# Patient Record
Sex: MALE | Race: WHITE
[De-identification: names, ages, dates, MRNs, and addresses within clinical notes are randomized per-mention and may not be internally consistent; named-entity substitution may affect disease eponyms.]

---

## 2019-11-13 ENCOUNTER — HOSPITAL ENCOUNTER (EMERGENCY)
Dept: HOSPITAL 46 - ED | Age: 54
LOS: 1 days | Discharge: HOME | End: 2019-11-14
Payer: MEDICAID

## 2019-11-13 VITALS — WEIGHT: 200 LBS | BODY MASS INDEX: 28.63 KG/M2 | HEIGHT: 70 IN

## 2019-11-13 DIAGNOSIS — Z88.0: ICD-10-CM

## 2019-11-13 DIAGNOSIS — N18.9: ICD-10-CM

## 2019-11-13 DIAGNOSIS — Z79.01: ICD-10-CM

## 2019-11-13 DIAGNOSIS — Z79.899: ICD-10-CM

## 2019-11-13 DIAGNOSIS — Z86.73: ICD-10-CM

## 2019-11-13 DIAGNOSIS — Z87.891: ICD-10-CM

## 2019-11-13 DIAGNOSIS — Z79.82: ICD-10-CM

## 2019-11-13 DIAGNOSIS — I12.9: Primary | ICD-10-CM

## 2019-11-13 DIAGNOSIS — E11.22: ICD-10-CM

## 2019-11-16 NOTE — EKG
[facility]
                                    [address]
                                  [place]  [address]
_________________________________________________________________________________________
                                                                 Signed   
 
 
Normal sinus rhythm
Possible Left atrial enlargement
Left axis deviation
Left ventricular hypertrophy with QRS widening
Cannot rule out Septal infarct , age undetermined
Abnormal ECG
No previous ECGs available
Confirmed by DANAY GOMES MD ([id number]) on 11/16/2019 7:55:11 PM
 
 
 
 
 
 
 
 
 
 
 
 
 
 
 
 
 
 
 
 
 
 
 
 
 
 
 
 
 
 
 
 
 
 
 
 
 
    Electronically Signed By: DANAY GOMES MD  11/16/19 1955
_________________________________________________________________________________________
PATIENT NAME:     ALEX PATEL             
MEDICAL RECORD #: [id number]                     Electrocardiogram             
          ACCT #: [id number]  
DATE OF BIRTH:   [birth year]                                       
PHYSICIAN:   DANAY GOMES MD           REPORT #: [id number]
REPORT IS CONFIDENTIAL AND NOT TO BE RELEASED WITHOUT AUTHORIZATION

## 2020-11-12 PROBLEM — R80.1 PERSISTENT PROTEINURIA: Chronic | Status: ACTIVE | Noted: 2020-11-12

## 2021-10-01 PROBLEM — N40.0 BENIGN PROSTATE HYPERPLASIA: Status: ACTIVE | Noted: 2021-10-01

## 2021-10-01 PROBLEM — F32.9 MAJOR DEPRESSIVE DISORDER: Status: ACTIVE | Noted: 2021-10-01

## 2021-10-01 PROBLEM — F31.9 BIPOLAR DISORDER: Status: ACTIVE | Noted: 2021-10-01

## 2021-10-01 PROBLEM — I50.32 CHRONIC DIASTOLIC HEART FAILURE: Status: ACTIVE | Noted: 2021-10-01

## 2021-10-01 PROBLEM — G81.90 HEMIPLEGIA: Status: ACTIVE | Noted: 2021-10-01

## 2021-10-01 PROBLEM — I48.91 ATRIAL FIBRILLATION: Status: ACTIVE | Noted: 2021-10-01

## 2021-10-01 PROBLEM — E78.5 HYPERLIPIDEMIA: Status: ACTIVE | Noted: 2021-10-01

## 2023-07-17 ENCOUNTER — OFFICE VISIT (OUTPATIENT)
Dept: OPHTHALMOLOGY | Facility: CLINIC | Age: 58
End: 2023-07-17
Payer: MEDICARE

## 2023-07-17 VITALS — HEIGHT: 70 IN | BODY MASS INDEX: 28.06 KG/M2 | WEIGHT: 196 LBS

## 2023-07-17 DIAGNOSIS — E11.3599 PROLIFERATIVE DIABETIC RETINOPATHY ASSOCIATED WITH TYPE 2 DIABETES MELLITUS, MACULAR EDEMA PRESENCE UNSPECIFIED, UNSPECIFIED LATERALITY: ICD-10-CM

## 2023-07-17 DIAGNOSIS — H33.23 BILATERAL RETINAL DETACHMENT: Primary | ICD-10-CM

## 2023-07-17 DIAGNOSIS — H44.003: ICD-10-CM

## 2023-07-17 PROCEDURE — 99212 OFFICE O/P EST SF 10 MIN: CPT | Mod: PBBFAC,PO | Performed by: STUDENT IN AN ORGANIZED HEALTH CARE EDUCATION/TRAINING PROGRAM

## 2023-07-17 PROCEDURE — 76512 OPH US DX B-SCAN: CPT | Mod: 50,PBBFAC,PO | Performed by: OPHTHALMOLOGY

## 2023-07-17 PROCEDURE — 76512 OPH US DX B-SCAN: CPT | Mod: PBBFAC,PO | Performed by: STUDENT IN AN ORGANIZED HEALTH CARE EDUCATION/TRAINING PROGRAM

## 2023-07-17 RX ORDER — DOCUSATE SODIUM 100 MG/1
CAPSULE, LIQUID FILLED ORAL
Status: ON HOLD | COMMUNITY
Start: 2023-03-17 | End: 2023-08-14

## 2023-07-17 RX ORDER — SEVELAMER CARBONATE 800 MG/1
TABLET, FILM COATED ORAL
Status: ON HOLD | COMMUNITY
Start: 2023-07-17 | End: 2023-09-01 | Stop reason: HOSPADM

## 2023-07-17 RX ORDER — IPRATROPIUM BROMIDE AND ALBUTEROL SULFATE 2.5; .5 MG/3ML; MG/3ML
3 SOLUTION RESPIRATORY (INHALATION) 3 TIMES DAILY
Status: ON HOLD | COMMUNITY
End: 2023-09-14 | Stop reason: SDUPTHER

## 2023-07-17 RX ORDER — HYDRALAZINE HYDROCHLORIDE 100 MG/1
100 TABLET, FILM COATED ORAL 3 TIMES DAILY
Status: ON HOLD | COMMUNITY
Start: 2023-07-03 | End: 2023-08-14

## 2023-07-17 RX ORDER — FERROUS GLUCONATE 324(38)MG
TABLET ORAL
Status: ON HOLD | COMMUNITY
Start: 2023-04-18 | End: 2023-08-14

## 2023-07-17 RX ORDER — CLONIDINE HYDROCHLORIDE 0.1 MG/1
0.1 TABLET ORAL 3 TIMES DAILY
Status: ON HOLD | COMMUNITY
Start: 2023-07-03 | End: 2023-08-14

## 2023-07-17 RX ORDER — POLYETHYLENE GLYCOL 3350 17 G/17G
POWDER, FOR SOLUTION ORAL
Status: ON HOLD | COMMUNITY
Start: 2023-03-17 | End: 2023-08-14 | Stop reason: CLARIF

## 2023-07-17 RX ORDER — VITAMIN B COMPLEX/FOLIC ACID 0.4 MG
TABLET ORAL
Status: ON HOLD | COMMUNITY
Start: 2023-04-18 | End: 2023-08-14

## 2023-07-17 RX ORDER — PENTOXIFYLLINE 400 MG/1
400 TABLET, EXTENDED RELEASE ORAL 2 TIMES DAILY
Status: ON HOLD | COMMUNITY
Start: 2023-07-17 | End: 2023-08-21 | Stop reason: HOSPADM

## 2023-07-17 RX ORDER — DOXAZOSIN 4 MG/1
4 TABLET ORAL 2 TIMES DAILY
Status: ON HOLD | COMMUNITY
Start: 2023-07-03 | End: 2023-08-14

## 2023-07-17 RX ORDER — OXYCODONE HYDROCHLORIDE 5 MG/1
10 CAPSULE ORAL EVERY 8 HOURS PRN
Status: ON HOLD | COMMUNITY
Start: 2023-03-17 | End: 2023-08-14

## 2023-07-17 RX ORDER — METOCLOPRAMIDE 5 MG/1
5 TABLET ORAL DAILY
Status: ON HOLD | COMMUNITY
Start: 2023-03-06 | End: 2023-08-21 | Stop reason: HOSPADM

## 2023-07-17 RX ORDER — LACTULOSE 10 G/15ML
45 SOLUTION ORAL; RECTAL DAILY PRN
Status: ON HOLD | COMMUNITY
Start: 2023-07-17 | End: 2023-08-21 | Stop reason: HOSPADM

## 2023-07-17 RX ORDER — FLECAINIDE ACETATE 50 MG/1
50 TABLET ORAL 2 TIMES DAILY
Status: ON HOLD | COMMUNITY
Start: 2023-07-17 | End: 2023-08-21 | Stop reason: HOSPADM

## 2023-07-17 RX ORDER — INSULIN LISPRO 100 [IU]/ML
INJECTION, SOLUTION INTRAVENOUS; SUBCUTANEOUS
Status: ON HOLD | COMMUNITY
Start: 2023-03-17 | End: 2023-08-14

## 2023-07-17 RX ORDER — FOLIC ACID 1 MG/1
1 TABLET ORAL EVERY MORNING
COMMUNITY

## 2023-07-17 RX ORDER — LOSARTAN POTASSIUM 25 MG/1
25 TABLET ORAL
Status: ON HOLD | COMMUNITY
Start: 2023-07-03 | End: 2023-08-14

## 2023-07-17 RX ORDER — IBUPROFEN 600 MG/1
600 TABLET ORAL EVERY 6 HOURS PRN
Status: ON HOLD | COMMUNITY
Start: 2023-07-17 | End: 2023-08-21 | Stop reason: HOSPADM

## 2023-07-17 RX ORDER — BUSPIRONE HYDROCHLORIDE 10 MG/1
1 TABLET ORAL
Status: ON HOLD | COMMUNITY
Start: 2023-03-17 | End: 2023-08-14

## 2023-07-17 RX ORDER — ROSUVASTATIN CALCIUM 5 MG/1
5 TABLET, COATED ORAL DAILY
Status: ON HOLD | COMMUNITY
Start: 2023-03-06 | End: 2023-08-21 | Stop reason: HOSPADM

## 2023-07-17 RX ORDER — MEGESTROL ACETATE 40 MG/ML
10 SUSPENSION ORAL 2 TIMES DAILY
COMMUNITY
Start: 2023-07-17

## 2023-07-17 RX ORDER — OXYCODONE AND ACETAMINOPHEN 5; 325 MG/1; MG/1
1 TABLET ORAL EVERY 8 HOURS PRN
Status: ON HOLD | COMMUNITY
Start: 2023-04-11 | End: 2023-08-14

## 2023-07-17 RX ORDER — AMLODIPINE BESYLATE 10 MG/1
10 TABLET ORAL DAILY
Status: ON HOLD | COMMUNITY
Start: 2023-03-06 | End: 2023-08-21 | Stop reason: HOSPADM

## 2023-07-17 RX ORDER — LOPERAMIDE HYDROCHLORIDE 2 MG/1
4 CAPSULE ORAL 3 TIMES DAILY
Status: ON HOLD | COMMUNITY
Start: 2023-07-03 | End: 2023-08-14

## 2023-07-17 RX ORDER — ALPRAZOLAM 0.5 MG/1
1 TABLET ORAL
Status: ON HOLD | COMMUNITY
Start: 2023-03-17 | End: 2023-08-14

## 2023-07-17 RX ORDER — CALCITRIOL 0.25 UG/1
0.25 CAPSULE ORAL
Status: ON HOLD | COMMUNITY
Start: 2023-07-03 | End: 2023-08-14

## 2023-07-17 NOTE — PROGRESS NOTES
HPI    Pt states lost vision OS approx 1 wk ago. Lost vision OD more recently.   States has been receiving injections for diabetic retinopathy since 2018.   Pt is on dialysis MWF. OS swollen earlier this week. Pt is resident at   nursing home in [place], [place]. Wheelchair bound.   Last edited by Massiel Russo RN on 7/17/2023  3:29 PM.          7/17/23  Assessment /Plan     For exam results, see Encounter Report.    Bilateral retinal detachment    Endophthalmitis of both eyes    Proliferative diabetic retinopathy associated with type 2 diabetes mellitus, macular edema presence unspecified, unspecified laterality      Bilateral retinal detachments  Bilateral endophthalmitis   - Patient diagnosed with a vitreous hemorrhage OS at outside ED and presents to clinic today for evaluation   - VA LP OU. IOP 4//12. Fibrin in AC OU with hypopyon OS. No view posteriorly   - B scan with RD OU and vitreous debris concerning for endophthalmitis OU  - Patient with ESRD on dialysis with right AV fistula recently placed 7/7/2023 ?potential source of infection   - Discussed with retina staff, patient will present to the ED at [facility] for evaluation. Sister will bring patient to [facility]. Provided with address   - Will likely need hospital admission for dialysis needs and workup to infectious source of endophthalmitis     3. History of PDR, both eyes   - Patient with history of necrotic bowel with sepsis requiring surgery in July 2022 per patient's sister.   - Per patient's sister, patient recently seen by retina specialist in April 2022 and no signs of RD/infection at that time  - Unsure chronicity of bilateral retinal detachments       Patient to be sent to [facility] ED for evaluation with retina service.   Patient seen and discussed with Dr. Estrada and Dr. Valencia (retina fellow)

## 2023-07-18 PROBLEM — H33.23 BILATERAL RETINAL DETACHMENT: Status: ACTIVE | Noted: 2023-07-18

## 2023-08-08 NOTE — PROVIDER TRANSFER
(Physician in Lead of Transfers)  Outside Transfer Acceptance Note / Regional Referral Center    Upon patient arrival to floor, please send SecureChat to [facility] or call extension [phone] (if no answer, do NOT leave a callback number after the beep, rather please send a SecureChat to [facility]), for Hospital Medicine admit team assignment and for additional admit orders for the patient.  Do not page the attending physician associated with the patient on arrival (this physician may not be on duty at the time of arrival).  Rather, always send a SecureChat to [facility] or call [phone] to reach the triage physician for orders and team assignment.    Referring facility: [facility]   Referring provider: SONYA BUTLER JR.  Accepting facility: [facility]  Accepting provider: ASHLEY CHAIREZ  Reason for transfer: Higher Level of Care   Transfer diagnosis: endopthalmitis  Transfer specialty requested: Ophthalmology  Transfer specialty notified: Yes  Transfer level: NUMBER 1-5: 2  Bed type requested: stepdown  Isolation status: No active isolations   Admission class or status: IP- Inpatient      Narrative     57-year-old male with a history of CHF, diabetes, hypertension, chronic disability, end-stage renal disease on hemodialysis, PEG placement, bowel obstruction, sleep apnea, TIA, left eye vitreous hemorrhage, stroke, and bowel obstruction with bowel resection admitted to [facility] in [place] July 18 from nursing home with left eye pain and blurred vision.  He was admitted with concern for bilateral endophthalmitis.  Other admit diagnoses included right upper extremity thrombus, end-stage renal disease on hemodialysis, hyperkalemia, sleep apnea, diabetes, and CHF.  He was treated with broad-spectrum antibiotics.  He was seen by Infectious Diseases,, and he was treated with vancomycin, ceftriaxone, and amphotericin.  Blood cultures were positive  for Pseudomonas, and amphotericin/vancomycin were stopped.  IV cefepime was continued.  He was seen by Ophthalmology, and he received intravitreal vancomycin and ceftazidime (July 18).  He also had intravitreal tap July 18 that had no yeast or fungal elements observed.  Left eye endophthalmitis did not respond to treatment, and he subsequently developed abscess formation, significant proptosis, and drainage of purulent material from the orbit.  Ophthalmology recommended enucleation.  He was continued on cefepime for pseudomonal and ophthalmitis.  Recommendation was for 6 weeks of treatment to be followed by indefinite fluoroquinolone.  He was seen by Pulmonology during his stay for a right upper lung mass with peripheral nodules.  It was felt that he would need further investigation of this once his current clinical issues stabilized.  Right upper extremity AV graft was excised during his stay with concern for infection.  A right IJ tunneled line was placed on July 27.  Left eye endophthalmitis continued to worsen, and ophthalmology spoke with patient and family about the need for enucleation.  Despite several conversations, family declined enucleation.  Plans were for transitioned to skilled nursing facility for continued treatment, but family did not want him to go to a skilled nursing facility.  He was subsequently discharged AMA from the hospital there on August 7.  Please see the August 7 Internal Medicine note for further details.    He subsequently presented to [facility] Emergency Department.  He will not go back to [facility] in [place]. He received Zosyn and vancomycin.  ED team at [facility] spoke with the patient and his power-of- (sister).  CT of the orbits noted scleral abscess along the lateral aspect of the left globe.  Patient and family are aware and in agreement that the patient needs enucleation of the eye at this time. Referring provider spoke with  Oculoplastics at [facility]. Requesting transfer to [facility] for Oculoplastics specialty evaluation of persistent endophthalmitis.  ED provider noted patient is hemodynamically stable.  He does not appear volume overloaded or in respiratory distress.    [facility]:  White blood cells 7.9, hemoglobin 8.3, hematocrit 28.1, platelets 259, sodium 137, potassium 4.2, chloride 103, CO2 28, BUN 46, creatinine 7.83, glucose 125, AST 7, ALT 10, CRP 18.9,  Venous pH 7.309, procalcitonin 0.71  -CT of the orbits noted scleral abscess suspected along the lateral aspect of the left globe in the region of the lateral rectus attachment with inflammatory change involving the preseptal and intraconal retrobulbar fat.  -chest x-ray noted right IJ tunneled hemodialysis catheter in good position.  No acute findings.      [facility]:  August 7:  White blood cells 6.4, hemoglobin 8.6, hematocrit 26, platelets 251, sodium 138, potassium 4.1, chloride 100, CO2 26, BUN 36, creatinine 6.1    August 3: MRI brain and orbits showed worsening ophthalmitis and inflammatory changes of the left orbital tissues with slight increase in proptosis.  No evidence of intracranial inflammatory process observed.  No acute intracranial process or other significant change in comparison with prior study.    July 28:  CT of the head had worsening inflammatory process in the left globe with interval rupture and volume loss.  Peripheral fluid collection which may represent vitreous leakage versus abscess formation.  No discrete abnormality in the right globe.  Subtle thickening and enhancement of the retina.  No enhancing lesions or acute intracranial process identified.    July 25: Transesophageal echocardiogram had no evidence of endocarditis.  Moderate to severely decreased left ventricular systolic function with EF 30-35%.  Moderately reduced RV systolic function.  Moderately enlarged right ventricle.  Moderate to severe tricuspid  regurgitation.    July 24: Blood cultures with no growth at 5 days    July 22:  CT chest showed right upper lobe mass with numerous bilateral nodularity predominantly in the right with associated mediastinal lymph nodes.  -CT abdomen and pelvis had no intra-abdominal/pelvic abscess.  Large amount of fecal material within the rectum.  Anasarca.    July 21: Blood cultures with Pseudomonas aeruginosa    July 19: MRI brain/orbits with and without contrast had findings suggestive of chronic microvascular ischemic disease of the white matter with remote ischemic event in the left insula/basal ganglia/subependymal white matter/right middle cerebellar peduncle and hemisphere.  Findings consistent with left endophthalmitis.  No abnormal findings observed in the right globe.    July 18:  Blood cultures with Pseudomonas aeruginosa and coag-negative staph    Objective     Vitals:  Temperature 99°, pulse 63, respirations 18, blood pressure 121/66, O2 sats 94%    Instructions    Admit to Hospital medicine  Consult Oculoplastics  Consult Nephrology for continued dialysis      LUIS Solis MD  Hospital Medicine Staff  Cell: [phone]

## 2023-08-09 ENCOUNTER — HOSPITAL ENCOUNTER (INPATIENT)
Facility: HOSPITAL | Age: 58
LOS: 40 days | Discharge: NURSING, REHAB OR LONG-TERM CARE | DRG: 113 | End: 2023-09-18
Attending: INTERNAL MEDICINE | Admitting: INTERNAL MEDICINE
Payer: MEDICARE

## 2023-08-09 DIAGNOSIS — Z99.2 ANEMIA IN CHRONIC KIDNEY DISEASE, ON CHRONIC DIALYSIS: ICD-10-CM

## 2023-08-09 DIAGNOSIS — R94.31 QT PROLONGATION: ICD-10-CM

## 2023-08-09 DIAGNOSIS — Z74.09 IMPAIRED MOBILITY: ICD-10-CM

## 2023-08-09 DIAGNOSIS — H44.002 LEFT ENDOPHTHALMIA: ICD-10-CM

## 2023-08-09 DIAGNOSIS — R23.8 REDNESS AND SWELLING OF UPPER ARM: ICD-10-CM

## 2023-08-09 DIAGNOSIS — R07.9 CHEST PAIN: ICD-10-CM

## 2023-08-09 DIAGNOSIS — R00.1 BRADYCARDIA: ICD-10-CM

## 2023-08-09 DIAGNOSIS — H44.009 ENDOPHTHALMITIS, UNSPECIFIED LATERALITY: ICD-10-CM

## 2023-08-09 DIAGNOSIS — I48.91 FIBRILLATION, ATRIAL: ICD-10-CM

## 2023-08-09 DIAGNOSIS — H44.009 ENDOPHTHALMITIS: ICD-10-CM

## 2023-08-09 DIAGNOSIS — G93.41 ACUTE METABOLIC ENCEPHALOPATHY: ICD-10-CM

## 2023-08-09 DIAGNOSIS — R41.0 DELIRIUM: ICD-10-CM

## 2023-08-09 DIAGNOSIS — N18.6 ESRD (END STAGE RENAL DISEASE): ICD-10-CM

## 2023-08-09 DIAGNOSIS — Z93.1 PEG (PERCUTANEOUS ENDOSCOPIC GASTROSTOMY) STATUS: Primary | Chronic | ICD-10-CM

## 2023-08-09 DIAGNOSIS — K92.1 MELENA: ICD-10-CM

## 2023-08-09 DIAGNOSIS — M79.89 REDNESS AND SWELLING OF UPPER ARM: ICD-10-CM

## 2023-08-09 DIAGNOSIS — R91.8 LUNG MASS: ICD-10-CM

## 2023-08-09 DIAGNOSIS — D63.1 ANEMIA IN CHRONIC KIDNEY DISEASE, ON CHRONIC DIALYSIS: ICD-10-CM

## 2023-08-09 DIAGNOSIS — N18.6 ANEMIA IN CHRONIC KIDNEY DISEASE, ON CHRONIC DIALYSIS: ICD-10-CM

## 2023-08-09 PROBLEM — I50.9 CONGESTIVE HEART FAILURE (CHF): Status: ACTIVE | Noted: 2023-08-09

## 2023-08-09 PROBLEM — E83.9 CHRONIC KIDNEY DISEASE-MINERAL AND BONE DISORDER: Status: ACTIVE | Noted: 2023-08-09

## 2023-08-09 PROBLEM — N18.9 ANEMIA IN CHRONIC KIDNEY DISEASE: Status: ACTIVE | Noted: 2023-04-28

## 2023-08-09 PROBLEM — N18.9 CHRONIC KIDNEY DISEASE-MINERAL AND BONE DISORDER: Status: ACTIVE | Noted: 2023-08-09

## 2023-08-09 PROBLEM — M89.9 CHRONIC KIDNEY DISEASE-MINERAL AND BONE DISORDER: Status: ACTIVE | Noted: 2023-08-09

## 2023-08-09 LAB
ALBUMIN SERPL BCP-MCNC: 2.4 G/DL (ref 3.5–5.2)
ALP SERPL-CCNC: 112 U/L (ref 55–135)
ALT SERPL W/O P-5'-P-CCNC: 9 U/L (ref 10–44)
ANION GAP SERPL CALC-SCNC: 18 MMOL/L (ref 8–16)
APTT PPP: 23.6 SEC (ref 21–32)
AST SERPL-CCNC: 12 U/L (ref 10–40)
BASOPHILS # BLD AUTO: 0.07 K/UL (ref 0–0.2)
BASOPHILS NFR BLD: 1 % (ref 0–1.9)
BILIRUB SERPL-MCNC: 0.4 MG/DL (ref 0.1–1)
BNP SERPL-MCNC: 4286 PG/ML (ref 0–99)
BUN SERPL-MCNC: 52 MG/DL (ref 6–20)
CALCIUM SERPL-MCNC: 9.8 MG/DL (ref 8.7–10.5)
CHLORIDE SERPL-SCNC: 103 MMOL/L (ref 95–110)
CO2 SERPL-SCNC: 17 MMOL/L (ref 23–29)
CREAT SERPL-MCNC: 9 MG/DL (ref 0.5–1.4)
DIFFERENTIAL METHOD: ABNORMAL
EOSINOPHIL # BLD AUTO: 0.4 K/UL (ref 0–0.5)
EOSINOPHIL NFR BLD: 5.5 % (ref 0–8)
ERYTHROCYTE [DISTWIDTH] IN BLOOD BY AUTOMATED COUNT: 19 % (ref 11.5–14.5)
EST. GFR  (NO RACE VARIABLE): 6.2 ML/MIN/1.73 M^2
GLUCOSE SERPL-MCNC: 62 MG/DL (ref 70–110)
HCT VFR BLD AUTO: 31.9 % (ref 40–54)
HGB BLD-MCNC: 9.3 G/DL (ref 14–18)
IMM GRANULOCYTES # BLD AUTO: 0.07 K/UL (ref 0–0.04)
IMM GRANULOCYTES NFR BLD AUTO: 1 % (ref 0–0.5)
INR PPP: 1.2 (ref 0.8–1.2)
LACTATE SERPL-SCNC: 0.6 MMOL/L (ref 0.5–2.2)
LYMPHOCYTES # BLD AUTO: 0.7 K/UL (ref 1–4.8)
LYMPHOCYTES NFR BLD: 9.9 % (ref 18–48)
MCH RBC QN AUTO: 30.3 PG (ref 27–31)
MCHC RBC AUTO-ENTMCNC: 29.2 G/DL (ref 32–36)
MCV RBC AUTO: 104 FL (ref 82–98)
MONOCYTES # BLD AUTO: 0.6 K/UL (ref 0.3–1)
MONOCYTES NFR BLD: 8.6 % (ref 4–15)
NEUTROPHILS # BLD AUTO: 5.2 K/UL (ref 1.8–7.7)
NEUTROPHILS NFR BLD: 74 % (ref 38–73)
NRBC BLD-RTO: 0 /100 WBC
PLATELET # BLD AUTO: 264 K/UL (ref 150–450)
PMV BLD AUTO: 10.4 FL (ref 9.2–12.9)
POCT GLUCOSE: 110 MG/DL (ref 70–110)
POCT GLUCOSE: 126 MG/DL (ref 70–110)
POTASSIUM SERPL-SCNC: 4.9 MMOL/L (ref 3.5–5.1)
PROCALCITONIN SERPL IA-MCNC: 0.5 NG/ML
PROT SERPL-MCNC: 7.3 G/DL (ref 6–8.4)
PROTHROMBIN TIME: 12.7 SEC (ref 9–12.5)
RBC # BLD AUTO: 3.07 M/UL (ref 4.6–6.2)
SODIUM SERPL-SCNC: 138 MMOL/L (ref 136–145)
VANCOMYCIN SERPL-MCNC: 17.2 UG/ML
WBC # BLD AUTO: 7.07 K/UL (ref 3.9–12.7)

## 2023-08-09 PROCEDURE — 80202 ASSAY OF VANCOMYCIN: CPT | Performed by: HOSPITALIST

## 2023-08-09 PROCEDURE — 63600175 PHARM REV CODE 636 W HCPCS: Performed by: STUDENT IN AN ORGANIZED HEALTH CARE EDUCATION/TRAINING PROGRAM

## 2023-08-09 PROCEDURE — 83605 ASSAY OF LACTIC ACID: CPT | Performed by: STUDENT IN AN ORGANIZED HEALTH CARE EDUCATION/TRAINING PROGRAM

## 2023-08-09 PROCEDURE — 99233 SBSQ HOSP IP/OBS HIGH 50: CPT | Mod: ,,, | Performed by: NURSE PRACTITIONER

## 2023-08-09 PROCEDURE — 85730 THROMBOPLASTIN TIME PARTIAL: CPT | Performed by: STUDENT IN AN ORGANIZED HEALTH CARE EDUCATION/TRAINING PROGRAM

## 2023-08-09 PROCEDURE — 99223 PR INITIAL HOSPITAL CARE,LEVL III: ICD-10-PCS | Mod: ,,, | Performed by: HOSPITALIST

## 2023-08-09 PROCEDURE — 99223 1ST HOSP IP/OBS HIGH 75: CPT | Mod: ,,, | Performed by: HOSPITALIST

## 2023-08-09 PROCEDURE — 80053 COMPREHEN METABOLIC PANEL: CPT | Performed by: STUDENT IN AN ORGANIZED HEALTH CARE EDUCATION/TRAINING PROGRAM

## 2023-08-09 PROCEDURE — 87040 BLOOD CULTURE FOR BACTERIA: CPT | Performed by: STUDENT IN AN ORGANIZED HEALTH CARE EDUCATION/TRAINING PROGRAM

## 2023-08-09 PROCEDURE — 93010 ELECTROCARDIOGRAM REPORT: CPT | Mod: ,,, | Performed by: INTERNAL MEDICINE

## 2023-08-09 PROCEDURE — 83880 ASSAY OF NATRIURETIC PEPTIDE: CPT | Performed by: STUDENT IN AN ORGANIZED HEALTH CARE EDUCATION/TRAINING PROGRAM

## 2023-08-09 PROCEDURE — 84145 PROCALCITONIN (PCT): CPT | Performed by: STUDENT IN AN ORGANIZED HEALTH CARE EDUCATION/TRAINING PROGRAM

## 2023-08-09 PROCEDURE — 93010 EKG 12-LEAD: ICD-10-PCS | Mod: ,,, | Performed by: INTERNAL MEDICINE

## 2023-08-09 PROCEDURE — 93005 ELECTROCARDIOGRAM TRACING: CPT

## 2023-08-09 PROCEDURE — 11000001 HC ACUTE MED/SURG PRIVATE ROOM

## 2023-08-09 PROCEDURE — 20600001 HC STEP DOWN PRIVATE ROOM

## 2023-08-09 PROCEDURE — 85025 COMPLETE CBC W/AUTO DIFF WBC: CPT | Performed by: STUDENT IN AN ORGANIZED HEALTH CARE EDUCATION/TRAINING PROGRAM

## 2023-08-09 PROCEDURE — 85610 PROTHROMBIN TIME: CPT | Performed by: STUDENT IN AN ORGANIZED HEALTH CARE EDUCATION/TRAINING PROGRAM

## 2023-08-09 PROCEDURE — 99233 PR SUBSEQUENT HOSPITAL CARE,LEVL III: ICD-10-PCS | Mod: ,,, | Performed by: NURSE PRACTITIONER

## 2023-08-09 PROCEDURE — 36415 COLL VENOUS BLD VENIPUNCTURE: CPT | Performed by: HOSPITALIST

## 2023-08-09 PROCEDURE — 25000003 PHARM REV CODE 250: Performed by: STUDENT IN AN ORGANIZED HEALTH CARE EDUCATION/TRAINING PROGRAM

## 2023-08-09 RX ORDER — HYDRALAZINE HYDROCHLORIDE 50 MG/1
100 TABLET, FILM COATED ORAL 3 TIMES DAILY
Status: DISCONTINUED | OUTPATIENT
Start: 2023-08-09 | End: 2023-08-12

## 2023-08-09 RX ORDER — NALOXONE HCL 0.4 MG/ML
0.02 VIAL (ML) INJECTION
Status: DISCONTINUED | OUTPATIENT
Start: 2023-08-09 | End: 2023-09-19 | Stop reason: HOSPADM

## 2023-08-09 RX ORDER — LOSARTAN POTASSIUM 25 MG/1
25 TABLET ORAL DAILY
Status: DISCONTINUED | OUTPATIENT
Start: 2023-08-09 | End: 2023-08-12

## 2023-08-09 RX ORDER — IBUPROFEN 200 MG
24 TABLET ORAL
Status: DISCONTINUED | OUTPATIENT
Start: 2023-08-09 | End: 2023-09-19 | Stop reason: HOSPADM

## 2023-08-09 RX ORDER — FUROSEMIDE 20 MG/1
20 TABLET ORAL 2 TIMES DAILY
Status: DISCONTINUED | OUTPATIENT
Start: 2023-08-09 | End: 2023-08-12

## 2023-08-09 RX ORDER — ENOXAPARIN SODIUM 100 MG/ML
40 INJECTION SUBCUTANEOUS EVERY 24 HOURS
Status: DISCONTINUED | OUTPATIENT
Start: 2023-08-09 | End: 2023-08-09

## 2023-08-09 RX ORDER — ISOSORBIDE MONONITRATE 60 MG/1
60 TABLET, EXTENDED RELEASE ORAL DAILY
Status: DISCONTINUED | OUTPATIENT
Start: 2023-08-09 | End: 2023-08-12

## 2023-08-09 RX ORDER — CITALOPRAM 10 MG/1
10 TABLET ORAL DAILY
Status: DISCONTINUED | OUTPATIENT
Start: 2023-08-09 | End: 2023-08-12

## 2023-08-09 RX ORDER — IPRATROPIUM BROMIDE AND ALBUTEROL SULFATE 2.5; .5 MG/3ML; MG/3ML
3 SOLUTION RESPIRATORY (INHALATION) EVERY 4 HOURS PRN
Status: DISCONTINUED | OUTPATIENT
Start: 2023-08-09 | End: 2023-08-23

## 2023-08-09 RX ORDER — AMLODIPINE BESYLATE 2.5 MG/1
2.5 TABLET ORAL DAILY
Status: DISCONTINUED | OUTPATIENT
Start: 2023-08-09 | End: 2023-08-09

## 2023-08-09 RX ORDER — GLUCAGON 1 MG
1 KIT INJECTION
Status: DISCONTINUED | OUTPATIENT
Start: 2023-08-09 | End: 2023-09-19 | Stop reason: HOSPADM

## 2023-08-09 RX ORDER — NAPROXEN SODIUM 220 MG/1
81 TABLET, FILM COATED ORAL DAILY
Status: DISCONTINUED | OUTPATIENT
Start: 2023-08-09 | End: 2023-08-09

## 2023-08-09 RX ORDER — TAMSULOSIN HYDROCHLORIDE 0.4 MG/1
1 CAPSULE ORAL DAILY
Status: DISCONTINUED | OUTPATIENT
Start: 2023-08-09 | End: 2023-08-12

## 2023-08-09 RX ORDER — CARVEDILOL 25 MG/1
25 TABLET ORAL 2 TIMES DAILY WITH MEALS
Status: DISCONTINUED | OUTPATIENT
Start: 2023-08-09 | End: 2023-08-12

## 2023-08-09 RX ORDER — ATORVASTATIN CALCIUM 40 MG/1
40 TABLET, FILM COATED ORAL DAILY
Status: DISCONTINUED | OUTPATIENT
Start: 2023-08-09 | End: 2023-08-12

## 2023-08-09 RX ORDER — NIFEDIPINE 30 MG/1
30 TABLET, EXTENDED RELEASE ORAL DAILY
Status: DISCONTINUED | OUTPATIENT
Start: 2023-08-09 | End: 2023-08-12

## 2023-08-09 RX ORDER — DOXAZOSIN 1 MG/1
4 TABLET ORAL DAILY
Status: DISCONTINUED | OUTPATIENT
Start: 2023-08-09 | End: 2023-08-09

## 2023-08-09 RX ORDER — SODIUM CHLORIDE 0.9 % (FLUSH) 0.9 %
10 SYRINGE (ML) INJECTION EVERY 12 HOURS PRN
Status: DISCONTINUED | OUTPATIENT
Start: 2023-08-09 | End: 2023-09-19 | Stop reason: HOSPADM

## 2023-08-09 RX ORDER — BUSPIRONE HYDROCHLORIDE 10 MG/1
10 TABLET ORAL 2 TIMES DAILY
Status: DISCONTINUED | OUTPATIENT
Start: 2023-08-09 | End: 2023-08-12

## 2023-08-09 RX ORDER — CLOPIDOGREL BISULFATE 75 MG/1
75 TABLET ORAL DAILY
Status: DISCONTINUED | OUTPATIENT
Start: 2023-08-09 | End: 2023-08-09

## 2023-08-09 RX ORDER — CALCITRIOL 0.25 UG/1
0.25 CAPSULE ORAL DAILY
Status: DISCONTINUED | OUTPATIENT
Start: 2023-08-09 | End: 2023-08-12

## 2023-08-09 RX ORDER — IBUPROFEN 200 MG
16 TABLET ORAL
Status: DISCONTINUED | OUTPATIENT
Start: 2023-08-09 | End: 2023-09-19 | Stop reason: HOSPADM

## 2023-08-09 RX ORDER — LACTULOSE 10 G/15ML
10 SOLUTION ORAL 2 TIMES DAILY
Status: DISCONTINUED | OUTPATIENT
Start: 2023-08-09 | End: 2023-08-12

## 2023-08-09 RX ORDER — SEVELAMER CARBONATE 800 MG/1
800 TABLET, FILM COATED ORAL
Status: DISCONTINUED | OUTPATIENT
Start: 2023-08-09 | End: 2023-08-14

## 2023-08-09 RX ADMIN — FUROSEMIDE 20 MG: 20 TABLET ORAL at 09:08

## 2023-08-09 RX ADMIN — ISOSORBIDE MONONITRATE 60 MG: 60 TABLET, EXTENDED RELEASE ORAL at 03:08

## 2023-08-09 RX ADMIN — ATORVASTATIN CALCIUM 40 MG: 40 TABLET, FILM COATED ORAL at 03:08

## 2023-08-09 RX ADMIN — LOSARTAN POTASSIUM 25 MG: 25 TABLET, FILM COATED ORAL at 03:08

## 2023-08-09 RX ADMIN — NIFEDIPINE 30 MG: 30 TABLET, FILM COATED, EXTENDED RELEASE ORAL at 03:08

## 2023-08-09 RX ADMIN — CARVEDILOL 25 MG: 25 TABLET, FILM COATED ORAL at 05:08

## 2023-08-09 RX ADMIN — LACTULOSE 10 G: 20 SOLUTION ORAL at 09:08

## 2023-08-09 RX ADMIN — TAMSULOSIN HYDROCHLORIDE 0.4 MG: 0.4 CAPSULE ORAL at 03:08

## 2023-08-09 RX ADMIN — BUSPIRONE HYDROCHLORIDE 10 MG: 10 TABLET ORAL at 09:08

## 2023-08-09 RX ADMIN — HYDRALAZINE HYDROCHLORIDE 100 MG: 50 TABLET ORAL at 09:08

## 2023-08-09 RX ADMIN — CITALOPRAM HYDROBROMIDE 10 MG: 10 TABLET ORAL at 03:08

## 2023-08-09 RX ADMIN — HYDRALAZINE HYDROCHLORIDE 100 MG: 50 TABLET ORAL at 04:08

## 2023-08-09 RX ADMIN — CALCITRIOL CAPSULES 0.25 MCG 0.25 MCG: 0.25 CAPSULE ORAL at 03:08

## 2023-08-09 RX ADMIN — PIPERACILLIN SODIUM AND TAZOBACTAM SODIUM 4.5 G: 4; .5 INJECTION, POWDER, FOR SOLUTION INTRAVENOUS at 03:08

## 2023-08-09 NOTE — LETTER
August 26, 2023    Immanuel Bernal  [address] [address]         AUDIOLOGY    Josefa Sherman M.S., CCC-A  EMILY Dowd, CCC-A  Samson Beard, CCC-A  Samson Nuñez, CCC-A  Handy Fry Jr., M.C.D., CCA-A  EMILY Trammell, CCC-A  Samson Baeza, CCC-A    Patient: Immanuel Bernal  YOB: 1965  MRN: [id number]      This is to certify that the patient's relative, Marley Bernal, who is his sister and his only power of , was his  during his hospital stay from the time of his admission.   [facility] [address] [place] [address]  phone [phone]  fax [phone]  [email or web address]

## 2023-08-09 NOTE — ASSESSMENT & PLAN NOTE
Last echo at outside facility    July 25: Transesophageal echocardiogram had no evidence of endocarditis. Moderate to severely decreased left ventricular systolic function with EF 30-35%. Moderately reduced RV systolic function. Moderately enlarged right ventricle. Moderate to severe tricuspid regurgitation.     -Repeat BNP  -Monitor for volume overload  -on 2L  -Pulse ox   -CXR  -Titrated GDMT

## 2023-08-09 NOTE — ASSESSMENT & PLAN NOTE
Ophthalmology team on board for tentative OR     -Repeat MRI w contrast- Nephrology team on board to dialyze post scan  -Keep NPO for possible operation  -Vancomycin and Zosyn  -Daily Labs  -Repeat Blood cultures

## 2023-08-09 NOTE — PLAN OF CARE
"Ophthalmology Plan of Care Note    I examined patient at bedside this afternoon upon arrival to [facility]. I was able to get in contact with patient's sister and MPOA, Marley Bernal. Discussed indications for enucleation (vs evisceration) for Mr. Bernal. Discussed that patient has "no light perception" vision as well as a worsening infection, evident on imaging obtained at outside hospital. As an ophthalmology team, we are all extremely concerned that the infection will continue to worsen and possibly spread to brain. She states that she was under the impression that if surgery was performed, it would be in several days after the IV antibiotics have had time to fully take effect. I explained to her that with the severity of the left eye infection, the infection will not be resolved with IV antibiotics alone and surgical intervention is required. Not only that but the patient has had several weeks of IV antibiotics and the infection has continued to progress (as evident on MRI performed 8/3/23).     Patient's MPOA asked many questions regarding the care given to Mr. Bernal at [facility]. I explained to her that I cannot discuss what occurred at [facility] because I was not at [facility] and I am not sure of the details that occurred during that hospitalization. I explained to her that I can review the exam findings as well as imaging through [facility] from [facility], but I do not have specific details regarding the care at [facility].     Of note, Mr. Bernal is not able to consent for himself. He does report that he understands that he has an infection of the left eye. When asked if he would be okay with surgical removal, he states "I am willing to take the risks of surgery". Patient's MPOA and Mr. Bernal had a phone call discussion regarding the need for evisceration vs enucleation. At the end of the discussion, patient's MPOA AGREED to consent to enucleation or evisceration. Rahul (SWAPNIL) was present throughout the entire " discussion.     Will plan for OR tomorrow (case request not in at this time). Will place case request tomorrow morning.     Patient's case discussed with Dr. Stewart (oculoplastics attending)

## 2023-08-09 NOTE — LETTER
August 28, 2023         [address]  [facility] [address]  Phone: [phone]  Fax: [phone]       Patient: Immanuel Bernal   YOB: 1965  Date of Visit: 08/28/2023    To Whom It May Concern:    Mr Immanuel Bernal has been admitted at [facility] since August 9, 2023 for multiple life-threatening health problems including a severe eye infection, gastrointestinal bleeding, and encephalopathy. His sister, healthcare power of , and sole primary caregiver, Ms Marley Bernal has been dedicated to his care since that time.     Therefore, Ms Bernal may be unable to attend to some of her regular duties, as she has been in [place] caring for her brother.      If you have any questions or concerns, or if I can be of further assistance, please do not hesitate to contact me.    Sincerely,          Pat Montoya MD

## 2023-08-09 NOTE — ASSESSMENT & PLAN NOTE
Outpatient HD Information:    -Outpatient HD unit: C   -HD tx days: MWF   -HD tx time: 210min  -HD access: R IJ TDC   -HD modality: iHD   -Residual urine: ?      Plan/Recommendations:    -If receiving gadolinium contrast, will need dialysis for 3 days in a row due to risk of nephrogenic systemic fibrosis   -Renal diet  -Strict I/O's and daily weights  -Daily renal function panels   -Renally dose meds

## 2023-08-09 NOTE — CONSULTS
[facility] ([place] [address])  Nephrology  Consult Note    Patient Name: Immanuel Bernal  MRN: [id number]  Admission Date: 8/9/2023  Hospital Length of Stay: 0 days  Attending Provider: Porsha Funez MD   Primary Care Physician: Dolly, Primary Doctor  Principal Problem:Endophthalmitis    Inpatient consult to Nephrology  Consult performed by: Pete Grimes NP  Consult ordered by: Leroy Park MD  Reason for consult: ESRD/HD         Subjective:     HPI: HPI obtained per medical record as patient unable to communicate     57-year-old male with a history of CHF, diabetes, hypertension, chronic disability, end-stage renal disease on hemodialysis, PEG placement, bowel obstruction, sleep apnea, TIA, left eye vitreous hemorrhage, stroke, and bowel obstruction with bowel resection admitted to [facility] in [place] July 18 from nursing home with left eye pain and blurred vision.  He was admitted with concern for bilateral endophthalmitis.  Other admit diagnoses included right upper extremity thrombus, end-stage renal disease on hemodialysis, hyperkalemia, sleep apnea, diabetes, and CHF.  He was treated with broad-spectrum antibiotics.  He was seen by Infectious Diseases,, and he was treated with vancomycin, ceftriaxone, and amphotericin.  Blood cultures were positive for Pseudomonas, and amphotericin/vancomycin were stopped.  IV cefepime was continued.  He was seen by Ophthalmology, and he received intravitreal vancomycin and ceftazidime (July 18).  He also had intravitreal tap July 18 that had no yeast or fungal elements observed.  Left eye endophthalmitis did not respond to treatment, and he subsequently developed abscess formation, significant proptosis, and drainage of purulent material from the orbit.  Ophthalmology recommended enucleation.  He was continued on cefepime for pseudomonal and ophthalmitis.  Recommendation was for 6 weeks of treatment to be followed by indefinite  fluoroquinolone.  He was seen by Pulmonology during his stay for a right upper lung mass with peripheral nodules.  It was felt that he would need further investigation of this once his current clinical issues stabilized.  Right upper extremity AV graft was excised during his stay with concern for infection.  A right IJ tunneled line was placed on July 27.  Left eye endophthalmitis continued to worsen, and ophthalmology spoke with patient and family about the need for enucleation.  Despite several conversations, family declined enucleation.  Plans were for transitioned to skilled nursing facility for continued treatment, but family did not want him to go to a skilled nursing facility.  He was subsequently discharged AMA from the hospital there on August 7.  Please see the August 7 Internal Medicine note for further details.     He subsequently presented to [facility] Emergency Department.  He will not go back to [facility] in [place]. He received Zosyn and vancomycin.  ED team at [facility] spoke with the patient and his power-of- (sister).  CT of the orbits noted scleral abscess along the lateral aspect of the left globe.  Patient and family are aware and in agreement that the patient needs enucleation of the eye at this time. Referring provider spoke with Oculoplastics at [facility]. Requesting transfer to Hospital Medicine for Oculoplastics specialty evaluation of persistent endophthalmitis.  ED provider noted patient is hemodynamically stable.  He does not appear volume overloaded or in respiratory distress.        The patient has a significant previous medical course as listed below  August 3: MRI brain and orbits showed worsening ophthalmitis and inflammatory changes of the left orbital tissues with slight increase in proptosis.  No evidence of intracranial inflammatory process observed.    July 25: Transesophageal echocardiogram had no evidence of endocarditis.   Moderate to severely decreased left ventricular systolic function with EF 30-35%.    July 22:  CT chest showed right upper lobe mass with numerous bilateral nodularity predominantly in the right with associated mediastinal lymph nodes.  July 21: Blood cultures with Pseudomonas aeruginosa  July 19: MRI brain/orbits with and without contrast had findings suggestive of chronic microvascular ischemic disease of the white matter with remote ischemic event in the left insula/basal ganglia/subependymal white matter/right middle cerebellar peduncle and hemisphere.  Findings consistent with left endophthalmitis.  No abnormal findings observed in the right globe.  July 18:  Blood cultures with Pseudomonas aeruginosa and coag-negative staph    HPI obtained from [facility]'s note. Patient with AMS at time of nephrology evaluation and unable to provide HPI or ROS.          Past Medical History:   Diagnosis Date    Bilateral retinal detachment 7/18/2023    BPH (benign prostatic hyperplasia)     Cataract     CHF (congestive heart failure)     CKD (chronic kidney disease) stage 3, GFR 30-59 ml/min     Diabetes mellitus, type 2     Hypertension     KENIA (obstructive sleep apnea)     Pancreatitis     Persistent proteinuria 11/12/2020    2 g proteinuria noted Resumed ACE Lower BP systolic to less than 130     PTSD (post-traumatic stress disorder)     Stroke        Past Surgical History:   Procedure Laterality Date    CATARACT EXTRACTION Bilateral        Review of patient's allergies indicates:   Allergen Reactions    Morphine Rash    Amiodarone analogues Itching     Other reaction(s): Unknown     Current Facility-Administered Medications   Medication Frequency    albuterol-ipratropium 2.5 mg-0.5 mg/3 mL nebulizer solution 3 mL Q4H PRN    atorvastatin tablet 40 mg Daily    busPIRone tablet 10 mg BID    calcitRIOL capsule 0.25 mcg Daily    carvediloL tablet 25 mg BID WM    citalopram tablet 10 mg Daily    dextrose  10% bolus 125 mL 125 mL PRN    dextrose 10% bolus 250 mL 250 mL PRN    furosemide tablet 20 mg BID    glucagon (human recombinant) injection 1 mg PRN    glucose chewable tablet 16 g PRN    glucose chewable tablet 24 g PRN    hydrALAZINE tablet 100 mg TID    isosorbide mononitrate 24 hr tablet 60 mg Daily    lactulose 20 gram/30 mL solution Soln 10 g BID    losartan tablet 25 mg Daily    naloxone 0.4 mg/mL injection 0.02 mg PRN    NIFEdipine 24 hr tablet 30 mg Daily    piperacillin-tazobactam (ZOSYN) 4.5 g in dextrose 5 % in water (D5W) 100 mL IVPB (MB+) Q12H    sevelamer carbonate tablet 800 mg TID WM    sodium chloride 0.9% flush 10 mL Q12H PRN    tamsulosin 24 hr capsule 0.4 mg Daily    vancomycin - pharmacy to dose pharmacy to manage frequency     Family History       Problem Relation (Age of Onset)    Diabetes Father, Sister    Heart disease Father    Hyperlipidemia Brother    Kidney disease Father    Stroke Mother          Tobacco Use    Smoking status: Former     Current packs/day: 0.00     Types: Cigarettes, Cigars    Smokeless tobacco: Never   Substance and Sexual Activity    Alcohol use: Not Currently    Drug use: Not Currently    Sexual activity: Not on file     Review of Systems   Unable to perform ROS: Mental status change     Objective:     Vital Signs (Most Recent):  Temp: 98 °F (36.7 °C) (08/09/23 1400)  Resp: 18 (08/09/23 1400)  BP: (!) 160/90 (08/09/23 1400)  SpO2: (!) 94 % (08/09/23 1400) Vital Signs (24h Range):  Temp:  [98 °F (36.7 °C)] 98 °F (36.7 °C)  Resp:  [18] 18  SpO2:  [94 %] 94 %  BP: (160)/(90) 160/90        There is no height or weight on file to calculate BMI.  There is no height or weight on file to calculate BSA.    No intake/output data recorded.     Physical Exam  Constitutional:       Appearance: He is ill-appearing.   HENT:      Head: Normocephalic and atraumatic.      Nose: Nose normal.      Mouth/Throat:      Mouth: Mucous membranes are moist.      Pharynx:  "Oropharynx is clear.   Eyes:      General:         Left eye: Discharge present.  Cardiovascular:      Rate and Rhythm: Normal rate and regular rhythm.      Comments: R IJ TDC   Pulmonary:      Effort: Pulmonary effort is normal.      Breath sounds: Normal breath sounds.   Abdominal:      General: Abdomen is flat.      Palpations: Abdomen is soft.   Musculoskeletal:      Cervical back: Normal range of motion and neck supple.      Right lower leg: No edema.      Left lower leg: No edema.   Skin:     General: Skin is warm and dry.   Neurological:      Mental Status: He is alert. He is disoriented.          Significant Labs:  CBC: No results for input(s): "WBC", "RBC", "HGB", "HCT", "PLT", "MCV", "MCH", "MCHC" in the last 168 hours.  CMP: No results for input(s): "GLU", "CALCIUM", "ALBUMIN", "PROT", "NA", "K", "CO2", "CL", "BUN", "CREATININE", "ALKPHOS", "ALT", "AST", "BILITOT" in the last 168 hours.  All labs within the past 24 hours have been reviewed.        Assessment/Plan:     Ophtho  * Endophthalmitis  -Plan per primary     Renal/  Chronic kidney disease-mineral and bone disorder  -Continue calcitriol and renvela     ESRD (end stage renal disease)  Outpatient HD Information:    -Outpatient HD unit: C   -HD tx days: MWF   -HD tx time: 210min  -HD access: R IJ TDC   -HD modality: iHD   -Residual urine: ?      Plan/Recommendations:    -If receiving gadolinium contrast, will need dialysis for 3 days in a row due to risk of nephrogenic systemic fibrosis   -Renal diet  -Strict I/O's and daily weights  -Daily renal function panels   -Renally dose meds      Oncology  Anemia in chronic kidney disease  -Transfuse for Hg <7.0  -Will obtain ferritin/iron studies         Thank you for your consult. I will follow-up with patient. Please contact us if you have any additional questions.    Pete Grimes, NP  Nephrology  [facility] ([facility]  "

## 2023-08-09 NOTE — H&P
[facility] - Intensive Care ([address] Medicine  History & Physical    Patient Name: Immanuel Bernal  MRN: [id number]  Patient Class: IP- Inpatient  Admission Date: 8/9/2023  Attending Physician: Porsha Funez MD   Primary Care Provider: [facility]         Patient information was obtained from past medical records.     Subjective:     Principal Problem:Endophthalmitis    Chief Complaint:   Chief Complaint   Patient presents with    Eye Problem        HPI: HPI obtained per medical record as patient unable to communicate    57-year-old male with a history of CHF, diabetes, hypertension, chronic disability, end-stage renal disease on hemodialysis, PEG placement, bowel obstruction, sleep apnea, TIA, left eye vitreous hemorrhage, stroke, and bowel obstruction with bowel resection admitted to [facility] in [place] July 18 from nursing home with left eye pain and blurred vision.  He was admitted with concern for bilateral endophthalmitis.  Other admit diagnoses included right upper extremity thrombus, end-stage renal disease on hemodialysis, hyperkalemia, sleep apnea, diabetes, and CHF.  He was treated with broad-spectrum antibiotics.  He was seen by Infectious Diseases,, and he was treated with vancomycin, ceftriaxone, and amphotericin.  Blood cultures were positive for Pseudomonas, and amphotericin/vancomycin were stopped.  IV cefepime was continued.  He was seen by Ophthalmology, and he received intravitreal vancomycin and ceftazidime (July 18).  He also had intravitreal tap July 18 that had no yeast or fungal elements observed.  Left eye endophthalmitis did not respond to treatment, and he subsequently developed abscess formation, significant proptosis, and drainage of purulent material from the orbit.  Ophthalmology recommended enucleation.  He was continued on cefepime for pseudomonal and ophthalmitis.  Recommendation was for 6 weeks of treatment to be followed by indefinite  fluoroquinolone.  He was seen by Pulmonology during his stay for a right upper lung mass with peripheral nodules.  It was felt that he would need further investigation of this once his current clinical issues stabilized.  Right upper extremity AV graft was excised during his stay with concern for infection.  A right IJ tunneled line was placed on July 27.  Left eye endophthalmitis continued to worsen, and ophthalmology spoke with patient and family about the need for enucleation.  Despite several conversations, family declined enucleation.  Plans were for transitioned to skilled nursing facility for continued treatment, but family did not want him to go to a skilled nursing facility.  He was subsequently discharged AMA from the hospital there on August 7.  Please see the August 7 Internal Medicine note for further details.     He subsequently presented to [facility] Emergency Department.  He will not go back to [facility] in [place]. He received Zosyn and vancomycin.  ED team at [place] spoke with the patient and his power-of- (sister).  CT of the orbits noted scleral abscess along the lateral aspect of the left globe.  Patient and family are aware and in agreement that the patient needs enucleation of the eye at this time. Referring provider spoke with Oculoplastics at [facility]. Requesting transfer to Hospital Medicine for Oculoplastics specialty evaluation of persistent endophthalmitis.  ED provider noted patient is hemodynamically stable.  He does not appear volume overloaded or in respiratory distress.      The patient has a significant previous medical course as listed below  August 3: MRI brain and orbits showed worsening ophthalmitis and inflammatory changes of the left orbital tissues with slight increase in proptosis.  No evidence of intracranial inflammatory process observed.    July 25: Transesophageal echocardiogram had no evidence of endocarditis.   Moderate to severely decreased left ventricular systolic function with EF 30-35%.    July 22:  CT chest showed right upper lobe mass with numerous bilateral nodularity predominantly in the right with associated mediastinal lymph nodes.  July 21: Blood cultures with Pseudomonas aeruginosa  July 19: MRI brain/orbits with and without contrast had findings suggestive of chronic microvascular ischemic disease of the white matter with remote ischemic event in the left insula/basal ganglia/subependymal white matter/right middle cerebellar peduncle and hemisphere.  Findings consistent with left endophthalmitis.  No abnormal findings observed in the right globe.  July 18:  Blood cultures with Pseudomonas aeruginosa and coag-negative staph      Past Medical History:   Diagnosis Date    Bilateral retinal detachment 7/18/2023    BPH (benign prostatic hyperplasia)     Cataract     CHF (congestive heart failure)     CKD (chronic kidney disease) stage 3, GFR 30-59 ml/min     Diabetes mellitus, type 2     Hypertension     KENIA (obstructive sleep apnea)     Pancreatitis     Persistent proteinuria 11/12/2020    2 g proteinuria noted Resumed ACE Lower BP systolic to less than 130     PTSD (post-traumatic stress disorder)     Stroke        Past Surgical History:   Procedure Laterality Date    CATARACT EXTRACTION Bilateral        Review of patient's allergies indicates:   Allergen Reactions    Morphine Rash    Amiodarone analogues Itching     Other reaction(s): Unknown       Current Facility-Administered Medications on File Prior to Encounter   Medication    [DISCONTINUED] acetaminophen tablet    [DISCONTINUED] aspirin EC tablet    [DISCONTINUED] atropine 1% ophthalmic solution    [DISCONTINUED] carvediloL tablet    [DISCONTINUED] citalopram tablet    [DISCONTINUED] dextrose 40 % gel    [DISCONTINUED] dextrose 5 % (D5W) infusion    [DISCONTINUED] dextrose 50 % in water (D50W) injection    [DISCONTINUED] epoetin  thee-epbx injection    [DISCONTINUED] ergocalciferol capsule    [DISCONTINUED] flecainide tablet    [DISCONTINUED] GENERIC EXTERNAL MEDICATION    [DISCONTINUED] GENERIC EXTERNAL MEDICATION    [DISCONTINUED] glucagon HCL SolR    [DISCONTINUED] glucagon SolR    [DISCONTINUED] heparin (porcine) injection    [DISCONTINUED] insulin lispro injection    [DISCONTINUED] melatonin tablet    [DISCONTINUED] mirtazapine tablet    [DISCONTINUED] moxifloxacin 0.5 % ophthalmic solution    [DISCONTINUED] multivitamin with folic acid 400 mcg Tab    [DISCONTINUED] neomycin-polymyxin-dexamethasone ophthalmic ointment    [DISCONTINUED] ondansetron injection    [DISCONTINUED] oxyCODONE immediate release tablet    [DISCONTINUED] pantoprazole EC tablet    [DISCONTINUED] pentoxifylline CR tablet    [DISCONTINUED] polyethylene glycol packet    [DISCONTINUED] senna tablet    [DISCONTINUED] sevelamer HCL tablet    [DISCONTINUED] tamsulosin 24 hr capsule    [DISCONTINUED] vitamin E (dl, acetate) capsule     Current Outpatient Medications on File Prior to Encounter   Medication Sig    acetaminophen-codeine 300-30mg (TYLENOL #3) 300-30 mg Tab Take 1 tablet by mouth every 6 (six) hours as needed.    albuterol-ipratropium (DUO-NEB) 2.5 mg-0.5 mg/3 mL nebulizer solution Inhale 3 mLs into the lungs.    ALPRAZolam (XANAX) 0.5 MG tablet Take 1 tablet by mouth.    amLODIPine (NORVASC) 10 MG tablet Take by mouth.    apixaban (ELIQUIS) 2.5 mg Tab Take 2.5 mg by mouth 2 (two) times daily.    ascorbic acid, vitamin C, (VITAMIN C) 500 MG tablet Take 500 mg by mouth once daily.    aspirin 81 MG Chew Take 81 mg by mouth once daily.    atorvastatin (LIPITOR) 80 MG tablet atorvastatin 80 mg tablet    B COMPLEX 1, WITH FOLIC ACID, 0.4 mg Tab     busPIRone (BUSPAR) 10 MG tablet Take 1 tablet by mouth.    calcitRIOL (ROCALTROL) 0.25 MCG Cap Take 0.25 mcg by mouth.    calcium carb/vit D3/minerals (CALCIUM CARBONATE-VIT D3-MIN ORAL)  Take by mouth.    carvediloL (COREG) 25 MG tablet Take 1 tablet (25 mg total) by mouth 2 (two) times daily with meals.    chlorthalidone (HYGROTEN) 25 MG Tab Take 1 tablet (25 mg total) by mouth once daily.    ciprofloxacin HCl (CILOXAN) 0.3 % ophthalmic solution ciprofloxacin 0.3 % eye drops   INSTILL 1 DROP IN AFFECTED EYE FOUR TIMES A DAY BEGINNING 3 DAYS PRIOR TO SURGERY AND CONTINUE TILL EMPTY    citalopram (CELEXA) 10 MG tablet citalopram 10 mg tablet    CLEARLAX 17 gram/dose powder SMARTSI scoopful By Mouth Twice Daily    cloNIDine (CATAPRES) 0.1 MG tablet Take 0.1 mg by mouth 3 (three) times daily.    clopidogreL (PLAVIX) 75 mg tablet Take 75 mg by mouth once daily.    docusate sodium (COLACE) 100 MG capsule Take by mouth.    doxazosin (CARDURA) 4 MG tablet Take 4 mg by mouth 2 (two) times daily.    ergocalciferol (ERGOCALCIFEROL) 50,000 unit Cap TAKE 1 CAPSULE BY MOUTH EVERY WEDNESDAY    ergocalciferol, vitamin D2, (VITAMIN D ORAL) Take 0.25 mcg by mouth.    ferrous gluconate (FERGON) 324 MG tablet     flecainide (TAMBOCOR) 50 MG Tab Take 50 mg by mouth 2 (two) times daily.    folic acid (FOLVITE) 1 MG tablet Take 1 tablet by mouth every morning.    folic acid/vit B complex and C (B COMPLEX-VITAMIN C-FOLIC ACID ORAL) Take by mouth.    FREESTYLE BRYAN 14 DAY SENSOR Kit 1 each by Misc.(Non-Drug; Combo Route) route every 14 (fourteen) days.    furosemide (LASIX) 20 MG tablet Take 20 mg by mouth 2 (two) times daily.    guaiFENesin (HUMIBID E) 400 mg Tab Take 400 mg by mouth.    HUMALOG U-100 INSULIN 100 unit/mL injection Inject into the skin.    hydrALAZINE (APRESOLINE) 100 MG tablet Take 100 mg by mouth 3 (three) times daily.    hydroCHLOROthiazide (HYDRODIURIL) 12.5 MG Tab Take 12.5 mg by mouth once daily.    ibuprofen (ADVIL,MOTRIN) 600 MG tablet Take 600 mg by mouth every 6 (six) hours as needed.    insulin aspart U-100 (NOVOLOG) 100 unit/mL injection Inject into the skin 3 (three)  times daily before meals.    insulin glargine (LANTUS U-100 INSULIN) 100 unit/mL injection Inject 15 Units into the skin once daily.    isosorbide mononitrate (IMDUR) 60 MG 24 hr tablet TAKE 1 & 1 2 (ONE & ONE HALF) TABLETS BY MOUTH ONCE DAILY IN THE MORNING    ketorolac 0.5% (ACULAR) 0.5 % Drop INSTILL 1 DROP 4 TIMES DAILY BEGIN THREE DAYS PRIOR TO SURGERY. USE UNTIL COMPLETED OR DIRECTED TO STOP BY DOCTOR    lactulose (CHRONULAC) 10 gram/15 mL solution SMARTSIG:Milliliter(s) By Mouth    lisinopriL 10 MG tablet Take 2 tablets (20 mg total) by mouth once daily.    loperamide (IMODIUM) 2 mg capsule Take 4 mg by mouth 3 (three) times daily.    losartan (COZAAR) 25 MG tablet Take 25 mg by mouth.    megestroL (MEGACE) 400 mg/10 mL (40 mg/mL) Susp Take by mouth.    methoxy peg-epoetin beta (MIRCERA INJ) 200 mcg.    metoclopramide HCl (REGLAN) 10 MG tablet Take by mouth 3 (three) times daily.    NIFEdipine (PROCARDIA) 20 MG Cap Take 60 mg by mouth every 8 (eight) hours.    ONETOUCH DELICA LANCETS 33 gauge Misc 1 lancet by Misc.(Non-Drug; Combo Route) route 3 (three) times daily.    ONETOUCH VERIO FLEX METER Misc 1 each by Misc.(Non-Drug; Combo Route) route as needed.    ONETOUCH VERIO MID CONTROL Soln 1 each by Misc.(Non-Drug; Combo Route) route as needed.    ONETOUCH VERIO TEST STRIPS Strp 1 strip by Misc.(Non-Drug; Combo Route) route 3 (three) times daily.    oxyCODONE (OXY-IR) 5 mg Cap Take 10 mg by mouth every 8 (eight) hours as needed.    oxyCODONE-acetaminophen (PERCOCET) 5-325 mg per tablet Take 1 tablet by mouth every 8 (eight) hours as needed.    pantoprazole (PROTONIX) 20 MG tablet Take 20 mg by mouth once daily.    pentoxifylline (TRENTAL) 400 mg TbSR Take 400 mg by mouth 2 (two) times daily.    pioglitazone (ACTOS) 30 MG tablet Take 1 tablet (30 mg total) by mouth once daily.    prednisoLONE acetate (PRED FORTE) 1 % DrpS prednisolone acetate 1 % eye drops,suspension   INSTILL 1 DROP 4  TIMES DAILY IN AFFECTED EYE BEGINNING AFTER SURGERY AND CONTINUE UNTIL COMPLETE OR DIRECTED TO STOP BY DOCTOR    rosuvastatin (CRESTOR) 5 MG tablet Take by mouth.    sevelamer carbonate (RENVELA) 800 mg Tab Take by mouth.    sodium chloride 0.9% SolP 1,000 mL with heparin (porcine) 10,000 unit/mL Soln 90,000 Units Heparin Sodium (Porcine) 1,000 Units/mL Systemic    tamsulosin (FLOMAX) 0.4 mg Cap Take 1 capsule (0.4 mg total) by mouth once daily.    torsemide (DEMADEX) 20 MG Tab Take 1 tablet (20 mg total) by mouth 2 (two) times a day.    trazodone (DESYREL) 25 MG tablet 1 tablet.    VITAMIN B COMPLEX ORAL Take 1 tablet by mouth every morning.    zinc sulfate (ORAZINC) 110 mg (25 mg zinc) Tab Orazinc 110 mg (25 mg zinc) tablet   Take 1 tablet every day by oral route for 30 days.     Family History       Problem Relation (Age of Onset)    Diabetes Father, Sister    Heart disease Father    Hyperlipidemia Brother    Kidney disease Father    Stroke Mother          Tobacco Use    Smoking status: Former     Current packs/day: 0.00     Types: Cigarettes, Cigars    Smokeless tobacco: Never   Substance and Sexual Activity    Alcohol use: Not Currently    Drug use: Not Currently    Sexual activity: Not on file     Review of Systems   Unable to perform ROS: Mental status change     Objective:     Vital Signs (Most Recent):    Vital Signs (24h Range):           There is no height or weight on file to calculate BMI.     Physical Exam  Vitals reviewed.   Constitutional:       Appearance: He is ill-appearing.   HENT:      Head: Normocephalic.      Mouth/Throat:      Pharynx: Oropharynx is clear.   Eyes:      Comments: Left eye   Draining purulence  Significant proptosis and chemosis  Appears fixed  Unable to determine vision   Neck:      Comments: Line in place in Right neck  Cardiovascular:      Rate and Rhythm: Normal rate.      Pulses: Normal pulses.   Pulmonary:      Effort: Pulmonary effort is normal. No  respiratory distress.   Abdominal:      General: Abdomen is flat. There is no distension.      Comments: Peg in place   Musculoskeletal:         General: Deformity and signs of injury present.      Comments: Yuridia noted   Skin:     General: Skin is warm.   Neurological:      Mental Status: He is disoriented.      Comments: Alert only to name   Psychiatric:      Comments: Unable to determine                Significant Labs: All pertinent labs within the past 24 hours have been reviewed.    Significant Imaging: I have reviewed all pertinent imaging results/findings within the past 24 hours.    Assessment/Plan:     * Endophthalmitis  Ophthalmology team on board for tentative OR     -Repeat MRI w contrast- Nephrology team on board to dialyze post scan  -Keep NPO for possible operation  -Vancomycin and Zosyn  -Daily Labs  -Repeat Blood cultures      Congestive heart failure (CHF)  Last echo at outside facility    July 25: Transesophageal echocardiogram had no evidence of endocarditis. Moderate to severely decreased left ventricular systolic function with EF 30-35%. Moderately reduced RV systolic function. Moderately enlarged right ventricle. Moderate to severe tricuspid regurgitation.     -Repeat BNP  -Monitor for volume overload  -on 2L  -Pulse ox   -CXR  -Titrated GDMT      ESRD (end stage renal disease)  Nephrology consulted for HD needs while inpatient      Bipolar disorder  Home psych meds reorderd      Atrial fibrillation  Continue apixaban  Continue home CCB    Hyperlipidemia  Home statin      Chronic diastolic heart failure  Continue to monitor for signs of volume overload      Persistent proteinuria  Repeat UA      Hypertension  Hypertensive on arrival    Home imdur  Carvedilol  Hydralazine  Nifedipine  And losartan reordered    Stroke  Continue home statin  Continue apixaban  Delerium precautions  PT OT    Diabetes mellitus  POCT glucose  LDSS  -180      VTE Risk Mitigation (From admission, onward)          Ordered     IP VTE HIGH RISK PATIENT  Once         08/09/23 1317     Place sequential compression device  Until discontinued         08/09/23 1317                           Leroy Park MD  Department of Hospital Medicine  [facility] - Intensive Care ([place]-)

## 2023-08-09 NOTE — SUBJECTIVE & OBJECTIVE
Past Medical History:   Diagnosis Date    Bilateral retinal detachment 7/18/2023    BPH (benign prostatic hyperplasia)     Cataract     CHF (congestive heart failure)     CKD (chronic kidney disease) stage 3, GFR 30-59 ml/min     Diabetes mellitus, type 2     Hypertension     KENIA (obstructive sleep apnea)     Pancreatitis     Persistent proteinuria 11/12/2020    2 g proteinuria noted Resumed ACE Lower BP systolic to less than 130     PTSD (post-traumatic stress disorder)     Stroke        Past Surgical History:   Procedure Laterality Date    CATARACT EXTRACTION Bilateral        Review of patient's allergies indicates:   Allergen Reactions    Morphine Rash    Amiodarone analogues Itching     Other reaction(s): Unknown     Current Facility-Administered Medications   Medication Frequency    albuterol-ipratropium 2.5 mg-0.5 mg/3 mL nebulizer solution 3 mL Q4H PRN    atorvastatin tablet 40 mg Daily    busPIRone tablet 10 mg BID    calcitRIOL capsule 0.25 mcg Daily    carvediloL tablet 25 mg BID WM    citalopram tablet 10 mg Daily    dextrose 10% bolus 125 mL 125 mL PRN    dextrose 10% bolus 250 mL 250 mL PRN    furosemide tablet 20 mg BID    glucagon (human recombinant) injection 1 mg PRN    glucose chewable tablet 16 g PRN    glucose chewable tablet 24 g PRN    hydrALAZINE tablet 100 mg TID    isosorbide mononitrate 24 hr tablet 60 mg Daily    lactulose 20 gram/30 mL solution Soln 10 g BID    losartan tablet 25 mg Daily    naloxone 0.4 mg/mL injection 0.02 mg PRN    NIFEdipine 24 hr tablet 30 mg Daily    piperacillin-tazobactam (ZOSYN) 4.5 g in dextrose 5 % in water (D5W) 100 mL IVPB (MB+) Q12H    sevelamer carbonate tablet 800 mg TID WM    sodium chloride 0.9% flush 10 mL Q12H PRN    tamsulosin 24 hr capsule 0.4 mg Daily    vancomycin - pharmacy to dose pharmacy to manage frequency     Family History       Problem Relation (Age of Onset)    Diabetes Father, Sister    Heart disease Father    Hyperlipidemia Brother     "Kidney disease Father    Stroke Mother          Tobacco Use    Smoking status: Former     Current packs/day: 0.00     Types: Cigarettes, Cigars    Smokeless tobacco: Never   Substance and Sexual Activity    Alcohol use: Not Currently    Drug use: Not Currently    Sexual activity: Not on file     Review of Systems   Unable to perform ROS: Mental status change     Objective:     Vital Signs (Most Recent):  Temp: 98 °F (36.7 °C) (08/09/23 1400)  Resp: 18 (08/09/23 1400)  BP: (!) 160/90 (08/09/23 1400)  SpO2: (!) 94 % (08/09/23 1400) Vital Signs (24h Range):  Temp:  [98 °F (36.7 °C)] 98 °F (36.7 °C)  Resp:  [18] 18  SpO2:  [94 %] 94 %  BP: (160)/(90) 160/90        There is no height or weight on file to calculate BMI.  There is no height or weight on file to calculate BSA.    No intake/output data recorded.     Physical Exam  Constitutional:       Appearance: He is ill-appearing.   HENT:      Head: Normocephalic and atraumatic.      Nose: Nose normal.      Mouth/Throat:      Mouth: Mucous membranes are moist.      Pharynx: Oropharynx is clear.   Eyes:      General:         Left eye: Discharge present.  Cardiovascular:      Rate and Rhythm: Normal rate and regular rhythm.      Comments: R IJ TDC   Pulmonary:      Effort: Pulmonary effort is normal.      Breath sounds: Normal breath sounds.   Abdominal:      General: Abdomen is flat.      Palpations: Abdomen is soft.   Musculoskeletal:      Cervical back: Normal range of motion and neck supple.      Right lower leg: No edema.      Left lower leg: No edema.   Skin:     General: Skin is warm and dry.   Neurological:      Mental Status: He is alert. He is disoriented.          Significant Labs:  CBC: No results for input(s): "WBC", "RBC", "HGB", "HCT", "PLT", "MCV", "MCH", "MCHC" in the last 168 hours.  CMP: No results for input(s): "GLU", "CALCIUM", "ALBUMIN", "PROT", "NA", "K", "CO2", "CL", "BUN", "CREATININE", "ALKPHOS", "ALT", "AST", "BILITOT" in the last 168 hours.  All " labs within the past 24 hours have been reviewed.

## 2023-08-09 NOTE — HPI
Immanuel Bernal is a 57 y.o. male with ESRD, HFrEF 30-35%, DM2, HTN, history of bowel obstruction s/p bowel resection, now with PEG in place, ?KENIA, history of CVA, ??AFib who was recently hospitalized at [facility] in 7/2023 for b/l endophthalmitis, Pseudomonas bacteremia, RUE thrombus, RUE AVG infection s/p excision, and RUL mass. Left eye did not improve & subsequently developed abscess, but POA declined enucleation and patient ultimately left AMA. Re-presented to [facility] ED with worsening eye symptoms with imaging showing left scleral abscess, so he was transferred to [facility] on 8/9/2023 for Oculoplastics evaluation & enucleation.

## 2023-08-09 NOTE — ASSESSMENT & PLAN NOTE
Hypertensive on arrival    Home imdur  Carvedilol  Hydralazine  Nifedipine  And losartan reordered

## 2023-08-09 NOTE — SUBJECTIVE & OBJECTIVE
Past Medical History:   Diagnosis Date    Bilateral retinal detachment 7/18/2023    BPH (benign prostatic hyperplasia)     Cataract     CHF (congestive heart failure)     CKD (chronic kidney disease) stage 3, GFR 30-59 ml/min     Diabetes mellitus, type 2     Hypertension     KENIA (obstructive sleep apnea)     Pancreatitis     Persistent proteinuria 11/12/2020    2 g proteinuria noted Resumed ACE Lower BP systolic to less than 130     PTSD (post-traumatic stress disorder)     Stroke        Past Surgical History:   Procedure Laterality Date    CATARACT EXTRACTION Bilateral        Review of patient's allergies indicates:   Allergen Reactions    Morphine Rash    Amiodarone analogues Itching     Other reaction(s): Unknown       Current Facility-Administered Medications on File Prior to Encounter   Medication    [DISCONTINUED] acetaminophen tablet    [DISCONTINUED] aspirin EC tablet    [DISCONTINUED] atropine 1% ophthalmic solution    [DISCONTINUED] carvediloL tablet    [DISCONTINUED] citalopram tablet    [DISCONTINUED] dextrose 40 % gel    [DISCONTINUED] dextrose 5 % (D5W) infusion    [DISCONTINUED] dextrose 50 % in water (D50W) injection    [DISCONTINUED] epoetin thee-epbx injection    [DISCONTINUED] ergocalciferol capsule    [DISCONTINUED] flecainide tablet    [DISCONTINUED] GENERIC EXTERNAL MEDICATION    [DISCONTINUED] GENERIC EXTERNAL MEDICATION    [DISCONTINUED] glucagon HCL SolR    [DISCONTINUED] glucagon SolR    [DISCONTINUED] heparin (porcine) injection    [DISCONTINUED] insulin lispro injection    [DISCONTINUED] melatonin tablet    [DISCONTINUED] mirtazapine tablet    [DISCONTINUED] moxifloxacin 0.5 % ophthalmic solution    [DISCONTINUED] multivitamin with folic acid 400 mcg Tab    [DISCONTINUED] neomycin-polymyxin-dexamethasone ophthalmic ointment    [DISCONTINUED] ondansetron injection    [DISCONTINUED] oxyCODONE immediate release tablet    [DISCONTINUED] pantoprazole EC tablet    [DISCONTINUED] pentoxifylline  CR tablet    [DISCONTINUED] polyethylene glycol packet    [DISCONTINUED] senna tablet    [DISCONTINUED] sevelamer HCL tablet    [DISCONTINUED] tamsulosin 24 hr capsule    [DISCONTINUED] vitamin E (dl, acetate) capsule     Current Outpatient Medications on File Prior to Encounter   Medication Sig    acetaminophen-codeine 300-30mg (TYLENOL #3) 300-30 mg Tab Take 1 tablet by mouth every 6 (six) hours as needed.    albuterol-ipratropium (DUO-NEB) 2.5 mg-0.5 mg/3 mL nebulizer solution Inhale 3 mLs into the lungs.    ALPRAZolam (XANAX) 0.5 MG tablet Take 1 tablet by mouth.    amLODIPine (NORVASC) 10 MG tablet Take by mouth.    apixaban (ELIQUIS) 2.5 mg Tab Take 2.5 mg by mouth 2 (two) times daily.    ascorbic acid, vitamin C, (VITAMIN C) 500 MG tablet Take 500 mg by mouth once daily.    aspirin 81 MG Chew Take 81 mg by mouth once daily.    atorvastatin (LIPITOR) 80 MG tablet atorvastatin 80 mg tablet    B COMPLEX 1, WITH FOLIC ACID, 0.4 mg Tab     busPIRone (BUSPAR) 10 MG tablet Take 1 tablet by mouth.    calcitRIOL (ROCALTROL) 0.25 MCG Cap Take 0.25 mcg by mouth.    calcium carb/vit D3/minerals (CALCIUM CARBONATE-VIT D3-MIN ORAL) Take by mouth.    carvediloL (COREG) 25 MG tablet Take 1 tablet (25 mg total) by mouth 2 (two) times daily with meals.    chlorthalidone (HYGROTEN) 25 MG Tab Take 1 tablet (25 mg total) by mouth once daily.    ciprofloxacin HCl (CILOXAN) 0.3 % ophthalmic solution ciprofloxacin 0.3 % eye drops   INSTILL 1 DROP IN AFFECTED EYE FOUR TIMES A DAY BEGINNING 3 DAYS PRIOR TO SURGERY AND CONTINUE TILL EMPTY    citalopram (CELEXA) 10 MG tablet citalopram 10 mg tablet    CLEARLAX 17 gram/dose powder SMARTSI scoopful By Mouth Twice Daily    cloNIDine (CATAPRES) 0.1 MG tablet Take 0.1 mg by mouth 3 (three) times daily.    clopidogreL (PLAVIX) 75 mg tablet Take 75 mg by mouth once daily.    docusate sodium (COLACE) 100 MG capsule Take by mouth.    doxazosin (CARDURA) 4 MG tablet Take 4 mg by mouth 2 (two)  times daily.    ergocalciferol (ERGOCALCIFEROL) 50,000 unit Cap TAKE 1 CAPSULE BY MOUTH EVERY WEDNESDAY    ergocalciferol, vitamin D2, (VITAMIN D ORAL) Take 0.25 mcg by mouth.    ferrous gluconate (FERGON) 324 MG tablet     flecainide (TAMBOCOR) 50 MG Tab Take 50 mg by mouth 2 (two) times daily.    folic acid (FOLVITE) 1 MG tablet Take 1 tablet by mouth every morning.    folic acid/vit B complex and C (B COMPLEX-VITAMIN C-FOLIC ACID ORAL) Take by mouth.    FREESTYLE BRYAN 14 DAY SENSOR Kit 1 each by Misc.(Non-Drug; Combo Route) route every 14 (fourteen) days.    furosemide (LASIX) 20 MG tablet Take 20 mg by mouth 2 (two) times daily.    guaiFENesin (HUMIBID E) 400 mg Tab Take 400 mg by mouth.    HUMALOG U-100 INSULIN 100 unit/mL injection Inject into the skin.    hydrALAZINE (APRESOLINE) 100 MG tablet Take 100 mg by mouth 3 (three) times daily.    hydroCHLOROthiazide (HYDRODIURIL) 12.5 MG Tab Take 12.5 mg by mouth once daily.    ibuprofen (ADVIL,MOTRIN) 600 MG tablet Take 600 mg by mouth every 6 (six) hours as needed.    insulin aspart U-100 (NOVOLOG) 100 unit/mL injection Inject into the skin 3 (three) times daily before meals.    insulin glargine (LANTUS U-100 INSULIN) 100 unit/mL injection Inject 15 Units into the skin once daily.    isosorbide mononitrate (IMDUR) 60 MG 24 hr tablet TAKE 1 & 1 2 (ONE & ONE HALF) TABLETS BY MOUTH ONCE DAILY IN THE MORNING    ketorolac 0.5% (ACULAR) 0.5 % Drop INSTILL 1 DROP 4 TIMES DAILY BEGIN THREE DAYS PRIOR TO SURGERY. USE UNTIL COMPLETED OR DIRECTED TO STOP BY DOCTOR    lactulose (CHRONULAC) 10 gram/15 mL solution SMARTSIG:Milliliter(s) By Mouth    lisinopriL 10 MG tablet Take 2 tablets (20 mg total) by mouth once daily.    loperamide (IMODIUM) 2 mg capsule Take 4 mg by mouth 3 (three) times daily.    losartan (COZAAR) 25 MG tablet Take 25 mg by mouth.    megestroL (MEGACE) 400 mg/10 mL (40 mg/mL) Susp Take by mouth.    methoxy peg-epoetin beta (MIRCERA INJ) 200 mcg.     metoclopramide HCl (REGLAN) 10 MG tablet Take by mouth 3 (three) times daily.    NIFEdipine (PROCARDIA) 20 MG Cap Take 60 mg by mouth every 8 (eight) hours.    ONETOUCH DELICA LANCETS 33 gauge Misc 1 lancet by Misc.(Non-Drug; Combo Route) route 3 (three) times daily.    ONETOUCH VERIO FLEX METER Misc 1 each by Misc.(Non-Drug; Combo Route) route as needed.    ONETOUCH VERIO MID CONTROL Soln 1 each by Misc.(Non-Drug; Combo Route) route as needed.    ONETOUCH VERIO TEST STRIPS Strp 1 strip by Misc.(Non-Drug; Combo Route) route 3 (three) times daily.    oxyCODONE (OXY-IR) 5 mg Cap Take 10 mg by mouth every 8 (eight) hours as needed.    oxyCODONE-acetaminophen (PERCOCET) 5-325 mg per tablet Take 1 tablet by mouth every 8 (eight) hours as needed.    pantoprazole (PROTONIX) 20 MG tablet Take 20 mg by mouth once daily.    pentoxifylline (TRENTAL) 400 mg TbSR Take 400 mg by mouth 2 (two) times daily.    pioglitazone (ACTOS) 30 MG tablet Take 1 tablet (30 mg total) by mouth once daily.    prednisoLONE acetate (PRED FORTE) 1 % DrpS prednisolone acetate 1 % eye drops,suspension   INSTILL 1 DROP 4 TIMES DAILY IN AFFECTED EYE BEGINNING AFTER SURGERY AND CONTINUE UNTIL COMPLETE OR DIRECTED TO STOP BY DOCTOR    rosuvastatin (CRESTOR) 5 MG tablet Take by mouth.    sevelamer carbonate (RENVELA) 800 mg Tab Take by mouth.    sodium chloride 0.9% SolP 1,000 mL with heparin (porcine) 10,000 unit/mL Soln 90,000 Units Heparin Sodium (Porcine) 1,000 Units/mL Systemic    tamsulosin (FLOMAX) 0.4 mg Cap Take 1 capsule (0.4 mg total) by mouth once daily.    torsemide (DEMADEX) 20 MG Tab Take 1 tablet (20 mg total) by mouth 2 (two) times a day.    trazodone (DESYREL) 25 MG tablet 1 tablet.    VITAMIN B COMPLEX ORAL Take 1 tablet by mouth every morning.    zinc sulfate (ORAZINC) 110 mg (25 mg zinc) Tab Orazinc 110 mg (25 mg zinc) tablet   Take 1 tablet every day by oral route for 30 days.     Family History       Problem Relation (Age of Onset)     Diabetes Father, Sister    Heart disease Father    Hyperlipidemia Brother    Kidney disease Father    Stroke Mother          Tobacco Use    Smoking status: Former     Current packs/day: 0.00     Types: Cigarettes, Cigars    Smokeless tobacco: Never   Substance and Sexual Activity    Alcohol use: Not Currently    Drug use: Not Currently    Sexual activity: Not on file     Review of Systems   Unable to perform ROS: Mental status change     Objective:     Vital Signs (Most Recent):    Vital Signs (24h Range):           There is no height or weight on file to calculate BMI.     Physical Exam  Vitals reviewed.   Constitutional:       Appearance: He is ill-appearing.   HENT:      Head: Normocephalic.      Mouth/Throat:      Pharynx: Oropharynx is clear.   Eyes:      Comments: Left eye   Draining purulence  Significant proptosis and chemosis  Appears fixed  Unable to determine vision   Neck:      Comments: Line in place in Right neck  Cardiovascular:      Rate and Rhythm: Normal rate.      Pulses: Normal pulses.   Pulmonary:      Effort: Pulmonary effort is normal. No respiratory distress.   Abdominal:      General: Abdomen is flat. There is no distension.      Comments: Peg in place   Musculoskeletal:         General: Deformity and signs of injury present.      Comments: Yuridia noted   Skin:     General: Skin is warm.   Neurological:      Mental Status: He is disoriented.      Comments: Alert only to name   Psychiatric:      Comments: Unable to determine                Significant Labs: All pertinent labs within the past 24 hours have been reviewed.    Significant Imaging: I have reviewed all pertinent imaging results/findings within the past 24 hours.

## 2023-08-09 NOTE — NURSING
Nurses Note -- 4 Eyes      8/9/2023   4:23 PM      Skin assessed during: Admit      [x] No Altered Skin Integrity Present    [x]Prevention Measures Documented    Dry skin noted to feet and legs     [] Yes- Altered Skin Integrity Present or Discovered   [] LDA Added if Not in Epic (Describe Wound)   [] New Altered Skin Integrity was Present on Admit and Documented in LDA   [] Wound Image Taken    Wound Care Consulted? No    Attending Nurse:  Rahul KRAFT    Second RN/Staff Member:   JEREMÍAS Rhoades

## 2023-08-09 NOTE — CONSULTS
"Consultation Report  Ophthalmology Service    Date: 08/09/2023    Reason for Consult: "Occuloplastics for enucleation."     History of Present Illness: Immanuel Bernal is a 57-year-old male with a history of CHF, diabetes, hypertension, chronic disability, end-stage renal disease on hemodialysis, PEG placement, bowel obstruction, sleep apnea, TIA, left eye vitreous hemorrhage, stroke, and bowel obstruction with bowel resection admitted to [facility] in [place] July 18 from nursing home with left eye pain and blurred vision.  He was admitted with concern for bilateral endophthalmitis.  Other admit diagnoses included right upper extremity thrombus, end-stage renal disease on hemodialysis, hyperkalemia, sleep apnea, diabetes, and CHF.  He was treated with broad-spectrum antibiotics.  He was seen by Infectious Diseases, and he was treated with vancomycin, ceftriaxone, and amphotericin.  Blood cultures were positive for Pseudomonas, and amphotericin/vancomycin were stopped.  IV cefepime was continued.  He was seen by Ophthalmology, and he received intravitreal vancomycin and ceftazidime (July 18).  He also had intravitreal tap July 18 that had no yeast or fungal elements observed.  Left eye endophthalmitis did not respond to treatment, and he subsequently developed abscess formation, significant proptosis, and drainage of purulent material from the orbit.  Ophthalmology recommended enucleation.  He was continued on cefepime for pseudomonal and ophthalmitis.  Recommendation was for 6 weeks of treatment to be followed by indefinite fluoroquinolone.  He was seen by Pulmonology during his stay for a right upper lung mass with peripheral nodules.  It was felt that he would need further investigation of this once his current clinical issues stabilized.  Right upper extremity AV graft was excised during his stay with concern for infection.  A right IJ tunneled line was placed on July 27.  Left eye " endophthalmitis continued to worsen, and ophthalmology spoke with patient and family about the need for enucleation.  Despite several conversations, family declined enucleation.  Plans were for transitioned to skilled nursing facility for continued treatment, but family did not want him to go to a skilled nursing facility.  He was subsequently discharged AMA from the hospital there on August 7.      He subsequently presented to [facility] Emergency Department.  He will not go back to [facility] in [place]. He received Zosyn and vancomycin.  ED team at [place] spoke with the patient and his power-of- (sister).  CT of the orbits noted scleral abscess along the lateral aspect of the left globe.      Patient and family are aware and in agreement that the patient needs enucleation of the eye at this time. Referring provider spoke with Oculoplastics at [facility]. Requesting transfer to [facility] Medicine for Oculoplastics specialty evaluation of persistent endophthalmitis.  ED provider noted patient is hemodynamically stable.  He does not appear volume overloaded or in respiratory distress. Ophthalmology is being consulted to evaluate for bilateral endophthalmitis requiring enucleation OS.       POcularHx: mild NPDR w/ macular edema OU per chart review    Current eye gtts: Atropine, vigamox     Family Hx:  family history includes Diabetes in his father and sister; Heart disease in his father; Hyperlipidemia in his brother; Kidney disease in his father; Stroke in his mother.     PMHx:  has a past medical history of Bilateral retinal detachment (7/18/2023), BPH (benign prostatic hyperplasia), Cataract, CHF (congestive heart failure), CKD (chronic kidney disease) stage 3, GFR 30-59 ml/min, Diabetes mellitus, type 2, Hypertension, KENIA (obstructive sleep apnea), Pancreatitis, Persistent proteinuria (11/12/2020), PTSD (post-traumatic stress disorder), and Stroke.     PSurgHx:   has a past surgical history that includes Cataract extraction (Bilateral).     Home Medications:   Prior to Admission medications    Medication Sig Start Date End Date Taking? Authorizing Provider   acetaminophen-codeine 300-30mg (TYLENOL #3) 300-30 mg Tab Take 1 tablet by mouth every 6 (six) hours as needed. 5/26/21   Isaias Williamson MD   albuterol-ipratropium (DUO-NEB) 2.5 mg-0.5 mg/3 mL nebulizer solution Inhale 3 mLs into the lungs.    Provider, Historical   ALPRAZolam (XANAX) 0.5 MG tablet Take 1 tablet by mouth. 3/17/23   Provider, Historical   amLODIPine (NORVASC) 10 MG tablet Take by mouth. 3/6/23   Provider, Historical   apixaban (ELIQUIS) 2.5 mg Tab Take 2.5 mg by mouth 2 (two) times daily.    Provider, Historical   ascorbic acid, vitamin C, (VITAMIN C) 500 MG tablet Take 500 mg by mouth once daily.    Provider, Historical   aspirin 81 MG Chew Take 81 mg by mouth once daily.    Provider, Historical   atorvastatin (LIPITOR) 80 MG tablet atorvastatin 80 mg tablet    Provider, Historical   B COMPLEX 1, WITH FOLIC ACID, 0.4 mg Tab  4/18/23   Provider, Historical   busPIRone (BUSPAR) 10 MG tablet Take 1 tablet by mouth. 3/17/23   Provider, Historical   calcitRIOL (ROCALTROL) 0.25 MCG Cap Take 0.25 mcg by mouth. 7/3/23   Provider, Historical   calcium carb/vit D3/minerals (CALCIUM CARBONATE-VIT D3-MIN ORAL) Take by mouth. 3/6/23   Provider, Historical   carvediloL (COREG) 25 MG tablet Take 1 tablet (25 mg total) by mouth 2 (two) times daily with meals. 11/12/20   Yuliana Mayberry MD   chlorthalidone (HYGROTEN) 25 MG Tab Take 1 tablet (25 mg total) by mouth once daily. 11/12/20 11/12/21  Yuliana Mayberry MD   ciprofloxacin HCl (CILOXAN) 0.3 % ophthalmic solution ciprofloxacin 0.3 % eye drops   INSTILL 1 DROP IN AFFECTED EYE FOUR TIMES A DAY BEGINNING 3 DAYS PRIOR TO SURGERY AND CONTINUE TILL EMPTY    Provider, Historical   citalopram (CELEXA) 10 MG tablet citalopram 10 mg tablet    Provider, Historical   CLEARLAX 17  gram/dose powder SMARTSI scoopful By Mouth Twice Daily 3/17/23   Provider, Historical   cloNIDine (CATAPRES) 0.1 MG tablet Take 0.1 mg by mouth 3 (three) times daily. 7/3/23   Provider, Historical   clopidogreL (PLAVIX) 75 mg tablet Take 75 mg by mouth once daily. 20   Provider, Historical   docusate sodium (COLACE) 100 MG capsule Take by mouth. 3/17/23   Provider, Historical   doxazosin (CARDURA) 4 MG tablet Take 4 mg by mouth 2 (two) times daily. 7/3/23   Provider, Historical   ergocalciferol (ERGOCALCIFEROL) 50,000 unit Cap TAKE 1 CAPSULE BY MOUTH EVERY 20   Provider, Historical   ergocalciferol, vitamin D2, (VITAMIN D ORAL) Take 0.25 mcg by mouth. 23  Provider, Historical   ferrous gluconate (FERGON) 324 MG tablet  23   Provider, Historical   flecainide (TAMBOCOR) 50 MG Tab Take 50 mg by mouth 2 (two) times daily. 23   Provider, Historical   folic acid (FOLVITE) 1 MG tablet Take 1 tablet by mouth every morning.    Provider, Historical   folic acid/vit B complex and C (B COMPLEX-VITAMIN C-FOLIC ACID ORAL) Take by mouth. 3/6/23   Provider, Historical   FREESTYLE BRYAN 14 DAY SENSOR Kit 1 each by Misc.(Non-Drug; Combo Route) route every 14 (fourteen) days. 10/15/20   Ej Rodgers MD   furosemide (LASIX) 20 MG tablet Take 20 mg by mouth 2 (two) times daily.    Provider, Historical   guaiFENesin (HUMIBID E) 400 mg Tab Take 400 mg by mouth.    Provider, Historical   HUMALOG U-100 INSULIN 100 unit/mL injection Inject into the skin. 3/17/23   Provider, Historical   hydrALAZINE (APRESOLINE) 100 MG tablet Take 100 mg by mouth 3 (three) times daily. 7/3/23   Provider, Historical   hydroCHLOROthiazide (HYDRODIURIL) 12.5 MG Tab Take 12.5 mg by mouth once daily.    Provider, Historical   ibuprofen (ADVIL,MOTRIN) 600 MG tablet Take 600 mg by mouth every 6 (six) hours as needed. 23   Provider, Historical   insulin aspart U-100 (NOVOLOG) 100 unit/mL injection Inject into the  skin 3 (three) times daily before meals.    Provider, Historical   insulin glargine (LANTUS U-100 INSULIN) 100 unit/mL injection Inject 15 Units into the skin once daily. 12/7/20 12/7/21  Ej Rodgers MD   isosorbide mononitrate (IMDUR) 60 MG 24 hr tablet TAKE 1 & 1 2 (ONE & ONE HALF) TABLETS BY MOUTH ONCE DAILY IN THE MORNING 9/16/20   Provider, Historical   ketorolac 0.5% (ACULAR) 0.5 % Drop INSTILL 1 DROP 4 TIMES DAILY BEGIN THREE DAYS PRIOR TO SURGERY. USE UNTIL COMPLETED OR DIRECTED TO STOP BY DOCTOR 10/23/20   Provider, Historical   lactulose (CHRONULAC) 10 gram/15 mL solution SMARTSIG:Milliliter(s) By Mouth 7/17/23   Provider, Historical   lisinopriL 10 MG tablet Take 2 tablets (20 mg total) by mouth once daily. 11/12/20 11/12/21  Yuliana Mayberry MD   loperamide (IMODIUM) 2 mg capsule Take 4 mg by mouth 3 (three) times daily. 7/3/23   Provider, Historical   losartan (COZAAR) 25 MG tablet Take 25 mg by mouth. 7/3/23   Provider, Historical   megestroL (MEGACE) 400 mg/10 mL (40 mg/mL) Susp Take by mouth. 7/17/23   Provider, Historical   methoxy peg-epoetin beta (MIRCERA INJ) 200 mcg. 7/14/23 7/12/24  Provider, Historical   metoclopramide HCl (REGLAN) 10 MG tablet Take by mouth 3 (three) times daily. 3/6/23   Provider, Historical   NIFEdipine (PROCARDIA) 20 MG Cap Take 60 mg by mouth every 8 (eight) hours.    Provider, Historical   ONETOUCH DELICA LANCETS 33 gauge Misc 1 lancet by Misc.(Non-Drug; Combo Route) route 3 (three) times daily. 11/13/20   Ej Rodgers MD ONETOUCH VERIO FLEX METER Misc 1 each by Misc.(Non-Drug; Combo Route) route as needed. 12/15/20 12/15/21  Ej Rodgers MD ONETOUCH VERIO MID CONTROL Soln 1 each by Misc.(Non-Drug; Combo Route) route as needed. 11/13/20 11/13/21  Ej Rodgers MD   ONETOUCH VERIO TEST STRIPS Strp 1 strip by Misc.(Non-Drug; Combo Route) route 3 (three) times daily. 11/13/20   Ej Rodgers MD   oxyCODONE (OXY-IR) 5 mg Cap Take 10 mg by mouth every 8 (eight) hours  as needed. 3/17/23   Provider, Historical   oxyCODONE-acetaminophen (PERCOCET) 5-325 mg per tablet Take 1 tablet by mouth every 8 (eight) hours as needed. 4/11/23   Provider, Historical   pantoprazole (PROTONIX) 20 MG tablet Take 20 mg by mouth once daily.    Provider, Historical   pentoxifylline (TRENTAL) 400 mg TbSR Take 400 mg by mouth 2 (two) times daily. 7/17/23   Provider, Historical   pioglitazone (ACTOS) 30 MG tablet Take 1 tablet (30 mg total) by mouth once daily. 10/23/20 10/23/21  Ej Rodgers MD   prednisoLONE acetate (PRED FORTE) 1 % DrpS prednisolone acetate 1 % eye drops,suspension   INSTILL 1 DROP 4 TIMES DAILY IN AFFECTED EYE BEGINNING AFTER SURGERY AND CONTINUE UNTIL COMPLETE OR DIRECTED TO STOP BY DOCTOR    Provider, Historical   rosuvastatin (CRESTOR) 5 MG tablet Take by mouth. 3/6/23   Provider, Historical   sevelamer carbonate (RENVELA) 800 mg Tab Take by mouth. 7/17/23   Provider, Historical   sodium chloride 0.9% SolP 1,000 mL with heparin (porcine) 10,000 unit/mL Soln 90,000 Units Heparin Sodium (Porcine) 1,000 Units/mL Systemic 5/24/23 5/22/24  Provider, Historical   tamsulosin (FLOMAX) 0.4 mg Cap Take 1 capsule (0.4 mg total) by mouth once daily. 11/12/20   Yuliana Mayberry MD   torsemide (DEMADEX) 20 MG Tab Take 1 tablet (20 mg total) by mouth 2 (two) times a day. 11/12/20 11/12/21  Yuliana Mayberry MD   trazodone (DESYREL) 25 MG tablet 1 tablet. 3/17/23   Provider, Historical   VITAMIN B COMPLEX ORAL Take 1 tablet by mouth every morning.    Provider, Historical   zinc sulfate (ORAZINC) 110 mg (25 mg zinc) Tab Orazinc 110 mg (25 mg zinc) tablet   Take 1 tablet every day by oral route for 30 days.    Provider, Historical        Medications this encounter:    atorvastatin  40 mg Oral Daily    busPIRone  10 mg Oral BID    calcitRIOL  0.25 mcg Oral Daily    carvediloL  25 mg Oral BID WM    citalopram  10 mg Oral Daily    furosemide  20 mg Oral BID    hydrALAZINE  100 mg Oral TID    isosorbide  mononitrate  60 mg Oral Daily    lactulose  10 g Oral BID    losartan  25 mg Oral Daily    NIFEdipine  30 mg Oral Daily    piperacillin-tazobactam (Zosyn) IV (PEDS and ADULTS) (extended infusion is not appropriate)  4.5 g Intravenous Q12H    sevelamer carbonate  800 mg Oral TID WM    tamsulosin  1 capsule Oral Daily       Allergies: is allergic to morphine and amiodarone analogues.     Social:  reports that he has quit smoking. His smoking use included cigarettes and cigars. He has never used smokeless tobacco. He reports that he does not currently use alcohol. He reports that he does not currently use drugs.     ROS: As per HPI    Ocular examination/Dilated fundus examination:  Base Eye Exam       Visual Acuity (Snellen - Linear)         Right Left    Dist sc 20/100 NLP              Tonometry (Applanation, 2:17 PM)         Right Left    Pressure 10 8              Pupils    Pharm dilated.                  Slit Lamp and Fundus Exam       External Exam         Right Left    External Normal Mass effect, Ptosis, 4+ proptosis, Prolapsed fat pads: Lower. Crusty drainage on cheek              Slit Lamp Exam         Right Left    Lids/Lashes Normal ptosis    Conjunctiva/Sclera White and quiet 4+ Chemosis, 4+ Injection    Cornea Clear hyphema, cloudy, edema    Anterior Chamber Deep and formed cloudy/hazy    Iris Round and pharm dialted poor view    Lens Clear poor view    Anterior Vitreous Normal poor view              Fundus Exam         Right Left    Disc Sharp and pink no view    C/D Ratio 0.4 no view    Macula flat no view    Vessels Normal no view    Periphery Flat, attached, dense area of vitritis I no view    Poor view in left eye                      Assessment/Plan:     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 OS Unable to assess given poor view.   - Transferred for enucleation OS.       Recommendations:   - Enucleation OS Per occuloplastics once consent is obtained from MPA (sister).   - Right eye significantly improved from HM at presentation at [facility], can continue to manage with systemic abx and watch with serial exams. No need for tap and inject at this time.   - Consider ID consult for IV antibiotic recommendations.       Patient's Best Contact Number: [phone]     Patient and plan discussed with Dr. Rodney Boyd MD   U Ophthalmology PGY2  08/09/2023  2:20 PM        Common Ophthalmologic Abbreviations  OD: right eye  OS: left eye  OU: both eyes  IOP: intraocular pressure  VA: visual acuity  PH: pinhole  HM: hand motion  LP: light perception  NLP: no light perception  DFE: dilated fundus examination  SLE: slit lamp examination  RD: retinal detachment   AT: artificial tears  PFAT: preservative free artificial tears

## 2023-08-09 NOTE — HPI
HPI obtained per medical record as patient unable to communicate     57-year-old male with a history of CHF, diabetes, hypertension, chronic disability, end-stage renal disease on hemodialysis, PEG placement, bowel obstruction, sleep apnea, TIA, left eye vitreous hemorrhage, stroke, and bowel obstruction with bowel resection admitted to [facility] in [place] July 18 from nursing home with left eye pain and blurred vision.  He was admitted with concern for bilateral endophthalmitis.  Other admit diagnoses included right upper extremity thrombus, end-stage renal disease on hemodialysis, hyperkalemia, sleep apnea, diabetes, and CHF.  He was treated with broad-spectrum antibiotics.  He was seen by Infectious Diseases,, and he was treated with vancomycin, ceftriaxone, and amphotericin.  Blood cultures were positive for Pseudomonas, and amphotericin/vancomycin were stopped.  IV cefepime was continued.  He was seen by Ophthalmology, and he received intravitreal vancomycin and ceftazidime (July 18).  He also had intravitreal tap July 18 that had no yeast or fungal elements observed.  Left eye endophthalmitis did not respond to treatment, and he subsequently developed abscess formation, significant proptosis, and drainage of purulent material from the orbit.  Ophthalmology recommended enucleation.  He was continued on cefepime for pseudomonal and ophthalmitis.  Recommendation was for 6 weeks of treatment to be followed by indefinite fluoroquinolone.  He was seen by Pulmonology during his stay for a right upper lung mass with peripheral nodules.  It was felt that he would need further investigation of this once his current clinical issues stabilized.  Right upper extremity AV graft was excised during his stay with concern for infection.  A right IJ tunneled line was placed on July 27.  Left eye endophthalmitis continued to worsen, and ophthalmology spoke with patient and family about the need for  enucleation.  Despite several conversations, family declined enucleation.  Plans were for transitioned to skilled nursing facility for continued treatment, but family did not want him to go to a skilled nursing facility.  He was subsequently discharged AMA from the hospital there on August 7.  Please see the August 7 Internal Medicine note for further details.     He subsequently presented to [facility] Emergency Department.  He will not go back to [facility] in [place]. He received Zosyn and vancomycin.  ED team at [place] spoke with the patient and his power-of- (sister).  CT of the orbits noted scleral abscess along the lateral aspect of the left globe.  Patient and family are aware and in agreement that the patient needs enucleation of the eye at this time. Referring provider spoke with Oculoplastics at [facility]. Requesting transfer to [facility] Medicine for Oculoplastics specialty evaluation of persistent endophthalmitis.  ED provider noted patient is hemodynamically stable.  He does not appear volume overloaded or in respiratory distress.        The patient has a significant previous medical course as listed below  August 3: MRI brain and orbits showed worsening ophthalmitis and inflammatory changes of the left orbital tissues with slight increase in proptosis.  No evidence of intracranial inflammatory process observed.    July 25: Transesophageal echocardiogram had no evidence of endocarditis.  Moderate to severely decreased left ventricular systolic function with EF 30-35%.    July 22:  CT chest showed right upper lobe mass with numerous bilateral nodularity predominantly in the right with associated mediastinal lymph nodes.  July 21: Blood cultures with Pseudomonas aeruginosa  July 19: MRI brain/orbits with and without contrast had findings suggestive of chronic microvascular ischemic disease of the white matter with remote ischemic event in the left  insula/basal ganglia/subependymal white matter/right middle cerebellar peduncle and hemisphere.  Findings consistent with left endophthalmitis.  No abnormal findings observed in the right globe.  July 18:  Blood cultures with Pseudomonas aeruginosa and coag-negative staph    HPI obtained from [facility]'s note. Patient with AMS at time of nephrology evaluation and unable to provide HPI or ROS.

## 2023-08-09 NOTE — PROGRESS NOTES
"Pharmacist Renal Dose Adjustment Note    Immanuel Bernal is a 57 y.o. male being treated with the medication zosyn    Patient Data:    Vital Signs (Most Recent):    Vital Signs (72h Range):  Temp:  [99 °F (37.2 °C)]   Pulse:  [63]   Resp:  [18]   BP: (121)/(66)   SpO2:  [94 %]      No results for input(s): "CREATININE" in the last 168 hours.  Creatinine clearance cannot be calculated (Patient's most recent lab result is older than the maximum 7 days allowed.)    Zosyn 4.5 g IV q8h will be changed to zosyn 4.5 g IV q12h based on the patient receiving iHD    Pharmacist's Name: Ajay Marshall, PharmD, [facility]   Pharmacist's Extension: [phone]    "

## 2023-08-10 PROBLEM — D63.1 ANEMIA IN CHRONIC KIDNEY DISEASE: Status: RESOLVED | Noted: 2023-04-28 | Resolved: 2023-08-10

## 2023-08-10 PROBLEM — Z43.4 CHOLECYSTOSTOMY CARE: Status: ACTIVE | Noted: 2023-08-10

## 2023-08-10 PROBLEM — N18.9 ANEMIA IN CHRONIC KIDNEY DISEASE: Status: RESOLVED | Noted: 2023-04-28 | Resolved: 2023-08-10

## 2023-08-10 PROBLEM — E43 SEVERE MALNUTRITION: Status: ACTIVE | Noted: 2023-08-10

## 2023-08-10 LAB
ALBUMIN SERPL BCP-MCNC: 2.3 G/DL (ref 3.5–5.2)
ALP SERPL-CCNC: 104 U/L (ref 55–135)
ALT SERPL W/O P-5'-P-CCNC: 7 U/L (ref 10–44)
ANION GAP SERPL CALC-SCNC: 19 MMOL/L (ref 8–16)
AST SERPL-CCNC: 15 U/L (ref 10–40)
BASOPHILS # BLD AUTO: 0.08 K/UL (ref 0–0.2)
BASOPHILS NFR BLD: 1.1 % (ref 0–1.9)
BILIRUB SERPL-MCNC: 0.4 MG/DL (ref 0.1–1)
BUN SERPL-MCNC: 62 MG/DL (ref 6–20)
CALCIUM SERPL-MCNC: 9.7 MG/DL (ref 8.7–10.5)
CHLORIDE SERPL-SCNC: 103 MMOL/L (ref 95–110)
CO2 SERPL-SCNC: 15 MMOL/L (ref 23–29)
CREAT SERPL-MCNC: 9.6 MG/DL (ref 0.5–1.4)
DIFFERENTIAL METHOD: ABNORMAL
EOSINOPHIL # BLD AUTO: 0.4 K/UL (ref 0–0.5)
EOSINOPHIL NFR BLD: 5.7 % (ref 0–8)
ERYTHROCYTE [DISTWIDTH] IN BLOOD BY AUTOMATED COUNT: 18.8 % (ref 11.5–14.5)
EST. GFR  (NO RACE VARIABLE): 5.7 ML/MIN/1.73 M^2
FERRITIN SERPL-MCNC: 3415 NG/ML (ref 20–300)
GLUCOSE SERPL-MCNC: 64 MG/DL (ref 70–110)
HBV SURFACE AG SERPL QL IA: NORMAL
HCT VFR BLD AUTO: 31 % (ref 40–54)
HGB BLD-MCNC: 9.2 G/DL (ref 14–18)
IMM GRANULOCYTES # BLD AUTO: 0.07 K/UL (ref 0–0.04)
IMM GRANULOCYTES NFR BLD AUTO: 1 % (ref 0–0.5)
IRON SERPL-MCNC: 72 UG/DL (ref 45–160)
LYMPHOCYTES # BLD AUTO: 0.7 K/UL (ref 1–4.8)
LYMPHOCYTES NFR BLD: 10.2 % (ref 18–48)
MAGNESIUM SERPL-MCNC: 3 MG/DL (ref 1.6–2.6)
MCH RBC QN AUTO: 29.8 PG (ref 27–31)
MCHC RBC AUTO-ENTMCNC: 29.7 G/DL (ref 32–36)
MCV RBC AUTO: 100 FL (ref 82–98)
MONOCYTES # BLD AUTO: 0.6 K/UL (ref 0.3–1)
MONOCYTES NFR BLD: 7.8 % (ref 4–15)
NEUTROPHILS # BLD AUTO: 5.3 K/UL (ref 1.8–7.7)
NEUTROPHILS NFR BLD: 74.2 % (ref 38–73)
NRBC BLD-RTO: 0 /100 WBC
PHOSPHATE SERPL-MCNC: 5.4 MG/DL (ref 2.7–4.5)
PLATELET # BLD AUTO: 244 K/UL (ref 150–450)
PMV BLD AUTO: 9.9 FL (ref 9.2–12.9)
POCT GLUCOSE: 134 MG/DL (ref 70–110)
POCT GLUCOSE: 53 MG/DL (ref 70–110)
POCT GLUCOSE: 86 MG/DL (ref 70–110)
POTASSIUM SERPL-SCNC: 4.9 MMOL/L (ref 3.5–5.1)
PROT SERPL-MCNC: 6.8 G/DL (ref 6–8.4)
RBC # BLD AUTO: 3.09 M/UL (ref 4.6–6.2)
SATURATED IRON: 45 % (ref 20–50)
SODIUM SERPL-SCNC: 137 MMOL/L (ref 136–145)
TOTAL IRON BINDING CAPACITY: 161 UG/DL (ref 250–450)
TRANSFERRIN SERPL-MCNC: 109 MG/DL (ref 200–375)
WBC # BLD AUTO: 7.17 K/UL (ref 3.9–12.7)

## 2023-08-10 PROCEDURE — 99233 SBSQ HOSP IP/OBS HIGH 50: CPT | Mod: ,,, | Performed by: STUDENT IN AN ORGANIZED HEALTH CARE EDUCATION/TRAINING PROGRAM

## 2023-08-10 PROCEDURE — 63600175 PHARM REV CODE 636 W HCPCS: Performed by: STUDENT IN AN ORGANIZED HEALTH CARE EDUCATION/TRAINING PROGRAM

## 2023-08-10 PROCEDURE — 80053 COMPREHEN METABOLIC PANEL: CPT | Performed by: STUDENT IN AN ORGANIZED HEALTH CARE EDUCATION/TRAINING PROGRAM

## 2023-08-10 PROCEDURE — 25000003 PHARM REV CODE 250: Performed by: NURSE PRACTITIONER

## 2023-08-10 PROCEDURE — 99223 PR INITIAL HOSPITAL CARE,LEVL III: ICD-10-PCS | Mod: ,,, | Performed by: INTERNAL MEDICINE

## 2023-08-10 PROCEDURE — 84100 ASSAY OF PHOSPHORUS: CPT | Performed by: STUDENT IN AN ORGANIZED HEALTH CARE EDUCATION/TRAINING PROGRAM

## 2023-08-10 PROCEDURE — 25000003 PHARM REV CODE 250: Performed by: STUDENT IN AN ORGANIZED HEALTH CARE EDUCATION/TRAINING PROGRAM

## 2023-08-10 PROCEDURE — 11000001 HC ACUTE MED/SURG PRIVATE ROOM

## 2023-08-10 PROCEDURE — 99233 PR SUBSEQUENT HOSPITAL CARE,LEVL III: ICD-10-PCS | Mod: ,,, | Performed by: STUDENT IN AN ORGANIZED HEALTH CARE EDUCATION/TRAINING PROGRAM

## 2023-08-10 PROCEDURE — 87340 HEPATITIS B SURFACE AG IA: CPT | Performed by: STUDENT IN AN ORGANIZED HEALTH CARE EDUCATION/TRAINING PROGRAM

## 2023-08-10 PROCEDURE — 84466 ASSAY OF TRANSFERRIN: CPT | Performed by: NURSE PRACTITIONER

## 2023-08-10 PROCEDURE — 90935 HEMODIALYSIS ONE EVALUATION: CPT | Mod: ,,, | Performed by: NURSE PRACTITIONER

## 2023-08-10 PROCEDURE — 20600001 HC STEP DOWN PRIVATE ROOM

## 2023-08-10 PROCEDURE — 36415 COLL VENOUS BLD VENIPUNCTURE: CPT | Performed by: STUDENT IN AN ORGANIZED HEALTH CARE EDUCATION/TRAINING PROGRAM

## 2023-08-10 PROCEDURE — 25000003 PHARM REV CODE 250: Performed by: HOSPITALIST

## 2023-08-10 PROCEDURE — 90935 PR HEMODIALYSIS, ONE EVALUATION: ICD-10-PCS | Mod: ,,, | Performed by: NURSE PRACTITIONER

## 2023-08-10 PROCEDURE — 83735 ASSAY OF MAGNESIUM: CPT | Performed by: STUDENT IN AN ORGANIZED HEALTH CARE EDUCATION/TRAINING PROGRAM

## 2023-08-10 PROCEDURE — 82728 ASSAY OF FERRITIN: CPT | Performed by: NURSE PRACTITIONER

## 2023-08-10 PROCEDURE — 63600175 PHARM REV CODE 636 W HCPCS: Mod: JZ | Performed by: NURSE PRACTITIONER

## 2023-08-10 PROCEDURE — 99223 1ST HOSP IP/OBS HIGH 75: CPT | Mod: ,,, | Performed by: INTERNAL MEDICINE

## 2023-08-10 PROCEDURE — 90935 HEMODIALYSIS ONE EVALUATION: CPT

## 2023-08-10 PROCEDURE — 85025 COMPLETE CBC W/AUTO DIFF WBC: CPT | Performed by: STUDENT IN AN ORGANIZED HEALTH CARE EDUCATION/TRAINING PROGRAM

## 2023-08-10 RX ORDER — FUROSEMIDE 10 MG/ML
INJECTION INTRAMUSCULAR; INTRAVENOUS
Status: CANCELLED | OUTPATIENT
Start: 2023-08-10

## 2023-08-10 RX ORDER — ACETAMINOPHEN 500 MG
1000 TABLET ORAL 3 TIMES DAILY
Status: DISCONTINUED | OUTPATIENT
Start: 2023-08-10 | End: 2023-08-12

## 2023-08-10 RX ORDER — SODIUM CHLORIDE 9 MG/ML
INJECTION, SOLUTION INTRAVENOUS ONCE
Status: COMPLETED | OUTPATIENT
Start: 2023-08-10 | End: 2023-08-10

## 2023-08-10 RX ORDER — HEPARIN SODIUM 1000 [USP'U]/ML
1000 INJECTION, SOLUTION INTRAVENOUS; SUBCUTANEOUS
Status: DISCONTINUED | OUTPATIENT
Start: 2023-08-10 | End: 2023-09-06

## 2023-08-10 RX ORDER — TALC
6 POWDER (GRAM) TOPICAL NIGHTLY PRN
Status: DISCONTINUED | OUTPATIENT
Start: 2023-08-11 | End: 2023-08-25

## 2023-08-10 RX ADMIN — CITALOPRAM HYDROBROMIDE 10 MG: 10 TABLET ORAL at 04:08

## 2023-08-10 RX ADMIN — LACTULOSE 10 G: 20 SOLUTION ORAL at 04:08

## 2023-08-10 RX ADMIN — MEROPENEM 2 G: 1 INJECTION INTRAVENOUS at 06:08

## 2023-08-10 RX ADMIN — LACTULOSE 10 G: 20 SOLUTION ORAL at 10:08

## 2023-08-10 RX ADMIN — SEVELAMER CARBONATE 800 MG: 800 TABLET, FILM COATED ORAL at 04:08

## 2023-08-10 RX ADMIN — CARVEDILOL 25 MG: 25 TABLET, FILM COATED ORAL at 06:08

## 2023-08-10 RX ADMIN — BUSPIRONE HYDROCHLORIDE 10 MG: 10 TABLET ORAL at 10:08

## 2023-08-10 RX ADMIN — BUSPIRONE HYDROCHLORIDE 10 MG: 10 TABLET ORAL at 04:08

## 2023-08-10 RX ADMIN — FUROSEMIDE 20 MG: 20 TABLET ORAL at 10:08

## 2023-08-10 RX ADMIN — PIPERACILLIN SODIUM AND TAZOBACTAM SODIUM 4.5 G: 4; .5 INJECTION, POWDER, FOR SOLUTION INTRAVENOUS at 02:08

## 2023-08-10 RX ADMIN — ATORVASTATIN CALCIUM 40 MG: 40 TABLET, FILM COATED ORAL at 04:08

## 2023-08-10 RX ADMIN — CALCITRIOL CAPSULES 0.25 MCG 0.25 MCG: 0.25 CAPSULE ORAL at 04:08

## 2023-08-10 RX ADMIN — ACETAMINOPHEN 1000 MG: 500 TABLET ORAL at 10:08

## 2023-08-10 RX ADMIN — HEPARIN SODIUM 1000 UNITS: 1000 INJECTION, SOLUTION INTRAVENOUS; SUBCUTANEOUS at 01:08

## 2023-08-10 RX ADMIN — SODIUM CHLORIDE: 9 INJECTION, SOLUTION INTRAVENOUS at 10:08

## 2023-08-10 RX ADMIN — HYDRALAZINE HYDROCHLORIDE 100 MG: 50 TABLET ORAL at 10:08

## 2023-08-10 RX ADMIN — Medication 6 MG: at 11:08

## 2023-08-10 RX ADMIN — ACETAMINOPHEN 1000 MG: 500 TABLET ORAL at 01:08

## 2023-08-10 RX ADMIN — DEXTROSE MONOHYDRATE 125 ML: 10 INJECTION, SOLUTION INTRAVENOUS at 09:08

## 2023-08-10 RX ADMIN — TAMSULOSIN HYDROCHLORIDE 0.4 MG: 0.4 CAPSULE ORAL at 04:08

## 2023-08-10 RX ADMIN — ERYTHROPOIETIN 3000 UNITS: 3000 INJECTION, SOLUTION INTRAVENOUS; SUBCUTANEOUS at 11:08

## 2023-08-10 RX ADMIN — APIXABAN 5 MG: 5 TABLET, FILM COATED ORAL at 10:08

## 2023-08-10 NOTE — PLAN OF CARE
Recommendations     1. When/if able, initiate TFs via PEG. Rec'd Novasource @ 40 mL/hr to provide 1920 kcals, 87 g of protein, 688 mL fluid.   2. RD to monitor & follow-up.     Goals: Meet % EEN, EPN by RD f/u date  Nutrition Goal Status: new  Communication of RD Recs: other (comment) (POC)

## 2023-08-10 NOTE — ASSESSMENT & PLAN NOTE
Nutrition Problem:  Severe Protein-Calorie Malnutrition  Malnutrition in the context of Chronic Illness/Injury    Related to (etiology):  Inability to consume sufficient energy     Signs and Symptoms (as evidenced by):  Body Fat Depletion: severe depletion of orbitals and triceps   Muscle Mass Depletion: severe depletion of temples, clavicle region, scapular region and lower extremities   Weight Loss: 31% x 6-7 months     Interventions(treatment strategy):  Collaboration of nutrition care w/ other providers  EN    Nutrition Diagnosis Status:  New

## 2023-08-10 NOTE — ASSESSMENT & PLAN NOTE
Continue to monitor for signs of volume overload  On full antifailure medications      Plan:  Monitor for signs of volume overload

## 2023-08-10 NOTE — PROGRESS NOTES
"Consultation Report  Ophthalmology Service    Date: 08/10/2023    Reason for Consult: "Occuloplastics for enucleation."     Interval history:   PT with worsening delirium on exam today. Per RN started in the afternoon today. PT repeating phrases, and interacts minimally with exam.       POcularHx: mild NPDR w/ macular edema OU per chart review    Current eye gtts: Atropine, vigamox     Family Hx:  family history includes Diabetes in his father and sister; Heart disease in his father; Hyperlipidemia in his brother; Kidney disease in his father; Stroke in his mother.     PMHx:  has a past medical history of Bilateral retinal detachment (7/18/2023), BPH (benign prostatic hyperplasia), Cataract, CHF (congestive heart failure), CKD (chronic kidney disease) stage 3, GFR 30-59 ml/min, Diabetes mellitus, type 2, Hypertension, KENIA (obstructive sleep apnea), Pancreatitis, Persistent proteinuria (11/12/2020), PTSD (post-traumatic stress disorder), and Stroke.     PSurgHx:  has a past surgical history that includes Cataract extraction (Bilateral).      Medications this encounter:    acetaminophen  1,000 mg Oral TID    apixaban  5 mg Oral BID    atorvastatin  40 mg Oral Daily    busPIRone  10 mg Oral BID    calcitRIOL  0.25 mcg Oral Daily    carvediloL  25 mg Oral BID WM    citalopram  10 mg Oral Daily    epoetin thee (PROCRIT) injection  3,000 Units Intravenous Every Tues, Thurs, Sat    furosemide  20 mg Oral BID    hydrALAZINE  100 mg Oral TID    isosorbide mononitrate  60 mg Oral Daily    lactulose  10 g Oral BID    losartan  25 mg Oral Daily    meropenem (MERREM) IVPB  2 g Intravenous Q8H    NIFEdipine  30 mg Oral Daily    sevelamer carbonate  800 mg Oral TID WM    tamsulosin  1 capsule Oral Daily    vancomycin (VANCOCIN) IV (PEDS and ADULTS)  500 mg Intravenous Once       Allergies: is allergic to morphine and amiodarone analogues.     Social:  reports that he has quit smoking. His smoking use included cigarettes and " cigars. He has never used smokeless tobacco. He reports that he does not currently use alcohol. He reports that he does not currently use drugs.     ROS: As per HPI    Ocular examination/Dilated fundus examination:  Base Eye Exam       Visual Acuity (Snellen - Linear)         Right Left    Dist sc 20/200 NLP              Tonometry (Tonopen, 9:27 AM)         Right Left    Pressure 8 6              Neuro/Psych    Fluctuating attention, poorly responsive to commands.                  Slit Lamp and Fundus Exam       External Exam         Right Left    External Normal Mass effect, Ptosis, 4+ proptosis, Prolapsed fat pads: Lower. Crusty drainage on cheek              Slit Lamp Exam         Right Left    Lids/Lashes Normal ptosis    Conjunctiva/Sclera White and quiet 4+ Chemosis, 4+ Injection    Cornea Clear hyphema, cloudy, edema    Anterior Chamber Deep and formed cloudy/hazy    Iris Round and pharm dialted poor view    Lens Clear poor view    Anterior Vitreous slight haze, retina visualized below poor view              Fundus Exam         Right Left    Disc Sharp and pink no view    C/D Ratio 0.2 no view    Macula flat no view    Vessels Normal no view    Periphery flat 360, 1+ Vitreous flair, snow banking inferiorly. Stable from prior. no view    Limited cooperation with eye movements                      Assessment/Plan:     Bilateral Endophthalmitis OS>OD  - Worsening mental status starting in the afternoon today per RN.   - IOP wnl, pupil pharm dilated OU, EOM full OD/ limited movement OS   - Anterior exam OD stable today   - DFE OD shows resolving area of clumped cells inferiorly, stable from prior.       Recommendations:   - Enucleation OS Per occuloplastics. Appreciate recs.   - Right eye exam stable from prior. No need for tap and inject at this time.   - Right eye significantly improved from initial presentation at OSH.   - Continue Abx per ID, appreciate recs.   - Will need regular fu with Retina specialist  upon discharge, as he is now monocular and needs frequent exams to ensure right eye does not worsen.    - Our team is happy to see him in our clinic, but this is likely too inconvenient for the patient and family as they live far away. Will likely benefit from Retina follow up closer to home.       Patient's Best Contact Number: [phone]     Patient seen and discussed with Dr. Rodney Boyd MD   [facility] Ophthalmology PGY2  08/10/2023  2:20 PM        Common Ophthalmologic Abbreviations  OD: right eye  OS: left eye  OU: both eyes  IOP: intraocular pressure  VA: visual acuity  PH: pinhole  HM: hand motion  LP: light perception  NLP: no light perception  DFE: dilated fundus examination  SLE: slit lamp examination  RD: retinal detachment   AT: artificial tears  PFAT: preservative free artificial tears

## 2023-08-10 NOTE — PLAN OF CARE
Problem: Adult Inpatient Plan of Care  Goal: Plan of Care Review  Outcome: Ongoing, Progressing  Goal: Patient-Specific Goal (Individualized)  Outcome: Ongoing, Progressing  Goal: Absence of Hospital-Acquired Illness or Injury  Outcome: Ongoing, Progressing  Goal: Optimal Comfort and Wellbeing  Outcome: Ongoing, Progressing  Goal: Readiness for Transition of Care  Outcome: Ongoing, Progressing     Problem: Diabetes Comorbidity  Goal: Blood Glucose Level Within Targeted Range  Outcome: Ongoing, Progressing     Problem: Skin Injury Risk Increased  Goal: Skin Health and Integrity  Outcome: Ongoing, Progressing     Problem: Device-Related Complication Risk (Hemodialysis)  Goal: Safe, Effective Therapy Delivery  Outcome: Ongoing, Progressing     Problem: Hemodynamic Instability (Hemodialysis)  Goal: Effective Tissue Perfusion  Outcome: Ongoing, Progressing     Problem: Infection (Hemodialysis)  Goal: Absence of Infection Signs and Symptoms  Outcome: Ongoing, Progressing     Problem: Infection  Goal: Absence of Infection Signs and Symptoms  Outcome: Ongoing, Progressing

## 2023-08-10 NOTE — PLAN OF CARE
[facility] ([place] [address])  Initial Discharge Assessment       Primary Care Provider: Dolly, Primary Doctor    Admission Diagnosis: Endophthalmitis [H44.009]    Admission Date: 8/9/2023  Expected Discharge Date: 8/11/2023    Transition of Care Barriers: None    Payor: MEDICARE / Plan: MEDICARE PART A & B / Product Type: Government /     Extended Emergency Contact Information  Primary Emergency Contact: Marley Bernal  Mobile Phone: [phone]  Relation: Sister   needed? No    Discharge Plan A: Long-term acute care facility (LTAC)  Discharge Plan B: Skilled Nursing Facility      [facility] [address] [place] [address] [address] [facility]  [address] [facility] [address]  Phone: [phone] Fax: [phone]    [place] [address] [facility]  [address] [facility] [address]  Phone: [phone] Fax: [phone]      Initial Assessment (most recent)       Adult Discharge Assessment - 08/10/23 1148          Discharge Assessment    Assessment Type Discharge Planning Assessment     Source of Information family   Marley Bernal (Sister)   [phone] (Mobile    When was your last doctors appointment? 05/09/23     Does patient/caregiver understand observation status No     Reason For Admission Endophthalmitis     Facility Arrived From: [facility]     Do you expect to return to your current living situation? No   [facility]    Do you have help at home or someone to help you manage your care at home? No     Prior to hospitilization cognitive status: Not Oriented to Place;Not Oriented to Time     Current cognitive status: Not Oriented to Place;Not Oriented to Time     Equipment Currently Used at Home walker, rolling;walker, standard;bedside commode;wheelchair     Readmission within 30 days? No     Patient currently being followed by outpatient case management? No     Do you currently have service(s) that help you manage your care at  home? No     Do you take prescription medications? Yes     Do you have prescription coverage? Yes     Do you have any problems affording any of your prescribed medications? No     Is the patient taking medications as prescribed? yes     Who is going to help you get home at discharge? patient was a resident at [facility]     How do you get to doctors appointments? family or friend will provide     Are you on dialysis? Yes   [facility] [place] #[phone]    Dialysis Name and Scheduled days [facility] MWF     Do you take coumadin? No     Discharge Plan A Long-term acute care facility (LTAC)     Discharge Plan B Skilled Nursing Facility     DME Needed Upon Discharge  oxygen     Discharge Plan discussed with: Sibling     Name(s) and Number(s) Marley Bernal (Sister)   [phone] (Mobile)     Transition of Care Barriers None        Physical Activity    On average, how many days per week do you engage in moderate to strenuous exercise (like a brisk walk)? 5 days     On average, how many minutes do you engage in exercise at this level? 20 min        Financial Resource Strain    How hard is it for you to pay for the very basics like food, housing, medical care, and heating? Not very hard        Housing Stability    In the last 12 months, was there a time when you were not able to pay the mortgage or rent on time? No     In the last 12 months, was there a time when you did not have a steady place to sleep or slept in a shelter (including now)? No        Transportation Needs    In the past 12 months, has lack of transportation kept you from medical appointments or from getting medications? No     In the past 12 months, has lack of transportation kept you from meetings, work, or from getting things needed for daily living? No        Food Insecurity    Within the past 12 months, you worried that your food would run out before you got the money to buy more. Never true     Within the past 12 months, the  food you bought just didn't last and you didn't have money to get more. Never true        Stress    Do you feel stress - tense, restless, nervous, or anxious, or unable to sleep at night because your mind is troubled all the time - these days? Rather much        Social Connections    In a typical week, how many times do you talk on the phone with family, friends, or neighbors? Twice a week     How often do you get together with friends or relatives? Once a week     How often do you attend Quaker or Episcopal services? Never     Do you belong to any clubs or organizations such as Quaker groups, unions, fraternal or athletic groups, or school groups? No     How often do you attend meetings of the clubs or organizations you belong to? Never     Are you , , , , never , or living with a partner? Never         Alcohol Use    Q1: How often do you have a drink containing alcohol? Never     Q2: How many drinks containing alcohol do you have on a typical day when you are drinking? Patient does not drink     Q3: How often do you have six or more drinks on one occasion? Never                      CM met with patient sister , Marley MARQUISE via phone  at to complete discharge planning assessment.  Patient alert and oriented xs 1-2  Patient verified all demographic information on facesheet is correct.  Patient verified PCP is Dr Hattie Thayer   Patient  sister verified primary health insurance is MEDICARE - MEDICARE PART A & B    Patient  is not active with  home health and has listed DME.  Patient with NO POA or Living Will.  Patient is on  dialysis or medication coumadin.  Patient with no 30 day admission.  Patient with no financial issues at this time.  Patient will  not provide transportation upon discharge from facility, patient will need transportation.  Patient  is dependent with ADLs, patient is current with [facility] damian Fitch and the patient sister prefer for patient not to  return.   All questions answered regarding Case Management Discharge Planning, patient  sister verbalized understanding.  Discharge booklet with CM's contact information given to patient.    Jerri Colon RN  Case Management  [facility]  [phone]

## 2023-08-10 NOTE — PROGRESS NOTES
3 hour dialysis complete.  Blood returned.  RIJ PC flushed, heparin locked, capped and taped.      Net UF 2L.  Epo given.    Tx ended 30 minutes per LORE Grimes NP d/t patient screaming.  Patient turned for comfort and given pain medication.    Transported from [facility] to room 16486 via stretcher by transporter and this RN.  Primary nurse at the bedside.

## 2023-08-10 NOTE — CONSULTS
[facility] ([place] [address])  Infectious Disease  Consult Note    Patient Name: Immanuel Bernal  MRN: [id number]  Admission Date: 8/9/2023  Hospital Length of Stay: 1 days  Attending Physician: Brett Gautam, *  Primary Care Provider: No, Primary Doctor     Isolation Status: No active isolations    Patient information was obtained from patient and ER records.      Inpatient consult to Infectious Diseases  Consult performed by: Nicole Andujar DO  Consult ordered by: Leroy Park MD        Assessment/Plan:     Ophtho  * Endophthalmitis  57M with PMH of DM2, HTN, ESRD, HFrEF, bowel obstruction s/p bowel resection, KENIA, afib, recent hospitalization for NELSON endophthalmitis, pseudomonas bacteremia, RUE AVG infection s/p excision, who presents as a transfer from [place] for worsening eye symptoms and imaging findings of L scleral abscess. Previously admitted to [facility] in July for NELSON endophthalmitis, received intravitreal vancomycin and ceftazidime and IV cefepime for pseudomonas bacteremia. Previously refused enucleation, but now agreeable. MRI orbits w/o on admission here shows fluid collection in the L posterior compartment. Patient is currently on vancomycin and zosyn. 8/9 blood cultures have no growth to date.     Recommendations:  · Continue vancomycin   · Switch zosyn to meropenem based on pseudomonas sensitivities from [facility]  · Pending enucleation by ophtho  · Follow up 8/9 blood cultures        Thank you for your consult. I will follow-up with patient. Please contact us if you have any additional questions.    Nicole Andujar DO  Infectious Disease  [facility] ([place] [address])    Subjective:     Principal Problem: Endophthalmitis    HPI: Mr. Bernal is a 57M with PMH of DM2, HTN, ESRD, HFrEF, bowel obstruction s/p bowel resection, KENIA, afib, recent hospitalization for NELSON endophthalmitis, pseudomonas bacteremia, RUE AVG infection s/p excision, who presents as a transfer from  [place] for worsening eye symptoms and imaging findings of L scleral abscess. Infectious disease consulted for "endophthalmitis".     Per chart review, patient had been admitted to [facility] in July for concern for NELSON endophthalmitis. He received intravitreal vancomycin and ceftazidime by ophthalmology, and IV cefepime for pseudomonas bacteremia. He also underwent intravitreal tap that was negative for any yeast or fungus. Unfortunately L eye did not respond to treatment and abscess developed. Ophthalmology recommended enucleation, however POA/family declined. He was continued on cefepime with plan for 6 weeks of treatment followed by indefinite fluoroquinolone.     MRI orbits w/o on admission here shows fluid collection in the L posterior compartment. Patient is currently on vancomycin and zosyn. 8/9 blood cultures have no growth to date.       Past Medical History:   Diagnosis Date    Bilateral retinal detachment 7/18/2023    BPH (benign prostatic hyperplasia)     Cataract     CHF (congestive heart failure)     CKD (chronic kidney disease) stage 3, GFR 30-59 ml/min     Diabetes mellitus, type 2     Hypertension     KENIA (obstructive sleep apnea)     Pancreatitis     Persistent proteinuria 11/12/2020    2 g proteinuria noted Resumed ACE Lower BP systolic to less than 130     PTSD (post-traumatic stress disorder)     Stroke        Past Surgical History:   Procedure Laterality Date    CATARACT EXTRACTION Bilateral        Review of patient's allergies indicates:   Allergen Reactions    Morphine Rash    Amiodarone analogues Itching     Other reaction(s): Unknown       Medications:  Medications Prior to Admission   Medication Sig    acetaminophen-codeine 300-30mg (TYLENOL #3) 300-30 mg Tab Take 1 tablet by mouth every 6 (six) hours as needed.    albuterol-ipratropium (DUO-NEB) 2.5 mg-0.5 mg/3 mL nebulizer solution Inhale 3 mLs into the lungs.    ALPRAZolam (XANAX) 0.5 MG tablet Take 1 tablet by mouth. "    amLODIPine (NORVASC) 10 MG tablet Take by mouth.    apixaban (ELIQUIS) 2.5 mg Tab Take 2.5 mg by mouth 2 (two) times daily.    ascorbic acid, vitamin C, (VITAMIN C) 500 MG tablet Take 500 mg by mouth once daily.    aspirin 81 MG Chew Take 81 mg by mouth once daily.    atorvastatin (LIPITOR) 80 MG tablet atorvastatin 80 mg tablet    B COMPLEX 1, WITH FOLIC ACID, 0.4 mg Tab     busPIRone (BUSPAR) 10 MG tablet Take 1 tablet by mouth.    calcitRIOL (ROCALTROL) 0.25 MCG Cap Take 0.25 mcg by mouth.    calcium carb/vit D3/minerals (CALCIUM CARBONATE-VIT D3-MIN ORAL) Take by mouth.    carvediloL (COREG) 25 MG tablet Take 1 tablet (25 mg total) by mouth 2 (two) times daily with meals.    chlorthalidone (HYGROTEN) 25 MG Tab Take 1 tablet (25 mg total) by mouth once daily.    ciprofloxacin HCl (CILOXAN) 0.3 % ophthalmic solution ciprofloxacin 0.3 % eye drops   INSTILL 1 DROP IN AFFECTED EYE FOUR TIMES A DAY BEGINNING 3 DAYS PRIOR TO SURGERY AND CONTINUE TILL EMPTY    citalopram (CELEXA) 10 MG tablet citalopram 10 mg tablet    CLEARLAX 17 gram/dose powder SMARTSI scoopful By Mouth Twice Daily    cloNIDine (CATAPRES) 0.1 MG tablet Take 0.1 mg by mouth 3 (three) times daily.    clopidogreL (PLAVIX) 75 mg tablet Take 75 mg by mouth once daily.    docusate sodium (COLACE) 100 MG capsule Take by mouth.    doxazosin (CARDURA) 4 MG tablet Take 4 mg by mouth 2 (two) times daily.    ergocalciferol (ERGOCALCIFEROL) 50,000 unit Cap TAKE 1 CAPSULE BY MOUTH EVERY WEDNESDAY    ergocalciferol, vitamin D2, (VITAMIN D ORAL) Take 0.25 mcg by mouth.    ferrous gluconate (FERGON) 324 MG tablet     flecainide (TAMBOCOR) 50 MG Tab Take 50 mg by mouth 2 (two) times daily.    folic acid (FOLVITE) 1 MG tablet Take 1 tablet by mouth every morning.    folic acid/vit B complex and C (B COMPLEX-VITAMIN C-FOLIC ACID ORAL) Take by mouth.    FREESTYLE BRYAN 14 DAY SENSOR Kit 1 each by Misc.(Non-Drug; Combo Route) route every 14  (fourteen) days.    furosemide (LASIX) 20 MG tablet Take 20 mg by mouth 2 (two) times daily.    guaiFENesin (HUMIBID E) 400 mg Tab Take 400 mg by mouth.    HUMALOG U-100 INSULIN 100 unit/mL injection Inject into the skin.    hydrALAZINE (APRESOLINE) 100 MG tablet Take 100 mg by mouth 3 (three) times daily.    hydroCHLOROthiazide (HYDRODIURIL) 12.5 MG Tab Take 12.5 mg by mouth once daily.    ibuprofen (ADVIL,MOTRIN) 600 MG tablet Take 600 mg by mouth every 6 (six) hours as needed.    insulin aspart U-100 (NOVOLOG) 100 unit/mL injection Inject into the skin 3 (three) times daily before meals.    insulin glargine (LANTUS U-100 INSULIN) 100 unit/mL injection Inject 15 Units into the skin once daily.    isosorbide mononitrate (IMDUR) 60 MG 24 hr tablet TAKE 1 & 1 2 (ONE & ONE HALF) TABLETS BY MOUTH ONCE DAILY IN THE MORNING    ketorolac 0.5% (ACULAR) 0.5 % Drop INSTILL 1 DROP 4 TIMES DAILY BEGIN THREE DAYS PRIOR TO SURGERY. USE UNTIL COMPLETED OR DIRECTED TO STOP BY DOCTOR    lactulose (CHRONULAC) 10 gram/15 mL solution SMARTSIG:Milliliter(s) By Mouth    lisinopriL 10 MG tablet Take 2 tablets (20 mg total) by mouth once daily.    loperamide (IMODIUM) 2 mg capsule Take 4 mg by mouth 3 (three) times daily.    losartan (COZAAR) 25 MG tablet Take 25 mg by mouth.    megestroL (MEGACE) 400 mg/10 mL (40 mg/mL) Susp Take by mouth.    methoxy peg-epoetin beta (MIRCERA INJ) 200 mcg.    metoclopramide HCl (REGLAN) 10 MG tablet Take by mouth 3 (three) times daily.    NIFEdipine (PROCARDIA) 20 MG Cap Take 60 mg by mouth every 8 (eight) hours.    ONETOUCH DELICA LANCETS 33 gauge Misc 1 lancet by Misc.(Non-Drug; Combo Route) route 3 (three) times daily.    ONETOUCH VERIO FLEX METER Misc 1 each by Misc.(Non-Drug; Combo Route) route as needed.    ONETOUCH VERIO MID CONTROL Soln 1 each by Misc.(Non-Drug; Combo Route) route as needed.    ONETOUCH VERIO TEST STRIPS Strp 1 strip by Misc.(Non-Drug; Combo Route) route 3  (three) times daily.    oxyCODONE (OXY-IR) 5 mg Cap Take 10 mg by mouth every 8 (eight) hours as needed.    oxyCODONE-acetaminophen (PERCOCET) 5-325 mg per tablet Take 1 tablet by mouth every 8 (eight) hours as needed.    pantoprazole (PROTONIX) 20 MG tablet Take 20 mg by mouth once daily.    pentoxifylline (TRENTAL) 400 mg TbSR Take 400 mg by mouth 2 (two) times daily.    pioglitazone (ACTOS) 30 MG tablet Take 1 tablet (30 mg total) by mouth once daily.    prednisoLONE acetate (PRED FORTE) 1 % DrpS prednisolone acetate 1 % eye drops,suspension   INSTILL 1 DROP 4 TIMES DAILY IN AFFECTED EYE BEGINNING AFTER SURGERY AND CONTINUE UNTIL COMPLETE OR DIRECTED TO STOP BY DOCTOR    rosuvastatin (CRESTOR) 5 MG tablet Take by mouth.    sevelamer carbonate (RENVELA) 800 mg Tab Take by mouth.    sodium chloride 0.9% SolP 1,000 mL with heparin (porcine) 10,000 unit/mL Soln 90,000 Units Heparin Sodium (Porcine) 1,000 Units/mL Systemic    tamsulosin (FLOMAX) 0.4 mg Cap Take 1 capsule (0.4 mg total) by mouth once daily.    torsemide (DEMADEX) 20 MG Tab Take 1 tablet (20 mg total) by mouth 2 (two) times a day.    trazodone (DESYREL) 25 MG tablet 1 tablet.    VITAMIN B COMPLEX ORAL Take 1 tablet by mouth every morning.    zinc sulfate (ORAZINC) 110 mg (25 mg zinc) Tab Orazinc 110 mg (25 mg zinc) tablet   Take 1 tablet every day by oral route for 30 days.     Antibiotics (From admission, onward)      Start     Stop Route Frequency Ordered    08/10/23 1800  meropenem (MERREM) 2 g in sodium chloride 0.9% 100 mL IVPB         -- IV Every 8 hours (non-standard times) 08/10/23 1646    08/10/23 1200  vancomycin (VANCOCIN) 500 mg in dextrose 5 % in water (D5W) 100 mL IVPB (MB+)         -- IV Once 08/10/23 0819    08/09/23 1418  vancomycin - pharmacy to dose  (vancomycin IVPB (PEDS and ADULTS))        See Hyperspace for full Linked Orders Report.    -- IV pharmacy to manage frequency 08/09/23 1319          Antifungals (From  admission, onward)      None          Antivirals (From admission, onward)      None             Immunization History   Administered Date(s) Administered    Influenza 10/30/1997, 11/14/2000    Pneumococcal Conjugate - 20 Valent 03/17/2023       Family History       Problem Relation (Age of Onset)    Diabetes Father, Sister    Heart disease Father    Hyperlipidemia Brother    Kidney disease Father    Stroke Mother          Social History     Socioeconomic History    Marital status: Single   Tobacco Use    Smoking status: Former     Current packs/day: 0.00     Types: Cigarettes, Cigars    Smokeless tobacco: Never   Substance and Sexual Activity    Alcohol use: Not Currently    Drug use: Not Currently     Social Determinants of Health     Financial Resource Strain: Low Risk  (8/10/2023)    Overall Financial Resource Strain (CARDIA)     Difficulty of Paying Living Expenses: Not very hard   Food Insecurity: No Food Insecurity (8/10/2023)    Hunger Vital Sign     Worried About Running Out of Food in the Last Year: Never true     Ran Out of Food in the Last Year: Never true   Transportation Needs: No Transportation Needs (8/10/2023)    PRAPARE - Transportation     Lack of Transportation (Medical): No     Lack of Transportation (Non-Medical): No   Physical Activity: Insufficiently Active (8/10/2023)    Exercise Vital Sign     Days of Exercise per Week: 5 days     Minutes of Exercise per Session: 20 min   Stress: Stress Concern Present (8/10/2023)    [employer] [place] [employer] - Occupational Stress Questionnaire     Feeling of Stress : Rather much   Social Connections: Socially Isolated (8/10/2023)    Social Connection and Isolation Panel [NHANES]     Frequency of Communication with Friends and Family: Twice a week     Frequency of Social Gatherings with Friends and Family: Once a week     Attends Spiritism Services: Never     Active Member of Clubs or Organizations: No     Attends Club or  Organization Meetings: Never     Marital Status: Never    Housing Stability: Unknown (8/10/2023)    Housing Stability Vital Sign     Unable to Pay for Housing in the Last Year: No     Unstable Housing in the Last Year: No       Limited ROS due to patient not participating in interview  Review of Systems   Respiratory:  Negative for shortness of breath.    Cardiovascular:  Negative for chest pain.   Gastrointestinal:  Negative for abdominal pain.     Objective:     Vital Signs (Most Recent):  Temp: 98.1 °F (36.7 °C) (08/10/23 1632)  Pulse: 67 (08/10/23 1632)  Resp: 19 (08/10/23 1632)  BP: 129/69 (08/10/23 1632)  SpO2: 100 % (08/10/23 1632) Vital Signs (24h Range):  Temp:  [96 °F (35.6 °C)-98.2 °F (36.8 °C)] 98.1 °F (36.7 °C)  Pulse:  [58-72] 67  Resp:  [18-20] 19  SpO2:  [98 %-100 %] 100 %  BP: (113-166)/(53-77) 129/69     Weight: 61.7 kg (136 lb 0.4 oz)  Body mass index is 19.52 kg/m².    Estimated Creatinine Clearance: 7.2 mL/min (A) (based on SCr of 9.6 mg/dL (H)).     Physical Exam  Vitals reviewed.   Constitutional:       General: He is not in acute distress.     Appearance: Normal appearance. He is not ill-appearing.   HENT:      Head: Normocephalic and atraumatic.   Eyes:      Comments: L eye edematous, patient refused to open eyes   Cardiovascular:      Rate and Rhythm: Normal rate and regular rhythm.      Heart sounds: No murmur heard.  Pulmonary:      Effort: Pulmonary effort is normal. No respiratory distress.      Breath sounds: Normal breath sounds.   Abdominal:      General: Bowel sounds are normal.      Palpations: Abdomen is soft.      Tenderness: There is no abdominal tenderness.   Skin:     General: Skin is warm and dry.          Significant Labs: All pertinent labs within the past 24 hours have been reviewed.  Recent Lab Results         08/10/23  1257   08/10/23  1128   08/10/23  0609   08/09/23  1924        Albumin     2.3         Alkaline Phosphatase     104         ALT     7          Anion Gap     19         AST     15         Baso #     0.08         Basophil %     1.1         BILIRUBIN TOTAL     0.4  Comment: For infants and newborns, interpretation of results should be based  on gestational age, weight and in agreement with clinical  observations.    Premature Infant recommended reference ranges:  Up to 24 hours.............<8.0 mg/dL  Up to 48 hours............<12.0 mg/dL  3-5 days..................<15.0 mg/dL  6-29 days.................<15.0 mg/dL           BUN     62         Calcium     9.7         Chloride     103         CO2     15         Creatinine     9.6         Differential Method     Automated         eGFR     5.7         Eos #     0.4         Eosinophil %     5.7         Ferritin     3,415         Glucose     64         Gran # (ANC)     5.3         Gran %     74.2         Hematocrit     31.0         Hemoglobin     9.2         Hepatitis B Surface Ag   Non-reactive           Immature Grans (Abs)     0.07  Comment: Mild elevation in immature granulocytes is non specific and   can be seen in a variety of conditions including stress response,   acute inflammation, trauma and pregnancy. Correlation with other   laboratory and clinical findings is essential.           Immature Granulocytes     1.0         Iron     72         Lymph #     0.7         Lymph %     10.2         Magnesium     3.0         MCH     29.8         MCHC     29.7         MCV     100         Mono #     0.6         Mono %     7.8         MPV     9.9         nRBC     0         Phosphorus     5.4         Platelets     244         POCT Glucose 86       110       Potassium     4.9         PROTEIN TOTAL     6.8         RBC     3.09         RDW     18.8         Saturated Iron     45         Sodium     137         TIBC     161         Transferrin     109         WBC     7.17                 Significant Imaging: I have reviewed all pertinent imaging results/findings within the past 24 hours.

## 2023-08-10 NOTE — PROGRESS NOTES
[facility] HEMODIALYSIS NOTE     Patient currently on hemodialysis for removal of uremic toxins and volume.     Patient seen and evaluated on hemodialysis, tolerating treatment, see HD flowsheet for vitals and assessments.    Labs have been reviewed and the dialysate bath has been adjusted.       Assessment/Plan:    -ESRD on HD   -Patient seen on HD, tolerating treatment well, w/o complaints   -UF goal of 3L   -Continue lasix 20mg po bid   -Renal diet, if not NPO   -Continue calcitriol and renvela   -Strict I/O's and daily weights  -Daily renal function panels  -Keep MAP >65 while on HD   -Maintain Hgb >7.0  -Will continue to follow while inpatient       Pete Grimes NP  Nephrology

## 2023-08-10 NOTE — ASSESSMENT & PLAN NOTE
57M with PMH of DM2, HTN, ESRD, HFrEF, bowel obstruction s/p bowel resection, KENIA, afib, recent hospitalization for NELSON endophthalmitis, pseudomonas bacteremia, RUE AVG infection s/p excision, who presents as a transfer from [place] for worsening eye symptoms and imaging findings of L scleral abscess. Previously admitted to [facility] in July for NELSON endophthalmitis, received intravitreal vancomycin and ceftazidime and IV cefepime for pseudomonas bacteremia. Previously refused enucleation, but now agreeable. MRI orbits w/o on admission here shows fluid collection in the L posterior compartment. Patient is currently on vancomycin and zosyn. 8/9 blood cultures have no growth to date.     Recommendations:  · Continue vancomycin   · Switch zosyn to meropenem based on pseudomonas sensitivities from [facility]  · Pending enucleation by ophtho  · Follow up 8/9 blood cultures

## 2023-08-10 NOTE — ASSESSMENT & PLAN NOTE
Ophthalmology team on board and were going to perform the procedure but the daughter refused without beign around to review the MRI which delayed the procedure till next wednesday    MRI repeat : Diffuse inflammatory change involving the left globe, with associated thickening of the left sclera, proptosis, and focal fluid collection within the left posterior compartment, which demonstrates diffusion restriction.  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     Diffuse left preseptal and postseptal inflammatory change, with superimposed orbital cellulitis also considered.  Evaluation of the left orbital apex is limited secondary to no contrast administration.     Decreased size of the left anterior compartment with bowing of the visualized left lens.  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      Plan:    -Keep NPO for possible operation, if GCS drops will utilize peg tube feeds  -Vancomycin and Zosyn  -Daily Labs  - trace blood cultures

## 2023-08-10 NOTE — PT/OT/SLP PROGRESS
Speech Language Pathology      Immanuel Bernal  MRN: [id number]    Patient not seen today secondary to being [facility] for dialysis. SLP will follow-up next service date.

## 2023-08-10 NOTE — PLAN OF CARE
Pt went to MRI overnight. AAO x1. VSS. NSR. 2L NC. Remains NPO. 1 BM. Safety measures in place.      Problem: Adult Inpatient Plan of Care  Goal: Plan of Care Review  Outcome: Ongoing, Progressing  Goal: Patient-Specific Goal (Individualized)  Outcome: Ongoing, Progressing  Goal: Absence of Hospital-Acquired Illness or Injury  Outcome: Ongoing, Progressing  Goal: Optimal Comfort and Wellbeing  Outcome: Ongoing, Progressing  Goal: Readiness for Transition of Care  Outcome: Ongoing, Progressing     Problem: Diabetes Comorbidity  Goal: Blood Glucose Level Within Targeted Range  Outcome: Ongoing, Progressing     Problem: Skin Injury Risk Increased  Goal: Skin Health and Integrity  Outcome: Ongoing, Progressing

## 2023-08-10 NOTE — SUBJECTIVE & OBJECTIVE
Past Medical History:   Diagnosis Date    Bilateral retinal detachment 7/18/2023    BPH (benign prostatic hyperplasia)     Cataract     CHF (congestive heart failure)     CKD (chronic kidney disease) stage 3, GFR 30-59 ml/min     Diabetes mellitus, type 2     Hypertension     KENIA (obstructive sleep apnea)     Pancreatitis     Persistent proteinuria 11/12/2020    2 g proteinuria noted Resumed ACE Lower BP systolic to less than 130     PTSD (post-traumatic stress disorder)     Stroke        Past Surgical History:   Procedure Laterality Date    CATARACT EXTRACTION Bilateral        Review of patient's allergies indicates:   Allergen Reactions    Morphine Rash    Amiodarone analogues Itching     Other reaction(s): Unknown       Medications:  Medications Prior to Admission   Medication Sig    acetaminophen-codeine 300-30mg (TYLENOL #3) 300-30 mg Tab Take 1 tablet by mouth every 6 (six) hours as needed.    albuterol-ipratropium (DUO-NEB) 2.5 mg-0.5 mg/3 mL nebulizer solution Inhale 3 mLs into the lungs.    ALPRAZolam (XANAX) 0.5 MG tablet Take 1 tablet by mouth.    amLODIPine (NORVASC) 10 MG tablet Take by mouth.    apixaban (ELIQUIS) 2.5 mg Tab Take 2.5 mg by mouth 2 (two) times daily.    ascorbic acid, vitamin C, (VITAMIN C) 500 MG tablet Take 500 mg by mouth once daily.    aspirin 81 MG Chew Take 81 mg by mouth once daily.    atorvastatin (LIPITOR) 80 MG tablet atorvastatin 80 mg tablet    B COMPLEX 1, WITH FOLIC ACID, 0.4 mg Tab     busPIRone (BUSPAR) 10 MG tablet Take 1 tablet by mouth.    calcitRIOL (ROCALTROL) 0.25 MCG Cap Take 0.25 mcg by mouth.    calcium carb/vit D3/minerals (CALCIUM CARBONATE-VIT D3-MIN ORAL) Take by mouth.    carvediloL (COREG) 25 MG tablet Take 1 tablet (25 mg total) by mouth 2 (two) times daily with meals.    chlorthalidone (HYGROTEN) 25 MG Tab Take 1 tablet (25 mg total) by mouth once daily.    ciprofloxacin HCl (CILOXAN) 0.3 % ophthalmic solution ciprofloxacin 0.3 % eye drops   INSTILL 1  DROP IN AFFECTED EYE FOUR TIMES A DAY BEGINNING 3 DAYS PRIOR TO SURGERY AND CONTINUE TILL EMPTY    citalopram (CELEXA) 10 MG tablet citalopram 10 mg tablet    CLEARLAX 17 gram/dose powder SMARTSI scoopful By Mouth Twice Daily    cloNIDine (CATAPRES) 0.1 MG tablet Take 0.1 mg by mouth 3 (three) times daily.    clopidogreL (PLAVIX) 75 mg tablet Take 75 mg by mouth once daily.    docusate sodium (COLACE) 100 MG capsule Take by mouth.    doxazosin (CARDURA) 4 MG tablet Take 4 mg by mouth 2 (two) times daily.    ergocalciferol (ERGOCALCIFEROL) 50,000 unit Cap TAKE 1 CAPSULE BY MOUTH EVERY WEDNESDAY    ergocalciferol, vitamin D2, (VITAMIN D ORAL) Take 0.25 mcg by mouth.    ferrous gluconate (FERGON) 324 MG tablet     flecainide (TAMBOCOR) 50 MG Tab Take 50 mg by mouth 2 (two) times daily.    folic acid (FOLVITE) 1 MG tablet Take 1 tablet by mouth every morning.    folic acid/vit B complex and C (B COMPLEX-VITAMIN C-FOLIC ACID ORAL) Take by mouth.    FREESTYLE BRYAN 14 DAY SENSOR Kit 1 each by Misc.(Non-Drug; Combo Route) route every 14 (fourteen) days.    furosemide (LASIX) 20 MG tablet Take 20 mg by mouth 2 (two) times daily.    guaiFENesin (HUMIBID E) 400 mg Tab Take 400 mg by mouth.    HUMALOG U-100 INSULIN 100 unit/mL injection Inject into the skin.    hydrALAZINE (APRESOLINE) 100 MG tablet Take 100 mg by mouth 3 (three) times daily.    hydroCHLOROthiazide (HYDRODIURIL) 12.5 MG Tab Take 12.5 mg by mouth once daily.    ibuprofen (ADVIL,MOTRIN) 600 MG tablet Take 600 mg by mouth every 6 (six) hours as needed.    insulin aspart U-100 (NOVOLOG) 100 unit/mL injection Inject into the skin 3 (three) times daily before meals.    insulin glargine (LANTUS U-100 INSULIN) 100 unit/mL injection Inject 15 Units into the skin once daily.    isosorbide mononitrate (IMDUR) 60 MG 24 hr tablet TAKE 1 & 1 2 (ONE & ONE HALF) TABLETS BY MOUTH ONCE DAILY IN THE MORNING    ketorolac 0.5% (ACULAR) 0.5 % Drop INSTILL 1 DROP 4 TIMES DAILY  BEGIN THREE DAYS PRIOR TO SURGERY. USE UNTIL COMPLETED OR DIRECTED TO STOP BY DOCTOR    lactulose (CHRONULAC) 10 gram/15 mL solution SMARTSIG:Milliliter(s) By Mouth    lisinopriL 10 MG tablet Take 2 tablets (20 mg total) by mouth once daily.    loperamide (IMODIUM) 2 mg capsule Take 4 mg by mouth 3 (three) times daily.    losartan (COZAAR) 25 MG tablet Take 25 mg by mouth.    megestroL (MEGACE) 400 mg/10 mL (40 mg/mL) Susp Take by mouth.    methoxy peg-epoetin beta (MIRCERA INJ) 200 mcg.    metoclopramide HCl (REGLAN) 10 MG tablet Take by mouth 3 (three) times daily.    NIFEdipine (PROCARDIA) 20 MG Cap Take 60 mg by mouth every 8 (eight) hours.    ONETOUCH DELICA LANCETS 33 gauge Misc 1 lancet by Misc.(Non-Drug; Combo Route) route 3 (three) times daily.    ONETOUCH VERIO FLEX METER Misc 1 each by Misc.(Non-Drug; Combo Route) route as needed.    ONETOUCH VERIO MID CONTROL Soln 1 each by Misc.(Non-Drug; Combo Route) route as needed.    ONETOUCH VERIO TEST STRIPS Strp 1 strip by Misc.(Non-Drug; Combo Route) route 3 (three) times daily.    oxyCODONE (OXY-IR) 5 mg Cap Take 10 mg by mouth every 8 (eight) hours as needed.    oxyCODONE-acetaminophen (PERCOCET) 5-325 mg per tablet Take 1 tablet by mouth every 8 (eight) hours as needed.    pantoprazole (PROTONIX) 20 MG tablet Take 20 mg by mouth once daily.    pentoxifylline (TRENTAL) 400 mg TbSR Take 400 mg by mouth 2 (two) times daily.    pioglitazone (ACTOS) 30 MG tablet Take 1 tablet (30 mg total) by mouth once daily.    prednisoLONE acetate (PRED FORTE) 1 % DrpS prednisolone acetate 1 % eye drops,suspension   INSTILL 1 DROP 4 TIMES DAILY IN AFFECTED EYE BEGINNING AFTER SURGERY AND CONTINUE UNTIL COMPLETE OR DIRECTED TO STOP BY DOCTOR    rosuvastatin (CRESTOR) 5 MG tablet Take by mouth.    sevelamer carbonate (RENVELA) 800 mg Tab Take by mouth.    sodium chloride 0.9% SolP 1,000 mL with heparin (porcine) 10,000 unit/mL Soln 90,000 Units Heparin Sodium (Porcine) 1,000  Units/mL Systemic    tamsulosin (FLOMAX) 0.4 mg Cap Take 1 capsule (0.4 mg total) by mouth once daily.    torsemide (DEMADEX) 20 MG Tab Take 1 tablet (20 mg total) by mouth 2 (two) times a day.    trazodone (DESYREL) 25 MG tablet 1 tablet.    VITAMIN B COMPLEX ORAL Take 1 tablet by mouth every morning.    zinc sulfate (ORAZINC) 110 mg (25 mg zinc) Tab Orazinc 110 mg (25 mg zinc) tablet   Take 1 tablet every day by oral route for 30 days.     Antibiotics (From admission, onward)      Start     Stop Route Frequency Ordered    08/10/23 1800  meropenem (MERREM) 2 g in sodium chloride 0.9% 100 mL IVPB         -- IV Every 8 hours (non-standard times) 08/10/23 1646    08/10/23 1200  vancomycin (VANCOCIN) 500 mg in dextrose 5 % in water (D5W) 100 mL IVPB (MB+)         -- IV Once 08/10/23 0819    08/09/23 1418  vancomycin - pharmacy to dose  (vancomycin IVPB (PEDS and ADULTS))        See Hyperspace for full Linked Orders Report.    -- IV pharmacy to manage frequency 08/09/23 1319          Antifungals (From admission, onward)      None          Antivirals (From admission, onward)      None             Immunization History   Administered Date(s) Administered    Influenza 10/30/1997, 11/14/2000    Pneumococcal Conjugate - 20 Valent 03/17/2023       Family History       Problem Relation (Age of Onset)    Diabetes Father, Sister    Heart disease Father    Hyperlipidemia Brother    Kidney disease Father    Stroke Mother          Social History     Socioeconomic History    Marital status: Single   Tobacco Use    Smoking status: Former     Current packs/day: 0.00     Types: Cigarettes, Cigars    Smokeless tobacco: Never   Substance and Sexual Activity    Alcohol use: Not Currently    Drug use: Not Currently     Social Determinants of Health     Financial Resource Strain: Low Risk  (8/10/2023)    Overall Financial Resource Strain (CARDIA)     Difficulty of Paying Living Expenses: Not very hard   Food Insecurity: No Food Insecurity  (8/10/2023)    Hunger Vital Sign     Worried About Running Out of Food in the Last Year: Never true     Ran Out of Food in the Last Year: Never true   Transportation Needs: No Transportation Needs (8/10/2023)    PRAPARE - Transportation     Lack of Transportation (Medical): No     Lack of Transportation (Non-Medical): No   Physical Activity: Insufficiently Active (8/10/2023)    Exercise Vital Sign     Days of Exercise per Week: 5 days     Minutes of Exercise per Session: 20 min   Stress: Stress Concern Present (8/10/2023)    [employer] - Occupational Stress Questionnaire     Feeling of Stress : Rather much   Social Connections: Socially Isolated (8/10/2023)    Social Connection and Isolation Panel [NHANES]     Frequency of Communication with Friends and Family: Twice a week     Frequency of Social Gatherings with Friends and Family: Once a week     Attends [employer] Services: Never     Active Member of Clubs or Organizations: No     Attends Club or Organization Meetings: Never     Marital Status: Never    Housing Stability: Unknown (8/10/2023)    Housing Stability Vital Sign     Unable to Pay for Housing in the Last Year: No     Unstable Housing in the Last Year: No       Limited ROS due to patient not participating in interview  Review of Systems   Respiratory:  Negative for shortness of breath.    Cardiovascular:  Negative for chest pain.   Gastrointestinal:  Negative for abdominal pain.     Objective:     Vital Signs (Most Recent):  Temp: 98.1 °F (36.7 °C) (08/10/23 1632)  Pulse: 67 (08/10/23 1632)  Resp: 19 (08/10/23 1632)  BP: 129/69 (08/10/23 1632)  SpO2: 100 % (08/10/23 1632) Vital Signs (24h Range):  Temp:  [96 °F (35.6 °C)-98.2 °F (36.8 °C)] 98.1 °F (36.7 °C)  Pulse:  [58-72] 67  Resp:  [18-20] 19  SpO2:  [98 %-100 %] 100 %  BP: (113-166)/(53-77) 129/69     Weight: 61.7 kg (136 lb 0.4 oz)  Body mass index is 19.52 kg/m².    Estimated Creatinine Clearance: 7.2 mL/min (A)  (based on SCr of 9.6 mg/dL (H)).     Physical Exam  Vitals reviewed.   Constitutional:       General: He is not in acute distress.     Appearance: Normal appearance. He is not ill-appearing.   HENT:      Head: Normocephalic and atraumatic.   Eyes:      Comments: L eye edematous, patient refused to open eyes   Cardiovascular:      Rate and Rhythm: Normal rate and regular rhythm.      Heart sounds: No murmur heard.  Pulmonary:      Effort: Pulmonary effort is normal. No respiratory distress.      Breath sounds: Normal breath sounds.   Abdominal:      General: Bowel sounds are normal.      Palpations: Abdomen is soft.      Tenderness: There is no abdominal tenderness.   Skin:     General: Skin is warm and dry.          Significant Labs: All pertinent labs within the past 24 hours have been reviewed.  Recent Lab Results         08/10/23  1257   08/10/23  1128   08/10/23  0609   08/09/23  1924        Albumin     2.3         Alkaline Phosphatase     104         ALT     7         Anion Gap     19         AST     15         Baso #     0.08         Basophil %     1.1         BILIRUBIN TOTAL     0.4  Comment: For infants and newborns, interpretation of results should be based  on gestational age, weight and in agreement with clinical  observations.    Premature Infant recommended reference ranges:  Up to 24 hours.............<8.0 mg/dL  Up to 48 hours............<12.0 mg/dL  3-5 days..................<15.0 mg/dL  6-29 days.................<15.0 mg/dL           BUN     62         Calcium     9.7         Chloride     103         CO2     15         Creatinine     9.6         Differential Method     Automated         eGFR     5.7         Eos #     0.4         Eosinophil %     5.7         Ferritin     3,415         Glucose     64         Gran # (ANC)     5.3         Gran %     74.2         Hematocrit     31.0         Hemoglobin     9.2         Hepatitis B Surface Ag   Non-reactive           Immature Grans (Abs)     0.07  Comment:  Mild elevation in immature granulocytes is non specific and   can be seen in a variety of conditions including stress response,   acute inflammation, trauma and pregnancy. Correlation with other   laboratory and clinical findings is essential.           Immature Granulocytes     1.0         Iron     72         Lymph #     0.7         Lymph %     10.2         Magnesium     3.0         MCH     29.8         MCHC     29.7         MCV     100         Mono #     0.6         Mono %     7.8         MPV     9.9         nRBC     0         Phosphorus     5.4         Platelets     244         POCT Glucose 86       110       Potassium     4.9         PROTEIN TOTAL     6.8         RBC     3.09         RDW     18.8         Saturated Iron     45         Sodium     137         TIBC     161         Transferrin     109         WBC     7.17                 Significant Imaging: I have reviewed all pertinent imaging results/findings within the past 24 hours.

## 2023-08-10 NOTE — SUBJECTIVE & OBJECTIVE
Interval History: no acute events overnight    Review of Systems   Respiratory:  Negative for choking, chest tightness, shortness of breath and wheezing.    Cardiovascular:  Negative for chest pain and leg swelling.   Gastrointestinal:  Positive for abdominal distention.   Neurological: Negative.    Psychiatric/Behavioral: Negative.       Objective:     Vital Signs (Most Recent):  Temp: 96 °F (35.6 °C) (08/10/23 0951)  Pulse: 72 (08/10/23 1309)  Resp: 18 (08/10/23 0804)  BP: (!) 150/68 (08/10/23 1309)  SpO2: 100 % (08/10/23 0804) Vital Signs (24h Range):  Temp:  [96 °F (35.6 °C)-98.2 °F (36.8 °C)] 96 °F (35.6 °C)  Pulse:  [58-72] 72  Resp:  [18-20] 18  SpO2:  [98 %-100 %] 100 %  BP: (113-166)/(53-77) 150/68     Weight: 61.7 kg (136 lb 0.4 oz)  Body mass index is 19.52 kg/m².    Intake/Output Summary (Last 24 hours) at 8/10/2023 1545  Last data filed at 8/10/2023 1310  Gross per 24 hour   Intake 601.8 ml   Output 2585 ml   Net -1983.2 ml         Physical Exam  Eyes:      Pupils: Pupils are equal, round, and reactive to light.   Cardiovascular:      Rate and Rhythm: Normal rate and regular rhythm.      Pulses: Normal pulses.      Heart sounds: Normal heart sounds.   Pulmonary:      Effort: Pulmonary effort is normal.      Breath sounds: Normal breath sounds.   Abdominal:      Palpations: Abdomen is soft.   Neurological:      Mental Status: He is alert. Mental status is at baseline.   Psychiatric:         Mood and Affect: Mood normal.             Significant Labs: All pertinent labs within the past 24 hours have been reviewed.    Significant Imaging: I have reviewed all pertinent imaging results/findings within the past 24 hours.

## 2023-08-10 NOTE — NURSING
Pt's sister (Marley) has MPOA. She expressed concerns over surgery. She would like to speak with MD regarding MRI results prior to surgery.

## 2023-08-10 NOTE — PLAN OF CARE
"   08/10/23 1223   Post-Acute Status   Post-Acute Authorization Placement   Post-Acute Placement Status Patient List Provided   Discharge Plan   Discharge Plan A Long-term acute care facility (LTAC)   Discharge Plan B Skilled Nursing Facility     CM spoke with sister and sister is requesting the patient have therapy evaluate and make any post acute recommendations.  The sister is interested in SNF or LTAC. CM secure chat the attending for therapy orders. Patients sisterMarley stated, " my brother was with  [facility]  #[phone] and I was pleased with them.       Patient has a HD chair @ [facility]  [place], contact number # [phone] dialysis days q MWF at 0450 pm       Patient was a previous resident at [facility] in [place] [phone] and spoke with to confirm the sisters concerns with care.    Orders received for therapy to evaluate for recommendations      Jerri Colon RN  Case Management  [facility]  [phone]    "

## 2023-08-10 NOTE — ASSESSMENT & PLAN NOTE
Underwent dialysis for three hours on 8/10 as well as MRI with contrast    Patient's creatinine before dialysis is 9.6  Baseline one month back is 5.83    Plan:  Follow nephro recs  And trace daily cmp

## 2023-08-10 NOTE — PROGRESS NOTES
Pharmacokinetic Assessment Follow Up: IV Vancomycin    Vancomycin serum concentration assessment(s):    The random level was drawn correctly and can be used to guide therapy at this time. The measurement is within the desired definitive target range of 10 to 20 mcg/mL.    Vancomycin Regimen Plan:    Will re-dose vancomycin 500 mg x 1 today   Repeat random level on Friday with AM labs   ESRD on HD- plan to dialyze today     Drug levels (last 3 results):  Recent Labs   Lab Result Units 08/09/23  1447   Vancomycin, Random ug/mL 17.2       Pharmacy will continue to follow and monitor vancomycin.    Please contact pharmacy at extension [phone] for questions regarding this assessment.    Thank you for the consult,   Elsa Solorzano       Patient brief summary:  Immanuel Bernal is a 57 y.o. male initiated on antimicrobial therapy with IV Vancomycin for treatment of  endophtalmitis     Drug Allergies:   Review of patient's allergies indicates:   Allergen Reactions    Morphine Rash    Amiodarone analogues Itching     Other reaction(s): Unknown       Renal Function:   Estimated Creatinine Clearance: 7.2 mL/min (A) (based on SCr of 9.6 mg/dL (H)).,     Dialysis Method (if applicable):  intermittent HD    CBC (last 72 hours):  Recent Labs   Lab Result Units 08/09/23  1447 08/10/23  0609   WBC K/uL 7.07 7.17   Hemoglobin g/dL 9.3* 9.2*   Hematocrit % 31.9* 31.0*   Platelets K/uL 264 244   Gran % % 74.0* 74.2*   Lymph % % 9.9* 10.2*   Mono % % 8.6 7.8   Eosinophil % % 5.5 5.7   Basophil % % 1.0 1.1   Differential Method  Automated Automated       Metabolic Panel (last 72 hours):  Recent Labs   Lab Result Units 08/09/23  1447 08/10/23  0609   Sodium mmol/L 138 137   Potassium mmol/L 4.9 4.9   Chloride mmol/L 103 103   CO2 mmol/L 17* 15*   Glucose mg/dL 62* 64*   BUN mg/dL 52* 62*   Creatinine mg/dL 9.0* 9.6*   Albumin g/dL 2.4* 2.3*   Total Bilirubin mg/dL 0.4 0.4   Alkaline Phosphatase U/L 112 104   AST U/L 12 15   ALT U/L 9*  7*   Magnesium mg/dL  --  3.0*   Phosphorus mg/dL  --  5.4*       Vancomycin Administrations:  vancomycin given in the last 96 hours        No antibiotic orders with administrations found.                    Microbiologic Results:  Microbiology Results (last 7 days)       Procedure Component Value Units Date/Time    Blood culture (site 2) [[id number]] Collected: 08/09/23 1434    Order Status: Completed Specimen: Blood Updated: 08/10/23 0115     Blood Culture, Routine No Growth to date    Narrative:      Site # 2, aerobic only  Collection has been rescheduled by [email or web address] at 08/09/2023 13:27 Reason:   Patient unavailable in with DR   Collection has been rescheduled by [email or web address] at 08/09/2023 13:27 Reason:   Patient unavailable in with DR     Blood culture (site 1) [[id number]] Collected: 08/09/23 1434    Order Status: Completed Specimen: Blood Updated: 08/10/23 0115     Blood Culture, Routine No Growth to date    Narrative:      Site # 1, aerobic and anaerobic  Collection has been rescheduled by [email or web address] at 08/09/2023 13:27 Reason:   Patient unavailable in with DR   Collection has been rescheduled by [email or web address] at 08/09/2023 13:27 Reason:   Patient unavailable in with DR

## 2023-08-10 NOTE — CONSULTS
[facility] ([place] [address])  Adult Nutrition  Consult Note    SUMMARY     Recommendations    1. When/if able, initiate TFs via PEG. Rec'd Novasource @ 40 mL/hr to provide 1920 kcals, 87 g of protein, 688 mL fluid.   2. RD to monitor & follow-up.    Goals: Meet % EEN, EPN by RD f/u date  Nutrition Goal Status: new  Communication of RD Recs: other (comment) (POC)    Assessment and Plan    Severe malnutrition    Nutrition Problem:  Severe Protein-Calorie Malnutrition  Malnutrition in the context of Chronic Illness/Injury    Related to (etiology):  Inability to consume sufficient energy     Signs and Symptoms (as evidenced by):  Body Fat Depletion: severe depletion of orbitals and triceps   Muscle Mass Depletion: severe depletion of temples, clavicle region, scapular region and lower extremities   Weight Loss: 31% x 6-7 months     Interventions(treatment strategy):  Collaboration of nutrition care w/ other providers  EN    Nutrition Diagnosis Status:  New    Malnutrition Assessment    Malnutrition Context: chronic illness  Malnutrition Level: severe    Weight Loss (Malnutrition): greater than 10% in 6 months  Energy Intake (Malnutrition): other (see comments) (LAMONT)  Subcutaneous Fat (Malnutrition): severe depletion  Muscle Mass (Malnutrition): severe depletion   Orbital Region (Subcutaneous Fat Loss): severe depletion  Upper Arm Region (Subcutaneous Fat Loss): severe depletion   [place] Region (Muscle Loss): severe depletion  Clavicle Bone Region (Muscle Loss): severe depletion  Dorsal Hand (Muscle Loss): severe depletion  Patellar Region (Muscle Loss): severe depletion  Anterior Thigh Region (Muscle Loss): severe depletion     Reason for Assessment    Reason For Assessment: consult  Diagnosis: other (see comments) (Endophtalmitis)  Relevant Medical History: CHF, DM, ESRD on HD, PEG  Interdisciplinary Rounds: did not attend    General Information Comments: NPO w/ PEG present. No family at bedside,  "unsure of energy intake/TF regimen PTA (pt aphasic). UBW: 196# per chart review. NFPE complete w/ severe wasting found. RD feels pt meets criteria for severe malnutrition. Please see PES statement for details. Planning for HD today.  Nutrition Discharge Planning: Pending clinical course    Nutrition/Diet History    Patient Reported Diet/Restrictions/Preferences: other (see comments) (LAMONT)  Factors Affecting Nutritional Intake: NPO  Nutrition Support Formula Prior to Admit: Other (see commments) (LAMONT)    Anthropometrics    Temp: 98.2 °F (36.8 °C)  Height: 5' 10" (177.8 cm)  Height (inches): 70 in  Weight Method: Bed Scale  Weight: 61.7 kg (136 lb 0.4 oz)  Weight (lb): 136.03 lb  Ideal Body Weight (IBW), Male: 166 lb  % Ideal Body Weight, Male (lb): 81.95 %  BMI (Calculated): 19.5  BMI Grade: 18.5-24.9 - normal  Usual Body Weight (UBW), k kg  % Usual Body Weight: 69.47  % Weight Change From Usual Weight: -30.67 %    Lab/Procedures/Meds    Pertinent Labs Reviewed: reviewed  Pertinent Labs Comments: Creat 9.6, GFR 5.7, P 5.4  Pertinent Medications Reviewed: reviewed  Pertinent Medications Comments: Lactulose    Estimated/Assessed Needs    Weight Used For Calorie Calculations: 61.7 kg (136 lb 0.4 oz)    Energy Calorie Requirements (kcal): 1851 kcal/d  Energy Need Method: Kcal/kg (30 kcal/kg)    Protein Requirements: 80 g/d (1.3 g/kg)  Weight Used For Protein Calculations: 61.7 kg (136 lb 0.4 oz)    Estimated Fluid Requirement Method: other (see comments) (Per MD or 1 mL/kcal)  RDA Method (mL): 1851    CHO Requirement: 231g    Nutrition Prescription Ordered    Current Diet Order: NPO    Evaluation of Received Nutrient/Fluid Intake    I/O: (n/a)    Comments: LBM:     Nutrition Risk    Level of Risk/Frequency of Follow-up:  (1x/week)     Monitor and Evaluation    Food and Nutrient Intake: enteral nutrition intake, food and beverage intake, energy intake  Food and Nutrient Adminstration: enteral and parenteral " nutrition administration, diet order  Physical Activity and Function: nutrition-related ADLs and IADLs  Anthropometric Measurements: weight, weight change  Biochemical Data, Medical Tests and Procedures: inflammatory profile, lipid profile, glucose/endocrine profile, gastrointestinal profile, electrolyte and renal panel  Nutrition-Focused Physical Findings: overall appearance     Nutrition Follow-Up    RD Follow-up?: Yes
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
## 2023-08-10 NOTE — HPI
Mr. Bernal is a 57M with PMH of DM2, HTN, ESRD, HFrEF, bowel obstruction s/p bowel resection, KENIA, afib, recent hospitalization for NELSON endophthalmitis, pseudomonas bacteremia, RUE AVG infection s/p excision, who presented as a transfer from [facility] for worsening eye symptoms and imaging findings of L scleral abscess. Patient had been admitted to [facility] July 18th for concern for NELSON endophthalmitis. He received intravitreal vancomycin and ceftazidime by ophthalmology, and IV cefepime for pseudomonas bacteremia. Superficial swab with diphtheroids and anaerobic cocci. He underwent intravitreal tap that was negative for any yeast or fungus. Unfortunately L eye did not respond to treatment and abscess developed. Ophthalmology recommended enucleation, however POA/family declined. He was recommend to continue on cefepime for 6 weeks followed by indefinite fluoroquinolone, however he left AMA without the antibiotics.     He then presented to [facility], where imaging was notable for L scleral abscess. He received a dose of zosyn and vanc, and was [place] [facility] [place]. ID was consulted on arrival, and he was transitioned to vanc/meropenem while awaiting culture results. Blood cultures from 8/9 negative. He underwent L enucleation 8/16. ID reconsulted 8/17 for long term antibiotic regimen.

## 2023-08-11 PROBLEM — D63.1 ANEMIA IN ESRD (END-STAGE RENAL DISEASE): Status: ACTIVE | Noted: 2023-08-11

## 2023-08-11 PROBLEM — G93.41 ACUTE METABOLIC ENCEPHALOPATHY: Status: ACTIVE | Noted: 2023-08-11

## 2023-08-11 PROBLEM — R41.0 DELIRIUM: Status: ACTIVE | Noted: 2023-08-11

## 2023-08-11 PROBLEM — Z51.5 ENCOUNTER FOR PALLIATIVE CARE: Status: ACTIVE | Noted: 2023-08-11

## 2023-08-11 PROBLEM — Z93.1 PEG (PERCUTANEOUS ENDOSCOPIC GASTROSTOMY) STATUS: Status: ACTIVE | Noted: 2023-08-11

## 2023-08-11 PROBLEM — N18.6 ANEMIA IN ESRD (END-STAGE RENAL DISEASE): Status: ACTIVE | Noted: 2023-08-11

## 2023-08-11 LAB
ALBUMIN SERPL BCP-MCNC: 2.3 G/DL (ref 3.5–5.2)
ALBUMIN SERPL BCP-MCNC: 2.4 G/DL (ref 3.5–5.2)
ALP SERPL-CCNC: 112 U/L (ref 55–135)
ALT SERPL W/O P-5'-P-CCNC: 7 U/L (ref 10–44)
ANION GAP SERPL CALC-SCNC: 16 MMOL/L (ref 8–16)
ANION GAP SERPL CALC-SCNC: 19 MMOL/L (ref 8–16)
AST SERPL-CCNC: 16 U/L (ref 10–40)
BASOPHILS # BLD AUTO: 0.11 K/UL (ref 0–0.2)
BASOPHILS NFR BLD: 1.1 % (ref 0–1.9)
BILIRUB SERPL-MCNC: 0.4 MG/DL (ref 0.1–1)
BUN SERPL-MCNC: 30 MG/DL (ref 6–20)
BUN SERPL-MCNC: 31 MG/DL (ref 6–20)
CALCIUM SERPL-MCNC: 8.9 MG/DL (ref 8.7–10.5)
CALCIUM SERPL-MCNC: 9 MG/DL (ref 8.7–10.5)
CHLORIDE SERPL-SCNC: 97 MMOL/L (ref 95–110)
CHLORIDE SERPL-SCNC: 97 MMOL/L (ref 95–110)
CO2 SERPL-SCNC: 22 MMOL/L (ref 23–29)
CO2 SERPL-SCNC: 25 MMOL/L (ref 23–29)
CREAT SERPL-MCNC: 5.9 MG/DL (ref 0.5–1.4)
CREAT SERPL-MCNC: 7 MG/DL (ref 0.5–1.4)
DIFFERENTIAL METHOD: ABNORMAL
EOSINOPHIL # BLD AUTO: 0.3 K/UL (ref 0–0.5)
EOSINOPHIL NFR BLD: 2.8 % (ref 0–8)
ERYTHROCYTE [DISTWIDTH] IN BLOOD BY AUTOMATED COUNT: 18.8 % (ref 11.5–14.5)
EST. GFR  (NO RACE VARIABLE): 10.3 ML/MIN/1.73 M^2
EST. GFR  (NO RACE VARIABLE): 8.4 ML/MIN/1.73 M^2
GLUCOSE SERPL-MCNC: 49 MG/DL (ref 70–110)
GLUCOSE SERPL-MCNC: 49 MG/DL (ref 70–110)
HCT VFR BLD AUTO: 28.4 % (ref 40–54)
HGB BLD-MCNC: 8.5 G/DL (ref 14–18)
IMM GRANULOCYTES # BLD AUTO: 0.06 K/UL (ref 0–0.04)
IMM GRANULOCYTES NFR BLD AUTO: 0.6 % (ref 0–0.5)
LYMPHOCYTES # BLD AUTO: 0.9 K/UL (ref 1–4.8)
LYMPHOCYTES NFR BLD: 8.5 % (ref 18–48)
MAGNESIUM SERPL-MCNC: 2.3 MG/DL (ref 1.6–2.6)
MAGNESIUM SERPL-MCNC: 2.5 MG/DL (ref 1.6–2.6)
MCH RBC QN AUTO: 29.7 PG (ref 27–31)
MCHC RBC AUTO-ENTMCNC: 29.9 G/DL (ref 32–36)
MCV RBC AUTO: 99 FL (ref 82–98)
MONOCYTES # BLD AUTO: 1.4 K/UL (ref 0.3–1)
MONOCYTES NFR BLD: 13.3 % (ref 4–15)
NEUTROPHILS # BLD AUTO: 7.5 K/UL (ref 1.8–7.7)
NEUTROPHILS NFR BLD: 73.7 % (ref 38–73)
NRBC BLD-RTO: 0 /100 WBC
PHOSPHATE SERPL-MCNC: 3.3 MG/DL (ref 2.7–4.5)
PHOSPHATE SERPL-MCNC: 4.1 MG/DL (ref 2.7–4.5)
PLATELET # BLD AUTO: 267 K/UL (ref 150–450)
PMV BLD AUTO: 10.5 FL (ref 9.2–12.9)
POCT GLUCOSE: 62 MG/DL (ref 70–110)
POCT GLUCOSE: 66 MG/DL (ref 70–110)
POCT GLUCOSE: 68 MG/DL (ref 70–110)
POCT GLUCOSE: 71 MG/DL (ref 70–110)
POTASSIUM SERPL-SCNC: 3.5 MMOL/L (ref 3.5–5.1)
POTASSIUM SERPL-SCNC: 3.6 MMOL/L (ref 3.5–5.1)
PROT SERPL-MCNC: 6.9 G/DL (ref 6–8.4)
RBC # BLD AUTO: 2.86 M/UL (ref 4.6–6.2)
SODIUM SERPL-SCNC: 138 MMOL/L (ref 136–145)
SODIUM SERPL-SCNC: 138 MMOL/L (ref 136–145)
VANCOMYCIN SERPL-MCNC: 22.4 UG/ML
WBC # BLD AUTO: 10.15 K/UL (ref 3.9–12.7)

## 2023-08-11 PROCEDURE — 99233 SBSQ HOSP IP/OBS HIGH 50: CPT | Mod: ,,, | Performed by: NURSE PRACTITIONER

## 2023-08-11 PROCEDURE — 99233 PR SUBSEQUENT HOSPITAL CARE,LEVL III: ICD-10-PCS | Mod: ,,, | Performed by: STUDENT IN AN ORGANIZED HEALTH CARE EDUCATION/TRAINING PROGRAM

## 2023-08-11 PROCEDURE — 63600175 PHARM REV CODE 636 W HCPCS: Performed by: STUDENT IN AN ORGANIZED HEALTH CARE EDUCATION/TRAINING PROGRAM

## 2023-08-11 PROCEDURE — 80053 COMPREHEN METABOLIC PANEL: CPT | Performed by: STUDENT IN AN ORGANIZED HEALTH CARE EDUCATION/TRAINING PROGRAM

## 2023-08-11 PROCEDURE — 36415 COLL VENOUS BLD VENIPUNCTURE: CPT | Performed by: STUDENT IN AN ORGANIZED HEALTH CARE EDUCATION/TRAINING PROGRAM

## 2023-08-11 PROCEDURE — 25000003 PHARM REV CODE 250: Performed by: STUDENT IN AN ORGANIZED HEALTH CARE EDUCATION/TRAINING PROGRAM

## 2023-08-11 PROCEDURE — 80202 ASSAY OF VANCOMYCIN: CPT | Performed by: STUDENT IN AN ORGANIZED HEALTH CARE EDUCATION/TRAINING PROGRAM

## 2023-08-11 PROCEDURE — 97165 OT EVAL LOW COMPLEX 30 MIN: CPT

## 2023-08-11 PROCEDURE — 92610 EVALUATE SWALLOWING FUNCTION: CPT

## 2023-08-11 PROCEDURE — 25000003 PHARM REV CODE 250: Performed by: HOSPITALIST

## 2023-08-11 PROCEDURE — 83735 ASSAY OF MAGNESIUM: CPT | Performed by: STUDENT IN AN ORGANIZED HEALTH CARE EDUCATION/TRAINING PROGRAM

## 2023-08-11 PROCEDURE — 20000000 HC ICU ROOM

## 2023-08-11 PROCEDURE — 99233 SBSQ HOSP IP/OBS HIGH 50: CPT | Mod: ,,, | Performed by: STUDENT IN AN ORGANIZED HEALTH CARE EDUCATION/TRAINING PROGRAM

## 2023-08-11 PROCEDURE — 99233 PR SUBSEQUENT HOSPITAL CARE,LEVL III: ICD-10-PCS | Mod: ,,, | Performed by: NURSE PRACTITIONER

## 2023-08-11 PROCEDURE — 99233 PR SUBSEQUENT HOSPITAL CARE,LEVL III: ICD-10-PCS | Mod: ,,, | Performed by: INTERNAL MEDICINE

## 2023-08-11 PROCEDURE — 99233 SBSQ HOSP IP/OBS HIGH 50: CPT | Mod: ,,, | Performed by: INTERNAL MEDICINE

## 2023-08-11 PROCEDURE — 80069 RENAL FUNCTION PANEL: CPT | Performed by: STUDENT IN AN ORGANIZED HEALTH CARE EDUCATION/TRAINING PROGRAM

## 2023-08-11 PROCEDURE — 85025 COMPLETE CBC W/AUTO DIFF WBC: CPT | Performed by: STUDENT IN AN ORGANIZED HEALTH CARE EDUCATION/TRAINING PROGRAM

## 2023-08-11 PROCEDURE — 99223 1ST HOSP IP/OBS HIGH 75: CPT | Mod: ,,, | Performed by: STUDENT IN AN ORGANIZED HEALTH CARE EDUCATION/TRAINING PROGRAM

## 2023-08-11 PROCEDURE — 83735 ASSAY OF MAGNESIUM: CPT | Mod: 91 | Performed by: STUDENT IN AN ORGANIZED HEALTH CARE EDUCATION/TRAINING PROGRAM

## 2023-08-11 PROCEDURE — 84100 ASSAY OF PHOSPHORUS: CPT | Performed by: STUDENT IN AN ORGANIZED HEALTH CARE EDUCATION/TRAINING PROGRAM

## 2023-08-11 PROCEDURE — 99223 PR INITIAL HOSPITAL CARE,LEVL III: ICD-10-PCS | Mod: ,,, | Performed by: STUDENT IN AN ORGANIZED HEALTH CARE EDUCATION/TRAINING PROGRAM

## 2023-08-11 RX ORDER — HYDROMORPHONE HYDROCHLORIDE 1 MG/ML
0.5 INJECTION, SOLUTION INTRAMUSCULAR; INTRAVENOUS; SUBCUTANEOUS
Status: DISCONTINUED | OUTPATIENT
Start: 2023-08-11 | End: 2023-08-17

## 2023-08-11 RX ORDER — HYDROMORPHONE HYDROCHLORIDE 1 MG/ML
0.5 INJECTION, SOLUTION INTRAMUSCULAR; INTRAVENOUS; SUBCUTANEOUS EVERY 6 HOURS PRN
Status: DISCONTINUED | OUTPATIENT
Start: 2023-08-11 | End: 2023-08-11

## 2023-08-11 RX ORDER — SODIUM CHLORIDE 9 MG/ML
INJECTION, SOLUTION INTRAVENOUS ONCE
Status: DISCONTINUED | OUTPATIENT
Start: 2023-08-12 | End: 2023-08-15

## 2023-08-11 RX ORDER — SODIUM CHLORIDE 9 MG/ML
INJECTION, SOLUTION INTRAVENOUS
Status: DISCONTINUED | OUTPATIENT
Start: 2023-08-11 | End: 2023-09-06

## 2023-08-11 RX ORDER — OLANZAPINE 10 MG/2ML
5 INJECTION, POWDER, FOR SOLUTION INTRAMUSCULAR EVERY 8 HOURS PRN
Status: DISCONTINUED | OUTPATIENT
Start: 2023-08-11 | End: 2023-08-19

## 2023-08-11 RX ORDER — MAGNESIUM SULFATE HEPTAHYDRATE 40 MG/ML
2 INJECTION, SOLUTION INTRAVENOUS
Status: ACTIVE | OUTPATIENT
Start: 2023-08-11 | End: 2023-08-12

## 2023-08-11 RX ORDER — QUETIAPINE FUMARATE 25 MG/1
100 TABLET, FILM COATED ORAL ONCE
Status: COMPLETED | OUTPATIENT
Start: 2023-08-11 | End: 2023-08-11

## 2023-08-11 RX ORDER — HYDROCODONE BITARTRATE AND ACETAMINOPHEN 500; 5 MG/1; MG/1
TABLET ORAL CONTINUOUS
Status: ACTIVE | OUTPATIENT
Start: 2023-08-11 | End: 2023-08-13

## 2023-08-11 RX ORDER — OLANZAPINE 10 MG/2ML
5 INJECTION, POWDER, FOR SOLUTION INTRAMUSCULAR ONCE AS NEEDED
Status: DISCONTINUED | OUTPATIENT
Start: 2023-08-11 | End: 2023-08-11

## 2023-08-11 RX ADMIN — VANCOMYCIN HYDROCHLORIDE 500 MG: 500 INJECTION, POWDER, LYOPHILIZED, FOR SOLUTION INTRAVENOUS at 02:08

## 2023-08-11 RX ADMIN — QUETIAPINE FUMARATE 100 MG: 25 TABLET ORAL at 12:08

## 2023-08-11 RX ADMIN — LACTULOSE 10 G: 20 SOLUTION ORAL at 10:08

## 2023-08-11 RX ADMIN — DEXTROSE MONOHYDRATE 125 ML: 10 INJECTION, SOLUTION INTRAVENOUS at 11:08

## 2023-08-11 RX ADMIN — MEROPENEM 2 G: 1 INJECTION INTRAVENOUS at 10:08

## 2023-08-11 RX ADMIN — Medication 6 MG: at 10:08

## 2023-08-11 RX ADMIN — Medication 16 G: at 10:08

## 2023-08-11 RX ADMIN — HYDROMORPHONE HYDROCHLORIDE 0.5 MG: 1 INJECTION, SOLUTION INTRAMUSCULAR; INTRAVENOUS; SUBCUTANEOUS at 11:08

## 2023-08-11 RX ADMIN — MEROPENEM 2 G: 1 INJECTION INTRAVENOUS at 03:08

## 2023-08-11 RX ADMIN — BUSPIRONE HYDROCHLORIDE 10 MG: 10 TABLET ORAL at 10:08

## 2023-08-11 RX ADMIN — FUROSEMIDE 20 MG: 20 TABLET ORAL at 10:08

## 2023-08-11 RX ADMIN — VANCOMYCIN HYDROCHLORIDE 500 MG: 500 INJECTION, POWDER, LYOPHILIZED, FOR SOLUTION INTRAVENOUS at 11:08

## 2023-08-11 RX ADMIN — ACETAMINOPHEN 1000 MG: 500 TABLET ORAL at 10:08

## 2023-08-11 RX ADMIN — HYDRALAZINE HYDROCHLORIDE 100 MG: 50 TABLET ORAL at 10:08

## 2023-08-11 RX ADMIN — Medication 16 G: at 11:08

## 2023-08-11 NOTE — PLAN OF CARE
Discussion today with Ophthalmology, Palliative care, and Medicine team with patient's sister and DYAN Marley. She is aware he has a severe, life threatening infection of his eye. Discussed with her obtaining a contrasted MRI despite his known ESRD, and that we would minimize the risk of gadolinium fibrosis with 3 consecutive days of HD after scan. Discussed that if MRI shows worsening posterior involvement/extension of infection, surgery would be performed emergently, else we would continue IV antibiotics in anticipation of him undergoing procedure as scheduled on Wednesday, 8/16. Pt's sister marley provided an opportunity to ask any questions of the multidisciplinary team, and she had no further questions at that time.

## 2023-08-11 NOTE — PROGRESS NOTES
Pharmacokinetic Assessment Follow Up: IV Vancomycin    Vancomycin serum concentration assessment(s):    The random level was drawn at 09;00 and resulted at 22.4 ug/ml  Patient received 500 mg vancomycin on 8/11 @ 02:00 instead as scheduled on 8/10    Vancomycin Regimen Plan:    Will re-dose vancomycin 500 mg x 1   Repeat random level on Saturday with AM labs   ESRD on HD- plan for HD on 8/11/23 (pt scheduled to have 3 days of HD over the weekend)    Drug levels (last 3 results):  Recent Labs   Lab Result Units 08/09/23  1447 08/11/23  0851   Vancomycin, Random ug/mL 17.2 22.4       Pharmacy will continue to follow and monitor vancomycin.    Please contact pharmacy at extension [phone] for questions regarding this assessment.    Thank you for the consult,   Elsa Solorzano       Patient brief summary:  Immanuel Bernal is a 57 y.o. male initiated on antimicrobial therapy with IV Vancomycin for treatment of  endophtalmitis     Drug Allergies:   Review of patient's allergies indicates:   Allergen Reactions    Morphine Rash    Amiodarone analogues Itching     Other reaction(s): Unknown       Renal Function:   Estimated Creatinine Clearance: 11.8 mL/min (A) (based on SCr of 5.9 mg/dL (H)).,     Dialysis Method (if applicable):  intermittent HD    CBC (last 72 hours):  Recent Labs   Lab Result Units 08/09/23  1447 08/10/23  0609 08/11/23  0851   WBC K/uL 7.07 7.17 10.15   Hemoglobin g/dL 9.3* 9.2* 8.5*   Hematocrit % 31.9* 31.0* 28.4*   Platelets K/uL 264 244 267   Gran % % 74.0* 74.2* 73.7*   Lymph % % 9.9* 10.2* 8.5*   Mono % % 8.6 7.8 13.3   Eosinophil % % 5.5 5.7 2.8   Basophil % % 1.0 1.1 1.1   Differential Method  Automated Automated Automated         Metabolic Panel (last 72 hours):  Recent Labs   Lab Result Units 08/09/23  1447 08/10/23  0609 08/11/23  0851   Sodium mmol/L 138 137 138   Potassium mmol/L 4.9 4.9 3.6   Chloride mmol/L 103 103 97   CO2 mmol/L 17* 15* 22*   Glucose mg/dL 62* 64* 49*   BUN mg/dL 52*  62* 30*   Creatinine mg/dL 9.0* 9.6* 5.9*   Albumin g/dL 2.4* 2.3* 2.4*   Total Bilirubin mg/dL 0.4 0.4 0.4   Alkaline Phosphatase U/L 112 104 112   AST U/L 12 15 16   ALT U/L 9* 7* 7*   Magnesium mg/dL  --  3.0* 2.3   Phosphorus mg/dL  --  5.4* 3.3         Vancomycin Administrations:  vancomycin given in the last 96 hours        No antibiotic orders with administrations found.                    Microbiologic Results:  Microbiology Results (last 7 days)       Procedure Component Value Units Date/Time    Blood culture (site 1) [[id number]] Collected: 08/09/23 1434    Order Status: Completed Specimen: Blood Updated: 08/10/23 1812     Blood Culture, Routine No Growth to date      No Growth to date    Narrative:      Site # 1, aerobic and anaerobic  Collection has been rescheduled by [email or web address] at 08/09/2023 13:27 Reason:   Patient unavailable in with DR   Collection has been rescheduled by [email or web address] at 08/09/2023 13:27 Reason:   Patient unavailable in with DR     Blood culture (site 2) [[id number]] Collected: 08/09/23 1434    Order Status: Completed Specimen: Blood Updated: 08/10/23 1812     Blood Culture, Routine No Growth to date      No Growth to date    Narrative:      Site # 2, aerobic only  Collection has been rescheduled by [email or web address] at 08/09/2023 13:27 Reason:   Patient unavailable in with DR   Collection has been rescheduled by [email or web address] at 08/09/2023 13:27 Reason:   Patient unavailable in with DR

## 2023-08-11 NOTE — PROGRESS NOTES
Pharmacokinetic Assessment Follow Up: IV Vancomycin    Vancomycin serum concentration assessment(s):    The random level was not drawn this morning   Patient received 500 mg vancomycin on 8/11 @ 02:00 instead as scheduled on 8/10    Vancomycin Regimen Plan:    Will hold re dosing vancomycin   Repeat random level on Saturday with AM labs   ESRD on HD- plan for next HD on 8/11/23    Drug levels (last 3 results):  Recent Labs   Lab Result Units 08/09/23  1447   Vancomycin, Random ug/mL 17.2       Pharmacy will continue to follow and monitor vancomycin.    Please contact pharmacy at extension [phone] for questions regarding this assessment.    Thank you for the consult,   Elsa Solorzano       Patient brief summary:  Immanuel Bernal is a 57 y.o. male initiated on antimicrobial therapy with IV Vancomycin for treatment of  endophtalmitis     Drug Allergies:   Review of patient's allergies indicates:   Allergen Reactions    Morphine Rash    Amiodarone analogues Itching     Other reaction(s): Unknown       Renal Function:   Estimated Creatinine Clearance: 7.2 mL/min (A) (based on SCr of 9.6 mg/dL (H)).,     Dialysis Method (if applicable):  intermittent HD    CBC (last 72 hours):  Recent Labs   Lab Result Units 08/09/23  1447 08/10/23  0609   WBC K/uL 7.07 7.17   Hemoglobin g/dL 9.3* 9.2*   Hematocrit % 31.9* 31.0*   Platelets K/uL 264 244   Gran % % 74.0* 74.2*   Lymph % % 9.9* 10.2*   Mono % % 8.6 7.8   Eosinophil % % 5.5 5.7   Basophil % % 1.0 1.1   Differential Method  Automated Automated         Metabolic Panel (last 72 hours):  Recent Labs   Lab Result Units 08/09/23  1447 08/10/23  0609   Sodium mmol/L 138 137   Potassium mmol/L 4.9 4.9   Chloride mmol/L 103 103   CO2 mmol/L 17* 15*   Glucose mg/dL 62* 64*   BUN mg/dL 52* 62*   Creatinine mg/dL 9.0* 9.6*   Albumin g/dL 2.4* 2.3*   Total Bilirubin mg/dL 0.4 0.4   Alkaline Phosphatase U/L 112 104   AST U/L 12 15   ALT U/L 9* 7*   Magnesium mg/dL  --  3.0*    Phosphorus mg/dL  --  5.4*         Vancomycin Administrations:  vancomycin given in the last 96 hours        No antibiotic orders with administrations found.                    Microbiologic Results:  Microbiology Results (last 7 days)       Procedure Component Value Units Date/Time    Blood culture (site 1) [[id number]] Collected: 08/09/23 1434    Order Status: Completed Specimen: Blood Updated: 08/10/23 1812     Blood Culture, Routine No Growth to date      No Growth to date    Narrative:      Site # 1, aerobic and anaerobic  Collection has been rescheduled by [email or web address] at 08/09/2023 13:27 Reason:   Patient unavailable in with DR   Collection has been rescheduled by [email or web address] at 08/09/2023 13:27 Reason:   Patient unavailable in with DR     Blood culture (site 2) [[id number]] Collected: 08/09/23 1434    Order Status: Completed Specimen: Blood Updated: 08/10/23 1812     Blood Culture, Routine No Growth to date      No Growth to date    Narrative:      Site # 2, aerobic only  Collection has been rescheduled by [email or web address] at 08/09/2023 13:27 Reason:   Patient unavailable in with DR   Collection has been rescheduled by [email or web address] at 08/09/2023 13:27 Reason:   Patient unavailable in with DR

## 2023-08-11 NOTE — PROGRESS NOTES
[facility] ([address] Medicine  Progress Note    Patient Name: Immanuel Bernal  MRN: [id number]  Patient Class: IP- Inpatient   Admission Date: 8/9/2023  Length of Stay: 2 days  Attending Physician: Brett Gautam, *  Primary Care Provider: Dolly, Primary Doctor        Subjective:     Principal Problem:Endophthalmitis        HPI:  HPI obtained per medical record as patient unable to communicate    57-year-old male with a history of CHF, diabetes, hypertension, chronic disability, end-stage renal disease on hemodialysis, PEG placement, bowel obstruction, sleep apnea, TIA, left eye vitreous hemorrhage, stroke, and bowel obstruction with bowel resection admitted to [facility] in [place] July 18 from nursing home with left eye pain and blurred vision.  He was admitted with concern for bilateral endophthalmitis.  Other admit diagnoses included right upper extremity thrombus, end-stage renal disease on hemodialysis, hyperkalemia, sleep apnea, diabetes, and CHF.  He was treated with broad-spectrum antibiotics.  He was seen by Infectious Diseases,, and he was treated with vancomycin, ceftriaxone, and amphotericin.  Blood cultures were positive for Pseudomonas, and amphotericin/vancomycin were stopped.  IV cefepime was continued.  He was seen by Ophthalmology, and he received intravitreal vancomycin and ceftazidime (July 18).  He also had intravitreal tap July 18 that had no yeast or fungal elements observed.  Left eye endophthalmitis did not respond to treatment, and he subsequently developed abscess formation, significant proptosis, and drainage of purulent material from the orbit.  Ophthalmology recommended enucleation.  He was continued on cefepime for pseudomonal and ophthalmitis.  Recommendation was for 6 weeks of treatment to be followed by indefinite fluoroquinolone.  He was seen by Pulmonology during his stay for a right upper lung mass with peripheral nodules.   It was felt that he would need further investigation of this once his current clinical issues stabilized.  Right upper extremity AV graft was excised during his stay with concern for infection.  A right IJ tunneled line was placed on July 27.  Left eye endophthalmitis continued to worsen, and ophthalmology spoke with patient and family about the need for enucleation.  Despite several conversations, family declined enucleation.  Plans were for transitioned to skilled nursing facility for continued treatment, but family did not want him to go to a skilled nursing facility.  He was subsequently discharged AMA from the hospital there on August 7.  Please see the August 7 Internal Medicine note for further details.     He subsequently presented to [facility] Emergency [facility].  He will not go back to [facility] in [place]. He received Zosyn and vancomycin.  ED team at [facility] spoke with the patient and his power-of- (sister).  CT of the orbits noted scleral abscess along the lateral aspect of the left globe.  Patient and family are aware and in agreement that the patient needs enucleation of the eye at this time. Referring provider spoke with Oculoplastics at [facility]. Requesting transfer to [facility] Medicine for Oculoplastics specialty evaluation of persistent endophthalmitis.  ED provider noted patient is hemodynamically stable.  He does not appear volume overloaded or in respiratory distress.      The patient has a significant previous medical course as listed below  August 3: MRI brain and orbits showed worsening ophthalmitis and inflammatory changes of the left orbital tissues with slight increase in proptosis.  No evidence of intracranial inflammatory process observed.    July 25: Transesophageal echocardiogram had no evidence of endocarditis.  Moderate to severely decreased left ventricular systolic function with EF 30-35%.    July 22:  CT chest showed right  upper lobe mass with numerous bilateral nodularity predominantly in the right with associated mediastinal lymph nodes.  July 21: Blood cultures with Pseudomonas aeruginosa  July 19: MRI brain/orbits with and without contrast had findings suggestive of chronic microvascular ischemic disease of the white matter with remote ischemic event in the left insula/basal ganglia/subependymal white matter/right middle cerebellar peduncle and hemisphere.  Findings consistent with left endophthalmitis.  No abnormal findings observed in the right globe.  July 18:  Blood cultures with Pseudomonas aeruginosa and coag-negative staph      Overview/Hospital Course:  Patient was rate controlled and therefor flecainide held. His GCS is 13/15. Oculopalstics were consulted and so were the nephrology teams and IR (regarding the cholecystotomy). He underwent dialysis on his first day. Was scheduled for surgery today but his daughter refused to have it done until she reviewed the MRI herself first. This means that the surgeon will only be available next Wednesday in order to allow her to view and discuss the MRI. He has a peg tube in place that has not been utilzied prior. His GCS is 13/15 and will be monitored during his stays, in any case of deterioration we may switch his feeding regimen to tube feeds. In the meantime his heartfailure medications have been reinstated and is being covered by vancomycin and meronem for the endopthalmitis. Multidisciplinary session was conducted with his POA in regards to his operation, she accepted the surgery if it was emergent, otherwise to be done next Monday.        Interval History: delirious all day. Pain medications were given. MRI w/wo orbit has been ordered. Palliative care has been added.    Review of Systems   Constitutional:  Negative for activity change.   Respiratory:  Negative for cough, choking and shortness of breath.    Cardiovascular:  Negative for chest pain, palpitations and leg  swelling.   Gastrointestinal:  Negative for abdominal distention and abdominal pain.   Neurological: Negative.    Psychiatric/Behavioral:  Positive for agitation and confusion.      Objective:     Vital Signs (Most Recent):  Temp: 97.5 °F (36.4 °C) (08/11/23 0427)  Pulse: 72 (08/11/23 0427)  Resp: 18 (08/11/23 1102)  BP: (!) 146/66 (08/11/23 0427)  SpO2: 97 % (08/11/23 0427) Vital Signs (24h Range):  Temp:  [97.5 °F (36.4 °C)-99 °F (37.2 °C)] 97.5 °F (36.4 °C)  Pulse:  [66-72] 72  Resp:  [16-18] 18  SpO2:  [95 %-97 %] 97 %  BP: (146-156)/(66-74) 146/66     Weight: 61.7 kg (136 lb 0.4 oz)  Body mass index is 19.52 kg/m².  No intake or output data in the 24 hours ending 08/11/23 1750      Physical Exam  Eyes:      Pupils: Pupils are equal, round, and reactive to light.   Cardiovascular:      Rate and Rhythm: Normal rate and regular rhythm.      Pulses: Normal pulses.      Heart sounds: Normal heart sounds.   Pulmonary:      Effort: Pulmonary effort is normal.      Breath sounds: Normal breath sounds.   Abdominal:      General: Bowel sounds are normal.      Palpations: Abdomen is soft.   Neurological:      General: No focal deficit present.      Mental Status: He is alert.             Significant Labs: All pertinent labs within the past 24 hours have been reviewed.    Significant Imaging: I have reviewed all pertinent imaging results/findings within the past 24 hours.      Assessment/Plan:      * Endophthalmitis  Ophthalmology team on board and were going to perform the procedure but the daughter refused without beign around to review the MRI which delayed the procedure till next wednesday    MRI repeat : Diffuse inflammatory change involving the left globe, with associated thickening of the left sclera, proptosis, and focal fluid collection within the left posterior compartment, which demonstrates diffusion restriction.  Overall, findings are concerning for evolving endophthalmitis with potential abscess within the  left posterior chamber.     Diffuse left preseptal and postseptal inflammatory change, with superimposed orbital cellulitis also considered.  Evaluation of the left orbital apex is limited secondary to no contrast administration.     Decreased size of the left anterior compartment with bowing of the visualized left lens.  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    Thorough counseling undertaken with POA and IM team, palliative, opthalmo and nursing team in regards to the surgery  She agreed for her brother to undergo the procedure as an emergent case if his health deteriorates  Otherwise to be scheduled on wednesday    Plan:    -restarted peg tube feeds, if GCS drops will utilize peg tube feeds  -Vancomycin and meronem  -Daily Labs  - trace blood cultures      Delirium    Mri w and without requested and nephro consulted regarding post contrast dialysis    Plan:  Follow mri  To undergo dialysis after  If extension to CNS, contact opthalmo    Encounter for palliative care        Cholecystostomy care  IR consult sent  As per IR to be kept for a few more weeks as looks functioning      Plan:  Follow IR recs      Chronic kidney disease-mineral and bone disorder        Congestive heart failure (CHF)  Last echo at outside facility    July 25: Transesophageal echocardiogram had no evidence of endocarditis. Moderate to severely decreased left ventricular systolic function with EF 30-35%. Moderately reduced RV systolic function. Moderately enlarged right ventricle. Moderate to severe tricuspid regurgitation.     -Repeat BNP  -Monitor for volume overload  -on 2L  -Pulse ox   -CXR  -Titrated GDMT  Strict I and o's      ESRD (end stage renal disease)  Nephrology consulted for HD needs while inpatient      Bipolar disorder  Home psych meds reorderd      Atrial fibrillation  Continue apixaban  Continue home CCB    Hyperlipidemia  Home statin      Chronic diastolic heart failure  Continue to monitor  for signs of volume overload  On full antifailure medications      Plan:  Monitor for signs of volume overload      Persistent proteinuria  Repeat UA      CKD (chronic kidney disease) stage 3, GFR 30-59 ml/min    Underwent dialysis for three hours on 8/10 as well as MRI with contrast    Patient's creatinine before dialysis is 9.6  Baseline one month back is 5.83    Plan:  Follow nephro recs  And trace daily cmp    Hypertension  Hypertensive on arrival    Home imdur  Carvedilol  Hydralazine  Nifedipine  And losartan reordered    Stroke  Continue home statin  Continue apixaban  Delerium precautions  PT OT    Diabetes mellitus  POCT glucose  LDSS  -180      VTE Risk Mitigation (From admission, onward)         Ordered     heparin (porcine) injection 1,000 Units  As needed (PRN)         08/10/23 1046     IP VTE HIGH RISK PATIENT  Once         08/09/23 1317     Place sequential compression device  Until discontinued         08/09/23 1317                Discharge Planning   TOBY: 8/15/2023     Code Status: Full Code   Is the patient medically ready for discharge?: No    Reason for patient still in hospital (select all that apply): Treatment  Discharge Plan A: Long-term acute care facility (LTAC)   Discharge Delays: (!) Procedure Scheduling (IR, OR, Labs, Echo, Cath, Echo, EEG) (eye surgery next week)              Sharon Law MD  Department of Hospital Medicine   [facility] - Intensive Care ([place]

## 2023-08-11 NOTE — SUBJECTIVE & OBJECTIVE
Interval History: Had HD yesterday and MRI today.    Past Medical History:   Diagnosis Date    Bilateral retinal detachment 7/18/2023    BPH (benign prostatic hyperplasia)     Cataract     CHF (congestive heart failure)     CKD (chronic kidney disease) stage 3, GFR 30-59 ml/min     Diabetes mellitus, type 2     Hypertension     KENIA (obstructive sleep apnea)     Pancreatitis     Persistent proteinuria 11/12/2020    2 g proteinuria noted Resumed ACE Lower BP systolic to less than 130     PTSD (post-traumatic stress disorder)     Stroke        Past Surgical History:   Procedure Laterality Date    CATARACT EXTRACTION Bilateral        Review of patient's allergies indicates:   Allergen Reactions    Morphine Rash    Amiodarone analogues Itching     Other reaction(s): Unknown       Medications:  Continuous Infusions:  Scheduled Meds:   [START ON 8/12/2023] sodium chloride 0.9%   Intravenous Once    acetaminophen  1,000 mg Oral TID    atorvastatin  40 mg Oral Daily    busPIRone  10 mg Oral BID    calcitRIOL  0.25 mcg Oral Daily    carvediloL  25 mg Oral BID WM    citalopram  10 mg Oral Daily    epoetin thee (PROCRIT) injection  3,000 Units Intravenous Every Tues, Thurs, Sat    furosemide  20 mg Oral BID    hydrALAZINE  100 mg Oral TID    isosorbide mononitrate  60 mg Oral Daily    lactulose  10 g Oral BID    losartan  25 mg Oral Daily    meropenem (MERREM) IVPB  2 g Intravenous Q8H    NIFEdipine  30 mg Oral Daily    sevelamer carbonate  800 mg Oral TID WM    tamsulosin  1 capsule Oral Daily    vancomycin (VANCOCIN) IV (PEDS and ADULTS)  500 mg Intravenous Once     PRN Meds:sodium chloride 0.9%, albuterol-ipratropium, dextrose 10%, dextrose 10%, glucagon (human recombinant), glucose, glucose, heparin (porcine), HYDROmorphone, melatonin, naloxone, OLANZapine, sodium chloride 0.9%, Pharmacy to dose Vancomycin consult **AND** vancomycin - pharmacy to dose    Family History       Problem Relation  (Age of Onset)    Diabetes Father, Sister    Heart disease Father    Hyperlipidemia Brother    Kidney disease Father    Stroke Mother          Tobacco Use    Smoking status: Former     Current packs/day: 0.00     Types: Cigarettes, Cigars    Smokeless tobacco: Never   Substance and Sexual Activity    Alcohol use: Not Currently    Drug use: Not Currently    Sexual activity: Not on file       Review of Systems   Unable to perform ROS: Mental status change     Objective:     Vital Signs (Most Recent):  Temp: 97.5 °F (36.4 °C) (08/11/23 0427)  Pulse: 72 (08/11/23 0427)  Resp: 18 (08/11/23 1102)  BP: (!) 146/66 (08/11/23 0427)  SpO2: 97 % (08/11/23 0427) Vital Signs (24h Range):  Temp:  [97.5 °F (36.4 °C)-99 °F (37.2 °C)] 97.5 °F (36.4 °C)  Pulse:  [66-72] 72  Resp:  [16-19] 18  SpO2:  [95 %-100 %] 97 %  BP: (129-156)/(66-74) 146/66     Weight: 61.7 kg (136 lb 0.4 oz)  Body mass index is 19.52 kg/m².       Physical Exam  Constitutional:       General: He is not in acute distress.     Appearance: He is ill-appearing.   HENT:      Head: Normocephalic and atraumatic.      Right Ear: External ear normal.      Left Ear: External ear normal.      Nose: Nose normal.      Mouth/Throat:      Mouth: Mucous membranes are dry.   Eyes:      General:         Left eye: Discharge present.  Cardiovascular:      Rate and Rhythm: Normal rate.   Pulmonary:      Effort: Pulmonary effort is normal.      Breath sounds: Normal breath sounds.   Abdominal:      General: Bowel sounds are normal.      Palpations: Abdomen is soft.      Comments: G tube   Musculoskeletal:         General: No swelling.   Skin:     General: Skin is dry.   Neurological:      Mental Status: He is disoriented.          Review of Symptoms      Symptom Assessment (ESAS 0-10 Scale)  Unable to complete assessment due to Mental status change         Pain Assessment in Advanced Demential Scale (PAINAD)   Breathing - Independent of vocalization:  0  Negative vocalization:   0  Facial expression:  0  Body language:  0  Consolability:  0  Total:  0    Psychosocial/Cultural:   See Palliative Psychosocial Note: No  No longer working since stroke. Has sister Marley. Has adult children  **Primary  to Follow**  Palliative Care  Consult: No      Advance Care Planning   Advance Directives:   Living Will: No    LaPOST: No    Do Not Resuscitate Status: No    Medical Power of : Yes    Agent's Name:  Marley Bernal    Decision Making:  Family answered questions and Patient unable to communicate due to disease severity/cognitive impairment  Goals of Care: The healthcare power of   endorses that what is most important right now is to focus on extending life as long as possible, even it it means sacrificing quality    Accordingly, we have decided that the best plan to meet the patient's goals includes continuing with treatment         Significant Labs: CBC:   Recent Labs   Lab 08/10/23  0609 08/11/23  0851   WBC 7.17 10.15   HGB 9.2* 8.5*   HCT 31.0* 28.4*    267     CMP:   Recent Labs   Lab 08/10/23  0609 08/11/23  0851    138   K 4.9 3.6    97   CO2 15* 22*   GLU 64* 49*   BUN 62* 30*   CREATININE 9.6* 5.9*   CALCIUM 9.7 8.9   PROT 6.8 6.9   ALBUMIN 2.3* 2.4*   BILITOT 0.4 0.4   ALKPHOS 104 112   AST 15 16   ALT 7* 7*   ANIONGAP 19* 19*     CBC:   Recent Labs   Lab 08/11/23  0851   WBC 10.15   HGB 8.5*   HCT 28.4*   MCV 99*        BMP:  Recent Labs   Lab 08/11/23  0851   GLU 49*      K 3.6   CL 97   CO2 22*   BUN 30*   CREATININE 5.9*   CALCIUM 8.9   MG 2.3     LFT:  Lab Results   Component Value Date    AST 16 08/11/2023    GGT 13 11/09/2020    ALKPHOS 112 08/11/2023    BILITOT 0.4 08/11/2023     Albumin:   Albumin   Date Value Ref Range Status   08/11/2023 2.4 (L) 3.5 - 5.2 g/dL Final     Protein:   Total Protein   Date Value Ref Range Status   08/11/2023 6.9 6.0 - 8.4 g/dL Final     Lactic acid:   Lab Results    Component Value Date    LACTATE 0.6 08/09/2023       Significant Imaging: I have reviewed all pertinent imaging results/findings within the past 24 hours.

## 2023-08-11 NOTE — PROGRESS NOTES
[facility] ([place] [address])  Nephrology  Progress Note    Patient Name: Immanuel Bernal  MRN: [id number]  Admission Date: 8/9/2023  Hospital Length of Stay: 2 days  Attending Provider: Brett Gautam, *   Primary Care Physician: No, Primary Doctor  Principal Problem:Endophthalmitis      Interval History: Underwent HD yesterday, with a net UF of 2L. Treatment stopped 30mins early due to patient having having severe agitation and confusion.     Objective:     Vital Signs (Most Recent):  Temp: 97.5 °F (36.4 °C) (08/11/23 0427)  Pulse: 72 (08/11/23 0427)  Resp: 17 (08/11/23 0427)  BP: (!) 146/66 (08/11/23 0427)  SpO2: 97 % (08/11/23 0427) Vital Signs (24h Range):  Temp:  [97.5 °F (36.4 °C)-99 °F (37.2 °C)] 97.5 °F (36.4 °C)  Pulse:  [58-72] 72  Resp:  [16-19] 17  SpO2:  [95 %-100 %] 97 %  BP: (113-166)/(53-74) 146/66     Weight: 61.7 kg (136 lb 0.4 oz) (08/10/23 0944)  Body mass index is 19.52 kg/m².  Body surface area is 1.75 meters squared.    I/O last 3 completed shifts:  In: 601.8 [I.V.:301.8; Other:300]  Out: 2585 [Other:2585]    Physical Exam  Constitutional:       Appearance: He is ill-appearing.   HENT:      Head: Normocephalic and atraumatic.      Nose: Nose normal.      Mouth/Throat:      Mouth: Mucous membranes are moist.      Pharynx: Oropharynx is clear.   Eyes:      General:         Left eye: Discharge present.  Cardiovascular:      Rate and Rhythm: Normal rate and regular rhythm.      Comments: R IJ TDC   Pulmonary:      Effort: Pulmonary effort is normal.      Breath sounds: Normal breath sounds.   Abdominal:      General: Abdomen is flat.      Palpations: Abdomen is soft.   Musculoskeletal:      Cervical back: Normal range of motion and neck supple.      Right lower leg: No edema.      Left lower leg: No edema.   Skin:     General: Skin is warm and dry.   Neurological:      Mental Status: He is alert. He is disoriented.          Significant Labs:  CBC:   Recent Labs   Lab  08/11/23  0851   WBC 10.15   RBC 2.86*   HGB 8.5*   HCT 28.4*      MCV 99*   MCH 29.7   MCHC 29.9*     CMP:   Recent Labs   Lab 08/10/23  0609   GLU 64*   CALCIUM 9.7   ALBUMIN 2.3*   PROT 6.8      K 4.9   CO2 15*      BUN 62*   CREATININE 9.6*   ALKPHOS 104   ALT 7*   AST 15   BILITOT 0.4     All labs within the past 24 hours have been reviewed.        Assessment/Plan:     Ophtho  * Endophthalmitis  -Plan per primary     Renal/  Chronic kidney disease-mineral and bone disorder  -Continue calcitriol and renvela     ESRD (end stage renal disease)  Outpatient HD Information:    -Outpatient HD unit: [facility]   -HD tx days: MWF   -HD tx time: 210min  -HD access: R IJ TDC   -HD modality: iHD   -Residual urine: ?      Plan/Recommendations:    -HD tomorrow for metabolic clearance and volume management   -Renal diet  -Strict I/O's and daily weights  -Daily renal function panels   -Renally dose meds      Oncology  Anemia in ESRD (end-stage renal disease)  -Target Hg of 10-12  -Transfuse for Hg <7.0  -Epo with HD         Thank you for your consult. I will follow-up with patient. Please contact us if you have any additional questions.    Pete Grimes NP  Nephrology  [facility] ([place]-)

## 2023-08-11 NOTE — ASSESSMENT & PLAN NOTE
Outpatient HD Information:    -Outpatient HD unit: [facility]   -HD tx days: MWF   -HD tx time: 210min  -HD access: R IJ TDC   -HD modality: iHD   -Residual urine: ?      Plan/Recommendations:    -HD tomorrow for metabolic clearance and volume management   -Renal diet  -Strict I/O's and daily weights  -Daily renal function panels   -Renally dose meds

## 2023-08-11 NOTE — PATIENT CARE CONFERENCE
[id number] MARQUISE   Immanuel Bernal  :1965     AGE:57 y.o.     SEX:male      STATUS:Full Code      ISOLATION:No active isolations   ALLERGIES:Morphine and Amiodarone analogues   ADMIT DATE:  2023   PHYSICIAN:  Brett Gautam, *   DIAGNOSIS: Endophthalmitis [H44.009] Endophthalmitis  CC: Eye Problem    CONSULTS: ID, Dietician, Nephrology, Ophthalmology    Past Medical History:   Diagnosis Date    Bilateral retinal detachment 2023    BPH (benign prostatic hyperplasia)     Cataract     CHF (congestive heart failure)     CKD (chronic kidney disease) stage 3, GFR 30-59 ml/min     Diabetes mellitus, type 2     Hypertension     KENIA (obstructive sleep apnea)     Pancreatitis     Persistent proteinuria 2020    2 g proteinuria noted Resumed ACE Lower BP systolic to less than 130     PTSD (post-traumatic stress disorder)     Stroke      Scheduled procedure(s): none   Labs to Monitor (no values):   Recent Vitals:  Temp: 97.5 °F (36.4 °C)  Pulse: 72  Resp: 17  SpO2: 97 %  BP: (!) 146/66    Respiratory:    Cardiac: Rhythm: sinus tachycardia      Wt Readings from Last 2 Encounters:   08/10/23 61.7 kg (136 lb 0.4 oz)   23 88.9 kg (196 lb)     GI/: No diet orders on file   Last Bowel Movement: 23    Neuro: ex:alert and oriented x 1   Mcknight catheter: No  Skin:WDL   Wu Score: 14      Lines/Drains/Airways       Central Venous Catheter Line  Duration                  Hemodialysis Catheter right internal jugular -- days              Peripheral Intravenous Line  Duration                  Peripheral IV - Single Lumen 23 0200 20 G Posterior;Right Forearm <1 day                  Antibiotics (From admission, onward)      Start     Stop Route Frequency Ordered    08/10/23 1800  meropenem (MERREM) 2 g in sodium chloride 0.9% 100 mL IVPB         -- IV Every 8 hours (non-standard times) 08/10/23 1646    23 1418  vancomycin - pharmacy to dose  (vancomycin IVPB (PEDS and ADULTS))         See Hyperspace for full Linked Orders Report.    -- IV pharmacy to manage frequency 08/09/23 1319          VTE Risk Mitigation (From admission, onward)           Ordered     apixaban tablet 5 mg  2 times daily         08/10/23 1550     heparin (porcine) injection 1,000 Units  As needed (PRN)         08/10/23 1046     IP VTE HIGH RISK PATIENT  Once         08/09/23 1317     Place sequential compression device  Until discontinued         08/09/23 1317                  Glycemic control:  Recent Labs   Lab 08/09/23  1924 08/10/23  1257 08/10/23  2114 08/10/23  2213   POCTGLUCOSE 110 86 53* 134*     Fall risk: bed alarm  Mobility: GEMS ([place] Early Mobility Scale): Level 1-Primary in bed activities    Nursing Update/Plan of Care: Patient very confused throughout the night screaming and yelling majority of the night, had to have MD come see patient and they ordered melatonin and Seroquel 100mg patient finally fell asleep at 0300 and rested on an off throughout the rest of the morning. Patient very aphasic not making any since when speaking.

## 2023-08-11 NOTE — PT/OT/SLP EVAL
"Speech Language Pathology Evaluation  Bedside Swallow    Patient Name:  Immanuel Bernal   MRN:  [id number]  Admitting Diagnosis: Endophthalmitis    Recommendations:                 General Recommendations:  Follow-up not indicated  Diet recommendations:  Minced & Moist Diet - IDDSI Level 5, Thin liquids - IDDSI Level 0   **upgrade as tolerated once confusion subsides  **HOLD all po intake w/ utilization of non-oral means via established PEG unless pt is awake/alert/accepting  Aspiration Precautions: Standard aspiration precautions   General Precautions: Standard, fall  Communication strategies:  yes/no questions only and provide increased time to answer    Assessment:     Immanuel Bernal is a 57 y.o. male with an SLP diagnosis of  baseline Aphasia . Oral and pharyngeal phases of the swallow appear WFL, though current confusion/agitation is impacting the pt's overall safety. While confusion persists, SLP recommend ongoing usage of established PEG for primary means of intake w/ mechanical soft solids and thin liquids for pleasure purposes only if the pt is awake/alert/accepting. Return to po intake as tolerated as confusion subsides.     History:     Past Medical History:   Diagnosis Date    Bilateral retinal detachment 7/18/2023    BPH (benign prostatic hyperplasia)     Cataract     CHF (congestive heart failure)     CKD (chronic kidney disease) stage 3, GFR 30-59 ml/min     Diabetes mellitus, type 2     Hypertension     KENIA (obstructive sleep apnea)     Pancreatitis     Persistent proteinuria 11/12/2020    2 g proteinuria noted Resumed ACE Lower BP systolic to less than 130     PTSD (post-traumatic stress disorder)     Stroke        Past Surgical History:   Procedure Laterality Date    CATARACT EXTRACTION Bilateral      Prior Intubation HX:  none this admit    Modified Barium Swallow: none found on file    Chest X-Rays: 8/9: "Impression:  Somewhat curvilinear course of the large bore right vascular catheter.  " "Confirmation of its position suggested.  Findings suggestive of volume overload.  This report was flagged in Epic as abnormal."    Prior diet: unknown    Subjective     Per RN, pt remains confused/agitated following dialysis yesterday. She reported that he repeatedly yells out nonsensically 2/2 baseline aphasia. RN stated that despite current confusion, he did not appear to have difficulty swallowing water provided this date.     Pt awake/alert, seated upright in bedside chair. He was yelling out a nonsensical phrase. Pt did not respond to simple directives or basic YNQs.     Pain/Comfort:  Pain Rating 1: 0/10  Pain Rating Post-Intervention 1: 0/10    Respiratory Status: Room air    Objective:     Oral Musculature Evaluation  Oral Musculature: unable to assess due to poor participation/comprehension    Bedside Swallow Eval:   Consistencies Assessed:  Thin liquids - x2 instances of cyclical straw sips (3-4 swallows per presentation)  Solids - x 2 bites of derrell cracker      Oral Phase:   Prolonged mastication    Pharyngeal Phase:   no overt clinical signs/symptoms of aspiration  no overt clinical signs/symptoms of pharyngeal dysphagia    Compensatory Strategies  None    Goals:   Multidisciplinary Problems       SLP Goals       Not on file              Multidisciplinary Problems (Resolved)          Problem: SLP    Goal Priority Disciplines Outcome   SLP Goal   (Resolved)     SLP Met                       Plan:     Plan of Care reviewed with:  patient   SLP Follow-Up:  No       Discharge recommendations:   (no further skilled SLP service warranted)     Time Tracking:     SLP Treatment Date:   08/11/23  Speech Start Time:  0920  Speech Stop Time:  0929     Speech Total Time (min):  9 min    Billable Minutes: Eval Swallow and Oral Function 9    08/11/2023         "

## 2023-08-11 NOTE — ASSESSMENT & PLAN NOTE
57M with PMH of DM2, HTN, ESRD, HFrEF, bowel obstruction s/p bowel resection, KENIA, afib, recent hospitalization for NELSON endophthalmitis, pseudomonas bacteremia, RUE AVG infection s/p excision, who presents as a transfer from [place] for worsening eye symptoms and imaging findings of L scleral abscess. Previously admitted to [facility] in July for NELSON endophthalmitis, received intravitreal vancomycin and ceftazidime and IV cefepime for pseudomonas bacteremia. Previously refused enucleation, but now agreeable. MRI orbits w/o on admission here shows fluid collection in the L posterior compartment. 8/9 blood cultures have no growth to date. Scheduled for enucleation on 8/16    Recommendations:  · Continue vancomycin and meropenem  · Pending enucleation by ophtho on 8/16  · Follow up 8/9 blood cultures

## 2023-08-11 NOTE — PLAN OF CARE
08/11/23 1448   Post-Acute Status   Post-Acute Authorization Placement   Post-Acute Placement Status Referrals Sent   Home Health Status Pending Clinical Review   Diaylsis Status Pending medical clearance/testing   Patient choice form signed by patient/caregiver List from [employer]   Discharge Delays (!) Procedure Scheduling (IR, OR, Labs, Echo, Cath, Echo, EEG)  (eye surgery next week)   Discharge Plan   Discharge Plan A Long-term acute care facility (LTAC)   Discharge Plan B Skilled Nursing Facility     CM sent LTAC referral via [facility], CM updated the patients sisterMarley.    2:56 pm  TAMIA spoke with Russell lamar [place] [facility] [place] # [phone] and confirmed that patient has an HD chair, days are q MWF. CM provided and update.    2:58 pm   TAMIA spoke with [facility]  # [phone] and spoke with Elliott and willing to accept patient once he discharges for  LTAC or post acute facility.     Jerri Colon RN  Case Management  [facility]  [phone]

## 2023-08-11 NOTE — ASSESSMENT & PLAN NOTE
Ophthalmology team on board and were going to perform the procedure but the daughter refused without beign around to review the MRI which delayed the procedure till next wednesday    MRI repeat : Diffuse inflammatory change involving the left globe, with associated thickening of the left sclera, proptosis, and focal fluid collection within the left posterior compartment, which demonstrates diffusion restriction.  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     Diffuse left preseptal and postseptal inflammatory change, with superimposed orbital cellulitis also considered.  Evaluation of the left orbital apex is limited secondary to no contrast administration.     Decreased size of the left anterior compartment with bowing of the visualized left lens.  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    Thorough counseling undertaken with POA and IM team, palliative, opthalmo and nursing team in regards to the surgery  She agreed for her brother to undergo the procedure as an emergent case if his health deteriorates  Otherwise to be scheduled on wednesday    Plan:    -restarted peg tube feeds, if GCS drops will utilize peg tube feeds  -Vancomycin and meronem  -Daily Labs  - trace blood cultures

## 2023-08-11 NOTE — NURSING
Patient lying in bed very agitated and yelling, patient was calm this morning and sleeping, patient went to HD per dialysis nurse, patient began to become agitated towards the end of treatment. Attempted multiple times to adminsiter patient's medications that were missed while in dialysis, patient kept medications in mouth and eventually spit them out, attending team notified, no medications ordered. Called patient's sister and utilized music to encourage patient relaxation, which was only temporarily successful. Patient has continue to yell and beginning to take off gown, bed alarm remains on

## 2023-08-11 NOTE — PT/OT/SLP EVAL
"Occupational Therapy   Evaluation    Name: Immanuel Bernal  MRN: [id number]  Admitting Diagnosis: Endophthalmitis  Recent Surgery: * No surgery found *      Recommendations:     Discharge Recommendations:  (TBD pending ongoing assessment; per chart, SNF vs LTACH.)  Discharge Equipment Recommendations:   (TBD)  Barriers to discharge:   (Pt requries increased level of assistance for ADLs; is at risk for falls, readmission and morbidity.)    Assessment:     Immanuel Bernal is a 57 y.o. male with a medical diagnosis of Endophthalmitis.  Performance deficits affecting function: weakness, impaired endurance, impaired self care skills, impaired functional mobility, gait instability, impaired balance, decreased upper extremity function, decreased lower extremity function, decreased coordination, impaired cognition, decreased safety awareness, impaired coordination, visual deficits.      Pt significantly confused and perseverating on phrases throughout eval despite efforts to redirect. Pt was able to stand for multiple trials w/ mod-max A and use of RW for performing step transfer to chair w/ max A and use of RW; total A x 2 persons required for standing rocael-care. Pt will continue to benefit from skilled acute OT services to maximize functional capacity for safe performance w/ ADLs and functional mobility. Functional mobility and ADL performance to be further assessed pending pt's mentation.     Rehab Prognosis: Good; patient would benefit from acute skilled OT services to address these deficits and reach maximum level of function.       Plan:     Patient to be seen 3 x/week to address the above listed problems via self-care/home management, therapeutic activities, therapeutic exercises  Plan of Care Expires: 08/25/23  Plan of Care Reviewed with: patient    Subjective     Chief Complaint: Pt perseverating on, "Can I roll? Can I get up? Can I be ?"   Patient/Family Comments/goals: Pt wanting to get up but when up " "and OOB, still perseverated on "Can I get up?"     Occupational Profile: Pt poor historian; no family present.   Living Environment: Per chart, pt was a resident of [facility]. However, family will not like for pt to return.   Previous level of function: Unknown, per CM note, pt required dep A for ADLs.   Roles and Routines: Unknown   Equipment Used at Home: walker, rolling, walker, standard, bedside commode, wheelchair (per chart)  Assistance upon Discharge: Sister    Pain/Comfort:  Pain Rating 1:  (Pt aphasic; unabel to appropriately answer.)    Patients cultural, spiritual, Faith conflicts given the current situation: no    Objective:     Communicated with: Nurse prior to session.  Nurse reports pt's outbursts and perseveration began after dialysis on yesterday. Patient found HOB elevated with telemetry, oxygen, peripheral IV (hemodialysis catheter) upon OT entry to room.    General Precautions: Standard, aspiration, fall (delirium precautions)  Orthopedic Precautions: N/A  Braces: N/A  Respiratory Status: Nasal cannula, flow 2 L/min    Occupational Performance:    Bed Mobility:  Pt repeatedly stating, "Can I get up?" OT removed covers and gestured pt to sit EOB; pt w/ some effort for moving BLEs to EOB w/ cueing from OT and was able to grab OT 's hand for assisting w/ trunk management.   Patient completed Scooting hips to EOB with minimum assistance  Patient completed Supine to Sit with moderate assistance; pt able to maintain sitting balance w/ close CGA-SBA for safety.     Functional Mobility/Transfers:  Patient completed Sit <> Stand Transfer x 2 trials from EOB and x 1 trial from chair with moderate assistance and maximal assistance  with  rolling walker; pt required most assist for initiating stands but once provided w/ assist, he was able to extend B knees to complete stands; max A required for hand placement on RW  Once up, pt required min-mod A for static standing balance and would " "impulsively sit after ~25 sec of standing  After standing from chair, pt required mod-max A for maintaining standing balance for ~1 min while nurse provided total rocael-care   Patient completed Bed <> Chair Transfer using Step Transfer technique with maximal assistance with rolling walker; pt was able to take steps though foot placement was disorganized    Activities of Daily Living:  Upper Body Dressing: moderate assistance for donning gown over back   Toileting: total assistance and of 2 persons for standing rocael-care    Cognitive/Visual Perceptual:  Cognitive/Psychosocial Skills:     -       Oriented to: Person   -       Follows Commands/attention:Inattentive  -       Communication: aphasic; perseverates on phrases   -       Memory: unable to formally assess   -       Safety awareness/insight to disability: impaired     Physical Exam:  Balance:    -       fair + sitting balance; fair - static standing balance; poor dynamic standing balance  Postural examination/scapula alignment:    -       Rounded shoulders  -       Forward head  -       BUE resting tremors   Skin integrity: L eye w/ "significant proptosis and chemosis"  Edema:  Severe L eye  Sensation:    -       Intact  Upper Extremity Range of Motion:     -       Right Upper Extremity: WFL as observed   -       Left Upper Extremity: WFLas observed   Upper Extremity Strength:    -       Right Upper Extremity: WFLas observed   -       Left Upper Extremity: WFLas observed    Strength:    -       Right Upper Extremity:  fair   -       Left Upper Extremity:  fair    AMPAC 6 Click ADL:  AMPAC Total Score: 10    Treatment & Education:  -Initial eval complete,   -No family present for education.     Patient left up in chair (w/ clearance from nursing) with all lines intact and call button in reach    GOALS:   Multidisciplinary Problems       Occupational Therapy Goals          Problem: Occupational Therapy    Goal Priority Disciplines Outcome Interventions "   Occupational Therapy Goal     OT, PT/OT Ongoing, Progressing    Description: Goals to be met by: 8/25/23     Patient will increase functional independence with ADLs by performing:    UE Dressing with Minimal Assistance.  Grooming while seated with Stand-by Assistance.  Toileting from bedside commode with Minimal Assistance for hygiene and clothing management.   Sit to stand transfer with Contact Guard Assistance and use of RW.   Sitting at edge of bed >25 minutes with Supervision.  Step transfer with Minimal Assistance and use of RW.   Toilet transfer to bedside commode with Minimal Assistance and use of RW.                          History:     Past Medical History:   Diagnosis Date    Bilateral retinal detachment 7/18/2023    BPH (benign prostatic hyperplasia)     Cataract     CHF (congestive heart failure)     CKD (chronic kidney disease) stage 3, GFR 30-59 ml/min     Diabetes mellitus, type 2     Hypertension     KENIA (obstructive sleep apnea)     Pancreatitis     Persistent proteinuria 11/12/2020    2 g proteinuria noted Resumed ACE Lower BP systolic to less than 130     PTSD (post-traumatic stress disorder)     Stroke          Past Surgical History:   Procedure Laterality Date    CATARACT EXTRACTION Bilateral        Time Tracking:     OT Date of Treatment: 08/11/23  OT Start Time: 0905  OT Stop Time: 0919  OT Total Time (min): 14 min    Billable Minutes:Evaluation 14  Total Time 14    8/11/2023

## 2023-08-11 NOTE — SUBJECTIVE & OBJECTIVE
Interval History: delirious all day. Pain medications were given. MRI w/wo orbit has been ordered. Palliative care has been added.    Review of Systems   Constitutional:  Negative for activity change.   Respiratory:  Negative for cough, choking and shortness of breath.    Cardiovascular:  Negative for chest pain, palpitations and leg swelling.   Gastrointestinal:  Negative for abdominal distention and abdominal pain.   Neurological: Negative.    Psychiatric/Behavioral:  Positive for agitation and confusion.      Objective:     Vital Signs (Most Recent):  Temp: 97.5 °F (36.4 °C) (08/11/23 0427)  Pulse: 72 (08/11/23 0427)  Resp: 18 (08/11/23 1102)  BP: (!) 146/66 (08/11/23 0427)  SpO2: 97 % (08/11/23 0427) Vital Signs (24h Range):  Temp:  [97.5 °F (36.4 °C)-99 °F (37.2 °C)] 97.5 °F (36.4 °C)  Pulse:  [66-72] 72  Resp:  [16-18] 18  SpO2:  [95 %-97 %] 97 %  BP: (146-156)/(66-74) 146/66     Weight: 61.7 kg (136 lb 0.4 oz)  Body mass index is 19.52 kg/m².  No intake or output data in the 24 hours ending 08/11/23 1750      Physical Exam  Eyes:      Pupils: Pupils are equal, round, and reactive to light.   Cardiovascular:      Rate and Rhythm: Normal rate and regular rhythm.      Pulses: Normal pulses.      Heart sounds: Normal heart sounds.   Pulmonary:      Effort: Pulmonary effort is normal.      Breath sounds: Normal breath sounds.   Abdominal:      General: Bowel sounds are normal.      Palpations: Abdomen is soft.   Neurological:      General: No focal deficit present.      Mental Status: He is alert.             Significant Labs: All pertinent labs within the past 24 hours have been reviewed.    Significant Imaging: I have reviewed all pertinent imaging results/findings within the past 24 hours.

## 2023-08-11 NOTE — PLAN OF CARE
Problem: Occupational Therapy  Goal: Occupational Therapy Goal  Description: Goals to be met by: 8/25/23     Patient will increase functional independence with ADLs by performing:    UE Dressing with Minimal Assistance.  Grooming while seated with Stand-by Assistance.  Toileting from bedside commode with Minimal Assistance for hygiene and clothing management.   Sit to stand transfer with Contact Guard Assistance and use of RW.   Sitting at edge of bed >25 minutes with Supervision.  Step transfer with Minimal Assistance and use of RW.   Toilet transfer to bedside commode with Minimal Assistance and use of RW.     Outcome: Ongoing, Progressing

## 2023-08-11 NOTE — ASSESSMENT & PLAN NOTE
Immanuel Bernal is a 57-year-old man with a history of ESRD on HD, HFrEF (EF 30-35%), DM2, HTN, bowel obstruction s/p bowel resection, KENIA, CVA, atrial fibrillation who was hospitalized at [facility] in 7/2023 for b/l endophthalmitis, Pseudomonas bacteremia, RUE thrombus, RUE AVG infection s/p excision, and RUL mass. Left eye did not improve & subsequently developed abscess, but POA declined enucleation and patient ultimately left AMA. Re-presented to [place] ED with worsening eye symptoms with imaging showing left scleral abscess. He was transferred to [facility] [place] [facility] for Oculoplastics evaluation & enucleation. Palliative and Supportive Care was consulted to explore goals of care.    Advance Care Planning   Goals of Care:  - Code status: Full code, did not discuss  - HCPOA: sister Marley Bernal  - Patient does not have decision making capacity at this time  - Prognosis: guarded  - Goals: life prolongation  - Plans: will proceed with plans for ophthalmologic intervention for source control; will follow along as understanding of prognosis is very limited and trust with health care workers is practically non-existent    Goals of Care Conversation:  - 8/11/23: Meeting with Marleyjanna Bernal (sister), Dr. Gautam, Dr. Celestin (ophthalmology) and writer (Mac, palliative). I asked Ms. Bernal to share with me about Mr. Bernal's condition and she started back 5 years ago when he was 52 years old when he suffered a stroke at work (related to his history of non-treated DM). His health deteriorated over the years and notes that he has been traumatized by his decline and prior hospitalizations. She shared how upsetting the hospitalization at [facility] was, noting how nothing was being done for him and alternatively was always being pressured to proceed with surgery. She recalls the day they signed papers to leave AMA, and being told that if she didn't agree with surgery, then he needed to sign out AMA and needed to  leave in 15 minutes. Allowed for Ms. Bernal to express her frustrations and disappointment about the poor care he received and the poor communication she felt she received during this time. She wishes to advocate for him and protect him, knowing that the surgery and enucleation will be something he has to live with. Her desire was to preserve his eyes, but if left with no other options, understands that enucleation would need to occur. She eventually expresses gratefulness for Dr. Celestin for going out of her way to communicate and help her brother. She was agreeable when I asked if I could follow along.

## 2023-08-11 NOTE — PLAN OF CARE
Patient to receive MRI with contrast. Plan for HD after scan. Plan for HD on Saturday and Sunday as well. Plan communicated with dialysis team, as well as nephrology on-call.     Pete Grimes NP  Nephrology

## 2023-08-11 NOTE — PLAN OF CARE
A multidisciplinary counseling session was conducted with his POA which involved the following teams:    Palliative  Opthalmo  Nursing    It revolved around the topic of his surgical procedure. It was explained in detail to his POA (his sister) that an emergent enucleation may be warranted necessary in the case that Mr Bernal deteriorates and there is a suspicion that the infection spread to his CNS. Otherwise, if he remains stable and free from CNS infection, he is to be scheduled as an elective basis most likely next Wednesday.    All her thoughts and queries were attended to.    The overall condition of the patient was relayed to her and a palliative standpoint was included.

## 2023-08-11 NOTE — ASSESSMENT & PLAN NOTE
Last echo at outside facility    July 25: Transesophageal echocardiogram had no evidence of endocarditis. Moderate to severely decreased left ventricular systolic function with EF 30-35%. Moderately reduced RV systolic function. Moderately enlarged right ventricle. Moderate to severe tricuspid regurgitation.     -Repeat BNP  -Monitor for volume overload  -on 2L  -Pulse ox   -CXR  -Titrated GDMT  Strict I and o's

## 2023-08-11 NOTE — ASSESSMENT & PLAN NOTE
IR consult sent  As per IR to be kept for a few more weeks as looks functioning      Plan:  Follow IR recs

## 2023-08-11 NOTE — PLAN OF CARE
Problem: Adult Inpatient Plan of Care  Goal: Plan of Care Review  Outcome: Ongoing, Progressing  Goal: Patient-Specific Goal (Individualized)  Outcome: Ongoing, Progressing  Goal: Absence of Hospital-Acquired Illness or Injury  Outcome: Ongoing, Progressing  Goal: Optimal Comfort and Wellbeing  Outcome: Ongoing, Progressing  Goal: Readiness for Transition of Care  Outcome: Ongoing, Progressing     Problem: Diabetes Comorbidity  Goal: Blood Glucose Level Within Targeted Range  Outcome: Ongoing, Progressing     Problem: Skin Injury Risk Increased  Goal: Skin Health and Integrity  Outcome: Ongoing, Progressing     Problem: Device-Related Complication Risk (Hemodialysis)  Goal: Safe, Effective Therapy Delivery  Outcome: Ongoing, Progressing     Problem: Hemodynamic Instability (Hemodialysis)  Goal: Effective Tissue Perfusion  Outcome: Ongoing, Progressing     Problem: Infection (Hemodialysis)  Goal: Absence of Infection Signs and Symptoms  Outcome: Ongoing, Progressing     Problem: Infection  Goal: Absence of Infection Signs and Symptoms  Outcome: Ongoing, Progressing     Problem: Coping Ineffective  Goal: Effective Coping  Outcome: Ongoing, Progressing

## 2023-08-11 NOTE — HPI
"Per primary HPI:     "Immanuel Bernal is a 57-year-old man with a history of ESRD on HD, HFrEF (EF 30-35%), DM2, HTN, bowel obstruction s/p bowel resection, KENIA, CVA, atrial fibrillation who was hospitalized at [facility] in 7/2023 for b/l endophthalmitis, Pseudomonas bacteremia, RUE thrombus, RUE AVG infection s/p excision, and RUL mass. Left eye did not improve & subsequently developed abscess, but POA declined enucleation and patient ultimately left AMA. Re-presented to [place] ED with worsening eye symptoms with imaging showing left scleral abscess. He was transferred to [facility] for Oculoplastics evaluation & enucleation."    Palliative and Supportive Care was consulted to explore goals of care.  "

## 2023-08-11 NOTE — PLAN OF CARE
Oculoplastics Plan of Care Note    Received a message from primary team this AM regarding concerns for deteriorating mental status of Mr. Bernal. I evaluated patient at bedside. He remains confused and is uttering nonsensical speech on my examination. This was similar to my examination of patient yesterday evening. At that time yesterday evening, I had mentioned to his sister (DYAN) that he seemed to be more confused. She mentioned that she would be coming to the hospital to see the patient and help orient him. I also offered the sister an earlier surgical date (8/11/23) rather than waiting for 8/16/23. She insisted on the 8/16/23 surgical date so we agreed to keep the surgery scheduled for 8/16/23.     This AM, I attempted to reach patient's sister regarding his mental status change and to revisit the possibility of an earlier surgery date. She did not answer. At this time, the infection remains stable. Patient remains afebrile, WBC WNL, and preliminary blood cultures are NGTD. We are obtaining a repeat MRI Brain/Orbits, but this time with contrast, to evaluate for any posterior spread of infection. If MRI reveals worsening of the infection and spread posteriorly, we would need to consider emergent surgery rather than urgent.     Deanna Celestin MD  [employer] [facility] PGY3

## 2023-08-11 NOTE — PROGRESS NOTES
"[facility] - Intensive Care ([place] [address])  Infectious Disease  Progress Note    Patient Name: Immanuel Bernal  MRN: [id number]  Admission Date: 8/9/2023  Length of Stay: 2 days  Attending Physician: Brett Gautam, *  Primary Care Provider: No, Primary Doctor    Isolation Status: No active isolations  Assessment/Plan:      Ophtho  * Endophthalmitis  57M with PMH of DM2, HTN, ESRD, HFrEF, bowel obstruction s/p bowel resection, KENIA, afib, recent hospitalization for NELSON endophthalmitis, pseudomonas bacteremia, RUE AVG infection s/p excision, who presents as a transfer from [place] for worsening eye symptoms and imaging findings of L scleral abscess. Previously admitted to [facility] in July for NELSON endophthalmitis, received intravitreal vancomycin and ceftazidime and IV cefepime for pseudomonas bacteremia. Previously refused enucleation, but now agreeable. MRI orbits w/o on admission here shows fluid collection in the L posterior compartment. 8/9 blood cultures have no growth to date. Scheduled for enucleation on 8/16    Recommendations:  Continue vancomycin and meropenem  Pending enucleation by ophtho on 8/16  Follow up 8/9 blood cultures        Anticipated Disposition: TBD    Thank you for your consult. I will sign off. Please contact us if you have any additional questions. Please reconsult after enucleation and there is any further culture data.     Nicole Andujar DO  Infectious Disease  [facility] - Intensive Care ([place] [address])    Subjective:     Principal Problem:Endophthalmitis    HPI: Mr. Bernal is a 57M with PMH of DM2, HTN, ESRD, HFrEF, bowel obstruction s/p bowel resection, KENIA, afib, recent hospitalization for NELSON endophthalmitis, pseudomonas bacteremia, RUE AVG infection s/p excision, who presents as a transfer from [place] for worsening eye symptoms and imaging findings of L scleral abscess. Infectious disease consulted for "endophthalmitis".     Per chart review, patient had been admitted to " [facility] in July for concern for NELSON endophthalmitis. He received intravitreal vancomycin and ceftazidime by ophthalmology, and IV cefepime for pseudomonas bacteremia. He also underwent intravitreal tap that was negative for any yeast or fungus. Unfortunately L eye did not respond to treatment and abscess developed. Ophthalmology recommended enucleation, however POA/family declined. He was continued on cefepime with plan for 6 weeks of treatment followed by indefinite fluoroquinolone.     MRI orbits w/o on admission here shows fluid collection in the L posterior compartment. Patient is currently on vancomycin and zosyn. 8/9 blood cultures have no growth to date.     Interval History:     Patient is agitated this morning, unwilling to participate in interview or exam. Enucleation scheduled for 8/16.     Review of Systems   Respiratory:  Negative for shortness of breath.    Cardiovascular:  Negative for chest pain.   Gastrointestinal:  Negative for abdominal pain.   Psychiatric/Behavioral:  Positive for agitation.      Objective:     Vital Signs (Most Recent):  Temp: 97.5 °F (36.4 °C) (08/11/23 0427)  Pulse: 72 (08/11/23 0427)  Resp: 18 (08/11/23 1102)  BP: (!) 146/66 (08/11/23 0427)  SpO2: 97 % (08/11/23 0427) Vital Signs (24h Range):  Temp:  [97.5 °F (36.4 °C)-99 °F (37.2 °C)] 97.5 °F (36.4 °C)  Pulse:  [60-72] 72  Resp:  [16-19] 18  SpO2:  [95 %-100 %] 97 %  BP: (121-156)/(59-74) 146/66     Weight: 61.7 kg (136 lb 0.4 oz)  Body mass index is 19.52 kg/m².    Estimated Creatinine Clearance: 11.8 mL/min (A) (based on SCr of 5.9 mg/dL (H)).     Physical Exam  Vitals reviewed.   Constitutional:       General: He is in acute distress.      Appearance: Normal appearance. He is not ill-appearing.   HENT:      Head: Normocephalic and atraumatic.   Eyes:      Comments: L eye edematous, both eyes closed and patient unwilling to open   Pulmonary:      Effort: Pulmonary effort is normal.   Skin:     General: Skin is warm and dry.           Significant Labs: All pertinent labs within the past 24 hours have been reviewed.  Recent Lab Results  (Last 5 results in the past 24 hours)        08/11/23  1101   08/11/23  0851   08/10/23  2213   08/10/23  2114   08/10/23  1257        Albumin   2.4             Alkaline Phosphatase   112             ALT   7             Anion Gap   19             AST   16             Baso #   0.11             Basophil %   1.1             BILIRUBIN TOTAL   0.4  Comment: For infants and newborns, interpretation of results should be based  on gestational age, weight and in agreement with clinical  observations.    Premature Infant recommended reference ranges:  Up to 24 hours.............<8.0 mg/dL  Up to 48 hours............<12.0 mg/dL  3-5 days..................<15.0 mg/dL  6-29 days.................<15.0 mg/dL               BUN   30             Calcium   8.9             Chloride   97             CO2   22             Creatinine   5.9             Differential Method   Automated             eGFR   10.3             Eos #   0.3             Eosinophil %   2.8             Glucose   49  Comment: *Critical value notification by [facility] with confirmation of receipt to   BRITT BRAGA RN at  Date 8/11/23 Time 10:31AM               Gran # (ANC)   7.5             Gran %   73.7             Hematocrit   28.4             Hemoglobin   8.5             Immature Grans (Abs)   0.06  Comment: Mild elevation in immature granulocytes is non specific and   can be seen in a variety of conditions including stress response,   acute inflammation, trauma and pregnancy. Correlation with other   laboratory and clinical findings is essential.               Immature Granulocytes   0.6             Lymph #   0.9             Lymph %   8.5             Magnesium   2.3             MCH   29.7             MCHC   29.9             MCV   99             Mono #   1.4             Mono %   13.3             MPV   10.5             nRBC   0             Phosphorus   3.3              Platelets   267             POCT Glucose 62     134   53   86       Potassium   3.6             PROTEIN TOTAL   6.9             RBC   2.86             RDW   18.8             Sodium   138             Vancomycin, Random   22.4             WBC   10.15                                    Significant Imaging: I have reviewed all pertinent imaging results/findings within the past 24 hours.

## 2023-08-11 NOTE — CODE/ RAPID DOCUMENTATION
RAPID RESPONSE NURSE PROACTIVE ROUNDING NOTE       Time of Visit: 1107    Admit Date: 2023  LOS: 2  Code Status: Full Code   Date of Visit: 2023  : 1965  Age: 57 y.o.  Sex: male  Race: Black or   Bed: 32362/57688 A:   MRN: [id number]  Was the patient discharged from an ICU this admission? No   Was the patient discharged from a PACU within last 24 hours? No   Did the patient receive conscious sedation/general anesthesia in last 24 hours? No  Was the patient in the ED within the past 24 hours? No  Was the patient on NIPPV within the past 24 hours? No   Attending Physician: Brett Gautam, *  Primary Service: [facility] HOSP MED 3   Time spent at the bedside: 15 -30 min    SITUATION    Notified by  Bedside RN during rounds .  Reason for alert: altered mental status  Called to evaluate the patient for Neuro    BACKGROUND     Why is the patient in the hospital?: Endophthalmitis    Patient has a past medical history of Bilateral retinal detachment, BPH (benign prostatic hyperplasia), Cataract, CHF (congestive heart failure), CKD (chronic kidney disease) stage 3, GFR 30-59 ml/min, Diabetes mellitus, type 2, Hypertension, KENIA (obstructive sleep apnea), Pancreatitis, Persistent proteinuria, PTSD (post-traumatic stress disorder), and Stroke.    Last Vitals:  Temp: 97.5 °F (36.4 °C) (427)  Pulse: 72 (427)  Resp: 18 ( 110)  BP: 146/66 (427)  SpO2: 97 % (427)    24 Hours Vitals Range:  Temp:  [97.5 °F (36.4 °C)-99 °F (37.2 °C)]   Pulse:  [66-72]   Resp:  [16-18]   BP: (146-156)/(66-74)   SpO2:  [95 %-97 %]     Labs:  Recent Labs     23  1447 08/10/23  0609 23  0851   WBC 7.07 7.17 10.15   HGB 9.3* 9.2* 8.5*   HCT 31.9* 31.0* 28.4*    244 267       Recent Labs     23  1447 08/10/23  0609 23  0851    137 138   K 4.9 4.9 3.6    103 97   CO2 17* 15* 22*   BUN 52* 62* 30*   CREATININE 9.0* 9.6* 5.9*   GLU 62* 64* 49*   PHOS   "--  5.4* 3.3   MG  --  3.0* 2.3            ASSESSMENT    Physical Exam  HENT:      Head: Left periorbital erythema present.   Eyes:      General: Visual field deficit present.         Left eye: Discharge present.     Extraocular Movements:      Left eye: Abnormal extraocular motion present.      Conjunctiva/sclera:      Left eye: Chemosis, exudate and hemorrhage present.   Neurological:      Mental Status: He is alert.   Psychiatric:         Mood and Affect: Affect is angry.         Behavior: Behavior is agitated and combative.      Comments: Patient repeatedly yelling, "I can't remember!" And "Can I feel good?" Not redirectable.         INTERVENTIONS    The patient was seen for Neurological problem. Staff concerns included unexplained agitation or delirium. The following interventions were performed: POCT glucose and 0.5 mg hydromorphone administration, 16 g glucose chewable tablets administered (BG 62), and primary care team called to bedside.    RECOMMENDATIONS    - Recheck BG  - Delirium precautions:during the day - shades open, lights on, frequent reorientation, etc.; at night - sleep promotion, calm, quiet environment, lights off, etc.  - Antipsychotics per MD recommendations with close monitoring of patient's respiratory status. Consider continuous pulse ox monitor  - Consider additional brain imaging. Last head CT 7/28 and last brain MRI 8/3    PROVIDER ESCALATION    Yes/No  Yes    Orders received and case discussed with Dr. Gautam .    Disposition: Remain in room 77119.    FOLLOW-UP    Bedside RNWerner and charge RNAlexey,  updated on plan of care. Instructed to call the Rapid Response Nurse, Xuan Eastman RN at [phone] for additional questions or concerns.            "

## 2023-08-11 NOTE — CONSULTS
F/A completed 8/10:     Recommendations     1. When/if able, initiate TFs via PEG. Rec'd Novasource @ 40 mL/hr to provide 1920 kcals, 87 g of protein, 688 mL fluid.   2. RD to monitor & follow-up.     Goals: Meet % EEN, EPN by RD f/u date  Nutrition Goal Status: new  Communication of RD Recs: other (comment) (POC)    Thanks!    Al Galvan Registration Eligible, Provisional LDN

## 2023-08-11 NOTE — PROGRESS NOTES
[facility] - Int[facility] ([address] Medicine  Progress Note    Patient Name: Immanuel Bernal  MRN: [id number]  Patient Class: IP- Inpatient   Admission Date: 8/9/2023  Length of Stay: 1 days  Attending Physician: Brett Gautam, *  Primary Care Provider: Dolly, Primary Doctor        Subjective:     Principal Problem:Endophthalmitis        HPI:  HPI obtained per medical record as patient unable to communicate    57-year-old male with a history of CHF, diabetes, hypertension, chronic disability, end-stage renal disease on hemodialysis, PEG placement, bowel obstruction, sleep apnea, TIA, left eye vitreous hemorrhage, stroke, and bowel obstruction with bowel resection admitted to [facility] in [place] July 18 from nursing home with left eye pain and blurred vision.  He was admitted with concern for bilateral endophthalmitis.  Other admit diagnoses included right upper extremity thrombus, end-stage renal disease on hemodialysis, hyperkalemia, sleep apnea, diabetes, and CHF.  He was treated with broad-spectrum antibiotics.  He was seen by Infectious Diseases,, and he was treated with vancomycin, ceftriaxone, and amphotericin.  Blood cultures were positive for Pseudomonas, and amphotericin/vancomycin were stopped.  IV cefepime was continued.  He was seen by Ophthalmology, and he received intravitreal vancomycin and ceftazidime (July 18).  He also had intravitreal tap July 18 that had no yeast or fungal elements observed.  Left eye endophthalmitis did not respond to treatment, and he subsequently developed abscess formation, significant proptosis, and drainage of purulent material from the orbit.  Ophthalmology recommended enucleation.  He was continued on cefepime for pseudomonal and ophthalmitis.  Recommendation was for 6 weeks of treatment to be followed by indefinite fluoroquinolone.  He was seen by Pulmonology during his stay for a right upper lung mass with peripheral nodules.   It was felt that he would need further investigation of this once his current clinical issues stabilized.  Right upper extremity AV graft was excised during his stay with concern for infection.  A right IJ tunneled line was placed on July 27.  Left eye endophthalmitis continued to worsen, and ophthalmology spoke with patient and family about the need for enucleation.  Despite several conversations, family declined enucleation.  Plans were for transitioned to skilled nursing facility for continued treatment, but family did not want him to go to a skilled nursing facility.  He was subsequently discharged AMA from the hospital there on August 7.  Please see the August 7 Internal Medicine note for further details.     He subsequently presented to [facility] Emergency Department.  He will not go back to [facility] in [place]. He received Zosyn and vancomycin.  ED team at [place] [facility] spoke with the patient and his power-of- (sister).  CT of the orbits noted scleral abscess along the lateral aspect of the left globe.  Patient and family are aware and in agreement that the patient needs enucleation of the eye at this time. Referring provider spoke with Oculoplastics at [facility]. Requesting transfer to [facility] for Oculoplastics specialty evaluation of persistent endophthalmitis.  ED provider noted patient is hemodynamically stable.  He does not appear volume overloaded or in respiratory distress.      The patient has a significant previous medical course as listed below  August 3: MRI brain and orbits showed worsening ophthalmitis and inflammatory changes of the left orbital tissues with slight increase in proptosis.  No evidence of intracranial inflammatory process observed.    July 25: Transesophageal echocardiogram had no evidence of endocarditis.  Moderate to severely decreased left ventricular systolic function with EF 30-35%.    July 22:  CT chest showed right  upper lobe mass with numerous bilateral nodularity predominantly in the right with associated mediastinal lymph nodes.  July 21: Blood cultures with Pseudomonas aeruginosa  July 19: MRI brain/orbits with and without contrast had findings suggestive of chronic microvascular ischemic disease of the white matter with remote ischemic event in the left insula/basal ganglia/subependymal white matter/right middle cerebellar peduncle and hemisphere.  Findings consistent with left endophthalmitis.  No abnormal findings observed in the right globe.  July 18:  Blood cultures with Pseudomonas aeruginosa and coag-negative staph      Overview/Hospital Course:  Patient was rate controlled and therefor flecainide held. His GCS is 13/15. Oculopalstics were consulted and so were the nephrology teams and IR (regarding the cholecystotomy). He underwent dialysis on his first day. Was scheduled for surgery today but his daughter refused to have it done until she reviewed the MRI herself first. This means that the surgeon will only be available next Wednesday in order to allow her to view and discuss the MRI. He has a peg tube in place that has not been utilzied prior. His GCS is 13/15 and will be monitored during his stays, in any case of deterioration we may switch his feeding regimen to tube feeds. In the meantime his heartfailure medications have been reinstated and is being covered by vancomycin and zosyn for the endopthalmitis.        Interval History: no acute events overnight    Review of Systems   Respiratory:  Negative for choking, chest tightness, shortness of breath and wheezing.    Cardiovascular:  Negative for chest pain and leg swelling.   Gastrointestinal:  Positive for abdominal distention.   Neurological: Negative.    Psychiatric/Behavioral: Negative.       Objective:     Vital Signs (Most Recent):  Temp: 96 °F (35.6 °C) (08/10/23 0951)  Pulse: 72 (08/10/23 1309)  Resp: 18 (08/10/23 0804)  BP: (!) 150/68 (08/10/23  1309)  SpO2: 100 % (08/10/23 0804) Vital Signs (24h Range):  Temp:  [96 °F (35.6 °C)-98.2 °F (36.8 °C)] 96 °F (35.6 °C)  Pulse:  [58-72] 72  Resp:  [18-20] 18  SpO2:  [98 %-100 %] 100 %  BP: (113-166)/(53-77) 150/68     Weight: 61.7 kg (136 lb 0.4 oz)  Body mass index is 19.52 kg/m².    Intake/Output Summary (Last 24 hours) at 8/10/2023 1545  Last data filed at 8/10/2023 1310  Gross per 24 hour   Intake 601.8 ml   Output 2585 ml   Net -1983.2 ml         Physical Exam  Eyes:      Pupils: Pupils are equal, round, and reactive to light.   Cardiovascular:      Rate and Rhythm: Normal rate and regular rhythm.      Pulses: Normal pulses.      Heart sounds: Normal heart sounds.   Pulmonary:      Effort: Pulmonary effort is normal.      Breath sounds: Normal breath sounds.   Abdominal:      Palpations: Abdomen is soft.   Neurological:      Mental Status: He is alert. Mental status is at baseline.   Psychiatric:         Mood and Affect: Mood normal.             Significant Labs: All pertinent labs within the past 24 hours have been reviewed.    Significant Imaging: I have reviewed all pertinent imaging results/findings within the past 24 hours.      Assessment/Plan:      * Endophthalmitis  Ophthalmology team on board and were going to perform the procedure but the daughter refused without beign around to review the MRI which delayed the procedure till next wednesday    MRI repeat : Diffuse inflammatory change involving the left globe, with associated thickening of the left sclera, proptosis, and focal fluid collection within the left posterior compartment, which demonstrates diffusion restriction.  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     Diffuse left preseptal and postseptal inflammatory change, with superimposed orbital cellulitis also considered.  Evaluation of the left orbital apex is limited secondary to no contrast  administration.     Decreased size of the left anterior compartment with bowing of the visualized left lens.  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      Plan:    -Keep NPO for possible operation, if GCS drops will utilize peg tube feeds  -Vancomycin and Zosyn  -Daily Labs  - trace blood cultures      Cholecystostomy care  IR consult sent    Plan:  Follow IR recs      Congestive heart failure (CHF)  Last echo at outside facility    July 25: Transesophageal echocardiogram had no evidence of endocarditis. Moderate to severely decreased left ventricular systolic function with EF 30-35%. Moderately reduced RV systolic function. Moderately enlarged right ventricle. Moderate to severe tricuspid regurgitation.     -Repeat BNP  -Monitor for volume overload  -on 2L  -Pulse ox   -CXR  -Titrated GDMT      ESRD (end stage renal disease)  Nephrology consulted for HD needs while inpatient      Bipolar disorder  Home psych meds reorderd      Atrial fibrillation  Continue apixaban  Continue home CCB    Hyperlipidemia  Home statin      Chronic diastolic heart failure  Continue to monitor for signs of volume overload  On full antifailure medications      Plan:  Monitor for signs of volume overload      Persistent proteinuria  Repeat UA      CKD (chronic kidney disease) stage 3, GFR 30-59 ml/min    Underwent dialysis for three hours on 8/10 as well as MRI with contrast    Patient's creatinine before dialysis is 9.6  Baseline one month back is 5.83    Plan:  Follow nephro recs  And trace daily cmp    Hypertension  Hypertensive on arrival    Home imdur  Carvedilol  Hydralazine  Nifedipine  And losartan reordered    Stroke  Continue home statin  Continue apixaban  Delerium precautions  PT OT    Diabetes mellitus  POCT glucose  LDSS  -180      VTE Risk Mitigation (From admission, onward)         Ordered     apixaban tablet 5 mg  2 times daily         08/10/23 1550     heparin (porcine) injection 1,000  Units  As needed (PRN)         08/10/23 1046     IP VTE HIGH RISK PATIENT  Once         08/09/23 1317     Place sequential compression device  Until discontinued         08/09/23 1317                Discharge Planning   TOBY: 8/15/2023     Code Status: Full Code   Is the patient medically ready for discharge?: No    Reason for patient still in hospital (select all that apply): Treatment  Discharge Plan A: (P) Long-term acute care facility (LTAC)                  Sharon Law MD  Department of Hospital Medicine   [facility] - Intensive Care ([place])

## 2023-08-11 NOTE — SUBJECTIVE & OBJECTIVE
Interval History:     Patient is agitated this morning, unwilling to participate in interview or exam. Enucleation scheduled for 8/16.     Review of Systems   Respiratory:  Negative for shortness of breath.    Cardiovascular:  Negative for chest pain.   Gastrointestinal:  Negative for abdominal pain.   Psychiatric/Behavioral:  Positive for agitation.      Objective:     Vital Signs (Most Recent):  Temp: 97.5 °F (36.4 °C) (08/11/23 0427)  Pulse: 72 (08/11/23 0427)  Resp: 18 (08/11/23 1102)  BP: (!) 146/66 (08/11/23 0427)  SpO2: 97 % (08/11/23 0427) Vital Signs (24h Range):  Temp:  [97.5 °F (36.4 °C)-99 °F (37.2 °C)] 97.5 °F (36.4 °C)  Pulse:  [60-72] 72  Resp:  [16-19] 18  SpO2:  [95 %-100 %] 97 %  BP: (121-156)/(59-74) 146/66     Weight: 61.7 kg (136 lb 0.4 oz)  Body mass index is 19.52 kg/m².    Estimated Creatinine Clearance: 11.8 mL/min (A) (based on SCr of 5.9 mg/dL (H)).     Physical Exam  Vitals reviewed.   Constitutional:       General: He is in acute distress.      Appearance: Normal appearance. He is not ill-appearing.   HENT:      Head: Normocephalic and atraumatic.   Eyes:      Comments: L eye edematous, both eyes closed and patient unwilling to open   Pulmonary:      Effort: Pulmonary effort is normal.   Skin:     General: Skin is warm and dry.          Significant Labs: All pertinent labs within the past 24 hours have been reviewed.  Recent Lab Results  (Last 5 results in the past 24 hours)        08/11/23  1101   08/11/23  0851   08/10/23  2213   08/10/23  2114   08/10/23  1257        Albumin   2.4             Alkaline Phosphatase   112             ALT   7             Anion Gap   19             AST   16             Baso #   0.11             Basophil %   1.1             BILIRUBIN TOTAL   0.4  Comment: For infants and newborns, interpretation of results should be based  on gestational age, weight and in agreement with clinical  observations.    Premature Infant recommended reference ranges:  Up to 24  hours.............<8.0 mg/dL  Up to 48 hours............<12.0 mg/dL  3-5 days..................<15.0 mg/dL  6-29 days.................<15.0 mg/dL               BUN   30             Calcium   8.9             Chloride   97             CO2   22             Creatinine   5.9             Differential Method   Automated             eGFR   10.3             Eos #   0.3             Eosinophil %   2.8             Glucose   49  Comment: *Critical value notification by [facility] with confirmation of receipt to   BRITT BRAGA RN at  Date 8/11/23 Time 10:31AM               Gran # (ANC)   7.5             Gran %   73.7             Hematocrit   28.4             Hemoglobin   8.5             Immature Grans (Abs)   0.06  Comment: Mild elevation in immature granulocytes is non specific and   can be seen in a variety of conditions including stress response,   acute inflammation, trauma and pregnancy. Correlation with other   laboratory and clinical findings is essential.               Immature Granulocytes   0.6             Lymph #   0.9             Lymph %   8.5             Magnesium   2.3             MCH   29.7             MCHC   29.9             MCV   99             Mono #   1.4             Mono %   13.3             MPV   10.5             nRBC   0             Phosphorus   3.3             Platelets   267             POCT Glucose 62     134   53   86       Potassium   3.6             PROTEIN TOTAL   6.9             RBC   2.86             RDW   18.8             Sodium   138             Vancomycin, Random   22.4             WBC   10.15                                    Significant Imaging: I have reviewed all pertinent imaging results/findings within the past 24 hours.

## 2023-08-11 NOTE — PT/OT/SLP PROGRESS
Physical Therapy      Patient Name:  Immanuel Bernal   MRN:  [id number]    PT attempted to see patient twice in PM. Upon first attempt, SWAPNIL Kendall asked to return later in PM. PT returned to see patient for initial evaluation later in PM and patient was rounding with multiple doctors. Will follow up with physical therapy initial evaluation as appropriate.    Yordan Aly, PT, DPT  8/11/2023

## 2023-08-11 NOTE — CONSULTS
[facility] ([place] [id number])  Palliative Medicine  Consult Note    Patient Name: Immanuel Bernal  MRN: [id number]  Admission Date: 8/9/2023  Hospital Length of Stay: 2 days  Code Status: Full Code   Attending Provider: Brett Gautam, *  Consulting Provider: Kim Watts MD  Primary Care Physician: Dolly, Primary Doctor  Principal Problem:Endophthalmitis    Patient information was obtained from relative(s) and primary team.      Inpatient consult to Palliative Care  Consult performed by: Kim Watts MD  Consult ordered by: Brett Gautam,   Reason for consult: goals of care        Assessment/Plan:     Palliative Care  Encounter for palliative care  Immanuel Bernal is a 57-year-old man with a history of ESRD on HD, HFrEF (EF 30-35%), DM2, HTN, bowel obstruction s/p bowel resection, KENIA, CVA, atrial fibrillation who was hospitalized at [facility] in 7/2023 for b/l endophthalmitis, Pseudomonas bacteremia, RUE thrombus, RUE AVG infection s/p excision, and RUL mass. Left eye did not improve & subsequently developed abscess, but POA declined enucleation and patient ultimately left AMA. Re-presented to [place] ED with worsening eye symptoms with imaging showing left scleral abscess. He was transferred to [facility] [place] O[place]oplastics evaluation & enucleation. Palliative and Supportive Care was consulted to explore goals of care.    Advance Care Planning   Goals of Care:  - Code status: Full code, did not discuss  - HCPOA: sister Marley Bernal  - Patient does not have decision making capacity at this time  - Prognosis: guarded  - Goals: life prolongation  - Plans: will proceed with plans for ophthalmologic intervention for source control; will follow along as understanding of prognosis is very limited and trust with health care workers is practically non-existent    Goals of Care Conversation:  - 8/11/23: Meeting with Marley Bernal (sister), Dr. Gautam, Dr. Celestin (ophthalmology) and writer  "(Mac, palliative). I asked Ms. Bernal to share with me about Mr. Bernal's condition and she started back 5 years ago when he was 52 years old when he suffered a stroke at work (related to his history of non-treated DM). His health deteriorated over the years and notes that he has been traumatized by his decline and prior hospitalizations. She shared how upsetting the hospitalization at [facility] was, noting how nothing was being done for him and alternatively was always being pressured to proceed with surgery. She recalls the day they signed papers to leave AMA, and being told that if she didn't agree with surgery, then he needed to sign out AMA and needed to leave in 15 minutes. Allowed for Ms. Bernal to express her frustrations and disappointment about the poor care he received and the poor communication she felt she received during this time. She wishes to advocate for him and protect him, knowing that the surgery and enucleation will be something he has to live with. Her desire was to preserve his eyes, but if left with no other options, understands that enucleation would need to occur. She eventually expresses gratefulness for Dr. Celestin for going out of her way to communicate and help her brother. She was agreeable when I asked if I could follow along.            Thank you for your consult. I will follow-up with patient. Please contact us if you have any additional questions.    Subjective:     HPI:   Per primary HPI:     "Immanuel Bernal is a 57-year-old man with a history of ESRD on HD, HFrEF (EF 30-35%), DM2, HTN, bowel obstruction s/p bowel resection, KENIA, CVA, atrial fibrillation who was hospitalized at [facility] in 7/2023 for b/l endophthalmitis, Pseudomonas bacteremia, RUE thrombus, RUE AVG infection s/p excision, and RUL mass. Left eye did not improve & subsequently developed abscess, but POA declined enucleation and patient ultimately left AMA. Re-presented to [place] ED with " "worsening eye symptoms with imaging showing left scleral abscess. He was transferred to [facility] [place]culoplastics evaluation & enucleation."    Palliative and Supportive Care was consulted to explore goals of care.      Hospital Course:  No notes on file    Interval History: Had HD yesterday and MRI today.    Past Medical History:   Diagnosis Date    Bilateral retinal detachment 7/18/2023    BPH (benign prostatic hyperplasia)     Cataract     CHF (congestive heart failure)     CKD (chronic kidney disease) stage 3, GFR 30-59 ml/min     Diabetes mellitus, type 2     Hypertension     KENIA (obstructive sleep apnea)     Pancreatitis     Persistent proteinuria 11/12/2020    2 g proteinuria noted Resumed ACE Lower BP systolic to less than 130     PTSD (post-traumatic stress disorder)     Stroke        Past Surgical History:   Procedure Laterality Date    CATARACT EXTRACTION Bilateral        Review of patient's allergies indicates:   Allergen Reactions    Morphine Rash    Amiodarone analogues Itching     Other reaction(s): Unknown       Medications:  Continuous Infusions:  Scheduled Meds:   [START ON 8/12/2023] sodium chloride 0.9%   Intravenous Once    acetaminophen  1,000 mg Oral TID    atorvastatin  40 mg Oral Daily    busPIRone  10 mg Oral BID    calcitRIOL  0.25 mcg Oral Daily    carvediloL  25 mg Oral BID WM    citalopram  10 mg Oral Daily    epoetin thee (PROCRIT) injection  3,000 Units Intravenous Every Tues, Thurs, Sat    furosemide  20 mg Oral BID    hydrALAZINE  100 mg Oral TID    isosorbide mononitrate  60 mg Oral Daily    lactulose  10 g Oral BID    losartan  25 mg Oral Daily    meropenem (MERREM) IVPB  2 g Intravenous Q8H    NIFEdipine  30 mg Oral Daily    sevelamer carbonate  800 mg Oral TID WM    tamsulosin  1 capsule Oral Daily    vancomycin (VANCOCIN) IV (PEDS and ADULTS)  500 mg Intravenous Once     PRN Meds:sodium chloride 0.9%, albuterol-ipratropium, dextrose 10%, dextrose " 10%, glucagon (human recombinant), glucose, glucose, heparin (porcine), HYDROmorphone, melatonin, naloxone, OLANZapine, sodium chloride 0.9%, Pharmacy to dose Vancomycin consult **AND** vancomycin - pharmacy to dose    Family History       Problem Relation (Age of Onset)    Diabetes Father, Sister    Heart disease Father    Hyperlipidemia Brother    Kidney disease Father    Stroke Mother          Tobacco Use    Smoking status: Former     Current packs/day: 0.00     Types: Cigarettes, Cigars    Smokeless tobacco: Never   Substance and Sexual Activity    Alcohol use: Not Currently    Drug use: Not Currently    Sexual activity: Not on file       Review of Systems   Unable to perform ROS: Mental status change     Objective:     Vital Signs (Most Recent):  Temp: 97.5 °F (36.4 °C) (08/11/23 0427)  Pulse: 72 (08/11/23 0427)  Resp: 18 (08/11/23 1102)  BP: (!) 146/66 (08/11/23 0427)  SpO2: 97 % (08/11/23 0427) Vital Signs (24h Range):  Temp:  [97.5 °F (36.4 °C)-99 °F (37.2 °C)] 97.5 °F (36.4 °C)  Pulse:  [66-72] 72  Resp:  [16-19] 18  SpO2:  [95 %-100 %] 97 %  BP: (129-156)/(66-74) 146/66     Weight: 61.7 kg (136 lb 0.4 oz)  Body mass index is 19.52 kg/m².       Physical Exam  Constitutional:       General: He is not in acute distress.     Appearance: He is ill-appearing.   HENT:      Head: Normocephalic and atraumatic.      Right Ear: External ear normal.      Left Ear: External ear normal.      Nose: Nose normal.      Mouth/Throat:      Mouth: Mucous membranes are dry.   Eyes:      General:         Left eye: Discharge present.  Cardiovascular:      Rate and Rhythm: Normal rate.   Pulmonary:      Effort: Pulmonary effort is normal.      Breath sounds: Normal breath sounds.   Abdominal:      General: Bowel sounds are normal.      Palpations: Abdomen is soft.      Comments: G tube   Musculoskeletal:         General: No swelling.   Skin:     General: Skin is dry.   Neurological:      Mental Status: He is disoriented.           Review of Symptoms      Symptom Assessment (ESAS 0-10 Scale)  Unable to complete assessment due to Mental status change         Pain Assessment in Advanced Demential Scale (PAINAD)   Breathing - Independent of vocalization:  0  Negative vocalization:  0  Facial expression:  0  Body language:  0  Consolability:  0  Total:  0    Psychosocial/Cultural:   See Palliative Psychosocial Note: No  No longer working since stroke. Has sister Marley. Has adult children  **Primary  to Follow**  Palliative Care  Consult: No      Advance Care Planning  Advance Directives:   Living Will: No    LaPOST: No    Do Not Resuscitate Status: No    Medical Power of : Yes    Agent's Name:  Marley Bernal    Decision Making:  Family answered questions and Patient unable to communicate due to disease severity/cognitive impairment  Goals of Care: The healthcare power of   endorses that what is most important right now is to focus on extending life as long as possible, even it it means sacrificing quality    Accordingly, we have decided that the best plan to meet the patient's goals includes continuing with treatment         Significant Labs: CBC:   Recent Labs   Lab 08/10/23  0609 08/11/23  0851   WBC 7.17 10.15   HGB 9.2* 8.5*   HCT 31.0* 28.4*    267     CMP:   Recent Labs   Lab 08/10/23  0609 08/11/23  0851    138   K 4.9 3.6    97   CO2 15* 22*   GLU 64* 49*   BUN 62* 30*   CREATININE 9.6* 5.9*   CALCIUM 9.7 8.9   PROT 6.8 6.9   ALBUMIN 2.3* 2.4*   BILITOT 0.4 0.4   ALKPHOS 104 112   AST 15 16   ALT 7* 7*   ANIONGAP 19* 19*     CBC:   Recent Labs   Lab 08/11/23  0851   WBC 10.15   HGB 8.5*   HCT 28.4*   MCV 99*        BMP:  Recent Labs   Lab 08/11/23  0851   GLU 49*      K 3.6   CL 97   CO2 22*   BUN 30*   CREATININE 5.9*   CALCIUM 8.9   MG 2.3     LFT:  Lab Results   Component Value Date    AST 16 08/11/2023    GGT 13 11/09/2020    ALKPHOS 112 08/11/2023     BILITOT 0.4 08/11/2023     Albumin:   Albumin   Date Value Ref Range Status   08/11/2023 2.4 (L) 3.5 - 5.2 g/dL Final     Protein:   Total Protein   Date Value Ref Range Status   08/11/2023 6.9 6.0 - 8.4 g/dL Final     Lactic acid:   Lab Results   Component Value Date    LACTATE 0.6 08/09/2023       Significant Imaging: I have reviewed all pertinent imaging results/findings within the past 24 hours.        I spent a total of 75 minutes on the day of the visit. This includes face to face time in discussion of goals of care, symptom assessment, coordination of care and emotional support.  This also includes non-face to face time preparing to see the patient (eg, review of tests/imaging), obtaining and/or reviewing separately obtained history, documenting clinical information in the electronic or other health record, independently interpreting results and communicating results to the patient/family/caregiver, or care coordinator.    Kim Watts MD  Palliative Medicine  [facility] - Intensive Care ([place] [id number])

## 2023-08-11 NOTE — PLAN OF CARE
08/10/23 1223   Discharge Reassessment   Assessment Type Discharge Planning Reassessment   Discharge Plan discussed with: Sibling   Name(s) and Number(s) Marley Bernal (Sister)   [phone] ([place])   Discharge Plan A Long-term acute care facility (LTAC)   Discharge Plan B Skilled Nursing Facility   DME Needed Upon Discharge  other (see comments)   Transition of Care Barriers None   Why the patient remains in the hospital Requires continued medical care  (eye surgery postpned for next week by the patients Marley landers)   Post-Acute Status   Post-Acute Authorization Placement;Home Health;Dialysis   Post-Acute Placement Status Patient List Provided  (LTAC or SNF : the sister to decide)   Home Health Status Referrals Sent  ([facility] #)   Diaylsis Status Set-up Complete/Auth obtained  (PAtient camilo an HD chair with [facility] [place] #)   Discharge Delays (!) Procedure Scheduling (IR, OR, Labs, Echo, Cath, Echo, EEG)  (eye surgery, sister refuses to sign consent for surgery)     CM spoke with Marley landers via phone with sister  to discuss any changes in discharge planning.  TOBY:  8/15/23. Patient is requiring eye surgery.    Jerri Colon RN  Case Management  [facility]  [phone]

## 2023-08-11 NOTE — SUBJECTIVE & OBJECTIVE
Interval History: Underwent HD yesterday, with a net UF of 2L. Treatment stopped 30mins early due to patient having having severe agitation and confusion.     Objective:     Vital Signs (Most Recent):  Temp: 97.5 °F (36.4 °C) (08/11/23 0427)  Pulse: 72 (08/11/23 0427)  Resp: 17 (08/11/23 0427)  BP: (!) 146/66 (08/11/23 0427)  SpO2: 97 % (08/11/23 0427) Vital Signs (24h Range):  Temp:  [97.5 °F (36.4 °C)-99 °F (37.2 °C)] 97.5 °F (36.4 °C)  Pulse:  [58-72] 72  Resp:  [16-19] 17  SpO2:  [95 %-100 %] 97 %  BP: (113-166)/(53-74) 146/66     Weight: 61.7 kg (136 lb 0.4 oz) (08/10/23 0944)  Body mass index is 19.52 kg/m².  Body surface area is 1.75 meters squared.    I/O last 3 completed shifts:  In: 601.8 [I.V.:301.8; Other:300]  Out: 2585 [Other:2585]     Physical Exam  Constitutional:       Appearance: He is ill-appearing.   HENT:      Head: Normocephalic and atraumatic.      Nose: Nose normal.      Mouth/Throat:      Mouth: Mucous membranes are moist.      Pharynx: Oropharynx is clear.   Eyes:      General:         Left eye: Discharge present.  Cardiovascular:      Rate and Rhythm: Normal rate and regular rhythm.      Comments: R IJ TDC   Pulmonary:      Effort: Pulmonary effort is normal.      Breath sounds: Normal breath sounds.   Abdominal:      General: Abdomen is flat.      Palpations: Abdomen is soft.   Musculoskeletal:      Cervical back: Normal range of motion and neck supple.      Right lower leg: No edema.      Left lower leg: No edema.   Skin:     General: Skin is warm and dry.   Neurological:      Mental Status: He is alert. He is disoriented.          Significant Labs:  CBC:   Recent Labs   Lab 08/11/23  0851   WBC 10.15   RBC 2.86*   HGB 8.5*   HCT 28.4*      MCV 99*   MCH 29.7   MCHC 29.9*     CMP:   Recent Labs   Lab 08/10/23  0609   GLU 64*   CALCIUM 9.7   ALBUMIN 2.3*   PROT 6.8      K 4.9   CO2 15*      BUN 62*   CREATININE 9.6*   ALKPHOS 104   ALT 7*   AST 15   BILITOT 0.4     All  labs within the past 24 hours have been reviewed.

## 2023-08-11 NOTE — ASSESSMENT & PLAN NOTE
Mri w and without requested and nephro consulted regarding post contrast dialysis    Plan:  Follow mri  To undergo dialysis after  If extension to CNS, contact opthalmo

## 2023-08-11 NOTE — PLAN OF CARE
Oculoplastics Plan of Care Note    Patient obtained MRI (without contrast) over night - redemonstrated scleral abscess/endophthalmitis. Discussed results with patient's sister and MPOA today around 9 AM. She would like to see the MRI herself to review it prior to patient going to surgery. She is currently out of town and would not be back until after 5 pm, so surgery was postponed again.     At this time, patient's sister has agreed to surgery (evisceration vs enucleation) for Wednesday 8/16/23, giving her time to ask questions and review results. Consent in the chart. Explained to her that we do have IV antibiotics which we are using as a temporizing measure to control the infection in the meantime. If patient were to decompensate from an infection standpoint (i.e. infection spreading to brain, persistent fevers, etc.), the surgery would be considered emergent and would need to be performed sooner. She understands.     Patient will need multiple follow up appointments, both with retina and oculoplastics. Patient's sister understands. Patient is stable for discharge from ophthalmology standpoint if they return for surgery on 8/16/23 and they are able to get IV antibiotics at an LTAC facility in the meantime. From my discussion with patient's MPOA, she was not agreeable to an LTAC facility.     Patient evaluated with Dr. Stewart (oculoplastics staff)    Deanna Celestin MD  [facility]hthal[facility] PGY3

## 2023-08-12 PROBLEM — E16.2 HYPOGLYCEMIA: Status: ACTIVE | Noted: 2023-08-12

## 2023-08-12 LAB
ALBUMIN SERPL BCP-MCNC: 2.1 G/DL (ref 3.5–5.2)
ALBUMIN SERPL BCP-MCNC: 2.1 G/DL (ref 3.5–5.2)
ALP SERPL-CCNC: 106 U/L (ref 55–135)
ALT SERPL W/O P-5'-P-CCNC: <5 U/L (ref 10–44)
AMMONIA PLAS-SCNC: 23 UMOL/L (ref 10–50)
ANION GAP SERPL CALC-SCNC: 13 MMOL/L (ref 8–16)
ANION GAP SERPL CALC-SCNC: 14 MMOL/L (ref 8–16)
AST SERPL-CCNC: 16 U/L (ref 10–40)
BASOPHILS # BLD AUTO: 0.05 K/UL (ref 0–0.2)
BASOPHILS NFR BLD: 0.8 % (ref 0–1.9)
BILIRUB SERPL-MCNC: 0.4 MG/DL (ref 0.1–1)
BUN SERPL-MCNC: 14 MG/DL (ref 6–20)
BUN SERPL-MCNC: 25 MG/DL (ref 6–20)
CALCIUM SERPL-MCNC: 8.4 MG/DL (ref 8.7–10.5)
CALCIUM SERPL-MCNC: 8.5 MG/DL (ref 8.7–10.5)
CHLORIDE SERPL-SCNC: 102 MMOL/L (ref 95–110)
CHLORIDE SERPL-SCNC: 99 MMOL/L (ref 95–110)
CO2 SERPL-SCNC: 23 MMOL/L (ref 23–29)
CO2 SERPL-SCNC: 23 MMOL/L (ref 23–29)
CREAT SERPL-MCNC: 3.9 MG/DL (ref 0.5–1.4)
CREAT SERPL-MCNC: 5.3 MG/DL (ref 0.5–1.4)
DIFFERENTIAL METHOD: ABNORMAL
EOSINOPHIL # BLD AUTO: 0.2 K/UL (ref 0–0.5)
EOSINOPHIL NFR BLD: 3.3 % (ref 0–8)
ERYTHROCYTE [DISTWIDTH] IN BLOOD BY AUTOMATED COUNT: 18.4 % (ref 11.5–14.5)
EST. GFR  (NO RACE VARIABLE): 11.7 ML/MIN/1.73 M^2
EST. GFR  (NO RACE VARIABLE): 16.9 ML/MIN/1.73 M^2
ESTIMATED AVG GLUCOSE: 88 MG/DL (ref 68–131)
GLUCOSE SERPL-MCNC: 68 MG/DL (ref 70–110)
GLUCOSE SERPL-MCNC: 68 MG/DL (ref 70–110)
HBA1C MFR BLD: 4.7 % (ref 4–5.6)
HCT VFR BLD AUTO: 26.9 % (ref 40–54)
HGB BLD-MCNC: 8 G/DL (ref 14–18)
IMM GRANULOCYTES # BLD AUTO: 0.06 K/UL (ref 0–0.04)
IMM GRANULOCYTES NFR BLD AUTO: 0.9 % (ref 0–0.5)
LYMPHOCYTES # BLD AUTO: 0.6 K/UL (ref 1–4.8)
LYMPHOCYTES NFR BLD: 9 % (ref 18–48)
MAGNESIUM SERPL-MCNC: 2.1 MG/DL (ref 1.6–2.6)
MAGNESIUM SERPL-MCNC: 2.2 MG/DL (ref 1.6–2.6)
MCH RBC QN AUTO: 29.7 PG (ref 27–31)
MCHC RBC AUTO-ENTMCNC: 29.7 G/DL (ref 32–36)
MCV RBC AUTO: 100 FL (ref 82–98)
MONOCYTES # BLD AUTO: 0.7 K/UL (ref 0.3–1)
MONOCYTES NFR BLD: 11.2 % (ref 4–15)
NEUTROPHILS # BLD AUTO: 5 K/UL (ref 1.8–7.7)
NEUTROPHILS NFR BLD: 74.8 % (ref 38–73)
NRBC BLD-RTO: 0 /100 WBC
PHOSPHATE SERPL-MCNC: 2.8 MG/DL (ref 2.7–4.5)
PHOSPHATE SERPL-MCNC: 3.2 MG/DL (ref 2.7–4.5)
PLATELET # BLD AUTO: 212 K/UL (ref 150–450)
PMV BLD AUTO: 9.9 FL (ref 9.2–12.9)
POCT GLUCOSE: 112 MG/DL (ref 70–110)
POCT GLUCOSE: 136 MG/DL (ref 70–110)
POCT GLUCOSE: 61 MG/DL (ref 70–110)
POCT GLUCOSE: 67 MG/DL (ref 70–110)
POCT GLUCOSE: 71 MG/DL (ref 70–110)
POCT GLUCOSE: 74 MG/DL (ref 70–110)
POCT GLUCOSE: 82 MG/DL (ref 70–110)
POCT GLUCOSE: 86 MG/DL (ref 70–110)
POCT GLUCOSE: 96 MG/DL (ref 70–110)
POTASSIUM SERPL-SCNC: 3.5 MMOL/L (ref 3.5–5.1)
POTASSIUM SERPL-SCNC: 3.9 MMOL/L (ref 3.5–5.1)
PROT SERPL-MCNC: 6.1 G/DL (ref 6–8.4)
RBC # BLD AUTO: 2.69 M/UL (ref 4.6–6.2)
SODIUM SERPL-SCNC: 136 MMOL/L (ref 136–145)
SODIUM SERPL-SCNC: 138 MMOL/L (ref 136–145)
VANCOMYCIN SERPL-MCNC: 20.5 UG/ML
WBC # BLD AUTO: 6.63 K/UL (ref 3.9–12.7)

## 2023-08-12 PROCEDURE — 25000003 PHARM REV CODE 250: Performed by: STUDENT IN AN ORGANIZED HEALTH CARE EDUCATION/TRAINING PROGRAM

## 2023-08-12 PROCEDURE — 25000003 PHARM REV CODE 250: Performed by: HOSPITALIST

## 2023-08-12 PROCEDURE — 90945 DIALYSIS ONE EVALUATION: CPT

## 2023-08-12 PROCEDURE — 82140 ASSAY OF AMMONIA: CPT | Performed by: PHYSICIAN ASSISTANT

## 2023-08-12 PROCEDURE — 83735 ASSAY OF MAGNESIUM: CPT | Performed by: STUDENT IN AN ORGANIZED HEALTH CARE EDUCATION/TRAINING PROGRAM

## 2023-08-12 PROCEDURE — 99223 PR INITIAL HOSPITAL CARE,LEVL III: ICD-10-PCS | Mod: ,,, | Performed by: INTERNAL MEDICINE

## 2023-08-12 PROCEDURE — 94761 N-INVAS EAR/PLS OXIMETRY MLT: CPT

## 2023-08-12 PROCEDURE — 99291 PR CRITICAL CARE, E/M 30-74 MINUTES: ICD-10-PCS | Mod: ,,, | Performed by: PHYSICIAN ASSISTANT

## 2023-08-12 PROCEDURE — 93010 EKG 12-LEAD: ICD-10-PCS | Mod: ,,, | Performed by: INTERNAL MEDICINE

## 2023-08-12 PROCEDURE — 20000000 HC ICU ROOM

## 2023-08-12 PROCEDURE — 83735 ASSAY OF MAGNESIUM: CPT | Mod: 91 | Performed by: STUDENT IN AN ORGANIZED HEALTH CARE EDUCATION/TRAINING PROGRAM

## 2023-08-12 PROCEDURE — 93005 ELECTROCARDIOGRAM TRACING: CPT

## 2023-08-12 PROCEDURE — 99223 1ST HOSP IP/OBS HIGH 75: CPT | Mod: ,,, | Performed by: INTERNAL MEDICINE

## 2023-08-12 PROCEDURE — 99900035 HC TECH TIME PER 15 MIN (STAT)

## 2023-08-12 PROCEDURE — 83036 HEMOGLOBIN GLYCOSYLATED A1C: CPT | Performed by: PHYSICIAN ASSISTANT

## 2023-08-12 PROCEDURE — 80069 RENAL FUNCTION PANEL: CPT | Performed by: STUDENT IN AN ORGANIZED HEALTH CARE EDUCATION/TRAINING PROGRAM

## 2023-08-12 PROCEDURE — 84100 ASSAY OF PHOSPHORUS: CPT | Performed by: STUDENT IN AN ORGANIZED HEALTH CARE EDUCATION/TRAINING PROGRAM

## 2023-08-12 PROCEDURE — 80202 ASSAY OF VANCOMYCIN: CPT | Performed by: STUDENT IN AN ORGANIZED HEALTH CARE EDUCATION/TRAINING PROGRAM

## 2023-08-12 PROCEDURE — S0166 INJ OLANZAPINE 2.5MG: HCPCS | Performed by: STUDENT IN AN ORGANIZED HEALTH CARE EDUCATION/TRAINING PROGRAM

## 2023-08-12 PROCEDURE — 99291 CRITICAL CARE FIRST HOUR: CPT | Mod: ,,, | Performed by: PHYSICIAN ASSISTANT

## 2023-08-12 PROCEDURE — 63600175 PHARM REV CODE 636 W HCPCS: Performed by: STUDENT IN AN ORGANIZED HEALTH CARE EDUCATION/TRAINING PROGRAM

## 2023-08-12 PROCEDURE — 27000221 HC OXYGEN, UP TO 24 HOURS

## 2023-08-12 PROCEDURE — 80053 COMPREHEN METABOLIC PANEL: CPT | Performed by: STUDENT IN AN ORGANIZED HEALTH CARE EDUCATION/TRAINING PROGRAM

## 2023-08-12 PROCEDURE — 93010 ELECTROCARDIOGRAM REPORT: CPT | Mod: ,,, | Performed by: INTERNAL MEDICINE

## 2023-08-12 PROCEDURE — 85025 COMPLETE CBC W/AUTO DIFF WBC: CPT | Performed by: STUDENT IN AN ORGANIZED HEALTH CARE EDUCATION/TRAINING PROGRAM

## 2023-08-12 PROCEDURE — 25000003 PHARM REV CODE 250: Performed by: PHYSICIAN ASSISTANT

## 2023-08-12 RX ORDER — ACETAMINOPHEN 500 MG
1000 TABLET ORAL 3 TIMES DAILY
Status: DISCONTINUED | OUTPATIENT
Start: 2023-08-12 | End: 2023-08-14

## 2023-08-12 RX ORDER — FUROSEMIDE 20 MG/1
20 TABLET ORAL 2 TIMES DAILY
Status: DISCONTINUED | OUTPATIENT
Start: 2023-08-12 | End: 2023-08-14

## 2023-08-12 RX ORDER — LACTULOSE 10 G/15ML
10 SOLUTION ORAL 2 TIMES DAILY
Status: DISCONTINUED | OUTPATIENT
Start: 2023-08-12 | End: 2023-08-13

## 2023-08-12 RX ORDER — DEXMEDETOMIDINE HYDROCHLORIDE 4 UG/ML
0-1.4 INJECTION, SOLUTION INTRAVENOUS CONTINUOUS
Status: DISCONTINUED | OUTPATIENT
Start: 2023-08-12 | End: 2023-08-12

## 2023-08-12 RX ORDER — ATORVASTATIN CALCIUM 40 MG/1
40 TABLET, FILM COATED ORAL DAILY
Status: DISCONTINUED | OUTPATIENT
Start: 2023-08-12 | End: 2023-08-15

## 2023-08-12 RX ORDER — QUETIAPINE FUMARATE 25 MG/1
100 TABLET, FILM COATED ORAL ONCE
Status: DISCONTINUED | OUTPATIENT
Start: 2023-08-12 | End: 2023-08-12

## 2023-08-12 RX ORDER — CALCITRIOL 0.25 UG/1
0.25 CAPSULE ORAL DAILY
Status: DISCONTINUED | OUTPATIENT
Start: 2023-08-12 | End: 2023-08-15

## 2023-08-12 RX ORDER — LORAZEPAM 2 MG/ML
1 INJECTION INTRAMUSCULAR ONCE
Status: COMPLETED | OUTPATIENT
Start: 2023-08-12 | End: 2023-08-12

## 2023-08-12 RX ORDER — CARVEDILOL 25 MG/1
25 TABLET ORAL 2 TIMES DAILY WITH MEALS
Status: DISCONTINUED | OUTPATIENT
Start: 2023-08-12 | End: 2023-08-12

## 2023-08-12 RX ORDER — HYDRALAZINE HYDROCHLORIDE 50 MG/1
100 TABLET, FILM COATED ORAL 3 TIMES DAILY
Status: DISCONTINUED | OUTPATIENT
Start: 2023-08-12 | End: 2023-08-15

## 2023-08-12 RX ADMIN — DEXMEDETOMIDINE HYDROCHLORIDE 0.6 MCG/KG/HR: 4 INJECTION INTRAVENOUS at 09:08

## 2023-08-12 RX ADMIN — SODIUM CHLORIDE: 9 INJECTION, SOLUTION INTRAVENOUS at 02:08

## 2023-08-12 RX ADMIN — MEROPENEM 2 G: 1 INJECTION INTRAVENOUS at 04:08

## 2023-08-12 RX ADMIN — DEXTROSE MONOHYDRATE 125 ML: 10 INJECTION, SOLUTION INTRAVENOUS at 09:08

## 2023-08-12 RX ADMIN — ACETAMINOPHEN 1000 MG: 500 TABLET ORAL at 08:08

## 2023-08-12 RX ADMIN — LACTULOSE 10 G: 20 SOLUTION ORAL at 08:08

## 2023-08-12 RX ADMIN — OLANZAPINE 5 MG: 10 INJECTION, POWDER, FOR SOLUTION INTRAMUSCULAR at 01:08

## 2023-08-12 RX ADMIN — CARVEDILOL 25 MG: 25 TABLET, FILM COATED ORAL at 07:08

## 2023-08-12 RX ADMIN — ACETAMINOPHEN 1000 MG: 500 TABLET ORAL at 02:08

## 2023-08-12 RX ADMIN — MEROPENEM 2 G: 1 INJECTION INTRAVENOUS at 12:08

## 2023-08-12 RX ADMIN — HYDRALAZINE HYDROCHLORIDE 100 MG: 50 TABLET ORAL at 02:08

## 2023-08-12 RX ADMIN — SEVELAMER CARBONATE 800 MG: 800 TABLET, FILM COATED ORAL at 07:08

## 2023-08-12 RX ADMIN — DEXMEDETOMIDINE HYDROCHLORIDE 0.4 MCG/KG/HR: 4 INJECTION INTRAVENOUS at 01:08

## 2023-08-12 RX ADMIN — SEVELAMER CARBONATE 800 MG: 800 TABLET, FILM COATED ORAL at 05:08

## 2023-08-12 RX ADMIN — HYDRALAZINE HYDROCHLORIDE 100 MG: 50 TABLET ORAL at 08:08

## 2023-08-12 RX ADMIN — FUROSEMIDE 20 MG: 20 TABLET ORAL at 08:08

## 2023-08-12 NOTE — NURSING
08/12/23 0249   Treatment   Treatment Type SLED   Treatment Status New start   Dialysis Machine Number [id number]   Dialyzer Time (hours) 0   BVP (Liters) 0 L   Solutions Labeled and Current  Yes   Access Temporary Cath;Right;IJ   Catheter Dressing Intact  Yes   Alarms Engaged Yes   CRRT Comments CRRT started per MD orders     Report received from primary nurse. CRRT started per MD orders.

## 2023-08-12 NOTE — NURSING
Upon receiving report for patient this morning, patient observed yelling and screaming consistently with nearby patients complaining. Team informed of patient's condition and inability to give medications as well as patient's blood sugar 49, team arrived to bedside and orders placed, per attending's recommendations, rapid response called who came to bedside , patient given glucose tablets as well as dilaudid ordered by provider. Patient began to calm down and eventually fall asleep, MRI ordered for patient. Unfortunately MRI completed w/o contrast and patient had to be taken again, however patient unable to have completed due to agitation and inability to remain still.     While patient was having MRI, meeting with palliative, ophthalmology, attending, myself as well as the unit manager done with patient's sister.     While patient was in MRI, patient's sister expressed that the patient told her that he felt he had a stroke..Dr. Gautam notified.     Patient to get zybrexa and will retry for MRI once patient is more calm, patient's sister remains at bedside

## 2023-08-12 NOTE — SUBJECTIVE & OBJECTIVE
Interval History:      I have seen Ms. Bernal today and examined him. I have reviewed all his lab and clinical data. I have obtained some of his clinical information from his family. We finished the 6 hours of SLED. He had an MRI for his eye with Gadolinium and the dialysis was ordered after the MRI for clearing the Gadolinium. He was hemodynamically stable during his dialysis. Will plan an intermittent HD session on Monday.   If he had to get another contrast gadolinium injection will do another post contrast HD session.   Thanks for asking the nephrology team to participate in his management.     Review of patient's allergies indicates:   Allergen Reactions    Morphine Rash    Amiodarone analogues Itching     Other reaction(s): Unknown     Current Facility-Administered Medications   Medication Frequency    0.9%  NaCl infusion (CRRT USE ONLY) Continuous    0.9%  NaCl infusion Once    0.9%  NaCl infusion PRN    acetaminophen tablet 1,000 mg TID    albuterol-ipratropium 2.5 mg-0.5 mg/3 mL nebulizer solution 3 mL Q4H PRN    atorvastatin tablet 40 mg Daily    calcitRIOL capsule 0.25 mcg Daily    dextrose 10% bolus 125 mL 125 mL PRN    dextrose 10% bolus 250 mL 250 mL PRN    epoetin thee injection 3,000 Units Every Tues, Thurs, Sat    furosemide tablet 20 mg BID    glucagon (human recombinant) injection 1 mg PRN    glucose chewable tablet 16 g PRN    glucose chewable tablet 24 g PRN    heparin (porcine) injection 1,000 Units PRN    hydrALAZINE tablet 100 mg TID    HYDROmorphone injection 0.5 mg Q3H PRN    lactulose 20 gram/30 mL solution Soln 10 g BID    losartan tablet 25 mg Daily    magnesium sulfate 2g in water 50mL IVPB (premix) PRN    melatonin tablet 6 mg Nightly PRN    meropenem (MERREM) 2 g in sodium chloride 0.9% 100 mL IVPB Q8H    naloxone 0.4 mg/mL injection 0.02 mg PRN    OLANZapine injection 5 mg Q8H PRN    sevelamer carbonate tablet 800 mg TID WM    sodium chloride 0.9% flush 10 mL Q12H PRN    sodium  phosphate 20.01 mmol in dextrose 5 % (D5W) 250 mL IVPB PRN    sodium phosphate 30 mmol in dextrose 5 % (D5W) 250 mL IVPB PRN    sodium phosphate 39.99 mmol in dextrose 5 % (D5W) 250 mL IVPB PRN    vancomycin - pharmacy to dose pharmacy to manage frequency       Objective:     Vital Signs (Most Recent):  Temp: 96.1 °F (35.6 °C) (08/12/23 1046)  Pulse: (!) 52 (08/12/23 1100)  Resp: 16 (08/12/23 1100)  BP: (!) 155/83 (08/12/23 1100)  SpO2: 100 % (08/12/23 1100) Vital Signs (24h Range):  Temp:  [96.1 °F (35.6 °C)-98 °F (36.7 °C)] 96.1 °F (35.6 °C)  Pulse:  [51-65] 52  Resp:  [6-19] 16  SpO2:  [98 %-100 %] 100 %  BP: (140-175)/(70-86) 155/83     Weight: 61.7 kg (136 lb 0.4 oz) (08/10/23 0944)  Body mass index is 19.52 kg/m².  Body surface area is 1.75 meters squared.    I/O last 3 completed shifts:  In: 1344.4 [I.V.:669.9; IV Piggyback:674.6]  Out: 820 [Other:820]       Vitals:    08/12/23 0900 08/12/23 1000 08/12/23 1046 08/12/23 1100   BP: (!) 162/80 (!) 175/86  (!) 155/83   BP Location: Left arm Left arm  Left arm   Patient Position: Lying Lying  Lying   Pulse: (!) 51 (!) 51  (!) 52   Resp: 14 16  16   Temp:   96.1 °F (35.6 °C)    TempSrc:   Axillary    SpO2: 100% 100%  100%   Weight:       Height:          Physical Exam  Constitutional:       Comments: Weak, sleepy and dry mucosa.    Eyes:      Comments: Periorbital Rt. Side swelling.    Neck:      Comments: Rt. IJ cuffed tunneled HD catheter.   Cardiovascular:      Rate and Rhythm: Normal rate.      Pulses: Normal pulses.   Pulmonary:      Effort: Pulmonary effort is normal.   Musculoskeletal:      Cervical back: Normal range of motion.      Comments: Swollen Rt. Arm with surgical clips.           Significant Labs:  CBC:   Recent Labs   Lab 08/12/23  0349   WBC 6.63   RBC 2.69*   HGB 8.0*   HCT 26.9*      *   MCH 29.7   MCHC 29.7*     CMP:   Recent Labs   Lab 08/12/23  0349   GLU 68*   CALCIUM 8.4*   ALBUMIN 2.1*   PROT 6.1      K 3.5   CO2 23    CL 99   BUN 25*   CREATININE 5.3*   ALKPHOS 106   ALT <5*   AST 16   BILITOT 0.4     LFTs:   Recent Labs   Lab 08/12/23  0349   ALT <5*   AST 16   ALKPHOS 106   BILITOT 0.4   PROT 6.1   ALBUMIN 2.1*     All labs within the past 24 hours have been reviewed.     Impression and plan:    Endophthalmitis. Had Gadolinium post MRI with Gadolinium imaging.    HTN  DM with hypoglycemia  Stroke  ESRD on MWF HD session for a year.   Proteinuria  A fib  Hyperlipoidemia  Anemia  PEG  BPH    ASHER BEAULIEU.Yulia. MD. FASN. FACP.  , [employer] / [employer] ([place]  Nephrology Consultant. [employer] [facility].   [facility]. 5th [place] [address].    email: [email or web address].  Tel: Office: [phone]

## 2023-08-12 NOTE — ASSESSMENT & PLAN NOTE
MRI on admission with evolving endophthalmitis with potential abscess within the left posterior chamber  Ophthalmology following with plans for OR 8/16    --ID following  --continue vancomycin and meropenem  --Repeat MRI with contrast 8/11

## 2023-08-12 NOTE — HPI
Immanuel Bernal is a 57 y.o. male with ESRD, HFrEF 30-35%, DM2, HTN, bowel obstruction s/p bowel resection, KENIA, CVA, AFib who was recently hospitalized at [facility] in 7/2023 for b/l endophthalmitis, Pseudomonas bacteremia, RUE thrombus, RUE AVG infection s/p excision, and RUL mass. Left eye did not improve & subsequently developed abscess, but POA declined enucleation and patient ultimately left AMA. Re-presented to [place] ED with worsening eye symptoms with imaging showing left scleral abscess, so he was transferred to [facility] on 8/9/2023 for Oculoplastics evaluation & enucleation. Planning for OR 8/16.     On 8/11, patient developed worsening agitation/delirium, and HM concerned for expansion of left scleral abscess with plans to obtain stat MRI with gadolinium contrast. Critical care medicine consulted to assist with agitation and emergent SLED after contrasted study.

## 2023-08-12 NOTE — ASSESSMENT & PLAN NOTE
Suspect hyperactive delirium but also concern for posterior spread of left eye abscess    --stat MRI  --delirium precautions  --antipsychotics / precedex as needed to obtain imaging

## 2023-08-12 NOTE — PROGRESS NOTES
08/12/23 0913   Treatment   Treatment Type SLED   Treatment Status Blood returned;Discontinued treatment   Dialysis Machine Number [id number]   Dialyzer Time (hours) 6.25   BVP (Liters) 58 L   Access Temporary Cath;Right;IJ   Catheter Dressing Intact  Yes   CRRT Comments CRRT tx completed, rinsed back per primary RN

## 2023-08-12 NOTE — CARE UPDATE
I have seen Mr. Bernal. He Had 6 hours SLED after his MRI with Gadolinium. He is stable hemodynamically. He will get his next session on Monday at intermittent HD.     If MRI is repeated with Gadolinium will consider a second session of Dialysis after the imaging.     Full note will follow later today.     ASHER BEAULIEU.Yulia. MD. DUSTIN. FACP.  , [employer] / [employer] ([place]  Nephrology Consultant. [facility].   [address] [place] [address]. 5th [place] [address].    email: [email or web address]  Tel: Office: [phone]

## 2023-08-12 NOTE — ASSESSMENT & PLAN NOTE
"Last TTE at OSH, July 25th:  " Transesophageal echocardiogram had no evidence of endocarditis. Moderate to severely decreased left ventricular systolic function with EF 30-35%. Moderately reduced RV systolic function. Moderately enlarged right ventricle. Moderate to severe tricuspid regurgitation."    --GDMT  --I/Os  "

## 2023-08-12 NOTE — ASSESSMENT & PLAN NOTE
"Last TTE at OSH, July 25th:  " Transesophageal echocardiogram had no evidence of endocarditis. Moderate to severely decreased left ventricular systolic function with EF 30-35%. Moderately reduced RV systolic function. Moderately enlarged right ventricle. Moderate to severe tricuspid regurgitation."    --GDMT: lasix, carvedilol. Holding isosorbide mononitrate due to inability to be crushed per PEG  --I/Os    "

## 2023-08-12 NOTE — CONSULTS
Consult received. Patient to be admitted to the MICU. Full H&P to follow.    Charlotte Tee PA-C  Critical Care Medicine  8/11/2023   9:03 PM

## 2023-08-12 NOTE — PROGRESS NOTES
[facility] - Cardiac Medical ICU  Nephrology  Progress Note    Patient Name: Immanuel Bernal  MRN: [id number]  Admission Date: 8/9/2023  Hospital Length of Stay: 3 days  Attending Provider: Jacquelin Colón DO   Primary Care Physician: Dolly, Primary Doctor  Principal Problem:Endophthalmitis    Subjective:     HPI: HPI obtained per medical record as patient unable to communicate     57-year-old male with a history of CHF, diabetes, hypertension, chronic disability, end-stage renal disease on hemodialysis, PEG placement, bowel obstruction, sleep apnea, TIA, left eye vitreous hemorrhage, stroke, and bowel obstruction with bowel resection admitted to [facility] in [place] July 18 from nursing home with left eye pain and blurred vision.  He was admitted with concern for bilateral endophthalmitis.  Other admit diagnoses included right upper extremity thrombus, end-stage renal disease on hemodialysis, hyperkalemia, sleep apnea, diabetes, and CHF.  He was treated with broad-spectrum antibiotics.  He was seen by Infectious Diseases,, and he was treated with vancomycin, ceftriaxone, and amphotericin.  Blood cultures were positive for Pseudomonas, and amphotericin/vancomycin were stopped.  IV cefepime was continued.  He was seen by Ophthalmology, and he received intravitreal vancomycin and ceftazidime (July 18).  He also had intravitreal tap July 18 that had no yeast or fungal elements observed.  Left eye endophthalmitis did not respond to treatment, and he subsequently developed abscess formation, significant proptosis, and drainage of purulent material from the orbit.  Ophthalmology recommended enucleation.  He was continued on cefepime for pseudomonal and ophthalmitis.  Recommendation was for 6 weeks of treatment to be followed by indefinite fluoroquinolone.  He was seen by Pulmonology during his stay for a right upper lung mass with peripheral nodules.  It was felt that he would need further  investigation of this once his current clinical issues stabilized.  Right upper extremity AV graft was excised during his stay with concern for infection.  A right IJ tunneled line was placed on July 27.  Left eye endophthalmitis continued to worsen, and ophthalmology spoke with patient and family about the need for enucleation.  Despite several conversations, family declined enucleation.  Plans were for transitioned to skilled nursing facility for continued treatment, but family did not want him to go to a skilled nursing facility.  He was subsequently discharged AMA from the hospital there on August 7.  Please see the August 7 Internal Medicine note for further details.     He subsequently presented to [facility] Emergency Department.  He will not go back to [facility] in [place]. He received Zosyn and vancomycin.  ED team at [facility] spoke with the patient and his power-of- (sister).  CT of the orbits noted scleral abscess along the lateral aspect of the left globe.  Patient and family are aware and in agreement that the patient needs enucleation of the eye at this time. Referring provider spoke with Oculoplastics at [facility]. Requesting transfer to [facility] Medicine for Oculoplastics specialty evaluation of persistent endophthalmitis.  ED provider noted patient is hemodynamically stable.  He does not appear volume overloaded or in respiratory distress.        The patient has a significant previous medical course as listed below  August 3: MRI brain and orbits showed worsening ophthalmitis and inflammatory changes of the left orbital tissues with slight increase in proptosis.  No evidence of intracranial inflammatory process observed.    July 25: Transesophageal echocardiogram had no evidence of endocarditis.  Moderate to severely decreased left ventricular systolic function with EF 30-35%.    July 22:  CT chest showed right upper lobe mass with numerous bilateral  nodularity predominantly in the right with associated mediastinal lymph nodes.  July 21: Blood cultures with Pseudomonas aeruginosa  July 19: MRI brain/orbits with and without contrast had findings suggestive of chronic microvascular ischemic disease of the white matter with remote ischemic event in the left insula/basal ganglia/subependymal white matter/right middle cerebellar peduncle and hemisphere.  Findings consistent with left endophthalmitis.  No abnormal findings observed in the right globe.  July 18:  Blood cultures with Pseudomonas aeruginosa and coag-negative staph    HPI obtained from hospital med's note. Patient with AMS at time of nephrology evaluation and unable to provide HPI or ROS.          Interval History:      I have seen Ms. Bernal today and examined him. I have reviewed all his lab and clinical data. I have obtained some of his clinical information from his family. We finished the 6 hours of SLED. He had an MRI for his eye with Gadolinium and the dialysis was ordered after the MRI for clearing the Gadolinium. He was hemodynamically stable during his dialysis. Will plan an intermittent HD session on Monday.   If he had to get another contrast gadolinium injection will do another post contrast HD session.   Thanks for asking the nephrology team to participate in his management.     Review of patient's allergies indicates:   Allergen Reactions    Morphine Rash    Amiodarone analogues Itching     Other reaction(s): Unknown     Current Facility-Administered Medications   Medication Frequency    0.9%  NaCl infusion (CRRT USE ONLY) Continuous    0.9%  NaCl infusion Once    0.9%  NaCl infusion PRN    acetaminophen tablet 1,000 mg TID    albuterol-ipratropium 2.5 mg-0.5 mg/3 mL nebulizer solution 3 mL Q4H PRN    atorvastatin tablet 40 mg Daily    calcitRIOL capsule 0.25 mcg Daily    dextrose 10% bolus 125 mL 125 mL PRN    dextrose 10% bolus 250 mL 250 mL PRN    epoetin thee injection  3,000 Units Every Tues, Thurs, Sat    furosemide tablet 20 mg BID    glucagon (human recombinant) injection 1 mg PRN    glucose chewable tablet 16 g PRN    glucose chewable tablet 24 g PRN    heparin (porcine) injection 1,000 Units PRN    hydrALAZINE tablet 100 mg TID    HYDROmorphone injection 0.5 mg Q3H PRN    lactulose 20 gram/30 mL solution Soln 10 g BID    losartan tablet 25 mg Daily    magnesium sulfate 2g in water 50mL IVPB (premix) PRN    melatonin tablet 6 mg Nightly PRN    meropenem (MERREM) 2 g in sodium chloride 0.9% 100 mL IVPB Q8H    naloxone 0.4 mg/mL injection 0.02 mg PRN    OLANZapine injection 5 mg Q8H PRN    sevelamer carbonate tablet 800 mg TID WM    sodium chloride 0.9% flush 10 mL Q12H PRN    sodium phosphate 20.01 mmol in dextrose 5 % (D5W) 250 mL IVPB PRN    sodium phosphate 30 mmol in dextrose 5 % (D5W) 250 mL IVPB PRN    sodium phosphate 39.99 mmol in dextrose 5 % (D5W) 250 mL IVPB PRN    vancomycin - pharmacy to dose pharmacy to manage frequency       Objective:     Vital Signs (Most Recent):  Temp: 96.1 °F (35.6 °C) (08/12/23 1046)  Pulse: (!) 52 (08/12/23 1100)  Resp: 16 (08/12/23 1100)  BP: (!) 155/83 (08/12/23 1100)  SpO2: 100 % (08/12/23 1100) Vital Signs (24h Range):  Temp:  [96.1 °F (35.6 °C)-98 °F (36.7 °C)] 96.1 °F (35.6 °C)  Pulse:  [51-65] 52  Resp:  [6-19] 16  SpO2:  [98 %-100 %] 100 %  BP: (140-175)/(70-86) 155/83     Weight: 61.7 kg (136 lb 0.4 oz) (08/10/23 0944)  Body mass index is 19.52 kg/m².  Body surface area is 1.75 meters squared.    I/O last 3 completed shifts:  In: 1344.4 [I.V.:669.9; IV Piggyback:674.6]  Out: 820 [Other:820]       Vitals:    08/12/23 0900 08/12/23 1000 08/12/23 1046 08/12/23 1100   BP: (!) 162/80 (!) 175/86  (!) 155/83   BP Location: Left arm Left arm  Left arm   Patient Position: Lying Lying  Lying   Pulse: (!) 51 (!) 51  (!) 52   Resp: 14 16  16   Temp:   96.1 °F (35.6 °C)    TempSrc:   Axillary    SpO2: 100% 100%  100%    Weight:       Height:          Physical Exam  Constitutional:       Comments: Weak, sleepy and dry mucosa.    Eyes:      Comments: Periorbital Rt. Side swelling.    Neck:      Comments: Rt. IJ cuffed tunneled HD catheter.   Cardiovascular:      Rate and Rhythm: Normal rate.      Pulses: Normal pulses.   Pulmonary:      Effort: Pulmonary effort is normal.   Musculoskeletal:      Cervical back: Normal range of motion.      Comments: Swollen Rt. Arm with surgical clips.           Significant Labs:  CBC:   Recent Labs   Lab 08/12/23  0349   WBC 6.63   RBC 2.69*   HGB 8.0*   HCT 26.9*      *   MCH 29.7   MCHC 29.7*     CMP:   Recent Labs   Lab 08/12/23  0349   GLU 68*   CALCIUM 8.4*   ALBUMIN 2.1*   PROT 6.1      K 3.5   CO2 23   CL 99   BUN 25*   CREATININE 5.3*   ALKPHOS 106   ALT <5*   AST 16   BILITOT 0.4     LFTs:   Recent Labs   Lab 08/12/23  0349   ALT <5*   AST 16   ALKPHOS 106   BILITOT 0.4   PROT 6.1   ALBUMIN 2.1*     All labs within the past 24 hours have been reviewed.     Impression and plan:    1. Endophthalmitis. Had Gadolinium post MRI with Gadolinium imaging.    2. HTN  3. DM with hypoglycemia  4. Stroke  5. ESRD on MWF HD session for a year.   6. Proteinuria  7. A fib  8. Hyperlipoidemia  9. Anemia  10. PEG  11. BPH    ASHER BEAULIEU.Yulia. MD. DUSTIN. FACP.  , [employer] / [employer] ([place]).  Nephrology Consultant. [facility].   [facility]. 5th [place] [address].    email: [email or web address].  Tel: Office: [phone]

## 2023-08-12 NOTE — NURSING
MICU DAILY GOALS     Family/Goals of care/Code Status   Code Status: Full Code    24H Vital Sign Range  Temp:  [96.1 °F (35.6 °C)-99 °F (37.2 °C)]   Pulse:  [51-65]   Resp:  [0-21]   BP: (140-175)/(70-86)   SpO2:  [98 %-100 %]      Shift Events   No acute events throughout shift, CRRT stopped at approx 9am per nephrology orders to stop CRRT after a 6 hr run. Rinsed back w/o any problems. 10am pt experienced brief episode of severe bradycardia (32bpm) with a quick recovery to 40. Pt was asked if he felt okay and that's when he vomited a moderate amount. HR returned to previous rate (50-60s bpm). Team made aware; RN held AM meds administered thru PEG tube and stopped Tfs (10ml/hr). EKG done. PEG tube clogged but functioning now. TF restarted around 3pm and evening hydralazine administered. Pt mentation improving; no agitation. Intermittent confusion but answering questions and participating in conversation more than at beginning of shift.     AWAKE RASS: Goal -    Actual - RASS (Rodrigues Agitation-Sedation Scale): alert and calm    Restraint necessity: Not necessary   BREATHE SBT: Not intubated    Coordinate A & B, analgesics/sedatives Pain: managed   SAT: Not intubated   Delirium CAM-ICU:     Early(intubated/ Progressive (non-intubated) Mobility MOVE Screen: Pass   Activity: Activity Management: Rolling - L1   Feeding/Nutrition Diet order: Diet/Nutrition Received: other (see comments) (TF stopped 2/2 vomiting episode),     Thrombus DVT prophylaxis: VTE Required Core Measure: Pharmacological prophylaxis initiated/maintained   HOB Elevation Head of Bed (HOB) Positioning: HOB at 30 degrees   Ulcer Prophylaxis GI: no   Glucose control managed     Skin Skin assessed during daily assessment. New altered skin Integrity was present on admit and documented in LDA.   Bowel Function no issues    Indwelling Catheter Necessity           Hemodialysis Catheter right internal jugular-Line Necessity Review: CRRT/HD     De-escalation  Antibiotics No

## 2023-08-12 NOTE — CARE UPDATE
Patient unable to get MRI with contrast and subsequently get HD in a timely manner due to numerous issues. Have discussed case with primary team and MICU, will step up to MICU for sedation and start SLED shortly after.    Discussed with staff.      Adi Robles MD  PGY-4 Nephrology

## 2023-08-12 NOTE — H&P
[facility] - Cardiac Medical ICU  Critical Care Medicine  History & Physical    Patient Name: Immanuel Bernal  MRN: [id number]  Admission Date: 8/9/2023  Hospital Length of Stay: 3 days  Code Status: Full Code  Attending Physician: Idris Engel MD   Primary Care Provider: Dolly, Primary Doctor   Principal Problem: Endophthalmitis    Subjective:     HPI:  Immanuel Bernal is a 57 y.o. male with ESRD, HFrEF 30-35%, DM2, HTN, bowel obstruction s/p bowel resection, KENIA, CVA, AFib who was recently hospitalized at [facility] in 7/2023 for b/l endophthalmitis, Pseudomonas bacteremia, RUE thrombus, RUE AVG infection s/p excision, and RUL mass. Left eye did not improve & subsequently developed abscess, but POA declined enucleation and patient ultimately left AMA. Re-presented to [facility] ED with worsening eye symptoms with imaging showing left scleral abscess, so he was transferred to [facility] [place] on 8/9/2023 for Oculoplastics evaluation & enucleation. Planning for OR 8/16.     On 8/11, patient developed worsening agitation/delirium, and HM concerned for expansion of left scleral abscess with plans to obtain stat MRI with gadolinium contrast. Critical care medicine consulted to assist with agitation and emergent SLED after contrasted study.       Hospital/ICU Course:  No notes on file     Past Medical History:   Diagnosis Date    Bilateral retinal detachment 7/18/2023    BPH (benign prostatic hyperplasia)     Cataract     CHF (congestive heart failure)     CKD (chronic kidney disease) stage 3, GFR 30-59 ml/min     Diabetes mellitus, type 2     Hypertension     KENIA (obstructive sleep apnea)     Pancreatitis     Persistent proteinuria 11/12/2020    2 g proteinuria noted Resumed ACE Lower BP systolic to less than 130     PTSD (post-traumatic stress disorder)     Stroke        Past Surgical History:   Procedure Laterality Date    CATARACT EXTRACTION Bilateral        Review of patient's allergies indicates:   Allergen  Reactions    Morphine Rash    Amiodarone analogues Itching     Other reaction(s): Unknown       Family History       Problem Relation (Age of Onset)    Diabetes Father, Sister    Heart disease Father    Hyperlipidemia Brother    Kidney disease Father    Stroke Mother          Tobacco Use    Smoking status: Former     Current packs/day: 0.00     Types: Cigarettes, Cigars    Smokeless tobacco: Never   Substance and Sexual Activity    Alcohol use: Not Currently    Drug use: Not Currently    Sexual activity: Not on file      Review of Systems   Unable to perform ROS: Mental status change     Objective:     Vital Signs (Most Recent):  Temp: 98 °F (36.7 °C) (08/11/23 2007)  Pulse: 65 (08/11/23 2007)  Resp: 19 (08/11/23 2007)  BP: (!) 164/79 (08/11/23 2007)  SpO2: 98 % (08/11/23 2007) Vital Signs (24h Range):  Temp:  [97.5 °F (36.4 °C)-98 °F (36.7 °C)] 98 °F (36.7 °C)  Pulse:  [65-72] 65  Resp:  [17-19] 19  SpO2:  [97 %-98 %] 98 %  BP: (146-164)/(66-79) 164/79   Weight: 61.7 kg (136 lb 0.4 oz)  Body mass index is 19.52 kg/m².    No intake or output data in the 24 hours ending 08/12/23 0037       Physical Exam  Vitals reviewed.   Constitutional:       Appearance: He is well-developed. He is ill-appearing.   HENT:      Head: Normocephalic and atraumatic.   Eyes:      Conjunctiva/sclera:      Left eye: Left conjunctiva is injected. Chemosis, exudate and hemorrhage present.      Pupils: Pupils are equal, round, and reactive to light.   Cardiovascular:      Rate and Rhythm: Normal rate and regular rhythm.      Heart sounds: Normal heart sounds.   Pulmonary:      Effort: Pulmonary effort is normal.      Breath sounds: Normal breath sounds.   Abdominal:      General: Bowel sounds are normal.      Palpations: Abdomen is soft.      Tenderness: There is no abdominal tenderness.      Comments: PEG tube in place   Musculoskeletal:         General: Normal range of motion.      Right lower leg: No edema.      Left lower leg: No  edema.   Skin:     General: Skin is warm and dry.   Neurological:      Mental Status: He is easily aroused. He is disoriented and confused.      Motor: Tremor (BUE) present.            Vents:     Lines/Drains/Airways       Central Venous Catheter Line  Duration                  Hemodialysis Catheter right internal jugular -- days              Peripheral Intravenous Line  Duration                  Peripheral IV - Single Lumen 08/11/23 0200 20 G Posterior;Right Forearm <1 day                  Significant Labs:    CBC/Anemia Profile:  Recent Labs   Lab 08/10/23  0609 08/11/23  0851   WBC 7.17 10.15   HGB 9.2* 8.5*   HCT 31.0* 28.4*    267   * 99*   RDW 18.8* 18.8*   IRON 72  --    FERRITIN 3,415*  --         Chemistries:  Recent Labs   Lab 08/10/23  0609 08/11/23  0851 08/11/23  2131    138 138   K 4.9 3.6 3.5    97 97   CO2 15* 22* 25   BUN 62* 30* 31*   CREATININE 9.6* 5.9* 7.0*   CALCIUM 9.7 8.9 9.0   ALBUMIN 2.3* 2.4* 2.3*   PROT 6.8 6.9  --    BILITOT 0.4 0.4  --    ALKPHOS 104 112  --    ALT 7* 7*  --    AST 15 16  --    MG 3.0* 2.3 2.5   PHOS 5.4* 3.3 4.1       All pertinent labs within the past 24 hours have been reviewed.    Significant Imaging: I have reviewed all pertinent imaging results/findings within the past 24 hours.    Assessment/Plan:     Neuro  Acute metabolic encephalopathy  Suspect hyperactive delirium but also concern for posterior spread of left eye abscess    --stat MRI  --delirium precautions  --antipsychotics / precedex as needed to obtain imaging    Psychiatric  Bipolar disorder  Holding medications due to encephalopathy    Ophtho  * Endophthalmitis  MRI on admission with evolving endophthalmitis with potential abscess within the left posterior chamber  Ophthalmology following with plans for OR 8/16    --ID following  --continue vancomycin and meropenem  --Repeat MRI with contrast 8/11    Cardiac/Vascular  Atrial fibrillation  No recorded EKGs in our  "records  Continue apixaban and CCB    Hyperlipidemia  statin    Chronic diastolic heart failure  Last TTE at OSH, July 25th:  " Transesophageal echocardiogram had no evidence of endocarditis. Moderate to severely decreased left ventricular systolic function with EF 30-35%. Moderately reduced RV systolic function. Moderately enlarged right ventricle. Moderate to severe tricuspid regurgitation."    --GDMT: lasix, carvedilol. Holding isosorbide mononitrate due to inability to be crushed per PEG  --I/Os      Hypertension  Continue coreg, losartan, hydralazine  Holding nifedipine as cant be crushed per PEG    Renal/  ESRD (end stage renal disease)  Permacath present  Nephrology following  Emergent SLED after MRI with gadolinium      Endocrine  Hypoglycemia  Likely due to decreased PO intake  Start tube feedings    Severe malnutrition  Nutrition consulted. Most recent weight and BMI monitored-     Measurements:  Wt Readings from Last 1 Encounters:   08/10/23 61.7 kg (136 lb 0.4 oz)   Body mass index is 19.52 kg/m².    Patient has been screened and assessed by RD.    Malnutrition Type:  Context: chronic illness  Level: severe    Malnutrition Characteristic Summary:  Weight Loss (Malnutrition): greater than 10% in 6 months  Energy Intake (Malnutrition): other (see comments) (LAMONT)  Subcutaneous Fat (Malnutrition): severe depletion  Muscle Mass (Malnutrition): severe depletion    Interventions/Recommendations (treatment strategy):  1. When/if able, initiate TFs via PEG. Rec'd Novasource @ 40 mL/hr to provide 1920 kcals, 87 g of protein, 688 mL fluid. 2. RD to monitor & follow-up.    Diabetes mellitus  Insulin dependent  Hemoglobin A1c pending    --POCT glucose / SSI  --goal 140-180    GI  PEG (percutaneous endoscopic gastrostomy) status  PEG tube present  Tube feedings     Further recommendations per Dr. Colón. Family updated at the bedside.     Critical Care Time: 50 minutes  Critical secondary to Patient has a " condition that poses threat to life and bodily function: encephalopathy and renal failure     Critical care was time spent personally by me on the following activities: development of treatment plan with patient or surrogate and bedside caregivers, discussions with consultants, evaluation of patient's response to treatment, examination of patient, ordering and performing treatments and interventions, ordering and review of laboratory studies, ordering and review of radiographic studies, pulse oximetry, re-evaluation of patient's condition. This critical care time did not overlap with that of any other provider or involve time for any procedures.     Charlotte Tee PA-C  Critical Care Medicine  [facility] - Cardiac Medical ICU

## 2023-08-12 NOTE — PROGRESS NOTES
Pharmacokinetic Assessment Follow Up: IV Vancomycin    Vancomycin serum concentration assessment(s):    The random level was drawn correctly and can be used to guide therapy at this time. The measurement is above the desired definitive target range of 15 to 20 mcg/mL.    Patient is ESRD, received emergent SLED x6 hours overnight    Vancomycin Regimen Plan:  Will hold on redosing vancomycin today    Re-dose when the random level is less than 20 mcg/mL, next level to be drawn at 0300 on 8/13 with AM labs    Drug levels (last 3 results):  Recent Labs   Lab Result Units 08/09/23  1447 08/11/23  0851 08/12/23  0349   Vancomycin, Random ug/mL 17.2 22.4 20.5       Pharmacy will continue to follow and monitor vancomycin.    Please contact pharmacy at extension [phone] for questions regarding this assessment.    Thank you for the consult,   Shannon Lima       Patient brief summary:  Immanuel Bernal is a 57 y.o. male initiated on antimicrobial therapy with IV Vancomycin for treatment of  endophthalmitis    The patient's current regimen is pulse dose based on level    Drug Allergies:   Review of patient's allergies indicates:   Allergen Reactions    Morphine Rash    Amiodarone analogues Itching     Other reaction(s): Unknown       Actual Body Weight:   61.7 kg    Renal Function:   Estimated Creatinine Clearance: 13.1 mL/min (A) (based on SCr of 5.3 mg/dL (H)).,     Dialysis Method (if applicable):  intermittent HD    CBC (last 72 hours):  Recent Labs   Lab Result Units 08/09/23  1447 08/10/23  0609 08/11/23  0851 08/12/23  0349   WBC K/uL 7.07 7.17 10.15 6.63   Hemoglobin g/dL 9.3* 9.2* 8.5* 8.0*   Hemoglobin A1C %  --   --   --  4.7   Hematocrit % 31.9* 31.0* 28.4* 26.9*   Platelets K/uL 264 244 267 212   Gran % % 74.0* 74.2* 73.7* 74.8*   Lymph % % 9.9* 10.2* 8.5* 9.0*   Mono % % 8.6 7.8 13.3 11.2   Eosinophil % % 5.5 5.7 2.8 3.3   Basophil % % 1.0 1.1 1.1 0.8   Differential Method  Automated Automated Automated Automated        Metabolic Panel (last 72 hours):  Recent Labs   Lab Result Units 08/09/23  1447 08/10/23  0609 08/11/23  0851 08/11/23  2131 08/12/23  0349   Sodium mmol/L 138 137 138 138 136   Potassium mmol/L 4.9 4.9 3.6 3.5 3.5   Chloride mmol/L 103 103 97 97 99   CO2 mmol/L 17* 15* 22* 25 23   Glucose mg/dL 62* 64* 49* 49* 68*   BUN mg/dL 52* 62* 30* 31* 25*   Creatinine mg/dL 9.0* 9.6* 5.9* 7.0* 5.3*   Albumin g/dL 2.4* 2.3* 2.4* 2.3* 2.1*   Total Bilirubin mg/dL 0.4 0.4 0.4  --  0.4   Alkaline Phosphatase U/L 112 104 112  --  106   AST U/L 12 15 16  --  16   ALT U/L 9* 7* 7*  --  <5*   Magnesium mg/dL  --  3.0* 2.3 2.5 2.2   Phosphorus mg/dL  --  5.4* 3.3 4.1 3.2       Vancomycin Administrations:  vancomycin given in the last 96 hours                     vancomycin (VANCOCIN) 500 mg in dextrose 5 % in water (D5W) 100 mL IVPB (MB+) (mg) 500 mg New Bag 08/11/23 2326    vancomycin (VANCOCIN) 500 mg in dextrose 5 % in water (D5W) 100 mL IVPB (MB+) (mg) 500 mg New Bag 08/11/23 0202                    Microbiologic Results:  Microbiology Results (last 7 days)       Procedure Component Value Units Date/Time    Blood culture (site 1) [[id number]] Collected: 08/09/23 1434    Order Status: Completed Specimen: Blood Updated: 08/11/23 1812     Blood Culture, Routine No Growth to date      No Growth to date      No Growth to date    Narrative:      Site # 1, aerobic and anaerobic  Collection has been rescheduled by [email or web address] at 08/09/2023 13:27 Reason:   Patient unavailable in with DR   Collection has been rescheduled by [email or web address] at 08/09/2023 13:27 Reason:   Patient unavailable in with DR     Blood culture (site 2) [[id number]] Collected: 08/09/23 1434    Order Status: Completed Specimen: Blood Updated: 08/11/23 1812     Blood Culture, Routine No Growth to date      No Growth to date      No Growth to date    Narrative:      Site # 2, aerobic only  Collection has been rescheduled by [email or web address] at 08/09/2023 13:27 Reason:   Patient unavailable in  with DR   Collection has been rescheduled by [email or web address] at 08/09/2023 13:27 Reason:   Patient unavailable in with

## 2023-08-12 NOTE — SUBJECTIVE & OBJECTIVE
Past Medical History:   Diagnosis Date    Bilateral retinal detachment 7/18/2023    BPH (benign prostatic hyperplasia)     Cataract     CHF (congestive heart failure)     CKD (chronic kidney disease) stage 3, GFR 30-59 ml/min     Diabetes mellitus, type 2     Hypertension     KENIA (obstructive sleep apnea)     Pancreatitis     Persistent proteinuria 11/12/2020    2 g proteinuria noted Resumed ACE Lower BP systolic to less than 130     PTSD (post-traumatic stress disorder)     Stroke        Past Surgical History:   Procedure Laterality Date    CATARACT EXTRACTION Bilateral        Review of patient's allergies indicates:   Allergen Reactions    Morphine Rash    Amiodarone analogues Itching     Other reaction(s): Unknown       Family History       Problem Relation (Age of Onset)    Diabetes Father, Sister    Heart disease Father    Hyperlipidemia Brother    Kidney disease Father    Stroke Mother          Tobacco Use    Smoking status: Former     Current packs/day: 0.00     Types: Cigarettes, Cigars    Smokeless tobacco: Never   Substance and Sexual Activity    Alcohol use: Not Currently    Drug use: Not Currently    Sexual activity: Not on file      Review of Systems   Unable to perform ROS: Mental status change     Objective:     Vital Signs (Most Recent):  Temp: 98 °F (36.7 °C) (08/11/23 2007)  Pulse: 65 (08/11/23 2007)  Resp: 19 (08/11/23 2007)  BP: (!) 164/79 (08/11/23 2007)  SpO2: 98 % (08/11/23 2007) Vital Signs (24h Range):  Temp:  [97.5 °F (36.4 °C)-98 °F (36.7 °C)] 98 °F (36.7 °C)  Pulse:  [65-72] 65  Resp:  [17-19] 19  SpO2:  [97 %-98 %] 98 %  BP: (146-164)/(66-79) 164/79   Weight: 61.7 kg (136 lb 0.4 oz)  Body mass index is 19.52 kg/m².    No intake or output data in the 24 hours ending 08/12/23 0037       Physical Exam  Vitals reviewed.   Constitutional:       Appearance: He is well-developed. He is ill-appearing.   HENT:      Head: Normocephalic and atraumatic.   Eyes:      Conjunctiva/sclera:      Left  eye: Left conjunctiva is injected. Chemosis, exudate and hemorrhage present.      Pupils: Pupils are equal, round, and reactive to light.   Cardiovascular:      Rate and Rhythm: Normal rate and regular rhythm.      Heart sounds: Normal heart sounds.   Pulmonary:      Effort: Pulmonary effort is normal.      Breath sounds: Normal breath sounds.   Abdominal:      General: Bowel sounds are normal.      Palpations: Abdomen is soft.      Tenderness: There is no abdominal tenderness.      Comments: PEG tube in place   Musculoskeletal:         General: Normal range of motion.      Right lower leg: No edema.      Left lower leg: No edema.   Skin:     General: Skin is warm and dry.   Neurological:      Mental Status: He is easily aroused. He is disoriented and confused.      Motor: Tremor (BUE) present.            Vents:     Lines/Drains/Airways       Central Venous Catheter Line  Duration                  Hemodialysis Catheter right internal jugular -- days              Peripheral Intravenous Line  Duration                  Peripheral IV - Single Lumen 08/11/23 0200 20 G Posterior;Right Forearm <1 day                  Significant Labs:    CBC/Anemia Profile:  Recent Labs   Lab 08/10/23  0609 08/11/23  0851   WBC 7.17 10.15   HGB 9.2* 8.5*   HCT 31.0* 28.4*    267   * 99*   RDW 18.8* 18.8*   IRON 72  --    FERRITIN 3,415*  --         Chemistries:  Recent Labs   Lab 08/10/23  0609 08/11/23  0851 08/11/23  2131    138 138   K 4.9 3.6 3.5    97 97   CO2 15* 22* 25   BUN 62* 30* 31*   CREATININE 9.6* 5.9* 7.0*   CALCIUM 9.7 8.9 9.0   ALBUMIN 2.3* 2.4* 2.3*   PROT 6.8 6.9  --    BILITOT 0.4 0.4  --    ALKPHOS 104 112  --    ALT 7* 7*  --    AST 15 16  --    MG 3.0* 2.3 2.5   PHOS 5.4* 3.3 4.1       All pertinent labs within the past 24 hours have been reviewed.    Significant Imaging: I have reviewed all pertinent imaging results/findings within the past 24 hours.

## 2023-08-12 NOTE — ASSESSMENT & PLAN NOTE
Nutrition consulted. Most recent weight and BMI monitored-     Measurements:  Wt Readings from Last 1 Encounters:   08/10/23 61.7 kg (136 lb 0.4 oz)   Body mass index is 19.52 kg/m².    Patient has been screened and assessed by RD.    Malnutrition Type:  Context: chronic illness  Level: severe    Malnutrition Characteristic Summary:  Weight Loss (Malnutrition): greater than 10% in 6 months  Energy Intake (Malnutrition): other (see comments) (LAMONT)  Subcutaneous Fat (Malnutrition): severe depletion  Muscle Mass (Malnutrition): severe depletion    Interventions/Recommendations (treatment strategy):  1. When/if able, initiate TFs via PEG. Rec'd Novasource @ 40 mL/hr to provide 1920 kcals, 87 g of protein, 688 mL fluid. 2. RD to monitor & follow-up.

## 2023-08-12 NOTE — PLAN OF CARE
MICU DAILY GOALS     Family/Goals of care/Code Status   Code Status: Full Code    24H Vital Sign Range  Temp:  [97.8 °F (36.6 °C)-98 °F (36.7 °C)]   Pulse:  [54-65]   Resp:  [6-19]   BP: (140-164)/(70-79)   SpO2:  [98 %-100 %]      Shift Events   Admitted from 14WT on 2 LPM NC. For stat MRI with contrast, started on Precedex for sedation and Xyprexa was given before going to MRI. At about 10 minutes into procedure, pt started to be agitated, can't lie still and started taking off his straps. Procedure has to be stopped. Informed the team and Ativan was ordered. Radiologist was consulted and decision was made not to continue with the MRI. MRI tech informed me that it was the 5th failed MRI with contrast for this patient.  CRRT was initiated when pt returned to his room.    AWAKE RASS: Goal -    Actual - RASS (Rodrigues Agitation-Sedation Scale): restless    Restraint necessity: Not necessary   BREATHE SBT: Not intubated    Coordinate A & B, analgesics/sedatives Pain: managed   SAT: Not intubated   Delirium CAM-ICU:     Early(intubated/ Progressive (non-intubated) Mobility MOVE Screen: Fail   Activity: Activity Management: Rolling - L1   Feeding/Nutrition Diet order: Diet/Nutrition Received: NPO,     Thrombus DVT prophylaxis: VTE Required Core Measure: Pharmacological prophylaxis initiated/maintained   HOB Elevation Head of Bed (HOB) Positioning: HOB at 20-30 degrees   Ulcer Prophylaxis GI: yes   Glucose control managed     Skin Skin assessed during daily assessment.    Bowel Function no issues    Indwelling Catheter Necessity           Hemodialysis Catheter right internal jugular-Line Necessity Review: CRRT/HD     De-escalation Antibiotics No       VS and assessment per flow sheet, patient progressing towards goals as tolerated, plan of care reviewed with  mother , all concerns addressed, will continue to monitor.

## 2023-08-13 LAB
ALBUMIN SERPL BCP-MCNC: 2.1 G/DL (ref 3.5–5.2)
ANION GAP SERPL CALC-SCNC: 12 MMOL/L (ref 8–16)
ANION GAP SERPL CALC-SCNC: 14 MMOL/L (ref 8–16)
ANION GAP SERPL CALC-SCNC: 14 MMOL/L (ref 8–16)
BASOPHILS # BLD AUTO: 0.07 K/UL (ref 0–0.2)
BASOPHILS NFR BLD: 0.8 % (ref 0–1.9)
BUN SERPL-MCNC: 17 MG/DL (ref 6–20)
BUN SERPL-MCNC: 17 MG/DL (ref 6–20)
BUN SERPL-MCNC: 21 MG/DL (ref 6–20)
CALCIUM SERPL-MCNC: 8.6 MG/DL (ref 8.7–10.5)
CALCIUM SERPL-MCNC: 8.6 MG/DL (ref 8.7–10.5)
CALCIUM SERPL-MCNC: 9 MG/DL (ref 8.7–10.5)
CHLORIDE SERPL-SCNC: 100 MMOL/L (ref 95–110)
CHLORIDE SERPL-SCNC: 101 MMOL/L (ref 95–110)
CHLORIDE SERPL-SCNC: 101 MMOL/L (ref 95–110)
CO2 SERPL-SCNC: 21 MMOL/L (ref 23–29)
CO2 SERPL-SCNC: 21 MMOL/L (ref 23–29)
CO2 SERPL-SCNC: 24 MMOL/L (ref 23–29)
CREAT SERPL-MCNC: 4.5 MG/DL (ref 0.5–1.4)
CREAT SERPL-MCNC: 4.5 MG/DL (ref 0.5–1.4)
CREAT SERPL-MCNC: 5.2 MG/DL (ref 0.5–1.4)
DIFFERENTIAL METHOD: ABNORMAL
EOSINOPHIL # BLD AUTO: 0.3 K/UL (ref 0–0.5)
EOSINOPHIL NFR BLD: 3.3 % (ref 0–8)
ERYTHROCYTE [DISTWIDTH] IN BLOOD BY AUTOMATED COUNT: 18.3 % (ref 11.5–14.5)
EST. GFR  (NO RACE VARIABLE): 12 ML/MIN/1.73 M^2
EST. GFR  (NO RACE VARIABLE): 14.3 ML/MIN/1.73 M^2
EST. GFR  (NO RACE VARIABLE): 14.3 ML/MIN/1.73 M^2
GLUCOSE SERPL-MCNC: 71 MG/DL (ref 70–110)
GLUCOSE SERPL-MCNC: 79 MG/DL (ref 70–110)
GLUCOSE SERPL-MCNC: 79 MG/DL (ref 70–110)
HCT VFR BLD AUTO: 29.5 % (ref 40–54)
HGB BLD-MCNC: 8.7 G/DL (ref 14–18)
IMM GRANULOCYTES # BLD AUTO: 0.07 K/UL (ref 0–0.04)
IMM GRANULOCYTES NFR BLD AUTO: 0.8 % (ref 0–0.5)
LYMPHOCYTES # BLD AUTO: 0.7 K/UL (ref 1–4.8)
LYMPHOCYTES NFR BLD: 8.4 % (ref 18–48)
MAGNESIUM SERPL-MCNC: 2.2 MG/DL (ref 1.6–2.6)
MAGNESIUM SERPL-MCNC: 2.2 MG/DL (ref 1.6–2.6)
MAGNESIUM SERPL-MCNC: 2.3 MG/DL (ref 1.6–2.6)
MCH RBC QN AUTO: 29.9 PG (ref 27–31)
MCHC RBC AUTO-ENTMCNC: 29.5 G/DL (ref 32–36)
MCV RBC AUTO: 101 FL (ref 82–98)
MONOCYTES # BLD AUTO: 1.3 K/UL (ref 0.3–1)
MONOCYTES NFR BLD: 15.4 % (ref 4–15)
NEUTROPHILS # BLD AUTO: 6.1 K/UL (ref 1.8–7.7)
NEUTROPHILS NFR BLD: 71.3 % (ref 38–73)
NRBC BLD-RTO: 0 /100 WBC
PHOSPHATE SERPL-MCNC: 3.4 MG/DL (ref 2.7–4.5)
PHOSPHATE SERPL-MCNC: 3.4 MG/DL (ref 2.7–4.5)
PHOSPHATE SERPL-MCNC: 4.2 MG/DL (ref 2.7–4.5)
PLATELET # BLD AUTO: 239 K/UL (ref 150–450)
PMV BLD AUTO: 10 FL (ref 9.2–12.9)
POCT GLUCOSE: 73 MG/DL (ref 70–110)
POCT GLUCOSE: 77 MG/DL (ref 70–110)
POCT GLUCOSE: 80 MG/DL (ref 70–110)
POTASSIUM SERPL-SCNC: 3.7 MMOL/L (ref 3.5–5.1)
POTASSIUM SERPL-SCNC: 3.7 MMOL/L (ref 3.5–5.1)
POTASSIUM SERPL-SCNC: 3.8 MMOL/L (ref 3.5–5.1)
RBC # BLD AUTO: 2.91 M/UL (ref 4.6–6.2)
SODIUM SERPL-SCNC: 136 MMOL/L (ref 136–145)
VANCOMYCIN SERPL-MCNC: 18.8 UG/ML
WBC # BLD AUTO: 8.5 K/UL (ref 3.9–12.7)

## 2023-08-13 PROCEDURE — 83735 ASSAY OF MAGNESIUM: CPT | Mod: 91 | Performed by: STUDENT IN AN ORGANIZED HEALTH CARE EDUCATION/TRAINING PROGRAM

## 2023-08-13 PROCEDURE — 94761 N-INVAS EAR/PLS OXIMETRY MLT: CPT

## 2023-08-13 PROCEDURE — 25000003 PHARM REV CODE 250: Performed by: INTERNAL MEDICINE

## 2023-08-13 PROCEDURE — 11000001 HC ACUTE MED/SURG PRIVATE ROOM

## 2023-08-13 PROCEDURE — 80202 ASSAY OF VANCOMYCIN: CPT | Performed by: INTERNAL MEDICINE

## 2023-08-13 PROCEDURE — 97168 OT RE-EVAL EST PLAN CARE: CPT

## 2023-08-13 PROCEDURE — 25000003 PHARM REV CODE 250

## 2023-08-13 PROCEDURE — 25000003 PHARM REV CODE 250: Performed by: PHYSICIAN ASSISTANT

## 2023-08-13 PROCEDURE — 85025 COMPLETE CBC W/AUTO DIFF WBC: CPT | Performed by: INTERNAL MEDICINE

## 2023-08-13 PROCEDURE — 99233 SBSQ HOSP IP/OBS HIGH 50: CPT | Mod: ,,, | Performed by: INTERNAL MEDICINE

## 2023-08-13 PROCEDURE — 83735 ASSAY OF MAGNESIUM: CPT | Performed by: STUDENT IN AN ORGANIZED HEALTH CARE EDUCATION/TRAINING PROGRAM

## 2023-08-13 PROCEDURE — 27000221 HC OXYGEN, UP TO 24 HOURS

## 2023-08-13 PROCEDURE — 80069 RENAL FUNCTION PANEL: CPT | Mod: 91 | Performed by: STUDENT IN AN ORGANIZED HEALTH CARE EDUCATION/TRAINING PROGRAM

## 2023-08-13 PROCEDURE — 97162 PT EVAL MOD COMPLEX 30 MIN: CPT

## 2023-08-13 PROCEDURE — 63600175 PHARM REV CODE 636 W HCPCS: Performed by: INTERNAL MEDICINE

## 2023-08-13 PROCEDURE — 25000003 PHARM REV CODE 250: Performed by: HOSPITALIST

## 2023-08-13 PROCEDURE — 25000003 PHARM REV CODE 250: Performed by: STUDENT IN AN ORGANIZED HEALTH CARE EDUCATION/TRAINING PROGRAM

## 2023-08-13 PROCEDURE — 97530 THERAPEUTIC ACTIVITIES: CPT

## 2023-08-13 PROCEDURE — 21400001 HC TELEMETRY ROOM

## 2023-08-13 PROCEDURE — 97116 GAIT TRAINING THERAPY: CPT

## 2023-08-13 PROCEDURE — 80069 RENAL FUNCTION PANEL: CPT | Performed by: STUDENT IN AN ORGANIZED HEALTH CARE EDUCATION/TRAINING PROGRAM

## 2023-08-13 PROCEDURE — 99900035 HC TECH TIME PER 15 MIN (STAT)

## 2023-08-13 PROCEDURE — 99233 PR SUBSEQUENT HOSPITAL CARE,LEVL III: ICD-10-PCS | Mod: ,,, | Performed by: INTERNAL MEDICINE

## 2023-08-13 RX ORDER — ONDANSETRON 8 MG/1
8 TABLET, ORALLY DISINTEGRATING ORAL EVERY 8 HOURS PRN
Status: DISCONTINUED | OUTPATIENT
Start: 2023-08-13 | End: 2023-08-19

## 2023-08-13 RX ORDER — PROMETHAZINE HYDROCHLORIDE 25 MG/ML
25 INJECTION, SOLUTION INTRAMUSCULAR; INTRAVENOUS EVERY 6 HOURS PRN
Status: DISCONTINUED | OUTPATIENT
Start: 2023-08-13 | End: 2023-08-13

## 2023-08-13 RX ORDER — SODIUM CHLORIDE 9 MG/ML
INJECTION, SOLUTION INTRAVENOUS ONCE
Status: CANCELLED | OUTPATIENT
Start: 2023-08-13 | End: 2023-08-13

## 2023-08-13 RX ORDER — PROMETHAZINE HYDROCHLORIDE 25 MG/ML
12.5 INJECTION, SOLUTION INTRAMUSCULAR; INTRAVENOUS EVERY 6 HOURS PRN
Status: DISCONTINUED | OUTPATIENT
Start: 2023-08-13 | End: 2023-08-13

## 2023-08-13 RX ORDER — MUPIROCIN 20 MG/G
OINTMENT TOPICAL 2 TIMES DAILY
Status: DISPENSED | OUTPATIENT
Start: 2023-08-13 | End: 2023-08-18

## 2023-08-13 RX ORDER — SODIUM CHLORIDE 9 MG/ML
INJECTION, SOLUTION INTRAVENOUS
Status: CANCELLED | OUTPATIENT
Start: 2023-08-13

## 2023-08-13 RX ADMIN — ACETAMINOPHEN 1000 MG: 500 TABLET ORAL at 08:08

## 2023-08-13 RX ADMIN — HYDRALAZINE HYDROCHLORIDE 100 MG: 50 TABLET ORAL at 08:08

## 2023-08-13 RX ADMIN — MEROPENEM 500 MG: 500 INJECTION INTRAVENOUS at 12:08

## 2023-08-13 RX ADMIN — ATORVASTATIN CALCIUM 40 MG: 40 TABLET, FILM COATED ORAL at 09:08

## 2023-08-13 RX ADMIN — MUPIROCIN: 20 OINTMENT TOPICAL at 09:08

## 2023-08-13 RX ADMIN — ACETAMINOPHEN 1000 MG: 500 TABLET ORAL at 03:08

## 2023-08-13 RX ADMIN — LOSARTAN POTASSIUM 25 MG: 25 TABLET, FILM COATED ORAL at 09:08

## 2023-08-13 RX ADMIN — DEXTROSE MONOHYDRATE 125 ML: 10 INJECTION, SOLUTION INTRAVENOUS at 10:08

## 2023-08-13 RX ADMIN — HYDRALAZINE HYDROCHLORIDE 100 MG: 50 TABLET ORAL at 03:08

## 2023-08-13 RX ADMIN — CALCITRIOL CAPSULES 0.25 MCG 0.25 MCG: 0.25 CAPSULE ORAL at 09:08

## 2023-08-13 RX ADMIN — ONDANSETRON 8 MG: 8 TABLET, ORALLY DISINTEGRATING ORAL at 11:08

## 2023-08-13 RX ADMIN — MEROPENEM 500 MG: 500 INJECTION INTRAVENOUS at 11:08

## 2023-08-13 RX ADMIN — PROMETHAZINE HYDROCHLORIDE 12.5 MG: 25 INJECTION, SOLUTION INTRAMUSCULAR; INTRAVENOUS at 03:08

## 2023-08-13 RX ADMIN — FUROSEMIDE 20 MG: 20 TABLET ORAL at 08:08

## 2023-08-13 RX ADMIN — PROMETHAZINE HYDROCHLORIDE 12.5 MG: 25 INJECTION, SOLUTION INTRAMUSCULAR; INTRAVENOUS at 07:08

## 2023-08-13 RX ADMIN — ONDANSETRON 8 MG: 8 TABLET, ORALLY DISINTEGRATING ORAL at 03:08

## 2023-08-13 RX ADMIN — FUROSEMIDE 20 MG: 20 TABLET ORAL at 09:08

## 2023-08-13 RX ADMIN — HYDRALAZINE HYDROCHLORIDE 100 MG: 50 TABLET ORAL at 09:08

## 2023-08-13 RX ADMIN — SEVELAMER CARBONATE 800 MG: 800 TABLET, FILM COATED ORAL at 04:08

## 2023-08-13 RX ADMIN — ACETAMINOPHEN 1000 MG: 500 TABLET ORAL at 09:08

## 2023-08-13 NOTE — PLAN OF CARE
MICU DAILY GOALS     Family/Goals of care/Code Status   Code Status: Full Code    24H Vital Sign Range  Temp:  [96.1 °F (35.6 °C)-99.3 °F (37.4 °C)]   Pulse:  [51-69]   Resp:  [0-36]   BP: (124-175)/(68-86)   SpO2:  [98 %-100 %]      Shift Events   No acute events throughout shift. Vomited in the morning. Zofran ordered, administered. Paused the tube feeding, team informed. He urinated about 10 ml last night, used the urine bottle.    AWAKE RASS: Goal -    Actual - RASS (Rodrigues Agitation-Sedation Scale): restless    Restraint necessity: Not necessary   BREATHE SBT: Not intubated    Coordinate A & B, analgesics/sedatives Pain: managed   SAT: Not intubated   Delirium CAM-ICU:     Early(intubated/ Progressive (non-intubated) Mobility MOVE Screen: Fail   Activity: Activity Management: Rolling - L1   Feeding/Nutrition Diet order: Diet/Nutrition Received: tube feeding,     Thrombus DVT prophylaxis: VTE Required Core Measure: Pharmacological prophylaxis initiated/maintained   HOB Elevation Head of Bed (HOB) Positioning: HOB at 20-30 degrees   Ulcer Prophylaxis GI: yes   Glucose control managed     Skin Skin assessed during daily assessment. .   Bowel Function no issues    Indwelling Catheter Necessity           Hemodialysis Catheter right internal jugular-Line Necessity Review: CRRT/HD     De-escalation Antibiotics No       VS and assessment per flow sheet, patient progressing towards goals as tolerated, plan of care reviewed with  sister , all concerns addressed, will continue to monitor.

## 2023-08-13 NOTE — PROGRESS NOTES
Pharmacokinetic Assessment Follow Up: IV Vancomycin    Vancomycin serum concentration assessment(s):    The random level was drawn correctly and can be used to guide therapy at this time. The measurement is above the desired definitive target range of 15 to 20 mcg/mL.    Patient is ESRD, will plan for HD today    Vancomycin Regimen Plan:  Give 500 mg IV once    Re-dose when the random level is less than 20 mcg/mL, next level to be drawn at 0300 on 8/14 with AM labs    Drug levels (last 3 results):  Recent Labs   Lab Result Units 08/11/23  0851 08/12/23  0349 08/13/23  0348   Vancomycin, Random ug/mL 22.4 20.5 18.8         Pharmacy will continue to follow and monitor vancomycin.    Please contact pharmacy at extension [phone] for questions regarding this assessment.    Thank you for the consult,   Shannon Lima       Patient brief summary:  Immanuel Bernal is a 57 y.o. male initiated on antimicrobial therapy with IV Vancomycin for treatment of  endophthalmitis    The patient's current regimen is pulse dose based on level    Drug Allergies:   Review of patient's allergies indicates:   Allergen Reactions    Morphine Rash    Amiodarone analogues Itching     Other reaction(s): Unknown       Actual Body Weight:   61.7 kg    Renal Function:   Estimated Creatinine Clearance: 15.4 mL/min (A) (based on SCr of 4.5 mg/dL (H)).,     Dialysis Method (if applicable):  intermittent HD    CBC (last 72 hours):  Recent Labs   Lab Result Units 08/11/23  0851 08/12/23  0349 08/13/23  0348   WBC K/uL 10.15 6.63 8.50   Hemoglobin g/dL 8.5* 8.0* 8.7*   Hemoglobin A1C %  --  4.7  --    Hematocrit % 28.4* 26.9* 29.5*   Platelets K/uL 267 212 239   Gran % % 73.7* 74.8* 71.3   Lymph % % 8.5* 9.0* 8.4*   Mono % % 13.3 11.2 15.4*   Eosinophil % % 2.8 3.3 3.3   Basophil % % 1.1 0.8 0.8   Differential Method  Automated Automated Automated         Metabolic Panel (last 72 hours):  Recent Labs   Lab Result Units 08/11/23  0851 08/11/23 2131  08/12/23  0349 08/12/23  1440 08/13/23  0348   Sodium mmol/L 138 138 136 138 136  136   Potassium mmol/L 3.6 3.5 3.5 3.9 3.7  3.7   Chloride mmol/L 97 97 99 102 101  101   CO2 mmol/L 22* 25 23 23 21*  21*   Glucose mg/dL 49* 49* 68* 68* 79  79   BUN mg/dL 30* 31* 25* 14 17  17   Creatinine mg/dL 5.9* 7.0* 5.3* 3.9* 4.5*  4.5*   Albumin g/dL 2.4* 2.3* 2.1* 2.1* 2.1*  2.1*   Total Bilirubin mg/dL 0.4  --  0.4  --   --    Alkaline Phosphatase U/L 112  --  106  --   --    AST U/L 16  --  16  --   --    ALT U/L 7*  --  <5*  --   --    Magnesium mg/dL 2.3 2.5 2.2 2.1 2.2  2.2   Phosphorus mg/dL 3.3 4.1 3.2 2.8 3.4  3.4         Vancomycin Administrations:  vancomycin given in the last 96 hours                     vancomycin (VANCOCIN) 500 mg in dextrose 5 % in water (D5W) 100 mL IVPB (MB+) (mg) 500 mg New Bag 08/11/23 2326    vancomycin (VANCOCIN) 500 mg in dextrose 5 % in water (D5W) 100 mL IVPB (MB+) (mg) 500 mg New Bag 08/11/23 0202                    Microbiologic Results:  Microbiology Results (last 7 days)       Procedure Component Value Units Date/Time    Blood culture (site 1) [[id number]] Collected: 08/09/23 1434    Order Status: Completed Specimen: Blood Updated: 08/12/23 1812     Blood Culture, Routine No Growth to date      No Growth to date      No Growth to date      No Growth to date    Narrative:      Site # 1, aerobic and anaerobic  Collection has been rescheduled by [email or web address] at 08/09/2023 13:27 Reason:   Patient unavailable in with DR   Collection has been rescheduled by [email or web address] at 08/09/2023 13:27 Reason:   Patient unavailable in with DR     Blood culture (site 2) [[id number]] Collected: 08/09/23 1434    Order Status: Completed Specimen: Blood Updated: 08/12/23 1812     Blood Culture, Routine No Growth to date      No Growth to date      No Growth to date      No Growth to date    Narrative:      Site # 2, aerobic only  Collection has been rescheduled by [email or web address] at 08/09/2023 13:27 Reason:   Patient  unavailable in with DR   Collection has been rescheduled by SCOTTY at 08/09/2023 13:27 Reason:   Patient unavailable in with DR

## 2023-08-13 NOTE — PT/OT/SLP EVAL
Physical Therapy Evaluation    Patient Name:  Immanuel Bernal   MRN:  [id number]  Admit Date: 8/9/2023  Admitting Diagnosis:  Endophthalmitis  Length of Stay: 4 days  Recent Surgery: * No surgery found *      Recommendations:     Discharge Recommendations:  nursing facility, skilled   Discharge Equipment Recommendations: none       Assessment:     Immanuel Bernal is a 57 y.o. male admitted with a medical diagnosis of Endophthalmitis. Pt found alert, cooperative, and mildly confused. Pt with a  history of a stroke. Pt was able to perform bed mobility, stand, and take a few side steps with 50-75% assistance. Pt's RUE and R LE have impaired strength and impaired coordination compared to the L side.     Pt with L eye abscess and no vision in L eye.     Problem List: weakness, impaired endurance, impaired self care skills, impaired functional mobility, gait instability, impaired balance, impaired cognition, decreased coordination, impaired cardiopulmonary response to activity, decreased upper extremity function, decreased lower extremity function  Rehab Prognosis: Fair; patient would benefit from acute skilled PT services to address these deficits and reach maximum level of function.      Plan:     During this hospitalization, patient to be seen 3 x/week to address the identified rehab impairments via therapeutic activities, gait training, therapeutic exercises, neuromuscular re-education and progress towards the established goals.    Subjective   Communicated with RN prior to session.  Patient found HOB elevated upon PT entry to room, agreeable to evaluation. Immanuel Bernal's sister present during session.    Chief Complaint: Eye Problem    Patient/Family Comments/goals: to get better  Pain/Comfort:  Pain Rating 1: 0/10  Pain Rating Post-Intervention 1: 0/10    Living Environment:  Pt lives alone with a caregiver present 36 hours a week in a 1st floor apt (part of a medicaid program). Pt has not been home in a year  "due to being in and out of the hospital and NH/SNF. Patient uses DME as follows: walker, rolling, walker, standard, bedside commode, wheelchair. DME owned (not currently used): none.      Patient reports they will have assistance from sister upon discharge.    Objective:   Patient found with: telemetry, pulse ox (continuous), blood pressure cuff, central line, peripheral IV, oxygen (g-tube)     General Precautions: Standard, Cardiac fall   Orthopedic Precautions:N/A   Braces: N/A   Oxygen Device: Nasal Cannula   Vitals: BP (!) 140/80 (BP Location: Left arm, Patient Position: Lying)   Pulse 74   Temp 99.6 °F (37.6 °C) (Oral)   Resp 20   Ht 5' 10" (1.778 m)   Wt 61.7 kg (136 lb 0.4 oz)   SpO2 96%   BMI 19.52 kg/m²     Exams:  Cognition:   Alert, cooperative, mildly confused, distractible  Ox3  Command following: Follows two step commands  Fluency: clear/fluent    RLE ROM: WFL  RLE Strength: grossly 3+/5  LLE ROM: WFL  LLE Strength: grossly 4/5  R LE with impaired coordination     Outcome Measures:  AM-PAC 6 CLICK MOBILITY  Turning over in bed (including adjusting bedclothes, sheets and blankets)?: 2  Sitting down on and standing up from a chair with arms (e.g., wheelchair, bedside commode, etc.): 2  Moving from lying on back to sitting on the side of the bed?: 2  Moving to and from a bed to a chair (including a wheelchair)?: 2  Need to walk in hospital room?: 2  Climbing 3-5 steps with a railing?: 1  Basic Mobility Total Score: 11     Functional Mobility:  Additional staff present: OT  Bed Mobility:  Supine to Sit: moderate assistance; HOB elevated  Scooting anteriorly to EOB to have both feet planted on floor: contact guard assistance  Sit to Supine: moderate assistance; HOB flat    Sitting Balance at Edge of Bed:  Assistance Level Required: Contact Guard Assistance  Time: 6 minutes  Comments:   Worked on activity tolerance sitting EOB and worked on tolerance to positional change     Transfers:   Sit <> Stand " Transfer: minimum assistance and of 2 persons with  HHA and raised EOB    Facilitation of hip and thoracic extension   Increased time to obtain stability       Gait:   Patient ambulated: 6 side steps to the right    Patient required: maximal assist and of 2 persons  Patient used:  B HHA  Gait Deviation(s): unsteady gait, decreased step length, narrow base of support, flexed posture, and decreased sindi  Impairments due to: impaired balance, decreased strength, and impaired coordination  Comments:    Pt generally unsteady. Pt demo'd difficulty with R UE and R LE coordination  Mild SOB  Facilitation of B weight shifting  Verbal cuing for upright posture, forward gaze, and B LE advancement      Therapeutic Activities, Exercises, & Education:   Educated pt on PT role/POC  Pt verbalized understanding       Patient left HOB elevated with all lines intact, call button in reach, RN notified, and sister present.    GOALS:   Multidisciplinary Problems       Physical Therapy Goals          Problem: Physical Therapy    Goal Priority Disciplines Outcome Goal Variances Interventions   Physical Therapy Goal     PT, PT/OT Ongoing, Progressing     Description: Goals to be met by: 2023    Patient will increase functional independence with mobility by performin. Supine to sit with MInimal Assistance  2. Sit to stand transfer with Minimal Assistance using LRAD  3. Gait  x 10 feet with Minimal Assistance using LRAD.                          History:     Past Medical History:   Diagnosis Date    Bilateral retinal detachment 2023    BPH (benign prostatic hyperplasia)     Cataract     CHF (congestive heart failure)     CKD (chronic kidney disease) stage 3, GFR 30-59 ml/min     Diabetes mellitus, type 2     Hypertension     KENIA (obstructive sleep apnea)     Pancreatitis     Persistent proteinuria 2020    2 g proteinuria noted Resumed ACE Lower BP systolic to less than 130     PTSD (post-traumatic stress disorder)      Stroke        Past Surgical History:   Procedure Laterality Date    CATARACT EXTRACTION Bilateral        Time Tracking:     PT Received On: 08/13/23  PT Start Time: 0945     PT Stop Time: 1007  PT Total Time (min): 22 min     Billable Minutes: Evaluation 8 and Gait Training 8

## 2023-08-13 NOTE — ASSESSMENT & PLAN NOTE
Suspect hyperactive delirium but also concern for posterior spread of left eye abscess    --Uncertain etiology.  Resolved

## 2023-08-13 NOTE — PT/OT/SLP RE-EVAL
Occupational Therapy   Re-evaluation and Cotx     Name: Immanuel Bernal  MRN: [id number]  Admitting Diagnosis:  Endophthalmitis  Pt was t/f to ICU 8/12/23 with new orders in place  Medical chart indicates enucleation planned for 8/16/23     Recommendations:     Discharge Recommendations: nursing facility, skilled  Return to NH    Assessment:     Immanuel Bernal is a 57 y.o. male with a medical diagnosis of Endophthalmitis.   Performance deficits affecting function are weakness, impaired endurance, impaired self care skills, impaired functional mobility, gait instability, impaired balance, decreased safety awareness, decreased upper extremity function, decreased lower extremity function, impaired cognition, visual deficits.    Pt tolerated session fairly well. Pt to benefit from cont OT and anticipate he will be appropriate for return to SNF/NH when medically appropriate.   Rehab Prognosis:  Fair ; patient would benefit from acute skilled OT services to address these deficits and reach maximum level of function.       Plan:     Patient to be seen 3 x/week to address the above listed problems via self-care/home management, therapeutic activities, therapeutic exercises  Plan of Care Expires: 08/25/23  Plan of Care Reviewed with: patient    Subjective     Pt agreeable to therapy   Communicated with: nay  prior to session.  Pain/Comfort:  Pain Rating 1: 0/10    Objective:     Communicated with: nay prior to session.  Patient found in bed with sister in room. Pt with tele, pulse ox, BP cuff,  PEG tube and IV.   Re-eval completed with cotx with PT to optimize functional performance and safety   General Precautions: Standard, fall (delirium precautions)    Occupational Performance:    Bed Mobility:    Supine<>sit with MOD A     Functional Mobility/Transfers:  Sit>Stand with MIN A  x2  Side steps with MAX A x 2. Poor coordination and placement of LE's with side steps. Impaired safety and balance noted.     Activities of  Daily Living:  LE dressing: TOTAL A   UE dressing: MAX A simulated   Poor B UE coordination limiting engagement with ADL skills.       Cognitive/Visual Perceptual  Pt awake, alert and following commands. Pt with occasional unintelligible verbalizations and several episodes of word substitution   making communication difficult. Impaired attention, problem solving and safety noted   Blind L eye. Pt demo visual tracking and intact basic vision right eye.     Physical Exam:  Pt demo Poor  and grossly 3/5 UE strength.     AMPAC 6 Click:  AMPAC Total Score: 8    Treatment & Education:  Pt tolerated sitting EOB with CGA. Education provided re: OT POC and safety with functional mobility/ADL skills.     Patient left in supine in bed with sister in room, bed alarm intact and needs in reach. Nsg aware.   GOALS:   Multidisciplinary Problems       Occupational Therapy Goals          Problem: Occupational Therapy    Goal Priority Disciplines Outcome Interventions   Occupational Therapy Goal     OT, PT/OT Ongoing, Progressing    Description: Goals to be met by: 8/25/23     Patient will increase functional independence with ADLs by performing:    UE Dressing with Minimal Assistance.  Grooming while seated with Stand-by Assistance.  Toileting from bedside commode with Minimal Assistance for hygiene and clothing management.   Sit to stand transfer with Contact Guard Assistance and use of RW.   Sitting at edge of bed >25 minutes with Supervision.  Step transfer with Minimal Assistance and use of RW.   Toilet transfer to bedside commode with Minimal Assistance and use of RW.                          History:     Past Medical History:   Diagnosis Date    Bilateral retinal detachment 7/18/2023    BPH (benign prostatic hyperplasia)     Cataract     CHF (congestive heart failure)     CKD (chronic kidney disease) stage 3, GFR 30-59 ml/min     Diabetes mellitus, type 2     Hypertension     KENIA (obstructive sleep apnea)     Pancreatitis      Persistent proteinuria 11/12/2020    2 g proteinuria noted Resumed ACE Lower BP systolic to less than 130     PTSD (post-traumatic stress disorder)     Stroke          Past Surgical History:   Procedure Laterality Date    CATARACT EXTRACTION Bilateral        Time Tracking:     OT Date of Treatment: 08/13/23  OT Start Time: 0940  OT Stop Time: 1009  OT Total Time (min): 29 min    Billable Minutes:Re-eval 15  Therapeutic Activity 14    8/13/2023

## 2023-08-13 NOTE — ASSESSMENT & PLAN NOTE
MRI on admission with evolving endophthalmitis with potential abscess within the left posterior chamber  Ophthalmology following with plans for OR 8/16    --ID following  --continue vancomycin and meropenem  --Repeat MRI with contrast 8/11; complicated by motion artifact

## 2023-08-13 NOTE — PLAN OF CARE
Problem: Adult Inpatient Plan of Care  Goal: Optimal Comfort and Wellbeing  Outcome: Ongoing, Progressing  Intervention: Monitor Pain and Promote Comfort  Flowsheets (Taken 8/13/2023 1742)  Pain Management Interventions: quiet environment facilitated  Intervention: Provide Person-Centered Care  Flowsheets (Taken 8/13/2023 1742)  Trust Relationship/Rapport:   care explained   choices provided   emotional support provided   empathic listening provided   questions answered   questions encouraged   reassurance provided   thoughts/feelings acknowledged

## 2023-08-13 NOTE — NURSING
MICU DAILY GOALS     Family/Goals of care/Code Status   Code Status: Full Code    24H Vital Sign Range  Temp:  [98.2 °F (36.8 °C)-99.6 °F (37.6 °C)]   Pulse:  [62-74]   Resp:  [8-20]   BP: (124-165)/(68-85)   SpO2:  [96 %-100 %]      Shift Events   No acute events throughout shift, Pt with very large bowel movement at beginning of shift. Calm and cooperative, pt slept most of day. Oriented to year and self.     AWAKE RASS: Goal -    Actual - RASS (Rodrigues Agitation-Sedation Scale): restless    Restraint necessity: Not necessary   BREATHE SBT: Not intubated    Coordinate A & B, analgesics/sedatives Pain: managed   SAT: Not intubated   Delirium CAM-ICU:     Early(intubated/ Progressive (non-intubated) Mobility MOVE Screen: Pass   Activity: Activity Management: Rolling - L1   Feeding/Nutrition Diet order: Diet/Nutrition Received: tube feeding,     Thrombus DVT prophylaxis: VTE Required Core Measure: Pharmacological prophylaxis initiated/maintained   HOB Elevation Head of Bed (HOB) Positioning: HOB at 30 degrees   Ulcer Prophylaxis GI: no   Glucose control managed     Skin Skin assessed during daily assessment. New altered skin Integrity was present on admit and documented in LDA.   Bowel Function no issues    Indwelling Catheter Necessity           Hemodialysis Catheter right internal jugular-Line Necessity Review: CRRT/HD     De-escalation Antibiotics No

## 2023-08-13 NOTE — ASSESSMENT & PLAN NOTE
Nutrition consulted. Most recent weight and BMI monitored-     Measurements:  Wt Readings from Last 1 Encounters:   08/10/23 61.7 kg (136 lb 0.4 oz)   Body mass index is 19.52 kg/m².    Patient has been screened and assessed by RD.    Malnutrition Type:  Context: chronic illness  Level: severe    Malnutrition Characteristic Summary:  Weight Loss (Malnutrition): greater than 10% in 6 months  Energy Intake (Malnutrition): other (see comments) (LAMNOT)  Subcutaneous Fat (Malnutrition): severe depletion  Muscle Mass (Malnutrition): severe depletion    Interventions/Recommendations (treatment strategy):  1. When/if able, initiate TFs via PEG. Rec'd Novasource @ 40 mL/hr to provide 1920 kcals, 87 g of protein, 688 mL fluid. 2. RD to monitor & follow-up.

## 2023-08-13 NOTE — PROGRESS NOTES
[facility]  Critical Care Medicine  Progress Note    Patient Name: Immanuel Bernal  MRN: [id number]  Admission Date: 8/9/2023  Hospital Length of Stay: 4 days  Code Status: Full Code  Attending Provider: Jacquelin Colón DO  Primary Care Provider: Dolly, Primary Doctor   Principal Problem: Endophthalmitis    Subjective:     HPI:  Immanuel Bernal is a 57 y.o. male with ESRD, HFrEF 30-35%, DM2, HTN, bowel obstruction s/p bowel resection, KENIA, CVA, AFib who was recently hospitalized at [facility] in 7/2023 for b/l endophthalmitis, Pseudomonas bacteremia, RUE thrombus, RUE AVG infection s/p excision, and RUL mass. Left eye did not improve & subsequently developed abscess, but POA declined enucleation and patient ultimately left AMA. Re-presented to [facility] ED with worsening eye symptoms with imaging showing left scleral abscess, so he was transferred to [facility] on 8/9/2023 for Oculoplastics evaluation & enucleation. Planning for OR 8/16.     On 8/11, patient developed worsening agitation/delirium, and HM concerned for expansion of left scleral abscess with plans to obtain stat MRI with gadolinium contrast. Critical care medicine consulted to assist with agitation and emergent SLED after contrasted study.       Hospital/ICU Course:  No notes on file    Interval History/Significant Events:   NAEON.  Off precedex.  Mental status stable.      Review of Systems  Objective:     Vital Signs (Most Recent):  Temp: 98.7 °F (37.1 °C) (08/13/23 1100)  Pulse: 67 (08/13/23 1100)  Resp: 17 (08/13/23 1100)  BP: (!) 146/76 (08/13/23 1100)  SpO2: 100 % (08/13/23 1100) Vital Signs (24h Range):  Temp:  [98.2 °F (36.8 °C)-99.6 °F (37.6 °C)] 98.7 °F (37.1 °C)  Pulse:  [58-74] 67  Resp:  [5-21] 17  SpO2:  [96 %-100 %] 100 %  BP: (124-165)/(68-85) 146/76   Weight: 61.7 kg (136 lb 0.4 oz)  Body mass index is 19.52 kg/m².      Intake/Output Summary (Last 24 hours) at 8/13/2023 1349  Last data filed at 8/13/2023 0600  Gross per  24 hour   Intake 719.07 ml   Output 10 ml   Net 709.07 ml          Physical Exam  Vitals reviewed.   Constitutional:       Appearance: He is well-developed. He is ill-appearing.   HENT:      Head: Normocephalic and atraumatic.   Eyes:      Conjunctiva/sclera:      Left eye: Left conjunctiva is injected. Chemosis, exudate and hemorrhage present.      Pupils: Pupils are equal, round, and reactive to light.   Cardiovascular:      Rate and Rhythm: Normal rate and regular rhythm.      Heart sounds: Normal heart sounds.   Pulmonary:      Effort: Pulmonary effort is normal.      Breath sounds: Normal breath sounds.   Abdominal:      General: Bowel sounds are normal.      Palpations: Abdomen is soft.      Tenderness: There is no abdominal tenderness.      Comments: PEG tube in place   Musculoskeletal:         General: Normal range of motion.      Right lower leg: No edema.      Left lower leg: No edema.   Skin:     General: Skin is warm and dry.   Neurological:      Mental Status: He is easily aroused. He is disoriented and confused.      Motor: Tremor (BUE) present.            Vents:     Lines/Drains/Airways       Central Venous Catheter Line  Duration                  Hemodialysis Catheter right internal jugular -- days              Drain  Duration                  Gastrostomy/Enterostomy LUQ -- days              Peripheral Intravenous Line  Duration                  Peripheral IV - Single Lumen 08/11/23 0200 20 G Posterior;Right Forearm 2 days                  Significant Labs:    CBC/Anemia Profile:  Recent Labs   Lab 08/12/23  0349 08/13/23  0348   WBC 6.63 8.50   HGB 8.0* 8.7*   HCT 26.9* 29.5*    239   * 101*   RDW 18.4* 18.3*        Chemistries:  Recent Labs   Lab 08/12/23  0349 08/12/23  1440 08/13/23  0348    138 136  136   K 3.5 3.9 3.7  3.7   CL 99 102 101  101   CO2 23 23 21*  21*   BUN 25* 14 17  17   CREATININE 5.3* 3.9* 4.5*  4.5*   CALCIUM 8.4* 8.5* 8.6*  8.6*   ALBUMIN 2.1* 2.1*  "2.1*  2.1*   PROT 6.1  --   --    BILITOT 0.4  --   --    ALKPHOS 106  --   --    ALT <5*  --   --    AST 16  --   --    MG 2.2 2.1 2.2  2.2   PHOS 3.2 2.8 3.4  3.4       All pertinent labs within the past 24 hours have been reviewed.    Significant Imaging:  I have reviewed all pertinent imaging results/findings within the past 24 hours.      ABG  No results for input(s): "PH", "PO2", "PCO2", "HCO3", "BE" in the last 168 hours.  Assessment/Plan:     Neuro  Acute metabolic encephalopathy  Suspect hyperactive delirium but also concern for posterior spread of left eye abscess    --Uncertain etiology.  Resolved    Psychiatric  Bipolar disorder  Holding medications due to encephalopathy    Ophtho  * Endophthalmitis  MRI on admission with evolving endophthalmitis with potential abscess within the left posterior chamber  Ophthalmology following with plans for OR 8/16    --ID following  --continue vancomycin and meropenem  --Repeat MRI with contrast 8/11; complicated by motion artifact    Cardiac/Vascular  Atrial fibrillation  No recorded EKGs in our records  Continue apixaban and CCB    Hyperlipidemia  statin    Chronic diastolic heart failure  Last TTE at OSH, July 25th:  " Transesophageal echocardiogram had no evidence of endocarditis. Moderate to severely decreased left ventricular systolic function with EF 30-35%. Moderately reduced RV systolic function. Moderately enlarged right ventricle. Moderate to severe tricuspid regurgitation."    --GDMT: lasix, carvedilol. Holding isosorbide mononitrate due to inability to be crushed per PEG  --I/Os      Hypertension  Continue coreg, losartan, hydralazine  Holding nifedipine as cant be crushed per PEG    Renal/  ESRD (end stage renal disease)  Permacath present  Nephrology following  Emergent SLED after MRI with gadolinium      Endocrine  Hypoglycemia  Likely due to decreased PO intake  Start tube feedings    Severe malnutrition  Nutrition consulted. Most recent weight " and BMI monitored-     Measurements:  Wt Readings from Last 1 Encounters:   08/10/23 61.7 kg (136 lb 0.4 oz)   Body mass index is 19.52 kg/m².    Patient has been screened and assessed by RD.    Malnutrition Type:  Context: chronic illness  Level: severe    Malnutrition Characteristic Summary:  Weight Loss (Malnutrition): greater than 10% in 6 months  Energy Intake (Malnutrition): other (see comments) (LAMONT)  Subcutaneous Fat (Malnutrition): severe depletion  Muscle Mass (Malnutrition): severe depletion    Interventions/Recommendations (treatment strategy):  1. When/if able, initiate TFs via PEG. Rec'd Novasource @ 40 mL/hr to provide 1920 kcals, 87 g of protein, 688 mL fluid. 2. RD to monitor & follow-up.    Diabetes mellitus  Insulin dependent  Hemoglobin A1c pending    --POCT glucose / SSI  --goal 140-180    GI  PEG (percutaneous endoscopic gastrostomy) status  PEG tube present  Tube feedings           Jacquelin Colón DO  Critical Care Medicine  [facility] - Card[facility] Medical [facility]

## 2023-08-13 NOTE — RESIDENT HANDOFF
ICU Transfer of Care Note  Critical Care Medicine    Admit Date: 8/9/2023  LOS: 4    CC: Endophthalmitis    Code Status: Full Code     Transfer to Hospital Medicine      HPI and Hospital Course:     HPI:  Immanuel Bernal is a 57 y.o. male with ESRD, HFrEF 30-35%, DM2, HTN, bowel obstruction s/p bowel resection, KENIA, CVA, AFib who was recently hospitalized at [facility] in 7/2023 for b/l endophthalmitis, Pseudomonas bacteremia, RUE thrombus, RUE AVG infection s/p excision, and RUL mass. Left eye did not improve & subsequently developed abscess, but POA declined enucleation and patient ultimately left AMA. Re-presented to [facility] ED with worsening eye symptoms with imaging showing left scleral abscess, so he was [place] [facility] [place] for Oculoplastics evaluation & enucleation. Planning for OR 8/16.     On 8/11, patient developed worsening agitation/delirium, and HM concerned for expansion of left scleral abscess with plans to obtain stat MRI with gadolinium contrast. Critical care medicine consulted to assist with agitation and emergent SLED after contrasted study.   Unable to complete the study. Patients mental status appears back to baseline.      To Follow Up:     Continue previous work up       Discharge Plan:     SNF    Call with questions.  [id number]

## 2023-08-13 NOTE — SUBJECTIVE & OBJECTIVE
Interval History/Significant Events:   NAEON.  Off precedex.  Mental status stable.      Review of Systems  Objective:     Vital Signs (Most Recent):  Temp: 98.7 °F (37.1 °C) (08/13/23 1100)  Pulse: 67 (08/13/23 1100)  Resp: 17 (08/13/23 1100)  BP: (!) 146/76 (08/13/23 1100)  SpO2: 100 % (08/13/23 1100) Vital Signs (24h Range):  Temp:  [98.2 °F (36.8 °C)-99.6 °F (37.6 °C)] 98.7 °F (37.1 °C)  Pulse:  [58-74] 67  Resp:  [5-21] 17  SpO2:  [96 %-100 %] 100 %  BP: (124-165)/(68-85) 146/76   Weight: 61.7 kg (136 lb 0.4 oz)  Body mass index is 19.52 kg/m².      Intake/Output Summary (Last 24 hours) at 8/13/2023 1349  Last data filed at 8/13/2023 0600  Gross per 24 hour   Intake 719.07 ml   Output 10 ml   Net 709.07 ml          Physical Exam  Vitals reviewed.   Constitutional:       Appearance: He is well-developed. He is ill-appearing.   HENT:      Head: Normocephalic and atraumatic.   Eyes:      Conjunctiva/sclera:      Left eye: Left conjunctiva is injected. Chemosis, exudate and hemorrhage present.      Pupils: Pupils are equal, round, and reactive to light.   Cardiovascular:      Rate and Rhythm: Normal rate and regular rhythm.      Heart sounds: Normal heart sounds.   Pulmonary:      Effort: Pulmonary effort is normal.      Breath sounds: Normal breath sounds.   Abdominal:      General: Bowel sounds are normal.      Palpations: Abdomen is soft.      Tenderness: There is no abdominal tenderness.      Comments: PEG tube in place   Musculoskeletal:         General: Normal range of motion.      Right lower leg: No edema.      Left lower leg: No edema.   Skin:     General: Skin is warm and dry.   Neurological:      Mental Status: He is easily aroused. He is disoriented and confused.      Motor: Tremor (BUE) present.            Vents:     Lines/Drains/Airways       Central Venous Catheter Line  Duration                  Hemodialysis Catheter right internal jugular -- days              Drain  Duration                   Gastrostomy/Enterostomy LUQ -- days              Peripheral Intravenous Line  Duration                  Peripheral IV - Single Lumen 08/11/23 0200 20 G Posterior;Right Forearm 2 days                  Significant Labs:    CBC/Anemia Profile:  Recent Labs   Lab 08/12/23 0349 08/13/23 0348   WBC 6.63 8.50   HGB 8.0* 8.7*   HCT 26.9* 29.5*    239   * 101*   RDW 18.4* 18.3*        Chemistries:  Recent Labs   Lab 08/12/23 0349 08/12/23  1440 08/13/23 0348    138 136  136   K 3.5 3.9 3.7  3.7   CL 99 102 101  101   CO2 23 23 21*  21*   BUN 25* 14 17  17   CREATININE 5.3* 3.9* 4.5*  4.5*   CALCIUM 8.4* 8.5* 8.6*  8.6*   ALBUMIN 2.1* 2.1* 2.1*  2.1*   PROT 6.1  --   --    BILITOT 0.4  --   --    ALKPHOS 106  --   --    ALT <5*  --   --    AST 16  --   --    MG 2.2 2.1 2.2  2.2   PHOS 3.2 2.8 3.4  3.4       All pertinent labs within the past 24 hours have been reviewed.    Significant Imaging:  I have reviewed all pertinent imaging results/findings within the past 24 hours.

## 2023-08-14 ENCOUNTER — TELEPHONE (OUTPATIENT)
Dept: OPHTHALMOLOGY | Facility: CLINIC | Age: 58
End: 2023-08-14
Payer: MEDICARE

## 2023-08-14 DIAGNOSIS — H57.12 BLIND PAINFUL LEFT EYE: Primary | ICD-10-CM

## 2023-08-14 DIAGNOSIS — H54.40 BLIND PAINFUL LEFT EYE: Primary | ICD-10-CM

## 2023-08-14 LAB
ALBUMIN SERPL BCP-MCNC: 1.9 G/DL (ref 3.5–5.2)
ALBUMIN SERPL BCP-MCNC: 2 G/DL (ref 3.5–5.2)
ALBUMIN SERPL BCP-MCNC: 2.1 G/DL (ref 3.5–5.2)
ANION GAP SERPL CALC-SCNC: 10 MMOL/L (ref 8–16)
ANION GAP SERPL CALC-SCNC: 10 MMOL/L (ref 8–16)
ANION GAP SERPL CALC-SCNC: 8 MMOL/L (ref 8–16)
BACTERIA BLD CULT: NORMAL
BACTERIA BLD CULT: NORMAL
BASOPHILS # BLD AUTO: 0.09 K/UL (ref 0–0.2)
BASOPHILS NFR BLD: 0.7 % (ref 0–1.9)
BUN SERPL-MCNC: 13 MG/DL (ref 6–20)
BUN SERPL-MCNC: 14 MG/DL (ref 6–20)
BUN SERPL-MCNC: 16 MG/DL (ref 6–20)
CALCIUM SERPL-MCNC: 8.5 MG/DL (ref 8.7–10.5)
CALCIUM SERPL-MCNC: 8.5 MG/DL (ref 8.7–10.5)
CALCIUM SERPL-MCNC: 8.6 MG/DL (ref 8.7–10.5)
CHLORIDE SERPL-SCNC: 101 MMOL/L (ref 95–110)
CHLORIDE SERPL-SCNC: 99 MMOL/L (ref 95–110)
CHLORIDE SERPL-SCNC: 99 MMOL/L (ref 95–110)
CO2 SERPL-SCNC: 23 MMOL/L (ref 23–29)
CO2 SERPL-SCNC: 24 MMOL/L (ref 23–29)
CO2 SERPL-SCNC: 27 MMOL/L (ref 23–29)
CREAT SERPL-MCNC: 3.7 MG/DL (ref 0.5–1.4)
CREAT SERPL-MCNC: 3.7 MG/DL (ref 0.5–1.4)
CREAT SERPL-MCNC: 4.2 MG/DL (ref 0.5–1.4)
DIFFERENTIAL METHOD: ABNORMAL
EOSINOPHIL # BLD AUTO: 0.4 K/UL (ref 0–0.5)
EOSINOPHIL NFR BLD: 3.2 % (ref 0–8)
ERYTHROCYTE [DISTWIDTH] IN BLOOD BY AUTOMATED COUNT: 18.1 % (ref 11.5–14.5)
EST. GFR  (NO RACE VARIABLE): 15.5 ML/MIN/1.73 M^2
EST. GFR  (NO RACE VARIABLE): 18 ML/MIN/1.73 M^2
EST. GFR  (NO RACE VARIABLE): 18 ML/MIN/1.73 M^2
GLUCOSE SERPL-MCNC: 76 MG/DL (ref 70–110)
GLUCOSE SERPL-MCNC: 84 MG/DL (ref 70–110)
GLUCOSE SERPL-MCNC: 98 MG/DL (ref 70–110)
HCT VFR BLD AUTO: 31.5 % (ref 40–54)
HGB BLD-MCNC: 9.4 G/DL (ref 14–18)
IMM GRANULOCYTES # BLD AUTO: 0.08 K/UL (ref 0–0.04)
IMM GRANULOCYTES NFR BLD AUTO: 0.6 % (ref 0–0.5)
LYMPHOCYTES # BLD AUTO: 0.8 K/UL (ref 1–4.8)
LYMPHOCYTES NFR BLD: 5.6 % (ref 18–48)
MAGNESIUM SERPL-MCNC: 2 MG/DL (ref 1.6–2.6)
MAGNESIUM SERPL-MCNC: 2 MG/DL (ref 1.6–2.6)
MAGNESIUM SERPL-MCNC: 2.1 MG/DL (ref 1.6–2.6)
MCH RBC QN AUTO: 30.3 PG (ref 27–31)
MCHC RBC AUTO-ENTMCNC: 29.8 G/DL (ref 32–36)
MCV RBC AUTO: 102 FL (ref 82–98)
MONOCYTES # BLD AUTO: 1.2 K/UL (ref 0.3–1)
MONOCYTES NFR BLD: 8.9 % (ref 4–15)
NEUTROPHILS # BLD AUTO: 10.9 K/UL (ref 1.8–7.7)
NEUTROPHILS NFR BLD: 81 % (ref 38–73)
NRBC BLD-RTO: 0 /100 WBC
PHOSPHATE SERPL-MCNC: 2.8 MG/DL (ref 2.7–4.5)
PHOSPHATE SERPL-MCNC: 3 MG/DL (ref 2.7–4.5)
PHOSPHATE SERPL-MCNC: 3.3 MG/DL (ref 2.7–4.5)
PLATELET # BLD AUTO: 231 K/UL (ref 150–450)
PMV BLD AUTO: 10.1 FL (ref 9.2–12.9)
POCT GLUCOSE: 70 MG/DL (ref 70–110)
POCT GLUCOSE: 80 MG/DL (ref 70–110)
POCT GLUCOSE: 85 MG/DL (ref 70–110)
POCT GLUCOSE: 91 MG/DL (ref 70–110)
POCT GLUCOSE: 92 MG/DL (ref 70–110)
POTASSIUM SERPL-SCNC: 3.4 MMOL/L (ref 3.5–5.1)
POTASSIUM SERPL-SCNC: 3.5 MMOL/L (ref 3.5–5.1)
POTASSIUM SERPL-SCNC: 4 MMOL/L (ref 3.5–5.1)
POTASSIUM SERPL-SCNC: 4.1 MMOL/L (ref 3.5–5.1)
RBC # BLD AUTO: 3.1 M/UL (ref 4.6–6.2)
SODIUM SERPL-SCNC: 133 MMOL/L (ref 136–145)
SODIUM SERPL-SCNC: 134 MMOL/L (ref 136–145)
SODIUM SERPL-SCNC: 134 MMOL/L (ref 136–145)
VANCOMYCIN SERPL-MCNC: 12.6 UG/ML
WBC # BLD AUTO: 13.49 K/UL (ref 3.9–12.7)

## 2023-08-14 PROCEDURE — 99233 PR SUBSEQUENT HOSPITAL CARE,LEVL III: ICD-10-PCS | Mod: ,,, | Performed by: STUDENT IN AN ORGANIZED HEALTH CARE EDUCATION/TRAINING PROGRAM

## 2023-08-14 PROCEDURE — G0316 PR PROLONG INPT EVAL EA ADDL 15 MIN: ICD-10-PCS | Mod: ,,, | Performed by: STUDENT IN AN ORGANIZED HEALTH CARE EDUCATION/TRAINING PROGRAM

## 2023-08-14 PROCEDURE — 25000003 PHARM REV CODE 250: Performed by: PHYSICIAN ASSISTANT

## 2023-08-14 PROCEDURE — 25000003 PHARM REV CODE 250: Performed by: STUDENT IN AN ORGANIZED HEALTH CARE EDUCATION/TRAINING PROGRAM

## 2023-08-14 PROCEDURE — 99900035 HC TECH TIME PER 15 MIN (STAT)

## 2023-08-14 PROCEDURE — 80069 RENAL FUNCTION PANEL: CPT | Mod: 91 | Performed by: STUDENT IN AN ORGANIZED HEALTH CARE EDUCATION/TRAINING PROGRAM

## 2023-08-14 PROCEDURE — 63600175 PHARM REV CODE 636 W HCPCS: Performed by: INTERNAL MEDICINE

## 2023-08-14 PROCEDURE — 63600175 PHARM REV CODE 636 W HCPCS: Performed by: STUDENT IN AN ORGANIZED HEALTH CARE EDUCATION/TRAINING PROGRAM

## 2023-08-14 PROCEDURE — 63600175 PHARM REV CODE 636 W HCPCS

## 2023-08-14 PROCEDURE — 83735 ASSAY OF MAGNESIUM: CPT | Mod: 91 | Performed by: STUDENT IN AN ORGANIZED HEALTH CARE EDUCATION/TRAINING PROGRAM

## 2023-08-14 PROCEDURE — 11000001 HC ACUTE MED/SURG PRIVATE ROOM

## 2023-08-14 PROCEDURE — 63600175 PHARM REV CODE 636 W HCPCS: Performed by: NURSE PRACTITIONER

## 2023-08-14 PROCEDURE — 99233 SBSQ HOSP IP/OBS HIGH 50: CPT | Mod: ,,, | Performed by: STUDENT IN AN ORGANIZED HEALTH CARE EDUCATION/TRAINING PROGRAM

## 2023-08-14 PROCEDURE — 25000003 PHARM REV CODE 250: Performed by: NURSE PRACTITIONER

## 2023-08-14 PROCEDURE — 25000003 PHARM REV CODE 250: Performed by: INTERNAL MEDICINE

## 2023-08-14 PROCEDURE — 94761 N-INVAS EAR/PLS OXIMETRY MLT: CPT

## 2023-08-14 PROCEDURE — 36415 COLL VENOUS BLD VENIPUNCTURE: CPT | Performed by: STUDENT IN AN ORGANIZED HEALTH CARE EDUCATION/TRAINING PROGRAM

## 2023-08-14 PROCEDURE — 27000221 HC OXYGEN, UP TO 24 HOURS

## 2023-08-14 PROCEDURE — 80100014 HC HEMODIALYSIS 1:1

## 2023-08-14 PROCEDURE — 85025 COMPLETE CBC W/AUTO DIFF WBC: CPT | Performed by: INTERNAL MEDICINE

## 2023-08-14 PROCEDURE — 25000003 PHARM REV CODE 250

## 2023-08-14 PROCEDURE — 80202 ASSAY OF VANCOMYCIN: CPT | Performed by: INTERNAL MEDICINE

## 2023-08-14 PROCEDURE — 84132 ASSAY OF SERUM POTASSIUM: CPT | Performed by: INTERNAL MEDICINE

## 2023-08-14 PROCEDURE — 20600001 HC STEP DOWN PRIVATE ROOM

## 2023-08-14 PROCEDURE — G0316 PR PROLONG INPT EVAL EA ADDL 15 MIN: HCPCS | Mod: ,,, | Performed by: STUDENT IN AN ORGANIZED HEALTH CARE EDUCATION/TRAINING PROGRAM

## 2023-08-14 RX ORDER — HEPARIN SODIUM 5000 [USP'U]/ML
5000 INJECTION, SOLUTION INTRAVENOUS; SUBCUTANEOUS EVERY 8 HOURS
Status: COMPLETED | OUTPATIENT
Start: 2023-08-14 | End: 2023-08-15

## 2023-08-14 RX ORDER — POLYETHYLENE GLYCOL 3350 17 G/17G
17 POWDER, FOR SOLUTION ORAL 2 TIMES DAILY
COMMUNITY

## 2023-08-14 RX ORDER — ACETAMINOPHEN 325 MG/1
650 TABLET ORAL EVERY 6 HOURS PRN
COMMUNITY

## 2023-08-14 RX ORDER — MULTIVITAMIN
1 TABLET ORAL DAILY
COMMUNITY

## 2023-08-14 RX ORDER — ACETAMINOPHEN 500 MG
1000 TABLET ORAL 3 TIMES DAILY
Status: DISCONTINUED | OUTPATIENT
Start: 2023-08-14 | End: 2023-08-15

## 2023-08-14 RX ORDER — SEVELAMER CARBONATE FOR ORAL SUSPENSION 800 MG/1
0.8 POWDER, FOR SUSPENSION ORAL
Status: DISCONTINUED | OUTPATIENT
Start: 2023-08-14 | End: 2023-08-25

## 2023-08-14 RX ORDER — ZINC GLUCONATE 50 MG
50 TABLET ORAL DAILY
COMMUNITY

## 2023-08-14 RX ADMIN — VANCOMYCIN HYDROCHLORIDE 750 MG: 750 INJECTION, POWDER, LYOPHILIZED, FOR SOLUTION INTRAVENOUS at 09:08

## 2023-08-14 RX ADMIN — SEVELAMER CARBONATE 0.8 G: 0.8 POWDER, FOR SUSPENSION ORAL at 08:08

## 2023-08-14 RX ADMIN — HEPARIN SODIUM 5000 UNITS: 5000 INJECTION INTRAVENOUS; SUBCUTANEOUS at 09:08

## 2023-08-14 RX ADMIN — HYDRALAZINE HYDROCHLORIDE 100 MG: 50 TABLET ORAL at 08:08

## 2023-08-14 RX ADMIN — HEPARIN SODIUM 1000 UNITS: 1000 INJECTION, SOLUTION INTRAVENOUS; SUBCUTANEOUS at 05:08

## 2023-08-14 RX ADMIN — ACETAMINOPHEN 1000 MG: 500 TABLET ORAL at 09:08

## 2023-08-14 RX ADMIN — ACETAMINOPHEN 1000 MG: 500 TABLET ORAL at 02:08

## 2023-08-14 RX ADMIN — HYDRALAZINE HYDROCHLORIDE 100 MG: 50 TABLET ORAL at 02:08

## 2023-08-14 RX ADMIN — MEROPENEM 500 MG: 500 INJECTION INTRAVENOUS at 11:08

## 2023-08-14 RX ADMIN — MUPIROCIN: 20 OINTMENT TOPICAL at 08:08

## 2023-08-14 RX ADMIN — ACETAMINOPHEN 1000 MG: 500 TABLET ORAL at 05:08

## 2023-08-14 RX ADMIN — LOSARTAN POTASSIUM 25 MG: 25 TABLET, FILM COATED ORAL at 08:08

## 2023-08-14 RX ADMIN — MUPIROCIN: 20 OINTMENT TOPICAL at 09:08

## 2023-08-14 RX ADMIN — ACETAMINOPHEN 1000 MG: 500 TABLET ORAL at 08:08

## 2023-08-14 RX ADMIN — HYDRALAZINE HYDROCHLORIDE 50 MG: 50 TABLET ORAL at 09:08

## 2023-08-14 RX ADMIN — POTASSIUM BICARBONATE 20 MEQ: 391 TABLET, EFFERVESCENT ORAL at 08:08

## 2023-08-14 RX ADMIN — SEVELAMER CARBONATE 0.8 G: 0.8 POWDER, FOR SUSPENSION ORAL at 05:08

## 2023-08-14 RX ADMIN — ATORVASTATIN CALCIUM 40 MG: 40 TABLET, FILM COATED ORAL at 08:08

## 2023-08-14 RX ADMIN — HEPARIN SODIUM 5000 UNITS: 5000 INJECTION INTRAVENOUS; SUBCUTANEOUS at 02:08

## 2023-08-14 RX ADMIN — SEVELAMER CARBONATE 0.8 G: 0.8 POWDER, FOR SUSPENSION ORAL at 12:08

## 2023-08-14 RX ADMIN — CALCITRIOL CAPSULES 0.25 MCG 0.25 MCG: 0.25 CAPSULE ORAL at 08:08

## 2023-08-14 NOTE — PROGRESS NOTES
"Consultation Report  Ophthalmology Service    Date: 08/14/2023    Reason for Consult: "Occuloplastics for enucleation."     Interval history:   PT with improved mood. Oriented to self and year. Not to month or place. Pleasant this AM.       POcularHx: mild NPDR w/ macular edema OU per chart review    Current eye gtts: Atropine, vigamox     Family Hx:  family history includes Diabetes in his father and sister; Heart disease in his father; Hyperlipidemia in his brother; Kidney disease in his father; Stroke in his mother.     PMHx:  has a past medical history of Bilateral retinal detachment (7/18/2023), BPH (benign prostatic hyperplasia), Cataract, CHF (congestive heart failure), CKD (chronic kidney disease) stage 3, GFR 30-59 ml/min, Diabetes mellitus, type 2, Hypertension, KENIA (obstructive sleep apnea), Pancreatitis, Persistent proteinuria (11/12/2020), PTSD (post-traumatic stress disorder), and Stroke.     PSurgHx:  has a past surgical history that includes Cataract extraction (Bilateral).      Medications this encounter:    sodium chloride 0.9%   Intravenous Once    acetaminophen  1,000 mg Per G Tube TID    atorvastatin  40 mg Per G Tube Daily    calcitRIOL  0.25 mcg Per G Tube Daily    epoetin thee (PROCRIT) injection  3,000 Units Intravenous Every Tues, Thurs, Sat    heparin (porcine)  5,000 Units Subcutaneous Q8H    hydrALAZINE  100 mg Per G Tube TID    losartan  25 mg Per G Tube Daily    meropenem (MERREM) IVPB  500 mg Intravenous Q12H    mupirocin   Nasal BID    sevelamer carbonate  0.8 g Oral TID WM       Allergies: is allergic to morphine and amiodarone analogues.     Social:  reports that he has quit smoking. His smoking use included cigarettes and cigars. He has never used smokeless tobacco. He reports that he does not currently use alcohol. He reports that he does not currently use drugs.     ROS: As per HPI    Ocular examination/Dilated fundus examination:  Base Eye Exam       Visual Acuity (Snellen - " Linear)         Right Left    Dist sc 20/40 +1 NLP                  Slit Lamp and Fundus Exam       External Exam         Right Left    External Normal Mass effect, Ptosis, 4+ proptosis, Prolapsed fat pads: Lower. Crusty drainage on cheek              Slit Lamp Exam         Right Left    Lids/Lashes Normal ptosis    Conjunctiva/Sclera White and quiet 4+ Chemosis, 4+ Injection    Cornea Clear hyphema, cloudy, edema    Anterior Chamber Deep and formed cloudy/hazy    Iris Round and pharm dialted poor view    Lens Clear poor view    Anterior Vitreous slight haze, retina visualized below poor view              Fundus Exam         Right Left    Disc Sharp and pink no view    C/D Ratio 0.3 no view    Macula flat no view    Vessels Normal no view    Periphery flat 360, 1+ Vitreous flair, snow banking inferiorly. Stable from prior. no view                      Assessment/Plan:     Bilateral Endophthalmitis OS>OD  - VA OD improved to 20/40  - IOP wnl, pupil pharm dilated OU, EOM full OD/ limited movement OS   - Anterior exam OD stable today   - DFE OD shows resolving area of clumped cells inferiorly, stable from prior.       Recommendations:   - Vision and mood markedly improved today from prior. Now 20/40 OD  - Enucleation OS Per occuloplastics. Appreciate recs.   - Right eye exam stable to improved from prior.  - Right eye significantly improved from initial presentation at OSH.   - Continue Abx per ID, appreciate recs.   - Will need regular fu with Retina specialist upon discharge, as he is now monocular and needs frequent exams to ensure right eye does not worsen.    - Our team is happy to see him in our clinic, but this is likely too inconvenient for the patient and family as they live far away. Will likely benefit from Retina follow up closer to home.       Patient's Best Contact Number: [phone]       Adalberto Boyd MD   [facility] Ophthal[facility] PGY2  08/14/2023  2:20 PM        Common Ophthalmologic Abbreviations  OD:  right eye  OS: left eye  OU: both eyes  IOP: intraocular pressure  VA: visual acuity  PH: pinhole  HM: hand motion  LP: light perception  NLP: no light perception  DFE: dilated fundus examination  SLE: slit lamp examination  RD: retinal detachment   AT: artificial tears  PFAT: preservative free artificial tears

## 2023-08-14 NOTE — PHARMACY MED REC
"Admission Medication History     The home medication history was taken by Romana Srivastava.    You may go to "Admission" then "Reconcile Home Medications" tabs to review and/or act upon these items.     The home medication list has been updated by the Pharmacy department.   Please read ALL comments highlighted in yellow.   Please address this information as you see fit.    Feel free to contact us if you have any questions or require assistance.      The medications listed below were removed from the home medication list. Please reorder if appropriate:  Patient reports no longer taking the following medication(s):  ACETAMINOPHEN-CODEINE 300-30 MG  ALPRAZOLAM 0.5 MG  APIXABAN 2.5 MG  ATORVASTATIN 80 MG  BUSPIRONE 10 MG  CALCITRIOL 0.25 MCG  CHLORTHALIDONE 25 MG  CIPROFLOXACIN HCL 0.3% OPTH SOL  CLONIDINE 0.1 MG  CLOPIDOGREL 75 MG  DOCUSATE 100 MG  DOXAZOSIN 4 MG  ERGOCALCIFEROL 50,000 UNIT  FERROUS GLUCONATE 324 MG  FUROSEMIDE 20 MG  GUAIFENESIN 400 MG  HUMALOG U-100  HYDRALAZINE 100 MG  HYDROCHLOROTHIAZIDE 12.5 MG  INSULIN ASPART U-100  INSULIN GLARGINE U-100  ISOSORBIDE MONONITRATE 60 MG  KETOROLAC 0.5% DROP  LISINOPRIL 10 MG  LOPERAMIDE 2 MG  LOSARTAN 25 MG  METHOXY PEG-EPOTEIN BETA  NIFEDIPINE 20 MG  OXYCODONE 5 MG  OXYCODONE-ACETAMINOPHEN 5-325 MG  PIOGLITAZONE 30 MG  PREDNISOLONE ACETATE 1%  SODIUM CHLORIDE 0.9% SOLP 1,000 ML WITH HEPARIN  TORSEMIDE 20 MG  TRAZODONE 25 MG    Medications listed below were obtained from: Nursing home  PTA Medications   Medication Sig    acetaminophen (TYLENOL) 325 MG tablet Take 650 mg by mouth every 6 (six) hours as needed for Pain.    albuterol-ipratropium (DUO-NEB) 2.5 mg-0.5 mg/3 mL nebulizer solution Inhale 3 mLs into the lungs 3 (three) times daily.    amLODIPine (NORVASC) 10 MG tablet Take 10 mg by mouth once daily.    ascorbic acid, vitamin C, (VITAMIN C) 500 MG tablet Take 500 mg by mouth once daily.    aspirin 81 MG Chew Take 81 mg by mouth once daily.    calcium " carbonate-vitamin D3 (OYSTER SHELL CALCIUM-VIT D3) 500 mg-5 mcg (200 unit) PwPk Take 1 tablet by mouth 2 (two) times a day.    carvediloL (COREG) 25 MG tablet Take 1 tablet (25 mg total) by mouth 2 (two) times daily with meals.    citalopram (CELEXA) 20 MG tablet Take 20 mg by mouth once daily.    coffee xt/phosphatidyl serine (NEURIVA ORIGINAL ORAL) Take 1 capsule by mouth once daily.    flecainide (TAMBOCOR) 50 MG Tab Take 50 mg by mouth 2 (two) times daily.    folic acid (FOLVITE) 1 MG tablet Take 1 tablet by mouth every morning.    ibuprofen (ADVIL,MOTRIN) 600 MG tablet Take 600 mg by mouth every 6 (six) hours as needed for Pain.    lactulose (CHRONULAC) 10 gram/15 mL solution Take 45 mLs by mouth daily as needed (constipation).    megestroL (MEGACE) 400 mg/10 mL (40 mg/mL) Susp Take 10 mLs by mouth 2 (two) times a day.    metoclopramide HCl (REGLAN) 5 MG tablet Take 5 mg by mouth once daily.    multivitamin (THERAGRAN) per tablet Take 1 tablet by mouth once daily.    pantoprazole (PROTONIX) 40 MG tablet Take 40 mg by mouth once daily.    pentoxifylline (TRENTAL) 400 mg TbSR Take 400 mg by mouth 2 (two) times daily.    polyethylene glycol (GLYCOLAX) 17 gram/dose powder Take 17 g by mouth 2 (two) times daily.    rosuvastatin (CRESTOR) 5 MG tablet Take 5 mg by mouth once daily.    sevelamer carbonate (RENVELA) 800 mg Tab Take 1 tablet by mouth 5 times per day    tamsulosin (FLOMAX) 0.4 mg Cap Take 1 capsule (0.4 mg total) by mouth once daily.    UNABLE TO FIND Take 1 tablet by mouth once daily. medication name: OPTIMAL ANTIOX    UNABLE TO FIND Take 1 tablet by mouth nightly. medication name: QUNOL SLEEP SUPPORT    VITAMIN B COMPLEX ORAL Take 1 tablet by mouth every morning.    zinc gluconate 50 mg tablet Take 50 mg by mouth once daily.       Potential issues to be addressed PRIOR TO DISCHARGE  The listed medications were obtained from another facility ([facility]). The patient  may not have been able to fill these prescriptions prior to this admission and may require new scripts upon discharge.     Romana Srivastava  [id number]                  .

## 2023-08-14 NOTE — PROGRESS NOTES
Oculoplastics Progress Note    Over the weekend, patient obtained MRI without contrast as well as a limited MRI with contrast. Both were stable. Because a contrast study was attempted (although unsuccessful), patient was stepped up to the MICU for CRRT over the weekend. He is now back to the floor. Patient is more alert and able to communicate today. WBC count slightly elevated at 13, but remains afebrile.     Patient and patient's sister (MPOA) believe that his eye is improving. Discussed with them that the eye may look slightly improved externally, but internally the abscess remains. They asked if it would be reasonable to monitor for improvement of the left eye and NOT proceed with surgery. I explained that the left eye will not get better without surgery. The only reason it is not worsening is due to the IV antibiotics which are a temporizing measure to make it to surgery on Wednesday. There is no visual potential in that eye (no light perception) and there continues to be an abscess posteriorly. The surgery needs to be done despite external appearances of the eye. Patient and patient's MPOA expressed understanding. Surgery is still scheduled for Wednesday 8/16/23.     Patient discussed with Dr. Stewart (Oculoplastics)    Deanna Celestin MD  [facility]

## 2023-08-14 NOTE — ASSESSMENT & PLAN NOTE
Immanuel Bernal is a 57-year-old man with a history of ESRD on HD, HFrEF (EF 30-35%), DM2, HTN, bowel obstruction s/p bowel resection, KENIA, CVA, atrial fibrillation who was hospitalized at [facility] in 7/2023 for b/l endophthalmitis, Pseudomonas bacteremia, RUE thrombus, RUE AVG infection s/p excision, and RUL mass. Left eye did not improve & subsequently developed abscess, but POA declined enucleation and patient ultimately left AMA. Re-presented to [place] ED with worsening eye symptoms with imaging showing left scleral abscess. He was transferred to [facility] for Oculoplastics evaluation & enucleation. Palliative and Supportive Care was consulted to explore goals of care.    Advance Care Planning   Goals of Care:  - Code status: Full code, did not discuss  - HCPOA: sister Marley Bernal   - HCPOA form signed at [facility]  - Patient does not have decision making capacity at this time  - Prognosis: guarded  - Goals: life prolongation  - Plans: will proceed with plans for ophthalmologic intervention for source control; will follow along as understanding of prognosis is very limited and trust with health care workers is practically non-existent. Low threshold to involve patient advocacy for patient and his HCPOA    Goals of Care Conversation:  - 8/14/23: Met Mr. Bernal, who wakes easily and answers appropriately and respectfully. Sister Marley at bedside. Had long supportive conversation with Marley, who is also trying to help their uncle who is in a nursing home, having his own serious medical issues related to severe weight loss and swallowing issues. His experience has been extremely negative in the nursing home and she says that unfortunately 80% of the nursing homes quality are like this, so she plans to get Mr. Bernal into an actual rehab and not a nursing home with rehab. Marley shares that she feels that having signed the HCPOA form with her brother, that she has a legal duty to  advocate for him. She shares that she and other caregivers are not compensated for doing the hard job of shouldering the burden and protecting their loved ones from the mistakes and shortcomings that are from the medical providers/system. She is hoping to take legal action on behalf of her brother and the neglectful and bad care that he received. She shared that it was likely helpful that he had this weekend to adjust to his new vision loss. That although he hasn't been able to see in the last 3-4 weeks, this past Thursday he cried out and she believes he did so in realization that he cannot see. She put a mirror in front of him to help assess how much he can see, and he realized he cannot. She believes he needed time to adjust to this. She also worries that if he is delirious before the surgery, then it won't be a good thing to proceed. I thanked her for sharing these hardships with me and will return on Wednesday to continue to provide support for Mr. Bernal and his family.  - 8/11/23: Meeting with Marley Bernal (sister), Dr. Gautam, Dr. Celestin (ophthalmology) and writer (Mca, palliative). I asked Ms. Bernal to share with me about Mr. Bernal's condition and she started back 5 years ago when he was 52 years old when he suffered a stroke at work (related to his history of non-treated DM). His health deteriorated over the years and notes that he has been traumatized by his decline and prior hospitalizations. She shared how upsetting the hospitalization at [facility] was, noting how nothing was being done for him and alternatively was always being pressured to proceed with surgery. She recalls the day they signed papers to leave AMA, and being told that if she didn't agree with surgery, then he needed to sign out AMA and needed to leave in 15 minutes. Allowed for Ms. Bernal to express her frustrations and disappointment about the poor care he received and the poor communication she felt  she received during this time. She wishes to advocate for him and protect him, knowing that the surgery and enucleation will be something he has to live with. Her desire was to preserve his eyes, but if left with no other options, understands that enucleation would need to occur. She eventually expresses gratefulness for Dr. Celestin for going out of her way to communicate and help her brother. She was agreeable when I asked if I could follow along.

## 2023-08-14 NOTE — PROGRESS NOTES
Hemodialysis treatment completed. Blood returned. Dialyzed patient for 3.5  hours with net fluid removal of 1 L. Patient tolerated treatment well with stable VS    R tunneled IJ CVC flushed with saline and locked with heparin. Lumens capped and wrapped in sterile gauze.     Report given to primary nurse

## 2023-08-14 NOTE — NURSING TRANSFER
Nursing Transfer Note      8/14/2023   7:20 AM    Reason patient is being transferred: Step down    Transfer To: 11000 from 6070    Transfer via bed    Transfer with  O2 cardiac monitoring    Transported by BILL Falcon RN and JEREMÍAS Goldman    4eyes on Skin: yes    Medicines sent: Yes    Any special needs or follow-up needed: None    Patient belongings transferred with patient: Yes    Chart send with patient: Yes    Notified: Sister with pt    Patient reassessed at: 06.30 AM    Upon arrival to floor: cardiac monitor applied, patient oriented to room, call bell in reach, and bed in lowest position.

## 2023-08-14 NOTE — NURSING TRANSFER
Nursing Transfer Note      8/14/2023   6:26 AM    Nurse giving handoff:Sonny KRAFT  Nurse receiving handoff:Katie KRAFT    Reason patient is being transferred: Down grade in care    Transfer From: ICU    Transfer via bed    Transfer with cardiac monitoring and O2    Transported by Sonny KRAFT    Transfer Vital Signs:  Blood Pressure: 121/69  Heart Rate:74  O2:100 on 2 L/NC  Temperature:98.2 oral  Respirations:16    Telemetry: Rhythm NSR on bedside  Order for Tele Monitor? Yes    Additional Lines: Oxygen    4eyes on Skin: yes Done with Eyal KRAFT    Medicines sent: Yes    Any special needs or follow-up needed: HOB 30+ TF @ 20 ml/hr with goal to be 45 ml/hr    Patient belongings transferred with patient: Yes    Chart send with patient: Yes    Notified: Sister Marley Bernal [phone]    Patient reassessed at: 4 eyes with Eyal KRAFT  1  Upon arrival to floor: cardiac monitor applied, patient oriented to room, call bell in reach, and bed in lowest position

## 2023-08-14 NOTE — ASSESSMENT & PLAN NOTE
IR consult sent  As per IR to be kept for a few more weeks as it looks functioning      Plan:  Follow IR recs

## 2023-08-14 NOTE — ASSESSMENT & PLAN NOTE
Hypertensive on arrival  bp today: 121/69  Home imdur  Carvedilol  Hydralazine  Nifedipine    Plan:  On hydralazine And losartan

## 2023-08-14 NOTE — NURSING
..Nurses Note -- 4 Eyes      8/14/2023   6:26 AM      Skin assessed during: Transfer      [] No Altered Skin Integrity Present    []Prevention Measures Documented      [x] Yes- Altered Skin Integrity Present or Discovered   [x] LDA Added if Not in Epic (Describe Wound)   [] New Altered Skin Integrity was Present on Admit and Documented in LDA   [] Wound Image Taken    Wound Care Consulted? No    Attending Nurse:  Katie KRAFT    Second RN/Staff Member:  Eyal KRAFT

## 2023-08-14 NOTE — PLAN OF CARE
Problem: Adult Inpatient Plan of Care  Goal: Plan of Care Review  Outcome: Ongoing, Progressing  Goal: Patient-Specific Goal (Individualized)  Outcome: Ongoing, Progressing  Goal: Absence of Hospital-Acquired Illness or Injury  Outcome: Ongoing, Progressing  Goal: Optimal Comfort and Wellbeing  Outcome: Ongoing, Progressing  Goal: Readiness for Transition of Care  Outcome: Ongoing, Progressing     Problem: Diabetes Comorbidity  Goal: Blood Glucose Level Within Targeted Range  Outcome: Ongoing, Progressing     Problem: Skin Injury Risk Increased  Goal: Skin Health and Integrity  Outcome: Ongoing, Progressing     Problem: Device-Related Complication Risk (Hemodialysis)  Goal: Safe, Effective Therapy Delivery  Outcome: Ongoing, Progressing     Problem: Hemodynamic Instability (Hemodialysis)  Goal: Effective Tissue Perfusion  Outcome: Ongoing, Progressing     Problem: Infection (Hemodialysis)  Goal: Absence of Infection Signs and Symptoms  Outcome: Ongoing, Progressing     Problem: Infection  Goal: Absence of Infection Signs and Symptoms  Outcome: Ongoing, Progressing     Problem: Coping Ineffective  Goal: Effective Coping  Outcome: Ongoing, Progressing     Problem: Impaired Wound Healing  Goal: Optimal Wound Healing  Outcome: Ongoing, Progressing    Patient alert and cooperative with care. Medicated as ordered. Continues on novosourse renal @ 30/hr. Patient is tolerating well. Flushed as ordered. Family remains at bedside. Swallow eval ordered per family request. No noted c/o pain this shift. Remains on tylenol RTC. Patient has minimal discharge from left eye. Eye remains swollen at this time. Family undecided at this time about surgery. Patient is resting comfortably at this time

## 2023-08-14 NOTE — PROGRESS NOTES
Bedside hemodialysis treatment 1:1 started per MD orders via  R tunneled IJ CVC accessed using aseptic technique. Sister at bedside. Patient calm and cooperative.

## 2023-08-14 NOTE — SUBJECTIVE & OBJECTIVE
Interval History: no acute events overnight    Review of Systems   Constitutional:  Negative for activity change.   Respiratory:  Negative for apnea, choking, chest tightness, shortness of breath and wheezing.    Cardiovascular:  Negative for chest pain, palpitations and leg swelling.   Gastrointestinal:  Negative for abdominal distention and abdominal pain.   Neurological:  Negative for dizziness, tremors, seizures, speech difficulty, weakness, light-headedness and headaches.   Psychiatric/Behavioral:  Negative for agitation, behavioral problems, confusion and hallucinations.      Objective:     Vital Signs (Most Recent):  Temp: 98.7 °F (37.1 °C) (08/14/23 0850)  Pulse: 73 (08/14/23 0850)  Resp: 10 (08/14/23 0850)  BP: 137/81 (08/14/23 0850)  SpO2: 100 % (08/14/23 0850) Vital Signs (24h Range):  Temp:  [98.2 °F (36.8 °C)-99 °F (37.2 °C)] 98.7 °F (37.1 °C)  Pulse:  [67-80] 73  Resp:  [8-18] 10  SpO2:  [100 %] 100 %  BP: (105-158)/(65-82) 137/81     Weight: 61.7 kg (136 lb 0.4 oz)  Body mass index is 19.52 kg/m².    Intake/Output Summary (Last 24 hours) at 8/14/2023 1120  Last data filed at 8/14/2023 0600  Gross per 24 hour   Intake 1465 ml   Output 1500 ml   Net -35 ml         Physical Exam  Constitutional:       Appearance: Normal appearance.   Eyes:      Pupils: Pupils are equal, round, and reactive to light.   Cardiovascular:      Rate and Rhythm: Normal rate and regular rhythm.      Pulses: Normal pulses.      Heart sounds: Normal heart sounds.   Pulmonary:      Effort: Pulmonary effort is normal. No respiratory distress.      Breath sounds: Normal breath sounds.   Abdominal:      General: Abdomen is flat.      Palpations: Abdomen is soft.   Neurological:      General: No focal deficit present.      Mental Status: Mental status is at baseline.   Psychiatric:         Mood and Affect: Mood normal.         Behavior: Behavior normal.             Significant Labs: All pertinent labs within the past 24 hours have been  reviewed.    Significant Imaging: I have reviewed all pertinent imaging results/findings within the past 24 hours.

## 2023-08-14 NOTE — ASSESSMENT & PLAN NOTE
Underwent dialysis for three hours on 8/10 as well as MRI with contrast    Patient's creatinine before dialysis is 9.6 >> 5.2 >>> 3.7  Baseline one month back is 5.83  Underwent emergent SLED post MRI    Plan:  Follow nephro recs  And trace daily cmp

## 2023-08-14 NOTE — PROGRESS NOTES
Pharmacokinetic Assessment Follow Up: IV Vancomycin    Vancomycin serum concentration assessment(s):    The random level was drawn correctly and can be used to guide therapy at this time. The measurement is below the desired definitive target range of 15 to 20 mcg/mL.    Vancomycin Regimen Plan:    Will give 750 mg IV once  Re-dose when the random level is less than 20 mcg/mL, next level to be drawn with AM labs on 8/15    Drug levels (last 3 results):  Recent Labs   Lab Result Units 08/12/23  0349 08/13/23 0348 08/14/23  0324   Vancomycin, Random ug/mL 20.5 18.8 12.6       Pharmacy will continue to follow and monitor vancomycin.    Please contact pharmacy at extension [phone] for questions regarding this assessment.    Thank you for the consult,   Elsa Solorzano       Patient brief summary:  Immanuel Bernal is a 57 y.o. male initiated on antimicrobial therapy with IV Vancomycin for treatment of  endophthalmitis    The patient's current regimen is pulse dose based on level    Drug Allergies:   Review of patient's allergies indicates:   Allergen Reactions    Morphine Rash    Amiodarone analogues Itching     Other reaction(s): Unknown       Actual Body Weight:   61.7 kg    Renal Function:   Estimated Creatinine Clearance: 18.8 mL/min (A) (based on SCr of 3.7 mg/dL (H)).,     Dialysis Method (if applicable):  intermittent HD    CBC (last 72 hours):  Recent Labs   Lab Result Units 08/12/23  0349 08/13/23 0348 08/14/23  0324   WBC K/uL 6.63 8.50 13.49*   Hemoglobin g/dL 8.0* 8.7* 9.4*   Hemoglobin A1C % 4.7  --   --    Hematocrit % 26.9* 29.5* 31.5*   Platelets K/uL 212 239 231   Gran % % 74.8* 71.3 81.0*   Lymph % % 9.0* 8.4* 5.6*   Mono % % 11.2 15.4* 8.9   Eosinophil % % 3.3 3.3 3.2   Basophil % % 0.8 0.8 0.7   Differential Method  Automated Automated Automated         Metabolic Panel (last 72 hours):  Recent Labs   Lab Result Units 08/11/23  2131 08/12/23  0349 08/12/23  1440 08/13/23 0348 08/13/23  1449  08/14/23  0324 08/14/23  0545 08/14/23  1354   Sodium mmol/L 138 136 138 136  136 136 134*  --  134*   Potassium mmol/L 3.5 3.5 3.9 3.7  3.7 3.8 3.4* 3.5 4.1   Chloride mmol/L 97 99 102 101  101 100 101  --  99   CO2 mmol/L 25 23 23 21*  21* 24 23  --  27   Glucose mg/dL 49* 68* 68* 79  79 71 76  --  84   BUN mg/dL 31* 25* 14 17  17 21* 14  --  13   Creatinine mg/dL 7.0* 5.3* 3.9* 4.5*  4.5* 5.2* 3.7*  --  3.7*   Albumin g/dL 2.3* 2.1* 2.1* 2.1*  2.1* 2.1* 2.1*  --  2.0*   Total Bilirubin mg/dL  --  0.4  --   --   --   --   --   --    Alkaline Phosphatase U/L  --  106  --   --   --   --   --   --    AST U/L  --  16  --   --   --   --   --   --    ALT U/L  --  <5*  --   --   --   --   --   --    Magnesium mg/dL 2.5 2.2 2.1 2.2  2.2 2.3 2.1  --  2.0   Phosphorus mg/dL 4.1 3.2 2.8 3.4  3.4 4.2 2.8  --  3.0         Vancomycin Administrations:  vancomycin given in the last 96 hours                     vancomycin (VANCOCIN) 500 mg in dextrose 5 % in water (D5W) 100 mL IVPB (MB+) (mg) 500 mg New Bag 08/11/23 2326    vancomycin (VANCOCIN) 500 mg in dextrose 5 % in water (D5W) 100 mL IVPB (MB+) (mg) 500 mg New Bag 08/11/23 0202                    Microbiologic Results:  Microbiology Results (last 7 days)       Procedure Component Value Units Date/Time    Blood culture (site 1) [[id number]] Collected: 08/09/23 1434    Order Status: Completed Specimen: Blood Updated: 08/13/23 1812     Blood Culture, Routine No Growth to date      No Growth to date      No Growth to date      No Growth to date      No Growth to date    Narrative:      Site # 1, aerobic and anaerobic  Collection has been rescheduled by [email or web address] at 08/09/2023 13:27 Reason:   Patient unavailable in with DR   Collection has been rescheduled by [email or web address] at 08/09/2023 13:27 Reason:   Patient unavailable in with DR     Blood culture (site 2) [[id number]] Collected: 08/09/23 1434    Order Status: Completed Specimen: Blood Updated: 08/13/23 1812     Blood Culture,  Routine No Growth to date      No Growth to date      No Growth to date      No Growth to date      No Growth to date    Narrative:      Site # 2, aerobic only  Collection has been rescheduled by [email or web address] at 08/09/2023 13:27 Reason:   Patient unavailable in with DR   Collection has been rescheduled by [email or web address] at 08/09/2023 13:27 Reason:   Patient unavailable in with DR

## 2023-08-14 NOTE — PLAN OF CARE
[facility] - Intensive Care ([facility])  Discharge Reassessment    Primary Care Provider: No, Primary Doctor    Expected Discharge Date: 8/15/2023    Reassessment (most recent)       Discharge Reassessment - 08/14/23 0856          Discharge Reassessment    Assessment Type Discharge Planning Reassessment (P)      Did the patient's condition or plan change since previous assessment? Yes (P)      Communicated TOBY with patient/caregiver Yes (P)      Discharge Plan A Long-term acute care facility (LTAC) (P)      Discharge Plan B Long-term acute care facility (LTAC) (P)                    Patient is not stable to dc today, patient is in need of LtAC post dc. Patient has an accepting LTAC [facility]. SW will continue to follow up.       Mirta Melendez LMSW  [facility]   [id number]

## 2023-08-14 NOTE — SUBJECTIVE & OBJECTIVE
Interval History: Over the weekend stepped up to the ICU for urgent dialysis in setting of receiving contrast for MRI-evaluation of eye/brain. Stepped down to the floor. Awake, responds appropriately. Sister at bedside.    Past Medical History:   Diagnosis Date    Bilateral retinal detachment 7/18/2023    BPH (benign prostatic hyperplasia)     Cataract     CHF (congestive heart failure)     CKD (chronic kidney disease) stage 3, GFR 30-59 ml/min     Diabetes mellitus, type 2     Hypertension     KENIA (obstructive sleep apnea)     Pancreatitis     Persistent proteinuria 11/12/2020    2 g proteinuria noted Resumed ACE Lower BP systolic to less than 130     PTSD (post-traumatic stress disorder)     Stroke        Past Surgical History:   Procedure Laterality Date    CATARACT EXTRACTION Bilateral        Review of patient's allergies indicates:   Allergen Reactions    Morphine Rash    Amiodarone analogues Itching     Other reaction(s): Unknown       Medications:  Continuous Infusions:  Scheduled Meds:   sodium chloride 0.9%   Intravenous Once    acetaminophen  1,000 mg Per G Tube TID    atorvastatin  40 mg Per G Tube Daily    calcitRIOL  0.25 mcg Per G Tube Daily    epoetin thee (PROCRIT) injection  3,000 Units Intravenous Every Tues, Thurs, Sat    heparin (porcine)  5,000 Units Subcutaneous Q8H    hydrALAZINE  100 mg Per G Tube TID    losartan  25 mg Per G Tube Daily    meropenem (MERREM) IVPB  500 mg Intravenous Q12H    mupirocin   Nasal BID    sevelamer carbonate  0.8 g Oral TID WM     PRN Meds:sodium chloride 0.9%, albuterol-ipratropium, dextrose 10%, dextrose 10%, glucagon (human recombinant), glucose, glucose, heparin (porcine), HYDROmorphone, melatonin, naloxone, OLANZapine, ondansetron, promethazine (PHENERGAN) 12.5 mg in dextrose 5 % (D5W) 50 mL IVPB, sodium chloride 0.9%, Pharmacy to dose Vancomycin consult **AND** vancomycin - pharmacy to dose    Family History       Problem Relation (Age of Onset)    Diabetes  Father, Sister    Heart disease Father    Hyperlipidemia Brother    Kidney disease Father    Stroke Mother          Tobacco Use    Smoking status: Former     Current packs/day: 0.00     Types: Cigarettes, Cigars    Smokeless tobacco: Never   Substance and Sexual Activity    Alcohol use: Not Currently    Drug use: Not Currently    Sexual activity: Not on file       Review of Systems   Unable to perform ROS: Other     Objective:     Vital Signs (Most Recent):  Temp: 98.7 °F (37.1 °C) (08/14/23 1144)  Pulse: 105 (08/14/23 1437)  Resp: 10 (08/14/23 1145)  BP: 130/89 (08/14/23 1145)  SpO2: 99 % (08/14/23 1145) Vital Signs (24h Range):  Temp:  [98.2 °F (36.8 °C)-99 °F (37.2 °C)] 98.7 °F (37.1 °C)  Pulse:  [] 105  Resp:  [8-18] 10  SpO2:  [99 %-100 %] 99 %  BP: (105-151)/(65-89) 130/89     Weight: 61.7 kg (136 lb 0.4 oz)  Body mass index is 19.52 kg/m².       Physical Exam  Constitutional:       General: He is not in acute distress.     Appearance: He is ill-appearing.   HENT:      Head: Normocephalic and atraumatic.      Right Ear: External ear normal.      Left Ear: External ear normal.      Nose: Nose normal.      Mouth/Throat:      Mouth: Mucous membranes are dry.   Eyes:      General:         Left eye: Discharge present.  Cardiovascular:      Rate and Rhythm: Normal rate.   Pulmonary:      Effort: Pulmonary effort is normal.      Breath sounds: Normal breath sounds.   Abdominal:      General: Bowel sounds are normal.      Palpations: Abdomen is soft.      Comments: G tube   Musculoskeletal:         General: No swelling.   Skin:     General: Skin is dry.   Neurological:      Mental Status: He is alert.      Comments: Oriented to self, place            Review of Symptoms      Symptom Assessment (ESAS 0-10 Scale)  Unable to complete assessment due to Other         Pain Assessment in Advanced Demential Scale (PAINAD)   Breathing - Independent of vocalization:  0  Negative vocalization:  0  Facial expression:   0  Body language:  0  Consolability:  0  Total:  0    Psychosocial/Cultural:   See Palliative Psychosocial Note: No  No longer working since stroke. Has sister Marley. Has adult children  **Primary  to Follow**  Palliative Care  Consult: No        Advance Care Planning   Advance Directives:   Living Will: No    LaPOST: No    Do Not Resuscitate Status: No    Medical Power of : Yes    Agent's Name:  Marley Bernal    Decision Making:  Family answered questions and Patient unable to communicate due to disease severity/cognitive impairment  Goals of Care: What is most important right now is to focus on extending life as long as possible, even it it means sacrificing quality. Accordingly, we have decided that the best plan to meet the patient's goals includes continuing with treatment.         Significant Labs: CBC:   Recent Labs   Lab 08/13/23  0348 08/14/23  0324   WBC 8.50 13.49*   HGB 8.7* 9.4*   HCT 29.5* 31.5*    231       CMP:   Recent Labs   Lab 08/13/23  1449 08/14/23  0324 08/14/23  0545 08/14/23  1354    134*  --  134*   K 3.8 3.4* 3.5 4.1    101  --  99   CO2 24 23  --  27   GLU 71 76  --  84   BUN 21* 14  --  13   CREATININE 5.2* 3.7*  --  3.7*   CALCIUM 9.0 8.6*  --  8.5*   ALBUMIN 2.1* 2.1*  --  2.0*   ANIONGAP 12 10  --  8       CBC:   Recent Labs   Lab 08/14/23  0324   WBC 13.49*   HGB 9.4*   HCT 31.5*   *          BMP:  Recent Labs   Lab 08/14/23  1354   GLU 84   *   K 4.1   CL 99   CO2 27   BUN 13   CREATININE 3.7*   CALCIUM 8.5*   MG 2.0       LFT:  Lab Results   Component Value Date    AST 16 08/12/2023    GGT 13 11/09/2020    ALKPHOS 106 08/12/2023    BILITOT 0.4 08/12/2023     Albumin:   Albumin   Date Value Ref Range Status   08/14/2023 2.0 (L) 3.5 - 5.2 g/dL Final     Protein:   Total Protein   Date Value Ref Range Status   08/12/2023 6.1 6.0 - 8.4 g/dL Final     Lactic acid:   Lab Results   Component Value Date     LACTATE 0.6 08/09/2023       Significant Imaging: I have reviewed all pertinent imaging results/findings within the past 24 hours.

## 2023-08-14 NOTE — PROGRESS NOTES
[facility] - Intensive Care ([address] Medicine  Progress Note    Patient Name: Immanuel Bernal  MRN: [id number]  Patient Class: IP- Inpatient   Admission Date: 8/9/2023  Length of Stay: 5 days  Attending Physician: Brett Gautam, *  Primary Care Provider: Dolly, Primary Doctor        Subjective:     Principal Problem:Endophthalmitis        HPI:  HPI obtained per medical record as patient unable to communicate    57-year-old male with a history of CHF, diabetes, hypertension, chronic disability, end-stage renal disease on hemodialysis, PEG placement, bowel obstruction, sleep apnea, TIA, left eye vitreous hemorrhage, stroke, and bowel obstruction with bowel resection admitted to [facility] in [place] July 18 from nursing home with left eye pain and blurred vision.  He was admitted with concern for bilateral endophthalmitis.  Other admit diagnoses included right upper extremity thrombus, end-stage renal disease on hemodialysis, hyperkalemia, sleep apnea, diabetes, and CHF.  He was treated with broad-spectrum antibiotics.  He was seen by Infectious Diseases,, and he was treated with vancomycin, ceftriaxone, and amphotericin.  Blood cultures were positive for Pseudomonas, and amphotericin/vancomycin were stopped.  IV cefepime was continued.  He was seen by Ophthalmology, and he received intravitreal vancomycin and ceftazidime (July 18).  He also had intravitreal tap July 18 that had no yeast or fungal elements observed.  Left eye endophthalmitis did not respond to treatment, and he subsequently developed abscess formation, significant proptosis, and drainage of purulent material from the orbit.  Ophthalmology recommended enucleation.  He was continued on cefepime for pseudomonal and ophthalmitis.  Recommendation was for 6 weeks of treatment to be followed by indefinite fluoroquinolone.  He was seen by Pulmonology during his stay for a right upper lung mass with peripheral nodules.   It was felt that he would need further investigation of this once his current clinical issues stabilized.  Right upper extremity AV graft was excised during his stay with concern for infection.  A right IJ tunneled line was placed on July 27.  Left eye endophthalmitis continued to worsen, and ophthalmology spoke with patient and family about the need for enucleation.  Despite several conversations, family declined enucleation.  Plans were for transitioned to skilled nursing facility for continued treatment, but family did not want him to go to a skilled nursing facility.  He was subsequently discharged AMA from the hospital there on August 7.  Please see the August 7 Internal Medicine note for further details.     He subsequently presented to [facility] Emergency Department.  He will not go back to [facility] in [place]. He received Zosyn and vancomycin.  ED team at [place] spoke with the patient and his power-of- (sister).  CT of the orbits noted scleral abscess along the lateral aspect of the left globe.  Patient and family are aware and in agreement that the patient needs enucleation of the eye at this time. Referring provider spoke with Oculoplastics at [facility]. Requesting transfer to [facility] for Oculoplastics specialty evaluation of persistent endophthalmitis.  ED provider noted patient is hemodynamically stable.  He does not appear volume overloaded or in respiratory distress.      The patient has a significant previous medical course as listed below  August 3: MRI brain and orbits showed worsening ophthalmitis and inflammatory changes of the left orbital tissues with slight increase in proptosis.  No evidence of intracranial inflammatory process observed.    July 25: Transesophageal echocardiogram had no evidence of endocarditis.  Moderate to severely decreased left ventricular systolic function with EF 30-35%.    July 22:  CT chest showed right  upper lobe mass with numerous bilateral nodularity predominantly in the right with associated mediastinal lymph nodes.  July 21: Blood cultures with Pseudomonas aeruginosa  July 19: MRI brain/orbits with and without contrast had findings suggestive of chronic microvascular ischemic disease of the white matter with remote ischemic event in the left insula/basal ganglia/subependymal white matter/right middle cerebellar peduncle and hemisphere.  Findings consistent with left endophthalmitis.  No abnormal findings observed in the right globe.  July 18:  Blood cultures with Pseudomonas aeruginosa and coag-negative staph      Overview/Hospital Course:  Patient was rate controlled and therefor flecainide held. His GCS is 13/15. Oculopalstics were consulted and so were the nephrology teams and IR (regarding the cholecystotomy). He underwent dialysis on his first day. Was scheduled for surgery today but his daughter refused to have it done until she reviewed the MRI herself first. This means that the surgeon will only be available next Wednesday in order to allow her to view and discuss the MRI. He has a peg tube in place that has not been utilzied prior. His GCS is 13/15 and will be monitored during his stays, in any case of deterioration we may switch his feeding regimen to tube feeds. In the meantime his heartfailure medications have been reinstated and is being covered by vancomycin and meronem for the endopthalmitis. Multidisciplinary session was conducted with his POA in regards to his operation, she accepted the surgery if it was emergent, otherwise to be done this upcoming Wednesday. Patient's delirium/agitation worsened so he was transferred to the ICU until his clinical situation settled. He then was stepped down to our care and ophthalmology will be conducting his eye procedure on him as planned.      Interval History: no acute events overnight    Review of Systems   Constitutional:  Negative for activity change.    Respiratory:  Negative for apnea, choking, chest tightness, shortness of breath and wheezing.    Cardiovascular:  Negative for chest pain, palpitations and leg swelling.   Gastrointestinal:  Negative for abdominal distention and abdominal pain.   Neurological:  Negative for dizziness, tremors, seizures, speech difficulty, weakness, light-headedness and headaches.   Psychiatric/Behavioral:  Negative for agitation, behavioral problems, confusion and hallucinations.      Objective:     Vital Signs (Most Recent):  Temp: 98.7 °F (37.1 °C) (08/14/23 0850)  Pulse: 73 (08/14/23 0850)  Resp: 10 (08/14/23 0850)  BP: 137/81 (08/14/23 0850)  SpO2: 100 % (08/14/23 0850) Vital Signs (24h Range):  Temp:  [98.2 °F (36.8 °C)-99 °F (37.2 °C)] 98.7 °F (37.1 °C)  Pulse:  [67-80] 73  Resp:  [8-18] 10  SpO2:  [100 %] 100 %  BP: (105-158)/(65-82) 137/81     Weight: 61.7 kg (136 lb 0.4 oz)  Body mass index is 19.52 kg/m².    Intake/Output Summary (Last 24 hours) at 8/14/2023 1120  Last data filed at 8/14/2023 0600  Gross per 24 hour   Intake 1465 ml   Output 1500 ml   Net -35 ml         Physical Exam  Constitutional:       Appearance: Normal appearance.   Eyes:      Pupils: Pupils are equal, round, and reactive to light.   Cardiovascular:      Rate and Rhythm: Normal rate and regular rhythm.      Pulses: Normal pulses.      Heart sounds: Normal heart sounds.   Pulmonary:      Effort: Pulmonary effort is normal. No respiratory distress.      Breath sounds: Normal breath sounds.   Abdominal:      General: Abdomen is flat.      Palpations: Abdomen is soft.   Neurological:      General: No focal deficit present.      Mental Status: Mental status is at baseline.   Psychiatric:         Mood and Affect: Mood normal.         Behavior: Behavior normal.             Significant Labs: All pertinent labs within the past 24 hours have been reviewed.    Significant Imaging: I have reviewed all pertinent imaging results/findings within the past 24  hours.      Assessment/Plan:      * Endophthalmitis  Ophthalmology team on board and were going to perform the procedure but the daughter refused without beign around to review the MRI which delayed the procedure till next wednesday    MRI repeat : Diffuse inflammatory change involving the left globe, with associated thickening of the left sclera, proptosis, and focal fluid collection within the left posterior compartment, which demonstrates diffusion restriction.  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     Diffuse left preseptal and postseptal inflammatory change, with superimposed orbital cellulitis also considered.  Evaluation of the left orbital apex is limited secondary to no contrast administration.     Decreased size of the left anterior compartment with bowing of the visualized left lens.  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    Thorough counseling undertaken with POA and IM team, palliative, opthalmo and nursing team in regards to the surgery  She agreed for her brother to undergo the procedure as an emergent case if his health deteriorates  Otherwise to be scheduled on wednesday      Plan:    -restarted peg tube feeds, if GCS drops will utilize peg tube feeds  -Vancomycin and meronem  -Daily Labs  - trace blood cultures      PEG (percutaneous endoscopic gastrostomy) status  On peg tube feeds now  Tolerating well    Acute metabolic encephalopathy    Mri w and without requested and nephro consulted regarding post contrast dialysis    Plan:  Follow mri  To undergo dialysis after  If extension to CNS, contact opthalmo    Encounter for palliative care        Cholecystostomy care  IR consult sent  As per IR to be kept for a few more weeks as it looks functioning      Plan:  Follow IR recs      Severe malnutrition  Nutrition consulted. Most recent weight and BMI monitored-     Measurements:  Wt  Readings from Last 1 Encounters:   08/10/23 61.7 kg (136 lb 0.4 oz)   Body mass index is 19.52 kg/m².    Patient has been screened and assessed by RD.    Malnutrition Type:  Context: chronic illness  Level: severe    Malnutrition Characteristic Summary:  Weight Loss (Malnutrition): greater than 10% in 6 months  Energy Intake (Malnutrition): other (see comments) (LAMONT)  Subcutaneous Fat (Malnutrition): severe depletion  Muscle Mass (Malnutrition): severe depletion    Interventions/Recommendations (treatment strategy):  1. When/if able, initiate TFs via PEG. Rec'd Novasource @ 40 mL/hr to provide 1920 kcals, 87 g of protein, 688 mL fluid. 2. RD to monitor & follow-up.    Chronic kidney disease-mineral and bone disorder        ESRD (end stage renal disease)  Nephrology consulted for HD needs while inpatient      Bipolar disorder  Home psych meds reorderd      Atrial fibrillation  Continue apixaban  Continue home CCB    Hyperlipidemia  Home statin      Chronic diastolic heart failure  Continue to monitor for signs of volume overload  On full antifailure medications      Plan:  Monitor for signs of volume overload      Persistent proteinuria  Repeat UA      CKD (chronic kidney disease) stage 3, GFR 30-59 ml/min    Underwent dialysis for three hours on 8/10 as well as MRI with contrast    Patient's creatinine before dialysis is 9.6 >> 5.2 >>> 3.7  Baseline one month back is 5.83  Underwent emergent SLED post MRI    Plan:  Follow nephro recs  And trace daily cmp    Hypertension  Hypertensive on arrival  bp today: 121/69  Home imdur  Carvedilol  Hydralazine  Nifedipine    Plan:  On hydralazine And losartan     Stroke  Continue home statin  Continue apixaban  Delerium precautions  PT OT    Diabetes mellitus  POCT glucose  LDSS  -180      VTE Risk Mitigation (From admission, onward)         Ordered     heparin (porcine) injection 5,000 Units  Every 8 hours         08/14/23 0655     heparin (porcine) injection 1,000 Units   As needed (PRN)         08/10/23 1046     IP VTE HIGH RISK PATIENT  Once         08/09/23 1317     Place sequential compression device  Until discontinued         08/09/23 1317                Discharge Planning   TOBY: 8/21/2023     Code Status: Full Code   Is the patient medically ready for discharge?: No    Reason for patient still in hospital (select all that apply): Treatment  Discharge Plan A: Long-term acute care facility (LTAC)   Discharge Delays: (!) Procedure Scheduling (IR, OR, Labs, Echo, Cath, Echo, EEG) (eye surgery next week)              Sharon Law MD  Department of Hospital Medicine   [facility] - Intensive Care ([place])

## 2023-08-15 ENCOUNTER — ANESTHESIA EVENT (OUTPATIENT)
Dept: SURGERY | Facility: HOSPITAL | Age: 58
DRG: 113 | End: 2023-08-15
Payer: MEDICARE

## 2023-08-15 PROBLEM — E16.2 HYPOGLYCEMIA: Status: RESOLVED | Noted: 2023-08-12 | Resolved: 2023-08-15

## 2023-08-15 PROBLEM — G93.41 ACUTE METABOLIC ENCEPHALOPATHY: Status: RESOLVED | Noted: 2023-08-11 | Resolved: 2023-08-15

## 2023-08-15 PROBLEM — R80.1 PERSISTENT PROTEINURIA: Chronic | Status: RESOLVED | Noted: 2020-11-12 | Resolved: 2023-08-15

## 2023-08-15 PROBLEM — F31.9 BIPOLAR DISORDER: Status: RESOLVED | Noted: 2021-10-01 | Resolved: 2023-08-15

## 2023-08-15 PROBLEM — Z43.4 CHOLECYSTOSTOMY CARE: Status: RESOLVED | Noted: 2023-08-10 | Resolved: 2023-08-15

## 2023-08-15 LAB
ALBUMIN SERPL BCP-MCNC: 2 G/DL (ref 3.5–5.2)
ALP SERPL-CCNC: 126 U/L (ref 55–135)
ALT SERPL W/O P-5'-P-CCNC: 6 U/L (ref 10–44)
ANION GAP SERPL CALC-SCNC: 10 MMOL/L (ref 8–16)
ANION GAP SERPL CALC-SCNC: 10 MMOL/L (ref 8–16)
ANION GAP SERPL CALC-SCNC: 9 MMOL/L (ref 8–16)
AST SERPL-CCNC: 13 U/L (ref 10–40)
BILIRUB SERPL-MCNC: 0.3 MG/DL (ref 0.1–1)
BUN SERPL-MCNC: 19 MG/DL (ref 6–20)
BUN SERPL-MCNC: 19 MG/DL (ref 6–20)
BUN SERPL-MCNC: 25 MG/DL (ref 6–20)
CALCIUM SERPL-MCNC: 8.8 MG/DL (ref 8.7–10.5)
CALCIUM SERPL-MCNC: 8.9 MG/DL (ref 8.7–10.5)
CALCIUM SERPL-MCNC: 9 MG/DL (ref 8.7–10.5)
CHLORIDE SERPL-SCNC: 98 MMOL/L (ref 95–110)
CHLORIDE SERPL-SCNC: 98 MMOL/L (ref 95–110)
CHLORIDE SERPL-SCNC: 99 MMOL/L (ref 95–110)
CO2 SERPL-SCNC: 25 MMOL/L (ref 23–29)
CO2 SERPL-SCNC: 25 MMOL/L (ref 23–29)
CO2 SERPL-SCNC: 26 MMOL/L (ref 23–29)
CREAT SERPL-MCNC: 4.3 MG/DL (ref 0.5–1.4)
CREAT SERPL-MCNC: 4.6 MG/DL (ref 0.5–1.4)
CREAT SERPL-MCNC: 4.8 MG/DL (ref 0.5–1.4)
ERYTHROCYTE [DISTWIDTH] IN BLOOD BY AUTOMATED COUNT: 17.8 % (ref 11.5–14.5)
EST. GFR  (NO RACE VARIABLE): 13.2 ML/MIN/1.73 M^2
EST. GFR  (NO RACE VARIABLE): 13.9 ML/MIN/1.73 M^2
EST. GFR  (NO RACE VARIABLE): 15.1 ML/MIN/1.73 M^2
GLUCOSE SERPL-MCNC: 105 MG/DL (ref 70–110)
GLUCOSE SERPL-MCNC: 99 MG/DL (ref 70–110)
GLUCOSE SERPL-MCNC: 99 MG/DL (ref 70–110)
HCT VFR BLD AUTO: 33.3 % (ref 40–54)
HGB BLD-MCNC: 9.7 G/DL (ref 14–18)
MAGNESIUM SERPL-MCNC: 2 MG/DL (ref 1.6–2.6)
MAGNESIUM SERPL-MCNC: 2.2 MG/DL (ref 1.6–2.6)
MCH RBC QN AUTO: 30.6 PG (ref 27–31)
MCHC RBC AUTO-ENTMCNC: 29.1 G/DL (ref 32–36)
MCV RBC AUTO: 105 FL (ref 82–98)
PHOSPHATE SERPL-MCNC: 3.3 MG/DL (ref 2.7–4.5)
PHOSPHATE SERPL-MCNC: 3.5 MG/DL (ref 2.7–4.5)
PLATELET # BLD AUTO: 253 K/UL (ref 150–450)
PMV BLD AUTO: 10.5 FL (ref 9.2–12.9)
POCT GLUCOSE: 109 MG/DL (ref 70–110)
POCT GLUCOSE: 114 MG/DL (ref 70–110)
POCT GLUCOSE: 117 MG/DL (ref 70–110)
POCT GLUCOSE: 98 MG/DL (ref 70–110)
POTASSIUM SERPL-SCNC: 3.8 MMOL/L (ref 3.5–5.1)
POTASSIUM SERPL-SCNC: 4 MMOL/L (ref 3.5–5.1)
POTASSIUM SERPL-SCNC: 4 MMOL/L (ref 3.5–5.1)
PROT SERPL-MCNC: 6.2 G/DL (ref 6–8.4)
RBC # BLD AUTO: 3.17 M/UL (ref 4.6–6.2)
SODIUM SERPL-SCNC: 133 MMOL/L (ref 136–145)
SODIUM SERPL-SCNC: 133 MMOL/L (ref 136–145)
SODIUM SERPL-SCNC: 134 MMOL/L (ref 136–145)
VANCOMYCIN SERPL-MCNC: 22.9 UG/ML
WBC # BLD AUTO: 12.6 K/UL (ref 3.9–12.7)

## 2023-08-15 PROCEDURE — 27000221 HC OXYGEN, UP TO 24 HOURS

## 2023-08-15 PROCEDURE — 25000003 PHARM REV CODE 250

## 2023-08-15 PROCEDURE — 99900035 HC TECH TIME PER 15 MIN (STAT)

## 2023-08-15 PROCEDURE — 36415 COLL VENOUS BLD VENIPUNCTURE: CPT | Performed by: STUDENT IN AN ORGANIZED HEALTH CARE EDUCATION/TRAINING PROGRAM

## 2023-08-15 PROCEDURE — 25000003 PHARM REV CODE 250: Performed by: INTERNAL MEDICINE

## 2023-08-15 PROCEDURE — 80069 RENAL FUNCTION PANEL: CPT | Mod: 91 | Performed by: STUDENT IN AN ORGANIZED HEALTH CARE EDUCATION/TRAINING PROGRAM

## 2023-08-15 PROCEDURE — 25000003 PHARM REV CODE 250: Performed by: NURSE PRACTITIONER

## 2023-08-15 PROCEDURE — 63600175 PHARM REV CODE 636 W HCPCS

## 2023-08-15 PROCEDURE — 97116 GAIT TRAINING THERAPY: CPT

## 2023-08-15 PROCEDURE — 99233 PR SUBSEQUENT HOSPITAL CARE,LEVL III: ICD-10-PCS | Mod: ,,, | Performed by: STUDENT IN AN ORGANIZED HEALTH CARE EDUCATION/TRAINING PROGRAM

## 2023-08-15 PROCEDURE — 97530 THERAPEUTIC ACTIVITIES: CPT

## 2023-08-15 PROCEDURE — 97535 SELF CARE MNGMENT TRAINING: CPT

## 2023-08-15 PROCEDURE — 63600175 PHARM REV CODE 636 W HCPCS: Mod: JZ | Performed by: NURSE PRACTITIONER

## 2023-08-15 PROCEDURE — 25000003 PHARM REV CODE 250: Performed by: PHYSICIAN ASSISTANT

## 2023-08-15 PROCEDURE — 99233 SBSQ HOSP IP/OBS HIGH 50: CPT | Mod: ,,, | Performed by: NURSE PRACTITIONER

## 2023-08-15 PROCEDURE — 25000003 PHARM REV CODE 250: Performed by: STUDENT IN AN ORGANIZED HEALTH CARE EDUCATION/TRAINING PROGRAM

## 2023-08-15 PROCEDURE — 94761 N-INVAS EAR/PLS OXIMETRY MLT: CPT

## 2023-08-15 PROCEDURE — 99233 SBSQ HOSP IP/OBS HIGH 50: CPT | Mod: ,,, | Performed by: STUDENT IN AN ORGANIZED HEALTH CARE EDUCATION/TRAINING PROGRAM

## 2023-08-15 PROCEDURE — 85027 COMPLETE CBC AUTOMATED: CPT

## 2023-08-15 PROCEDURE — 92610 EVALUATE SWALLOWING FUNCTION: CPT

## 2023-08-15 PROCEDURE — 63600175 PHARM REV CODE 636 W HCPCS: Performed by: INTERNAL MEDICINE

## 2023-08-15 PROCEDURE — 99233 PR SUBSEQUENT HOSPITAL CARE,LEVL III: ICD-10-PCS | Mod: ,,, | Performed by: NURSE PRACTITIONER

## 2023-08-15 PROCEDURE — 80202 ASSAY OF VANCOMYCIN: CPT | Performed by: STUDENT IN AN ORGANIZED HEALTH CARE EDUCATION/TRAINING PROGRAM

## 2023-08-15 PROCEDURE — 83735 ASSAY OF MAGNESIUM: CPT | Performed by: STUDENT IN AN ORGANIZED HEALTH CARE EDUCATION/TRAINING PROGRAM

## 2023-08-15 PROCEDURE — 11000001 HC ACUTE MED/SURG PRIVATE ROOM

## 2023-08-15 PROCEDURE — 80053 COMPREHEN METABOLIC PANEL: CPT

## 2023-08-15 PROCEDURE — 80100014 HC HEMODIALYSIS 1:1

## 2023-08-15 RX ORDER — ACETAMINOPHEN 500 MG
1000 TABLET ORAL 3 TIMES DAILY
Status: DISCONTINUED | OUTPATIENT
Start: 2023-08-15 | End: 2023-08-25

## 2023-08-15 RX ORDER — ATORVASTATIN CALCIUM 40 MG/1
40 TABLET, FILM COATED ORAL DAILY
Status: DISCONTINUED | OUTPATIENT
Start: 2023-08-16 | End: 2023-08-25

## 2023-08-15 RX ORDER — HYDRALAZINE HYDROCHLORIDE 50 MG/1
100 TABLET, FILM COATED ORAL 3 TIMES DAILY
Status: DISCONTINUED | OUTPATIENT
Start: 2023-08-15 | End: 2023-08-16

## 2023-08-15 RX ORDER — CALCITRIOL 0.25 UG/1
0.25 CAPSULE ORAL DAILY
Status: DISCONTINUED | OUTPATIENT
Start: 2023-08-16 | End: 2023-08-25

## 2023-08-15 RX ORDER — ALUMINUM HYDROXIDE, MAGNESIUM HYDROXIDE, AND SIMETHICONE 2400; 240; 2400 MG/30ML; MG/30ML; MG/30ML
30 SUSPENSION ORAL ONCE
Status: COMPLETED | OUTPATIENT
Start: 2023-08-15 | End: 2023-08-15

## 2023-08-15 RX ORDER — SODIUM CHLORIDE 9 MG/ML
INJECTION, SOLUTION INTRAVENOUS ONCE
Status: DISCONTINUED | OUTPATIENT
Start: 2023-08-16 | End: 2023-08-17

## 2023-08-15 RX ADMIN — HYDRALAZINE HYDROCHLORIDE 100 MG: 50 TABLET ORAL at 02:08

## 2023-08-15 RX ADMIN — HYDRALAZINE HYDROCHLORIDE 100 MG: 50 TABLET ORAL at 08:08

## 2023-08-15 RX ADMIN — CALCITRIOL CAPSULES 0.25 MCG 0.25 MCG: 0.25 CAPSULE ORAL at 08:08

## 2023-08-15 RX ADMIN — ALUMINUM HYDROXIDE, MAGNESIUM HYDROXIDE, AND DIMETHICONE 30 ML: 400; 400; 40 SUSPENSION ORAL at 08:08

## 2023-08-15 RX ADMIN — ACETAMINOPHEN 1000 MG: 500 TABLET ORAL at 03:08

## 2023-08-15 RX ADMIN — SEVELAMER CARBONATE 0.8 G: 0.8 POWDER, FOR SUSPENSION ORAL at 04:08

## 2023-08-15 RX ADMIN — MEROPENEM 500 MG: 500 INJECTION INTRAVENOUS at 12:08

## 2023-08-15 RX ADMIN — MUPIROCIN: 20 OINTMENT TOPICAL at 09:08

## 2023-08-15 RX ADMIN — ONDANSETRON 8 MG: 8 TABLET, ORALLY DISINTEGRATING ORAL at 06:08

## 2023-08-15 RX ADMIN — LOSARTAN POTASSIUM 25 MG: 25 TABLET, FILM COATED ORAL at 08:08

## 2023-08-15 RX ADMIN — HEPARIN SODIUM 5000 UNITS: 5000 INJECTION INTRAVENOUS; SUBCUTANEOUS at 05:08

## 2023-08-15 RX ADMIN — ONDANSETRON 8 MG: 8 TABLET, ORALLY DISINTEGRATING ORAL at 11:08

## 2023-08-15 RX ADMIN — SEVELAMER CARBONATE 0.8 G: 0.8 POWDER, FOR SUSPENSION ORAL at 12:08

## 2023-08-15 RX ADMIN — ERYTHROPOIETIN 3000 UNITS: 3000 INJECTION, SOLUTION INTRAVENOUS; SUBCUTANEOUS at 11:08

## 2023-08-15 RX ADMIN — ACETAMINOPHEN 1000 MG: 500 TABLET ORAL at 08:08

## 2023-08-15 RX ADMIN — ATORVASTATIN CALCIUM 40 MG: 40 TABLET, FILM COATED ORAL at 08:08

## 2023-08-15 RX ADMIN — SEVELAMER CARBONATE 0.8 G: 0.8 POWDER, FOR SUSPENSION ORAL at 08:08

## 2023-08-15 RX ADMIN — MUPIROCIN: 20 OINTMENT TOPICAL at 08:08

## 2023-08-15 RX ADMIN — HEPARIN SODIUM 5000 UNITS: 5000 INJECTION INTRAVENOUS; SUBCUTANEOUS at 02:08

## 2023-08-15 NOTE — PT/OT/SLP PROGRESS
Physical Therapy Treatment    Patient Name:  Immanuel Bernal   MRN:  [id number]  Co-evaluation/treatment performed due to patient's multiple deficits requiring two skilled therapists to appropriately and safely assess patient's strength and endurance while facilitating functional tasks in addition to accommodating for patient's activity tolerance.    Recommendations:     Discharge Recommendations: nursing facility, skilled  Discharge Equipment Recommendations: none  Barriers to discharge: Decreased caregiver support    Assessment:     Immanuel Bernal is a 57 y.o. male admitted with a medical diagnosis of Endophthalmitis.  He presents with the following impairments/functional limitations: weakness, impaired endurance, impaired self care skills, impaired functional mobility, gait instability, impaired balance, decreased upper extremity function, decreased lower extremity function, decreased safety awareness, impaired cardiopulmonary response to activity, decreased coordination. Pt with increased GT distance and needed Mod A x 2people compared to  MaxA x 2people during his last PT session. Pt. With L sided lean, decrease eyesight L >R,FFT , decrease step length, decrease sindi and R sided weakness d.t a previous stroke.pt very motivated to participate with PT and would benefit from continued SNF rehab.    Rehab Prognosis: Good; patient would benefit from acute skilled PT services to address these deficits and reach maximum level of function.    Recent Surgery: * No surgery found *      Plan:     During this hospitalization, patient to be seen 3 x/week to address the identified rehab impairments via gait training, therapeutic activities, therapeutic exercises, neuromuscular re-education and progress toward the following goals:    Plan of Care Expires:  09/14/23    Subjective     Chief Complaint: none noted by pt.  Patient/Family Comments/goals: none noted by pt.  Pain/Comfort:  Pain Rating 1: 0/10  Pain Rating  Post-Intervention 1: 0/10      Objective:     Communicated with pt's nurse prior to session.  Patient found HOB elevated with millan catheter, pulse ox (continuous), telemetry, PEG Tube, oxygen upon PT entry to room.     General Precautions: Standard, fall  Orthopedic Precautions: N/A  Braces: N/A  Respiratory Status: Nasal cannula, flow 1 L/min     Functional Mobility:  Bed Mobility:     Scooting: Supervision scooting hips to the EOB  Supine to Sit: Leah for trunk elevation  Transfers:     Sit to Stand from elevated bed:  moderate assistance with rolling walker  Bed to Chair: moderate assistance and of 2 persons with  rolling walker  using  Stand Pivot  Gait: 4 ft from bed to [place] with RW and Mod A x 2people d.t L sided weakness, FFT, decrease step length, decrease sindi and pt with R sided weakness d.t previous stroke.  Balance: (S) for static sitting at the EOB; Mod A x 2 people for static standing with RW      AM-PAC 6 CLICK MOBILITY  Turning over in bed (including adjusting bedclothes, sheets and blankets)?: 3  Sitting down on and standing up from a chair with arms (e.g., wheelchair, bedside commode, etc.): 2  Moving from lying on back to sitting on the side of the bed?: 3  Moving to and from a bed to a chair (including a wheelchair)?: 2  Need to walk in hospital room?: 2  Climbing 3-5 steps with a railing?: 1  Basic Mobility Total Score: 13       Treatment & Education:  Education:  Patient provided with daily orientation and goals of this PT session.  Patient educated to transfer to bedside chair/bedside commode/bathroom with RN/PCT present.   Patient educated on Fall risk and plan of care by explanation.   Patient Verbalized Understanding.  Time provided for therapeutic counseling and discussion of current health disposition. All questions answered to satisfaction, within scope of PT practice; voiced no other concerns. White board updated in patient's room, RN notified of session.     Patient left up in  chair with all lines intact and call button in reach..    GOALS:   Multidisciplinary Problems       Physical Therapy Goals          Problem: Physical Therapy    Goal Priority Disciplines Outcome Goal Variances Interventions   Physical Therapy Goal     PT, PT/OT Ongoing, Progressing     Description: Goals to be met by: 2023    Patient will increase functional independence with mobility by performin. Supine to sit with MInimal Assistance  2. Sit to stand transfer with Minimal Assistance using LRAD  3. Gait  x 10 feet with Minimal Assistance using LRAD.                          Time Tracking:     PT Received On: 08/15/23  PT Start Time: 903     PT Stop Time: 922  PT Total Time (min): 19 min     Billable Minutes: Gait Training 19    Treatment Type: Treatment  PT/PTA: PT     Number of PTA visits since last PT visit: 0     08/15/2023

## 2023-08-15 NOTE — ASSESSMENT & PLAN NOTE
Nutrition consulted. Most recent weight and BMI monitored-     Measurements:  Wt Readings from Last 1 Encounters:   08/10/23 61.7 kg (136 lb 0.4 oz)   Body mass index is 19.52 kg/m².    Patient has been screened and assessed by RD.    Malnutrition Type:  Context: chronic illness  Level: severe    Malnutrition Characteristic Summary:  Weight Loss (Malnutrition): greater than 10% in 6 months  Energy Intake (Malnutrition): other (see comments) (LAMONT)  Subcutaneous Fat (Malnutrition): severe depletion  Muscle Mass (Malnutrition): severe depletion    Interventions/Recommendations (treatment strategy):  1. When/if able, initiate TFs via PEG. Rec'd Novasource @ 40 mL/hr to provide 1920 kcals, 87 g of protein, 688 mL fluid. 2. RD to monitor & follow-up.     Able to take in PO per SLP assessment on 8/15, on regular diet with thin liquids  Will reduce tube feeds gradually as patient takes in more PO

## 2023-08-15 NOTE — PT/OT/SLP EVAL
"Speech Language Pathology Evaluation  Bedside Swallow    Patient Name:  Immanuel Bernal   MRN:  [id number]  Admitting Diagnosis: Endophthalmitis    Recommendations:                 General Recommendations:  Follow-up not indicated  Diet recommendations:  Regular Diet - IDDSI Level 7, Thin liquids - IDDSI Level 0   Aspiration Precautions: Standard aspiration precautions   General Precautions: Standard, fall  Communication strategies:  yes/no questions only and provide increased time to answer    Assessment:     Immanuel Bernal is a 57 y.o. male with an SLP diagnosis of swallow remaining WFL.     History:     Past Medical History:   Diagnosis Date    Bilateral retinal detachment 7/18/2023    BPH (benign prostatic hyperplasia)     Cataract     CHF (congestive heart failure)     CKD (chronic kidney disease) stage 3, GFR 30-59 ml/min     Diabetes mellitus, type 2     Hypertension     KENIA (obstructive sleep apnea)     Pancreatitis     Persistent proteinuria 11/12/2020    2 g proteinuria noted Resumed ACE Lower BP systolic to less than 130     PTSD (post-traumatic stress disorder)     Stroke        Past Surgical History:   Procedure Laterality Date    CATARACT EXTRACTION Bilateral      Prior Intubation HX:  none this admit    Modified Barium Swallow: none found on file    Chest X-Rays: 8/9: "Impression:  Somewhat curvilinear course of the large bore right vascular catheter.  Confirmation of its position suggested.  Findings suggestive of volume overload.  This report was flagged in Epic as abnormal."    Prior diet: reg/thin per sister    Subjective     Pt awake/alert, seated upright in bedside chair. Sister present.     Pain/Comfort:  Pain Rating 1: 0/10  Pain Rating Post-Intervention 1: 0/10    Respiratory Status: Room air    Objective:     Oral Musculature Evaluation  Oral Musculature: unable to assess due to poor participation/comprehension    Bedside Swallow Eval:   Consistencies Assessed:  Thin liquids - several " straw sips thin water  Solids - x1 whole derrell cracker      Oral Phase:   WFL    Pharyngeal Phase:   no overt clinical signs/symptoms of aspiration  no overt clinical signs/symptoms of pharyngeal dysphagia    Compensatory Strategies  None    ST educated pt and sister re: role of SLP, risk factors for aspiration, current po rec, safe swallow precautions, and decreased need for SLP POC moving forward to which pt and sister verbalized understanding and denied questions prior to SLP exit. RN at bedside upon SLP exit.     Goals:   Multidisciplinary Problems       SLP Goals       Not on file              Multidisciplinary Problems (Resolved)          Problem: SLP    Goal Priority Disciplines Outcome   SLP Goal   (Resolved)     SLP Met                       Plan:     Plan of Care reviewed with:  patient, sibling   SLP Follow-Up:  No       Discharge recommendations: none per SLP    Time Tracking:     SLP Treatment Date:   08/15/23  Speech Start Time:  0919  Speech Stop Time:  0935     Speech Total Time (min):  16 min    Billable Minutes: Eval Swallow and Oral Function 8 and Self Care/Home Management Training 8    08/15/2023

## 2023-08-15 NOTE — PLAN OF CARE
Problem: Adult Inpatient Plan of Care  Goal: Plan of Care Review  Outcome: Ongoing, Progressing  Goal: Patient-Specific Goal (Individualized)  Outcome: Ongoing, Progressing  Goal: Optimal Comfort and Wellbeing  Outcome: Ongoing, Progressing  Goal: Readiness for Transition of Care  Outcome: Ongoing, Progressing     Problem: Diabetes Comorbidity  Goal: Blood Glucose Level Within Targeted Range  Outcome: Ongoing, Progressing     Problem: Skin Injury Risk Increased  Goal: Skin Health and Integrity  Outcome: Ongoing, Progressing     Problem: Infection (Hemodialysis)  Goal: Absence of Infection Signs and Symptoms  Outcome: Ongoing, Progressing     Problem: Infection  Goal: Absence of Infection Signs and Symptoms  Outcome: Ongoing, Progressing     Problem: Coping Ineffective  Goal: Effective Coping  Outcome: Ongoing, Progressing     Problem: Fall Injury Risk  Goal: Absence of Fall and Fall-Related Injury  Outcome: Ongoing, Progressing

## 2023-08-15 NOTE — ASSESSMENT & PLAN NOTE
History of AF noted however no EKG here with AF  Not on AC or rate control agents at this time   Subjective:      Patient ID: Katty Aguero is a 35 y.o. female.    Chief Complaint: No chief complaint on file.    The patient is a 35-year-old  female with MDS/MPN most consistent with CMML who presents to the hematology oncology clinic today to proceed with cycle 2 of chemotherapy with Vidaza.  She reports that overall she tolerated cycle 1 well without any significant side effects.  She also continues on prednisone 20 mg by mouth once daily at this time for polyarthralgia.  She reports that Dr. Deshpande has recommended that she start sulfasalazine but she has not yet picked up this prescription.  She denies any fevers, chills or night sweats.  She denies any loss of appetite or unintentional weight loss.  She denies any chest pain or shortness of breath.  She denies any melena, hematochezia, hematemesis, hemoptysis or hematuria.  She denies any bowel or urinary complaints.  She denies any nausea, vomiting or abdominal pain.  I have reviewed all of the patient's relevant clinical history available in the medical record and have utilized this in my evaluation and management recommendations today.  The patient is followed in the outpatient hematology oncology clinic by Dr. Miki Worrell and Dr. Aaron Zhong.      Review of Systems   Constitutional: Negative for activity change, appetite change, chills, diaphoresis, fatigue, fever and unexpected weight change.   HENT: Negative for congestion, dental problem, ear discharge, ear pain, hearing loss, mouth sores, postnasal drip, sinus pressure, sore throat, tinnitus, trouble swallowing and voice change.    Eyes: Negative for photophobia, pain and visual disturbance.   Respiratory: Negative for cough, chest tightness, shortness of breath and wheezing.    Cardiovascular: Negative for chest pain, palpitations and leg swelling.   Gastrointestinal: Negative for abdominal distention, abdominal pain, blood in stool, constipation, diarrhea, nausea and  vomiting.   Endocrine: Negative for cold intolerance, heat intolerance, polydipsia, polyphagia and polyuria.   Genitourinary: Negative for difficulty urinating, dysuria, flank pain, frequency, hematuria, urgency, vaginal bleeding and vaginal discharge.   Musculoskeletal: Positive for arthralgias. Negative for back pain, gait problem, joint swelling and myalgias.   Skin: Negative for pallor and rash.   Allergic/Immunologic: Negative for immunocompromised state.   Neurological: Negative for dizziness, syncope, weakness, light-headedness, numbness and headaches.   Hematological: Negative for adenopathy. Does not bruise/bleed easily.   Psychiatric/Behavioral: Negative for agitation, confusion and dysphoric mood. The patient is not nervous/anxious.        Medication List with Changes/Refills   Current Medications    DICLOFENAC SODIUM (VOLTAREN) 1 % GEL    Apply 2 g topically 4 (four) times daily as needed.    DORZOLAMIDE-TIMOLOL 2-0.5% (COSOPT) 22.3-6.8 MG/ML OPHTHALMIC SOLUTION    Place 1 drop into the left eye 2 (two) times daily.    HYDROCODONE-ACETAMINOPHEN 5-325MG (NORCO) 5-325 MG PER TABLET    Take 1 tablet by mouth every 6 (six) hours as needed for Pain.    NORETHINDRONE (ORTHO MICRONOR) 0.35 MG TABLET    Take 1 tablet (0.35 mg total) by mouth once daily.    PREDNISOLONE ACETATE (PRED FORTE) 1 % DRPS    Place One drop in the right eye QID and one drop in the left eye 6 times a day    PREDNISONE (DELTASONE) 20 MG TABLET    Take 2 tablets (40 mg total) by mouth once daily.    SULFASALAZINE (AZULFIDINE) 500 MG TBEC    Take 1 tablet (500 mg total) by mouth 2 (two) times daily.     Review of patient's allergies indicates:  No Known Allergies    Lab: I have reviewed all of the patient's relevant lab work available in the medical record and have utilized this in my evaluation and management recommendations today    Imaging: I have reviewed all of the patient's diagnostic/imaging results available in the medical record  and have utilized this in my evaluation and management recommendations today.      Objective:     Vitals:    02/19/18 1043   BP: 113/76   Pulse: 110   Temp: 98.8 °F (37.1 °C)       Physical Exam   Constitutional: She is oriented to person, place, and time. She appears well-developed and well-nourished. She is active and cooperative.   HENT:   Head: Normocephalic and atraumatic.   Nose: Nose normal.   Mouth/Throat: Oropharynx is clear and moist. No oropharyngeal exudate.   Eyes: Pupils are equal, round, and reactive to light. No scleral icterus.   Neck: Neck supple. No thyromegaly present.   Cardiovascular: Normal rate, regular rhythm, normal heart sounds and intact distal pulses.    No murmur heard.  Pulmonary/Chest: Effort normal and breath sounds normal. No stridor. No respiratory distress. She has no wheezes. She has no rales.   Abdominal: Soft. Bowel sounds are normal. She exhibits no distension, no ascites and no mass. There is no hepatosplenomegaly. There is no tenderness. There is no rigidity, no rebound and no guarding.   Musculoskeletal: She exhibits no edema or tenderness.   Lymphadenopathy:     She has no cervical adenopathy.   Neurological: She is alert and oriented to person, place, and time. No cranial nerve deficit. She exhibits normal muscle tone. Coordination normal.   Skin: Skin is warm and dry. No rash noted. No pallor.   Psychiatric: She has a normal mood and affect. Her behavior is normal. Judgment and thought content normal.   Nursing note and vitals reviewed.      Assessment:     1. MDS/MPN (myelodysplastic/myeloproliferative neoplasms)    2. Thrombocytopenia        Plan:   1.  I had a detailed discussion with the patient today with regard to her current clinical situation.  Results of patient's lab work was reviewed.  She will proceed with cycle 2 of Vidaza today.  2.  I have recommended that she start by mouth sulfasalazine as recommend by Dr. Deshpande with rheumatology.  I have  recommended that she decrease her dose of prednisone to 15 mg by mouth once daily 2-3 days after she starts sulfasalazine.  3.  She knows to seek immediate medical attention for any signs/symptoms of infection or increased bleeding.    Follow-up in 10 days with Dr. Worrell with labs.  She knows to call sooner if any new problems or questions.  MDS/MPN (myelodysplastic/myeloproliferative neoplasms)  -     Cancel: azaCITIDine (VIDAZA) chemo injection 125 mg; Inject 125 mg into the skin one time.  -     CBC auto differential; Future; Expected date: 02/19/2018  -     CMP; Future; Expected date: 02/19/2018    Thrombocytopenia  -     Cancel: azaCITIDine (VIDAZA) chemo injection 125 mg; Inject 125 mg into the skin one time.  -     CBC auto differential; Future; Expected date: 02/19/2018  -     CMP; Future; Expected date: 02/19/2018    Other orders  -     azaCITIDine (VIDAZA) chemo injection 125 mg; Inject 125 mg into the skin one time.  -     azaCITIDine (VIDAZA) chemo injection 125 mg; Inject 125 mg into the skin one time.  -     azaCITIDine (VIDAZA) chemo injection 125 mg; Inject 125 mg into the skin one time.  -     azaCITIDine (VIDAZA) chemo injection 125 mg; Inject 125 mg into the skin one time.

## 2023-08-15 NOTE — ASSESSMENT & PLAN NOTE
Ophthalmology team on board and were going to perform the procedure but the patient's family refused without being around to review the MRI which delayed the procedure till next wednesday    MRI repeat : Diffuse inflammatory change involving the left globe, with associated thickening of the left sclera, proptosis, and focal fluid collection within the left posterior compartment, which demonstrates diffusion restriction.  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  Diffuse left preseptal and postseptal inflammatory change, with superimposed orbital cellulitis also considered.  Evaluation of the left orbital apex is limited secondary to no contrast administration.  Decreased size of the left anterior compartment with bowing of the visualized left lens.  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    Thorough counseling undertaken with POA and IM team, palliative, opthalmo and nursing team in regards to the surgery  She agreed for her brother to undergo the procedure as an emergent case if his health deteriorates  Otherwise to be scheduled on wednesday      Plan:  - Enucleation tentatively scheduled for tomorrow  - NPO midnight  - Patient's family, and Dr Stewart with ophthalmology will discuss the case further today  - If they decline surgery further, will consider life-long antibiotics instead, with outpatient follow up   oral

## 2023-08-15 NOTE — PT/OT/SLP PROGRESS
Occupational Therapy  Co- Treatment    Name: Immanuel Bernal  MRN: [id number]  Admitting Diagnosis:  Endophthalmitis     Co-tx performed to ensure pt. Safety and accommodate for pt. Activity tolerance  Recommendations:     Discharge Recommendations: nursing facility, skilled  Discharge Equipment Recommendations:  none  Barriers to discharge:  Other (Comment) (requires increased assist for ADL tasks)    Assessment:     Immanuel Bernal is a 57 y.o. male with a medical diagnosis of Endophthalmitis.  He presents with deficits in mobility and ADL tasks. Pt. Is a high fall risk. Pt. Has a lateral lean to the left for standing activities. Pt. Pleasant and cooperative. Patient would benefit from continued OT services to maximize level of safety and independence with self-care tasks.    Performance deficits affecting function are weakness, impaired endurance, impaired self care skills, impaired functional mobility, gait instability, impaired balance, impaired cardiopulmonary response to activity.     Rehab Prognosis:  Good; patient would benefit from acute skilled OT services to address these deficits and reach maximum level of function.       Plan:     Patient to be seen 3 x/week to address the above listed problems via self-care/home management, therapeutic activities, therapeutic exercises, neuromuscular re-education  Plan of Care Expires: 08/25/23  Plan of Care Reviewed with: patient, sibling    Subjective     Chief Complaint: Patient reported slight head ache on this date  Patient/Family Comments/goals: to get better   Pain/Comfort:  Pain Rating 1: 0/10  Pain Rating Post-Intervention 1: 0/10    Objective:     Communicated with: nurse prior to session.  Patient found supine with millan catheter, pulse ox (continuous), telemetry, PEG Tube, oxygen upon OT entry to room. Sister present    General Precautions: Standard, fall, vision impaired (Blind in left eye)    Orthopedic Precautions:N/A  Braces: N/A  Respiratory Status:  02 1 liter    Occupational Performance:     Bed Mobility:    Patient completed Supine to Sit with min assistance     Functional Mobility/Transfers:  Patient completed Sit <> Stand Transfer with moderate assistance  with  rolling walker   Functional Mobility: Pt. Ambulated ~ 4 feet with RW and Mod A with left lateral lean noted and second person needed for safety     Activities of Daily Living:  Upper Body Dressing: minimum assistance to don fresh gown forward seated EOB      AMPAC 6 Click ADL: 14    Treatment & Education:  Pt. Educated on importance of OOB/therapy with staff assist    Patient left up in chair with all lines intact, call button in reach, and sister present    GOALS:   Multidisciplinary Problems       Occupational Therapy Goals          Problem: Occupational Therapy    Goal Priority Disciplines Outcome Interventions   Occupational Therapy Goal     OT, PT/OT Ongoing, Progressing    Description: Goals to be met by: 8/25/23     Patient will increase functional independence with ADLs by performing:    UE Dressing with Minimal Assistance.  Grooming while seated with Stand-by Assistance.  Toileting from bedside commode with Minimal Assistance for hygiene and clothing management.   Sit to stand transfer with Contact Guard Assistance and use of RW.   Sitting at edge of bed >25 minutes with Supervision.  Step transfer with Minimal Assistance and use of RW.   Toilet transfer to bedside commode with Minimal Assistance and use of RW.                          Time Tracking:     OT Date of Treatment: 08/15/23  OT Start Time: 0905  OT Stop Time: 0923  OT Total Time (min): 18 min    Billable Minutes:Therapeutic Activity 18    OT/MORRO: OT          8/15/2023

## 2023-08-15 NOTE — NURSING
Peg tube is inoperable.  Attempts to flush tube were unsuccessful.  Peg tube has dark colored residual adhered to inside of tubing.  Medications were changed to oral.  TPN stopped.  Patient was able to swallow medications whole without incident. FLACA Gautam DO, and team notified via secure chat.

## 2023-08-15 NOTE — NURSING
Patient requested to be repositioned from recliner to bed.  Patient was not able to stand for a long period of time, legs buckled and had to be repositioned back into recliner for a 2nd try to reposition back into bed. Patient repositioned into bed with assist x 3. Patient repositioned to HOB.  Bed locked.  Call light within reach.

## 2023-08-15 NOTE — SUBJECTIVE & OBJECTIVE
Interval History: Patient was feeling well this morning, passed swallow study and cleared for a regular diet.     Labs, vitals, all remain stable.   Vision is stable in the R eye.     Tentatively, enucleation of the L eye is scheduled for tomorrow. However, patient's family would like to discuss the situation further with Dr Stewart and his team today.     Review of Systems   Constitutional:  Negative for activity change.   Eyes:  Positive for pain, redness and visual disturbance.        L eye affected   Respiratory:  Negative for apnea, choking, chest tightness, shortness of breath and wheezing.    Cardiovascular:  Negative for chest pain, palpitations and leg swelling.   Gastrointestinal:  Negative for abdominal distention and abdominal pain.   Neurological:  Negative for dizziness, tremors, seizures, speech difficulty, weakness, light-headedness and headaches.   Psychiatric/Behavioral:  Negative for agitation, behavioral problems, confusion and hallucinations.      Objective:     Vital Signs (Most Recent):  Temp: 98.7 °F (37.1 °C) (08/15/23 0807)  Pulse: 84 (08/15/23 1106)  Resp: 19 (08/15/23 0807)  BP: 120/68 (08/15/23 0807)  SpO2: 100 % (08/15/23 1106) Vital Signs (24h Range):  Temp:  [98 °F (36.7 °C)-99.1 °F (37.3 °C)] 98.7 °F (37.1 °C)  Pulse:  [] 84  Resp:  [10-19] 19  SpO2:  [99 %-100 %] 100 %  BP: (120-159)/(61-89) 120/68     Weight: 61.7 kg (136 lb 0.4 oz)  Body mass index is 19.52 kg/m².    Intake/Output Summary (Last 24 hours) at 8/15/2023 1141  Last data filed at 8/15/2023 0830  Gross per 24 hour   Intake 1464.47 ml   Output 0 ml   Net 1464.47 ml         Physical Exam  Constitutional:       Appearance: Normal appearance.   Eyes:      General:         Right eye: No discharge.      Comments: L eye remains swollen with mild tenderness in the periorbital region. Vision is not present.   R eye appears stable, without erythema pain or discharge.    Cardiovascular:      Rate and Rhythm: Normal rate and  regular rhythm.      Pulses: Normal pulses.      Heart sounds: Normal heart sounds.   Pulmonary:      Effort: Pulmonary effort is normal. No respiratory distress.      Breath sounds: Normal breath sounds.   Abdominal:      General: Abdomen is flat.      Palpations: Abdomen is soft.      Comments: PEG tube present   Musculoskeletal:      Right lower leg: No edema.      Left lower leg: No edema.   Skin:     General: Skin is warm and dry.   Neurological:      Mental Status: Mental status is at baseline.   Psychiatric:         Mood and Affect: Mood normal.             Significant Labs: All pertinent labs within the past 24 hours have been reviewed.    Significant Imaging: I have reviewed all pertinent imaging results/findings within the past 24 hours.

## 2023-08-15 NOTE — PROGRESS NOTES
Oculoplastics Progress Note    Interval hx - No improvement, stable examination.      Examination OS  External: proptosis, edema  L/L: significant periorbital edema  C/S: chemosis, 4+ injection  K: MCE  AC: hyphema  Iris: no view  Lens: no view  Vit: no view      A/P  Plan for OR tomorrow. Patient seen at bedside with Dr. Stewart. All questions answered. Will proceed with evisceration vs enucleation OS tomorrow 8/16/23.    Will update on recommendations for post-operative care after surgery.     Deanna Celestin MD  [facility]

## 2023-08-15 NOTE — NURSING
Patient is complaining of heartburn/indigestion.  FLACA Gautam DO, and team notified via secure chat.

## 2023-08-15 NOTE — NURSING
Patient consumed only a few bites of his breakfast meal, drank all of his milk 236 ml.  No coughing or clearing noted.  Patient reported feeling nauseas.  ODT given.  Patient provided crackers.

## 2023-08-15 NOTE — SUBJECTIVE & OBJECTIVE
Interval History: Underwent HD yesterday, with a net UF of 1L. NAEON.     Objective:     Vital Signs (Most Recent):  Temp: 99.5 °F (37.5 °C) (08/15/23 1218)  Pulse: 82 (08/15/23 1218)  Resp: 20 (08/15/23 1218)  BP: 119/64 (08/15/23 1218)  SpO2: 100 % (08/15/23 1218) Vital Signs (24h Range):  Temp:  [98 °F (36.7 °C)-99.5 °F (37.5 °C)] 99.5 °F (37.5 °C)  Pulse:  [] 82  Resp:  [12-20] 20  SpO2:  [99 %-100 %] 100 %  BP: (119-159)/(61-74) 119/64     Weight: 61.7 kg (136 lb 0.4 oz) (08/10/23 0944)  Body mass index is 19.52 kg/m².  Body surface area is 1.75 meters squared.    I/O last 3 completed shifts:  In: 2135.1 [Other:250; NG/GT:1330; IV Piggyback:555.1]  Out: 1500 [Other:1500]     Physical Exam  Constitutional:       Appearance: He is ill-appearing.   HENT:      Head: Normocephalic and atraumatic.      Nose: Nose normal.      Mouth/Throat:      Mouth: Mucous membranes are moist.      Pharynx: Oropharynx is clear.   Eyes:      General:         Left eye: Discharge present.  Cardiovascular:      Rate and Rhythm: Normal rate and regular rhythm.      Comments: R IJ TDC   Pulmonary:      Effort: Pulmonary effort is normal.      Breath sounds: Normal breath sounds.   Abdominal:      General: Abdomen is flat.      Palpations: Abdomen is soft.   Musculoskeletal:      Cervical back: Normal range of motion and neck supple.      Right lower leg: No edema.      Left lower leg: No edema.   Skin:     General: Skin is warm and dry.   Neurological:      Mental Status: He is alert. He is disoriented.          Significant Labs:  CBC:   Recent Labs   Lab 08/15/23  0415   WBC 12.60   RBC 3.17*   HGB 9.7*   HCT 33.3*      *   MCH 30.6   MCHC 29.1*       CMP:   Recent Labs   Lab 08/15/23  0415   GLU 99  99   CALCIUM 8.9  8.8   ALBUMIN 2.0*  2.0*   PROT 6.2   *  133*   K 3.8  4.0   CO2 25  25   CL 98  99   BUN 19  19   CREATININE 4.3*  4.6*   ALKPHOS 126   ALT 6*   AST 13   BILITOT 0.3       All labs  within the past 24 hours have been reviewed.

## 2023-08-15 NOTE — PROGRESS NOTES
[facility] - Intensive [facility] ([address] Medicine  Progress Note    Patient Name: Immanuel Bernal  MRN: [id number]  Patient Class: IP- Inpatient   Admission Date: 8/9/2023  Length of Stay: 6 days  Attending Physician: Brett Gautam, *  Primary Care Provider: Dolly, Primary Doctor        Subjective:     Principal Problem:Endophthalmitis        HPI:  HPI obtained per medical record as patient unable to communicate    57-year-old male with a history of CHF, diabetes, hypertension, chronic disability, end-stage renal disease on hemodialysis, PEG placement, bowel obstruction, sleep apnea, TIA, left eye vitreous hemorrhage, stroke, and bowel obstruction with bowel resection admitted to [facility] in [place] July 18 from nursing home with left eye pain and blurred vision.  He was admitted with concern for bilateral endophthalmitis.  Other admit diagnoses included right upper extremity thrombus, end-stage renal disease on hemodialysis, hyperkalemia, sleep apnea, diabetes, and CHF.  He was treated with broad-spectrum antibiotics.  He was seen by Infectious Diseases,, and he was treated with vancomycin, ceftriaxone, and amphotericin.  Blood cultures were positive for Pseudomonas, and amphotericin/vancomycin were stopped.  IV cefepime was continued.  He was seen by Ophthalmology, and he received intravitreal vancomycin and ceftazidime (July 18).  He also had intravitreal tap July 18 that had no yeast or fungal elements observed.  Left eye endophthalmitis did not respond to treatment, and he subsequently developed abscess formation, significant proptosis, and drainage of purulent material from the orbit.  Ophthalmology recommended enucleation.  He was continued on cefepime for pseudomonal and ophthalmitis.  Recommendation was for 6 weeks of treatment to be followed by indefinite fluoroquinolone.  He was seen by Pulmonology during his stay for a right upper lung mass with peripheral nodules.   It was felt that he would need further investigation of this once his current clinical issues stabilized.  Right upper extremity AV graft was excised during his stay with concern for infection.  A right IJ tunneled line was placed on July 27.  Left eye endophthalmitis continued to worsen, and ophthalmology spoke with patient and family about the need for enucleation.  Despite several conversations, family declined enucleation.  Plans were for transitioned to skilled nursing facility for continued treatment, but family did not want him to go to a skilled nursing facility.  He was subsequently discharged AMA from the hospital there on August 7.  Please see the August 7 Internal Medicine note for further details.     He subsequently presented to [facility] Department.  He will not go back to [facility] in [place]. He received Zosyn and vancomycin.  ED team at [facility] spoke with the patient and his power-of- (sister).  CT of the orbits noted scleral abscess along the lateral aspect of the left globe.  Patient and family are aware and in agreement that the patient needs enucleation of the eye at this time. Referring provider spoke with Oculoplastics at [facility]. Requesting transfer to [facility] Medicine for Oculoplastics specialty evaluation of persistent endophthalmitis.  ED provider noted patient is hemodynamically stable.  He does not appear volume overloaded or in respiratory distress.      The patient has a significant previous medical course as listed below  August 3: MRI brain and orbits showed worsening ophthalmitis and inflammatory changes of the left orbital tissues with slight increase in proptosis.  No evidence of intracranial inflammatory process observed.    July 25: Transesophageal echocardiogram had no evidence of endocarditis.  Moderate to severely decreased left ventricular systolic function with EF 30-35%.    July 22:  CT chest showed right  upper lobe mass with numerous bilateral nodularity predominantly in the right with associated mediastinal lymph nodes.  July 21: Blood cultures with Pseudomonas aeruginosa  July 19: MRI brain/orbits with and without contrast had findings suggestive of chronic microvascular ischemic disease of the white matter with remote ischemic event in the left insula/basal ganglia/subependymal white matter/right middle cerebellar peduncle and hemisphere.  Findings consistent with left endophthalmitis.  No abnormal findings observed in the right globe.  July 18:  Blood cultures with Pseudomonas aeruginosa and coag-negative staph      Overview/Hospital Course:  Patient was rate controlled and therefore flecainide held. His GCS is 13/15. Oculopalstics were consulted and so were the nephrology teams and IR. He underwent dialysis on his first day. Was scheduled for surgery today but his family refused to have it done until she reviewed the MRI herself first. This means that the surgeon will only be available next Wednesday in order to allow her to view and discuss the MRI. He has a peg tube in place that has not been utilzied prior. Heart failure medications have been reinstated and is being covered by vancomycin and meropenem for the endopthalmitis. Multidisciplinary session was conducted with his POA in regards to his operation, she accepted the surgery if it was emergent, otherwise to be done this upcoming Wednesday. Patient's delirium/agitation worsened so he was transferred to the ICU until his clinical situation settled. He then was stepped down to our care and ophthalmology will be conducting his eye procedure on him as planned. Tentatively, enucleation of the L eye is scheduled for tomorrow. However, patient's family would like to discuss the situation further with Dr Stewart and his team today.     Disposition: referrals to SNF have been sent. [facility] or rehab and do not want to return to their previous  nursing facility.       Interval History: Patient was feeling well this morning, passed swallow study and cleared for a regular diet.     Labs, vitals, all remain stable.   Vision is stable in the R eye.     Tentatively, enucleation of the L eye is scheduled for tomorrow. However, patient's family would like to discuss the situation further with Dr Stewart and his team today.     Review of Systems   Constitutional:  Negative for activity change.   Eyes:  Positive for pain, redness and visual disturbance.        L eye affected   Respiratory:  Negative for apnea, choking, chest tightness, shortness of breath and wheezing.    Cardiovascular:  Negative for chest pain, palpitations and leg swelling.   Gastrointestinal:  Negative for abdominal distention and abdominal pain.   Neurological:  Negative for dizziness, tremors, seizures, speech difficulty, weakness, light-headedness and headaches.   Psychiatric/Behavioral:  Negative for agitation, behavioral problems, confusion and hallucinations.      Objective:     Vital Signs (Most Recent):  Temp: 98.7 °F (37.1 °C) (08/15/23 0807)  Pulse: 84 (08/15/23 1106)  Resp: 19 (08/15/23 0807)  BP: 120/68 (08/15/23 0807)  SpO2: 100 % (08/15/23 1106) Vital Signs (24h Range):  Temp:  [98 °F (36.7 °C)-99.1 °F (37.3 °C)] 98.7 °F (37.1 °C)  Pulse:  [] 84  Resp:  [10-19] 19  SpO2:  [99 %-100 %] 100 %  BP: (120-159)/(61-89) 120/68     Weight: 61.7 kg (136 lb 0.4 oz)  Body mass index is 19.52 kg/m².    Intake/Output Summary (Last 24 hours) at 8/15/2023 1141  Last data filed at 8/15/2023 0830  Gross per 24 hour   Intake 1464.47 ml   Output 0 ml   Net 1464.47 ml         Physical Exam  Constitutional:       Appearance: Normal appearance.   Eyes:      General:         Right eye: No discharge.      Comments: L eye remains swollen with mild tenderness in the periorbital region. Vision is not present.   R eye appears stable, without erythema pain or discharge.    Cardiovascular:      Rate and  Rhythm: Normal rate and regular rhythm.      Pulses: Normal pulses.      Heart sounds: Normal heart sounds.   Pulmonary:      Effort: Pulmonary effort is normal. No respiratory distress.      Breath sounds: Normal breath sounds.   Abdominal:      General: Abdomen is flat.      Palpations: Abdomen is soft.      Comments: PEG tube present   Musculoskeletal:      Right lower leg: No edema.      Left lower leg: No edema.   Skin:     General: Skin is warm and dry.   Neurological:      Mental Status: Mental status is at baseline.   Psychiatric:         Mood and Affect: Mood normal.             Significant Labs: All pertinent labs within the past 24 hours have been reviewed.    Significant Imaging: I have reviewed all pertinent imaging results/findings within the past 24 hours.      Assessment/Plan:      * Endophthalmitis  Ophthalmology team on board and were going to perform the procedure but the patient's family refused without being around to review the MRI which delayed the procedure till next wednesday    MRI repeat : Diffuse inflammatory change involving the left globe, with associated thickening of the left sclera, proptosis, and focal fluid collection within the left posterior compartment, which demonstrates diffusion restriction.  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  Diffuse left preseptal and postseptal inflammatory change, with superimposed orbital cellulitis also considered.  Evaluation of the left orbital apex is limited secondary to no contrast administration.  Decreased size of the left anterior compartment with bowing of the visualized left lens.  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    Thorough counseling undertaken with POA and IM team, palliative, opthalmo and nursing team in regards to the surgery  She agreed for her brother to undergo the procedure as an emergent case if his health  deteriorates  Otherwise to be scheduled on wednesday      Plan:  - Enucleation tentatively scheduled for tomorrow  - NPO midnight  - Patient's family, and Dr Stewart with ophthalmology will discuss the case further today  - If they decline surgery further, will consider life-long antibiotics instead, with outpatient follow up    PEG (percutaneous endoscopic gastrostomy) status  On peg tube feeds now  Tolerating well  Able to take in PO per SLP assessment on 8/15, on regular diet with thin liquids  Will reduce tube feeds gradually as patient takes in more PO    Encounter for palliative care        Anemia in ESRD (end-stage renal disease)  EPO per nephrology      Severe malnutrition  Nutrition consulted. Most recent weight and BMI monitored-     Measurements:  Wt Readings from Last 1 Encounters:   08/10/23 61.7 kg (136 lb 0.4 oz)   Body mass index is 19.52 kg/m².    Patient has been screened and assessed by RD.    Malnutrition Type:  Context: chronic illness  Level: severe    Malnutrition Characteristic Summary:  Weight Loss (Malnutrition): greater than 10% in 6 months  Energy Intake (Malnutrition): other (see comments) (LAMONT)  Subcutaneous Fat (Malnutrition): severe depletion  Muscle Mass (Malnutrition): severe depletion    Interventions/Recommendations (treatment strategy):  1. When/if able, initiate TFs via PEG. Rec'd Novasource @ 40 mL/hr to provide 1920 kcals, 87 g of protein, 688 mL fluid. 2. RD to monitor & follow-up.     Able to take in PO per SLP assessment on 8/15, on regular diet with thin liquids  Will reduce tube feeds gradually as patient takes in more PO    Chronic kidney disease-mineral and bone disorder        ESRD (end stage renal disease)  Nephrology consulted for HD needs while inpatient    Atrial fibrillation  History of AF noted however no EKG here with AF  Not on AC or rate control agents at this time    Hyperlipidemia  Home statin    Chronic diastolic heart failure  Continue to monitor for signs of  volume overload; volume removal with dialysis    Hypertension  Hypertensive on arrival  bp today: 121/69  Home imdur  Carvedilol  Hydralazine  Nifedipine    Plan:  On hydralazine And losartan     Stroke  Continue home statin  Delerium precautions  PT OT    Diabetes mellitus  POCT glucose  LDSS  -180      VTE Risk Mitigation (From admission, onward)         Ordered     heparin (porcine) injection 5,000 Units  Every 8 hours         08/14/23 0655     heparin (porcine) injection 1,000 Units  As needed (PRN)         08/10/23 1046     IP VTE HIGH RISK PATIENT  Once         08/09/23 1317     Place sequential compression device  Until discontinued         08/09/23 1317                Discharge Planning   TOBY: 8/21/2023     Code Status: Full Code   Is the patient medically ready for discharge?: No    Reason for patient still in hospital (select all that apply): Patient trending condition, Treatment and Consult recommendations  Discharge Plan A: Long-term acute care facility (LTAC)   Discharge Delays: (!) Procedure Scheduling (IR, OR, Labs, Echo, Cath, Echo, EEG) (eye surgery next week)              Pat Montoya MD  Department of Hospital Medicine   [facility] - Intensive Care ([place]

## 2023-08-15 NOTE — NURSING
Marley, Sister, DYAN, is at bedside.  ANGELA Stewart will round on Patient shortly.  Patient and sister notified.

## 2023-08-15 NOTE — ANESTHESIA PREPROCEDURE EVALUATION
[facility]  Anesthesia Pre-Operative Evaluation         Patient Name: Immanuel Bernal  YOB: 1965  MRN: [id number]    SUBJECTIVE:     Pre-operative evaluation for Procedure(s) (LRB):  ENUCLEATION, EYE (Left)  CONJUNCTIVOPLASTY (Left)  BLEPHARORRHAPHY (Left)  INJECTION, MEDICATION, RETROBULBAR (Left)  EVISCERATION, OCULAR CONTENTS (Left)     08/15/2023    Immanuel Bernal is a 57 y.o. male w/ a significant PMHx of ESRD on HD, HFrEF 30-35%, DM2, HTN, HLD, bowel obstruction s/p bowel resection with PEG tube, KENIA, CVA, bipolar disorder, and AFib who is hospitalized for bilateral endophthalmitis with left scleral abscess. Currently on IV meropenem and vancomycin. Of note, patient was in MICU for SLED but has since been stepped down to HM.    Patient has severe PONV.    Patient now presents for the above procedure(s).    LDA:       Hemodialysis Catheter right internal jugular (Active)   Line Necessity Review CRRT/HD 08/14/23 0545   Verification by X-ray Yes 08/12/23 0701   Site Assessment No drainage;No redness;No swelling;No warmth 08/14/23 1930   Line Securement Device Secured with sutures 08/14/23 1930   Dressing Type CHG impregnated dressing/sponge 08/14/23 1930   Dressing Status Clean;Dry;Intact 08/14/23 1930   Dressing Intervention Integrity maintained 08/14/23 1930   Date on Dressing 08/10/23 08/14/23 0545   Dressing Due to be Changed 08/17/23 08/14/23 1930   Venous Patency/Care heparin locked 08/14/23 0545   Arterial Patency/Care heparin locked 08/14/23 0545   Waveform Not being transduced 08/14/23 0215   Number of days:             Peripheral IV - Single Lumen 08/11/23 0200 20 G Posterior;Right Forearm (Active)   Site Assessment Clean;Dry;Intact 08/15/23 0800   Extremity Assessment Distal to IV No abnormal discoloration 08/14/23 1930   Line Status Flushed;Saline locked  08/15/23 0800   Dressing Status Clean;Dry;Intact 08/15/23 0800   Dressing Intervention Integrity maintained 08/15/23 0800   Dressing Change Due 08/15/23 08/14/23 0500   Site Change Due 08/15/23 08/13/23 1905   Reason Not Rotated Not due 08/14/23 0500   Number of days: 4            Gastrostomy/Enterostomy LUQ (Active)   Securement secured to abdomen 08/14/23 1930   Interventions Prior to Feeding patency checked 08/14/23 1930   Feeding Type continuous;by pump 08/14/23 1930   Clamp Status/Tolerance unclamped;no abdominal discomfort;no abdominal distention 08/14/23 1930   Feeding Action feeding continued 08/14/23 0900   Dressing dry and intact 08/14/23 0900   Insertion Site no redness;no drainage 08/14/23 1930   Site Care site cleansed w/ sterile normal saline 08/14/23 0900   Flush/Irrigation flushed w/;water;no resistance met 08/14/23 1930   Current Rate (mL/hr) 45 mL/hr 08/15/23 0200   Goal Rate (mL/hr) 45 mL/hr 08/15/23 0200   Intake (mL) 30 mL 08/14/23 2118   Water Bolus (mL) 30 mL 08/14/23 2118   Tube Output(mL)(Include Discarded Residual) 0 mL 08/14/23 1651   Tube Feeding Intake (mL) 260 08/14/23 1651   Number of days:        Prev airway: None documented.    Drips: None documented.      Patient Active Problem List   Diagnosis    Diabetes mellitus    Stroke    Pancreatitis    Hypertension    CKD (chronic kidney disease) stage 3, GFR 30-59 ml/min    Persistent proteinuria    Caries    Chronic diastolic heart failure    Hemiplegia    Hyperlipidemia    Atrial fibrillation    Benign prostate hyperplasia    Major depressive disorder    Bipolar disorder    Bilateral retinal detachment    ESRD (end stage renal disease)    Endophthalmitis    Chronic kidney disease-mineral and bone disorder    Severe malnutrition    Cholecystostomy care    Anemia in ESRD (end-stage renal disease)    Encounter for palliative care    Acute metabolic encephalopathy    PEG (percutaneous endoscopic gastrostomy) status     Hypoglycemia       Review of patient's allergies indicates:   Allergen Reactions    Morphine Rash    Amiodarone analogues Itching     Other reaction(s): Unknown       Current Inpatient Medications:      Current Facility-Administered Medications on File Prior to Encounter   Medication Dose Route Frequency Provider Last Rate Last Admin    0.9%  NaCl infusion   Intravenous Once Pete Grimes NP        0.9%  NaCl infusion   Intravenous PRN Pete Grimes NP        acetaminophen tablet 1,000 mg  1,000 mg Per G Tube TID Rina Zhang NP   1,000 mg at 08/15/23 0850    albuterol-ipratropium 2.5 mg-0.5 mg/3 mL nebulizer solution 3 mL  3 mL Nebulization Q4H PRN Leroy Park MD        atorvastatin tablet 40 mg  40 mg Per G Tube Daily Charlotte Tee PA-C   40 mg at 08/15/23 0851    calcitRIOL capsule 0.25 mcg  0.25 mcg Per G Tube Daily Charlotte Tee PA-C   0.25 mcg at 08/15/23 0852    dextrose 10% bolus 125 mL 125 mL  12.5 g Intravenous PRN Porsha Funez MD   Stopped at 08/13/23 2211    dextrose 10% bolus 250 mL 250 mL  25 g Intravenous PRN Porsha Funez MD        epoetin thee injection 3,000 Units  3,000 Units Intravenous Every Tues, Thurs, Sat Pete Grimes NP   3,000 Units at 08/10/23 1116    glucagon (human recombinant) injection 1 mg  1 mg Intramuscular PRN Leroy Park MD        glucose chewable tablet 16 g  16 g Oral PRN Leroy Park MD   16 g at 08/11/23 2236    glucose chewable tablet 24 g  24 g Oral PRN Leroy Park MD        heparin (porcine) injection 1,000 Units  1,000 Units Intra-Catheter PRN Pete Grimes NP   1,000 Units at 08/14/23 0544    heparin (porcine) injection 5,000 Units  5,000 Units Subcutaneous Q8H Pat Montoya MD   5,000 Units at 08/15/23 0524    hydrALAZINE tablet 100 mg  100 mg Per G Tube TID Charlotte Tee PA-C   100 mg at 08/15/23 4219    HYDROmorphone injection 0.5 mg  0.5 mg Intravenous Q3H PRN  Brett Gautam DO        losartan split tablet 25 mg  25 mg Per G Tube Daily Charlotte Tee PA-C   25 mg at 08/15/23 0852    melatonin tablet 6 mg  6 mg Oral Nightly PRN Emilie Dunne MD   6 mg at 08/11/23 2204    meropenem (MERREM) 500 mg in sodium chloride 0.9 % 100 mL IVPB (MB+)  500 mg Intravenous Q12H Jacquelin Colón DO   Stopped at 08/15/23 0157    mupirocin 2 % ointment   Nasal BID Corine Delatorre MD   Given at 08/15/23 0850    naloxone 0.4 mg/mL injection 0.02 mg  0.02 mg Intravenous PRN Leroy Park MD        OLANZapine injection 5 mg  5 mg Intramuscular Q8H PRN Brett Gautam DO   5 mg at 08/12/23 0130    ondansetron disintegrating tablet 8 mg  8 mg Oral Q8H PRN Vera Trinidad MD   8 mg at 08/13/23 1133    promethazine (PHENERGAN) 12.5 mg in dextrose 5 % (D5W) 50 mL IVPB  12.5 mg Intravenous Q6H PRN Jacquelin Colón DO   Stopped at 08/13/23 1600    sevelamer carbonate pwpk 0.8 g  0.8 g Oral TID WM Brett Gautam DO   0.8 g at 08/15/23 0852    sodium chloride 0.9% flush 10 mL  10 mL Intravenous Q12H PRN Leroy Park MD        vancomycin - pharmacy to dose   Intravenous pharmacy to manage frequency Leroy Park MD         Current Outpatient Medications on File Prior to Encounter   Medication Sig Dispense Refill    acetaminophen (TYLENOL) 325 MG tablet Take 650 mg by mouth every 6 (six) hours as needed for Pain.      albuterol-ipratropium (DUO-NEB) 2.5 mg-0.5 mg/3 mL nebulizer solution Inhale 3 mLs into the lungs 3 (three) times daily.      amLODIPine (NORVASC) 10 MG tablet Take 10 mg by mouth once daily.      ascorbic acid, vitamin C, (VITAMIN C) 500 MG tablet Take 500 mg by mouth once daily.      aspirin 81 MG Chew Take 81 mg by mouth once daily.      calcium carbonate-vitamin D3 (OYSTER SHELL CALCIUM-VIT D3) 500 mg-5 mcg (200 unit) PwPk Take 1 tablet by mouth 2 (two) times a day.      carvediloL (COREG) 25 MG tablet Take 1  tablet (25 mg total) by mouth 2 (two) times daily with meals. 60 tablet 6    citalopram (CELEXA) 20 MG tablet Take 20 mg by mouth once daily.      coffee xt/phosphatidyl serine (NEURIVA ORIGINAL ORAL) Take 1 capsule by mouth once daily.      flecainide (TAMBOCOR) 50 MG Tab Take 50 mg by mouth 2 (two) times daily.      folic acid (FOLVITE) 1 MG tablet Take 1 tablet by mouth every morning.      ibuprofen (ADVIL,MOTRIN) 600 MG tablet Take 600 mg by mouth every 6 (six) hours as needed for Pain.      lactulose (CHRONULAC) 10 gram/15 mL solution Take 45 mLs by mouth daily as needed (constipation).      megestroL (MEGACE) 400 mg/10 mL (40 mg/mL) Susp Take 10 mLs by mouth 2 (two) times a day.      metoclopramide HCl (REGLAN) 5 MG tablet Take 5 mg by mouth once daily.      multivitamin (THERAGRAN) per tablet Take 1 tablet by mouth once daily.      pantoprazole (PROTONIX) 40 MG tablet Take 40 mg by mouth once daily.      pentoxifylline (TRENTAL) 400 mg TbSR Take 400 mg by mouth 2 (two) times daily.      polyethylene glycol (GLYCOLAX) 17 gram/dose powder Take 17 g by mouth 2 (two) times daily.      rosuvastatin (CRESTOR) 5 MG tablet Take 5 mg by mouth once daily.      sevelamer carbonate (RENVELA) 800 mg Tab Take 1 tablet by mouth 5 times per day      tamsulosin (FLOMAX) 0.4 mg Cap Take 1 capsule (0.4 mg total) by mouth once daily. 30 capsule 6    UNABLE TO FIND Take 1 tablet by mouth once daily. medication name: OPTIMAL ANTIOX      UNABLE TO FIND Take 1 tablet by mouth nightly. medication name: QUNOL SLEEP SUPPORT      VITAMIN B COMPLEX ORAL Take 1 tablet by mouth every morning.      zinc gluconate 50 mg tablet Take 50 mg by mouth once daily.         Past Surgical History:   Procedure Laterality Date    CATARACT EXTRACTION Bilateral        Social History     Socioeconomic History    Marital status: Single   Tobacco Use    Smoking status: Former     Current packs/day: 0.00     Types: Cigarettes, Cigars     Smokeless tobacco: Never   Substance and Sexual Activity    Alcohol use: Not Currently    Drug use: Not Currently     Social Determinants of Health     Financial Resource Strain: Low Risk  (8/10/2023)    Overall Financial Resource Strain (CARDIA)     Difficulty of Paying Living Expenses: Not very hard   Food Insecurity: No Food Insecurity (8/10/2023)    Hunger Vital Sign     Worried About Running Out of Food in the Last Year: Never true     Ran Out of Food in the Last Year: Never true   Transportation Needs: No Transportation Needs (8/10/2023)    PRAPARE - Transportation     Lack of Transportation (Medical): No     Lack of Transportation (Non-Medical): No   Physical Activity: Insufficiently Active (8/10/2023)    Exercise Vital Sign     Days of Exercise per Week: 5 days     Minutes of Exercise per Session: 20 min   Stress: Stress Concern Present (8/10/2023)    [employer] - Occupational Stress Questionnaire     Feeling of Stress : Rather much   Social Connections: Socially Isolated (8/10/2023)    Social Connection and Isolation Panel [NHANES]     Frequency of Communication with Friends and Family: Twice a week     Frequency of Social Gatherings with Friends and Family: Once a week     Attends Quaker Services: Never     Active Member of Clubs or Organizations: No     Attends Club or Organization Meetings: Never     Marital Status: Never    Housing Stability: Unknown (8/10/2023)    Housing Stability Vital Sign     Unable to Pay for Housing in the Last Year: No     Unstable Housing in the Last Year: No       OBJECTIVE:     Vital Signs Range (Last 24H):  Temp:  [36.7 °C (98 °F)-37.3 °C (99.1 °F)]   Pulse:  []   Resp:  [10-19]   BP: (120-159)/(61-89)   SpO2:  [99 %-100 %]       Significant Labs:  Lab Results   Component Value Date    WBC 12.60 08/15/2023    HGB 9.7 (L) 08/15/2023    HCT 33.3 (L) 08/15/2023     08/15/2023    CHOL 212 (H) 11/09/2020     TRIG 177 (H) 11/09/2020    HDL 46 11/09/2020    ALT 6 (L) 08/15/2023    AST 13 08/15/2023     (L) 08/15/2023     (L) 08/15/2023    K 3.8 08/15/2023    K 4.0 08/15/2023    CL 98 08/15/2023    CL 99 08/15/2023    CREATININE 4.3 (H) 08/15/2023    CREATININE 4.6 (H) 08/15/2023    BUN 19 08/15/2023    BUN 19 08/15/2023    CO2 25 08/15/2023    CO2 25 08/15/2023    TSH 2.270 11/09/2020    INR 1.2 08/09/2023    HGBA1C 4.7 08/12/2023       Diagnostic Studies: No relevant studies.    EKG:   Results for orders placed or performed during the hospital encounter of 08/09/23   EKG 12-lead    Collection Time: 08/12/23  9:38 AM    Narrative    Test Reason : R00.1,    Vent. Rate : 052 BPM     Atrial Rate : 052 BPM     P-R Int : 212 ms          QRS Dur : 128 ms      QT Int : 490 ms       P-R-T Axes : 002 252 269 degrees     QTc Int : 455 ms    Sinus bradycardia with 1st degree A-V block  Right bundle branch block  Anteroseptal infarct (cited on or before 09-AUG-2023)  T wave abnormality, consider inferolateral ischemia  Abnormal ECG  When compared with ECG of 09-AUG-2023 16:26,  Serial changes of Anteroseptal infarct Present  Confirmed by ELISSA DAVIS MD ([id number]) on 8/13/2023 9:18:03 AM    Referred By: SONYA BUTLER JR.           Confirmed By:ELISSA DAVIS MD       2D ECHO:  TTE:  No results found for this or any previous visit.    EMERALD:  No results found for this or any previous visit.    ASSESSMENT/PLAN:           Pre-op Assessment    I have reviewed the Patient Summary Reports.     I have reviewed the Nursing Notes. I have reviewed the NPO Status.   I have reviewed the Medications.     Review of Systems  Anesthesia Hx:  No problems with previous Anesthesia  History of prior surgery of interest to airway management or planning: Denies Family Hx of Anesthesia complications.  Personal Hx of Anesthesia complications, Post-Operative Nausea/Vomiting   Social:  No Alcohol Use, Former Smoker    Hematology/Oncology:         --  Denies Anemia:   Cardiovascular:   Hypertension Denies CAD.   Dysrhythmias atrial fibrillation CHF hyperlipidemia    Pulmonary:   Denies COPD.  Denies Asthma. Sleep Apnea    Renal/:   Chronic Renal Disease, ESRD, Dialysis    Hepatic/GI:   Denies GERD.    Musculoskeletal:   Denies Arthritis.     Neurological:   TIA, CVA Denies Seizures.    Endocrine:   Diabetes, type 2    Psych:   Psychiatric History          Physical Exam  General: Cooperative, Alert and Cachexia    Airway:  Mallampati: IV / III  Mouth Opening: Normal  TM Distance: Normal  Tongue: Normal  Neck ROM: Extension Decreased    Dental:  Periodontal disease  Multiple missing teeth.      Anesthesia Plan  Type of Anesthesia, risks & benefits discussed:    Anesthesia Type: Gen ETT  Intra-op Monitoring Plan: Standard ASA Monitors  Post Op Pain Control Plan: multimodal analgesia and IV/PO Opioids PRN  Induction:  IV  Airway Plan: Video, Post-Induction  Informed Consent: Informed consent signed with the Patient and all parties understand the risks and agree with anesthesia plan.  All questions answered.   ASA Score: 4  Day of Surgery Review of History & Physical: H&P Update referred to the surgeon/provider.    Ready For Surgery From Anesthesia Perspective.     .

## 2023-08-15 NOTE — ASSESSMENT & PLAN NOTE
On peg tube feeds now  Tolerating well  Able to take in PO per SLP assessment on 8/15, on regular diet with thin liquids  Will reduce tube feeds gradually as patient takes in more PO

## 2023-08-15 NOTE — PROGRESS NOTES
Pharmacokinetic Assessment Follow Up: IV Vancomycin    Vancomycin serum concentration assessment(s):    The random level was drawn correctly and can be used to guide therapy at this time. The measurement is above the desired definitive target range of 15 to 20 mcg/mL    Vancomycin Regimen Plan:    Will hold abx today   Re-dose when the random level is less than 20 mcg/mL, next level to be drawn with AM labs on 8/16  ESRD on HD- next HD set up for Wednesday     Drug levels (last 3 results):  Recent Labs   Lab Result Units 08/13/23  0348 08/14/23 0324 08/15/23  0415   Vancomycin, Random ug/mL 18.8 12.6 22.9       Pharmacy will continue to follow and monitor vancomycin.    Please contact pharmacy at extension [phone] for questions regarding this assessment.    Thank you for the consult,   Elsa Solorzano       Patient brief summary:  Immanuel Bernal is a 57 y.o. male initiated on antimicrobial therapy with IV Vancomycin for treatment of  endophthalmitis    The patient's current regimen is pulse dose based on level    Drug Allergies:   Review of patient's allergies indicates:   Allergen Reactions    Morphine Rash    Amiodarone analogues Itching     Other reaction(s): Unknown       Actual Body Weight:   61.7 kg    Renal Function:   Estimated Creatinine Clearance: 16.1 mL/min (A) (based on SCr of 4.3 mg/dL (H)).,     Dialysis Method (if applicable):  intermittent HD    CBC (last 72 hours):  Recent Labs   Lab Result Units 08/13/23  0348 08/14/23  0324 08/15/23  0415   WBC K/uL 8.50 13.49* 12.60   Hemoglobin g/dL 8.7* 9.4* 9.7*   Hematocrit % 29.5* 31.5* 33.3*   Platelets K/uL 239 231 253   Gran % % 71.3 81.0*  --    Lymph % % 8.4* 5.6*  --    Mono % % 15.4* 8.9  --    Eosinophil % % 3.3 3.2  --    Basophil % % 0.8 0.7  --    Differential Method  Automated Automated  --          Metabolic Panel (last 72 hours):  Recent Labs   Lab Result Units 08/12/23  1440 08/13/23  0348 08/13/23  1449 08/14/23  0324 08/14/23  0545  08/14/23  1354 08/14/23  2148 08/15/23  0415   Sodium mmol/L 138 136  136 136 134*  --  134* 133* 133*  133*   Potassium mmol/L 3.9 3.7  3.7 3.8 3.4* 3.5 4.1 4.0 3.8  4.0   Chloride mmol/L 102 101  101 100 101  --  99 99 98  99   CO2 mmol/L 23 21*  21* 24 23  --  27 24 25  25   Glucose mg/dL 68* 79  79 71 76  --  84 98 99  99   BUN mg/dL 14 17  17 21* 14  --  13 16 19  19   Creatinine mg/dL 3.9* 4.5*  4.5* 5.2* 3.7*  --  3.7* 4.2* 4.3*  4.6*   Albumin g/dL 2.1* 2.1*  2.1* 2.1* 2.1*  --  2.0* 1.9* 2.0*  2.0*   Total Bilirubin mg/dL  --   --   --   --   --   --   --  0.3   Alkaline Phosphatase U/L  --   --   --   --   --   --   --  126   AST U/L  --   --   --   --   --   --   --  13   ALT U/L  --   --   --   --   --   --   --  6*   Magnesium mg/dL 2.1 2.2  2.2 2.3 2.1  --  2.0 2.0 2.0   Phosphorus mg/dL 2.8 3.4  3.4 4.2 2.8  --  3.0 3.3 3.3         Vancomycin Administrations:  vancomycin given in the last 96 hours                     vancomycin (VANCOCIN) 500 mg in dextrose 5 % in water (D5W) 100 mL IVPB (MB+) (mg) 500 mg New Bag 08/11/23 2326    vancomycin (VANCOCIN) 500 mg in dextrose 5 % in water (D5W) 100 mL IVPB (MB+) (mg) 500 mg New Bag 08/11/23 0202                    Microbiologic Results:  Microbiology Results (last 7 days)       Procedure Component Value Units Date/Time    Blood culture (site 2) [[id number]] Collected: 08/09/23 1434    Order Status: Completed Specimen: Blood Updated: 08/14/23 1812     Blood Culture, Routine No growth after 5 days.    Narrative:      Site # 2, aerobic only  Collection has been rescheduled by [email or web address] at 08/09/2023 13:27 Reason:   Patient unavailable in with DR   Collection has been rescheduled by [email or web address] at 08/09/2023 13:27 Reason:   Patient unavailable in with DR     Blood culture (site 1) [[id number]] Collected: 08/09/23 1434    Order Status: Completed Specimen: Blood Updated: 08/14/23 1812     Blood Culture, Routine No growth after 5 days.    Narrative:      Site  # 1, aerobic and anaerobic  Collection has been rescheduled by [email or web address] at 08/09/2023 13:27 Reason:   Patient unavailable in with DR   Collection has been rescheduled by [email or web address] at 08/09/2023 13:27 Reason:   Patient unavailable in with DR

## 2023-08-15 NOTE — PLAN OF CARE
Problem: Adult Inpatient Plan of Care  Goal: Plan of Care Review  Outcome: Ongoing, Progressing  Goal: Optimal Comfort and Wellbeing  Outcome: Ongoing, Progressing  Goal: Readiness for Transition of Care  Outcome: Ongoing, Progressing     Problem: Diabetes Comorbidity  Goal: Blood Glucose Level Within Targeted Range  Outcome: Ongoing, Progressing     Problem: Skin Injury Risk Increased  Goal: Skin Health and Integrity  Outcome: Ongoing, Progressing     Problem: Infection (Hemodialysis)  Goal: Absence of Infection Signs and Symptoms  Outcome: Ongoing, Progressing

## 2023-08-15 NOTE — ASSESSMENT & PLAN NOTE
Underwent dialysis for three hours on 8/10 as well as MRI with contrast    Patient's creatinine before dialysis is 9.6 >> 5.2 >>> 3.7  Baseline one month back is 5.83  Underwent emergent SLED post MRI    Plan:  Follow nephro recs, currently receiving HD

## 2023-08-15 NOTE — NURSING
End of Shift Note    Diagnosis:  Endophthalmitis, Left Eye    Plan:  Left eye surgery tomorrow, Patient will remain NPO at midnight.    Mentation:  AAOx3, calm, cooperative, very pleasant    Respiratory:  2L nasal cannula.  No home oxygen use    Cardio:  Telemetry, NSR    GI: Last BM 8/15/23, c/o nausea, ODT given x 2.      : Anuric    Mobility:  Gait instability, and weakness.  Assist x 3.  Patient remained OOB for a few hours today.      Skin:  PEG LUQ (inoperable).      Lines, Drains, & Tubes:  PIV, Right 20 FA.  Right HD IJ, Peg tube    Antibiotic Therapy:  Meropenem 500 mg q12h.       Family:  Marley, sister, MPOMARQUISE, at bedside.  She is very attentive, caring, and participates with all Patient care.

## 2023-08-15 NOTE — PROGRESS NOTES
[facility] ([place] [id number])  Nephrology  Progress Note    Patient Name: Immanuel Bernal  MRN: [id number]  Admission Date: 8/9/2023  Hospital Length of Stay: 6 days  Attending Provider: Brett Gautam, *   Primary Care Physician: No, Primary Doctor  Principal Problem:Endophthalmitis      Interval History: Underwent HD yesterday, with a net UF of 1L. NAEON.     Objective:     Vital Signs (Most Recent):  Temp: 99.5 °F (37.5 °C) (08/15/23 1218)  Pulse: 82 (08/15/23 1218)  Resp: 20 (08/15/23 1218)  BP: 119/64 (08/15/23 1218)  SpO2: 100 % (08/15/23 1218) Vital Signs (24h Range):  Temp:  [98 °F (36.7 °C)-99.5 °F (37.5 °C)] 99.5 °F (37.5 °C)  Pulse:  [] 82  Resp:  [12-20] 20  SpO2:  [99 %-100 %] 100 %  BP: (119-159)/(61-74) 119/64     Weight: 61.7 kg (136 lb 0.4 oz) (08/10/23 0944)  Body mass index is 19.52 kg/m².  Body surface area is 1.75 meters squared.    I/O last 3 completed shifts:  In: 2135.1 [Other:250; NG/GT:1330; IV Piggyback:555.1]  Out: 1500 [Other:1500]    Physical Exam  Constitutional:       Appearance: He is ill-appearing.   HENT:      Head: Normocephalic and atraumatic.      Nose: Nose normal.      Mouth/Throat:      Mouth: Mucous membranes are moist.      Pharynx: Oropharynx is clear.   Eyes:      General:         Left eye: Discharge present.  Cardiovascular:      Rate and Rhythm: Normal rate and regular rhythm.      Comments: R IJ TDC   Pulmonary:      Effort: Pulmonary effort is normal.      Breath sounds: Normal breath sounds.   Abdominal:      General: Abdomen is flat.      Palpations: Abdomen is soft.   Musculoskeletal:      Cervical back: Normal range of motion and neck supple.      Right lower leg: No edema.      Left lower leg: No edema.   Skin:     General: Skin is warm and dry.   Neurological:      Mental Status: He is alert. He is disoriented.          Significant Labs:  CBC:   Recent Labs   Lab 08/15/23  0415   WBC 12.60   RBC 3.17*   HGB 9.7*   HCT 33.3*       *   MCH 30.6   MCHC 29.1*       CMP:   Recent Labs   Lab 08/15/23  0415   GLU 99  99   CALCIUM 8.9  8.8   ALBUMIN 2.0*  2.0*   PROT 6.2   *  133*   K 3.8  4.0   CO2 25  25   CL 98  99   BUN 19  19   CREATININE 4.3*  4.6*   ALKPHOS 126   ALT 6*   AST 13   BILITOT 0.3       All labs within the past 24 hours have been reviewed.        Assessment/Plan:     Ophtho  * Endophthalmitis  -Plan per primary     Renal/  Chronic kidney disease-mineral and bone disorder  -Continue calcitriol and renvela     ESRD (end stage renal disease)  Outpatient HD Information:    -Outpatient HD unit: [facility] [place]   -HD tx days: MWF   -HD tx time: 210min  -HD access: R IJ TDC   -HD modality: iHD   -Residual urine: ?      Plan/Recommendations:    -HD tomorrow for metabolic clearance and volume management   -Renal diet  -Strict I/O's and daily weights  -Daily renal function panels   -Renally dose meds      Oncology  Anemia in ESRD (end-stage renal disease)  -Target Hg of 10-12  -Transfuse for Hg <7.0  -Epo with HD         Thank you for your consult. I will follow-up with patient. Please contact us if you have any additional questions.    Pete Girmes NP  Nephrology  [facility] ([place])

## 2023-08-16 ENCOUNTER — ANESTHESIA (OUTPATIENT)
Dept: SURGERY | Facility: HOSPITAL | Age: 58
DRG: 113 | End: 2023-08-16
Payer: MEDICARE

## 2023-08-16 LAB
ABO + RH BLD: NORMAL
ALBUMIN SERPL BCP-MCNC: 2 G/DL (ref 3.5–5.2)
ALBUMIN SERPL BCP-MCNC: 2 G/DL (ref 3.5–5.2)
ALBUMIN SERPL BCP-MCNC: 2.1 G/DL (ref 3.5–5.2)
ALP SERPL-CCNC: 118 U/L (ref 55–135)
ALT SERPL W/O P-5'-P-CCNC: 5 U/L (ref 10–44)
ANION GAP SERPL CALC-SCNC: 10 MMOL/L (ref 8–16)
ANION GAP SERPL CALC-SCNC: 11 MMOL/L (ref 8–16)
ANION GAP SERPL CALC-SCNC: 9 MMOL/L (ref 8–16)
AST SERPL-CCNC: 14 U/L (ref 10–40)
BILIRUB SERPL-MCNC: 0.3 MG/DL (ref 0.1–1)
BLD GP AB SCN CELLS X3 SERPL QL: NORMAL
BUN SERPL-MCNC: 13 MG/DL (ref 6–20)
BUN SERPL-MCNC: 13 MG/DL (ref 6–20)
BUN SERPL-MCNC: 14 MG/DL (ref 6–20)
CALCIUM SERPL-MCNC: 8.6 MG/DL (ref 8.7–10.5)
CALCIUM SERPL-MCNC: 8.6 MG/DL (ref 8.7–10.5)
CALCIUM SERPL-MCNC: 8.7 MG/DL (ref 8.7–10.5)
CHLORIDE SERPL-SCNC: 103 MMOL/L (ref 95–110)
CHLORIDE SERPL-SCNC: 104 MMOL/L (ref 95–110)
CHLORIDE SERPL-SCNC: 104 MMOL/L (ref 95–110)
CO2 SERPL-SCNC: 23 MMOL/L (ref 23–29)
CO2 SERPL-SCNC: 24 MMOL/L (ref 23–29)
CO2 SERPL-SCNC: 25 MMOL/L (ref 23–29)
CREAT SERPL-MCNC: 3.2 MG/DL (ref 0.5–1.4)
CREAT SERPL-MCNC: 3.2 MG/DL (ref 0.5–1.4)
CREAT SERPL-MCNC: 3.4 MG/DL (ref 0.5–1.4)
ERYTHROCYTE [DISTWIDTH] IN BLOOD BY AUTOMATED COUNT: 17.7 % (ref 11.5–14.5)
EST. GFR  (NO RACE VARIABLE): 20 ML/MIN/1.73 M^2
EST. GFR  (NO RACE VARIABLE): 21.5 ML/MIN/1.73 M^2
EST. GFR  (NO RACE VARIABLE): 21.5 ML/MIN/1.73 M^2
GLUCOSE SERPL-MCNC: 66 MG/DL (ref 70–110)
GLUCOSE SERPL-MCNC: 67 MG/DL (ref 70–110)
GLUCOSE SERPL-MCNC: 68 MG/DL (ref 70–110)
GRAM STN SPEC: NORMAL
HCT VFR BLD AUTO: 30.4 % (ref 40–54)
HGB BLD-MCNC: 8.7 G/DL (ref 14–18)
MAGNESIUM SERPL-MCNC: 2.1 MG/DL (ref 1.6–2.6)
MAGNESIUM SERPL-MCNC: 2.1 MG/DL (ref 1.6–2.6)
MCH RBC QN AUTO: 29.6 PG (ref 27–31)
MCHC RBC AUTO-ENTMCNC: 28.6 G/DL (ref 32–36)
MCV RBC AUTO: 103 FL (ref 82–98)
PHOSPHATE SERPL-MCNC: 2.4 MG/DL (ref 2.7–4.5)
PHOSPHATE SERPL-MCNC: 2.4 MG/DL (ref 2.7–4.5)
PLATELET # BLD AUTO: 224 K/UL (ref 150–450)
PMV BLD AUTO: 9.8 FL (ref 9.2–12.9)
POCT GLUCOSE: 71 MG/DL (ref 70–110)
POCT GLUCOSE: 80 MG/DL (ref 70–110)
POTASSIUM SERPL-SCNC: 3.7 MMOL/L (ref 3.5–5.1)
PROT SERPL-MCNC: 6 G/DL (ref 6–8.4)
RBC # BLD AUTO: 2.94 M/UL (ref 4.6–6.2)
SODIUM SERPL-SCNC: 137 MMOL/L (ref 136–145)
SODIUM SERPL-SCNC: 138 MMOL/L (ref 136–145)
SODIUM SERPL-SCNC: 138 MMOL/L (ref 136–145)
SPECIMEN OUTDATE: NORMAL
VANCOMYCIN SERPL-MCNC: 17.8 UG/ML
WBC # BLD AUTO: 8.56 K/UL (ref 3.9–12.7)

## 2023-08-16 PROCEDURE — 85027 COMPLETE CBC AUTOMATED: CPT

## 2023-08-16 PROCEDURE — 87206 SMEAR FLUORESCENT/ACID STAI: CPT | Mod: 91 | Performed by: STUDENT IN AN ORGANIZED HEALTH CARE EDUCATION/TRAINING PROGRAM

## 2023-08-16 PROCEDURE — 88304 TISSUE EXAM BY PATHOLOGIST: CPT | Performed by: STUDENT IN AN ORGANIZED HEALTH CARE EDUCATION/TRAINING PROGRAM

## 2023-08-16 PROCEDURE — 63600175 PHARM REV CODE 636 W HCPCS: Performed by: ANESTHESIOLOGY

## 2023-08-16 PROCEDURE — 87116 MYCOBACTERIA CULTURE: CPT | Mod: 59 | Performed by: OPHTHALMOLOGY

## 2023-08-16 PROCEDURE — 67875 PR TEMP CLOSURE EYELID BY SUTURE: ICD-10-PCS | Mod: 51,LT,, | Performed by: OPHTHALMOLOGY

## 2023-08-16 PROCEDURE — 36415 COLL VENOUS BLD VENIPUNCTURE: CPT | Performed by: STUDENT IN AN ORGANIZED HEALTH CARE EDUCATION/TRAINING PROGRAM

## 2023-08-16 PROCEDURE — 87070 CULTURE OTHR SPECIMN AEROBIC: CPT | Mod: 59 | Performed by: OPHTHALMOLOGY

## 2023-08-16 PROCEDURE — 87205 SMEAR GRAM STAIN: CPT | Mod: 59 | Performed by: STUDENT IN AN ORGANIZED HEALTH CARE EDUCATION/TRAINING PROGRAM

## 2023-08-16 PROCEDURE — 25000003 PHARM REV CODE 250: Performed by: NURSE ANESTHETIST, CERTIFIED REGISTERED

## 2023-08-16 PROCEDURE — 87102 FUNGUS ISOLATION CULTURE: CPT | Mod: 59 | Performed by: STUDENT IN AN ORGANIZED HEALTH CARE EDUCATION/TRAINING PROGRAM

## 2023-08-16 PROCEDURE — 11000001 HC ACUTE MED/SURG PRIVATE ROOM

## 2023-08-16 PROCEDURE — 71000033 HC RECOVERY, INTIAL HOUR: Performed by: OPHTHALMOLOGY

## 2023-08-16 PROCEDURE — 88305 TISSUE EXAM BY PATHOLOGIST: CPT | Mod: 59 | Performed by: STUDENT IN AN ORGANIZED HEALTH CARE EDUCATION/TRAINING PROGRAM

## 2023-08-16 PROCEDURE — 88305 TISSUE EXAM BY PATHOLOGIST: CPT | Mod: 26,,, | Performed by: STUDENT IN AN ORGANIZED HEALTH CARE EDUCATION/TRAINING PROGRAM

## 2023-08-16 PROCEDURE — 88312 SPECIAL STAINS GROUP 1: CPT | Mod: 59 | Performed by: STUDENT IN AN ORGANIZED HEALTH CARE EDUCATION/TRAINING PROGRAM

## 2023-08-16 PROCEDURE — 36000707: Performed by: OPHTHALMOLOGY

## 2023-08-16 PROCEDURE — 99233 SBSQ HOSP IP/OBS HIGH 50: CPT | Mod: ,,, | Performed by: STUDENT IN AN ORGANIZED HEALTH CARE EDUCATION/TRAINING PROGRAM

## 2023-08-16 PROCEDURE — 37000009 HC ANESTHESIA EA ADD 15 MINS: Performed by: OPHTHALMOLOGY

## 2023-08-16 PROCEDURE — 88312 PR  SPECIAL STAINS,GROUP I: ICD-10-PCS | Mod: 26,,, | Performed by: STUDENT IN AN ORGANIZED HEALTH CARE EDUCATION/TRAINING PROGRAM

## 2023-08-16 PROCEDURE — 71000015 HC POSTOP RECOV 1ST HR: Performed by: OPHTHALMOLOGY

## 2023-08-16 PROCEDURE — 87075 CULTR BACTERIA EXCEPT BLOOD: CPT | Performed by: OPHTHALMOLOGY

## 2023-08-16 PROCEDURE — 25000003 PHARM REV CODE 250

## 2023-08-16 PROCEDURE — 82962 GLUCOSE BLOOD TEST: CPT | Performed by: OPHTHALMOLOGY

## 2023-08-16 PROCEDURE — 87070 CULTURE OTHR SPECIMN AEROBIC: CPT | Mod: 59 | Performed by: STUDENT IN AN ORGANIZED HEALTH CARE EDUCATION/TRAINING PROGRAM

## 2023-08-16 PROCEDURE — 80069 RENAL FUNCTION PANEL: CPT | Performed by: STUDENT IN AN ORGANIZED HEALTH CARE EDUCATION/TRAINING PROGRAM

## 2023-08-16 PROCEDURE — 88312 SPECIAL STAINS GROUP 1: CPT | Mod: 26,,, | Performed by: STUDENT IN AN ORGANIZED HEALTH CARE EDUCATION/TRAINING PROGRAM

## 2023-08-16 PROCEDURE — 88305 TISSUE EXAM BY PATHOLOGIST: ICD-10-PCS | Mod: 26,,, | Performed by: STUDENT IN AN ORGANIZED HEALTH CARE EDUCATION/TRAINING PROGRAM

## 2023-08-16 PROCEDURE — 36000706: Performed by: OPHTHALMOLOGY

## 2023-08-16 PROCEDURE — 87076 CULTURE ANAEROBE IDENT EACH: CPT | Performed by: STUDENT IN AN ORGANIZED HEALTH CARE EDUCATION/TRAINING PROGRAM

## 2023-08-16 PROCEDURE — 86900 BLOOD TYPING SEROLOGIC ABO: CPT

## 2023-08-16 PROCEDURE — 88304 PR  SURG PATH,LEVEL III: ICD-10-PCS | Mod: 26,,, | Performed by: STUDENT IN AN ORGANIZED HEALTH CARE EDUCATION/TRAINING PROGRAM

## 2023-08-16 PROCEDURE — D9220A PRA ANESTHESIA: Mod: CRNA,,, | Performed by: NURSE ANESTHETIST, CERTIFIED REGISTERED

## 2023-08-16 PROCEDURE — 63600175 PHARM REV CODE 636 W HCPCS: Performed by: NURSE ANESTHETIST, CERTIFIED REGISTERED

## 2023-08-16 PROCEDURE — 99233 PR SUBSEQUENT HOSPITAL CARE,LEVL III: ICD-10-PCS | Mod: ,,, | Performed by: NURSE PRACTITIONER

## 2023-08-16 PROCEDURE — D9220A PRA ANESTHESIA: ICD-10-PCS | Mod: CRNA,,, | Performed by: NURSE ANESTHETIST, CERTIFIED REGISTERED

## 2023-08-16 PROCEDURE — 88313 PR  SPECIAL STAINS,GROUP II: ICD-10-PCS | Mod: 26,,, | Performed by: STUDENT IN AN ORGANIZED HEALTH CARE EDUCATION/TRAINING PROGRAM

## 2023-08-16 PROCEDURE — 80202 ASSAY OF VANCOMYCIN: CPT | Performed by: STUDENT IN AN ORGANIZED HEALTH CARE EDUCATION/TRAINING PROGRAM

## 2023-08-16 PROCEDURE — 25000003 PHARM REV CODE 250: Performed by: OPHTHALMOLOGY

## 2023-08-16 PROCEDURE — 88304 TISSUE EXAM BY PATHOLOGIST: CPT | Mod: 26,,, | Performed by: STUDENT IN AN ORGANIZED HEALTH CARE EDUCATION/TRAINING PROGRAM

## 2023-08-16 PROCEDURE — 63600175 PHARM REV CODE 636 W HCPCS: Performed by: INTERNAL MEDICINE

## 2023-08-16 PROCEDURE — 99233 SBSQ HOSP IP/OBS HIGH 50: CPT | Mod: ,,, | Performed by: NURSE PRACTITIONER

## 2023-08-16 PROCEDURE — 87015 SPECIMEN INFECT AGNT CONCNTJ: CPT | Mod: 59 | Performed by: OPHTHALMOLOGY

## 2023-08-16 PROCEDURE — 36415 COLL VENOUS BLD VENIPUNCTURE: CPT

## 2023-08-16 PROCEDURE — 71000039 HC RECOVERY, EACH ADD'L HOUR: Performed by: OPHTHALMOLOGY

## 2023-08-16 PROCEDURE — 63600175 PHARM REV CODE 636 W HCPCS: Performed by: STUDENT IN AN ORGANIZED HEALTH CARE EDUCATION/TRAINING PROGRAM

## 2023-08-16 PROCEDURE — 83735 ASSAY OF MAGNESIUM: CPT | Performed by: STUDENT IN AN ORGANIZED HEALTH CARE EDUCATION/TRAINING PROGRAM

## 2023-08-16 PROCEDURE — 21400001 HC TELEMETRY ROOM

## 2023-08-16 PROCEDURE — 87116 MYCOBACTERIA CULTURE: CPT | Mod: 59 | Performed by: STUDENT IN AN ORGANIZED HEALTH CARE EDUCATION/TRAINING PROGRAM

## 2023-08-16 PROCEDURE — 80053 COMPREHEN METABOLIC PANEL: CPT

## 2023-08-16 PROCEDURE — 87205 SMEAR GRAM STAIN: CPT | Mod: 59 | Performed by: OPHTHALMOLOGY

## 2023-08-16 PROCEDURE — D9220A PRA ANESTHESIA: Mod: ANES,,, | Performed by: STUDENT IN AN ORGANIZED HEALTH CARE EDUCATION/TRAINING PROGRAM

## 2023-08-16 PROCEDURE — 88313 SPECIAL STAINS GROUP 2: CPT | Mod: 26,,, | Performed by: STUDENT IN AN ORGANIZED HEALTH CARE EDUCATION/TRAINING PROGRAM

## 2023-08-16 PROCEDURE — 88313 SPECIAL STAINS GROUP 2: CPT | Performed by: STUDENT IN AN ORGANIZED HEALTH CARE EDUCATION/TRAINING PROGRAM

## 2023-08-16 PROCEDURE — 80069 RENAL FUNCTION PANEL: CPT | Mod: 91 | Performed by: STUDENT IN AN ORGANIZED HEALTH CARE EDUCATION/TRAINING PROGRAM

## 2023-08-16 PROCEDURE — 94761 N-INVAS EAR/PLS OXIMETRY MLT: CPT

## 2023-08-16 PROCEDURE — 87075 CULTR BACTERIA EXCEPT BLOOD: CPT | Mod: 59 | Performed by: STUDENT IN AN ORGANIZED HEALTH CARE EDUCATION/TRAINING PROGRAM

## 2023-08-16 PROCEDURE — 27000221 HC OXYGEN, UP TO 24 HOURS

## 2023-08-16 PROCEDURE — 63600175 PHARM REV CODE 636 W HCPCS: Performed by: OPHTHALMOLOGY

## 2023-08-16 PROCEDURE — 65091 PR REMOVE OCULAR CONTENTS: ICD-10-PCS | Mod: LT,,, | Performed by: OPHTHALMOLOGY

## 2023-08-16 PROCEDURE — D9220A PRA ANESTHESIA: ICD-10-PCS | Mod: ANES,,, | Performed by: STUDENT IN AN ORGANIZED HEALTH CARE EDUCATION/TRAINING PROGRAM

## 2023-08-16 PROCEDURE — 37000008 HC ANESTHESIA 1ST 15 MINUTES: Performed by: OPHTHALMOLOGY

## 2023-08-16 PROCEDURE — 25000003 PHARM REV CODE 250: Performed by: INTERNAL MEDICINE

## 2023-08-16 PROCEDURE — 87102 FUNGUS ISOLATION CULTURE: CPT | Performed by: OPHTHALMOLOGY

## 2023-08-16 PROCEDURE — 65091 REVISE EYE: CPT | Mod: LT,,, | Performed by: OPHTHALMOLOGY

## 2023-08-16 PROCEDURE — 99900035 HC TECH TIME PER 15 MIN (STAT)

## 2023-08-16 PROCEDURE — 87206 SMEAR FLUORESCENT/ACID STAI: CPT | Mod: 91 | Performed by: OPHTHALMOLOGY

## 2023-08-16 PROCEDURE — 83735 ASSAY OF MAGNESIUM: CPT | Mod: 91 | Performed by: STUDENT IN AN ORGANIZED HEALTH CARE EDUCATION/TRAINING PROGRAM

## 2023-08-16 PROCEDURE — 71000016 HC POSTOP RECOV ADDL HR: Performed by: OPHTHALMOLOGY

## 2023-08-16 PROCEDURE — 80100014 HC HEMODIALYSIS 1:1

## 2023-08-16 PROCEDURE — 99233 PR SUBSEQUENT HOSPITAL CARE,LEVL III: ICD-10-PCS | Mod: ,,, | Performed by: STUDENT IN AN ORGANIZED HEALTH CARE EDUCATION/TRAINING PROGRAM

## 2023-08-16 PROCEDURE — 67875 CLOSURE OF EYELID BY SUTURE: CPT | Mod: 51,LT,, | Performed by: OPHTHALMOLOGY

## 2023-08-16 RX ORDER — SCOLOPAMINE TRANSDERMAL SYSTEM 1 MG/1
PATCH, EXTENDED RELEASE TRANSDERMAL
Status: DISCONTINUED | OUTPATIENT
Start: 2023-08-16 | End: 2023-08-16

## 2023-08-16 RX ORDER — PHENYLEPHRINE HYDROCHLORIDE 10 MG/ML
INJECTION INTRAVENOUS
Status: DISCONTINUED | OUTPATIENT
Start: 2023-08-16 | End: 2023-08-16

## 2023-08-16 RX ORDER — HYDROMORPHONE HYDROCHLORIDE 1 MG/ML
0.2 INJECTION, SOLUTION INTRAMUSCULAR; INTRAVENOUS; SUBCUTANEOUS EVERY 5 MIN PRN
Status: DISCONTINUED | OUTPATIENT
Start: 2023-08-16 | End: 2023-08-16 | Stop reason: HOSPADM

## 2023-08-16 RX ORDER — PROPOFOL 10 MG/ML
VIAL (ML) INTRAVENOUS
Status: DISCONTINUED | OUTPATIENT
Start: 2023-08-16 | End: 2023-08-16

## 2023-08-16 RX ORDER — SCOLOPAMINE TRANSDERMAL SYSTEM 1 MG/1
PATCH, EXTENDED RELEASE TRANSDERMAL
Status: COMPLETED
Start: 2023-08-16 | End: 2023-08-16

## 2023-08-16 RX ORDER — LOSARTAN POTASSIUM 50 MG/1
50 TABLET ORAL DAILY
Status: DISCONTINUED | OUTPATIENT
Start: 2023-08-17 | End: 2023-08-17

## 2023-08-16 RX ORDER — MEROPENEM 500 MG/1
INJECTION, POWDER, FOR SOLUTION INTRAVENOUS
Status: DISPENSED
Start: 2023-08-16 | End: 2023-08-17

## 2023-08-16 RX ORDER — SODIUM CHLORIDE 0.9 % (FLUSH) 0.9 %
3 SYRINGE (ML) INJECTION
Status: DISCONTINUED | OUTPATIENT
Start: 2023-08-16 | End: 2023-08-16 | Stop reason: HOSPADM

## 2023-08-16 RX ORDER — VASOPRESSIN 20 [USP'U]/ML
INJECTION, SOLUTION INTRAMUSCULAR; SUBCUTANEOUS
Status: DISCONTINUED | OUTPATIENT
Start: 2023-08-16 | End: 2023-08-16

## 2023-08-16 RX ORDER — TOBRAMYCIN AND DEXAMETHASONE 3; 1 MG/ML; MG/ML
SUSPENSION/ DROPS OPHTHALMIC
Status: DISCONTINUED | OUTPATIENT
Start: 2023-08-16 | End: 2023-08-16 | Stop reason: HOSPADM

## 2023-08-16 RX ORDER — ONDANSETRON 2 MG/ML
INJECTION INTRAMUSCULAR; INTRAVENOUS
Status: DISCONTINUED | OUTPATIENT
Start: 2023-08-16 | End: 2023-08-16

## 2023-08-16 RX ORDER — OXYCODONE HYDROCHLORIDE 5 MG/1
TABLET ORAL
Status: DISPENSED
Start: 2023-08-16 | End: 2023-08-17

## 2023-08-16 RX ORDER — HYDRALAZINE HYDROCHLORIDE 50 MG/1
TABLET, FILM COATED ORAL
Status: DISPENSED
Start: 2023-08-16 | End: 2023-08-17

## 2023-08-16 RX ORDER — ROCURONIUM BROMIDE 10 MG/ML
INJECTION, SOLUTION INTRAVENOUS
Status: DISCONTINUED | OUTPATIENT
Start: 2023-08-16 | End: 2023-08-16

## 2023-08-16 RX ORDER — HALOPERIDOL 5 MG/ML
0.5 INJECTION INTRAMUSCULAR EVERY 10 MIN PRN
Status: DISCONTINUED | OUTPATIENT
Start: 2023-08-16 | End: 2023-08-16 | Stop reason: HOSPADM

## 2023-08-16 RX ORDER — HYDROMORPHONE HYDROCHLORIDE 1 MG/ML
INJECTION, SOLUTION INTRAMUSCULAR; INTRAVENOUS; SUBCUTANEOUS
Status: DISPENSED
Start: 2023-08-16 | End: 2023-08-17

## 2023-08-16 RX ORDER — SUCCINYLCHOLINE CHLORIDE 20 MG/ML
INJECTION INTRAMUSCULAR; INTRAVENOUS
Status: DISCONTINUED | OUTPATIENT
Start: 2023-08-16 | End: 2023-08-16

## 2023-08-16 RX ORDER — FENTANYL CITRATE 50 UG/ML
INJECTION, SOLUTION INTRAMUSCULAR; INTRAVENOUS
Status: DISCONTINUED | OUTPATIENT
Start: 2023-08-16 | End: 2023-08-16

## 2023-08-16 RX ORDER — LIDOCAINE HYDROCHLORIDE 20 MG/ML
INJECTION, SOLUTION EPIDURAL; INFILTRATION; INTRACAUDAL; PERINEURAL
Status: DISCONTINUED | OUTPATIENT
Start: 2023-08-16 | End: 2023-08-16

## 2023-08-16 RX ORDER — TOBRAMYCIN AND DEXAMETHASONE 3; 1 MG/ML; MG/ML
1 SUSPENSION/ DROPS OPHTHALMIC 4 TIMES DAILY
Status: DISCONTINUED | OUTPATIENT
Start: 2023-08-16 | End: 2023-08-18

## 2023-08-16 RX ORDER — HYDRALAZINE HYDROCHLORIDE 50 MG/1
50 TABLET, FILM COATED ORAL 3 TIMES DAILY
Status: DISCONTINUED | OUTPATIENT
Start: 2023-08-16 | End: 2023-08-17

## 2023-08-16 RX ORDER — DEXAMETHASONE SODIUM PHOSPHATE 4 MG/ML
INJECTION, SOLUTION INTRA-ARTICULAR; INTRALESIONAL; INTRAMUSCULAR; INTRAVENOUS; SOFT TISSUE
Status: DISCONTINUED | OUTPATIENT
Start: 2023-08-16 | End: 2023-08-16

## 2023-08-16 RX ORDER — BUPIVACAINE HYDROCHLORIDE 5 MG/ML
INJECTION, SOLUTION EPIDURAL; INTRACAUDAL
Status: DISCONTINUED | OUTPATIENT
Start: 2023-08-16 | End: 2023-08-16 | Stop reason: HOSPADM

## 2023-08-16 RX ORDER — OXYCODONE HYDROCHLORIDE 5 MG/1
5 TABLET ORAL ONCE
Status: COMPLETED | OUTPATIENT
Start: 2023-08-16 | End: 2023-08-16

## 2023-08-16 RX ORDER — CARVEDILOL 12.5 MG/1
TABLET ORAL
Status: COMPLETED
Start: 2023-08-16 | End: 2023-08-20

## 2023-08-16 RX ORDER — CARVEDILOL 12.5 MG/1
12.5 TABLET ORAL 2 TIMES DAILY
Status: DISCONTINUED | OUTPATIENT
Start: 2023-08-16 | End: 2023-08-21

## 2023-08-16 RX ORDER — LIDOCAINE HCL/EPINEPHRINE/PF 2%-1:200K
VIAL (ML) INJECTION
Status: DISCONTINUED | OUTPATIENT
Start: 2023-08-16 | End: 2023-08-16 | Stop reason: HOSPADM

## 2023-08-16 RX ADMIN — HYDROMORPHONE HYDROCHLORIDE 0.2 MG: 1 INJECTION, SOLUTION INTRAMUSCULAR; INTRAVENOUS; SUBCUTANEOUS at 07:08

## 2023-08-16 RX ADMIN — ACETAMINOPHEN 1000 MG: 500 TABLET ORAL at 07:08

## 2023-08-16 RX ADMIN — OXYCODONE HYDROCHLORIDE 5 MG: 5 TABLET ORAL at 11:08

## 2023-08-16 RX ADMIN — HYDROMORPHONE HYDROCHLORIDE 0.5 MG: 1 INJECTION, SOLUTION INTRAMUSCULAR; INTRAVENOUS; SUBCUTANEOUS at 04:08

## 2023-08-16 RX ADMIN — HYDROMORPHONE HYDROCHLORIDE 0.5 MG: 1 INJECTION, SOLUTION INTRAMUSCULAR; INTRAVENOUS; SUBCUTANEOUS at 09:08

## 2023-08-16 RX ADMIN — VASOPRESSIN 1 UNITS: 20 INJECTION INTRAVENOUS at 03:08

## 2023-08-16 RX ADMIN — ONDANSETRON 8 MG: 2 INJECTION INTRAMUSCULAR; INTRAVENOUS at 03:08

## 2023-08-16 RX ADMIN — ROCURONIUM BROMIDE 10 MG: 10 INJECTION, SOLUTION INTRAVENOUS at 04:08

## 2023-08-16 RX ADMIN — SODIUM CHLORIDE: 9 INJECTION, SOLUTION INTRAVENOUS at 03:08

## 2023-08-16 RX ADMIN — DEXAMETHASONE SODIUM PHOSPHATE 4 MG: 4 INJECTION, SOLUTION INTRAMUSCULAR; INTRAVENOUS at 03:08

## 2023-08-16 RX ADMIN — VASOPRESSIN 1 UNITS: 20 INJECTION INTRAVENOUS at 04:08

## 2023-08-16 RX ADMIN — PROMETHAZINE HYDROCHLORIDE 12.5 MG: 25 INJECTION, SOLUTION INTRAMUSCULAR; INTRAVENOUS at 11:08

## 2023-08-16 RX ADMIN — CALCITRIOL CAPSULES 0.25 MCG 0.25 MCG: 0.25 CAPSULE ORAL at 09:08

## 2023-08-16 RX ADMIN — CARVEDILOL 12.5 MG: 12.5 TABLET, FILM COATED ORAL at 09:08

## 2023-08-16 RX ADMIN — MEROPENEM 500 MG: 500 INJECTION INTRAVENOUS at 12:08

## 2023-08-16 RX ADMIN — ROCURONIUM BROMIDE 30 MG: 10 INJECTION, SOLUTION INTRAVENOUS at 03:08

## 2023-08-16 RX ADMIN — ATORVASTATIN CALCIUM 40 MG: 40 TABLET, FILM COATED ORAL at 09:08

## 2023-08-16 RX ADMIN — MEROPENEM 500 MG: 500 INJECTION INTRAVENOUS at 02:08

## 2023-08-16 RX ADMIN — FENTANYL CITRATE 25 MCG: 50 INJECTION, SOLUTION INTRAMUSCULAR; INTRAVENOUS at 04:08

## 2023-08-16 RX ADMIN — PROPOFOL 50 MG: 10 INJECTION, EMULSION INTRAVENOUS at 03:08

## 2023-08-16 RX ADMIN — MEROPENEM 500 MG: 500 INJECTION INTRAVENOUS at 11:08

## 2023-08-16 RX ADMIN — SCOPALAMINE 1 PATCH: 1 PATCH, EXTENDED RELEASE TRANSDERMAL at 03:08

## 2023-08-16 RX ADMIN — ONDANSETRON 8 MG: 8 TABLET, ORALLY DISINTEGRATING ORAL at 07:08

## 2023-08-16 RX ADMIN — SUCCINYLCHOLINE CHLORIDE 140 MG: 20 INJECTION, SOLUTION INTRAMUSCULAR; INTRAVENOUS at 03:08

## 2023-08-16 RX ADMIN — SUGAMMADEX 200 MG: 100 INJECTION, SOLUTION INTRAVENOUS at 05:08

## 2023-08-16 RX ADMIN — ACETAMINOPHEN 1000 MG: 500 TABLET ORAL at 08:08

## 2023-08-16 RX ADMIN — PHENYLEPHRINE HYDROCHLORIDE 100 MCG: 10 INJECTION INTRAVENOUS at 03:08

## 2023-08-16 RX ADMIN — LIDOCAINE HYDROCHLORIDE 100 MG: 20 INJECTION, SOLUTION EPIDURAL; INFILTRATION; INTRACAUDAL; PERINEURAL at 03:08

## 2023-08-16 RX ADMIN — SODIUM PHOSPHATE, MONOBASIC, MONOHYDRATE AND SODIUM PHOSPHATE, DIBASIC, ANHYDROUS 15 MMOL: 142; 276 INJECTION, SOLUTION INTRAVENOUS at 08:08

## 2023-08-16 RX ADMIN — TOBRAMYCIN AND DEXAMETHASONE 1 APPLICATION: 3; 1 OINTMENT OPHTHALMIC at 09:08

## 2023-08-16 RX ADMIN — LOSARTAN POTASSIUM 25 MG: 25 TABLET, FILM COATED ORAL at 09:08

## 2023-08-16 RX ADMIN — TOBRAMYCIN AND DEXAMETHASONE 1 DROP: 3; 1 SUSPENSION/ DROPS OPHTHALMIC at 09:08

## 2023-08-16 RX ADMIN — HYDRALAZINE HYDROCHLORIDE 100 MG: 50 TABLET ORAL at 08:08

## 2023-08-16 RX ADMIN — HYDRALAZINE HYDROCHLORIDE 50 MG: 50 TABLET ORAL at 09:08

## 2023-08-16 RX ADMIN — MUPIROCIN: 20 OINTMENT TOPICAL at 08:08

## 2023-08-16 NOTE — PLAN OF CARE
"Chart reviewed. Pre-op nursing care per orders. Safe surgery checklist complete. Sister Marley at bedside. IV to left forearm infiltrated and removed. IV to left wrist flushed well. Mepilex, jacques hose and SCD applied to BLE. Patient is drowsy, arouses to voice and answers questions appropriately. Left periorbital swelling and drainage. 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 Permacath to right chest. Staples to RUE. Peg tube to abdomen clamped. Diaper on. Moving all extremities in bed. Positioned for comfort.  Warm blanket applied. Oxygen at 2L n/c. Vital signs stable. Glucose 71 at Noon. Tele monitor on, will send to PACU.   "

## 2023-08-16 NOTE — PROGRESS NOTES
[facility] ([place] [address])  Nephrology  Progress Note    Patient Name: Immanuel Bernal  MRN: [id number]  Admission Date: 8/9/2023  Hospital Length of Stay: 7 days  Attending Provider: Brett Gautam, *   Primary Care Physician: No, Primary Doctor  Principal Problem:Endophthalmitis      Interval History: Tolerated HD early this morning, with a net UF of 1L. NAEON.     Objective:     Vital Signs (Most Recent):  Temp: 98 °F (36.7 °C) (08/16/23 0714)  Pulse: 75 (08/16/23 0737)  Resp: 18 (08/16/23 0714)  BP: (!) 144/77 (08/16/23 0714)  SpO2: 100 % (08/16/23 0714) Vital Signs (24h Range):  Temp:  [97.6 °F (36.4 °C)-99.5 °F (37.5 °C)] 98 °F (36.7 °C)  Pulse:  [75-84] 75  Resp:  [13-20] 18  SpO2:  [95 %-100 %] 100 %  BP: (104-144)/(60-86) 144/77     Weight: 62.1 kg (136 lb 14.5 oz) (08/16/23 0215)  Body mass index is 19.64 kg/m².  Body surface area is 1.75 meters squared.    I/O last 3 completed shifts:  In: 296 [P.O.:236; NG/GT:60]  Out: 1500 [Other:1500]    Physical Exam  Constitutional:       Appearance: He is ill-appearing.   HENT:      Head: Normocephalic and atraumatic.      Nose: Nose normal.      Mouth/Throat:      Mouth: Mucous membranes are moist.      Pharynx: Oropharynx is clear.   Eyes:      General:         Left eye: Discharge present.  Cardiovascular:      Rate and Rhythm: Normal rate and regular rhythm.      Comments: R IJ TDC   Pulmonary:      Effort: Pulmonary effort is normal.      Breath sounds: Normal breath sounds.   Abdominal:      General: Abdomen is flat.      Palpations: Abdomen is soft.   Musculoskeletal:      Cervical back: Normal range of motion and neck supple.      Right lower leg: No edema.      Left lower leg: No edema.   Skin:     General: Skin is warm and dry.   Neurological:      Mental Status: He is alert. He is disoriented.          Significant Labs:  CBC:   Recent Labs   Lab 08/16/23  0437   WBC 8.56   RBC 2.94*   HGB 8.7*   HCT 30.4*      *   MCH  29.6   MCHC 28.6*       CMP:   Recent Labs   Lab 08/16/23  0437 08/16/23  0438   GLU 67* 66*  68*   CALCIUM 8.6* 8.6*  8.7   ALBUMIN 2.1* 2.0*  2.0*   PROT 6.0  --     138  138   K 3.7 3.7  3.7   CO2 23 24  25    103  104   BUN 13 14  13   CREATININE 3.2* 3.2*  3.4*   ALKPHOS 118  --    ALT 5*  --    AST 14  --    BILITOT 0.3  --        All labs within the past 24 hours have been reviewed.        Assessment/Plan:     Ophtho  * Endophthalmitis  -Plan per primary     Renal/  Chronic kidney disease-mineral and bone disorder  -Continue calcitriol and renvela     ESRD (end stage renal disease)  Outpatient HD Information:    -Outpatient HD unit: [facility]   -HD tx days: MWF   -HD tx time: 210min  -HD access: R IJ TDC   -HD modality: iHD   -Residual urine: ?      Plan/Recommendations:    -Continue HD MWF while inpatient   -Renal diet  -Strict I/O's and daily weights  -Daily renal function panels   -Renally dose meds      Oncology  Anemia in ESRD (end-stage renal disease)  -Target Hg of 10-12  -Transfuse for Hg <7.0  -Epo with HD         Thank you for your consult. I will follow-up with patient. Please contact us if you have any additional questions.    Pete Grimes NP  Nephrology  [facility] - Intensive [facility] ([place])

## 2023-08-16 NOTE — NURSING
Taryn, Phlebotomist, arrived to unit to draw renal panel.  Patient transferred to surgery, currently not available. JOI Montoya M.D. notified via secure chat.  Next draw will be at 2200 hours.

## 2023-08-16 NOTE — PLAN OF CARE
Problem: Adult Inpatient Plan of Care  Goal: Plan of Care Review  Outcome: Ongoing, Progressing  Goal: Absence of Hospital-Acquired Illness or Injury  Outcome: Ongoing, Progressing  Goal: Optimal Comfort and Wellbeing  Outcome: Ongoing, Progressing  Goal: Readiness for Transition of Care  Outcome: Ongoing, Progressing     Problem: Skin Injury Risk Increased  Goal: Skin Health and Integrity  Outcome: Ongoing, Progressing     Problem: Hemodynamic Instability (Hemodialysis)  Goal: Effective Tissue Perfusion  Outcome: Ongoing, Progressing     Problem: Infection (Hemodialysis)  Goal: Absence of Infection Signs and Symptoms  Outcome: Ongoing, Progressing

## 2023-08-16 NOTE — NURSING
End of Shift Note    Diagnosis: Endophthalmitis, left eye    Plan:  Enucleation of right eye /Blepharorrhapy, Blood glucose monitoring, new PIV, Antibiotic Therapy, Ultrasound Upper Extremity Right (STAT) for arm swelling.  Tube feedings discontinued. Diet changed to Diabetic 2000 calorie, low Na+ 2g.    Neuro:  AAO x 4, calm, cooperative, very pleasant.    Fluids:  Sodium Phosphate 15 mmol once    Antibiotic Therapy:  Meropenem 500 mg q12h    Respiratory: 2L nasal cannula, no home oxygen    Cardiac:  Telemetry, NSR, wall monitor    GI: Last BM 8/15/23    : UOP, HD anuric    Mobility:  assist x 2/3    Skin:  left eye swelling with purulent drainage.  Right arm swelling with staples (elevate with pillow)    Lines, Drains, & Tubes:  PEG tub (clogged) and not in use since 8/15/23, PIV Left 20 FA, Left 20 hand.  Right HD AV Fistula, deaccessed    Family:  Marley (MPOA) sister at bedside.

## 2023-08-16 NOTE — PLAN OF CARE
Problem: Adult Inpatient Plan of Care  Goal: Plan of Care Review  Outcome: Ongoing, Progressing  Goal: Patient-Specific Goal (Individualized)  Outcome: Ongoing, Progressing  Goal: Absence of Hospital-Acquired Illness or Injury  Outcome: Ongoing, Progressing  Goal: Optimal Comfort and Wellbeing  Outcome: Ongoing, Progressing     Problem: Diabetes Comorbidity  Goal: Blood Glucose Level Within Targeted Range  Outcome: Ongoing, Progressing     Problem: Skin Injury Risk Increased  Goal: Skin Health and Integrity  Outcome: Ongoing, Progressing     Problem: Coping Ineffective  Goal: Effective Coping  Outcome: Ongoing, Progressing     Problem: Fall Injury Risk  Goal: Absence of Fall and Fall-Related Injury  Outcome: Ongoing, Progressing

## 2023-08-16 NOTE — SUBJECTIVE & OBJECTIVE
Interval History: no acute events overnight    Review of Systems   Constitutional:  Negative for activity change.   Eyes:  Positive for pain, discharge and visual disturbance.   Cardiovascular:  Negative for chest pain, palpitations and leg swelling.   Gastrointestinal:  Negative for abdominal distention and abdominal pain.   Neurological: Negative.      Objective:     Vital Signs (Most Recent):  Temp: 98.1 °F (36.7 °C) (08/16/23 1400)  Pulse: 71 (08/16/23 1400)  Resp: 18 (08/16/23 1400)  BP: 138/77 (08/16/23 1400)  SpO2: 100 % (08/16/23 1400) Vital Signs (24h Range):  Temp:  [97.6 °F (36.4 °C)-98.1 °F (36.7 °C)] 98.1 °F (36.7 °C)  Pulse:  [71-84] 71  Resp:  [13-20] 18  SpO2:  [95 %-100 %] 100 %  BP: (104-161)/(60-86) 138/77     Weight: 68 kg (150 lb)  Body mass index is 21.52 kg/m².    Intake/Output Summary (Last 24 hours) at 8/16/2023 1641  Last data filed at 8/16/2023 0112  Gross per 24 hour   Intake --   Output 1500 ml   Net -1500 ml         Physical Exam  Eyes:      General:         Left eye: Discharge present.  Cardiovascular:      Rate and Rhythm: Normal rate and regular rhythm.      Pulses: Normal pulses.      Heart sounds: Normal heart sounds.   Pulmonary:      Effort: Pulmonary effort is normal.      Breath sounds: Normal breath sounds.   Abdominal:      General: Abdomen is flat. Bowel sounds are normal.      Palpations: Abdomen is soft.   Neurological:      General: No focal deficit present.      Mental Status: He is alert and oriented to person, place, and time. Mental status is at baseline.   Psychiatric:         Mood and Affect: Mood normal.             Significant Labs: All pertinent labs within the past 24 hours have been reviewed.    Significant Imaging: I have reviewed all pertinent imaging results/findings within the past 24 hours.

## 2023-08-16 NOTE — ANESTHESIA PROCEDURE NOTES
Intubation    Date/Time: 8/16/2023 3:29 PM    Performed by: Kim Simmons  Authorized by: Kandice Isaacs MD    Intubation:     Induction:  Intravenous    Intubated:  Postinduction    Mask Ventilation:  Not attempted    Attempts:  1    Attempted By:  Student    Method of Intubation:  Video laryngoscopy    Blade:  Putnam 3    Laryngeal View Grade: Grade I - full view of cords      Difficult Airway Encountered?: No      Complications:  None    Airway Device:  Oral endotracheal tube    Airway Device Size:  7.5    Style/Cuff Inflation:  Cuffed (inflated to minimal occlusive pressure)    Tube secured:  22    Secured at:  The lips    Placement Verified By:  Capnometry    Complicating Factors:  Small mouth    Findings Post-Intubation:  BS equal bilateral and atraumatic/condition of teeth unchanged

## 2023-08-16 NOTE — ASSESSMENT & PLAN NOTE
Nephrology consulted for HD needs while inpatient  Underwent SLED post MRI contrast (gadolinium)   Ad last dialysis was 08/15    Plan:  Follow nephro recs

## 2023-08-16 NOTE — OP NOTE
Operative Note  Ophthalmology Service     Procedure Date: 8/9/2023     Surgeon: Vicki Stewart MD      Assistant: Deanna Celestin MD PGY3    Pre-Operative Diagnosis:   Endophthalmitis [H44.009]      Post-Operative Diagnosis: Same as above     Treatments/Procedures:   Procedure(s) (LRB):  BLEPHARORRHAPHY (Left)  INJECTION, MEDICATION, RETROBULBAR (Left)  EVISCERATION, OCULAR CONTENTS (Left)     Intraoperative Findings: Endophthalmitis, OS. Scleral abscess, OS.      Anesthesia: General. Retrobulbar block with 2% lidocaine with epinephrine and 0.5% bupivacaine. Perilimbal injection with mixture of 2% lidocaine with epinephrine, 0.5% bupivacaine, and vitrase     Complications: None     Estimated Blood Loss: < 20 cc    Specimens:  intraocular contents left eye, cornea left eye, scleral abscess left eye, vitreous abscess left eye     Implant: none      Indications for Procedure:  The patient is a 57 y.o. year-old male with endophthalmitis of the left eye that has no light perception visual acuity. Given the poor visual prognosis and presence of infection, the risks, benefits, alternatives, and complications of evisceration of the left eye were discussed thoroughly with the patient. After discussion and the opportunity to ask questions, the patient expressed understanding and a desire to proceed with the procedure. Informed consent was obtained, signed, and witnessed, and placed in the chart.      Procedure Details:   The patient was brought to the operating room and the patient was put under general anesthesia. A time-out was performed to verify the correct patient using 2 identifiers, correct procedure, and correct laterality.     A transcutaneous retrobulbar block was performed using 3 cc of a mixture of 2% lidocaine with epinephrine and 0.5% bupivacaine. A mixture of 2% lido with epinephrine, 0.5% bupivacaine and vitrase was injected perilimbally for local anesthesia. The patient was then prepped and draped in usual sterile  fashion.      A 360 degree peritomy was then performed using Olivia scissors. An #11 scalpel was used to make an incision in the sclera adjacent to the limbus. This incision was extended around the cornea using Olivia scissors, leaving a hinge of cornea attached. The intraocular contents were then removed using an evisceration spoon and sent to pathology. As the intraocular contents were being removed, purulent drainage was being expressed from the scleral wall abscess. As the intraocular contents continued to be removed, copious amounts of purulent drainage was noted inside the left eye. Hemostasis of the vortex vessels was obtained with monopolar cautery. The optic nerve and vessels were cauterized with bipolar cautery. The scleral shell was then thoroughly irrigated multiple times using sterile saline. The sclera shell was packed with xeroform gauze X2. A temporary tarsorrhaphy was created with two 6-0 plain gut sutures placed through the gray lines via a horizontal mattress suture at both the medial and lateral aspects of the eye. 1.5 cc of additional 2% lidocaine with epinephrine and 0.5% bupivicaine was used for a retrobulbar block. The eye was then covered in tobradex ointment and tobradex drops. The patient was awoken from surgery. He tolerated procedure well.

## 2023-08-16 NOTE — PLAN OF CARE
Patient currently in surgery . YULI met with patient sister at the bedside and provided an update in regard to OSNF.Patient no longer has any days left for LTAC. Mray with OSNF is following patient for SNF. YULI will continue to follow up.    Mirta Melendez LMSW  [facility]   [id number]

## 2023-08-16 NOTE — BRIEF OP NOTE
Brief Operative Note  Ophthalmology Service    Date of Procedure: 8/9/2023     Attending Physician: MD Pat    Assistant: Deanna Celestin MD    Pre-Operative Diagnosis: Endophthalmitis [H44.009]     Post-Operative Diagnosis: Same as pre-operative diagnosis    Treatments/Procedures:   Procedure(s) (LRB):  BLEPHARORRHAPHY (Left)  INJECTION, MEDICATION, RETROBULBAR (Left)  EVISCERATION, OCULAR CONTENTS (Left)    Intraoperative Findings: scleral abscess, vitreous abscess, endophthalmitis    Anesthesia: General    Complications: None    Estimated Blood Loss: < 20 cc    Specimens: ocular contents, cornea, scleral wall abscess, vitreous abscess    -------------------------------------------------------------  Full dictated Operative Report to follow.  -------------------------------------------------------------     Asc Procedure Text (A): After obtaining clear surgical margins the patient was sent to an ASC for surgical repair.  The patient understands they will receive post-surgical care and follow-up from the ASC physician.

## 2023-08-16 NOTE — PLAN OF CARE
Recommendations     Resume diet when able. Rec'd Renal diet w/ texture per SLP.  If EN to resume, consider nocturnal TFs to promote PO intake throughout the day. Rec'd Novasource @ 40 mL/hr x 12 hours = 960 kcals, 44 g of protein, 344 mL fluid. This would meet 52% EEN, 54% EPN.   - Can increase/decrease depending on PO intake.   RD to monitor & follow-up.     Goals: Meet % EEN, EPN by RD f/u date  Nutrition Goal Status: progressing towards goal  Communication of RD Recs: other (comment) (POC)

## 2023-08-16 NOTE — PROGRESS NOTES
[facility] ([facility]  Adult Nutrition  Progress Note    SUMMARY       Recommendations    Resume diet when able. Rec'd Renal diet w/ texture per SLP.  If EN to resume, consider nocturnal TFs to promote PO intake throughout the day. Rec'd Novasource @ 40 mL/hr x 12 hours = 960 kcals, 44 g of protein, 344 mL fluid. This would meet 52% EEN, 54% EPN.   - Can increase/decrease depending on PO intake.   RD to monitor & follow-up.    Goals: Meet % EEN, EPN by RD f/u date  Nutrition Goal Status: progressing towards goal  Communication of RD Recs: other (comment) (POC)    Assessment and Plan    Severe malnutrition    Nutrition Problem:  Severe Protein-Calorie Malnutrition  Malnutrition in the context of Chronic Illness/Injury    Related to (etiology):  Inability to consume sufficient energy     Signs and Symptoms (as evidenced by):  Body Fat Depletion: severe depletion of orbitals and triceps   Muscle Mass Depletion: severe depletion of temples, clavicle region, scapular region and lower extremities   Weight Loss: 31% x 6-7 months     Interventions(treatment strategy):  Collaboration of nutrition care w/ other providers  EN    Nutrition Diagnosis Status:  Continues    Malnutrition Assessment    Malnutrition Context: chronic illness  Malnutrition Level: severe    Weight Loss (Malnutrition): greater than 10% in 6 months  Energy Intake (Malnutrition): other (see comments) (LAMONT)  Subcutaneous Fat (Malnutrition): severe depletion  Muscle Mass (Malnutrition): severe depletion   Orbital Region (Subcutaneous Fat Loss): severe depletion  Upper Arm Region (Subcutaneous Fat Loss): severe depletion   Yazidi Region (Muscle Loss): severe depletion  Clavicle Bone Region (Muscle Loss): severe depletion  Dorsal Hand (Muscle Loss): severe depletion  Patellar Region (Muscle Loss): severe depletion  Anterior Thigh Region (Muscle Loss): severe depletion     Reason for Assessment    Reason For Assessment: RD  "follow-up  Diagnosis: other (see comments) (Endophtalmitis)  Relevant Medical History: CHF, DM, ESRD on HD, PEG  Interdisciplinary Rounds: did not attend    General Information Comments: NPO for evisceration vs enucleation OS; was tolerating diet (advanced yesterday per SLP). PEG also present. +Nausea this AM. Pt received HD overnight. UBW: 196# per chart review. NFPE complete 8/10 w/ severe wasting found. RD feels pt meets criteria for severe malnutrition. Please see PES statement for details.   Nutrition Discharge Planning: Pending clinical course    Nutrition/Diet History    Patient Reported Diet/Restrictions/Preferences: other (see comments) (LAMONT)  Spiritual, Cultural Beliefs, Baptist Practices, Values that Affect Care: no  Factors Affecting Nutritional Intake: NPO  Nutrition Support Formula Prior to Admit: Other (see commments) (LAMONT)    Anthropometrics    Temp: 98 °F (36.7 °C)  Height Method: Stated  Height: 5' 10" (177.8 cm)  Height (inches): 70 in  Weight Method: Bed Scale  Weight: 62.1 kg (136 lb 14.5 oz)  Weight (lb): 136.91 lb  Ideal Body Weight (IBW), Male: 166 lb  % Ideal Body Weight, Male (lb): 82.48 %  BMI (Calculated): 19.6  BMI Grade: 18.5-24.9 - normal  Usual Body Weight (UBW), k kg  % Usual Body Weight: 69.47  % Weight Change From Usual Weight: -30.67 %    Lab/Procedures/Meds    Pertinent Labs Reviewed: reviewed  Pertinent Labs Comments: Creat 3.2, GFR 21.5  Pertinent Medications Reviewed: reviewed  Pertinent Medications Comments: -    Estimated/Assessed Needs    Weight Used For Calorie Calculations: 62.1 kg (136 lb 14.5 oz)    Energy Calorie Requirements (kcal): 1863 kcal/d  Energy Need Method: Kcal/kg (30 kcal/kg)    Protein Requirements: 81 g/d (1.3 g/kg)  Weight Used For Protein Calculations: 62.1 kg (136 lb 14.5 oz)    Estimated Fluid Requirement Method: other (see comments) (Per MD or 1 mL/kcal)  RDA Method (mL): 1863    CHO Requirement: 233g    Nutrition Prescription " Ordered    Current Diet Order: NPO    Evaluation of Received Nutrient/Fluid Intake    I/O: -1L since admit    Comments: LBM: 8/15    Tolerance: was tolerating    Nutrition Risk    Level of Risk/Frequency of Follow-up:  (1x/week)     Monitor and Evaluation    Food and Nutrient Intake: enteral nutrition intake, food and beverage intake, energy intake  Food and Nutrient Adminstration: enteral and parenteral nutrition administration, diet order  Physical Activity and Function: nutrition-related ADLs and IADLs  Anthropometric Measurements: weight, weight change  Biochemical Data, Medical Tests and Procedures: inflammatory profile, lipid profile, glucose/endocrine profile, gastrointestinal profile, electrolyte and renal panel  Nutrition-Focused Physical Findings: overall appearance     Nutrition Follow-Up    RD Follow-up?: Yes

## 2023-08-16 NOTE — PROGRESS NOTES
08/15/23 2208   During Hemodialysis Assessment   Blood Flow Rate (mL/min) 400 mL/min   Dialysate Flow Rate (mL/min) 800 ml/min   Ultrafiltration Rate (mL/Hr) 500 mL/Hr   Arteriovenous Lines Secure Yes   Arterial Pressure (mmHg) -180 mmHg   Venous Pressure (mmHg) 180   Blood Volume Processed (Liters) 0 L   UF Removed (mL) 0 mL   TMP 50   Venous Line in Air Detector Yes   Intake (mL) 250 mL   Intra-Hemodialysis Comments Tx started per MD orders     Report received from primary nurse. HD started per MD orders.

## 2023-08-16 NOTE — TRANSFER OF CARE
"Anesthesia Transfer of Care Note    Patient: Immanuel Bernal    Procedure(s) Performed: Procedure(s) (LRB):  BLEPHARORRHAPHY (Left)  INJECTION, MEDICATION, RETROBULBAR (Left)  EVISCERATION, OCULAR CONTENTS (Left)    Patient location: PACU    Anesthesia Type: general    Transport from OR: Transported from OR on 6-10 L/min O2 by face mask with adequate spontaneous ventilation    Post pain: adequate analgesia    Post assessment: no apparent anesthetic complications    Post vital signs: stable    Level of consciousness: awake    Nausea/Vomiting: no nausea/vomiting    Complications: none    Transfer of care protocol was followed      Last vitals:   Visit Vitals  BP (!) 147/70   Pulse 80   Temp 36.7 °C (98.1 °F) (Temporal)   Resp 18   Ht 5' 10" (1.778 m)   Wt 68 kg (150 lb)   SpO2 100%   BMI 21.52 kg/m²     "

## 2023-08-16 NOTE — ASSESSMENT & PLAN NOTE
On peg tube feeds now  Tolerating well  Able to take in PO per SLP assessment on 8/15, on regular diet with thin liquids  Will reduce tube feeds gradually as patient takes in more PO    Peg tube was found to be blocked yesterday  SLP evaluated patient : can restart oral feeds    Plan:  Start oral feeds post surgery

## 2023-08-16 NOTE — SUBJECTIVE & OBJECTIVE
Interval History: Tolerated HD early this morning, with a net UF of 1L. NAEON.     Objective:     Vital Signs (Most Recent):  Temp: 98 °F (36.7 °C) (08/16/23 0714)  Pulse: 75 (08/16/23 0737)  Resp: 18 (08/16/23 0714)  BP: (!) 144/77 (08/16/23 0714)  SpO2: 100 % (08/16/23 0714) Vital Signs (24h Range):  Temp:  [97.6 °F (36.4 °C)-99.5 °F (37.5 °C)] 98 °F (36.7 °C)  Pulse:  [75-84] 75  Resp:  [13-20] 18  SpO2:  [95 %-100 %] 100 %  BP: (104-144)/(60-86) 144/77     Weight: 62.1 kg (136 lb 14.5 oz) (08/16/23 0215)  Body mass index is 19.64 kg/m².  Body surface area is 1.75 meters squared.    I/O last 3 completed shifts:  In: 296 [P.O.:236; NG/GT:60]  Out: 1500 [Other:1500]     Physical Exam  Constitutional:       Appearance: He is ill-appearing.   HENT:      Head: Normocephalic and atraumatic.      Nose: Nose normal.      Mouth/Throat:      Mouth: Mucous membranes are moist.      Pharynx: Oropharynx is clear.   Eyes:      General:         Left eye: Discharge present.  Cardiovascular:      Rate and Rhythm: Normal rate and regular rhythm.      Comments: R IJ TDC   Pulmonary:      Effort: Pulmonary effort is normal.      Breath sounds: Normal breath sounds.   Abdominal:      General: Abdomen is flat.      Palpations: Abdomen is soft.   Musculoskeletal:      Cervical back: Normal range of motion and neck supple.      Right lower leg: No edema.      Left lower leg: No edema.   Skin:     General: Skin is warm and dry.   Neurological:      Mental Status: He is alert. He is disoriented.          Significant Labs:  CBC:   Recent Labs   Lab 08/16/23 0437   WBC 8.56   RBC 2.94*   HGB 8.7*   HCT 30.4*      *   MCH 29.6   MCHC 28.6*       CMP:   Recent Labs   Lab 08/16/23 0437 08/16/23 0438   GLU 67* 66*  68*   CALCIUM 8.6* 8.6*  8.7   ALBUMIN 2.1* 2.0*  2.0*   PROT 6.0  --     138  138   K 3.7 3.7  3.7   CO2 23 24  25    103  104   BUN 13 14  13   CREATININE 3.2* 3.2*  3.4*   ALKPHOS 118  --     ALT 5*  --    AST 14  --    BILITOT 0.3  --        All labs within the past 24 hours have been reviewed.

## 2023-08-16 NOTE — PLAN OF CARE
Oculoplastics Plan of Care Note    Patient s/p evisceration of the left eye this afternoon. Gross specimen and cultures were taken during the surgery. At the end of the case, two temporary sutures were placed medially and laterally to assist in healing. These will be removed later this week. Patient to go back to his inpatient room following PACU.     Recommendations:  - Start tobradex drops 4 times daily in the left eye  - Start tobradex ointment 3 times daily in the left eye, including at night  - Pain medication as needed. No need to patch eye.   - Continue IV antibiotics per infectious disease. Intraoperative cultures pending. Please assist in recommendations regarding length of IV antibiotic duration.   - Packing in place within the scleral shell. Will replace packing in 2-3 days and remove medial and lateral sutures.   - Upon discharge, patient will require q2-3 day follow up with oculoplastics (Dr. Stewart) to replace scleral shell packing x 14 days following surgery    Plan discussed with patient's sister/MPOA over the phone    Deanna Celestin MD  [facility]

## 2023-08-16 NOTE — NURSING
Ophthalmology at bedside marking left eye.  Sister, DYAN, at bedside.  Informed not to clean eye.  Plan is surgery on/about 1530 hours.

## 2023-08-16 NOTE — ASSESSMENT & PLAN NOTE
Ophthalmology team on board and were going to perform the procedure but the patient's family refused without being around to review the MRI which delayed the procedure till next wednesday    MRI repeat : Diffuse inflammatory change involving the left globe, with associated thickening of the left sclera, proptosis, and focal fluid collection within the left posterior compartment, which demonstrates diffusion restriction.  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  Diffuse left preseptal and postseptal inflammatory change, with superimposed orbital cellulitis also considered.  Evaluation of the left orbital apex is limited secondary to no contrast administration.  Decreased size of the left anterior compartment with bowing of the visualized left lens.  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    Thorough counseling undertaken with POA and IM team, palliative, opthalmo and nursing team in regards to the surgery  She agreed for her brother to undergo the procedure as an emergent case if his health deteriorates  Otherwise to be scheduled on wednesday    Will undergo enucleation vs evisceration today  Was kept NPO  - Patient's family, and Dr Stewart with ophthalmology will discuss the case further today    Plan:  - to go for surgery

## 2023-08-16 NOTE — ASSESSMENT & PLAN NOTE
Hypertensive on arrival  bp today: 144/77  Home imdur  Carvedilol  Hydralazine  Nifedipine    Plan:  On hydralazine And losartan and carvedilol

## 2023-08-16 NOTE — H&P
Pre-Operative History & Physical  Ophthalmology      SUBJECTIVE:     History of Present Illness:  Patient is a 57 y.o. male presents with Endophthalmitis [H44.009].    MEDICATIONS:   PTA Medications   Medication Sig    acetaminophen (TYLENOL) 325 MG tablet Take 650 mg by mouth every 6 (six) hours as needed for Pain.    albuterol-ipratropium (DUO-NEB) 2.5 mg-0.5 mg/3 mL nebulizer solution Inhale 3 mLs into the lungs 3 (three) times daily.    amLODIPine (NORVASC) 10 MG tablet Take 10 mg by mouth once daily.    ascorbic acid, vitamin C, (VITAMIN C) 500 MG tablet Take 500 mg by mouth once daily.    aspirin 81 MG Chew Take 81 mg by mouth once daily.    calcium carbonate-vitamin D3 (OYSTER SHELL CALCIUM-VIT D3) 500 mg-5 mcg (200 unit) PwPk Take 1 tablet by mouth 2 (two) times a day.    carvediloL (COREG) 25 MG tablet Take 1 tablet (25 mg total) by mouth 2 (two) times daily with meals.    citalopram (CELEXA) 20 MG tablet Take 20 mg by mouth once daily.    coffee xt/phosphatidyl serine (NEURIVA ORIGINAL ORAL) Take 1 capsule by mouth once daily.    flecainide (TAMBOCOR) 50 MG Tab Take 50 mg by mouth 2 (two) times daily.    folic acid (FOLVITE) 1 MG tablet Take 1 tablet by mouth every morning.    ibuprofen (ADVIL,MOTRIN) 600 MG tablet Take 600 mg by mouth every 6 (six) hours as needed for Pain.    lactulose (CHRONULAC) 10 gram/15 mL solution Take 45 mLs by mouth daily as needed (constipation).    megestroL (MEGACE) 400 mg/10 mL (40 mg/mL) Susp Take 10 mLs by mouth 2 (two) times a day.    metoclopramide HCl (REGLAN) 5 MG tablet Take 5 mg by mouth once daily.    multivitamin (THERAGRAN) per tablet Take 1 tablet by mouth once daily.    pantoprazole (PROTONIX) 40 MG tablet Take 40 mg by mouth once daily.    pentoxifylline (TRENTAL) 400 mg TbSR Take 400 mg by mouth 2 (two) times daily.    polyethylene glycol (GLYCOLAX) 17 gram/dose powder Take 17 g by mouth 2 (two) times daily.    rosuvastatin (CRESTOR) 5 MG tablet Take 5 mg by  mouth once daily.    sevelamer carbonate (RENVELA) 800 mg Tab Take 1 tablet by mouth 5 times per day    tamsulosin (FLOMAX) 0.4 mg Cap Take 1 capsule (0.4 mg total) by mouth once daily.    UNABLE TO FIND Take 1 tablet by mouth once daily. medication name: OPTIMAL ANTIOX    UNABLE TO FIND Take 1 tablet by mouth nightly. medication name: QUNOL SLEEP SUPPORT    VITAMIN B COMPLEX ORAL Take 1 tablet by mouth every morning.    zinc gluconate 50 mg tablet Take 50 mg by mouth once daily.       ALLERGIES:   Review of patient's allergies indicates:   Allergen Reactions    Morphine Rash    Amiodarone analogues Itching     Other reaction(s): Unknown       PAST MEDICAL HISTORY:   Past Medical History:   Diagnosis Date    Bilateral retinal detachment 7/18/2023    BPH (benign prostatic hyperplasia)     Cataract     CHF (congestive heart failure)     CKD (chronic kidney disease) stage 3, GFR 30-59 ml/min     Diabetes mellitus, type 2     Hypertension     KENIA (obstructive sleep apnea)     Pancreatitis     Persistent proteinuria 11/12/2020    2 g proteinuria noted Resumed ACE Lower BP systolic to less than 130     PTSD (post-traumatic stress disorder)     Stroke      PAST SURGICAL HISTORY:   Past Surgical History:   Procedure Laterality Date    CATARACT EXTRACTION Bilateral      PAST FAMILY HISTORY:   Family History   Problem Relation Age of Onset    Stroke Mother     Diabetes Father     Heart disease Father     Kidney disease Father     Diabetes Sister     Hyperlipidemia Brother     Amblyopia Neg Hx     Blindness Neg Hx     Cataracts Neg Hx     Glaucoma Neg Hx     Macular degeneration Neg Hx     Strabismus Neg Hx     Retinal detachment Neg Hx      SOCIAL HISTORY:   Social History     Tobacco Use    Smoking status: Former     Current packs/day: 0.00     Types: Cigarettes, Cigars    Smokeless tobacco: Never   Substance Use Topics    Alcohol use: Not Currently    Drug use: Not Currently        MENTAL STATUS: Alert    REVIEW OF SYSTEMS:  Negative    OBJECTIVE:     Vital Signs (Most Recent)  Temp: 98 °F (36.7 °C) (08/16/23 0714)  Pulse: 75 (08/16/23 0714)  Resp: 18 (08/16/23 0714)  BP: (!) 144/77 (08/16/23 0714)  SpO2: 100 % (08/16/23 0714)    Physical Exam:  General: NAD  HEENT: AT/NC  Lungs: Adequate respirations  Heart: + pulses  Abdomen: Soft    ASSESSMENT/PLAN:     Patient is a 57 y.o. male with endophthalmitis and no light perception vision of the left eye     - Risks/benefits/alternatives of the procedure including, but not limited, infection, bleeding, pain, ptosis, scarring, undercorrection, overcorrection, loss of vision, and loss of the eye were discussed with the patient and/or family. The patient/family voiced good understanding, the informed consent was signed, witnessed, and placed in chart. All patient and family questions were answered.   - Will proceed with enucleation vs evisceration of the left eye  - Plan for general anesthesia  - Allergies reviewed:   Review of patient's allergies indicates:   Allergen Reactions    Morphine Rash    Amiodarone analogues Itching     Other reaction(s): Unknown       Deanna Celestin MD  [facility]

## 2023-08-16 NOTE — NURSING
Patient has staples in right arm since x 4 weeks ago for clots/fistula per sister.  Right arm swelling noted.  Per sister arm has been swollen for months.  FLACA Gautam DO, notified via secure chat.

## 2023-08-16 NOTE — PROGRESS NOTES
Pharmacokinetic Assessment Follow Up: IV Vancomycin    Vancomycin serum concentration assessment(s):    The random level was drawn correctly and can be used to guide therapy at this time. The measurement is within the desired definitive target range of 15 to 20 mcg/mL    Vancomycin Regimen Plan:    Will hold abx today   Random level with AM labs on 8/17  ESRD on HD    Drug levels (last 3 results):  Recent Labs   Lab Result Units 08/14/23  0324 08/15/23  0415 08/16/23  0437   Vancomycin, Random ug/mL 12.6 22.9 17.8       Pharmacy will continue to follow and monitor vancomycin.    Please contact pharmacy at extension [phone] for questions regarding this assessment.    Thank you for the consult,   Elsa Solorzano       Patient brief summary:  Immanuel Bernal is a 57 y.o. male initiated on antimicrobial therapy with IV Vancomycin for treatment of  endophthalmitis    The patient's current regimen is pulse dose based on level    Drug Allergies:   Review of patient's allergies indicates:   Allergen Reactions    Morphine Rash    Amiodarone analogues Itching     Other reaction(s): Unknown       Actual Body Weight:   61.7 kg    Renal Function:   Estimated Creatinine Clearance: 21.8 mL/min (A) (based on SCr of 3.2 mg/dL (H)).,     Dialysis Method (if applicable):  intermittent HD    CBC (last 72 hours):  Recent Labs   Lab Result Units 08/14/23  0324 08/15/23  0415 08/16/23  0437   WBC K/uL 13.49* 12.60 8.56   Hemoglobin g/dL 9.4* 9.7* 8.7*   Hematocrit % 31.5* 33.3* 30.4*   Platelets K/uL 231 253 224   Gran % % 81.0*  --   --    Lymph % % 5.6*  --   --    Mono % % 8.9  --   --    Eosinophil % % 3.2  --   --    Basophil % % 0.7  --   --    Differential Method  Automated  --   --          Metabolic Panel (last 72 hours):  Recent Labs   Lab Result Units 08/13/23  1449 08/14/23  0324 08/14/23  0545 08/14/23  1354 08/14/23  2148 08/15/23  0415 08/15/23  1426 08/16/23  0437 08/16/23  0438   Sodium mmol/L 136 134*  --  134* 133*  133*  133* 134* 137 138  138   Potassium mmol/L 3.8 3.4* 3.5 4.1 4.0 3.8  4.0 4.0 3.7 3.7  3.7   Chloride mmol/L 100 101  --  99 99 98  99 98 104 103  104   CO2 mmol/L 24 23  --  27 24 25  25 26 23 24  25   Glucose mg/dL 71 76  --  84 98 99  99 105 67* 66*  68*   BUN mg/dL 21* 14  --  13 16 19  19 25* 13 14  13   Creatinine mg/dL 5.2* 3.7*  --  3.7* 4.2* 4.3*  4.6* 4.8* 3.2* 3.2*  3.4*   Albumin g/dL 2.1* 2.1*  --  2.0* 1.9* 2.0*  2.0* 2.0* 2.1* 2.0*  2.0*   Total Bilirubin mg/dL  --   --   --   --   --  0.3  --  0.3  --    Alkaline Phosphatase U/L  --   --   --   --   --  126  --  118  --    AST U/L  --   --   --   --   --  13  --  14  --    ALT U/L  --   --   --   --   --  6*  --  5*  --    Magnesium mg/dL 2.3 2.1  --  2.0 2.0 2.0 2.2  --  2.1  2.1   Phosphorus mg/dL 4.2 2.8  --  3.0 3.3 3.3 3.5  --  2.4*  2.4*         Vancomycin Administrations:  vancomycin given in the last 96 hours                     vancomycin (VANCOCIN) 500 mg in dextrose 5 % in water (D5W) 100 mL IVPB (MB+) (mg) 500 mg New Bag 08/11/23 2326    vancomycin (VANCOCIN) 500 mg in dextrose 5 % in water (D5W) 100 mL IVPB (MB+) (mg) 500 mg New Bag 08/11/23 0202                    Microbiologic Results:  Microbiology Results (last 7 days)       Procedure Component Value Units Date/Time    Blood culture (site 2) [[id number]] Collected: 08/09/23 1434    Order Status: Completed Specimen: Blood Updated: 08/14/23 1812     Blood Culture, Routine No growth after 5 days.    Narrative:      Site # 2, aerobic only  Collection has been rescheduled by [email or web address] at 08/09/2023 13:27 Reason:   Patient unavailable in with DR   Collection has been rescheduled by [email or web address] at 08/09/2023 13:27 Reason:   Patient unavailable in with DR     Blood culture (site 1) [[id number]] Collected: 08/09/23 1434    Order Status: Completed Specimen: Blood Updated: 08/14/23 1812     Blood Culture, Routine No growth after 5 days.    Narrative:      Site # 1, aerobic and  anaerobic  Collection has been rescheduled by [email or web address] at 08/09/2023 13:27 Reason:   Patient unavailable in with DR   Collection has been rescheduled by [email or web address] at 08/09/2023 13:27 Reason:   Patient unavailable in with DR

## 2023-08-16 NOTE — NURSING
08/16/23 0110 08/16/23 0112   During Hemodialysis Assessment   Blood Flow Rate (mL/min) 400 mL/min  --    Dialysate Flow Rate (mL/min) 800 ml/min  --    Ultrafiltration Rate (mL/Hr) 500 mL/Hr  --    Arteriovenous Lines Secure Yes  --    Arterial Pressure (mmHg) -180 mmHg  --    Venous Pressure (mmHg) 170  --    UF Removed (mL) 1500 mL  --    TMP 40  --    Venous Line in Air Detector Yes  --    Intake (mL) 250 mL  --    Intra-Hemodialysis Comments Tx complete  --    Post-Hemodialysis Assessment   Blood Volume Processed (Liters)  --  72 L   Dialyzer Clearance  --  Moderately streaked   Duration of Treatment  --  180 minutes   Total UF (mL)  --  1500 mL   Net Fluid Removal  --  1000   Patient Response to Treatment  --  Pt tolerated HD well   Post-Hemodialysis Comments  --  Tx complete     HD x 3 hours via [facility] TDC. UF: -1000ml net. Pt tolerated HD very well.

## 2023-08-16 NOTE — ASSESSMENT & PLAN NOTE
Outpatient HD Information:    -Outpatient HD unit: C   -HD tx days: MWF   -HD tx time: 210min  -HD access: R IJ TDC   -HD modality: iHD   -Residual urine: ?      Plan/Recommendations:    -Continue HD MWF while inpatient   -Renal diet  -Strict I/O's and daily weights  -Daily renal function panels   -Renally dose meds

## 2023-08-16 NOTE — PROGRESS NOTES
[facility] - Intensive Care ([address] Medicine  Progress Note    Patient Name: Immanuel Bernal  MRN: [id number]  Patient Class: IP- Inpatient   Admission Date: 8/9/2023  Length of Stay: 7 days  Attending Physician: Brett Gautam, *  Primary Care Provider: Dolly, Primary Doctor        Subjective:     Principal Problem:Endophthalmitis        HPI:  HPI obtained per medical record as patient unable to communicate    57-year-old male with a history of CHF, diabetes, hypertension, chronic disability, end-stage renal disease on hemodialysis, PEG placement, bowel obstruction, sleep apnea, TIA, left eye vitreous hemorrhage, stroke, and bowel obstruction with bowel resection admitted to [facility] in [place] July 18 from nursing home with left eye pain and blurred vision.  He was admitted with concern for bilateral endophthalmitis.  Other admit diagnoses included right upper extremity thrombus, end-stage renal disease on hemodialysis, hyperkalemia, sleep apnea, diabetes, and CHF.  He was treated with broad-spectrum antibiotics.  He was seen by Infectious Diseases,, and he was treated with vancomycin, ceftriaxone, and amphotericin.  Blood cultures were positive for Pseudomonas, and amphotericin/vancomycin were stopped.  IV cefepime was continued.  He was seen by Ophthalmology, and he received intravitreal vancomycin and ceftazidime (July 18).  He also had intravitreal tap July 18 that had no yeast or fungal elements observed.  Left eye endophthalmitis did not respond to treatment, and he subsequently developed abscess formation, significant proptosis, and drainage of purulent material from the orbit.  Ophthalmology recommended enucleation.  He was continued on cefepime for pseudomonal and ophthalmitis.  Recommendation was for 6 weeks of treatment to be followed by indefinite fluoroquinolone.  He was seen by Pulmonology during his stay for a right upper lung mass with peripheral nodules.   It was felt that he would need further investigation of this once his current clinical issues stabilized.  Right upper extremity AV graft was excised during his stay with concern for infection.  A right IJ tunneled line was placed on July 27.  Left eye endophthalmitis continued to worsen, and ophthalmology spoke with patient and family about the need for enucleation.  Despite several conversations, family declined enucleation.  Plans were for transitioned to skilled nursing facility for continued treatment, but family did not want him to go to a skilled nursing facility.  He was subsequently discharged AMA from the hospital there on August 7.  Please see the August 7 Internal Medicine note for further details.     He subsequently presented to [facility] Emergency Department.  He will not go back to [facility] in [place]. He received Zosyn and vancomycin.  ED team at [place] spoke with the patient and his power-of- (sister).  CT of the orbits noted scleral abscess along the lateral aspect of the left globe.  Patient and family are aware and in agreement that the patient needs enucleation of the eye at this time. Referring provider spoke with Oculoplastics at [facility]. Requesting transfer to [facility] Medicine for Oculoplastics specialty evaluation of persistent endophthalmitis.  ED provider noted patient is hemodynamically stable.  He does not appear volume overloaded or in respiratory distress.      The patient has a significant previous medical course as listed below  August 3: MRI brain and orbits showed worsening ophthalmitis and inflammatory changes of the left orbital tissues with slight increase in proptosis.  No evidence of intracranial inflammatory process observed.    July 25: Transesophageal echocardiogram had no evidence of endocarditis.  Moderate to severely decreased left ventricular systolic function with EF 30-35%.    July 22:  CT chest showed right  upper lobe mass with numerous bilateral nodularity predominantly in the right with associated mediastinal lymph nodes.  July 21: Blood cultures with Pseudomonas aeruginosa  July 19: MRI brain/orbits with and without contrast had findings suggestive of chronic microvascular ischemic disease of the white matter with remote ischemic event in the left insula/basal ganglia/subependymal white matter/right middle cerebellar peduncle and hemisphere.  Findings consistent with left endophthalmitis.  No abnormal findings observed in the right globe.  July 18:  Blood cultures with Pseudomonas aeruginosa and coag-negative staph      Overview/Hospital Course:  Patient was rate controlled and therefore flecainide held. His GCS is 13/15. Oculopalstics were consulted and so were the nephrology teams and IR. He underwent dialysis on his first day. Was scheduled for surgery today but his family refused to have it done until she reviewed the MRI herself first. This means that the surgeon will only be available next Wednesday in order to allow her to view and discuss the MRI. He has a peg tube in place that has not been utilzied prior. Heart failure medications have been reinstated and is being covered by vancomycin and meropenem for the endopthalmitis. Multidisciplinary session was conducted with his POA in regards to his operation, she accepted the surgery if it was emergent, otherwise to be done this upcoming Wednesday. Patient's delirium/agitation worsened so he was transferred to the ICU until his clinical situation settled. He then was stepped down to our care and ophthalmology will be conducting his eye procedure on him as planned. Tentatively, enucleation vs evisceration of the L eye is scheduled for today.    Disposition: referrals to SNF have been sent. Family prefers [facility] SNF or rehab and do not want to return to their previous nursing facility.       Interval History: no acute events overnight    Review of Systems    Constitutional:  Negative for activity change.   Eyes:  Positive for pain, discharge and visual disturbance.   Cardiovascular:  Negative for chest pain, palpitations and leg swelling.   Gastrointestinal:  Negative for abdominal distention and abdominal pain.   Neurological: Negative.      Objective:     Vital Signs (Most Recent):  Temp: 98.1 °F (36.7 °C) (08/16/23 1400)  Pulse: 71 (08/16/23 1400)  Resp: 18 (08/16/23 1400)  BP: 138/77 (08/16/23 1400)  SpO2: 100 % (08/16/23 1400) Vital Signs (24h Range):  Temp:  [97.6 °F (36.4 °C)-98.1 °F (36.7 °C)] 98.1 °F (36.7 °C)  Pulse:  [71-84] 71  Resp:  [13-20] 18  SpO2:  [95 %-100 %] 100 %  BP: (104-161)/(60-86) 138/77     Weight: 68 kg (150 lb)  Body mass index is 21.52 kg/m².    Intake/Output Summary (Last 24 hours) at 8/16/2023 1641  Last data filed at 8/16/2023 0112  Gross per 24 hour   Intake --   Output 1500 ml   Net -1500 ml         Physical Exam  Eyes:      General:         Left eye: Discharge present.  Cardiovascular:      Rate and Rhythm: Normal rate and regular rhythm.      Pulses: Normal pulses.      Heart sounds: Normal heart sounds.   Pulmonary:      Effort: Pulmonary effort is normal.      Breath sounds: Normal breath sounds.   Abdominal:      General: Abdomen is flat. Bowel sounds are normal.      Palpations: Abdomen is soft.   Neurological:      General: No focal deficit present.      Mental Status: He is alert and oriented to person, place, and time. Mental status is at baseline.   Psychiatric:         Mood and Affect: Mood normal.             Significant Labs: All pertinent labs within the past 24 hours have been reviewed.    Significant Imaging: I have reviewed all pertinent imaging results/findings within the past 24 hours.      Assessment/Plan:      * Endophthalmitis  Ophthalmology team on board and were going to perform the procedure but the patient's family refused without being around to review the MRI which delayed the procedure till next  wednesday    MRI repeat : Diffuse inflammatory change involving the left globe, with associated thickening of the left sclera, proptosis, and focal fluid collection within the left posterior compartment, which demonstrates diffusion restriction.  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  Diffuse left preseptal and postseptal inflammatory change, with superimposed orbital cellulitis also considered.  Evaluation of the left orbital apex is limited secondary to no contrast administration.  Decreased size of the left anterior compartment with bowing of the visualized left lens.  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    Thorough counseling undertaken with POA and IM team, palliative, opthalmo and nursing team in regards to the surgery  She agreed for her brother to undergo the procedure as an emergent case if his health deteriorates  Otherwise to be scheduled on wednesday    Will undergo enucleation vs evisceration today  Was kept NPO  - Patient's family, and Dr Stewart with ophthalmology will discuss the case further today    Plan:  - to go for surgery    PEG (percutaneous endoscopic gastrostomy) status  On peg tube feeds now  Tolerating well  Able to take in PO per SLP assessment on 8/15, on regular diet with thin liquids  Will reduce tube feeds gradually as patient takes in more PO    Peg tube was found to be blocked yesterday  SLP evaluated patient : can restart oral feeds    Plan:  Start oral feeds post surgery    Encounter for palliative care        Anemia in ESRD (end-stage renal disease)  EPO per nephrology      Severe malnutrition  Nutrition consulted. Most recent weight and BMI monitored-     Measurements:  Wt Readings from Last 1 Encounters:   08/10/23 61.7 kg (136 lb 0.4 oz)   Body mass index is 19.52 kg/m².    Patient has been screened and assessed by RD.    Malnutrition Type:  Context: chronic  illness  Level: severe    Malnutrition Characteristic Summary:  Weight Loss (Malnutrition): greater than 10% in 6 months  Energy Intake (Malnutrition): other (see comments) (LAMONT)  Subcutaneous Fat (Malnutrition): severe depletion  Muscle Mass (Malnutrition): severe depletion    Interventions/Recommendations (treatment strategy):  1. When/if able, initiate TFs via PEG. Rec'd Novasource @ 40 mL/hr to provide 1920 kcals, 87 g of protein, 688 mL fluid. 2. RD to monitor & follow-up.     Able to take in PO per SLP assessment on 8/15, on regular diet with thin liquids  Will reduce tube feeds gradually as patient takes in more PO    Chronic kidney disease-mineral and bone disorder        ESRD (end stage renal disease)  Nephrology consulted for HD needs while inpatient  Underwent SLED post MRI contrast (gadolinium)   Ad last dialysis was 08/15    Plan:  Follow nephro recs    Atrial fibrillation  History of AF noted however no EKG here with AF  Not on AC or rate control agents at this time    Hyperlipidemia  Home statin    Chronic diastolic heart failure  Continue to monitor for signs of volume overload; volume removal with dialysis    Hypertension  Hypertensive on arrival  bp today: 144/77  Home imdur  Carvedilol  Hydralazine  Nifedipine    Plan:  On hydralazine And losartan and carvedilol    Stroke  Continue home statin  Delerium precautions  PT OT    Diabetes mellitus  POCT glucose  LDSS  -180      VTE Risk Mitigation (From admission, onward)         Ordered     heparin (porcine) injection 1,000 Units  As needed (PRN)         08/10/23 1046     IP VTE HIGH RISK PATIENT  Once         08/09/23 1317     Place sequential compression device  Until discontinued         08/09/23 1317                Discharge Planning   TOBY: 8/21/2023     Code Status: Full Code   Is the patient medically ready for discharge?: No    Reason for patient still in hospital (select all that apply): Treatment  Discharge Plan A: Long-term acute care  facility (LTAC)   Discharge Delays: (!) Procedure Scheduling (IR, OR, Labs, Echo, Cath, Echo, EEG) (eye surgery next week)              Sharon Law MD  Department of Hospital Medicine   [facility] - Intensive Care ([place]-[facility])

## 2023-08-16 NOTE — NURSING
Received report from PACU.  Fistula declotted, site covered with gauze and tegaderm.  Patient will transfer to Dialysis after PACU.

## 2023-08-17 PROBLEM — M79.89 REDNESS AND SWELLING OF UPPER ARM: Status: ACTIVE | Noted: 2023-08-17

## 2023-08-17 PROBLEM — E83.9 CHRONIC KIDNEY DISEASE-MINERAL AND BONE DISORDER: Status: RESOLVED | Noted: 2023-08-09 | Resolved: 2023-08-17

## 2023-08-17 PROBLEM — M89.9 CHRONIC KIDNEY DISEASE-MINERAL AND BONE DISORDER: Status: RESOLVED | Noted: 2023-08-09 | Resolved: 2023-08-17

## 2023-08-17 PROBLEM — N18.9 CHRONIC KIDNEY DISEASE-MINERAL AND BONE DISORDER: Status: RESOLVED | Noted: 2023-08-09 | Resolved: 2023-08-17

## 2023-08-17 PROBLEM — R23.8 REDNESS AND SWELLING OF UPPER ARM: Status: ACTIVE | Noted: 2023-08-17

## 2023-08-17 LAB
ALBUMIN SERPL BCP-MCNC: 2.1 G/DL (ref 3.5–5.2)
ALBUMIN SERPL BCP-MCNC: 2.2 G/DL (ref 3.5–5.2)
ALP SERPL-CCNC: 104 U/L (ref 55–135)
ALT SERPL W/O P-5'-P-CCNC: <5 U/L (ref 10–44)
ANION GAP SERPL CALC-SCNC: 11 MMOL/L (ref 8–16)
ANION GAP SERPL CALC-SCNC: 11 MMOL/L (ref 8–16)
ANION GAP SERPL CALC-SCNC: 12 MMOL/L (ref 8–16)
ANION GAP SERPL CALC-SCNC: 12 MMOL/L (ref 8–16)
AST SERPL-CCNC: 12 U/L (ref 10–40)
BILIRUB SERPL-MCNC: 0.3 MG/DL (ref 0.1–1)
BUN SERPL-MCNC: 16 MG/DL (ref 6–20)
BUN SERPL-MCNC: 17 MG/DL (ref 6–20)
BUN SERPL-MCNC: 18 MG/DL (ref 6–20)
BUN SERPL-MCNC: 20 MG/DL (ref 6–20)
CALCIUM SERPL-MCNC: 8.7 MG/DL (ref 8.7–10.5)
CALCIUM SERPL-MCNC: 8.8 MG/DL (ref 8.7–10.5)
CALCIUM SERPL-MCNC: 9 MG/DL (ref 8.7–10.5)
CALCIUM SERPL-MCNC: 9 MG/DL (ref 8.7–10.5)
CHLORIDE SERPL-SCNC: 104 MMOL/L (ref 95–110)
CHLORIDE SERPL-SCNC: 104 MMOL/L (ref 95–110)
CHLORIDE SERPL-SCNC: 105 MMOL/L (ref 95–110)
CHLORIDE SERPL-SCNC: 105 MMOL/L (ref 95–110)
CO2 SERPL-SCNC: 22 MMOL/L (ref 23–29)
CO2 SERPL-SCNC: 22 MMOL/L (ref 23–29)
CO2 SERPL-SCNC: 24 MMOL/L (ref 23–29)
CO2 SERPL-SCNC: 24 MMOL/L (ref 23–29)
CREAT SERPL-MCNC: 4.6 MG/DL (ref 0.5–1.4)
CREAT SERPL-MCNC: 4.8 MG/DL (ref 0.5–1.4)
CREAT SERPL-MCNC: 5.2 MG/DL (ref 0.5–1.4)
CREAT SERPL-MCNC: 5.5 MG/DL (ref 0.5–1.4)
ERYTHROCYTE [DISTWIDTH] IN BLOOD BY AUTOMATED COUNT: 17.6 % (ref 11.5–14.5)
EST. GFR  (NO RACE VARIABLE): 11.2 ML/MIN/1.73 M^2
EST. GFR  (NO RACE VARIABLE): 12 ML/MIN/1.73 M^2
EST. GFR  (NO RACE VARIABLE): 13.2 ML/MIN/1.73 M^2
EST. GFR  (NO RACE VARIABLE): 13.9 ML/MIN/1.73 M^2
GLUCOSE SERPL-MCNC: 65 MG/DL (ref 70–110)
GLUCOSE SERPL-MCNC: 67 MG/DL (ref 70–110)
GLUCOSE SERPL-MCNC: 88 MG/DL (ref 70–110)
GLUCOSE SERPL-MCNC: 95 MG/DL (ref 70–110)
HCT VFR BLD AUTO: 30.5 % (ref 40–54)
HGB BLD-MCNC: 8.8 G/DL (ref 14–18)
MAGNESIUM SERPL-MCNC: 2.4 MG/DL (ref 1.6–2.6)
MAGNESIUM SERPL-MCNC: 2.5 MG/DL (ref 1.6–2.6)
MAGNESIUM SERPL-MCNC: 2.6 MG/DL (ref 1.6–2.6)
MCH RBC QN AUTO: 29.5 PG (ref 27–31)
MCHC RBC AUTO-ENTMCNC: 28.9 G/DL (ref 32–36)
MCV RBC AUTO: 102 FL (ref 82–98)
PHOSPHATE SERPL-MCNC: 4.4 MG/DL (ref 2.7–4.5)
PLATELET # BLD AUTO: 249 K/UL (ref 150–450)
PMV BLD AUTO: 10 FL (ref 9.2–12.9)
POCT GLUCOSE: 104 MG/DL (ref 70–110)
POCT GLUCOSE: 63 MG/DL (ref 70–110)
POCT GLUCOSE: 71 MG/DL (ref 70–110)
POCT GLUCOSE: 74 MG/DL (ref 70–110)
POCT GLUCOSE: 74 MG/DL (ref 70–110)
POCT GLUCOSE: 80 MG/DL (ref 70–110)
POCT GLUCOSE: 95 MG/DL (ref 70–110)
POTASSIUM SERPL-SCNC: 4.5 MMOL/L (ref 3.5–5.1)
POTASSIUM SERPL-SCNC: 4.6 MMOL/L (ref 3.5–5.1)
PROT SERPL-MCNC: 6.1 G/DL (ref 6–8.4)
RBC # BLD AUTO: 2.98 M/UL (ref 4.6–6.2)
SODIUM SERPL-SCNC: 138 MMOL/L (ref 136–145)
SODIUM SERPL-SCNC: 139 MMOL/L (ref 136–145)
SODIUM SERPL-SCNC: 139 MMOL/L (ref 136–145)
SODIUM SERPL-SCNC: 140 MMOL/L (ref 136–145)
VANCOMYCIN SERPL-MCNC: 18 UG/ML
WBC # BLD AUTO: 8.07 K/UL (ref 3.9–12.7)

## 2023-08-17 PROCEDURE — 97535 SELF CARE MNGMENT TRAINING: CPT

## 2023-08-17 PROCEDURE — 25000003 PHARM REV CODE 250: Performed by: STUDENT IN AN ORGANIZED HEALTH CARE EDUCATION/TRAINING PROGRAM

## 2023-08-17 PROCEDURE — 94660 CPAP INITIATION&MGMT: CPT

## 2023-08-17 PROCEDURE — 99233 SBSQ HOSP IP/OBS HIGH 50: CPT | Mod: ,,, | Performed by: NURSE PRACTITIONER

## 2023-08-17 PROCEDURE — 99233 PR SUBSEQUENT HOSPITAL CARE,LEVL III: ICD-10-PCS | Mod: ,,, | Performed by: HOSPITALIST

## 2023-08-17 PROCEDURE — 25000003 PHARM REV CODE 250: Performed by: INTERNAL MEDICINE

## 2023-08-17 PROCEDURE — 80069 RENAL FUNCTION PANEL: CPT | Mod: 91 | Performed by: STUDENT IN AN ORGANIZED HEALTH CARE EDUCATION/TRAINING PROGRAM

## 2023-08-17 PROCEDURE — 63600175 PHARM REV CODE 636 W HCPCS: Performed by: INTERNAL MEDICINE

## 2023-08-17 PROCEDURE — 97116 GAIT TRAINING THERAPY: CPT

## 2023-08-17 PROCEDURE — 80202 ASSAY OF VANCOMYCIN: CPT | Performed by: STUDENT IN AN ORGANIZED HEALTH CARE EDUCATION/TRAINING PROGRAM

## 2023-08-17 PROCEDURE — 27000190 HC CPAP FULL FACE MASK W/VALVE

## 2023-08-17 PROCEDURE — 80053 COMPREHEN METABOLIC PANEL: CPT

## 2023-08-17 PROCEDURE — 99233 SBSQ HOSP IP/OBS HIGH 50: CPT | Mod: ,,, | Performed by: HOSPITALIST

## 2023-08-17 PROCEDURE — 83735 ASSAY OF MAGNESIUM: CPT | Performed by: STUDENT IN AN ORGANIZED HEALTH CARE EDUCATION/TRAINING PROGRAM

## 2023-08-17 PROCEDURE — 63600175 PHARM REV CODE 636 W HCPCS

## 2023-08-17 PROCEDURE — 99900035 HC TECH TIME PER 15 MIN (STAT)

## 2023-08-17 PROCEDURE — 94761 N-INVAS EAR/PLS OXIMETRY MLT: CPT

## 2023-08-17 PROCEDURE — 11000001 HC ACUTE MED/SURG PRIVATE ROOM

## 2023-08-17 PROCEDURE — 97530 THERAPEUTIC ACTIVITIES: CPT

## 2023-08-17 PROCEDURE — 99233 SBSQ HOSP IP/OBS HIGH 50: CPT | Mod: ,,, | Performed by: INTERNAL MEDICINE

## 2023-08-17 PROCEDURE — 21400001 HC TELEMETRY ROOM

## 2023-08-17 PROCEDURE — 85027 COMPLETE CBC AUTOMATED: CPT

## 2023-08-17 PROCEDURE — 99233 PR SUBSEQUENT HOSPITAL CARE,LEVL III: ICD-10-PCS | Mod: ,,, | Performed by: INTERNAL MEDICINE

## 2023-08-17 PROCEDURE — 99233 PR SUBSEQUENT HOSPITAL CARE,LEVL III: ICD-10-PCS | Mod: ,,, | Performed by: NURSE PRACTITIONER

## 2023-08-17 PROCEDURE — 25000003 PHARM REV CODE 250

## 2023-08-17 PROCEDURE — 36415 COLL VENOUS BLD VENIPUNCTURE: CPT | Performed by: STUDENT IN AN ORGANIZED HEALTH CARE EDUCATION/TRAINING PROGRAM

## 2023-08-17 RX ORDER — HYDRALAZINE HYDROCHLORIDE 25 MG/1
25 TABLET, FILM COATED ORAL 3 TIMES DAILY
Status: DISCONTINUED | OUTPATIENT
Start: 2023-08-17 | End: 2023-08-18

## 2023-08-17 RX ORDER — FLUCONAZOLE 200 MG/1
800 TABLET ORAL ONCE
Status: COMPLETED | OUTPATIENT
Start: 2023-08-17 | End: 2023-08-17

## 2023-08-17 RX ORDER — SODIUM CHLORIDE 9 MG/ML
INJECTION, SOLUTION INTRAVENOUS ONCE
Status: DISCONTINUED | OUTPATIENT
Start: 2023-08-18 | End: 2023-08-19

## 2023-08-17 RX ORDER — HYDROMORPHONE HYDROCHLORIDE 1 MG/ML
0.5 INJECTION, SOLUTION INTRAMUSCULAR; INTRAVENOUS; SUBCUTANEOUS ONCE
Status: COMPLETED | OUTPATIENT
Start: 2023-08-17 | End: 2023-08-17

## 2023-08-17 RX ORDER — FLUCONAZOLE 200 MG/1
400 TABLET ORAL DAILY
Status: DISCONTINUED | OUTPATIENT
Start: 2023-08-18 | End: 2023-08-25

## 2023-08-17 RX ORDER — OXYCODONE AND ACETAMINOPHEN 5; 325 MG/1; MG/1
1 TABLET ORAL EVERY 4 HOURS PRN
Status: DISCONTINUED | OUTPATIENT
Start: 2023-08-17 | End: 2023-09-01

## 2023-08-17 RX ORDER — OXYCODONE HYDROCHLORIDE 10 MG/1
10 TABLET ORAL EVERY 4 HOURS PRN
Status: DISCONTINUED | OUTPATIENT
Start: 2023-08-17 | End: 2023-09-01

## 2023-08-17 RX ORDER — LOSARTAN POTASSIUM 50 MG/1
100 TABLET ORAL DAILY
Status: DISCONTINUED | OUTPATIENT
Start: 2023-08-18 | End: 2023-08-19

## 2023-08-17 RX ADMIN — SEVELAMER CARBONATE 0.8 G: 0.8 POWDER, FOR SUSPENSION ORAL at 06:08

## 2023-08-17 RX ADMIN — CALCITRIOL CAPSULES 0.25 MCG 0.25 MCG: 0.25 CAPSULE ORAL at 08:08

## 2023-08-17 RX ADMIN — TOBRAMYCIN AND DEXAMETHASONE 1 DROP: 3; 1 SUSPENSION/ DROPS OPHTHALMIC at 06:08

## 2023-08-17 RX ADMIN — ACETAMINOPHEN 1000 MG: 500 TABLET ORAL at 09:08

## 2023-08-17 RX ADMIN — HYDROMORPHONE HYDROCHLORIDE 0.5 MG: 1 INJECTION, SOLUTION INTRAMUSCULAR; INTRAVENOUS; SUBCUTANEOUS at 06:08

## 2023-08-17 RX ADMIN — APIXABAN 5 MG: 5 TABLET, FILM COATED ORAL at 09:08

## 2023-08-17 RX ADMIN — TOBRAMYCIN AND DEXAMETHASONE 1 DROP: 3; 1 SUSPENSION/ DROPS OPHTHALMIC at 08:08

## 2023-08-17 RX ADMIN — ATORVASTATIN CALCIUM 40 MG: 40 TABLET, FILM COATED ORAL at 08:08

## 2023-08-17 RX ADMIN — TOBRAMYCIN AND DEXAMETHASONE: 3; 1 OINTMENT OPHTHALMIC at 09:08

## 2023-08-17 RX ADMIN — FLUCONAZOLE 800 MG: 200 TABLET ORAL at 02:08

## 2023-08-17 RX ADMIN — MUPIROCIN: 20 OINTMENT TOPICAL at 08:08

## 2023-08-17 RX ADMIN — TOBRAMYCIN AND DEXAMETHASONE: 3; 1 OINTMENT OPHTHALMIC at 03:08

## 2023-08-17 RX ADMIN — Medication 16 G: at 08:08

## 2023-08-17 RX ADMIN — HYDRALAZINE HYDROCHLORIDE 50 MG: 50 TABLET ORAL at 08:08

## 2023-08-17 RX ADMIN — PROMETHAZINE HYDROCHLORIDE 12.5 MG: 25 INJECTION, SOLUTION INTRAMUSCULAR; INTRAVENOUS at 08:08

## 2023-08-17 RX ADMIN — HYDRALAZINE HYDROCHLORIDE 25 MG: 25 TABLET, FILM COATED ORAL at 02:08

## 2023-08-17 RX ADMIN — ONDANSETRON 8 MG: 8 TABLET, ORALLY DISINTEGRATING ORAL at 06:08

## 2023-08-17 RX ADMIN — TOBRAMYCIN AND DEXAMETHASONE 1 DROP: 3; 1 SUSPENSION/ DROPS OPHTHALMIC at 02:08

## 2023-08-17 RX ADMIN — HYDRALAZINE HYDROCHLORIDE 25 MG: 25 TABLET, FILM COATED ORAL at 09:08

## 2023-08-17 RX ADMIN — OXYCODONE HYDROCHLORIDE 10 MG: 10 TABLET ORAL at 02:08

## 2023-08-17 RX ADMIN — SEVELAMER CARBONATE 0.8 G: 0.8 POWDER, FOR SUSPENSION ORAL at 02:08

## 2023-08-17 RX ADMIN — LOSARTAN POTASSIUM 50 MG: 50 TABLET, FILM COATED ORAL at 08:08

## 2023-08-17 RX ADMIN — CARVEDILOL 12.5 MG: 12.5 TABLET, FILM COATED ORAL at 08:08

## 2023-08-17 RX ADMIN — TOBRAMYCIN AND DEXAMETHASONE 1 DROP: 3; 1 SUSPENSION/ DROPS OPHTHALMIC at 09:08

## 2023-08-17 RX ADMIN — CARVEDILOL 12.5 MG: 12.5 TABLET, FILM COATED ORAL at 09:08

## 2023-08-17 RX ADMIN — MUPIROCIN: 20 OINTMENT TOPICAL at 09:08

## 2023-08-17 RX ADMIN — ACETAMINOPHEN 1000 MG: 500 TABLET ORAL at 08:08

## 2023-08-17 RX ADMIN — ACETAMINOPHEN 1000 MG: 500 TABLET ORAL at 02:08

## 2023-08-17 RX ADMIN — MEROPENEM 500 MG: 500 INJECTION INTRAVENOUS at 02:08

## 2023-08-17 RX ADMIN — Medication 6 MG: at 09:08

## 2023-08-17 NOTE — PROGRESS NOTES
Pharmacokinetic Assessment Follow Up: IV Vancomycin    Vancomycin serum concentration assessment(s):    The random level was drawn correctly and can be used to guide therapy at this time. The measurement is within the desired definitive target range of 15 to 20 mcg/mL    Vancomycin Regimen Plan:    Will hold abx today   Will re-dose vancomycin 500 mg post HD on Friday (ordered)   Repeat random level with AM labs on Saturday     Drug levels (last 3 results):  Recent Labs   Lab Result Units 08/15/23  0415 08/16/23  0437 08/17/23  0540   Vancomycin, Random ug/mL 22.9 17.8 18.0       Pharmacy will continue to follow and monitor vancomycin.    Please contact pharmacy at extension [phone] for questions regarding this assessment.    Thank you for the consult,   Elsa Solorzano       Patient brief summary:  Immanuel Bernal is a 57 y.o. male initiated on antimicrobial therapy with IV Vancomycin for treatment of  endophthalmitis    The patient's current regimen is pulse dose based on level    Drug Allergies:   Review of patient's allergies indicates:   Allergen Reactions    Morphine Rash    Amiodarone analogues Itching     Other reaction(s): Unknown       Actual Body Weight:   61.7 kg    Renal Function:   Estimated Creatinine Clearance: 14.7 mL/min (A) (based on SCr of 5.2 mg/dL (H)).,     Dialysis Method (if applicable):  intermittent HD    CBC (last 72 hours):  Recent Labs   Lab Result Units 08/15/23  0415 08/16/23  0437 08/17/23  0540   WBC K/uL 12.60 8.56 8.07   Hemoglobin g/dL 9.7* 8.7* 8.8*   Hematocrit % 33.3* 30.4* 30.5*   Platelets K/uL 253 224 249         Metabolic Panel (last 72 hours):  Recent Labs   Lab Result Units 08/14/23  1354 08/14/23  2148 08/15/23  0415 08/15/23  1426 08/16/23  0437 08/16/23  0438 08/16/23  2348 08/17/23  0540   Sodium mmol/L 134* 133* 133*  133* 134* 137 138  138 139 138  140   Potassium mmol/L 4.1 4.0 3.8  4.0 4.0 3.7 3.7  3.7 4.5 4.5  4.5   Chloride mmol/L 99 99 98  99 98 104  103  104 104 104  105   CO2 mmol/L 27 24 25  25 26 23 24  25 24 22*  24   Glucose mg/dL 84 98 99  99 105 67* 66*  68* 95 65*  67*   BUN mg/dL 13 16 19  19 25* 13 14  13 16 17  18   Creatinine mg/dL 3.7* 4.2* 4.3*  4.6* 4.8* 3.2* 3.2*  3.4* 4.6* 4.8*  5.2*   Albumin g/dL 2.0* 1.9* 2.0*  2.0* 2.0* 2.1* 2.0*  2.0* 2.1* 2.1*  2.1*   Total Bilirubin mg/dL  --   --  0.3  --  0.3  --   --  0.3   Alkaline Phosphatase U/L  --   --  126  --  118  --   --  104   AST U/L  --   --  13  --  14  --   --  12   ALT U/L  --   --  6*  --  5*  --   --  <5*   Magnesium mg/dL 2.0 2.0 2.0 2.2  --  2.1  2.1 2.4 2.5   Phosphorus mg/dL 3.0 3.3 3.3 3.5  --  2.4*  2.4* 4.4 4.4         Vancomycin Administrations:  vancomycin given in the last 96 hours                     vancomycin (VANCOCIN) 500 mg in dextrose 5 % in water (D5W) 100 mL IVPB (MB+) (mg) 500 mg New Bag 08/11/23 2326    vancomycin (VANCOCIN) 500 mg in dextrose 5 % in water (D5W) 100 mL IVPB (MB+) (mg) 500 mg New Bag 08/11/23 0202                    Microbiologic Results:  Microbiology Results (last 7 days)       Procedure Component Value Units Date/Time    Aerobic culture [[id number]] Collected: 08/16/23 1629    Order Status: Completed Specimen: Wound from Eyelid, Left Updated: 08/17/23 0713     Aerobic Bacterial Culture No growth    Aerobic culture [[id number]] Collected: 08/16/23 1636    Order Status: Completed Specimen: Abscess from Eyelid, Left Updated: 08/17/23 0713     Aerobic Bacterial Culture No growth    Narrative:      Sclera abscess    Aerobic culture [[id number]] Collected: 08/16/23 1647    Order Status: Completed Specimen: Abscess from Eyelid, Left Updated: 08/17/23 0713     Aerobic Bacterial Culture No growth    Aerobic culture [[id number]] Collected: 08/16/23 1632    Order Status: Completed Specimen: Wound from Cornea, Left Updated: 08/17/23 0713     Aerobic Bacterial Culture No growth    Fungus culture [[id number]] Collected: 08/16/23 1636    Order  Status: Completed Specimen: Abscess from Eyelid, Left Updated: 08/17/23 0618     Fungus (Mycology) Culture Culture in progress    Narrative:      Sclera abscess    Fungus culture [[id number]] Collected: 08/16/23 1647    Order Status: Completed Specimen: Abscess from Eyelid, Left Updated: 08/17/23 0618     Fungus (Mycology) Culture Culture in progress    Fungus culture [[id number]] Collected: 08/16/23 1632    Order Status: Completed Specimen: Wound from Cornea, Left Updated: 08/17/23 0618     Fungus (Mycology) Culture Culture in progress    Fungus culture [[id number]] Collected: 08/16/23 1629    Order Status: Completed Specimen: Wound from Eyelid, Left Updated: 08/17/23 0618     Fungus (Mycology) Culture Culture in progress    Gram stain [[id number]] Collected: 08/16/23 1632    Order Status: Completed Specimen: Wound from Cornea, Left Updated: 08/16/23 1840     Gram Stain Result No WBC's      No organisms seen    Gram stain [[id number]] Collected: 08/16/23 1629    Order Status: Completed Specimen: Wound from Eyelid, Left Updated: 08/16/23 1840     Gram Stain Result Rare WBC's      No organisms seen    Gram stain [[id number]] Collected: 08/16/23 1647    Order Status: Completed Specimen: Abscess from Eyelid, Left Updated: 08/16/23 1839     Gram Stain Result No WBC's      Rare yeast    Narrative:      Vitreous abscess    Gram stain [[id number]] Collected: 08/16/23 1636    Order Status: Completed Specimen: Abscess from Eyelid, Left Updated: 08/16/23 1839     Gram Stain Result No WBC's      Rare yeast    Narrative:      Sclera abscess    AFB Culture & Smear [[id number]] Collected: 08/16/23 1647    Order Status: Sent Specimen: Abscess from Eyelid, Left Updated: 08/16/23 1702    Culture, Anaerobe [[id number]] Collected: 08/16/23 1647    Order Status: Sent Specimen: Abscess from Eyelid, Left Updated: 08/16/23 1701    AFB Culture & Smear [[id number]] Collected: 08/16/23 1636    Order Status: Sent Specimen: Abscess from Eyelid, Left  Updated: 08/16/23 1700    Culture, Anaerobe [[id number]] Collected: 08/16/23 1636    Order Status: Sent Specimen: Abscess from Eyelid, Left Updated: 08/16/23 1658    AFB Culture & Smear [[id number]] Collected: 08/16/23 1629    Order Status: Sent Specimen: Wound from Eyelid, Left Updated: 08/16/23 1657    Culture, Anaerobe [[id number]] Collected: 08/16/23 1628    Order Status: Sent Specimen: Wound from Eyelid, Left Updated: 08/16/23 1656    Culture, Anaerobe [[id number]] Collected: 08/16/23 1632    Order Status: Sent Specimen: Wound from Cornea, Left Updated: 08/16/23 1655    AFB Culture & Smear [[id number]] Collected: 08/16/23 1632    Order Status: Sent Specimen: Wound from Cornea, Left Updated: 08/16/23 1654    Blood culture (site 2) [[id number]] Collected: 08/09/23 1434    Order Status: Completed Specimen: Blood Updated: 08/14/23 1812     Blood Culture, Routine No growth after 5 days.    Narrative:      Site # 2, aerobic only  Collection has been rescheduled by [email or web address] at 08/09/2023 13:27 Reason:   Patient unavailable in with DR   Collection has been rescheduled by [email or web address] at 08/09/2023 13:27 Reason:   Patient unavailable in with DR     Blood culture (site 1) [[id number]] Collected: 08/09/23 1434    Order Status: Completed Specimen: Blood Updated: 08/14/23 1812     Blood Culture, Routine No growth after 5 days.    Narrative:      Site # 1, aerobic and anaerobic  Collection has been rescheduled by [email or web address] at 08/09/2023 13:27 Reason:   Patient unavailable in with DR   Collection has been rescheduled by [email or web address] at 08/09/2023 13:27 Reason:   Patient unavailable in with DR

## 2023-08-17 NOTE — ASSESSMENT & PLAN NOTE
Nephrology consulted for HD needs while inpatient  Underwent SLED post MRI contrast (gadolinium)   Ad last dialysis was 08/16: Tolerated HD, with a net UF of 1L. NAEON.     Plan:  Will be followed by nephro

## 2023-08-17 NOTE — ASSESSMENT & PLAN NOTE
Hypertensive on arrival  bp today: 137/66  SBP range: 128-137  Home medications: imdur Carvedilol Hydralazine Nifedipine    Plan:  On hydralazine And losartan and carvedilol in patient   Resume low salt diet

## 2023-08-17 NOTE — ASSESSMENT & PLAN NOTE
Ophthalmology team on board and were going to perform the procedure but the patient's family refused without being around to review the MRI which delayed the procedure till next wednesday    MRI repeat : Diffuse inflammatory change involving the left globe, with associated thickening of the left sclera, proptosis, and focal fluid collection within the left posterior compartment, which demonstrates diffusion restriction.  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  Diffuse left preseptal and postseptal inflammatory change, with superimposed orbital cellulitis also considered.  Evaluation of the left orbital apex is limited secondary to no contrast administration.  Decreased size of the left anterior compartment with bowing of the visualized left lens.  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    Thorough counseling undertaken with POA and IM team, palliative, opthalmo and nursing team in regards to the surgery  She agreed for her brother to undergo the procedure as an emergent case if his health deteriorates  Otherwise to be scheduled on wednesday    Underwent evisceration of his left eye yesterday, tobramycin ordered, ID recs are being followed  Still on meronema and vancomycin IV  Wbc: 8.07 (normal)  hgb post op 8.8 (at [facility])  No signs of distress or delirium    Plan:  Follow ID recs  Follow opthalomology recs

## 2023-08-17 NOTE — PROGRESS NOTES
[facility] ([place] [address])  Nephrology  Progress Note    Patient Name: Immanuel Bernal  MRN: [id number]  Admission Date: 8/9/2023  Hospital Length of Stay: 8 days  Attending Provider: Porsha Funez MD   Primary Care Physician: Dolly, Primary Doctor  Principal Problem:Endophthalmitis      Interval History: Tolerated HD yesterday, with a net UF of 1L. NAEON.     Objective:     Vital Signs (Most Recent):  Temp: 99.1 °F (37.3 °C) (08/17/23 0753)  Pulse: 75 (08/17/23 1123)  Resp: 18 (08/17/23 0753)  BP: 137/66 (08/17/23 0753)  SpO2: 98 % (08/17/23 0753) Vital Signs (24h Range):  Temp:  [98 °F (36.7 °C)-99.1 °F (37.3 °C)] 99.1 °F (37.3 °C)  Pulse:  [71-89] 75  Resp:  [10-19] 18  SpO2:  [96 %-100 %] 98 %  BP: (128-159)/(64-83) 137/66     Weight: 68 kg (150 lb) (08/16/23 1400)  Body mass index is 21.52 kg/m².  Body surface area is 1.83 meters squared.    I/O last 3 completed shifts:  In: 300 [IV Piggyback:300]  Out: 1500 [Other:1500]    Physical Exam  Constitutional:       Appearance: He is ill-appearing.   HENT:      Head: Normocephalic and atraumatic.      Nose: Nose normal.      Mouth/Throat:      Mouth: Mucous membranes are moist.      Pharynx: Oropharynx is clear.   Eyes:      General:         Left eye: Discharge present.  Cardiovascular:      Rate and Rhythm: Normal rate and regular rhythm.      Comments: R IJ TDC   Pulmonary:      Effort: Pulmonary effort is normal.      Breath sounds: Normal breath sounds.   Abdominal:      General: Abdomen is flat.      Palpations: Abdomen is soft.   Musculoskeletal:      Cervical back: Normal range of motion and neck supple.      Right lower leg: No edema.      Left lower leg: No edema.   Skin:     General: Skin is warm and dry.   Neurological:      Mental Status: He is alert. He is disoriented.          Significant Labs:  CBC:   Recent Labs   Lab 08/17/23  0540   WBC 8.07   RBC 2.98*   HGB 8.8*   HCT 30.5*      *   MCH 29.5   MCHC 28.9*       CMP:    Recent Labs   Lab 08/17/23  0540   GLU 65*  67*   CALCIUM 8.8  9.0   ALBUMIN 2.1*  2.1*   PROT 6.1     140   K 4.5  4.5   CO2 22*  24     105   BUN 17  18   CREATININE 4.8*  5.2*   ALKPHOS 104   ALT <5*   AST 12   BILITOT 0.3       All labs within the past 24 hours have been reviewed.        Assessment/Plan:     Ophtho  * Endophthalmitis  -Plan per primary/ophthalmology     Renal/  Chronic kidney disease-mineral and bone disorder  -Continue calcitriol and renvela     ESRD (end stage renal disease)  Outpatient HD Information:    -Outpatient HD unit: [facility]   -HD tx days: MWF   -HD tx time: 210min  -HD access: R IJ TDC   -HD modality: iHD   -Residual urine: ?      Plan/Recommendations:    -Continue HD MWF while inpatient   -Renal diet  -Strict I/O's and daily weights  -Daily renal function panels   -Renally dose meds      Oncology  Anemia in ESRD (end-stage renal disease)  -Target Hg of 10-12  -Transfuse for Hg <7.0  -Epo with HD         Thank you for your consult. I will follow-up with patient. Please contact us if you have any additional questions.    Pete Grimes NP  Nephrology  [facility] ([place]

## 2023-08-17 NOTE — CONSULTS
[facility] ([place] [address])  Infectious Disease  Consult Note    Patient Name: Immanuel Bernal  MRN: [id number]  Admission Date: 8/9/2023  Hospital Length of Stay: 8 days  Attending Physician: Porsah Funez MD  Primary Care Provider: No, Primary Doctor     Isolation Status: No active isolations    Patient information was obtained from patient, spouse/SO and past medical records.      Inpatient consult to Infectious Diseases  Consult performed by: Joann Haley DO  Consult ordered by: Brett Gautam DO        Assessment/Plan:     Ophtho  * Endophthalmitis  57M with PMH of DM2, HTN, ESRD, HFrEF, bowel obstruction s/p bowel resection, KENIA, afib, recent hospitalization for NELSON endophthalmitis, pseudomonas bacteremia, RUE AVG infection s/p excision, who presents as a transfer from [place] for worsening eye symptoms and imaging findings of L scleral abscess. Previously admitted to [facility] in July for NELSON endophthalmitis, received intravitreal vancomycin and ceftazidime and IV cefepime for pseudomonas bacteremia. Refused enucleation during that admission, and left AMA. He then presented to [place], and was transferred to [facility] for oculoplastic eval. MRI orbits with L scleral abscess. Underwent enucleation 8/16  - Blood cultures 8/9 NGTD. Gram stain of abscess with rare yeast. He has been on vanc/eunice since 8/10.     Recommendations:  - Given yeast on abscess culture, recommend addition of fluconazole  - Continue vanc and meropenem while awaiting operative culture data. Further tailoring of regimen to follow.      Thank you for your consult. I will follow-up with patient. Please contact us if you have any additional questions.    Joann Haley DO  Infectious Disease  [facility] ([place] [address])    Subjective:     Principal Problem: Endophthalmitis    HPI: Mr. Bernal is a 57M with PMH of DM2, HTN, ESRD, HFrEF, bowel obstruction s/p bowel resection, KENIA, afib, recent  hospitalization for NELSON endophthalmitis, pseudomonas bacteremia, RUE AVG infection s/p excision, who presented as a transfer from [place] for worsening eye symptoms and imaging findings of L scleral abscess. Patient had been admitted to [facility] July 18th for concern for NELSON endophthalmitis. He received intravitreal vancomycin and ceftazidime by ophthalmology, and IV cefepime for pseudomonas bacteremia. Superficial swab with diphtheroids and anaerobic cocci. He underwent intravitreal tap that was negative for any yeast or fungus. Unfortunately L eye did not respond to treatment and abscess developed. Ophthalmology recommended enucleation, however POA/family declined. He was recommend to continue on cefepime for 6 weeks followed by indefinite fluoroquinolone, however he left AMA without the antibiotics.     He then presented to [place], where imaging was notable for L scleral abscess. He received a dose of zosyn and vanc, and was transferred to [facility] for oculoplastic evaluation. ID was consulted on arrival, and he was transitioned to vanc/meropenem while awaiting culture results. Blood cultures from 8/9 negative. He underwent L enucleation 8/16. ID reconsulted 8/17 for long term antibiotic regimen.        Past Medical History:   Diagnosis Date    Bilateral retinal detachment 7/18/2023    BPH (benign prostatic hyperplasia)     Cataract     CHF (congestive heart failure)     CKD (chronic kidney disease) stage 3, GFR 30-59 ml/min     Diabetes mellitus, type 2     Hypertension     KENIA (obstructive sleep apnea)     Pancreatitis     Persistent proteinuria 11/12/2020    2 g proteinuria noted Resumed ACE Lower BP systolic to less than 130     PTSD (post-traumatic stress disorder)     Stroke        Past Surgical History:   Procedure Laterality Date    CATARACT EXTRACTION Bilateral     EVISCERATION OF OCULAR CONTENTS Left 8/16/2023    Procedure: EVISCERATION, OCULAR CONTENTS;  Surgeon: Vicki Stewart MD;   Location: [facility] OR [address] [place] [address];  Service: Ophthalmology;  Laterality: Left;    RETROBULBAR INJECTION OF MEDICATION Left 8/16/2023    Procedure: INJECTION, MEDICATION, RETROBULBAR;  Surgeon: Vicki Stewart MD;  Location: [facility] OR [address] [place] [address];  Service: Ophthalmology;  Laterality: Left;    TARSORRHAPHY Left 8/16/2023    Procedure: BLEPHARORRHAPHY;  Surgeon: Vicki Stewart MD;  Location: [facility] OR [address] [place] [address];  Service: Ophthalmology;  Laterality: Left;       Review of patient's allergies indicates:   Allergen Reactions    Morphine Rash    Amiodarone analogues Itching     Other reaction(s): Unknown       Medications:  Medications Prior to Admission   Medication Sig    acetaminophen (TYLENOL) 325 MG tablet Take 650 mg by mouth every 6 (six) hours as needed for Pain.    albuterol-ipratropium (DUO-NEB) 2.5 mg-0.5 mg/3 mL nebulizer solution Inhale 3 mLs into the lungs 3 (three) times daily.    amLODIPine (NORVASC) 10 MG tablet Take 10 mg by mouth once daily.    ascorbic acid, vitamin C, (VITAMIN C) 500 MG tablet Take 500 mg by mouth once daily.    aspirin 81 MG Chew Take 81 mg by mouth once daily.    calcium carbonate-vitamin D3 (OYSTER SHELL CALCIUM-VIT D3) 500 mg-5 mcg (200 unit) PwPk Take 1 tablet by mouth 2 (two) times a day.    carvediloL (COREG) 25 MG tablet Take 1 tablet (25 mg total) by mouth 2 (two) times daily with meals.    citalopram (CELEXA) 20 MG tablet Take 20 mg by mouth once daily.    coffee xt/phosphatidyl serine (NEURIVA ORIGINAL ORAL) Take 1 capsule by mouth once daily.    flecainide (TAMBOCOR) 50 MG Tab Take 50 mg by mouth 2 (two) times daily.    folic acid (FOLVITE) 1 MG tablet Take 1 tablet by mouth every morning.    ibuprofen (ADVIL,MOTRIN) 600 MG tablet Take 600 mg by mouth every 6 (six) hours as needed for Pain.    lactulose (CHRONULAC) 10 gram/15 mL solution Take 45 mLs by mouth daily as needed (constipation).    megestroL (MEGACE) 400 mg/10 mL (40 mg/mL) Susp Take 10 mLs by mouth 2 (two)  times a day.    metoclopramide HCl (REGLAN) 5 MG tablet Take 5 mg by mouth once daily.    multivitamin (THERAGRAN) per tablet Take 1 tablet by mouth once daily.    pantoprazole (PROTONIX) 40 MG tablet Take 40 mg by mouth once daily.    pentoxifylline (TRENTAL) 400 mg TbSR Take 400 mg by mouth 2 (two) times daily.    polyethylene glycol (GLYCOLAX) 17 gram/dose powder Take 17 g by mouth 2 (two) times daily.    rosuvastatin (CRESTOR) 5 MG tablet Take 5 mg by mouth once daily.    sevelamer carbonate (RENVELA) 800 mg Tab Take 1 tablet by mouth 5 times per day    tamsulosin (FLOMAX) 0.4 mg Cap Take 1 capsule (0.4 mg total) by mouth once daily.    UNABLE TO FIND Take 1 tablet by mouth once daily. medication name: OPTIMAL ANTIOX    UNABLE TO FIND Take 1 tablet by mouth nightly. medication name: QUNOL SLEEP SUPPORT    VITAMIN B COMPLEX ORAL Take 1 tablet by mouth every morning.    zinc gluconate 50 mg tablet Take 50 mg by mouth once daily.     Antibiotics (From admission, onward)      Start     Stop Route Frequency Ordered    08/18/23 1200  vancomycin (VANCOCIN) 500 mg in dextrose 5 % in water (D5W) 100 mL IVPB (MB+)         -- IV Once 08/17/23 0944    08/16/23 2100  tobramycin-dexAMETHasone 0.3-0.1% ophthalmic suspension 1 drop         09/15/23 2059 LEFT EYE 4 times daily 08/16/23 1732    08/16/23 2100  tobramycin-dexAMETHasone 0.3-0.1% ophthalmic ointment         09/15/23 2059 LEFT EYE 3 times daily 08/16/23 1732    08/13/23 0945  mupirocin 2 % ointment         08/18/23 0859 Nasl 2 times daily 08/13/23 0840    08/13/23 0000  meropenem (MERREM) 500 mg in sodium chloride 0.9 % 100 mL IVPB (MB+)         -- IV Every 12 hours (non-standard times) 08/12/23 1507    08/09/23 1418  vancomycin - pharmacy to dose  (vancomycin IVPB (PEDS and ADULTS))        See [facility] for full Linked Orders Report.    -- IV pharmacy to manage frequency 08/09/23 1319          Antifungals (From admission, onward)      None           Antivirals (From admission, onward)      None             Immunization History   Administered Date(s) Administered    Influenza 10/30/1997, 11/14/2000    Pneumococcal Conjugate - 20 Valent 03/17/2023       Family History       Problem Relation (Age of Onset)    Diabetes Father, Sister    Heart disease Father    Hyperlipidemia Brother    Kidney disease Father    Stroke Mother          Social History     Socioeconomic History    Marital status: Single   Tobacco Use    Smoking status: Former     Current packs/day: 0.00     Types: Cigarettes, Cigars    Smokeless tobacco: Never   Substance and Sexual Activity    Alcohol use: Not Currently    Drug use: Not Currently     Social Determinants of Health     Financial Resource Strain: Low Risk  (8/10/2023)    Overall Financial Resource Strain (CARDIA)     Difficulty of Paying Living Expenses: Not very hard   Food Insecurity: No Food Insecurity (8/10/2023)    Hunger Vital Sign     Worried About Running Out of Food in the Last Year: Never true     Ran Out of Food in the Last Year: Never true   Transportation Needs: No Transportation Needs (8/10/2023)    PRAPARE - Transportation     Lack of Transportation (Medical): No     Lack of Transportation (Non-Medical): No   Physical Activity: Insufficiently Active (8/10/2023)    Exercise Vital Sign     Days of Exercise per Week: 5 days     Minutes of Exercise per Session: 20 min   Stress: Stress Concern Present (8/10/2023)    Citizen of [place] of Occupational Health - Occupational Stress Questionnaire     Feeling of Stress : Rather much   Social Connections: Socially Isolated (8/10/2023)    Social Connection and Isolation Panel [NHANES]     Frequency of Communication with Friends and Family: Twice a week     Frequency of Social Gatherings with Friends and Family: Once a week     Attends Islam Services: Never     Active Member of Clubs or Organizations: No     Attends Club or Organization Meetings: Never      Marital Status: Never    Housing Stability: Unknown (8/10/2023)    Housing Stability Vital Sign     Unable to Pay for Housing in the Last Year: No     Unstable Housing in the Last Year: No     Review of Systems   Constitutional:  Positive for fatigue. Negative for fever.   Eyes:  Positive for visual disturbance. Negative for pain and redness.   Respiratory:  Negative for shortness of breath.    Cardiovascular:  Negative for chest pain.   Neurological:  Positive for headaches. Negative for dizziness and light-headedness.   Psychiatric/Behavioral:  Negative for confusion.      Objective:     Vital Signs (Most Recent):  Temp: 99.1 °F (37.3 °C) (08/17/23 0753)  Pulse: 80 (08/17/23 0753)  Resp: 18 (08/17/23 0753)  BP: 137/66 (08/17/23 0753)  SpO2: 98 % (08/17/23 0753) Vital Signs (24h Range):  Temp:  [98 °F (36.7 °C)-99.1 °F (37.3 °C)] 99.1 °F (37.3 °C)  Pulse:  [71-89] 80  Resp:  [10-19] 18  SpO2:  [96 %-100 %] 98 %  BP: (128-161)/(64-86) 137/66     Weight: 68 kg (150 lb)  Body mass index is 21.52 kg/m².    Estimated Creatinine Clearance: 14.7 mL/min (A) (based on SCr of 5.2 mg/dL (H)).     Physical Exam  Vitals and nursing note reviewed.   Constitutional:       General: He is not in acute distress.     Appearance: Normal appearance. He is not ill-appearing.   HENT:      Head: Normocephalic and atraumatic.   Eyes:      Comments: Left eye enucleated. Eye lid and surrounding tissue without significant edema/erythema  Right eye non erythematous. EOM intact   Cardiovascular:      Rate and Rhythm: Normal rate and regular rhythm.      Heart sounds: No murmur heard.  Pulmonary:      Effort: Pulmonary effort is normal. No respiratory distress.      Breath sounds: Normal breath sounds. No wheezing or rales.   Abdominal:      General: Abdomen is flat.      Palpations: Abdomen is soft.   Musculoskeletal:      Cervical back: Normal range of motion.      Right lower leg: No edema.      Left lower leg: No edema.   Skin:      General: Skin is warm and dry.      Comments: RUE with recent incision, staples in place  Clean, no erythematous, no oozing or drainage    Neurological:      General: No focal deficit present.      Mental Status: Mental status is at baseline.   Psychiatric:         Mood and Affect: Mood normal.         Behavior: Behavior normal.          Significant Labs: Blood Culture:   Recent Labs   Lab 08/09/23  1434   LABBLOO No growth after 5 days.  No growth after 5 days.     CBC:   Recent Labs   Lab 08/16/23  0437 08/17/23  0540   WBC 8.56 8.07   HGB 8.7* 8.8*   HCT 30.4* 30.5*    249     CMP:   Recent Labs   Lab 08/16/23  0437 08/16/23  0438 08/16/23  2348 08/17/23  0540    138  138 139 138  140   K 3.7 3.7  3.7 4.5 4.5  4.5    103  104 104 104  105   CO2 23 24  25 24 22*  24   GLU 67* 66*  68* 95 65*  67*   BUN 13 14  13 16 17  18   CREATININE 3.2* 3.2*  3.4* 4.6* 4.8*  5.2*   CALCIUM 8.6* 8.6*  8.7 8.7 8.8  9.0   PROT 6.0  --   --  6.1   ALBUMIN 2.1* 2.0*  2.0* 2.1* 2.1*  2.1*   BILITOT 0.3  --   --  0.3   ALKPHOS 118  --   --  104   AST 14  --   --  12   ALT 5*  --   --  <5*   ANIONGAP 10 11  9 11 12  11     Wound Culture:   Recent Labs   Lab 08/16/23  1629 08/16/23  1632 08/16/23  1636 08/16/23  1647   LABAERO No growth No growth No growth No growth       Significant Imaging: I have reviewed all pertinent imaging results/findings within the past 24 hours.

## 2023-08-17 NOTE — ASSESSMENT & PLAN NOTE
57M with PMH of DM2, HTN, ESRD, HFrEF, bowel obstruction s/p bowel resection, KENIA, afib, recent hospitalization for NELSON endophthalmitis, pseudomonas bacteremia, RUE AVG infection s/p excision, who presents as a transfer from [place] for worsening eye symptoms and imaging findings of L scleral abscess. Previously admitted to [facility] in July for NELSON endophthalmitis, received intravitreal vancomycin and ceftazidime and IV cefepime for pseudomonas bacteremia. Refused enucleation during that admission, and left AMA. He then presented to [place], and was transferred to [facility] for oculoplastic eval. MRI orbits with L scleral abscess. Underwent enucleation 8/16  - Blood cultures 8/9 NGTD. Gram stain of abscess with rare yeast. He has been on vanc/eunice since 8/10.     Recommendations:  - Given yeast on abscess culture, recommend addition of fluconazole  - Continue vanc and meropenem while awaiting operative culture data. Further tailoring of regimen to follow.

## 2023-08-17 NOTE — ANESTHESIA POSTPROCEDURE EVALUATION
Anesthesia Post Evaluation    Patient: Immanuel Bernal    Procedure(s) Performed: Procedure(s) (LRB):  BLEPHARORRHAPHY (Left)  INJECTION, MEDICATION, RETROBULBAR (Left)  EVISCERATION, OCULAR CONTENTS (Left)    Final Anesthesia Type: general      Patient location during evaluation: PACU  Patient participation: Yes- Able to Participate  Level of consciousness: awake and alert and oriented  Post-procedure vital signs: reviewed and stable  Pain management: adequate  Airway patency: patent  KENIA mitigation strategies: Intraoperative administration of CPAP, nasopharyngeal airway, or oral appliance during sedation, Multimodal analgesia, Extubation while patient is awake, Verification of full reversal of neuromuscular block and Extubation and recovery carried out in lateral, semiupright, or other nonsupine position  PONV status at discharge: No PONV  Anesthetic complications: no      Cardiovascular status: hemodynamically stable  Respiratory status: unassisted  Hydration status: euvolemic  Follow-up not needed.          Vitals Value Taken Time   /66 08/17/23 0753   Temp 37.3 °C (99.1 °F) 08/17/23 0753   Pulse 86 08/17/23 0824   Resp 18 08/17/23 0753   SpO2 98 % 08/17/23 0824   Vitals shown include unvalidated device data.      Event Time   Out of Recovery 08/16/2023 19:00:00         Pain/Marco A Score: Pain Rating Prior to Med Admin: 0 (8/17/2023  8:21 AM)  Pain Rating Post Med Admin: 5 (8/17/2023 12:56 AM)  Marco A Score: 9 (8/16/2023  7:00 PM)

## 2023-08-17 NOTE — ASSESSMENT & PLAN NOTE
Nutrition consulted. Most recent weight and BMI monitored-     Measurements:  Wt Readings from Last 1 Encounters:   08/16/23 68 kg (150 lb)   Body mass index is 21.52 kg/m².    Patient has been screened and assessed by RD.    Malnutrition Type:  Context: chronic illness  Level: severe    Malnutrition Characteristic Summary:  Weight Loss (Malnutrition): greater than 10% in 6 months  Energy Intake (Malnutrition): other (see comments) ([place])  Subcutaneous Fat (Malnutrition): severe depletion  Muscle Mass (Malnutrition): severe depletion    Interventions/Recommendations (treatment strategy):  1.     Able to take in PO per SLP assessment on 8/15, on regular diet with thin liquids  Will reduce tube feeds gradually as patient takes in more PO

## 2023-08-17 NOTE — SUBJECTIVE & OBJECTIVE
Interval History: Tolerated HD yesterday, with a net UF of 1L. NAEON.     Objective:     Vital Signs (Most Recent):  Temp: 99.1 °F (37.3 °C) (08/17/23 0753)  Pulse: 75 (08/17/23 1123)  Resp: 18 (08/17/23 0753)  BP: 137/66 (08/17/23 0753)  SpO2: 98 % (08/17/23 0753) Vital Signs (24h Range):  Temp:  [98 °F (36.7 °C)-99.1 °F (37.3 °C)] 99.1 °F (37.3 °C)  Pulse:  [71-89] 75  Resp:  [10-19] 18  SpO2:  [96 %-100 %] 98 %  BP: (128-159)/(64-83) 137/66     Weight: 68 kg (150 lb) (08/16/23 1400)  Body mass index is 21.52 kg/m².  Body surface area is 1.83 meters squared.    I/O last 3 completed shifts:  In: 300 [IV Piggyback:300]  Out: 1500 [Other:1500]     Physical Exam  Constitutional:       Appearance: He is ill-appearing.   HENT:      Head: Normocephalic and atraumatic.      Nose: Nose normal.      Mouth/Throat:      Mouth: Mucous membranes are moist.      Pharynx: Oropharynx is clear.   Eyes:      General:         Left eye: Discharge present.  Cardiovascular:      Rate and Rhythm: Normal rate and regular rhythm.      Comments: R IJ TDC   Pulmonary:      Effort: Pulmonary effort is normal.      Breath sounds: Normal breath sounds.   Abdominal:      General: Abdomen is flat.      Palpations: Abdomen is soft.   Musculoskeletal:      Cervical back: Normal range of motion and neck supple.      Right lower leg: No edema.      Left lower leg: No edema.   Skin:     General: Skin is warm and dry.   Neurological:      Mental Status: He is alert. He is disoriented.          Significant Labs:  CBC:   Recent Labs   Lab 08/17/23  0540   WBC 8.07   RBC 2.98*   HGB 8.8*   HCT 30.5*      *   MCH 29.5   MCHC 28.9*       CMP:   Recent Labs   Lab 08/17/23  0540   GLU 65*  67*   CALCIUM 8.8  9.0   ALBUMIN 2.1*  2.1*   PROT 6.1     140   K 4.5  4.5   CO2 22*  24     105   BUN 17  18   CREATININE 4.8*  5.2*   ALKPHOS 104   ALT <5*   AST 12   BILITOT 0.3       All labs within the past 24 hours have been  reviewed.

## 2023-08-17 NOTE — PLAN OF CARE
Consultation Report  Ophthalmology Service    Date: 08/17/2023       History of Present Illness:     PMHx:  has a past medical history of Bilateral retinal detachment (7/18/2023), BPH (benign prostatic hyperplasia), Cataract, CHF (congestive heart failure), CKD (chronic kidney disease) stage 3, GFR 30-59 ml/min, Diabetes mellitus, type 2, Hypertension, KENIA (obstructive sleep apnea), Pancreatitis, Persistent proteinuria (11/12/2020), PTSD (post-traumatic stress disorder), and Stroke.     PSurgHx:  has a past surgical history that includes Cataract extraction (Bilateral); Tarsorrhaphy (Left, 8/16/2023); Retrobulbar injection of medication (Left, 8/16/2023); and Evisceration of ocular contents (Left, 8/16/2023).     Home Medications:   Prior to Admission medications    Medication Sig Start Date End Date Taking? Authorizing Provider   acetaminophen (TYLENOL) 325 MG tablet Take 650 mg by mouth every 6 (six) hours as needed for Pain.   Yes Provider, Historical   albuterol-ipratropium (DUO-NEB) 2.5 mg-0.5 mg/3 mL nebulizer solution Inhale 3 mLs into the lungs 3 (three) times daily.   Yes Provider, Historical   amLODIPine (NORVASC) 10 MG tablet Take 10 mg by mouth once daily. 3/6/23  Yes Provider, Historical   ascorbic acid, vitamin C, (VITAMIN C) 500 MG tablet Take 500 mg by mouth once daily.   Yes Provider, Historical   aspirin 81 MG Chew Take 81 mg by mouth once daily.   Yes Provider, Historical   calcium carbonate-vitamin D3 (OYSTER SHELL CALCIUM-VIT D3) 500 mg-5 mcg (200 unit) PwPk Take 1 tablet by mouth 2 (two) times a day.   Yes Provider, Historical   carvediloL (COREG) 25 MG tablet Take 1 tablet (25 mg total) by mouth 2 (two) times daily with meals. 11/12/20  Yes Yuliana Mayberry MD   citalopram (CELEXA) 20 MG tablet Take 20 mg by mouth once daily.   Yes Provider, Historical   coffee xt/phosphatidyl serine (NEURIVA ORIGINAL ORAL) Take 1 capsule by mouth once daily.   Yes Provider, Historical   flecainide (TAMBOCOR) 50  MG Tab Take 50 mg by mouth 2 (two) times daily. 7/17/23  Yes Provider, Historical   folic acid (FOLVITE) 1 MG tablet Take 1 tablet by mouth every morning.   Yes Provider, Historical   ibuprofen (ADVIL,MOTRIN) 600 MG tablet Take 600 mg by mouth every 6 (six) hours as needed for Pain. 7/17/23  Yes Provider, Historical   lactulose (CHRONULAC) 10 gram/15 mL solution Take 45 mLs by mouth daily as needed (constipation). 7/17/23  Yes Provider, Historical   megestroL (MEGACE) 400 mg/10 mL (40 mg/mL) Susp Take 10 mLs by mouth 2 (two) times a day. 7/17/23  Yes Provider, Historical   metoclopramide HCl (REGLAN) 5 MG tablet Take 5 mg by mouth once daily. 3/6/23  Yes Provider, Historical   multivitamin (THERAGRAN) per tablet Take 1 tablet by mouth once daily.   Yes Provider, Historical   pantoprazole (PROTONIX) 40 MG tablet Take 40 mg by mouth once daily.   Yes Provider, Historical   pentoxifylline (TRENTAL) 400 mg TbSR Take 400 mg by mouth 2 (two) times daily. 7/17/23  Yes Provider, Historical   polyethylene glycol (GLYCOLAX) 17 gram/dose powder Take 17 g by mouth 2 (two) times daily.   Yes Provider, Historical   rosuvastatin (CRESTOR) 5 MG tablet Take 5 mg by mouth once daily. 3/6/23  Yes Provider, Historical   sevelamer carbonate (RENVELA) 800 mg Tab Take 1 tablet by mouth 5 times per day 7/17/23  Yes Provider, Historical   tamsulosin (FLOMAX) 0.4 mg Cap Take 1 capsule (0.4 mg total) by mouth once daily. 11/12/20  Yes Yuliana Mayberry MD   UNABLE TO FIND Take 1 tablet by mouth once daily. medication name: OPTIMAL ANTIOX   Yes Provider, Historical   UNABLE TO FIND Take 1 tablet by mouth nightly. medication name: QUNOL SLEEP SUPPORT   Yes Provider, Historical   VITAMIN B COMPLEX ORAL Take 1 tablet by mouth every morning.   Yes Provider, Historical   zinc gluconate 50 mg tablet Take 50 mg by mouth once daily.   Yes Provider, Historical        Medications this encounter:    [START ON 8/18/2023] sodium chloride 0.9%   Intravenous Once     acetaminophen  1,000 mg Oral TID    atorvastatin  40 mg Oral Daily    calcitRIOL  0.25 mcg Oral Daily    carvediloL  12.5 mg Oral BID    epoetin thee (PROCRIT) injection  3,000 Units Intravenous Every Tues, Thurs, Sat    hydrALAZINE  50 mg Oral TID    losartan  50 mg Oral Daily    meropenem (MERREM) IVPB  500 mg Intravenous Q12H    mupirocin   Nasal BID    sevelamer carbonate  0.8 g Oral TID WM    tobramycin-dexAMETHasone 0.3-0.1%   Left Eye TID    tobramycin-dexAMETHasone 0.3-0.1%  1 drop Left Eye QID       Allergies: is allergic to morphine and amiodarone analogues.     Social:  reports that he has quit smoking. His smoking use included cigarettes and cigars. He has never used smokeless tobacco. He reports that he does not currently use alcohol. He reports that he does not currently use drugs.     ROS: As per HPI    Ocular examination/Dilated fundus examination:  Base Eye Exam       Visual Acuity (Snellen - Linear)         Right Left    Dist sc 20/40 +1     Dist ph sc NI               Tonometry (Applanation, 9:31 AM)         Right Left    Pressure 10               Pupils         Dark Light Shape React APD    Right 4 3 Round Brisk None    Left                   Visual Fields         Right Left     Full               Extraocular Movement         Right Left     Full, Ortho Full, Ortho              Dilation       Both eyes: 1% Mydriacyl, 2.5% Phenylephrine @ 9:32 AM                  Slit Lamp and Fundus Exam       External Exam         Right Left    External Normal Normal              Slit Lamp Exam         Right Left    Lids/Lashes Normal Ptosis, edematous eyelid. Partial tarsorraphy noted with sutures medial and laterally.    Conjunctiva/Sclera White and quiet S/p evisceration. Open anteriorly. Globe tightly packed with gauze. No purulent drainage noted. Erythematous conj overlying scleral shell.    Cornea Clear S/p evisceration    Anterior Chamber Deep and formed S/p evisceration    Iris Round and pharm dialted  S/p evisceration    Lens Clear S/p evisceration    Anterior Vitreous  S/p evisceration                      Assessment/Plan:     1. Bilateral endophthalmitis (OS>OD)  2. POD1 evisceration of left eye   - evisceration on 08/16/2023  - patient reports mild throbbing pain  - stable post operative exam, with no purulent drainage noted. Image in chart.   - right Va stable at 20/40, IOP normal, stable anterior exam OD, stable funduscopic examination from prior with vitreous flare and clumped cells inferiorly.     Recommendations  - Continue tobradex drops 4 times daily in the left eye  - Continue tobradex ointment 3 times daily in the left eye, including at night  - Reviewed drop and ointment application instructions with nursing staff.   - Pain medication as needed. No need to patch left eye  - Continue IV antibiotics per infectious disease. Intraoperative cultures from OS evisceration pending.  Gram stain with rare yeast, patient is now on fluconazole.  - Packing in place within the scleral shell. Will replace packing tomorrow (08/18) and remove medial and lateral sutures.   - Upon discharge, patient will require q2-3 day follow up with oculoplastics (Dr. Stewart) to replace scleral shell packing x 14 days following surgery  - from Retina standpoint, patient will need regular follow with retina specialist after discharge. Likely can follow up with [facility] given need to follow with [facility].     Ajay Abad MD   [facility]  08/17/2023  9:37 AM

## 2023-08-17 NOTE — ASSESSMENT & PLAN NOTE
Had staples in right upper arm from transfer hospital (the fistula clotted and had pseudomonas aurgenosa, and bacteremia), patient signed AMA before intervention was yielded there    Underwent Ultrasound US    Plan:  Trace US report

## 2023-08-17 NOTE — PLAN OF CARE
Problem: Infection  Goal: Absence of Infection Signs and Symptoms  Outcome: Ongoing, Progressing     Problem: Coping Ineffective  Goal: Effective Coping  Outcome: Ongoing, Progressing     Problem: Impaired Wound Healing  Goal: Optimal Wound Healing  Outcome: Ongoing, Progressing

## 2023-08-17 NOTE — SUBJECTIVE & OBJECTIVE
Past Medical History:   Diagnosis Date    Bilateral retinal detachment 7/18/2023    BPH (benign prostatic hyperplasia)     Cataract     CHF (congestive heart failure)     CKD (chronic kidney disease) stage 3, GFR 30-59 ml/min     Diabetes mellitus, type 2     Hypertension     KENIA (obstructive sleep apnea)     Pancreatitis     Persistent proteinuria 11/12/2020    2 g proteinuria noted Resumed ACE Lower BP systolic to less than 130     PTSD (post-traumatic stress disorder)     Stroke        Past Surgical History:   Procedure Laterality Date    CATARACT EXTRACTION Bilateral     EVISCERATION OF OCULAR CONTENTS Left 8/16/2023    Procedure: EVISCERATION, OCULAR CONTENTS;  Surgeon: Vicki Stewart MD;  Location: [facility] OR [address] [place] [address];  Service: Ophthalmology;  Laterality: Left;    RETROBULBAR INJECTION OF MEDICATION Left 8/16/2023    Procedure: INJECTION, MEDICATION, RETROBULBAR;  Surgeon: Vicki Stewart MD;  Location: [facility] OR [address] [place] [address];  Service: Ophthalmology;  Laterality: Left;    TARSORRHAPHY Left 8/16/2023    Procedure: BLEPHARORRHAPHY;  Surgeon: Vicki Stewart MD;  Location: [address];  Service: Ophthalmology;  Laterality: Left;       Review of patient's allergies indicates:   Allergen Reactions    Morphine Rash    Amiodarone analogues Itching     Other reaction(s): Unknown       Medications:  Medications Prior to Admission   Medication Sig    acetaminophen (TYLENOL) 325 MG tablet Take 650 mg by mouth every 6 (six) hours as needed for Pain.    albuterol-ipratropium (DUO-NEB) 2.5 mg-0.5 mg/3 mL nebulizer solution Inhale 3 mLs into the lungs 3 (three) times daily.    amLODIPine (NORVASC) 10 MG tablet Take 10 mg by mouth once daily.    ascorbic acid, vitamin C, (VITAMIN C) 500 MG tablet Take 500 mg by mouth once daily.    aspirin 81 MG Chew Take 81 mg by mouth once daily.    calcium carbonate-vitamin D3 (OYSTER SHELL CALCIUM-VIT D3) 500 mg-5 mcg (200 unit) PwPk Take 1 tablet by mouth 2 (two) times a day.    carvediloL  (COREG) 25 MG tablet Take 1 tablet (25 mg total) by mouth 2 (two) times daily with meals.    citalopram (CELEXA) 20 MG tablet Take 20 mg by mouth once daily.    coffee xt/phosphatidyl serine (NEURIVA ORIGINAL ORAL) Take 1 capsule by mouth once daily.    flecainide (TAMBOCOR) 50 MG Tab Take 50 mg by mouth 2 (two) times daily.    folic acid (FOLVITE) 1 MG tablet Take 1 tablet by mouth every morning.    ibuprofen (ADVIL,MOTRIN) 600 MG tablet Take 600 mg by mouth every 6 (six) hours as needed for Pain.    lactulose (CHRONULAC) 10 gram/15 mL solution Take 45 mLs by mouth daily as needed (constipation).    megestroL (MEGACE) 400 mg/10 mL (40 mg/mL) Susp Take 10 mLs by mouth 2 (two) times a day.    metoclopramide HCl (REGLAN) 5 MG tablet Take 5 mg by mouth once daily.    multivitamin (THERAGRAN) per tablet Take 1 tablet by mouth once daily.    pantoprazole (PROTONIX) 40 MG tablet Take 40 mg by mouth once daily.    pentoxifylline (TRENTAL) 400 mg TbSR Take 400 mg by mouth 2 (two) times daily.    polyethylene glycol (GLYCOLAX) 17 gram/dose powder Take 17 g by mouth 2 (two) times daily.    rosuvastatin (CRESTOR) 5 MG tablet Take 5 mg by mouth once daily.    sevelamer carbonate (RENVELA) 800 mg Tab Take 1 tablet by mouth 5 times per day    tamsulosin (FLOMAX) 0.4 mg Cap Take 1 capsule (0.4 mg total) by mouth once daily.    UNABLE TO FIND Take 1 tablet by mouth once daily. medication name: OPTIMAL ANTIOX    UNABLE TO FIND Take 1 tablet by mouth nightly. medication name: QUNOL SLEEP SUPPORT    VITAMIN B COMPLEX ORAL Take 1 tablet by mouth every morning.    zinc gluconate 50 mg tablet Take 50 mg by mouth once daily.     Antibiotics (From admission, onward)      Start     Stop Route Frequency Ordered    08/18/23 1200  vancomycin (VANCOCIN) 500 mg in dextrose 5 % in water (D5W) 100 mL IVPB (MB+)         -- IV Once 08/17/23 0950    08/16/23 2100  tobramycin-dexAMETHasone 0.3-0.1% ophthalmic suspension 1 drop         09/15/23 4687  LEFT EYE 4 times daily 08/16/23 1732    08/16/23 2100  tobramycin-dexAMETHasone 0.3-0.1% ophthalmic ointment         09/15/23 2059 LEFT EYE 3 times daily 08/16/23 1732    08/13/23 0945  mupirocin 2 % ointment         08/18/23 0859 Nasl 2 times daily 08/13/23 0840    08/13/23 0000  meropenem (MERREM) 500 mg in sodium chloride 0.9 % 100 mL IVPB (MB+)         -- IV Every 12 hours (non-standard times) 08/12/23 1507    08/09/23 1418  vancomycin - pharmacy to dose  (vancomycin IVPB (PEDS and ADULTS))        See Hyperspace for full Linked Orders Report.    -- IV pharmacy to manage frequency 08/09/23 1319          Antifungals (From admission, onward)      None          Antivirals (From admission, onward)      None             Immunization History   Administered Date(s) Administered    Influenza 10/30/1997, 11/14/2000    Pneumococcal Conjugate - 20 Valent 03/17/2023       Family History       Problem Relation (Age of Onset)    Diabetes Father, Sister    Heart disease Father    Hyperlipidemia Brother    Kidney disease Father    Stroke Mother          Social History     Socioeconomic History    Marital status: Single   Tobacco Use    Smoking status: Former     Current packs/day: 0.00     Types: Cigarettes, Cigars    Smokeless tobacco: Never   Substance and Sexual Activity    Alcohol use: Not Currently    Drug use: Not Currently     Social Determinants of Health     Financial Resource Strain: Low Risk  (8/10/2023)    Overall Financial Resource Strain (CARDIA)     Difficulty of Paying Living Expenses: Not very hard   Food Insecurity: No Food Insecurity (8/10/2023)    Hunger Vital Sign     Worried About Running Out of Food in the Last Year: Never true     Ran Out of Food in the Last Year: Never true   Transportation Needs: No Transportation Needs (8/10/2023)    PRAPARE - Transportation     Lack of Transportation (Medical): No     Lack of Transportation (Non-Medical): No   Physical Activity: Insufficiently Active (8/10/2023)     Exercise Vital Sign     Days of Exercise per Week: 5 days     Minutes of Exercise per Session: 20 min   Stress: Stress Concern Present (8/10/2023)    [employer] - Occupational Stress Questionnaire     Feeling of Stress : Rather much   Social Connections: Socially Isolated (8/10/2023)    Social Connection and Isolation Panel [NHANES]     Frequency of Communication with Friends and Family: Twice a week     Frequency of Social Gatherings with Friends and Family: Once a week     Attends Mu-ism Services: Never     Active Member of Clubs or Organizations: No     Attends Club or Organization Meetings: Never     Marital Status: Never    Housing Stability: Unknown (8/10/2023)    Housing Stability Vital Sign     Unable to Pay for Housing in the Last Year: No     Unstable Housing in the Last Year: No     Review of Systems   Constitutional:  Positive for fatigue. Negative for fever.   Eyes:  Positive for visual disturbance. Negative for pain and redness.   Respiratory:  Negative for shortness of breath.    Cardiovascular:  Negative for chest pain.   Neurological:  Positive for headaches. Negative for dizziness and light-headedness.   Psychiatric/Behavioral:  Negative for confusion.      Objective:     Vital Signs (Most Recent):  Temp: 99.1 °F (37.3 °C) (08/17/23 0753)  Pulse: 80 (08/17/23 0753)  Resp: 18 (08/17/23 0753)  BP: 137/66 (08/17/23 0753)  SpO2: 98 % (08/17/23 0753) Vital Signs (24h Range):  Temp:  [98 °F (36.7 °C)-99.1 °F (37.3 °C)] 99.1 °F (37.3 °C)  Pulse:  [71-89] 80  Resp:  [10-19] 18  SpO2:  [96 %-100 %] 98 %  BP: (128-161)/(64-86) 137/66     Weight: 68 kg (150 lb)  Body mass index is 21.52 kg/m².    Estimated Creatinine Clearance: 14.7 mL/min (A) (based on SCr of 5.2 mg/dL (H)).     Physical Exam  Vitals and nursing note reviewed.   Constitutional:       General: He is not in acute distress.     Appearance: Normal appearance. He is not ill-appearing.   HENT:      Head:  Normocephalic and atraumatic.   Eyes:      Comments: Left eye enucleated. Eye lid and surrounding tissue without significant edema/erythema  Right eye non erythematous. EOM intact   Cardiovascular:      Rate and Rhythm: Normal rate and regular rhythm.      Heart sounds: No murmur heard.  Pulmonary:      Effort: Pulmonary effort is normal. No respiratory distress.      Breath sounds: Normal breath sounds. No wheezing or rales.   Abdominal:      General: Abdomen is flat.      Palpations: Abdomen is soft.   Musculoskeletal:      Cervical back: Normal range of motion.      Right lower leg: No edema.      Left lower leg: No edema.   Skin:     General: Skin is warm and dry.      Comments: RUE with recent incision, staples in place  Clean, no erythematous, no oozing or drainage    Neurological:      General: No focal deficit present.      Mental Status: Mental status is at baseline.   Psychiatric:         Mood and Affect: Mood normal.         Behavior: Behavior normal.          Significant Labs: Blood Culture:   Recent Labs   Lab 08/09/23  1434   LABBLOO No growth after 5 days.  No growth after 5 days.     CBC:   Recent Labs   Lab 08/16/23  0437 08/17/23  0540   WBC 8.56 8.07   HGB 8.7* 8.8*   HCT 30.4* 30.5*    249     CMP:   Recent Labs   Lab 08/16/23  0437 08/16/23  0438 08/16/23  2348 08/17/23  0540    138  138 139 138  140   K 3.7 3.7  3.7 4.5 4.5  4.5    103  104 104 104  105   CO2 23 24  25 24 22*  24   GLU 67* 66*  68* 95 65*  67*   BUN 13 14  13 16 17  18   CREATININE 3.2* 3.2*  3.4* 4.6* 4.8*  5.2*   CALCIUM 8.6* 8.6*  8.7 8.7 8.8  9.0   PROT 6.0  --   --  6.1   ALBUMIN 2.1* 2.0*  2.0* 2.1* 2.1*  2.1*   BILITOT 0.3  --   --  0.3   ALKPHOS 118  --   --  104   AST 14  --   --  12   ALT 5*  --   --  <5*   ANIONGAP 10 11  9 11 12  11     Wound Culture:   Recent Labs   Lab 08/16/23  1629 08/16/23  1632 08/16/23  1636 08/16/23  1647   LABAERO No growth No growth No growth No  growth       Significant Imaging: I have reviewed all pertinent imaging results/findings within the past 24 hours.

## 2023-08-17 NOTE — PT/OT/SLP PROGRESS
Occupational Therapy   Co-Treatment    Name: Immanuel Bernal  MRN: [id number]  Admitting Diagnosis:  Endophthalmitis  1 Day Post-Op  Co-tx on this date to ensure pt. Safety and accommodate for pt. Activity tolerance  Recommendations:     Discharge Recommendations: rehabilitation facility  Discharge Equipment Recommendations:  none  Barriers to discharge:  Other (Comment) (requires increased assist)    Assessment:     Immanuel Bernal is a 57 y.o. male with a medical diagnosis of Endophthalmitis.  He presents with deficits in self-care tasks, mobility and endurance. Pt. With pain noted in left eye on this date. Pt. Also with some dizziness noted when performing sitting tasks and standing on this date. Pt. Motivated to participate in therapy session. Patient would benefit from continued OT services to maximize level of safety and independence with self-care tasks.   . Performance deficits affecting function are weakness, impaired endurance, impaired self care skills, impaired functional mobility, gait instability, pain, decreased upper extremity function, decreased lower extremity function, visual deficits.     Rehab Prognosis:  Good; patient would benefit from acute skilled OT services to address these deficits and reach maximum level of function.       Plan:     Patient to be seen 4 x/week to address the above listed problems via self-care/home management, therapeutic activities, therapeutic exercises, neuromuscular re-education  Plan of Care Expires: 08/25/23  Plan of Care Reviewed with: patient, sibling    Subjective     Chief Complaint: pain in left eye  Patient/Family Comments/goals: to get stronger  Pain/Comfort:  Pain Rating 1: 7/10  Location - Side 1: Left  Location 1: eye  Pain Addressed 1: Nurse notified  Pain Rating Post-Intervention 1: 7/10    Objective:     Communicated with: nurse prior to session.  Patient found supine with telemetry (sister present) upon OT entry to room.    General Precautions:  Standard, fall, vision impaired  (per ophthalomologist no restrictions following procedure on 08-16-23)  Orthopedic Precautions:N/A  Braces: N/A  Respiratory Status: Room air     Occupational Performance:     Bed Mobility:    Patient completed Supine to Sit with minimum assistance   Sit to supine Min A wth assist at BLE    Functional Mobility/Transfers:  Patient completed Sit <> Stand Transfer with minimum assistance  with  rolling walker  x 2 trials from EOB with RW  Functional Mobility: pt. Took ~ 4 side steps to HOB with Min A and RW    Activities of Daily Living:  Grooming: stand by assistance seated EOB      [facility] 6 Click ADL: 14    Treatment & Education:  Pt. Educated on importance of therapy/OOB  Pt. Educated on safety with mobility and need for staff assist    Patient left supine with all lines intact, call button in reach, and sister present    GOALS:   Multidisciplinary Problems       Occupational Therapy Goals          Problem: Occupational Therapy    Goal Priority Disciplines Outcome Interventions   Occupational Therapy Goal     OT, PT/OT Ongoing, Progressing    Description: Goals to be met by: 8/25/23     Patient will increase functional independence with ADLs by performing:    UE Dressing with Minimal Assistance.  Grooming while seated with Stand-by Assistance.  Toileting from bedside commode with Minimal Assistance for hygiene and clothing management.   Sit to stand transfer with Contact Guard Assistance and use of RW.   Sitting at edge of bed >25 minutes with Supervision.  Step transfer with Minimal Assistance and use of RW.   Toilet transfer to bedside commode with Minimal Assistance and use of RW.                          Time Tracking:     OT Date of Treatment: 08/17/23  OT Start Time: 1356  OT Stop Time: 1419  OT Total Time (min): 23 min    Billable Minutes:Self Care/Home Management 15  Therapeutic Activity 8    OT/MORRO: OT          8/17/2023

## 2023-08-17 NOTE — ASSESSMENT & PLAN NOTE
On peg tube feeds now  Tolerating well  Able to take in PO per SLP assessment on 8/15, on regular diet with thin liquids  Will reduce tube feeds gradually as patient takes in more PO    Peg tube was found to be blocked yesterday  SLP evaluated patient : can restart oral feeds    Plan:  Start oral feeds post surgery (low salt diet)

## 2023-08-17 NOTE — PLAN OF CARE
Problem: Fall Injury Risk  Goal: Absence of Fall and Fall-Related Injury  Outcome: Ongoing, Progressing     Problem: Coping Ineffective  Goal: Effective Coping  Outcome: Ongoing, Progressing     Problem: Infection  Goal: Absence of Infection Signs and Symptoms  Outcome: Ongoing, Progressing

## 2023-08-17 NOTE — PROGRESS NOTES
[facility] - Intensive Care ([address] Medicine  Progress Note    Patient Name: Immanuel Bernal  MRN: [id number]  Patient Class: IP- Inpatient   Admission Date: 8/9/2023  Length of Stay: 8 days  Attending Physician: Porsha Funez MD  Primary Care Provider: Dolly Primary Doctor        Subjective:     Principal Problem:Endophthalmitis        HPI:  HPI obtained per medical record as patient unable to communicate    57-year-old male with a history of CHF, diabetes, hypertension, chronic disability, end-stage renal disease on hemodialysis, PEG placement, bowel obstruction, sleep apnea, TIA, left eye vitreous hemorrhage, stroke, and bowel obstruction with bowel resection admitted to [facility] in [place] July 18 from nursing home with left eye pain and blurred vision.  He was admitted with concern for bilateral endophthalmitis.  Other admit diagnoses included right upper extremity thrombus, end-stage renal disease on hemodialysis, hyperkalemia, sleep apnea, diabetes, and CHF.  He was treated with broad-spectrum antibiotics.  He was seen by Infectious Diseases,, and he was treated with vancomycin, ceftriaxone, and amphotericin.  Blood cultures were positive for Pseudomonas, and amphotericin/vancomycin were stopped.  IV cefepime was continued.  He was seen by Ophthalmology, and he received intravitreal vancomycin and ceftazidime (July 18).  He also had intravitreal tap July 18 that had no yeast or fungal elements observed.  Left eye endophthalmitis did not respond to treatment, and he subsequently developed abscess formation, significant proptosis, and drainage of purulent material from the orbit.  Ophthalmology recommended enucleation.  He was continued on cefepime for pseudomonal and ophthalmitis.  Recommendation was for 6 weeks of treatment to be followed by indefinite fluoroquinolone.  He was seen by Pulmonology during his stay for a right upper lung mass with peripheral nodules.  It was  felt that he would need further investigation of this once his current clinical issues stabilized.  Right upper extremity AV graft was excised during his stay with concern for infection.  A right IJ tunneled line was placed on July 27.  Left eye endophthalmitis continued to worsen, and ophthalmology spoke with patient and family about the need for enucleation.  Despite several conversations, family declined enucleation.  Plans were for transitioned to skilled nursing facility for continued treatment, but family did not want him to go to a skilled nursing facility.  He was subsequently discharged AMA from the hospital there on August 7.  Please see the August 7 Internal Medicine note for further details.     He subsequently presented to [facility] Emergency Department.  He will not go back to [facility] in [place]. He received Zosyn and vancomycin.  ED team at [facility] spoke with the patient and his power-of- (sister).  CT of the orbits noted scleral abscess along the lateral aspect of the left globe.  Patient and family are aware and in agreement that the patient needs enucleation of the eye at this time. Referring provider spoke with Oculoplastics at [facility]. Requesting transfer to [facility] for Oculoplastics specialty evaluation of persistent endophthalmitis.  ED provider noted patient is hemodynamically stable.  He does not appear volume overloaded or in respiratory distress.      The patient has a significant previous medical course as listed below  August 3: MRI brain and orbits showed worsening ophthalmitis and inflammatory changes of the left orbital tissues with slight increase in proptosis.  No evidence of intracranial inflammatory process observed.    July 25: Transesophageal echocardiogram had no evidence of endocarditis.  Moderate to severely decreased left ventricular systolic function with EF 30-35%.    July 22:  CT chest showed right upper  lobe mass with numerous bilateral nodularity predominantly in the right with associated mediastinal lymph nodes.  July 21: Blood cultures with Pseudomonas aeruginosa  July 19: MRI brain/orbits with and without contrast had findings suggestive of chronic microvascular ischemic disease of the white matter with remote ischemic event in the left insula/basal ganglia/subependymal white matter/right middle cerebellar peduncle and hemisphere.  Findings consistent with left endophthalmitis.  No abnormal findings observed in the right globe.  July 18:  Blood cultures with Pseudomonas aeruginosa and coag-negative staph      Overview/Hospital Course:  Patient was rate controlled and therefore flecainide held. His GCS is 13/15. Oculopalstics were consulted and so were the nephrology teams and IR. He underwent dialysis on his first day. Was scheduled for surgery today but his family refused to have it done until she reviewed the MRI herself first. This means that the surgeon will only be available next Wednesday in order to allow her to view and discuss the MRI. He has a peg tube in place that has not been utilzied prior. Heart failure medications have been reinstated and is being covered by vancomycin and meropenem for the endopthalmitis. Multidisciplinary session was conducted with his POA in regards to his operation, she accepted the surgery if it was emergent, otherwise to be done this upcoming Wednesday. Patient's delirium/agitation worsened so he was transferred to the ICU until his clinical situation settled. He then was stepped down to our care and ophthalmology will be conducting his eye procedure on him as planned. Underwent evisceration last night. Started the tobramycin ointment and drops. Following ID recs regarding antimicrobial regimen. Patient's sister noted us that he was diagnosed with central apnea and KENIA in the past of which he used a CPAP/BiPAP for. No concern of lack of oxygenation during his inpatient  stay. Labs are underway to detect any element of CO2 retention although he had no clinical signs of that.    Disposition: referrals to SNF have been sent. Family prefers [facility] SNF or rehab and do not want to return to their previous nursing facility.       No new subjective & objective note has been filed under this hospital service since the last note was generated.    Review of Systems   Respiratory:  Negative for cough, hemoptysis, shortness of breath and wheezing.    Cardiovascular:  Negative for chest pain, palpitations and leg swelling.   Neurological: Negative.    Psychiatric/Behavioral:  Negative for depression and hallucinations.       Physical Exam  Eyes:      Pupils: Pupils are equal, round, and reactive to light.   Cardiovascular:      Rate and Rhythm: Normal rate and regular rhythm.      Pulses: Normal pulses.      Heart sounds: Normal heart sounds.   Pulmonary:      Effort: Pulmonary effort is normal.      Breath sounds: Normal breath sounds.   Abdominal:      Palpations: Abdomen is soft.   Neurological:      Mental Status: He is oriented to person, place, and time. Mental status is at baseline.        Assessment/Plan:      * Endophthalmitis  Ophthalmology team on board and were going to perform the procedure but the patient's family refused without being around to review the MRI which delayed the procedure till next wednesday    MRI repeat : Diffuse inflammatory change involving the left globe, with associated thickening of the left sclera, proptosis, and focal fluid collection within the left posterior compartment, which demonstrates diffusion restriction.  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  Diffuse left preseptal and postseptal inflammatory change, with superimposed orbital cellulitis also considered.  Evaluation of the left orbital apex is limited secondary to no contrast administration.  Decreased size of the  left anterior compartment with bowing of the visualized left lens.  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    Thorough counseling undertaken with POA and IM team, palliative, opthalmo and nursing team in regards to the surgery  She agreed for her brother to undergo the procedure as an emergent case if his health deteriorates  Otherwise to be scheduled on wednesday    Underwent evisceration of his left eye yesterday, tobramycin ordered, ID recs are being followed  Still on meronema and vancomycin IV  Wbc: 8.07 (normal)  hgb post op 8.8 (at [facility])  No signs of distress or delirium    Plan:  Follow ID recs  Follow opthalomology recs          Redness and swelling of upper arm  Had staples in right upper arm from transfer hospital (the fistula clotted and had pseudomonas aurgenosa, and bacteremia), patient signed AMA before intervention was yielded there    Underwent Ultrasound US    Plan:  Trace US report    PEG (percutaneous endoscopic gastrostomy) status  On peg tube feeds now  Tolerating well  Able to take in PO per SLP assessment on 8/15, on regular diet with thin liquids  Will reduce tube feeds gradually as patient takes in more PO    Peg tube was found to be blocked yesterday  SLP evaluated patient : can restart oral feeds    Plan:  Start oral feeds post surgery (low salt diet)    Encounter for palliative care        Anemia in ESRD (end-stage renal disease)  EPO per nephrology      Severe malnutrition  Nutrition consulted. Most recent weight and BMI monitored-     Measurements:  Wt Readings from Last 1 Encounters:   08/16/23 68 kg (150 lb)   Body mass index is 21.52 kg/m².    Patient has been screened and assessed by RD.    Malnutrition Type:  Context: chronic illness  Level: severe    Malnutrition Characteristic Summary:  Weight Loss (Malnutrition): greater than 10% in 6 months  Energy Intake (Malnutrition): other (see comments) (LAMONT)  Subcutaneous Fat (Malnutrition): severe  depletion  Muscle Mass (Malnutrition): severe depletion    Interventions/Recommendations (treatment strategy):  1.     Able to take in PO per SLP assessment on 8/15, on regular diet with thin liquids  Will reduce tube feeds gradually as patient takes in more PO    ESRD (end stage renal disease)  Nephrology consulted for HD needs while inpatient  Underwent SLED post MRI contrast (gadolinium)   Ad last dialysis was 08/16: Tolerated HD, with a net UF of 1L. NAEON.     Plan:  Will be followed by nephro      Atrial fibrillation  History of AF noted however no EKG here with AF  Not on AC or rate control agents at this time    Hyperlipidemia  Home statin    Chronic diastolic heart failure  Continue to monitor for signs of volume overload; volume removal with dialysis    Hypertension  Hypertensive on arrival  bp today: 137/66  SBP range: 128-137  Home medications: imdur Carvedilol Hydralazine Nifedipine    Plan:  On hydralazine And losartan and carvedilol in patient   Resume low salt diet     Stroke  Continue home statin  Delerium precautions  PT OT    Diabetes mellitus  POCT glucose  LDSS  -180      VTE Risk Mitigation (From admission, onward)           Ordered     heparin (porcine) injection 1,000 Units  As needed (PRN)         08/10/23 1046     IP VTE HIGH RISK PATIENT  Once         08/09/23 1317     Place sequential compression device  Until discontinued         08/09/23 1317                    Discharge Planning   TOBY: 8/21/2023     Code Status: Full Code   Is the patient medically ready for discharge?: No    Reason for patient still in hospital (select all that apply): Treatment  Discharge Plan A: Long-term acute care facility (LTAC)   Discharge Delays: (!) Procedure Scheduling (IR, OR, Labs, Echo, Cath, Echo, EEG) (eye surgery next week)              Sharon Law MD  Department of Hospital Medicine   [facility] - Intensive Care ([place]-14)

## 2023-08-18 ENCOUNTER — OFFICE VISIT (OUTPATIENT)
Dept: DIALYSIS | Facility: HOSPITAL | Age: 58
DRG: 113 | End: 2023-08-18
Attending: INTERNAL MEDICINE
Payer: MEDICARE

## 2023-08-18 LAB
ALBUMIN SERPL BCP-MCNC: 2 G/DL (ref 3.5–5.2)
ALBUMIN SERPL BCP-MCNC: 2.1 G/DL (ref 3.5–5.2)
ALBUMIN SERPL BCP-MCNC: 2.2 G/DL (ref 3.5–5.2)
ALBUMIN SERPL BCP-MCNC: 2.2 G/DL (ref 3.5–5.2)
ALBUMIN SERPL BCP-MCNC: 2.3 G/DL (ref 3.5–5.2)
ALP SERPL-CCNC: 100 U/L (ref 55–135)
ALT SERPL W/O P-5'-P-CCNC: <5 U/L (ref 10–44)
ANION GAP SERPL CALC-SCNC: 10 MMOL/L (ref 8–16)
ANION GAP SERPL CALC-SCNC: 11 MMOL/L (ref 8–16)
ANION GAP SERPL CALC-SCNC: 9 MMOL/L (ref 8–16)
AST SERPL-CCNC: 19 U/L (ref 10–40)
BACTERIA SPEC AEROBE CULT: NORMAL
BILIRUB SERPL-MCNC: 0.3 MG/DL (ref 0.1–1)
BUN SERPL-MCNC: 17 MG/DL (ref 6–20)
BUN SERPL-MCNC: 17 MG/DL (ref 6–20)
BUN SERPL-MCNC: 24 MG/DL (ref 6–20)
CALCIUM SERPL-MCNC: 8.4 MG/DL (ref 8.7–10.5)
CALCIUM SERPL-MCNC: 8.7 MG/DL (ref 8.7–10.5)
CALCIUM SERPL-MCNC: 9.1 MG/DL (ref 8.7–10.5)
CALCIUM SERPL-MCNC: 9.2 MG/DL (ref 8.7–10.5)
CALCIUM SERPL-MCNC: 9.2 MG/DL (ref 8.7–10.5)
CHLORIDE SERPL-SCNC: 101 MMOL/L (ref 95–110)
CHLORIDE SERPL-SCNC: 103 MMOL/L (ref 95–110)
CHLORIDE SERPL-SCNC: 104 MMOL/L (ref 95–110)
CHLORIDE SERPL-SCNC: 104 MMOL/L (ref 95–110)
CHLORIDE SERPL-SCNC: 105 MMOL/L (ref 95–110)
CO2 SERPL-SCNC: 22 MMOL/L (ref 23–29)
CO2 SERPL-SCNC: 23 MMOL/L (ref 23–29)
CO2 SERPL-SCNC: 24 MMOL/L (ref 23–29)
CO2 SERPL-SCNC: 25 MMOL/L (ref 23–29)
CO2 SERPL-SCNC: 25 MMOL/L (ref 23–29)
CREAT SERPL-MCNC: 4.7 MG/DL (ref 0.5–1.4)
CREAT SERPL-MCNC: 5 MG/DL (ref 0.5–1.4)
CREAT SERPL-MCNC: 5.9 MG/DL (ref 0.5–1.4)
CREAT SERPL-MCNC: 6.3 MG/DL (ref 0.5–1.4)
CREAT SERPL-MCNC: 6.4 MG/DL (ref 0.5–1.4)
ERYTHROCYTE [DISTWIDTH] IN BLOOD BY AUTOMATED COUNT: 18 % (ref 11.5–14.5)
EST. GFR  (NO RACE VARIABLE): 10.3 ML/MIN/1.73 M^2
EST. GFR  (NO RACE VARIABLE): 12.6 ML/MIN/1.73 M^2
EST. GFR  (NO RACE VARIABLE): 13.5 ML/MIN/1.73 M^2
EST. GFR  (NO RACE VARIABLE): 9.3 ML/MIN/1.73 M^2
EST. GFR  (NO RACE VARIABLE): 9.5 ML/MIN/1.73 M^2
GLUCOSE SERPL-MCNC: 51 MG/DL (ref 70–110)
GLUCOSE SERPL-MCNC: 52 MG/DL (ref 70–110)
GLUCOSE SERPL-MCNC: 52 MG/DL (ref 70–110)
GLUCOSE SERPL-MCNC: 80 MG/DL (ref 70–110)
GLUCOSE SERPL-MCNC: 86 MG/DL (ref 70–110)
HCT VFR BLD AUTO: 30.1 % (ref 40–54)
HGB BLD-MCNC: 8.6 G/DL (ref 14–18)
MAGNESIUM SERPL-MCNC: 2.3 MG/DL (ref 1.6–2.6)
MAGNESIUM SERPL-MCNC: 2.4 MG/DL (ref 1.6–2.6)
MAGNESIUM SERPL-MCNC: 2.6 MG/DL (ref 1.6–2.6)
MAGNESIUM SERPL-MCNC: 2.7 MG/DL (ref 1.6–2.6)
MCH RBC QN AUTO: 29.7 PG (ref 27–31)
MCHC RBC AUTO-ENTMCNC: 28.6 G/DL (ref 32–36)
MCV RBC AUTO: 104 FL (ref 82–98)
PHOSPHATE SERPL-MCNC: 3.6 MG/DL (ref 2.7–4.5)
PHOSPHATE SERPL-MCNC: 3.9 MG/DL (ref 2.7–4.5)
PHOSPHATE SERPL-MCNC: 4.3 MG/DL (ref 2.7–4.5)
PHOSPHATE SERPL-MCNC: 4.3 MG/DL (ref 2.7–4.5)
PLATELET # BLD AUTO: 258 K/UL (ref 150–450)
PMV BLD AUTO: 10.4 FL (ref 9.2–12.9)
POCT GLUCOSE: 100 MG/DL (ref 70–110)
POCT GLUCOSE: 111 MG/DL (ref 70–110)
POCT GLUCOSE: 64 MG/DL (ref 70–110)
POCT GLUCOSE: 67 MG/DL (ref 70–110)
POCT GLUCOSE: 89 MG/DL (ref 70–110)
POCT GLUCOSE: 95 MG/DL (ref 70–110)
POCT GLUCOSE: 97 MG/DL (ref 70–110)
POCT GLUCOSE: 98 MG/DL (ref 70–110)
POTASSIUM SERPL-SCNC: 4.3 MMOL/L (ref 3.5–5.1)
POTASSIUM SERPL-SCNC: 4.4 MMOL/L (ref 3.5–5.1)
POTASSIUM SERPL-SCNC: 4.5 MMOL/L (ref 3.5–5.1)
POTASSIUM SERPL-SCNC: 4.6 MMOL/L (ref 3.5–5.1)
POTASSIUM SERPL-SCNC: 4.6 MMOL/L (ref 3.5–5.1)
PROT SERPL-MCNC: 6.3 G/DL (ref 6–8.4)
RBC # BLD AUTO: 2.9 M/UL (ref 4.6–6.2)
SODIUM SERPL-SCNC: 137 MMOL/L (ref 136–145)
SODIUM SERPL-SCNC: 138 MMOL/L (ref 136–145)
SODIUM SERPL-SCNC: 139 MMOL/L (ref 136–145)
WBC # BLD AUTO: 8.18 K/UL (ref 3.9–12.7)

## 2023-08-18 PROCEDURE — 11000001 HC ACUTE MED/SURG PRIVATE ROOM

## 2023-08-18 PROCEDURE — 63600175 PHARM REV CODE 636 W HCPCS: Performed by: NURSE PRACTITIONER

## 2023-08-18 PROCEDURE — 25000003 PHARM REV CODE 250: Performed by: STUDENT IN AN ORGANIZED HEALTH CARE EDUCATION/TRAINING PROGRAM

## 2023-08-18 PROCEDURE — 99233 PR SUBSEQUENT HOSPITAL CARE,LEVL III: ICD-10-PCS | Mod: ,,, | Performed by: STUDENT IN AN ORGANIZED HEALTH CARE EDUCATION/TRAINING PROGRAM

## 2023-08-18 PROCEDURE — 25000003 PHARM REV CODE 250: Performed by: INTERNAL MEDICINE

## 2023-08-18 PROCEDURE — 99232 PR SUBSEQUENT HOSPITAL CARE,LEVL II: ICD-10-PCS | Mod: ,,, | Performed by: HOSPITALIST

## 2023-08-18 PROCEDURE — 83735 ASSAY OF MAGNESIUM: CPT | Mod: 91 | Performed by: STUDENT IN AN ORGANIZED HEALTH CARE EDUCATION/TRAINING PROGRAM

## 2023-08-18 PROCEDURE — 85027 COMPLETE CBC AUTOMATED: CPT

## 2023-08-18 PROCEDURE — 80069 RENAL FUNCTION PANEL: CPT | Performed by: HOSPITALIST

## 2023-08-18 PROCEDURE — 25000003 PHARM REV CODE 250

## 2023-08-18 PROCEDURE — 36415 COLL VENOUS BLD VENIPUNCTURE: CPT | Performed by: STUDENT IN AN ORGANIZED HEALTH CARE EDUCATION/TRAINING PROGRAM

## 2023-08-18 PROCEDURE — 63600175 PHARM REV CODE 636 W HCPCS: Performed by: HOSPITALIST

## 2023-08-18 PROCEDURE — 80100016 HC MAINTENANCE HEMODIALYSIS

## 2023-08-18 PROCEDURE — 99232 SBSQ HOSP IP/OBS MODERATE 35: CPT | Mod: ,,, | Performed by: HOSPITALIST

## 2023-08-18 PROCEDURE — 90935 HEMODIALYSIS ONE EVALUATION: CPT | Mod: ,,, | Performed by: NURSE PRACTITIONER

## 2023-08-18 PROCEDURE — 25000003 PHARM REV CODE 250: Performed by: HOSPITALIST

## 2023-08-18 PROCEDURE — 36415 COLL VENOUS BLD VENIPUNCTURE: CPT | Performed by: HOSPITALIST

## 2023-08-18 PROCEDURE — 80069 RENAL FUNCTION PANEL: CPT | Mod: 91 | Performed by: STUDENT IN AN ORGANIZED HEALTH CARE EDUCATION/TRAINING PROGRAM

## 2023-08-18 PROCEDURE — 63600175 PHARM REV CODE 636 W HCPCS: Performed by: INTERNAL MEDICINE

## 2023-08-18 PROCEDURE — 99233 PR SUBSEQUENT HOSPITAL CARE,LEVL III: ICD-10-PCS | Mod: ,,, | Performed by: INTERNAL MEDICINE

## 2023-08-18 PROCEDURE — 99233 SBSQ HOSP IP/OBS HIGH 50: CPT | Mod: ,,, | Performed by: STUDENT IN AN ORGANIZED HEALTH CARE EDUCATION/TRAINING PROGRAM

## 2023-08-18 PROCEDURE — 90935 PR HEMODIALYSIS, ONE EVALUATION: ICD-10-PCS | Mod: ,,, | Performed by: NURSE PRACTITIONER

## 2023-08-18 PROCEDURE — 21400001 HC TELEMETRY ROOM

## 2023-08-18 PROCEDURE — 80053 COMPREHEN METABOLIC PANEL: CPT

## 2023-08-18 PROCEDURE — 99233 SBSQ HOSP IP/OBS HIGH 50: CPT | Mod: ,,, | Performed by: INTERNAL MEDICINE

## 2023-08-18 PROCEDURE — 83735 ASSAY OF MAGNESIUM: CPT | Mod: 91 | Performed by: HOSPITALIST

## 2023-08-18 RX ORDER — MECLIZINE HCL 12.5 MG 12.5 MG/1
12.5 TABLET ORAL 3 TIMES DAILY PRN
Status: DISCONTINUED | OUTPATIENT
Start: 2023-08-18 | End: 2023-09-19 | Stop reason: HOSPADM

## 2023-08-18 RX ORDER — HYDRALAZINE HYDROCHLORIDE 10 MG/1
10 TABLET, FILM COATED ORAL 3 TIMES DAILY
Status: DISCONTINUED | OUTPATIENT
Start: 2023-08-18 | End: 2023-08-19

## 2023-08-18 RX ORDER — TOBRAMYCIN AND DEXAMETHASONE 3; 1 MG/ML; MG/ML
1 SUSPENSION/ DROPS OPHTHALMIC 4 TIMES DAILY
Status: DISCONTINUED | OUTPATIENT
Start: 2023-08-18 | End: 2023-08-31

## 2023-08-18 RX ADMIN — MECLIZINE HYDROCHLORIDE 12.5 MG: 12.5 TABLET ORAL at 04:08

## 2023-08-18 RX ADMIN — VANCOMYCIN HYDROCHLORIDE 500 MG: 500 INJECTION, POWDER, LYOPHILIZED, FOR SOLUTION INTRAVENOUS at 03:08

## 2023-08-18 RX ADMIN — PROMETHAZINE HYDROCHLORIDE 12.5 MG: 25 INJECTION, SOLUTION INTRAMUSCULAR; INTRAVENOUS at 10:08

## 2023-08-18 RX ADMIN — HYDRALAZINE HYDROCHLORIDE 10 MG: 10 TABLET, FILM COATED ORAL at 08:08

## 2023-08-18 RX ADMIN — FLUCONAZOLE 400 MG: 200 TABLET ORAL at 08:08

## 2023-08-18 RX ADMIN — ACETAMINOPHEN 1000 MG: 500 TABLET ORAL at 03:08

## 2023-08-18 RX ADMIN — OXYCODONE HYDROCHLORIDE 10 MG: 10 TABLET ORAL at 09:08

## 2023-08-18 RX ADMIN — CALCITRIOL CAPSULES 0.25 MCG 0.25 MCG: 0.25 CAPSULE ORAL at 08:08

## 2023-08-18 RX ADMIN — ATORVASTATIN CALCIUM 40 MG: 40 TABLET, FILM COATED ORAL at 08:08

## 2023-08-18 RX ADMIN — TOBRAMYCIN AND DEXAMETHASONE: 3; 1 OINTMENT OPHTHALMIC at 03:08

## 2023-08-18 RX ADMIN — APIXABAN 10 MG: 5 TABLET, FILM COATED ORAL at 08:08

## 2023-08-18 RX ADMIN — HYDRALAZINE HYDROCHLORIDE 10 MG: 10 TABLET, FILM COATED ORAL at 02:08

## 2023-08-18 RX ADMIN — APIXABAN 5 MG: 5 TABLET, FILM COATED ORAL at 08:08

## 2023-08-18 RX ADMIN — TOBRAMYCIN AND DEXAMETHASONE 1 DROP: 3; 1 SUSPENSION/ DROPS OPHTHALMIC at 05:08

## 2023-08-18 RX ADMIN — MEROPENEM 500 MG: 500 INJECTION INTRAVENOUS at 02:08

## 2023-08-18 RX ADMIN — TOBRAMYCIN AND DEXAMETHASONE 1 DROP: 3; 1 SUSPENSION/ DROPS OPHTHALMIC at 08:08

## 2023-08-18 RX ADMIN — SEVELAMER CARBONATE 0.8 G: 0.8 POWDER, FOR SUSPENSION ORAL at 05:08

## 2023-08-18 RX ADMIN — Medication 16 G: at 08:08

## 2023-08-18 RX ADMIN — Medication 16 G: at 04:08

## 2023-08-18 RX ADMIN — OXYCODONE HYDROCHLORIDE AND ACETAMINOPHEN 1 TABLET: 5; 325 TABLET ORAL at 06:08

## 2023-08-18 RX ADMIN — CARVEDILOL 12.5 MG: 12.5 TABLET, FILM COATED ORAL at 08:08

## 2023-08-18 RX ADMIN — Medication 6 MG: at 09:08

## 2023-08-18 RX ADMIN — OXYCODONE HYDROCHLORIDE 10 MG: 10 TABLET ORAL at 03:08

## 2023-08-18 RX ADMIN — APIXABAN 10 MG: 5 TABLET, FILM COATED ORAL at 02:08

## 2023-08-18 RX ADMIN — ACETAMINOPHEN 1000 MG: 500 TABLET ORAL at 08:08

## 2023-08-18 RX ADMIN — TOBRAMYCIN AND DEXAMETHASONE: 3; 1 OINTMENT OPHTHALMIC at 09:08

## 2023-08-18 RX ADMIN — LOSARTAN POTASSIUM 100 MG: 50 TABLET, FILM COATED ORAL at 02:08

## 2023-08-18 RX ADMIN — TOBRAMYCIN AND DEXAMETHASONE: 3; 1 OINTMENT OPHTHALMIC at 08:08

## 2023-08-18 RX ADMIN — HEPARIN SODIUM 1000 UNITS: 1000 INJECTION, SOLUTION INTRAVENOUS; SUBCUTANEOUS at 01:08

## 2023-08-18 RX ADMIN — SEVELAMER CARBONATE 0.8 G: 0.8 POWDER, FOR SUSPENSION ORAL at 09:08

## 2023-08-18 RX ADMIN — CARVEDILOL 12.5 MG: 12.5 TABLET, FILM COATED ORAL at 02:08

## 2023-08-18 NOTE — PLAN OF CARE
Problem: Diabetes Comorbidity  Goal: Blood Glucose Level Within Targeted Range  Outcome: Ongoing, Progressing.  Patient has been hypoglycemic several times the last 2 days and treated with glucose tablet 16 g.  PER night shift nurse he was hypoglycemic overnight and was sweating a lot.  This morning 8/18/2023 his blood glucose  was 64 he was asymptomatic and he was given  glucose chewable tablets 16 grams, and 8 oz of apple juice (patient did not want orange juice).  His blood glucose level came up to 89 and after his blood glucose  recheck he ate his breakfast.  Patient alert and stable.     Problem: Infection  Goal: Absence of Infection Signs and Symptoms  Outcome: Ongoing, Progressing - Patient afebrile this morning.  WBC within defined limits.  Right arm surgical site open to air with staples has no drainage, no warmth, no redness.  Left eye still has swelling to it.  He is receiving antibiotic eye drops/ointment to left eye per orders.

## 2023-08-18 NOTE — SUBJECTIVE & OBJECTIVE
Interval History: Successful surgery on 8/16. Feeling nauseous this morning when trying to eat breakfast. Has had a history of room-spinning sensation with standing. Sister is wondering if he has vertigo.    Past Medical History:   Diagnosis Date    Bilateral retinal detachment 7/18/2023    BPH (benign prostatic hyperplasia)     Cataract     CHF (congestive heart failure)     CKD (chronic kidney disease) stage 3, GFR 30-59 ml/min     Diabetes mellitus, type 2     Hypertension     KENIA (obstructive sleep apnea)     Pancreatitis     Persistent proteinuria 11/12/2020    2 g proteinuria noted Resumed ACE Lower BP systolic to less than 130     PTSD (post-traumatic stress disorder)     Stroke        Past Surgical History:   Procedure Laterality Date    CATARACT EXTRACTION Bilateral     EVISCERATION OF OCULAR CONTENTS Left 8/16/2023    Procedure: EVISCERATION, OCULAR CONTENTS;  Surgeon: Vicki Stewart MD;  Location: [facility] OR [address] [place] [address];  Service: Ophthalmology;  Laterality: Left;    RETROBULBAR INJECTION OF MEDICATION Left 8/16/2023    Procedure: INJECTION, MEDICATION, RETROBULBAR;  Surgeon: Vicki Stewart MD;  Location: [facility] OR [address] [place] [address];  Service: Ophthalmology;  Laterality: Left;    TARSORRHAPHY Left 8/16/2023    Procedure: BLEPHARORRHAPHY;  Surgeon: Vicki Stewart MD;  Location: [facility] OR [address] [place] [address];  Service: Ophthalmology;  Laterality: Left;       Review of patient's allergies indicates:   Allergen Reactions    Morphine Rash    Amiodarone analogues Itching     Other reaction(s): Unknown       Medications:  Continuous Infusions:  Scheduled Meds:   sodium chloride 0.9%   Intravenous Once    acetaminophen  1,000 mg Oral TID    apixaban  10 mg Oral BID    Followed by    [START ON 8/21/2023] apixaban  5 mg Oral BID    atorvastatin  40 mg Oral Daily    calcitRIOL  0.25 mcg Oral Daily    carvediloL  12.5 mg Oral BID    epoetin thee (PROCRIT) injection  3,000 Units Intravenous Every Tues, Thurs, Sat    fluconazole  400 mg Oral Daily     hydrALAZINE  10 mg Oral TID    losartan  100 mg Oral Daily    meropenem (MERREM) IVPB  500 mg Intravenous Q12H    sevelamer carbonate  0.8 g Oral TID WM    tobramycin-dexAMETHasone 0.3-0.1%   Left Eye TID    tobramycin-dexAMETHasone 0.3-0.1%  1 drop Left Eye QID    vancomycin (VANCOCIN) IV (PEDS and ADULTS)  500 mg Intravenous Once     PRN Meds:sodium chloride 0.9%, albuterol-ipratropium, dextrose 10%, dextrose 10%, glucagon (human recombinant), glucose, glucose, heparin (porcine), meclizine, melatonin, naloxone, OLANZapine, ondansetron, oxyCODONE, oxyCODONE-acetaminophen, promethazine (PHENERGAN) 12.5 mg in dextrose 5 % (D5W) 50 mL IVPB, sodium chloride 0.9%, Pharmacy to dose Vancomycin consult **AND** vancomycin - pharmacy to dose    Family History       Problem Relation (Age of Onset)    Diabetes Father, Sister    Heart disease Father    Hyperlipidemia Brother    Kidney disease Father    Stroke Mother          Tobacco Use    Smoking status: Former     Current packs/day: 0.00     Types: Cigarettes, Cigars    Smokeless tobacco: Never   Substance and Sexual Activity    Alcohol use: Not Currently    Drug use: Not Currently    Sexual activity: Not on file       Review of Systems   Unable to perform ROS: Other     Objective:     Vital Signs (Most Recent):  Temp: 98.7 °F (37.1 °C) (08/18/23 0745)  Pulse: 70 (08/18/23 0745)  Resp: 18 (08/18/23 0929)  BP: (!) 164/88 (08/18/23 0745)  SpO2: 100 % (08/18/23 0745) Vital Signs (24h Range):  Temp:  [98.2 °F (36.8 °C)-98.9 °F (37.2 °C)] 98.7 °F (37.1 °C)  Pulse:  [66-75] 70  Resp:  [14-19] 18  SpO2:  [95 %-100 %] 100 %  BP: (125-164)/(64-88) 164/88     Weight: 68 kg (150 lb)  Body mass index is 21.52 kg/m².       Physical Exam  Constitutional:       General: He is not in acute distress.     Appearance: He is ill-appearing.   HENT:      Head: Normocephalic and atraumatic.      Right Ear: External ear normal.      Left Ear: External ear normal.      Nose: Nose normal.       Mouth/Throat:      Mouth: Mucous membranes are dry.   Eyes:      General:         Left eye: Discharge present.  Cardiovascular:      Rate and Rhythm: Normal rate.   Pulmonary:      Effort: Pulmonary effort is normal.      Breath sounds: Normal breath sounds.   Abdominal:      General: Bowel sounds are normal.      Palpations: Abdomen is soft.      Comments: G tube   Musculoskeletal:         General: No swelling.   Skin:     General: Skin is dry.   Neurological:      Mental Status: He is alert.      Comments: Oriented to self, place            Review of Symptoms      Symptom Assessment (ESAS 0-10 Scale)  Pain:  0  Dyspnea:  0  Anxiety:  0  Nausea:  0  Depression:  0  Anorexia:  0  Fatigue:  0  Insomnia:  0  Restlessness:  0  Agitation:  0 due to Other         Pain Assessment in Advanced Demential Scale (PAINAD)   Breathing - Independent of vocalization:  0  Negative vocalization:  0  Facial expression:  0  Body language:  0  Consolability:  0  Total:  0    Psychosocial/Cultural:   See Palliative Psychosocial Note: No  No longer working since stroke. Has sister Marley. Has adult children  **Primary  to Follow**  Palliative Care  Consult: No        Advance Care Planning   Advance Directives:   Living Will: No    LaPOST: No    Do Not Resuscitate Status: No    Medical Power of : Yes    Agent's Name:  Marley Bernal    Decision Making:  Family answered questions and Patient unable to communicate due to disease severity/cognitive impairment  Goals of Care: What is most important right now is to focus on extending life as long as possible, even it it means sacrificing quality. Accordingly, we have decided that the best plan to meet the patient's goals includes continuing with treatment.         Significant Labs: CBC:   Recent Labs   Lab 08/17/23  0540 08/18/23  0454   WBC 8.07 8.18   HGB 8.8* 8.6*   HCT 30.5* 30.1*    258       CMP:   Recent Labs   Lab 08/17/23  0540  08/17/23  1426 08/18/23  0454     140 139 137  138  139   K 4.5  4.5 4.6 4.4  4.6  4.5     105 105 104  105  104   CO2 22*  24 22* 22*  23  24   GLU 65*  67* 88 52*  51*  52*   BUN 17  18 20 24*  24*  24*   CREATININE 4.8*  5.2* 5.5* 5.9*  6.4*  6.3*   CALCIUM 8.8  9.0 9.0 9.2  9.1  9.2   PROT 6.1  --  6.3   ALBUMIN 2.1*  2.1* 2.2* 2.2*  2.2*  2.3*   BILITOT 0.3  --  0.3   ALKPHOS 104  --  100   AST 12  --  19   ALT <5*  --  <5*   ANIONGAP 12  11 12 11  10  11       CBC:   Recent Labs   Lab 08/18/23 0454   WBC 8.18   HGB 8.6*   HCT 30.1*   *          BMP:  Recent Labs   Lab 08/18/23 0454   GLU 52*  51*  52*     138  139   K 4.4  4.6  4.5     105  104   CO2 22*  23  24   BUN 24*  24*  24*   CREATININE 5.9*  6.4*  6.3*   CALCIUM 9.2  9.1  9.2   MG 2.6  2.7*       LFT:  Lab Results   Component Value Date    AST 19 08/18/2023    GGT 13 11/09/2020    ALKPHOS 100 08/18/2023    BILITOT 0.3 08/18/2023     Albumin:   Albumin   Date Value Ref Range Status   08/18/2023 2.2 (L) 3.5 - 5.2 g/dL Final   08/18/2023 2.2 (L) 3.5 - 5.2 g/dL Final   08/18/2023 2.3 (L) 3.5 - 5.2 g/dL Final     Protein:   Total Protein   Date Value Ref Range Status   08/18/2023 6.3 6.0 - 8.4 g/dL Final     Lactic acid:   Lab Results   Component Value Date    LACTATE 0.6 08/09/2023       Significant Imaging: I have reviewed all pertinent imaging results/findings within the past 24 hours.

## 2023-08-18 NOTE — PROGRESS NOTES
[facility] ([place] [address])  Infectious Disease  Progress Note    Patient Name: Immanuel Bernal  MRN: [id number]  Admission Date: 8/9/2023  Length of Stay: 9 days  Attending Physician: Porsha Funez MD  Primary Care Provider: No, Primary Doctor    Isolation Status: No active isolations  Assessment/Plan:      Ophtho  * Endophthalmitis  57M with PMH of DM2, HTN, ESRD, HFrEF, bowel obstruction s/p bowel resection, KENIA, afib, recent hospitalization for NELSON endophthalmitis, pseudomonas bacteremia, RUE AVG infection s/p excision, who presents as a transfer from [place] for worsening eye symptoms and imaging findings of L scleral abscess. Previously admitted to [facility] in July for NELSON endophthalmitis, received intravitreal vancomycin and ceftazidime and IV cefepime for pseudomonas bacteremia. Refused enucleation during that admission, and left AMA. He then presented to [place], and was transferred to [facility] for oculoplastic eval. MRI orbits with L scleral abscess. Underwent enucleation 8/16  - Blood cultures 8/9 NGTD. Gram stain of abscess with rare yeast, fluconazole started 8/17. vanc/eunice started 8/10.    Recommendations:  - Continue fluconazole, meropenem and vanc. Will need total of 6 weeks of therapy  - awaiting operative culture data. Further tailoring of regimen to follow.        Anticipated Disposition: Nursing home    Thank you for your consult. I will follow-up with patient. Please contact us if you have any additional questions.    Sarabjit Maguire MD  Infectious Disease  [facility] ([place] [address])    Subjective:     Principal Problem:Endophthalmitis    HPI: Mr. Bernal is a 57M with PMH of DM2, HTN, ESRD, HFrEF, bowel obstruction s/p bowel resection, KENIA, afib, recent hospitalization for NELSON endophthalmitis, pseudomonas bacteremia, RUE AVG infection s/p excision, who presented as a transfer from [place] for worsening eye symptoms and imaging findings of L scleral abscess.  Patient had been admitted to [facility] July 18th for concern for NELSON endophthalmitis. He received intravitreal vancomycin and ceftazidime by ophthalmology, and IV cefepime for pseudomonas bacteremia. Superficial swab with diphtheroids and anaerobic cocci. He underwent intravitreal tap that was negative for any yeast or fungus. Unfortunately L eye did not respond to treatment and abscess developed. Ophthalmology recommended enucleation, however POA/family declined. He was recommend to continue on cefepime for 6 weeks followed by indefinite fluoroquinolone, however he left AMA without the antibiotics.     He then presented to [place], where imaging was notable for L scleral abscess. He received a dose of zosyn and vanc, and was transferred to [facility] for oculoplastic evaluation. ID was consulted on arrival, and he was transitioned to vanc/meropenem while awaiting culture results. Blood cultures from 8/9 negative. He underwent L enucleation 8/16. ID reconsulted 8/17 for long term antibiotic regimen.      Interval History: NAEON, afebrile, WBC unelevated. Pt was seen in dialysis this morning. Only complaints of headache and eye pain. No other complaints at this time.    Past Medical History:   Diagnosis Date    Bilateral retinal detachment 7/18/2023    BPH (benign prostatic hyperplasia)     Cataract     CHF (congestive heart failure)     CKD (chronic kidney disease) stage 3, GFR 30-59 ml/min     Diabetes mellitus, type 2     Hypertension     KENIA (obstructive sleep apnea)     Pancreatitis     Persistent proteinuria 11/12/2020    2 g proteinuria noted Resumed ACE Lower BP systolic to less than 130     PTSD (post-traumatic stress disorder)     Stroke        Past Surgical History:   Procedure Laterality Date    CATARACT EXTRACTION Bilateral     EVISCERATION OF OCULAR CONTENTS Left 8/16/2023    Procedure: EVISCERATION, OCULAR CONTENTS;  Surgeon: Vicki Stewart MD;  Location: [facility] OR [address] [place] [address];  Service: Ophthalmology;  Laterality:  Left;    RETROBULBAR INJECTION OF MEDICATION Left 8/16/2023    Procedure: INJECTION, MEDICATION, RETROBULBAR;  Surgeon: Vicki Stewart MD;  Location: [facility] OR [address] [place] [address];  Service: Ophthalmology;  Laterality: Left;    TARSORRHAPHY Left 8/16/2023    Procedure: BLEPHARORRHAPHY;  Surgeon: Vicki Stewart MD;  Location: [facility] OR [address] [place] [address];  Service: Ophthalmology;  Laterality: Left;       Review of patient's allergies indicates:   Allergen Reactions    Morphine Rash    Amiodarone analogues Itching     Other reaction(s): Unknown       Medications:  Medications Prior to Admission   Medication Sig    acetaminophen (TYLENOL) 325 MG tablet Take 650 mg by mouth every 6 (six) hours as needed for Pain.    albuterol-ipratropium (DUO-NEB) 2.5 mg-0.5 mg/3 mL nebulizer solution Inhale 3 mLs into the lungs 3 (three) times daily.    amLODIPine (NORVASC) 10 MG tablet Take 10 mg by mouth once daily.    ascorbic acid, vitamin C, (VITAMIN C) 500 MG tablet Take 500 mg by mouth once daily.    aspirin 81 MG Chew Take 81 mg by mouth once daily.    calcium carbonate-vitamin D3 (OYSTER SHELL CALCIUM-VIT D3) 500 mg-5 mcg (200 unit) PwPk Take 1 tablet by mouth 2 (two) times a day.    carvediloL (COREG) 25 MG tablet Take 1 tablet (25 mg total) by mouth 2 (two) times daily with meals.    citalopram (CELEXA) 20 MG tablet Take 20 mg by mouth once daily.    coffee xt/phosphatidyl serine (NEURIVA ORIGINAL ORAL) Take 1 capsule by mouth once daily.    flecainide (TAMBOCOR) 50 MG Tab Take 50 mg by mouth 2 (two) times daily.    folic acid (FOLVITE) 1 MG tablet Take 1 tablet by mouth every morning.    ibuprofen (ADVIL,MOTRIN) 600 MG tablet Take 600 mg by mouth every 6 (six) hours as needed for Pain.    lactulose (CHRONULAC) 10 gram/15 mL solution Take 45 mLs by mouth daily as needed (constipation).    megestroL (MEGACE) 400 mg/10 mL (40 mg/mL) Susp Take 10 mLs by mouth 2 (two) times a day.    metoclopramide HCl (REGLAN) 5 MG tablet Take 5 mg by mouth once daily.     multivitamin (THERAGRAN) per tablet Take 1 tablet by mouth once daily.    pantoprazole (PROTONIX) 40 MG tablet Take 40 mg by mouth once daily.    pentoxifylline (TRENTAL) 400 mg TbSR Take 400 mg by mouth 2 (two) times daily.    polyethylene glycol (GLYCOLAX) 17 gram/dose powder Take 17 g by mouth 2 (two) times daily.    rosuvastatin (CRESTOR) 5 MG tablet Take 5 mg by mouth once daily.    sevelamer carbonate (RENVELA) 800 mg Tab Take 1 tablet by mouth 5 times per day    tamsulosin (FLOMAX) 0.4 mg Cap Take 1 capsule (0.4 mg total) by mouth once daily.    UNABLE TO FIND Take 1 tablet by mouth once daily. medication name: OPTIMAL ANTIOX    UNABLE TO FIND Take 1 tablet by mouth nightly. medication name: QUNOL SLEEP SUPPORT    VITAMIN B COMPLEX ORAL Take 1 tablet by mouth every morning.    zinc gluconate 50 mg tablet Take 50 mg by mouth once daily.     Antibiotics (From admission, onward)      Start     Stop Route Frequency Ordered    08/18/23 1200  vancomycin (VANCOCIN) 500 mg in dextrose 5 % in water (D5W) 100 mL IVPB (MB+)         -- IV Once 08/17/23 0944    08/16/23 2343  meropenem (MERREM) 500 mg injection        Note to Pharmacy: Created by cabinet override    08/17/23 1144   08/16/23 2343    08/16/23 2100  tobramycin-dexAMETHasone 0.3-0.1% ophthalmic suspension 1 drop         09/15/23 2059 LEFT EYE 4 times daily 08/16/23 1732    08/16/23 2100  tobramycin-dexAMETHasone 0.3-0.1% ophthalmic ointment         09/15/23 2059 LEFT EYE 3 times daily 08/16/23 1732    08/13/23 0945  mupirocin 2 % ointment         08/18/23 0859 Nasl 2 times daily 08/13/23 0840    08/13/23 0000  meropenem (MERREM) 500 mg in sodium chloride 0.9 % 100 mL IVPB (MB+)         -- IV Every 12 hours (non-standard times) 08/12/23 1507    08/09/23 1418  vancomycin - pharmacy to dose  (vancomycin IVPB (PEDS and ADULTS))        See Hyperspace for full Linked Orders Report.    -- IV pharmacy to manage frequency 08/09/23 1319          Antifungals (From  admission, onward)      Start     Stop Route Frequency Ordered    08/18/23 0900  fluconazole tablet 400 mg        See Hyperspace for full Linked Orders Report.    -- Oral Daily 08/17/23 1331          Antivirals (From admission, onward)      None             Immunization History   Administered Date(s) Administered    Influenza 10/30/1997, 11/14/2000    Pneumococcal Conjugate - 20 Valent 03/17/2023       Family History       Problem Relation (Age of Onset)    Diabetes Father, Sister    Heart disease Father    Hyperlipidemia Brother    Kidney disease Father    Stroke Mother          Social History     Socioeconomic History    Marital status: Single   Tobacco Use    Smoking status: Former     Current packs/day: 0.00     Types: Cigarettes, Cigars    Smokeless tobacco: Never   Substance and Sexual Activity    Alcohol use: Not Currently    Drug use: Not Currently     Social Determinants of Health     Financial Resource Strain: Low Risk  (8/10/2023)    Overall Financial Resource Strain (CARDIA)     Difficulty of Paying Living Expenses: Not very hard   Food Insecurity: No Food Insecurity (8/10/2023)    Hunger Vital Sign     Worried About Running Out of Food in the Last Year: Never true     Ran Out of Food in the Last Year: Never true   Transportation Needs: No Transportation Needs (8/10/2023)    PRAPARE - Transportation     Lack of Transportation (Medical): No     Lack of Transportation (Non-Medical): No   Physical Activity: Insufficiently Active (8/10/2023)    Exercise Vital Sign     Days of Exercise per Week: 5 days     Minutes of Exercise per Session: 20 min   Stress: Stress Concern Present (8/10/2023)    [employer] [place] [employer] - Occupational Stress Questionnaire     Feeling of Stress : Rather much   Social Connections: Socially Isolated (8/10/2023)    Social Connection and Isolation Panel [NHANES]     Frequency of Communication with Friends and Family: Twice a week     Frequency of Social Gatherings  with Friends and Family: Once a week     Attends [employer] Services: Never     Active Member of Clubs or Organizations: No     Attends Club or Organization Meetings: Never     Marital Status: Never    Housing Stability: Unknown (8/10/2023)    Housing Stability Vital Sign     Unable to Pay for Housing in the Last Year: No     Unstable Housing in the Last Year: No     Review of Systems   Constitutional:  Negative for chills, diaphoresis, fatigue and fever.   Eyes:  Positive for visual disturbance. Negative for pain and redness.   Respiratory:  Negative for shortness of breath.    Cardiovascular:  Negative for chest pain.   Neurological:  Positive for headaches. Negative for dizziness and light-headedness.   Psychiatric/Behavioral:  Negative for confusion.      Objective:     Vital Signs (Most Recent):  Temp: 98.7 °F (37.1 °C) (08/18/23 0745)  Pulse: 66 (08/18/23 1326)  Resp: 18 (08/18/23 0929)  BP: 135/76 (08/18/23 1326)  SpO2: 100 % (08/18/23 0745) Vital Signs (24h Range):  Temp:  [98.2 °F (36.8 °C)-98.7 °F (37.1 °C)] 98.7 °F (37.1 °C)  Pulse:  [65-74] 66  Resp:  [14-19] 18  SpO2:  [95 %-100 %] 100 %  BP: (123-164)/(65-88) 135/76     Weight: 68 kg (150 lb)  Body mass index is 21.52 kg/m².    Estimated Creatinine Clearance: 13 mL/min (A) (based on SCr of 5.9 mg/dL (H)).     Physical Exam  Vitals and nursing note reviewed.   Constitutional:       General: He is not in acute distress.     Appearance: Normal appearance. He is not ill-appearing.   HENT:      Head: Normocephalic and atraumatic.   Eyes:      Comments: Left eye enucleated. Eye lid and surrounding tissue without edematous and erythematous, serosanguinous, thin discharge from his L eye.   Right eye non erythematous. EOM intact   Cardiovascular:      Rate and Rhythm: Normal rate and regular rhythm.      Pulses: Normal pulses.      Heart sounds: Normal heart sounds. No murmur heard.  Pulmonary:      Effort: Pulmonary effort is normal. No respiratory  distress.      Breath sounds: Normal breath sounds. No wheezing or rales.   Abdominal:      General: Abdomen is flat.      Palpations: Abdomen is soft.   Musculoskeletal:      Cervical back: Normal range of motion.      Right lower leg: No edema.      Left lower leg: No edema.   Skin:     General: Skin is warm and dry.      Comments: RUE with recent incision, staples in place  Clean, no erythematous, no oozing or drainage    Neurological:      General: No focal deficit present.      Mental Status: Mental status is at baseline.   Psychiatric:         Mood and Affect: Mood normal.         Behavior: Behavior normal.          Significant Labs: Blood Culture:   Recent Labs   Lab 08/09/23  1434   LABBLOO No growth after 5 days.  No growth after 5 days.       CBC:   Recent Labs   Lab 08/17/23  0540 08/18/23  0454   WBC 8.07 8.18   HGB 8.8* 8.6*   HCT 30.5* 30.1*    258       CMP:   Recent Labs   Lab 08/17/23  0540 08/17/23  1426 08/18/23  0454     140 139 137  138  139   K 4.5  4.5 4.6 4.4  4.6  4.5     105 105 104  105  104   CO2 22*  24 22* 22*  23  24   GLU 65*  67* 88 52*  51*  52*   BUN 17  18 20 24*  24*  24*   CREATININE 4.8*  5.2* 5.5* 5.9*  6.4*  6.3*   CALCIUM 8.8  9.0 9.0 9.2  9.1  9.2   PROT 6.1  --  6.3   ALBUMIN 2.1*  2.1* 2.2* 2.2*  2.2*  2.3*   BILITOT 0.3  --  0.3   ALKPHOS 104  --  100   AST 12  --  19   ALT <5*  --  <5*   ANIONGAP 12  11 12 11  10  11       Wound Culture:   Recent Labs   Lab 08/16/23  1629 08/16/23  1632 08/16/23  1636 08/16/23  1647   LABAERO No growth No growth No growth No growth         Significant Imaging: I have reviewed all pertinent imaging results/findings within the past 24 hours.

## 2023-08-18 NOTE — ASSESSMENT & PLAN NOTE
57M with PMH of DM2, HTN, ESRD, HFrEF, bowel obstruction s/p bowel resection, KENIA, afib, recent hospitalization for NELSON endophthalmitis, pseudomonas bacteremia, RUE AVG infection s/p excision, who presents as a transfer from [place] for worsening eye symptoms and imaging findings of L scleral abscess. Previously admitted to [facility] in July for NELSON endophthalmitis, received intravitreal vancomycin and ceftazidime and IV cefepime for pseudomonas bacteremia. Refused enucleation during that admission, and left AMA. He then presented to [place], and was [place] [facility] [place]. MRI orbits with L scleral abscess. Underwent enucleation 8/16  - Blood cultures 8/9 NGTD. Gram stain of abscess with rare yeast, fluconazole started 8/17. He has been on vanc/eunice since 8/10. Vanc d/c on 8/14.   Recommendations:  - Continue fluconazole and meropenem. Will need total of 6 weeks of therapy  - awaiting operative culture data. Further tailoring of regimen to follow.

## 2023-08-18 NOTE — ASSESSMENT & PLAN NOTE
Ophthalmology team on board and were going to perform the procedure but the patient's family refused without being around to review the MRI which delayed the procedure till next wednesday    MRI repeat : Diffuse inflammatory change involving the left globe, with associated thickening of the left sclera, proptosis, and focal fluid collection within the left posterior compartment, which demonstrates diffusion restriction.  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  Diffuse left preseptal and postseptal inflammatory change, with superimposed orbital cellulitis also considered.  Evaluation of the left orbital apex is limited secondary to no contrast administration.  Decreased size of the left anterior compartment with bowing of the visualized left lens.  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    Thorough counseling undertaken with POA and IM team, palliative, opthalmo and nursing team in regards to the surgery  She agreed for her brother to undergo the procedure as an emergent case if his health deteriorates  Otherwise to be scheduled on wednesday    Underwent evisceration of his left eye yesterday, tobramycin ordered, ID recs are being followed  Still on meronema and vancomycin IV  Wbc: 8.07 (normal)  hgb post op 8.8 (at [facility])  No signs of distress or delirium  Culture of eye discharge grew yeast, started flucanazole to antibiotic regimen  Follow cultures      Plan:  Follow ID recs  Follow opthalomology recs

## 2023-08-18 NOTE — SUBJECTIVE & OBJECTIVE
Interval History: NAEON, afebrile, WBC unelevated. Pt was seen in dialysis this morning. Only complaints of headache and eye pain. No other complaints at this time.    Past Medical History:   Diagnosis Date    Bilateral retinal detachment 7/18/2023    BPH (benign prostatic hyperplasia)     Cataract     CHF (congestive heart failure)     CKD (chronic kidney disease) stage 3, GFR 30-59 ml/min     Diabetes mellitus, type 2     Hypertension     KENIA (obstructive sleep apnea)     Pancreatitis     Persistent proteinuria 11/12/2020    2 g proteinuria noted Resumed ACE Lower BP systolic to less than 130     PTSD (post-traumatic stress disorder)     Stroke        Past Surgical History:   Procedure Laterality Date    CATARACT EXTRACTION Bilateral     EVISCERATION OF OCULAR CONTENTS Left 8/16/2023    Procedure: EVISCERATION, OCULAR CONTENTS;  Surgeon: Vicki Stewart MD;  Location: [facility] OR [address] [place] [address];  Service: Ophthalmology;  Laterality: Left;    RETROBULBAR INJECTION OF MEDICATION Left 8/16/2023    Procedure: INJECTION, MEDICATION, RETROBULBAR;  Surgeon: Vicki tSewart MD;  Location: [facility] OR [address] [place] [address];  Service: Ophthalmology;  Laterality: Left;    TARSORRHAPHY Left 8/16/2023    Procedure: BLEPHARORRHAPHY;  Surgeon: Vicki Stewart MD;  Location: [facility] OR [address] [place] [address];  Service: Ophthalmology;  Laterality: Left;       Review of patient's allergies indicates:   Allergen Reactions    Morphine Rash    Amiodarone analogues Itching     Other reaction(s): Unknown       Medications:  Medications Prior to Admission   Medication Sig    acetaminophen (TYLENOL) 325 MG tablet Take 650 mg by mouth every 6 (six) hours as needed for Pain.    albuterol-ipratropium (DUO-NEB) 2.5 mg-0.5 mg/3 mL nebulizer solution Inhale 3 mLs into the lungs 3 (three) times daily.    amLODIPine (NORVASC) 10 MG tablet Take 10 mg by mouth once daily.    ascorbic acid, vitamin C, (VITAMIN C) 500 MG tablet Take 500 mg by mouth once daily.    aspirin 81 MG Chew Take 81 mg by  mouth once daily.    calcium carbonate-vitamin D3 (OYSTER SHELL CALCIUM-VIT D3) 500 mg-5 mcg (200 unit) PwPk Take 1 tablet by mouth 2 (two) times a day.    carvediloL (COREG) 25 MG tablet Take 1 tablet (25 mg total) by mouth 2 (two) times daily with meals.    citalopram (CELEXA) 20 MG tablet Take 20 mg by mouth once daily.    coffee xt/phosphatidyl serine (NEURIVA ORIGINAL ORAL) Take 1 capsule by mouth once daily.    flecainide (TAMBOCOR) 50 MG Tab Take 50 mg by mouth 2 (two) times daily.    folic acid (FOLVITE) 1 MG tablet Take 1 tablet by mouth every morning.    ibuprofen (ADVIL,MOTRIN) 600 MG tablet Take 600 mg by mouth every 6 (six) hours as needed for Pain.    lactulose (CHRONULAC) 10 gram/15 mL solution Take 45 mLs by mouth daily as needed (constipation).    megestroL (MEGACE) 400 mg/10 mL (40 mg/mL) Susp Take 10 mLs by mouth 2 (two) times a day.    metoclopramide HCl (REGLAN) 5 MG tablet Take 5 mg by mouth once daily.    multivitamin (THERAGRAN) per tablet Take 1 tablet by mouth once daily.    pantoprazole (PROTONIX) 40 MG tablet Take 40 mg by mouth once daily.    pentoxifylline (TRENTAL) 400 mg TbSR Take 400 mg by mouth 2 (two) times daily.    polyethylene glycol (GLYCOLAX) 17 gram/dose powder Take 17 g by mouth 2 (two) times daily.    rosuvastatin (CRESTOR) 5 MG tablet Take 5 mg by mouth once daily.    sevelamer carbonate (RENVELA) 800 mg Tab Take 1 tablet by mouth 5 times per day    tamsulosin (FLOMAX) 0.4 mg Cap Take 1 capsule (0.4 mg total) by mouth once daily.    UNABLE TO FIND Take 1 tablet by mouth once daily. medication name: OPTIMAL ANTIOX    UNABLE TO FIND Take 1 tablet by mouth nightly. medication name: QUNOL SLEEP SUPPORT    VITAMIN B COMPLEX ORAL Take 1 tablet by mouth every morning.    zinc gluconate 50 mg tablet Take 50 mg by mouth once daily.     Antibiotics (From admission, onward)      Start     Stop Route Frequency Ordered    08/18/23 1200  vancomycin (VANCOCIN) 500 mg in dextrose 5 % in  water (D5W) 100 mL IVPB (MB+)         -- IV Once 08/17/23 0944    08/16/23 2343  meropenem (MERREM) 500 mg injection        Note to Pharmacy: Created by cabinet override    08/17/23 1144   08/16/23 2343    08/16/23 2100  tobramycin-dexAMETHasone 0.3-0.1% ophthalmic suspension 1 drop         09/15/23 2059 LEFT EYE 4 times daily 08/16/23 1732    08/16/23 2100  tobramycin-dexAMETHasone 0.3-0.1% ophthalmic ointment         09/15/23 2059 LEFT EYE 3 times daily 08/16/23 1732    08/13/23 0945  mupirocin 2 % ointment         08/18/23 0859 Nasl 2 times daily 08/13/23 0840    08/13/23 0000  meropenem (MERREM) 500 mg in sodium chloride 0.9 % 100 mL IVPB (MB+)         -- IV Every 12 hours (non-standard times) 08/12/23 1507    08/09/23 1418  vancomycin - pharmacy to dose  (vancomycin IVPB (PEDS and ADULTS))        See Hyperspace for full Linked Orders Report.    -- IV pharmacy to manage frequency 08/09/23 1319          Antifungals (From admission, onward)      Start     Stop Route Frequency Ordered    08/18/23 0900  fluconazole tablet 400 mg        See Hyperspace for full Linked Orders Report.    -- Oral Daily 08/17/23 1331          Antivirals (From admission, onward)      None             Immunization History   Administered Date(s) Administered    Influenza 10/30/1997, 11/14/2000    Pneumococcal Conjugate - 20 Valent 03/17/2023       Family History       Problem Relation (Age of Onset)    Diabetes Father, Sister    Heart disease Father    Hyperlipidemia Brother    Kidney disease Father    Stroke Mother          Social History     Socioeconomic History    Marital status: Single   Tobacco Use    Smoking status: Former     Current packs/day: 0.00     Types: Cigarettes, Cigars    Smokeless tobacco: Never   Substance and Sexual Activity    Alcohol use: Not Currently    Drug use: Not Currently     Social Determinants of Health     Financial Resource Strain: Low Risk  (8/10/2023)    Overall Financial Resource Strain (CARDIA)      Difficulty of Paying Living Expenses: Not very hard   Food Insecurity: No Food Insecurity (8/10/2023)    Hunger Vital Sign     Worried About Running Out of Food in the Last Year: Never true     Ran Out of Food in the Last Year: Never true   Transportation Needs: No Transportation Needs (8/10/2023)    PRAPARE - Transportation     Lack of Transportation (Medical): No     Lack of Transportation (Non-Medical): No   Physical Activity: Insufficiently Active (8/10/2023)    Exercise Vital Sign     Days of Exercise per Week: 5 days     Minutes of Exercise per Session: 20 min   Stress: Stress Concern Present (8/10/2023)    [employer] - Occupational Stress Questionnaire     Feeling of Stress : Rather much   Social Connections: Socially Isolated (8/10/2023)    Social Connection and Isolation Panel [NHANES]     Frequency of Communication with Friends and Family: Twice a week     Frequency of Social Gatherings with Friends and Family: Once a week     Attends Jainism Services: Never     Active Member of Clubs or Organizations: No     Attends Club or Organization Meetings: Never     Marital Status: Never    Housing Stability: Unknown (8/10/2023)    Housing Stability Vital Sign     Unable to Pay for Housing in the Last Year: No     Unstable Housing in the Last Year: No     Review of Systems   Constitutional:  Negative for chills, diaphoresis, fatigue and fever.   Eyes:  Positive for visual disturbance. Negative for pain and redness.   Respiratory:  Negative for shortness of breath.    Cardiovascular:  Negative for chest pain.   Neurological:  Positive for headaches. Negative for dizziness and light-headedness.   Psychiatric/Behavioral:  Negative for confusion.      Objective:     Vital Signs (Most Recent):  Temp: 98.7 °F (37.1 °C) (08/18/23 0745)  Pulse: 66 (08/18/23 1326)  Resp: 18 (08/18/23 0929)  BP: 135/76 (08/18/23 1326)  SpO2: 100 % (08/18/23 0745) Vital Signs (24h Range):  Temp:  [98.2  °F (36.8 °C)-98.7 °F (37.1 °C)] 98.7 °F (37.1 °C)  Pulse:  [65-74] 66  Resp:  [14-19] 18  SpO2:  [95 %-100 %] 100 %  BP: (123-164)/(65-88) 135/76     Weight: 68 kg (150 lb)  Body mass index is 21.52 kg/m².    Estimated Creatinine Clearance: 13 mL/min (A) (based on SCr of 5.9 mg/dL (H)).     Physical Exam  Vitals and nursing note reviewed.   Constitutional:       General: He is not in acute distress.     Appearance: Normal appearance. He is not ill-appearing.   HENT:      Head: Normocephalic and atraumatic.   Eyes:      Comments: Left eye enucleated. Eye lid and surrounding tissue without edematous and erythematous, serosanguinous, thin discharge from his L eye.   Right eye non erythematous. EOM intact   Cardiovascular:      Rate and Rhythm: Normal rate and regular rhythm.      Pulses: Normal pulses.      Heart sounds: Normal heart sounds. No murmur heard.  Pulmonary:      Effort: Pulmonary effort is normal. No respiratory distress.      Breath sounds: Normal breath sounds. No wheezing or rales.   Abdominal:      General: Abdomen is flat.      Palpations: Abdomen is soft.   Musculoskeletal:      Cervical back: Normal range of motion.      Right lower leg: No edema.      Left lower leg: No edema.   Skin:     General: Skin is warm and dry.      Comments: RUE with recent incision, staples in place  Clean, no erythematous, no oozing or drainage    Neurological:      General: No focal deficit present.      Mental Status: Mental status is at baseline.   Psychiatric:         Mood and Affect: Mood normal.         Behavior: Behavior normal.          Significant Labs: Blood Culture:   Recent Labs   Lab 08/09/23  1434   LABBLOO No growth after 5 days.  No growth after 5 days.       CBC:   Recent Labs   Lab 08/17/23  0540 08/18/23  0454   WBC 8.07 8.18   HGB 8.8* 8.6*   HCT 30.5* 30.1*    258       CMP:   Recent Labs   Lab 08/17/23  0540 08/17/23  1426 08/18/23  0454     140 139 137  138  139   K 4.5  4.5 4.6  4.4  4.6  4.5     105 105 104  105  104   CO2 22*  24 22* 22*  23  24   GLU 65*  67* 88 52*  51*  52*   BUN 17  18 20 24*  24*  24*   CREATININE 4.8*  5.2* 5.5* 5.9*  6.4*  6.3*   CALCIUM 8.8  9.0 9.0 9.2  9.1  9.2   PROT 6.1  --  6.3   ALBUMIN 2.1*  2.1* 2.2* 2.2*  2.2*  2.3*   BILITOT 0.3  --  0.3   ALKPHOS 104  --  100   AST 12  --  19   ALT <5*  --  <5*   ANIONGAP 12  11 12 11  10  11       Wound Culture:   Recent Labs   Lab 08/16/23  1629 08/16/23  1632 08/16/23  1636 08/16/23  1647   LABAERO No growth No growth No growth No growth         Significant Imaging: I have reviewed all pertinent imaging results/findings within the past 24 hours.

## 2023-08-18 NOTE — ASSESSMENT & PLAN NOTE
57yoM with incisions recently closed with staples on RUE. Given findings on physical exam - lack of significant incisional tenderness, lack of erythema, no fluid of purulence noted at incisions, it is unlikely that patient has a SSI.     - Given mild dehiscence and granulation tissue, recommend keeping staples in place for now   - Recommend consulting wound care for granulation tissue at incision site   - Recommend treating RUE DVT, resolution of DVT may help with arm swelling   - Continue dialysis through RIJ catheter

## 2023-08-18 NOTE — ASSESSMENT & PLAN NOTE
Immanuel Bernal is a 57-year-old man with a history of ESRD on HD, HFrEF (EF 30-35%), DM2, HTN, bowel obstruction s/p bowel resection, KENIA, CVA, atrial fibrillation who was hospitalized at [facility] in 7/2023 for b/l endophthalmitis, Pseudomonas bacteremia, RUE thrombus, RUE AVG infection s/p excision, and RUL mass. Left eye did not improve & subsequently developed abscess, but POA declined enucleation and patient ultimately left AMA. Re-presented to [facility] ED with worsening eye symptoms with imaging showing left scleral abscess. He was transferred to [facility] for Oculoplastics evaluation & enucleation. S/p evisceration of left eye on 8/16/23. Palliative and Supportive Care was consulted to explore goals of care.    Advance Care Planning   Goals of Care:  - Code status: Full code  - HCPOA: sister Marley Bernal   - HCPOA form signed at [facility]  - Patient does not have decision making capacity at this time  - Prognosis: guarded  - Goals: life prolongation  - Plans: will proceed with plans for ophthalmologic intervention for source control; will follow along as understanding of prognosis is very limited and trust with health care workers is practically non-existent. Low threshold to involve patient advocacy for patient and his HCPOA    Goals of Care Conversation:  - 8/18/23: Met Mr. Bernal and his sister Marley at bedside. He tried to eat breakfast but felt nauseous and could not finish very much of it. Sister wonders if there is a component of vertigo contributing to his overall symptoms as he had shared in the past that with position changes, he gets a room-spinning sensation. Denies room spinning sensation at this time. Agreed that while at this time, his nausea will not likely respond to meclizine if it's not directly related to vertigo, but worth trying a low dose as needed. Will continue to follow along during hospitalization and return on Monday, 8/21/23.  - 8/14/23: Met Mr. Bernal, who  wakes easily and answers appropriately and respectfully. Sister Marley at bedside. Had long supportive conversation with Marley, who is also trying to help their uncle who is in a nursing home, having his own serious medical issues related to severe weight loss and swallowing issues. His experience has been extremely negative in the nursing home and she says that unfortunately 80% of the nursing homes quality are like this, so she plans to get Mr. Bernal into an actual rehab and not a nursing home with rehab. Marley shares that she feels that having signed the HCPOA form with her brother, that she has a legal duty to advocate for him. She shares that she and other caregivers are not compensated for doing the hard job of shouldering the burden and protecting their loved ones from the mistakes and shortcomings that are from the medical providers/system. She is hoping to take legal action on behalf of her brother and the neglectful and bad care that he received. She shared that it was likely helpful that he had this weekend to adjust to his new vision loss. That although he hasn't been able to see in the last 3-4 weeks, this past Thursday he cried out and she believes he did so in realization that he cannot see. She put a mirror in front of him to help assess how much he can see, and he realized he cannot. She believes he needed time to adjust to this. She also worries that if he is delirious before the surgery, then it won't be a good thing to proceed. I thanked her for sharing these hardships with me and will return on Wednesday to continue to provide support for Mr. Bernal and his family.  - 8/11/23: Meeting with Marley Bernal (sister), Dr. Gautam, Dr. Celestin (ophthalmology) and writer (Mac, palliative). I asked Ms. Bernal to share with me about Mr. Bernal's condition and she started back 5 years ago when he was 52 years old when he suffered a stroke at work (related to his history of non-treated  THOR). His health deteriorated over the years and notes that he has been traumatized by his decline and prior hospitalizations. She shared how upsetting the hospitalization at [facility] was, noting how nothing was being done for him and alternatively was always being pressured to proceed with surgery. She recalls the day they signed papers to leave AMA, and being told that if she didn't agree with surgery, then he needed to sign out AMA and needed to leave in 15 minutes. Allowed for Ms. Bernal to express her frustrations and disappointment about the poor care he received and the poor communication she felt she received during this time. She wishes to advocate for him and protect him, knowing that the surgery and enucleation will be something he has to live with. Her desire was to preserve his eyes, but if left with no other options, understands that enucleation would need to occur. She eventually expresses gratefulness for Dr. Celestin for going out of her way to communicate and help her brother. She was agreeable when I asked if I could follow along.       Nausea  - Had bowel movement on 8/17; continue to monitor for possible ileus in setting of severe illness  - Agree with antiemetics ordered: ondansetron and promethazine PRN  - Trial meclizine 12.5 mg TID PRN room-spinning sensation with nausea

## 2023-08-18 NOTE — PROGRESS NOTES
[facility] - [facility] ([place] [id number])  Palliative Medicine  Progress Note    Patient Name: Immanuel Bernal  MRN: [id number]  Admission Date: 8/9/2023  Hospital Length of Stay: 9 days  Code Status: Full Code   Attending Provider: Porsha Funez MD  Consulting Provider: Kim Watts MD  Primary Care Physician: Dolly, Primary Doctor  Principal Problem:Endophthalmitis    Patient information was obtained from patient, relative(s) and primary team.      Assessment/Plan:     Palliative Care  Encounter for palliative care  Immanuel Bernal is a 57-year-old man with a history of ESRD on HD, HFrEF (EF 30-35%), DM2, HTN, bowel obstruction s/p bowel resection, KENIA, CVA, atrial fibrillation who was hospitalized at [facility] in 7/2023 for b/l endophthalmitis, Pseudomonas bacteremia, RUE thrombus, RUE AVG infection s/p excision, and RUL mass. Left eye did not improve & subsequently developed abscess, but POA declined enucleation and patient ultimately left A. Re-presented to [place] ED with worsening eye symptoms with imaging showing left scleral abscess. He was transferred to [facility] for Oculoplastics evaluation & enucleation. S/p evisceration of left eye on 8/16/23. Palliative and Supportive Care was consulted to explore goals of care.    Advance Care Planning   Goals of Care:  - Code status: Full code  - HCPOA: sister Marley Bernal   - HCPOA form signed at [facility]  - Patient does not have decision making capacity at this time  - Prognosis: guarded  - Goals: life prolongation  - Plans: will proceed with plans for ophthalmologic intervention for source control; will follow along as understanding of prognosis is very limited and trust with health care workers is practically non-existent. Low threshold to involve patient advocacy for patient and his HCPOA    Goals of Care Conversation:  - 8/18/23: Met Mr. Bernal and his sister Marley at bedside. He tried to eat breakfast but felt nauseous and could not  finish very much of it. Sister wonders if there is a component of vertigo contributing to his overall symptoms as he had shared in the past that with position changes, he gets a room-spinning sensation. Denies room spinning sensation at this time. Agreed that while at this time, his nausea will not likely respond to meclizine if it's not directly related to vertigo, but worth trying a low dose as needed. Will continue to follow along during hospitalization and return on Monday, 8/21/23.  - 8/14/23: Met Mr. Bernal, who wakes easily and answers appropriately and respectfully. Sister Marley at bedside. Had long supportive conversation with Marley, who is also trying to help their uncle who is in a nursing home, having his own serious medical issues related to severe weight loss and swallowing issues. His experience has been extremely negative in the nursing home and she says that unfortunately 80% of the nursing homes quality are like this, so she plans to get Mr. Bernal into an actual rehab and not a nursing home with rehab. Marley shares that she feels that having signed the HCPOA form with her brother, that she has a legal duty to advocate for him. She shares that she and other caregivers are not compensated for doing the hard job of shouldering the burden and protecting their loved ones from the mistakes and shortcomings that are from the medical providers/system. She is hoping to take legal action on behalf of her brother and the neglectful and bad care that he received. She shared that it was likely helpful that he had this weekend to adjust to his new vision loss. That although he hasn't been able to see in the last 3-4 weeks, this past Thursday he cried out and she believes he did so in realization that he cannot see. She put a mirror in front of him to help assess how much he can see, and he realized he cannot. She believes he needed time to adjust to this. She also worries that if he is delirious  before the surgery, then it won't be a good thing to proceed. I thanked her for sharing these hardships with me and will return on Wednesday to continue to provide support for Mr. Bernal and his family.  - 8/11/23: Meeting with Marley Bernal (sister), Dr. Gautam, Dr. Celestin (ophthalmology) and writer (Mac, palliative). I asked Ms. Bernal to share with me about Mr. Bernal's condition and she started back 5 years ago when he was 52 years old when he suffered a stroke at work (related to his history of non-treated DM). His health deteriorated over the years and notes that he has been traumatized by his decline and prior hospitalizations. She shared how upsetting the hospitalization at [facility] was, noting how nothing was being done for him and alternatively was always being pressured to proceed with surgery. She recalls the day they signed papers to leave AMA, and being told that if she didn't agree with surgery, then he needed to sign out AMA and needed to leave in 15 minutes. Allowed for Ms. Bernal to express her frustrations and disappointment about the poor care he received and the poor communication she felt she received during this time. She wishes to advocate for him and protect him, knowing that the surgery and enucleation will be something he has to live with. Her desire was to preserve his eyes, but if left with no other options, understands that enucleation would need to occur. She eventually expresses gratefulness for Dr. Celestin for going out of her way to communicate and help her brother. She was agreeable when I asked if I could follow along.      Nausea  - Had bowel movement on 8/17; continue to monitor for possible ileus in setting of severe illness  - Agree with antiemetics ordered: ondansetron and promethazine PRN  - Trial meclizine 12.5 mg TID PRN room-spinning sensation with nausea        Will return on Monday, 8/21/23 to follow up Mr. Bernal and family for  "support.    Subjective:     Chief Complaint:   Chief Complaint   Patient presents with    Eye Problem       HPI:   Per primary HPI:     "Immanuel Bernal is a 57-year-old man with a history of ESRD on HD, HFrEF (EF 30-35%), DM2, HTN, bowel obstruction s/p bowel resection, KENIA, CVA, atrial fibrillation who was hospitalized at [facility] in 7/2023 for b/l endophthalmitis, Pseudomonas bacteremia, RUE thrombus, RUE AVG infection s/p excision, and RUL mass. Left eye did not improve & subsequently developed abscess, but POA declined enucleation and patient ultimately left AMA. Re-presented to [place] ED with worsening eye symptoms with imaging showing left scleral abscess. He was transferred to [facility] for Oculoplastics evaluation & enucleation."    Palliative and Supportive Care was consulted to explore goals of care.      Hospital Course:  No notes on file    Interval History: Successful surgery on 8/16. Feeling nauseous this morning when trying to eat breakfast. Has had a history of room-spinning sensation with standing. Sister is wondering if he has vertigo.    Past Medical History:   Diagnosis Date    Bilateral retinal detachment 7/18/2023    BPH (benign prostatic hyperplasia)     Cataract     CHF (congestive heart failure)     CKD (chronic kidney disease) stage 3, GFR 30-59 ml/min     Diabetes mellitus, type 2     Hypertension     KENIA (obstructive sleep apnea)     Pancreatitis     Persistent proteinuria 11/12/2020    2 g proteinuria noted Resumed ACE Lower BP systolic to less than 130     PTSD (post-traumatic stress disorder)     Stroke        Past Surgical History:   Procedure Laterality Date    CATARACT EXTRACTION Bilateral     EVISCERATION OF OCULAR CONTENTS Left 8/16/2023    Procedure: EVISCERATION, OCULAR CONTENTS;  Surgeon: Vicki Stewart MD;  Location: [facility] OR [address] [place] [address];  Service: Ophthalmology;  Laterality: Left;    RETROBULBAR INJECTION OF MEDICATION Left 8/16/2023    Procedure: INJECTION, " MEDICATION, RETROBULBAR;  Surgeon: Vicki Stewart MD;  Location: [facility] OR [address] [place] [address];  Service: Ophthalmology;  Laterality: Left;    TARSORRHAPHY Left 8/16/2023    Procedure: BLEPHARORRHAPHY;  Surgeon: Vicki Stewart MD;  Location: [facility] OR [address] [place] [address];  Service: Ophthalmology;  Laterality: Left;       Review of patient's allergies indicates:   Allergen Reactions    Morphine Rash    Amiodarone analogues Itching     Other reaction(s): Unknown       Medications:  Continuous Infusions:  Scheduled Meds:   sodium chloride 0.9%   Intravenous Once    acetaminophen  1,000 mg Oral TID    apixaban  10 mg Oral BID    Followed by    [START ON 8/21/2023] apixaban  5 mg Oral BID    atorvastatin  40 mg Oral Daily    calcitRIOL  0.25 mcg Oral Daily    carvediloL  12.5 mg Oral BID    epoetin thee (PROCRIT) injection  3,000 Units Intravenous Every Tues, Thurs, Sat    fluconazole  400 mg Oral Daily    hydrALAZINE  10 mg Oral TID    losartan  100 mg Oral Daily    meropenem (MERREM) IVPB  500 mg Intravenous Q12H    sevelamer carbonate  0.8 g Oral TID WM    tobramycin-dexAMETHasone 0.3-0.1%   Left Eye TID    tobramycin-dexAMETHasone 0.3-0.1%  1 drop Left Eye QID    vancomycin (VANCOCIN) IV (PEDS and ADULTS)  500 mg Intravenous Once     PRN Meds:sodium chloride 0.9%, albuterol-ipratropium, dextrose 10%, dextrose 10%, glucagon (human recombinant), glucose, glucose, heparin (porcine), meclizine, melatonin, naloxone, OLANZapine, ondansetron, oxyCODONE, oxyCODONE-acetaminophen, promethazine (PHENERGAN) 12.5 mg in dextrose 5 % (D5W) 50 mL IVPB, sodium chloride 0.9%, Pharmacy to dose Vancomycin consult **AND** vancomycin - pharmacy to dose    Family History       Problem Relation (Age of Onset)    Diabetes Father, Sister    Heart disease Father    Hyperlipidemia Brother    Kidney disease Father    Stroke Mother          Tobacco Use    Smoking status: Former     Current packs/day: 0.00     Types: Cigarettes, Cigars    Smokeless  tobacco: Never   Substance and Sexual Activity    Alcohol use: Not Currently    Drug use: Not Currently    Sexual activity: Not on file       Review of Systems   Unable to perform ROS: Other     Objective:     Vital Signs (Most Recent):  Temp: 98.7 °F (37.1 °C) (08/18/23 0745)  Pulse: 70 (08/18/23 0745)  Resp: 18 (08/18/23 0929)  BP: (!) 164/88 (08/18/23 0745)  SpO2: 100 % (08/18/23 0745) Vital Signs (24h Range):  Temp:  [98.2 °F (36.8 °C)-98.9 °F (37.2 °C)] 98.7 °F (37.1 °C)  Pulse:  [66-75] 70  Resp:  [14-19] 18  SpO2:  [95 %-100 %] 100 %  BP: (125-164)/(64-88) 164/88     Weight: 68 kg (150 lb)  Body mass index is 21.52 kg/m².       Physical Exam  Constitutional:       General: He is not in acute distress.     Appearance: He is ill-appearing.   HENT:      Head: Normocephalic and atraumatic.      Right Ear: External ear normal.      Left Ear: External ear normal.      Nose: Nose normal.      Mouth/Throat:      Mouth: Mucous membranes are dry.   Eyes:      General:         Left eye: Discharge present.  Cardiovascular:      Rate and Rhythm: Normal rate.   Pulmonary:      Effort: Pulmonary effort is normal.      Breath sounds: Normal breath sounds.   Abdominal:      General: Bowel sounds are normal.      Palpations: Abdomen is soft.      Comments: G tube   Musculoskeletal:         General: No swelling.   Skin:     General: Skin is dry.   Neurological:      Mental Status: He is alert.      Comments: Oriented to self, place            Review of Symptoms      Symptom Assessment (ESAS 0-10 Scale)  Pain:  0  Dyspnea:  0  Anxiety:  0  Nausea:  0  Depression:  0  Anorexia:  0  Fatigue:  0  Insomnia:  0  Restlessness:  0  Agitation:  0 due to Other         Pain Assessment in Advanced Demential Scale (PAINAD)   Breathing - Independent of vocalization:  0  Negative vocalization:  0  Facial expression:  0  Body language:  0  Consolability:  0  Total:  0    Psychosocial/Cultural:   See Palliative Psychosocial Note: No  No  longer working since stroke. Has sister Marley. Has adult children  **Primary  to Follow**  Palliative Care  Consult: No        Advance Care Planning  Advance Directives:   Living Will: No    LaPOST: No    Do Not Resuscitate Status: No    Medical Power of : Yes    Agent's Name:  Marley Bernal    Decision Making:  Family answered questions and Patient unable to communicate due to disease severity/cognitive impairment  Goals of Care: What is most important right now is to focus on extending life as long as possible, even it it means sacrificing quality. Accordingly, we have decided that the best plan to meet the patient's goals includes continuing with treatment.         Significant Labs: CBC:   Recent Labs   Lab 08/17/23  0540 08/18/23  0454   WBC 8.07 8.18   HGB 8.8* 8.6*   HCT 30.5* 30.1*    258       CMP:   Recent Labs   Lab 08/17/23  0540 08/17/23  1426 08/18/23  0454     140 139 137  138  139   K 4.5  4.5 4.6 4.4  4.6  4.5     105 105 104  105  104   CO2 22*  24 22* 22*  23  24   GLU 65*  67* 88 52*  51*  52*   BUN 17  18 20 24*  24*  24*   CREATININE 4.8*  5.2* 5.5* 5.9*  6.4*  6.3*   CALCIUM 8.8  9.0 9.0 9.2  9.1  9.2   PROT 6.1  --  6.3   ALBUMIN 2.1*  2.1* 2.2* 2.2*  2.2*  2.3*   BILITOT 0.3  --  0.3   ALKPHOS 104  --  100   AST 12  --  19   ALT <5*  --  <5*   ANIONGAP 12  11 12 11  10  11       CBC:   Recent Labs   Lab 08/18/23  0454   WBC 8.18   HGB 8.6*   HCT 30.1*   *          BMP:  Recent Labs   Lab 08/18/23  0454   GLU 52*  51*  52*     138  139   K 4.4  4.6  4.5     105  104   CO2 22*  23  24   BUN 24*  24*  24*   CREATININE 5.9*  6.4*  6.3*   CALCIUM 9.2  9.1  9.2   MG 2.6  2.7*       LFT:  Lab Results   Component Value Date    AST 19 08/18/2023    GGT 13 11/09/2020    ALKPHOS 100 08/18/2023    BILITOT 0.3 08/18/2023     Albumin:   Albumin   Date Value Ref Range Status    08/18/2023 2.2 (L) 3.5 - 5.2 g/dL Final   08/18/2023 2.2 (L) 3.5 - 5.2 g/dL Final   08/18/2023 2.3 (L) 3.5 - 5.2 g/dL Final     Protein:   Total Protein   Date Value Ref Range Status   08/18/2023 6.3 6.0 - 8.4 g/dL Final     Lactic acid:   Lab Results   Component Value Date    LACTATE 0.6 08/09/2023       Significant Imaging: I have reviewed all pertinent imaging results/findings within the past 24 hours.    I spent a total of 50 minutes on the day of the visit. This includes face to face time in discussion of goals of care, symptom assessment, coordination of care and emotional support. This also includes non-face to face time preparing to see the patient (eg, review of tests/imaging), obtaining and/or reviewing separately obtained history, documenting clinical information in the electronic or other health record, independently interpreting results and communicating results to the patient/family/caregiver, or care coordinator.         Kim Watts MD  Palliative Medicine  [facility] - Intensive Care ([facility]               Number Of Hemigard Strips Per Side: 1

## 2023-08-18 NOTE — ASSESSMENT & PLAN NOTE
Nephrology consulted for HD needs while inpatient  Underwent SLED post MRI contrast (gadolinium)   Ad last dialysis was 08/16: Tolerated HD, with a net UF of 1L. NAEON.   CRT today 6.4      Plan:  Will be followed by nephro, follow up with their recommendations

## 2023-08-18 NOTE — SUBJECTIVE & OBJECTIVE
Medications Prior to Admission   Medication Sig Dispense Refill Last Dose    acetaminophen (TYLENOL) 325 MG tablet Take 650 mg by mouth every 6 (six) hours as needed for Pain.       albuterol-ipratropium (DUO-NEB) 2.5 mg-0.5 mg/3 mL nebulizer solution Inhale 3 mLs into the lungs 3 (three) times daily.       amLODIPine (NORVASC) 10 MG tablet Take 10 mg by mouth once daily.       ascorbic acid, vitamin C, (VITAMIN C) 500 MG tablet Take 500 mg by mouth once daily.       aspirin 81 MG Chew Take 81 mg by mouth once daily.       calcium carbonate-vitamin D3 (OYSTER SHELL CALCIUM-VIT D3) 500 mg-5 mcg (200 unit) PwPk Take 1 tablet by mouth 2 (two) times a day.       carvediloL (COREG) 25 MG tablet Take 1 tablet (25 mg total) by mouth 2 (two) times daily with meals. 60 tablet 6 Past Week    citalopram (CELEXA) 20 MG tablet Take 20 mg by mouth once daily.       coffee xt/phosphatidyl serine (NEURIVA ORIGINAL ORAL) Take 1 capsule by mouth once daily.       flecainide (TAMBOCOR) 50 MG Tab Take 50 mg by mouth 2 (two) times daily.       folic acid (FOLVITE) 1 MG tablet Take 1 tablet by mouth every morning.       ibuprofen (ADVIL,MOTRIN) 600 MG tablet Take 600 mg by mouth every 6 (six) hours as needed for Pain.       lactulose (CHRONULAC) 10 gram/15 mL solution Take 45 mLs by mouth daily as needed (constipation).       megestroL (MEGACE) 400 mg/10 mL (40 mg/mL) Susp Take 10 mLs by mouth 2 (two) times a day.       metoclopramide HCl (REGLAN) 5 MG tablet Take 5 mg by mouth once daily.       multivitamin (THERAGRAN) per tablet Take 1 tablet by mouth once daily.       pantoprazole (PROTONIX) 40 MG tablet Take 40 mg by mouth once daily.       pentoxifylline (TRENTAL) 400 mg TbSR Take 400 mg by mouth 2 (two) times daily.       polyethylene glycol (GLYCOLAX) 17 gram/dose powder Take 17 g by mouth 2 (two) times daily.       rosuvastatin (CRESTOR) 5 MG tablet Take 5 mg by mouth once daily.       sevelamer carbonate (RENVELA) 800 mg Tab  Take 1 tablet by mouth 5 times per day       tamsulosin (FLOMAX) 0.4 mg Cap Take 1 capsule (0.4 mg total) by mouth once daily. 30 capsule 6     UNABLE TO FIND Take 1 tablet by mouth once daily. medication name: OPTIMAL ANTIOX       UNABLE TO FIND Take 1 tablet by mouth nightly. medication name: QUNOL SLEEP SUPPORT       VITAMIN B COMPLEX ORAL Take 1 tablet by mouth every morning.       zinc gluconate 50 mg tablet Take 50 mg by mouth once daily.          Review of patient's allergies indicates:   Allergen Reactions    Morphine Rash    Amiodarone analogues Itching     Other reaction(s): Unknown       Past Medical History:   Diagnosis Date    Bilateral retinal detachment 7/18/2023    BPH (benign prostatic hyperplasia)     Cataract     CHF (congestive heart failure)     CKD (chronic kidney disease) stage 3, GFR 30-59 ml/min     Diabetes mellitus, type 2     Hypertension     KENIA (obstructive sleep apnea)     Pancreatitis     Persistent proteinuria 11/12/2020    2 g proteinuria noted Resumed ACE Lower BP systolic to less than 130     PTSD (post-traumatic stress disorder)     Stroke      Past Surgical History:   Procedure Laterality Date    CATARACT EXTRACTION Bilateral     EVISCERATION OF OCULAR CONTENTS Left 8/16/2023    Procedure: EVISCERATION, OCULAR CONTENTS;  Surgeon: Vicki Stewart MD;  Location: [address];  Service: Ophthalmology;  Laterality: Left;    RETROBULBAR INJECTION OF MEDICATION Left 8/16/2023    Procedure: INJECTION, MEDICATION, RETROBULBAR;  Surgeon: Vicki Stewart MD;  Location: [address];  Service: Ophthalmology;  Laterality: Left;    TARSORRHAPHY Left 8/16/2023    Procedure: BLEPHARORRHAPHY;  Surgeon: Vicki Stewart MD;  Location: [address];  Service: Ophthalmology;  Laterality: Left;     Family History       Problem Relation (Age of Onset)    Diabetes Father, Sister    Heart disease Father    Hyperlipidemia Brother    Kidney disease Father    Stroke Mother          Tobacco Use     Smoking status: Former     Current packs/day: 0.00     Types: Cigarettes, Cigars    Smokeless tobacco: Never   Substance and Sexual Activity    Alcohol use: Not Currently    Drug use: Not Currently    Sexual activity: Not on file     Review of Systems   Constitutional:  Negative for chills and fever.   Eyes:  Positive for pain and discharge.   Respiratory:  Negative for chest tightness and shortness of breath.    Cardiovascular:  Negative for chest pain.   Gastrointestinal:  Negative for nausea and vomiting.     Objective:     Vital Signs (Most Recent):  Temp: 98.3 °F (36.8 °C) (08/18/23 0443)  Pulse: 70 (08/18/23 0443)  Resp: 17 (08/18/23 0443)  BP: (!) 147/81 (08/18/23 0443)  SpO2: 95 % (08/18/23 0443) Vital Signs (24h Range):  Temp:  [98.2 °F (36.8 °C)-98.9 °F (37.2 °C)] 98.3 °F (36.8 °C)  Pulse:  [66-75] 70  Resp:  [16-19] 17  SpO2:  [95 %-100 %] 95 %  BP: (125-151)/(64-81) 147/81     Weight: 68 kg (150 lb)  Body mass index is 21.52 kg/m².      Physical Exam  Constitutional:       Appearance: Normal appearance.   HENT:      Head: Normocephalic and atraumatic.   Eyes:      Comments: L eye s/p surgical intervention   Cardiovascular:      Rate and Rhythm: Normal rate and regular rhythm.      Pulses: Normal pulses.      Comments: Palpable 2+ R radial and ulnar pulse   Pulmonary:      Effort: No respiratory distress.   Musculoskeletal:         General: Swelling present.      Cervical back: Neck supple.      Comments: Significant swelling/pitting edema of RUE   Skin:     General: Skin is warm.      Comments: Incisions on RUE closed with staples. Incisions not tender, erythematous, or with any discharge or purulence. Proximal medial incision appears to have mild dehiscence with granulation tissue           Significant Labs:  All pertinent labs from the last 24 hours have been reviewed.    Significant Diagnostics:  I have reviewed all pertinent imaging results/findings within the past 24 hours.

## 2023-08-18 NOTE — HPI
"57yoM w/ multiple medical comorbidities including ESRD, AVF with potential infection and subsequent removal, who presented to [facility] after leaving ama from [facility] for management of endopthalmitis. Vascular surgery is consulted for the management of staples on his right arm from AVF removal at OSH. Per patient's sister, he had an uncomplicated AVF done at an OSH about 6 weeks ago. About two weeks after that he developed sxs of an "eye infection" later diagnosed at endophthalmitis. There was concern that the AVF placement may have been the source of infection, hence the fistula was removed the next week. After the removal the incision on the R UE was closed with staples. At the hospital the patient was also diagnosed with R UE "blood clots" given significant RUE swelling and tenderness. However, patient left AMA before receiving treatment d/t nursing concerns. He then came to [facility] for management of endophthalmitis which has since been treated with IV abx and opthalmologic intervention. He had a RUE US yesterday that showed multiple DVTs. Vascular surgery is consulted for potential SSI at staple site.             "

## 2023-08-18 NOTE — SUBJECTIVE & OBJECTIVE
Interval History: no acute events overnight    Review of Systems   Constitutional:  Negative for activity change.   HENT:  Positive for ear discharge (less than pre-op).    Respiratory:  Negative for choking, chest tightness, shortness of breath and wheezing.    Cardiovascular:  Negative for chest pain, palpitations and leg swelling.   Gastrointestinal:  Negative for abdominal distention and abdominal pain.   Neurological: Negative.      Objective:     Vital Signs (Most Recent):  Temp: 98.7 °F (37.1 °C) (08/18/23 0745)  Pulse: 66 (08/18/23 1326)  Resp: 18 (08/18/23 0929)  BP: 135/76 (08/18/23 1326)  SpO2: 100 % (08/18/23 0745) Vital Signs (24h Range):  Temp:  [98.2 °F (36.8 °C)-98.7 °F (37.1 °C)] 98.7 °F (37.1 °C)  Pulse:  [65-74] 66  Resp:  [14-19] 18  SpO2:  [95 %-100 %] 100 %  BP: (123-164)/(65-88) 135/76     Weight: 68 kg (150 lb)  Body mass index is 21.52 kg/m².    Intake/Output Summary (Last 24 hours) at 8/18/2023 1355  Last data filed at 8/18/2023 1326  Gross per 24 hour   Intake 360 ml   Output 2501 ml   Net -2141 ml         Physical Exam  Eyes:      General:         Left eye: Discharge present.  Cardiovascular:      Rate and Rhythm: Normal rate and regular rhythm.      Pulses: Normal pulses.      Heart sounds: Normal heart sounds.   Pulmonary:      Effort: Pulmonary effort is normal. No respiratory distress.      Breath sounds: Normal breath sounds.   Abdominal:      General: Bowel sounds are normal.      Palpations: Abdomen is soft.   Neurological:      Mental Status: He is alert and oriented to person, place, and time. Mental status is at baseline.   Psychiatric:         Mood and Affect: Mood normal.             Significant Labs: All pertinent labs within the past 24 hours have been reviewed.    Significant Imaging: I have reviewed all pertinent imaging results/findings within the past 24 hours.

## 2023-08-18 NOTE — PT/OT/SLP PROGRESS
Occupational Therapy      Patient Name:  Immanuel Bernal   MRN:  [id number]    Patient not seen today secondary to Patient fatigue (just gotten up with nursing, not feeling well). Will follow-up when appropriate.    8/18/2023

## 2023-08-18 NOTE — PROGRESS NOTES
[facility] - Int[facility] ([address] Medicine  Progress Note    Patient Name: Immanuel Bernal  MRN: [id number]  Patient Class: IP- Inpatient   Admission Date: 8/9/2023  Length of Stay: 9 days  Attending Physician: Porsha Funez MD  Primary Care Provider: Dolly, Primary Doctor        Subjective:     Principal Problem:Endophthalmitis        HPI:  HPI obtained per medical record as patient unable to communicate    57-year-old male with a history of CHF, diabetes, hypertension, chronic disability, end-stage renal disease on hemodialysis, PEG placement, bowel obstruction, sleep apnea, TIA, left eye vitreous hemorrhage, stroke, and bowel obstruction with bowel resection admitted to [facility] in [place] July 18 from nursing home with left eye pain and blurred vision.  He was admitted with concern for bilateral endophthalmitis.  Other admit diagnoses included right upper extremity thrombus, end-stage renal disease on hemodialysis, hyperkalemia, sleep apnea, diabetes, and CHF.  He was treated with broad-spectrum antibiotics.  He was seen by Infectious Diseases,, and he was treated with vancomycin, ceftriaxone, and amphotericin.  Blood cultures were positive for Pseudomonas, and amphotericin/vancomycin were stopped.  IV cefepime was continued.  He was seen by Ophthalmology, and he received intravitreal vancomycin and ceftazidime (July 18).  He also had intravitreal tap July 18 that had no yeast or fungal elements observed.  Left eye endophthalmitis did not respond to treatment, and he subsequently developed abscess formation, significant proptosis, and drainage of purulent material from the orbit.  Ophthalmology recommended enucleation.  He was continued on cefepime for pseudomonal and ophthalmitis.  Recommendation was for 6 weeks of treatment to be followed by indefinite fluoroquinolone.  He was seen by Pulmonology during his stay for a right upper lung mass with peripheral nodules.  It was  felt that he would need further investigation of this once his current clinical issues stabilized.  Right upper extremity AV graft was excised during his stay with concern for infection.  A right IJ tunneled line was placed on July 27.  Left eye endophthalmitis continued to worsen, and ophthalmology spoke with patient and family about the need for enucleation.  Despite several conversations, family declined enucleation.  Plans were for transitioned to skilled nursing facility for continued treatment, but family did not want him to go to a skilled nursing facility.  He was subsequently discharged AMA from the hospital there on August 7.  Please see the August 7 Internal Medicine note for further details.     He subsequently presented to [facility] Emergency Department.  He will not go back to [facility] in [place]. He received Zosyn and vancomycin.  ED team at [facility] spoke with the patient and his power-of- (sister).  CT of the orbits noted scleral abscess along the lateral aspect of the left globe.  Patient and family are aware and in agreement that the patient needs enucleation of the eye at this time. Referring provider spoke with Oculoplastics at [facility]. Requesting transfer to [facility] Medicine for Oculoplastics specialty evaluation of persistent endophthalmitis.  ED provider noted patient is hemodynamically stable.  He does not appear volume overloaded or in respiratory distress.      The patient has a significant previous medical course as listed below  August 3: MRI brain and orbits showed worsening ophthalmitis and inflammatory changes of the left orbital tissues with slight increase in proptosis.  No evidence of intracranial inflammatory process observed.    July 25: Transesophageal echocardiogram had no evidence of endocarditis.  Moderate to severely decreased left ventricular systolic function with EF 30-35%.    July 22:  CT chest showed right upper  lobe mass with numerous bilateral nodularity predominantly in the right with associated mediastinal lymph nodes.  July 21: Blood cultures with Pseudomonas aeruginosa  July 19: MRI brain/orbits with and without contrast had findings suggestive of chronic microvascular ischemic disease of the white matter with remote ischemic event in the left insula/basal ganglia/subependymal white matter/right middle cerebellar peduncle and hemisphere.  Findings consistent with left endophthalmitis.  No abnormal findings observed in the right globe.  July 18:  Blood cultures with Pseudomonas aeruginosa and coag-negative staph      Overview/Hospital Course:  Patient was rate controlled and therefore flecainide held. His GCS is 13/15. Oculopalstics were consulted and so were the nephrology teams and IR. He underwent dialysis on his first day. Was scheduled for surgery today but his family refused to have it done until she reviewed the MRI herself first. This means that the surgeon will only be available next Wednesday in order to allow her to view and discuss the MRI. He has a peg tube in place that has not been utilzied prior. Heart failure medications have been reinstated and is being covered by vancomycin and meropenem for the endopthalmitis. Multidisciplinary session was conducted with his POA in regards to his operation, she accepted the surgery if it was emergent, otherwise to be done this upcoming Wednesday. Patient's delirium/agitation worsened so he was transferred to the ICU until his clinical situation settled. He then was stepped down to our care and ophthalmology will be conducting his eye procedure on him as planned. Underwent evisceration last night. Started the tobramycin ointment and drops. Following ID recs regarding antimicrobial regimen. Patient's sister noted us that he was diagnosed with central apnea and KENIA in the past of which he used a CPAP/BiPAP for. No concern of lack of oxygenation during his inpatient  stay. Labs are underway to detect any element of CO2 retention although he had no clinical signs of that.    Disposition: referrals to SNF have been sent. [facility] or rehab and do not want to return to their previous nursing facility.       Interval History: no acute events overnight    Review of Systems   Constitutional:  Negative for activity change.   HENT:  Positive for ear discharge (less than pre-op).    Respiratory:  Negative for choking, chest tightness, shortness of breath and wheezing.    Cardiovascular:  Negative for chest pain, palpitations and leg swelling.   Gastrointestinal:  Negative for abdominal distention and abdominal pain.   Neurological: Negative.      Objective:     Vital Signs (Most Recent):  Temp: 98.7 °F (37.1 °C) (08/18/23 0745)  Pulse: 66 (08/18/23 1326)  Resp: 18 (08/18/23 0929)  BP: 135/76 (08/18/23 1326)  SpO2: 100 % (08/18/23 0745) Vital Signs (24h Range):  Temp:  [98.2 °F (36.8 °C)-98.7 °F (37.1 °C)] 98.7 °F (37.1 °C)  Pulse:  [65-74] 66  Resp:  [14-19] 18  SpO2:  [95 %-100 %] 100 %  BP: (123-164)/(65-88) 135/76     Weight: 68 kg (150 lb)  Body mass index is 21.52 kg/m².    Intake/Output Summary (Last 24 hours) at 8/18/2023 1355  Last data filed at 8/18/2023 1326  Gross per 24 hour   Intake 360 ml   Output 2501 ml   Net -2141 ml         Physical Exam  Eyes:      General:         Left eye: Discharge present.  Cardiovascular:      Rate and Rhythm: Normal rate and regular rhythm.      Pulses: Normal pulses.      Heart sounds: Normal heart sounds.   Pulmonary:      Effort: Pulmonary effort is normal. No respiratory distress.      Breath sounds: Normal breath sounds.   Abdominal:      General: Bowel sounds are normal.      Palpations: Abdomen is soft.   Neurological:      Mental Status: He is alert and oriented to person, place, and time. Mental status is at baseline.   Psychiatric:         Mood and Affect: Mood normal.             Significant Labs: All pertinent labs  within the past 24 hours have been reviewed.    Significant Imaging: I have reviewed all pertinent imaging results/findings within the past 24 hours.      Assessment/Plan:      * Endophthalmitis  Ophthalmology team on board and were going to perform the procedure but the patient's family refused without being around to review the MRI which delayed the procedure till next wednesday    MRI repeat : Diffuse inflammatory change involving the left globe, with associated thickening of the left sclera, proptosis, and focal fluid collection within the left posterior compartment, which demonstrates diffusion restriction.  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  Diffuse left preseptal and postseptal inflammatory change, with superimposed orbital cellulitis also considered.  Evaluation of the left orbital apex is limited secondary to no contrast administration.  Decreased size of the left anterior compartment with bowing of the visualized left lens.  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    Thorough counseling undertaken with POA and IM team, palliative, opthalmo and nursing team in regards to the surgery  She agreed for her brother to undergo the procedure as an emergent case if his health deteriorates  Otherwise to be scheduled on wednesday    Underwent evisceration of his left eye yesterday, tobramycin ordered, ID recs are being followed  Still on meronema and vancomycin IV  Wbc: 8.07 (normal)  hgb post op 8.8 (at [facility])  No signs of distress or delirium  Culture of eye discharge grew yeast, started flucanazole to antibiotic regimen  Follow cultures      Plan:  Follow ID recs  Follow opthalomology recs            Redness and swelling of upper arm  Had staples in right upper arm from transfer hospital (the fistula clotted and had pseudomonas aurgenosa, and bacteremia), patient signed AMA before intervention was  yielded there    Underwent Ultrasound US, findings:  1. Nonocclusive DVT in the right subclavian and axillary veins.  Catheter in the right subclavian vein.  2. 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    Seen by urology team:  Staples cannot be removed now as the skin is now adhering fully yet  Wound appears to be having dehiscence, for wound care team- consult sent already  DVT to be anticoagulated      Plan:  Wound care consult sent, pending recs  Anticoagulation started in the form of eliquis    Hypoglycemia  Latest POCT glucose is 111  Oral diet switched to low salt diet      Plan:  Follow glucose and notifiy if drops below <70      PEG (percutaneous endoscopic gastrostomy) status  On peg tube feeds now  Tolerating well  Able to take in PO per SLP assessment on 8/15, on regular diet with thin liquids  Will reduce tube feeds gradually as patient takes in more PO    Peg tube was found to be blocked yesterday  SLP evaluated patient : can restart oral feeds    Plan:  Start oral feeds post surgery (low salt diet)    Encounter for palliative care        Anemia in ESRD (end-stage renal disease)  EPO per nephrology  hgb at baseline     Severe malnutrition  Nutrition consulted. Most recent weight and BMI monitored-     Measurements:  Wt Readings from Last 1 Encounters:   08/16/23 68 kg (150 lb)   Body mass index is 21.52 kg/m².    Patient has been screened and assessed by RD.    Malnutrition Type:  Context: chronic illness  Level: severe    Malnutrition Characteristic Summary:  Weight Loss (Malnutrition): greater than 10% in 6 months  Energy Intake (Malnutrition): other (see comments) (LAMONT)  Subcutaneous Fat (Malnutrition): severe depletion  Muscle Mass (Malnutrition): severe depletion    Interventions/Recommendations (treatment strategy):  1.     Able to take in PO per SLP assessment on 8/15, on regular diet with thin liquids  Will reduce tube feeds gradually as patient takes  in more PO    ESRD (end stage renal disease)  Nephrology consulted for HD needs while inpatient  Underwent SLED post MRI contrast (gadolinium)   Ad last dialysis was 08/16: Tolerated HD, with a net UF of 1L. NAEON.   CRT today 6.4      Plan:  Will be followed by nephro, follow up with their recommendations      Atrial fibrillation  History of AF noted however no EKG here with AF  Not on AC or rate control agents at this time    Hyperlipidemia  Home statin    Chronic diastolic heart failure  Continue to monitor for signs of volume overload; volume removal with dialysis    Hypertension  Hypertensive on arrival  bp today: 137/66  SBP range: 128-137  Home medications: imdur Carvedilol Hydralazine Nifedipine    Plan:  On hydralazine And losartan and carvedilol in patient   Resume low salt diet     Stroke  Continue home statin  Delerium precautions  PT OT    Diabetes mellitus  POCT glucose  LDSS  -180      VTE Risk Mitigation (From admission, onward)         Ordered     apixaban tablet 5 mg  2 times daily        See Hyperspace for full Linked Orders Report.    08/18/23 0912     apixaban tablet 10 mg  2 times daily        See Hyperspace for full Linked Orders Report.    08/18/23 0912     heparin (porcine) injection 1,000 Units  As needed (PRN)         08/10/23 1046     IP VTE HIGH RISK PATIENT  Once         08/09/23 1317     Place sequential compression device  Until discontinued         08/09/23 1317                Discharge Planning   TOBY: 8/21/2023     Code Status: Full Code   Is the patient medically ready for discharge?: No    Reason for patient still in hospital (select all that apply): Treatment  Discharge Plan A: Long-term acute care facility (LTAC)   Discharge Delays: (!) Procedure Scheduling (IR, OR, Labs, Echo, Cath, Echo, EEG) (eye surgery next week)              Sharon Law MD  Department of Hospital Medicine   [facility] - Intensive Care ([place])

## 2023-08-18 NOTE — CONSULTS
"[facility] - [facility] ([facility]-)  Vascular Surgery  Consult Note    Inpatient consult to Vascular Surgery  Consult performed by: Trevon Dee MD  Consult ordered by: Pat Montoya MD        Subjective:     Chief Complaint/Reason for Admission: Concern for SSI    History of Present Illness: 57yoM w/ multiple medical comorbidities including ESRD, AVF with potential infection and subsequent removal, who presented to [facility] after leaving ama from [facility] for management of endopthalmitis. Vascular surgery is consulted for the management of staples on his right arm from AVF removal at OSH. Per patient's sister, he had an uncomplicated AVF done at an OSH about 6 weeks ago. About two weeks after that he developed sxs of an "eye infection" later diagnosed at endophthalmitis. There was concern that the AVF placement may have been the source of infection, hence the fistula was removed the next week. After the removal the incision on the R UE was closed with staples. At the hospital the patient was also diagnosed with R UE "blood clots" given significant RUE swelling and tenderness. However, patient left AMA before receiving treatment d/t nursing concerns. He then came to [facility] for management of endophthalmitis which has since been treated with IV abx and opthalmologic intervention. He had a RUE US yesterday that showed multiple DVTs. Vascular surgery is consulted for potential SSI at staple site.                 Medications Prior to Admission   Medication Sig Dispense Refill Last Dose    acetaminophen (TYLENOL) 325 MG tablet Take 650 mg by mouth every 6 (six) hours as needed for Pain.       albuterol-ipratropium (DUO-NEB) 2.5 mg-0.5 mg/3 mL nebulizer solution Inhale 3 mLs into the lungs 3 (three) times daily.       amLODIPine (NORVASC) 10 MG tablet Take 10 mg by mouth once daily.       ascorbic acid, vitamin C, (VITAMIN C) 500 MG tablet Take 500 mg by mouth once daily.       aspirin 81 MG Chew Take 81 mg by mouth once " daily.       calcium carbonate-vitamin D3 (OYSTER SHELL CALCIUM-VIT D3) 500 mg-5 mcg (200 unit) PwPk Take 1 tablet by mouth 2 (two) times a day.       carvediloL (COREG) 25 MG tablet Take 1 tablet (25 mg total) by mouth 2 (two) times daily with meals. 60 tablet 6 Past Week    citalopram (CELEXA) 20 MG tablet Take 20 mg by mouth once daily.       coffee xt/phosphatidyl serine (NEURIVA ORIGINAL ORAL) Take 1 capsule by mouth once daily.       flecainide (TAMBOCOR) 50 MG Tab Take 50 mg by mouth 2 (two) times daily.       folic acid (FOLVITE) 1 MG tablet Take 1 tablet by mouth every morning.       ibuprofen (ADVIL,MOTRIN) 600 MG tablet Take 600 mg by mouth every 6 (six) hours as needed for Pain.       lactulose (CHRONULAC) 10 gram/15 mL solution Take 45 mLs by mouth daily as needed (constipation).       megestroL (MEGACE) 400 mg/10 mL (40 mg/mL) Susp Take 10 mLs by mouth 2 (two) times a day.       metoclopramide HCl (REGLAN) 5 MG tablet Take 5 mg by mouth once daily.       multivitamin (THERAGRAN) per tablet Take 1 tablet by mouth once daily.       pantoprazole (PROTONIX) 40 MG tablet Take 40 mg by mouth once daily.       pentoxifylline (TRENTAL) 400 mg TbSR Take 400 mg by mouth 2 (two) times daily.       polyethylene glycol (GLYCOLAX) 17 gram/dose powder Take 17 g by mouth 2 (two) times daily.       rosuvastatin (CRESTOR) 5 MG tablet Take 5 mg by mouth once daily.       sevelamer carbonate (RENVELA) 800 mg Tab Take 1 tablet by mouth 5 times per day       tamsulosin (FLOMAX) 0.4 mg Cap Take 1 capsule (0.4 mg total) by mouth once daily. 30 capsule 6     UNABLE TO FIND Take 1 tablet by mouth once daily. medication name: OPTIMAL ANTIOX       UNABLE TO FIND Take 1 tablet by mouth nightly. medication name: QUNOL SLEEP SUPPORT       VITAMIN B COMPLEX ORAL Take 1 tablet by mouth every morning.       zinc gluconate 50 mg tablet Take 50 mg by mouth once daily.          Review of patient's allergies indicates:   Allergen Reactions     Morphine Rash    Amiodarone analogues Itching     Other reaction(s): Unknown       Past Medical History:   Diagnosis Date    Bilateral retinal detachment 7/18/2023    BPH (benign prostatic hyperplasia)     Cataract     CHF (congestive heart failure)     CKD (chronic kidney disease) stage 3, GFR 30-59 ml/min     Diabetes mellitus, type 2     Hypertension     KENIA (obstructive sleep apnea)     Pancreatitis     Persistent proteinuria 11/12/2020    2 g proteinuria noted Resumed ACE Lower BP systolic to less than 130     PTSD (post-traumatic stress disorder)     Stroke      Past Surgical History:   Procedure Laterality Date    CATARACT EXTRACTION Bilateral     EVISCERATION OF OCULAR CONTENTS Left 8/16/2023    Procedure: EVISCERATION, OCULAR CONTENTS;  Surgeon: Vicki Stewart MD;  Location: [facility] OR [address] [place] [address];  Service: Ophthalmology;  Laterality: Left;    RETROBULBAR INJECTION OF MEDICATION Left 8/16/2023    Procedure: INJECTION, MEDICATION, RETROBULBAR;  Surgeon: Vicki Stewart MD;  Location: [facility] OR [address] [place] [address];  Service: Ophthalmology;  Laterality: Left;    TARSORRHAPHY Left 8/16/2023    Procedure: BLEPHARORRHAPHY;  Surgeon: Vicki Stewart MD;  Location: [facility] OR [address] [place] [address];  Service: Ophthalmology;  Laterality: Left;     Family History       Problem Relation (Age of Onset)    Diabetes Father, Sister    Heart disease Father    Hyperlipidemia Brother    Kidney disease Father    Stroke Mother          Tobacco Use    Smoking status: Former     Current packs/day: 0.00     Types: Cigarettes, Cigars    Smokeless tobacco: Never   Substance and Sexual Activity    Alcohol use: Not Currently    Drug use: Not Currently    Sexual activity: Not on file     Review of Systems   Constitutional:  Negative for chills and fever.   Eyes:  Positive for pain and discharge.   Respiratory:  Negative for chest tightness and shortness of breath.    Cardiovascular:  Negative for chest pain.   Gastrointestinal:  Negative for nausea and vomiting.      Objective:     Vital Signs (Most Recent):  Temp: 98.3 °F (36.8 °C) (08/18/23 0443)  Pulse: 70 (08/18/23 0443)  Resp: 17 (08/18/23 0443)  BP: (!) 147/81 (08/18/23 0443)  SpO2: 95 % (08/18/23 0443) Vital Signs (24h Range):  Temp:  [98.2 °F (36.8 °C)-98.9 °F (37.2 °C)] 98.3 °F (36.8 °C)  Pulse:  [66-75] 70  Resp:  [16-19] 17  SpO2:  [95 %-100 %] 95 %  BP: (125-151)/(64-81) 147/81     Weight: 68 kg (150 lb)  Body mass index is 21.52 kg/m².      Physical Exam  Constitutional:       Appearance: Normal appearance.   HENT:      Head: Normocephalic and atraumatic.   Eyes:      Comments: L eye s/p surgical intervention   Cardiovascular:      Rate and Rhythm: Normal rate and regular rhythm.      Pulses: Normal pulses.      Comments: Palpable 2+ R radial and ulnar pulse   Pulmonary:      Effort: No respiratory distress.   Musculoskeletal:         General: Swelling present.      Cervical back: Neck supple.      Comments: Significant swelling/pitting edema of RUE   Skin:     General: Skin is warm.      Comments: Incisions on RUE closed with staples. Incisions not tender, erythematous, or with any discharge or purulence. Proximal medial incision appears to have mild dehiscence with granulation tissue           Significant Labs:  All pertinent labs from the last 24 hours have been reviewed.    Significant Diagnostics:  I have reviewed all pertinent imaging results/findings within the past 24 hours.    Assessment/Plan:     Redness and swelling of upper arm  57yoM with incisions recently closed with staples on RUE. Given findings on physical exam - lack of significant incisional tenderness, lack of erythema, no fluid of purulence noted at incisions, it is unlikely that patient has a SSI.     - Given mild dehiscence and granulation tissue, recommend keeping staples in place for now   - Recommend consulting wound care for granulation tissue at incision site   - Recommend treating RUE DVT, resolution of DVT may help with arm  swelling   - Continue dialysis through RIJ catheter         Thank you for your consult. Vascular will sign off for now, but please do not hesitate to reach out with any questions.     Trevon Dee MD  Vascular Surgery  [facility] ([place]-)

## 2023-08-18 NOTE — PROGRESS NOTES
Progress Note  Ophthalmology Service    Date: 08/18/2023    Presenting HPI:  Immanuel Bernal is a 57-year-old male with a history of CHF, diabetes, hypertension, chronic disability, end-stage renal disease on hemodialysis, PEG placement, bowel obstruction, sleep apnea, TIA, left eye vitreous hemorrhage, stroke, and bowel obstruction with bowel resection admitted to [facility] in [place] July 18 from nursing home with left eye pain and blurred vision.  He was admitted with concern for bilateral endophthalmitis.  Other admit diagnoses included right upper extremity thrombus, end-stage renal disease on hemodialysis, hyperkalemia, sleep apnea, diabetes, and CHF.  He was treated with broad-spectrum antibiotics.  He was seen by Infectious Diseases, and he was treated with vancomycin, ceftriaxone, and amphotericin.  Blood cultures were positive for Pseudomonas, and amphotericin/vancomycin were stopped.  IV cefepime was continued.  He was seen by Ophthalmology, and he received intravitreal vancomycin and ceftazidime (July 18).  He also had intravitreal tap July 18 that had no yeast or fungal elements observed.  Left eye endophthalmitis did not respond to treatment, and he subsequently developed abscess formation, significant proptosis, and drainage of purulent material from the orbit.  Ophthalmology recommended enucleation.  He was continued on cefepime for pseudomonal and ophthalmitis.  Recommendation was for 6 weeks of treatment to be followed by indefinite fluoroquinolone.  He was seen by Pulmonology during his stay for a right upper lung mass with peripheral nodules.  It was felt that he would need further investigation of this once his current clinical issues stabilized.  Right upper extremity AV graft was excised during his stay with concern for infection.  A right IJ tunneled line was placed on July 27.  Left eye endophthalmitis continued to worsen, and ophthalmology spoke with patient and family  about the need for enucleation.  Despite several conversations, family declined enucleation.  Plans were for transitioned to skilled nursing facility for continued treatment, but family did not want him to go to a skilled nursing facility.  He was subsequently discharged AMA from the hospital there on August 7.  He subsequently presented to [facility].  He will not go back to [facility] in [place]. He received Zosyn and vancomycin.  ED team at [facility] spoke with the patient and his power-of- (sister).  CT of the orbits noted scleral abscess along the lateral aspect of the left globe.  Patient and family are aware and in agreement that the patient needs enucleation of the eye at this time. Referring provider spoke with Oculoplastics at [facility]. Requesting transfer to Hospital Medicine for Oculoplastics specialty evaluation of persistent endophthalmitis.  ED provider noted patient is hemodynamically stable.  He does not appear volume overloaded or in respiratory distress. Ophthalmology is being consulted to evaluate for bilateral endophthalmitis requiring enucleation OS.      Interval History:   POD 2 s/p evisceration of the left eye. Doing well. Repacked scleral shell with gauze today.     Ocular examination/Dilated fundus examination:  Slit Lamp and Fundus Exam       External Exam         Right Left    External  Normal              Slit Lamp Exam         Right Left    Lids/Lashes  Ptosis, edematous eyelid.    Conjunctiva/Sclera  S/p evisceration. Open anteriorly. Globe tightly packed with gauze. No purulent drainage noted. Erythematous conj overlying scleral shell.    Cornea  S/p evisceration    Anterior Chamber  S/p evisceration    Iris  S/p evisceration    Lens  S/p evisceration    Anterior Vitreous  S/p evisceration                      Assessment/Plan:     # S/p Evisceration (DOS: 8/16/23)  # Endophthalmitis, OU  - patient with complex  history noted above in HPI. Diagnosed with endophthalmitis and scleral abscess at [facility] with NLP vision. Initially refused enucleation/evisceration and left [facility] AMA -- transferred to [facility] for enucleation/evisceration 3 days later. Patient now s/p evisceration without placement of implant  - remains afebrile and WBC WNL    Recommendations  - Continue tobradex drops 4 times daily in the left eye  - Continue tobradex ointment 3 times daily in the left eye, including at night  - Tape left eye closed at night following ointment administration  - Pain medication as needed  - Continue IV antibiotics per infectious disease. Intraoperative cultures from OS evisceration pending final results.  Gram stain with rare yeast, patient is now on fluconazole as well as meropenem and vancomycin  - Packing in place within the scleral shell. Replaced packing today 8/18/23 and removed temporary sutures medially and laterally. Packing needs to be replaced on 8/21/23.   - Upon discharge, patient will require q2-3 day follow up with oculoplastics (Dr. Stewart) to replace scleral shell packing x 14 days following surgery  - from Retina standpoint, patient will need regular follow with retina specialist after discharge. Likely can follow up with [facility] given need to follow with [facility].     If you have any further questions, please contact the ophthalmology resident on call.     Deanna Celestin MD   [facility] Ophthalmology PGY-3  08/18/2023  4:05 PM

## 2023-08-18 NOTE — PROGRESS NOTES
[facility] STAFF HEMODIALYSIS NOTE     Patient currently on hemodialysis for removal of uremic toxins and volume.     Patient seen and evaluated on hemodialysis, tolerating treatment, see HD flowsheet for vitals and assessments.    Labs have been reviewed and the dialysate bath has been adjusted.       Assessment/Plan:    -Elevated arterial pressures, lines reversed .   -Cathflo prior to next tx   -RFP daily   -Patient seen on HD, tolerating treatment well, w/o complaints   -UF goal of 2L  -Renal diet, if not NPO   -Strict I/O's and daily weights  -Daily renal function panels  -Keep MAP >65 while on HD   -Hgb goal 10-11  -continue epo with HD   -continue sevelamer   -check PTH with next lab  -Will continue to follow while inpatient     Kylie Okeefe DNP-FNP, C  Nephrology  Pager: [phone]

## 2023-08-18 NOTE — ASSESSMENT & PLAN NOTE
Had staples in right upper arm from transfer hospital (the fistula clotted and had pseudomonas aurgenosa, and bacteremia), patient signed AMA before intervention was yielded there    Underwent Ultrasound US, findings:  1. Nonocclusive DVT in the right subclavian and axillary veins.  Catheter in the right subclavian vein.  2. 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    Seen by urology team:  Staples cannot be removed now as the skin is now adhering fully yet  Wound appears to be having dehiscence, for wound care team- consult sent already  DVT to be anticoagulated      Plan:  Wound care consult sent, pending recs  Anticoagulation started in the form of eliquis

## 2023-08-18 NOTE — PT/OT/SLP PROGRESS
Physical Therapy      Patient Name:  Immanuel Bernal   MRN:  [id number]    Patient not seen today secondary to  (Pt politely declining stating he walked and did ther-ex x2 earlier today and requesting PT return tomorrow.). Will follow-up as appropriate.

## 2023-08-18 NOTE — PT/OT/SLP PROGRESS
Physical Therapy Treatment    Patient Name:  Immanuel Bernal   MRN:  [id number]    Recommendations:     Discharge Recommendations: rehabilitation facility  Discharge Equipment Recommendations: none  Barriers to discharge:  increased level of assistance    Assessment:     Immanuel Bernal is a 57 y.o. male admitted with a medical diagnosis of Endophthalmitis.  He presents with the following impairments/functional limitations: weakness, impaired endurance, impaired self care skills, impaired functional mobility, gait instability, impaired balance, visual deficits, decreased upper extremity function, decreased lower extremity function, pain. Pt is progressing well. Pt performed bed mobility with Ashlee this date and performed STS with Ashlee with RW x2 trials. Pt took 4 steps right with Ashlee with assistance with RW. Pt would benefit from intensive inpatient rehabilitation for: Dynamic/static standing/sitting balance through skilled balance training, strengthening with the use of skilled therapeutic exercises interventions, mobility and safety training to ensure safe discharge home through skilled patient and caregiver education home management training, and mobility through adaptive equipment training. Pt highly motivated to return to independent PLOF and can tolerate 3+hours of therapy. Pt continues to benefit from a collaborative multidisciplinary program to improve quality of life and focus on recovery of impairments.      Rehab Prognosis: Good; patient would benefit from acute skilled PT services to address these deficits and reach maximum level of function.    Recent Surgery: Procedure(s) (LRB):  BLEPHARORRHAPHY (Left)  INJECTION, MEDICATION, RETROBULBAR (Left)  EVISCERATION, OCULAR CONTENTS (Left) 2 Days Post-Op    Plan:     During this hospitalization, patient to be seen 3 x/week to address the identified rehab impairments via gait training, therapeutic activities, therapeutic exercises, neuromuscular re-education  and progress toward the following goals:    Plan of Care Expires:  09/14/23    Subjective     Chief Complaint: L eye pain  Patient/Family Comments/goals: improve functional mobility  Pain/Comfort:   Beginning of Session: 7/10, L eye  Intervention: RN notified  End of session: 7/10 L eye      Objective:     Communicated with RN prior to session.  Patient found HOB elevated with telemetry, peripheral IV, PEG Tube upon PT entry to room.     General Precautions: Standard, fall, vision impaired  Orthopedic Precautions: N/A  Braces: N/A  Respiratory Status: Room air     Functional Mobility:  Bed Mobility:     Scooting to EOB for feet flat on the floor: stand by assistance  Supine to Sit: minimum assistance  Sit to Supine: minimum assistance  Transfers:     Sit to Stand:  minimum assistance with rolling walker, x2 trials  Gait: 4 steps R along EOB, Ashlee, RW; decreased gait speed and step length, decreased BLE clearance, assistance with RW management  Balance:   Static Sitting: SBA  Dynamic Sitting: SBA  Static Standing: CGA-Ashlee with RW  Dynamic Standing: Ashlee with RW      AM-PAC 6 CLICK MOBILITY   14       Treatment & Education:  Patient educated on role of therapy, goals of session, and benefits of mobilizing.   Discussed PT plan of care during hospitalization.   Patient educated on calling for assistance.   Patient educated on how their diagnosis impacts their mobility within PT scope of practice.   All questions answered within PT scope of practice.  Co-treatment performed due to patient's multiple deficits and anticipating needing two skilled therapists to appropriately and safely address patient's strength and endurance while facilitating functional tasks.    Patient left HOB elevated with all lines intact, call button in reach, RN notified, and pt's sister present.    GOALS:   Multidisciplinary Problems       Physical Therapy Goals          Problem: Physical Therapy    Goal Priority Disciplines Outcome Goal  Variances Interventions   Physical Therapy Goal     PT, PT/OT Ongoing, Progressing     Description: Goals to be met by: 2023    Patient will increase functional independence with mobility by performin. Supine to sit with MInimal Assistance  2. Sit to stand transfer with Minimal Assistance using LRAD  3. Gait  x 10 feet with Minimal Assistance using LRAD.                          Time Tracking:     PT Received On: 23  PT Start Time: 1356     PT Stop Time: 1419  PT Total Time (min): 23 min     Billable Minutes: Gait Training 15 and Therapeutic Activity 8    Treatment Type: Treatment  PT/PTA: PT     Number of PTA visits since last PT visit: 0     2023

## 2023-08-18 NOTE — PROGRESS NOTES
08/18/23 1326   Post-Hemodialysis Assessment   Blood Volume Processed (Liters) 45.2 L   Dialyzer Clearance Lightly streaked   Duration of Treatment 180 minutes   Total UF (mL) 2500 mL   Net Fluid Removal 2000     HD tx completed, blood returned and PC heparin locked; PC dressing changed. Pt in NAD/VS waiting on transport back to bed. Report called to primary RN.    1350 Back to unit via bed with transport

## 2023-08-18 NOTE — PT/OT/SLP PROGRESS
Physical Therapy      Patient Name:  Immanuel Bernal   MRN:  [id number]    Patient not seen today secondary to  (VÍTCOR in HD x2 attempts). Will follow-up as appropriate.

## 2023-08-18 NOTE — PLAN OF CARE
Pt slept well this shift. A&Ox 4. VSS. Pain controlled with PRN's. Nausea at HS controlled with PRN's. HD cath to R chest. Drsg C/D/I. T&RQ2. Skin care completed. Safety precautions in place. Call light in reach. No further concerns noted at this time.    Problem: Adult Inpatient Plan of Care  Goal: Plan of Care Review  Outcome: Ongoing, Progressing  Goal: Patient-Specific Goal (Individualized)  Outcome: Ongoing, Progressing  Goal: Absence of Hospital-Acquired Illness or Injury  Outcome: Ongoing, Progressing  Goal: Optimal Comfort and Wellbeing  Outcome: Ongoing, Progressing  Goal: Readiness for Transition of Care  Outcome: Ongoing, Progressing     Problem: Diabetes Comorbidity  Goal: Blood Glucose Level Within Targeted Range  Outcome: Ongoing, Progressing     Problem: Skin Injury Risk Increased  Goal: Skin Health and Integrity  Outcome: Ongoing, Progressing     Problem: Device-Related Complication Risk (Hemodialysis)  Goal: Safe, Effective Therapy Delivery  Outcome: Ongoing, Progressing     Problem: Hemodynamic Instability (Hemodialysis)  Goal: Effective Tissue Perfusion  Outcome: Ongoing, Progressing     Problem: Infection (Hemodialysis)  Goal: Absence of Infection Signs and Symptoms  Outcome: Ongoing, Progressing     Problem: Infection  Goal: Absence of Infection Signs and Symptoms  Outcome: Ongoing, Progressing     Problem: Coping Ineffective  Goal: Effective Coping  Outcome: Ongoing, Progressing     Problem: Device-Related Complication Risk (CRRT (Continuous Renal Replacement Therapy))  Goal: Safe, Effective Therapy Delivery  Outcome: Ongoing, Progressing     Problem: Hypothermia (CRRT (Continuous Renal Replacement Therapy))  Goal: Body Temperature Maintained in Desired Range  Outcome: Ongoing, Progressing     Problem: Infection (CRRT (Continuous Renal Replacement Therapy))  Goal: Absence of Infection Signs and Symptoms  Outcome: Ongoing, Progressing     Problem: Impaired Wound Healing  Goal: Optimal Wound  Healing  Outcome: Ongoing, Progressing     Problem: Fall Injury Risk  Goal: Absence of Fall and Fall-Related Injury  Outcome: Ongoing, Progressing

## 2023-08-18 NOTE — ASSESSMENT & PLAN NOTE
Latest POCT glucose is 111  Oral diet switched to low salt diet      Plan:  Follow glucose and notifiy if drops below <70

## 2023-08-19 LAB
ALBUMIN SERPL BCP-MCNC: 2 G/DL (ref 3.5–5.2)
ALBUMIN SERPL BCP-MCNC: 2.1 G/DL (ref 3.5–5.2)
ALP SERPL-CCNC: 103 U/L (ref 55–135)
ALT SERPL W/O P-5'-P-CCNC: <5 U/L (ref 10–44)
ANION GAP SERPL CALC-SCNC: 11 MMOL/L (ref 8–16)
ANION GAP SERPL CALC-SCNC: 9 MMOL/L (ref 8–16)
AST SERPL-CCNC: 14 U/L (ref 10–40)
BACTERIA SPEC AEROBE CULT: NO GROWTH
BILIRUB SERPL-MCNC: 0.3 MG/DL (ref 0.1–1)
BUN SERPL-MCNC: 20 MG/DL (ref 6–20)
BUN SERPL-MCNC: 20 MG/DL (ref 6–20)
BUN SERPL-MCNC: 22 MG/DL (ref 6–20)
BUN SERPL-MCNC: 25 MG/DL (ref 6–20)
CALCIUM SERPL-MCNC: 8.7 MG/DL (ref 8.7–10.5)
CALCIUM SERPL-MCNC: 8.9 MG/DL (ref 8.7–10.5)
CALCIUM SERPL-MCNC: 9 MG/DL (ref 8.7–10.5)
CALCIUM SERPL-MCNC: 9.1 MG/DL (ref 8.7–10.5)
CHLORIDE SERPL-SCNC: 102 MMOL/L (ref 95–110)
CO2 SERPL-SCNC: 22 MMOL/L (ref 23–29)
CO2 SERPL-SCNC: 25 MMOL/L (ref 23–29)
CO2 SERPL-SCNC: 26 MMOL/L (ref 23–29)
CO2 SERPL-SCNC: 26 MMOL/L (ref 23–29)
CREAT SERPL-MCNC: 5.5 MG/DL (ref 0.5–1.4)
CREAT SERPL-MCNC: 5.6 MG/DL (ref 0.5–1.4)
CREAT SERPL-MCNC: 6 MG/DL (ref 0.5–1.4)
CREAT SERPL-MCNC: 6.3 MG/DL (ref 0.5–1.4)
ERYTHROCYTE [DISTWIDTH] IN BLOOD BY AUTOMATED COUNT: 17.5 % (ref 11.5–14.5)
EST. GFR  (NO RACE VARIABLE): 10.1 ML/MIN/1.73 M^2
EST. GFR  (NO RACE VARIABLE): 11 ML/MIN/1.73 M^2
EST. GFR  (NO RACE VARIABLE): 11.2 ML/MIN/1.73 M^2
EST. GFR  (NO RACE VARIABLE): 9.5 ML/MIN/1.73 M^2
FOLATE SERPL-MCNC: 9.1 NG/ML (ref 4–24)
GLUCOSE SERPL-MCNC: 71 MG/DL (ref 70–110)
GLUCOSE SERPL-MCNC: 72 MG/DL (ref 70–110)
GLUCOSE SERPL-MCNC: 80 MG/DL (ref 70–110)
GLUCOSE SERPL-MCNC: 86 MG/DL (ref 70–110)
HCT VFR BLD AUTO: 32.5 % (ref 40–54)
HGB BLD-MCNC: 8.8 G/DL (ref 14–18)
LIPASE SERPL-CCNC: 5 U/L (ref 4–60)
MAGNESIUM SERPL-MCNC: 2.5 MG/DL (ref 1.6–2.6)
MCH RBC QN AUTO: 30.6 PG (ref 27–31)
MCHC RBC AUTO-ENTMCNC: 27.1 G/DL (ref 32–36)
MCV RBC AUTO: 113 FL (ref 82–98)
PHOSPHATE SERPL-MCNC: 4.6 MG/DL (ref 2.7–4.5)
PHOSPHATE SERPL-MCNC: 5.2 MG/DL (ref 2.7–4.5)
PHOSPHATE SERPL-MCNC: 5.6 MG/DL (ref 2.7–4.5)
PLATELET # BLD AUTO: 193 K/UL (ref 150–450)
PMV BLD AUTO: 9.7 FL (ref 9.2–12.9)
POCT GLUCOSE: 68 MG/DL (ref 70–110)
POCT GLUCOSE: 72 MG/DL (ref 70–110)
POCT GLUCOSE: 79 MG/DL (ref 70–110)
POCT GLUCOSE: 88 MG/DL (ref 70–110)
POCT GLUCOSE: 94 MG/DL (ref 70–110)
POTASSIUM SERPL-SCNC: 4.3 MMOL/L (ref 3.5–5.1)
POTASSIUM SERPL-SCNC: 4.3 MMOL/L (ref 3.5–5.1)
POTASSIUM SERPL-SCNC: 4.7 MMOL/L (ref 3.5–5.1)
POTASSIUM SERPL-SCNC: 5.2 MMOL/L (ref 3.5–5.1)
PROT SERPL-MCNC: 6 G/DL (ref 6–8.4)
RBC # BLD AUTO: 2.88 M/UL (ref 4.6–6.2)
SODIUM SERPL-SCNC: 135 MMOL/L (ref 136–145)
SODIUM SERPL-SCNC: 136 MMOL/L (ref 136–145)
SODIUM SERPL-SCNC: 137 MMOL/L (ref 136–145)
SODIUM SERPL-SCNC: 137 MMOL/L (ref 136–145)
VANCOMYCIN SERPL-MCNC: 20.2 UG/ML
VIT B12 SERPL-MCNC: 827 PG/ML (ref 210–950)
WBC # BLD AUTO: 8.01 K/UL (ref 3.9–12.7)

## 2023-08-19 PROCEDURE — 80053 COMPREHEN METABOLIC PANEL: CPT

## 2023-08-19 PROCEDURE — 25000003 PHARM REV CODE 250

## 2023-08-19 PROCEDURE — 80069 RENAL FUNCTION PANEL: CPT | Mod: 91 | Performed by: STUDENT IN AN ORGANIZED HEALTH CARE EDUCATION/TRAINING PROGRAM

## 2023-08-19 PROCEDURE — 25000003 PHARM REV CODE 250: Performed by: STUDENT IN AN ORGANIZED HEALTH CARE EDUCATION/TRAINING PROGRAM

## 2023-08-19 PROCEDURE — 85027 COMPLETE CBC AUTOMATED: CPT

## 2023-08-19 PROCEDURE — 25000003 PHARM REV CODE 250: Performed by: INTERNAL MEDICINE

## 2023-08-19 PROCEDURE — 83690 ASSAY OF LIPASE: CPT | Performed by: STUDENT IN AN ORGANIZED HEALTH CARE EDUCATION/TRAINING PROGRAM

## 2023-08-19 PROCEDURE — 82746 ASSAY OF FOLIC ACID SERUM: CPT

## 2023-08-19 PROCEDURE — 99233 PR SUBSEQUENT HOSPITAL CARE,LEVL III: ICD-10-PCS | Mod: ,,, | Performed by: HOSPITALIST

## 2023-08-19 PROCEDURE — 82607 VITAMIN B-12: CPT

## 2023-08-19 PROCEDURE — 63600175 PHARM REV CODE 636 W HCPCS

## 2023-08-19 PROCEDURE — 63600175 PHARM REV CODE 636 W HCPCS: Performed by: INTERNAL MEDICINE

## 2023-08-19 PROCEDURE — 97530 THERAPEUTIC ACTIVITIES: CPT

## 2023-08-19 PROCEDURE — 11000001 HC ACUTE MED/SURG PRIVATE ROOM

## 2023-08-19 PROCEDURE — 97112 NEUROMUSCULAR REEDUCATION: CPT

## 2023-08-19 PROCEDURE — 21400001 HC TELEMETRY ROOM

## 2023-08-19 PROCEDURE — 99233 PR SUBSEQUENT HOSPITAL CARE,LEVL III: ICD-10-PCS | Mod: ,,, | Performed by: INTERNAL MEDICINE

## 2023-08-19 PROCEDURE — 80202 ASSAY OF VANCOMYCIN: CPT | Performed by: HOSPITALIST

## 2023-08-19 PROCEDURE — 99233 SBSQ HOSP IP/OBS HIGH 50: CPT | Mod: ,,, | Performed by: HOSPITALIST

## 2023-08-19 PROCEDURE — 36415 COLL VENOUS BLD VENIPUNCTURE: CPT | Performed by: STUDENT IN AN ORGANIZED HEALTH CARE EDUCATION/TRAINING PROGRAM

## 2023-08-19 PROCEDURE — 99233 SBSQ HOSP IP/OBS HIGH 50: CPT | Mod: ,,, | Performed by: INTERNAL MEDICINE

## 2023-08-19 PROCEDURE — 83735 ASSAY OF MAGNESIUM: CPT | Mod: 91 | Performed by: STUDENT IN AN ORGANIZED HEALTH CARE EDUCATION/TRAINING PROGRAM

## 2023-08-19 RX ORDER — SODIUM CHLORIDE 9 MG/ML
INJECTION, SOLUTION INTRAVENOUS ONCE
Status: COMPLETED | OUTPATIENT
Start: 2023-08-21 | End: 2023-08-21

## 2023-08-19 RX ORDER — AMOXICILLIN 250 MG
1 CAPSULE ORAL 2 TIMES DAILY
Status: DISCONTINUED | OUTPATIENT
Start: 2023-08-19 | End: 2023-08-25

## 2023-08-19 RX ORDER — ONDANSETRON 2 MG/ML
8 INJECTION INTRAMUSCULAR; INTRAVENOUS
Status: DISCONTINUED | OUTPATIENT
Start: 2023-08-19 | End: 2023-08-21

## 2023-08-19 RX ORDER — ONDANSETRON 8 MG/1
8 TABLET, ORALLY DISINTEGRATING ORAL
Status: DISCONTINUED | OUTPATIENT
Start: 2023-08-19 | End: 2023-08-19

## 2023-08-19 RX ORDER — LOSARTAN POTASSIUM 50 MG/1
100 TABLET ORAL DAILY
Status: DISCONTINUED | OUTPATIENT
Start: 2023-08-19 | End: 2023-08-25

## 2023-08-19 RX ORDER — POLYETHYLENE GLYCOL 3350 17 G/17G
17 POWDER, FOR SOLUTION ORAL 2 TIMES DAILY
Status: DISCONTINUED | OUTPATIENT
Start: 2023-08-19 | End: 2023-08-25

## 2023-08-19 RX ADMIN — CALCITRIOL CAPSULES 0.25 MCG 0.25 MCG: 0.25 CAPSULE ORAL at 08:08

## 2023-08-19 RX ADMIN — LOSARTAN POTASSIUM 100 MG: 50 TABLET, FILM COATED ORAL at 08:08

## 2023-08-19 RX ADMIN — SEVELAMER CARBONATE 0.8 G: 0.8 POWDER, FOR SUSPENSION ORAL at 06:08

## 2023-08-19 RX ADMIN — ACETAMINOPHEN 1000 MG: 500 TABLET ORAL at 08:08

## 2023-08-19 RX ADMIN — TOBRAMYCIN AND DEXAMETHASONE 1 DROP: 3; 1 SUSPENSION/ DROPS OPHTHALMIC at 04:08

## 2023-08-19 RX ADMIN — MEROPENEM 500 MG: 500 INJECTION INTRAVENOUS at 01:08

## 2023-08-19 RX ADMIN — TOBRAMYCIN AND DEXAMETHASONE 1 DROP: 3; 1 SUSPENSION/ DROPS OPHTHALMIC at 01:08

## 2023-08-19 RX ADMIN — HYDRALAZINE HYDROCHLORIDE 10 MG: 10 TABLET, FILM COATED ORAL at 08:08

## 2023-08-19 RX ADMIN — ATORVASTATIN CALCIUM 40 MG: 40 TABLET, FILM COATED ORAL at 08:08

## 2023-08-19 RX ADMIN — TOBRAMYCIN AND DEXAMETHASONE: 3; 1 OINTMENT OPHTHALMIC at 08:08

## 2023-08-19 RX ADMIN — FLUCONAZOLE 400 MG: 200 TABLET ORAL at 08:08

## 2023-08-19 RX ADMIN — ONDANSETRON 8 MG: 2 INJECTION INTRAMUSCULAR; INTRAVENOUS at 04:08

## 2023-08-19 RX ADMIN — PROMETHAZINE HYDROCHLORIDE 12.5 MG: 25 INJECTION, SOLUTION INTRAMUSCULAR; INTRAVENOUS at 06:08

## 2023-08-19 RX ADMIN — ONDANSETRON 8 MG: 2 INJECTION INTRAMUSCULAR; INTRAVENOUS at 11:08

## 2023-08-19 RX ADMIN — CARVEDILOL 12.5 MG: 12.5 TABLET, FILM COATED ORAL at 08:08

## 2023-08-19 RX ADMIN — TOBRAMYCIN AND DEXAMETHASONE: 3; 1 OINTMENT OPHTHALMIC at 03:08

## 2023-08-19 RX ADMIN — HYDRALAZINE HYDROCHLORIDE 10 MG: 10 TABLET, FILM COATED ORAL at 03:08

## 2023-08-19 RX ADMIN — TOBRAMYCIN AND DEXAMETHASONE 1 DROP: 3; 1 SUSPENSION/ DROPS OPHTHALMIC at 08:08

## 2023-08-19 RX ADMIN — PROMETHAZINE HYDROCHLORIDE 12.5 MG: 25 INJECTION, SOLUTION INTRAMUSCULAR; INTRAVENOUS at 11:08

## 2023-08-19 RX ADMIN — SEVELAMER CARBONATE 0.8 G: 0.8 POWDER, FOR SUSPENSION ORAL at 08:08

## 2023-08-19 RX ADMIN — APIXABAN 10 MG: 5 TABLET, FILM COATED ORAL at 08:08

## 2023-08-19 RX ADMIN — Medication 16 G: at 05:08

## 2023-08-19 NOTE — ASSESSMENT & PLAN NOTE
Ophthalmology team on board and were going to perform the procedure but the patient's family refused without being around to review the MRI which delayed the procedure till next wednesday    MRI repeat : Diffuse inflammatory change involving the left globe, with associated thickening of the left sclera, proptosis, and focal fluid collection within the left posterior compartment, which demonstrates diffusion restriction.  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  Diffuse left preseptal and postseptal inflammatory change, with superimposed orbital cellulitis also considered.  Evaluation of the left orbital apex is limited secondary to no contrast administration.  Decreased size of the left anterior compartment with bowing of the visualized left lens.  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    Thorough counseling undertaken with POA and IM team, palliative, opthalmo and nursing team in regards to the surgery  She agreed for her brother to undergo the procedure as an emergent case if his health deteriorates  Otherwise to be scheduled on wednesday    Underwent evisceration of his left eye yesterday, tobramycin ordered, ID recs are being followed  Still on meronema and vancomycin IV  Wbc: 8.07 (normal)  hgb post op 8.8 (at [facility])  No signs of distress or delirium  Culture of eye discharge grew yeast, started flucanazole to antibiotic regimen  Follow cultures      Plan:  Follow ID recs  - Continue fluconazole, vanc and meropenem  - Pending final ID recs for discharge  Follow opthalomology recs  - Continue tobramycin eye drops and ointments  - Will see Dr Stewart frequently in the clinic for dressing changes

## 2023-08-19 NOTE — SUBJECTIVE & OBJECTIVE
Interval History: NAEON. Patient had a low BG again this morning and overall has a very poor appetite.  Per family request, diet was changed to bland, and zofran will be given prior to meals.   Patient's PEG tube remains clogged currently. Will try to unclog, then consult GI.    Review of Systems   Constitutional: Negative.    Eyes:  Positive for redness.   Respiratory: Negative.     Cardiovascular: Negative.    Gastrointestinal:  Positive for nausea and vomiting.        Poor appetite   Genitourinary: Negative.    Musculoskeletal: Negative.    Neurological:  Positive for headaches.   All other systems reviewed and are negative.    Objective:     Vital Signs (Most Recent):  Temp: 99.2 °F (37.3 °C) (08/19/23 0825)  Pulse: 60 (08/19/23 1047)  Resp: 20 (08/19/23 0825)  BP: (!) 150/80 (08/19/23 0825)  SpO2: 100 % (08/19/23 0825) Vital Signs (24h Range):  Temp:  [98.1 °F (36.7 °C)-99.2 °F (37.3 °C)] 99.2 °F (37.3 °C)  Pulse:  [60-68] 60  Resp:  [17-20] 20  SpO2:  [99 %-100 %] 100 %  BP: (130-173)/(72-84) 150/80     Weight: 68 kg (150 lb)  Body mass index is 21.52 kg/m².    Intake/Output Summary (Last 24 hours) at 8/19/2023 1203  Last data filed at 8/18/2023 1854  Gross per 24 hour   Intake 330.03 ml   Output 2500 ml   Net -2169.97 ml         Physical Exam  Constitutional:       Appearance: He is ill-appearing.   Eyes:      Comments: L eye s/p enucleation  R eye stable   Cardiovascular:      Rate and Rhythm: Normal rate and regular rhythm.      Pulses: Normal pulses.      Heart sounds: Normal heart sounds.   Pulmonary:      Effort: Pulmonary effort is normal.      Breath sounds: Normal breath sounds.   Abdominal:      General: There is no distension.      Palpations: Abdomen is soft.      Tenderness: There is no abdominal tenderness.      Comments: PEG tube in place   Musculoskeletal:      Right lower leg: No edema.      Left lower leg: No edema.   Skin:     General: Skin is warm and dry.      Capillary Refill: Capillary  refill takes less than 2 seconds.   Neurological:      Mental Status: He is alert and oriented to person, place, and time.             Significant Labs: All pertinent labs within the past 24 hours have been reviewed.    Significant Imaging: I have reviewed all pertinent imaging results/findings within the past 24 hours.

## 2023-08-19 NOTE — PT/OT/SLP PROGRESS
"Occupational Therapy   Treatment    Name: Immanuel Bernal  MRN: [id number]  Admitting Diagnosis:  Endophthalmitis  3 Days Post-Op    Recommendations:     Discharge Recommendations: rehabilitation facility  Discharge Equipment Recommendations:  none  Barriers to discharge:  Other (Comment) (decreased functional abilities as a result of medical status)    Assessment:     Immanuel Bernal is a 57 y.o. male with a medical diagnosis of Endophthalmitis.  Pt tolerated OT session well with good participation and motivation. Pt is progressing well overall but does remain limited. Pt would continue to benefit from skilled OT services to maximize functional (I) & facilitate safe discharge. Performance deficits affecting function are weakness, impaired endurance, impaired sensation, impaired self care skills, impaired functional mobility, gait instability, impaired balance, visual deficits, decreased coordination, decreased upper extremity function, decreased lower extremity function, decreased safety awareness, decreased ROM, impaired coordination, impaired cardiopulmonary response to activity.     Rehab Prognosis:  Good; patient would benefit from acute skilled OT services to address these deficits and reach maximum level of function.       Plan:     Patient to be seen 4 x/week to address the above listed problems via self-care/home management, therapeutic activities, therapeutic exercises, neuromuscular re-education  Plan of Care Expires: 08/25/23  Plan of Care Reviewed with: patient, sibling    Subjective     Chief Complaint: "The nausea is better now, I'll get up"  Patient/Family Comments/goals: Transition to next level of care  Pain/Comfort:  Pain Rating 1: 0/10    Objective:     Communicated with: SWAPNIL Kay prior to session.  Patient found supine with PICC line, peripheral IV, pulse ox (continuous), telemetry upon OT entry to room.    General Precautions: Standard, fall    Orthopedic Precautions:N/A  Braces: " N/A  Respiratory Status: Room air     Occupational Performance:     Bed Mobility:    Patient completed Rolling/Turning to Left with  contact guard assistance  Patient completed Rolling/Turning to Right with contact guard assistance  Patient completed Scooting/Bridging with contact guard assistance  Patient completed Supine to Sit with contact guard assistance  Patient completed Sit to Supine with minimum assistance     Functional Mobility/Transfers:  Patient completed Sit <> Stand Transfer with contact guard assistance and minimum assistance  with  rolling walker   Functional Mobility: Patient completed trials x3 of sit to stand exercises at EOB. Initial plan was to have patient complete functional mobility within room, however, when EOB patient started to experience dizziness (nursing notified) and therapist opted against initial plan for safety. Patient required added time for rest breaks between trials of standing. Patient also completed 3-4 steps to R to reach HOB with min A using RW. Standing trials between 2-3 mins each with easy fatigue noted.     Activities of Daily Living:  Patient with no need/desire to complete ADLs at this time.       [facility] 6 Click ADL: 15    Treatment & Education:  Treatment as stated above. Discussed patient's PLOF and d/c plans at length with sister.   -Education on energy conservation and task modification to maximize safety and (I) during ADLs and mobility  -Education on importance of OOB activity to improve overall activity tolerance and promote recovery  -Pt educated to call for assistance and to transfer with hospital staff only  -Provided education regarding role of OT, POC, & discharge recommendations with pt and sister verbalizing understanding.  Pt had no further questions & when asked whether there were any concerns pt reported none.      Patient left supine with all lines intact, call button in reach, bed alarm on, nursing notified, and sister present    GOALS:    Multidisciplinary Problems       Occupational Therapy Goals          Problem: Occupational Therapy    Goal Priority Disciplines Outcome Interventions   Occupational Therapy Goal     OT, PT/OT Ongoing, Progressing    Description: Goals to be met by: 8/25/23     Patient will increase functional independence with ADLs by performing:    UE Dressing with Minimal Assistance.  Grooming while seated with Stand-by Assistance.  Toileting from bedside commode with Minimal Assistance for hygiene and clothing management.   Sit to stand transfer with Contact Guard Assistance and use of RW.   Sitting at edge of bed >25 minutes with Supervision.  Step transfer with Minimal Assistance and use of RW.   Toilet transfer to bedside commode with Minimal Assistance and use of RW.                          Time Tracking:     OT Date of Treatment: 08/19/23  OT Start Time: 1008  OT Stop Time: 1041  OT Total Time (min): 33 min    Billable Minutes:Therapeutic Activity 15  Neuromuscular Re-education 18    OT/MORRO: OT          8/19/2023

## 2023-08-19 NOTE — ASSESSMENT & PLAN NOTE
Hypertensive on arrival  bp today: 137/66  SBP range: 128-137  Home medications: imdur Carvedilol Hydralazine Nifedipine    Plan:  On hydralazine And losartan and carvedilol in patient, attempting to uptitrate GDMT  If BP remains uncontrolled, will switch hydralazine to BiDil

## 2023-08-19 NOTE — PROGRESS NOTES
Pharmacokinetic Assessment Follow Up: IV Vancomycin    Vancomycin serum concentration assessment(s):    The random level was drawn correctly and can be used to guide therapy at this time. The measurement is above the desired definitive target range of 15 to 20 mcg/mL.    Vancomycin Regimen Plan:    Re-dose when the random level is less than 20 mcg/mL, next level to be drawn at 0500 on 8/20/2023    Drug levels (last 3 results):  Recent Labs   Lab Result Units 08/17/23  0540 08/19/23  0441   Vancomycin, Random ug/mL 18.0 20.2       Pharmacy will continue to follow and monitor vancomycin.    Please contact pharmacy at extension [phone] for questions regarding this assessment.    Thank you for the consult,   Yasmine Wilson       Patient brief summary:  Immanuel Bernal is a 57 y.o. male initiated on antimicrobial therapy with IV Vancomycin for treatment of  endophthalmitis    The patient's current regimen is pulse dose based on level     Drug Allergies:   Review of patient's allergies indicates:   Allergen Reactions    Morphine Rash    Amiodarone analogues Itching     Other reaction(s): Unknown       Actual Body Weight:   68    Renal Function:   Estimated Creatinine Clearance: 13.7 mL/min (A) (based on SCr of 5.6 mg/dL (H)).,     Dialysis Method (if applicable):  intermittent HD    CBC (last 72 hours):  Recent Labs   Lab Result Units 08/17/23  0540 08/18/23  0454 08/19/23  0441   WBC K/uL 8.07 8.18 8.01   Hemoglobin g/dL 8.8* 8.6* 8.8*   Hematocrit % 30.5* 30.1* 32.5*   Platelets K/uL 249 258 193       Metabolic Panel (last 72 hours):  Recent Labs   Lab Result Units 08/16/23  2348 08/17/23  0540 08/17/23  1426 08/18/23  0454 08/18/23  1807 08/18/23  2143 08/19/23  0441   Sodium mmol/L 139 138  140 139 137  138  139 137 137 136  137   Potassium mmol/L 4.5 4.5  4.5 4.6 4.4  4.6  4.5 4.3 4.6 4.3  4.3   Chloride mmol/L 104 104  105 105 104  105  104 103 101 102  102   CO2 mmol/L 24 22*  24 22* 22*  23  24 25 25  25  26   Glucose mg/dL 95 65*  67* 88 52*  51*  52* 86 80 71  72   BUN mg/dL 16 17  18 20 24*  24*  24* 17 17 20  20   Creatinine mg/dL 4.6* 4.8*  5.2* 5.5* 5.9*  6.4*  6.3* 4.7* 5.0* 5.5*  5.6*   Albumin g/dL 2.1* 2.1*  2.1* 2.2* 2.2*  2.2*  2.3* 2.0* 2.1* 2.1*  2.1*   Total Bilirubin mg/dL  --  0.3  --  0.3  --   --  0.3   Alkaline Phosphatase U/L  --  104  --  100  --   --  103   AST U/L  --  12  --  19  --   --  14   ALT U/L  --  <5*  --  <5*  --   --  <5*   Magnesium mg/dL 2.4 2.5 2.6 2.6  2.7* 2.4 2.3 2.5   Phosphorus mg/dL 4.4 4.4 4.4 4.3  4.3 3.6 3.9 4.6*       Vancomycin Administrations:  vancomycin given in the last 96 hours                     vancomycin (VANCOCIN) 500 mg in dextrose 5 % in water (D5W) 100 mL IVPB (MB+) (mg) 500 mg New Bag 08/18/23 1540                    Microbiologic Results:  Microbiology Results (last 7 days)       Procedure Component Value Units Date/Time    Aerobic culture [[id number]] Collected: 08/16/23 1647    Order Status: Completed Specimen: Abscess from Eyelid, Left Updated: 08/18/23 1423     Aerobic Bacterial Culture No significant isolate    Culture, Anaerobe [[id number]] Collected: 08/16/23 1647    Order Status: Completed Specimen: Abscess from Eyelid, Left Updated: 08/18/23 0911     Anaerobic Culture Culture in progress    Culture, Anaerobe [[id number]] Collected: 08/16/23 1636    Order Status: Completed Specimen: Abscess from Eyelid, Left Updated: 08/18/23 0909     Anaerobic Culture Culture in progress    Narrative:      Sclera abscess    Culture, Anaerobe [[id number]] Collected: 08/16/23 1632    Order Status: Completed Specimen: Wound from Cornea, Left Updated: 08/18/23 0909     Anaerobic Culture Culture in progress    Culture, Anaerobe [[id number]] Collected: 08/16/23 1628    Order Status: Completed Specimen: Wound from Eyelid, Left Updated: 08/18/23 0908     Anaerobic Culture Culture in progress    AFB Culture & Smear [[id number]] Collected: 08/16/23 1643     Order Status: Completed Specimen: Abscess from Eyelid, Left Updated: 08/17/23 2127     AFB Culture & Smear Culture in progress     AFB CULTURE STAIN No acid fast bacilli seen.    AFB Culture & Smear [[id number]] Collected: 08/16/23 1629    Order Status: Completed Specimen: Wound from Eyelid, Left Updated: 08/17/23 2127     AFB Culture & Smear Culture in progress     AFB CULTURE STAIN No acid fast bacilli seen.    AFB Culture & Smear [[id number]] Collected: 08/16/23 1636    Order Status: Completed Specimen: Abscess from Eyelid, Left Updated: 08/17/23 2127     AFB Culture & Smear Culture in progress     AFB CULTURE STAIN No acid fast bacilli seen.    Narrative:      Sclera abscess    AFB Culture & Smear [[id number]] Collected: 08/16/23 1632    Order Status: Completed Specimen: Wound from Cornea, Left Updated: 08/17/23 2127     AFB Culture & Smear Culture in progress     AFB CULTURE STAIN No acid fast bacilli seen.    Aerobic culture [[id number]] Collected: 08/16/23 1629    Order Status: Completed Specimen: Wound from Eyelid, Left Updated: 08/17/23 0713     Aerobic Bacterial Culture No growth    Aerobic culture [[id number]] Collected: 08/16/23 1636    Order Status: Completed Specimen: Abscess from Eyelid, Left Updated: 08/17/23 0713     Aerobic Bacterial Culture No growth    Narrative:      Sclera abscess    Aerobic culture [[id number]] Collected: 08/16/23 1632    Order Status: Completed Specimen: Wound from Cornea, Left Updated: 08/17/23 0713     Aerobic Bacterial Culture No growth    Fungus culture [[id number]] Collected: 08/16/23 1636    Order Status: Completed Specimen: Abscess from Eyelid, Left Updated: 08/17/23 0618     Fungus (Mycology) Culture Culture in progress    Narrative:      Sclera abscess    Fungus culture [[id number]] Collected: 08/16/23 1647    Order Status: Completed Specimen: Abscess from Eyelid, Left Updated: 08/17/23 0618     Fungus (Mycology) Culture Culture in progress    Fungus culture [[id number]]  Collected: 08/16/23 1632    Order Status: Completed Specimen: Wound from Cornea, Left Updated: 08/17/23 0618     Fungus (Mycology) Culture Culture in progress    Fungus culture [[id number]] Collected: 08/16/23 1629    Order Status: Completed Specimen: Wound from Eyelid, Left Updated: 08/17/23 0618     Fungus (Mycology) Culture Culture in progress    Gram stain [[id number]] Collected: 08/16/23 1632    Order Status: Completed Specimen: Wound from Cornea, Left Updated: 08/16/23 1840     Gram Stain Result No WBC's      No organisms seen    Gram stain [[id number]] Collected: 08/16/23 1629    Order Status: Completed Specimen: Wound from Eyelid, Left Updated: 08/16/23 1840     Gram Stain Result Rare WBC's      No organisms seen    Gram stain [[id number]] Collected: 08/16/23 1647    Order Status: Completed Specimen: Abscess from Eyelid, Left Updated: 08/16/23 1839     Gram Stain Result No WBC's      Rare yeast    Narrative:      Vitreous abscess    Gram stain [[id number]] Collected: 08/16/23 1636    Order Status: Completed Specimen: Abscess from Eyelid, Left Updated: 08/16/23 1839     Gram Stain Result No WBC's      Rare yeast    Narrative:      Sclera abscess    Blood culture (site 2) [[id number]] Collected: 08/09/23 1434    Order Status: Completed Specimen: Blood Updated: 08/14/23 1812     Blood Culture, Routine No growth after 5 days.    Narrative:      Site # 2, aerobic only  Collection has been rescheduled by [email or web address] at 08/09/2023 13:27 Reason:   Patient unavailable in with DR   Collection has been rescheduled by [email or web address] at 08/09/2023 13:27 Reason:   Patient unavailable in with DR     Blood culture (site 1) [[id number]] Collected: 08/09/23 1434    Order Status: Completed Specimen: Blood Updated: 08/14/23 1812     Blood Culture, Routine No growth after 5 days.    Narrative:      Site # 1, aerobic and anaerobic  Collection has been rescheduled by [email or web address] at 08/09/2023 13:27 Reason:   Patient unavailable in with DR   Collection has been  rescheduled by [email or web address] at 08/09/2023 13:27 Reason:   Patient unavailable in with

## 2023-08-19 NOTE — PROGRESS NOTES
[facility] - Intensive Care ([address] Medicine  Progress Note    Patient Name: Immanuel Bernal  MRN: [id number]  Patient Class: IP- Inpatient   Admission Date: 8/9/2023  Length of Stay: 10 days  Attending Physician: Porsha Funez MD  Primary Care Provider: Dolly Primary Doctor        Subjective:     Principal Problem:Endophthalmitis        HPI:  HPI obtained per medical record as patient unable to communicate    57-year-old male with a history of CHF, diabetes, hypertension, chronic disability, end-stage renal disease on hemodialysis, PEG placement, bowel obstruction, sleep apnea, TIA, left eye vitreous hemorrhage, stroke, and bowel obstruction with bowel resection admitted to [facility] in [place] July 18 from nursing home with left eye pain and blurred vision.  He was admitted with concern for bilateral endophthalmitis.  Other admit diagnoses included right upper extremity thrombus, end-stage renal disease on hemodialysis, hyperkalemia, sleep apnea, diabetes, and CHF.  He was treated with broad-spectrum antibiotics.  He was seen by Infectious Diseases,, and he was treated with vancomycin, ceftriaxone, and amphotericin.  Blood cultures were positive for Pseudomonas, and amphotericin/vancomycin were stopped.  IV cefepime was continued.  He was seen by Ophthalmology, and he received intravitreal vancomycin and ceftazidime (July 18).  He also had intravitreal tap July 18 that had no yeast or fungal elements observed.  Left eye endophthalmitis did not respond to treatment, and he subsequently developed abscess formation, significant proptosis, and drainage of purulent material from the orbit.  Ophthalmology recommended enucleation.  He was continued on cefepime for pseudomonal and ophthalmitis.  Recommendation was for 6 weeks of treatment to be followed by indefinite fluoroquinolone.  He was seen by Pulmonology during his stay for a right upper lung mass with peripheral nodules.  It was  felt that he would need further investigation of this once his current clinical issues stabilized.  Right upper extremity AV graft was excised during his stay with concern for infection.  A right IJ tunneled line was placed on July 27.  Left eye endophthalmitis continued to worsen, and ophthalmology spoke with patient and family about the need for enucleation.  Despite several conversations, family declined enucleation.  Plans were for transitioned to skilled nursing facility for continued treatment, but family did not want him to go to a skilled nursing facility.  He was subsequently discharged AMA from the hospital there on August 7.  Please see the August 7 Internal Medicine note for further details.     He subsequently presented to [facility] Emergency Department.  He will not go back to [facility] in [place]. He received Zosyn and vancomycin.  ED team at [place] spoke with the patient and his power-of- (sister).  CT of the orbits noted scleral abscess along the lateral aspect of the left globe.  Patient and family are aware and in agreement that the patient needs enucleation of the eye at this time. Referring provider spoke with Oculoplastics at [facility]. Requesting transfer to [facility] Medicine for Oculoplastics specialty evaluation of persistent endophthalmitis.  ED provider noted patient is hemodynamically stable.  He does not appear volume overloaded or in respiratory distress.      The patient has a significant previous medical course as listed below  August 3: MRI brain and orbits showed worsening ophthalmitis and inflammatory changes of the left orbital tissues with slight increase in proptosis.  No evidence of intracranial inflammatory process observed.    July 25: Transesophageal echocardiogram had no evidence of endocarditis.  Moderate to severely decreased left ventricular systolic function with EF 30-35%.    July 22:  CT chest showed right upper  lobe mass with numerous bilateral nodularity predominantly in the right with associated mediastinal lymph nodes.  July 21: Blood cultures with Pseudomonas aeruginosa  July 19: MRI brain/orbits with and without contrast had findings suggestive of chronic microvascular ischemic disease of the white matter with remote ischemic event in the left insula/basal ganglia/subependymal white matter/right middle cerebellar peduncle and hemisphere.  Findings consistent with left endophthalmitis.  No abnormal findings observed in the right globe.  July 18:  Blood cultures with Pseudomonas aeruginosa and coag-negative staph      Overview/Hospital Course:  Patient was rate controlled and therefore flecainide held. His GCS is 13/15. Oculopalstics were consulted and so were the nephrology teams and IR. He underwent dialysis on his first day. Was scheduled for surgery today but his family refused to have it done until she reviewed the MRI herself first. This means that the surgeon will only be available next Wednesday in order to allow her to view and discuss the MRI. He has a peg tube in place that has not been utilzied prior. Heart failure medications have been reinstated and is being covered by vancomycin and meropenem for the endopthalmitis. Multidisciplinary session was conducted with his POA in regards to his operation, she accepted the surgery if it was emergent, otherwise to be done this upcoming Wednesday. Patient's delirium/agitation worsened so he was transferred to the ICU until his clinical situation settled. He then was stepped down to our care and ophthalmology will be conducting his eye procedure on him as planned. Underwent L eye enucleation on 8/16. Started the tobramycin ointment and drops. Following ID recs regarding antimicrobial regimen; operative cultures returned with yeast thus patient was also started on fluconazole. Patient's sister noted us that he was diagnosed with central apnea and KENIA in the past of  which he used a CPAP/BiPAP for. No concern of lack of oxygenation during his inpatient stay. Labs are underway to detect any element of CO2 retention although he had no clinical signs of that.    Disposition: referrals to SNF have been sent. Family prefers [facility] or rehab and do not want to return to their previous nursing facility. Pending final ID recommendations at this time.       Interval History: NAEON. Patient had a low BG again this morning and overall has a very poor appetite.  Per family request, diet was changed to bland, and zofran will be given prior to meals.   Patient's PEG tube remains clogged currently. Will try to unclog, then consult GI.    Review of Systems   Constitutional: Negative.    Eyes:  Positive for redness.   Respiratory: Negative.     Cardiovascular: Negative.    Gastrointestinal:  Positive for nausea and vomiting.        Poor appetite   Genitourinary: Negative.    Musculoskeletal: Negative.    Neurological:  Positive for headaches.   All other systems reviewed and are negative.    Objective:     Vital Signs (Most Recent):  Temp: 99.2 °F (37.3 °C) (08/19/23 0825)  Pulse: 60 (08/19/23 1047)  Resp: 20 (08/19/23 0825)  BP: (!) 150/80 (08/19/23 0825)  SpO2: 100 % (08/19/23 0825) Vital Signs (24h Range):  Temp:  [98.1 °F (36.7 °C)-99.2 °F (37.3 °C)] 99.2 °F (37.3 °C)  Pulse:  [60-68] 60  Resp:  [17-20] 20  SpO2:  [99 %-100 %] 100 %  BP: (130-173)/(72-84) 150/80     Weight: 68 kg (150 lb)  Body mass index is 21.52 kg/m².    Intake/Output Summary (Last 24 hours) at 8/19/2023 1203  Last data filed at 8/18/2023 1854  Gross per 24 hour   Intake 330.03 ml   Output 2500 ml   Net -2169.97 ml         Physical Exam  Constitutional:       Appearance: He is ill-appearing.   Eyes:      Comments: L eye s/p enucleation  R eye stable   Cardiovascular:      Rate and Rhythm: Normal rate and regular rhythm.      Pulses: Normal pulses.      Heart sounds: Normal heart sounds.   Pulmonary:      Effort:  Pulmonary effort is normal.      Breath sounds: Normal breath sounds.   Abdominal:      General: There is no distension.      Palpations: Abdomen is soft.      Tenderness: There is no abdominal tenderness.      Comments: PEG tube in place   Musculoskeletal:      Right lower leg: No edema.      Left lower leg: No edema.   Skin:     General: Skin is warm and dry.      Capillary Refill: Capillary refill takes less than 2 seconds.   Neurological:      Mental Status: He is alert and oriented to person, place, and time.             Significant Labs: All pertinent labs within the past 24 hours have been reviewed.    Significant Imaging: I have reviewed all pertinent imaging results/findings within the past 24 hours.      Assessment/Plan:      * Endophthalmitis  Ophthalmology team on board and were going to perform the procedure but the patient's family refused without being around to review the MRI which delayed the procedure till next wednesday    MRI repeat : Diffuse inflammatory change involving the left globe, with associated thickening of the left sclera, proptosis, and focal fluid collection within the left posterior compartment, which demonstrates diffusion restriction.  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  Diffuse left preseptal and postseptal inflammatory change, with superimposed orbital cellulitis also considered.  Evaluation of the left orbital apex is limited secondary to no contrast administration.  Decreased size of the left anterior compartment with bowing of the visualized left lens.  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    Thorough counseling undertaken with POA and IM team, palliative, opthalmo and nursing team in regards to the surgery  She agreed for her brother to undergo the procedure as an emergent case if his health deteriorates  Otherwise to be scheduled on wednesday    Underwent  evisceration of his left eye yesterday, tobramycin ordered, ID recs are being followed  Still on meronema and vancomycin IV  Wbc: 8.07 (normal)  hgb post op 8.8 (at [facility])  No signs of distress or delirium  Culture of eye discharge grew yeast, started flucanazole to antibiotic regimen  Follow cultures      Plan:  Follow ID recs  - Continue fluconazole, vanc and meropenem  - Pending final ID recs for discharge  Follow opthalomology recs  - Continue tobramycin eye drops and ointments  - Will see Dr Stewart frequently in the clinic for dressing changes    Redness and swelling of upper arm  Had staples in right upper arm from transfer hospital (the fistula clotted and had pseudomonas aurgenosa, and bacteremia), patient signed AMA before intervention was yielded there    Underwent Ultrasound US, findings:  1. Nonocclusive DVT in the right subclavian and axillary veins.  Catheter in the right subclavian vein.  2. 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    Seen by urology team:  Staples cannot be removed now as the skin is now adhering fully yet  Wound appears to be having dehiscence, for wound care team- consult sent already  DVT to be anticoagulated      Plan:  Wound care consult sent, pending recs  Anticoagulation started in the form of eliquis    Hypoglycemia  Latest POCT glucose is 111  Oral diet switched to low salt diet      Plan:  Follow glucose and notifiy if drops below <70      PEG (percutaneous endoscopic gastrostomy) status  On peg tube feeds now  Tolerating well  Able to take in PO per SLP assessment on 8/15, on regular diet with thin liquids  Will reduce tube feeds gradually as patient takes in more PO    Peg tube was found to be blocked yesterday  SLP evaluated patient : can restart oral feeds    Plan:  Start oral feeds post surgery (low salt diet)    Encounter for palliative care        Anemia in ESRD (end-stage renal disease)  EPO per nephrology  hgb at baseline      Severe malnutrition  Nutrition consulted. Most recent weight and BMI monitored-     Measurements:  Wt Readings from Last 1 Encounters:   08/16/23 68 kg (150 lb)   Body mass index is 21.52 kg/m².    Patient has been screened and assessed by RD.    Malnutrition Type:  Context: chronic illness  Level: severe    Malnutrition Characteristic Summary:  Weight Loss (Malnutrition): greater than 10% in 6 months  Energy Intake (Malnutrition): other (see comments) (LAMONT)  Subcutaneous Fat (Malnutrition): severe depletion  Muscle Mass (Malnutrition): severe depletion    Interventions/Recommendations (treatment strategy):  1.     Able to take in PO per SLP assessment on 8/15, on regular diet with thin liquids  Will reduce tube feeds gradually as patient takes in more PO    ESRD (end stage renal disease)  Nephrology consulted for HD needs while inpatient  Underwent SLED post MRI contrast (gadolinium)   Ad last dialysis was 08/16: Tolerated HD, with a net UF of 1L. NAEON.   CRT today 6.4      Plan:  Will be followed by nephro, follow up with their recommendations      Atrial fibrillation  History of AF noted however no EKG here with AF  Not on AC due to lack of definitive evidence of AF  Continue carvedilol    Hyperlipidemia  Home statin    Chronic diastolic heart failure  Continue to monitor for signs of volume overload; volume removal with dialysis    Hypertension  Hypertensive on arrival  bp today: 137/66  SBP range: 128-137  Home medications: imdur Carvedilol Hydralazine Nifedipine    Plan:  On hydralazine And losartan and carvedilol in patient, attempting to uptitrate GDMT  If BP remains uncontrolled, will switch hydralazine to BiDil    Stroke  Continue home statin  Delerium precautions  PT OT    Diabetes mellitus  POCT glucose  LDSS  -180      VTE Risk Mitigation (From admission, onward)         Ordered     apixaban tablet 5 mg  2 times daily        See Hyperspace for full Linked Orders Report.    08/18/23 8284      apixaban tablet 10 mg  2 times daily        See [employer] for full Linked Orders Report.    08/18/23 0912     heparin (porcine) injection 1,000 Units  As needed (PRN)         08/10/23 1046     IP VTE HIGH RISK PATIENT  Once         08/09/23 1317     Place sequential compression device  Until discontinued         08/09/23 1317                Discharge Planning   TOBY: 8/21/2023     Code Status: Full Code   Is the patient medically ready for discharge?: No    Reason for patient still in hospital (select all that apply): Consult recommendations  Discharge Plan A: Long-term acute care facility (LTAC)   Discharge Delays: (!) Procedure Scheduling (IR, OR, Labs, Echo, Cath, Echo, EEG) (eye surgery next week)              Pat Montoya MD  Department of Hospital Medicine   [facility] - Intensive Care ([place])

## 2023-08-19 NOTE — SUBJECTIVE & OBJECTIVE
"Interval History: NAEON, afebrile, WBC wnl. Only complaints of headache and eye pain. Daily weakness and "haziness" upon waking up that waxes and wanes throughout the day. Has had some throat pain and cough since his surgery.    Past Medical History:   Diagnosis Date    Bilateral retinal detachment 7/18/2023    BPH (benign prostatic hyperplasia)     Cataract     CHF (congestive heart failure)     CKD (chronic kidney disease) stage 3, GFR 30-59 ml/min     Diabetes mellitus, type 2     Hypertension     KENIA (obstructive sleep apnea)     Pancreatitis     Persistent proteinuria 11/12/2020    2 g proteinuria noted Resumed ACE Lower BP systolic to less than 130     PTSD (post-traumatic stress disorder)     Stroke        Past Surgical History:   Procedure Laterality Date    CATARACT EXTRACTION Bilateral     EVISCERATION OF OCULAR CONTENTS Left 8/16/2023    Procedure: EVISCERATION, OCULAR CONTENTS;  Surgeon: Vicki Stewart MD;  Location: [facility] OR [address] [place] [address];  Service: Ophthalmology;  Laterality: Left;    RETROBULBAR INJECTION OF MEDICATION Left 8/16/2023    Procedure: INJECTION, MEDICATION, RETROBULBAR;  Surgeon: Vicki Stewart MD;  Location: [facility] OR [address] [place] [address];  Service: Ophthalmology;  Laterality: Left;    TARSORRHAPHY Left 8/16/2023    Procedure: BLEPHARORRHAPHY;  Surgeon: Vicki Stewart MD;  Location: [facility] OR [address] [place] [address];  Service: Ophthalmology;  Laterality: Left;       Review of patient's allergies indicates:   Allergen Reactions    Morphine Rash    Amiodarone analogues Itching     Other reaction(s): Unknown       Medications:  Medications Prior to Admission   Medication Sig    acetaminophen (TYLENOL) 325 MG tablet Take 650 mg by mouth every 6 (six) hours as needed for Pain.    albuterol-ipratropium (DUO-NEB) 2.5 mg-0.5 mg/3 mL nebulizer solution Inhale 3 mLs into the lungs 3 (three) times daily.    amLODIPine (NORVASC) 10 MG tablet Take 10 mg by mouth once daily.    ascorbic acid, vitamin C, (VITAMIN C) 500 MG tablet Take 500 " mg by mouth once daily.    aspirin 81 MG Chew Take 81 mg by mouth once daily.    calcium carbonate-vitamin D3 (OYSTER SHELL CALCIUM-VIT D3) 500 mg-5 mcg (200 unit) PwPk Take 1 tablet by mouth 2 (two) times a day.    carvediloL (COREG) 25 MG tablet Take 1 tablet (25 mg total) by mouth 2 (two) times daily with meals.    citalopram (CELEXA) 20 MG tablet Take 20 mg by mouth once daily.    coffee xt/phosphatidyl serine (NEURIVA ORIGINAL ORAL) Take 1 capsule by mouth once daily.    flecainide (TAMBOCOR) 50 MG Tab Take 50 mg by mouth 2 (two) times daily.    folic acid (FOLVITE) 1 MG tablet Take 1 tablet by mouth every morning.    ibuprofen (ADVIL,MOTRIN) 600 MG tablet Take 600 mg by mouth every 6 (six) hours as needed for Pain.    lactulose (CHRONULAC) 10 gram/15 mL solution Take 45 mLs by mouth daily as needed (constipation).    megestroL (MEGACE) 400 mg/10 mL (40 mg/mL) Susp Take 10 mLs by mouth 2 (two) times a day.    metoclopramide HCl (REGLAN) 5 MG tablet Take 5 mg by mouth once daily.    multivitamin (THERAGRAN) per tablet Take 1 tablet by mouth once daily.    pantoprazole (PROTONIX) 40 MG tablet Take 40 mg by mouth once daily.    pentoxifylline (TRENTAL) 400 mg TbSR Take 400 mg by mouth 2 (two) times daily.    polyethylene glycol (GLYCOLAX) 17 gram/dose powder Take 17 g by mouth 2 (two) times daily.    rosuvastatin (CRESTOR) 5 MG tablet Take 5 mg by mouth once daily.    sevelamer carbonate (RENVELA) 800 mg Tab Take 1 tablet by mouth 5 times per day    tamsulosin (FLOMAX) 0.4 mg Cap Take 1 capsule (0.4 mg total) by mouth once daily.    UNABLE TO FIND Take 1 tablet by mouth once daily. medication name: OPTIMAL ANTIOX    UNABLE TO FIND Take 1 tablet by mouth nightly. medication name: QUNOL SLEEP SUPPORT    VITAMIN B COMPLEX ORAL Take 1 tablet by mouth every morning.    zinc gluconate 50 mg tablet Take 50 mg by mouth once daily.     Antibiotics (From admission, onward)      Start     Stop Route Frequency Ordered     08/18/23 1700  tobramycin-dexAMETHasone 0.3-0.1% ophthalmic suspension 1 drop         09/15/23 2059 Both Eyes 4 times daily 08/18/23 1411    08/18/23 1515  tobramycin-dexAMETHasone 0.3-0.1% ophthalmic ointment         -- RIGHT EYE 3 times daily 08/18/23 1411    08/16/23 2343  meropenem (MERREM) 500 mg injection        Note to Pharmacy: Created by cabinet override    08/17/23 1144   08/16/23 2343    08/16/23 2100  tobramycin-dexAMETHasone 0.3-0.1% ophthalmic ointment         09/15/23 2059 LEFT EYE 3 times daily 08/16/23 1732    08/13/23 0945  mupirocin 2 % ointment         08/18/23 0859 Nasl 2 times daily 08/13/23 0840    08/13/23 0000  meropenem (MERREM) 500 mg in sodium chloride 0.9 % 100 mL IVPB (MB+)         -- IV Every 12 hours (non-standard times) 08/12/23 1507    08/09/23 1418  vancomycin - pharmacy to dose  (vancomycin IVPB (PEDS and ADULTS))        See Hyperspace for full Linked Orders Report.    -- IV pharmacy to manage frequency 08/09/23 1319          Antifungals (From admission, onward)      Start     Stop Route Frequency Ordered    08/18/23 0900  fluconazole tablet 400 mg        See Hyperspace for full Linked Orders Report.    -- Oral Daily 08/17/23 1331          Antivirals (From admission, onward)      None             Immunization History   Administered Date(s) Administered    Influenza 10/30/1997, 11/14/2000    Pneumococcal Conjugate - 20 Valent 03/17/2023       Family History       Problem Relation (Age of Onset)    Diabetes Father, Sister    Heart disease Father    Hyperlipidemia Brother    Kidney disease Father    Stroke Mother          Social History     Socioeconomic History    Marital status: Single   Tobacco Use    Smoking status: Former     Current packs/day: 0.00     Types: Cigarettes, Cigars    Smokeless tobacco: Never   Substance and Sexual Activity    Alcohol use: Not Currently    Drug use: Not Currently     Social Determinants of Health     Financial Resource Strain: Low Risk  (8/10/2023)     Overall Financial Resource Strain (CARDIA)     Difficulty of Paying Living Expenses: Not very hard   Food Insecurity: No Food Insecurity (8/10/2023)    Hunger Vital Sign     Worried About Running Out of Food in the Last Year: Never true     Ran Out of Food in the Last Year: Never true   Transportation Needs: No Transportation Needs (8/10/2023)    PRAPARE - Transportation     Lack of Transportation (Medical): No     Lack of Transportation (Non-Medical): No   Physical Activity: Insufficiently Active (8/10/2023)    Exercise Vital Sign     Days of Exercise per Week: 5 days     Minutes of Exercise per Session: 20 min   Stress: Stress Concern Present (8/10/2023)    [employer] - Occupational Stress Questionnaire     Feeling of Stress : Rather much   Social Connections: Socially Isolated (8/10/2023)    Social Connection and Isolation Panel [NHANES]     Frequency of Communication with Friends and Family: Twice a week     Frequency of Social Gatherings with Friends and Family: Once a week     Attends Religion Services: Never     Active Member of Clubs or Organizations: No     Attends Club or Organization Meetings: Never     Marital Status: Never    Housing Stability: Unknown (8/10/2023)    Housing Stability Vital Sign     Unable to Pay for Housing in the Last Year: No     Unstable Housing in the Last Year: No     Review of Systems   Constitutional:  Negative for chills, diaphoresis, fatigue and fever.   Eyes:  Positive for visual disturbance. Negative for pain and redness.   Respiratory:  Negative for shortness of breath.    Cardiovascular:  Negative for chest pain.   Neurological:  Positive for headaches. Negative for dizziness and light-headedness.   Psychiatric/Behavioral:  Negative for confusion.      Objective:     Vital Signs (Most Recent):  Temp: 99.2 °F (37.3 °C) (08/19/23 0825)  Pulse: 60 (08/19/23 1047)  Resp: 20 (08/19/23 0825)  BP: (!) 150/80 (08/19/23 0825)  SpO2: 100 %  (08/19/23 0825) Vital Signs (24h Range):  Temp:  [98.1 °F (36.7 °C)-99.2 °F (37.3 °C)] 99.2 °F (37.3 °C)  Pulse:  [60-70] 60  Resp:  [17-20] 20  SpO2:  [99 %-100 %] 100 %  BP: (130-173)/(70-84) 150/80     Weight: 68 kg (150 lb)  Body mass index is 21.52 kg/m².    Estimated Creatinine Clearance: 13.7 mL/min (A) (based on SCr of 5.6 mg/dL (H)).     Physical Exam  Vitals and nursing note reviewed.   Constitutional:       General: He is not in acute distress.     Appearance: Normal appearance. He is not ill-appearing.   HENT:      Head: Normocephalic and atraumatic.   Eyes:      Comments: Left eye enucleated. Eye lid and surrounding tissue non edematous and erythematous,  thin discharge from his L eye now improved   Right eye non erythematous. EOM intact   Cardiovascular:      Rate and Rhythm: Normal rate and regular rhythm.      Pulses: Normal pulses.      Heart sounds: Normal heart sounds. No murmur heard.  Pulmonary:      Effort: Pulmonary effort is normal. No respiratory distress.      Breath sounds: Normal breath sounds. No wheezing or rales.   Abdominal:      General: Abdomen is flat.      Palpations: Abdomen is soft.   Musculoskeletal:      Cervical back: Normal range of motion.      Right lower leg: No edema.      Left lower leg: No edema.   Skin:     General: Skin is warm and dry.      Comments: RUE with recent incision, staples in place  Clean, no erythematous, no oozing or drainage    Neurological:      General: No focal deficit present.      Mental Status: Mental status is at baseline.   Psychiatric:         Mood and Affect: Mood normal.         Behavior: Behavior normal.          Significant Labs: Blood Culture:   Recent Labs   Lab 08/09/23  1434   LABBLOO No growth after 5 days.  No growth after 5 days.       CBC:   Recent Labs   Lab 08/18/23  0454 08/19/23  0441   WBC 8.18 8.01   HGB 8.6* 8.8*   HCT 30.1* 32.5*    193       CMP:   Recent Labs   Lab 08/18/23  0454 08/18/23  1807 08/18/23  2143  08/19/23  0441     138  139 137 137 136  137   K 4.4  4.6  4.5 4.3 4.6 4.3  4.3     105  104 103 101 102  102   CO2 22*  23  24 25 25 25  26   GLU 52*  51*  52* 86 80 71  72   BUN 24*  24*  24* 17 17 20  20   CREATININE 5.9*  6.4*  6.3* 4.7* 5.0* 5.5*  5.6*   CALCIUM 9.2  9.1  9.2 8.4* 8.7 8.7  9.0   PROT 6.3  --   --  6.0   ALBUMIN 2.2*  2.2*  2.3* 2.0* 2.1* 2.1*  2.1*   BILITOT 0.3  --   --  0.3   ALKPHOS 100  --   --  103   AST 19  --   --  14   ALT <5*  --   --  <5*   ANIONGAP 11  10  11 9 11 9  9       Wound Culture:   Recent Labs   Lab 08/16/23  1629 08/16/23  1632 08/16/23  1636 08/16/23  1647   LABAERO No growth No growth No growth No significant isolate         Significant Imaging: I have reviewed all pertinent imaging results/findings within the past 24 hours.

## 2023-08-19 NOTE — PLAN OF CARE
Pt slept well this shift. A&Ox 4. VSS. Pain controlled with PRN's. HD cath to R chest. Drsg C/D/I. O2 @ 2lpm NC. PEG drsg changed. Discussed herbal supplements with sister and MD. MD advised to not use any Bonny supplements d/t increased risk of bleeding. The headache supplement (Migrelief)  requires additional information and sister was encouraged to hold off until the the day team can assess. T&RQ2. Skin care completed. Pt very diaphoretic this early AM. Full bed change and bath completed. Spot check of BG was 68. Oral glucose tab given and pt started vomiting approx 15 mins later. PRN Phenergan requested. Recheck at 15 mins was 79. Md notified and recommended recheck in 20 mins. Recheck was 72. Safety precautions in place. Call light in reach. No further concerns noted at this time.    Problem: Adult Inpatient Plan of Care  Goal: Plan of Care Review  Outcome: Ongoing, Progressing  Goal: Patient-Specific Goal (Individualized)  Outcome: Ongoing, Progressing  Goal: Absence of Hospital-Acquired Illness or Injury  Outcome: Ongoing, Progressing  Goal: Optimal Comfort and Wellbeing  Outcome: Ongoing, Progressing  Goal: Readiness for Transition of Care  Outcome: Ongoing, Progressing     Problem: Diabetes Comorbidity  Goal: Blood Glucose Level Within Targeted Range  Outcome: Ongoing, Progressing     Problem: Skin Injury Risk Increased  Goal: Skin Health and Integrity  Outcome: Ongoing, Progressing     Problem: Device-Related Complication Risk (Hemodialysis)  Goal: Safe, Effective Therapy Delivery  Outcome: Ongoing, Progressing     Problem: Hemodynamic Instability (Hemodialysis)  Goal: Effective Tissue Perfusion  Outcome: Ongoing, Progressing     Problem: Infection (Hemodialysis)  Goal: Absence of Infection Signs and Symptoms  Outcome: Ongoing, Progressing     Problem: Infection  Goal: Absence of Infection Signs and Symptoms  Outcome: Ongoing, Progressing     Problem: Coping Ineffective  Goal: Effective Coping  Outcome:  Ongoing, Progressing     Problem: Device-Related Complication Risk (CRRT (Continuous Renal Replacement Therapy))  Goal: Safe, Effective Therapy Delivery  Outcome: Ongoing, Progressing     Problem: Hypothermia (CRRT (Continuous Renal Replacement Therapy))  Goal: Body Temperature Maintained in Desired Range  Outcome: Ongoing, Progressing     Problem: Infection (CRRT (Continuous Renal Replacement Therapy))  Goal: Absence of Infection Signs and Symptoms  Outcome: Ongoing, Progressing     Problem: Impaired Wound Healing  Goal: Optimal Wound Healing  Outcome: Ongoing, Progressing     Problem: Fall Injury Risk  Goal: Absence of Fall and Fall-Related Injury  Outcome: Ongoing, Progressing

## 2023-08-19 NOTE — ASSESSMENT & PLAN NOTE
57M with PMH of DM2, HTN, ESRD, HFrEF, bowel obstruction s/p bowel resection, KENIA, afib, recent hospitalization for NELSON endophthalmitis, pseudomonas bacteremia, RUE AVG infection s/p excision, who presents as a transfer from [place] for worsening eye symptoms and imaging findings of L scleral abscess. Previously admitted to [facility] in July for NELSON endophthalmitis, received intravitreal vancomycin and ceftazidime and IV cefepime for pseudomonas bacteremia. Refused enucleation during that admission, and left AMA. He then presented to [place], and was transferred to [facility] for oculoplastic eval. MRI orbits with L scleral abscess. Underwent enucleation 8/16    - Blood cultures 8/9 NGTD. Gram stain of abscess with rare yeast, fluconazole started 8/17. He has been on vanc/eunice since 8/10.   Recommendations:  - Continue fluconazole, vancomycin and meropenem.   - Plan to treat as CNS infection as MRI was non diagnostic. Anticipated 4-6 weeks of therapy from evisceration.  - awaiting operative culture data. Further tailoring of regimen to follow.  - Recommend LTAC placement for long term abx therapy.

## 2023-08-19 NOTE — PROGRESS NOTES
[facility] ([place] [address])  Infectious Disease  Progress Note    Patient Name: Immanuel Bernal  MRN: [id number]  Admission Date: 8/9/2023  Length of Stay: 10 days  Attending Physician: Porsha Funez MD  Primary Care Provider: No, Primary Doctor    Isolation Status: No active isolations  Assessment/Plan:      Ophtho  * Endophthalmitis  57M with PMH of DM2, HTN, ESRD, HFrEF, bowel obstruction s/p bowel resection, KENIA, afib, recent hospitalization for NELSON endophthalmitis, pseudomonas bacteremia, RUE AVG infection s/p excision, who presents as a transfer from [place] for worsening eye symptoms and imaging findings of L scleral abscess. Previously admitted to [facility] in July for NELSON endophthalmitis, received intravitreal vancomycin and ceftazidime and IV cefepime for pseudomonas bacteremia. Refused enucleation during that admission, and left AMA. He then presented to [place], and was transferred to [facility] [place] [facility] for oculoplastic eval. MRI orbits with L scleral abscess. Underwent enucleation 8/16    - Blood cultures 8/9 NGTD. Gram stain of abscess with rare yeast, fluconazole started 8/17. He has been on vanc/eunice since 8/10.   Recommendations:  - Continue fluconazole, vancomycin and meropenem.   - Plan to treat as CNS infection as MRI was non diagnostic. Anticipated 4-6 weeks of therapy from evisceration.  - awaiting operative culture data. Further tailoring of regimen to follow.  - Recommend LTAC placement for long term abx therapy.         Anticipated Disposition: LTAC  Thank you for your consult. I will follow-up with patient. Please contact us if you have any additional questions.    Sarabjit Maguire MD  Infectious Disease  [facility] ([place] [address])    Subjective:     Principal Problem:Endophthalmitis    HPI: Mr. Bernal is a 57M with PMH of DM2, HTN, ESRD, HFrEF, bowel obstruction s/p bowel resection, KENIA, afib, recent hospitalization for NELSON endophthalmitis, pseudomonas bacteremia, RUE AVG  "infection s/p excision, who presented as a transfer from [place] for worsening eye symptoms and imaging findings of L scleral abscess. Patient had been admitted to [facility] July 18th for concern for NELSON endophthalmitis. He received intravitreal vancomycin and ceftazidime by ophthalmology, and IV cefepime for pseudomonas bacteremia. Superficial swab with diphtheroids and anaerobic cocci. He underwent intravitreal tap that was negative for any yeast or fungus. Unfortunately L eye did not respond to treatment and abscess developed. Ophthalmology recommended enucleation, however POA/family declined. He was recommend to continue on cefepime for 6 weeks followed by indefinite fluoroquinolone, however he left AMA without the antibiotics.     He then presented to [place], where imaging was notable for L scleral abscess. He received a dose of zosyn and vanc, and was transferred to [facility] for oculoplastic evaluation. ID was consulted on arrival, and he was transitioned to vanc/meropenem while awaiting culture results. Blood cultures from 8/9 negative. He underwent L enucleation 8/16. ID reconsulted 8/17 for long term antibiotic regimen.      Interval History: NAEON, afebrile, WBC wnl. Only complaints of headache and eye pain. Daily weakness and "haziness" upon waking up that waxes and wanes throughout the day. Has had some throat pain and cough since his surgery.    Past Medical History:   Diagnosis Date    Bilateral retinal detachment 7/18/2023    BPH (benign prostatic hyperplasia)     Cataract     CHF (congestive heart failure)     CKD (chronic kidney disease) stage 3, GFR 30-59 ml/min     Diabetes mellitus, type 2     Hypertension     KENIA (obstructive sleep apnea)     Pancreatitis     Persistent proteinuria 11/12/2020    2 g proteinuria noted Resumed ACE Lower BP systolic to less than 130     PTSD (post-traumatic stress disorder)     Stroke        Past Surgical History:   Procedure Laterality Date    " CATARACT EXTRACTION Bilateral     EVISCERATION OF OCULAR CONTENTS Left 8/16/2023    Procedure: EVISCERATION, OCULAR CONTENTS;  Surgeon: Vicki Stewart MD;  Location: [facility] OR [address] [place] [address];  Service: Ophthalmology;  Laterality: Left;    RETROBULBAR INJECTION OF MEDICATION Left 8/16/2023    Procedure: INJECTION, MEDICATION, RETROBULBAR;  Surgeon: Vicki Stewart MD;  Location: [facility] OR [address] [place] [address];  Service: Ophthalmology;  Laterality: Left;    TARSORRHAPHY Left 8/16/2023    Procedure: BLEPHARORRHAPHY;  Surgeon: Vicki Stewart MD;  Location: [facility] OR [address] [place] [address];  Service: Ophthalmology;  Laterality: Left;       Review of patient's allergies indicates:   Allergen Reactions    Morphine Rash    Amiodarone analogues Itching     Other reaction(s): Unknown       Medications:  Medications Prior to Admission   Medication Sig    acetaminophen (TYLENOL) 325 MG tablet Take 650 mg by mouth every 6 (six) hours as needed for Pain.    albuterol-ipratropium (DUO-NEB) 2.5 mg-0.5 mg/3 mL nebulizer solution Inhale 3 mLs into the lungs 3 (three) times daily.    amLODIPine (NORVASC) 10 MG tablet Take 10 mg by mouth once daily.    ascorbic acid, vitamin C, (VITAMIN C) 500 MG tablet Take 500 mg by mouth once daily.    aspirin 81 MG Chew Take 81 mg by mouth once daily.    calcium carbonate-vitamin D3 (OYSTER SHELL CALCIUM-VIT D3) 500 mg-5 mcg (200 unit) PwPk Take 1 tablet by mouth 2 (two) times a day.    carvediloL (COREG) 25 MG tablet Take 1 tablet (25 mg total) by mouth 2 (two) times daily with meals.    citalopram (CELEXA) 20 MG tablet Take 20 mg by mouth once daily.    coffee xt/phosphatidyl serine (NEURIVA ORIGINAL ORAL) Take 1 capsule by mouth once daily.    flecainide (TAMBOCOR) 50 MG Tab Take 50 mg by mouth 2 (two) times daily.    folic acid (FOLVITE) 1 MG tablet Take 1 tablet by mouth every morning.    ibuprofen (ADVIL,MOTRIN) 600 MG tablet Take 600 mg by mouth every 6 (six) hours as needed for Pain.    lactulose (CHRONULAC) 10  gram/15 mL solution Take 45 mLs by mouth daily as needed (constipation).    megestroL (MEGACE) 400 mg/10 mL (40 mg/mL) Susp Take 10 mLs by mouth 2 (two) times a day.    metoclopramide HCl (REGLAN) 5 MG tablet Take 5 mg by mouth once daily.    multivitamin (THERAGRAN) per tablet Take 1 tablet by mouth once daily.    pantoprazole (PROTONIX) 40 MG tablet Take 40 mg by mouth once daily.    pentoxifylline (TRENTAL) 400 mg TbSR Take 400 mg by mouth 2 (two) times daily.    polyethylene glycol (GLYCOLAX) 17 gram/dose powder Take 17 g by mouth 2 (two) times daily.    rosuvastatin (CRESTOR) 5 MG tablet Take 5 mg by mouth once daily.    sevelamer carbonate (RENVELA) 800 mg Tab Take 1 tablet by mouth 5 times per day    tamsulosin (FLOMAX) 0.4 mg Cap Take 1 capsule (0.4 mg total) by mouth once daily.    UNABLE TO FIND Take 1 tablet by mouth once daily. medication name: OPTIMAL ANTIOX    UNABLE TO FIND Take 1 tablet by mouth nightly. medication name: QUNOL SLEEP SUPPORT    VITAMIN B COMPLEX ORAL Take 1 tablet by mouth every morning.    zinc gluconate 50 mg tablet Take 50 mg by mouth once daily.     Antibiotics (From admission, onward)      Start     Stop Route Frequency Ordered    08/18/23 1700  tobramycin-dexAMETHasone 0.3-0.1% ophthalmic suspension 1 drop         09/15/23 2059 Both Eyes 4 times daily 08/18/23 1411    08/18/23 1515  tobramycin-dexAMETHasone 0.3-0.1% ophthalmic ointment         -- RIGHT EYE 3 times daily 08/18/23 1411    08/16/23 2343  meropenem (MERREM) 500 mg injection        Note to Pharmacy: Created by cabinet override    08/17/23 1144   08/16/23 2343    08/16/23 2100  tobramycin-dexAMETHasone 0.3-0.1% ophthalmic ointment         09/15/23 2059 LEFT EYE 3 times daily 08/16/23 1732    08/13/23 0945  mupirocin 2 % ointment         08/18/23 0859 Nasl 2 times daily 08/13/23 0840    08/13/23 0000  meropenem (MERREM) 500 mg in sodium chloride 0.9 % 100 mL IVPB (MB+)         -- IV Every 12 hours  (non-standard times) 08/12/23 1507    08/09/23 1418  vancomycin - pharmacy to dose  (vancomycin IVPB (PEDS and ADULTS))        See Hyperspace for full Linked Orders Report.    -- IV pharmacy to manage frequency 08/09/23 1319          Antifungals (From admission, onward)      Start     Stop Route Frequency Ordered    08/18/23 0900  fluconazole tablet 400 mg        See Hyperspace for full Linked Orders Report.    -- Oral Daily 08/17/23 1331          Antivirals (From admission, onward)      None             Immunization History   Administered Date(s) Administered    Influenza 10/30/1997, 11/14/2000    Pneumococcal Conjugate - 20 Valent 03/17/2023       Family History       Problem Relation (Age of Onset)    Diabetes Father, Sister    Heart disease Father    Hyperlipidemia Brother    Kidney disease Father    Stroke Mother          Social History     Socioeconomic History    Marital status: Single   Tobacco Use    Smoking status: Former     Current packs/day: 0.00     Types: Cigarettes, Cigars    Smokeless tobacco: Never   Substance and Sexual Activity    Alcohol use: Not Currently    Drug use: Not Currently     Social Determinants of Health     Financial Resource Strain: Low Risk  (8/10/2023)    Overall Financial Resource Strain (CARDIA)     Difficulty of Paying Living Expenses: Not very hard   Food Insecurity: No Food Insecurity (8/10/2023)    Hunger Vital Sign     Worried About Running Out of Food in the Last Year: Never true     Ran Out of Food in the Last Year: Never true   Transportation Needs: No Transportation Needs (8/10/2023)    PRAPARE - Transportation     Lack of Transportation (Medical): No     Lack of Transportation (Non-Medical): No   Physical Activity: Insufficiently Active (8/10/2023)    Exercise Vital Sign     Days of Exercise per Week: 5 days     Minutes of Exercise per Session: 20 min   Stress: Stress Concern Present (8/10/2023)    [employer] [place] [employer] - Occupational  Stress Questionnaire     Feeling of Stress : Rather much   Social Connections: Socially Isolated (8/10/2023)    Social Connection and Isolation Panel [NHANES]     Frequency of Communication with Friends and Family: Twice a week     Frequency of Social Gatherings with Friends and Family: Once a week     Attends Christianity Services: Never     Active Member of Clubs or Organizations: No     Attends Club or Organization Meetings: Never     Marital Status: Never    Housing Stability: Unknown (8/10/2023)    Housing Stability Vital Sign     Unable to Pay for Housing in the Last Year: No     Unstable Housing in the Last Year: No     Review of Systems   Constitutional:  Negative for chills, diaphoresis, fatigue and fever.   Eyes:  Positive for visual disturbance. Negative for pain and redness.   Respiratory:  Negative for shortness of breath.    Cardiovascular:  Negative for chest pain.   Neurological:  Positive for headaches. Negative for dizziness and light-headedness.   Psychiatric/Behavioral:  Negative for confusion.      Objective:     Vital Signs (Most Recent):  Temp: 99.2 °F (37.3 °C) (08/19/23 0825)  Pulse: 60 (08/19/23 1047)  Resp: 20 (08/19/23 0825)  BP: (!) 150/80 (08/19/23 0825)  SpO2: 100 % (08/19/23 0825) Vital Signs (24h Range):  Temp:  [98.1 °F (36.7 °C)-99.2 °F (37.3 °C)] 99.2 °F (37.3 °C)  Pulse:  [60-70] 60  Resp:  [17-20] 20  SpO2:  [99 %-100 %] 100 %  BP: (130-173)/(70-84) 150/80     Weight: 68 kg (150 lb)  Body mass index is 21.52 kg/m².    Estimated Creatinine Clearance: 13.7 mL/min (A) (based on SCr of 5.6 mg/dL (H)).     Physical Exam  Vitals and nursing note reviewed.   Constitutional:       General: He is not in acute distress.     Appearance: Normal appearance. He is not ill-appearing.   HENT:      Head: Normocephalic and atraumatic.   Eyes:      Comments: Left eye enucleated. Eye lid and surrounding tissue non edematous and erythematous,  thin discharge from his L eye now improved    Right eye non erythematous. EOM intact   Cardiovascular:      Rate and Rhythm: Normal rate and regular rhythm.      Pulses: Normal pulses.      Heart sounds: Normal heart sounds. No murmur heard.  Pulmonary:      Effort: Pulmonary effort is normal. No respiratory distress.      Breath sounds: Normal breath sounds. No wheezing or rales.   Abdominal:      General: Abdomen is flat.      Palpations: Abdomen is soft.   Musculoskeletal:      Cervical back: Normal range of motion.      Right lower leg: No edema.      Left lower leg: No edema.   Skin:     General: Skin is warm and dry.      Comments: RUE with recent incision, staples in place  Clean, no erythematous, no oozing or drainage    Neurological:      General: No focal deficit present.      Mental Status: Mental status is at baseline.   Psychiatric:         Mood and Affect: Mood normal.         Behavior: Behavior normal.          Significant Labs: Blood Culture:   Recent Labs   Lab 08/09/23  1434   LABBLOO No growth after 5 days.  No growth after 5 days.       CBC:   Recent Labs   Lab 08/18/23  0454 08/19/23  0441   WBC 8.18 8.01   HGB 8.6* 8.8*   HCT 30.1* 32.5*    193       CMP:   Recent Labs   Lab 08/18/23  0454 08/18/23  1807 08/18/23  2143 08/19/23  0441     138  139 137 137 136  137   K 4.4  4.6  4.5 4.3 4.6 4.3  4.3     105  104 103 101 102  102   CO2 22*  23  24 25 25 25  26   GLU 52*  51*  52* 86 80 71  72   BUN 24*  24*  24* 17 17 20  20   CREATININE 5.9*  6.4*  6.3* 4.7* 5.0* 5.5*  5.6*   CALCIUM 9.2  9.1  9.2 8.4* 8.7 8.7  9.0   PROT 6.3  --   --  6.0   ALBUMIN 2.2*  2.2*  2.3* 2.0* 2.1* 2.1*  2.1*   BILITOT 0.3  --   --  0.3   ALKPHOS 100  --   --  103   AST 19  --   --  14   ALT <5*  --   --  <5*   ANIONGAP 11  10  11 9 11 9  9       Wound Culture:   Recent Labs   Lab 08/16/23  1629 08/16/23  1632 08/16/23  1636 08/16/23  1647   LABAERO No growth No growth No growth No significant isolate          Significant Imaging: I have reviewed all pertinent imaging results/findings within the past 24 hours.

## 2023-08-19 NOTE — ASSESSMENT & PLAN NOTE
History of AF noted however no EKG here with AF  Not on AC due to lack of definitive evidence of AF  Continue carvedilol

## 2023-08-19 NOTE — PLAN OF CARE
Patient making good progress toward stated goals. Continue POC.     Goals to be met by: 8/25/23     Patient will increase functional independence with ADLs by performing:    UE Dressing with Minimal Assistance.  Grooming while seated with Stand-by Assistance.  Toileting from bedside commode with Minimal Assistance for hygiene and clothing management.   Sit to stand transfer with Contact Guard Assistance and use of RW.   Sitting at edge of bed >25 minutes with Supervision.  Step transfer with Minimal Assistance and use of RW.   Toilet transfer to bedside commode with Minimal Assistance and use of RW.

## 2023-08-20 PROBLEM — R39.198 DIFFICULTY IN URINATION: Status: ACTIVE | Noted: 2023-08-20

## 2023-08-20 LAB
ALBUMIN SERPL BCP-MCNC: 1.9 G/DL (ref 3.5–5.2)
ALBUMIN SERPL BCP-MCNC: 2 G/DL (ref 3.5–5.2)
ALBUMIN SERPL BCP-MCNC: 2 G/DL (ref 3.5–5.2)
ALBUMIN SERPL BCP-MCNC: 2.1 G/DL (ref 3.5–5.2)
ALP SERPL-CCNC: 99 U/L (ref 55–135)
ALT SERPL W/O P-5'-P-CCNC: <5 U/L (ref 10–44)
ANION GAP SERPL CALC-SCNC: 11 MMOL/L (ref 8–16)
ANION GAP SERPL CALC-SCNC: 11 MMOL/L (ref 8–16)
ANION GAP SERPL CALC-SCNC: 12 MMOL/L (ref 8–16)
ANION GAP SERPL CALC-SCNC: 12 MMOL/L (ref 8–16)
AST SERPL-CCNC: 13 U/L (ref 10–40)
BILIRUB SERPL-MCNC: 0.3 MG/DL (ref 0.1–1)
BUN SERPL-MCNC: 29 MG/DL (ref 6–20)
BUN SERPL-MCNC: 29 MG/DL (ref 6–20)
BUN SERPL-MCNC: 35 MG/DL (ref 6–20)
BUN SERPL-MCNC: 39 MG/DL (ref 6–20)
CALCIUM SERPL-MCNC: 9.2 MG/DL (ref 8.7–10.5)
CALCIUM SERPL-MCNC: 9.2 MG/DL (ref 8.7–10.5)
CALCIUM SERPL-MCNC: 9.3 MG/DL (ref 8.7–10.5)
CALCIUM SERPL-MCNC: 9.3 MG/DL (ref 8.7–10.5)
CHLORIDE SERPL-SCNC: 100 MMOL/L (ref 95–110)
CHLORIDE SERPL-SCNC: 100 MMOL/L (ref 95–110)
CHLORIDE SERPL-SCNC: 101 MMOL/L (ref 95–110)
CHLORIDE SERPL-SCNC: 102 MMOL/L (ref 95–110)
CO2 SERPL-SCNC: 23 MMOL/L (ref 23–29)
CO2 SERPL-SCNC: 24 MMOL/L (ref 23–29)
CO2 SERPL-SCNC: 25 MMOL/L (ref 23–29)
CO2 SERPL-SCNC: 25 MMOL/L (ref 23–29)
CREAT SERPL-MCNC: 6.2 MG/DL (ref 0.5–1.4)
CREAT SERPL-MCNC: 6.6 MG/DL (ref 0.5–1.4)
CREAT SERPL-MCNC: 6.8 MG/DL (ref 0.5–1.4)
CREAT SERPL-MCNC: 7.1 MG/DL (ref 0.5–1.4)
ERYTHROCYTE [DISTWIDTH] IN BLOOD BY AUTOMATED COUNT: 17 % (ref 11.5–14.5)
EST. GFR  (NO RACE VARIABLE): 8.3 ML/MIN/1.73 M^2
EST. GFR  (NO RACE VARIABLE): 8.7 ML/MIN/1.73 M^2
EST. GFR  (NO RACE VARIABLE): 9 ML/MIN/1.73 M^2
EST. GFR  (NO RACE VARIABLE): 9.7 ML/MIN/1.73 M^2
GLUCOSE SERPL-MCNC: 74 MG/DL (ref 70–110)
GLUCOSE SERPL-MCNC: 74 MG/DL (ref 70–110)
GLUCOSE SERPL-MCNC: 80 MG/DL (ref 70–110)
GLUCOSE SERPL-MCNC: 81 MG/DL (ref 70–110)
HCT VFR BLD AUTO: 27.1 % (ref 40–54)
HGB BLD-MCNC: 8.1 G/DL (ref 14–18)
MAGNESIUM SERPL-MCNC: 2.6 MG/DL (ref 1.6–2.6)
MAGNESIUM SERPL-MCNC: 2.6 MG/DL (ref 1.6–2.6)
MAGNESIUM SERPL-MCNC: 2.7 MG/DL (ref 1.6–2.6)
MCH RBC QN AUTO: 30.7 PG (ref 27–31)
MCHC RBC AUTO-ENTMCNC: 29.9 G/DL (ref 32–36)
MCV RBC AUTO: 103 FL (ref 82–98)
PHOSPHATE SERPL-MCNC: 5.7 MG/DL (ref 2.7–4.5)
PHOSPHATE SERPL-MCNC: 5.8 MG/DL (ref 2.7–4.5)
PHOSPHATE SERPL-MCNC: 5.9 MG/DL (ref 2.7–4.5)
PLATELET # BLD AUTO: 242 K/UL (ref 150–450)
PMV BLD AUTO: 10.2 FL (ref 9.2–12.9)
POCT GLUCOSE: 102 MG/DL (ref 70–110)
POCT GLUCOSE: 457 MG/DL (ref 70–110)
POCT GLUCOSE: 68 MG/DL (ref 70–110)
POCT GLUCOSE: 69 MG/DL (ref 70–110)
POCT GLUCOSE: 72 MG/DL (ref 70–110)
POCT GLUCOSE: 75 MG/DL (ref 70–110)
POCT GLUCOSE: 82 MG/DL (ref 70–110)
POCT GLUCOSE: 98 MG/DL (ref 70–110)
POCT GLUCOSE: 99 MG/DL (ref 70–110)
POTASSIUM SERPL-SCNC: 4.7 MMOL/L (ref 3.5–5.1)
POTASSIUM SERPL-SCNC: 4.7 MMOL/L (ref 3.5–5.1)
POTASSIUM SERPL-SCNC: 4.9 MMOL/L (ref 3.5–5.1)
POTASSIUM SERPL-SCNC: 5 MMOL/L (ref 3.5–5.1)
PROT SERPL-MCNC: 5.9 G/DL (ref 6–8.4)
RBC # BLD AUTO: 2.64 M/UL (ref 4.6–6.2)
SODIUM SERPL-SCNC: 136 MMOL/L (ref 136–145)
SODIUM SERPL-SCNC: 136 MMOL/L (ref 136–145)
SODIUM SERPL-SCNC: 137 MMOL/L (ref 136–145)
SODIUM SERPL-SCNC: 137 MMOL/L (ref 136–145)
VANCOMYCIN SERPL-MCNC: 19.3 UG/ML
WBC # BLD AUTO: 7.29 K/UL (ref 3.9–12.7)

## 2023-08-20 PROCEDURE — 80053 COMPREHEN METABOLIC PANEL: CPT

## 2023-08-20 PROCEDURE — 80202 ASSAY OF VANCOMYCIN: CPT | Performed by: HOSPITALIST

## 2023-08-20 PROCEDURE — 25000003 PHARM REV CODE 250: Performed by: INTERNAL MEDICINE

## 2023-08-20 PROCEDURE — 25000003 PHARM REV CODE 250

## 2023-08-20 PROCEDURE — 83735 ASSAY OF MAGNESIUM: CPT | Performed by: STUDENT IN AN ORGANIZED HEALTH CARE EDUCATION/TRAINING PROGRAM

## 2023-08-20 PROCEDURE — 36415 COLL VENOUS BLD VENIPUNCTURE: CPT | Performed by: STUDENT IN AN ORGANIZED HEALTH CARE EDUCATION/TRAINING PROGRAM

## 2023-08-20 PROCEDURE — 63600175 PHARM REV CODE 636 W HCPCS

## 2023-08-20 PROCEDURE — 21400001 HC TELEMETRY ROOM

## 2023-08-20 PROCEDURE — 80069 RENAL FUNCTION PANEL: CPT | Mod: 91 | Performed by: STUDENT IN AN ORGANIZED HEALTH CARE EDUCATION/TRAINING PROGRAM

## 2023-08-20 PROCEDURE — 25000003 PHARM REV CODE 250: Performed by: STUDENT IN AN ORGANIZED HEALTH CARE EDUCATION/TRAINING PROGRAM

## 2023-08-20 PROCEDURE — 11000001 HC ACUTE MED/SURG PRIVATE ROOM

## 2023-08-20 PROCEDURE — 85027 COMPLETE CBC AUTOMATED: CPT

## 2023-08-20 PROCEDURE — 99233 PR SUBSEQUENT HOSPITAL CARE,LEVL III: ICD-10-PCS | Mod: ,,, | Performed by: HOSPITALIST

## 2023-08-20 PROCEDURE — 99233 SBSQ HOSP IP/OBS HIGH 50: CPT | Mod: ,,, | Performed by: HOSPITALIST

## 2023-08-20 PROCEDURE — 63600175 PHARM REV CODE 636 W HCPCS: Performed by: INTERNAL MEDICINE

## 2023-08-20 RX ORDER — METOCLOPRAMIDE HYDROCHLORIDE 5 MG/ML
10 INJECTION INTRAMUSCULAR; INTRAVENOUS
Status: DISCONTINUED | OUTPATIENT
Start: 2023-08-20 | End: 2023-08-21

## 2023-08-20 RX ORDER — HEPARIN SODIUM,PORCINE/D5W 25000/250
0-40 INTRAVENOUS SOLUTION INTRAVENOUS CONTINUOUS
Status: DISCONTINUED | OUTPATIENT
Start: 2023-08-20 | End: 2023-08-20

## 2023-08-20 RX ORDER — TAMSULOSIN HYDROCHLORIDE 0.4 MG/1
0.4 CAPSULE ORAL DAILY
Status: DISCONTINUED | OUTPATIENT
Start: 2023-08-20 | End: 2023-08-25

## 2023-08-20 RX ORDER — HYDRALAZINE HYDROCHLORIDE 20 MG/ML
10 INJECTION INTRAMUSCULAR; INTRAVENOUS ONCE
Status: COMPLETED | OUTPATIENT
Start: 2023-08-20 | End: 2023-08-20

## 2023-08-20 RX ADMIN — SEVELAMER CARBONATE 0.8 G: 0.8 POWDER, FOR SUSPENSION ORAL at 04:08

## 2023-08-20 RX ADMIN — METOCLOPRAMIDE 10 MG: 5 INJECTION, SOLUTION INTRAMUSCULAR; INTRAVENOUS at 10:08

## 2023-08-20 RX ADMIN — APIXABAN 10 MG: 5 TABLET, FILM COATED ORAL at 09:08

## 2023-08-20 RX ADMIN — TOBRAMYCIN AND DEXAMETHASONE 1 DROP: 3; 1 SUSPENSION/ DROPS OPHTHALMIC at 04:08

## 2023-08-20 RX ADMIN — SEVELAMER CARBONATE 0.8 G: 0.8 POWDER, FOR SUSPENSION ORAL at 09:08

## 2023-08-20 RX ADMIN — ONDANSETRON 8 MG: 2 INJECTION INTRAMUSCULAR; INTRAVENOUS at 04:08

## 2023-08-20 RX ADMIN — MEROPENEM 500 MG: 500 INJECTION INTRAVENOUS at 02:08

## 2023-08-20 RX ADMIN — CARVEDILOL 12.5 MG: 12.5 TABLET, FILM COATED ORAL at 09:08

## 2023-08-20 RX ADMIN — POLYETHYLENE GLYCOL 3350 17 G: 17 POWDER, FOR SOLUTION ORAL at 10:08

## 2023-08-20 RX ADMIN — ACETAMINOPHEN 1000 MG: 500 TABLET ORAL at 02:08

## 2023-08-20 RX ADMIN — ISOSORBIDE DINITRATE: 20 TABLET ORAL at 10:08

## 2023-08-20 RX ADMIN — APIXABAN 10 MG: 5 TABLET, FILM COATED ORAL at 01:08

## 2023-08-20 RX ADMIN — METOCLOPRAMIDE 10 MG: 5 INJECTION, SOLUTION INTRAMUSCULAR; INTRAVENOUS at 07:08

## 2023-08-20 RX ADMIN — APIXABAN 10 MG: 5 TABLET, FILM COATED ORAL at 10:08

## 2023-08-20 RX ADMIN — TOBRAMYCIN AND DEXAMETHASONE: 3; 1 OINTMENT OPHTHALMIC at 10:08

## 2023-08-20 RX ADMIN — POLYETHYLENE GLYCOL 3350 17 G: 17 POWDER, FOR SOLUTION ORAL at 09:08

## 2023-08-20 RX ADMIN — ONDANSETRON 8 MG: 2 INJECTION INTRAMUSCULAR; INTRAVENOUS at 07:08

## 2023-08-20 RX ADMIN — ACETAMINOPHEN 1000 MG: 500 TABLET ORAL at 10:08

## 2023-08-20 RX ADMIN — ONDANSETRON 8 MG: 2 INJECTION INTRAMUSCULAR; INTRAVENOUS at 10:08

## 2023-08-20 RX ADMIN — SENNOSIDES AND DOCUSATE SODIUM 1 TABLET: 50; 8.6 TABLET ORAL at 09:08

## 2023-08-20 RX ADMIN — ACETAMINOPHEN 1000 MG: 500 TABLET ORAL at 09:08

## 2023-08-20 RX ADMIN — TOBRAMYCIN AND DEXAMETHASONE 1 DROP: 3; 1 SUSPENSION/ DROPS OPHTHALMIC at 02:08

## 2023-08-20 RX ADMIN — TOBRAMYCIN AND DEXAMETHASONE: 3; 1 OINTMENT OPHTHALMIC at 09:08

## 2023-08-20 RX ADMIN — ISOSORBIDE DINITRATE: 20 TABLET ORAL at 09:08

## 2023-08-20 RX ADMIN — CARVEDILOL 12.5 MG: 12.5 TABLET, FILM COATED ORAL at 10:08

## 2023-08-20 RX ADMIN — TAMSULOSIN HYDROCHLORIDE 0.4 MG: 0.4 CAPSULE ORAL at 10:08

## 2023-08-20 RX ADMIN — HYDRALAZINE HYDROCHLORIDE 10 MG: 20 INJECTION, SOLUTION INTRAMUSCULAR; INTRAVENOUS at 01:08

## 2023-08-20 RX ADMIN — PROMETHAZINE HYDROCHLORIDE 12.5 MG: 25 INJECTION, SOLUTION INTRAMUSCULAR; INTRAVENOUS at 06:08

## 2023-08-20 RX ADMIN — TOBRAMYCIN AND DEXAMETHASONE: 3; 1 OINTMENT OPHTHALMIC at 02:08

## 2023-08-20 RX ADMIN — FLUCONAZOLE 400 MG: 200 TABLET ORAL at 09:08

## 2023-08-20 RX ADMIN — ISOSORBIDE DINITRATE: 20 TABLET ORAL at 02:08

## 2023-08-20 RX ADMIN — TOBRAMYCIN AND DEXAMETHASONE 1 DROP: 3; 1 SUSPENSION/ DROPS OPHTHALMIC at 09:08

## 2023-08-20 RX ADMIN — TOBRAMYCIN AND DEXAMETHASONE 1 DROP: 3; 1 SUSPENSION/ DROPS OPHTHALMIC at 12:08

## 2023-08-20 RX ADMIN — TOBRAMYCIN AND DEXAMETHASONE: 3; 1 OINTMENT OPHTHALMIC at 12:08

## 2023-08-20 RX ADMIN — LOSARTAN POTASSIUM 100 MG: 50 TABLET, FILM COATED ORAL at 09:08

## 2023-08-20 RX ADMIN — Medication 6 MG: at 10:08

## 2023-08-20 RX ADMIN — SENNOSIDES AND DOCUSATE SODIUM 1 TABLET: 50; 8.6 TABLET ORAL at 10:08

## 2023-08-20 RX ADMIN — CALCITRIOL CAPSULES 0.25 MCG 0.25 MCG: 0.25 CAPSULE ORAL at 09:08

## 2023-08-20 RX ADMIN — METOCLOPRAMIDE 10 MG: 5 INJECTION, SOLUTION INTRAMUSCULAR; INTRAVENOUS at 04:08

## 2023-08-20 RX ADMIN — ATORVASTATIN CALCIUM 40 MG: 40 TABLET, FILM COATED ORAL at 09:08

## 2023-08-20 RX ADMIN — TOBRAMYCIN AND DEXAMETHASONE 1 DROP: 3; 1 SUSPENSION/ DROPS OPHTHALMIC at 10:08

## 2023-08-20 NOTE — ASSESSMENT & PLAN NOTE
History of AF noted however no EKG here with AF  Not on AC due to lack of definitive evidence of AF  Continue carvedilol  eliquis indication is the non-occlusive DVT in right upper arm veins      Plan:  Received curshed eliquis through peg tube last night

## 2023-08-20 NOTE — ASSESSMENT & PLAN NOTE
Hypertensive on arrival  bp today: 156/88 SBP range: 142-172  Home medications: imdur Carvedilol Hydralazine Nifedipine  Had single attack of hypertension approx  during last night, patient was given another dose of hydralazine    Plan:  On hydralazine And losartan and carvedilol in patient, attempting to uptitrate GDMT  If BP remains uncontrolled, will switch hydralazine to BiDil

## 2023-08-20 NOTE — PROGRESS NOTES
Pharmacokinetic Assessment Follow Up: IV Vancomycin    Vancomycin serum concentration assessment(s):    The random level was drawn correctly and can be used to guide therapy at this time. The measurement is within the desired definitive target range of 15 to 20 mcg/mL.    Vancomycin Regimen Plan:    Will continue to hold Vancomycin due to HD schedule  Re-dose 8/21/2023 after dialysis and based on random level  Repeat random level with AM labs ordered for 8/21/2023    Drug levels (last 3 results):  Recent Labs   Lab Result Units 08/19/23  0441 08/20/23  0526   Vancomycin, Random ug/mL 20.2 19.3       Pharmacy will continue to follow and monitor vancomycin.    Please contact pharmacy at extension [phone] for questions regarding this assessment.    Thank you for the consult,   Yasmine Wilson       Patient brief summary:  Immanuel Bernal is a 57 y.o. male initiated on antimicrobial therapy with IV Vancomycin for treatment of  endphthalmitis    The patient's current regimen is pulse dose based on level    Drug Allergies:   Review of patient's allergies indicates:   Allergen Reactions    Morphine Rash    Amiodarone analogues Itching     Other reaction(s): Unknown       Actual Body Weight:   68 kg    Renal Function:   Estimated Creatinine Clearance: 11.6 mL/min (A) (based on SCr of 6.6 mg/dL (H)).,     Dialysis Method (if applicable):  intermittent HD    CBC (last 72 hours):  Recent Labs   Lab Result Units 08/18/23  0454 08/19/23  0441 08/20/23  0526   WBC K/uL 8.18 8.01 7.29   Hemoglobin g/dL 8.6* 8.8* 8.1*   Hematocrit % 30.1* 32.5* 27.1*   Platelets K/uL 258 193 242       Metabolic Panel (last 72 hours):  Recent Labs   Lab Result Units 08/17/23  1426 08/18/23  0454 08/18/23  1807 08/18/23  2143 08/19/23  0441 08/19/23  1326 08/19/23  2137 08/20/23  0526   Sodium mmol/L 139 137  138  139 137 137 136  137 137 135* 136  137   Potassium mmol/L 4.6 4.4  4.6  4.5 4.3 4.6 4.3  4.3 4.7 5.2* 4.7  4.7   Chloride mmol/L 105  104  105  104 103 101 102  102 102 102 100  101   CO2 mmol/L 22* 22*  23  24 25 25 25  26 26 22* 25  24   Glucose mg/dL 88 52*  51*  52* 86 80 71  72 80 86 74  74   BUN mg/dL 20 24*  24*  24* 17 17 20  20 22* 25* 29*  29*   Creatinine mg/dL 5.5* 5.9*  6.4*  6.3* 4.7* 5.0* 5.5*  5.6* 6.0* 6.3* 6.2*  6.6*   Albumin g/dL 2.2* 2.2*  2.2*  2.3* 2.0* 2.1* 2.1*  2.1* 2.1* 2.0* 2.1*  2.0*   Total Bilirubin mg/dL  --  0.3  --   --  0.3  --   --  0.3   Alkaline Phosphatase U/L  --  100  --   --  103  --   --  99   AST U/L  --  19  --   --  14  --   --  13   ALT U/L  --  <5*  --   --  <5*  --   --  <5*   Magnesium mg/dL 2.6 2.6  2.7* 2.4 2.3 2.5 2.5 2.5 2.6   Phosphorus mg/dL 4.4 4.3  4.3 3.6 3.9 4.6* 5.2* 5.6* 5.7*       Vancomycin Administrations:  vancomycin given in the last 96 hours                     vancomycin (VANCOCIN) 500 mg in dextrose 5 % in water (D5W) 100 mL IVPB (MB+) (mg) 500 mg New Bag 08/18/23 1540                    Microbiologic Results:  Microbiology Results (last 7 days)       Procedure Component Value Units Date/Time    Aerobic culture [[id number]] Collected: 08/16/23 1629    Order Status: Completed Specimen: Wound from Eyelid, Left Updated: 08/19/23 0911     Aerobic Bacterial Culture No growth    Aerobic culture [[id number]] Collected: 08/16/23 1636    Order Status: Completed Specimen: Abscess from Eyelid, Left Updated: 08/19/23 0911     Aerobic Bacterial Culture No growth    Narrative:      Sclera abscess    Aerobic culture [[id number]] Collected: 08/16/23 1632    Order Status: Completed Specimen: Wound from Cornea, Left Updated: 08/19/23 0911     Aerobic Bacterial Culture No growth    Aerobic culture [[id number]] Collected: 08/16/23 1647    Order Status: Completed Specimen: Abscess from Eyelid, Left Updated: 08/18/23 1423     Aerobic Bacterial Culture No significant isolate    Culture, Anaerobe [[id number]] Collected: 08/16/23 1647    Order Status: Completed Specimen: Abscess  from Eyelid, Left Updated: 08/18/23 0911     Anaerobic Culture Culture in progress    Culture, Anaerobe [[id number]] Collected: 08/16/23 1636    Order Status: Completed Specimen: Abscess from Eyelid, Left Updated: 08/18/23 0909     Anaerobic Culture Culture in progress    Narrative:      Sclera abscess    Culture, Anaerobe [[id number]] Collected: 08/16/23 1632    Order Status: Completed Specimen: Wound from Cornea, Left Updated: 08/18/23 0909     Anaerobic Culture Culture in progress    Culture, Anaerobe [[id number]] Collected: 08/16/23 1628    Order Status: Completed Specimen: Wound from Eyelid, Left Updated: 08/18/23 0908     Anaerobic Culture Culture in progress    AFB Culture & Smear [[id number]] Collected: 08/16/23 1647    Order Status: Completed Specimen: Abscess from Eyelid, Left Updated: 08/17/23 2127     AFB Culture & Smear Culture in progress     AFB CULTURE STAIN No acid fast bacilli seen.    AFB Culture & Smear [[id number]] Collected: 08/16/23 1629    Order Status: Completed Specimen: Wound from Eyelid, Left Updated: 08/17/23 2127     AFB Culture & Smear Culture in progress     AFB CULTURE STAIN No acid fast bacilli seen.    AFB Culture & Smear [[id number]] Collected: 08/16/23 1636    Order Status: Completed Specimen: Abscess from Eyelid, Left Updated: 08/17/23 2127     AFB Culture & Smear Culture in progress     AFB CULTURE STAIN No acid fast bacilli seen.    Narrative:      Sclera abscess    AFB Culture & Smear [[id number]] Collected: 08/16/23 1632    Order Status: Completed Specimen: Wound from Cornea, Left Updated: 08/17/23 2127     AFB Culture & Smear Culture in progress     AFB CULTURE STAIN No acid fast bacilli seen.    Fungus culture [[id number]] Collected: 08/16/23 1636    Order Status: Completed Specimen: Abscess from Eyelid, Left Updated: 08/17/23 0618     Fungus (Mycology) Culture Culture in progress    Narrative:      Sclera abscess    Fungus culture [[id number]] Collected: 08/16/23 1647    Order  Status: Completed Specimen: Abscess from Eyelid, Left Updated: 08/17/23 0618     Fungus (Mycology) Culture Culture in progress    Fungus culture [[id number]] Collected: 08/16/23 1632    Order Status: Completed Specimen: Wound from Cornea, Left Updated: 08/17/23 0618     Fungus (Mycology) Culture Culture in progress    Fungus culture [[id number]] Collected: 08/16/23 1629    Order Status: Completed Specimen: Wound from Eyelid, Left Updated: 08/17/23 0618     Fungus (Mycology) Culture Culture in progress    Gram stain [[id number]] Collected: 08/16/23 1632    Order Status: Completed Specimen: Wound from Cornea, Left Updated: 08/16/23 1840     Gram Stain Result No WBC's      No organisms seen    Gram stain [[id number]] Collected: 08/16/23 1629    Order Status: Completed Specimen: Wound from Eyelid, Left Updated: 08/16/23 1840     Gram Stain Result Rare WBC's      No organisms seen    Gram stain [[id number]] Collected: 08/16/23 1647    Order Status: Completed Specimen: Abscess from Eyelid, Left Updated: 08/16/23 1839     Gram Stain Result No WBC's      Rare yeast    Narrative:      Vitreous abscess    Gram stain [[id number]] Collected: 08/16/23 1636    Order Status: Completed Specimen: Abscess from Eyelid, Left Updated: 08/16/23 1839     Gram Stain Result No WBC's      Rare yeast    Narrative:      Sclera abscess    Blood culture (site 2) [[id number]] Collected: 08/09/23 1434    Order Status: Completed Specimen: Blood Updated: 08/14/23 1812     Blood Culture, Routine No growth after 5 days.    Narrative:      Site # 2, aerobic only  Collection has been rescheduled by [email or web address] at 08/09/2023 13:27 Reason:   Patient unavailable in with DR   Collection has been rescheduled by [email or web address] at 08/09/2023 13:27 Reason:   Patient unavailable in with DR     Blood culture (site 1) [[id number]] Collected: 08/09/23 1434    Order Status: Completed Specimen: Blood Updated: 08/14/23 1812     Blood Culture, Routine No growth after 5 days.    Narrative:       Site # 1, aerobic and anaerobic  Collection has been rescheduled by [email or web address] at 08/09/2023 13:27 Reason:   Patient unavailable in with DR   Collection has been rescheduled by [email or web address] at 08/09/2023 13:27 Reason:   Patient unavailable in with DR

## 2023-08-20 NOTE — PLAN OF CARE
Pt slept well this shift. A&Ox 4. VSS. Pain controlled with PRN's. HD cath to R chest. Drsg C/D/I. O2 @ 2lpm NC. PEG flushed and drsg changed. Unable to tolerate HS PO meds d/t nausea. MD notified. N.O. received to give eliquis per PEG and 1 time IVP Hydralazine ordered. Administered with effect. BG stable this shift. T&RQ2. Skin care completed. Safety precautions in place. Call light in reach. No further concerns noted at this time.    Problem: Adult Inpatient Plan of Care  Goal: Plan of Care Review  Outcome: Ongoing, Progressing  Goal: Patient-Specific Goal (Individualized)  Outcome: Ongoing, Progressing  Goal: Absence of Hospital-Acquired Illness or Injury  Outcome: Ongoing, Progressing  Goal: Optimal Comfort and Wellbeing  Outcome: Ongoing, Progressing  Goal: Readiness for Transition of Care  Outcome: Ongoing, Progressing     Problem: Diabetes Comorbidity  Goal: Blood Glucose Level Within Targeted Range  Outcome: Ongoing, Progressing     Problem: Skin Injury Risk Increased  Goal: Skin Health and Integrity  Outcome: Ongoing, Progressing     Problem: Device-Related Complication Risk (Hemodialysis)  Goal: Safe, Effective Therapy Delivery  Outcome: Ongoing, Progressing     Problem: Hemodynamic Instability (Hemodialysis)  Goal: Effective Tissue Perfusion  Outcome: Ongoing, Progressing     Problem: Infection (Hemodialysis)  Goal: Absence of Infection Signs and Symptoms  Outcome: Ongoing, Progressing     Problem: Infection  Goal: Absence of Infection Signs and Symptoms  Outcome: Ongoing, Progressing     Problem: Coping Ineffective  Goal: Effective Coping  Outcome: Ongoing, Progressing     Problem: Device-Related Complication Risk (CRRT (Continuous Renal Replacement Therapy))  Goal: Safe, Effective Therapy Delivery  Outcome: Ongoing, Progressing     Problem: Hypothermia (CRRT (Continuous Renal Replacement Therapy))  Goal: Body Temperature Maintained in Desired Range  Outcome: Ongoing, Progressing     Problem: Infection  (CRRT (Continuous Renal Replacement Therapy))  Goal: Absence of Infection Signs and Symptoms  Outcome: Ongoing, Progressing     Problem: Impaired Wound Healing  Goal: Optimal Wound Healing  Outcome: Ongoing, Progressing     Problem: Fall Injury Risk  Goal: Absence of Fall and Fall-Related Injury  Outcome: Ongoing, Progressing

## 2023-08-20 NOTE — ASSESSMENT & PLAN NOTE
Element of BPH displayed  Producing good urine, but facing slight difficulty emptying    Plan:  Start flomax 0.4 daily po

## 2023-08-20 NOTE — PLAN OF CARE
Problem: Adult Inpatient Plan of Care  Goal: Plan of Care Review  Outcome: Ongoing, Progressing  Goal: Patient-Specific Goal (Individualized)  Outcome: Ongoing, Progressing  Goal: Absence of Hospital-Acquired Illness or Injury  Outcome: Ongoing, Progressing  Goal: Optimal Comfort and Wellbeing  Outcome: Ongoing, Progressing  Goal: Readiness for Transition of Care  Outcome: Ongoing, Progressing     Problem: Diabetes Comorbidity  Goal: Blood Glucose Level Within Targeted Range  Outcome: Ongoing, Progressing     Problem: Skin Injury Risk Increased  Goal: Skin Health and Integrity  Outcome: Ongoing, Progressing     Problem: Device-Related Complication Risk (Hemodialysis)  Goal: Safe, Effective Therapy Delivery  Outcome: Ongoing, Progressing     Problem: Hemodynamic Instability (Hemodialysis)  Goal: Effective Tissue Perfusion  Outcome: Ongoing, Progressing

## 2023-08-20 NOTE — PROGRESS NOTES
[facility] - Int[facility] Care ([address] Medicine  Progress Note    Patient Name: Immanuel Bernal  MRN: [id number]  Patient Class: IP- Inpatient   Admission Date: 8/9/2023  Length of Stay: 11 days  Attending Physician: Porsha Funez MD  Primary Care Provider: Dolly Primary Doctor        Subjective:     Principal Problem:Endophthalmitis        HPI:  HPI obtained per medical record as patient unable to communicate    57-year-old male with a history of CHF, diabetes, hypertension, chronic disability, end-stage renal disease on hemodialysis, PEG placement, bowel obstruction, sleep apnea, TIA, left eye vitreous hemorrhage, stroke, and bowel obstruction with bowel resection admitted to [facility] in [place] July 18 from nursing home with left eye pain and blurred vision.  He was admitted with concern for bilateral endophthalmitis.  Other admit diagnoses included right upper extremity thrombus, end-stage renal disease on hemodialysis, hyperkalemia, sleep apnea, diabetes, and CHF.  He was treated with broad-spectrum antibiotics.  He was seen by Infectious Diseases,, and he was treated with vancomycin, ceftriaxone, and amphotericin.  Blood cultures were positive for Pseudomonas, and amphotericin/vancomycin were stopped.  IV cefepime was continued.  He was seen by Ophthalmology, and he received intravitreal vancomycin and ceftazidime (July 18).  He also had intravitreal tap July 18 that had no yeast or fungal elements observed.  Left eye endophthalmitis did not respond to treatment, and he subsequently developed abscess formation, significant proptosis, and drainage of purulent material from the orbit.  Ophthalmology recommended enucleation.  He was continued on cefepime for pseudomonal and ophthalmitis.  Recommendation was for 6 weeks of treatment to be followed by indefinite fluoroquinolone.  He was seen by Pulmonology during his stay for a right upper lung mass with peripheral nodules.  It was  felt that he would need further investigation of this once his current clinical issues stabilized.  Right upper extremity AV graft was excised during his stay with concern for infection.  A right IJ tunneled line was placed on July 27.  Left eye endophthalmitis continued to worsen, and ophthalmology spoke with patient and family about the need for enucleation.  Despite several conversations, family declined enucleation.  Plans were for transitioned to skilled nursing facility for continued treatment, but family did not want him to go to a skilled nursing facility.  He was subsequently discharged AMA from the hospital there on August 7.  Please see the August 7 Internal Medicine note for further details.     He subsequently presented to [facility] Emergency Department.  He will not go back to [facility] in [place]. He received Zosyn and vancomycin.  ED team at [facility] spoke with the patient and his power-of- (sister).  CT of the orbits noted scleral abscess along the lateral aspect of the left globe.  Patient and family are aware and in agreement that the patient needs enucleation of the eye at this time. Referring provider spoke with Oculoplastics at [facility]. Requesting transfer to [facility] Medicine for Oculoplastics specialty evaluation of persistent endophthalmitis.  ED provider noted patient is hemodynamically stable.  He does not appear volume overloaded or in respiratory distress.      The patient has a significant previous medical course as listed below  August 3: MRI brain and orbits showed worsening ophthalmitis and inflammatory changes of the left orbital tissues with slight increase in proptosis.  No evidence of intracranial inflammatory process observed.    July 25: Transesophageal echocardiogram had no evidence of endocarditis.  Moderate to severely decreased left ventricular systolic function with EF 30-35%.    July 22:  CT chest showed right upper  lobe mass with numerous bilateral nodularity predominantly in the right with associated mediastinal lymph nodes.  July 21: Blood cultures with Pseudomonas aeruginosa  July 19: MRI brain/orbits with and without contrast had findings suggestive of chronic microvascular ischemic disease of the white matter with remote ischemic event in the left insula/basal ganglia/subependymal white matter/right middle cerebellar peduncle and hemisphere.  Findings consistent with left endophthalmitis.  No abnormal findings observed in the right globe.  July 18:  Blood cultures with Pseudomonas aeruginosa and coag-negative staph      Overview/Hospital Course:  Patient was rate controlled and therefore flecainide held. His GCS is 13/15. Oculopalstics were consulted and so were the nephrology teams and IR. He underwent dialysis on his first day. Was scheduled for surgery today but his family refused to have it done until she reviewed the MRI herself first. This means that the surgeon will only be available next Wednesday in order to allow her to view and discuss the MRI. He has a peg tube in place that has not been utilzied prior. Heart failure medications have been reinstated and is being covered by vancomycin and meropenem for the endopthalmitis. Multidisciplinary session was conducted with his POA in regards to his operation, she accepted the surgery if it was emergent, otherwise to be done this upcoming Wednesday. Patient's delirium/agitation worsened so he was transferred to the ICU until his clinical situation settled. He then was stepped down to our care and ophthalmology will be conducting his eye procedure on him as planned. Underwent L eye enucleation on 8/16. Started the tobramycin ointment and drops. Following ID recs regarding antimicrobial regimen; operative cultures returned with yeast thus patient was also started on fluconazole. Patient's sister noted us that he was diagnosed with central apnea and KENIA in the past of  which he used a CPAP/BiPAP for. No concern of lack of oxygenation during his inpatient stay. Labs are underway to detect any element of CO2 retention although he had no clinical signs of that.    Disposition: referrals to SNF have been sent. Family prefers [facility] or rehab and do not want to return to their previous nursing facility. Pending final ID recommendations at this time.       Interval History: patient vomited his food every time. Feels nauseous. Bp was on higher end overnight, given extra hydralazine dose STAT.    Review of Systems   Constitutional:  Negative for activity change.   HENT:  Ear discharge: minimal.    Eyes:  Positive for pain (minimal) and discharge (minimal).   Respiratory:  Negative for chest tightness, shortness of breath and wheezing.    Cardiovascular:  Negative for chest pain, palpitations and leg swelling.   Gastrointestinal:  Positive for nausea. Negative for abdominal distention, abdominal pain and vomiting (stopped since last night).   Neurological: Negative.      Objective:     Vital Signs (Most Recent):  Temp: 97.7 °F (36.5 °C) (08/20/23 0731)  Pulse: 69 (08/20/23 0731)  Resp: 13 (08/20/23 0731)  BP: (!) 147/82 (08/20/23 0731)  SpO2: 100 % (08/20/23 0731) Vital Signs (24h Range):  Temp:  [97.7 °F (36.5 °C)-98.3 °F (36.8 °C)] 97.7 °F (36.5 °C)  Pulse:  [60-69] 69  Resp:  [13-21] 13  SpO2:  [99 %-100 %] 100 %  BP: (140-172)/(74-91) 147/82     Weight: 68 kg (150 lb)  Body mass index is 21.52 kg/m².  No intake or output data in the 24 hours ending 08/20/23 0951      Physical Exam  Eyes:      General:         Left eye: Discharge present.  Cardiovascular:      Rate and Rhythm: Normal rate and regular rhythm.      Pulses: Normal pulses.      Heart sounds: Normal heart sounds.   Pulmonary:      Effort: Pulmonary effort is normal. No respiratory distress.      Breath sounds: Normal breath sounds.   Abdominal:      General: Abdomen is flat. There is no distension.      Palpations:  Abdomen is soft.      Tenderness: There is no abdominal tenderness.   Neurological:      General: No focal deficit present.      Mental Status: He is alert and oriented to person, place, and time. Mental status is at baseline.   Psychiatric:         Mood and Affect: Mood normal.             Significant Labs: All pertinent labs within the past 24 hours have been reviewed.    Significant Imaging: I have reviewed all pertinent imaging results/findings within the past 24 hours.      Assessment/Plan:      * Endophthalmitis  Ophthalmology team on board and were going to perform the procedure but the patient's family refused without being around to review the MRI which delayed the procedure till next wednesday    MRI repeat : Diffuse inflammatory change involving the left globe, with associated thickening of the left sclera, proptosis, and focal fluid collection within the left posterior compartment, which demonstrates diffusion restriction.  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  Diffuse left preseptal and postseptal inflammatory change, with superimposed orbital cellulitis also considered.  Evaluation of the left orbital apex is limited secondary to no contrast administration.  Decreased size of the left anterior compartment with bowing of the visualized left lens.  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    Thorough counseling undertaken with POA and IM team, palliative, opthalmo and nursing team in regards to the surgery  She agreed for her brother to undergo the procedure as an emergent case if his health deteriorates  Otherwise to be scheduled on wednesday    Underwent evisceration of his left eye yesterday, tobramycin ordered, ID recs are being followed  Still on meronema and vancomycin IV  Wbc: 8.07 (normal)  hgb post op 8.8 (at [facility])  No signs of distress or delirium  Culture of eye discharge grew yeast,  started flucanazole to antibiotic regimen  Follow cultures      Plan:  Follow ID recs  - Continue fluconazole, vanc and meropenem  - Pending final ID recs for discharge  Follow opthalomology recs  - Continue tobramycin eye drops and ointments  - Will see Dr Stewart frequently in the clinic for dressing changes    Difficulty in urination  Element of BPH displayed  Producing good urine, but facing slight difficulty emptying    Plan:  Start flomax 0.4 daily po      Redness and swelling of upper arm  Had staples in right upper arm from transfer hospital (the fistula clotted and had pseudomonas aurgenosa, and bacteremia), patient signed AMA before intervention was yielded there    Underwent Ultrasound US, findings:  1. Nonocclusive DVT in the right subclavian and axillary veins.  Catheter in the right subclavian vein.  2. 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    Seen by urology team:  Staples cannot be removed now as the skin is now adhering fully yet  Wound appears to be having dehiscence, for wound care team- consult sent already  DVT to be anticoagulated      Plan:  Wound care consult sent, pending recs  Anticoagulation started in the form of eliquis    Hypoglycemia  Latest POCT glucose is 111  Oral diet switched to low salt diet      Plan:  Follow glucose and notifiy if drops below <70      PEG (percutaneous endoscopic gastrostomy) status  On peg tube feeds now  Tolerating well  Able to take in PO per SLP assessment on 8/15, on regular diet with thin liquids  Will reduce tube feeds gradually as patient takes in more PO    Peg tube was found to be blocked yesterday, nursing staff were able to unclog the PEG tube  SLP evaluated patient : can restart oral feeds    Patient was nauseous and had multiple attacks of vomitus yesterday. Peg tube was used as port for administration of medications  This monring he is only feeling slightly nauseous without any attacks of  vomitus    Plan:  Nutrition consult  Add novosource until nutrition recs are ready  Will continue to attempt oral feeds    Encounter for palliative care        Anemia in ESRD (end-stage renal disease)  EPO per nephrology  hgb at baseline     Severe malnutrition  Nutrition consulted. Most recent weight and BMI monitored-     Measurements:  Wt Readings from Last 1 Encounters:   08/16/23 68 kg (150 lb)   Body mass index is 21.52 kg/m².    Patient has been screened and assessed by RD.    Malnutrition Type:  Context: chronic illness  Level: severe    Malnutrition Characteristic Summary:  Weight Loss (Malnutrition): greater than 10% in 6 months  Energy Intake (Malnutrition): other (see comments) (LAMONT)  Subcutaneous Fat (Malnutrition): severe depletion  Muscle Mass (Malnutrition): severe depletion    Interventions/Recommendations (treatment strategy):  1.     Able to take in PO per SLP assessment on 8/15, on regular diet with thin liquids  Will reduce tube feeds gradually as patient takes in more PO    ESRD (end stage renal disease)  Nephrology consulted for HD needs while inpatient  Underwent SLED post MRI contrast (gadolinium)   Ad last dialysis was 08/18: Tolerated HD, UF 2500ml  CRT today 6.4 >> 6.2 >> 6.6  Electrolytes normal      Plan:  Will be followed by nephro, follow up with their recommendations regarding dialysis      Atrial fibrillation  History of AF noted however no EKG here with AF  Not on AC due to lack of definitive evidence of AF  Continue carvedilol  eliquis indication is the non-occlusive DVT in right upper arm veins      Plan:  Received curshed eliquis through peg tube last night    Hyperlipidemia  Home statin    Chronic diastolic heart failure  Continue to monitor for signs of volume overload; volume removal with dialysis    Hypertension  Hypertensive on arrival  bp today: 156/88 SBP range: 142-172  Home medications: imdur Carvedilol Hydralazine Nifedipine  Had single attack of hypertension approx   during last night, patient was given another dose of hydralazine    Plan:  On hydralazine And losartan and carvedilol in patient, attempting to uptitrate GDMT  If BP remains uncontrolled, will switch hydralazine to BiDil    Stroke  Continue home statin  Delerium precautions  PT OT    Diabetes mellitus  POCT glucose  LDSS  -180 target    Running slightly on the lower end: BG POCT is 75  No event of actual hypoglycemia inpatient    Plan:  Continue to monitor  In the event that his BG drops lower than 70 patient's hypoglycemic medications will be regulated again      VTE Risk Mitigation (From admission, onward)         Ordered     apixaban tablet 5 mg  2 times daily        See Hyperspace for full Linked Orders Report.    08/20/23 0308     apixaban tablet 10 mg  2 times daily        See Hyperspace for full Linked Orders Report.    08/20/23 0308     heparin (porcine) injection 1,000 Units  As needed (PRN)         08/10/23 1046     IP VTE HIGH RISK PATIENT  Once         08/09/23 1317     Place sequential compression device  Until discontinued         08/09/23 1317                Discharge Planning   TOBY: 8/21/2023     Code Status: Full Code   Is the patient medically ready for discharge?: No    Reason for patient still in hospital (select all that apply): Treatment  Discharge Plan A: Long-term acute care facility (LTAC)   Discharge Delays: (!) Procedure Scheduling (IR, OR, Labs, Echo, Cath, Echo, EEG) (eye surgery next week)              Sharon Law MD  Department of Hospital Medicine   [facility] - Intensive Care ([place]-14)

## 2023-08-20 NOTE — ASSESSMENT & PLAN NOTE
Nephrology consulted for HD needs while inpatient  Underwent SLED post MRI contrast (gadolinium)   Ad last dialysis was 08/18: Tolerated HD, UF 2500ml  CRT today 6.4 >> 6.2 >> 6.6  Electrolytes normal      Plan:  Will be followed by nephro, follow up with their recommendations regarding dialysis

## 2023-08-20 NOTE — PLAN OF CARE
[facility]    FACILITY TRANSFER ORDERS      Patient Name: Immanuel Bernal  YOB: 1965    PCP: No, Primary Doctor   PCP Address: None  PCP Phone Number: None  PCP Fax: None    Encounter Date: 08/21/2023    Admit to: LTAC    Vital Signs:  Routine    Diagnoses:   Active Hospital Problems    Diagnosis  POA    *Endophthalmitis [H44.009]  Yes    Difficulty in urination [R39.198]  Unknown    Redness and swelling of upper arm [M79.89, R23.8]  Unknown    Hypoglycemia [E16.2]  Unknown    Anemia in ESRD (end-stage renal disease) [N18.6, D63.1]  Yes    Encounter for palliative care [Z51.5]  Not Applicable    PEG (percutaneous endoscopic gastrostomy) status [Z93.1]  Not Applicable    Severe malnutrition [E43]  Unknown    ESRD (end stage renal disease) [N18.6]  Yes    Atrial fibrillation [I48.91]  Yes    Chronic diastolic heart failure [I50.32]  Yes    Hyperlipidemia [E78.5]  Yes    Diabetes mellitus [E11.9]  Yes     Goals of therapy reviewed:   1. Weight BMI <24  2. BP <130/80 mmHg  3. A1C <7 (Use when available SGLT2# lowers risk of progression)  4. Low salt diet  5. Avoid all OTC meds (NSAIDS)  6. Max Tolerated dose of ACE/ARB        Stroke [I63.9]  Yes    Hypertension [I10]  Yes     Amlodipine can cause significant swelling, stop  Hydralazine is 3 times a osborn, poor compliance    Add CTH 25 g a day (leg swelling)   Resume ACE for better proteinuria control  RTC 3-4 weeks    Large amounts of sodium can be hidden in canned, processed and convenience foods. High amounts of sodium can be found in many foods that are served at fast food restaurants.  Sodium controls fluid balance in our bodies and maintains blood volume and blood pressure. Eating too much sodium may raise blood pressure and cause fluid retention, which could lead to swelling of the legs and feet or other health issues. When limiting sodium in your diet, a common target is to eat less than 2,000 milligrams of sodium per day.           Resolved Hospital Problems    Diagnosis Date Resolved POA    Acute metabolic encephalopathy [G93.41] 08/15/2023 Yes    Cholecystostomy care [Z43.4] 08/15/2023 Not Applicable    Chronic kidney disease-mineral and bone disorder [N18.9, E83.9, M89.9] 08/17/2023 Yes    Anemia in chronic kidney disease [N18.9, D63.1] 08/10/2023 Yes    Bipolar disorder [F31.9] 08/15/2023 Yes    Persistent proteinuria [R80.1] 08/15/2023 Yes     Chronic     2 g proteinuria noted  Resumed ACE  Lower BP systolic to less than 130       CKD (chronic kidney disease) stage 3, GFR 30-59 ml/min [N18.30] 08/15/2023 Yes     Diabetic since 2009    CKD diagnosed in [facility] 2018  Not clear when the CKD was diagnosed    GFR 25-30 ml/min  Proteinuria >2g  Resume ACE and increase as tolerated         Allergies:  Review of patient's allergies indicates:   Allergen Reactions    Morphine Rash    Amiodarone analogues Itching     Other reaction(s): Unknown       Diet: cardiac diet    Activities: Activity as tolerated    Goals of Care Treatment Preferences:  Code Status: Full Code    Health care agent[facility] agent number: No value filed.          What is most important right now is to focus on extending life as long as possible, even it it means sacrificing quality.  Accordingly, we have decided that the best plan to meet the patient's goals includes continuing with treatment.      Nursing:     Labs: See antibiotic plan below    Antibiotic Therapy Plan:        1) Infection: Pseudomonas bacteremia and Endophthalmitis with concern for extension into CNS      2) Discharge Antibiotics:     Intravenous antibiotics:  Meropenem 500 mg IV q 12 hours   Vancomycin at dose recommended by PharmD service      Oral antibiotics:  Fluconazole 400 mg PO q 24 hours      3) Therapy Duration:  4-6 weeks     Estimated end date of IV antibiotics: 9/12/23-9/26/23     4) Outpatient Weekly Labs:     Order the following labs to be drawn on  Mondays:   CBC  CMP   CRP     Vancomycin trough. Target 15-20     If vancomycin trough is not at target (15-20) prior to discharge, schedule vancomycin trough to be drawn before their fourth outpatient dose.     5) Fax Lab Results to Infectious Diseases Provider: Dr. Gideon Meneses     [facility] Fax Number: [phone]     6) Outpatient Infectious Diseases Follow-up     Follow-up appointment will be arranged by the ID clinic and will be found in the patient's appointments tab.     Prior to discharge, please ensure the patient's follow-up has been scheduled.    If there is still no follow-up scheduled prior to discharge, please send an EPIC message to Gisele Castro in Infectious Diseases.      CONSULTS:    Physical Therapy to evaluate and treat.  and Occupational Therapy to evaluate and treat.    MISCELLANEOUS CARE:  PEG Care: Clean site every 24 hours.     WOUND CARE ORDERS  Yes: Surgical Wound:  Location: right upper arm    Consult ET nurse        Apply the following to wound:   Wound gel daily    Medications: Review discharge medications with patient and family and provide education.      Current Discharge Medication List        START taking these medications    Details   apixaban (ELIQUIS) 5 mg Tab Take 1 tablet (5 mg total) by mouth 2 (two) times daily.      fluconazole (DIFLUCAN) 200 MG Tab Take 2 tablets (400 mg total) by mouth once daily.      isosorbide-hydrALAZINE 20-37.5 mg (BIDIL) 20-37.5 mg Tab Take 1 tablet by mouth 3 (three) times daily.  Qty: 90 tablet, Refills: 11      losartan (COZAAR) 100 MG tablet Take 1 tablet (100 mg total) by mouth once daily.  Qty: 90 tablet, Refills: 3    Comments: .      oxyCODONE-acetaminophen (PERCOCET) 5-325 mg per tablet Take 1 tablet by mouth every 4 (four) hours as needed for Pain.  Refills: 0    Comments: Quantity prescribed more than 7 day supply? No      sodium chloride 0.9 % PgBk 100 mL with meropenem 500 mg SolR 500 mg Inject 500 mg into the vein every 12  (twelve) hours.      tobramycin-dexAMETHasone 0.3-0.1% (TOBRADEX) 0.3-0.1 % DrpS Place 1 drop into both eyes 4 (four) times daily.      tobramycin-dexAMETHasone 0.3-0.1% (TOBRADEX) 0.3-0.1 % Oint Place into the left eye 3 (three) times daily.           CONTINUE these medications which have CHANGED    Details   atorvastatin (LIPITOR) 40 MG tablet Take 1 tablet (40 mg total) by mouth once daily.  Qty: 90 tablet, Refills: 3      calcitRIOL (ROCALTROL) 0.25 MCG Cap Take 1 capsule (0.25 mcg total) by mouth once daily.           CONTINUE these medications which have NOT CHANGED    Details   acetaminophen (TYLENOL) 325 MG tablet Take 650 mg by mouth every 6 (six) hours as needed for Pain.      albuterol-ipratropium (DUO-NEB) 2.5 mg-0.5 mg/3 mL nebulizer solution Inhale 3 mLs into the lungs 3 (three) times daily.      ascorbic acid, vitamin C, (VITAMIN C) 500 MG tablet Take 500 mg by mouth once daily.      aspirin 81 MG Chew Take 81 mg by mouth once daily.      calcium carbonate-vitamin D3 (OYSTER SHELL CALCIUM-VIT D3) 500 mg-5 mcg (200 unit) PwPk Take 1 tablet by mouth 2 (two) times a day.      carvediloL (COREG) 25 MG tablet Take 1 tablet (25 mg total) by mouth 2 (two) times daily with meals.  Qty: 60 tablet, Refills: 6    Comments: .      folic acid (FOLVITE) 1 MG tablet Take 1 tablet by mouth every morning.      megestroL (MEGACE) 400 mg/10 mL (40 mg/mL) Susp Take 10 mLs by mouth 2 (two) times a day.      metoclopramide HCl (REGLAN) 5 MG tablet Take 5 mg by mouth once daily.      multivitamin (THERAGRAN) per tablet Take 1 tablet by mouth once daily.      pantoprazole (PROTONIX) 40 MG tablet Take 40 mg by mouth once daily.      polyethylene glycol (GLYCOLAX) 17 gram/dose powder Take 17 g by mouth 2 (two) times daily.      sevelamer carbonate (RENVELA) 800 mg Tab Take 1 tablet by mouth 5 times per day      tamsulosin (FLOMAX) 0.4 mg Cap Take 1 capsule (0.4 mg total) by mouth once daily.  Qty: 30 capsule, Refills: 6       VITAMIN B COMPLEX ORAL Take 1 tablet by mouth every morning.      zinc gluconate 50 mg tablet Take 50 mg by mouth once daily.           STOP taking these medications       amLODIPine (NORVASC) 10 MG tablet Comments:   Reason for Stopping:         citalopram (CELEXA) 20 MG tablet Comments:   Reason for Stopping:         coffee xt/phosphatidyl serine (NEURIVA ORIGINAL ORAL) Comments:   Reason for Stopping:         flecainide (TAMBOCOR) 50 MG Tab Comments:   Reason for Stopping:         ibuprofen (ADVIL,MOTRIN) 600 MG tablet Comments:   Reason for Stopping:         lactulose (CHRONULAC) 10 gram/15 mL solution Comments:   Reason for Stopping:         pentoxifylline (TRENTAL) 400 mg TbSR Comments:   Reason for Stopping:         rosuvastatin (CRESTOR) 5 MG tablet Comments:   Reason for Stopping:         UNABLE TO FIND Comments:   Reason for Stopping:         UNABLE TO FIND Comments:   Reason for Stopping:                  Immunizations Administered as of 8/21/2023       No immunizations on file.            Unknown covid vaccination status    Some patients may experience side effects after vaccination.  These may include fever, headache, muscle or joint aches.  Most symptoms resolve with 24-48 hours and do not require urgent medical evaluation unless they persist for more than 72 hours or symptoms are concerning for an unrelated medical condition.          _________________________________  Pat Montoya MD  08/21/2023

## 2023-08-20 NOTE — ASSESSMENT & PLAN NOTE
POCT glucose  LDSS  -180 target    Running slightly on the lower end: BG POCT is 75  No event of actual hypoglycemia inpatient    Plan:  Continue to monitor  In the event that his BG drops lower than 70 patient's hypoglycemic medications will be regulated again

## 2023-08-20 NOTE — SUBJECTIVE & OBJECTIVE
Interval History: patient vomited his food every time. Feels nauseous. Bp was on higher end overnight, given extra hydralazine dose STAT.    Review of Systems   Constitutional:  Negative for activity change.   HENT:  Ear discharge: minimal.    Eyes:  Positive for pain (minimal) and discharge (minimal).   Respiratory:  Negative for chest tightness, shortness of breath and wheezing.    Cardiovascular:  Negative for chest pain, palpitations and leg swelling.   Gastrointestinal:  Positive for nausea. Negative for abdominal distention, abdominal pain and vomiting (stopped since last night).   Neurological: Negative.      Objective:     Vital Signs (Most Recent):  Temp: 97.7 °F (36.5 °C) (08/20/23 0731)  Pulse: 69 (08/20/23 0731)  Resp: 13 (08/20/23 0731)  BP: (!) 147/82 (08/20/23 0731)  SpO2: 100 % (08/20/23 0731) Vital Signs (24h Range):  Temp:  [97.7 °F (36.5 °C)-98.3 °F (36.8 °C)] 97.7 °F (36.5 °C)  Pulse:  [60-69] 69  Resp:  [13-21] 13  SpO2:  [99 %-100 %] 100 %  BP: (140-172)/(74-91) 147/82     Weight: 68 kg (150 lb)  Body mass index is 21.52 kg/m².  No intake or output data in the 24 hours ending 08/20/23 0951      Physical Exam  Eyes:      General:         Left eye: Discharge present.  Cardiovascular:      Rate and Rhythm: Normal rate and regular rhythm.      Pulses: Normal pulses.      Heart sounds: Normal heart sounds.   Pulmonary:      Effort: Pulmonary effort is normal. No respiratory distress.      Breath sounds: Normal breath sounds.   Abdominal:      General: Abdomen is flat. There is no distension.      Palpations: Abdomen is soft.      Tenderness: There is no abdominal tenderness.   Neurological:      General: No focal deficit present.      Mental Status: He is alert and oriented to person, place, and time. Mental status is at baseline.   Psychiatric:         Mood and Affect: Mood normal.             Significant Labs: All pertinent labs within the past 24 hours have been reviewed.    Significant Imaging:  I have reviewed all pertinent imaging results/findings within the past 24 hours.

## 2023-08-20 NOTE — ASSESSMENT & PLAN NOTE
On peg tube feeds now  Tolerating well  Able to take in PO per SLP assessment on 8/15, on regular diet with thin liquids  Will reduce tube feeds gradually as patient takes in more PO    Peg tube was found to be blocked yesterday, nursing staff were able to unclog the PEG tube  SLP evaluated patient : can restart oral feeds    Patient was nauseous and had multiple attacks of vomitus yesterday. Peg tube was used as port for administration of medications  This monring he is only feeling slightly nauseous without any attacks of vomitus    Plan:  Nutrition consult  Add novosource until nutrition recs are ready  Will continue to attempt oral feeds

## 2023-08-21 PROBLEM — Z74.09 IMPAIRED MOBILITY: Status: ACTIVE | Noted: 2023-08-21

## 2023-08-21 LAB
ALBUMIN SERPL BCP-MCNC: 1.9 G/DL (ref 3.5–5.2)
ALBUMIN SERPL BCP-MCNC: 2 G/DL (ref 3.5–5.2)
ALBUMIN SERPL BCP-MCNC: 2.1 G/DL (ref 3.5–5.2)
ANION GAP SERPL CALC-SCNC: 8 MMOL/L (ref 8–16)
ANION GAP SERPL CALC-SCNC: 8 MMOL/L (ref 8–16)
ANION GAP SERPL CALC-SCNC: 9 MMOL/L (ref 8–16)
BASOPHILS # BLD AUTO: 0.04 K/UL (ref 0–0.2)
BASOPHILS NFR BLD: 0.6 % (ref 0–1.9)
BUN SERPL-MCNC: 17 MG/DL (ref 6–20)
BUN SERPL-MCNC: 22 MG/DL (ref 6–20)
BUN SERPL-MCNC: 37 MG/DL (ref 6–20)
CALCIUM SERPL-MCNC: 8.1 MG/DL (ref 8.7–10.5)
CALCIUM SERPL-MCNC: 8.4 MG/DL (ref 8.7–10.5)
CALCIUM SERPL-MCNC: 8.8 MG/DL (ref 8.7–10.5)
CHLORIDE SERPL-SCNC: 104 MMOL/L (ref 95–110)
CHLORIDE SERPL-SCNC: 104 MMOL/L (ref 95–110)
CHLORIDE SERPL-SCNC: 105 MMOL/L (ref 95–110)
CO2 SERPL-SCNC: 23 MMOL/L (ref 23–29)
CO2 SERPL-SCNC: 24 MMOL/L (ref 23–29)
CO2 SERPL-SCNC: 25 MMOL/L (ref 23–29)
CREAT SERPL-MCNC: 3.9 MG/DL (ref 0.5–1.4)
CREAT SERPL-MCNC: 4.4 MG/DL (ref 0.5–1.4)
CREAT SERPL-MCNC: 6.5 MG/DL (ref 0.5–1.4)
DIFFERENTIAL METHOD: ABNORMAL
EOSINOPHIL # BLD AUTO: 0.4 K/UL (ref 0–0.5)
EOSINOPHIL NFR BLD: 5.4 % (ref 0–8)
ERYTHROCYTE [DISTWIDTH] IN BLOOD BY AUTOMATED COUNT: 16.5 % (ref 11.5–14.5)
ERYTHROCYTE [DISTWIDTH] IN BLOOD BY AUTOMATED COUNT: 16.7 % (ref 11.5–14.5)
EST. GFR  (NO RACE VARIABLE): 14.7 ML/MIN/1.73 M^2
EST. GFR  (NO RACE VARIABLE): 16.9 ML/MIN/1.73 M^2
EST. GFR  (NO RACE VARIABLE): 9.2 ML/MIN/1.73 M^2
GLUCOSE SERPL-MCNC: 77 MG/DL (ref 70–110)
GLUCOSE SERPL-MCNC: 87 MG/DL (ref 70–110)
GLUCOSE SERPL-MCNC: 90 MG/DL (ref 70–110)
HCT VFR BLD AUTO: 23.5 % (ref 40–54)
HCT VFR BLD AUTO: 26.6 % (ref 40–54)
HGB BLD-MCNC: 6.7 G/DL (ref 14–18)
HGB BLD-MCNC: 7.7 G/DL (ref 14–18)
IMM GRANULOCYTES # BLD AUTO: 0.05 K/UL (ref 0–0.04)
IMM GRANULOCYTES NFR BLD AUTO: 0.8 % (ref 0–0.5)
LYMPHOCYTES # BLD AUTO: 0.8 K/UL (ref 1–4.8)
LYMPHOCYTES NFR BLD: 12.5 % (ref 18–48)
MAGNESIUM SERPL-MCNC: 2.2 MG/DL (ref 1.6–2.6)
MAGNESIUM SERPL-MCNC: 2.3 MG/DL (ref 1.6–2.6)
MCH RBC QN AUTO: 30.3 PG (ref 27–31)
MCH RBC QN AUTO: 30.7 PG (ref 27–31)
MCHC RBC AUTO-ENTMCNC: 28.5 G/DL (ref 32–36)
MCHC RBC AUTO-ENTMCNC: 28.9 G/DL (ref 32–36)
MCV RBC AUTO: 106 FL (ref 82–98)
MCV RBC AUTO: 106 FL (ref 82–98)
MONOCYTES # BLD AUTO: 1 K/UL (ref 0.3–1)
MONOCYTES NFR BLD: 15.6 % (ref 4–15)
NEUTROPHILS # BLD AUTO: 4.3 K/UL (ref 1.8–7.7)
NEUTROPHILS NFR BLD: 65.1 % (ref 38–73)
NRBC BLD-RTO: 0 /100 WBC
PHOSPHATE SERPL-MCNC: 3.1 MG/DL (ref 2.7–4.5)
PHOSPHATE SERPL-MCNC: 3.8 MG/DL (ref 2.7–4.5)
PHOSPHATE SERPL-MCNC: 4.8 MG/DL (ref 2.7–4.5)
PLATELET # BLD AUTO: 189 K/UL (ref 150–450)
PLATELET # BLD AUTO: 199 K/UL (ref 150–450)
PMV BLD AUTO: 10.3 FL (ref 9.2–12.9)
PMV BLD AUTO: 10.8 FL (ref 9.2–12.9)
POCT GLUCOSE: 112 MG/DL (ref 70–110)
POCT GLUCOSE: 76 MG/DL (ref 70–110)
POCT GLUCOSE: 87 MG/DL (ref 70–110)
POCT GLUCOSE: 95 MG/DL (ref 70–110)
POCT GLUCOSE: 96 MG/DL (ref 70–110)
POTASSIUM SERPL-SCNC: 3.9 MMOL/L (ref 3.5–5.1)
POTASSIUM SERPL-SCNC: 4.2 MMOL/L (ref 3.5–5.1)
POTASSIUM SERPL-SCNC: 4.9 MMOL/L (ref 3.5–5.1)
PTH-INTACT SERPL-MCNC: 78.5 PG/ML (ref 9–77)
RBC # BLD AUTO: 2.21 M/UL (ref 4.6–6.2)
RBC # BLD AUTO: 2.51 M/UL (ref 4.6–6.2)
SODIUM SERPL-SCNC: 136 MMOL/L (ref 136–145)
SODIUM SERPL-SCNC: 136 MMOL/L (ref 136–145)
SODIUM SERPL-SCNC: 138 MMOL/L (ref 136–145)
VANCOMYCIN SERPL-MCNC: 15.9 UG/ML
WBC # BLD AUTO: 3.42 K/UL (ref 3.9–12.7)
WBC # BLD AUTO: 6.65 K/UL (ref 3.9–12.7)

## 2023-08-21 PROCEDURE — 85027 COMPLETE CBC AUTOMATED: CPT

## 2023-08-21 PROCEDURE — 99222 1ST HOSP IP/OBS MODERATE 55: CPT | Mod: ,,, | Performed by: NURSE PRACTITIONER

## 2023-08-21 PROCEDURE — 63600175 PHARM REV CODE 636 W HCPCS: Performed by: HOSPITALIST

## 2023-08-21 PROCEDURE — 21400001 HC TELEMETRY ROOM

## 2023-08-21 PROCEDURE — 63600175 PHARM REV CODE 636 W HCPCS: Performed by: NURSE PRACTITIONER

## 2023-08-21 PROCEDURE — 90935 HEMODIALYSIS ONE EVALUATION: CPT | Mod: ,,, | Performed by: NURSE PRACTITIONER

## 2023-08-21 PROCEDURE — 25000003 PHARM REV CODE 250: Performed by: HOSPITALIST

## 2023-08-21 PROCEDURE — 85025 COMPLETE CBC W/AUTO DIFF WBC: CPT

## 2023-08-21 PROCEDURE — 63600175 PHARM REV CODE 636 W HCPCS

## 2023-08-21 PROCEDURE — 25000003 PHARM REV CODE 250

## 2023-08-21 PROCEDURE — 80069 RENAL FUNCTION PANEL: CPT | Mod: 91 | Performed by: STUDENT IN AN ORGANIZED HEALTH CARE EDUCATION/TRAINING PROGRAM

## 2023-08-21 PROCEDURE — 80202 ASSAY OF VANCOMYCIN: CPT | Performed by: HOSPITALIST

## 2023-08-21 PROCEDURE — 25000003 PHARM REV CODE 250: Performed by: INTERNAL MEDICINE

## 2023-08-21 PROCEDURE — 25000003 PHARM REV CODE 250: Performed by: STUDENT IN AN ORGANIZED HEALTH CARE EDUCATION/TRAINING PROGRAM

## 2023-08-21 PROCEDURE — 80069 RENAL FUNCTION PANEL: CPT | Performed by: HOSPITALIST

## 2023-08-21 PROCEDURE — 36415 COLL VENOUS BLD VENIPUNCTURE: CPT | Performed by: NURSE PRACTITIONER

## 2023-08-21 PROCEDURE — 90935 PR HEMODIALYSIS, ONE EVALUATION: ICD-10-PCS | Mod: ,,, | Performed by: NURSE PRACTITIONER

## 2023-08-21 PROCEDURE — 80100016 HC MAINTENANCE HEMODIALYSIS

## 2023-08-21 PROCEDURE — 63600175 PHARM REV CODE 636 W HCPCS: Performed by: INTERNAL MEDICINE

## 2023-08-21 PROCEDURE — 99232 SBSQ HOSP IP/OBS MODERATE 35: CPT | Mod: ,,, | Performed by: HOSPITALIST

## 2023-08-21 PROCEDURE — 99232 PR SUBSEQUENT HOSPITAL CARE,LEVL II: ICD-10-PCS | Mod: ,,, | Performed by: HOSPITALIST

## 2023-08-21 PROCEDURE — 83735 ASSAY OF MAGNESIUM: CPT | Performed by: STUDENT IN AN ORGANIZED HEALTH CARE EDUCATION/TRAINING PROGRAM

## 2023-08-21 PROCEDURE — 99222 PR INITIAL HOSPITAL CARE,LEVL II: ICD-10-PCS | Mod: ,,, | Performed by: NURSE PRACTITIONER

## 2023-08-21 PROCEDURE — 11000001 HC ACUTE MED/SURG PRIVATE ROOM

## 2023-08-21 PROCEDURE — 36415 COLL VENOUS BLD VENIPUNCTURE: CPT | Performed by: STUDENT IN AN ORGANIZED HEALTH CARE EDUCATION/TRAINING PROGRAM

## 2023-08-21 PROCEDURE — 83970 ASSAY OF PARATHORMONE: CPT | Performed by: NURSE PRACTITIONER

## 2023-08-21 PROCEDURE — 63600175 PHARM REV CODE 636 W HCPCS: Mod: JZ | Performed by: NURSE PRACTITIONER

## 2023-08-21 PROCEDURE — 25000003 PHARM REV CODE 250: Performed by: NURSE PRACTITIONER

## 2023-08-21 RX ORDER — CALCITRIOL 0.25 UG/1
0.25 CAPSULE ORAL DAILY
Start: 2023-08-21

## 2023-08-21 RX ORDER — TOBRAMYCIN AND DEXAMETHASONE 3; 1 MG/ML; MG/ML
1 SUSPENSION/ DROPS OPHTHALMIC 4 TIMES DAILY
Start: 2023-08-21 | End: 2023-08-31 | Stop reason: HOSPADM

## 2023-08-21 RX ORDER — ONDANSETRON 8 MG/1
8 TABLET, ORALLY DISINTEGRATING ORAL EVERY 6 HOURS PRN
Start: 2023-08-21

## 2023-08-21 RX ORDER — FLUCONAZOLE 200 MG/1
400 TABLET ORAL DAILY
Start: 2023-08-21 | End: 2023-09-01 | Stop reason: HOSPADM

## 2023-08-21 RX ORDER — ATORVASTATIN CALCIUM 40 MG/1
40 TABLET, FILM COATED ORAL DAILY
Qty: 90 TABLET | Refills: 3
Start: 2023-08-21 | End: 2023-09-01 | Stop reason: HOSPADM

## 2023-08-21 RX ORDER — ONDANSETRON 8 MG/1
8 TABLET, ORALLY DISINTEGRATING ORAL
Status: DISCONTINUED | OUTPATIENT
Start: 2023-08-21 | End: 2023-08-27

## 2023-08-21 RX ORDER — METOCLOPRAMIDE 5 MG/1
5 TABLET ORAL
Start: 2023-08-21 | End: 2023-09-01 | Stop reason: HOSPADM

## 2023-08-21 RX ORDER — PROMETHAZINE HYDROCHLORIDE 12.5 MG/1
25 TABLET ORAL EVERY 6 HOURS PRN
Status: DISCONTINUED | OUTPATIENT
Start: 2023-08-21 | End: 2023-08-24

## 2023-08-21 RX ORDER — METOCLOPRAMIDE 5 MG/1
5 TABLET ORAL
Status: DISCONTINUED | OUTPATIENT
Start: 2023-08-21 | End: 2023-08-25

## 2023-08-21 RX ORDER — ISOSORBIDE DINITRATE AND HYDRALAZINE HYDROCHLORIDE 37.5; 2 MG/1; MG/1
1 TABLET ORAL 3 TIMES DAILY
Qty: 90 TABLET | Refills: 11
Start: 2023-08-21 | End: 2023-09-01 | Stop reason: SDUPTHER

## 2023-08-21 RX ORDER — LOSARTAN POTASSIUM 100 MG/1
100 TABLET ORAL DAILY
Qty: 90 TABLET | Refills: 3
Start: 2023-08-21 | End: 2023-09-01 | Stop reason: HOSPADM

## 2023-08-21 RX ORDER — METOCLOPRAMIDE HYDROCHLORIDE 5 MG/ML
5 INJECTION INTRAMUSCULAR; INTRAVENOUS
Status: DISCONTINUED | OUTPATIENT
Start: 2023-08-21 | End: 2023-08-21

## 2023-08-21 RX ORDER — CARVEDILOL 25 MG/1
25 TABLET ORAL 2 TIMES DAILY
Status: DISCONTINUED | OUTPATIENT
Start: 2023-08-21 | End: 2023-08-22

## 2023-08-21 RX ORDER — OXYCODONE AND ACETAMINOPHEN 5; 325 MG/1; MG/1
1 TABLET ORAL EVERY 4 HOURS PRN
Refills: 0
Start: 2023-08-21 | End: 2023-09-01 | Stop reason: HOSPADM

## 2023-08-21 RX ADMIN — TOBRAMYCIN AND DEXAMETHASONE: 3; 1 OINTMENT OPHTHALMIC at 03:08

## 2023-08-21 RX ADMIN — SENNOSIDES AND DOCUSATE SODIUM 1 TABLET: 50; 8.6 TABLET ORAL at 12:08

## 2023-08-21 RX ADMIN — ERYTHROPOIETIN 3000 UNITS: 3000 INJECTION, SOLUTION INTRAVENOUS; SUBCUTANEOUS at 10:08

## 2023-08-21 RX ADMIN — MEROPENEM 500 MG: 500 INJECTION INTRAVENOUS at 03:08

## 2023-08-21 RX ADMIN — ONDANSETRON 8 MG: 2 INJECTION INTRAMUSCULAR; INTRAVENOUS at 06:08

## 2023-08-21 RX ADMIN — SENNOSIDES AND DOCUSATE SODIUM 1 TABLET: 50; 8.6 TABLET ORAL at 08:08

## 2023-08-21 RX ADMIN — SODIUM CHLORIDE: 9 INJECTION, SOLUTION INTRAVENOUS at 08:08

## 2023-08-21 RX ADMIN — VANCOMYCIN HYDROCHLORIDE 500 MG: 500 INJECTION, POWDER, LYOPHILIZED, FOR SOLUTION INTRAVENOUS at 01:08

## 2023-08-21 RX ADMIN — TOBRAMYCIN AND DEXAMETHASONE 1 DROP: 3; 1 SUSPENSION/ DROPS OPHTHALMIC at 12:08

## 2023-08-21 RX ADMIN — CARVEDILOL 25 MG: 25 TABLET, FILM COATED ORAL at 12:08

## 2023-08-21 RX ADMIN — POLYETHYLENE GLYCOL 3350 17 G: 17 POWDER, FOR SOLUTION ORAL at 12:08

## 2023-08-21 RX ADMIN — TOBRAMYCIN AND DEXAMETHASONE 1 DROP: 3; 1 SUSPENSION/ DROPS OPHTHALMIC at 05:08

## 2023-08-21 RX ADMIN — ONDANSETRON 8 MG: 8 TABLET, ORALLY DISINTEGRATING ORAL at 05:08

## 2023-08-21 RX ADMIN — ISOSORBIDE DINITRATE: 20 TABLET ORAL at 09:08

## 2023-08-21 RX ADMIN — TOBRAMYCIN AND DEXAMETHASONE 1 DROP: 3; 1 SUSPENSION/ DROPS OPHTHALMIC at 07:08

## 2023-08-21 RX ADMIN — TOBRAMYCIN AND DEXAMETHASONE: 3; 1 OINTMENT OPHTHALMIC at 07:08

## 2023-08-21 RX ADMIN — ATORVASTATIN CALCIUM 40 MG: 40 TABLET, FILM COATED ORAL at 12:08

## 2023-08-21 RX ADMIN — CALCITRIOL CAPSULES 0.25 MCG 0.25 MCG: 0.25 CAPSULE ORAL at 12:08

## 2023-08-21 RX ADMIN — TOBRAMYCIN AND DEXAMETHASONE: 3; 1 OINTMENT OPHTHALMIC at 09:08

## 2023-08-21 RX ADMIN — METOCLOPRAMIDE 5 MG: 5 TABLET ORAL at 12:08

## 2023-08-21 RX ADMIN — SEVELAMER CARBONATE 0.8 G: 0.8 POWDER, FOR SUSPENSION ORAL at 12:08

## 2023-08-21 RX ADMIN — APIXABAN 5 MG: 5 TABLET, FILM COATED ORAL at 08:08

## 2023-08-21 RX ADMIN — OXYCODONE HYDROCHLORIDE AND ACETAMINOPHEN 1 TABLET: 5; 325 TABLET ORAL at 05:08

## 2023-08-21 RX ADMIN — SEVELAMER CARBONATE 0.8 G: 0.8 POWDER, FOR SUSPENSION ORAL at 05:08

## 2023-08-21 RX ADMIN — METOCLOPRAMIDE 5 MG: 5 TABLET ORAL at 03:08

## 2023-08-21 RX ADMIN — CARVEDILOL 25 MG: 25 TABLET, FILM COATED ORAL at 08:08

## 2023-08-21 RX ADMIN — HEPARIN SODIUM 1000 UNITS: 1000 INJECTION, SOLUTION INTRAVENOUS; SUBCUTANEOUS at 10:08

## 2023-08-21 RX ADMIN — TOBRAMYCIN AND DEXAMETHASONE 1 DROP: 3; 1 SUSPENSION/ DROPS OPHTHALMIC at 09:08

## 2023-08-21 RX ADMIN — PROMETHAZINE HYDROCHLORIDE 12.5 MG: 25 INJECTION, SOLUTION INTRAMUSCULAR; INTRAVENOUS at 05:08

## 2023-08-21 RX ADMIN — APIXABAN 10 MG: 5 TABLET, FILM COATED ORAL at 12:08

## 2023-08-21 RX ADMIN — ACETAMINOPHEN 1000 MG: 500 TABLET ORAL at 03:08

## 2023-08-21 RX ADMIN — FLUCONAZOLE 400 MG: 200 TABLET ORAL at 12:08

## 2023-08-21 RX ADMIN — ISOSORBIDE DINITRATE: 20 TABLET ORAL at 03:08

## 2023-08-21 RX ADMIN — TAMSULOSIN HYDROCHLORIDE 0.4 MG: 0.4 CAPSULE ORAL at 12:08

## 2023-08-21 RX ADMIN — ONDANSETRON 8 MG: 8 TABLET, ORALLY DISINTEGRATING ORAL at 12:08

## 2023-08-21 RX ADMIN — METOCLOPRAMIDE 10 MG: 5 INJECTION, SOLUTION INTRAMUSCULAR; INTRAVENOUS at 06:08

## 2023-08-21 NOTE — HPI
Immanuel Bernal is a 57-year-old male with PMHx of CVA 2018 followed by another CVA per sister, CHF, diabetes, hypertension, chronic disability, end-stage renal disease on HD, A fib, PEG placement, bowel obstruction, sleep apnea, TIA, left eye vitreous hemorrhage. Patient has been in a nursing home for a year due to debility. About 6 weeks ago AVF done at an OSH later diagnosed with endophthalmitis with concern that the AVF placement may have been the source of infection, fistula was removed. Left OSH AMA and presented to [facility] on 8/14/23 for endophthalmitis with no light perception vision of the left eye. Opthalmology consulted and s/p evisceration on 8/16/23. Opthalmology last changed dressing 8/21/23 and recommending continue dressing changes (xeroform sheets) while inpt or outpt q2-3 days, Continue tobradex drops 4 times daily in the left eye, and Continue tobradex ointment 3 times daily in the left eye, including at night.  [employer] plans to replace scleral shell packing x 14 days after surgery. As well as retina specialist f/u, which will be 8/30/23. Hospital course complicated by RUE swelling at old fistual site (Vascular surgery consulted for the management of staples on his right arm from AVF removal at OSH. Given mild dehiscence and granulation tissue, recommend keeping staples in place for now. Continue HD through [facility], and wound care saw 8/21/23), RUE DVT (Eliquis), Pseudomonas bacteremia and gram stain with yeast (ID consulted and recommending IV fluconazole, Meropenem, and Vanc x 4-6 weeks of therapy from evisceration. Anticipated end date: 9/12-9/26). Now having drop in H&H 8/22,     Functional History: Patient lives in a nursing home the past year. Prior to admission, mainly bedbound. DME: w/c. Per sister, patient will be discharging back home with sister. Patient was I before 2018 CVA and since has had multiple admission to OSHs, rehabs, and nursing home.

## 2023-08-21 NOTE — ASSESSMENT & PLAN NOTE
POCT glucose  LDSS  -180 target    Running slightly on the lower end: BG POCT is 75 >>> 96  No event of actual hypoglycemia inpatient    Plan:  Continue to monitor  In the event that his BG drops lower than 70 patient's hypoglycemic medications will be regulated again

## 2023-08-21 NOTE — PROGRESS NOTES
Pharmacokinetic Assessment Follow Up: IV Vancomycin    Vancomycin serum concentration assessment/plan(s):    -Patient to receive HD today  -Level within goal of 15 - 20 for endophthalmitis  -Will re-dose with vancomycin 500 mg IVPB x1 to be given post HD  -HD schedule M/W/F, repeat random level on 08/23, plans for re-dosing with levels < 20    Drug levels (last 3 results):  Recent Labs   Lab Result Units 08/19/23  0441 08/20/23  0526   Vancomycin, Random ug/mL 20.2 19.3       Pharmacy will continue to follow and monitor vancomycin.    Please contact pharmacy at extension [phone] for questions regarding this assessment.    Thank you for the consult,   Ubaldo Whiteyamile       Patient brief summary:  Immanuel Bernal is a 57 y.o. male initiated on antimicrobial therapy with IV Vancomycin for treatment of  endophthalmitis    The patient's current regimen is vancomycin pulse dosing    Drug Allergies:   Review of patient's allergies indicates:   Allergen Reactions    Morphine Rash    Amiodarone analogues Itching     Other reaction(s): Unknown       Actual Body Weight:   63 kg    Renal Function:   Estimated Creatinine Clearance: 10.4 mL/min (A) (based on SCr of 6.8 mg/dL (H)).,     Dialysis Method (if applicable):  intermittent HD    CBC (last 72 hours):  Recent Labs   Lab Result Units 08/19/23  0441 08/20/23  0526   WBC K/uL 8.01 7.29   Hemoglobin g/dL 8.8* 8.1*   Hematocrit % 32.5* 27.1*   Platelets K/uL 193 242       Metabolic Panel (last 72 hours):  Recent Labs   Lab Result Units 08/18/23  1807 08/18/23  2143 08/19/23  0441 08/19/23  1326 08/19/23  2137 08/20/23  0526 08/20/23  1355 08/20/23  2222   Sodium mmol/L 137 137 136  137 137 135* 136  137 137 136   Potassium mmol/L 4.3 4.6 4.3  4.3 4.7 5.2* 4.7  4.7 4.9 5.0   Chloride mmol/L 103 101 102  102 102 102 100  101 100 102   CO2 mmol/L 25 25 25  26 26 22* 25  24 25 23   Glucose mg/dL 86 80 71  72 80 86 74  74 80 81   BUN mg/dL 17 17 20  20 22* 25* 29*  29* 35*  39*   Creatinine mg/dL 4.7* 5.0* 5.5*  5.6* 6.0* 6.3* 6.2*  6.6* 7.1* 6.8*   Albumin g/dL 2.0* 2.1* 2.1*  2.1* 2.1* 2.0* 2.1*  2.0* 2.0* 1.9*   Total Bilirubin mg/dL  --   --  0.3  --   --  0.3  --   --    Alkaline Phosphatase U/L  --   --  103  --   --  99  --   --    AST U/L  --   --  14  --   --  13  --   --    ALT U/L  --   --  <5*  --   --  <5*  --   --    Magnesium mg/dL 2.4 2.3 2.5 2.5 2.5 2.6 2.7* 2.6   Phosphorus mg/dL 3.6 3.9 4.6* 5.2* 5.6* 5.7* 5.9* 5.8*       Vancomycin Administrations:  vancomycin given in the last 96 hours                     vancomycin (VANCOCIN) 500 mg in dextrose 5 % in water (D5W) 100 mL IVPB (MB+) (mg) 500 mg New Bag 08/18/23 1540                    Microbiologic Results:  Microbiology Results (last 7 days)       Procedure Component Value Units Date/Time    Aerobic culture [[id number]] Collected: 08/16/23 1629    Order Status: Completed Specimen: Wound from Eyelid, Left Updated: 08/19/23 0911     Aerobic Bacterial Culture No growth    Aerobic culture [[id number]] Collected: 08/16/23 1636    Order Status: Completed Specimen: Abscess from Eyelid, Left Updated: 08/19/23 0911     Aerobic Bacterial Culture No growth    Narrative:      Sclera abscess    Aerobic culture [[id number]] Collected: 08/16/23 1632    Order Status: Completed Specimen: Wound from Cornea, Left Updated: 08/19/23 0911     Aerobic Bacterial Culture No growth    Aerobic culture [[id number]] Collected: 08/16/23 1647    Order Status: Completed Specimen: Abscess from Eyelid, Left Updated: 08/18/23 1423     Aerobic Bacterial Culture No significant isolate    Culture, Anaerobe [[id number]] Collected: 08/16/23 1647    Order Status: Completed Specimen: Abscess from Eyelid, Left Updated: 08/18/23 0911     Anaerobic Culture Culture in progress    Culture, Anaerobe [[id number]] Collected: 08/16/23 1636    Order Status: Completed Specimen: Abscess from Eyelid, Left Updated: 08/18/23 0909     Anaerobic Culture Culture in progress     Narrative:      Sclera abscess    Culture, Anaerobe [[id number]] Collected: 08/16/23 1632    Order Status: Completed Specimen: Wound from Cornea, Left Updated: 08/18/23 0909     Anaerobic Culture Culture in progress    Culture, Anaerobe [[id number]] Collected: 08/16/23 1628    Order Status: Completed Specimen: Wound from Eyelid, Left Updated: 08/18/23 0908     Anaerobic Culture Culture in progress    AFB Culture & Smear [[id number]] Collected: 08/16/23 1647    Order Status: Completed Specimen: Abscess from Eyelid, Left Updated: 08/17/23 2127     AFB Culture & Smear Culture in progress     AFB CULTURE STAIN No acid fast bacilli seen.    AFB Culture & Smear [[id number]] Collected: 08/16/23 1629    Order Status: Completed Specimen: Wound from Eyelid, Left Updated: 08/17/23 2127     AFB Culture & Smear Culture in progress     AFB CULTURE STAIN No acid fast bacilli seen.    AFB Culture & Smear [[id number]] Collected: 08/16/23 1636    Order Status: Completed Specimen: Abscess from Eyelid, Left Updated: 08/17/23 2127     AFB Culture & Smear Culture in progress     AFB CULTURE STAIN No acid fast bacilli seen.    Narrative:      Sclera abscess    AFB Culture & Smear [[id number]] Collected: 08/16/23 1632    Order Status: Completed Specimen: Wound from Cornea, Left Updated: 08/17/23 2127     AFB Culture & Smear Culture in progress     AFB CULTURE STAIN No acid fast bacilli seen.    Fungus culture [[id number]] Collected: 08/16/23 1636    Order Status: Completed Specimen: Abscess from Eyelid, Left Updated: 08/17/23 0618     Fungus (Mycology) Culture Culture in progress    Narrative:      Sclera abscess    Fungus culture [[id number]] Collected: 08/16/23 1647    Order Status: Completed Specimen: Abscess from Eyelid, Left Updated: 08/17/23 0618     Fungus (Mycology) Culture Culture in progress    Fungus culture [[id number]] Collected: 08/16/23 1632    Order Status: Completed Specimen: Wound from Cornea, Left Updated: 08/17/23 0618      Fungus (Mycology) Culture Culture in progress    Fungus culture [[id number]] Collected: 08/16/23 1629    Order Status: Completed Specimen: Wound from Eyelid, Left Updated: 08/17/23 0618     Fungus (Mycology) Culture Culture in progress    Gram stain [[id number]] Collected: 08/16/23 1632    Order Status: Completed Specimen: Wound from Cornea, Left Updated: 08/16/23 1840     Gram Stain Result No WBC's      No organisms seen    Gram stain [[id number]] Collected: 08/16/23 1629    Order Status: Completed Specimen: Wound from Eyelid, Left Updated: 08/16/23 1840     Gram Stain Result Rare WBC's      No organisms seen    Gram stain [[id number]] Collected: 08/16/23 1647    Order Status: Completed Specimen: Abscess from Eyelid, Left Updated: 08/16/23 1839     Gram Stain Result No WBC's      Rare yeast    Narrative:      Vitreous abscess    Gram stain [[id number]] Collected: 08/16/23 1636    Order Status: Completed Specimen: Abscess from Eyelid, Left Updated: 08/16/23 1839     Gram Stain Result No WBC's      Rare yeast    Narrative:      Sclera abscess    Blood culture (site 2) [[id number]] Collected: 08/09/23 1434    Order Status: Completed Specimen: Blood Updated: 08/14/23 1812     Blood Culture, Routine No growth after 5 days.    Narrative:      Site # 2, aerobic only  Collection has been rescheduled by [email or web address] at 08/09/2023 13:27 Reason:   Patient unavailable in with DR   Collection has been rescheduled by [email or web address] at 08/09/2023 13:27 Reason:   Patient unavailable in with DR     Blood culture (site 1) [[id number]] Collected: 08/09/23 1434    Order Status: Completed Specimen: Blood Updated: 08/14/23 1812     Blood Culture, Routine No growth after 5 days.    Narrative:      Site # 1, aerobic and anaerobic  Collection has been rescheduled by [email or web address] at 08/09/2023 13:27 Reason:   Patient unavailable in with DR   Collection has been rescheduled by [email or web address] at 08/09/2023 13:27 Reason:   Patient unavailable in with DR

## 2023-08-21 NOTE — CONSULTS
Patient seen for wound care consultation.   Reviewed chart for this encounter.   See Flow Sheet for findings.    Pt returned from dialysis and safely transferred to bed in room, family and bedside nurse present.   Pt agreed to assessment, pt able to turn with assistance for sacral assessment, triad removed with purple bath wipes, two small open areas on upper left buttock, appear to be scratches, camera malfunctioned.  Pt left heel, dry flaky skin, intact callous,   No wound on right heel, blanchable  Right bicept--insicion site, cleansed with Vashe, applied xeroform, cast padding, tubi-netting (change q2 days)    RECOMMENDATIONS:  Heel lift boots to prevent heels from making contact with hard surfaces.   Continue to monitor sacral area for breakdown. Apply Triad.   Xeroform to surgical incision on right bicep (upper arm) , cast padding, tubi-net    Discussed POC with patient and primary nurse.   See EMR for orders & patient education.    Discussed nutrition and the role of protein in wound healing with the patient. Instructed patient to optimize protein for wound healing.    Nursing to continue care.  Nursing to maintain pressure injury prevention interventions.       08/21/23 1130   WOCN Assessment   WOCN Total Time (mins) 30   Visit Date 08/21/23   Visit Time 1130   Consult Type New   WOCN Speciality Wound   Wound surgical   Intervention assessed;changed;applied;chart review;coordination of care;orders   Teaching on-going        Altered Skin Integrity 08/14/23 0630 Left Heel #1 Other (comment) Purple or maroon localized area of discolored intact skin or non-intact skin or a blood-filled blister.   Date First Assessed/Time First Assessed: 08/14/23 0630   Altered Skin Integrity Present on Admission - Did Patient arrive to the hospital with altered skin?: (c)   Side: Left  Location: Heel  Wound Number: #1  Primary Wound Type: (c) Other (comment)  ...   Wound Image         Altered Skin Integrity 08/14/23 0630 Right  posterior Heel #2 Other (comment) Purple or maroon localized area of discolored intact skin or non-intact skin or a blood-filled blister.   Date First Assessed/Time First Assessed: 08/14/23 0630   Altered Skin Integrity Present on Admission - Did Patient arrive to the hospital with altered skin?: (c)   Side: Right  Orientation: posterior  Location: Heel  Wound Number: #2  Is this injury d...   Wound Image         Altered Skin Integrity 08/18/23 1945 Left lower Buttocks Moisture associated dermatitis Intact skin with non-blanchable redness of localized area   Date First Assessed/Time First Assessed: 08/18/23 1945   Altered Skin Integrity Present on Admission - Did Patient arrive to the hospital with altered skin?: suspected hospital acquired  Side: Left  Orientation: lower  Location: Buttocks  Primary Woun...   Wound Image   (camera malfunction)   Dressing Appearance Open to air   Drainage Amount None   Drainage Characteristics/Odor No odor   Appearance Intact   Tissue loss description Not applicable   Dressing Removed;Applied;Other (comment)  (Triad)   Dressing Change Due 08/21/23        Incision/Site 08/14/23 0630 Right Arm anterior;upper vertical   Date First Assessed/Time First Assessed: 08/14/23 0630   Side: Right  Location: Arm  Orientation: anterior;upper  Incision Type: vertical  Closure Method: Staples  Additional Comments: Present on transfer from ICU   Wound Image    Dressing Appearance Open to air;Dry;Dried drainage   Drainage Amount None   Drainage Characteristics/Odor No odor   Appearance Staples intact;Sutures intact   Periwound Area Dry;Intact   Wound Edges Approximated   Wound Length (cm) 12.5 cm   Wound Width (cm) 0.2 cm   Wound Depth (cm) 0.1 cm   Wound Volume (cm^3) 0.25 cm^3   Wound Surface Area (cm^2) 2.5 cm^2   Care Cleansed with:;Antimicrobial agent   Dressing Applied;Other (comment)  (xeroform / cast padding / Tubi-netting)   Periwound Care Dry periwound area maintained   Dressing Change Due  08/23/23

## 2023-08-21 NOTE — CONSULTS
"Nutrition consult received stating "patient is unable to tolerate oral feeds. has peg tube. started novosource for the time being. SLP okay'd oral feeds after evaluation nonetheless pt is very nauseous and still vomits".  RD following, please see note from 8/16 for full assessment.    Recommendations/Interventions:  1) If TFs desired for 24 hours, rec'd Novasource @ 40 mL/hr to provide 1920 kcals, 87 g of protein, 688 mL fluid.   2) If TFs desired overnight (for 12 hours), rec'd Novasource @ 80 mL/hr to provide 1920 kcals, 87 g of protein, 688 mL fluid.   3) If bolus TFs desired, rec'd Novasource - 4 cartons/day to provide 1900 kcals, 86 g of protein, 680 mL fluid.   - All of these TF regimens will meet 100% EEN, 100% EPN.  4) RD to monitor & follow-up.    Thanks!  Cathleen, MS, RD, LDN     "

## 2023-08-21 NOTE — PROGRESS NOTES
[facility] NOTE    Immanuel Bernal is a 57 y.o. male currently on hemodialysis for removal of uremic toxins and volume.     Patient seen and evaluated on hemodialysis, tolerating treatment, see HD flowsheet for vitals and assessments.    No Hypotension, chest pain, shortness of breath, cramping, nausea or vomiting.      Labs have been reviewed and the dialysate bath has been adjusted.     Labs:     Recent Labs   Lab 08/20/23  0526 08/20/23  1355 08/20/23  2222     137 137 136   K 4.7  4.7 4.9 5.0     101 100 102   CO2 25  24 25 23   BUN 29*  29* 35* 39*   CREATININE 6.2*  6.6* 7.1* 6.8*   CALCIUM 9.2  9.2 9.3 9.3   PHOS 5.7* 5.9* 5.8*     Recent Labs   Lab 08/18/23  0454 08/19/23  0441 08/20/23  0526   WBC 8.18 8.01 7.29   HGB 8.6* 8.8* 8.1*   HCT 30.1* 32.5* 27.1*    193 242     Lab Results   Component Value Date    FESATURATED 45 08/10/2023    FERRITIN 3,415 (H) 08/10/2023        Assessment/Plan      Ultrafiltration goal: Liters: 1L. Duration: 3 hours    - Seen on dialysis today, tolerating session with current UFR, no complications. Patient resting during treatment, nodding head yes/no to direct questions.   - Pending SNF placement   - No lab stick/BP intake on access site  - Continue to monitor intake and output, daily weights   - Please avoid gadolinium, fleets, phos-based laxatives, NSAIDs  - Will follow closely and continue dialysis treatments while in-patient    Anemia  - Continue PATRICIA with dialysis treatments    BMM  - Renal diet with protein intake goal 1.5 g/kg/d if appropriate   - Novasource with meals   - F/U PO4, Mg, Calcium. And albumin levels.   - Phos 5.8. Please continue Sevelamer.   - PTH ordered   - Continue Calcitriol     HTN  - BP Normal  - Goal for BP <140mmHg SBP and <90 mmHg DBP. Maintain MAP > 65 mmHg.  - Continue home antihypertensive regimen; adjust as needed.       Rachael Barraza, ALEC, APRN, FNP-C  Nephrology Department  Pager:   [phone]

## 2023-08-21 NOTE — ASSESSMENT & PLAN NOTE
Ophthalmology team on board and were going to perform the procedure but the patient's family refused without being around to review the MRI which delayed the procedure till next wednesday    MRI repeat : Diffuse inflammatory change involving the left globe, with associated thickening of the left sclera, proptosis, and focal fluid collection within the left posterior compartment, which demonstrates diffusion restriction.  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  Diffuse left preseptal and postseptal inflammatory change, with superimposed orbital cellulitis also considered.  Evaluation of the left orbital apex is limited secondary to no contrast administration.  Decreased size of the left anterior compartment with bowing of the visualized left lens.  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    Thorough counseling undertaken with POA and IM team, palliative, opthalmo and nursing team in regards to the surgery  She agreed for her brother to undergo the procedure as an emergent case if his health deteriorates  Otherwise to be scheduled on wednesday    Underwent evisceration of his left eye yesterday, tobramycin ordered, ID recs are being followed  Still on meronema and vancomycin IV  Wbc: 8.07 (normal)  hgb post op 8.8 (at [facility])  No signs of distress or delirium  Culture of eye discharge grew yeast, started flucanazole to antibiotic regimen  Follow cultures    Pending Rehab placement as patient and his sister unanimously agreed that he does not desire LTAC  Negotiations with ID team and pharmacy team in regards to his antibiotics, whether they can be administered via the dialysis line or not, pending opinion    Plan:  Follow ID recs  - Continue fluconazole, vanc and meropenem  - Pending final ID recs for discharge  Follow opthalomology recs  - Continue tobramycin eye drops and ointments  - Will see Dr Stewart  frequently in the clinic for dressing changes

## 2023-08-21 NOTE — PLAN OF CARE
Problem: Adult Inpatient Plan of Care  Goal: Plan of Care Review  Outcome: Ongoing, Progressing  Goal: Patient-Specific Goal (Individualized)  Outcome: Ongoing, Progressing  Goal: Absence of Hospital-Acquired Illness or Injury  Outcome: Ongoing, Progressing  Goal: Optimal Comfort and Wellbeing  Outcome: Ongoing, Progressing  Goal: Readiness for Transition of Care  Outcome: Ongoing, Progressing     Problem: Diabetes Comorbidity  Goal: Blood Glucose Level Within Targeted Range  Outcome: Ongoing, Progressing     Problem: Skin Injury Risk Increased  Goal: Skin Health and Integrity  Outcome: Ongoing, Progressing     Problem: Device-Related Complication Risk (Hemodialysis)  Goal: Safe, Effective Therapy Delivery  Outcome: Ongoing, Progressing     Problem: Hemodynamic Instability (Hemodialysis)  Goal: Effective Tissue Perfusion  Outcome: Ongoing, Progressing     Problem: Infection (Hemodialysis)  Goal: Absence of Infection Signs and Symptoms  Outcome: Ongoing, Progressing

## 2023-08-21 NOTE — PROGRESS NOTES
Pharmacist Renal Dose Adjustment Note    Immanuel Bernal is a 57 y.o. male being treated with the medication metoclopramide    Patient Data:    Vital Signs (Most Recent):  Temp: 98.3 °F (36.8 °C) (08/21/23 0750)  Pulse: 70 (08/21/23 0404)  Resp: 14 (08/21/23 0404)  BP: (!) 141/84 (08/21/23 0404)  SpO2: 100 % (08/21/23 0404) Vital Signs (72h Range):  Temp:  [97.7 °F (36.5 °C)-99.2 °F (37.3 °C)]   Pulse:  [60-75]   Resp:  [13-21]   BP: (123-173)/(70-91)   SpO2:  [99 %-100 %]      Recent Labs   Lab 08/20/23  0526 08/20/23  1355 08/20/23  2222   CREATININE 6.2*  6.6* 7.1* 6.8*     Serum creatinine: 6.8 mg/dL (H) 08/20/23 2222  Estimated creatinine clearance: 10.4 mL/min (A)    Change metoclopramide to 5 mg 4x daily for ESRD on HD    Pharmacist's Name: Ubaldo Padilla  Pharmacist's Extension: [phone]

## 2023-08-21 NOTE — ASSESSMENT & PLAN NOTE
On peg tube feeds now  Tolerating well  Able to take in PO per SLP assessment on 8/15, on regular diet with thin liquids  Will reduce tube feeds gradually as patient takes in more PO    Peg tube was found to be blocked yesterday, nursing staff were able to unclog the PEG tube  SLP evaluated patient : can restart oral feeds    Patient was able to tolerate oral feeds last night without feeling nauseous or vomiting  Nutrition team was consulted regarding aid with tube feeds in case the patient needs to be switched to TF. Appreciate  Their recs. If patient vomits or cant tolerate oral feeds will switch to TF    Plan:  Will continue to attempt oral feeds

## 2023-08-21 NOTE — ASSESSMENT & PLAN NOTE
Latest POCT glucose is 96  Oral diet switched to low salt diet      Plan:  Follow glucose and notifiy if drops below <70

## 2023-08-21 NOTE — PLAN OF CARE
Pt slept well this shift. A&Ox 4 . VSS. T&RQ2. Skin care completed. Nause this am resolved by PRN's. Safety precautions in place. Call light in reach. No further concerns noted at this time.    Problem: Adult Inpatient Plan of Care  Goal: Plan of Care Review  Outcome: Ongoing, Progressing  Goal: Patient-Specific Goal (Individualized)  Outcome: Ongoing, Progressing  Goal: Absence of Hospital-Acquired Illness or Injury  Outcome: Ongoing, Progressing  Goal: Optimal Comfort and Wellbeing  Outcome: Ongoing, Progressing  Goal: Readiness for Transition of Care  Outcome: Ongoing, Progressing     Problem: Diabetes Comorbidity  Goal: Blood Glucose Level Within Targeted Range  Outcome: Ongoing, Progressing     Problem: Skin Injury Risk Increased  Goal: Skin Health and Integrity  Outcome: Ongoing, Progressing     Problem: Device-Related Complication Risk (Hemodialysis)  Goal: Safe, Effective Therapy Delivery  Outcome: Ongoing, Progressing     Problem: Hemodynamic Instability (Hemodialysis)  Goal: Effective Tissue Perfusion  Outcome: Ongoing, Progressing     Problem: Infection (Hemodialysis)  Goal: Absence of Infection Signs and Symptoms  Outcome: Ongoing, Progressing     Problem: Infection  Goal: Absence of Infection Signs and Symptoms  Outcome: Ongoing, Progressing     Problem: Coping Ineffective  Goal: Effective Coping  Outcome: Ongoing, Progressing     Problem: Device-Related Complication Risk (CRRT (Continuous Renal Replacement Therapy))  Goal: Safe, Effective Therapy Delivery  Outcome: Ongoing, Progressing     Problem: Hypothermia (CRRT (Continuous Renal Replacement Therapy))  Goal: Body Temperature Maintained in Desired Range  Outcome: Ongoing, Progressing     Problem: Infection (CRRT (Continuous Renal Replacement Therapy))  Goal: Absence of Infection Signs and Symptoms  Outcome: Ongoing, Progressing     Problem: Impaired Wound Healing  Goal: Optimal Wound Healing  Outcome: Ongoing, Progressing     Problem: Fall Injury  Risk  Goal: Absence of Fall and Fall-Related Injury  Outcome: Ongoing, Progressing

## 2023-08-21 NOTE — CONSULTS
Inpatient consult to Physical Medicine Rehab  Consult performed by: Kylah Crawford NP  Consult ordered by: Porsha Funez MD  Reason for consult: Assess rehab needs      Reviewed patient history and current admission.  Rehab team following.  Full consult to follow.    ISAURA Powell, FNP-C  Physical Medicine & Rehabilitation   08/21/2023

## 2023-08-21 NOTE — ASSESSMENT & PLAN NOTE
Hypertensive on arrival  bp today: 141/84SBP  Home medications: imdur Carvedilol Hydralazine Nifedipine  Had single attack of hypertension approx  during 08/19, patient was given another dose of hydralazine    Plan:  On hydralazine And losartan and carvedilol in patient, attempting to uptitrate GDMT  If BP remains uncontrolled, will switch hydralazine to BiDil

## 2023-08-21 NOTE — PROGRESS NOTES
[facility] - Intensive Care ([address] Medicine  Progress Note    Patient Name: Immanuel Bernal  MRN: [id number]  Patient Class: IP- Inpatient   Admission Date: 8/9/2023  Length of Stay: 12 days  Attending Physician: Porsha Funez MD  Primary Care Provider: [facility] Primary Doctor        Subjective:     Principal Problem:Endophthalmitis        HPI:  HPI obtained per medical record as patient unable to communicate    57-year-old male with a history of CHF, diabetes, hypertension, chronic disability, end-stage renal disease on hemodialysis, PEG placement, bowel obstruction, sleep apnea, TIA, left eye vitreous hemorrhage, stroke, and bowel obstruction with bowel resection admitted to [facility] in [place] July 18 from nursing home with left eye pain and blurred vision.  He was admitted with concern for bilateral endophthalmitis.  Other admit diagnoses included right upper extremity thrombus, end-stage renal disease on hemodialysis, hyperkalemia, sleep apnea, diabetes, and CHF.  He was treated with broad-spectrum antibiotics.  He was seen by Infectious Diseases,, and he was treated with vancomycin, ceftriaxone, and amphotericin.  Blood cultures were positive for Pseudomonas, and amphotericin/vancomycin were stopped.  IV cefepime was continued.  He was seen by Ophthalmology, and he received intravitreal vancomycin and ceftazidime (July 18).  He also had intravitreal tap July 18 that had no yeast or fungal elements observed.  Left eye endophthalmitis did not respond to treatment, and he subsequently developed abscess formation, significant proptosis, and drainage of purulent material from the orbit.  Ophthalmology recommended enucleation.  He was continued on cefepime for pseudomonal and ophthalmitis.  Recommendation was for 6 weeks of treatment to be followed by indefinite fluoroquinolone.  He was seen by Pulmonology during his stay for a right upper lung mass with peripheral nodules.  It was  felt that he would need further investigation of this once his current clinical issues stabilized.  Right upper extremity AV graft was excised during his stay with concern for infection.  A right IJ tunneled line was placed on July 27.  Left eye endophthalmitis continued to worsen, and ophthalmology spoke with patient and family about the need for enucleation.  Despite several conversations, family declined enucleation.  Plans were for transitioned to skilled nursing facility for continued treatment, but family did not want him to go to a skilled nursing facility.  He was subsequently discharged AMA from the hospital there on August 7.  Please see the August 7 Internal Medicine note for further details.     He subsequently presented to [facility] Emergency Department.  He will not go back to [facility] in [place]. He received Zosyn and vancomycin.  ED team at [facility] spoke with the patient and his power-of- (sister).  CT of the orbits noted scleral abscess along the lateral aspect of the left globe.  Patient and family are aware and in agreement that the patient needs enucleation of the eye at this time. Referring provider spoke with Oculoplastics at [facility]. Requesting transfer to [facility] Medicine for Oculoplastics specialty evaluation of persistent endophthalmitis.  ED provider noted patient is hemodynamically stable.  He does not appear volume overloaded or in respiratory distress.      The patient has a significant previous medical course as listed below  August 3: MRI brain and orbits showed worsening ophthalmitis and inflammatory changes of the left orbital tissues with slight increase in proptosis.  No evidence of intracranial inflammatory process observed.    July 25: Transesophageal echocardiogram had no evidence of endocarditis.  Moderate to severely decreased left ventricular systolic function with EF 30-35%.    July 22:  CT chest showed right upper  lobe mass with numerous bilateral nodularity predominantly in the right with associated mediastinal lymph nodes.  July 21: Blood cultures with Pseudomonas aeruginosa  July 19: MRI brain/orbits with and without contrast had findings suggestive of chronic microvascular ischemic disease of the white matter with remote ischemic event in the left insula/basal ganglia/subependymal white matter/right middle cerebellar peduncle and hemisphere.  Findings consistent with left endophthalmitis.  No abnormal findings observed in the right globe.  July 18:  Blood cultures with Pseudomonas aeruginosa and coag-negative staph      Overview/Hospital Course:  Patient was rate controlled and therefore flecainide held. His GCS is 13/15. Oculopalstics were consulted and so were the nephrology teams and IR. He underwent dialysis on his first day. Was scheduled for surgery today but his family refused to have it done until she reviewed the MRI herself first. This means that the surgeon will only be available next Wednesday in order to allow her to view and discuss the MRI. He has a peg tube in place that has not been utilzied prior. Heart failure medications have been reinstated and is being covered by vancomycin and meropenem for the endopthalmitis. Multidisciplinary session was conducted with his POA in regards to his operation, she accepted the surgery if it was emergent, otherwise to be done this upcoming Wednesday. Patient's delirium/agitation worsened so he was transferred to the ICU until his clinical situation settled. He then was stepped down to our care and ophthalmology will be conducting his eye procedure on him as planned. Underwent L eye enucleation on 8/16. Started the tobramycin ointment and drops. Following ID recs regarding antimicrobial regimen; operative cultures returned with yeast thus patient was also started on fluconazole. Patient's sister noted us that he was diagnosed with central apnea and KENIA in the past of  which he used a CPAP/BiPAP for. No concern of lack of oxygenation during his inpatient stay. Labs are underway to detect any element of CO2 retention although he had no clinical signs of that.    Disposition: referrals to SNF have been sent. [facility] or [facility]hab and do not want to return to their previous nursing facility. Pending final ID recommendations at this time.       Interval History: was able to tolerate the food he ate overnight without nausea nor vomitus.    Review of Systems   Constitutional:  Negative for activity change.   Eyes:  Positive for discharge (left eye minimal discharge).   Respiratory:  Negative for cough, chest tightness, shortness of breath and wheezing.    Cardiovascular:  Negative for chest pain and leg swelling.   Gastrointestinal:  Negative for abdominal distention and abdominal pain.   Neurological: Negative.      Objective:     Vital Signs (Most Recent):  Temp: 98.3 °F (36.8 °C) (08/21/23 0750)  Pulse: 62 (08/21/23 1105)  Resp: 14 (08/21/23 0404)  BP: 117/76 (08/21/23 1105)  SpO2: 100 % (08/21/23 0404) Vital Signs (24h Range):  Temp:  [98.3 °F (36.8 °C)-98.9 °F (37.2 °C)] 98.3 °F (36.8 °C)  Pulse:  [62-75] 62  Resp:  [14-18] 14  SpO2:  [100 %] 100 %  BP: (117-146)/(71-85) 117/76     Weight: 62.8 kg (138 lb 7.2 oz)  Body mass index is 19.87 kg/m².    Intake/Output Summary (Last 24 hours) at 8/21/2023 1128  Last data filed at 8/21/2023 1105  Gross per 24 hour   Intake --   Output 1601 ml   Net -1601 ml         Physical Exam  Eyes:      General:         Left eye: Discharge (very minimal) present.     Pupils: Pupils are equal, round, and reactive to light.   Cardiovascular:      Rate and Rhythm: Normal rate and regular rhythm.      Pulses: Normal pulses.      Heart sounds: Normal heart sounds.   Pulmonary:      Effort: Pulmonary effort is normal.      Breath sounds: Normal breath sounds.   Abdominal:      General: Bowel sounds are normal.      Palpations: Abdomen is soft.       Tenderness: There is no abdominal tenderness.   Neurological:      General: No focal deficit present.   Psychiatric:         Mood and Affect: Mood normal.             Significant Labs: All pertinent labs within the past 24 hours have been reviewed.    Significant Imaging: I have reviewed all pertinent imaging results/findings within the past 24 hours.      Assessment/Plan:      * Endophthalmitis  Ophthalmology team on board and were going to perform the procedure but the patient's family refused without being around to review the MRI which delayed the procedure till next wednesday    MRI repeat : Diffuse inflammatory change involving the left globe, with associated thickening of the left sclera, proptosis, and focal fluid collection within the left posterior compartment, which demonstrates diffusion restriction.  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  Diffuse left preseptal and postseptal inflammatory change, with superimposed orbital cellulitis also considered.  Evaluation of the left orbital apex is limited secondary to no contrast administration.  Decreased size of the left anterior compartment with bowing of the visualized left lens.  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    Thorough counseling undertaken with POA and IM team, palliative, opthalmo and nursing team in regards to the surgery  She agreed for her brother to undergo the procedure as an emergent case if his health deteriorates  Otherwise to be scheduled on wednesday    Underwent evisceration of his left eye yesterday, tobramycin ordered, ID recs are being followed  Still on meronema and vancomycin IV  Wbc: 8.07 (normal)  hgb post op 8.8 (at [facility])  No signs of distress or delirium  Culture of eye discharge grew yeast, started flucanazole to antibiotic regimen  Follow cultures    Pending Rehab placement as patient and his sister  unanimously agreed that he does not desire LTAC  Negotiations with ID team and pharmacy team in regards to his antibiotics, whether they can be administered via the dialysis line or not, pending opinion    Plan:  Follow ID recs  - Continue fluconazole, vanc and meropenem  - Pending final ID recs for discharge  Follow opthalomology recs  - Continue tobramycin eye drops and ointments  - Will see Dr Stewart frequently in the clinic for dressing changes    Difficulty in urination  Element of BPH displayed  Producing good urine, but facing slight difficulty emptying    Plan:  Start flomax 0.4 daily po      Redness and swelling of upper arm  Had staples in right upper arm from transfer hospital (the fistula clotted and had pseudomonas aurgenosa, and bacteremia), patient signed AMA before intervention was yielded there    Underwent Ultrasound US, findings:  1. Nonocclusive DVT in the right subclavian and axillary veins.  Catheter in the right subclavian vein.  2. 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    Seen by urology team:  Staples cannot be removed now as the skin is now adhering fully yet  Wound appears to be having dehiscence, for wound care team- consult sent already  DVT to be anticoagulated  Vascular consult sent for plan of removal of staples after discharge    Plan:  Wound care consult sent, pending recs  Anticoagulation started in the form of eliquis    Hypoglycemia  Latest POCT glucose is 96  Oral diet switched to low salt diet      Plan:  Follow glucose and notifiy if drops below <70      PEG (percutaneous endoscopic gastrostomy) status  On peg tube feeds now  Tolerating well  Able to take in PO per SLP assessment on 8/15, on regular diet with thin liquids  Will reduce tube feeds gradually as patient takes in more PO    Peg tube was found to be blocked yesterday, nursing staff were able to unclog the PEG tube  SLP evaluated patient : can restart oral feeds    Patient  was able to tolerate oral feeds last night without feeling nauseous or vomiting  Nutrition team was consulted regarding aid with tube feeds in case the patient needs to be switched to TF. Appreciate  Their recs. If patient vomits or cant tolerate oral feeds will switch to TF    Plan:  Will continue to attempt oral feeds    Encounter for palliative care        Anemia in ESRD (end-stage renal disease)  EPO per nephrology  hgb at baseline     Severe malnutrition  Nutrition consulted. Most recent weight and BMI monitored-     Measurements:  Wt Readings from Last 1 Encounters:   08/20/23 62.8 kg (138 lb 7.2 oz)   Body mass index is 19.87 kg/m².    Patient has been screened and assessed by RD.    Malnutrition Type:  Context: chronic illness  Level: severe    Malnutrition Characteristic Summary:  Weight Loss (Malnutrition): greater than 10% in 6 months  Energy Intake (Malnutrition): other (see comments) (LAMONT)  Subcutaneous Fat (Malnutrition): severe depletion  Muscle Mass (Malnutrition): severe depletion    Interventions/Recommendations (treatment strategy):  1.     Able to take in PO per SLP assessment on 8/15, on regular diet with thin liquids  Will reduce tube feeds gradually as patient takes in more PO    ESRD (end stage renal disease)  Nephrology consulted for HD needs while inpatient  Underwent SLED post MRI contrast (gadolinium)   Ad last dialysis was 08/18: Tolerated HD, UF 2500ml  08/21 in dialysis now  CRT today 6.4 >> 6.2 >> 6.6 >> 6.8    Electrolytes normal      Plan:  Will be followed by nephro, follow up with their recommendations regarding dialysis      Atrial fibrillation  History of AF noted however no EKG here with AF  Not on AC due to lack of definitive evidence of AF  Continue carvedilol  eliquis indication is the non-occlusive DVT in right upper arm veins      Plan:  Received curshed eliquis through peg tube last night    Hyperlipidemia  Home statin    Chronic diastolic heart failure  Continue to  monitor for signs of volume overload; volume removal with dialysis    Hypertension  Hypertensive on arrival  bp today: 141/84SBP  Home medications: imdur Carvedilol Hydralazine Nifedipine  Had single attack of hypertension approx  during 08/19, patient was given another dose of hydralazine    Plan:  On hydralazine And losartan and carvedilol in patient, attempting to uptitrate GDMT  If BP remains uncontrolled, will switch hydralazine to BiDil    Stroke  Continue home statin  Delerium precautions  PT OT    Diabetes mellitus  POCT glucose  LDSS  -180 target    Running slightly on the lower end: BG POCT is 75 >>> 96  No event of actual hypoglycemia inpatient    Plan:  Continue to monitor  In the event that his BG drops lower than 70 patient's hypoglycemic medications will be regulated again      VTE Risk Mitigation (From admission, onward)         Ordered     apixaban tablet 5 mg  2 times daily        See Hyperspace for full Linked Orders Report.    08/20/23 0308     apixaban tablet 10 mg  2 times daily        See Hyperspace for full Linked Orders Report.    08/20/23 0308     heparin (porcine) injection 1,000 Units  As needed (PRN)         08/10/23 1046     IP VTE HIGH RISK PATIENT  Once         08/09/23 1317     Place sequential compression device  Until discontinued         08/09/23 1317                Discharge Planning   TOBY: 8/22/2023     Code Status: Full Code   Is the patient medically ready for discharge?: No    Reason for patient still in hospital (select all that apply): Treatment and Pending disposition  Discharge Plan A: Skilled Nursing Facility   Discharge Delays: (!) Procedure Scheduling (IR, OR, Labs, Echo, Cath, Echo, EEG) (eye surgery next week)              Sharon Law MD  Department of Hospital Medicine   [facility] - Intensive Care ([place]-[facility])

## 2023-08-21 NOTE — PLAN OF CARE
NURSING HOME ORDERS    08/21/2023  [facility]  [facility] ([place] [place]  [address]  [facility] [address]  Dept: [phone]  Loc: [phone]     Admit to Nursing Home:  SNF    Diagnoses:  Active Hospital Problems    Diagnosis  POA    *Endophthalmitis [H44.009]  Yes    Difficulty in urination [R39.198]  Unknown    Redness and swelling of upper arm [M79.89, R23.8]  Unknown    Hypoglycemia [E16.2]  Unknown    Anemia in ESRD (end-stage renal disease) [N18.6, D63.1]  Yes    Encounter for palliative care [Z51.5]  Not Applicable    PEG (percutaneous endoscopic gastrostomy) status [Z93.1]  Not Applicable    Severe malnutrition [E43]  Unknown    ESRD (end stage renal disease) [N18.6]  Yes    Atrial fibrillation [I48.91]  Yes    Chronic diastolic heart failure [I50.32]  Yes    Hyperlipidemia [E78.5]  Yes    Diabetes mellitus [E11.9]  Yes     Goals of therapy reviewed:   1. Weight BMI <24  2. BP <130/80 mmHg  3. A1C <7 (Use when available SGLT2# lowers risk of progression)  4. Low salt diet  5. Avoid all OTC meds (NSAIDS)  6. Max Tolerated dose of ACE/ARB        Stroke [I63.9]  Yes    Hypertension [I10]  Yes     Amlodipine can cause significant swelling, stop  Hydralazine is 3 times a osborn, poor compliance    Add CTH 25 g a day (leg swelling)   Resume ACE for better proteinuria control  RTC 3-4 weeks    Large amounts of sodium can be hidden in canned, processed and convenience foods. High amounts of sodium can be found in many foods that are served at fast food restaurants.  Sodium controls fluid balance in our bodies and maintains blood volume and blood pressure. Eating too much sodium may raise blood pressure and cause fluid retention, which could lead to swelling of the legs and feet or other health issues. When limiting sodium in your diet, a common target is to eat less than 2,000 milligrams of sodium per day.          Resolved Hospital Problems    Diagnosis Date  Resolved POA    Acute metabolic encephalopathy [G93.41] 08/15/2023 Yes    Cholecystostomy care [Z43.4] 08/15/2023 Not Applicable    Chronic kidney disease-mineral and bone disorder [N18.9, E83.9, M89.9] 08/17/2023 Yes    Anemia in chronic kidney disease [N18.9, D63.1] 08/10/2023 Yes    Bipolar disorder [F31.9] 08/15/2023 Yes    Persistent proteinuria [R80.1] 08/15/2023 Yes     Chronic     2 g proteinuria noted  Resumed ACE  Lower BP systolic to less than 130       CKD (chronic kidney disease) stage 3, GFR 30-59 ml/min [N18.30] 08/15/2023 Yes     Diabetic since 2009    CKD diagnosed in [facility] 2018  Not clear when the CKD was diagnosed    GFR 25-30 ml/min  Proteinuria >2g  Resume ACE and increase as tolerated         Patient is homebound due to:  Endophthalmitis    Allergies:  Review of patient's allergies indicates:   Allergen Reactions    Morphine Rash    Amiodarone analogues Itching     Other reaction(s): Unknown       Vitals:  Routine    Diet: renal diet    Activities:   Activity as tolerated    Goals of Care Treatment Preferences:  Code Status: Full Code    Health care agent: Marley Bernal  [facility] care agent number: No value filed.          What is most important right now is to focus on extending life as long as possible, even it it means sacrificing quality.  Accordingly, we have decided that the best plan to meet the patient's goals includes continuing with treatment.      Labs:  See antibiotic plan below     Antibiotic Therapy Plan:        1) Infection: Pseudomonas bacteremia and Endophthalmitis with concern for extension into CNS      2) Discharge Antibiotics:     Intravenous antibiotics:  Meropenem 500 mg IV q 12 hours   Vancomycin at dose recommended by PharmD service      Oral antibiotics:  Fluconazole 400 mg PO q 24 hours      3) Therapy Duration:  4-6 weeks     Estimated end date of IV antibiotics: 9/12/23-9/26/23     4) Outpatient Weekly Labs:     Order the following labs to be drawn  on Mondays:   CBC  CMP   CRP     Vancomycin trough. Target 15-20     If vancomycin trough is not at target (15-20) prior to discharge, schedule vancomycin trough to be drawn before their fourth outpatient dose.     5) Fax Lab Results to Infectious Diseases Provider: Dr. Gideon Meneses     [facility] Fax Number: [phone]     6) Outpatient Infectious Diseases Follow-up     Follow-up appointment will be arranged by the ID clinic and will be found in the patient's appointments tab.     Prior to discharge, please ensure the patient's follow-up has been scheduled.    If there is still no follow-up scheduled prior to discharge, please send an EPIC message to Gisele Castro in Infectious Diseases.    Nursing Precautions:  Aspiration  and Fall    Consults:   PT to evaluate and treat and OT to evaluate and treat as seen appropriate, and Wound Care     Miscellaneous Care: PEG Care:  Clean site every 24 hours  Wound Care: yes:  Wound Vac:   Location:  right upper arm           Dressing changes every Monday, Wednesday and Friday.        ET Consult  Diabetes Care: Diabetes: Check blood sugar. Fingerstick blood sugar every 6 hours if unable to eat  Sliding Scale/Hypoglycemia Protocol: **MODERATE CORRECTION DOSE**  Blood Glucose  mg/dL    Pre-meal     Bedtime  151-200  2 units        1 unit  201-250   4 units       2 units   251-300  6 units        3 units   301-350   8 units       4 units   >350      10 units        5 units  **CALL MD for BG >350**  CHF Care: Daily Weight with notification of MD/NP of 2lb or > increase in 24 hours    v/s and O2 sat every shift    Oxygen as needed for sats <90%    Report abnormal breath sounds to MD/NP    Edema checks q shift- notify MD/NP of increased edema    Task segmentation by nursing for daily care to decrease exertion      CHF education to include diet ,medication, and CHF flags for MD notification                   Diabetes Care:  Report CBG < 60 or > 350 to  physician.      Medications: Discontinue all previous medication orders, if any. See new list below.     Medication List        START taking these medications      apixaban 5 mg Tab  Commonly known as: ELIQUIS  From 08/21 to 08/24 take 2 tablets (10 mg) by mouth twice daily; starting 08/25 take 1 tablet (5 mg) by mouth twice daily     dextrose 5 % in water (D5W) PgBk 100 mL with vancomycin 500 mg SolR 500 mg  Inject 500 mg into the vein every Mon, Wed, Fri. Administer vancomycin 500 mg post dialysis on HD days. Goal pre-HD vancomycin level 15 - 20     fluconazole 200 MG Tab  Commonly known as: DIFLUCAN  Take 2 tablets (400 mg total) by mouth once daily.     isosorbide-hydrALAZINE 20-37.5 mg 20-37.5 mg Tab  Commonly known as: BIDIL  Take 1 tablet by mouth 3 (three) times daily.     losartan 100 MG tablet  Commonly known as: COZAAR  Take 1 tablet (100 mg total) by mouth once daily.     oxyCODONE-acetaminophen 5-325 mg per tablet  Commonly known as: PERCOCET  Take 1 tablet by mouth every 4 (four) hours as needed for Pain.     sodium chloride 0.9 % PgBk 100 mL with meropenem 500 mg SolR 500 mg  Inject 500 mg into the vein every 12 (twelve) hours.     * tobramycin-dexAMETHasone 0.3-0.1% 0.3-0.1 % Oint  Commonly known as: TOBRADEX  Place into the left eye 3 (three) times daily.     * tobramycin-dexAMETHasone 0.3-0.1% 0.3-0.1 % Drps  Commonly known as: TOBRADEX  Place 1 drop into both eyes 4 (four) times daily.           * This list has 2 medication(s) that are the same as other medications prescribed for you. Read the directions carefully, and ask your doctor or other care provider to review them with you.                CHANGE how you take these medications      atorvastatin 40 MG tablet  Commonly known as: LIPITOR  Take 1 tablet (40 mg total) by mouth once daily.  What changed:   medication strength  See the new instructions.     calcitRIOL 0.25 MCG Cap  Commonly known as: ROCALTROL  Take 1 capsule (0.25 mcg total) by  mouth once daily.  What changed: when to take this            CONTINUE taking these medications      acetaminophen 325 MG tablet  Commonly known as: TYLENOL  Take 650 mg by mouth every 6 (six) hours as needed for Pain.     albuterol-ipratropium 2.5 mg-0.5 mg/3 mL nebulizer solution  Commonly known as: DUO-NEB  Inhale 3 mLs into the lungs 3 (three) times daily.     aspirin 81 MG Chew  Take 81 mg by mouth once daily.     carvediloL 25 MG tablet  Commonly known as: COREG  Take 1 tablet (25 mg total) by mouth 2 (two) times daily with meals.     folic acid 1 MG tablet  Commonly known as: FOLVITE  Take 1 tablet by mouth every morning.     megestroL 400 mg/10 mL (40 mg/mL) Susp  Commonly known as: MEGACE  Take 10 mLs by mouth 2 (two) times a day.     metoclopramide HCl 5 MG tablet  Commonly known as: REGLAN  Take 5 mg by mouth once daily.     multivitamin per tablet  Commonly known as: THERAGRAN  Take 1 tablet by mouth once daily.     OYSTER SHELL CALCIUM-VIT D3 500 mg-5 mcg (200 unit) Pwpk  Generic drug: calcium carbonate-vitamin D3  Take 1 tablet by mouth 2 (two) times a day.     pantoprazole 40 MG tablet  Commonly known as: PROTONIX  Take 40 mg by mouth once daily.     polyethylene glycol 17 gram/dose powder  Commonly known as: GLYCOLAX  Take 17 g by mouth 2 (two) times daily.     sevelamer carbonate 800 mg Tab  Commonly known as: RENVELA  Take 1 tablet by mouth 5 times per day     tamsulosin 0.4 mg Cap  Commonly known as: FLOMAX  Take 1 capsule (0.4 mg total) by mouth once daily.     VITAMIN B COMPLEX ORAL  Take 1 tablet by mouth every morning.     VITAMIN C 500 MG tablet  Generic drug: ascorbic acid (vitamin C)  Take 500 mg by mouth once daily.     zinc gluconate 50 mg tablet  Take 50 mg by mouth once daily.            STOP taking these medications      amLODIPine 10 MG tablet  Commonly known as: NORVASC     citalopram 20 MG tablet  Commonly known as: CeleXA     flecainide 50 MG Tab  Commonly known as: TAMBOCOR      ibuprofen 600 MG tablet  Commonly known as: ADVIL,MOTRIN     lactulose 10 gram/15 mL solution  Commonly known as: CHRONULAC     NEURIVA ORIGINAL ORAL     pentoxifylline 400 mg Tbsr  Commonly known as: TRENTAL     rosuvastatin 5 MG tablet  Commonly known as: CRESTOR     UNABLE TO FIND     UNABLE TO FIND                Immunizations Administered as of 8/21/2023       No immunizations on file.            Unknown covid vaccination status    Some patients may experience side effects after vaccination.  These may include fever, headache, muscle or joint aches.  Most symptoms resolve with 24-48 hours and do not require urgent medical evaluation unless they persist for more than 72 hours or symptoms are concerning for an unrelated medical condition.          _________________________________  Sharon Law MD  08/21/2023

## 2023-08-21 NOTE — ASSESSMENT & PLAN NOTE
Had staples in right upper arm from transfer hospital (the fistula clotted and had pseudomonas aurgenosa, and bacteremia), patient signed AMA before intervention was yielded there    Underwent Ultrasound US, findings:  1. Nonocclusive DVT in the right subclavian and axillary veins.  Catheter in the right subclavian vein.  2. 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    Seen by urology team:  Staples cannot be removed now as the skin is now adhering fully yet  Wound appears to be having dehiscence, for wound care team- consult sent already  DVT to be anticoagulated  Vascular consult sent for plan of removal of staples after discharge    Plan:  Wound care consult sent, pending recs  Anticoagulation started in the form of eliquis

## 2023-08-21 NOTE — NURSING
Report received from FLACA Martinez RN.   Patient arrived to [facility] via stretcher.     HD tx initiated via Right IJ CVC.

## 2023-08-21 NOTE — PLAN OF CARE
Patient is expected to dc to [facility] tomorrow.      Mirta Melendez LMSW  [facility]   [id number]

## 2023-08-21 NOTE — PLAN OF CARE
[facility] ([facility])  Discharge Reassessment    Primary Care Provider: No, Primary Doctor    Expected Discharge Date: 8/21/2023    Reassessment (most recent)       Discharge Reassessment - 08/21/23 1010          Discharge Reassessment    Assessment Type Discharge Planning Reassessment (P)      Did the patient's condition or plan change since previous assessment? Yes (P)      Communicated TOBY with patient/caregiver Yes (P)      Discharge Plan A Skilled Nursing Facility (P)      Discharge Plan B Skilled Nursing Facility (P)                    Patient has accepting SNF at [facility].Patient is expected to admit there on Wednesday.       Mirta Melendez LMSW  [facility]   [id number]

## 2023-08-21 NOTE — PT/OT/SLP PROGRESS
Physical Therapy  Not Seen  Patient Name:  Immanuel Bernal   MRN:  [id number]  Admitting Diagnosis:  Endophthalmitis   Recent Surgery: Procedure(s) (LRB):  BLEPHARORRHAPHY (Left)  INJECTION, MEDICATION, RETROBULBAR (Left)  EVISCERATION, OCULAR CONTENTS (Left) 5 Days Post-Op  Admit Date: 8/9/2023  Length of Stay: 12 days    Patient not seen on this date for treatment - patient gone to dialysis this morning; PT unable to return in the afternoon for second attempt. PT will continue to follow. Please continue progressive mobility as appropriate.    Discharge Disposition Recommendation: Rehab    Katy Boland, PT, DPT  8/21/2023

## 2023-08-21 NOTE — PLAN OF CARE
1. Bilateral endophthalmitis ([facility])  2. POD1 evisceration of left eye   - evisceration on 08/16/2023  - patient reports mild throbbing pain  - stable post operative exam, with no purulent drainage noted. Dressing changed today at bedside   - will continue dressing changes (xeroform sheets)  while inpt or outpt q2-3 days as below     Recommendations  - Continue tobradex drops 4 times daily in the left eye  - Continue tobradex ointment 3 times daily in the left eye, including at night  - Reviewed drop and ointment application instructions with nursing staff.   - Pain medication as needed. No need to patch left eye  - Continue IV antibiotics per infectious disease.   - upon d/c patient will require q2-3 day follow up with oculoplastics (Dr. Stewart) to replace scleral shell packing x 14 days following surgery (end date 8/30/23)   - from Retina standpoint, patient will need regular follow with retina specialist after discharge. Likely can follow up with [facility] given need to follow with [facility]. Will do dilated exam on pt tomorrow.

## 2023-08-21 NOTE — SUBJECTIVE & OBJECTIVE
Interval History: was able to tolerate the food he ate overnight without nausea nor vomitus.    Review of Systems   Constitutional:  Negative for activity change.   Eyes:  Positive for discharge (left eye minimal discharge).   Respiratory:  Negative for cough, chest tightness, shortness of breath and wheezing.    Cardiovascular:  Negative for chest pain and leg swelling.   Gastrointestinal:  Negative for abdominal distention and abdominal pain.   Neurological: Negative.      Objective:     Vital Signs (Most Recent):  Temp: 98.3 °F (36.8 °C) (08/21/23 0750)  Pulse: 62 (08/21/23 1105)  Resp: 14 (08/21/23 0404)  BP: 117/76 (08/21/23 1105)  SpO2: 100 % (08/21/23 0404) Vital Signs (24h Range):  Temp:  [98.3 °F (36.8 °C)-98.9 °F (37.2 °C)] 98.3 °F (36.8 °C)  Pulse:  [62-75] 62  Resp:  [14-18] 14  SpO2:  [100 %] 100 %  BP: (117-146)/(71-85) 117/76     Weight: 62.8 kg (138 lb 7.2 oz)  Body mass index is 19.87 kg/m².    Intake/Output Summary (Last 24 hours) at 8/21/2023 1128  Last data filed at 8/21/2023 1105  Gross per 24 hour   Intake --   Output 1601 ml   Net -1601 ml         Physical Exam  Eyes:      General:         Left eye: Discharge (very minimal) present.     Pupils: Pupils are equal, round, and reactive to light.   Cardiovascular:      Rate and Rhythm: Normal rate and regular rhythm.      Pulses: Normal pulses.      Heart sounds: Normal heart sounds.   Pulmonary:      Effort: Pulmonary effort is normal.      Breath sounds: Normal breath sounds.   Abdominal:      General: Bowel sounds are normal.      Palpations: Abdomen is soft.      Tenderness: There is no abdominal tenderness.   Neurological:      General: No focal deficit present.   Psychiatric:         Mood and Affect: Mood normal.             Significant Labs: All pertinent labs within the past 24 hours have been reviewed.    Significant Imaging: I have reviewed all pertinent imaging results/findings within the past 24 hours.

## 2023-08-21 NOTE — ASSESSMENT & PLAN NOTE
Nutrition consulted. Most recent weight and BMI monitored-     Measurements:  Wt Readings from Last 1 Encounters:   08/20/23 62.8 kg (138 lb 7.2 oz)   Body mass index is 19.87 kg/m².    Patient has been screened and assessed by RD.    Malnutrition Type:  Context: chronic illness  Level: severe    Malnutrition Characteristic Summary:  Weight Loss (Malnutrition): greater than 10% in 6 months  Energy Intake (Malnutrition): other (see comments) (LAMONT)  Subcutaneous Fat (Malnutrition): severe depletion  Muscle Mass (Malnutrition): severe depletion    Interventions/Recommendations (treatment strategy):  1.     Able to take in PO per SLP assessment on 8/15, on regular diet with thin liquids  Will reduce tube feeds gradually as patient takes in more PO

## 2023-08-21 NOTE — ASSESSMENT & PLAN NOTE
Nephrology consulted for HD needs while inpatient  Underwent SLED post MRI contrast (gadolinium)   Ad last dialysis was 08/18: Tolerated HD, UF 2500ml  08/21 in dialysis now  CRT today 6.4 >> 6.2 >> 6.6 >> 6.8    Electrolytes normal      Plan:  Will be followed by nephro, follow up with their recommendations regarding dialysis

## 2023-08-21 NOTE — NURSING
3 hours HD tx completed. 1 L of fluid removed.  Patient tolerated well.    Blood returned. Lines flushed with NS. Catheter locked with Heparin. Clamped and capped.    Report given to FLACA Martinez RN.

## 2023-08-22 ENCOUNTER — ANESTHESIA EVENT (OUTPATIENT)
Dept: ENDOSCOPY | Facility: HOSPITAL | Age: 58
DRG: 113 | End: 2023-08-22
Payer: MEDICARE

## 2023-08-22 PROBLEM — K92.1 MELENA: Status: ACTIVE | Noted: 2023-08-22

## 2023-08-22 LAB
ABO + RH BLD: NORMAL
ALBUMIN SERPL BCP-MCNC: 1.9 G/DL (ref 3.5–5.2)
ALBUMIN SERPL BCP-MCNC: 2 G/DL (ref 3.5–5.2)
ALP SERPL-CCNC: 83 U/L (ref 55–135)
ALT SERPL W/O P-5'-P-CCNC: <5 U/L (ref 10–44)
ANION GAP SERPL CALC-SCNC: 6 MMOL/L (ref 8–16)
ANION GAP SERPL CALC-SCNC: 7 MMOL/L (ref 8–16)
ANION GAP SERPL CALC-SCNC: 8 MMOL/L (ref 8–16)
ANION GAP SERPL CALC-SCNC: 8 MMOL/L (ref 8–16)
AST SERPL-CCNC: 11 U/L (ref 10–40)
BILIRUB SERPL-MCNC: 0.3 MG/DL (ref 0.1–1)
BLD GP AB SCN CELLS X3 SERPL QL: NORMAL
BLD PROD TYP BPU: NORMAL
BLOOD UNIT EXPIRATION DATE: NORMAL
BLOOD UNIT TYPE CODE: 5100
BLOOD UNIT TYPE: NORMAL
BUN SERPL-MCNC: 24 MG/DL (ref 6–20)
BUN SERPL-MCNC: 25 MG/DL (ref 6–20)
BUN SERPL-MCNC: 29 MG/DL (ref 6–20)
BUN SERPL-MCNC: 31 MG/DL (ref 6–20)
CALCIUM SERPL-MCNC: 8.5 MG/DL (ref 8.7–10.5)
CALCIUM SERPL-MCNC: 8.6 MG/DL (ref 8.7–10.5)
CALCIUM SERPL-MCNC: 8.9 MG/DL (ref 8.7–10.5)
CALCIUM SERPL-MCNC: 8.9 MG/DL (ref 8.7–10.5)
CHLORIDE SERPL-SCNC: 103 MMOL/L (ref 95–110)
CHLORIDE SERPL-SCNC: 104 MMOL/L (ref 95–110)
CHLORIDE SERPL-SCNC: 105 MMOL/L (ref 95–110)
CHLORIDE SERPL-SCNC: 106 MMOL/L (ref 95–110)
CO2 SERPL-SCNC: 24 MMOL/L (ref 23–29)
CO2 SERPL-SCNC: 24 MMOL/L (ref 23–29)
CO2 SERPL-SCNC: 26 MMOL/L (ref 23–29)
CO2 SERPL-SCNC: 26 MMOL/L (ref 23–29)
CODING SYSTEM: NORMAL
CREAT SERPL-MCNC: 5 MG/DL (ref 0.5–1.4)
CREAT SERPL-MCNC: 5.3 MG/DL (ref 0.5–1.4)
CREAT SERPL-MCNC: 5.5 MG/DL (ref 0.5–1.4)
CREAT SERPL-MCNC: 5.8 MG/DL (ref 0.5–1.4)
CROSSMATCH INTERPRETATION: NORMAL
DISPENSE STATUS: NORMAL
ERYTHROCYTE [DISTWIDTH] IN BLOOD BY AUTOMATED COUNT: 16.9 % (ref 11.5–14.5)
EST. GFR  (NO RACE VARIABLE): 10.5 ML/MIN/1.73 M^2
EST. GFR  (NO RACE VARIABLE): 11.2 ML/MIN/1.73 M^2
EST. GFR  (NO RACE VARIABLE): 11.7 ML/MIN/1.73 M^2
EST. GFR  (NO RACE VARIABLE): 12.6 ML/MIN/1.73 M^2
GLUCOSE SERPL-MCNC: 107 MG/DL (ref 70–110)
GLUCOSE SERPL-MCNC: 79 MG/DL (ref 70–110)
GLUCOSE SERPL-MCNC: 86 MG/DL (ref 70–110)
GLUCOSE SERPL-MCNC: 98 MG/DL (ref 70–110)
HCT VFR BLD AUTO: 22.7 % (ref 40–54)
HCT VFR BLD AUTO: 25.7 % (ref 40–54)
HGB BLD-MCNC: 6.7 G/DL (ref 14–18)
HGB BLD-MCNC: 7.8 G/DL (ref 14–18)
MAGNESIUM SERPL-MCNC: 2.2 MG/DL (ref 1.6–2.6)
MAGNESIUM SERPL-MCNC: 2.4 MG/DL (ref 1.6–2.6)
MAGNESIUM SERPL-MCNC: 2.4 MG/DL (ref 1.6–2.6)
MCH RBC QN AUTO: 31 PG (ref 27–31)
MCHC RBC AUTO-ENTMCNC: 29.5 G/DL (ref 32–36)
MCV RBC AUTO: 105 FL (ref 82–98)
NUM UNITS TRANS PACKED RBC: NORMAL
PHOSPHATE SERPL-MCNC: 4.1 MG/DL (ref 2.7–4.5)
PHOSPHATE SERPL-MCNC: 4.6 MG/DL (ref 2.7–4.5)
PHOSPHATE SERPL-MCNC: 4.6 MG/DL (ref 2.7–4.5)
PLATELET # BLD AUTO: 210 K/UL (ref 150–450)
PMV BLD AUTO: 10.2 FL (ref 9.2–12.9)
POCT GLUCOSE: 104 MG/DL (ref 70–110)
POCT GLUCOSE: 80 MG/DL (ref 70–110)
POCT GLUCOSE: 84 MG/DL (ref 70–110)
POCT GLUCOSE: 86 MG/DL (ref 70–110)
POCT GLUCOSE: 86 MG/DL (ref 70–110)
POTASSIUM SERPL-SCNC: 4.2 MMOL/L (ref 3.5–5.1)
POTASSIUM SERPL-SCNC: 4.2 MMOL/L (ref 3.5–5.1)
POTASSIUM SERPL-SCNC: 4.6 MMOL/L (ref 3.5–5.1)
POTASSIUM SERPL-SCNC: 4.7 MMOL/L (ref 3.5–5.1)
PROT SERPL-MCNC: 5.2 G/DL (ref 6–8.4)
RBC # BLD AUTO: 2.16 M/UL (ref 4.6–6.2)
SODIUM SERPL-SCNC: 136 MMOL/L (ref 136–145)
SODIUM SERPL-SCNC: 136 MMOL/L (ref 136–145)
SODIUM SERPL-SCNC: 137 MMOL/L (ref 136–145)
SODIUM SERPL-SCNC: 138 MMOL/L (ref 136–145)
SPECIMEN OUTDATE: NORMAL
WBC # BLD AUTO: 8.74 K/UL (ref 3.9–12.7)

## 2023-08-22 PROCEDURE — 80053 COMPREHEN METABOLIC PANEL: CPT

## 2023-08-22 PROCEDURE — 25000003 PHARM REV CODE 250: Performed by: HOSPITALIST

## 2023-08-22 PROCEDURE — 97530 THERAPEUTIC ACTIVITIES: CPT | Mod: CQ

## 2023-08-22 PROCEDURE — 86920 COMPATIBILITY TEST SPIN: CPT

## 2023-08-22 PROCEDURE — P9016 RBC LEUKOCYTES REDUCED: HCPCS

## 2023-08-22 PROCEDURE — 25000003 PHARM REV CODE 250

## 2023-08-22 PROCEDURE — 83735 ASSAY OF MAGNESIUM: CPT | Mod: 91 | Performed by: STUDENT IN AN ORGANIZED HEALTH CARE EDUCATION/TRAINING PROGRAM

## 2023-08-22 PROCEDURE — 99232 PR SUBSEQUENT HOSPITAL CARE,LEVL II: ICD-10-PCS | Mod: ,,, | Performed by: NURSE PRACTITIONER

## 2023-08-22 PROCEDURE — 99233 SBSQ HOSP IP/OBS HIGH 50: CPT | Mod: ,,, | Performed by: HOSPITALIST

## 2023-08-22 PROCEDURE — 11000001 HC ACUTE MED/SURG PRIVATE ROOM

## 2023-08-22 PROCEDURE — 36415 COLL VENOUS BLD VENIPUNCTURE: CPT

## 2023-08-22 PROCEDURE — 63600175 PHARM REV CODE 636 W HCPCS

## 2023-08-22 PROCEDURE — 85014 HEMATOCRIT: CPT | Performed by: HOSPITALIST

## 2023-08-22 PROCEDURE — 21400001 HC TELEMETRY ROOM

## 2023-08-22 PROCEDURE — 85018 HEMOGLOBIN: CPT | Performed by: HOSPITALIST

## 2023-08-22 PROCEDURE — 25000003 PHARM REV CODE 250: Performed by: INTERNAL MEDICINE

## 2023-08-22 PROCEDURE — 99223 1ST HOSP IP/OBS HIGH 75: CPT | Mod: ,,, | Performed by: INTERNAL MEDICINE

## 2023-08-22 PROCEDURE — 99223 PR INITIAL HOSPITAL CARE,LEVL III: ICD-10-PCS | Mod: ,,, | Performed by: INTERNAL MEDICINE

## 2023-08-22 PROCEDURE — 86901 BLOOD TYPING SEROLOGIC RH(D): CPT

## 2023-08-22 PROCEDURE — 99232 SBSQ HOSP IP/OBS MODERATE 35: CPT | Mod: ,,, | Performed by: NURSE PRACTITIONER

## 2023-08-22 PROCEDURE — C9113 INJ PANTOPRAZOLE SODIUM, VIA: HCPCS

## 2023-08-22 PROCEDURE — 80069 RENAL FUNCTION PANEL: CPT | Performed by: STUDENT IN AN ORGANIZED HEALTH CARE EDUCATION/TRAINING PROGRAM

## 2023-08-22 PROCEDURE — 36415 COLL VENOUS BLD VENIPUNCTURE: CPT | Performed by: STUDENT IN AN ORGANIZED HEALTH CARE EDUCATION/TRAINING PROGRAM

## 2023-08-22 PROCEDURE — 99233 PR SUBSEQUENT HOSPITAL CARE,LEVL III: ICD-10-PCS | Mod: ,,, | Performed by: HOSPITALIST

## 2023-08-22 PROCEDURE — 36430 TRANSFUSION BLD/BLD COMPNT: CPT

## 2023-08-22 PROCEDURE — 85027 COMPLETE CBC AUTOMATED: CPT

## 2023-08-22 RX ORDER — PANTOPRAZOLE SODIUM 40 MG/10ML
80 INJECTION, POWDER, LYOPHILIZED, FOR SOLUTION INTRAVENOUS ONCE
Status: COMPLETED | OUTPATIENT
Start: 2023-08-22 | End: 2023-08-22

## 2023-08-22 RX ORDER — HYDROCODONE BITARTRATE AND ACETAMINOPHEN 500; 5 MG/1; MG/1
TABLET ORAL
Status: DISCONTINUED | OUTPATIENT
Start: 2023-08-22 | End: 2023-09-19 | Stop reason: HOSPADM

## 2023-08-22 RX ORDER — CARVEDILOL 12.5 MG/1
12.5 TABLET ORAL 2 TIMES DAILY
Status: DISCONTINUED | OUTPATIENT
Start: 2023-08-22 | End: 2023-08-23

## 2023-08-22 RX ORDER — PANTOPRAZOLE SODIUM 40 MG/10ML
80 INJECTION, POWDER, LYOPHILIZED, FOR SOLUTION INTRAVENOUS ONCE
Status: DISCONTINUED | OUTPATIENT
Start: 2023-08-22 | End: 2023-08-22

## 2023-08-22 RX ORDER — SODIUM CHLORIDE 9 MG/ML
INJECTION, SOLUTION INTRAVENOUS ONCE
Status: DISCONTINUED | OUTPATIENT
Start: 2023-08-23 | End: 2023-08-26

## 2023-08-22 RX ORDER — PANTOPRAZOLE SODIUM 40 MG/10ML
40 INJECTION, POWDER, LYOPHILIZED, FOR SOLUTION INTRAVENOUS 2 TIMES DAILY
Status: DISCONTINUED | OUTPATIENT
Start: 2023-08-22 | End: 2023-08-29

## 2023-08-22 RX ADMIN — ACETAMINOPHEN 1000 MG: 500 TABLET ORAL at 09:08

## 2023-08-22 RX ADMIN — ONDANSETRON 8 MG: 8 TABLET, ORALLY DISINTEGRATING ORAL at 11:08

## 2023-08-22 RX ADMIN — PANTOPRAZOLE SODIUM 80 MG: 40 INJECTION, POWDER, FOR SOLUTION INTRAVENOUS at 09:08

## 2023-08-22 RX ADMIN — CEFEPIME 2 G: 2 INJECTION, POWDER, FOR SOLUTION INTRAVENOUS at 02:08

## 2023-08-22 RX ADMIN — ATORVASTATIN CALCIUM 40 MG: 40 TABLET, FILM COATED ORAL at 09:08

## 2023-08-22 RX ADMIN — CEFEPIME 2 G: 2 INJECTION, POWDER, FOR SOLUTION INTRAVENOUS at 09:08

## 2023-08-22 RX ADMIN — ONDANSETRON 8 MG: 8 TABLET, ORALLY DISINTEGRATING ORAL at 05:08

## 2023-08-22 RX ADMIN — TOBRAMYCIN AND DEXAMETHASONE 1 DROP: 3; 1 SUSPENSION/ DROPS OPHTHALMIC at 12:08

## 2023-08-22 RX ADMIN — TOBRAMYCIN AND DEXAMETHASONE: 3; 1 OINTMENT OPHTHALMIC at 03:08

## 2023-08-22 RX ADMIN — ISOSORBIDE DINITRATE: 20 TABLET ORAL at 03:08

## 2023-08-22 RX ADMIN — POLYETHYLENE GLYCOL 3350 17 G: 17 POWDER, FOR SOLUTION ORAL at 09:08

## 2023-08-22 RX ADMIN — TAMSULOSIN HYDROCHLORIDE 0.4 MG: 0.4 CAPSULE ORAL at 09:08

## 2023-08-22 RX ADMIN — CARVEDILOL 12.5 MG: 12.5 TABLET, FILM COATED ORAL at 09:08

## 2023-08-22 RX ADMIN — SENNOSIDES AND DOCUSATE SODIUM 1 TABLET: 50; 8.6 TABLET ORAL at 09:08

## 2023-08-22 RX ADMIN — METOCLOPRAMIDE 5 MG: 5 TABLET ORAL at 05:08

## 2023-08-22 RX ADMIN — TOBRAMYCIN AND DEXAMETHASONE: 3; 1 OINTMENT OPHTHALMIC at 09:08

## 2023-08-22 RX ADMIN — METOCLOPRAMIDE 5 MG: 5 TABLET ORAL at 11:08

## 2023-08-22 RX ADMIN — ACETAMINOPHEN 1000 MG: 500 TABLET ORAL at 03:08

## 2023-08-22 RX ADMIN — ISOSORBIDE DINITRATE: 20 TABLET ORAL at 09:08

## 2023-08-22 RX ADMIN — TOBRAMYCIN AND DEXAMETHASONE 1 DROP: 3; 1 SUSPENSION/ DROPS OPHTHALMIC at 09:08

## 2023-08-22 RX ADMIN — METOCLOPRAMIDE 5 MG: 5 TABLET ORAL at 03:08

## 2023-08-22 RX ADMIN — CALCITRIOL CAPSULES 0.25 MCG 0.25 MCG: 0.25 CAPSULE ORAL at 09:08

## 2023-08-22 RX ADMIN — ONDANSETRON 8 MG: 8 TABLET, ORALLY DISINTEGRATING ORAL at 03:08

## 2023-08-22 RX ADMIN — FLUCONAZOLE 400 MG: 200 TABLET ORAL at 09:08

## 2023-08-22 RX ADMIN — LOSARTAN POTASSIUM 100 MG: 50 TABLET, FILM COATED ORAL at 09:08

## 2023-08-22 RX ADMIN — PANTOPRAZOLE SODIUM 40 MG: 40 INJECTION, POWDER, FOR SOLUTION INTRAVENOUS at 09:08

## 2023-08-22 RX ADMIN — TOBRAMYCIN AND DEXAMETHASONE 1 DROP: 3; 1 SUSPENSION/ DROPS OPHTHALMIC at 05:08

## 2023-08-22 NOTE — ASSESSMENT & PLAN NOTE
- s/p evisceration on 8/16/23.  - Opthalmology last changed dressing 8/21/23 and recommending continue dressing changes (xeroform sheets) while inpt or outpt q2-3 days, Continue tobradex drops 4 times daily in the left eye, and Continue tobradex ointment 3 times daily in the left eye, including at night.   -[facility]cu[facility]astics plans to replace scleral shell packing x 14 days after surgery. As well as retina specialist f/u, which will be 8/30/23.

## 2023-08-22 NOTE — PROGRESS NOTES
[facility] ([place] [address])  Physical Medicine & Rehab  Progress Note    Patient Name: Immanuel Bernal  MRN: [id number]  Admission Date: 8/9/2023  Length of Stay: 13 days  Attending Physician: Porsha Funez MD    Subjective:     Principal Problem: melena    Hospital Course:   8/19/23: Participated w/ OT. Patient completed trials x3 of sit to stand exercises at EOB. Initial plan was to have patient complete functional mobility within room, however, when EOB patient started to experience dizziness (nursing notified) and therapist opted against initial plan for safety. Patient required added time for rest breaks between trials of standing. Patient also completed 3-4 steps to R to reach HOB with min A using RW. Standing trials between 2-3 mins each with easy fatigue noted. [facility] 15.      Interval History 8/22/2023:  Patient is seen for follow-up PM&R evaluation and recommendations: Participating with therapy. Scope pending for tomorrow due to drop in H&H and black tarry stool.     HPI, Past Medical, Family, and Social History remains the same as documented in the initial encounter.    Scheduled Medications:    [START ON 8/23/2023] sodium chloride 0.9%   Intravenous Once    acetaminophen  1,000 mg Oral TID    atorvastatin  40 mg Oral Daily    calcitRIOL  0.25 mcg Oral Daily    carvediloL  12.5 mg Oral BID    [START ON 8/23/2023] ceFEPime (MAXIPIME) IVPB  2 g Intravenous Q7 Days    And    [START ON 8/28/2023] ceFEPime (MAXIPIME) IVPB  2 g Intravenous Q7 Days    And    [START ON 8/25/2023] ceFEPime (MAXIPIME) IVPB  3 g Intravenous Q7 Days    epoetin thee (PROCRIT) injection  3,000 Units Intravenous Every Mon, Wed, Fri    fluconazole  400 mg Oral Daily    hydrALAZINE 10 mg 1 tablet, hydrALAZINE 25 mg 1 tablet, isorsorbide dinitrate 20 mg 1 tablet combination   Oral TID    losartan  100 mg Oral Daily    metoclopramide HCl  5 mg Oral TID AC    ondansetron  8 mg Oral TID AC    pantoprazole  40 mg  Intravenous BID    polyethylene glycol  17 g Oral BID    senna-docusate 8.6-50 mg  1 tablet Oral BID    sevelamer carbonate  0.8 g Oral TID WM    tamsulosin  0.4 mg Oral Daily    tobramycin-dexAMETHasone 0.3-0.1%   Left Eye TID    tobramycin-dexAMETHasone 0.3-0.1%  1 drop Both Eyes QID       Diagnostic Results: Labs: Reviewed  ECG: Reviewed  CT: Reviewed    PRN Medications: 0.9%  NaCl infusion (for blood administration), sodium chloride 0.9%, albuterol-ipratropium, dextrose 10%, dextrose 10%, glucagon (human recombinant), glucose, glucose, heparin (porcine), meclizine, melatonin, naloxone, oxyCODONE, oxyCODONE-acetaminophen, promethazine, sodium chloride 0.9%, Pharmacy to dose Vancomycin consult **AND** vancomycin - pharmacy to dose    Review of Systems   Constitutional:  Positive for activity change.   Musculoskeletal:  Positive for gait problem.   Neurological:  Positive for weakness. Negative for speech difficulty.   Psychiatric/Behavioral:  Negative for agitation and behavioral problems.      Objective:     Vital Signs (Most Recent):  Temp: 98.2 °F (36.8 °C) (08/22/23 1500)  Pulse: 61 (08/22/23 1509)  Resp: 18 (08/22/23 1500)  BP: (!) 141/78 (08/22/23 1530)  SpO2: 100 % (08/22/23 1617)    Vital Signs (24h Range):  Temp:  [97.6 °F (36.4 °C)-98.2 °F (36.8 °C)] 98.2 °F (36.8 °C)  Pulse:  [] 61  Resp:  [14-18] 18  SpO2:  [97 %-100 %] 100 %  BP: (118-145)/(69-80) 141/78         Physical Exam  Vitals and nursing note reviewed.   Constitutional:       Comments: Closing eyes, not speaking just nodding yes and no   Eyes:      Comments: L eye closed with minimal drainage   Pulmonary:      Effort: Pulmonary effort is normal. No respiratory distress.   Musculoskeletal:      Comments: Deconditioned and frail  RUE with dressing in place   Skin:     General: Skin is warm.   Neurological:      Motor: Weakness present.      Gait: Gait abnormal.   Psychiatric:         Mood and Affect: Mood normal.         Behavior:  Behavior normal.     Assessment/Plan:      Impaired mobility  - Related to prolonged/acute hospital course.     Recommendations  -  Encourage mobility, OOB in chair at least 3 hours per day, and early ambulation as appropriate  -  PT/OT evaluate and treat  -  Pain management  -  Monitor for and prevent skin breakdown and pressure ulcers  · Early mobility, repositioning/weight shifting every 20-30 minutes when sitting, turn patient every 2 hours, proper mattress/overlay and chair cushioning, pressure relief/heel protector boots  -  DVT prophylaxis    -  Reviewed discharge options (IP rehab, SNF, HH therapy, and OP therapy)    Redness and swelling of upper arm  - Vascular surgery consulted for the management of staples on his right arm from AVF removal at OSH. Given mild dehiscence and granulation tissue, recommend keeping staples in place for now. Continue HD through [facility], and wound care saw 8/21/23    Melena  - scope pending for tomorrow    Endophthalmitis  - s/p evisceration on 8/16/23.  - Opthalmology last changed dressing 8/21/23 and recommending continue dressing changes (xeroform sheets) while inpt or outpt q2-3 days, Continue tobradex drops 4 times daily in the left eye, and Continue tobradex ointment 3 times daily in the left eye, including at night.   -[employer] plans to replace scleral shell packing x 14 days after surgery. As well as retina specialist f/u, which will be 8/30/23.     ESRD (end stage renal disease)  - HD MWF  - Continue through [facility] per vas sx    PM&R Recommendation:     At this time, the PM&R team has reviewed this patient's ongoing medical case including inpatient diagnosis, medical history, clinical examination, labs, vitals, current social and functional history to provide the post-acute recommendation as follows:     RECOMMENDATIONS: inpatient rehabilitation due to good motivation/participation with therapies, has been determined to tolerate 3 hours of therapy and good potential for  recovery.     The patient will be admitted for comprehensive interdisciplinary inpatient rehabilitation to address the impairments due to medical diagnosis of Debility. The patient will benefit from an inpatient rehabilitation program to promote functional recovery, implement compensatory strategies and will undergo assessment for needs for durable medical equipment for safe discharge to the community. This patient will benefit from a coordinated interdisciplinary rehabilitation program services that require close monitoring and treatment with 24-hour rehabilitative nursing and physical/occupational therapies for 3 hours/day for 5 days/week.This interdisciplinary program will be performed under the direction of a physiatrist.    MEDICAL STABILITY:     At this time, barriers for post-acute rehab admission include insurance, improvement in H&H, and GI final plan.     We will continue to follow.    Kylah Crawford NP  Department of Physical Medicine & Rehab   [facility] - Intensive Care ([place]-)

## 2023-08-22 NOTE — SUBJECTIVE & OBJECTIVE
Past Medical History:   Diagnosis Date    Bilateral retinal detachment 7/18/2023    BPH (benign prostatic hyperplasia)     Cataract     CHF (congestive heart failure)     CKD (chronic kidney disease) stage 3, GFR 30-59 ml/min     Diabetes mellitus, type 2     Hypertension     KENIA (obstructive sleep apnea)     Pancreatitis     Persistent proteinuria 11/12/2020    2 g proteinuria noted Resumed ACE Lower BP systolic to less than 130     PTSD (post-traumatic stress disorder)     Stroke        Past Surgical History:   Procedure Laterality Date    CATARACT EXTRACTION Bilateral     EVISCERATION OF OCULAR CONTENTS Left 8/16/2023    Procedure: EVISCERATION, OCULAR CONTENTS;  Surgeon: Vicki Stewart MD;  Location: [address];  Service: Ophthalmology;  Laterality: Left;    RETROBULBAR INJECTION OF MEDICATION Left 8/16/2023    Procedure: INJECTION, MEDICATION, RETROBULBAR;  Surgeon: Vicki Stewart MD;  Location: [facility] OR [address] [place] [address];  Service: Ophthalmology;  Laterality: Left;    TARSORRHAPHY Left 8/16/2023    Procedure: BLEPHARORRHAPHY;  Surgeon: Vicki Stewart MD;  Location: [address];  Service: Ophthalmology;  Laterality: Left;       Review of patient's allergies indicates:   Allergen Reactions    Morphine Rash    Amiodarone analogues Itching     Other reaction(s): Unknown     Family History       Problem Relation (Age of Onset)    Diabetes Father, Sister    Heart disease Father    Hyperlipidemia Brother    Kidney disease Father    Stroke Mother          Tobacco Use    Smoking status: Former     Current packs/day: 0.00     Types: Cigarettes, Cigars    Smokeless tobacco: Never   Substance and Sexual Activity    Alcohol use: Not Currently    Drug use: Not Currently    Sexual activity: Not on file     Review of Systems   Constitutional:  Positive for diaphoresis. Negative for fever.   Gastrointestinal:  Positive for nausea and vomiting. Negative for abdominal pain.   Neurological:  Positive for dizziness and  light-headedness. Negative for syncope.     Objective:     Vital Signs (Most Recent):  Temp: 98 °F (36.7 °C) (08/22/23 0440)  Pulse: 66 (08/22/23 0440)  Resp: 15 (08/22/23 0440)  BP: 120/70 (08/22/23 0440)  SpO2: 100 % (08/22/23 0440) Vital Signs (24h Range):  Temp:  [97.7 °F (36.5 °C)-98 °F (36.7 °C)] 98 °F (36.7 °C)  Pulse:  [62-66] 66  Resp:  [14-18] 15  SpO2:  [100 %] 100 %  BP: (115-132)/(65-84) 120/70     Weight: 62.8 kg (138 lb 7.2 oz) (08/20/23 2255)  Body mass index is 19.87 kg/m².      Intake/Output Summary (Last 24 hours) at 8/22/2023 1004  Last data filed at 8/22/2023 0631  Gross per 24 hour   Intake 190 ml   Output 1600 ml   Net -1410 ml       Lines/Drains/Airways       Central Venous Catheter Line  Duration                  Hemodialysis Catheter right internal jugular -- days              Drain  Duration                  Gastrostomy/Enterostomy LUQ -- days              Peripheral Intravenous Line  Duration                  Peripheral IV - Single Lumen 08/14/23 0822 Anterior;Distal;Left Forearm 8 days                     Physical Exam  Constitutional:       General: He is not in acute distress.     Appearance: Normal appearance.   HENT:      Head: Normocephalic and atraumatic.   Eyes:      General: No scleral icterus.        Left eye: Discharge present.     Extraocular Movements: Extraocular movements intact.      Conjunctiva/sclera: Conjunctivae normal.   Cardiovascular:      Rate and Rhythm: Normal rate and regular rhythm.   Pulmonary:      Effort: Pulmonary effort is normal.      Breath sounds: Normal breath sounds.   Abdominal:      General: Abdomen is flat. Bowel sounds are normal. There is no distension.      Palpations: Abdomen is soft.      Tenderness: There is no abdominal tenderness.   Genitourinary:     Rectum: No anal fissure or external hemorrhoid.      Comments: Dark brown stool, no melena  Skin:     General: Skin is warm and dry.   Neurological:      Mental Status: He is alert.           Significant Labs:  All pertinent lab results from the last 24 hours have been reviewed.    Significant Imaging:  Imaging results within the past 24 hours have been reviewed.

## 2023-08-22 NOTE — ASSESSMENT & PLAN NOTE
- Vascular surgery consulted for the management of staples on his right arm from AVF removal at OSH. Given mild dehiscence and granulation tissue, recommend keeping staples in place for now. Continue HD through [facility], and wound care saw 8/21/23

## 2023-08-22 NOTE — ASSESSMENT & PLAN NOTE
Ophthalmology team on board and were going to perform the procedure but the patient's family refused without being around to review the MRI which delayed the procedure till next wednesday    MRI repeat : Diffuse inflammatory change involving the left globe, with associated thickening of the left sclera, proptosis, and focal fluid collection within the left posterior compartment, which demonstrates diffusion restriction.  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  Diffuse left preseptal and postseptal inflammatory change, with superimposed orbital cellulitis also considered.  Evaluation of the left orbital apex is limited secondary to no contrast administration.  Decreased size of the left anterior compartment with bowing of the visualized left lens.  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    Thorough counseling undertaken with POA and IM team, palliative, opthalmo and nursing team in regards to the surgery  She agreed for her brother to undergo the procedure as an emergent case if his health deteriorates  Otherwise to be scheduled on wednesday    Underwent evisceration of his left eye yesterday, tobramycin ordered, ID recs are being followed  Still on meronema and vancomycin IV  Wbc: 8.07 (normal)  hgb post op 8.8 (at [facility])  No signs of distress or delirium  Culture of eye discharge grew yeast, started flucanazole to antibiotic regimen  Follow cultures    Pending Rehab placement as patient and his sister unanimously agreed that he does not desire LTAC  Negotiations with ID team and pharmacy team in regards to his antibiotics, whether they can be administered via the dialysis line or not, pending opinion    Packing this Thursday and meeting on the following Monday.  Meronem is discontinued and replaced by cefepime in order to be able to administer it on dialysis    Plan:  Follow ID recs  - Continue fluconazole,  vanc and cefepime  - Pending final ID recs for discharge, waiting for their latest input  -Follow opthalomology recs  - Continue tobramycin eye drops and ointments

## 2023-08-22 NOTE — SUBJECTIVE & OBJECTIVE
Interval History 8/22/2023:  Patient is seen for follow-up PM&R evaluation and recommendations: Participating with therapy. Scope pending for tomorrow due to drop in H&H and black tarry stool.     HPI, Past Medical, Family, and Social History remains the same as documented in the initial encounter.    Scheduled Medications:    [START ON 8/23/2023] sodium chloride 0.9%   Intravenous Once    acetaminophen  1,000 mg Oral TID    atorvastatin  40 mg Oral Daily    calcitRIOL  0.25 mcg Oral Daily    carvediloL  12.5 mg Oral BID    [START ON 8/23/2023] ceFEPime (MAXIPIME) IVPB  2 g Intravenous Q7 Days    And    [START ON 8/28/2023] ceFEPime (MAXIPIME) IVPB  2 g Intravenous Q7 Days    And    [START ON 8/25/2023] ceFEPime (MAXIPIME) IVPB  3 g Intravenous Q7 Days    epoetin thee (PROCRIT) injection  3,000 Units Intravenous Every Mon, Wed, Fri    fluconazole  400 mg Oral Daily    hydrALAZINE 10 mg 1 tablet, hydrALAZINE 25 mg 1 tablet, isorsorbide dinitrate 20 mg 1 tablet combination   Oral TID    losartan  100 mg Oral Daily    metoclopramide HCl  5 mg Oral TID AC    ondansetron  8 mg Oral TID AC    pantoprazole  40 mg Intravenous BID    polyethylene glycol  17 g Oral BID    senna-docusate 8.6-50 mg  1 tablet Oral BID    sevelamer carbonate  0.8 g Oral TID WM    tamsulosin  0.4 mg Oral Daily    tobramycin-dexAMETHasone 0.3-0.1%   Left Eye TID    tobramycin-dexAMETHasone 0.3-0.1%  1 drop Both Eyes QID       Diagnostic Results: Labs: Reviewed  ECG: Reviewed  CT: Reviewed    PRN Medications: 0.9%  NaCl infusion (for blood administration), sodium chloride 0.9%, albuterol-ipratropium, dextrose 10%, dextrose 10%, glucagon (human recombinant), glucose, glucose, heparin (porcine), meclizine, melatonin, naloxone, oxyCODONE, oxyCODONE-acetaminophen, promethazine, sodium chloride 0.9%, Pharmacy to dose Vancomycin consult **AND** vancomycin - pharmacy to dose    Review of Systems   Constitutional:  Positive for activity change.    Musculoskeletal:  Positive for gait problem.   Neurological:  Positive for weakness. Negative for speech difficulty.   Psychiatric/Behavioral:  Negative for agitation and behavioral problems.      Objective:     Vital Signs (Most Recent):  Temp: 98.2 °F (36.8 °C) (08/22/23 1500)  Pulse: 61 (08/22/23 1509)  Resp: 18 (08/22/23 1500)  BP: (!) 141/78 (08/22/23 1530)  SpO2: 100 % (08/22/23 1617)    Vital Signs (24h Range):  Temp:  [97.6 °F (36.4 °C)-98.2 °F (36.8 °C)] 98.2 °F (36.8 °C)  Pulse:  [] 61  Resp:  [14-18] 18  SpO2:  [97 %-100 %] 100 %  BP: (118-145)/(69-80) 141/78         Physical Exam  Vitals and nursing note reviewed.   Constitutional:       Comments: Closing eyes, not speaking just nodding yes and no   Eyes:      Comments: L eye closed with minimal drainage   Pulmonary:      Effort: Pulmonary effort is normal. No respiratory distress.   Musculoskeletal:      Comments: Deconditioned and frail  RUE with dressing in place   Skin:     General: Skin is warm.   Neurological:      Motor: Weakness present.      Gait: Gait abnormal.   Psychiatric:         Mood and Affect: Mood normal.         Behavior: Behavior normal.

## 2023-08-22 NOTE — ASSESSMENT & PLAN NOTE
EPO per nephrology  hgb dropped due to melena this morning, transfused one pint PRBC after consent was taken

## 2023-08-22 NOTE — HPI
Pt is a 58 yo man w/ PMH of PEG tube, small bowel obstruction s/p bowel resection, ESRD on HD, stroke, endophthalmitis s/p left eye enucleation who had an episode of melena overnight. Per nurse, he had a bowel movement of dark and tarry stool and an episode of emesis overnight with no blood present. His sister endorses diaphoresis, nausea, vomiting, dizziness, and lightheadedness but denies fever, abdominal pain, syncope. He has received eliquis the last 5 days. His most recent colonoscopy was in 2008. His grandfather had colon cancer and his uncle had stomach cancer. His VSS. He Hgb dropped from 7.7 to 6.7 overnight

## 2023-08-22 NOTE — ASSESSMENT & PLAN NOTE
- Plan for EGD and colonoscopy tomorrow  - Trend H&H, transfuse for Hgb > 7, unless otherwise indicated  - Bolus IV pantoprazole 80mg followed by 40mg BID  - Maintain IV access with 2 large bore Ivs  - Intravascular resuscitation/support with IVFs   - Keep NPO  - Hold all NSAIDs and anticoagulants, unless contraindicated  - Please correct any coagulopathy with platelets and FFP for goal of platelets >50K and INR <2.0  - Please notify GI team if there is significant change in patient's clinical status

## 2023-08-22 NOTE — HOSPITAL COURSE
8/19/23: Participated w/ OT. Patient completed trials x3 of sit to stand exercises at EOB. Initial plan was to have patient complete functional mobility within room, however, when EOB patient started to experience dizziness (nursing notified) and therapist opted against initial plan for safety. Patient required added time for rest breaks between trials of standing. Patient also completed 3-4 steps to R to reach HOB with min A using RW. Standing trials between 2-3 mins each with easy fatigue noted. [facility] 15.  8/28/23: Participated w/ PT. Sat EOB SBA x 10 minutes. Sit to stand maxA. [facility] 11.

## 2023-08-22 NOTE — ASSESSMENT & PLAN NOTE
Hypertensive on arrival  bp today: 120/70    Home medications: imdur Carvedilol Hydralazine Nifedipine  Had single attack of hypertension approx  during 08/19, patient was given another dose of hydralazine    Plan:  On hydralazine And losartan and carvedilol in patient, attempting to uptitrate GDMT  Reduced carvedilol dose to half the dose (12.5)

## 2023-08-22 NOTE — ANESTHESIA PREPROCEDURE EVALUATION
[facility]  Anesthesia Pre-Operative Evaluation         Patient Name: Immanuel Bernal  YOB: 1965  MRN: [id number]    SUBJECTIVE:     Pre-operative evaluation for Procedure(s) (LRB):  EGD (ESOPHAGOGASTRODUODENOSCOPY) (N/A)     08/22/2023    Immanuel Bernal is a 57 y.o. male w/ a significant PMHx of DM, HTN, HFrEF EF 30-35%, A fib, ESRD on HD, KENIA, TIA, prior CVA, bilateral endophthalmitis, Pseudeomonas bacteremia. Patient has PEG tube that was recently clogged. Sister states he has chronic nausea that is worse with anesthesia.    Patient now presents for the above procedure(s).      LDA:        Hemodialysis Catheter right internal jugular (Active)   Line Necessity Review CRRT/HD 08/21/23 2100   Verification by X-ray Yes 08/21/23 0800   Site Assessment No drainage;No redness;No swelling;No warmth 08/21/23 2100   Line Securement Device Secured with sutures 08/21/23 2100   Dressing Type Central line dressing 08/21/23 2100   Dressing Status Clean;Dry;Intact 08/21/23 2100   Dressing Intervention Integrity maintained 08/21/23 2100   Date on Dressing 08/18/23 08/21/23 2100   Dressing Due to be Changed 08/25/23 08/21/23 2100   Venous Patency/Care heparin locked;intermittent infusion cap applied 08/21/23 1105   Arterial Patency/Care heparin locked;intermittent infusion cap applied 08/21/23 1105   Waveform Not being transduced 08/18/23 1014   Number of days:             Peripheral IV - Single Lumen 08/14/23 0822 Anterior;Distal;Left Forearm (Active)   Site Assessment Clean;Dry;Intact 08/22/23 0400   Extremity Assessment Distal to IV No abnormal discoloration;No redness;No swelling;No warmth 08/21/23 2100   Line Status Saline locked 08/22/23 0400   Dressing Status Clean;Dry;Intact 08/22/23 0400   Dressing Intervention Integrity maintained 08/22/23 0400   Dressing Change Due 08/18/23 08/21/23 2100   Site Change Due 08/18/23 08/21/23 2100   Reason Not Rotated Not due 08/21/23 2100   Number of  days: 8            Gastrostomy/Enterostomy LUQ (Active)   Securement secured to abdomen 08/21/23 2100   Interventions Prior to Feeding patency checked 08/21/23 2100   Feeding Type other (see comments) 08/16/23 0933   Clamp Status/Tolerance clamped 08/21/23 2100   Feeding Action feeding held 08/21/23 2100   Dressing no dressing 08/17/23 1945   Insertion Site no redness;no warmth 08/21/23 2100   Site Care sterile precut T-slit dressing applied 08/20/23 2000   Flush/Irrigation flushed w/;sterile normal saline;no resistance met 08/21/23 2100   Current Rate (mL/hr) 45 mL/hr 08/15/23 0200   Goal Rate (mL/hr) 45 mL/hr 08/15/23 0200   Intake (mL) 30 mL 08/14/23 2118   Water Bolus (mL) 30 mL 08/22/23 0631   Tube Output(mL)(Include Discarded Residual) 0 mL 08/14/23 1651   Tube Feeding Intake (mL) 260 08/14/23 1651   Number of days:        Prev airway: Intubation:     Induction:  Intravenous    Intubated:  Postinduction    Mask Ventilation:  Not attempted    Attempts:  1    Attempted By:  Student    Method of Intubation:  Video laryngoscopy    Blade:  Putnam 3    Laryngeal View Grade: Grade I - full view of cords      Difficult Airway Encountered?: No        Patient Active Problem List   Diagnosis    Diabetes mellitus    Stroke    Pancreatitis    Hypertension    Caries    Chronic diastolic heart failure    Hemiplegia    Hyperlipidemia    Atrial fibrillation    Benign prostate hyperplasia    Major depressive disorder    Bilateral retinal detachment    ESRD (end stage renal disease)    Endophthalmitis    Severe malnutrition    Anemia in ESRD (end-stage renal disease)    Encounter for palliative care    PEG (percutaneous endoscopic gastrostomy) status    Hypoglycemia    Redness and swelling of upper arm    Difficulty in urination    Impaired mobility    Melena       Review of patient's allergies indicates:   Allergen Reactions    Morphine Rash    Amiodarone analogues Itching     Other reaction(s):  Unknown       Current Inpatient Medications:   [START ON 8/23/2023] sodium chloride 0.9%   Intravenous Once    acetaminophen  1,000 mg Oral TID    atorvastatin  40 mg Oral Daily    calcitRIOL  0.25 mcg Oral Daily    carvediloL  12.5 mg Oral BID    [START ON 8/23/2023] ceFEPime (MAXIPIME) IVPB  2 g Intravenous Q7 Days    And    [START ON 8/28/2023] ceFEPime (MAXIPIME) IVPB  2 g Intravenous Q7 Days    And    [START ON 8/25/2023] ceFEPime (MAXIPIME) IVPB  3 g Intravenous Q7 Days    epoetin thee (PROCRIT) injection  3,000 Units Intravenous Every Mon, Wed, Fri    fluconazole  400 mg Oral Daily    hydrALAZINE 10 mg 1 tablet, hydrALAZINE 25 mg 1 tablet, isorsorbide dinitrate 20 mg 1 tablet combination   Oral TID    losartan  100 mg Oral Daily    metoclopramide HCl  5 mg Oral TID AC    ondansetron  8 mg Oral TID AC    pantoprazole  40 mg Intravenous BID    polyethylene glycol  17 g Oral BID    senna-docusate 8.6-50 mg  1 tablet Oral BID    sevelamer carbonate  0.8 g Oral TID WM    tamsulosin  0.4 mg Oral Daily    tobramycin-dexAMETHasone 0.3-0.1%   Left Eye TID    tobramycin-dexAMETHasone 0.3-0.1%  1 drop Both Eyes QID       No current facility-administered medications on file prior to encounter.     Current Outpatient Medications on File Prior to Encounter   Medication Sig Dispense Refill    acetaminophen (TYLENOL) 325 MG tablet Take 650 mg by mouth every 6 (six) hours as needed for Pain.      albuterol-ipratropium (DUO-NEB) 2.5 mg-0.5 mg/3 mL nebulizer solution Inhale 3 mLs into the lungs 3 (three) times daily.      ascorbic acid, vitamin C, (VITAMIN C) 500 MG tablet Take 500 mg by mouth once daily.      aspirin 81 MG Chew Take 81 mg by mouth once daily.      calcium carbonate-vitamin D3 (OYSTER SHELL CALCIUM-VIT D3) 500 mg-5 mcg (200 unit) PwPk Take 1 tablet by mouth 2 (two) times a day.      carvediloL (COREG) 25 MG tablet Take 1 tablet (25 mg total) by mouth 2 (two) times daily with meals. 60  tablet 6    folic acid (FOLVITE) 1 MG tablet Take 1 tablet by mouth every morning.      megestroL (MEGACE) 400 mg/10 mL (40 mg/mL) Susp Take 10 mLs by mouth 2 (two) times a day.      multivitamin (THERAGRAN) per tablet Take 1 tablet by mouth once daily.      pantoprazole (PROTONIX) 40 MG tablet Take 40 mg by mouth once daily.      polyethylene glycol (GLYCOLAX) 17 gram/dose powder Take 17 g by mouth 2 (two) times daily.      sevelamer carbonate (RENVELA) 800 mg Tab Take 1 tablet by mouth 5 times per day      tamsulosin (FLOMAX) 0.4 mg Cap Take 1 capsule (0.4 mg total) by mouth once daily. 30 capsule 6    VITAMIN B COMPLEX ORAL Take 1 tablet by mouth every morning.      zinc gluconate 50 mg tablet Take 50 mg by mouth once daily.         Past Surgical History:   Procedure Laterality Date    CATARACT EXTRACTION Bilateral     EVISCERATION OF OCULAR CONTENTS Left 8/16/2023    Procedure: EVISCERATION, OCULAR CONTENTS;  Surgeon: Vicki Stewart MD;  Location: [address];  Service: Ophthalmology;  Laterality: Left;    RETROBULBAR INJECTION OF MEDICATION Left 8/16/2023    Procedure: INJECTION, MEDICATION, RETROBULBAR;  Surgeon: Vicki Stewart MD;  Location: [address];  Service: Ophthalmology;  Laterality: Left;    TARSORRHAPHY Left 8/16/2023    Procedure: BLEPHARORRHAPHY;  Surgeon: Vikci Stewart MD;  Location: [address];  Service: Ophthalmology;  Laterality: Left;       Social History     Socioeconomic History    Marital status: Single   Tobacco Use    Smoking status: Former     Current packs/day: 0.00     Types: Cigarettes, Cigars    Smokeless tobacco: Never   Substance and Sexual Activity    Alcohol use: Not Currently    Drug use: Not Currently       OBJECTIVE:     Vital Signs Range (Last 24H):  Temp:  [36.5 °C (97.7 °F)-36.7 °C (98 °F)]   Pulse:  [62-66]   Resp:  [14-18]   BP: (115-132)/(65-84)   SpO2:  [100 %]       Significant Labs:  Lab Results   Component Value Date    WBC 8.74  08/22/2023    HGB 6.7 (L) 08/22/2023    HCT 22.7 (L) 08/22/2023     08/22/2023    CHOL 212 (H) 11/09/2020    TRIG 177 (H) 11/09/2020    HDL 46 11/09/2020    ALT <5 (L) 08/22/2023    AST 11 08/22/2023     08/22/2023    K 4.2 08/22/2023     08/22/2023    CREATININE 5.0 (H) 08/22/2023    BUN 24 (H) 08/22/2023    CO2 26 08/22/2023    TSH 2.270 11/09/2020    INR 1.2 08/09/2023    HGBA1C 4.7 08/12/2023       Diagnostic Studies: No relevant studies.    EKG:   Results for orders placed or performed during the hospital encounter of 08/09/23   EKG 12-lead    Collection Time: 08/12/23  9:38 AM    Narrative    Test Reason : R00.1,    Vent. Rate : 052 BPM     Atrial Rate : 052 BPM     P-R Int : 212 ms          QRS Dur : 128 ms      QT Int : 490 ms       P-R-T Axes : 002 252 269 degrees     QTc Int : 455 ms    Sinus bradycardia with 1st degree A-V block  Right bundle branch block  Anteroseptal infarct (cited on or before 09-AUG-2023)  T wave abnormality, consider inferolateral ischemia  Abnormal ECG  When compared with ECG of 09-AUG-2023 16:26,  Serial changes of Anteroseptal infarct Present  Confirmed by ELISSA DAVIS MD ([id number]) on 8/13/2023 9:18:03 AM    Referred By: SONYA BUTLER JR.           Confirmed By:ELISSA DAVIS MD       ASSESSMENT/PLAN:     Pre-op Assessment    I have reviewed the Patient Summary Reports.     I have reviewed the Nursing Notes. I have reviewed the NPO Status.   I have reviewed the Medications.     Review of Systems  Anesthesia Hx:  No problems with previous Anesthesia  History of prior surgery of interest to airway management or planning: Previous anesthesia: General Airway issues documented on chart review include videolaryngoscope used  Denies Family Hx of Anesthesia complications.   Denies Personal Hx of Anesthesia complications.   Social:  Former Smoker    Hematology/Oncology:     Oncology Normal    -- Anemia:   EENT/Dental:EENT/Dental Normal   Cardiovascular:   Hypertension CHF     Pulmonary:   Sleep Apnea    Renal/:   Chronic Renal Disease, ESRD, Dialysis    Hepatic/GI:  Hepatic/GI Normal    Musculoskeletal:  Musculoskeletal Normal    Neurological:   CVA    Endocrine:   Diabetes    Psych:   depression          Physical Exam  General: Cooperative, Alert, Oriented and Cachexia    Airway:  Mallampati: III   Mouth Opening: Normal  TM Distance: Normal  Tongue: Normal  Neck ROM: Normal ROM    Dental:  Intact        Anesthesia Plan  Type of Anesthesia, risks & benefits discussed:    Anesthesia Type: Gen Natural Airway, MAC, Gen ETT  Intra-op Monitoring Plan: Standard ASA Monitors  Post Op Pain Control Plan: multimodal analgesia and IV/PO Opioids PRN  Induction:  IV  Airway Plan: Direct, Post-Induction  Informed Consent: Informed consent signed with the Patient and all parties understand the risks and agree with anesthesia plan.  All questions answered.   ASA Score: 3  Day of Surgery Review of History & Physical: H&P Update referred to the surgeon/provider.    Ready For Surgery From Anesthesia Perspective.     .

## 2023-08-22 NOTE — SUBJECTIVE & OBJECTIVE
Past Medical History:   Diagnosis Date    Bilateral retinal detachment 7/18/2023    BPH (benign prostatic hyperplasia)     Cataract     CHF (congestive heart failure)     CKD (chronic kidney disease) stage 3, GFR 30-59 ml/min     Diabetes mellitus, type 2     Hypertension     KENIA (obstructive sleep apnea)     Pancreatitis     Persistent proteinuria 11/12/2020    2 g proteinuria noted Resumed ACE Lower BP systolic to less than 130     PTSD (post-traumatic stress disorder)     Stroke      Past Surgical History:   Procedure Laterality Date    CATARACT EXTRACTION Bilateral     EVISCERATION OF OCULAR CONTENTS Left 8/16/2023    Procedure: EVISCERATION, OCULAR CONTENTS;  Surgeon: Vicki Stewart MD;  Location: [facility] OR [address] [place] [address];  Service: Ophthalmology;  Laterality: Left;    RETROBULBAR INJECTION OF MEDICATION Left 8/16/2023    Procedure: INJECTION, MEDICATION, RETROBULBAR;  Surgeon: Vicki Stewart MD;  Location: [facility] OR [address] [place] [address];  Service: Ophthalmology;  Laterality: Left;    TARSORRHAPHY Left 8/16/2023    Procedure: BLEPHARORRHAPHY;  Surgeon: Vicki Stewart MD;  Location: [address];  Service: Ophthalmology;  Laterality: Left;     Review of patient's allergies indicates:   Allergen Reactions    Morphine Rash    Amiodarone analogues Itching     Other reaction(s): Unknown       Scheduled Medications:    acetaminophen  1,000 mg Oral TID    apixaban  5 mg Oral BID    atorvastatin  40 mg Oral Daily    calcitRIOL  0.25 mcg Oral Daily    carvediloL  25 mg Oral BID    [START ON 8/22/2023] ceFEPime (MAXIPIME) IVPB  2 g Intravenous Q24H    epoetin thee (PROCRIT) injection  3,000 Units Intravenous Every Mon, Wed, Fri    fluconazole  400 mg Oral Daily    hydrALAZINE 10 mg 1 tablet, hydrALAZINE 25 mg 1 tablet, isorsorbide dinitrate 20 mg 1 tablet combination   Oral TID    losartan  100 mg Oral Daily    metoclopramide HCl  5 mg Oral TID AC    ondansetron  8 mg Oral TID AC    polyethylene glycol  17 g Oral BID    senna-docusate  8.6-50 mg  1 tablet Oral BID    sevelamer carbonate  0.8 g Oral TID WM    tamsulosin  0.4 mg Oral Daily    tobramycin-dexAMETHasone 0.3-0.1%   Left Eye TID    tobramycin-dexAMETHasone 0.3-0.1%   Right Eye TID    tobramycin-dexAMETHasone 0.3-0.1%  1 drop Both Eyes QID       PRN Medications: sodium chloride 0.9%, albuterol-ipratropium, dextrose 10%, dextrose 10%, glucagon (human recombinant), glucose, glucose, heparin (porcine), meclizine, melatonin, naloxone, oxyCODONE, oxyCODONE-acetaminophen, promethazine, sodium chloride 0.9%, Pharmacy to dose Vancomycin consult **AND** vancomycin - pharmacy to dose    Family History       Problem Relation (Age of Onset)    Diabetes Father, Sister    Heart disease Father    Hyperlipidemia Brother    Kidney disease Father    Stroke Mother          Tobacco Use    Smoking status: Former     Current packs/day: 0.00     Types: Cigarettes, Cigars    Smokeless tobacco: Never   Substance and Sexual Activity    Alcohol use: Not Currently    Drug use: Not Currently    Sexual activity: Not on file     Review of Systems   Constitutional:  Positive for activity change.   Musculoskeletal:  Positive for gait problem.   Neurological:  Positive for weakness.   Psychiatric/Behavioral:  Negative for agitation and behavioral problems.      Objective:     Vital Signs (Most Recent):  Temp: 97.7 °F (36.5 °C) (08/21/23 1205)  Pulse: 64 (08/21/23 1522)  Resp: 18 (08/21/23 1735)  BP: 132/73 (08/21/23 1515)  SpO2: 100 % (08/21/23 1522)    Vital Signs (24h Range):  Temp:  [97.7 °F (36.5 °C)-98.9 °F (37.2 °C)] 97.7 °F (36.5 °C)  Pulse:  [62-75] 64  Resp:  [14-18] 18  SpO2:  [100 %] 100 %  BP: (115-146)/(65-85) 132/73     Body mass index is 19.87 kg/m².     Physical Exam  Vitals and nursing note reviewed.   Constitutional:       Comments: Closing eyes, not speaking just nodding yes and no   Eyes:      Comments: L eye closed with minimal drainage   Pulmonary:      Effort: Pulmonary effort is normal. No  respiratory distress.   Musculoskeletal:      Comments: Deconditioned and frail  RUE with dressing in place   Skin:     General: Skin is warm.   Neurological:      Motor: Weakness present.      Gait: Gait abnormal.   Psychiatric:         Mood and Affect: Mood normal.         Behavior: Behavior normal.               Diagnostic Results: Labs: Reviewed  ECG: Reviewed  CT: Reviewed

## 2023-08-22 NOTE — H&P (VIEW-ONLY)
[facility] - [facility] ([place] [address])  Gastroenterology  Consult Note    Patient Name: Immanuel Bernal  MRN: [id number]  Admission Date: 8/9/2023  Hospital Length of Stay: 13 days  Code Status: Full Code   Attending Provider: Porsha Funez MD   Consulting Provider: Emilie Dunne MD  Primary Care Physician: No, Primary Doctor  Principal Problem:<principal problem not specified>    Inpatient consult to Gastroenterology  Consult performed by: Emilie Dunne MD  Consult ordered by: Pat Montoya MD  Reason for consult: Melena        Subjective:     HPI:  Pt is a 58 yo man w/ PMH of PEG tube, small bowel obstruction s/p bowel resection, ESRD on HD, stroke, endophthalmitis s/p left eye enucleation who had an episode of melena overnight. Per nurse, he had a bowel movement of dark and tarry stool and an episode of emesis overnight with no blood present. His sister endorses diaphoresis, nausea, vomiting, dizziness, and lightheadedness but denies fever, abdominal pain, syncope. He has received eliquis the last 5 days. His most recent colonoscopy was in 2008. His grandfather had colon cancer and his uncle had stomach cancer. His VSS. He Hgb dropped from 7.7 to 6.7 overnight       Past Medical History:   Diagnosis Date    Bilateral retinal detachment 7/18/2023    BPH (benign prostatic hyperplasia)     Cataract     CHF (congestive heart failure)     CKD (chronic kidney disease) stage 3, GFR 30-59 ml/min     Diabetes mellitus, type 2     Hypertension     KENIA (obstructive sleep apnea)     Pancreatitis     Persistent proteinuria 11/12/2020    2 g proteinuria noted Resumed ACE Lower BP systolic to less than 130     PTSD (post-traumatic stress disorder)     Stroke        Past Surgical History:   Procedure Laterality Date    CATARACT EXTRACTION Bilateral     EVISCERATION OF OCULAR CONTENTS Left 8/16/2023    Procedure: EVISCERATION, OCULAR CONTENTS;  Surgeon: Vicki Stewart MD;  Location: [facility] OR [address] [place] [address];  Service:  Ophthalmology;  Laterality: Left;    RETROBULBAR INJECTION OF MEDICATION Left 8/16/2023    Procedure: INJECTION, MEDICATION, RETROBULBAR;  Surgeon: Vicki Stewart MD;  Location: [facility] OR [address] [place] [address];  Service: Ophthalmology;  Laterality: Left;    TARSORRHAPHY Left 8/16/2023    Procedure: BLEPHARORRHAPHY;  Surgeon: Vicki Stewart MD;  Location: [facility] OR [address] [place] [address];  Service: Ophthalmology;  Laterality: Left;       Review of patient's allergies indicates:   Allergen Reactions    Morphine Rash    Amiodarone analogues Itching     Other reaction(s): Unknown     Family History       Problem Relation (Age of Onset)    Diabetes Father, Sister    Heart disease Father    Hyperlipidemia Brother    Kidney disease Father    Stroke Mother          Tobacco Use    Smoking status: Former     Current packs/day: 0.00     Types: Cigarettes, Cigars    Smokeless tobacco: Never   Substance and Sexual Activity    Alcohol use: Not Currently    Drug use: Not Currently    Sexual activity: Not on file     Review of Systems   Constitutional:  Positive for diaphoresis. Negative for fever.   Gastrointestinal:  Positive for nausea and vomiting. Negative for abdominal pain.   Neurological:  Positive for dizziness and light-headedness. Negative for syncope.     Objective:     Vital Signs (Most Recent):  Temp: 98 °F (36.7 °C) (08/22/23 0440)  Pulse: 66 (08/22/23 0440)  Resp: 15 (08/22/23 0440)  BP: 120/70 (08/22/23 0440)  SpO2: 100 % (08/22/23 0440) Vital Signs (24h Range):  Temp:  [97.7 °F (36.5 °C)-98 °F (36.7 °C)] 98 °F (36.7 °C)  Pulse:  [62-66] 66  Resp:  [14-18] 15  SpO2:  [100 %] 100 %  BP: (115-132)/(65-84) 120/70     Weight: 62.8 kg (138 lb 7.2 oz) (08/20/23 2255)  Body mass index is 19.87 kg/m².      Intake/Output Summary (Last 24 hours) at 8/22/2023 1004  Last data filed at 8/22/2023 0631  Gross per 24 hour   Intake 190 ml   Output 1600 ml   Net -1410 ml       Lines/Drains/Airways       Central Venous Catheter Line  Duration                   Hemodialysis Catheter right internal jugular -- days              Drain  Duration                  Gastrostomy/Enterostomy LUQ -- days              Peripheral Intravenous Line  Duration                  Peripheral IV - Single Lumen 08/14/23 0822 Anterior;Distal;Left Forearm 8 days                     Physical Exam  Constitutional:       General: He is not in acute distress.     Appearance: Normal appearance.   HENT:      Head: Normocephalic and atraumatic.   Eyes:      General: No scleral icterus.        Left eye: Discharge present.     Extraocular Movements: Extraocular movements intact.      Conjunctiva/sclera: Conjunctivae normal.   Cardiovascular:      Rate and Rhythm: Normal rate and regular rhythm.   Pulmonary:      Effort: Pulmonary effort is normal.      Breath sounds: Normal breath sounds.   Abdominal:      General: Abdomen is flat. Bowel sounds are normal. There is no distension.      Palpations: Abdomen is soft.      Tenderness: There is no abdominal tenderness.   Genitourinary:     Rectum: No anal fissure or external hemorrhoid.      Comments: Dark brown stool, no melena  Skin:     General: Skin is warm and dry.   Neurological:      Mental Status: He is alert.          Significant Labs:  All pertinent lab results from the last 24 hours have been reviewed.    Significant Imaging:  Imaging results within the past 24 hours have been reviewed.    Assessment/Plan:     GI  Melena  Pt is a 58 yo man with a possible episode of melena overnight. His baseline Hgb is 9 and his Hgb dropped acutely from 7.7 to 6.7 over the last day suggesting a possible GI bleed. Etiology ulcer 2/2 PEG tube vs malignancy vs esophagitis.    Recommendations:  - Plan for EGD tomorrow  - Trend H&H, transfuse for Hgb > 7, unless otherwise indicated  - Bolus IV pantoprazole 80mg followed by 40mg BID  - Maintain IV access with 2 large bore Ivs  - Intravascular resuscitation/support with IVFs   - NPO at midnight  - Hold all NSAIDs and  anticoagulants, unless contraindicated  - Please correct any coagulopathy with platelets and FFP for goal of platelets >50K and INR <2.0  - Please notify GI team if there is significant change in patient's clinical status          Thank you for your consult. I will follow-up with patient. Please contact us if you have any additional questions.    Emilie Dunne MD  Gastroenterology  [facility] - Intensive Care ([place]

## 2023-08-22 NOTE — ASSESSMENT & PLAN NOTE
Pt is a 58 yo man with a possible episode of melena overnight. His baseline Hgb is 9 and his Hgb dropped acutely from 7.7 to 6.7 over the last day suggesting a possible GI bleed. Etiology ulcer 2/2 PEG tube vs malignancy vs esophagitis.    Recommendations:  - Plan for EGD tomorrow  - Trend H&H, transfuse for Hgb > 7, unless otherwise indicated  - Bolus IV pantoprazole 80mg followed by 40mg BID  - Maintain IV access with 2 large bore Ivs  - Intravascular resuscitation/support with IVFs   - NPO at midnight  - Hold all NSAIDs and anticoagulants, unless contraindicated  - Please correct any coagulopathy with platelets and FFP for goal of platelets >50K and INR <2.0  - Please notify GI team if there is significant change in patient's clinical status

## 2023-08-22 NOTE — PT/OT/SLP PROGRESS
Occupational Therapy      Patient Name:  Immanuel Bernal   MRN:  [id number]    Patient not seen today secondary to Blood transfusion, Patient fatigue (pt attempted 2x, in AM pt not feeling well, in PM pt recieving blood). Will follow-up when appropriate.    8/22/2023

## 2023-08-22 NOTE — PROGRESS NOTES
[facility] ([address] Medicine  Progress Note    Patient Name: Immanuel Bernal  MRN: [id number]  Patient Class: IP- Inpatient   Admission Date: 8/9/2023  Length of Stay: 13 days  Attending Physician: Porsha Funez MD  Primary Care Provider: Dolly, Primary Doctor        Subjective:     Principal Problem:<principal problem not specified>        HPI:  Pt is a 58 yo man w/ PMH of PEG tube, small bowel obstruction s/p bowel resection, ESRD on HD, stroke, endophthalmitis s/p left eye enucleation who was found to have an episode of melena overnight. Per nurse, he had a bowel movement that was dark and tarry. He also had an episode of emesis overnight that had no blood in it. Pt's sister endorses sweating, nausea, vomiting, dizziness, and lightheadedness. Denies fever, abdominal pain, syncope. He has taken eliquis the last 5 days. His most recent colonoscopy was 2008       Overview/Hospital Course:  Patient was rate controlled and therefore flecainide held. His GCS is 13/15. Oculopalstics were consulted and so were the nephrology teams and IR. He underwent dialysis on his first day. Was scheduled for surgery today but his family refused to have it done until she reviewed the MRI herself first. This means that the surgeon will only be available next Wednesday in order to allow her to view and discuss the MRI. He has a peg tube in place that has not been utilzied prior. Heart failure medications have been reinstated and is being covered by vancomycin and meropenem for the endopthalmitis. Multidisciplinary session was conducted with his POA in regards to his operation, she accepted the surgery if it was emergent, otherwise to be done this upcoming Wednesday. Patient's delirium/agitation worsened so he was transferred to the ICU until his clinical situation settled. He then was stepped down to our care and ophthalmology will be conducting his eye procedure on him as planned. Underwent L eye  enucleation on 8/16. Started the tobramycin ointment and drops. Following ID recs regarding antimicrobial regimen; operative cultures returned with yeast thus patient was also started on fluconazole. Patient's sister noted us that he was diagnosed with central apnea and KENIA in the past of which he used a CPAP/BiPAP for. No concern of lack of oxygenation during his inpatient stay. Labs are underway to detect any element of CO2 retention although he had no clinical signs of that. Patient was ready to be transferred to rehab when he suddenly developed one single bout of melena associated with hemoglobin drop. He received 1 pint PRBC and will undergo EGD the next morning.    Disposition: referrals to SNF have been sent. Family [facility] or rehab and do not want to return to their previous nursing facility. Pending final ID recommendations at this time.       Past Medical History:   Diagnosis Date    Bilateral retinal detachment 7/18/2023    BPH (benign prostatic hyperplasia)     Cataract     CHF (congestive heart failure)     CKD (chronic kidney disease) stage 3, GFR 30-59 ml/min     Diabetes mellitus, type 2     Hypertension     KENIA (obstructive sleep apnea)     Pancreatitis     Persistent proteinuria 11/12/2020    2 g proteinuria noted Resumed ACE Lower BP systolic to less than 130     PTSD (post-traumatic stress disorder)     Stroke        Past Surgical History:   Procedure Laterality Date    CATARACT EXTRACTION Bilateral     EVISCERATION OF OCULAR CONTENTS Left 8/16/2023    Procedure: EVISCERATION, OCULAR CONTENTS;  Surgeon: Vicki Stewart MD;  Location: [address];  Service: Ophthalmology;  Laterality: Left;    RETROBULBAR INJECTION OF MEDICATION Left 8/16/2023    Procedure: INJECTION, MEDICATION, RETROBULBAR;  Surgeon: Vicki Stewart MD;  Location: [facility] OR [address] [place] [address];  Service: Ophthalmology;  Laterality: Left;    TARSORRHAPHY Left 8/16/2023    Procedure: BLEPHARORRHAPHY;  Surgeon:  Vicki Stewart MD;  Location: [facility] OR [address] [place] [address];  Service: Ophthalmology;  Laterality: Left;       Review of patient's allergies indicates:   Allergen Reactions    Morphine Rash    Amiodarone analogues Itching     Other reaction(s): Unknown       No current facility-administered medications on file prior to encounter.     Current Outpatient Medications on File Prior to Encounter   Medication Sig    acetaminophen (TYLENOL) 325 MG tablet Take 650 mg by mouth every 6 (six) hours as needed for Pain.    albuterol-ipratropium (DUO-NEB) 2.5 mg-0.5 mg/3 mL nebulizer solution Inhale 3 mLs into the lungs 3 (three) times daily.    ascorbic acid, vitamin C, (VITAMIN C) 500 MG tablet Take 500 mg by mouth once daily.    aspirin 81 MG Chew Take 81 mg by mouth once daily.    calcium carbonate-vitamin D3 (OYSTER SHELL CALCIUM-VIT D3) 500 mg-5 mcg (200 unit) PwPk Take 1 tablet by mouth 2 (two) times a day.    carvediloL (COREG) 25 MG tablet Take 1 tablet (25 mg total) by mouth 2 (two) times daily with meals.    folic acid (FOLVITE) 1 MG tablet Take 1 tablet by mouth every morning.    megestroL (MEGACE) 400 mg/10 mL (40 mg/mL) Susp Take 10 mLs by mouth 2 (two) times a day.    multivitamin (THERAGRAN) per tablet Take 1 tablet by mouth once daily.    pantoprazole (PROTONIX) 40 MG tablet Take 40 mg by mouth once daily.    polyethylene glycol (GLYCOLAX) 17 gram/dose powder Take 17 g by mouth 2 (two) times daily.    sevelamer carbonate (RENVELA) 800 mg Tab Take 1 tablet by mouth 5 times per day    tamsulosin (FLOMAX) 0.4 mg Cap Take 1 capsule (0.4 mg total) by mouth once daily.    VITAMIN B COMPLEX ORAL Take 1 tablet by mouth every morning.    zinc gluconate 50 mg tablet Take 50 mg by mouth once daily.     Family History       Problem Relation (Age of Onset)    Diabetes Father, Sister    Heart disease Father    Hyperlipidemia Brother    Kidney disease Father    Stroke Mother          Tobacco Use    Smoking status: Former      Current packs/day: 0.00     Types: Cigarettes, Cigars    Smokeless tobacco: Never   Substance and Sexual Activity    Alcohol use: Not Currently    Drug use: Not Currently    Sexual activity: Not on file     Review of Systems   Constitutional:  Positive for diaphoresis. Negative for fever.   Gastrointestinal:  Positive for nausea and vomiting. Negative for abdominal pain.   Neurological:  Positive for dizziness and light-headedness. Negative for syncope.     Objective:     Vital Signs (Most Recent):  Temp: 98 °F (36.7 °C) (08/22/23 0440)  Pulse: 66 (08/22/23 0440)  Resp: 15 (08/22/23 0440)  BP: 120/70 (08/22/23 0440)  SpO2: 100 % (08/22/23 0440) Vital Signs (24h Range):  Temp:  [97.7 °F (36.5 °C)-98 °F (36.7 °C)] 98 °F (36.7 °C)  Pulse:  [62-66] 66  Resp:  [14-18] 15  SpO2:  [100 %] 100 %  BP: (115-132)/(65-84) 120/70     Weight: 62.8 kg (138 lb 7.2 oz)  Body mass index is 19.87 kg/m².     Physical Exam  Constitutional:       General: He is not in acute distress.     Appearance: Normal appearance.   HENT:      Head: Normocephalic and atraumatic.   Eyes:      General: No scleral icterus.        Left eye: Discharge present.     Extraocular Movements: Extraocular movements intact.      Conjunctiva/sclera: Conjunctivae normal.   Cardiovascular:      Rate and Rhythm: Normal rate and regular rhythm.   Pulmonary:      Effort: Pulmonary effort is normal.      Breath sounds: Normal breath sounds.   Abdominal:      General: Abdomen is flat. Bowel sounds are normal. There is no distension.      Palpations: Abdomen is soft.      Tenderness: There is no abdominal tenderness.   Genitourinary:     Rectum: No anal fissure or external hemorrhoid.      Comments: Dark brown stool, no melena  Skin:     General: Skin is warm and dry.   Neurological:      General: No focal deficit present.      Mental Status: He is alert and oriented to person, place, and time.   Psychiatric:         Mood and Affect: Mood normal.         Behavior:  Behavior normal.          Significant Labs: All pertinent labs within the past 24 hours have been reviewed.    Significant Imaging: I have reviewed all pertinent imaging results/findings within the past 24 hours.      Assessment/Plan:      Melena  - Plan for EGD and colonoscopy tomorrow  - Trend H&H, transfuse for Hgb > 7, unless otherwise indicated  - Bolus IV pantoprazole 80mg followed by 40mg BID  - Maintain IV access with 2 large bore Ivs  - Intravascular resuscitation/support with IVFs   - Keep NPO  - Hold all NSAIDs and anticoagulants, unless contraindicated  - Please correct any coagulopathy with platelets and FFP for goal of platelets >50K and INR <2.0  - Please notify GI team if there is significant change in patient's clinical status        Difficulty in urination  Element of BPH displayed  Producing good urine, but facing slight difficulty emptying    Plan:  Start flomax 0.4 daily po      Redness and swelling of upper arm  Had staples in right upper arm from transfer hospital (the fistula clotted and had pseudomonas aurgenosa, and bacteremia), patient signed AMA before intervention was yielded there    Underwent Ultrasound US, findings:  1. Nonocclusive DVT in the right subclavian and axillary veins.  Catheter in the right subclavian vein.  2. 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    Seen by urology team:  Staples cannot be removed now as the skin is now adhering fully yet  Wound appears to be having dehiscence, for wound care team, appreciate recs  Vascular consult sent for plan of removal of staples after discharge      Plan:  Wound care consult sent, appreciate recs  Anticoagulation held due to melena    Hypoglycemia  Latest POCT glucose is 96  Oral diet switched to ckear fluid pre-EGD    Plan:  Follow glucose and notifiy if drops below <70      PEG (percutaneous endoscopic gastrostomy) status  On peg tube feeds now  Tolerating well  Able to take in PO per  SLP assessment on 8/15, on regular diet with thin liquids  Will reduce tube feeds gradually as patient takes in more PO    Peg tube was found to be blocked yesterday, nursing staff were able to unclog the PEG tube  SLP evaluated patient : can restart oral feeds    Patient was able to tolerate oral feeds last night without feeling nauseous or vomiting  Nutrition team was consulted regarding aid with tube feeds in case the patient needs to be switched to TF. Appreciate  Their recs. If patient vomits or cant tolerate oral feeds will switch to TF    Plan:  Will continue to attempt oral feeds    Encounter for palliative care        Anemia in ESRD (end-stage renal disease)  EPO per nephrology  hgb dropped due to melena this morning, transfused one pint PRBC after consent was taken        Severe malnutrition  Nutrition consulted. Most recent weight and BMI monitored-     Measurements:  Wt Readings from Last 1 Encounters:   08/20/23 62.8 kg (138 lb 7.2 oz)   Body mass index is 19.87 kg/m².    Patient has been screened and assessed by RD.    Malnutrition Type:  Context: chronic illness  Level: severe    Malnutrition Characteristic Summary:  Weight Loss (Malnutrition): greater than 10% in 6 months  Energy Intake (Malnutrition): other (see comments) (LAMONT)  Subcutaneous Fat (Malnutrition): severe depletion  Muscle Mass (Malnutrition): severe depletion    Interventions/Recommendations (treatment strategy):  1.     Able to take in PO per SLP assessment on 8/15, on regular diet with thin liquids  Will reduce tube feeds gradually as patient takes in more PO    Endophthalmitis  Ophthalmology team on board and were going to perform the procedure but the patient's family refused without being around to review the MRI which delayed the procedure till next wednesday    MRI repeat : Diffuse inflammatory change involving the left globe, with associated thickening of the left sclera, proptosis, and focal fluid collection within the left  posterior compartment, which demonstrates diffusion restriction.  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  Diffuse left preseptal and postseptal inflammatory change, with superimposed orbital cellulitis also considered.  Evaluation of the left orbital apex is limited secondary to no contrast administration.  Decreased size of the left anterior compartment with bowing of the visualized left lens.  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    Thorough counseling undertaken with POA and IM team, palliative, opthalmo and nursing team in regards to the surgery  She agreed for her brother to undergo the procedure as an emergent case if his health deteriorates  Otherwise to be scheduled on wednesday    Underwent evisceration of his left eye yesterday, tobramycin ordered, ID recs are being followed  Still on meronema and vancomycin IV  Wbc: 8.07 (normal)  hgb post op 8.8 (at [facility])  No signs of distress or delirium  Culture of eye discharge grew yeast, started flucanazole to antibiotic regimen  Follow cultures    Pending Rehab placement as patient and his sister unanimously agreed that he does not desire LTAC  Negotiations with ID team and pharmacy team in regards to his antibiotics, whether they can be administered via the dialysis line or not, pending opinion    Packing this Thursday and meeting on the following Monday.  Meronem is discontinued and replaced by cefepime in order to be able to administer it on dialysis    Plan:  Follow ID recs  - Continue fluconazole, vanc and cefepime  - Pending final ID recs for discharge, waiting for their latest input  -Follow opthalomology recs  - Continue tobramycin eye drops and ointments    ESRD (end stage renal disease)  Nephrology consulted for HD needs while inpatient  Underwent SLED post MRI contrast (gadolinium)   Ad last dialysis was 08/18: Tolerated HD, UF 2500ml  Last  dialysis 08/21  CRT today 6.4 >> 6.2 >> 6.6 >> 6.8 >> 5.0     Electrolytes normal      Plan:  Will be followed by nephro, follow up with their recommendations regarding dialysis      Atrial fibrillation  History of AF noted however no EKG here with AF  Not on AC due to lack of definitive evidence of AF  Continue carvedilol  eliquis indication is the non-occlusive DVT in right upper arm veins      Plan:  Received curshed eliquis through peg tube last night    Hyperlipidemia  Home statin    Chronic diastolic heart failure  Continue to monitor for signs of volume overload; volume removal with dialysis    Hypertension  Hypertensive on arrival  bp today: 120/70    Home medications: imdur Carvedilol Hydralazine Nifedipine  Had single attack of hypertension approx  during 08/19, patient was given another dose of hydralazine    Plan:  On hydralazine And losartan and carvedilol in patient, attempting to uptitrate GDMT  Reduced carvedilol dose to half the dose (12.5)    Stroke  Continue home statin  Delerium precautions  PT OT    Diabetes mellitus  POCT glucose  LDSS  -180 target    Running slightly on the lower end: BG POCT is 75 >>> 96  No event of actual hypoglycemia inpatient    Plan:  Continue to monitor  In the event that his BG drops lower than 70 patient's hypoglycemic medications will be regulated again      VTE Risk Mitigation (From admission, onward)         Ordered     heparin (porcine) injection 1,000 Units  As needed (PRN)         08/10/23 1046     IP VTE HIGH RISK PATIENT  Once         08/09/23 1317     Place sequential compression device  Until discontinued         08/09/23 1317                Discharge Planning   TOBY: 8/23/2023     Code Status: Full Code   Is the patient medically ready for discharge?: No    Reason for patient still in hospital (select all that apply): Treatment  Discharge Plan A: Skilled Nursing Facility   Discharge Delays: (!) Procedure Scheduling (IR, OR, Labs, Echo, Cath,  Echo, EEG) (eye surgery next week)              Sharon Law MD  Department of Hospital Medicine   [facility] - Intensive Care ([place])

## 2023-08-22 NOTE — ASSESSMENT & PLAN NOTE
Nephrology consulted for HD needs while inpatient  Underwent SLED post MRI contrast (gadolinium)   Ad last dialysis was 08/18: Tolerated HD, UF 2500ml  Last dialysis 08/21  CRT today 6.4 >> 6.2 >> 6.6 >> 6.8 >> 5.0     Electrolytes normal      Plan:  Will be followed by nephro, follow up with their recommendations regarding dialysis

## 2023-08-22 NOTE — CONSULTS
[facility] ([place] [address])  Gastroenterology  Consult Note    Patient Name: Immanuel Bernal  MRN: [id number]  Admission Date: 8/9/2023  Hospital Length of Stay: 13 days  Code Status: Full Code   Attending Provider: Porsha Funez MD   Consulting Provider: Emilie Dunne MD  Primary Care Physician: No, Primary Doctor  Principal Problem:<principal problem not specified>    Inpatient consult to Gastroenterology  Consult performed by: Emilie Dunne MD  Consult ordered by: Pat Montoya MD  Reason for consult: Melena        Subjective:     HPI:  Pt is a 58 yo man w/ PMH of PEG tube, small bowel obstruction s/p bowel resection, ESRD on HD, stroke, endophthalmitis s/p left eye enucleation who had an episode of melena overnight. Per nurse, he had a bowel movement of dark and tarry stool and an episode of emesis overnight with no blood present. His sister endorses diaphoresis, nausea, vomiting, dizziness, and lightheadedness but denies fever, abdominal pain, syncope. He has received eliquis the last 5 days. His most recent colonoscopy was in 2008. His grandfather had colon cancer and his uncle had stomach cancer. His VSS. He Hgb dropped from 7.7 to 6.7 overnight       Past Medical History:   Diagnosis Date    Bilateral retinal detachment 7/18/2023    BPH (benign prostatic hyperplasia)     Cataract     CHF (congestive heart failure)     CKD (chronic kidney disease) stage 3, GFR 30-59 ml/min     Diabetes mellitus, type 2     Hypertension     KENIA (obstructive sleep apnea)     Pancreatitis     Persistent proteinuria 11/12/2020    2 g proteinuria noted Resumed ACE Lower BP systolic to less than 130     PTSD (post-traumatic stress disorder)     Stroke        Past Surgical History:   Procedure Laterality Date    CATARACT EXTRACTION Bilateral     EVISCERATION OF OCULAR CONTENTS Left 8/16/2023    Procedure: EVISCERATION, OCULAR CONTENTS;  Surgeon: Vicki Stewart MD;  Location: [facility] OR [address] [place] [address];  Service:  Ophthalmology;  Laterality: Left;    RETROBULBAR INJECTION OF MEDICATION Left 8/16/2023    Procedure: INJECTION, MEDICATION, RETROBULBAR;  Surgeon: Vicki Stewart MD;  Location: [facility] OR [address] [place] [address];  Service: Ophthalmology;  Laterality: Left;    TARSORRHAPHY Left 8/16/2023    Procedure: BLEPHARORRHAPHY;  Surgeon: Vicki Stewart MD;  Location: [facility] OR [address] [place] [address];  Service: Ophthalmology;  Laterality: Left;       Review of patient's allergies indicates:   Allergen Reactions    Morphine Rash    Amiodarone analogues Itching     Other reaction(s): Unknown     Family History       Problem Relation (Age of Onset)    Diabetes Father, Sister    Heart disease Father    Hyperlipidemia Brother    Kidney disease Father    Stroke Mother          Tobacco Use    Smoking status: Former     Current packs/day: 0.00     Types: Cigarettes, Cigars    Smokeless tobacco: Never   Substance and Sexual Activity    Alcohol use: Not Currently    Drug use: Not Currently    Sexual activity: Not on file     Review of Systems   Constitutional:  Positive for diaphoresis. Negative for fever.   Gastrointestinal:  Positive for nausea and vomiting. Negative for abdominal pain.   Neurological:  Positive for dizziness and light-headedness. Negative for syncope.     Objective:     Vital Signs (Most Recent):  Temp: 98 °F (36.7 °C) (08/22/23 0440)  Pulse: 66 (08/22/23 0440)  Resp: 15 (08/22/23 0440)  BP: 120/70 (08/22/23 0440)  SpO2: 100 % (08/22/23 0440) Vital Signs (24h Range):  Temp:  [97.7 °F (36.5 °C)-98 °F (36.7 °C)] 98 °F (36.7 °C)  Pulse:  [62-66] 66  Resp:  [14-18] 15  SpO2:  [100 %] 100 %  BP: (115-132)/(65-84) 120/70     Weight: 62.8 kg (138 lb 7.2 oz) (08/20/23 2255)  Body mass index is 19.87 kg/m².      Intake/Output Summary (Last 24 hours) at 8/22/2023 1004  Last data filed at 8/22/2023 0631  Gross per 24 hour   Intake 190 ml   Output 1600 ml   Net -1410 ml       Lines/Drains/Airways       Central Venous Catheter Line  Duration                   Hemodialysis Catheter right internal jugular -- days              Drain  Duration                  Gastrostomy/Enterostomy LUQ -- days              Peripheral Intravenous Line  Duration                  Peripheral IV - Single Lumen 08/14/23 0822 Anterior;Distal;Left Forearm 8 days                     Physical Exam  Constitutional:       General: He is not in acute distress.     Appearance: Normal appearance.   HENT:      Head: Normocephalic and atraumatic.   Eyes:      General: No scleral icterus.        Left eye: Discharge present.     Extraocular Movements: Extraocular movements intact.      Conjunctiva/sclera: Conjunctivae normal.   Cardiovascular:      Rate and Rhythm: Normal rate and regular rhythm.   Pulmonary:      Effort: Pulmonary effort is normal.      Breath sounds: Normal breath sounds.   Abdominal:      General: Abdomen is flat. Bowel sounds are normal. There is no distension.      Palpations: Abdomen is soft.      Tenderness: There is no abdominal tenderness.   Genitourinary:     Rectum: No anal fissure or external hemorrhoid.      Comments: Dark brown stool, no melena  Skin:     General: Skin is warm and dry.   Neurological:      Mental Status: He is alert.          Significant Labs:  All pertinent lab results from the last 24 hours have been reviewed.    Significant Imaging:  Imaging results within the past 24 hours have been reviewed.    Assessment/Plan:     GI  Melena  Pt is a 58 yo man with a possible episode of melena overnight. His baseline Hgb is 9 and his Hgb dropped acutely from 7.7 to 6.7 over the last day suggesting a possible GI bleed. Etiology ulcer 2/2 PEG tube vs malignancy vs esophagitis.    Recommendations:  - Plan for EGD tomorrow  - Trend H&H, transfuse for Hgb > 7, unless otherwise indicated  - Bolus IV pantoprazole 80mg followed by 40mg BID  - Maintain IV access with 2 large bore Ivs  - Intravascular resuscitation/support with IVFs   - NPO at midnight  - Hold all NSAIDs and  anticoagulants, unless contraindicated  - Please correct any coagulopathy with platelets and FFP for goal of platelets >50K and INR <2.0  - Please notify GI team if there is significant change in patient's clinical status          Thank you for your consult. I will follow-up with patient. Please contact us if you have any additional questions.    Emilie Dunne MD  Gastroenterology  [facility] - Intensive Care ([place]-)

## 2023-08-22 NOTE — PLAN OF CARE
YULI spoke with pt sister Marley [phone] at length about pt d/c plan and co-pays for post-acute services.  Sister Marley understood the issues and challenges with the pt insurance and d/c needs.      YULI confirmed Home Health company for pt as [facility] and Marley was agreeable to pt re-admitting to [facility].  YULI asked if pt dialysis was [facility] in [facility] and Marley affirmed that was where pt had dialysis in the past.      YULI agreed to send referrals to [facility] on behalf of family.  YULI stated she would ask the medical team to reach out with her to determine goals for medical readiness for pt.       is frustrated with the 'system' and the care pt received outside of [facility], and is thankful for the care at [facility] for pt.      YULI discussed sitter services with  and mentioned she would send her an email with the sitter lists to help her with pt care at home on d./c.  YULI mentioned that it would beneficial to contact pt secondary insurance.    YULI sent email to sister Marley at [email or web address]    YULI/TAMIA to follow-up on Home Health referrals and Dialysis chair on 08/23        Sherice Zepeda, JESÚS, MSW, LMSW, RSW   Case Management  [facility]  Email: [email or web address]

## 2023-08-22 NOTE — ASSESSMENT & PLAN NOTE
Had staples in right upper arm from transfer hospital (the fistula clotted and had pseudomonas aurgenosa, and bacteremia), patient signed AMA before intervention was yielded there    Underwent Ultrasound US, findings:  1. Nonocclusive DVT in the right subclavian and axillary veins.  Catheter in the right subclavian vein.  2. 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    Seen by urology team:  Staples cannot be removed now as the skin is now adhering fully yet  Wound appears to be having dehiscence, for wound care team, appreciate recs  Vascular consult sent for plan of removal of staples after discharge      Plan:  Wound care consult sent, appreciate recs  Anticoagulation held due to melena

## 2023-08-22 NOTE — CONSULTS
[facility] ([place] [address])  Physical Medicine & Rehab  Consult Note    Patient Name: Immanuel Bernal  MRN: [id number]  Admission Date: 8/9/2023  Hospital Length of Stay: 12 days  Attending Physician: Porsha Funez MD    Inpatient consult to Physical Medicine & Rehabilitation  Consult performed by: Kylah Crawford NP  Consult requested by:  Porsha Funez MD    Collaborating Physician: Dinora Rico MD  Reason for Consult:  Assess rehabilitation needs     Consults  Subjective:     Principal Problem: Endophthalmitis    HPI: Immanuel Bernal is a 57-year-old male with PMHx of CVA 2018 followed by another CVA per sister, CHF, diabetes, hypertension, chronic disability, end-stage renal disease on HD, A fib, PEG placement, bowel obstruction, sleep apnea, TIA, left eye vitreous hemorrhage, stroke, and bowel obstruction. Patient has been in a nursing home for a year due to debility. About 6 weeks ago AVF done at an OSH later diagnosed with endophthalmitis with concern that the AVF placement may have been the source of infection, fistula was removed. Left OSH AMA and presented to [facility] on 8/14/23 for endophthalmitis with no light perception vision of the left eye. Opthalmology consulted and s/p evisceration on 8/16/23. Opthalmology last changed dressing 8/21/23 and recommending continue dressing changes (xeroform sheets) while inpt or outpt q2-3 days, Continue tobradex drops 4 times daily in the left eye, and Continue tobradex ointment 3 times daily in the left eye, including at night.  [employer] plans to replace scleral shell packing x 14 days after surgery. As well as retina specialist f/u, which will be 8/30/23. Hospital course complicated by RUE swelling at old fistual site (Vascular surgery consulted for the management of staples on his right arm from AVF removal at OSH. Given mild dehiscence and granulation tissue, recommend keeping staples in place for now. Continue HD through [facility], and wound care  saw 8/21/23), RUE DVT (Eliquis), Pseudomonas bacteremia and gram stain with yeast (ID consulted and recommending IV fluconazole, Meropenem, and Vanc x 4-6 weeks of therapy from evisceration. Anticipated end date: 9/12-9/26).    Functional History: Patient lives in a nursing home the past year. Prior to admission, mainly bedbound. DME: w/c. Per sister, patient will be discharging back home with sister. Patient was I before 2018 CVA and since has had multiple admission to OSHs, rehabs, and nursing home.        Hospital Course: 8/19/23: Participated w/ OT. Patient completed trials x3 of sit to stand exercises at EOB. Initial plan was to have patient complete functional mobility within room, however, when EOB patient started to experience dizziness (nursing notified) and therapist opted against initial plan for safety. Patient required added time for rest breaks between trials of standing. Patient also completed 3-4 steps to R to reach HOB with min A using RW. Standing trials between 2-3 mins each with easy fatigue noted. [facility] 15.      Past Medical History:   Diagnosis Date    Bilateral retinal detachment 7/18/2023    BPH (benign prostatic hyperplasia)     Cataract     CHF (congestive heart failure)     CKD (chronic kidney disease) stage 3, GFR 30-59 ml/min     Diabetes mellitus, type 2     Hypertension     KENIA (obstructive sleep apnea)     Pancreatitis     Persistent proteinuria 11/12/2020    2 g proteinuria noted Resumed ACE Lower BP systolic to less than 130     PTSD (post-traumatic stress disorder)     Stroke      Past Surgical History:   Procedure Laterality Date    CATARACT EXTRACTION Bilateral     EVISCERATION OF OCULAR CONTENTS Left 8/16/2023    Procedure: EVISCERATION, OCULAR CONTENTS;  Surgeon: Vicki Stewart MD;  Location: [facility] OR [address] [place] [address];  Service: Ophthalmology;  Laterality: Left;    RETROBULBAR INJECTION OF MEDICATION Left 8/16/2023    Procedure: INJECTION, MEDICATION, RETROBULBAR;   2 Surgeon: Vicki Stewart MD;  Location: [facility] OR [address] [place] [address];  Service: Ophthalmology;  Laterality: Left;    TARSORRHAPHY Left 8/16/2023    Procedure: BLEPHARORRHAPHY;  Surgeon: Vicki Stewart MD;  Location: [facility] OR [address] [place] [address];  Service: Ophthalmology;  Laterality: Left;     Review of patient's allergies indicates:   Allergen Reactions    Morphine Rash    Amiodarone analogues Itching     Other reaction(s): Unknown       Scheduled Medications:    acetaminophen  1,000 mg Oral TID    apixaban  5 mg Oral BID    atorvastatin  40 mg Oral Daily    calcitRIOL  0.25 mcg Oral Daily    carvediloL  25 mg Oral BID    [START ON 8/22/2023] ceFEPime (MAXIPIME) IVPB  2 g Intravenous Q24H    epoetin thee (PROCRIT) injection  3,000 Units Intravenous Every Mon, Wed, Fri    fluconazole  400 mg Oral Daily    hydrALAZINE 10 mg 1 tablet, hydrALAZINE 25 mg 1 tablet, isorsorbide dinitrate 20 mg 1 tablet combination   Oral TID    losartan  100 mg Oral Daily    metoclopramide HCl  5 mg Oral TID AC    ondansetron  8 mg Oral TID AC    polyethylene glycol  17 g Oral BID    senna-docusate 8.6-50 mg  1 tablet Oral BID    sevelamer carbonate  0.8 g Oral TID WM    tamsulosin  0.4 mg Oral Daily    tobramycin-dexAMETHasone 0.3-0.1%   Left Eye TID    tobramycin-dexAMETHasone 0.3-0.1%   Right Eye TID    tobramycin-dexAMETHasone 0.3-0.1%  1 drop Both Eyes QID       PRN Medications: sodium chloride 0.9%, albuterol-ipratropium, dextrose 10%, dextrose 10%, glucagon (human recombinant), glucose, glucose, heparin (porcine), meclizine, melatonin, naloxone, oxyCODONE, oxyCODONE-acetaminophen, promethazine, sodium chloride 0.9%, Pharmacy to dose Vancomycin consult **AND** vancomycin - pharmacy to dose    Family History       Problem Relation (Age of Onset)    Diabetes Father, Sister    Heart disease Father    Hyperlipidemia Brother    Kidney disease Father    Stroke Mother          Tobacco Use    Smoking status: Former     Current packs/day: 0.00      Types: Cigarettes, Cigars    Smokeless tobacco: Never   Substance and Sexual Activity    Alcohol use: Not Currently    Drug use: Not Currently    Sexual activity: Not on file     Review of Systems Sister giving all information for patient  Constitutional:  Positive for activity change.   Musculoskeletal:  Positive for gait problem.   Neurological:  Positive for weakness.   Psychiatric/Behavioral:  Negative for agitation and behavioral problems.      Objective:     Vital Signs (Most Recent):  Temp: 97.7 °F (36.5 °C) (08/21/23 1205)  Pulse: 64 (08/21/23 1522)  Resp: 18 (08/21/23 1735)  BP: 132/73 (08/21/23 1515)  SpO2: 100 % (08/21/23 1522)    Vital Signs (24h Range):  Temp:  [97.7 °F (36.5 °C)-98.9 °F (37.2 °C)] 97.7 °F (36.5 °C)  Pulse:  [62-75] 64  Resp:  [14-18] 18  SpO2:  [100 %] 100 %  BP: (115-146)/(65-85) 132/73     Body mass index is 19.87 kg/m².     Physical Exam  Vitals and nursing note reviewed.   Constitutional:       Comments: Closing eyes, not speaking just nodding yes and no   Eyes:      Comments: L eye closed with minimal drainage   Pulmonary:      Effort: Pulmonary effort is normal. No respiratory distress.   Musculoskeletal:      Comments: Deconditioned and frail  RUE with dressing in place   Skin:     General: Skin is warm.   Neurological:      Motor: Weakness present.      Gait: Gait abnormal.   Psychiatric:         Mood and Affect: Mood normal.         Behavior: Behavior normal.     Diagnostic Results:   Labs: Reviewed  ECG: Reviewed  CT: Reviewed    Assessment/Plan:     * Endophthalmitis  - s/p evisceration on 8/16/23.  - Opthalmology last changed dressing 8/21/23 and recommending continue dressing changes (xeroform sheets) while inpt or outpt q2-3 days, Continue tobradex drops 4 times daily in the left eye, and Continue tobradex ointment 3 times daily in the left eye, including at night.   -[facility]cu[facility] plans to replace scleral shell packing x 14 days after surgery. As well as retina  specialist f/u, which will be 8/30/23.     Impaired mobility  - Related to prolonged/acute hospital course.     Recommendations  -  Encourage mobility, OOB in chair at least 3 hours per day, and early ambulation as appropriate  -  PT/OT evaluate and treat  -  Pain management  -  Monitor for and prevent skin breakdown and pressure ulcers  · Early mobility, repositioning/weight shifting every 20-30 minutes when sitting, turn patient every 2 hours, proper mattress/overlay and chair cushioning, pressure relief/heel protector boots  -  DVT prophylaxis    -  Reviewed discharge options (IP rehab, SNF, HH therapy, and OP therapy)    Redness and swelling of upper arm  - Vascular surgery consulted for the management of staples on his right arm from AVF removal at OSH. Given mild dehiscence and granulation tissue, recommend keeping staples in place for now. Continue HD through [facility], and wound care saw 8/21/23    ESRD (end stage renal disease)  - HD MWF  - Continue through [facility] per [facility] sx    PM&R Recommendation:     At this time, the PM&R team has reviewed this patient's ongoing medical case including inpatient diagnosis, medical history, clinical examination, labs, vitals, current social and functional history    At this time, we will continue to follow for ID final recommendation, Opthalmology replacing packing today, and continued participation as a rehab candidate.    Thank you for your consult.     Kylah Crawford NP  Department of Physical Medicine & Rehab  [facility] ([place]-[facility])

## 2023-08-22 NOTE — PT/OT/SLP PROGRESS
Physical Therapy Treatment    Patient Name:  Immanuel Bernal   MRN:  [id number]    Recommendations:     Discharge Recommendations: rehabilitation facility  Discharge Equipment Recommendations: none  Barriers to discharge: Decreased caregiver support    Assessment:     Immanuel Bernal is a 57 y.o. male admitted with a medical diagnosis of <principal problem not specified>.  He presents with the following impairments/functional limitations: weakness, impaired endurance, impaired functional mobility, gait instability, decreased coordination, decreased lower extremity function, decreased safety awareness, pain, impaired balance.  Pt required encouragement to participate. Pt completed bed mobility, seated therex, and short gait. Pt reported feeling dizzy upon initially sitting up, but no change in symptoms. Pt continues to benefit from therapy to improve functional endurance, strength, and mobility.     Rehab Prognosis: Good; patient would benefit from acute skilled PT services to address these deficits and reach maximum level of function.    Recent Surgery: Procedure(s) (LRB):  BLEPHARORRHAPHY (Left)  INJECTION, MEDICATION, RETROBULBAR (Left)  EVISCERATION, OCULAR CONTENTS (Left) 6 Days Post-Op    Plan:     During this hospitalization, patient to be seen 3 x/week to address the identified rehab impairments via gait training, therapeutic activities, therapeutic exercises, neuromuscular re-education and progress toward the following goals:    Plan of Care Expires:  09/14/23    Subjective     Chief Complaint: L eye pain  Patient/Family Comments/goals: pt reports wanting to take a nap  Pain/Comfort:  Pain Rating 1:  (did not rate)  Location - Side 1: Left  Location - Orientation 1: generalized  Location 1: eye  Pain Addressed 1: Reposition  Pain Rating Post-Intervention 1:  (did not rate)      Objective:     Communicated with RN prior to session.  Patient found HOB elevated with pressure relief boots, pulse ox  (continuous), telemetry upon PT entry to room.     General Precautions: Standard, fall, vision impaired  Orthopedic Precautions: N/A  Braces: N/A  Respiratory Status: Nasal cannula     Functional Mobility:  Additional staff present: N/A  Bed Mobility:   Scooting to EOB: minimum assistance  Supine to Sit: minimum assistance; HOB elevated  Sit to Supine: contact guard assistance  Transfers:   Sit <> Stand Transfer: moderate assistance with rolling walker   Increased left lateral lean noted   Cues for hand placement   Gait:  Pt took ~3 lateral steps to the right with minimum assistance and rolling walker.  Gait Deviation(s): occasional unsteady gait with flexed posture, decrease sindi, decrease step length, increase lateral sway  Verbal/tactile cues for RW management and sequencing  Balance:   Static/Dynamic sitting EOB balance: CGA-Ashlee   Completed seated therex   Reports initially feeling dizzy, but no change in symptoms      AM-PAC 6 CLICK MOBILITY  Turning over in bed (including adjusting bedclothes, sheets and blankets)?: 3  Sitting down on and standing up from a chair with arms (e.g., wheelchair, bedside commode, etc.): 3  Moving from lying on back to sitting on the side of the bed?: 3  Moving to and from a bed to a chair (including a wheelchair)?: 2  Need to walk in hospital room?: 2  Climbing 3-5 steps with a railing?: 1  Basic Mobility Total Score: 14       Treatment & Education:  Pt required encouragement to participate in therapy  Seated BLE therex x10 reps: ankle pumps, long arc quads, and marches  Cues for full ROM  Patient educated on role of therapy, goals of session, and benefits of out of bed mobility.   Instructed on use of call button and importance of calling nursing staff for assistance with mobility   Questions/concerns addressed within PTA scope of practice  Pt verbalized understanding.      Patient left HOB elevated with all lines intact, call button in reach, and nurse notified.    GOALS:    Multidisciplinary Problems       Physical Therapy Goals          Problem: Physical Therapy    Goal Priority Disciplines Outcome Goal Variances Interventions   Physical Therapy Goal     PT, PT/OT Ongoing, Progressing     Description: Goals to be met by: 2023    Patient will increase functional independence with mobility by performin. Supine to sit with MInimal Assistance  2. Sit to stand transfer with Minimal Assistance using LRAD  3. Gait  x 10 feet with Minimal Assistance using LRAD.                          Time Tracking:     PT Received On: 23  PT Start Time: 1629     PT Stop Time: 1643  PT Total Time (min): 14 min     Billable Minutes: Therapeutic Activity 14    Treatment Type: Treatment  PT/PTA: PTA     Number of PTA visits since last PT visit: 1     2023

## 2023-08-22 NOTE — PLAN OF CARE
Pt A&Ox4, VSS. L eye swollen w/ serosanguinous output, ointment and eye drops applied. Pt had one episode of emesis around 0600, scheduled zofran and Reglan given w/ relief. X1 episode of dark tarry stool at 0600. Provider notified of stool and Hgb and Hct of 6.7 and 22.7. Safety precautions maintained throughout shift.       Problem: Adult Inpatient Plan of Care  Goal: Plan of Care Review  Outcome: Ongoing, Progressing  Goal: Patient-Specific Goal (Individualized)  Outcome: Ongoing, Progressing  Goal: Absence of Hospital-Acquired Illness or Injury  Outcome: Ongoing, Progressing  Goal: Optimal Comfort and Wellbeing  Outcome: Ongoing, Progressing  Goal: Readiness for Transition of Care  Outcome: Ongoing, Progressing     Problem: Diabetes Comorbidity  Goal: Blood Glucose Level Within Targeted Range  Outcome: Ongoing, Progressing     Problem: Skin Injury Risk Increased  Goal: Skin Health and Integrity  Outcome: Ongoing, Progressing     Problem: Device-Related Complication Risk (Hemodialysis)  Goal: Safe, Effective Therapy Delivery  Outcome: Ongoing, Progressing     Problem: Hemodynamic Instability (Hemodialysis)  Goal: Effective Tissue Perfusion  Outcome: Ongoing, Progressing     Problem: Infection (Hemodialysis)  Goal: Absence of Infection Signs and Symptoms  Outcome: Ongoing, Progressing     Problem: Infection  Goal: Absence of Infection Signs and Symptoms  Outcome: Ongoing, Progressing     Problem: Coping Ineffective  Goal: Effective Coping  Outcome: Ongoing, Progressing     Problem: Device-Related Complication Risk (CRRT (Continuous Renal Replacement Therapy))  Goal: Safe, Effective Therapy Delivery  Outcome: Ongoing, Progressing     Problem: Hypothermia (CRRT (Continuous Renal Replacement Therapy))  Goal: Body Temperature Maintained in Desired Range  Outcome: Ongoing, Progressing     Problem: Infection (CRRT (Continuous Renal Replacement Therapy))  Goal: Absence of Infection Signs and Symptoms  Outcome: Ongoing,  Progressing     Problem: Impaired Wound Healing  Goal: Optimal Wound Healing  Outcome: Ongoing, Progressing     Problem: Fall Injury Risk  Goal: Absence of Fall and Fall-Related Injury  Outcome: Ongoing, Progressing

## 2023-08-22 NOTE — PROGRESS NOTES
Progress Note  Ophthalmology Service    Date: 08/22/2023    Presenting HPI:  Immanuel Bernal is a 57-year-old male with a history of CHF, diabetes, hypertension, chronic disability, end-stage renal disease on hemodialysis, PEG placement, bowel obstruction, sleep apnea, TIA, left eye vitreous hemorrhage, stroke, and bowel obstruction with bowel resection admitted to [facility] in [place] July 18 from nursing home with left eye pain and blurred vision.  He was admitted with concern for bilateral endophthalmitis.  Other admit diagnoses included right upper extremity thrombus, end-stage renal disease on hemodialysis, hyperkalemia, sleep apnea, diabetes, and CHF.  He was treated with broad-spectrum antibiotics.  He was seen by Infectious Diseases, and he was treated with vancomycin, ceftriaxone, and amphotericin.  Blood cultures were positive for Pseudomonas, and amphotericin/vancomycin were stopped.  IV cefepime was continued.  He was seen by Ophthalmology, and he received intravitreal vancomycin and ceftazidime (July 18).  He also had intravitreal tap July 18 that had no yeast or fungal elements observed.  Left eye endophthalmitis did not respond to treatment, and he subsequently developed abscess formation, significant proptosis, and drainage of purulent material from the orbit.  Ophthalmology recommended enucleation.  He was continued on cefepime for pseudomonal and ophthalmitis.  Recommendation was for 6 weeks of treatment to be followed by indefinite fluoroquinolone.  He was seen by Pulmonology during his stay for a right upper lung mass with peripheral nodules.  It was felt that he would need further investigation of this once his current clinical issues stabilized.  Right upper extremity AV graft was excised during his stay with concern for infection.  A right IJ tunneled line was placed on July 27.  Left eye endophthalmitis continued to worsen, and ophthalmology spoke with patient and family  about the need for enucleation.  Despite several conversations, family declined enucleation.  Plans were for transitioned to skilled nursing facility for continued treatment, but family did not want him to go to a skilled nursing facility.  He was subsequently discharged AMA from the hospital there on August 7.  He subsequently presented to [facility] Emergency Department.  He will not go back to [facility] in [place]. He received Zosyn and vancomycin.  ED team at [facility] spoke with the patient and his power-of- (sister).  CT of the orbits noted scleral abscess along the lateral aspect of the left globe.  Patient and family are aware and in agreement that the patient needs enucleation of the eye at this time. Referring provider spoke with Oculoplastics at [facility]. Requesting transfer to Hospital Medicine for Oculoplastics specialty evaluation of persistent endophthalmitis.  ED provider noted patient is hemodynamically stable.  He does not appear volume overloaded or in respiratory distress. Ophthalmology is being consulted to evaluate for bilateral endophthalmitis requiring enucleation OS.      Interval History:   POD 6 s/p evisceration of the left eye. Doing well. Repacked scleral shell with gauze day prior. DFE right eye prior to d/c, stable.      Ocular examination/Dilated fundus examination:  Slit Lamp and Fundus Exam       External Exam         Right Left    External  Normal              Slit Lamp Exam         Right Left    Lids/Lashes  Ptosis, edematous eyelid.    Conjunctiva/Sclera  S/p evisceration. Open anteriorly. Globe tightly packed with gauze. No purulent drainage noted. Erythematous conj overlying scleral shell.    Cornea  S/p evisceration    Anterior Chamber  S/p evisceration    Iris  S/p evisceration    Lens  S/p evisceration    Anterior Vitreous  S/p evisceration                      Assessment/Plan:     # S/p Evisceration (DOS: 8/16/23)  #  Endophthalmitis, OU  - patient with complex history noted above in HPI. Diagnosed with endophthalmitis and scleral abscess at [facility] with NLP vision. Initially refused enucleation/evisceration and left [facility] AMA -- transferred to [facility] for enucleation/evisceration 3 days later. Patient now s/p evisceration without placement of implant  - remains afebrile and WBC WNL    Recommendations  - Continue tobradex drops 4 times daily in the left eye  - Continue tobradex ointment 3 times daily in the left eye, including at night  - Tape left eye closed at night following ointment administration  - Pain medication as needed  - Continue IV antibiotics per infectious disease. Intraoperative cultures from OS evisceration pending final results.  Gram stain with rare yeast, patient is now on fluconazole as well as meropenem and vancomycin  - Packing in place within the scleral shell. Replaced packing yesterday 8/22/23 and removed temporary sutures medially and laterally on 8/18/23. Packing needs to be replaced on 8/23/23, will arrange for this prior to d/c tomorrow.   - Upon discharge, patient will require q2-3 day follow up with oculoplastics (Dr. Stewart) to replace scleral shell packing x 14 days following surgery. Will schedule appt Friday 8/25.  - from Retina standpoint, patient will need regular follow with retina specialist after discharge. Likely can follow up with [facility] given need to follow with [facility].     If you have any further questions, please contact the ophthalmology resident on call.     Tr Esquivel MD   [facility] Ophthalmology PGY-2  08/22/2023  4:05 PM

## 2023-08-22 NOTE — ASSESSMENT & PLAN NOTE
Latest POCT glucose is 96  Oral diet switched to ckear fluid pre-EGD    Plan:  Follow glucose and notifiy if drops below <70

## 2023-08-23 ENCOUNTER — ANESTHESIA (OUTPATIENT)
Dept: ENDOSCOPY | Facility: HOSPITAL | Age: 58
DRG: 113 | End: 2023-08-23
Payer: MEDICARE

## 2023-08-23 LAB
ALBUMIN SERPL BCP-MCNC: 2 G/DL (ref 3.5–5.2)
ALBUMIN SERPL BCP-MCNC: 2.1 G/DL (ref 3.5–5.2)
ALP SERPL-CCNC: 83 U/L (ref 55–135)
ALT SERPL W/O P-5'-P-CCNC: <5 U/L (ref 10–44)
ANION GAP SERPL CALC-SCNC: 8 MMOL/L (ref 8–16)
ANION GAP SERPL CALC-SCNC: 9 MMOL/L (ref 8–16)
AST SERPL-CCNC: 17 U/L (ref 10–40)
BACTERIA SPEC ANAEROBE CULT: ABNORMAL
BACTERIA SPEC ANAEROBE CULT: NORMAL
BILIRUB SERPL-MCNC: 0.3 MG/DL (ref 0.1–1)
BUN SERPL-MCNC: 34 MG/DL (ref 6–20)
BUN SERPL-MCNC: 34 MG/DL (ref 6–20)
BUN SERPL-MCNC: 37 MG/DL (ref 6–20)
BUN SERPL-MCNC: 39 MG/DL (ref 6–20)
CALCIUM SERPL-MCNC: 8.9 MG/DL (ref 8.7–10.5)
CALCIUM SERPL-MCNC: 9 MG/DL (ref 8.7–10.5)
CALCIUM SERPL-MCNC: 9.1 MG/DL (ref 8.7–10.5)
CALCIUM SERPL-MCNC: 9.1 MG/DL (ref 8.7–10.5)
CHLORIDE SERPL-SCNC: 103 MMOL/L (ref 95–110)
CHLORIDE SERPL-SCNC: 103 MMOL/L (ref 95–110)
CHLORIDE SERPL-SCNC: 108 MMOL/L (ref 95–110)
CHLORIDE SERPL-SCNC: 108 MMOL/L (ref 95–110)
CO2 SERPL-SCNC: 21 MMOL/L (ref 23–29)
CO2 SERPL-SCNC: 22 MMOL/L (ref 23–29)
CREAT SERPL-MCNC: 6.1 MG/DL (ref 0.5–1.4)
CREAT SERPL-MCNC: 6.1 MG/DL (ref 0.5–1.4)
CREAT SERPL-MCNC: 6.4 MG/DL (ref 0.5–1.4)
CREAT SERPL-MCNC: 6.6 MG/DL (ref 0.5–1.4)
ERYTHROCYTE [DISTWIDTH] IN BLOOD BY AUTOMATED COUNT: 18.1 % (ref 11.5–14.5)
EST. GFR  (NO RACE VARIABLE): 9 ML/MIN/1.73 M^2
EST. GFR  (NO RACE VARIABLE): 9.3 ML/MIN/1.73 M^2
EST. GFR  (NO RACE VARIABLE): 9.9 ML/MIN/1.73 M^2
EST. GFR  (NO RACE VARIABLE): 9.9 ML/MIN/1.73 M^2
GLUCOSE SERPL-MCNC: 106 MG/DL (ref 70–110)
GLUCOSE SERPL-MCNC: 60 MG/DL (ref 70–110)
GLUCOSE SERPL-MCNC: 60 MG/DL (ref 70–110)
GLUCOSE SERPL-MCNC: 70 MG/DL (ref 70–110)
HCT VFR BLD AUTO: 24.8 % (ref 40–54)
HGB BLD-MCNC: 7.3 G/DL (ref 14–18)
MAGNESIUM SERPL-MCNC: 2.4 MG/DL (ref 1.6–2.6)
MAGNESIUM SERPL-MCNC: 2.5 MG/DL (ref 1.6–2.6)
MAGNESIUM SERPL-MCNC: 2.5 MG/DL (ref 1.6–2.6)
MCH RBC QN AUTO: 30.7 PG (ref 27–31)
MCHC RBC AUTO-ENTMCNC: 29.4 G/DL (ref 32–36)
MCV RBC AUTO: 104 FL (ref 82–98)
PHOSPHATE SERPL-MCNC: 4.2 MG/DL (ref 2.7–4.5)
PHOSPHATE SERPL-MCNC: 4.7 MG/DL (ref 2.7–4.5)
PHOSPHATE SERPL-MCNC: 4.9 MG/DL (ref 2.7–4.5)
PLATELET # BLD AUTO: 185 K/UL (ref 150–450)
PMV BLD AUTO: 10.2 FL (ref 9.2–12.9)
POCT GLUCOSE: 159 MG/DL (ref 70–110)
POCT GLUCOSE: 62 MG/DL (ref 70–110)
POCT GLUCOSE: 66 MG/DL (ref 70–110)
POCT GLUCOSE: 67 MG/DL (ref 70–110)
POCT GLUCOSE: 74 MG/DL (ref 70–110)
POCT GLUCOSE: 81 MG/DL (ref 70–110)
POCT GLUCOSE: 97 MG/DL (ref 70–110)
POTASSIUM SERPL-SCNC: 4.4 MMOL/L (ref 3.5–5.1)
POTASSIUM SERPL-SCNC: 4.6 MMOL/L (ref 3.5–5.1)
POTASSIUM SERPL-SCNC: 4.8 MMOL/L (ref 3.5–5.1)
POTASSIUM SERPL-SCNC: 4.8 MMOL/L (ref 3.5–5.1)
PROT SERPL-MCNC: 5.5 G/DL (ref 6–8.4)
RBC # BLD AUTO: 2.38 M/UL (ref 4.6–6.2)
SODIUM SERPL-SCNC: 133 MMOL/L (ref 136–145)
SODIUM SERPL-SCNC: 133 MMOL/L (ref 136–145)
SODIUM SERPL-SCNC: 138 MMOL/L (ref 136–145)
SODIUM SERPL-SCNC: 138 MMOL/L (ref 136–145)
TROPONIN I SERPL DL<=0.01 NG/ML-MCNC: 0.1 NG/ML (ref 0–0.03)
TROPONIN I SERPL DL<=0.01 NG/ML-MCNC: 0.1 NG/ML (ref 0–0.03)
TROPONIN I SERPL DL<=0.01 NG/ML-MCNC: 0.12 NG/ML (ref 0–0.03)
VANCOMYCIN SERPL-MCNC: 19 UG/ML
WBC # BLD AUTO: 6.76 K/UL (ref 3.9–12.7)

## 2023-08-23 PROCEDURE — 83735 ASSAY OF MAGNESIUM: CPT | Mod: 91 | Performed by: STUDENT IN AN ORGANIZED HEALTH CARE EDUCATION/TRAINING PROGRAM

## 2023-08-23 PROCEDURE — 63600175 PHARM REV CODE 636 W HCPCS: Performed by: HOSPITALIST

## 2023-08-23 PROCEDURE — 99233 SBSQ HOSP IP/OBS HIGH 50: CPT | Mod: GC,,, | Performed by: HOSPITALIST

## 2023-08-23 PROCEDURE — 94761 N-INVAS EAR/PLS OXIMETRY MLT: CPT

## 2023-08-23 PROCEDURE — 37000009 HC ANESTHESIA EA ADD 15 MINS: Performed by: INTERNAL MEDICINE

## 2023-08-23 PROCEDURE — 36415 COLL VENOUS BLD VENIPUNCTURE: CPT

## 2023-08-23 PROCEDURE — 25000003 PHARM REV CODE 250: Performed by: NURSE ANESTHETIST, CERTIFIED REGISTERED

## 2023-08-23 PROCEDURE — 99900035 HC TECH TIME PER 15 MIN (STAT)

## 2023-08-23 PROCEDURE — 43255 PR EGD, FLEX, W/CTRL BLEED, ANY METHOD: ICD-10-PCS | Mod: 59,GC,, | Performed by: INTERNAL MEDICINE

## 2023-08-23 PROCEDURE — 27201089 HC SNARE, DISP (ANY): Performed by: INTERNAL MEDICINE

## 2023-08-23 PROCEDURE — 36415 COLL VENOUS BLD VENIPUNCTURE: CPT | Performed by: STUDENT IN AN ORGANIZED HEALTH CARE EDUCATION/TRAINING PROGRAM

## 2023-08-23 PROCEDURE — 25000003 PHARM REV CODE 250

## 2023-08-23 PROCEDURE — 63600175 PHARM REV CODE 636 W HCPCS

## 2023-08-23 PROCEDURE — 43246 EGD PLACE GASTROSTOMY TUBE: CPT | Mod: ,,, | Performed by: INTERNAL MEDICINE

## 2023-08-23 PROCEDURE — 43255 EGD CONTROL BLEEDING ANY: CPT | Mod: 59,GC,, | Performed by: INTERNAL MEDICINE

## 2023-08-23 PROCEDURE — C9113 INJ PANTOPRAZOLE SODIUM, VIA: HCPCS

## 2023-08-23 PROCEDURE — 25000003 PHARM REV CODE 250: Performed by: HOSPITALIST

## 2023-08-23 PROCEDURE — 83735 ASSAY OF MAGNESIUM: CPT | Mod: 91

## 2023-08-23 PROCEDURE — 63600175 PHARM REV CODE 636 W HCPCS: Performed by: NURSE ANESTHETIST, CERTIFIED REGISTERED

## 2023-08-23 PROCEDURE — D9220A PRA ANESTHESIA: Mod: ANES,,, | Performed by: ANESTHESIOLOGY

## 2023-08-23 PROCEDURE — D9220A PRA ANESTHESIA: Mod: CRNA,,, | Performed by: NURSE ANESTHETIST, CERTIFIED REGISTERED

## 2023-08-23 PROCEDURE — 11000001 HC ACUTE MED/SURG PRIVATE ROOM

## 2023-08-23 PROCEDURE — 27000221 HC OXYGEN, UP TO 24 HOURS

## 2023-08-23 PROCEDURE — 85027 COMPLETE CBC AUTOMATED: CPT

## 2023-08-23 PROCEDURE — 94640 AIRWAY INHALATION TREATMENT: CPT

## 2023-08-23 PROCEDURE — 80202 ASSAY OF VANCOMYCIN: CPT | Performed by: HOSPITALIST

## 2023-08-23 PROCEDURE — 27200997: Performed by: INTERNAL MEDICINE

## 2023-08-23 PROCEDURE — 99233 PR SUBSEQUENT HOSPITAL CARE,LEVL III: ICD-10-PCS | Mod: GC,,, | Performed by: HOSPITALIST

## 2023-08-23 PROCEDURE — D9220A PRA ANESTHESIA: ICD-10-PCS | Mod: ANES,,, | Performed by: ANESTHESIOLOGY

## 2023-08-23 PROCEDURE — 80069 RENAL FUNCTION PANEL: CPT | Mod: 91 | Performed by: STUDENT IN AN ORGANIZED HEALTH CARE EDUCATION/TRAINING PROGRAM

## 2023-08-23 PROCEDURE — 25000242 PHARM REV CODE 250 ALT 637 W/ HCPCS

## 2023-08-23 PROCEDURE — 84484 ASSAY OF TROPONIN QUANT: CPT | Mod: 91

## 2023-08-23 PROCEDURE — 21400001 HC TELEMETRY ROOM

## 2023-08-23 PROCEDURE — 37000008 HC ANESTHESIA 1ST 15 MINUTES: Performed by: INTERNAL MEDICINE

## 2023-08-23 PROCEDURE — 43246 EGD PLACE GASTROSTOMY TUBE: CPT | Performed by: INTERNAL MEDICINE

## 2023-08-23 PROCEDURE — 97535 SELF CARE MNGMENT TRAINING: CPT

## 2023-08-23 PROCEDURE — 93010 EKG 12-LEAD: ICD-10-PCS | Mod: ,,, | Performed by: INTERNAL MEDICINE

## 2023-08-23 PROCEDURE — 63600175 PHARM REV CODE 636 W HCPCS: Mod: JZ | Performed by: NURSE PRACTITIONER

## 2023-08-23 PROCEDURE — 27201018 HC KIT, PEG (ANY): Performed by: INTERNAL MEDICINE

## 2023-08-23 PROCEDURE — 43246 PR EGD, FLEX, W/PLCMT, GASTROSTOMY TUBE: ICD-10-PCS | Mod: ,,, | Performed by: INTERNAL MEDICINE

## 2023-08-23 PROCEDURE — 93010 ELECTROCARDIOGRAM REPORT: CPT | Mod: ,,, | Performed by: INTERNAL MEDICINE

## 2023-08-23 PROCEDURE — 80053 COMPREHEN METABOLIC PANEL: CPT

## 2023-08-23 PROCEDURE — 82962 GLUCOSE BLOOD TEST: CPT | Performed by: INTERNAL MEDICINE

## 2023-08-23 PROCEDURE — 93005 ELECTROCARDIOGRAM TRACING: CPT

## 2023-08-23 PROCEDURE — 97530 THERAPEUTIC ACTIVITIES: CPT

## 2023-08-23 PROCEDURE — 25000003 PHARM REV CODE 250: Performed by: INTERNAL MEDICINE

## 2023-08-23 PROCEDURE — 84100 ASSAY OF PHOSPHORUS: CPT

## 2023-08-23 PROCEDURE — 43255 EGD CONTROL BLEEDING ANY: CPT | Mod: 59 | Performed by: INTERNAL MEDICINE

## 2023-08-23 PROCEDURE — D9220A PRA ANESTHESIA: ICD-10-PCS | Mod: CRNA,,, | Performed by: NURSE ANESTHETIST, CERTIFIED REGISTERED

## 2023-08-23 RX ORDER — SODIUM CHLORIDE 0.9 % (FLUSH) 0.9 %
10 SYRINGE (ML) INJECTION
Status: DISCONTINUED | OUTPATIENT
Start: 2023-08-23 | End: 2023-08-23 | Stop reason: HOSPADM

## 2023-08-23 RX ORDER — LIDOCAINE HYDROCHLORIDE 20 MG/ML
INJECTION INTRAVENOUS
Status: DISCONTINUED | OUTPATIENT
Start: 2023-08-23 | End: 2023-08-23

## 2023-08-23 RX ORDER — EPINEPHRINE 0.1 MG/ML
INJECTION INTRAVENOUS
Status: DISCONTINUED | OUTPATIENT
Start: 2023-08-23 | End: 2023-08-23

## 2023-08-23 RX ORDER — PROPOFOL 10 MG/ML
VIAL (ML) INTRAVENOUS
Status: DISCONTINUED | OUTPATIENT
Start: 2023-08-23 | End: 2023-08-23

## 2023-08-23 RX ORDER — ONDANSETRON 2 MG/ML
INJECTION INTRAMUSCULAR; INTRAVENOUS
Status: DISCONTINUED | OUTPATIENT
Start: 2023-08-23 | End: 2023-08-23

## 2023-08-23 RX ORDER — CARVEDILOL 6.25 MG/1
6.25 TABLET ORAL 2 TIMES DAILY
Status: DISCONTINUED | OUTPATIENT
Start: 2023-08-23 | End: 2023-08-25

## 2023-08-23 RX ORDER — SODIUM CHLORIDE 9 MG/ML
INJECTION, SOLUTION INTRAVENOUS CONTINUOUS PRN
Status: DISCONTINUED | OUTPATIENT
Start: 2023-08-23 | End: 2023-08-23

## 2023-08-23 RX ORDER — SUCCINYLCHOLINE CHLORIDE 20 MG/ML
INJECTION INTRAMUSCULAR; INTRAVENOUS
Status: DISCONTINUED | OUTPATIENT
Start: 2023-08-23 | End: 2023-08-23

## 2023-08-23 RX ORDER — HALOPERIDOL 5 MG/ML
0.5 INJECTION INTRAMUSCULAR EVERY 10 MIN PRN
Status: DISCONTINUED | OUTPATIENT
Start: 2023-08-23 | End: 2023-08-23 | Stop reason: HOSPADM

## 2023-08-23 RX ORDER — IPRATROPIUM BROMIDE AND ALBUTEROL SULFATE 2.5; .5 MG/3ML; MG/3ML
3 SOLUTION RESPIRATORY (INHALATION)
Status: DISCONTINUED | OUTPATIENT
Start: 2023-08-23 | End: 2023-09-08

## 2023-08-23 RX ADMIN — EPINEPHRINE 5 MCG: 0.1 INJECTION INTRAVENOUS at 11:08

## 2023-08-23 RX ADMIN — ONDANSETRON 8 MG: 8 TABLET, ORALLY DISINTEGRATING ORAL at 05:08

## 2023-08-23 RX ADMIN — VANCOMYCIN HYDROCHLORIDE 500 MG: 500 INJECTION, POWDER, LYOPHILIZED, FOR SOLUTION INTRAVENOUS at 05:08

## 2023-08-23 RX ADMIN — ATORVASTATIN CALCIUM 40 MG: 40 TABLET, FILM COATED ORAL at 08:08

## 2023-08-23 RX ADMIN — EPINEPHRINE 5 MCG: 0.1 INJECTION INTRAVENOUS at 12:08

## 2023-08-23 RX ADMIN — LIDOCAINE HYDROCHLORIDE 100 MG: 20 INJECTION INTRAVENOUS at 11:08

## 2023-08-23 RX ADMIN — POLYETHYLENE GLYCOL 3350 17 G: 17 POWDER, FOR SOLUTION ORAL at 10:08

## 2023-08-23 RX ADMIN — ONDANSETRON 4 MG: 2 INJECTION INTRAMUSCULAR; INTRAVENOUS at 12:08

## 2023-08-23 RX ADMIN — DEXTROSE MONOHYDRATE 125 ML: 10 INJECTION, SOLUTION INTRAVENOUS at 05:08

## 2023-08-23 RX ADMIN — PANTOPRAZOLE SODIUM 40 MG: 40 INJECTION, POWDER, FOR SOLUTION INTRAVENOUS at 08:08

## 2023-08-23 RX ADMIN — FLUCONAZOLE 400 MG: 200 TABLET ORAL at 08:08

## 2023-08-23 RX ADMIN — DEXTROSE MONOHYDRATE 250 ML: 10 INJECTION, SOLUTION INTRAVENOUS at 01:08

## 2023-08-23 RX ADMIN — IPRATROPIUM BROMIDE AND ALBUTEROL SULFATE 3 ML: .5; 3 SOLUTION RESPIRATORY (INHALATION) at 08:08

## 2023-08-23 RX ADMIN — CALCITRIOL CAPSULES 0.25 MCG 0.25 MCG: 0.25 CAPSULE ORAL at 08:08

## 2023-08-23 RX ADMIN — METOCLOPRAMIDE 5 MG: 5 TABLET ORAL at 04:08

## 2023-08-23 RX ADMIN — SODIUM CHLORIDE: 9 INJECTION, SOLUTION INTRAVENOUS at 11:08

## 2023-08-23 RX ADMIN — DEXTROSE MONOHYDRATE 125 ML: 10 INJECTION, SOLUTION INTRAVENOUS at 08:08

## 2023-08-23 RX ADMIN — TOBRAMYCIN AND DEXAMETHASONE: 3; 1 OINTMENT OPHTHALMIC at 10:08

## 2023-08-23 RX ADMIN — ACETAMINOPHEN 1000 MG: 500 TABLET ORAL at 10:08

## 2023-08-23 RX ADMIN — TOBRAMYCIN AND DEXAMETHASONE: 3; 1 OINTMENT OPHTHALMIC at 04:08

## 2023-08-23 RX ADMIN — CARVEDILOL 12.5 MG: 12.5 TABLET, FILM COATED ORAL at 08:08

## 2023-08-23 RX ADMIN — ISOSORBIDE DINITRATE: 20 TABLET ORAL at 10:08

## 2023-08-23 RX ADMIN — ONDANSETRON 8 MG: 8 TABLET, ORALLY DISINTEGRATING ORAL at 04:08

## 2023-08-23 RX ADMIN — SUCCINYLCHOLINE CHLORIDE 160 MG: 20 INJECTION, SOLUTION INTRAMUSCULAR; INTRAVENOUS at 11:08

## 2023-08-23 RX ADMIN — ISOSORBIDE DINITRATE: 20 TABLET ORAL at 04:08

## 2023-08-23 RX ADMIN — ERYTHROPOIETIN 3000 UNITS: 3000 INJECTION, SOLUTION INTRAVENOUS; SUBCUTANEOUS at 05:08

## 2023-08-23 RX ADMIN — TAMSULOSIN HYDROCHLORIDE 0.4 MG: 0.4 CAPSULE ORAL at 08:08

## 2023-08-23 RX ADMIN — ISOSORBIDE DINITRATE: 20 TABLET ORAL at 08:08

## 2023-08-23 RX ADMIN — PROPOFOL 130 MG: 10 INJECTION, EMULSION INTRAVENOUS at 11:08

## 2023-08-23 RX ADMIN — CEFEPIME 2 G: 2 INJECTION, POWDER, FOR SOLUTION INTRAVENOUS at 04:08

## 2023-08-23 RX ADMIN — SENNOSIDES AND DOCUSATE SODIUM 1 TABLET: 50; 8.6 TABLET ORAL at 10:08

## 2023-08-23 RX ADMIN — IPRATROPIUM BROMIDE AND ALBUTEROL SULFATE 3 ML: .5; 3 SOLUTION RESPIRATORY (INHALATION) at 03:08

## 2023-08-23 RX ADMIN — TOBRAMYCIN AND DEXAMETHASONE: 3; 1 OINTMENT OPHTHALMIC at 08:08

## 2023-08-23 RX ADMIN — PANTOPRAZOLE SODIUM 40 MG: 40 INJECTION, POWDER, FOR SOLUTION INTRAVENOUS at 10:08

## 2023-08-23 RX ADMIN — LOSARTAN POTASSIUM 100 MG: 50 TABLET, FILM COATED ORAL at 08:08

## 2023-08-23 RX ADMIN — TOBRAMYCIN AND DEXAMETHASONE 1 DROP: 3; 1 SUSPENSION/ DROPS OPHTHALMIC at 04:08

## 2023-08-23 RX ADMIN — CARVEDILOL 6.25 MG: 6.25 TABLET, FILM COATED ORAL at 10:08

## 2023-08-23 RX ADMIN — TOBRAMYCIN AND DEXAMETHASONE 1 DROP: 3; 1 SUSPENSION/ DROPS OPHTHALMIC at 10:08

## 2023-08-23 RX ADMIN — METOCLOPRAMIDE 5 MG: 5 TABLET ORAL at 05:08

## 2023-08-23 RX ADMIN — ACETAMINOPHEN 1000 MG: 500 TABLET ORAL at 08:08

## 2023-08-23 RX ADMIN — TOBRAMYCIN AND DEXAMETHASONE 1 DROP: 3; 1 SUSPENSION/ DROPS OPHTHALMIC at 08:08

## 2023-08-23 RX ADMIN — ACETAMINOPHEN 1000 MG: 500 TABLET ORAL at 04:08

## 2023-08-23 NOTE — PLAN OF CARE
Pt A&Ox4, VSS. Pt woke up from sleeping and reported two episodes of feeling short of breath. Pt repositioned and sat up in bed. Satting 100% on 2L NC, titrated up to 4L NC for comfort. Hospital med team 3 made aware. NPO since midnight for EGD today. Safety precautions maintained throughout shift and frequent rounding completed.       Problem: Adult Inpatient Plan of Care  Goal: Plan of Care Review  Outcome: Ongoing, Progressing  Goal: Patient-Specific Goal (Individualized)  Outcome: Ongoing, Progressing  Goal: Absence of Hospital-Acquired Illness or Injury  Outcome: Ongoing, Progressing  Goal: Optimal Comfort and Wellbeing  Outcome: Ongoing, Progressing  Goal: Readiness for Transition of Care  Outcome: Ongoing, Progressing     Problem: Diabetes Comorbidity  Goal: Blood Glucose Level Within Targeted Range  Outcome: Ongoing, Progressing     Problem: Skin Injury Risk Increased  Goal: Skin Health and Integrity  Outcome: Ongoing, Progressing     Problem: Device-Related Complication Risk (Hemodialysis)  Goal: Safe, Effective Therapy Delivery  Outcome: Ongoing, Progressing     Problem: Hemodynamic Instability (Hemodialysis)  Goal: Effective Tissue Perfusion  Outcome: Ongoing, Progressing     Problem: Infection (Hemodialysis)  Goal: Absence of Infection Signs and Symptoms  Outcome: Ongoing, Progressing     Problem: Infection  Goal: Absence of Infection Signs and Symptoms  Outcome: Ongoing, Progressing     Problem: Coping Ineffective  Goal: Effective Coping  Outcome: Ongoing, Progressing     Problem: Device-Related Complication Risk (CRRT (Continuous Renal Replacement Therapy))  Goal: Safe, Effective Therapy Delivery  Outcome: Ongoing, Progressing     Problem: Hypothermia (CRRT (Continuous Renal Replacement Therapy))  Goal: Body Temperature Maintained in Desired Range  Outcome: Ongoing, Progressing     Problem: Infection (CRRT (Continuous Renal Replacement Therapy))  Goal: Absence of Infection Signs and Symptoms  Outcome:  Ongoing, Progressing     Problem: Impaired Wound Healing  Goal: Optimal Wound Healing  Outcome: Ongoing, Progressing     Problem: Fall Injury Risk  Goal: Absence of Fall and Fall-Related Injury  Outcome: Ongoing, Progressing

## 2023-08-23 NOTE — PROGRESS NOTES
[facility] - Intensive Care ([facility]-)  Adult Nutrition  Progress Note    SUMMARY       Recommendations    1. Resume Renal diet + Novasource ONS when able - encourage PO intake as tolerated.   2. If EN to resume, consider nocturnal TFs to promote PO intake throughout the day. Rec'd Novasource @ 40 mL/hr x 12 hours = 960 kcals, 44 g of protein, 344 mL fluid. This would meet 52% EEN, 54% EPN.   - Can increase/decrease depending on PO intake.   3. RD to monitor & follow-up.    Goals: Meet % EEN, EPN by RD f/u date  Nutrition Goal Status: progressing towards goal  Communication of RD Recs: reviewed with RN    Assessment and Plan    Severe malnutrition    Nutrition Problem:  Severe Protein-Calorie Malnutrition  Malnutrition in the context of Chronic Illness/Injury    Related to (etiology):  Inability to consume sufficient energy     Signs and Symptoms (as evidenced by):  Body Fat Depletion: severe depletion of orbitals and triceps   Muscle Mass Depletion: severe depletion of temples, clavicle region, scapular region and lower extremities   Weight Loss: 31% x 6-7 months     Interventions(treatment strategy):  Collaboration of nutrition care w/ other providers  EN vs ONS    Nutrition Diagnosis Status:  Continues     Malnutrition Assessment    Malnutrition Context: chronic illness  Malnutrition Level: severe    Weight Loss (Malnutrition): greater than 10% in 6 months  Energy Intake (Malnutrition): other (see comments) (LAMONT)  Subcutaneous Fat (Malnutrition): severe depletion  Muscle Mass (Malnutrition): severe depletion   Orbital Region (Subcutaneous Fat Loss): severe depletion  Upper Arm Region (Subcutaneous Fat Loss): severe depletion   [place] Region (Muscle Loss): severe depletion  Clavicle Bone Region (Muscle Loss): severe depletion  Dorsal Hand (Muscle Loss): severe depletion  Patellar Region (Muscle Loss): severe depletion  Anterior Thigh Region (Muscle Loss): severe depletion     Reason for  "Assessment    Reason For Assessment: RD follow-up  Diagnosis: other (see comments) (Endophtalmitis)  Relevant Medical History: CHF, DM, ESRD on HD, PEG  Interdisciplinary Rounds: did not attend    General Information Comments: NPO for EGD; prior to NPO status (per RN documentation), pt tolerating diet + ONS w/ 50% PO intake. Pt receiving no TFs. HD complete . UBW: 196# per chart review. NFPE complete 8/10 w/ severe wasting found. RD feels pt meets criteria for severe malnutrition. Please see PES statement for details.   Nutrition Discharge Planning: Pending clinical course    Nutrition/Diet History    Patient Reported Diet/Restrictions/Preferences: other (see comments) (LAMONT)  Spiritual, Cultural Beliefs, Hindu Practices, Values that Affect Care: no  Factors Affecting Nutritional Intake: NPO  Nutrition Support Formula Prior to Admit: Other (see commments) (LAMONT)    Anthropometrics    Temp: 98.1 °F (36.7 °C)  Height Method: Stated  Height: 5' 10" (177.8 cm)  Height (inches): 70 in  Weight Method: Bed Scale  Weight: 62.8 kg (138 lb 7.2 oz)  Weight (lb): 138.45 lb  Ideal Body Weight (IBW), Male: 166 lb  % Ideal Body Weight, Male (lb): 83.4 %  BMI (Calculated): 19.9  BMI Grade: 18.5-24.9 - normal  Usual Body Weight (UBW), k kg  % Usual Body Weight: 69.47  % Weight Change From Usual Weight: -30.67 %    Lab/Procedures/Meds    Pertinent Labs Reviewed: reviewed  Pertinent Labs Comments: Creat 6.1, GFR 9.9, P 4.9  Pertinent Medications Reviewed: reviewed  Pertinent Medications Comments: -    Estimated/Assessed Needs    Weight Used For Calorie Calculations: 62.8 kg (138 lb 7.2 oz)    Energy Calorie Requirements (kcal): 1884 kcal/d  Energy Need Method: Kcal/kg (30 kcal/kg)    Protein Requirements: 82 g/d (1.3 g/kg)  Weight Used For Protein Calculations: 62.8 kg (138 lb 7.2 oz)    Estimated Fluid Requirement Method: other (see comments) (Per MD or 1 mL/kcal)  RDA Method (mL): 1884    CHO Requirement: " 236g    Nutrition Prescription Ordered    Current Diet Order: NPO  Nutrition Order Comments: Was on Renal diet + ONS & tolerating.  Current Nutrition Support Formula Ordered: Other (Comment) (Discontinued)    Evaluation of Received Nutrient/Fluid Intake    I/O: -3.7L since admit    Comments: LBM: 8/23    Tolerance: tolerating    Nutrition Risk    Level of Risk/Frequency of Follow-up:  (1x/week)     Monitor and Evaluation    Food and Nutrient Intake: enteral nutrition intake, food and beverage intake, energy intake  Food and Nutrient Adminstration: enteral and parenteral nutrition administration, diet order  Physical Activity and Function: nutrition-related ADLs and IADLs  Anthropometric Measurements: weight, weight change  Biochemical Data, Medical Tests and Procedures: inflammatory profile, lipid profile, glucose/endocrine profile, gastrointestinal profile, electrolyte and renal panel  Nutrition-Focused Physical Findings: overall appearance     Nutrition Follow-Up    RD Follow-up?: Yes

## 2023-08-23 NOTE — PLAN OF CARE
Infectious Disease Note      Chart has been reviewed.  Notified by primary service that patient is not a candidate for CVC, due to ESRD on HD, for daily antibiotic dosing with meropenem. Operative cultures were reviewed and have not grown prior Pseudomonas. I have reviewed the cultures from [facility] and the Pseudomonas was susceptible to cefepime in the past.     Recommendations:  Stop meropenem. Start cefepime 2gm, 2gm, 3 gm with HD sessions.  Continue vancomycin.  Continue PO fluconazole.   Not a candidate for home IV antibiotics. Recommend placement for completion of therapy.   Plan to treat as CNS infection as MRI was non diagnostic. Anticipated 4-6 weeks of therapy from evisceration. Anticipated end date: 9/12-9/26.  Monitor CBC, CMP, and CRP while on antibiotics.   Discussed management plan with the staff and/or members from HM service.    Infectious Diseases will sign off. Please call if questions arise.  Beverley Meneses MD, [facility]  Infectious Diseases

## 2023-08-23 NOTE — ASSESSMENT & PLAN NOTE
Latest hemoglobin = 7.3 (post transfusion)    Underwent EGD which yielded a large DU with overlying clot, Gi were not able to penetrate beneath the clot to determine presence of any bleeding vessel, 2 clips placed.   The PEG was removed because it was not  functioning, and replaced with an externally removable PEG with internal balloon bumper.     Recommendation:      -  If hemoglobin downtrending or having ongoing  melena, consult IR for embolization. Clips are present on adherent clot.  - clear liquid diet  - Trend H&H, transfuse for Hgb > 7, unless otherwise indicated  -IV [id number] BID  - Maintain IV access with 2 large bore Ivs  - Hold all NSAIDs and anticoagulants, unless contraindicated  -correct any coagulopathy with platelets and FFP for goal of platelets >50K and INR <2.0

## 2023-08-23 NOTE — PROGRESS NOTES
Pharmacokinetic Assessment Follow Up: IV Vancomycin    Vancomycin serum concentration assessment/plan(s):    -Patient to receive HD today  -Level within goal of 15 - 20 for endophthalmitis, drawn appropriately.  - Patient has been receiving 500 mg after each dialysis session. Will schedule 500 mg dose to be given MWF post-HD.  -Repeat random level on 08/25.    Drug levels (last 3 results):  Recent Labs   Lab Result Units 08/21/23  0815 08/23/23  0525   Vancomycin, Random ug/mL 15.9 19.0         Pharmacy will continue to follow and monitor vancomycin.    Please contact pharmacy at extension [phone] for questions regarding this assessment.    Thank you for the consult,   Flavia Yin       Patient brief summary:  Immanuel Bernal is a 57 y.o. male initiated on antimicrobial therapy with IV Vancomycin for treatment of  endophthalmitis    The patient's current regimen is vancomycin pulse dosing    Drug Allergies:   Review of patient's allergies indicates:   Allergen Reactions    Morphine Rash    Amiodarone analogues Itching     Other reaction(s): Unknown       Actual Body Weight:   63 kg    Renal Function:   Estimated Creatinine Clearance: 11.6 mL/min (A) (based on SCr of 6.1 mg/dL (H)).,     Dialysis Method (if applicable):  intermittent HD- MWF    CBC (last 72 hours):  Recent Labs   Lab Result Units 08/21/23  0815 08/21/23  1200 08/22/23  0416 08/22/23  1648 08/23/23  0525   WBC K/uL 3.42* 6.65 8.74  --  6.76   Hemoglobin g/dL 6.7* 7.7* 6.7* 7.8* 7.3*   Hematocrit % 23.5* 26.6* 22.7* 25.7* 24.8*   Platelets K/uL 199 189 210  --  185   Gran % %  --  65.1  --   --   --    Lymph % %  --  12.5*  --   --   --    Mono % %  --  15.6*  --   --   --    Eosinophil % %  --  5.4  --   --   --    Basophil % %  --  0.6  --   --   --    Differential Method   --  Automated  --   --   --          Metabolic Panel (last 72 hours):  Recent Labs   Lab Result Units 08/20/23  1355 08/20/23  2222 08/21/23  0815 08/21/23  1338 08/21/23  7333  08/22/23  0415 08/22/23  0416 08/22/23  1648 08/22/23  2216 08/23/23  0525   Sodium mmol/L 137 136 138 136 136 137 136 136 138 138  138   Potassium mmol/L 4.9 5.0 4.2 3.9 4.9 4.2 4.2 4.6 4.7 4.8  4.8   Chloride mmol/L 100 102 104 104 105 103 104 105 106 108  108   CO2 mmol/L 25 23 25 24 23 26 26 24 24 21*  21*   Glucose mg/dL 80 81 87 77 90 98 107 79 86 60*  60*   BUN mg/dL 35* 39* 37* 17 22* 25* 24* 29* 31* 34*  34*   Creatinine mg/dL 7.1* 6.8* 6.5* 3.9* 4.4* 5.3* 5.0* 5.8* 5.5* 6.1*  6.1*   Albumin g/dL 2.0* 1.9* 1.9* 2.1* 2.0* 1.9* 1.9* 2.0* 1.9* 2.0*  2.0*   Total Bilirubin mg/dL  --   --   --   --   --   --  0.3  --   --  0.3   Alkaline Phosphatase U/L  --   --   --   --   --   --  83  --   --  83   AST U/L  --   --   --   --   --   --  11  --   --  17   ALT U/L  --   --   --   --   --   --  <5*  --   --  <5*   Magnesium mg/dL 2.7* 2.6  --  2.2 2.3 2.2  --  2.4 2.4 2.4   Phosphorus mg/dL 5.9* 5.8* 4.8* 3.1 3.8 4.1  --  4.6* 4.6* 4.9*         Vancomycin Administrations:  vancomycin given in the last 96 hours                     vancomycin (VANCOCIN) 500 mg in dextrose 5 % in water (D5W) 100 mL IVPB (MB+) (mg) 500 mg New Bag 08/18/23 9878                    Microbiologic Results:  Microbiology Results (last 7 days)       Procedure Component Value Units Date/Time    Culture, Anaerobe [[id number]] Collected: 08/16/23 1632    Order Status: Completed Specimen: Wound from Cornea, Left Updated: 08/23/23 0908     Anaerobic Culture Culture in progress    Culture, Anaerobe [[id number]] Collected: 08/16/23 1636    Order Status: Completed Specimen: Abscess from Eyelid, Left Updated: 08/23/23 0842     Anaerobic Culture No anaerobes isolated    Narrative:      Sclera abscess    Culture, Anaerobe [[id number]] Collected: 08/16/23 1647    Order Status: Completed Specimen: Abscess from Eyelid, Left Updated: 08/23/23 0842     Anaerobic Culture No anaerobes isolated    Culture, Anaerobe [[id number]] Collected: 08/16/23 1628     Order Status: Completed Specimen: Wound from Eyelid, Left Updated: 08/23/23 0842     Anaerobic Culture No anaerobes isolated    Aerobic culture [[id number]] Collected: 08/16/23 1629    Order Status: Completed Specimen: Wound from Eyelid, Left Updated: 08/19/23 0911     Aerobic Bacterial Culture No growth    Aerobic culture [[id number]] Collected: 08/16/23 1636    Order Status: Completed Specimen: Abscess from Eyelid, Left Updated: 08/19/23 0911     Aerobic Bacterial Culture No growth    Narrative:      Sclera abscess    Aerobic culture [[id number]] Collected: 08/16/23 1632    Order Status: Completed Specimen: Wound from Cornea, Left Updated: 08/19/23 0911     Aerobic Bacterial Culture No growth    Aerobic culture [[id number]] Collected: 08/16/23 1647    Order Status: Completed Specimen: Abscess from Eyelid, Left Updated: 08/18/23 1423     Aerobic Bacterial Culture No significant isolate    AFB Culture & Smear [[id number]] Collected: 08/16/23 1647    Order Status: Completed Specimen: Abscess from Eyelid, Left Updated: 08/17/23 2127     AFB Culture & Smear Culture in progress     AFB CULTURE STAIN No acid fast bacilli seen.    AFB Culture & Smear [[id number]] Collected: 08/16/23 1629    Order Status: Completed Specimen: Wound from Eyelid, Left Updated: 08/17/23 2127     AFB Culture & Smear Culture in progress     AFB CULTURE STAIN No acid fast bacilli seen.    AFB Culture & Smear [[id number]] Collected: 08/16/23 1636    Order Status: Completed Specimen: Abscess from Eyelid, Left Updated: 08/17/23 2127     AFB Culture & Smear Culture in progress     AFB CULTURE STAIN No acid fast bacilli seen.    Narrative:      Sclera abscess    AFB Culture & Smear [[id number]] Collected: 08/16/23 1632    Order Status: Completed Specimen: Wound from Cornea, Left Updated: 08/17/23 2127     AFB Culture & Smear Culture in progress     AFB CULTURE STAIN No acid fast bacilli seen.    Fungus culture [[id number]] Collected: 08/16/23 1636    Order  Status: Completed Specimen: Abscess from Eyelid, Left Updated: 08/17/23 0618     Fungus (Mycology) Culture Culture in progress    Narrative:      Sclera abscess    Fungus culture [[id number]] Collected: 08/16/23 1647    Order Status: Completed Specimen: Abscess from Eyelid, Left Updated: 08/17/23 0618     Fungus (Mycology) Culture Culture in progress    Fungus culture [[id number]] Collected: 08/16/23 1632    Order Status: Completed Specimen: Wound from Cornea, Left Updated: 08/17/23 0618     Fungus (Mycology) Culture Culture in progress    Fungus culture [[id number]] Collected: 08/16/23 1629    Order Status: Completed Specimen: Wound from Eyelid, Left Updated: 08/17/23 0618     Fungus (Mycology) Culture Culture in progress    Gram stain [[id number]] Collected: 08/16/23 1632    Order Status: Completed Specimen: Wound from Cornea, Left Updated: 08/16/23 1840     Gram Stain Result No WBC's      No organisms seen    Gram stain [[id number]] Collected: 08/16/23 1629    Order Status: Completed Specimen: Wound from Eyelid, Left Updated: 08/16/23 1840     Gram Stain Result Rare WBC's      No organisms seen    Gram stain [[id number]] Collected: 08/16/23 1647    Order Status: Completed Specimen: Abscess from Eyelid, Left Updated: 08/16/23 1839     Gram Stain Result No WBC's      Rare yeast    Narrative:      Vitreous abscess    Gram stain [[id number]] Collected: 08/16/23 1636    Order Status: Completed Specimen: Abscess from Eyelid, Left Updated: 08/16/23 1839     Gram Stain Result No WBC's      Rare yeast    Narrative:      Sclera abscess

## 2023-08-23 NOTE — NURSING TRANSFER
Nursing Transfer Note      8/23/2023   12:55 PM    Nurse giving handoff:SWAPNIL Wisdom  Nurse receiving handoff:Dayna    Reason patient is being transferred: Out of PACU    Transfer To: [id number]    Transfer via stretcher    Transfer with 2 L NC to O2, cardiac monitoring    Transported by Jasiel, PCT    Transfer Vital Signs:  Blood Pressure:177/79  Heart Rate:56  O2:100%  Temperature:97.9  Respirations:15    Order for Tele Monitor? Yes    Additional Lines: Oxygen    Medicines sent: N/A    Any special needs or follow-up needed: No    Patient belongings transferred with patient: No    Chart send with patient: Yes    Notified: Patient to notify family

## 2023-08-23 NOTE — PT/OT/SLP PROGRESS
"Occupational Therapy   Treatment    Name: Immanuel Bernal  MRN: [id number]  Admitting Diagnosis:  <principal problem not specified>  Day of Surgery    Recommendations:     Discharge Recommendations: rehabilitation facility  Discharge Equipment Recommendations:  none  Barriers to discharge:  Inaccessible home environment, Other (Comment) (decreased functional abilities for ADLs and mobility)    Assessment:     Immanuel Bernal is a 57 y.o. male with a medical diagnosis of <principal problem not specified>.  Pt tolerated OT session well with good participation and motivation. Pt is progressing well overall but does remain limited due to weakness leading to easy fatigue. Pt would continue to benefit from skilled OT services to maximize functional (I) & facilitate safe discharge. Performance deficits affecting function are weakness, impaired endurance, impaired sensation, impaired self care skills, impaired functional mobility, impaired balance, visual deficits, gait instability, impaired cognition, decreased coordination, decreased upper extremity function, decreased lower extremity function, decreased safety awareness, decreased ROM, impaired coordination, impaired fine motor, impaired cardiopulmonary response to activity, edema.     Rehab Prognosis:  Good; patient would benefit from acute skilled OT services to address these deficits and reach maximum level of function.       Plan:     Patient to be seen 4 x/week to address the above listed problems via self-care/home management, therapeutic activities, therapeutic exercises, neuromuscular re-education  Plan of Care Expires: 08/25/23  Plan of Care Reviewed with: patient, sibling    Subjective     Chief Complaint: "I've been feeling pretty good"  Patient/Family Comments/goals: Get better  Pain/Comfort:  Pain Rating 1: 0/10    Objective:     Communicated with: SWAPNIL Land prior to session.  Patient found supine with PICC line, Condom Catheter, peripheral IV, oxygen, " telemetry, pulse ox (continuous), pressure relief boots upon OT entry to room.    General Precautions: Standard, fall    Orthopedic Precautions:N/A  Braces: N/A  Respiratory Status: Nasal cannula, flow 3 L/min     Occupational Performance:     Bed Mobility:    Patient completed Rolling/Turning to Left with  contact guard assistance  Patient completed Rolling/Turning to Right with contact guard assistance  Patient completed Scooting/Bridging with minimum assistance  Patient completed Supine to Sit with minimum assistance  Patient completed Sit to Supine with minimum assistance     Functional Mobility/Transfers:  Patient completed Sit <> Stand Transfer with minimum assistance  with  rolling walker   Functional Mobility: Patient needed min A to correct L sided lean when completing sit to stand. Patient completed 3 to 4 steps forward and 3 to 4 back to bed with CGA using RW. 1 significant LOB when completing initial sit to stand. Patient with no significant SOB. Patient did complete EOB sitting with Sup-->CGA for ~15 minutes but needed to return to bed due to fatigue and needing to be cleaned.     Activities of Daily Living:  Upper Body Dressing: minimum assistance donning 2nd gown   Toileting: maximal assistance from bed level; patient aided with rolling. Patient defecated during ambulation and needed to be cleaned.       [facility] 6 Click ADL: 16    Treatment & Education:  Patient completed treatment as stated above.   -Education on energy conservation and task modification to maximize safety and (I) during ADLs and mobility  -Education on importance of OOB activity to improve overall activity tolerance and promote recovery  -Pt educated to call for assistance and to transfer with hospital staff only  -Provided education regarding role of OT, POC, & discharge recommendations with pt and sister Marley verbalizing understanding.  Pt had no further questions & when asked whether there were any concerns pt reported  none.      Patient left supine with all lines intact, call button in reach, nursing notified, and sister present    GOALS:   Multidisciplinary Problems       Occupational Therapy Goals          Problem: Occupational Therapy    Goal Priority Disciplines Outcome Interventions   Occupational Therapy Goal     OT, PT/OT Ongoing, Progressing    Description: Goals to be met by: 8/25/23     Patient will increase functional independence with ADLs by performing:    UE Dressing with Minimal Assistance.  Grooming while seated with Stand-by Assistance.  Toileting from bedside commode with Minimal Assistance for hygiene and clothing management.   Sit to stand transfer with Contact Guard Assistance and use of RW.   Sitting at edge of bed >25 minutes with Supervision.  Step transfer with Minimal Assistance and use of RW.   Toilet transfer to bedside commode with Minimal Assistance and use of RW.                          Time Tracking:     OT Date of Treatment: 08/23/23  OT Start Time: 0902  OT Stop Time: 0944  OT Total Time (min): 42 min    Billable Minutes:Self Care/Home Management 25  Therapeutic Activity 17    OT/MORRO: OT          8/23/2023

## 2023-08-23 NOTE — ANESTHESIA POSTPROCEDURE EVALUATION
Anesthesia Post Evaluation    Patient: Immanuel Brenal    Procedure(s) Performed: Procedure(s) (LRB):  EGD (ESOPHAGOGASTRODUODENOSCOPY) (N/A)    Final Anesthesia Type: general      Patient location during evaluation: PACU  Patient participation: Yes- Able to Participate  Level of consciousness: awake and alert and oriented  Pain management: adequate  Airway patency: patent    PONV status at discharge: No PONV  Anesthetic complications: no      Cardiovascular status: blood pressure returned to baseline and hemodynamically stable  Respiratory status: unassisted  Hydration status: euvolemic  Follow-up not needed.          Vitals Value Taken Time   /77 08/23/23 1312   Temp 36.4 °C (97.5 °F) 08/23/23 1235   Pulse 57 08/23/23 1545   Resp 17 08/23/23 1526   SpO2 100 % 08/23/23 1545   Vitals shown include unvalidated device data.      Event Time   Out of Recovery 12:50:00         Pain/Marco A Score: Pain Rating Prior to Med Admin: 1 (8/23/2023  8:57 AM)  Pain Rating Post Med Admin: 0 (8/23/2023  9:57 AM)  Marco A Score: 9 (8/23/2023 12:50 PM)

## 2023-08-23 NOTE — PLAN OF CARE
Recommendations     1. Resume Renal diet + Novasource ONS when able - encourage PO intake as tolerated.   2. If EN to resume, consider nocturnal TFs to promote PO intake throughout the day. Rec'd Novasource @ 40 mL/hr x 12 hours = 960 kcals, 44 g of protein, 344 mL fluid. This would meet 52% EEN, 54% EPN.   - Can increase/decrease depending on PO intake.   3. RD to monitor & follow-up.     Goals: Meet % EEN, EPN by RD f/u date  Nutrition Goal Status: progressing towards goal  Communication of RD Recs: reviewed with RN

## 2023-08-23 NOTE — NURSING
Patient resting in bed comfortably today, no longer complaining of chest pain.  Returned from EGD with VSS.  Blood sugars have remained in the 60s boluses given as ordered.  Notified Dr. Law and Med team 3 of blood sugars and troponin numbers.  Wound care for right upper extremity completed by previous nurse, next dressing change due on Friday 8/25.  Call light in reach with bed alarm on.

## 2023-08-23 NOTE — CARE UPDATE
"RAPID RESPONSE NURSE CHART REVIEW        Chart Reviewed: 08/23/2023, 9:58 AM    MRN: [id number]  Bed: 76587/25885 A    Dx: <principal problem not specified>    Immanuel Bernal has a past medical history of Bilateral retinal detachment, BPH (benign prostatic hyperplasia), Cataract, CHF (congestive heart failure), CKD (chronic kidney disease) stage 3, GFR 30-59 ml/min, Diabetes mellitus, type 2, Hypertension, KENIA (obstructive sleep apnea), Pancreatitis, Persistent proteinuria, PTSD (post-traumatic stress disorder), and Stroke.    Last VS: BP (!) 148/78   Pulse 61   Temp 98.1 °F (36.7 °C) (Oral)   Resp 20   Ht 5' 10" (1.778 m)   Wt 62.8 kg (138 lb 7.2 oz)   SpO2 100%   BMI 19.87 kg/m²     24H Vital Sign Range:  Temp:  [97.6 °F (36.4 °C)-98.6 °F (37 °C)]   Pulse:  []   Resp:  [18-22]   BP: (118-148)/(70-80)   SpO2:  [97 %-100 %]     Level of Consciousness (AVPU): alert    Recent Labs     08/21/23  1200 08/22/23  0416 08/22/23  1648 08/23/23  0525   WBC 6.65 8.74  --  6.76   HGB 7.7* 6.7* 7.8* 7.3*   HCT 26.6* 22.7* 25.7* 24.8*    210  --  185       Recent Labs     08/22/23  1648 08/22/23  2216 08/23/23  0525    138 138  138   K 4.6 4.7 4.8  4.8    106 108  108   CO2 24 24 21*  21*   BUN 29* 31* 34*  34*   CREATININE 5.8* 5.5* 6.1*  6.1*   GLU 79 86 60*  60*   PHOS 4.6* 4.6* 4.9*   MG 2.4 2.4 2.4        MEWS score: 1    Rounding completed with charge SWAPNIL William. Patient with chest pain this am, EKG and troponin ordered. VSS. No other concerns verbalized at this time. Instructed to call [phone] for further concerns or assistance.    Porsha Allred RN        "

## 2023-08-23 NOTE — ASSESSMENT & PLAN NOTE
Nephrology consulted for HD needs while inpatient  Underwent SLED post MRI contrast (gadolinium)   Ad last dialysis was 08/18: Tolerated HD, UF 2500ml  Last dialysis 08/21  CRT today 6.4 >> 6.2 >> 6.6 >> 6.8 >> 5.0 >> 6.1    Electrolytes normal      Plan:  Will be followed by nephro, follow up with their recommendations regarding dialysis

## 2023-08-23 NOTE — ASSESSMENT & PLAN NOTE
Hypertensive on arrival  bp today: 138/72  Home medications: imdur Carvedilol Hydralazine Nifedipine    Plan:  On hydralazine And losartan and carvedilol in patient, attempting to uptitrate GDMT  Reduced carvedilol dose to half the dose (12.5)

## 2023-08-23 NOTE — TRANSFER OF CARE
"Anesthesia Transfer of Care Note    Patient: Immanuel Bernal    Procedure(s) Performed: Procedure(s) (LRB):  EGD (ESOPHAGOGASTRODUODENOSCOPY) (N/A)    Patient location: PACU    Anesthesia Type: general    Transport from OR: Transported from OR on 2-3 L/min O2 by NC with adequate spontaneous ventilation    Post pain: adequate analgesia    Post assessment: tolerated procedure well and no apparent anesthetic complications    Post vital signs: stable    Level of consciousness: awake and alert    Nausea/Vomiting: no nausea/vomiting    Complications: none    Transfer of care protocol was followed      Last vitals:   Visit Vitals  BP (!) 170/77   Pulse (!) 57   Temp 36.7 °C (98 °F) (Temporal)   Resp 16   Ht 5' 10" (1.778 m)   Wt 62.8 kg (138 lb 7.2 oz)   SpO2 100%   BMI 19.87 kg/m²     "

## 2023-08-23 NOTE — PROGRESS NOTES
[facility] - Int[facility] ([address] Medicine  Progress Note    Patient Name: Immanuel Bernal  MRN: [id number]  Patient Class: IP- Inpatient   Admission Date: 8/9/2023  Length of Stay: 14 days  Attending Physician: Porsha Funez MD  Primary Care Provider: No, Primary Doctor        Subjective:     Principal Problem:<principal problem not specified>        HPI:  Pt is a 56 yo man w/ PMH of PEG tube, small bowel obstruction s/p bowel resection, ESRD on HD, stroke, endophthalmitis s/p left eye enucleation who was found to have an episode of melena overnight. Per nurse, he had a bowel movement that was dark and tarry. He also had an episode of emesis overnight that had no blood in it. Pt's sister endorses sweating, nausea, vomiting, dizziness, and lightheadedness. Denies fever, abdominal pain, syncope. He has taken eliquis the last 5 days. His most recent colonoscopy was 2008       Overview/Hospital Course:  Patient was rate controlled and therefore flecainide held. His GCS is 13/15. Oculopalstics were consulted and so were the nephrology teams and IR. He underwent dialysis on his first day. Was scheduled for surgery today but his family refused to have it done until she reviewed the MRI herself first. This means that the surgeon will only be available next Wednesday in order to allow her to view and discuss the MRI. He has a peg tube in place that has not been utilzied prior. Heart failure medications have been reinstated and is being covered by vancomycin and meropenem for the endopthalmitis. Multidisciplinary session was conducted with his POA in regards to his operation, she accepted the surgery if it was emergent, otherwise to be done this upcoming Wednesday. Patient's delirium/agitation worsened so he was transferred to the ICU until his clinical situation settled. He then was stepped down to our care and ophthalmology will be conducting his eye procedure on him as planned. Underwent L eye  enucleation on 8/16. Started the tobramycin ointment and drops. Following ID recs regarding antimicrobial regimen; operative cultures returned with yeast thus patient was also started on fluconazole. Patient's sister noted us that he was diagnosed with central apnea and KENIA in the past of which he used a CPAP/BiPAP for. No concern of lack of oxygenation during his inpatient stay. Labs are underway to detect any element of CO2 retention although he had no clinical signs of that. Patient was ready to be transferred to rehab when he suddenly developed one single bout of melena associated with hemoglobin drop. He received 1 pint PRBC and underwent EGD which showed large DU with overlying clots and 2 clips were placed.    Disposition: referrals to rehab have been sent. Patient's medicare days have been exhausted. Currently exploring medicaid options.      Interval History: slight chest tightness overnight but SpO2 was 100%    Review of Systems   Respiratory:  Positive for chest tightness (very mild.). Negative for cough, shortness of breath and wheezing.    Cardiovascular:  Negative for chest pain, palpitations and leg swelling.   Gastrointestinal:  Negative for blood in stool, diarrhea, nausea and vomiting.   Neurological: Negative.    Psychiatric/Behavioral: Negative.       Objective:     Vital Signs (Most Recent):  Temp: 97.5 °F (36.4 °C) (08/23/23 1235)  Pulse: (!) 57 (08/23/23 1526)  Resp: 17 (08/23/23 1526)  BP: (!) 167/77 (08/23/23 1312)  SpO2: 100 % (08/23/23 1526) Vital Signs (24h Range):  Temp:  [97.5 °F (36.4 °C)-98.6 °F (37 °C)] 97.5 °F (36.4 °C)  Pulse:  [54-65] 57  Resp:  [10-22] 17  SpO2:  [100 %] 100 %  BP: (136-177)/(72-80) 167/77     Weight: 62.8 kg (138 lb 7.2 oz)  Body mass index is 19.87 kg/m².    Intake/Output Summary (Last 24 hours) at 8/23/2023 1611  Last data filed at 8/23/2023 1231  Gross per 24 hour   Intake 331.36 ml   Output --   Net 331.36 ml         Physical Exam  Eyes:      General:          Left eye: Discharge present.  Cardiovascular:      Rate and Rhythm: Normal rate and regular rhythm.      Pulses: Normal pulses.      Heart sounds: Normal heart sounds.   Pulmonary:      Effort: Pulmonary effort is normal. No respiratory distress.      Breath sounds: Normal breath sounds. No wheezing, rhonchi or rales.   Abdominal:      General: Bowel sounds are normal.      Palpations: Abdomen is soft.   Neurological:      General: No focal deficit present.      Mental Status: He is alert and oriented to person, place, and time. Mental status is at baseline.   Psychiatric:         Mood and Affect: Mood normal.             Significant Labs: All pertinent labs within the past 24 hours have been reviewed.    Significant Imaging: I have reviewed all pertinent imaging results/findings within the past 24 hours.      Assessment/Plan:      Melena  Latest hemoglobin = 7.3 (post transfusion)    Underwent EGD which yielded a large DU with overlying clot, Gi were not able to penetrate beneath the clot to determine presence of any bleeding vessel, 2 clips placed.   The PEG was removed because it was not  functioning, and replaced with an externally removable PEG with internal balloon bumper.     Recommendation:      -  If hemoglobin downtrending or having ongoing  melena, consult IR for embolization. Clips are present on adherent clot.  - clear liquid diet  - Trend H&H, transfuse for Hgb > 7, unless otherwise indicated  -IV [id number] BID  - Maintain IV access with 2 large bore Ivs  - Hold all NSAIDs and anticoagulants, unless contraindicated  -correct any coagulopathy with platelets and FFP for goal of platelets >50K and INR <2.0          Difficulty in urination  Element of BPH displayed  Producing good urine, but facing slight difficulty emptying    Plan:  Start flomax 0.4 daily po      Redness and swelling of upper arm  Had staples in right upper arm from transfer hospital (the fistula clotted and had pseudomonas  aurgenosa, and bacteremia), patient signed AMA before intervention was yielded there    Underwent Ultrasound US, findings:  1. Nonocclusive DVT in the right subclavian and axillary veins.  Catheter in the right subclavian vein.  2. 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    Seen by urology team:  Staples cannot be removed now as the skin is now adhering fully yet  Wound appears to be having dehiscence, for wound care team, appreciate recs  Vascular consult sent for plan of removal of staples after discharge      Plan:  Wound care consult sent, appreciate recs  Anticoagulation held due to melena    Hypoglycemia  Latest POCT glucose is 96  Oral diet switched to ckear fluid pre-EGD    Plan:  Follow glucose and notifiy if drops below <70      PEG (percutaneous endoscopic gastrostomy) status  On peg tube feeds now  Tolerating well  Able to take in PO per SLP assessment on 8/15, on regular diet with thin liquids  Will reduce tube feeds gradually as patient takes in more PO    Peg tube was found to be blocked yesterday, nursing staff were able to unclog the PEG tube  SLP evaluated patient : can restart oral feeds    Patient was able to tolerate oral feeds last night without feeling nauseous or vomiting  Nutrition team was consulted regarding aid with tube feeds in case the patient needs to be switched to TF. Appreciate  Their recs. If patient vomits or cant tolerate oral feeds will switch to TF    Plan:  Will continue to attempt oral feeds    Encounter for palliative care        Anemia in ESRD (end-stage renal disease)  EPO per nephrology  hgb dropped due to melena this morning, transfused one pint PRBC after consent was taken        Severe malnutrition  Nutrition consulted. Most recent weight and BMI monitored-     Measurements:  Wt Readings from Last 1 Encounters:   08/20/23 62.8 kg (138 lb 7.2 oz)   Body mass index is 19.87 kg/m².    Patient has been screened and assessed by  RD.    Malnutrition Type:  Context: chronic illness  Level: severe    Malnutrition Characteristic Summary:  Weight Loss (Malnutrition): greater than 10% in 6 months  Energy Intake (Malnutrition): other (see comments) ([place]AMONT)  Subcutaneous Fat (Malnutrition): severe depletion  Muscle Mass (Malnutrition): severe depletion    Interventions/Recommendations (treatment strategy):  1.     Able to take in PO per SLP assessment on 8/15, on regular diet with thin liquids  Will reduce tube feeds gradually as patient takes in more PO    Endophthalmitis  Ophthalmology team on board and were going to perform the procedure but the patient's family refused without being around to review the MRI which delayed the procedure till next wednesday    MRI repeat : Diffuse inflammatory change involving the left globe, with associated thickening of the left sclera, proptosis, and focal fluid collection within the left posterior compartment, which demonstrates diffusion restriction.  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  Diffuse left preseptal and postseptal inflammatory change, with superimposed orbital cellulitis also considered.  Evaluation of the left orbital apex is limited secondary to no contrast administration.  Decreased size of the left anterior compartment with bowing of the visualized left lens.  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    Thorough counseling undertaken with POA and IM team, palliative, opthalmo and nursing team in regards to the surgery  She agreed for her brother to undergo the procedure as an emergent case if his health deteriorates  Otherwise to be scheduled on wednesday    Underwent evisceration of his left eye yesterday, tobramycin ordered, ID recs are being followed  Still on meronema and vancomycin IV  Wbc: 8.07 (normal)  hgb post op 8.8 (at [facility])  No signs of distress or delirium  Culture of eye  discharge grew yeast, started flucanazole to antibiotic regimen  Follow cultures    Pending Rehab placement as patient and his sister unanimously agreed that he does not desire LTAC  Negotiations with ID team and pharmacy team in regards to his antibiotics, whether they can be administered via the dialysis line or not, pending opinion    Packing this Thursday and meeting on the following Monday.  Meronem is discontinued and replaced by cefepime in order to be able to administer it on dialysis    Plan:  Follow ID recs  - Continue fluconazole, vanc and cefepime  - Pending final ID recs for discharge, waiting for their latest input  -Follow opthalomology recs  - Continue tobramycin eye drops and ointments    ESRD (end stage renal disease)  Nephrology consulted for HD needs while inpatient  Underwent SLED post MRI contrast (gadolinium)   Ad last dialysis was 08/18: Tolerated HD, UF 2500ml  Last dialysis 08/21  CRT today 6.4 >> 6.2 >> 6.6 >> 6.8 >> 5.0 >> 6.1    Electrolytes normal      Plan:  Will be followed by nephro, follow up with their recommendations regarding dialysis      Atrial fibrillation  History of AF noted however no EKG here with AF  Not on AC due to lack of definitive evidence of AF  Continue carvedilol  eliquis indication is the non-occlusive DVT in right upper arm veins      Plan:  Received curshed eliquis through peg tube last night    Hyperlipidemia  Home statin    Chronic diastolic heart failure  Continue to monitor for signs of volume overload; volume removal with dialysis    Hypertension  Hypertensive on arrival  bp today: 138/72  Home medications: imdur Carvedilol Hydralazine Nifedipine    Plan:  On hydralazine And losartan and carvedilol in patient, attempting to uptitrate GDMT  Reduced carvedilol dose to half the dose (12.5)    Stroke  Continue home statin  Delerium precautions  PT OT    Diabetes mellitus  POCT glucose  LDSS  -180 target    Running slightly on the lower end: BG POCT is 75  >>> 96  No event of actual hypoglycemia inpatient    Plan:  Continue to monitor  In the event that his BG drops lower than 70 patient's hypoglycemic medications will be regulated again      VTE Risk Mitigation (From admission, onward)         Ordered     heparin (porcine) injection 1,000 Units  As needed (PRN)         08/10/23 1046     IP VTE HIGH RISK PATIENT  Once         08/09/23 1317     Place sequential compression device  Until discontinued         08/09/23 1317                Discharge Planning   TOBY: 8/25/2023     Code Status: Full Code   Is the patient medically ready for discharge?: No    Reason for patient still in hospital (select all that apply): Treatment  Discharge Plan A: Skilled Nursing Facility   Discharge Delays: (!) Procedure Scheduling (IR, OR, Labs, Echo, Cath, Echo, EEG) (eye surgery next week)              Sharon Law MD  Department of Hospital Medicine   [facility] - Intensive Care ([address])

## 2023-08-23 NOTE — NURSING
Pt reports chest pain, still feeling SOB. [facility] med team 3 notified.EKG and trop ordered. Day shift RN updated on plan.

## 2023-08-23 NOTE — PLAN OF CARE
Continue to work toward goals set in POC. Patient is making slow but steady progress toward meeting goals.     Goals to be met by: 8/25/23     Patient will increase functional independence with ADLs by performing:    UE Dressing with Minimal Assistance.  Grooming while seated with Stand-by Assistance.  Toileting from bedside commode with Minimal Assistance for hygiene and clothing management.   Sit to stand transfer with Contact Guard Assistance and use of RW.   Sitting at edge of bed >25 minutes with Supervision.  Step transfer with Minimal Assistance and use of RW.   Toilet transfer to bedside commode with Minimal Assistance and use of RW.

## 2023-08-23 NOTE — PROVATION PATIENT INSTRUCTIONS
Discharge Summary/Instructions after an Endoscopic Procedure  Patient Name: Immanuel Bernal  Patient MRN: [id number]  Patient YOB: 1965 Wednesday, August 23, 2023  Bharath Ya MD  Dear patient,  As a result of recent federal legislation (The Federal Cures Act), you may   receive lab or pathology results from your procedure in your [facility]   account before your physician is able to contact you. Your physician or   their representative will relay the results to you with their   recommendations at their soonest availability.  Thank you,  RESTRICTIONS:  During your procedure today, you received medications for sedation.  These   medications may affect your judgment, balance and coordination.  Therefore,   for 24 hours, you have the following restrictions:   - DO NOT drive a car, operate machinery, make legal/financial decisions,   sign important papers or drink alcohol.    ACTIVITY:  Today: no heavy lifting, straining or running due to procedural   sedation/anesthesia.  The following day: return to full activity including work.  DIET:  Eat and drink normally unless instructed otherwise.     TREATMENT FOR COMMON SIDE EFFECTS:  - Mild abdominal pain, nausea, belching, bloating or excessive gas:  rest,   eat lightly and use a heating pad.  - Sore Throat: treat with throat lozenges and/or gargle with warm salt   water.  - Because air was used during the procedure, expelling large amounts of air   from your rectum or belching is normal.  - If a bowel prep was taken, you may not have a bowel movement for 1-3 days.    This is normal.  SYMPTOMS TO WATCH FOR AND REPORT TO YOUR PHYSICIAN:  1. Abdominal pain or bloating, other than gas cramps.  2. Chest pain.  3. Back pain.  4. Signs of infection such as: chills or fever occurring within 24 hours   after the procedure.  5. Rectal bleeding, which would show as bright red, maroon, or black stools.   (A tablespoon of blood from the rectum is not serious,  especially if   hemorrhoids are present.)  6. Vomiting.  7. Weakness or dizziness.  GO DIRECTLY TO THE NEAREST EMERGENCY ROOM IF YOU HAVE ANY OF THE FOLLOWING:      Difficulty breathing              Chills and/or fever over 101 F   Persistent vomiting and/or vomiting blood   Severe abdominal pain   Severe chest pain   Black, tarry stools   Bleeding- more than one tablespoon   Any other symptom or condition that you feel may need urgent attention  Your doctor recommends these additional instructions:  If any biopsies were taken, your doctors clinic will contact you in 1 to 2   weeks with any results.  - [facility] patient to [facility] for ongoing care.   - Clear liquid diet.   - Use a proton pump inhibitor IV BID.   - Observe patient's clinical course.   - If hemoglobin downtrending or having ongoing melena, consult IR for   embolization. Clips are present on adherent clot.  For questions, problems or results please call your physician - Bharath Ya MD at Work:  [phone].  [facility], EMERGENCY ROOM PHONE NUMBER: [phone]  IF A COMPLICATION OR EMERGENCY SITUATION ARISES AND YOU ARE UNABLE TO REACH   YOUR PHYSICIAN - GO DIRECTLY TO THE EMERGENCY ROOM.  Bharath Ya MD  8/23/2023 1:49:31 PM  This report has been verified and signed electronically.  Dear patient,  As a result of recent federal legislation (The Federal Cures Act), you may   receive lab or pathology results from your procedure in your [facility]   account before your physician is able to contact you. Your physician or   their representative will relay the results to you with their   recommendations at their soonest availability.  Thank you,  PROVATION

## 2023-08-23 NOTE — TREATMENT PLAN
GI Post Procedure Treatment Plan  Procedure Performed: EGD    Impression:                            - Normal esophagus.                         - Intact gastrostomy with an occluded G-tube                          present characterized by healthy appearing mucosa.                          - Oozing duodenal ulcer with adherent clot. Unable                          to dislodge despite lavage, mechanical traction                          with scope and suction. Mild oozing from clot                          surface after these attempts. Clips (MR                          conditional) were placed onto clot surface with no                          oozing at end of procedure. Clip :                          Almondy.                          - Non-bleeding duodenal ulcer with a clean ulcer                          base (Ronal Class III) in duodenal bulb.                          - Non-bleeding duodenal ulcers with a clean ulcer                          base (Ronal Class III) in second portion of                          duodenum.                          - The PEG was removed because it was not                          functioning, and replaced with an externally                          removable PEG with internal balloon bumper.                          - No specimens collected.     Recommendation:                             - Return patient to [facility] for ongoing care.                          - Clear liquid diet.                          - Use a proton pump inhibitor IV BID.                          - Observe patient's clinical course.                          - If hemoglobin downtrending or having ongoing                          melena, consult IR for embolization. Clips are                          present on adherent clot.    Petra Kent MD  Gastroenterology, PGY-4

## 2023-08-23 NOTE — ANESTHESIA PROCEDURE NOTES
Intubation    Date/Time: 8/23/2023 11:38 AM    Performed by: Lombardi, Nicole A., CRNA  Authorized by: Keith Gayle MD    Intubation:     Induction:  Rapid sequence induction    Intubated:  Postinduction    Mask Ventilation:  N/a    Attempts:  1    Attempted By:  CRNA    Method of Intubation:  Video laryngoscopy    Blade:  Putnam 3    Laryngeal View Grade: Grade I - full view of cords      Difficult Airway Encountered?: No      Complications:  None    Airway Device:  Oral endotracheal tube    Airway Device Size:  7.5    Style/Cuff Inflation:  Cuffed (inflated to minimal occlusive pressure)    Inflation Amount (mL):  10    Secured at:  The lips    Placement Verified By:  Capnometry    Complicating Factors:  None    Findings Post-Intubation:  BS equal bilateral and atraumatic/condition of teeth unchanged

## 2023-08-23 NOTE — PLAN OF CARE
[phone]      Caller Francine Spence MD  [place]      Patient declined ED disposition despite being informed these are urgent symptoms. Paged Dr. Viera who is on call to the patient.    Patient had consulted with Dr. Spence's clinical team for overbooking tomorrow appointment for a reported cough but did not discuss her elevated blood pressures. PCP office requested a negative covid-19 test than will overbook Dr. Sepnce.     Message sent to Dr. Spence's clinical pool about insisting on tomorrow appointment.    Onset:6/3/22  Location / description: Elevated blood pressure of 189/87 prior 190/79, mild lightheadedness, headache   Precipitating Factors: possibly due to steroid  Pain Scale (1-10), 10 highest: 7/10  Associated Symptoms: headache, lightheadedness  What improves / worsens symptoms: worsening symptoms of headache and lightheadedness.   Symptom specific medications: prednsione, tramadol  LMP : No LMP recorded (lmp unknown). Patient has had a hysterectomy.  Are you pregnant or breast feeding: n/a  Recent visits (last 3-4 weeks) for same reason or recent surgery: n/a      PLAN:   Directed to Emergency Department    Patient/Caller refuses to follow recommendations.        Reason for Disposition  • [1] Systolic BP  >= 160 OR Diastolic >= 100 AND [2] cardiac or neurologic symptoms (e.g., chest pain, difficulty breathing, unsteady gait, blurred vision)    Protocols used: BLOOD PRESSURE - HIGH-A-       YULI spoke with patient sister Marley [phone] to follow up on the dc plan in regard home with home health vs rehab. She is reporting that she has spoken to a rep with the Medicaid team and she was informed of a Medicaid Spend down program that will help to cover patient medical expenses. She is stating that she was informed if patient pays the first 4500.00 then the Medicaid program cover any additional cost. [phone] case number [id number]. YULI will follow up with the dept for additional information. She also expressed that she does not know how she is going to get patient transported back to [place], [place] and patient does not have a PCP at this time. She states there are no reputable physicians that can provide care for patients eye in [place]  and the only reputable physician she has spoken  is nurse for Dr. Santiago which is physician at [facility] . YULI will follow up on this information        Mirta Melendez LMSW  [facility]   [id number]

## 2023-08-23 NOTE — SUBJECTIVE & OBJECTIVE
Interval History: slight chest tightness overnight but SpO2 was 100%    Review of Systems   Respiratory:  Positive for chest tightness (very mild.). Negative for cough, shortness of breath and wheezing.    Cardiovascular:  Negative for chest pain, palpitations and leg swelling.   Gastrointestinal:  Negative for blood in stool, diarrhea, nausea and vomiting.   Neurological: Negative.    Psychiatric/Behavioral: Negative.       Objective:     Vital Signs (Most Recent):  Temp: 97.5 °F (36.4 °C) (08/23/23 1235)  Pulse: (!) 57 (08/23/23 1526)  Resp: 17 (08/23/23 1526)  BP: (!) 167/77 (08/23/23 1312)  SpO2: 100 % (08/23/23 1526) Vital Signs (24h Range):  Temp:  [97.5 °F (36.4 °C)-98.6 °F (37 °C)] 97.5 °F (36.4 °C)  Pulse:  [54-65] 57  Resp:  [10-22] 17  SpO2:  [100 %] 100 %  BP: (136-177)/(72-80) 167/77     Weight: 62.8 kg (138 lb 7.2 oz)  Body mass index is 19.87 kg/m².    Intake/Output Summary (Last 24 hours) at 8/23/2023 1611  Last data filed at 8/23/2023 1231  Gross per 24 hour   Intake 331.36 ml   Output --   Net 331.36 ml         Physical Exam  Eyes:      General:         Left eye: Discharge present.  Cardiovascular:      Rate and Rhythm: Normal rate and regular rhythm.      Pulses: Normal pulses.      Heart sounds: Normal heart sounds.   Pulmonary:      Effort: Pulmonary effort is normal. No respiratory distress.      Breath sounds: Normal breath sounds. No wheezing, rhonchi or rales.   Abdominal:      General: Bowel sounds are normal.      Palpations: Abdomen is soft.   Neurological:      General: No focal deficit present.      Mental Status: He is alert and oriented to person, place, and time. Mental status is at baseline.   Psychiatric:         Mood and Affect: Mood normal.             Significant Labs: All pertinent labs within the past 24 hours have been reviewed.    Significant Imaging: I have reviewed all pertinent imaging results/findings within the past 24 hours.

## 2023-08-23 NOTE — INTERVAL H&P NOTE
The patient has been examined and the H&P has been reviewed:    Pre-Procedure H and P Addendum    Patient seen and examined.  History and exam unchanged from prior history and physical.      Procedure: EGD  Indication: Melena  ASA Class: per anesthesiology  Airway: normal  Neck Mobility: full range of motion  Mallampatti score: per anesthesia  History of anesthesia problems: no  Family history of anesthesia problems: no  Anesthesia Plan: MAC      Active Hospital Problems    Diagnosis  POA    Melena [K92.1]  Unknown      Resolved Hospital Problems    Diagnosis Date Resolved POA    Acute metabolic encephalopathy [G93.41] 08/15/2023 Yes    Cholecystostomy care [Z43.4] 08/15/2023 Not Applicable    Chronic kidney disease-mineral and bone disorder [N18.9, E83.9, M89.9] 08/17/2023 Yes    Anemia in chronic kidney disease [N18.9, D63.1] 08/10/2023 Yes    Bipolar disorder [F31.9] 08/15/2023 Yes    Persistent proteinuria [R80.1] 08/15/2023 Yes     Chronic     2 g proteinuria noted  Resumed ACE  Lower BP systolic to less than 130       CKD (chronic kidney disease) stage 3, GFR 30-59 ml/min [N18.30] 08/15/2023 Yes     Diabetic since 2009    CKD diagnosed in [facility] 2018  Not clear when the CKD was diagnosed    GFR 25-30 ml/min  Proteinuria >2g  Resume ACE and increase as tolerated

## 2023-08-23 NOTE — NURSING
Blood sugar was 66 this morning, patient NPO for procedure d10% bolus given, last blood sugar check 97.  Troponin came back at 0.097 notified Dr. Montoya with Med team 3, patient no longer with chest pain and cleared for EGD.

## 2023-08-24 PROBLEM — R41.0 DELIRIUM: Status: ACTIVE | Noted: 2023-08-24

## 2023-08-24 PROBLEM — E83.9 CHRONIC KIDNEY DISEASE-MINERAL AND BONE DISORDER: Status: RESOLVED | Noted: 2023-08-09 | Resolved: 2023-08-24

## 2023-08-24 PROBLEM — N18.9 CHRONIC KIDNEY DISEASE-MINERAL AND BONE DISORDER: Status: RESOLVED | Noted: 2023-08-09 | Resolved: 2023-08-24

## 2023-08-24 PROBLEM — M89.9 CHRONIC KIDNEY DISEASE-MINERAL AND BONE DISORDER: Status: RESOLVED | Noted: 2023-08-09 | Resolved: 2023-08-24

## 2023-08-24 LAB
ABO + RH BLD: NORMAL
ALBUMIN SERPL BCP-MCNC: 2.1 G/DL (ref 3.5–5.2)
ALBUMIN SERPL BCP-MCNC: 2.1 G/DL (ref 3.5–5.2)
ALBUMIN SERPL BCP-MCNC: 2.2 G/DL (ref 3.5–5.2)
ALLENS TEST: ABNORMAL
ALLENS TEST: NORMAL
ALP SERPL-CCNC: 91 U/L (ref 55–135)
ALT SERPL W/O P-5'-P-CCNC: <5 U/L (ref 10–44)
ANION GAP SERPL CALC-SCNC: 10 MMOL/L (ref 8–16)
ANION GAP SERPL CALC-SCNC: 12 MMOL/L (ref 8–16)
ANION GAP SERPL CALC-SCNC: 9 MMOL/L (ref 8–16)
AST SERPL-CCNC: 15 U/L (ref 10–40)
BILIRUB SERPL-MCNC: 0.3 MG/DL (ref 0.1–1)
BLD GP AB SCN CELLS X3 SERPL QL: NORMAL
BLD PROD TYP BPU: NORMAL
BLOOD UNIT EXPIRATION DATE: NORMAL
BLOOD UNIT TYPE CODE: 5100
BLOOD UNIT TYPE: NORMAL
BUN SERPL-MCNC: 39 MG/DL (ref 6–20)
BUN SERPL-MCNC: 39 MG/DL (ref 6–20)
BUN SERPL-MCNC: 41 MG/DL (ref 6–20)
CALCIUM SERPL-MCNC: 9.2 MG/DL (ref 8.7–10.5)
CALCIUM SERPL-MCNC: 9.4 MG/DL (ref 8.7–10.5)
CALCIUM SERPL-MCNC: 9.4 MG/DL (ref 8.7–10.5)
CHLORIDE SERPL-SCNC: 101 MMOL/L (ref 95–110)
CHLORIDE SERPL-SCNC: 101 MMOL/L (ref 95–110)
CHLORIDE SERPL-SCNC: 102 MMOL/L (ref 95–110)
CO2 SERPL-SCNC: 19 MMOL/L (ref 23–29)
CO2 SERPL-SCNC: 23 MMOL/L (ref 23–29)
CO2 SERPL-SCNC: 23 MMOL/L (ref 23–29)
CODING SYSTEM: NORMAL
CREAT SERPL-MCNC: 6.9 MG/DL (ref 0.5–1.4)
CREAT SERPL-MCNC: 7.4 MG/DL (ref 0.5–1.4)
CREAT SERPL-MCNC: 7.6 MG/DL (ref 0.5–1.4)
CROSSMATCH INTERPRETATION: NORMAL
DELSYS: ABNORMAL
DELSYS: NORMAL
DISPENSE STATUS: NORMAL
ERYTHROCYTE [DISTWIDTH] IN BLOOD BY AUTOMATED COUNT: 17.2 % (ref 11.5–14.5)
EST. GFR  (NO RACE VARIABLE): 7.6 ML/MIN/1.73 M^2
EST. GFR  (NO RACE VARIABLE): 7.9 ML/MIN/1.73 M^2
EST. GFR  (NO RACE VARIABLE): 8.5 ML/MIN/1.73 M^2
FINAL PATHOLOGIC DIAGNOSIS: NORMAL
FLOW: 2
FLOW: 2
GLUCOSE SERPL-MCNC: 70 MG/DL (ref 70–110)
GLUCOSE SERPL-MCNC: 76 MG/DL (ref 70–110)
GLUCOSE SERPL-MCNC: 77 MG/DL (ref 70–110)
GROSS: NORMAL
HCO3 UR-SCNC: 21.6 MMOL/L (ref 24–28)
HCT VFR BLD AUTO: 22.4 % (ref 40–54)
HCT VFR BLD CALC: 19 %PCV (ref 36–54)
HGB BLD-MCNC: 6.8 G/DL (ref 14–18)
LDH SERPL L TO P-CCNC: 0.61 MMOL/L (ref 0.36–1.25)
Lab: NORMAL
MAGNESIUM SERPL-MCNC: 2.5 MG/DL (ref 1.6–2.6)
MAGNESIUM SERPL-MCNC: 2.5 MG/DL (ref 1.6–2.6)
MCH RBC QN AUTO: 30.5 PG (ref 27–31)
MCHC RBC AUTO-ENTMCNC: 30.4 G/DL (ref 32–36)
MCV RBC AUTO: 100 FL (ref 82–98)
MICROSCOPIC EXAM: NORMAL
MODE: ABNORMAL
MODE: NORMAL
NUM UNITS TRANS PACKED RBC: NORMAL
PCO2 BLDA: 31.1 MMHG (ref 35–45)
PH SMN: 7.45 [PH] (ref 7.35–7.45)
PHOSPHATE SERPL-MCNC: 4.3 MG/DL (ref 2.7–4.5)
PHOSPHATE SERPL-MCNC: 4.5 MG/DL (ref 2.7–4.5)
PLATELET # BLD AUTO: 188 K/UL (ref 150–450)
PMV BLD AUTO: 10.3 FL (ref 9.2–12.9)
PO2 BLDA: 63 MMHG (ref 80–100)
POC BE: -2 MMOL/L
POC IONIZED CALCIUM: 1.24 MMOL/L (ref 1.06–1.42)
POC SATURATED O2: 93 % (ref 95–100)
POC TCO2: 22 MMOL/L (ref 23–27)
POCT GLUCOSE: 81 MG/DL (ref 70–110)
POCT GLUCOSE: 81 MG/DL (ref 70–110)
POCT GLUCOSE: 89 MG/DL (ref 70–110)
POCT GLUCOSE: 92 MG/DL (ref 70–110)
POTASSIUM BLD-SCNC: 4.8 MMOL/L (ref 3.5–5.1)
POTASSIUM SERPL-SCNC: 5 MMOL/L (ref 3.5–5.1)
POTASSIUM SERPL-SCNC: 5 MMOL/L (ref 3.5–5.1)
POTASSIUM SERPL-SCNC: 5.1 MMOL/L (ref 3.5–5.1)
PROT SERPL-MCNC: 5.7 G/DL (ref 6–8.4)
RBC # BLD AUTO: 2.23 M/UL (ref 4.6–6.2)
SAMPLE: ABNORMAL
SAMPLE: NORMAL
SARS-COV-2 RNA RESP QL NAA+PROBE: NOT DETECTED
SITE: ABNORMAL
SITE: NORMAL
SODIUM BLD-SCNC: 130 MMOL/L (ref 136–145)
SODIUM SERPL-SCNC: 133 MMOL/L (ref 136–145)
SODIUM SERPL-SCNC: 133 MMOL/L (ref 136–145)
SODIUM SERPL-SCNC: 134 MMOL/L (ref 136–145)
SP02: 100
SP02: 100
SPECIMEN OUTDATE: NORMAL
WBC # BLD AUTO: 9.74 K/UL (ref 3.9–12.7)

## 2023-08-24 PROCEDURE — 85014 HEMATOCRIT: CPT

## 2023-08-24 PROCEDURE — 63600175 PHARM REV CODE 636 W HCPCS: Performed by: FAMILY MEDICINE

## 2023-08-24 PROCEDURE — 99233 SBSQ HOSP IP/OBS HIGH 50: CPT | Mod: GC,,, | Performed by: FAMILY MEDICINE

## 2023-08-24 PROCEDURE — P9016 RBC LEUKOCYTES REDUCED: HCPCS

## 2023-08-24 PROCEDURE — 85025 COMPLETE CBC W/AUTO DIFF WBC: CPT | Performed by: FAMILY MEDICINE

## 2023-08-24 PROCEDURE — 84132 ASSAY OF SERUM POTASSIUM: CPT

## 2023-08-24 PROCEDURE — 99223 PR INITIAL HOSPITAL CARE,LEVL III: ICD-10-PCS | Mod: ,,, | Performed by: INTERNAL MEDICINE

## 2023-08-24 PROCEDURE — 86920 COMPATIBILITY TEST SPIN: CPT

## 2023-08-24 PROCEDURE — 82330 ASSAY OF CALCIUM: CPT

## 2023-08-24 PROCEDURE — 27000221 HC OXYGEN, UP TO 24 HOURS

## 2023-08-24 PROCEDURE — 94761 N-INVAS EAR/PLS OXIMETRY MLT: CPT

## 2023-08-24 PROCEDURE — 86850 RBC ANTIBODY SCREEN: CPT

## 2023-08-24 PROCEDURE — 36415 COLL VENOUS BLD VENIPUNCTURE: CPT

## 2023-08-24 PROCEDURE — 25000003 PHARM REV CODE 250: Performed by: HOSPITALIST

## 2023-08-24 PROCEDURE — 36600 WITHDRAWAL OF ARTERIAL BLOOD: CPT

## 2023-08-24 PROCEDURE — 99223 1ST HOSP IP/OBS HIGH 75: CPT | Mod: ,,, | Performed by: INTERNAL MEDICINE

## 2023-08-24 PROCEDURE — 80069 RENAL FUNCTION PANEL: CPT | Mod: 91 | Performed by: STUDENT IN AN ORGANIZED HEALTH CARE EDUCATION/TRAINING PROGRAM

## 2023-08-24 PROCEDURE — 25000003 PHARM REV CODE 250

## 2023-08-24 PROCEDURE — 80053 COMPREHEN METABOLIC PANEL: CPT

## 2023-08-24 PROCEDURE — 99900035 HC TECH TIME PER 15 MIN (STAT)

## 2023-08-24 PROCEDURE — 94660 CPAP INITIATION&MGMT: CPT

## 2023-08-24 PROCEDURE — 11000001 HC ACUTE MED/SURG PRIVATE ROOM

## 2023-08-24 PROCEDURE — 63600175 PHARM REV CODE 636 W HCPCS: Performed by: NURSE PRACTITIONER

## 2023-08-24 PROCEDURE — 25500020 PHARM REV CODE 255: Performed by: FAMILY MEDICINE

## 2023-08-24 PROCEDURE — 63600175 PHARM REV CODE 636 W HCPCS

## 2023-08-24 PROCEDURE — 83605 ASSAY OF LACTIC ACID: CPT

## 2023-08-24 PROCEDURE — 83735 ASSAY OF MAGNESIUM: CPT | Performed by: STUDENT IN AN ORGANIZED HEALTH CARE EDUCATION/TRAINING PROGRAM

## 2023-08-24 PROCEDURE — 99232 SBSQ HOSP IP/OBS MODERATE 35: CPT | Mod: ,,, | Performed by: NURSE PRACTITIONER

## 2023-08-24 PROCEDURE — 94640 AIRWAY INHALATION TREATMENT: CPT

## 2023-08-24 PROCEDURE — 36430 TRANSFUSION BLD/BLD COMPNT: CPT

## 2023-08-24 PROCEDURE — 99232 PR SUBSEQUENT HOSPITAL CARE,LEVL II: ICD-10-PCS | Mod: ,,, | Performed by: NURSE PRACTITIONER

## 2023-08-24 PROCEDURE — 85027 COMPLETE CBC AUTOMATED: CPT

## 2023-08-24 PROCEDURE — C9113 INJ PANTOPRAZOLE SODIUM, VIA: HCPCS

## 2023-08-24 PROCEDURE — 84295 ASSAY OF SERUM SODIUM: CPT

## 2023-08-24 PROCEDURE — 21400001 HC TELEMETRY ROOM

## 2023-08-24 PROCEDURE — 36415 COLL VENOUS BLD VENIPUNCTURE: CPT | Performed by: STUDENT IN AN ORGANIZED HEALTH CARE EDUCATION/TRAINING PROGRAM

## 2023-08-24 PROCEDURE — 87635 SARS-COV-2 COVID-19 AMP PRB: CPT

## 2023-08-24 PROCEDURE — 25000242 PHARM REV CODE 250 ALT 637 W/ HCPCS

## 2023-08-24 PROCEDURE — 82803 BLOOD GASES ANY COMBINATION: CPT

## 2023-08-24 PROCEDURE — 99233 PR SUBSEQUENT HOSPITAL CARE,LEVL III: ICD-10-PCS | Mod: GC,,, | Performed by: FAMILY MEDICINE

## 2023-08-24 RX ORDER — HYDRALAZINE HYDROCHLORIDE 20 MG/ML
10 INJECTION INTRAMUSCULAR; INTRAVENOUS EVERY 6 HOURS PRN
Status: DISCONTINUED | OUTPATIENT
Start: 2023-08-24 | End: 2023-09-19 | Stop reason: HOSPADM

## 2023-08-24 RX ORDER — PROCHLORPERAZINE EDISYLATE 5 MG/ML
10 INJECTION INTRAMUSCULAR; INTRAVENOUS EVERY 6 HOURS PRN
Status: DISCONTINUED | OUTPATIENT
Start: 2023-08-24 | End: 2023-08-24

## 2023-08-24 RX ORDER — HYDROCODONE BITARTRATE AND ACETAMINOPHEN 500; 5 MG/1; MG/1
TABLET ORAL
Status: DISCONTINUED | OUTPATIENT
Start: 2023-08-24 | End: 2023-09-19 | Stop reason: HOSPADM

## 2023-08-24 RX ORDER — LORAZEPAM 2 MG/ML
0.5 INJECTION INTRAMUSCULAR
Status: DISCONTINUED | OUTPATIENT
Start: 2023-08-24 | End: 2023-08-25

## 2023-08-24 RX ORDER — PROCHLORPERAZINE EDISYLATE 5 MG/ML
10 INJECTION INTRAMUSCULAR; INTRAVENOUS EVERY 6 HOURS PRN
Status: DISCONTINUED | OUTPATIENT
Start: 2023-08-24 | End: 2023-09-19 | Stop reason: HOSPADM

## 2023-08-24 RX ORDER — ONDANSETRON 2 MG/ML
4 INJECTION INTRAMUSCULAR; INTRAVENOUS EVERY 6 HOURS PRN
Status: DISCONTINUED | OUTPATIENT
Start: 2023-08-24 | End: 2023-08-29

## 2023-08-24 RX ADMIN — DEXTROSE MONOHYDRATE 125 ML: 10 INJECTION, SOLUTION INTRAVENOUS at 04:08

## 2023-08-24 RX ADMIN — LORAZEPAM 0.5 MG: 2 INJECTION INTRAMUSCULAR; INTRAVENOUS at 01:08

## 2023-08-24 RX ADMIN — HYDRALAZINE HYDROCHLORIDE 10 MG: 20 INJECTION, SOLUTION INTRAMUSCULAR; INTRAVENOUS at 04:08

## 2023-08-24 RX ADMIN — HEPARIN SODIUM 1000 UNITS: 1000 INJECTION, SOLUTION INTRAVENOUS; SUBCUTANEOUS at 06:08

## 2023-08-24 RX ADMIN — ISOSORBIDE DINITRATE: 20 TABLET ORAL at 08:08

## 2023-08-24 RX ADMIN — MEROPENEM 500 MG: 500 INJECTION INTRAVENOUS at 04:08

## 2023-08-24 RX ADMIN — ACETAMINOPHEN 1000 MG: 500 TABLET ORAL at 08:08

## 2023-08-24 RX ADMIN — IOHEXOL 75 ML: 350 INJECTION, SOLUTION INTRAVENOUS at 02:08

## 2023-08-24 RX ADMIN — TOBRAMYCIN AND DEXAMETHASONE: 3; 1 OINTMENT OPHTHALMIC at 09:08

## 2023-08-24 RX ADMIN — PANTOPRAZOLE SODIUM 40 MG: 40 INJECTION, POWDER, FOR SOLUTION INTRAVENOUS at 04:08

## 2023-08-24 RX ADMIN — CARVEDILOL 6.25 MG: 6.25 TABLET, FILM COATED ORAL at 08:08

## 2023-08-24 RX ADMIN — PANTOPRAZOLE SODIUM 40 MG: 40 INJECTION, POWDER, FOR SOLUTION INTRAVENOUS at 08:08

## 2023-08-24 RX ADMIN — IPRATROPIUM BROMIDE AND ALBUTEROL SULFATE 3 ML: .5; 3 SOLUTION RESPIRATORY (INHALATION) at 08:08

## 2023-08-24 RX ADMIN — TOBRAMYCIN AND DEXAMETHASONE 1 DROP: 3; 1 SUSPENSION/ DROPS OPHTHALMIC at 09:08

## 2023-08-24 RX ADMIN — ONDANSETRON 4 MG: 2 INJECTION INTRAMUSCULAR; INTRAVENOUS at 05:08

## 2023-08-24 NOTE — NURSING
Pt sister at bedside very persistent about a physician coming to bedside immediately to check patient out. She feels he may be having a stroke due to him having some confusion that has been off and on but she says she has not had in about a week. Patient can answer half of the questions the primary nurse and charge nurse ask appropriately and half he can not. Primary nurse and charge nurse both have done neuro assessment on patient . BG normal and all vitals are stable. Wilfredo MANNING notified and will be up to see the patient.

## 2023-08-24 NOTE — NURSING
Patient given ativan and able to tolerate CT scan afterwards.  Patient returned to the room and has still been yelling out the same phrase repeatedly.  Patient lost both PIVs, replaced with an ultrasound.  Patient had 1 small black bm, and has started vomiting, black and watery consistency.  Dr. Mcbride team notified, iv zofran given.  Perm cath dressing soiled and changed.  Now infusing 1 unit of prbc.

## 2023-08-24 NOTE — PROGRESS NOTES
[facility] - Intensive Care ([place] [id number])  Infectious Disease  Progress Note    Patient Name: Immanuel Bernal  MRN: [id number]  Admission Date: 8/9/2023  Length of Stay: 15 days  Attending Physician: Michel Mcbride III*  Primary Care Provider: No, Primary Doctor    Isolation Status: No active isolations  Assessment/Plan:      Ophtho  Endophthalmitis  Hx of Pseudomonas bacteremia and Endophthalmitis with concern for extension into CNS as he had developed encephalopathy. He is now s/p enucleation with operative gram stain positive for yeast. Operative cultures grew c acnes. Blood cultures NGTD on 8/9/23.    ID reconsulted for acute encephalopathy. Etiology remains unclear. Was on cefepime which can cause neurotoxicity.  CTH stable.  · Agree with switching cefepime back to meropenem   Continue  vancomycin.   Continue fluconazole.   Plan to treat as CNS infection as MRI was non diagnostic. Anticipated 4-6 weeks of therapy from evisceration. Anticipated end date: 9/12-9/26.   Consider EEG        Anticipated Disposition: tbd    Thank you for your consult. I will follow-up with patient. Please contact us if you have any additional questions.    Bria Guzman MD  Infectious Disease  [facility] - Intensive Care ([place] [address])    Subjective:     Principal Problem:<principal problem not specified>    HPI: Mr. Bernal is a 57M with PMH of DM2, HTN, ESRD, HFrEF, bowel obstruction s/p bowel resection, KENIA, afib, recent hospitalization for NELSON endophthalmitis, pseudomonas bacteremia, RUE AVG infection s/p excision, who presented as a transfer from [place] for worsening eye symptoms and imaging findings of L scleral abscess. Patient had been admitted to [facility] July 18th for concern for NELSON endophthalmitis. He received intravitreal vancomycin and ceftazidime by ophthalmology, and IV cefepime for pseudomonas bacteremia. Superficial swab with diphtheroids and anaerobic cocci. He underwent intravitreal tap that  was negative for any yeast or fungus. Unfortunately L eye did not respond to treatment and abscess developed. Ophthalmology recommended enucleation, however POA/family declined. He was recommend to continue on cefepime for 6 weeks followed by indefinite fluoroquinolone, however he left AMA without the antibiotics.     He then presented to [place], where imaging was notable for L scleral abscess. He received a dose of zosyn and vanc, and was transferred to [facility] for oculoplastic evaluation. ID was consulted on arrival, and he was transitioned to vanc/meropenem while awaiting culture results. Blood cultures from 8/9 negative. He underwent L enucleation 8/16. ID reconsulted 8/17 for long term antibiotic regimen.      Interval History: ID reconsulted for acute onset mental status change.     Review of Systems   Unable to perform ROS: Mental status change     Objective:     Vital Signs (Most Recent):  Temp: 98 °F (36.7 °C) (08/24/23 1815)  Pulse: 83 (08/24/23 1746)  Resp: 20 (08/24/23 1815)  BP: (!) 165/89 (08/24/23 1815)  SpO2: 98 % (08/24/23 1746) Vital Signs (24h Range):  Temp:  [97.4 °F (36.3 °C)-98 °F (36.7 °C)] 98 °F (36.7 °C)  Pulse:  [61-83] 83  Resp:  [12-21] 20  SpO2:  [98 %-100 %] 98 %  BP: (146-206)/(74-97) 165/89     Weight: 62.8 kg (138 lb 7.2 oz)  Body mass index is 19.87 kg/m².    Estimated Creatinine Clearance: 9.3 mL/min (A) (based on SCr of 7.6 mg/dL (H)).     Physical Exam  HENT:      Head: Normocephalic and atraumatic.   Eyes:      Comments: Lt eyelid shut, yellowish discharge present (s/p enucleation)   Cardiovascular:      Rate and Rhythm: Normal rate and regular rhythm.   Pulmonary:      Effort: Pulmonary effort is normal.      Breath sounds: Normal breath sounds.   Abdominal:      General: Abdomen is flat. There is no distension.      Palpations: Abdomen is soft.      Tenderness: There is no abdominal tenderness.   Skin:     Findings: No lesion or rash.   Neurological:      Mental Status: He  is alert.      Comments: Not following my commands, incoherent          Significant Labs: All pertinent labs within the past 24 hours have been reviewed.    Significant Imaging: I have reviewed all pertinent imaging results/findings within the past 24 hours.

## 2023-08-24 NOTE — PT/OT/SLP PROGRESS
Physical Therapy      Patient Name:  Immanuel Bernal   MRN:  [id number]    Patient not seen today secondary to  (Pt low H&H, new onset of confusion, and requiring HD. Pt awaiting CT and likely needing blood products per RN. RN recommending f/u tomorrow.). Will follow-up tomorrow as appropriate.

## 2023-08-24 NOTE — ASSESSMENT & PLAN NOTE
Outpatient HD Information:    -Outpatient HD unit: [facility]   -HD tx days: MWF   -HD tx time: 210min  -HD access: R IJ TDC   -HD modality: iHD   -Residual urine: ?      Plan/Recommendations:  -No emergent need for HD at this particular time. Will tentatively plan for HD after CT if there's no acute changes noted. RN to call on-call nephrology provider before placing patient on machine. Orders placed. CN updated.   -Strict I/O's and daily weights  -Daily renal function panels   -Renally dose meds

## 2023-08-24 NOTE — PLAN OF CARE
Problem: Adult Inpatient Plan of Care  Goal: Plan of Care Review  Outcome: Ongoing, Progressing  Goal: Patient-Specific Goal (Individualized)  Outcome: Ongoing, Progressing  Goal: Absence of Hospital-Acquired Illness or Injury  Outcome: Ongoing, Progressing  Goal: Optimal Comfort and Wellbeing  Outcome: Ongoing, Progressing  Goal: Readiness for Transition of Care  Outcome: Ongoing, Progressing     Problem: Diabetes Comorbidity  Goal: Blood Glucose Level Within Targeted Range  Outcome: Ongoing, Progressing     Problem: Skin Injury Risk Increased  Goal: Skin Health and Integrity  Outcome: Ongoing, Progressing     Problem: Device-Related Complication Risk (Hemodialysis)  Goal: Safe, Effective Therapy Delivery  Outcome: Ongoing, Progressing     Problem: Hemodynamic Instability (Hemodialysis)  Goal: Effective Tissue Perfusion  Outcome: Ongoing, Progressing     Problem: Infection (Hemodialysis)  Goal: Absence of Infection Signs and Symptoms  Outcome: Ongoing, Progressing     Problem: Infection  Goal: Absence of Infection Signs and Symptoms  Outcome: Ongoing, Progressing     Problem: Coping Ineffective  Goal: Effective Coping  Outcome: Ongoing, Progressing     Problem: Device-Related Complication Risk (CRRT (Continuous Renal Replacement Therapy))  Goal: Safe, Effective Therapy Delivery  Outcome: Ongoing, Progressing     Problem: Hypothermia (CRRT (Continuous Renal Replacement Therapy))  Goal: Body Temperature Maintained in Desired Range  Outcome: Ongoing, Progressing     Problem: Infection (CRRT (Continuous Renal Replacement Therapy))  Goal: Absence of Infection Signs and Symptoms  Outcome: Ongoing, Progressing     Problem: Impaired Wound Healing  Goal: Optimal Wound Healing  Outcome: Ongoing, Progressing     Problem: Fall Injury Risk  Goal: Absence of Fall and Fall-Related Injury  Outcome: Ongoing, Progressing

## 2023-08-24 NOTE — RESPIRATORY THERAPY
RAPID RESPONSE RESPIRATORY THERAPY PROACTIVE NOTE           Time of visit: 835     Code Status: Full Code   : 1965  Bed: 39868/57457 A:   MRN: [id number]  Time spent at the bedside: 15 -30 min    SITUATION    Evaluated patient for: AMS    BACKGROUND    Why is the patient in the hospital?: <principal problem not specified>    Patient has a past medical history of Bilateral retinal detachment, BPH (benign prostatic hyperplasia), Cataract, CHF (congestive heart failure), CKD (chronic kidney disease) stage 3, GFR 30-59 ml/min, Diabetes mellitus, type 2, Hypertension, KENIA (obstructive sleep apnea), Pancreatitis, Persistent proteinuria, PTSD (post-traumatic stress disorder), and Stroke.    24 Hours Vitals Range:  Temp:  [97.4 °F (36.3 °C)-98 °F (36.7 °C)]   Pulse:  [54-77]   Resp:  [10-20]   BP: (146-178)/(74-88)   SpO2:  [99 %-100 %]     Labs:    Recent Labs     23  1421 23  2215 23  0828   * 133* 134*  133*   K 4.6 4.4 5.1  5.0    103 101  101   CO2 22* 21* 23  23   BUN 37* 39* 39*  39*   CREATININE 6.4* 6.6* 7.4*  6.9*    70 77  76   PHOS 4.7* 4.2 4.5   MG 2.5 2.5 2.5        Recent Labs     23  0836   PH 7.449   PCO2 31.1*   PO2 63*   HCO3 21.6*   POCSATURATED 93*   BE -2       ASSESSMENT/INTERVENTIONS  Called to the bedside due to AMS. Currently on 2L NC.   Bedside RT was running abg, per MD pt had a desaturation event last night. ABG results above. No other respiratory concerns at this time.       Last VS   Temp: 97.7 °F (36.5 °C) ( 0755)  Pulse: 77 ( 1027)  Resp: 18 ( 1027)  BP: 177/81 ( 0755)  SpO2: 100 % ( 1027)    Level of Consciousness: Level of Consciousness (AVPU): alert  Respiratory Effort: Respiratory Effort: Normal, Unlabored Expansion/Accessory Muscle Usage: Expansion/Accessory Muscles/Retractions: expansion symmetric, no use of accessory muscles, no retractions  All Lung Field Breath Sounds: All Lung Fields Breath Sounds:  diminished  O2 Device/Concentration: 2l  NIPPV: No Surgical airway: No  ETCO2 monitored:    Ambu at bedside:      Active Orders   Respiratory Care    Inhalation Treatment Q6H WAKE     Frequency: Q6H WAKE     Number of Occurrences: Until Specified    Oxygen Continuous     Frequency: Continuous     Number of Occurrences: Until Specified     Order Questions:      Device type: Low flow      Device: Nasal Cannula (1- 5 Liters)      LPM: 3      Titrate O2 per Oxygen Titration Protocol: Yes      To maintain SpO2 goal of: >= 90%      Notify MD of: Inability to achieve desired SpO2; Sudden change in patient status and requires 20% increase in FiO2; Patient requires >60% FiO2    Pulse Oximetry Continuous     Frequency: Continuous     Number of Occurrences: Until Specified       RECOMMENDATIONS    We recommend: RRT Recs: Continue POC per primary team.    FOLLOW-UP    Please call back the Rapid Response RT, Latisha Hastings RRT at x [phone] for any questions or concerns.

## 2023-08-24 NOTE — PROGRESS NOTES
[facility] ([place] [address])  Nephrology  Progress Note    Patient Name: Immanuel Bernal  MRN: [id number]  Admission Date: 8/9/2023  Hospital Length of Stay: 15 days  Attending Provider: Michel Mcbride III*   Primary Care Physician: Dolly, Primary Doctor  Principal Problem:<principal problem not specified>    Subjective:     Interval History:   Acute change in mental status overnight. STAT CT ordered this AM, but unable to complete due to agitation and patient not following commands.   Remain confused on exam and unable to be redirected. Family at bedside.   Electrolytes non emergent. Oxygenating well on RA.     Review of patient's allergies indicates:   Allergen Reactions    Morphine Rash    Amiodarone analogues Itching     Other reaction(s): Unknown     Current Facility-Administered Medications   Medication Frequency    0.9%  NaCl infusion (for blood administration) Q24H PRN    0.9%  NaCl infusion (for blood administration) Q24H PRN    0.9%  NaCl infusion PRN    0.9%  NaCl infusion Once    acetaminophen tablet 1,000 mg TID    albuterol-ipratropium 2.5 mg-0.5 mg/3 mL nebulizer solution 3 mL Q6H WAKE    atorvastatin tablet 40 mg Daily    calcitRIOL capsule 0.25 mcg Daily    carvediloL tablet 6.25 mg BID    dextrose 10% bolus 125 mL 125 mL PRN    dextrose 10% bolus 250 mL 250 mL PRN    epoetin thee injection 3,000 Units Every Mon, Wed, Fri    fluconazole tablet 400 mg Daily    glucagon (human recombinant) injection 1 mg PRN    glucose chewable tablet 16 g PRN    glucose chewable tablet 24 g PRN    heparin (porcine) injection 1,000 Units PRN    hydrALAZINE 10 mg 1 tablet, hydrALAZINE 25 mg 1 tablet, isorsorbide dinitrate 20 mg 1 tablet combination TID    hydrALAZINE injection 10 mg Q6H PRN    LORazepam injection 0.5 mg On Call Procedure    losartan tablet 100 mg Daily    meclizine tablet 12.5 mg TID PRN    melatonin tablet 6 mg Nightly PRN    meropenem (MERREM) 500 mg in  sodium chloride 0.9 % 100 mL IVPB (MB+) Q12H    metoclopramide HCl tablet 5 mg TID AC    naloxone 0.4 mg/mL injection 0.02 mg PRN    ondansetron disintegrating tablet 8 mg TID AC    oxyCODONE immediate release tablet Tab 10 mg Q4H PRN    oxyCODONE-acetaminophen 5-325 mg per tablet 1 tablet Q4H PRN    pantoprazole injection 40 mg BID    polyethylene glycol packet 17 g BID    promethazine tablet 25 mg Q6H PRN    senna-docusate 8.6-50 mg per tablet 1 tablet BID    sevelamer carbonate pwpk 0.8 g TID WM    sodium chloride 0.9% flush 10 mL Q12H PRN    tamsulosin 24 hr capsule 0.4 mg Daily    tobramycin-dexAMETHasone 0.3-0.1% ophthalmic ointment TID    tobramycin-dexAMETHasone 0.3-0.1% ophthalmic suspension 1 drop QID    vancomycin (VANCOCIN) 500 mg in dextrose 5 % in water (D5W) 100 mL IVPB (MB+) Every Mon, Wed, Fri    vancomycin - pharmacy to dose pharmacy to manage frequency       Objective:     Vital Signs (Most Recent):  Temp: 97.7 °F (36.5 °C) (08/24/23 0755)  Pulse: 76 (08/24/23 1315)  Resp: 18 (08/24/23 1027)  BP: (!) 196/96 (08/24/23 1315)  SpO2: 100 % (08/24/23 1315) Vital Signs (24h Range):  Temp:  [97.4 °F (36.3 °C)-98 °F (36.7 °C)] 97.7 °F (36.5 °C)  Pulse:  [54-77] 76  Resp:  [16-20] 18  SpO2:  [99 %-100 %] 100 %  BP: (146-196)/(74-96) 196/96     Weight: 62.8 kg (138 lb 7.2 oz) (08/23/23 0900)  Body mass index is 19.87 kg/m².  Body surface area is 1.76 meters squared.    I/O last 3 completed shifts:  In: 610 [P.O.:480; I.V.:100; NG/GT:30]  Out: -      Physical Exam  Vitals and nursing note reviewed.   Constitutional:       General: He is not in acute distress.     Appearance: He is ill-appearing.   Eyes:      General:         Left eye: Discharge present.  Cardiovascular:      Rate and Rhythm: Normal rate and regular rhythm.      Pulses: Normal pulses.      Heart sounds: Normal heart sounds.   Pulmonary:      Effort: Pulmonary effort is normal. No respiratory distress.      Breath sounds:  Normal breath sounds. No wheezing, rhonchi or rales.   Abdominal:      General: Bowel sounds are normal.      Palpations: Abdomen is soft.   Neurological:      Mental Status: He is alert. He is confused.   Psychiatric:         Speech: He is noncommunicative.         Behavior: Behavior is uncooperative.          Significant Labs:     CBC:   Recent Labs   Lab 08/24/23  0828 08/24/23  0836   WBC 9.74  --    RBC 2.23*  --    HGB 6.8*  --    HCT 22.4* 19*     --    *  --    MCH 30.5  --    MCHC 30.4*  --      CMP:   Recent Labs   Lab 08/24/23  0828   GLU 77  76   CALCIUM 9.4  9.4   ALBUMIN 2.2*  2.1*   PROT 5.7*   *  133*   K 5.1  5.0   CO2 23  23     101   BUN 39*  39*   CREATININE 7.4*  6.9*   ALKPHOS 91   ALT <5*   AST 15   BILITOT 0.3     All labs within the past 24 hours have been reviewed.    Assessment/Plan:     Ophtho  Endophthalmitis  -Plan per primary/ophthalmology     Renal/  Chronic kidney disease-mineral and bone disorder  -Continue calcitriol and renvela     ESRD (end stage renal disease)  Outpatient HD Information:    -Outpatient HD unit: [facility] [place]   -HD tx days: MWF   -HD tx time: 210min  -HD access: R IJ TDC   -HD modality: iHD   -Residual urine: ?      Plan/Recommendations:  -No emergent need for HD at this particular time. Will tentatively plan for HD after CT if there's no acute changes noted. RN to call on-call nephrology provider before placing patient on machine. Orders placed. CN updated.   -Strict I/O's and daily weights  -Daily renal function panels   -Renally dose meds      Oncology  Anemia in ESRD (end-stage renal disease)  -Target Hg of 10-12  -Epo with HD     GI  Melena  - defer to primary team         Thank you for your consult. I will follow-up with patient. Please contact us if you have any additional questions.    Rachael Barraza, ALEC, FNP-C  Nephrology  [facility] ([place]

## 2023-08-24 NOTE — PLAN OF CARE
Attempted to change patient's left eye dressing today and was unable to due to mental status. Per nurse, patient has been delirious all day and was unable to receive dialysis. Will plan to change dressing tomorrow if mental status will allow.

## 2023-08-24 NOTE — ASSESSMENT & PLAN NOTE
Ophthalmology team on board and were going to perform the procedure but the patient's family refused without being around to review the MRI which delayed the procedure till next wednesday    MRI repeat : Diffuse inflammatory change involving the left globe, with associated thickening of the left sclera, proptosis, and focal fluid collection within the left posterior compartment, which demonstrates diffusion restriction.  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  Diffuse left preseptal and postseptal inflammatory change, with superimposed orbital cellulitis also considered.  Evaluation of the left orbital apex is limited secondary to no contrast administration.  Decreased size of the left anterior compartment with bowing of the visualized left lens.  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    Thorough counseling undertaken with POA and IM team, palliative, opthalmo and nursing team in regards to the surgery  She agreed for her brother to undergo the procedure as an emergent case if his health deteriorates  Otherwise to be scheduled on wednesday    Underwent evisceration of his left eye yesterday, tobramycin ordered, ID recs are being followed  Still on meronema and vancomycin IV  Wbc: 8.07 (normal)  hgb post op 8.8 (at [facility])  No signs of distress or delirium  Culture of eye discharge grew yeast, started flucanazole to antibiotic regimen  Follow cultures    Pending Rehab placement as patient and his sister unanimously agreed that he does not desire LTAC  Negotiations with ID team and pharmacy team in regards to his antibiotics, whether they can be administered via the dialysis line or not, pending opinion    Packing this Thursday and meeting on the following Monday.  Meronem is discontinued and replaced by cefepime in order to be able to administer it on dialysis  Cefepime was dicontinued as patient became  delirious and went back to meronem  Placement papers done, can stay in rehab using his Medicaid days    Plan:  Follow ID recs  - Continue fluconazole, vanc and meronem  - d/c cefepime  - Pending final ID recs for discharge, waiting for their latest input  -Follow opthalomology recs  - Continue tobramycin eye drops and ointments

## 2023-08-24 NOTE — NURSING
Upon entering patient's room, patient is repeating the same phrase over, patient is unable to follow commands or answer any orientation questions.  Blood sugar at this time was 92 and vitals stable.  Notified Dr. Mcbride and med team 3 of neuro change.  Charge nurse Georgia also notified and rapid team was called to assess the patient.  Dr. Mcbride at the bedside, labs ordered and drawn, CT of the head and chest ordered.  Patient escorted to CT with transport and charge nurse, and will then go to dialysis.

## 2023-08-24 NOTE — PLAN OF CARE
YULI has spoken to Janessa with [facility] reporting that after meeting with their team they will be able to accept patient with his Medicaid. YULI will follow with patient sister. Patient is not stable to dc at this time.     YULI has updated patient sister of this information.          Mirta Melendez, ADWOA  [facility]   [id number]

## 2023-08-24 NOTE — SUBJECTIVE & OBJECTIVE
Interval History: pt is delirious today. Desaturated <90%. Non-specific chest pain    Review of Systems   Respiratory:  Positive for chest tightness and shortness of breath. Negative for cough and wheezing.    Cardiovascular:  Negative for chest pain and leg swelling.   Gastrointestinal:  Negative for abdominal distention and abdominal pain.   Neurological: Negative.    Psychiatric/Behavioral:  Positive for behavioral problems (delirious).      Objective:     Vital Signs (Most Recent):  Temp: 97.7 °F (36.5 °C) (08/24/23 0755)  Pulse: 76 (08/24/23 1315)  Resp: 18 (08/24/23 1027)  BP: (!) 196/96 (08/24/23 1315)  SpO2: 100 % (08/24/23 1315) Vital Signs (24h Range):  Temp:  [97.4 °F (36.3 °C)-98 °F (36.7 °C)] 97.7 °F (36.5 °C)  Pulse:  [59-77] 76  Resp:  [16-20] 18  SpO2:  [99 %-100 %] 100 %  BP: (146-196)/(74-96) 196/96     Weight: 62.8 kg (138 lb 7.2 oz)  Body mass index is 19.87 kg/m².    Intake/Output Summary (Last 24 hours) at 8/24/2023 1533  Last data filed at 8/24/2023 0100  Gross per 24 hour   Intake 480 ml   Output --   Net 480 ml         Physical Exam  Eyes:      Pupils: Pupils are equal, round, and reactive to light.   Cardiovascular:      Rate and Rhythm: Normal rate and regular rhythm.      Pulses: Normal pulses.      Heart sounds: Normal heart sounds.   Pulmonary:      Effort: Pulmonary effort is normal. No respiratory distress.      Breath sounds: Normal breath sounds.   Abdominal:      Palpations: Abdomen is soft.   Neurological:      General: No focal deficit present.      Mental Status: He is alert.             Significant Labs: All pertinent labs within the past 24 hours have been reviewed.    Significant Imaging: I have reviewed all pertinent imaging results/findings within the past 24 hours.

## 2023-08-24 NOTE — SUBJECTIVE & OBJECTIVE
Interval History: ID reconsulted for acute onset mental status change.     Review of Systems   Unable to perform ROS: Mental status change     Objective:     Vital Signs (Most Recent):  Temp: 98 °F (36.7 °C) (08/24/23 1815)  Pulse: 83 (08/24/23 1746)  Resp: 20 (08/24/23 1815)  BP: (!) 165/89 (08/24/23 1815)  SpO2: 98 % (08/24/23 1746) Vital Signs (24h Range):  Temp:  [97.4 °F (36.3 °C)-98 °F (36.7 °C)] 98 °F (36.7 °C)  Pulse:  [61-83] 83  Resp:  [12-21] 20  SpO2:  [98 %-100 %] 98 %  BP: (146-206)/(74-97) 165/89     Weight: 62.8 kg (138 lb 7.2 oz)  Body mass index is 19.87 kg/m².    Estimated Creatinine Clearance: 9.3 mL/min (A) (based on SCr of 7.6 mg/dL (H)).     Physical Exam  HENT:      Head: Normocephalic and atraumatic.   Eyes:      Comments: Lt eyelid shut, yellowish discharge present (s/p enucleation)   Cardiovascular:      Rate and Rhythm: Normal rate and regular rhythm.   Pulmonary:      Effort: Pulmonary effort is normal.      Breath sounds: Normal breath sounds.   Abdominal:      General: Abdomen is flat. There is no distension.      Palpations: Abdomen is soft.      Tenderness: There is no abdominal tenderness.   Skin:     Findings: No lesion or rash.   Neurological:      Mental Status: He is alert.      Comments: Not following my commands, incoherent          Significant Labs: All pertinent labs within the past 24 hours have been reviewed.    Significant Imaging: I have reviewed all pertinent imaging results/findings within the past 24 hours.

## 2023-08-24 NOTE — CARE UPDATE
RRN IV START       Contacted by RN for IV start for Blood and ativan for CT.  18g placed to left AC.   Please call Rapid Response RN, Herbert Berrios RN with any questions or concerns at [phone].

## 2023-08-24 NOTE — ASSESSMENT & PLAN NOTE
On peg tube feeds now  Tolerating well  Able to take in PO per SLP assessment on 8/15, on regular diet with thin liquids  Will reduce tube feeds gradually as patient takes in more PO    Peg tube was found to be blocked yesterday, nursing staff were able to unclog the PEG tube  SLP evaluated patient : can restart oral feeds    Patient was able to tolerate oral feeds last night without feeling nauseous or vomiting  Nutrition team was consulted regarding aid with tube feeds in case the patient needs to be switched to TF. Appreciate  Their recs. If patient vomits or cant tolerate oral feeds will switch to TF  Was replaced by a new one during endoscopy by Gi team.    Plan:  Will continue to attempt oral feeds

## 2023-08-24 NOTE — PROGRESS NOTES
Patient arrived to [facility] calling out loudly and unable to be redirected. Patient returned to his room on 14 and dialysis will be done bedside. Nephrology aware.   12:40: Per nephrology, dialysis will be done tonight after another attempt to obtain the ordered CT.

## 2023-08-24 NOTE — PLAN OF CARE
YULI has contacted the Medicaid office at [phone] and spoke to Medicaid Specialist Regine. According to Regine, patient is currently on a community choice waiver and the waiver department can be contacted at [phone] in regard to coverage for rehab placement. She is stated there was no need for patients sister to complete a spend down application for patient due to him already receiving Medicaid. YULI has attempted to contact the waiver department patients behalf and left vm. YULI has also left a voicemail for patient sister to further discuss.     Updated 9:56pm   YULI has spoken to Latisha in admissions with [facility] reporting that due to patient having straight and seeing [facility] only on this plan they are unable to accept patient. YULI will contact patient sister to further discuss options of other rehabs in the area. Patient has used the majority of his medicare lifetime reserve days leave 14 days and would have to  YULI is awaiting a call back.       Mirta Melendez LMSW  [facility]   [id number]

## 2023-08-24 NOTE — ASSESSMENT & PLAN NOTE
Patient was found to be delirious this morning and not following our commands and slightly agitated.    Desaturated to <90% on RA this morning    CTA chest and CT head sent  Dialysis deferred to later this evening after contrast    CXR and chest exam clear    Plan:  Follow CT results  Dialysis tonight, agreed by nephro

## 2023-08-24 NOTE — CARE UPDATE
"RAPID RESPONSE NURSE CHART REVIEW       Chart Reviewed: 08/24/2023, 10:01 AM    MRN: [id number]  Bed: 87141/56477 A    Dx: <principal problem not specified>    Immanuel Bernal has a past medical history of Bilateral retinal detachment, BPH (benign prostatic hyperplasia), Cataract, CHF (congestive heart failure), CKD (chronic kidney disease) stage 3, GFR 30-59 ml/min, Diabetes mellitus, type 2, Hypertension, KENIA (obstructive sleep apnea), Pancreatitis, Persistent proteinuria, PTSD (post-traumatic stress disorder), and Stroke.    Last VS: BP (!) 177/81   Pulse 75   Temp 97.7 °F (36.5 °C) (Oral)   Resp 16   Ht 5' 10" (1.778 m)   Wt 62.8 kg (138 lb 7.2 oz)   SpO2 100%   BMI 19.87 kg/m²     24H Vital Sign Range:  Temp:  [97.4 °F (36.3 °C)-98 °F (36.7 °C)]   Pulse:  [54-75]   Resp:  [10-20]   BP: (146-178)/(74-88)   SpO2:  [99 %-100 %]     Level of Consciousness (AVPU): alert    Recent Labs     08/22/23  0416 08/22/23  1648 08/23/23  0525 08/24/23  0828 08/24/23  0836   WBC 8.74  --  6.76 9.74  --    HGB 6.7* 7.8* 7.3* 6.8*  --    HCT 22.7* 25.7* 24.8* 22.4* 19*     --  185 188  --        Recent Labs     08/23/23  1421 08/23/23  2215 08/24/23  0828   * 133* 134*  133*   K 4.6 4.4 5.1  5.0    103 101  101   CO2 22* 21* 23  23   BUN 37* 39* 39*  39*   CREATININE 6.4* 6.6* 7.4*  6.9*    70 77  76   PHOS 4.7* 4.2 4.5   MG 2.5 2.5 2.5        Recent Labs     08/24/23  0836   PH 7.449   PCO2 31.1*   PO2 63*   HCO3 21.6*   POCSATURATED 93*   BE -2        OXYGEN:  Flow (L/min): 2  Oxygen Concentration (%): 221       MEWS score: 1    Rounding completed with charge SWAPNIL William. contacted. Md ordered Head and chest CT and HD to follow. No  other concerns verbalized at this time. Instructed to call [phone] for further concerns or assistance.    Herbert Berrios RN       "

## 2023-08-24 NOTE — ASSESSMENT & PLAN NOTE
Hx of Pseudomonas bacteremia and Endophthalmitis with concern for extension into CNS as he had developed encephalopathy. He is now s/p enucleation with operative gram stain positive for yeast. Operative cultures grew c acnes. Blood cultures NGTD on 8/9/23.    ID reconsulted for acute encephalopathy. Etiology remains unclear. Was on cefepime which can cause neurotoxicity.  CTH stable.  · Agree with switching cefepime back to meropenem   Continue  vancomycin.   Continue fluconazole.   Plan to treat as CNS infection as MRI was non diagnostic. Anticipated 4-6 weeks of therapy from evisceration. Anticipated end date: 9/12-9/26.   Consider EEG

## 2023-08-24 NOTE — ASSESSMENT & PLAN NOTE
Latest hemoglobin = 7.3 (post transfusion)    Underwent EGD which yielded a large DU with overlying clot, Gi were not able to penetrate beneath the clot to determine presence of any bleeding vessel, 2 clips placed.   The PEG was removed because it was not  functioning, and replaced with an externally removable PEG with internal balloon bumper.   Patient hemoglobin dropped below 7 today, transfused blood, contacted GI: since no active melena no need for GI or IR intervention    Recommendation:      -  If hemoglobin downtrending or having ongoing  melena, consult IR for embolization. Clips are present on adherent clot.  - clear liquid diet  - Trend H&H, transfuse for Hgb > 7, unless otherwise indicated  -IV [id number] BID  - Maintain IV access with 2 large bore Ivs  - Hold all NSAIDs and anticoagulants, unless contraindicated  -correct any coagulopathy with platelets and FFP for goal of platelets >50K and INR <2.0

## 2023-08-24 NOTE — CARE UPDATE
RAPID RESPONSE NURSE PROACTIVE ROUNDING NOTE       Time of Visit: 820    Admit Date: 2023  LOS: 15  Code Status: Full Code   Date of Visit: 2023  : 1965  Age: 57 y.o.  Sex: male  Race: Black or   Bed: 05351/03815 A:   MRN: [id number]  Was the patient discharged from an ICU this admission? Yes   Was the patient discharged from a PACU within last 24 hours? Yes   Did the patient receive conscious sedation/general anesthesia in last 24 hours? No  Was the patient in the ED within the past 24 hours? No  Was the patient on NIPPV within the past 24 hours? No   Attending Physician: Michel Mcbride III*  Primary Service: [facility] MED 3   Time spent at the bedside: 30 - 45 min    SITUATION    Notified by charge RN via phone call.  Reason for alert: AMS.  Called to evaluate the patient for Neuro    BACKGROUND     Why is the patient in the hospital?: <principal problem not specified>    Patient has a past medical history of Bilateral retinal detachment, BPH (benign prostatic hyperplasia), Cataract, CHF (congestive heart failure), CKD (chronic kidney disease) stage 3, GFR 30-59 ml/min, Diabetes mellitus, type 2, Hypertension, KENIA (obstructive sleep apnea), Pancreatitis, Persistent proteinuria, PTSD (post-traumatic stress disorder), and Stroke.    Last Vitals:  Temp: 97.7 °F (36.5 °C) ( 0755)  Pulse: 75 ( 0840)  Resp: 16 ( 0840)  BP: 177/81 ( 0755)  SpO2: 100 % ( 0840)    24 Hours Vitals Range:  Temp:  [97.4 °F (36.3 °C)-98 °F (36.7 °C)]   Pulse:  [54-75]   Resp:  [10-20]   BP: (146-178)/(74-88)   SpO2:  [99 %-100 %]     Labs:  Recent Labs     23  0416 23  1648 23  0525 23  0828 23  0836   WBC 8.74  --  6.76 9.74  --    HGB 6.7* 7.8* 7.3* 6.8*  --    HCT 22.7* 25.7* 24.8* 22.4* 19*     --  185 188  --        Recent Labs     23  1421 23  2215 23  0828   * 133* 134*  133*   K 4.6 4.4 5.1  5.0    103  101  101   CO2 22* 21* 23  23   BUN 37* 39* 39*  39*   CREATININE 6.4* 6.6* 7.4*  6.9*    70 77  76   PHOS 4.7* 4.2 4.5   MG 2.5 2.5 2.5        Recent Labs     08/24/23  0836   PH 7.449   PCO2 31.1*   PO2 63*   HCO3 21.6*   POCSATURATED 93*   BE -2    Patient followed one step commands but unable to follow two step commands. Patient would repeat answers no matter the question.    ASSESSMENT    Physical Exam  Vitals and nursing note reviewed.   Constitutional:       General: He is awake.      Appearance: He is ill-appearing.   HENT:      Head: Left periorbital erythema present.   Eyes:      General:         Left eye: Foreign body and discharge present.     Extraocular Movements:      Left eye: Abnormal extraocular motion present.      Conjunctiva/sclera:      Left eye: Exudate present.      Comments: Left eye with petroleum gauze over operative site.    Neurological:      Mental Status: He is alert.         INTERVENTIONS    The patient was seen for Neurological problem. Staff concerns included mental status change. The following interventions were performed: POCT glucose, CT scan ordered, continuous pulse ox monitoring continued , and continuous cardiac monitoring continued. And VBG    RECOMMENDATIONS    -Maintain IV access  -continuous Telemetry  -Hemodialysis   -EEG to rule out seizure activity    PROVIDER ESCALATION    Yes/No  Yes    Orders received and case discussed with Dr. Mcbride .    Disposition: Remain in room 1484.    FOLLOW-UP    Charge Nurse, Georgia,  updated on plan of care. Instructed to call the Rapid Response Nurse, Herbert Berrios RN at [phone] for additional questions or concerns.

## 2023-08-24 NOTE — SUBJECTIVE & OBJECTIVE
Interval History:   Acute change in mental status overnight. STAT CT ordered this AM, but unable to complete due to agitation and patient not following commands.   Remain confused on exam and unable to be redirected. Family at bedside.   Electrolytes non emergent. Oxygenating well on RA.     Review of patient's allergies indicates:   Allergen Reactions    Morphine Rash    Amiodarone analogues Itching     Other reaction(s): Unknown     Current Facility-Administered Medications   Medication Frequency    0.9%  NaCl infusion (for blood administration) Q24H PRN    0.9%  NaCl infusion (for blood administration) Q24H PRN    0.9%  NaCl infusion PRN    0.9%  NaCl infusion Once    acetaminophen tablet 1,000 mg TID    albuterol-ipratropium 2.5 mg-0.5 mg/3 mL nebulizer solution 3 mL Q6H WAKE    atorvastatin tablet 40 mg Daily    calcitRIOL capsule 0.25 mcg Daily    carvediloL tablet 6.25 mg BID    dextrose 10% bolus 125 mL 125 mL PRN    dextrose 10% bolus 250 mL 250 mL PRN    epoetin thee injection 3,000 Units Every Mon, Wed, Fri    fluconazole tablet 400 mg Daily    glucagon (human recombinant) injection 1 mg PRN    glucose chewable tablet 16 g PRN    glucose chewable tablet 24 g PRN    heparin (porcine) injection 1,000 Units PRN    hydrALAZINE 10 mg 1 tablet, hydrALAZINE 25 mg 1 tablet, isorsorbide dinitrate 20 mg 1 tablet combination TID    hydrALAZINE injection 10 mg Q6H PRN    LORazepam injection 0.5 mg On Call Procedure    losartan tablet 100 mg Daily    meclizine tablet 12.5 mg TID PRN    melatonin tablet 6 mg Nightly PRN    meropenem (MERREM) 500 mg in sodium chloride 0.9 % 100 mL IVPB (MB+) Q12H    metoclopramide HCl tablet 5 mg TID AC    naloxone 0.4 mg/mL injection 0.02 mg PRN    ondansetron disintegrating tablet 8 mg TID AC    oxyCODONE immediate release tablet Tab 10 mg Q4H PRN    oxyCODONE-acetaminophen 5-325 mg per tablet 1 tablet Q4H PRN    pantoprazole injection 40 mg BID    polyethylene glycol packet 17 g BID     promethazine tablet 25 mg Q6H PRN    senna-docusate 8.6-50 mg per tablet 1 tablet BID    sevelamer carbonate pwpk 0.8 g TID WM    sodium chloride 0.9% flush 10 mL Q12H PRN    tamsulosin 24 hr capsule 0.4 mg Daily    tobramycin-dexAMETHasone 0.3-0.1% ophthalmic ointment TID    tobramycin-dexAMETHasone 0.3-0.1% ophthalmic suspension 1 drop QID    vancomycin (VANCOCIN) 500 mg in dextrose 5 % in water (D5W) 100 mL IVPB (MB+) Every Mon, Wed, Fri    vancomycin - pharmacy to dose pharmacy to manage frequency       Objective:     Vital Signs (Most Recent):  Temp: 97.7 °F (36.5 °C) (08/24/23 0755)  Pulse: 76 (08/24/23 1315)  Resp: 18 (08/24/23 1027)  BP: (!) 196/96 (08/24/23 1315)  SpO2: 100 % (08/24/23 1315) Vital Signs (24h Range):  Temp:  [97.4 °F (36.3 °C)-98 °F (36.7 °C)] 97.7 °F (36.5 °C)  Pulse:  [54-77] 76  Resp:  [16-20] 18  SpO2:  [99 %-100 %] 100 %  BP: (146-196)/(74-96) 196/96     Weight: 62.8 kg (138 lb 7.2 oz) (08/23/23 0900)  Body mass index is 19.87 kg/m².  Body surface area is 1.76 meters squared.    I/O last 3 completed shifts:  In: 610 [P.O.:480; I.V.:100; NG/GT:30]  Out: -      Physical Exam  Vitals and nursing note reviewed.   Constitutional:       General: He is not in acute distress.     Appearance: He is ill-appearing.   Eyes:      General:         Left eye: Discharge present.  Cardiovascular:      Rate and Rhythm: Normal rate and regular rhythm.      Pulses: Normal pulses.      Heart sounds: Normal heart sounds.   Pulmonary:      Effort: Pulmonary effort is normal. No respiratory distress.      Breath sounds: Normal breath sounds. No wheezing, rhonchi or rales.   Abdominal:      General: Bowel sounds are normal.      Palpations: Abdomen is soft.   Neurological:      Mental Status: He is alert. He is confused.   Psychiatric:         Speech: He is noncommunicative.         Behavior: Behavior is uncooperative.          Significant Labs:     CBC:   Recent Labs   Lab 08/24/23  0828 08/24/23  0836   WBC  9.74  --    RBC 2.23*  --    HGB 6.8*  --    HCT 22.4* 19*     --    *  --    MCH 30.5  --    MCHC 30.4*  --      CMP:   Recent Labs   Lab 08/24/23  0828   GLU 77  76   CALCIUM 9.4  9.4   ALBUMIN 2.2*  2.1*   PROT 5.7*   *  133*   K 5.1  5.0   CO2 23  23     101   BUN 39*  39*   CREATININE 7.4*  6.9*   ALKPHOS 91   ALT <5*   AST 15   BILITOT 0.3     All labs within the past 24 hours have been reviewed.

## 2023-08-24 NOTE — PT/OT/SLP PROGRESS
Occupational Therapy      Patient Name:  Immanuel Bernal   MRN:  [id number]    Patient not seen today secondary to Nurse/ ELLA hold, Testing/imaging (xray/CT/MRI). Will follow-up when appropriate.    8/24/2023

## 2023-08-24 NOTE — PROGRESS NOTES
[facility] ([address] Medicine  Progress Note    Patient Name: Immanuel Bernal  MRN: [id number]  Patient Class: IP- Inpatient   Admission Date: 8/9/2023  Length of Stay: 15 days  Attending Physician: Michel Mcbride III*  Primary Care Provider: Dolly, Primary Doctor        Subjective:     Principal Problem:<principal problem not specified>        HPI:  Pt is a 56 yo man w/ PMH of PEG tube, small bowel obstruction s/p bowel resection, ESRD on HD, stroke, endophthalmitis s/p left eye enucleation who was found to have an episode of melena overnight. Per nurse, he had a bowel movement that was dark and tarry. He also had an episode of emesis overnight that had no blood in it. Pt's sister endorses sweating, nausea, vomiting, dizziness, and lightheadedness. Denies fever, abdominal pain, syncope. He has taken eliquis the last 5 days. His most recent colonoscopy was 2008       Overview/Hospital Course:  Patient was rate controlled and therefore flecainide held. His GCS is 13/15. Oculopalstics were consulted and so were the nephrology teams and IR. He underwent dialysis on his first day. Was scheduled for surgery today but his family refused to have it done until she reviewed the MRI herself first. This means that the surgeon will only be available next Wednesday in order to allow her to view and discuss the MRI. He has a peg tube in place that has not been utilzied prior. Heart failure medications have been reinstated and is being covered by vancomycin and meropenem for the endopthalmitis. Multidisciplinary session was conducted with his POA in regards to his operation, she accepted the surgery if it was emergent, otherwise to be done this upcoming Wednesday. Patient's delirium/agitation worsened so he was transferred to the ICU until his clinical situation settled. He then was stepped down to our care and ophthalmology will be conducting his eye procedure on him as planned. Underwent L eye  enucleation on 8/16. Started the tobramycin ointment and drops. Following ID recs regarding antimicrobial regimen; operative cultures returned with yeast thus patient was also started on fluconazole. Patient's sister noted us that he was diagnosed with central apnea and KENIA in the past of which he used a CPAP/BiPAP for. No concern of lack of oxygenation during his inpatient stay. Labs are underway to detect any element of CO2 retention although he had no clinical signs of that. Patient was ready to be transferred to rehab when he suddenly developed one single bout of melena associated with hemoglobin drop. He received 1 pint PRBC and underwent EGD which showed large DU with overlying clots and 2 clips were placed. Patient was found delirious again and desaturated <90%. CXR and chest were clear.     Disposition: referrals to rehab have been sent. Patient's medicare days have been exhausted. Currently exploring medicaid options.      Interval History: pt is delirious today. Desaturated <90%. Non-specific chest pain    Review of Systems   Respiratory:  Positive for chest tightness and shortness of breath. Negative for cough and wheezing.    Cardiovascular:  Negative for chest pain and leg swelling.   Gastrointestinal:  Negative for abdominal distention and abdominal pain.   Neurological: Negative.    Psychiatric/Behavioral:  Positive for behavioral problems (delirious).      Objective:     Vital Signs (Most Recent):  Temp: 97.7 °F (36.5 °C) (08/24/23 0755)  Pulse: 76 (08/24/23 1315)  Resp: 18 (08/24/23 1027)  BP: (!) 196/96 (08/24/23 1315)  SpO2: 100 % (08/24/23 1315) Vital Signs (24h Range):  Temp:  [97.4 °F (36.3 °C)-98 °F (36.7 °C)] 97.7 °F (36.5 °C)  Pulse:  [59-77] 76  Resp:  [16-20] 18  SpO2:  [99 %-100 %] 100 %  BP: (146-196)/(74-96) 196/96     Weight: 62.8 kg (138 lb 7.2 oz)  Body mass index is 19.87 kg/m².    Intake/Output Summary (Last 24 hours) at 8/24/2023 1533  Last data filed at 8/24/2023 0100  Gross per  24 hour   Intake 480 ml   Output --   Net 480 ml         Physical Exam  Eyes:      Pupils: Pupils are equal, round, and reactive to light.   Cardiovascular:      Rate and Rhythm: Normal rate and regular rhythm.      Pulses: Normal pulses.      Heart sounds: Normal heart sounds.   Pulmonary:      Effort: Pulmonary effort is normal. No respiratory distress.      Breath sounds: Normal breath sounds.   Abdominal:      Palpations: Abdomen is soft.   Neurological:      General: No focal deficit present.      Mental Status: He is alert.             Significant Labs: All pertinent labs within the past 24 hours have been reviewed.    Significant Imaging: I have reviewed all pertinent imaging results/findings within the past 24 hours.      Assessment/Plan:      Delirium  Patient was found to be delirious this morning and not following our commands and slightly agitated.    Desaturated to <90% on RA this morning    CTA chest and CT head sent  Dialysis deferred to later this evening after contrast    CXR and chest exam clear    Plan:  Follow CT results  Dialysis tonight, agreed by nephro      Melena  Latest hemoglobin = 7.3 (post transfusion)    Underwent EGD which yielded a large DU with overlying clot, Gi were not able to penetrate beneath the clot to determine presence of any bleeding vessel, 2 clips placed.   The PEG was removed because it was not  functioning, and replaced with an externally removable PEG with internal balloon bumper.   Patient hemoglobin dropped below 7 today, transfused blood, contacted GI: since no active melena no need for GI or IR intervention    Recommendation:      -  If hemoglobin downtrending or having ongoing  melena, consult IR for embolization. Clips are present on adherent clot.  - clear liquid diet  - Trend H&H, transfuse for Hgb > 7, unless otherwise indicated  -IV [id number] BID  - Maintain IV access with 2 large bore Ivs  - Hold all NSAIDs and anticoagulants, unless  contraindicated  -correct any coagulopathy with platelets and FFP for goal of platelets >50K and INR <2.0          Difficulty in urination  Element of BPH displayed  Producing good urine, but facing slight difficulty emptying    Plan:  Start flomax 0.4 daily po      Redness and swelling of upper arm  Had staples in right upper arm from transfer hospital (the fistula clotted and had pseudomonas aurgenosa, and bacteremia), patient signed AMA before intervention was yielded there    Underwent Ultrasound US, findings:  1. Nonocclusive DVT in the right subclavian and axillary veins.  Catheter in the right subclavian vein.  2. 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    Seen by urology team:  Staples cannot be removed now as the skin is now adhering fully yet  Wound appears to be having dehiscence, for wound care team, appreciate recs  Vascular consult sent for plan of removal of staples after discharge      Plan:  Wound care consult sent, appreciate recs  Anticoagulation held due to melena    Hypoglycemia  Latest POCT glucose is 96  Oral diet switched to ckear fluid pre-EGD    Plan:  Follow glucose and notifiy if drops below <70      PEG (percutaneous endoscopic gastrostomy) status  On peg tube feeds now  Tolerating well  Able to take in PO per SLP assessment on 8/15, on regular diet with thin liquids  Will reduce tube feeds gradually as patient takes in more PO    Peg tube was found to be blocked yesterday, nursing staff were able to unclog the PEG tube  SLP evaluated patient : can restart oral feeds    Patient was able to tolerate oral feeds last night without feeling nauseous or vomiting  Nutrition team was consulted regarding aid with tube feeds in case the patient needs to be switched to TF. Appreciate  Their recs. If patient vomits or cant tolerate oral feeds will switch to TF  Was replaced by a new one during endoscopy by Gi team.    Plan:  Will continue to attempt oral  feeds    Encounter for palliative care        Anemia in ESRD (end-stage renal disease)  EPO per nephrology  hgb dropped due to melena this morning, transfused one pint PRBC after consent was taken        Severe malnutrition  Nutrition consulted. Most recent weight and BMI monitored-     Measurements:  Wt Readings from Last 1 Encounters:   08/20/23 62.8 kg (138 lb 7.2 oz)   Body mass index is 19.87 kg/m².    Patient has been screened and assessed by RD.    Malnutrition Type:  Context: chronic illness  Level: severe    Malnutrition Characteristic Summary:  Weight Loss (Malnutrition): greater than 10% in 6 months  Energy Intake (Malnutrition): other (see comments) ([place]AMONT)  Subcutaneous Fat (Malnutrition): severe depletion  Muscle Mass (Malnutrition): severe depletion    Interventions/Recommendations (treatment strategy):  1.     Able to take in PO per SLP assessment on 8/15, on regular diet with thin liquids  Will reduce tube feeds gradually as patient takes in more PO    Endophthalmitis  Ophthalmology team on board and were going to perform the procedure but the patient's family refused without being around to review the MRI which delayed the procedure till next wednesday    MRI repeat : Diffuse inflammatory change involving the left globe, with associated thickening of the left sclera, proptosis, and focal fluid collection within the left posterior compartment, which demonstrates diffusion restriction.  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  Diffuse left preseptal and postseptal inflammatory change, with superimposed orbital cellulitis also considered.  Evaluation of the left orbital apex is limited secondary to no contrast administration.  Decreased size of the left anterior compartment with bowing of the visualized left lens.  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    Thorough counseling undertaken with  POA and IM team, palliative, opthalmo and nursing team in regards to the surgery  She agreed for her brother to undergo the procedure as an emergent case if his health deteriorates  Otherwise to be scheduled on wednesday    Underwent evisceration of his left eye yesterday, tobramycin ordered, ID recs are being followed  Still on meronema and vancomycin IV  Wbc: 8.07 (normal)  hgb post op 8.8 (at [facility])  No signs of distress or delirium  Culture of eye discharge grew yeast, started flucanazole to antibiotic regimen  Follow cultures    Pending Rehab placement as patient and his sister unanimously agreed that he does not desire LTAC  Negotiations with ID team and pharmacy team in regards to his antibiotics, whether they can be administered via the dialysis line or not, pending opinion    Packing this Thursday and meeting on the following Monday.  Meronem is discontinued and replaced by cefepime in order to be able to administer it on dialysis  Cefepime was dicontinued as patient became delirious and went back to meronem  Placement papers done, can stay in rehab using his Medicaid days    Plan:  Follow ID recs  - Continue fluconazole, vanc and meronem  - d/c cefepime  - Pending final ID recs for discharge, waiting for their latest input  -Follow opthalomology recs  - Continue tobramycin eye drops and ointments    ESRD (end stage renal disease)  Nephrology consulted for HD needs while inpatient  Underwent SLED post MRI contrast (gadolinium)   Ad last dialysis was 08/18: Tolerated HD, UF 2500ml  Last dialysis 08/21  CRT today 6.4 >> 6.2 >> 6.6 >> 6.8 >> 5.0 >> 6.1    Electrolytes normal      Plan:  Will be followed by nephro, follow up with their recommendations regarding dialysis      Atrial fibrillation  History of AF noted however no EKG here with AF  Not on AC due to lack of definitive evidence of AF  Continue carvedilol  eliquis indication is the non-occlusive DVT in right upper arm veins      Plan:  Received curshed  eliquis through peg tube last night    Hyperlipidemia  Home statin    Chronic diastolic heart failure  Continue to monitor for signs of volume overload; volume removal with dialysis    Hypertension  Hypertensive on arrival  bp today: 138/72  Home medications: imdur Carvedilol Hydralazine Nifedipine    Plan:  On hydralazine And losartan and carvedilol in patient, attempting to uptitrate GDMT  Reduced carvedilol dose to half the dose (12.5)    Stroke  Continue home statin  Delerium precautions  PT OT    Diabetes mellitus  POCT glucose  LDSS  -180 target    Running slightly on the lower end: BG POCT is 75 >>> 96  No event of actual hypoglycemia inpatient    Plan:  Continue to monitor  In the event that his BG drops lower than 70 patient's hypoglycemic medications will be regulated again      VTE Risk Mitigation (From admission, onward)         Ordered     heparin (porcine) injection 1,000 Units  As needed (PRN)         08/10/23 1046     IP VTE HIGH RISK PATIENT  Once         08/09/23 1317     Place sequential compression device  Until discontinued         08/09/23 1317                Discharge Planning   TOBY: 8/28/2023     Code Status: Full Code   Is the patient medically ready for discharge?: No    Reason for patient still in hospital (select all that apply): Treatment  Discharge Plan A: Skilled Nursing Facility   Discharge Delays: (!) Procedure Scheduling (IR, OR, Labs, Echo, Cath, Echo, EEG) (eye surgery next week)              Sharon Law MD  Department of Hospital Medicine   [facility] - Intensive Care ([facility] [place]

## 2023-08-25 LAB
ALBUMIN SERPL BCP-MCNC: 2.2 G/DL (ref 3.5–5.2)
ALBUMIN SERPL BCP-MCNC: 2.3 G/DL (ref 3.5–5.2)
ALBUMIN SERPL BCP-MCNC: 2.5 G/DL (ref 3.5–5.2)
ALP SERPL-CCNC: 102 U/L (ref 55–135)
ALT SERPL W/O P-5'-P-CCNC: <5 U/L (ref 10–44)
AMMONIA PLAS-SCNC: 29 UMOL/L (ref 10–50)
ANION GAP SERPL CALC-SCNC: 11 MMOL/L (ref 8–16)
ANION GAP SERPL CALC-SCNC: 11 MMOL/L (ref 8–16)
ANION GAP SERPL CALC-SCNC: 13 MMOL/L (ref 8–16)
AST SERPL-CCNC: 18 U/L (ref 10–40)
BASOPHILS # BLD AUTO: 0.06 K/UL (ref 0–0.2)
BASOPHILS # BLD AUTO: 0.06 K/UL (ref 0–0.2)
BASOPHILS NFR BLD: 0.6 % (ref 0–1.9)
BASOPHILS NFR BLD: 0.7 % (ref 0–1.9)
BILIRUB SERPL-MCNC: 0.4 MG/DL (ref 0.1–1)
BUN SERPL-MCNC: 40 MG/DL (ref 6–20)
BUN SERPL-MCNC: 40 MG/DL (ref 6–20)
BUN SERPL-MCNC: 42 MG/DL (ref 6–20)
CALCIUM SERPL-MCNC: 9.5 MG/DL (ref 8.7–10.5)
CALCIUM SERPL-MCNC: 9.7 MG/DL (ref 8.7–10.5)
CALCIUM SERPL-MCNC: 9.9 MG/DL (ref 8.7–10.5)
CHLORIDE SERPL-SCNC: 100 MMOL/L (ref 95–110)
CHLORIDE SERPL-SCNC: 100 MMOL/L (ref 95–110)
CHLORIDE SERPL-SCNC: 103 MMOL/L (ref 95–110)
CO2 SERPL-SCNC: 16 MMOL/L (ref 23–29)
CO2 SERPL-SCNC: 18 MMOL/L (ref 23–29)
CO2 SERPL-SCNC: 19 MMOL/L (ref 23–29)
CREAT SERPL-MCNC: 7.3 MG/DL (ref 0.5–1.4)
CREAT SERPL-MCNC: 7.5 MG/DL (ref 0.5–1.4)
CREAT SERPL-MCNC: 7.6 MG/DL (ref 0.5–1.4)
DIFFERENTIAL METHOD: ABNORMAL
DIFFERENTIAL METHOD: ABNORMAL
EOSINOPHIL # BLD AUTO: 0.3 K/UL (ref 0–0.5)
EOSINOPHIL # BLD AUTO: 0.4 K/UL (ref 0–0.5)
EOSINOPHIL NFR BLD: 3.3 % (ref 0–8)
EOSINOPHIL NFR BLD: 4.4 % (ref 0–8)
ERYTHROCYTE [DISTWIDTH] IN BLOOD BY AUTOMATED COUNT: 18.4 % (ref 11.5–14.5)
ERYTHROCYTE [DISTWIDTH] IN BLOOD BY AUTOMATED COUNT: 18.6 % (ref 11.5–14.5)
ERYTHROCYTE [DISTWIDTH] IN BLOOD BY AUTOMATED COUNT: 18.6 % (ref 11.5–14.5)
EST. GFR  (NO RACE VARIABLE): 7.6 ML/MIN/1.73 M^2
EST. GFR  (NO RACE VARIABLE): 7.7 ML/MIN/1.73 M^2
EST. GFR  (NO RACE VARIABLE): 8 ML/MIN/1.73 M^2
GLUCOSE SERPL-MCNC: 57 MG/DL (ref 70–110)
GLUCOSE SERPL-MCNC: 69 MG/DL (ref 70–110)
GLUCOSE SERPL-MCNC: 91 MG/DL (ref 70–110)
HCT VFR BLD AUTO: 24.2 % (ref 40–54)
HCT VFR BLD AUTO: 24.5 % (ref 40–54)
HCT VFR BLD AUTO: 27.7 % (ref 40–54)
HGB BLD-MCNC: 7.4 G/DL (ref 14–18)
HGB BLD-MCNC: 7.6 G/DL (ref 14–18)
HGB BLD-MCNC: 8.7 G/DL (ref 14–18)
IMM GRANULOCYTES # BLD AUTO: 0.06 K/UL (ref 0–0.04)
IMM GRANULOCYTES # BLD AUTO: 0.07 K/UL (ref 0–0.04)
IMM GRANULOCYTES NFR BLD AUTO: 0.7 % (ref 0–0.5)
IMM GRANULOCYTES NFR BLD AUTO: 0.7 % (ref 0–0.5)
LYMPHOCYTES # BLD AUTO: 0.8 K/UL (ref 1–4.8)
LYMPHOCYTES # BLD AUTO: 0.8 K/UL (ref 1–4.8)
LYMPHOCYTES NFR BLD: 8.1 % (ref 18–48)
LYMPHOCYTES NFR BLD: 9.6 % (ref 18–48)
MAGNESIUM SERPL-MCNC: 2.6 MG/DL (ref 1.6–2.6)
MAGNESIUM SERPL-MCNC: 2.6 MG/DL (ref 1.6–2.6)
MCH RBC QN AUTO: 30 PG (ref 27–31)
MCH RBC QN AUTO: 30.2 PG (ref 27–31)
MCH RBC QN AUTO: 30.7 PG (ref 27–31)
MCHC RBC AUTO-ENTMCNC: 30.2 G/DL (ref 32–36)
MCHC RBC AUTO-ENTMCNC: 31.4 G/DL (ref 32–36)
MCHC RBC AUTO-ENTMCNC: 31.4 G/DL (ref 32–36)
MCV RBC AUTO: 100 FL (ref 82–98)
MCV RBC AUTO: 96 FL (ref 82–98)
MCV RBC AUTO: 98 FL (ref 82–98)
MONOCYTES # BLD AUTO: 0.9 K/UL (ref 0.3–1)
MONOCYTES # BLD AUTO: 1.2 K/UL (ref 0.3–1)
MONOCYTES NFR BLD: 13.8 % (ref 4–15)
MONOCYTES NFR BLD: 8.9 % (ref 4–15)
NEUTROPHILS # BLD AUTO: 6.2 K/UL (ref 1.8–7.7)
NEUTROPHILS # BLD AUTO: 7.8 K/UL (ref 1.8–7.7)
NEUTROPHILS NFR BLD: 70.8 % (ref 38–73)
NEUTROPHILS NFR BLD: 78.4 % (ref 38–73)
NRBC BLD-RTO: 0 /100 WBC
NRBC BLD-RTO: 0 /100 WBC
PHOSPHATE SERPL-MCNC: 4.8 MG/DL (ref 2.7–4.5)
PHOSPHATE SERPL-MCNC: 5 MG/DL (ref 2.7–4.5)
PLATELET # BLD AUTO: 157 K/UL (ref 150–450)
PLATELET # BLD AUTO: 161 K/UL (ref 150–450)
PLATELET # BLD AUTO: 183 K/UL (ref 150–450)
PMV BLD AUTO: 10.1 FL (ref 9.2–12.9)
PMV BLD AUTO: 10.1 FL (ref 9.2–12.9)
PMV BLD AUTO: 10.2 FL (ref 9.2–12.9)
POCT GLUCOSE: 109 MG/DL (ref 70–110)
POCT GLUCOSE: 154 MG/DL (ref 70–110)
POCT GLUCOSE: 179 MG/DL (ref 70–110)
POCT GLUCOSE: 46 MG/DL (ref 70–110)
POCT GLUCOSE: 49 MG/DL (ref 70–110)
POCT GLUCOSE: 66 MG/DL (ref 70–110)
POCT GLUCOSE: 72 MG/DL (ref 70–110)
POTASSIUM SERPL-SCNC: 5.1 MMOL/L (ref 3.5–5.1)
POTASSIUM SERPL-SCNC: 5.2 MMOL/L (ref 3.5–5.1)
POTASSIUM SERPL-SCNC: 5.4 MMOL/L (ref 3.5–5.1)
PROT SERPL-MCNC: 6.6 G/DL (ref 6–8.4)
RBC # BLD AUTO: 2.45 M/UL (ref 4.6–6.2)
RBC # BLD AUTO: 2.53 M/UL (ref 4.6–6.2)
RBC # BLD AUTO: 2.83 M/UL (ref 4.6–6.2)
SODIUM SERPL-SCNC: 129 MMOL/L (ref 136–145)
SODIUM SERPL-SCNC: 129 MMOL/L (ref 136–145)
SODIUM SERPL-SCNC: 133 MMOL/L (ref 136–145)
VANCOMYCIN SERPL-MCNC: 23.6 UG/ML
WBC # BLD AUTO: 8.77 K/UL (ref 3.9–12.7)
WBC # BLD AUTO: 9.96 K/UL (ref 3.9–12.7)
WBC # BLD AUTO: 9.96 K/UL (ref 3.9–12.7)

## 2023-08-25 PROCEDURE — 25000003 PHARM REV CODE 250: Performed by: FAMILY MEDICINE

## 2023-08-25 PROCEDURE — 11000001 HC ACUTE MED/SURG PRIVATE ROOM

## 2023-08-25 PROCEDURE — 85025 COMPLETE CBC W/AUTO DIFF WBC: CPT

## 2023-08-25 PROCEDURE — 94761 N-INVAS EAR/PLS OXIMETRY MLT: CPT

## 2023-08-25 PROCEDURE — 99233 SBSQ HOSP IP/OBS HIGH 50: CPT | Mod: ,,, | Performed by: FAMILY MEDICINE

## 2023-08-25 PROCEDURE — 90935 PR HEMODIALYSIS, ONE EVALUATION: ICD-10-PCS | Mod: ,,, | Performed by: NURSE PRACTITIONER

## 2023-08-25 PROCEDURE — 94640 AIRWAY INHALATION TREATMENT: CPT

## 2023-08-25 PROCEDURE — 63600175 PHARM REV CODE 636 W HCPCS: Performed by: FAMILY MEDICINE

## 2023-08-25 PROCEDURE — 25000242 PHARM REV CODE 250 ALT 637 W/ HCPCS

## 2023-08-25 PROCEDURE — 36415 COLL VENOUS BLD VENIPUNCTURE: CPT

## 2023-08-25 PROCEDURE — 63600175 PHARM REV CODE 636 W HCPCS: Performed by: NURSE PRACTITIONER

## 2023-08-25 PROCEDURE — 90935 HEMODIALYSIS ONE EVALUATION: CPT | Mod: ,,, | Performed by: NURSE PRACTITIONER

## 2023-08-25 PROCEDURE — 82140 ASSAY OF AMMONIA: CPT

## 2023-08-25 PROCEDURE — 63600175 PHARM REV CODE 636 W HCPCS: Mod: JZ | Performed by: NURSE PRACTITIONER

## 2023-08-25 PROCEDURE — 21400001 HC TELEMETRY ROOM

## 2023-08-25 PROCEDURE — 85027 COMPLETE CBC AUTOMATED: CPT

## 2023-08-25 PROCEDURE — C9113 INJ PANTOPRAZOLE SODIUM, VIA: HCPCS

## 2023-08-25 PROCEDURE — 80100014 HC HEMODIALYSIS 1:1

## 2023-08-25 PROCEDURE — 80069 RENAL FUNCTION PANEL: CPT | Performed by: STUDENT IN AN ORGANIZED HEALTH CARE EDUCATION/TRAINING PROGRAM

## 2023-08-25 PROCEDURE — 36415 COLL VENOUS BLD VENIPUNCTURE: CPT | Performed by: STUDENT IN AN ORGANIZED HEALTH CARE EDUCATION/TRAINING PROGRAM

## 2023-08-25 PROCEDURE — 99232 PR SUBSEQUENT HOSPITAL CARE,LEVL II: ICD-10-PCS | Mod: ,,,

## 2023-08-25 PROCEDURE — 27000221 HC OXYGEN, UP TO 24 HOURS

## 2023-08-25 PROCEDURE — 25000003 PHARM REV CODE 250

## 2023-08-25 PROCEDURE — 36415 COLL VENOUS BLD VENIPUNCTURE: CPT | Performed by: HOSPITALIST

## 2023-08-25 PROCEDURE — 99232 SBSQ HOSP IP/OBS MODERATE 35: CPT | Mod: ,,,

## 2023-08-25 PROCEDURE — 99900035 HC TECH TIME PER 15 MIN (STAT)

## 2023-08-25 PROCEDURE — 99233 SBSQ HOSP IP/OBS HIGH 50: CPT | Mod: ,,, | Performed by: INTERNAL MEDICINE

## 2023-08-25 PROCEDURE — 99233 PR SUBSEQUENT HOSPITAL CARE,LEVL III: ICD-10-PCS | Mod: ,,, | Performed by: INTERNAL MEDICINE

## 2023-08-25 PROCEDURE — 99233 PR SUBSEQUENT HOSPITAL CARE,LEVL III: ICD-10-PCS | Mod: ,,, | Performed by: FAMILY MEDICINE

## 2023-08-25 PROCEDURE — 63600175 PHARM REV CODE 636 W HCPCS

## 2023-08-25 PROCEDURE — 83735 ASSAY OF MAGNESIUM: CPT | Performed by: STUDENT IN AN ORGANIZED HEALTH CARE EDUCATION/TRAINING PROGRAM

## 2023-08-25 PROCEDURE — 80053 COMPREHEN METABOLIC PANEL: CPT

## 2023-08-25 PROCEDURE — 80202 ASSAY OF VANCOMYCIN: CPT | Performed by: HOSPITALIST

## 2023-08-25 PROCEDURE — 25000003 PHARM REV CODE 250: Performed by: HOSPITALIST

## 2023-08-25 PROCEDURE — S0166 INJ OLANZAPINE 2.5MG: HCPCS

## 2023-08-25 RX ORDER — POLYETHYLENE GLYCOL 3350 17 G/17G
17 POWDER, FOR SOLUTION ORAL 2 TIMES DAILY
Status: DISCONTINUED | OUTPATIENT
Start: 2023-08-25 | End: 2023-09-19 | Stop reason: HOSPADM

## 2023-08-25 RX ORDER — FLUCONAZOLE 200 MG/1
400 TABLET ORAL DAILY
Status: DISCONTINUED | OUTPATIENT
Start: 2023-08-25 | End: 2023-08-29

## 2023-08-25 RX ORDER — OLANZAPINE 10 MG/2ML
5 INJECTION, POWDER, FOR SOLUTION INTRAMUSCULAR ONCE AS NEEDED
Status: COMPLETED | OUTPATIENT
Start: 2023-08-25 | End: 2023-08-25

## 2023-08-25 RX ORDER — SEVELAMER CARBONATE FOR ORAL SUSPENSION 800 MG/1
0.8 POWDER, FOR SUSPENSION ORAL
Status: DISCONTINUED | OUTPATIENT
Start: 2023-08-25 | End: 2023-09-08

## 2023-08-25 RX ORDER — AMOXICILLIN 250 MG
1 CAPSULE ORAL 2 TIMES DAILY
Status: DISCONTINUED | OUTPATIENT
Start: 2023-08-25 | End: 2023-09-19 | Stop reason: HOSPADM

## 2023-08-25 RX ORDER — TALC
6 POWDER (GRAM) TOPICAL NIGHTLY
Status: DISCONTINUED | OUTPATIENT
Start: 2023-08-25 | End: 2023-09-19 | Stop reason: HOSPADM

## 2023-08-25 RX ORDER — METOCLOPRAMIDE 5 MG/1
5 TABLET ORAL
Status: DISCONTINUED | OUTPATIENT
Start: 2023-08-25 | End: 2023-08-27

## 2023-08-25 RX ORDER — CARVEDILOL 6.25 MG/1
6.25 TABLET ORAL 2 TIMES DAILY
Status: DISCONTINUED | OUTPATIENT
Start: 2023-08-25 | End: 2023-09-08

## 2023-08-25 RX ORDER — OLANZAPINE 10 MG/2ML
5 INJECTION, POWDER, FOR SOLUTION INTRAMUSCULAR ONCE AS NEEDED
Status: COMPLETED | OUTPATIENT
Start: 2023-08-25 | End: 2023-08-26

## 2023-08-25 RX ORDER — QUETIAPINE FUMARATE 25 MG/1
25 TABLET, FILM COATED ORAL NIGHTLY
Status: DISCONTINUED | OUTPATIENT
Start: 2023-08-25 | End: 2023-08-26

## 2023-08-25 RX ORDER — LOSARTAN POTASSIUM 50 MG/1
100 TABLET ORAL DAILY
Status: DISCONTINUED | OUTPATIENT
Start: 2023-08-25 | End: 2023-08-28

## 2023-08-25 RX ORDER — HYDROMORPHONE HYDROCHLORIDE 1 MG/ML
0.2 INJECTION, SOLUTION INTRAMUSCULAR; INTRAVENOUS; SUBCUTANEOUS EVERY 4 HOURS PRN
Status: DISCONTINUED | OUTPATIENT
Start: 2023-08-25 | End: 2023-08-25

## 2023-08-25 RX ORDER — ATORVASTATIN CALCIUM 40 MG/1
40 TABLET, FILM COATED ORAL DAILY
Status: DISCONTINUED | OUTPATIENT
Start: 2023-08-25 | End: 2023-09-19 | Stop reason: HOSPADM

## 2023-08-25 RX ORDER — ACETAMINOPHEN 500 MG
1000 TABLET ORAL 2 TIMES DAILY
Status: DISCONTINUED | OUTPATIENT
Start: 2023-08-25 | End: 2023-09-01

## 2023-08-25 RX ORDER — HYDROMORPHONE HYDROCHLORIDE 1 MG/ML
0.2 INJECTION, SOLUTION INTRAMUSCULAR; INTRAVENOUS; SUBCUTANEOUS ONCE
Status: COMPLETED | OUTPATIENT
Start: 2023-08-25 | End: 2023-08-25

## 2023-08-25 RX ORDER — CALCITRIOL 0.25 UG/1
0.25 CAPSULE ORAL DAILY
Status: DISCONTINUED | OUTPATIENT
Start: 2023-08-25 | End: 2023-09-06

## 2023-08-25 RX ORDER — ERGOCALCIFEROL 1.25 MG/1
50000 CAPSULE ORAL
Status: DISCONTINUED | OUTPATIENT
Start: 2023-08-25 | End: 2023-08-25

## 2023-08-25 RX ORDER — OLANZAPINE 5 MG/1
20 TABLET, ORALLY DISINTEGRATING ORAL NIGHTLY
Status: DISCONTINUED | OUTPATIENT
Start: 2023-08-25 | End: 2023-08-25

## 2023-08-25 RX ADMIN — CARVEDILOL 6.25 MG: 6.25 TABLET, FILM COATED ORAL at 08:08

## 2023-08-25 RX ADMIN — TOBRAMYCIN AND DEXAMETHASONE: 3; 1 OINTMENT OPHTHALMIC at 09:08

## 2023-08-25 RX ADMIN — SEVELAMER CARBONATE 0.8 G: 0.8 POWDER, FOR SUSPENSION ORAL at 05:08

## 2023-08-25 RX ADMIN — OXYCODONE HYDROCHLORIDE 10 MG: 10 TABLET ORAL at 02:08

## 2023-08-25 RX ADMIN — CARVEDILOL 6.25 MG: 6.25 TABLET, FILM COATED ORAL at 09:08

## 2023-08-25 RX ADMIN — TOBRAMYCIN AND DEXAMETHASONE 1 DROP: 3; 1 SUSPENSION/ DROPS OPHTHALMIC at 09:08

## 2023-08-25 RX ADMIN — ACETAMINOPHEN 1000 MG: 500 TABLET ORAL at 09:08

## 2023-08-25 RX ADMIN — ISOSORBIDE DINITRATE: 20 TABLET ORAL at 09:08

## 2023-08-25 RX ADMIN — OXYCODONE HYDROCHLORIDE 10 MG: 10 TABLET ORAL at 08:08

## 2023-08-25 RX ADMIN — DEXTROSE MONOHYDRATE 250 ML: 10 INJECTION, SOLUTION INTRAVENOUS at 02:08

## 2023-08-25 RX ADMIN — ONDANSETRON 8 MG: 8 TABLET, ORALLY DISINTEGRATING ORAL at 05:08

## 2023-08-25 RX ADMIN — ISOSORBIDE DINITRATE: 20 TABLET ORAL at 08:08

## 2023-08-25 RX ADMIN — DEXTROSE MONOHYDRATE 125 ML: 10 INJECTION, SOLUTION INTRAVENOUS at 08:08

## 2023-08-25 RX ADMIN — ISOSORBIDE DINITRATE: 20 TABLET ORAL at 02:08

## 2023-08-25 RX ADMIN — PROCHLORPERAZINE EDISYLATE 10 MG: 5 INJECTION INTRAMUSCULAR; INTRAVENOUS at 02:08

## 2023-08-25 RX ADMIN — MEROPENEM 500 MG: 500 INJECTION INTRAVENOUS at 04:08

## 2023-08-25 RX ADMIN — SENNOSIDES AND DOCUSATE SODIUM 1 TABLET: 50; 8.6 TABLET ORAL at 09:08

## 2023-08-25 RX ADMIN — CALCITRIOL CAPSULES 0.25 MCG 0.25 MCG: 0.25 CAPSULE ORAL at 09:08

## 2023-08-25 RX ADMIN — TOBRAMYCIN AND DEXAMETHASONE: 3; 1 OINTMENT OPHTHALMIC at 02:08

## 2023-08-25 RX ADMIN — METOCLOPRAMIDE 5 MG: 5 TABLET ORAL at 04:08

## 2023-08-25 RX ADMIN — IPRATROPIUM BROMIDE AND ALBUTEROL SULFATE 3 ML: .5; 3 SOLUTION RESPIRATORY (INHALATION) at 01:08

## 2023-08-25 RX ADMIN — PANTOPRAZOLE SODIUM 40 MG: 40 INJECTION, POWDER, FOR SOLUTION INTRAVENOUS at 09:08

## 2023-08-25 RX ADMIN — TOBRAMYCIN AND DEXAMETHASONE 1 DROP: 3; 1 SUSPENSION/ DROPS OPHTHALMIC at 05:08

## 2023-08-25 RX ADMIN — OLANZAPINE 5 MG: 10 INJECTION, POWDER, FOR SOLUTION INTRAMUSCULAR at 08:08

## 2023-08-25 RX ADMIN — HYDROMORPHONE HYDROCHLORIDE 0.2 MG: 1 INJECTION, SOLUTION INTRAMUSCULAR; INTRAVENOUS; SUBCUTANEOUS at 09:08

## 2023-08-25 RX ADMIN — LOSARTAN POTASSIUM 100 MG: 50 TABLET, FILM COATED ORAL at 09:08

## 2023-08-25 RX ADMIN — ERYTHROPOIETIN 3000 UNITS: 3000 INJECTION, SOLUTION INTRAVENOUS; SUBCUTANEOUS at 12:08

## 2023-08-25 RX ADMIN — IPRATROPIUM BROMIDE AND ALBUTEROL SULFATE 3 ML: .5; 3 SOLUTION RESPIRATORY (INHALATION) at 08:08

## 2023-08-25 RX ADMIN — ONDANSETRON 8 MG: 8 TABLET, ORALLY DISINTEGRATING ORAL at 04:08

## 2023-08-25 RX ADMIN — ERGOCALCIFEROL 50000 UNITS: 1.25 CAPSULE ORAL at 09:08

## 2023-08-25 RX ADMIN — HEPARIN SODIUM 1000 UNITS: 1000 INJECTION, SOLUTION INTRAVENOUS; SUBCUTANEOUS at 12:08

## 2023-08-25 RX ADMIN — ATORVASTATIN CALCIUM 40 MG: 40 TABLET, FILM COATED ORAL at 09:08

## 2023-08-25 RX ADMIN — FLUCONAZOLE 400 MG: 200 TABLET ORAL at 09:08

## 2023-08-25 RX ADMIN — Medication 6 MG: at 08:08

## 2023-08-25 RX ADMIN — PANTOPRAZOLE SODIUM 40 MG: 40 INJECTION, POWDER, FOR SOLUTION INTRAVENOUS at 08:08

## 2023-08-25 RX ADMIN — MEROPENEM 500 MG: 500 INJECTION INTRAVENOUS at 05:08

## 2023-08-25 RX ADMIN — METOCLOPRAMIDE 5 MG: 5 TABLET ORAL at 05:08

## 2023-08-25 RX ADMIN — THERA TABS 1 TABLET: TAB at 09:08

## 2023-08-25 RX ADMIN — VANCOMYCIN HYDROCHLORIDE 500 MG: 500 INJECTION, POWDER, LYOPHILIZED, FOR SOLUTION INTRAVENOUS at 06:08

## 2023-08-25 RX ADMIN — QUETIAPINE FUMARATE 25 MG: 25 TABLET ORAL at 08:08

## 2023-08-25 RX ADMIN — DEXTROSE MONOHYDRATE 250 ML: 10 INJECTION, SOLUTION INTRAVENOUS at 03:08

## 2023-08-25 RX ADMIN — ACETAMINOPHEN 1000 MG: 500 TABLET ORAL at 08:08

## 2023-08-25 RX ADMIN — TOBRAMYCIN AND DEXAMETHASONE 1 DROP: 3; 1 SUSPENSION/ DROPS OPHTHALMIC at 02:08

## 2023-08-25 RX ADMIN — SEVELAMER CARBONATE 0.8 G: 0.8 POWDER, FOR SUSPENSION ORAL at 09:08

## 2023-08-25 RX ADMIN — HYDRALAZINE HYDROCHLORIDE 10 MG: 20 INJECTION, SOLUTION INTRAMUSCULAR; INTRAVENOUS at 04:08

## 2023-08-25 NOTE — PLAN OF CARE
Problem: Adult Inpatient Plan of Care  Goal: Plan of Care Review  Outcome: Ongoing, Progressing  Goal: Patient-Specific Goal (Individualized)  Outcome: Ongoing, Progressing  Goal: Absence of Hospital-Acquired Illness or Injury  Outcome: Ongoing, Progressing     Problem: Diabetes Comorbidity  Goal: Blood Glucose Level Within Targeted Range  Outcome: Ongoing, Progressing     Problem: Skin Injury Risk Increased  Goal: Skin Health and Integrity  Outcome: Ongoing, Progressing     Problem: Device-Related Complication Risk (Hemodialysis)  Goal: Safe, Effective Therapy Delivery  Outcome: Ongoing, Progressing     Problem: Hemodynamic Instability (Hemodialysis)  Goal: Effective Tissue Perfusion  Outcome: Ongoing, Progressing     Problem: Infection (Hemodialysis)  Goal: Absence of Infection Signs and Symptoms  Outcome: Ongoing, Progressing     Problem: Infection  Goal: Absence of Infection Signs and Symptoms  Outcome: Ongoing, Progressing       Problem: Impaired Wound Healing  Goal: Optimal Wound Healing  Outcome: Ongoing, Progressing     Problem: Fall Injury Risk  Goal: Absence of Fall and Fall-Related Injury  Outcome: Ongoing, Progressing.    Pt remains stable. Pt is agitated and screaming out. No other complaints noted. Needs met at this time.

## 2023-08-25 NOTE — PROGRESS NOTES
HD completed , Blood returned . Ports flushed with normal sale , heparin instilled into each port as indicated. Post B/P 173/79, pulse 73. O2 sat 93%.  Net uf net 800ml

## 2023-08-25 NOTE — PT/OT/SLP PROGRESS
Occupational Therapy      Patient Name:  Immanuel Bernal   MRN:  [id number]    Patient not seen today secondary to Dialysis, Other (Comment), Increased agitation (patient with increased agitation, yelling at staff.). Will follow-up when he is appropriate for therapy.    8/25/2023

## 2023-08-25 NOTE — ASSESSMENT & PLAN NOTE
Pt is a 56 yo man with a possible episode of melena overnight. His baseline Hgb is 9 and his Hgb dropped acutely from 7.7 to 6.7 over the last day suggesting a possible GI bleed. S/p EGD 8/23 with bleeding duodenal ulcer that was clipped. Trasnfused yesterday with appropriate response. No further signs of bleeding from nursing.     Recommendations:    - Diet per primary team  - Use a proton pump inhibitor IV BID fo 72 hours, the BID PO for 2 weeks, then once daily thereafter.   - Observe patient's clinical course.   - If hemoglobin downtrending or having ongoing melena, consult IR for embolization. Clips are present on adherent clot.  - Please notify GI team if there is significant change in patient's clinical status

## 2023-08-25 NOTE — SUBJECTIVE & OBJECTIVE
Subjective:     Interval History: Followed up with patient for concerns of ongoing bleeding. S/p EGD 8/23 with a oozing duodenal ulcer that was clipped. Nonbleeding ulcers noted in the duodenal bulb and second portion of the duodenum. Reports of possible hematemesis yesterday, however, RN reports emesis was more gray in color. Did have a BM overnight, however, uncertain of consistency and color. Blood counts much improved this AM. Last transfusion was yesterday with appropriate response. Patient confused and agitated. Vitals fluctuating due to his agitation. Otherwise, no further signs of bleeding per charge RN.     Review of Systems   Unable to perform ROS: Mental status change     Objective:     Vital Signs (Most Recent):  Temp: 98.9 °F (37.2 °C) (08/25/23 0807)  Pulse: 73 (08/25/23 0859)  Resp: 20 (08/25/23 0859)  BP: (!) 178/83 (08/25/23 0440)  SpO2: 98 % (08/25/23 0859) Vital Signs (24h Range):  Temp:  [97.6 °F (36.4 °C)-98.9 °F (37.2 °C)] 98.9 °F (37.2 °C)  Pulse:  [66-83] 73  Resp:  [12-29] 20  SpO2:  [95 %-100 %] 98 %  BP: (163-206)/() 178/83     Weight: 62.8 kg (138 lb 7.2 oz) (08/23/23 0900)  Body mass index is 19.87 kg/m².    No intake or output data in the 24 hours ending 08/25/23 0930    Lines/Drains/Airways       Central Venous Catheter Line  Duration                  Hemodialysis Catheter right internal jugular -- days              Drain  Duration                  Gastrostomy/Enterostomy 08/23/23 1213 Percutaneous endoscopic gastrostomy (PEG) LUQ 1 day              Peripheral Intravenous Line  Duration                  Peripheral IV - Single Lumen 08/24/23 1541 20 G;1 3/4 in Left Upper Arm <1 day                     Physical Exam  Vitals and nursing note reviewed.   Constitutional:       Appearance: He is ill-appearing.   HENT:      Mouth/Throat:      Mouth: Mucous membranes are dry.      Pharynx: Oropharynx is clear.   Eyes:      General: No scleral icterus.  Abdominal:      General: Abdomen is  flat. Bowel sounds are normal. There is no distension.      Palpations: Abdomen is soft. There is no mass.      Tenderness: There is no abdominal tenderness.      Hernia: No hernia is present.   Skin:     General: Skin is warm and dry.      Capillary Refill: Capillary refill takes less than 2 seconds.      Coloration: Skin is not jaundiced or pale.      Findings: No bruising or erythema.   Neurological:      Mental Status: He is disoriented.   Psychiatric:         Behavior: Behavior is uncooperative, agitated and hyperactive.          Significant Labs:  CBC:   Recent Labs   Lab 08/24/23  0828 08/24/23  0836 08/24/23  2344 08/25/23  0800   WBC 9.74  --  9.96 9.96   HGB 6.8*  --  7.4* 8.7*   HCT 22.4* 19* 24.5* 27.7*     --  161 183     CMP:   Recent Labs   Lab 08/25/23  0800   GLU 69*   CALCIUM 9.9   ALBUMIN 2.5*   PROT 6.6   *   K 5.1   CO2 16*      BUN 40*   CREATININE 7.5*   ALKPHOS 102   ALT <5*   AST 18   BILITOT 0.4         Significant Imaging:  Imaging results within the past 24 hours have been reviewed.    EGD 8/23/2023  Impression:            - Normal esophagus.                          - Intact gastrostomy with an occluded G-tube                          present characterized by healthy appearing mucosa.                          - Oozing duodenal ulcer with adherent clot. Unable                          to dislodge despite lavage, mechanical traction                          with scope and suction. Mild oozing from clot                          surface after these attempts. Clips (MR                          conditional) were placed onto clot surface with no                          oozing at end of procedure. Clip :                          Bswift.                          - Non-bleeding duodenal ulcer with a clean ulcer                          base (Ronal Class III) in duodenal bulb.                          - Non-bleeding duodenal ulcers with a clean ulcer                           base (Ronal Class III) in second portion of                          duodenum.                          - The PEG was removed because it was not                          functioning, and replaced with an externally                          removable PEG with internal balloon bumper.                          - No specimens collected.   Recommendation:        - [facility].                          - Clear liquid diet.                          - Use a proton pump inhibitor IV BID.                          - Observe patient's clinical course.                          - If hemoglobin downtrending or having ongoing                          melena, consult IR for embolization. Clips are                          present on adherent clot.   Attending Participation:        I was present and participated during the entire procedure,        including non-key portions.   Bharath Ya MD   8/23/2023 1:49:31 PM

## 2023-08-25 NOTE — PROGRESS NOTES
At this time, the PM&R team has reviewed this patient's ongoing medical case including inpatient diagnosis. We will be rescinded inpatient rehab recommendation at this time until agitation improves and able to participate with therapy. Thank you.     ISAURA Powell, FNP-C  Physical Medicine & Rehabilitation   08/25/2023

## 2023-08-25 NOTE — SUBJECTIVE & OBJECTIVE
Interval History: one bout of coffee ground emesis last evening. Gi defferred to the next day since he is vitally stable. Pt is still agitated/delirious. Pt not eating. No dialysis done yesterday.    Review of Systems   Constitutional:  Positive for activity change and appetite change.   Respiratory:  Negative for cough, chest tightness and wheezing.    Cardiovascular:  Negative for chest pain and leg swelling.   Neurological: Negative.    Psychiatric/Behavioral:  Positive for agitation, confusion, hallucinations and sleep disturbance. The patient is hyperactive.      Objective:     Vital Signs (Most Recent):  Temp: 98.6 °F (37 °C) (08/25/23 0430)  Pulse: 73 (08/25/23 0440)  Resp: 18 (08/25/23 0440)  BP: (!) 178/83 (08/25/23 0440)  SpO2: 98 % (08/25/23 0440) Vital Signs (24h Range):  Temp:  [97.6 °F (36.4 °C)-98.6 °F (37 °C)] 98.6 °F (37 °C)  Pulse:  [66-83] 73  Resp:  [12-29] 18  SpO2:  [95 %-100 %] 98 %  BP: (163-206)/() 178/83     Weight: 62.8 kg (138 lb 7.2 oz)  Body mass index is 19.87 kg/m².  No intake or output data in the 24 hours ending 08/25/23 0749      Physical Exam  Constitutional:       Appearance: He is ill-appearing.   Eyes:      General:         Right eye: Discharge present.   Cardiovascular:      Rate and Rhythm: Normal rate and regular rhythm.      Pulses: Normal pulses.      Heart sounds: Normal heart sounds.   Pulmonary:      Effort: Pulmonary effort is normal. No respiratory distress.      Breath sounds: Normal breath sounds.   Abdominal:      Palpations: Abdomen is soft.   Neurological:      General: No focal deficit present.      Mental Status: He is disoriented.             Significant Labs: All pertinent labs within the past 24 hours have been reviewed.    Significant Imaging: I have reviewed all pertinent imaging results/findings within the past 24 hours.

## 2023-08-25 NOTE — ASSESSMENT & PLAN NOTE
On peg tube feeds now  Tolerating well  Able to take in PO per SLP assessment on 8/15, on regular diet with thin liquids  Will reduce tube feeds gradually as patient takes in more PO    Peg tube was found to be blocked yesterday, nursing staff were able to unclog the PEG tube  SLP evaluated patient : can restart oral feeds    Patient was able to tolerate oral feeds last night without feeling nauseous or vomiting  Nutrition team was consulted regarding aid with tube feeds in case the patient needs to be switched to TF. Appreciate  Their recs. If patient vomits or cant tolerate oral feeds will switch to TF  Was replaced by a new one during endoscopy by Gi team.  >> currently receiving medications through the tube    Plan:  Will continue to attempt oral feeds   Patient should stop trulicity. I would defer any alternative medication choices to Dr. Ghosh, will flag her on this note.

## 2023-08-25 NOTE — ASSESSMENT & PLAN NOTE
Hx of Pseudomonas bacteremia and Endophthalmitis with concern for extension into CNS as he had developed encephalopathy. He is now s/p enucleation with operative gram stain positive for yeast. Operative cultures grew c acnes. Blood cultures NGTD on 8/9/23.     ID reconsulted for acute encephalopathy. Etiology remains unclear. Was on cefepime which can cause neurotoxicity.  CTH stable. Getting dialyzed today.   · Continue meropenem   Continue  vancomycin.   Continue fluconazole.   Plan to treat as CNS infection as MRI was non diagnostic. Anticipated 4-6 weeks of therapy from evisceration. Anticipated end date: 9/12-9/26.   Discussed with micro lab- no growth on fungal cx.

## 2023-08-25 NOTE — ASSESSMENT & PLAN NOTE
Ophthalmology team on board and were going to perform the procedure but the patient's family refused without being around to review the MRI which delayed the procedure till next wednesday    MRI repeat : Diffuse inflammatory change involving the left globe, with associated thickening of the left sclera, proptosis, and focal fluid collection within the left posterior compartment, which demonstrates diffusion restriction.  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  Diffuse left preseptal and postseptal inflammatory change, with superimposed orbital cellulitis also considered.  Evaluation of the left orbital apex is limited secondary to no contrast administration.  Decreased size of the left anterior compartment with bowing of the visualized left lens.  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    Thorough counseling undertaken with POA and IM team, palliative, opthalmo and nursing team in regards to the surgery  She agreed for her brother to undergo the procedure as an emergent case if his health deteriorates  Otherwise to be scheduled on wednesday    Underwent evisceration of his left eye yesterday, tobramycin ordered, ID recs are being followed  Still on meronema and vancomycin IV  Wbc: 8.07 (normal)  hgb post op 8.8 (at [facility])  No signs of distress or delirium  Culture of eye discharge grew yeast, started flucanazole to antibiotic regimen  Follow cultures    Pending Rehab placement as patient and his sister unanimously agreed that he does not desire LTAC  Negotiations with ID team and pharmacy team in regards to his antibiotics, whether they can be administered via the dialysis line or not, pending opinion    Packing this Thursday and meeting on the following Monday.  Meronem is discontinued and replaced by cefepime in order to be able to administer it on dialysis  Cefepime was dicontinued as patient became  delirious and went back to meronem  Placement papers done, can stay in rehab using his Medicaid days    As per ID:  ID reconsulted for acute encephalopathy. Etiology remains unclear. Was on cefepime which can cause neurotoxicity.  CTH stable.   Agree with switching cefepime back to meropenem   Continue  vancomycin.   Continue fluconazole.   Plan to treat as CNS infection as MRI was non diagnostic. Anticipated 4-6 weeks of therapy from evisceration. Anticipated end date: 9/12-9/26.   Consider EEG     Plan:  Follow ID recommendations  Seen by opthalmo today for packing but patient was too agitated, will visit again later today

## 2023-08-25 NOTE — ASSESSMENT & PLAN NOTE
Patient was found to be delirious this morning and not following our commands and slightly agitated.    Maintaining appropriate saturation  CT head: no acute insult  CTPA:  No definite evidence of pulmonary arterial thromboembolism to the level of the segmental arteries bilaterally.  Although the subsegmental arteries evaluation is limited the right upper lobe lateral subsegmental artery abruptly terminates its trajectory which could represent a distal pulmonary emboli.  Patient had a bout of coffee ground emesis yesterday, no intervention from Gi as he is vitally stable and hgb did not drop    CTA chest and CT head sent  Dialysis deferred yesterday. Will attempt today.    CXR and chest exam clear    Plan:    Trial of dialysis today after we sedate patient with PRN medications to calm him down

## 2023-08-25 NOTE — PT/OT/SLP PROGRESS
Physical Therapy  Not Seen  Patient Name:  Immanuel Bernal   MRN:  [id number]  Admitting Diagnosis:  <principal problem not specified>   Recent Surgery: Procedure(s) (LRB):  EGD (ESOPHAGOGASTRODUODENOSCOPY) (N/A) 2 Days Post-Op  Admit Date: 8/9/2023  Length of Stay: 16 days    Patient not seen on this date for treatment - patient with increased agitation, yelling at staff. PT will continue to follow as patient is able to safely participate. Please continue progressive mobility as appropriate.    Discharge Disposition Recommendation: Rehab    Katy Boland, PT, DPT  8/25/2023

## 2023-08-25 NOTE — PROGRESS NOTES
[facility] NOTE     Patient currently on hemodialysis for removal of uremic toxins .     Patient seen and evaluated on hemodialysis, tolerating treatment, see HD flowsheet for vitals and assessments.      No Hypotension, chest pain, shortness of breath, cramping, nausea or vomiting.     Patient acute change in mental statu son yesterday, sp CTH with no acute findings, remain agitated and delirious on exam, but tolerating HD.     Target UF: 0.5 L as tolerated, keep MAP >65.  Anemia: continue Epogen  MBD: Continue Calcitriol and Sevelamer.     FLACA Barraza DNP, APRN, FNP-C  Department of Nephrology  [facility]  Pager: [phone]

## 2023-08-25 NOTE — PROGRESS NOTES
[facility] - Intensive Care ([place] [id number])  Infectious Disease  Progress Note    Patient Name: Immanuel Bernal  MRN: [id number]  Admission Date: 8/9/2023  Length of Stay: 16 days  Attending Physician: Michel Mcbride III*  Primary Care Provider: Dolly, Primary Doctor    Isolation Status: No active isolations  Assessment/Plan:      Ophtho  Endophthalmitis  Hx of Pseudomonas bacteremia and Endophthalmitis with concern for extension into CNS as he had developed encephalopathy. He is now s/p enucleation with operative gram stain positive for yeast. Operative cultures grew c acnes. Blood cultures NGTD on 8/9/23.     ID reconsulted for acute encephalopathy. Etiology remains unclear. Was on cefepime which can cause neurotoxicity.  CTH stable. Getting dialyzed today.     · Continue meropenem   Continue  vancomycin.   Continue fluconazole.   Treating as CNS infection as MRI was non diagnostic. Anticipated 4-6 weeks of therapy from evisceration. Anticipated end date: 9/12-9/26.   Discussed with micro lab- no growth on fungal cx.   Monitor for improvement   F/u optha recs            Anticipated Disposition: tbd    Thank you for your consult. I will follow-up with patient. Please contact us if you have any additional questions.    Bria Guzman MD  Infectious Disease  [facility] - Intensive Care ([place] [address])    Subjective:     Principal Problem:<principal problem not specified>    HPI: Mr. Bernal is a 57M with PMH of DM2, HTN, ESRD, HFrEF, bowel obstruction s/p bowel resection, KENIA, afib, recent hospitalization for NELSON endophthalmitis, pseudomonas bacteremia, RUE AVG infection s/p excision, who presented as a transfer from [place] for worsening eye symptoms and imaging findings of L scleral abscess. Patient had been admitted to [facility] July 18th for concern for NELSON endophthalmitis. He received intravitreal vancomycin and ceftazidime by ophthalmology, and IV cefepime for pseudomonas bacteremia.  Superficial swab with diphtheroids and anaerobic cocci. He underwent intravitreal tap that was negative for any yeast or fungus. Unfortunately L eye did not respond to treatment and abscess developed. Ophthalmology recommended enucleation, however POA/family declined. He was recommend to continue on cefepime for 6 weeks followed by indefinite fluoroquinolone, however he left AMA without the antibiotics.     He then presented to [place], where imaging was notable for L scleral abscess. He received a dose of zosyn and vanc, and was transferred to [facility] for oculoplastic evaluation. ID was consulted on arrival, and he was transitioned to vanc/meropenem while awaiting culture results. Blood cultures from 8/9 negative. He underwent L enucleation 8/16. ID reconsulted 8/17 for long term antibiotic regimen.      Interval History: ID reconsulted for acute onset mental status change 4/24. On HD during eval. Sister at bedside. Still encephalopathic.    Review of Systems   Unable to perform ROS: Mental status change     Objective:     Vital Signs (Most Recent):  Temp: 98.3 °F (36.8 °C) (08/25/23 1115)  Pulse: 81 (08/25/23 1515)  Resp: 14 (08/25/23 1515)  BP: (!) 167/76 (08/25/23 1345)  SpO2: (!) 84 % (08/25/23 1515) Vital Signs (24h Range):  Temp:  [97.6 °F (36.4 °C)-98.9 °F (37.2 °C)] 98.3 °F (36.8 °C)  Pulse:  [66-83] 81  Resp:  [4-30] 14  SpO2:  [84 %-100 %] 84 %  BP: (163-206)/() 167/76     Weight: 62.8 kg (138 lb 7.2 oz)  Body mass index is 19.87 kg/m².    Estimated Creatinine Clearance: 9.4 mL/min (A) (based on SCr of 7.5 mg/dL (H)).     Physical Exam  HENT:      Head: Normocephalic and atraumatic.   Eyes:      Comments: Lt eyelid shut, yellowish discharge present (s/p enucleation)   Cardiovascular:      Rate and Rhythm: Normal rate and regular rhythm.   Pulmonary:      Effort: Pulmonary effort is normal.      Breath sounds: Normal breath sounds.   Abdominal:      General: Abdomen is flat. There is no distension.       Palpations: Abdomen is soft.      Tenderness: There is no abdominal tenderness.   Skin:     Findings: No lesion or rash.   Neurological:      Mental Status: He is alert.      Comments: Not following my commands, incoherent          Significant Labs: All pertinent labs within the past 24 hours have been reviewed.    Significant Imaging: I have reviewed all pertinent imaging results/findings within the past 24 hours.

## 2023-08-25 NOTE — PLAN OF CARE
Problem: Adult Inpatient Plan of Care  Goal: Plan of Care Review  Outcome: Ongoing, Progressing  Goal: Patient-Specific Goal (Individualized)  Outcome: Ongoing, Progressing  Goal: Absence of Hospital-Acquired Illness or Injury  Outcome: Ongoing, Progressing  Goal: Optimal Comfort and Wellbeing  Outcome: Ongoing, Progressing  Goal: Readiness for Transition of Care  Outcome: Ongoing, Progressing     Problem: Diabetes Comorbidity  Goal: Blood Glucose Level Within Targeted Range  Outcome: Ongoing, Progressing     Problem: Skin Injury Risk Increased  Goal: Skin Health and Integrity  Outcome: Ongoing, Progressing     Problem: Infection  Goal: Absence of Infection Signs and Symptoms  Outcome: Ongoing, Progressing     Problem: Impaired Wound Healing  Goal: Optimal Wound Healing  Outcome: Ongoing, Progressing     Problem: Fall Injury Risk  Goal: Absence of Fall and Fall-Related Injury  Outcome: Ongoing, Progressing

## 2023-08-25 NOTE — PLAN OF CARE
[facility] ([facility])  Discharge Reassessment    Primary Care Provider: No, Primary Doctor    Expected Discharge Date: 8/28/2023    Reassessment (most recent)       Discharge Reassessment - 08/25/23 0919          Discharge Reassessment    Assessment Type Discharge Planning Reassessment (P)      Discharge Plan discussed with: Sibling (P)      Communicated TOBY with patient/caregiver Yes (P)      Discharge Plan A Rehab (P)      Discharge Plan B Rehab (P)                    Patient is not stable to dc at this time. SW has provided updates to patient/patient family in regard to the dc plan of rehab. YULI will continue to follow up.    Mirta Melendez LMSW  [facility]   [id number]

## 2023-08-25 NOTE — NURSING
Patient laying bed, call light within reach, bed in low position, patient has not slept more than a couple hours all night very confused not making any sense throughout the whole night repeating the same word that he hears over and over again patient not alert and oriented, has his blood pressure elevated from being so agitated and yelling all night have given IV hydralazine and didn't work due to patient getting worked up. Patient left eye had moderate drainage throughout night, right arm has staples intact, on IV Merrem, there is no comprehension or understanding when talking with patient. Patient had x1 coffee ground bowel movement over night, 1 unit of blood was started on dayshift and ended on night shift hgb at 0000 came up to 7.4. blood sugar this AM was 46 gave patient 250ml bolus of D10 and blood sugar after D10 was 154. Patient might need tube feed or other source of feed due to patient sister states patient has not had anything to eat in 3 days now.

## 2023-08-25 NOTE — SUBJECTIVE & OBJECTIVE
Interval History: ID reconsulted for acute onset mental status change 4/24. On HD during eval. Sister at bedside. Still encephalopathic.    Review of Systems   Unable to perform ROS: Mental status change     Objective:     Vital Signs (Most Recent):  Temp: 98.3 °F (36.8 °C) (08/25/23 1115)  Pulse: 81 (08/25/23 1515)  Resp: 14 (08/25/23 1515)  BP: (!) 167/76 (08/25/23 1345)  SpO2: (!) 84 % (08/25/23 1515) Vital Signs (24h Range):  Temp:  [97.6 °F (36.4 °C)-98.9 °F (37.2 °C)] 98.3 °F (36.8 °C)  Pulse:  [66-83] 81  Resp:  [4-30] 14  SpO2:  [84 %-100 %] 84 %  BP: (163-206)/() 167/76     Weight: 62.8 kg (138 lb 7.2 oz)  Body mass index is 19.87 kg/m².    Estimated Creatinine Clearance: 9.4 mL/min (A) (based on SCr of 7.5 mg/dL (H)).     Physical Exam  HENT:      Head: Normocephalic and atraumatic.   Eyes:      Comments: Lt eyelid shut, yellowish discharge present (s/p enucleation)   Cardiovascular:      Rate and Rhythm: Normal rate and regular rhythm.   Pulmonary:      Effort: Pulmonary effort is normal.      Breath sounds: Normal breath sounds.   Abdominal:      General: Abdomen is flat. There is no distension.      Palpations: Abdomen is soft.      Tenderness: There is no abdominal tenderness.   Skin:     Findings: No lesion or rash.   Neurological:      Mental Status: He is alert.      Comments: Not following my commands, incoherent          Significant Labs: All pertinent labs within the past 24 hours have been reviewed.    Significant Imaging: I have reviewed all pertinent imaging results/findings within the past 24 hours.

## 2023-08-25 NOTE — ASSESSMENT & PLAN NOTE
Latest hemoglobin = 7.3 (post transfusion) >> 7.4 latest    Underwent EGD which yielded a large DU with overlying clot, Gi were not able to penetrate beneath the clot to determine presence of any bleeding vessel, 2 clips placed.   The PEG was removed because it was not  functioning, and replaced with an externally removable PEG with internal balloon bumper.   Patient hemoglobin dropped below 7 today, transfused blood, contacted GI: since no active melena no need for GI or IR intervention  Patient had another bout of coffee ground vomitus, consulted Gi since he is vitally stable no need for scope yesterday    Recommendation:      -  If hemoglobin downtrending or having ongoing  melena, consult Gastro or IR for embolization. Clips are present on adherent clot.  - clear liquid diet  - Trend H&H, transfuse for Hgb > 7, unless otherwise indicated  -IV [id number] BID  - Maintain IV access with 2 large bore Ivs  - Hold all NSAIDs and anticoagulants, unless contraindicated  -correct any coagulopathy with platelets and FFP for goal of platelets >50K and INR <2.0

## 2023-08-25 NOTE — ASSESSMENT & PLAN NOTE
Hypertensive on arrival  bp today: 177/82  Home medications: imdur Carvedilol Hydralazine Nifedipine  Patient is agitated today which can explain his elevated BP readings    Plan:  On hydralazine And losartan and carvedilol in patient, attempting to uptitrate GDMT  Reduced carvedilol dose to half the dose (12.5)  Will try to sedate patient with PRN medications

## 2023-08-25 NOTE — PROGRESS NOTES
[facility] - [facility] ([address] Medicine  Progress Note    Patient Name: Immanuel Bernal  MRN: [id number]  Patient Class: IP- Inpatient   Admission Date: 8/9/2023  Length of Stay: 16 days  Attending Physician: Michel Mcbride III*  Primary Care Provider: Dolly, Primary Doctor        Subjective:     Principal Problem:<principal problem not specified>        HPI:  Pt is a 56 yo man w/ PMH of PEG tube, small bowel obstruction s/p bowel resection, ESRD on HD, stroke, endophthalmitis s/p left eye enucleation who was found to have an episode of melena overnight. Per nurse, he had a bowel movement that was dark and tarry. He also had an episode of emesis overnight that had no blood in it. Pt's sister endorses sweating, nausea, vomiting, dizziness, and lightheadedness. Denies fever, abdominal pain, syncope. He has taken eliquis the last 5 days. His most recent colonoscopy was 2008       Overview/Hospital Course:  Patient was rate controlled and therefore flecainide held. His GCS is 13/15. Oculopalstics were consulted and so were the nephrology teams and IR. He underwent dialysis on his first day. Was scheduled for surgery today but his family refused to have it done until she reviewed the MRI herself first. This means that the surgeon will only be available next Wednesday in order to allow her to view and discuss the MRI. He has a peg tube in place that has not been utilzied prior. Heart failure medications have been reinstated and is being covered by vancomycin and meropenem for the endopthalmitis. Multidisciplinary session was conducted with his POA in regards to his operation, she accepted the surgery if it was emergent, otherwise to be done this upcoming Wednesday. Patient's delirium/agitation worsened so he was transferred to the ICU until his clinical situation settled. He then was stepped down to our care and ophthalmology will be conducting his eye procedure on him as planned. Underwent L eye  enucleation on 8/16. Started the tobramycin ointment and drops. Following ID recs regarding antimicrobial regimen; operative cultures returned with yeast thus patient was also started on fluconazole. Patient's sister noted us that he was diagnosed with central apnea and KENIA in the past of which he used a CPAP/BiPAP for. No concern of lack of oxygenation during his inpatient stay. Labs are underway to detect any element of CO2 retention although he had no clinical signs of that. Patient was ready to be transferred to rehab when he suddenly developed one single bout of melena associated with hemoglobin drop. He received 1 pint PRBC and underwent EGD which showed large DU with overlying clots and 2 clips were placed. Patient was found delirious again and desaturated <90%. CXR and chest were clear. Ct head showed no evidence of acute insult. CTPA not diagnostic of PE. Patient had another bout of coffee ground vomitus.    Disposition: referrals to rehab have been sent. Patient's medicare days have been exhausted. Medicaid days are available to be used for rehab.      Interval History: one bout of coffee ground emesis last evening. Gi defferred to the next day since he is vitally stable. Pt is still agitated/delirious. Pt not eating. No dialysis done yesterday.    Review of Systems   Constitutional:  Positive for activity change and appetite change.   Respiratory:  Negative for cough, chest tightness and wheezing.    Cardiovascular:  Negative for chest pain and leg swelling.   Neurological: Negative.    Psychiatric/Behavioral:  Positive for agitation, confusion, hallucinations and sleep disturbance. The patient is hyperactive.      Objective:     Vital Signs (Most Recent):  Temp: 98.6 °F (37 °C) (08/25/23 0430)  Pulse: 73 (08/25/23 0440)  Resp: 18 (08/25/23 0440)  BP: (!) 178/83 (08/25/23 0440)  SpO2: 98 % (08/25/23 0440) Vital Signs (24h Range):  Temp:  [97.6 °F (36.4 °C)-98.6 °F (37 °C)] 98.6 °F (37 °C)  Pulse:  [66-83]  73  Resp:  [12-29] 18  SpO2:  [95 %-100 %] 98 %  BP: (163-206)/() 178/83     Weight: 62.8 kg (138 lb 7.2 oz)  Body mass index is 19.87 kg/m².  No intake or output data in the 24 hours ending 08/25/23 0749      Physical Exam  Constitutional:       Appearance: He is ill-appearing.   Eyes:      General:         Right eye: Discharge present.   Cardiovascular:      Rate and Rhythm: Normal rate and regular rhythm.      Pulses: Normal pulses.      Heart sounds: Normal heart sounds.   Pulmonary:      Effort: Pulmonary effort is normal. No respiratory distress.      Breath sounds: Normal breath sounds.   Abdominal:      Palpations: Abdomen is soft.   Neurological:      General: No focal deficit present.      Mental Status: He is disoriented.             Significant Labs: All pertinent labs within the past 24 hours have been reviewed.    Significant Imaging: I have reviewed all pertinent imaging results/findings within the past 24 hours.      Assessment/Plan:      Delirium  Patient was found to be delirious this morning and not following our commands and slightly agitated.    Maintaining appropriate saturation  CT head: no acute insult  CTPA:  No definite evidence of pulmonary arterial thromboembolism to the level of the segmental arteries bilaterally.  Although the subsegmental arteries evaluation is limited the right upper lobe lateral subsegmental artery abruptly terminates its trajectory which could represent a distal pulmonary emboli.  Patient had a bout of coffee ground emesis yesterday, no intervention from Gi as he is vitally stable and hgb did not drop    CTA chest and CT head sent  Dialysis deferred yesterday. Will attempt today.    CXR and chest exam clear    Plan:    Trial of dialysis today after we sedate patient with PRN medications to calm him down      Melena  Latest hemoglobin = 7.3 (post transfusion) >> 7.4 latest    Underwent EGD which yielded a large DU with overlying clot, Gi were not able to  penetrate beneath the clot to determine presence of any bleeding vessel, 2 clips placed.   The PEG was removed because it was not  functioning, and replaced with an externally removable PEG with internal balloon bumper.   Patient hemoglobin dropped below 7 today, transfused blood, contacted GI: since no active melena no need for GI or IR intervention  Patient had another bout of coffee ground vomitus, consulted Gi since he is vitally stable no need for scope yesterday    Recommendation:      -  If hemoglobin downtrending or having ongoing  melena, consult Gastro or IR for embolization. Clips are present on adherent clot.  - clear liquid diet  - Trend H&H, transfuse for Hgb > 7, unless otherwise indicated  -IV [id number] BID  - Maintain IV access with 2 large bore Ivs  - Hold all NSAIDs and anticoagulants, unless contraindicated  -correct any coagulopathy with platelets and FFP for goal of platelets >50K and INR <2.0          Difficulty in urination  Element of BPH displayed  Producing good urine, but facing slight difficulty emptying    Plan:  Start flomax 0.4 daily po      Redness and swelling of upper arm  Had staples in right upper arm from transfer hospital (the fistula clotted and had pseudomonas aurgenosa, and bacteremia), patient signed AMA before intervention was yielded there    Underwent Ultrasound US, findings:  1. Nonocclusive DVT in the right subclavian and axillary veins.  Catheter in the right subclavian vein.  2. 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    Seen by urology team:  Staples cannot be removed now as the skin is now adhering fully yet  Wound appears to be having dehiscence, for wound care team, appreciate recs  Vascular consult sent for plan of removal of staples after discharge      Plan:  Wound care consult sent, appreciate recs  Anticoagulation held due to melena    Hypoglycemia  Latest POCT glucose is 96  Oral diet switched to ckear  fluid pre-EGD    Plan:  Follow glucose and notifiy if drops below <70      PEG (percutaneous endoscopic gastrostomy) status  On peg tube feeds now  Tolerating well  Able to take in PO per SLP assessment on 8/15, on regular diet with thin liquids  Will reduce tube feeds gradually as patient takes in more PO    Peg tube was found to be blocked yesterday, nursing staff were able to unclog the PEG tube  SLP evaluated patient : can restart oral feeds    Patient was able to tolerate oral feeds last night without feeling nauseous or vomiting  Nutrition team was consulted regarding aid with tube feeds in case the patient needs to be switched to TF. Appreciate  Their recs. If patient vomits or cant tolerate oral feeds will switch to TF  Was replaced by a new one during endoscopy by Gi team.  >> currently receiving medications through the tube    Plan:  Will continue to attempt oral feeds    Encounter for palliative care        Anemia in ESRD (end-stage renal disease)  EPO per nephrology  hgb dropped due to melena this morning, transfused one pint PRBC after consent was taken        Severe malnutrition  Nutrition consulted. Most recent weight and BMI monitored-     Measurements:  Wt Readings from Last 1 Encounters:   08/20/23 62.8 kg (138 lb 7.2 oz)   Body mass index is 19.87 kg/m².    Patient has been screened and assessed by RD.    Malnutrition Type:  Context: chronic illness  Level: severe    Malnutrition Characteristic Summary:  Weight Loss (Malnutrition): greater than 10% in 6 months  Energy Intake (Malnutrition): other (see comments) ([place]AMONT)  Subcutaneous Fat (Malnutrition): severe depletion  Muscle Mass (Malnutrition): severe depletion    Interventions/Recommendations (treatment strategy):  1.     Able to take in PO per SLP assessment on 8/15, on regular diet with thin liquids  Will reduce tube feeds gradually as patient takes in more PO    Endophthalmitis  Ophthalmology team on board and were going to perform the  procedure but the patient's family refused without being around to review the MRI which delayed the procedure till next wednesday    MRI repeat : Diffuse inflammatory change involving the left globe, with associated thickening of the left sclera, proptosis, and focal fluid collection within the left posterior compartment, which demonstrates diffusion restriction.  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  Diffuse left preseptal and postseptal inflammatory change, with superimposed orbital cellulitis also considered.  Evaluation of the left orbital apex is limited secondary to no contrast administration.  Decreased size of the left anterior compartment with bowing of the visualized left lens.  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    Thorough counseling undertaken with POA and IM team, palliative, opthalmo and nursing team in regards to the surgery  She agreed for her brother to undergo the procedure as an emergent case if his health deteriorates  Otherwise to be scheduled on wednesday    Underwent evisceration of his left eye yesterday, tobramycin ordered, ID recs are being followed  Still on meronema and vancomycin IV  Wbc: 8.07 (normal)  hgb post op 8.8 (at [facility])  No signs of distress or delirium  Culture of eye discharge grew yeast, started flucanazole to antibiotic regimen  Follow cultures    Pending Rehab placement as patient and his sister unanimously agreed that he does not desire LTAC  Negotiations with ID team and pharmacy team in regards to his antibiotics, whether they can be administered via the dialysis line or not, pending opinion    Packing this Thursday and meeting on the following Monday.  Meronem is discontinued and replaced by cefepime in order to be able to administer it on dialysis  Cefepime was dicontinued as patient became delirious and went back to meronem  Placement papers done, can  stay in rehab using his Medicaid days    As per ID:  ID reconsulted for acute encephalopathy. Etiology remains unclear. Was on cefepime which can cause neurotoxicity.  CTH stable.   Agree with switching cefepime back to meropenem   Continue  vancomycin.   Continue fluconazole.   Plan to treat as CNS infection as MRI was non diagnostic. Anticipated 4-6 weeks of therapy from evisceration. Anticipated end date: 9/12-9/26.   Consider EEG     Plan:  Follow ID recommendations  Seen by opthalmo today for packing but patient was too agitated, will visit again later today    ESRD (end stage renal disease)  Nephrology consulted for HD needs while inpatient  Underwent SLED post MRI contrast (gadolinium)   Ad last dialysis was 08/18: Tolerated HD, UF 2500ml  Last dialysis 08/21  CRT today 6.4 >> 6.2 >> 6.6 >> 6.8 >> 5.0 >> 6.1    Electrolytes normal      Plan:  Will be followed by nephro, follow up with their recommendations regarding dialysis      Atrial fibrillation  History of AF noted however no EKG here with AF  Not on AC due to lack of definitive evidence of AF  Continue carvedilol  eliquis indication is the non-occlusive DVT in right upper arm veins      Plan:  Received curshed eliquis through peg tube last night    Hyperlipidemia  Home statin    Chronic diastolic heart failure  Continue to monitor for signs of volume overload; volume removal with dialysis    Hypertension  Hypertensive on arrival  bp today: 177/82  Home medications: imdur Carvedilol Hydralazine Nifedipine  Patient is agitated today which can explain his elevated BP readings    Plan:  On hydralazine And losartan and carvedilol in patient, attempting to uptitrate GDMT  Reduced carvedilol dose to half the dose (12.5)  Will try to sedate patient with PRN medications    Stroke  Continue home statin  Delerium precautions  PT OT    Diabetes mellitus  POCT glucose  LDSS  -180 target    Running slightly on the lower end: BG POCT is 75 >>> 96  No event  of actual hypoglycemia inpatient    Plan:  Continue to monitor  In the event that his BG drops lower than 70 patient's hypoglycemic medications will be regulated again      VTE Risk Mitigation (From admission, onward)         Ordered     heparin (porcine) injection 1,000 Units  As needed (PRN)         08/10/23 1046     IP VTE HIGH RISK PATIENT  Once         08/09/23 1317     Place sequential compression device  Until discontinued         08/09/23 1317                Discharge Planning   TOBY: 8/28/2023     Code Status: Full Code   Is the patient medically ready for discharge?: No    Reason for patient still in hospital (select all that apply): Treatment  Discharge Plan A: Skilled Nursing Facility   Discharge Delays: (!) Procedure Scheduling (IR, OR, Labs, Echo, Cath, Echo, EEG) (eye surgery next week)              Sharon Law MD  Department of Hospital Medicine   [facility] - Intensive Care ([place]

## 2023-08-25 NOTE — PROGRESS NOTES
Pharmacokinetic Assessment Follow Up: IV Vancomycin    Vancomycin serum concentration assessment(s):    Vanc level is 23.6 mcg/ml, drawn appropriately.  Patient is over the goal of 15-20 mcg/ml.    Vancomycin Regimen Plan:    Spoke to HD nurse, plan for patient to receive HD today.  Give vancomycin 500 mg once after HD.  Redraw vanc random on Monday with AM labs.   Normal HD schedule is MWF; however, the HD sessions have been inconsistent this week. If the patient resumes that schedule, we will consider scheduling vancomycin 500 mg MWF after HD.     Drug levels (last 3 results):  Recent Labs   Lab Result Units 08/23/23  0525 08/25/23  0800   Vancomycin, Random ug/mL 19.0 23.6       Pharmacy will continue to follow and monitor vancomycin.    Please contact pharmacy at extension [phone] for questions regarding this assessment.    Thank you for the consult,   Flavia Rivas, PharmD, [facility]  [id number]     Patient brief summary:  Immanuel Bernal is a 57 y.o. male initiated on antimicrobial therapy with IV Vancomycin for treatment of endophthalmitis.    The patient's current regimen is vancomycin pulse dosing.     Actual Body Weight:   62.8 kg    Renal Function:   Estimated Creatinine Clearance: 9.4 mL/min (A) (based on SCr of 7.5 mg/dL (H)).,     Dialysis Method (if applicable):  intermittent HD    CBC (last 72 hours):  Recent Labs   Lab Result Units 08/22/23  1648 08/23/23  0525 08/24/23  0828 08/24/23  2344 08/25/23  0800   WBC K/uL  --  6.76 9.74 9.96 9.96   Hemoglobin g/dL 7.8* 7.3* 6.8* 7.4* 8.7*   Hematocrit % 25.7* 24.8* 22.4* 24.5* 27.7*   Platelets K/uL  --  185 188 161 183   Gran % %  --   --   --  78.4*  --    Lymph % %  --   --   --  8.1*  --    Mono % %  --   --   --  8.9  --    Eosinophil % %  --   --   --  3.3  --    Basophil % %  --   --   --  0.6  --    Differential Method   --   --   --  Automated  --        Metabolic Panel (last 72 hours):  Recent Labs   Lab Result Units 08/22/23  9815  08/22/23  2216 08/23/23  0525 08/23/23  1421 08/23/23  2215 08/24/23  0828 08/24/23  1458 08/24/23  2344 08/25/23  0529 08/25/23  0800   Sodium mmol/L 136 138 138  138 133* 133* 134*  133* 133* 133* 129* 129*   Potassium mmol/L 4.6 4.7 4.8  4.8 4.6 4.4 5.1  5.0 5.0 5.2* 5.4* 5.1   Chloride mmol/L 105 106 108  108 103 103 101  101 102 103 100 100   CO2 mmol/L 24 24 21*  21* 22* 21* 23  23 19* 19* 18* 16*   Glucose mg/dL 79 86 60*  60* 106 70 77  76 70 57* 91 69*   BUN mg/dL 29* 31* 34*  34* 37* 39* 39*  39* 41* 42* 40* 40*   Creatinine mg/dL 5.8* 5.5* 6.1*  6.1* 6.4* 6.6* 7.4*  6.9* 7.6* 7.3* 7.6* 7.5*   Albumin g/dL 2.0* 1.9* 2.0*  2.0* 2.0* 2.1* 2.2*  2.1* 2.1* 2.2* 2.3* 2.5*   Total Bilirubin mg/dL  --   --  0.3  --   --  0.3  --   --   --  0.4   Alkaline Phosphatase U/L  --   --  83  --   --  91  --   --   --  102   AST U/L  --   --  17  --   --  15  --   --   --  18   ALT U/L  --   --  <5*  --   --  <5*  --   --   --  <5*   Magnesium mg/dL 2.4 2.4 2.4 2.5 2.5 2.5 2.5 2.6 2.6  --    Phosphorus mg/dL 4.6* 4.6* 4.9* 4.7* 4.2 4.5 4.3 5.0* 4.8*  --        Vancomycin Administrations:  vancomycin given in the last 96 hours                     vancomycin (VANCOCIN) 500 mg in dextrose 5 % in water (D5W) 100 mL IVPB (MB+) (mg) 500 mg New Bag 08/23/23 1724    vancomycin (VANCOCIN) 500 mg in dextrose 5 % in water (D5W) 100 mL IVPB (MB+) (mg) 500 mg New Bag 08/21/23 1318                    Microbiologic Results:  Microbiology Results (last 7 days)       Procedure Component Value Units Date/Time    Culture, Anaerobe [[id number]]  (Abnormal) Collected: 08/16/23 1632    Order Status: Completed Specimen: Wound from Cornea, Left Updated: 08/23/23 1108     Anaerobic Culture CUTIBACTERIUM ACNES  From broth only      Culture, Anaerobe [[id number]] Collected: 08/16/23 1636    Order Status: Completed Specimen: Abscess from Eyelid, Left Updated: 08/23/23 0842     Anaerobic Culture No anaerobes isolated    Narrative:       Sclera abscess    Culture, Anaerobe [[id number]] Collected: 08/16/23 1647    Order Status: Completed Specimen: Abscess from Eyelid, Left Updated: 08/23/23 0842     Anaerobic Culture No anaerobes isolated    Culture, Anaerobe [[id number]] Collected: 08/16/23 1628    Order Status: Completed Specimen: Wound from Eyelid, Left Updated: 08/23/23 0842     Anaerobic Culture No anaerobes isolated    Aerobic culture [[id number]] Collected: 08/16/23 1629    Order Status: Completed Specimen: Wound from Eyelid, Left Updated: 08/19/23 0911     Aerobic Bacterial Culture No growth    Aerobic culture [[id number]] Collected: 08/16/23 1636    Order Status: Completed Specimen: Abscess from Eyelid, Left Updated: 08/19/23 0911     Aerobic Bacterial Culture No growth    Narrative:      Sclera abscess    Aerobic culture [[id number]] Collected: 08/16/23 1632    Order Status: Completed Specimen: Wound from Cornea, Left Updated: 08/19/23 0911     Aerobic Bacterial Culture No growth    Aerobic culture [[id number]] Collected: 08/16/23 1647    Order Status: Completed Specimen: Abscess from Eyelid, Left Updated: 08/18/23 1423     Aerobic Bacterial Culture No significant isolate

## 2023-08-25 NOTE — PROGRESS NOTES
[facility] ([place] [address])  Gastroenterology  Progress Note    Patient Name: Immanuel Bernal  MRN: [id number]  Admission Date: 8/9/2023  Hospital Length of Stay: 16 days  Code Status: Full Code   Attending Provider: Michel Mcbried III*  Consulting Provider: Mara Harvey NP  Primary Care Physician: No, Primary Doctor  Principal Problem: <principal problem not specified>    Subjective:     Interval History: Followed up with patient for concerns of ongoing bleeding. S/p EGD 8/23 with a oozing duodenal ulcer that was clipped. Nonbleeding ulcers noted in the duodenal bulb and second portion of the duodenum. Reports of possible hematemesis yesterday, however, RN reports emesis was more gray in color. Did have a BM overnight, however, uncertain of consistency and color. Blood counts much improved this AM. Last transfusion was yesterday with appropriate response. Patient confused and agitated. Vitals fluctuating due to his agitation. Otherwise, no further signs of bleeding per charge RN.     Review of Systems   Unable to perform ROS: Mental status change     Objective:     Vital Signs (Most Recent):  Temp: 98.9 °F (37.2 °C) (08/25/23 0807)  Pulse: 73 (08/25/23 0859)  Resp: 20 (08/25/23 0859)  BP: (!) 178/83 (08/25/23 0440)  SpO2: 98 % (08/25/23 0859) Vital Signs (24h Range):  Temp:  [97.6 °F (36.4 °C)-98.9 °F (37.2 °C)] 98.9 °F (37.2 °C)  Pulse:  [66-83] 73  Resp:  [12-29] 20  SpO2:  [95 %-100 %] 98 %  BP: (163-206)/() 178/83     Weight: 62.8 kg (138 lb 7.2 oz) (08/23/23 0900)  Body mass index is 19.87 kg/m².    No intake or output data in the 24 hours ending 08/25/23 0930    Lines/Drains/Airways       Central Venous Catheter Line  Duration                  Hemodialysis Catheter right internal jugular -- days              Drain  Duration                  Gastrostomy/Enterostomy 08/23/23 1213 Percutaneous endoscopic gastrostomy (PEG) LUQ 1 day              Peripheral Intravenous Line  Duration                   Peripheral IV - Single Lumen 08/24/23 1541 20 G;1 3/4 in Left Upper Arm <1 day                     Physical Exam  Vitals and nursing note reviewed.   Constitutional:       Appearance: He is ill-appearing.   HENT:      Mouth/Throat:      Mouth: Mucous membranes are dry.      Pharynx: Oropharynx is clear.   Eyes:      General: No scleral icterus.  Abdominal:      General: Abdomen is flat. Bowel sounds are normal. There is no distension.      Palpations: Abdomen is soft. There is no mass.      Tenderness: There is no abdominal tenderness.      Hernia: No hernia is present.   Skin:     General: Skin is warm and dry.      Capillary Refill: Capillary refill takes less than 2 seconds.      Coloration: Skin is not jaundiced or pale.      Findings: No bruising or erythema.   Neurological:      Mental Status: He is disoriented.   Psychiatric:         Behavior: Behavior is uncooperative, agitated and hyperactive.          Significant Labs:  CBC:   Recent Labs   Lab 08/24/23  0828 08/24/23  0836 08/24/23  2344 08/25/23  0800   WBC 9.74  --  9.96 9.96   HGB 6.8*  --  7.4* 8.7*   HCT 22.4* 19* 24.5* 27.7*     --  161 183     CMP:   Recent Labs   Lab 08/25/23  0800   GLU 69*   CALCIUM 9.9   ALBUMIN 2.5*   PROT 6.6   *   K 5.1   CO2 16*      BUN 40*   CREATININE 7.5*   ALKPHOS 102   ALT <5*   AST 18   BILITOT 0.4         Significant Imaging:  Imaging results within the past 24 hours have been reviewed.    EGD 8/23/2023  Impression:            - Normal esophagus.                          - Intact gastrostomy with an occluded G-tube                          present characterized by healthy appearing mucosa.                          - Oozing duodenal ulcer with adherent clot. Unable                          to dislodge despite lavage, mechanical traction                          with scope and suction. Mild oozing from clot                          surface after these attempts. Clips (MR                           conditional) were placed onto clot surface with no                          oozing at end of procedure. Clip :                          [employer].                          - Non-bleeding duodenal ulcer with a clean ulcer                          base (Ronal Class III) in duodenal bulb.                          - Non-bleeding duodenal ulcers with a clean ulcer                          base (Ronal Class III) in second portion of                          duodenum.                          - The PEG was removed because it was not                          functioning, and replaced with an externally                          removable PEG with internal balloon bumper.                          - No specimens collected.   Recommendation:        - Return patient to [facility] for ongoing care.                          - Clear liquid diet.                          - Use a proton pump inhibitor IV BID.                          - Observe patient's clinical course.                          - If hemoglobin downtrending or having ongoing                          melena, consult IR for embolization. Clips are                          present on adherent clot.   Attending Participation:        I was present and participated during the entire procedure,        including non-key portions.   Bharath Ya MD   8/23/2023 1:49:31 PM     Assessment/Plan:     GI  Melena  Pt is a 58 yo man with a possible episode of melena overnight. His baseline Hgb is 9 and his Hgb dropped acutely from 7.7 to 6.7 over the last day suggesting a possible GI bleed. S/p EGD 8/23 with bleeding duodenal ulcer that was clipped. Trasnfused yesterday with appropriate response. No further signs of bleeding from nursing.     Recommendations:    - Diet per primary team  - Use a proton pump inhibitor IV BID fo 72 hours, the BID PO for 2 weeks, then once daily thereafter.   - Observe patient's clinical course.   - If  hemoglobin downtrending or having ongoing melena, consult IR for embolization. Clips are present on adherent clot.  - Please notify GI team if there is significant change in patient's clinical status          Thank you for your consult. After careful review of labs and patient current status with the GI staff and fellow, it has been decided that I will sign off. Please contact us if you have any additional questions.    Mara Harvey NP  Gastroenterology  [facility] ([place])

## 2023-08-26 ENCOUNTER — ANESTHESIA (OUTPATIENT)
Dept: SURGERY | Facility: HOSPITAL | Age: 58
DRG: 113 | End: 2023-08-26
Payer: MEDICARE

## 2023-08-26 ENCOUNTER — ANESTHESIA (OUTPATIENT)
Dept: ENDOSCOPY | Facility: HOSPITAL | Age: 58
DRG: 113 | End: 2023-08-26
Payer: MEDICARE

## 2023-08-26 ENCOUNTER — ANESTHESIA EVENT (OUTPATIENT)
Dept: ENDOSCOPY | Facility: HOSPITAL | Age: 58
DRG: 113 | End: 2023-08-26
Payer: MEDICARE

## 2023-08-26 ENCOUNTER — ANESTHESIA EVENT (OUTPATIENT)
Dept: SURGERY | Facility: HOSPITAL | Age: 58
DRG: 113 | End: 2023-08-26
Payer: MEDICARE

## 2023-08-26 LAB
ALBUMIN SERPL BCP-MCNC: 2.2 G/DL (ref 3.5–5.2)
ALP SERPL-CCNC: 96 U/L (ref 55–135)
ALT SERPL W/O P-5'-P-CCNC: <5 U/L (ref 10–44)
ANION GAP SERPL CALC-SCNC: 9 MMOL/L (ref 8–16)
AST SERPL-CCNC: 19 U/L (ref 10–40)
BASOPHILS # BLD AUTO: 0.06 K/UL (ref 0–0.2)
BASOPHILS # BLD AUTO: 0.06 K/UL (ref 0–0.2)
BASOPHILS NFR BLD: 0.6 % (ref 0–1.9)
BASOPHILS NFR BLD: 0.6 % (ref 0–1.9)
BILIRUB SERPL-MCNC: 0.4 MG/DL (ref 0.1–1)
BLD PROD TYP BPU: NORMAL
BLOOD UNIT EXPIRATION DATE: NORMAL
BLOOD UNIT TYPE CODE: 5100
BLOOD UNIT TYPE: NORMAL
BUN SERPL-MCNC: 24 MG/DL (ref 6–20)
C GATTII+NEOFOR DNA CSF QL NAA+NON-PROBE: NOT DETECTED
CALCIUM SERPL-MCNC: 9.1 MG/DL (ref 8.7–10.5)
CHLORIDE SERPL-SCNC: 105 MMOL/L (ref 95–110)
CLARITY CSF: ABNORMAL
CLARITY CSF: CLEAR
CMV DNA CSF QL NAA+NON-PROBE: NOT DETECTED
CO2 SERPL-SCNC: 21 MMOL/L (ref 23–29)
CODING SYSTEM: NORMAL
COLOR CSF: COLORLESS
COLOR CSF: COLORLESS
CREAT SERPL-MCNC: 4.8 MG/DL (ref 0.5–1.4)
CROSSMATCH INTERPRETATION: NORMAL
CSF TUBE NUMBER: 2
CSF TUBE NUMBER: 2
CSF, COMMENT: ABNORMAL
CSF, COMMENT: ABNORMAL
DIFFERENTIAL METHOD: ABNORMAL
DIFFERENTIAL METHOD: ABNORMAL
DISPENSE STATUS: NORMAL
E COLI K1 DNA CSF QL NAA+NON-PROBE: NOT DETECTED
EOSINOPHIL # BLD AUTO: 0.4 K/UL (ref 0–0.5)
EOSINOPHIL # BLD AUTO: 0.6 K/UL (ref 0–0.5)
EOSINOPHIL NFR BLD: 4.3 % (ref 0–8)
EOSINOPHIL NFR BLD: 6.3 % (ref 0–8)
ERYTHROCYTE [DISTWIDTH] IN BLOOD BY AUTOMATED COUNT: 17.9 % (ref 11.5–14.5)
ERYTHROCYTE [DISTWIDTH] IN BLOOD BY AUTOMATED COUNT: 18.1 % (ref 11.5–14.5)
ERYTHROCYTE [DISTWIDTH] IN BLOOD BY AUTOMATED COUNT: 18.1 % (ref 11.5–14.5)
EST. GFR  (NO RACE VARIABLE): 13.2 ML/MIN/1.73 M^2
EV RNA CSF QL NAA+NON-PROBE: NOT DETECTED
GLUCOSE CSF-MCNC: 52 MG/DL (ref 40–70)
GLUCOSE SERPL-MCNC: 70 MG/DL (ref 70–110)
GP B STREP DNA CSF QL NAA+NON-PROBE: NOT DETECTED
HAEM INFLU DNA CSF QL NAA+NON-PROBE: NOT DETECTED
HAEM INFLU DNA CSF QL NAA+NON-PROBE: NOT DETECTED
HCT VFR BLD AUTO: 22.2 % (ref 40–54)
HCT VFR BLD AUTO: 23.8 % (ref 40–54)
HCT VFR BLD AUTO: 24.1 % (ref 40–54)
HGB BLD-MCNC: 6.9 G/DL (ref 14–18)
HGB BLD-MCNC: 7.5 G/DL (ref 14–18)
HGB BLD-MCNC: 7.5 G/DL (ref 14–18)
HHV6 DNA CSF QL NAA+NON-PROBE: NOT DETECTED
HSV1 DNA CSF QL NAA+NON-PROBE: NOT DETECTED
HSV2 DNA CSF QL NAA+NON-PROBE: NOT DETECTED
IMM GRANULOCYTES # BLD AUTO: 0.05 K/UL (ref 0–0.04)
IMM GRANULOCYTES # BLD AUTO: 0.06 K/UL (ref 0–0.04)
IMM GRANULOCYTES NFR BLD AUTO: 0.5 % (ref 0–0.5)
IMM GRANULOCYTES NFR BLD AUTO: 0.6 % (ref 0–0.5)
LYMPHOCYTES # BLD AUTO: 0.8 K/UL (ref 1–4.8)
LYMPHOCYTES # BLD AUTO: 0.9 K/UL (ref 1–4.8)
LYMPHOCYTES NFR BLD: 7.6 % (ref 18–48)
LYMPHOCYTES NFR BLD: 9.2 % (ref 18–48)
LYMPHOCYTES NFR CSF MANUAL: 25 % (ref 40–80)
MCH RBC QN AUTO: 30.5 PG (ref 27–31)
MCH RBC QN AUTO: 30.7 PG (ref 27–31)
MCH RBC QN AUTO: 30.9 PG (ref 27–31)
MCHC RBC AUTO-ENTMCNC: 31.1 G/DL (ref 32–36)
MCHC RBC AUTO-ENTMCNC: 31.1 G/DL (ref 32–36)
MCHC RBC AUTO-ENTMCNC: 31.5 G/DL (ref 32–36)
MCV RBC AUTO: 98 FL (ref 82–98)
MCV RBC AUTO: 98 FL (ref 82–98)
MCV RBC AUTO: 99 FL (ref 82–98)
MONOCYTES # BLD AUTO: 1.1 K/UL (ref 0.3–1)
MONOCYTES # BLD AUTO: 1.2 K/UL (ref 0.3–1)
MONOCYTES NFR BLD: 10.5 % (ref 4–15)
MONOCYTES NFR BLD: 11.8 % (ref 4–15)
MONOS+MACROS NFR CSF MANUAL: 25 % (ref 15–45)
N MEN DNA CSF QL NAA+NON-PROBE: NOT DETECTED
NEUTROPHILS # BLD AUTO: 7.3 K/UL (ref 1.8–7.7)
NEUTROPHILS # BLD AUTO: 7.5 K/UL (ref 1.8–7.7)
NEUTROPHILS NFR BLD: 72.8 % (ref 38–73)
NEUTROPHILS NFR BLD: 75.2 % (ref 38–73)
NEUTROPHILS NFR CSF MANUAL: 50 % (ref 0–6)
NRBC BLD-RTO: 0 /100 WBC
NRBC BLD-RTO: 0 /100 WBC
NUM UNITS TRANS PACKED RBC: NORMAL
PARECHOVIRUS A RNA CSF QL NAA+NON-PROBE: NOT DETECTED
PLATELET # BLD AUTO: 150 K/UL (ref 150–450)
PLATELET # BLD AUTO: 150 K/UL (ref 150–450)
PLATELET # BLD AUTO: 153 K/UL (ref 150–450)
PMV BLD AUTO: 10.5 FL (ref 9.2–12.9)
PMV BLD AUTO: 11.1 FL (ref 9.2–12.9)
PMV BLD AUTO: 11.6 FL (ref 9.2–12.9)
POCT GLUCOSE: 101 MG/DL (ref 70–110)
POCT GLUCOSE: 72 MG/DL (ref 70–110)
POCT GLUCOSE: 76 MG/DL (ref 70–110)
POCT GLUCOSE: 84 MG/DL (ref 70–110)
POCT GLUCOSE: 84 MG/DL (ref 70–110)
POTASSIUM SERPL-SCNC: 4.6 MMOL/L (ref 3.5–5.1)
PROT CSF-MCNC: 26 MG/DL (ref 15–40)
PROT SERPL-MCNC: 6.2 G/DL (ref 6–8.4)
RBC # BLD AUTO: 2.26 M/UL (ref 4.6–6.2)
RBC # BLD AUTO: 2.43 M/UL (ref 4.6–6.2)
RBC # BLD AUTO: 2.44 M/UL (ref 4.6–6.2)
RBC # CSF: 1 /CU MM
RBC # CSF: 64 /CU MM
S PNEUM DNA CSF QL NAA+NON-PROBE: NOT DETECTED
SODIUM SERPL-SCNC: 135 MMOL/L (ref 136–145)
SPECIMEN VOL CSF: 3.5 ML
SPECIMEN VOL CSF: 4.5 ML
VZV DNA CSF QL NAA+NON-PROBE: NOT DETECTED
WBC # BLD AUTO: 10 K/UL (ref 3.9–12.7)
WBC # BLD AUTO: 10.01 K/UL (ref 3.9–12.7)
WBC # BLD AUTO: 9.36 K/UL (ref 3.9–12.7)
WBC # CSF: 1 /CU MM (ref 0–5)
WBC # CSF: 1 /CU MM (ref 0–5)

## 2023-08-26 PROCEDURE — 99233 PR SUBSEQUENT HOSPITAL CARE,LEVL III: ICD-10-PCS | Mod: GC,,, | Performed by: FAMILY MEDICINE

## 2023-08-26 PROCEDURE — 94761 N-INVAS EAR/PLS OXIMETRY MLT: CPT

## 2023-08-26 PROCEDURE — 99233 SBSQ HOSP IP/OBS HIGH 50: CPT | Mod: GC,,, | Performed by: FAMILY MEDICINE

## 2023-08-26 PROCEDURE — D9220A PRA ANESTHESIA: ICD-10-PCS | Mod: CRNA,,, | Performed by: NURSE ANESTHETIST, CERTIFIED REGISTERED

## 2023-08-26 PROCEDURE — 25000242 PHARM REV CODE 250 ALT 637 W/ HCPCS

## 2023-08-26 PROCEDURE — 37000008 HC ANESTHESIA 1ST 15 MINUTES

## 2023-08-26 PROCEDURE — D9220A PRA ANESTHESIA: Mod: ANES,,, | Performed by: ANESTHESIOLOGY

## 2023-08-26 PROCEDURE — 27000221 HC OXYGEN, UP TO 24 HOURS

## 2023-08-26 PROCEDURE — 36415 COLL VENOUS BLD VENIPUNCTURE: CPT

## 2023-08-26 PROCEDURE — 87529 HSV DNA AMP PROBE: CPT | Mod: 59 | Performed by: INTERNAL MEDICINE

## 2023-08-26 PROCEDURE — 25000003 PHARM REV CODE 250

## 2023-08-26 PROCEDURE — 87205 SMEAR GRAM STAIN: CPT

## 2023-08-26 PROCEDURE — 25000003 PHARM REV CODE 250: Performed by: NURSE ANESTHETIST, CERTIFIED REGISTERED

## 2023-08-26 PROCEDURE — 63600175 PHARM REV CODE 636 W HCPCS

## 2023-08-26 PROCEDURE — 62270 DX LMBR SPI PNXR: CPT | Performed by: STUDENT IN AN ORGANIZED HEALTH CARE EDUCATION/TRAINING PROGRAM

## 2023-08-26 PROCEDURE — 99900035 HC TECH TIME PER 15 MIN (STAT)

## 2023-08-26 PROCEDURE — 85025 COMPLETE CBC W/AUTO DIFF WBC: CPT

## 2023-08-26 PROCEDURE — P9016 RBC LEUKOCYTES REDUCED: HCPCS

## 2023-08-26 PROCEDURE — 82962 GLUCOSE BLOOD TEST: CPT

## 2023-08-26 PROCEDURE — D9220A PRA ANESTHESIA: Mod: CRNA,,, | Performed by: NURSE ANESTHETIST, CERTIFIED REGISTERED

## 2023-08-26 PROCEDURE — C9113 INJ PANTOPRAZOLE SODIUM, VIA: HCPCS

## 2023-08-26 PROCEDURE — 87102 FUNGUS ISOLATION CULTURE: CPT

## 2023-08-26 PROCEDURE — 87483 CNS DNA AMP PROBE TYPE 12-25: CPT | Performed by: INTERNAL MEDICINE

## 2023-08-26 PROCEDURE — 94640 AIRWAY INHALATION TREATMENT: CPT

## 2023-08-26 PROCEDURE — 21400001 HC TELEMETRY ROOM

## 2023-08-26 PROCEDURE — 99000 SPECIMEN HANDLING OFFICE-LAB: CPT

## 2023-08-26 PROCEDURE — 80053 COMPREHEN METABOLIC PANEL: CPT

## 2023-08-26 PROCEDURE — 87070 CULTURE OTHR SPECIMN AEROBIC: CPT

## 2023-08-26 PROCEDURE — 87206 SMEAR FLUORESCENT/ACID STAI: CPT

## 2023-08-26 PROCEDURE — S0166 INJ OLANZAPINE 2.5MG: HCPCS

## 2023-08-26 PROCEDURE — 85027 COMPLETE CBC AUTOMATED: CPT

## 2023-08-26 PROCEDURE — 87116 MYCOBACTERIA CULTURE: CPT

## 2023-08-26 PROCEDURE — 86592 SYPHILIS TEST NON-TREP QUAL: CPT | Performed by: INTERNAL MEDICINE

## 2023-08-26 PROCEDURE — 82945 GLUCOSE OTHER FLUID: CPT

## 2023-08-26 PROCEDURE — 36415 COLL VENOUS BLD VENIPUNCTURE: CPT | Performed by: INTERNAL MEDICINE

## 2023-08-26 PROCEDURE — 36430 TRANSFUSION BLD/BLD COMPNT: CPT

## 2023-08-26 PROCEDURE — 37000009 HC ANESTHESIA EA ADD 15 MINS

## 2023-08-26 PROCEDURE — 87076 CULTURE ANAEROBE IDENT EACH: CPT

## 2023-08-26 PROCEDURE — 87798 DETECT AGENT NOS DNA AMP: CPT | Performed by: INTERNAL MEDICINE

## 2023-08-26 PROCEDURE — 84157 ASSAY OF PROTEIN OTHER: CPT

## 2023-08-26 PROCEDURE — 89051 BODY FLUID CELL COUNT: CPT

## 2023-08-26 PROCEDURE — 63600175 PHARM REV CODE 636 W HCPCS: Performed by: FAMILY MEDICINE

## 2023-08-26 PROCEDURE — 71000033 HC RECOVERY, INTIAL HOUR

## 2023-08-26 PROCEDURE — 86403 PARTICLE AGGLUT ANTBDY SCRN: CPT | Performed by: INTERNAL MEDICINE

## 2023-08-26 PROCEDURE — D9220A PRA ANESTHESIA: ICD-10-PCS | Mod: ANES,,, | Performed by: ANESTHESIOLOGY

## 2023-08-26 PROCEDURE — 11000001 HC ACUTE MED/SURG PRIVATE ROOM

## 2023-08-26 PROCEDURE — 63600175 PHARM REV CODE 636 W HCPCS: Performed by: NURSE ANESTHETIST, CERTIFIED REGISTERED

## 2023-08-26 RX ORDER — OLANZAPINE 10 MG/2ML
5 INJECTION, POWDER, FOR SOLUTION INTRAMUSCULAR NIGHTLY PRN
Status: DISCONTINUED | OUTPATIENT
Start: 2023-08-26 | End: 2023-09-19 | Stop reason: HOSPADM

## 2023-08-26 RX ORDER — LIDOCAINE HYDROCHLORIDE 20 MG/ML
INJECTION INTRAVENOUS
Status: DISCONTINUED | OUTPATIENT
Start: 2023-08-26 | End: 2023-08-26

## 2023-08-26 RX ORDER — DROPERIDOL 2.5 MG/ML
0.62 INJECTION, SOLUTION INTRAMUSCULAR; INTRAVENOUS ONCE AS NEEDED
Status: DISCONTINUED | OUTPATIENT
Start: 2023-08-26 | End: 2023-08-26 | Stop reason: HOSPADM

## 2023-08-26 RX ORDER — SODIUM CHLORIDE 9 MG/ML
INJECTION, SOLUTION INTRAVENOUS ONCE
Status: DISCONTINUED | OUTPATIENT
Start: 2023-08-28 | End: 2023-09-06

## 2023-08-26 RX ORDER — QUETIAPINE FUMARATE 25 MG/1
50 TABLET, FILM COATED ORAL 2 TIMES DAILY
Status: DISCONTINUED | OUTPATIENT
Start: 2023-08-26 | End: 2023-08-30

## 2023-08-26 RX ORDER — PROPOFOL 10 MG/ML
VIAL (ML) INTRAVENOUS CONTINUOUS PRN
Status: DISCONTINUED | OUTPATIENT
Start: 2023-08-26 | End: 2023-08-26

## 2023-08-26 RX ORDER — HYDROMORPHONE HYDROCHLORIDE 1 MG/ML
0.2 INJECTION, SOLUTION INTRAMUSCULAR; INTRAVENOUS; SUBCUTANEOUS EVERY 5 MIN PRN
Status: DISCONTINUED | OUTPATIENT
Start: 2023-08-26 | End: 2023-08-26 | Stop reason: HOSPADM

## 2023-08-26 RX ORDER — ONDANSETRON 2 MG/ML
4 INJECTION INTRAMUSCULAR; INTRAVENOUS ONCE AS NEEDED
Status: DISCONTINUED | OUTPATIENT
Start: 2023-08-26 | End: 2023-08-26 | Stop reason: HOSPADM

## 2023-08-26 RX ORDER — QUETIAPINE FUMARATE 25 MG/1
50 TABLET, FILM COATED ORAL NIGHTLY
Status: DISCONTINUED | OUTPATIENT
Start: 2023-08-26 | End: 2023-08-26

## 2023-08-26 RX ORDER — ONDANSETRON 2 MG/ML
INJECTION INTRAMUSCULAR; INTRAVENOUS
Status: DISCONTINUED | OUTPATIENT
Start: 2023-08-26 | End: 2023-08-26

## 2023-08-26 RX ORDER — PROPOFOL 10 MG/ML
VIAL (ML) INTRAVENOUS
Status: DISCONTINUED | OUTPATIENT
Start: 2023-08-26 | End: 2023-08-26

## 2023-08-26 RX ORDER — HEPARIN SODIUM 5000 [USP'U]/ML
5000 INJECTION, SOLUTION INTRAVENOUS; SUBCUTANEOUS EVERY 8 HOURS
Status: DISCONTINUED | OUTPATIENT
Start: 2023-08-26 | End: 2023-08-27

## 2023-08-26 RX ADMIN — CALCITRIOL CAPSULES 0.25 MCG 0.25 MCG: 0.25 CAPSULE ORAL at 08:08

## 2023-08-26 RX ADMIN — ATORVASTATIN CALCIUM 40 MG: 40 TABLET, FILM COATED ORAL at 08:08

## 2023-08-26 RX ADMIN — OXYCODONE HYDROCHLORIDE 10 MG: 10 TABLET ORAL at 08:08

## 2023-08-26 RX ADMIN — METOCLOPRAMIDE 5 MG: 5 TABLET ORAL at 05:08

## 2023-08-26 RX ADMIN — IPRATROPIUM BROMIDE AND ALBUTEROL SULFATE 3 ML: .5; 3 SOLUTION RESPIRATORY (INHALATION) at 09:08

## 2023-08-26 RX ADMIN — SENNOSIDES AND DOCUSATE SODIUM 1 TABLET: 50; 8.6 TABLET ORAL at 08:08

## 2023-08-26 RX ADMIN — ACETAMINOPHEN 1000 MG: 500 TABLET ORAL at 08:08

## 2023-08-26 RX ADMIN — LIDOCAINE HYDROCHLORIDE 100 MG: 20 INJECTION INTRAVENOUS at 11:08

## 2023-08-26 RX ADMIN — ONDANSETRON 4 MG: 2 INJECTION INTRAMUSCULAR; INTRAVENOUS at 12:08

## 2023-08-26 RX ADMIN — OLANZAPINE 5 MG: 10 INJECTION, POWDER, FOR SOLUTION INTRAMUSCULAR at 09:08

## 2023-08-26 RX ADMIN — HEPARIN SODIUM 5000 UNITS: 5000 INJECTION INTRAVENOUS; SUBCUTANEOUS at 04:08

## 2023-08-26 RX ADMIN — ISOSORBIDE DINITRATE: 20 TABLET ORAL at 08:08

## 2023-08-26 RX ADMIN — THERA TABS 1 TABLET: TAB at 08:08

## 2023-08-26 RX ADMIN — MEROPENEM 500 MG: 500 INJECTION INTRAVENOUS at 04:08

## 2023-08-26 RX ADMIN — PANTOPRAZOLE SODIUM 40 MG: 40 INJECTION, POWDER, FOR SOLUTION INTRAVENOUS at 08:08

## 2023-08-26 RX ADMIN — QUETIAPINE FUMARATE 50 MG: 25 TABLET ORAL at 08:08

## 2023-08-26 RX ADMIN — CARVEDILOL 6.25 MG: 6.25 TABLET, FILM COATED ORAL at 08:08

## 2023-08-26 RX ADMIN — TOBRAMYCIN AND DEXAMETHASONE 1 DROP: 3; 1 SUSPENSION/ DROPS OPHTHALMIC at 04:08

## 2023-08-26 RX ADMIN — ONDANSETRON 8 MG: 8 TABLET, ORALLY DISINTEGRATING ORAL at 04:08

## 2023-08-26 RX ADMIN — SODIUM CHLORIDE: 0.9 INJECTION, SOLUTION INTRAVENOUS at 11:08

## 2023-08-26 RX ADMIN — METOCLOPRAMIDE 5 MG: 5 TABLET ORAL at 04:08

## 2023-08-26 RX ADMIN — Medication 6 MG: at 08:08

## 2023-08-26 RX ADMIN — TOBRAMYCIN AND DEXAMETHASONE: 3; 1 OINTMENT OPHTHALMIC at 04:08

## 2023-08-26 RX ADMIN — ONDANSETRON 8 MG: 8 TABLET, ORALLY DISINTEGRATING ORAL at 05:08

## 2023-08-26 RX ADMIN — FLUCONAZOLE 400 MG: 200 TABLET ORAL at 08:08

## 2023-08-26 RX ADMIN — OXYCODONE HYDROCHLORIDE 10 MG: 10 TABLET ORAL at 04:08

## 2023-08-26 RX ADMIN — HEPARIN SODIUM 5000 UNITS: 5000 INJECTION INTRAVENOUS; SUBCUTANEOUS at 08:08

## 2023-08-26 RX ADMIN — TOBRAMYCIN AND DEXAMETHASONE: 3; 1 OINTMENT OPHTHALMIC at 08:08

## 2023-08-26 RX ADMIN — PROCHLORPERAZINE EDISYLATE 10 MG: 5 INJECTION INTRAMUSCULAR; INTRAVENOUS at 08:08

## 2023-08-26 RX ADMIN — OLANZAPINE 5 MG: 10 INJECTION, POWDER, FOR SOLUTION INTRAMUSCULAR at 02:08

## 2023-08-26 RX ADMIN — IPRATROPIUM BROMIDE AND ALBUTEROL SULFATE 3 ML: .5; 3 SOLUTION RESPIRATORY (INHALATION) at 08:08

## 2023-08-26 RX ADMIN — LOSARTAN POTASSIUM 100 MG: 50 TABLET, FILM COATED ORAL at 08:08

## 2023-08-26 RX ADMIN — PROPOFOL 50 MCG/KG/MIN: 10 INJECTION, EMULSION INTRAVENOUS at 11:08

## 2023-08-26 RX ADMIN — ISOSORBIDE DINITRATE: 20 TABLET ORAL at 04:08

## 2023-08-26 RX ADMIN — METOCLOPRAMIDE 5 MG: 5 TABLET ORAL at 10:08

## 2023-08-26 RX ADMIN — TOBRAMYCIN AND DEXAMETHASONE 1 DROP: 3; 1 SUSPENSION/ DROPS OPHTHALMIC at 08:08

## 2023-08-26 RX ADMIN — IPRATROPIUM BROMIDE AND ALBUTEROL SULFATE 3 ML: .5; 3 SOLUTION RESPIRATORY (INHALATION) at 05:08

## 2023-08-26 RX ADMIN — SEVELAMER CARBONATE 0.8 G: 0.8 POWDER, FOR SUSPENSION ORAL at 04:08

## 2023-08-26 RX ADMIN — QUETIAPINE FUMARATE 50 MG: 25 TABLET ORAL at 10:08

## 2023-08-26 RX ADMIN — ONDANSETRON 8 MG: 8 TABLET, ORALLY DISINTEGRATING ORAL at 10:08

## 2023-08-26 RX ADMIN — PROPOFOL 30 MG: 10 INJECTION, EMULSION INTRAVENOUS at 11:08

## 2023-08-26 NOTE — AI DETERIORATION ALERT
"RAPID RESPONSE NURSE AI ALERT       AI alert received.    Chart Reviewed: 08/26/2023, 12:38 AM    MRN: [id number]  Bed: 52817/76549 A    Dx: <principal problem not specified>    Immanuel Bernal has a past medical history of Bilateral retinal detachment, BPH (benign prostatic hyperplasia), Cataract, CHF (congestive heart failure), CKD (chronic kidney disease) stage 3, GFR 30-59 ml/min, Diabetes mellitus, type 2, Hypertension, KENIA (obstructive sleep apnea), Pancreatitis, Persistent proteinuria, PTSD (post-traumatic stress disorder), and Stroke.    Last VS: BP (!) 147/69   Pulse 83   Temp 98.6 °F (37 °C) (Axillary)   Resp 17   Ht 5' 10" (1.778 m)   Wt 62.8 kg (138 lb 7.2 oz)   SpO2 (!) 93%   BMI 19.87 kg/m²     24H Vital Sign Range:  Temp:  [98.3 °F (36.8 °C)-98.9 °F (37.2 °C)]   Pulse:  [66-83]   Resp:  [4-30]   BP: (147-188)/()   SpO2:  [84 %-98 %]     Level of Consciousness (AVPU): alert    Recent Labs     08/24/23  2344 08/25/23  0800 08/25/23  1841   WBC 9.96 9.96 8.77   HGB 7.4* 8.7* 7.6*   HCT 24.5* 27.7* 24.2*    183 157       Recent Labs     08/24/23  1458 08/24/23  2344 08/25/23  0529 08/25/23  0800   * 133* 129* 129*   K 5.0 5.2* 5.4* 5.1    103 100 100   CO2 19* 19* 18* 16*   BUN 41* 42* 40* 40*   CREATININE 7.6* 7.3* 7.6* 7.5*   GLU 70 57* 91 69*   PHOS 4.3 5.0* 4.8*  --    MG 2.5 2.6 2.6  --         Recent Labs     08/24/23  0836   PH 7.449   PCO2 31.1*   PO2 63*   HCO3 21.6*   POCSATURATED 93*   BE -2        OXYGEN:  Flow (L/min): 2  Oxygen Concentration (%): 4       MEWS score: 1    bedside RN, David  contacted. No concerns verbalized at this time. States patient is confused. Instructed to call [phone] for further concerns or assistance.    LUIS Hernandez RN        "

## 2023-08-26 NOTE — ASSESSMENT & PLAN NOTE
POCT glucose  LDSS  -180 target    Running slightly on the lower end: BG POCT is 75 >>> 96 >> 101  No event of actual hypoglycemia inpatient    Plan:  Continue to monitor  In the event that his BG drops lower than 70 patient's hypoglycemic medications will be regulated again

## 2023-08-26 NOTE — PLAN OF CARE
Problem: Adult Inpatient Plan of Care  Goal: Plan of Care Review  Outcome: Ongoing, Progressing  Goal: Patient-Specific Goal (Individualized)  Outcome: Ongoing, Progressing  Goal: Absence of Hospital-Acquired Illness or Injury  Outcome: Ongoing, Progressing     Problem: Fall Injury Risk  Goal: Absence of Fall and Fall-Related Injury  Outcome: Ongoing, Progressing     Problem: Adult Inpatient Plan of Care  Goal: Optimal Comfort and Wellbeing  Outcome: Ongoing, Not Progressing  Goal: Readiness for Transition of Care  Outcome: Ongoing, Not Progressing     Problem: Coping Ineffective  Goal: Effective Coping  Outcome: Ongoing, Not Progressing    Pt remains stable. Agitation has not improved. Pt messing with packing in left eye. Sister at bedside with own theories as to what is medically going on with pt. Seems to possibly be contributing to pts agitation.  MRI with sedation and EEG planned for today.

## 2023-08-26 NOTE — CODE/ RAPID DOCUMENTATION
"RAPID RESPONSE NURSE CHART REVIEW       Chart Reviewed: 08/26/2023, 9:33 AM    MRN: [id number]  Bed: 78755/62834 A    Dx: endophthalmitis    Immanuel Bernal has a past medical history of Bilateral retinal detachment, BPH (benign prostatic hyperplasia), Cataract, CHF (congestive heart failure), CKD (chronic kidney disease) stage 3, GFR 30-59 ml/min, Diabetes mellitus, type 2, Hypertension, KENIA (obstructive sleep apnea), Pancreatitis, Persistent proteinuria, PTSD (post-traumatic stress disorder), and Stroke.    Last VS: BP (!) 173/82 (BP Location: Right arm, Patient Position: Lying)   Pulse 78   Temp 97.8 °F (36.6 °C) (Axillary)   Resp 20   Ht 5' 10" (1.778 m)   Wt 62.8 kg (138 lb 7.2 oz)   SpO2 99%   BMI 19.87 kg/m²     24H Vital Sign Range:  Temp:  [97.8 °F (36.6 °C)-98.6 °F (37 °C)]   Pulse:  [69-83]   Resp:  [4-30]   BP: (147-186)/()   SpO2:  [84 %-100 %]     Level of Consciousness (AVPU): alert    Recent Labs     08/25/23  0800 08/25/23  1841 08/26/23  0601   WBC 9.96 8.77 9.36   HGB 8.7* 7.6* 7.5*   HCT 27.7* 24.2* 23.8*    157 150       Recent Labs     08/24/23  1458 08/24/23  2344 08/25/23  0529 08/25/23  0800 08/26/23  0601   * 133* 129* 129* 135*   K 5.0 5.2* 5.4* 5.1 4.6    103 100 100 105   CO2 19* 19* 18* 16* 21*   BUN 41* 42* 40* 40* 24*   CREATININE 7.6* 7.3* 7.6* 7.5* 4.8*   GLU 70 57* 91 69* 70   PHOS 4.3 5.0* 4.8*  --   --    MG 2.5 2.6 2.6  --   --         Recent Labs     08/24/23  0836   PH 7.449   PCO2 31.1*   PO2 63*   HCO3 21.6*   POCSATURATED 93*   BE -2        OXYGEN:  Flow (L/min): 2  Oxygen Concentration (%): 28       MEWS score: 1    Rounding completed with charge SWAPNIL Moreno. No concerns verbalized at this time. Instructed to call [phone] for further concerns or assistance.    Xuan Eastman RN       "

## 2023-08-26 NOTE — PROGRESS NOTES
[facility] ([address] Medicine  Progress Note    Patient Name: Immanuel Bernal  MRN: [id number]  Patient Class: IP- Inpatient   Admission Date: 8/9/2023  Length of Stay: 17 days  Attending Physician: Michel Mcbride III*  Primary Care Provider: Dolly, Primary Doctor        Subjective:     Principal Problem:<principal problem not specified>        HPI:  Pt is a 56 yo man w/ PMH of PEG tube, small bowel obstruction s/p bowel resection, ESRD on HD, stroke, endophthalmitis s/p left eye enucleation who was found to have an episode of melena overnight. Per nurse, he had a bowel movement that was dark and tarry. He also had an episode of emesis overnight that had no blood in it. Pt's sister endorses sweating, nausea, vomiting, dizziness, and lightheadedness. Denies fever, abdominal pain, syncope. He has taken eliquis the last 5 days. His most recent colonoscopy was 2008       Overview/Hospital Course:  Patient was rate controlled and therefore flecainide held. His GCS is 13/15. Oculopalstics were consulted and so were the nephrology teams and IR. He underwent dialysis on his first day. Was scheduled for surgery today but his family refused to have it done until she reviewed the MRI herself first. This means that the surgeon will only be available next Wednesday in order to allow her to view and discuss the MRI. He has a peg tube in place that has not been utilzied prior. Heart failure medications have been reinstated and is being covered by vancomycin and meropenem for the endopthalmitis. Multidisciplinary session was conducted with his POA in regards to his operation, she accepted the surgery if it was emergent, otherwise to be done this upcoming Wednesday. Patient's delirium/agitation worsened so he was transferred to the ICU until his clinical situation settled. He then was stepped down to our care and ophthalmology will be conducting his eye procedure on him as planned. Underwent L eye  enucleation on 8/16. Started the tobramycin ointment and drops. Following ID recs regarding antimicrobial regimen; operative cultures returned with yeast thus patient was also started on fluconazole. Patient's sister noted us that he was diagnosed with central apnea and KENIA in the past of which he used a CPAP/BiPAP for. No concern of lack of oxygenation during his inpatient stay. Labs are underway to detect any element of CO2 retention although he had no clinical signs of that. Patient was ready to be transferred to rehab when he suddenly developed one single bout of melena associated with hemoglobin drop. He received 1 pint PRBC and underwent EGD which showed large DU with overlying clots and 2 clips were placed. Patient was found delirious again and desaturated <90%. CXR and chest were clear. Ct head showed no evidence of acute insult. CTPA not diagnostic of PE. Patient had another bout of coffee ground vomitus but no drop of hemoglobin neither     Disposition: referrals to rehab have been sent. Patient's medicare days have been exhausted. Medicaid days are available to be used for rehab.      Interval History: kept pulling his eye packing out. Did not sleep well. Was fed through tube feeds.     Review of Systems   Constitutional:  Positive for activity change and appetite change.   Eyes:  Positive for discharge.   Respiratory:  Negative for cough, choking, chest tightness, shortness of breath and wheezing.    Cardiovascular:  Negative for chest pain, palpitations and leg swelling.   Gastrointestinal:  Negative for abdominal distention and abdominal pain.   Neurological: Negative.    Psychiatric/Behavioral:  Positive for agitation and behavioral problems.      Objective:     Vital Signs (Most Recent):  Temp: 98.8 °F (37.1 °C) (08/26/23 1415)  Pulse: 61 (08/26/23 1415)  Resp: 15 (08/26/23 1415)  BP: (!) 153/78 (08/26/23 1415)  SpO2: 99 % (08/26/23 1415) Vital Signs (24h Range):  Temp:  [97.8 °F (36.6 °C)-98.8 °F  (37.1 °C)] 98.8 °F (37.1 °C)  Pulse:  [61-83] 61  Resp:  [15-20] 15  SpO2:  [93 %-100 %] 99 %  BP: (147-178)/() 153/78     Weight: 62.8 kg (138 lb 7.2 oz)  Body mass index is 19.87 kg/m².    Intake/Output Summary (Last 24 hours) at 8/26/2023 1544  Last data filed at 8/25/2023 1915  Gross per 24 hour   Intake 60 ml   Output --   Net 60 ml         Physical Exam  Eyes:      Pupils: Pupils are equal, round, and reactive to light.   Cardiovascular:      Rate and Rhythm: Normal rate and regular rhythm.      Pulses: Normal pulses.      Heart sounds: Normal heart sounds.   Pulmonary:      Effort: Pulmonary effort is normal.      Breath sounds: Normal breath sounds.   Abdominal:      General: Bowel sounds are normal.      Palpations: Abdomen is soft.   Neurological:      General: No focal deficit present.      Mental Status: He is alert. He is disoriented.             Significant Labs: All pertinent labs within the past 24 hours have been reviewed.    Significant Imaging: I have reviewed all pertinent imaging results/findings within the past 24 hours.      Assessment/Plan:      Delirium  Patient was found to be delirious this morning and not following our commands and slightly agitated.    Maintaining appropriate saturation  CT head: no acute insult  CTPA:  No definite evidence of pulmonary arterial thromboembolism to the level of the segmental arteries bilaterally.  Although the subsegmental arteries evaluation is limited the right upper lobe lateral subsegmental artery abruptly terminates its trajectory which could represent a distal pulmonary emboli.  Patient had a bout of coffee ground emesis yesterday, no intervention from Gi as he is vitally stable and hgb did not drop    CTA chest done    CT head sent: no acute brain insult  Dialysis done 08/25     CXR and chest exam clear    MRI orbits: evolving orbital cellulitis  MRI head: no evidence of acute brain insult      Plan:  Contact ophthalmology regarding  opinion  Trace EEG  followup LP  May recontact ID team tomorrow for possible change in antibiotics given that patient did not display CNS infection in new MRI?        Melena  Latest hemoglobin = 7.3 (post transfusion) >> 7.4 >> 7.5 latest    Underwent EGD which yielded a large DU with overlying clot, Gi were not able to penetrate beneath the clot to determine presence of any bleeding vessel, 2 clips placed.   The PEG was removed because it was not  functioning, and replaced with an externally removable PEG with internal balloon bumper.   Patient hemoglobin dropped below 7 today, transfused blood, contacted GI: since no active melena no need for GI or IR intervention  Patient had another bout of coffee ground vomitus, consulted Gi since he is vitally stable no need for scope yesterday    Recommendation:      -  If hemoglobin downtrending or having ongoing  melena, consult Gastro or IR for embolization. Clips are present on adherent clot.  - clear liquid diet  - Trend H&H, transfuse for Hgb > 7, unless otherwise indicated  -IV [id number] BID  - Maintain IV access with 2 large bore Ivs  - Hold all NSAIDs and anticoagulants, unless contraindicated  -correct any coagulopathy with platelets and FFP for goal of platelets >50K and INR <2.0            Difficulty in urination  Element of BPH displayed  Producing good urine, but facing slight difficulty emptying    Plan:  Start flomax 0.4 daily po      Redness and swelling of upper arm  Had staples in right upper arm from transfer hospital (the fistula clotted and had pseudomonas aurgenosa, and bacteremia), patient signed AMA before intervention was yielded there    Underwent Ultrasound US, findings:  1. Nonocclusive DVT in the right subclavian and axillary veins.  Catheter in the right subclavian vein.  2. 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    Seen by urology team:  Staples cannot be removed now as the skin is now  adhering fully yet  Wound appears to be having dehiscence, for wound care team, appreciate recs  Vascular consult sent for plan of removal of staples after discharge      Plan:  Wound care consult sent, appreciate recs  Anticoagulation held due to melena    Hypoglycemia  Latest POCT glucose is 96  Oral diet switched to ckear fluid pre-EGD    Plan:  Follow glucose and notifiy if drops below <70      PEG (percutaneous endoscopic gastrostomy) status  On peg tube feeds now  Tolerating well  Able to take in PO per SLP assessment on 8/15, on regular diet with thin liquids  Will reduce tube feeds gradually as patient takes in more PO    Peg tube was found to be blocked yesterday, nursing staff were able to unclog the PEG tube  SLP evaluated patient : can restart oral feeds    Patient was able to tolerate oral feeds last night without feeling nauseous or vomiting  Nutrition team was consulted regarding aid with tube feeds in case the patient needs to be switched to TF. Appreciate  Their recs. If patient vomits or cant tolerate oral feeds will switch to TF  Was replaced by a new one during endoscopy by Gi team.  >> currently receiving medications through the tube    Plan:  Will continue to attempt oral feeds    Encounter for palliative care        Anemia in ESRD (end-stage renal disease)  EPO per nephrology  hgb dropped due to melena this morning, transfused one pint PRBC after consent was taken        Severe malnutrition  Nutrition consulted. Most recent weight and BMI monitored-     Measurements:  Wt Readings from Last 1 Encounters:   08/20/23 62.8 kg (138 lb 7.2 oz)   Body mass index is 19.87 kg/m².    Patient has been screened and assessed by RD.    Malnutrition Type:  Context: chronic illness  Level: severe    Malnutrition Characteristic Summary:  Weight Loss (Malnutrition): greater than 10% in 6 months  Energy Intake (Malnutrition): other (see comments) (LAMONT)  Subcutaneous Fat (Malnutrition): severe depletion  Muscle  Mass (Malnutrition): severe depletion    Interventions/Recommendations (treatment strategy):  1.     Able to take in PO per SLP assessment on 8/15, on regular diet with thin liquids  Will reduce tube feeds gradually as patient takes in more PO    Endophthalmitis  Ophthalmology team on board and were going to perform the procedure but the patient's family refused without being around to review the MRI which delayed the procedure till next wednesday    MRI repeat : Diffuse inflammatory change involving the left globe, with associated thickening of the left sclera, proptosis, and focal fluid collection within the left posterior compartment, which demonstrates diffusion restriction.  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  Diffuse left preseptal and postseptal inflammatory change, with superimposed orbital cellulitis also considered.  Evaluation of the left orbital apex is limited secondary to no contrast administration.  Decreased size of the left anterior compartment with bowing of the visualized left lens.  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    Thorough counseling undertaken with POA and IM team, palliative, opthalmo and nursing team in regards to the surgery  She agreed for her brother to undergo the procedure as an emergent case if his health deteriorates  Otherwise to be scheduled on wednesday    Underwent evisceration of his left eye yesterday, tobramycin ordered, ID recs are being followed  Still on meronema and vancomycin IV  Wbc: 8.07 (normal)  hgb post op 8.8 (at [facility])  No signs of distress or delirium  Culture of eye discharge grew yeast, started flucanazole to antibiotic regimen  Follow cultures    Pending Rehab placement as patient and his sister unanimously agreed that he does not desire LTAC  Negotiations with ID team and pharmacy team in regards to his antibiotics, whether they can be  administered via the dialysis line or not, pending opinion    Packing this Thursday and meeting on the following Monday.  Meronem is discontinued and replaced by cefepime in order to be able to administer it on dialysis  Cefepime was dicontinued as patient became delirious and went back to meronem  Placement papers done, can stay in rehab using his Medicaid days    As per ID:  ID reconsulted for acute encephalopathy. Etiology remains unclear. Was on cefepime which can cause neurotoxicity.  CTH stable.   Agree with switching cefepime back to meropenem   Continue  vancomycin.   Continue fluconazole.   Plan to treat as CNS infection as MRI was non diagnostic. Anticipated 4-6 weeks of therapy from evisceration. Anticipated end date: 9/12-9/26.   Consider EEG     Plan:  Follow ID recommendations  Seen by opthalmo today for packing but patient was too agitated, will visit again later today    ESRD (end stage renal disease)  Nephrology consulted for HD needs while inpatient  Underwent SLED post MRI contrast (gadolinium)   Ad last dialysis was 08/18: Tolerated HD, UF 2500ml  Last dialysis 08/21  CRT today 6.4 >> 6.2 >> 6.6 >> 6.8 >> 5.0 >> 6.1    Electrolytes normal      Plan:  Will be followed by nephro, follow up with their recommendations regarding dialysis      Atrial fibrillation  History of AF noted however no EKG here with AF  Not on AC due to lack of definitive evidence of AF  Continue carvedilol  eliquis indication is the non-occlusive DVT in right upper arm veins      Plan:  Received curshed eliquis through peg tube last night    Hyperlipidemia  Home statin    Chronic diastolic heart failure  Continue to monitor for signs of volume overload; volume removal with dialysis    Hypertension  Hypertensive on arrival  bp today: 177/82  Home medications: imdur Carvedilol Hydralazine Nifedipine  Patient is agitated today which can explain his elevated BP readings    Plan:  On hydralazine And losartan and carvedilol in  patient, attempting to uptitrate GDMT  Reduced carvedilol dose to half the dose (12.5)  Will try to sedate patient with PRN medications    Stroke  Continue home statin  Delerium precautions  PT OT      MRI head 08/26: no evidence of acute brain insult     Diabetes mellitus  POCT glucose  LDSS  -180 target    Running slightly on the lower end: BG POCT is 75 >>> 96 >> 101  No event of actual hypoglycemia inpatient    Plan:  Continue to monitor  In the event that his BG drops lower than 70 patient's hypoglycemic medications will be regulated again      VTE Risk Mitigation (From admission, onward)         Ordered     heparin (porcine) injection 5,000 Units  Every 8 hours         08/26/23 0941     heparin (porcine) injection 1,000 Units  As needed (PRN)         08/10/23 1046     IP VTE HIGH RISK PATIENT  Once         08/09/23 1317     Place sequential compression device  Until discontinued         08/09/23 1317                Discharge Planning   TOBY: 8/28/2023     Code Status: Full Code   Is the patient medically ready for discharge?: No    Reason for patient still in hospital (select all that apply): Treatment  Discharge Plan A: Rehab   Discharge Delays: (!) Procedure Scheduling (IR, OR, Labs, Echo, Cath, Echo, EEG) (eye surgery next week)              Sharon Law MD  Department of Hospital Medicine   [facility] - Intensive Care ([place])

## 2023-08-26 NOTE — ANESTHESIA PREPROCEDURE EVALUATION
[facility]  Anesthesia Pre-Operative Evaluation         Patient Name: Immanuel Bernal  YOB: 1965  MRN: [id number]    SUBJECTIVE:     Pre-operative evaluation for Procedure(s) (LRB):  MRI (Magnetic Resonance Imagine) (N/A)  Scheduled for 8/26/2023    HPI 08/26/2023:  Immanuel Bernal is a 57 y.o. male with hx of  ESRD, HFrEF 30-35%, DM2, HTN, bowel obstruction s/p bowel resection, KENIA, CVA, s/p PEG placement (usually tolerates PO diet), AFib (s/p cardioversion 2022 per sister, unclear hx of AC). Admitted with endophthalmitis s/p L eye evisceration. Hospital course c/b GIB (stopped eliquis, now stable) and encephalopathy. Presents for above procedure.    Prev airway:   [place] 3, Grade I view    Oxygen/Ventilation Requirements:  2LNC  Oxygen Concentration (%):  [2-28] 28        Patient Active Problem List   Diagnosis    Diabetes mellitus    Stroke    Pancreatitis    Hypertension    Caries    Chronic diastolic heart failure    Hemiplegia    Hyperlipidemia    Atrial fibrillation    Benign prostate hyperplasia    Major depressive disorder    Bilateral retinal detachment    ESRD (end stage renal disease)    Endophthalmitis    Severe malnutrition    Anemia in ESRD (end-stage renal disease)    Encounter for palliative care    PEG (percutaneous endoscopic gastrostomy) status    Hypoglycemia    Redness and swelling of upper arm    Difficulty in urination    Impaired mobility    Melena    Delirium       Review of patient's allergies indicates:   Allergen Reactions    Morphine Rash    Amiodarone analogues Itching     Other reaction(s): Unknown       Outpatient Medications:  No current facility-administered medications on file prior to encounter.     Current Outpatient Medications on File Prior to Encounter   Medication Sig Dispense Refill    acetaminophen (TYLENOL) 325 MG tablet Take 650 mg by mouth every 6 (six) hours as needed for Pain.       albuterol-ipratropium (DUO-NEB) 2.5 mg-0.5 mg/3 mL nebulizer solution Inhale 3 mLs into the lungs 3 (three) times daily.      ascorbic acid, vitamin C, (VITAMIN C) 500 MG tablet Take 500 mg by mouth once daily.      aspirin 81 MG Chew Take 81 mg by mouth once daily.      calcium carbonate-vitamin D3 (OYSTER SHELL CALCIUM-VIT D3) 500 mg-5 mcg (200 unit) PwPk Take 1 tablet by mouth 2 (two) times a day.      carvediloL (COREG) 25 MG tablet Take 1 tablet (25 mg total) by mouth 2 (two) times daily with meals. 60 tablet 6    folic acid (FOLVITE) 1 MG tablet Take 1 tablet by mouth every morning.      megestroL (MEGACE) 400 mg/10 mL (40 mg/mL) Susp Take 10 mLs by mouth 2 (two) times a day.      multivitamin (THERAGRAN) per tablet Take 1 tablet by mouth once daily.      pantoprazole (PROTONIX) 40 MG tablet Take 40 mg by mouth once daily.      polyethylene glycol (GLYCOLAX) 17 gram/dose powder Take 17 g by mouth 2 (two) times daily.      sevelamer carbonate (RENVELA) 800 mg Tab Take 1 tablet by mouth 5 times per day      tamsulosin (FLOMAX) 0.4 mg Cap Take 1 capsule (0.4 mg total) by mouth once daily. 30 capsule 6    VITAMIN B COMPLEX ORAL Take 1 tablet by mouth every morning.      zinc gluconate 50 mg tablet Take 50 mg by mouth once daily.          Current Inpatient Medications:   [START ON 8/28/2023] sodium chloride 0.9%   Intravenous Once    acetaminophen  1,000 mg Per G Tube BID    albuterol-ipratropium  3 mL Nebulization Q6H WAKE    atorvastatin  40 mg Per G Tube Daily    calcitRIOL  0.25 mcg Per G Tube Daily    carvediloL  6.25 mg Per G Tube BID    epoetin thee (PROCRIT) injection  3,000 Units Intravenous Every Mon, Wed, Fri    fluconazole  400 mg Per G Tube Daily    heparin (porcine)  5,000 Units Subcutaneous Q8H    hydrALAZINE 10 mg 1 tablet, hydrALAZINE 25 mg 1 tablet, isorsorbide dinitrate 20 mg 1 tablet combination   Per G Tube TID    losartan  100 mg Per G Tube Daily    melatonin  6 mg  Oral Nightly    meropenem (MERREM) IVPB  500 mg Intravenous Q12H    metoclopramide HCl  5 mg Per G Tube TID AC    multivitamin  1 tablet Per G Tube Daily    ondansetron  8 mg Oral TID AC    pantoprazole  40 mg Intravenous BID    polyethylene glycol  17 g Per G Tube BID    QUEtiapine  50 mg Per G Tube BID    senna-docusate 8.6-50 mg  1 tablet Per G Tube BID    sevelamer carbonate  0.8 g Per G Tube TID WM    tobramycin-dexAMETHasone 0.3-0.1%   Left Eye TID    tobramycin-dexAMETHasone 0.3-0.1%  1 drop Both Eyes QID       Past Surgical History:   Procedure Laterality Date    CATARACT EXTRACTION Bilateral     ESOPHAGOGASTRODUODENOSCOPY N/A 8/23/2023    Procedure: EGD (ESOPHAGOGASTRODUODENOSCOPY);  Surgeon: Bharath Ya MD;  Location: [address]);  Service: Endoscopy;  Laterality: N/A;    EVISCERATION OF OCULAR CONTENTS Left 8/16/2023    Procedure: EVISCERATION, OCULAR CONTENTS;  Surgeon: Vicki Stewart MD;  Location: [address];  Service: Ophthalmology;  Laterality: Left;    RETROBULBAR INJECTION OF MEDICATION Left 8/16/2023    Procedure: INJECTION, MEDICATION, RETROBULBAR;  Surgeon: Vicki Stewart MD;  Location: [address];  Service: Ophthalmology;  Laterality: Left;    TARSORRHAPHY Left 8/16/2023    Procedure: BLEPHARORRHAPHY;  Surgeon: Vicki Stewart MD;  Location: [address];  Service: Ophthalmology;  Laterality: Left;       Social History     Socioeconomic History    Marital status: Single   Tobacco Use    Smoking status: Former     Types: Cigarettes, Cigars    Smokeless tobacco: Never   Substance and Sexual Activity    Alcohol use: Not Currently    Drug use: Not Currently     Social Determinants of Health     Financial Resource Strain: Low Risk  (8/10/2023)    Overall Financial Resource Strain (CARDIA)     Difficulty of Paying Living Expenses: Not very hard   Food Insecurity: No Food Insecurity (8/10/2023)    Hunger Vital Sign     Worried About Running Out of  Food in the Last Year: Never true     Ran Out of Food in the Last Year: Never true   Transportation Needs: No Transportation Needs (8/10/2023)    PRAPARE - Transportation     Lack of Transportation (Medical): No     Lack of Transportation (Non-Medical): No   Physical Activity: Insufficiently Active (8/10/2023)    Exercise Vital Sign     Days of Exercise per Week: 5 days     Minutes of Exercise per Session: 20 min   Stress: Stress Concern Present (8/10/2023)    [employer] - Occupational Stress Questionnaire     Feeling of Stress : Rather much   Social Connections: Socially Isolated (8/10/2023)    Social Connection and Isolation Panel [NHANES]     Frequency of Communication with Friends and Family: Twice a week     Frequency of Social Gatherings with Friends and Family: Once a week     Attends Pentec[employer]ism Services: Never     Active Member of Clubs or Organizations: No     Attends Club or Organization Meetings: Never     Marital Status: Never    Housing Stability: Unknown (8/10/2023)    Housing Stability Vital Sign     Unable to Pay for Housing in the Last Year: No     Unstable Housing in the Last Year: No       OBJECTIVE:     Weight:  Wt Readings from Last 1 Encounters:   08/23/23 62.8 kg (138 lb 7.2 oz)     Body mass index is 19.87 kg/m².    Recent Blood Pressure Readings:  BP Readings from Last 3 Encounters:   08/26/23 (!) 173/82   08/08/23 121/66   10/01/21 138/64       Vital Signs Range (Last 24H):  Temp:  [36.6 °C (97.8 °F)-37 °C (98.6 °F)]   Pulse:  [69-83]   Resp:  [4-30]   BP: (147-186)/()   SpO2:  [84 %-100 %]       CBC:   Lab Results   Component Value Date    WBC 10.00 08/26/2023    HGB 7.5 (L) 08/26/2023    HCT 24.1 (L) 08/26/2023    MCV 99 (H) 08/26/2023     08/26/2023       CMP:     Chemistry        Component Value Date/Time     (L) 08/26/2023 0601    K 4.6 08/26/2023 0601     08/26/2023 0601    CO2 21 (L) 08/26/2023 0601    BUN 24  (H) 08/26/2023 0601    CREATININE 4.8 (H) 08/26/2023 0601    GLU 70 08/26/2023 0601        Component Value Date/Time    CALCIUM 9.1 08/26/2023 0601    ALKPHOS 96 08/26/2023 0601    AST 19 08/26/2023 0601    ALT <5 (L) 08/26/2023 0601    BILITOT 0.4 08/26/2023 0601    ESTGFRAFRICA 11 07/07/2023 0718    ESTGFRAFRICA 28.9 (A) 11/12/2020 1300    EGFRNONAA 25.0 (A) 11/12/2020 1300            INR:  Lab Results   Component Value Date    INR 1.2 08/09/2023       Diagnostic Studies:      EKG:    Results for orders placed or performed during the hospital encounter of 08/09/23   EKG 12-lead    Collection Time: 08/23/23  7:57 AM    Narrative    Test Reason : R07.9,    Vent. Rate : 061 BPM     Atrial Rate : 061 BPM     P-R Int : 216 ms          QRS Dur : 122 ms      QT Int : 424 ms       P-R-T Axes : 065 -51 -71 degrees     QTc Int : 426 ms    Sinus rhythm with 1st degree A-V block  Left axis deviation  Right bundle branch block  Possible  Septal infarct (cited on or before 09-AUG-2023)vs due to  conduction  Abnormal ECG  When compared with ECG of 12-AUG-2023 09:38,  Questionable change in The axis  Nonspecific T wave abnormality has replaced inverted T waves in Anterior  leads  Confirmed by Candelario REICH MD ([id number]) on 8/23/2023 11:22:54 AM    Referred By: LUNA LOW           Confirmed By:Candelario REICH MD       2D Echo:    No results found for this or any previous visit.    No results found for this or any previous visit.    No results found for this or any previous visit.      ASSESSMENT/PLAN:           Pre-op Assessment    I have reviewed the Patient Summary Reports.     I have reviewed the Nursing Notes. I have reviewed the NPO Status.      Review of Systems  Anesthesia Hx:  No problems with previous Anesthesia  History of prior surgery of interest to airway management or planning: Denies Family Hx of Anesthesia complications.   Denies Personal Hx of Anesthesia complications.   Cardiovascular:   Hypertension CHF    Pulmonary:    Sleep Apnea On 2LNC home oxygen   Renal/:   Chronic Renal Disease, ESRD, Dialysis    Hepatic/GI:   Denies PUD. Denies GERD.    Neurological:   CVA Neuromuscular Disease,    Endocrine:   Diabetes        Physical Exam  General: Confusion and Combative    Airway:  Mallampati: unable to assess   Mouth Opening: Normal  Neck ROM: Normal ROM    Chest/Lungs:  Normal Respiratory Rate    Heart:  Rate: Normal  Rhythm: Regular Rhythm        Anesthesia Plan  Type of Anesthesia, risks & benefits discussed:    Anesthesia Type: Gen ETT, Gen Supraglottic Airway, Gen Natural Airway  Intra-op Monitoring Plan: Standard ASA Monitors  Post Op Pain Control Plan: multimodal analgesia  Induction:  IV  Airway Plan: Direct and Video, Post-Induction  Informed Consent: Informed consent signed with the Patient representative and all parties understand the risks and agree with anesthesia plan.  All questions answered.   ASA Score: 3  Day of Surgery Review of History & Physical: H&P Update referred to the surgeon/provider.    Ready For Surgery From Anesthesia Perspective.     .

## 2023-08-26 NOTE — PATIENT CARE CONFERENCE
[id number] MARQUISE Bernal  :1965     AGE:57 y.o.     SEX:male      STATUS:Full Code      ISOLATION:No active isolations   ALLERGIES:Morphine and Amiodarone analogues   ADMIT DATE:  2023   PHYSICIAN:  Michel Mcbride III*   DIAGNOSIS: Endophthalmitis [H44.009] <principal problem not specified>  CC: Eye Problem    CONSULTS: Nephrology, Gastroenterology,ID.   Past Medical History:   Diagnosis Date    Bilateral retinal detachment 2023    BPH (benign prostatic hyperplasia)     Cataract     CHF (congestive heart failure)     CKD (chronic kidney disease) stage 3, GFR 30-59 ml/min     Diabetes mellitus, type 2     Hypertension     KENIA (obstructive sleep apnea)     Pancreatitis     Persistent proteinuria 2020    2 g proteinuria noted Resumed ACE Lower BP systolic to less than 130     PTSD (post-traumatic stress disorder)     Stroke      Scheduled procedure(s): MRI today   Labs to Monitor (no values):   Recent Vitals:  Temp: 98.6 °F (37 °C)  Pulse: 69  Resp: 18  SpO2: 100 %  BP: (!) 153/72    Respiratory:    Cardiac: Rhythm: normal sinus rhythm      Wt Readings from Last 2 Encounters:   23 62.8 kg (138 lb 7.2 oz)   23 88.9 kg (196 lb)     GI/: Diet NPO   Last Bowel Movement: 23    Neuro: ex:unable to assess   Mcknight catheter: No  Skin:ex:see LDA     Wu Score: 10      Lines/Drains/Airways       Central Venous Catheter Line  Duration                  Hemodialysis Catheter right internal jugular -- days              Drain  Duration                  Gastrostomy/Enterostomy 23 1213 Percutaneous endoscopic gastrostomy (PEG) LUQ 2 days              Peripheral Intravenous Line  Duration                  Peripheral IV - Single Lumen 23 1541 20 G;1 3/4 in Left Upper Arm 1 day                  Antibiotics (From admission, onward)      Start     Stop Route Frequency Ordered    23 1030  meropenem (MERREM) 500 mg in sodium chloride 0.9 % 100 mL IVPB (MB+)          -- IV Every 12 hours (non-standard times) 08/24/23 0918    08/18/23 1700  tobramycin-dexAMETHasone 0.3-0.1% ophthalmic suspension 1 drop         09/15/23 2059 Both Eyes 4 times daily 08/18/23 1411    08/16/23 2343  meropenem (MERREM) 500 mg injection        Note to Pharmacy: Created by cabinet override    08/17/23 1144   08/16/23 2343    08/16/23 2100  tobramycin-dexAMETHasone 0.3-0.1% ophthalmic ointment         09/15/23 2059 LEFT EYE 3 times daily 08/16/23 1732    08/13/23 0945  mupirocin 2 % ointment         08/18/23 0859 Nasl 2 times daily 08/13/23 0840    08/09/23 1418  vancomycin - pharmacy to dose  (vancomycin IVPB (PEDS and ADULTS))        See Hyperspace for full Linked Orders Report.    -- IV pharmacy to manage frequency 08/09/23 1319          VTE Risk Mitigation (From admission, onward)           Ordered     heparin (porcine) injection 1,000 Units  As needed (PRN)         08/10/23 1046     IP VTE HIGH RISK PATIENT  Once         08/09/23 1317     Place sequential compression device  Until discontinued         08/09/23 1317                  Glycemic control:  Recent Labs   Lab 08/25/23  1419 08/25/23  1437 08/25/23  1952 08/26/23  0539   POCTGLUCOSE 49* 179* 109 76     Fall risk: bed alarm  Mobility: GEMS ([place] Early Mobility Scale): Level 1-Primary in bed activities    Nursing Update/Plan of Care: Patient yelled throughout majority of the night, I got an order for zyprxa 5mg nightly patient slept for 3 hours and sister woke pt. Back up, sister is concerned with patient voice because it has gone hoarse from the yelling but she thinks is build up in his lungs I listen to his lungs and they are clear equally bilaterally throughout. Patient very confused you cannot understand anything he is saying at this point. Sister also has concerns about how eye was paked and stated she wants it repacked because she thinks it is irritating him and making him more agitated.

## 2023-08-26 NOTE — PROGRESS NOTES
Patient arrived to PACU with CRNA only via stretcher. CSF specimen tubes brought with patient; tube labels verified against armband, collected in computer, and walked to lab by PACU RN.

## 2023-08-26 NOTE — NURSING TRANSFER
Nursing Transfer Note      8/26/2023   2:47 PM    Nurse giving handoff:JOI Mccoy RN  Nurse receiving handoff: SWAPNIL Morneo (charge nurse)    Reason patient is being transferred: PACU to room    Transfer To: [id number]    Transfer via: Stretcher    Transfer with: O2 a 2L via NC    Transported by: RN and PCT    Transfer Vital Signs:  See-flowsheet    Telemetry: NSR 60's; On bedside Monitor in room  Order for Tele Monitor? Yes    Additional Lines: Oxygen    4eyes on Skin: Yes (SWAPNIL Terry updated in room)    Medicines sent: N/A    Any special needs or follow-up needed: No    Patient belongings transferred with patient: N/A    Chart send with patient: Yes    Notified: sister present in room om arrival to unit    Patient reassessed at: 1417

## 2023-08-26 NOTE — TRANSFER OF CARE
"Anesthesia Transfer of Care Note    Patient: Immanuel Bernal    Procedure(s) Performed: Procedure(s) (LRB):  MRI (Magnetic Resonance Imagine) (N/A)    Patient location: PACU    Anesthesia Type: general    Transport from OR: Transported from OR on 6-10 L/min O2 by face mask with adequate spontaneous ventilation    Post pain: adequate analgesia    Post assessment: no apparent anesthetic complications    Post vital signs: stable    Level of consciousness: responds to stimulation    Nausea/Vomiting: no nausea/vomiting    Complications: none    Transfer of care protocol was followed      Last vitals:   Visit Vitals  BP (!) 169/84 (BP Location: Right leg, Patient Position: Lying)   Pulse 64   Temp 37.1 °C (98.8 °F) (Temporal)   Resp 20   Ht 5' 10" (1.778 m)   Wt 62.8 kg (138 lb 7.2 oz)   SpO2 99%   BMI 19.87 kg/m²     "

## 2023-08-26 NOTE — SUBJECTIVE & OBJECTIVE
Interval History: kept pulling his eye packing out. Did not sleep well. Was fed through tube feeds.     Review of Systems   Constitutional:  Positive for activity change and appetite change.   Eyes:  Positive for discharge.   Respiratory:  Negative for cough, choking, chest tightness, shortness of breath and wheezing.    Cardiovascular:  Negative for chest pain, palpitations and leg swelling.   Gastrointestinal:  Negative for abdominal distention and abdominal pain.   Neurological: Negative.    Psychiatric/Behavioral:  Positive for agitation and behavioral problems.      Objective:     Vital Signs (Most Recent):  Temp: 98.8 °F (37.1 °C) (08/26/23 1415)  Pulse: 61 (08/26/23 1415)  Resp: 15 (08/26/23 1415)  BP: (!) 153/78 (08/26/23 1415)  SpO2: 99 % (08/26/23 1415) Vital Signs (24h Range):  Temp:  [97.8 °F (36.6 °C)-98.8 °F (37.1 °C)] 98.8 °F (37.1 °C)  Pulse:  [61-83] 61  Resp:  [15-20] 15  SpO2:  [93 %-100 %] 99 %  BP: (147-178)/() 153/78     Weight: 62.8 kg (138 lb 7.2 oz)  Body mass index is 19.87 kg/m².    Intake/Output Summary (Last 24 hours) at 8/26/2023 1544  Last data filed at 8/25/2023 1915  Gross per 24 hour   Intake 60 ml   Output --   Net 60 ml         Physical Exam  Eyes:      Pupils: Pupils are equal, round, and reactive to light.   Cardiovascular:      Rate and Rhythm: Normal rate and regular rhythm.      Pulses: Normal pulses.      Heart sounds: Normal heart sounds.   Pulmonary:      Effort: Pulmonary effort is normal.      Breath sounds: Normal breath sounds.   Abdominal:      General: Bowel sounds are normal.      Palpations: Abdomen is soft.   Neurological:      General: No focal deficit present.      Mental Status: He is alert. He is disoriented.             Significant Labs: All pertinent labs within the past 24 hours have been reviewed.    Significant Imaging: I have reviewed all pertinent imaging results/findings within the past 24 hours.

## 2023-08-26 NOTE — ASSESSMENT & PLAN NOTE
Patient was found to be delirious this morning and not following our commands and slightly agitated.    Maintaining appropriate saturation  CT head: no acute insult  CTPA:  No definite evidence of pulmonary arterial thromboembolism to the level of the segmental arteries bilaterally.  Although the subsegmental arteries evaluation is limited the right upper lobe lateral subsegmental artery abruptly terminates its trajectory which could represent a distal pulmonary emboli.  Patient had a bout of coffee ground emesis yesterday, no intervention from Gi as he is vitally stable and hgb did not drop    CTA chest done    CT head sent: no acute brain insult  Dialysis done 08/25     CXR and chest exam clear    MRI orbits: evolving orbital cellulitis  MRI head: no evidence of acute brain insult      Plan:  Contact ophthalmology regarding opinion  Trace EEG  followup LP  May recontact ID team tomorrow for possible change in antibiotics given that patient did not display CNS infection in new MRI?

## 2023-08-26 NOTE — ANESTHESIA PROCEDURE NOTES
Lumbar Puncture    Diagnosis: encephalopathy  Patient location during procedure: holding area  Start time: 8/26/2023 12:00 PM  Timeout: 8/26/2023 12:00 PM  End time: 8/26/2023 12:10 PM    Staffing  Authorizing Provider: Reji Rodgers MD  Performing Provider: Archana Lin DO    Staffing  Performed by: Archana Lin DO  Authorized by: Reji Rodgers MD    Preanesthetic Checklist  Completed: patient identified, IV checked, site marked, risks and benefits discussed, surgical consent, monitors and equipment checked, pre-op evaluation and timeout performed  Spinal Block  Patient position: right lateral decubitus  Prep: ChloraPrep  Patient monitoring: heart rate, continuous pulse ox, frequent blood pressure checks and cardiac monitor  Approach: midline  Location: L4-5  Injection technique: lumbar puncture  CSF Fluid: blood-tinged free-flowing CSF and clear free-flowing CSF  Needle  Needle type: Quincke   Needle localization: anatomical landmarks  Additional Notes  Opening pressure 23 cmH2O

## 2023-08-26 NOTE — ASSESSMENT & PLAN NOTE
Latest hemoglobin = 7.3 (post transfusion) >> 7.4 >> 7.5 latest    Underwent EGD which yielded a large DU with overlying clot, Gi were not able to penetrate beneath the clot to determine presence of any bleeding vessel, 2 clips placed.   The PEG was removed because it was not  functioning, and replaced with an externally removable PEG with internal balloon bumper.   Patient hemoglobin dropped below 7 today, transfused blood, contacted GI: since no active melena no need for GI or IR intervention  Patient had another bout of coffee ground vomitus, consulted Gi since he is vitally stable no need for scope yesterday    Recommendation:      -  If hemoglobin downtrending or having ongoing  melena, consult Gastro or IR for embolization. Clips are present on adherent clot.  - clear liquid diet  - Trend H&H, transfuse for Hgb > 7, unless otherwise indicated  -IV [id number] BID  - Maintain IV access with 2 large bore Ivs  - Hold all NSAIDs and anticoagulants, unless contraindicated  -correct any coagulopathy with platelets and FFP for goal of platelets >50K and INR <2.0

## 2023-08-26 NOTE — PLAN OF CARE
Patient resting quietly; arouses to voice, lifts hands to eyes, stops when told to and drifts off back to sleep. VSS. Placed on 2L per NC. Orally suctioned as need for thick secretions. Dressing to left eye remains CDI. RN remains at bedside for close monitoring. Patient to return to room. Safety maintained.

## 2023-08-26 NOTE — ANESTHESIA POSTPROCEDURE EVALUATION
Anesthesia Post Evaluation    Patient: Immanuel Bernal    Procedure(s) Performed: Procedure(s) (LRB):  MRI (Magnetic Resonance Imagine) (N/A)    Final Anesthesia Type: general      Patient location during evaluation: PACU  Patient participation: Yes- Able to Participate  Level of consciousness: awake  Post-procedure vital signs: reviewed and stable  Pain management: adequate  Airway patency: patent    PONV status at discharge: No PONV  Anesthetic complications: no      Cardiovascular status: blood pressure returned to baseline  Respiratory status: unassisted  Hydration status: euvolemic  Follow-up not needed.          Vitals Value Taken Time   /84 08/26/23 1801   Temp 36.5 °C (97.7 °F) 08/26/23 1610   Pulse 68 08/26/23 1849   Resp 22 08/26/23 1849   SpO2 100 % 08/26/23 1849   Vitals shown include unvalidated device data.      Event Time   Out of Recovery 08/26/2023 14:20:00         Pain/Marco A Score: Pain Rating Prior to Med Admin: 7 (8/26/2023  4:56 PM)  Pain Rating Post Med Admin: 3 (8/26/2023  5:48 PM)  Marco A Score: 9 (8/26/2023  2:00 PM)

## 2023-08-26 NOTE — ASSESSMENT & PLAN NOTE
Continue home statin  Delerium precautions  PT OT      MRI head 08/26: no evidence of acute brain insult

## 2023-08-27 PROBLEM — R39.198 DIFFICULTY IN URINATION: Status: RESOLVED | Noted: 2023-08-20 | Resolved: 2023-08-27

## 2023-08-27 PROBLEM — Z51.5 ENCOUNTER FOR PALLIATIVE CARE: Status: RESOLVED | Noted: 2023-08-11 | Resolved: 2023-08-27

## 2023-08-27 PROBLEM — E16.2 HYPOGLYCEMIA: Chronic | Status: ACTIVE | Noted: 2023-08-12

## 2023-08-27 PROBLEM — R23.8 REDNESS AND SWELLING OF UPPER ARM: Status: RESOLVED | Noted: 2023-08-17 | Resolved: 2023-08-27

## 2023-08-27 PROBLEM — E16.2 HYPOGLYCEMIA: Status: RESOLVED | Noted: 2023-08-12 | Resolved: 2023-08-27

## 2023-08-27 PROBLEM — I48.91 ATRIAL FIBRILLATION: Status: RESOLVED | Noted: 2021-10-01 | Resolved: 2023-08-27

## 2023-08-27 PROBLEM — K26.9 DUODENAL ULCERATION: Status: ACTIVE | Noted: 2023-08-22

## 2023-08-27 PROBLEM — T82.7XXA AV FISTULA INFECTION: Status: ACTIVE | Noted: 2023-08-27

## 2023-08-27 PROBLEM — Z93.1 PEG (PERCUTANEOUS ENDOSCOPIC GASTROSTOMY) STATUS: Chronic | Status: ACTIVE | Noted: 2023-08-11

## 2023-08-27 PROBLEM — N18.6 ANEMIA IN ESRD (END-STAGE RENAL DISEASE): Chronic | Status: ACTIVE | Noted: 2023-08-11

## 2023-08-27 PROBLEM — K92.1 MELENA: Chronic | Status: ACTIVE | Noted: 2023-08-22

## 2023-08-27 PROBLEM — N18.6 ESRD (END STAGE RENAL DISEASE): Chronic | Status: ACTIVE | Noted: 2023-08-09

## 2023-08-27 PROBLEM — M79.89 REDNESS AND SWELLING OF UPPER ARM: Status: RESOLVED | Noted: 2023-08-17 | Resolved: 2023-08-27

## 2023-08-27 PROBLEM — E43 SEVERE MALNUTRITION: Chronic | Status: ACTIVE | Noted: 2023-08-10

## 2023-08-27 PROBLEM — D63.1 ANEMIA IN ESRD (END-STAGE RENAL DISEASE): Chronic | Status: ACTIVE | Noted: 2023-08-11

## 2023-08-27 PROBLEM — Z74.09 IMPAIRED MOBILITY: Chronic | Status: ACTIVE | Noted: 2023-08-21

## 2023-08-27 LAB
ALBUMIN SERPL BCP-MCNC: 2.2 G/DL (ref 3.5–5.2)
ALP SERPL-CCNC: 104 U/L (ref 55–135)
ALT SERPL W/O P-5'-P-CCNC: 6 U/L (ref 10–44)
ANION GAP SERPL CALC-SCNC: 11 MMOL/L (ref 8–16)
ANISOCYTOSIS BLD QL SMEAR: SLIGHT
AST SERPL-CCNC: 25 U/L (ref 10–40)
BASOPHILS # BLD AUTO: ABNORMAL K/UL (ref 0–0.2)
BASOPHILS NFR BLD: 0 % (ref 0–1.9)
BILIRUB SERPL-MCNC: 0.5 MG/DL (ref 0.1–1)
BUN SERPL-MCNC: 30 MG/DL (ref 6–20)
CALCIUM SERPL-MCNC: 9.2 MG/DL (ref 8.7–10.5)
CHLORIDE SERPL-SCNC: 105 MMOL/L (ref 95–110)
CO2 SERPL-SCNC: 17 MMOL/L (ref 23–29)
CREAT SERPL-MCNC: 5.4 MG/DL (ref 0.5–1.4)
DACRYOCYTES BLD QL SMEAR: ABNORMAL
DIFFERENTIAL METHOD: ABNORMAL
EOSINOPHIL # BLD AUTO: ABNORMAL K/UL (ref 0–0.5)
EOSINOPHIL NFR BLD: 4 % (ref 0–8)
ERYTHROCYTE [DISTWIDTH] IN BLOOD BY AUTOMATED COUNT: 19 % (ref 11.5–14.5)
ERYTHROCYTE [DISTWIDTH] IN BLOOD BY AUTOMATED COUNT: 19.4 % (ref 11.5–14.5)
EST. GFR  (NO RACE VARIABLE): 11.5 ML/MIN/1.73 M^2
GLUCOSE SERPL-MCNC: 71 MG/DL (ref 70–110)
HCT VFR BLD AUTO: 26.6 % (ref 40–54)
HCT VFR BLD AUTO: 26.8 % (ref 40–54)
HCT VFR BLD AUTO: 27.8 % (ref 40–54)
HCT VFR BLD AUTO: 28.2 % (ref 40–54)
HGB BLD-MCNC: 8.3 G/DL (ref 14–18)
HGB BLD-MCNC: 8.4 G/DL (ref 14–18)
HGB BLD-MCNC: 8.6 G/DL (ref 14–18)
HGB BLD-MCNC: 9 G/DL (ref 14–18)
IMM GRANULOCYTES # BLD AUTO: ABNORMAL K/UL (ref 0–0.04)
IMM GRANULOCYTES NFR BLD AUTO: ABNORMAL % (ref 0–0.5)
LYMPHOCYTES # BLD AUTO: ABNORMAL K/UL (ref 1–4.8)
LYMPHOCYTES NFR BLD: 9 % (ref 18–48)
MCH RBC QN AUTO: 30.5 PG (ref 27–31)
MCH RBC QN AUTO: 30.6 PG (ref 27–31)
MCH RBC QN AUTO: 30.7 PG (ref 27–31)
MCH RBC QN AUTO: 31.5 PG (ref 27–31)
MCHC RBC AUTO-ENTMCNC: 30.9 G/DL (ref 32–36)
MCHC RBC AUTO-ENTMCNC: 31.2 G/DL (ref 32–36)
MCHC RBC AUTO-ENTMCNC: 31.3 G/DL (ref 32–36)
MCHC RBC AUTO-ENTMCNC: 31.9 G/DL (ref 32–36)
MCV RBC AUTO: 98 FL (ref 82–98)
MCV RBC AUTO: 99 FL (ref 82–98)
MONOCYTES # BLD AUTO: ABNORMAL K/UL (ref 0.3–1)
MONOCYTES NFR BLD: 5 % (ref 4–15)
NEUTROPHILS NFR BLD: 81 % (ref 38–73)
NEUTS BAND NFR BLD MANUAL: 1 %
NRBC BLD-RTO: 0 /100 WBC
OVALOCYTES BLD QL SMEAR: ABNORMAL
PLATELET # BLD AUTO: 141 K/UL (ref 150–450)
PLATELET # BLD AUTO: 151 K/UL (ref 150–450)
PLATELET # BLD AUTO: 154 K/UL (ref 150–450)
PLATELET # BLD AUTO: 154 K/UL (ref 150–450)
PLATELET BLD QL SMEAR: ABNORMAL
PMV BLD AUTO: 10.2 FL (ref 9.2–12.9)
PMV BLD AUTO: 11.1 FL (ref 9.2–12.9)
PMV BLD AUTO: 11.2 FL (ref 9.2–12.9)
PMV BLD AUTO: 11.3 FL (ref 9.2–12.9)
POCT GLUCOSE: 102 MG/DL (ref 70–110)
POCT GLUCOSE: 103 MG/DL (ref 70–110)
POCT GLUCOSE: 104 MG/DL (ref 70–110)
POCT GLUCOSE: 110 MG/DL (ref 70–110)
POIKILOCYTOSIS BLD QL SMEAR: SLIGHT
POTASSIUM SERPL-SCNC: 5.1 MMOL/L (ref 3.5–5.1)
PROT SERPL-MCNC: 6.4 G/DL (ref 6–8.4)
RBC # BLD AUTO: 2.7 M/UL (ref 4.6–6.2)
RBC # BLD AUTO: 2.75 M/UL (ref 4.6–6.2)
RBC # BLD AUTO: 2.81 M/UL (ref 4.6–6.2)
RBC # BLD AUTO: 2.86 M/UL (ref 4.6–6.2)
SODIUM SERPL-SCNC: 133 MMOL/L (ref 136–145)
SPHEROCYTES BLD QL SMEAR: ABNORMAL
WBC # BLD AUTO: 8.46 K/UL (ref 3.9–12.7)
WBC # BLD AUTO: 8.92 K/UL (ref 3.9–12.7)
WBC # BLD AUTO: 9 K/UL (ref 3.9–12.7)
WBC # BLD AUTO: 9.58 K/UL (ref 3.9–12.7)

## 2023-08-27 PROCEDURE — 99233 SBSQ HOSP IP/OBS HIGH 50: CPT | Mod: ,,, | Performed by: INTERNAL MEDICINE

## 2023-08-27 PROCEDURE — 27000221 HC OXYGEN, UP TO 24 HOURS

## 2023-08-27 PROCEDURE — 94640 AIRWAY INHALATION TREATMENT: CPT

## 2023-08-27 PROCEDURE — 25500020 PHARM REV CODE 255: Performed by: FAMILY MEDICINE

## 2023-08-27 PROCEDURE — 99233 SBSQ HOSP IP/OBS HIGH 50: CPT | Mod: GC,,, | Performed by: FAMILY MEDICINE

## 2023-08-27 PROCEDURE — 63600175 PHARM REV CODE 636 W HCPCS

## 2023-08-27 PROCEDURE — 63600175 PHARM REV CODE 636 W HCPCS: Performed by: FAMILY MEDICINE

## 2023-08-27 PROCEDURE — 11000001 HC ACUTE MED/SURG PRIVATE ROOM

## 2023-08-27 PROCEDURE — 95700 EEG CONT REC W/VID EEG TECH: CPT

## 2023-08-27 PROCEDURE — 25000242 PHARM REV CODE 250 ALT 637 W/ HCPCS

## 2023-08-27 PROCEDURE — 95711 VEEG 2-12 HR UNMONITORED: CPT

## 2023-08-27 PROCEDURE — 25000003 PHARM REV CODE 250

## 2023-08-27 PROCEDURE — 85027 COMPLETE CBC AUTOMATED: CPT

## 2023-08-27 PROCEDURE — 99233 PR SUBSEQUENT HOSPITAL CARE,LEVL III: ICD-10-PCS | Mod: GC,,, | Performed by: FAMILY MEDICINE

## 2023-08-27 PROCEDURE — 99900035 HC TECH TIME PER 15 MIN (STAT)

## 2023-08-27 PROCEDURE — 94761 N-INVAS EAR/PLS OXIMETRY MLT: CPT

## 2023-08-27 PROCEDURE — C9113 INJ PANTOPRAZOLE SODIUM, VIA: HCPCS

## 2023-08-27 PROCEDURE — 99233 PR SUBSEQUENT HOSPITAL CARE,LEVL III: ICD-10-PCS | Mod: ,,, | Performed by: INTERNAL MEDICINE

## 2023-08-27 PROCEDURE — 85025 COMPLETE CBC W/AUTO DIFF WBC: CPT | Performed by: FAMILY MEDICINE

## 2023-08-27 PROCEDURE — 21400001 HC TELEMETRY ROOM

## 2023-08-27 PROCEDURE — 80053 COMPREHEN METABOLIC PANEL: CPT

## 2023-08-27 PROCEDURE — 36415 COLL VENOUS BLD VENIPUNCTURE: CPT

## 2023-08-27 RX ORDER — ONDANSETRON 8 MG/1
8 TABLET, ORALLY DISINTEGRATING ORAL EVERY 8 HOURS PRN
Status: DISCONTINUED | OUTPATIENT
Start: 2023-08-27 | End: 2023-08-27

## 2023-08-27 RX ADMIN — PANTOPRAZOLE SODIUM 40 MG: 40 INJECTION, POWDER, FOR SOLUTION INTRAVENOUS at 08:08

## 2023-08-27 RX ADMIN — SENNOSIDES AND DOCUSATE SODIUM 1 TABLET: 50; 8.6 TABLET ORAL at 08:08

## 2023-08-27 RX ADMIN — HYDRALAZINE HYDROCHLORIDE 10 MG: 20 INJECTION, SOLUTION INTRAMUSCULAR; INTRAVENOUS at 12:08

## 2023-08-27 RX ADMIN — ONDANSETRON 8 MG: 8 TABLET, ORALLY DISINTEGRATING ORAL at 04:08

## 2023-08-27 RX ADMIN — ATORVASTATIN CALCIUM 40 MG: 40 TABLET, FILM COATED ORAL at 08:08

## 2023-08-27 RX ADMIN — PROCHLORPERAZINE EDISYLATE 10 MG: 5 INJECTION INTRAMUSCULAR; INTRAVENOUS at 04:08

## 2023-08-27 RX ADMIN — METOCLOPRAMIDE 5 MG: 5 TABLET ORAL at 04:08

## 2023-08-27 RX ADMIN — IPRATROPIUM BROMIDE AND ALBUTEROL SULFATE 3 ML: .5; 3 SOLUTION RESPIRATORY (INHALATION) at 08:08

## 2023-08-27 RX ADMIN — FLUCONAZOLE 400 MG: 200 TABLET ORAL at 08:08

## 2023-08-27 RX ADMIN — QUETIAPINE FUMARATE 50 MG: 25 TABLET ORAL at 08:08

## 2023-08-27 RX ADMIN — LOSARTAN POTASSIUM 100 MG: 50 TABLET, FILM COATED ORAL at 08:08

## 2023-08-27 RX ADMIN — TOBRAMYCIN AND DEXAMETHASONE: 3; 1 OINTMENT OPHTHALMIC at 08:08

## 2023-08-27 RX ADMIN — CALCITRIOL CAPSULES 0.25 MCG 0.25 MCG: 0.25 CAPSULE ORAL at 08:08

## 2023-08-27 RX ADMIN — TOBRAMYCIN AND DEXAMETHASONE 1 DROP: 3; 1 SUSPENSION/ DROPS OPHTHALMIC at 08:08

## 2023-08-27 RX ADMIN — SEVELAMER CARBONATE 0.8 G: 0.8 POWDER, FOR SUSPENSION ORAL at 08:08

## 2023-08-27 RX ADMIN — IOHEXOL 75 ML: 350 INJECTION, SOLUTION INTRAVENOUS at 07:08

## 2023-08-27 RX ADMIN — POLYETHYLENE GLYCOL 3350 17 G: 17 POWDER, FOR SOLUTION ORAL at 08:08

## 2023-08-27 RX ADMIN — HYDRALAZINE HYDROCHLORIDE: 10 TABLET, FILM COATED ORAL at 08:08

## 2023-08-27 RX ADMIN — TOBRAMYCIN AND DEXAMETHASONE 1 DROP: 3; 1 SUSPENSION/ DROPS OPHTHALMIC at 04:08

## 2023-08-27 RX ADMIN — IPRATROPIUM BROMIDE AND ALBUTEROL SULFATE 3 ML: .5; 3 SOLUTION RESPIRATORY (INHALATION) at 03:08

## 2023-08-27 RX ADMIN — TOBRAMYCIN AND DEXAMETHASONE 1 DROP: 3; 1 SUSPENSION/ DROPS OPHTHALMIC at 02:08

## 2023-08-27 RX ADMIN — ACETAMINOPHEN 1000 MG: 500 TABLET ORAL at 08:08

## 2023-08-27 RX ADMIN — TOBRAMYCIN AND DEXAMETHASONE: 3; 1 OINTMENT OPHTHALMIC at 02:08

## 2023-08-27 RX ADMIN — HYDRALAZINE HYDROCHLORIDE: 10 TABLET, FILM COATED ORAL at 02:08

## 2023-08-27 RX ADMIN — IPRATROPIUM BROMIDE AND ALBUTEROL SULFATE 3 ML: .5; 3 SOLUTION RESPIRATORY (INHALATION) at 09:08

## 2023-08-27 RX ADMIN — CARVEDILOL 6.25 MG: 6.25 TABLET, FILM COATED ORAL at 08:08

## 2023-08-27 RX ADMIN — HEPARIN SODIUM 5000 UNITS: 5000 INJECTION INTRAVENOUS; SUBCUTANEOUS at 04:08

## 2023-08-27 RX ADMIN — Medication 6 MG: at 08:08

## 2023-08-27 RX ADMIN — MEROPENEM 500 MG: 500 INJECTION INTRAVENOUS at 04:08

## 2023-08-27 RX ADMIN — ISOSORBIDE DINITRATE: 20 TABLET ORAL at 08:08

## 2023-08-27 RX ADMIN — THERA TABS 1 TABLET: TAB at 08:08

## 2023-08-27 NOTE — AI DETERIORATION ALERT
"RAPID RESPONSE NURSE AI ALERT       AI alert received.    Chart Reviewed: 08/27/2023, 11:18 AM    MRN: [id number]  Bed: 37531/60215 A    Dx: endophthalmitis    Immanuel Bernal has a past medical history of Bilateral retinal detachment, BPH (benign prostatic hyperplasia), Cataract, CHF (congestive heart failure), CKD (chronic kidney disease) stage 3, GFR 30-59 ml/min, Diabetes mellitus, type 2, Hypertension, KENIA (obstructive sleep apnea), Pancreatitis, Persistent proteinuria, PTSD (post-traumatic stress disorder), and Stroke.    Last VS: BP (!) 178/96   Pulse 68   Temp 98.2 °F (36.8 °C) (Oral)   Resp 16   Ht 5' 10" (1.778 m)   Wt 62.8 kg (138 lb 7.2 oz)   SpO2 100%   BMI 19.87 kg/m²     24H Vital Sign Range:  Temp:  [97.7 °F (36.5 °C)-98.8 °F (37.1 °C)]   Pulse:  [57-71]   Resp:  [15-23]   BP: (148-185)/(76-96)   SpO2:  [97 %-100 %]     Level of Consciousness (AVPU): alert    Recent Labs     08/26/23  1812 08/27/23  0116 08/27/23  0905   WBC 10.01 8.92  9.58 9.00   HGB 6.9* 8.6*  8.4* 9.0*   HCT 22.2* 27.8*  26.8* 28.2*    141*  151 154       Recent Labs     08/24/23  1458 08/24/23  2344 08/25/23  0529 08/25/23  0800 08/26/23  0601 08/27/23  0116   * 133* 129* 129* 135* 133*   K 5.0 5.2* 5.4* 5.1 4.6 5.1    103 100 100 105 105   CO2 19* 19* 18* 16* 21* 17*   BUN 41* 42* 40* 40* 24* 30*   CREATININE 7.6* 7.3* 7.6* 7.5* 4.8* 5.4*   GLU 70 57* 91 69* 70 71   PHOS 4.3 5.0* 4.8*  --   --   --    MG 2.5 2.6 2.6  --   --   --        OXYGEN:  Flow (L/min): 2  Oxygen Concentration (%): 28       MEWS score: 1    Bedside RN, Mara  contacted. Patient hypertensive, but otherwise VSS. Pt is currently being connected to an EEG monitor. RN states MD aware of HTN and PRNs have been ordered. No other concerns verbalized at this time. Instructed to call [phone] for further concerns or assistance.    Xuan Eastman RN       "

## 2023-08-27 NOTE — PATIENT CARE CONFERENCE
[id number] MARQUISE   Immanuel Bernal  :1965     AGE:57 y.o.     SEX:male      STATUS:Full Code      ISOLATION:No active isolations   ALLERGIES:Morphine and Amiodarone analogues   ADMIT DATE:  2023   PHYSICIAN:  Michel Mcbride III*   DIAGNOSIS: Endophthalmitis [H44.009] <principal problem not specified>  CC: Eye Problem    CONSULTS: Gastroenterology, ID, Neurology, Hospital medicine, Ophthalmology, Nephrology.   Past Medical History:   Diagnosis Date    Bilateral retinal detachment 2023    BPH (benign prostatic hyperplasia)     Cataract     CHF (congestive heart failure)     CKD (chronic kidney disease) stage 3, GFR 30-59 ml/min     Diabetes mellitus, type 2     Hypertension     KENIA (obstructive sleep apnea)     Pancreatitis     Persistent proteinuria 2020    2 g proteinuria noted Resumed ACE Lower BP systolic to less than 130     PTSD (post-traumatic stress disorder)     Stroke      Scheduled procedure(s): none   Labs to Monitor (no values):   Recent Vitals:  Temp: 97.7 °F (36.5 °C)  Pulse: 71  Resp: 17  SpO2: 100 %  BP: (!) 169/82    Respiratory:    Cardiac: Rhythm: normal sinus rhythm      Wt Readings from Last 2 Encounters:   23 62.8 kg (138 lb 7.2 oz)   23 88.9 kg (196 lb)     GI/: No diet orders on file   Last Bowel Movement: 23    Neuro: ex:unable to assess   Mcknight catheter: No  Skin:ex:see LDA     Wu Score: 11      Lines/Drains/Airways       Central Venous Catheter Line  Duration                  Hemodialysis Catheter right internal jugular -- days              Drain  Duration                  Gastrostomy/Enterostomy 23 1213 Percutaneous endoscopic gastrostomy (PEG) LUQ 3 days              Peripheral Intravenous Line  Duration                  Peripheral IV - Single Lumen 23 1541 20 G;1 3/4 in Left Upper Arm 2 days                  Antibiotics (From admission, onward)      Start     Stop Route Frequency Ordered    23 1030  meropenem (MERREM)  500 mg in sodium chloride 0.9 % 100 mL IVPB (MB+)         -- IV Every 12 hours (non-standard times) 08/24/23 0918    08/18/23 1700  tobramycin-dexAMETHasone 0.3-0.1% ophthalmic suspension 1 drop         09/15/23 2059 Both Eyes 4 times daily 08/18/23 1411    08/16/23 2343  meropenem (MERREM) 500 mg injection        Note to Pharmacy: Created by cabinet override    08/17/23 1144   08/16/23 2343    08/16/23 2100  tobramycin-dexAMETHasone 0.3-0.1% ophthalmic ointment         09/15/23 2059 LEFT EYE 3 times daily 08/16/23 1732    08/13/23 0945  mupirocin 2 % ointment         08/18/23 0859 Nasl 2 times daily 08/13/23 0840    08/09/23 1418  vancomycin - pharmacy to dose  (vancomycin IVPB (PEDS and ADULTS))        See Hyperspace for full Linked Orders Report.    -- IV pharmacy to manage frequency 08/09/23 1319          VTE Risk Mitigation (From admission, onward)           Ordered     heparin (porcine) injection 5,000 Units  Every 8 hours         08/26/23 0941     heparin (porcine) injection 1,000 Units  As needed (PRN)         08/10/23 1046     IP VTE HIGH RISK PATIENT  Once         08/09/23 1317     Place sequential compression device  Until discontinued         08/09/23 1317                  Glycemic control:  Recent Labs   Lab 08/26/23  0754 08/26/23  1331 08/26/23  1606 08/26/23  2013   POCTGLUCOSE 101 84 72 84     Fall risk: bed alarm  Mobility: GEMS (Patriot Early Mobility Scale): Level 1-Primary in bed activities    Nursing Update/Plan of Care: Patient in bed resting, call light within reach, bed in low position, tube feed going peptide 1.5 @ 50ml/hr, lungs clear on 2L NC, NSR on heart monitor, patient received 1 unit of blood over night HGB went from 6.8 to 8.6 after 1 unit of blood, patient still confused at this time but has rested throughout the night after procedures yesterday.

## 2023-08-27 NOTE — ASSESSMENT & PLAN NOTE
- Previously cleared by SLP for a regular diet without restrictions  - Currently ability to take PO limited by mentation  - Also frequently hypoglycemic due to malnutrition  - Tube feeds

## 2023-08-27 NOTE — ASSESSMENT & PLAN NOTE
Hx of Pseudomonas bacteremia and Endophthalmitis with concern for extension into CNS as he had developed encephalopathy. He is now s/p enucleation with operative gram stain positive for yeast. Operative cultures grew c acnes. Blood cultures NGTD on 8/9/23.     · ID reconsulted for acute encephalopathy. Etiology remains unclear. Was on cefepime which can cause neurotoxicity.  CTH stable. MRI orbits revealed evolving infection and probable continued orbital cellulitis; no abscess. LP results not concerning for CNS infection.     Encephalopathy improving today. Would continue to avoid cefepime as may have contributed to AMS.     Recommendations:  · Continue meropenem. Will need tunneled line on discharge.   Continue  vancomycin.   Continue fluconazole.   Plan to treat as CNS infection. Anticipated 4-6 weeks of therapy from evisceration. Anticipated end date: 9/12-9/26.   Discussed with micro lab- no growth on fungal cx.   Please notify ID of any new positive culture results.

## 2023-08-27 NOTE — PLAN OF CARE
Problem: Adult Inpatient Plan of Care  Goal: Plan of Care Review  Outcome: Ongoing, Progressing  Goal: Patient-Specific Goal (Individualized)  Outcome: Ongoing, Progressing  Goal: Absence of Hospital-Acquired Illness or Injury  Outcome: Ongoing, Progressing  Goal: Optimal Comfort and Wellbeing  Outcome: Ongoing, Progressing  Goal: Readiness for Transition of Care  Outcome: Ongoing, Progressing     Problem: Diabetes Comorbidity  Goal: Blood Glucose Level Within Targeted Range  Outcome: Ongoing, Progressing     Problem: Skin Injury Risk Increased  Goal: Skin Health and Integrity  Outcome: Ongoing, Progressing     Problem: Device-Related Complication Risk (Hemodialysis)  Goal: Safe, Effective Therapy Delivery  Outcome: Ongoing, Progressing     Problem: Hemodynamic Instability (Hemodialysis)  Goal: Effective Tissue Perfusion  Outcome: Ongoing, Progressing     Problem: Infection (Hemodialysis)  Goal: Absence of Infection Signs and Symptoms  Outcome: Ongoing, Progressing     Problem: Infection  Goal: Absence of Infection Signs and Symptoms  Outcome: Ongoing, Progressing     Problem: Coping Ineffective  Goal: Effective Coping  Outcome: Ongoing, Progressing     Problem: Device-Related Complication Risk (CRRT (Continuous Renal Replacement Therapy))  Goal: Safe, Effective Therapy Delivery  Outcome: Ongoing, Progressing     Problem: Hypothermia (CRRT (Continuous Renal Replacement Therapy))  Goal: Body Temperature Maintained in Desired Range  Outcome: Ongoing, Progressing     Problem: Infection (CRRT (Continuous Renal Replacement Therapy))  Goal: Absence of Infection Signs and Symptoms  Outcome: Ongoing, Progressing     Problem: Impaired Wound Healing  Goal: Optimal Wound Healing  Outcome: Ongoing, Progressing     Problem: Fall Injury Risk  Goal: Absence of Fall and Fall-Related Injury  Outcome: Ongoing, Progressing       Pt remains stable. Mentation appears better today. No new complaints. Needs met at this time.

## 2023-08-27 NOTE — ASSESSMENT & PLAN NOTE
Patient currently with severe delirium and altered mentation of uncertain etiology. He was briefly stepped up to the ICU on 8/11 for delirium that self-resolved. Current episode started on 8/24.  Workup so far:  Cefepime (received for 3 days) switched back to meropenem.   CTA PE study and US LE studies rule out thromboembolism  Electrolytes have been maintained wnl with dialysis, but mentation has not improved significantly with dialysis   CTH without acute changes.  Repeated MRI under sedation on 8/26, showed ongoing L scleral and orbital infection but no new processes nor new strokes  LP under sedation on 8/26 non-infectious    Plan  - Currently pending EEG, so far no seizure activity, but showing moderate encephalopathy  - Continue HD sessions  - Delirium likely due to prolonged hospitalization, altered sleep/wake cycle, vision loss  - Seroqul 50mg BID - seems to be helping  - Olanzapine 5mg IM PRN for severe agitation  - If not improving in coming days, may involve psychiatry

## 2023-08-27 NOTE — PROGRESS NOTES
[facility] - Intensive Care ([place] [address])  Infectious Disease  Progress Note    Patient Name: Immanuel Bernal  MRN: [id number]  Admission Date: 8/9/2023  Length of Stay: 18 days  Attending Physician: Michel Mcbride III*  Primary Care Provider: Dolly, Primary Doctor    Isolation Status: No active isolations  Assessment/Plan:      Ophtho  Endophthalmitis  Hx of Pseudomonas bacteremia and Endophthalmitis with concern for extension into CNS as he had developed encephalopathy. He is now s/p enucleation with operative gram stain positive for yeast. Operative cultures grew c acnes. Blood cultures NGTD on 8/9/23.     ID reconsulted for acute encephalopathy. Was on cefepime which can cause neurotoxicity.  CTH stable. MRI orbits revealed evolving infection and probable continued orbital cellulitis; no abscess. LP results not concerning for CNS infection.     Encephalopathy improving today. Would continue to avoid cefepime as may have contributed to AMS.     Recommendations:  · Continue meropenem. Will need tunneled line on discharge.   Continue  vancomycin.   Continue fluconazole.   Plan to treat as CNS infection. Anticipated 4-6 weeks of therapy from evisceration. Anticipated end date: 9/12-9/26.   Discussed with micro lab- no growth on fungal cx.   Please notify ID of any new positive culture results.     see OPAT note from 8/19/23.    Anticipated Disposition: tbd    Thank you for your consult. I will sign off. Please contact us if you have any additional questions.    Bria Guzman MD  Infectious Disease  [facility] - Intensive Care ([place] [address])    Subjective:     Principal Problem:Delirium    HPI: Mr. Bernal is a 57M with PMH of DM2, HTN, ESRD, HFrEF, bowel obstruction s/p bowel resection, KENIA, afib, recent hospitalization for NELSON endophthalmitis, pseudomonas bacteremia, RUE AVG infection s/p excision, who presented as a transfer from [place] for worsening eye symptoms and imaging findings of  L scleral abscess. Patient had been admitted to [facility] July 18th for concern for NELSON endophthalmitis. He received intravitreal vancomycin and ceftazidime by ophthalmology, and IV cefepime for pseudomonas bacteremia. Superficial swab with diphtheroids and anaerobic cocci. He underwent intravitreal tap that was negative for any yeast or fungus. Unfortunately L eye did not respond to treatment and abscess developed. Ophthalmology recommended enucleation, however POA/family declined. He was recommend to continue on cefepime for 6 weeks followed by indefinite fluoroquinolone, however he left AMA without the antibiotics.     He then presented to [place], where imaging was notable for L scleral abscess. He received a dose of zosyn and vanc, and was transferred to [facility] for oculoplastic evaluation. ID was consulted on arrival, and he was transitioned to vanc/meropenem while awaiting culture results. Blood cultures from 8/9 negative. He underwent L enucleation 8/16. ID reconsulted 8/17 for long term antibiotic regimen.      Interval History: Remained afebrile overnight. Pt more interactive today; encephalopathy appears to be improving.     Review of Systems   Unable to perform ROS: Mental status change   Gastrointestinal:  Negative for abdominal pain.     Objective:     Vital Signs (Most Recent):  Temp: 98.2 °F (36.8 °C) (08/27/23 1534)  Pulse: 64 (08/27/23 1537)  Resp: 20 (08/27/23 1534)  BP: (!) 172/91 (08/27/23 1537)  SpO2: 100 % (08/27/23 1534) Vital Signs (24h Range):  Temp:  [97.7 °F (36.5 °C)-98.2 °F (36.8 °C)] 98.2 °F (36.8 °C)  Pulse:  [58-72] 64  Resp:  [16-24] 20  SpO2:  [82 %-100 %] 100 %  BP: (168-196)/(80-96) 172/91     Weight: 62.8 kg (138 lb 7.2 oz)  Body mass index is 19.87 kg/m².    Estimated Creatinine Clearance: 13.1 mL/min (A) (based on SCr of 5.4 mg/dL (H)).     Physical Exam  Vitals reviewed.   Constitutional:       Appearance: He is ill-appearing.   HENT:      Head: Normocephalic and atraumatic.       Comments: EEG cap in place.  Eyes:      Comments: Lt eyelid edematous- swollen shut   Cardiovascular:      Rate and Rhythm: Normal rate and regular rhythm.   Pulmonary:      Effort: Pulmonary effort is normal.      Breath sounds: Normal breath sounds.   Abdominal:      General: Abdomen is flat.      Palpations: Abdomen is soft.   Musculoskeletal:      Right lower leg: No edema.      Left lower leg: No edema.   Skin:     General: Skin is warm.      Findings: No lesion or rash.   Neurological:      Mental Status: He is alert.      Comments: Oriented to self, also knew his age.           Significant Labs: Blood Culture:   Recent Labs   Lab 08/09/23  1434   LABBLOO No growth after 5 days.  No growth after 5 days.     All pertinent labs within the past 24 hours have been reviewed.    Significant Imaging: I have reviewed all pertinent imaging results/findings within the past 24 hours.

## 2023-08-27 NOTE — ASSESSMENT & PLAN NOTE
S/p L eye evisceration on 8/16/23.  Repeat MRI head/orbit with ongoing L scleral infection and cellulitis    - ID and ophthalmology following  - Continue vancomycin, meropenem, fluconazole, and tobramycin ointments/eye drops  - Tentatively planning total 4 weeks of abx from date of source control  - Follow up all culture data  - Ophthalmology to perform packing changes every 2-3 days  - L eye to be taped shut at night

## 2023-08-27 NOTE — PROCEDURES
EEG prelim  11:36 a.m.-12:35 p.m.    Background:  Continuous, slow, disorganized, frequent multifocal triphasic waves which do not organize.  There is cycling between wakefulness and sleep.    Impression:  Moderate encephalopathy with evidence of subcortical/deep midline dysfunction bilaterally.  There are no definite epileptiform discharges and no electrographic seizures on this portion of the recording session.    Please hit the EEG button with any clinical activity concerning for seizures and describe what you see.    Full report after the completion of the study.    Gisele Soriano MD PhD [facility]  Neurology-Epilepsy  [facility].

## 2023-08-27 NOTE — PROGRESS NOTES
[facility] - Intensive Care ([address] Medicine  Progress Note    Patient Name: Immanuel Bernal  MRN: [id number]  Patient Class: IP- Inpatient   Admission Date: 8/9/2023  Length of Stay: 18 days  Attending Physician: Michel Mcbride III*  Primary Care Provider: Dolly, Primary Doctor        Subjective:     Principal Problem:Delirium        HPI:  Immanuel Bernal is a 57 y.o. male with ESRD, HFrEF 30-35%, DM2, HTN, history of bowel obstruction s/p bowel resection, now with PEG in place, ?KENIA, history of CVA, ??AFib who was recently hospitalized at [facility] in 7/2023 for b/l endophthalmitis, Pseudomonas bacteremia, RUE thrombus, RUE AVG infection s/p excision, and RUL mass. Left eye did not improve & subsequently developed abscess, but POA declined enucleation and patient ultimately left AMA. Re-presented to [place] ED with worsening eye symptoms with imaging showing left scleral abscess, so he was transferred to [facility] on 8/9/2023 for Oculoplastics evaluation & enucleation.       Overview/Hospital Course:  Patient was admitted initially on 8/9/2023 to [facility] medicine for Oculoplastics evaluation for L eye endophthalmitis with abscess. Due to prior Pseudomonal bacteremia, patient was kept of vancomycin and meropenem per infectious disease recommendations. Initially, surgery was planned, however, patient's POA wanted to repeat imaging to confirm the severe infection before proceeding. Unfortunately, patient had worsening delirium of unknown etiology, thus MRI could not be completed properly. Patient was stepped up to ICU on 8/11 and briefly required a precedex drip, and got a full MRI on 8/12. Delirium self-resolved and patient was stepped down to medicine again on 8/14. After back and forth discussion with patient's POA and ophthalmology, patient underwent L eye evisceration on 8/16. Surgical cultures additionally grew yeast (not yet speciated) and cutibacterium acnes. Patient was started on fluconazole  and tobramycin drops/ointment. In preparation for discharge, meropenem was changed to cefepime on 8/21 for pseudomonal coverage, as meropenem needed to be dosed daily (and patient would need another tunneled Gil line) and cefepime could be dosed with dialysis.     On 8/24, patient once again had worsening delirium of unclear etiology. Cefepime was switched back to meropenem due to concern for neurotoxicity. Patient underwent HD however this did not improve his mentation. Patient underwent repeat MRI jim under sedation on 8/26, which showed ongoing L scleral infection and orbital cellulitis; per ophthalmology, no further surgical intervention is warranted. LP was obtained as well and negative for infection. CTA was ordered to r/o PE in setting of intermittent hypoxia, and was largely negative. LE US was also ordered, and did not show DVT, thus ruling out thromboembolism as a cause for agitation/confusion. Currently, pending EEG.    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 His R Permacath was still functioning and was left in place. Patient had an old AVF on the R arm that was operated on 1-2 months ago (see HPI; infected graft) and still had staples in place; vascular surgery recommend outpatient follow up for staple removal once site fully healed. For DVTs, Eliquis was started on 8/17, however, patient developed melena on 8/22. Eliquis was stopped, 1U PRBC was transfused, GI was consulted, and patient underwent EGD on 8/24. EGD found a large duodenal ulcer with adherent clot - 2 clips were placed adjacent to the ulcer but the clot was left intact. Patient remained hemodynamically stable, however has required 3U PRBC total since melena started. Per GI, no further endoscopic intervention would be useful for the large ulcer. CTA was ordered on 8/27; plan to consult IR for embolization if CTA is positive.      Disposition: once delirium resolves, antibiotic plan is finalized, and GIB stabilizes, will DC to [facility].       Interval History: Patient had a good nights sleep after being sedated for MRI and procedures yesterday. Received 1U PRBC overnight, and tube feeds were restarted.     This morning, his mentation was slightly better, however, he remained oriented only to self and would repeat phrases.    Furthermore, this morning nurses noted a large tarry bowel movement; patient had not had melena for several days until now.    Pending EEG today and CTA GIB protocol.      Review of Systems   Unable to perform ROS: Mental status change   Gastrointestinal:  Positive for blood in stool.   Psychiatric/Behavioral:  Positive for agitation and confusion.      Objective:     Vital Signs (Most Recent):  Temp: 98.2 °F (36.8 °C) (08/27/23 0822)  Pulse: 71 (08/27/23 1116)  Resp: (!) 24 (08/27/23 1100)  BP: (!) 196/93 (08/27/23 1100)  SpO2: (!) 82 % (08/27/23 1100) Vital Signs (24h Range):  Temp:  [97.7 °F (36.5 °C)-98.8 °F (37.1 °C)] 98.2 °F (36.8 °C)  Pulse:  [57-72] 71  Resp:  [15-24] 24  SpO2:  [82 %-100 %] 82 %  BP: (153-196)/(76-96) 196/93     Weight: 62.8 kg (138 lb 7.2 oz)  Body mass index is 19.87 kg/m².    Intake/Output Summary (Last 24 hours) at 8/27/2023 1346  Last data filed at 8/27/2023 0618  Gross per 24 hour   Intake 1493.42 ml   Output --   Net 1493.42 ml         Physical Exam  Vitals reviewed.   Constitutional:       Appearance: He is ill-appearing (chronic).   HENT:      Mouth/Throat:      Mouth: Mucous membranes are dry.   Eyes:      General:         Left eye: Discharge present.     Comments: R eye clear. Pupils reactive.  L eye with packing in place; taped shut.   Cardiovascular:      Rate and Rhythm: Normal rate and regular rhythm.      Pulses: Normal pulses.      Heart sounds: Normal heart sounds. No murmur heard.  Pulmonary:      Breath sounds: Normal breath sounds. No wheezing or rales.   Abdominal:       General: There is no distension.      Palpations: Abdomen is soft.      Tenderness: There is no abdominal tenderness.      Comments: PEG tube in place   Musculoskeletal:      Right lower leg: No edema.      Left lower leg: No edema.   Skin:     General: Skin is warm and dry.      Capillary Refill: Capillary refill takes less than 2 seconds.   Neurological:      Mental Status: He is alert.      Comments: Oriented to self only; repeats phrases           Significant Labs: All pertinent labs within the past 24 hours have been reviewed.  CBC:   Recent Labs   Lab 08/26/23  1812 08/27/23  0116 08/27/23  0905   WBC 10.01 8.92  9.58 9.00   HGB 6.9* 8.6*  8.4* 9.0*   HCT 22.2* 27.8*  26.8* 28.2*    141*  151 154     CMP:   Recent Labs   Lab 08/26/23  0601 08/27/23  0116   * 133*   K 4.6 5.1    105   CO2 21* 17*   GLU 70 71   BUN 24* 30*   CREATININE 4.8* 5.4*   CALCIUM 9.1 9.2   PROT 6.2 6.4   ALBUMIN 2.2* 2.2*   BILITOT 0.4 0.5   ALKPHOS 96 104   AST 19 25   ALT <5* 6*   ANIONGAP 9 11       Significant Imaging: I have reviewed all pertinent imaging results/findings within the past 24 hours.      Assessment/Plan:      * Delirium  Patient currently with severe delirium and altered mentation of uncertain etiology. He was briefly stepped up to the ICU on 8/11 for delirium that self-resolved. Current episode started on 8/24.  Workup so far:  Cefepime (received for 3 days) switched back to meropenem.   CTA PE study and US LE studies rule out thromboembolism  Electrolytes have been maintained wnl with dialysis, but mentation has not improved significantly with dialysis   CTH without acute changes.  Repeated MRI under sedation on 8/26, showed ongoing L scleral and orbital infection but no new processes nor new strokes  LP under sedation on 8/26 non-infectious    Plan  - Currently pending EEG, so far no seizure activity, but showing moderate encephalopathy  - Continue HD sessions  - Delirium likely due to  prolonged hospitalization, altered sleep/wake cycle, vision loss  - Seroqul 50mg BID - seems to be helping  - Olanzapine 5mg IM PRN for severe agitation  - If not improving in coming days, may involve psychiatry    Endophthalmitis  S/p L eye evisceration on 8/16/23.  Repeat MRI head/orbit with ongoing L scleral infection and cellulitis    - ID and ophthalmology following  - Continue vancomycin, meropenem, fluconazole, and tobramycin ointments/eye drops  - Tentatively planning total 4 weeks of abx from date of source control  - Follow up all culture data  - Ophthalmology to perform packing changes every 2-3 days  - L eye to be taped shut at night    Duodenal ulceration  Patient started to have melena on 8/22, and on 8/24, underwent EGD.   Found to have a large oozing duodenal ulcer with adherent clot. 2 clips placed however ulcer could not be addressed completely. Also had 2 more nonbleeding ulcers in the duodenal bulb and 2nd part of the duodenum.  3U PRBC transfused so far this admission    - Per GI, no further endoscopic intervention warranted  - Due to ongoing melena, ordered CTA GIB protocol today  - If CTA positive will consult IR  - Trend CBC Q12. Transfuse for Hb >7, unless otherwise indicated  - Maintain IV access with 2 large bore IVs  - Keep NPO  - Hold all NSAIDs and anticoagulants, unless contraindicated  - IV [id number] BID  - Correct any coagulopathy with platelets and FFP for goal of platelets >50K and INR <2.0    AV fistula infection  - See HPI and hospital course; previous AV graft was removed at OSH and found to be colonized with pseudomonas from prior pseudomonal bacteremia  - Staples still in place  - F/u with [facility] vascular surgery on discharge    Impaired mobility  - Plan to DC to [facility] when medically stable    PEG (percutaneous endoscopic gastrostomy) status  - Previously cleared by SLP for a regular diet without restrictions  - Currently ability to take PO limited by mentation  -  Also frequently hypoglycemic due to malnutrition  - Tube feeds    Anemia in ESRD (end-stage renal disease)  EPO per nephrology    Severe malnutrition  Nutrition consulted. Most recent weight and BMI monitored-     Measurements:  Wt Readings from Last 1 Encounters:   08/23/23 62.8 kg (138 lb 7.2 oz)   Body mass index is 19.87 kg/m².    Patient has been screened and assessed by RD.    Malnutrition Type:  Context: chronic illness  Level: severe    Malnutrition Characteristic Summary:  Weight Loss (Malnutrition): greater than 10% in 6 months  Energy Intake (Malnutrition): other (see comments) (LAMONT)  Subcutaneous Fat (Malnutrition): severe depletion  Muscle Mass (Malnutrition): severe depletion    Interventions/Recommendations (treatment strategy):  1. Resume Renal diet + Novasource ONS when able - encourage PO intake as tolerated. 2. If EN to resume, please reconsult for updated recommendations. 3. RD to monitor & follow-up.     Able to take in PO per SLP assessment on 8/15, on regular diet with thin liquids  Continue tube feeds for now due to altered mentation    ESRD (end stage renal disease)  Nephrology consulted for HD needs while inpatient    Hyperlipidemia  Home statin    Chronic diastolic heart failure  Continue to monitor for signs of volume overload; volume removal with dialysis    Hypertension  Current hypertension is worsened secondary to agitation  On CCB at home but will uptitrate GDMT while here    - Coreg 6.25mg BID, limited by HR  - BiDil 2 tablets TID  - Losartan 100mg daily    VTE Risk Mitigation (From admission, onward)         Ordered     heparin (porcine) injection 1,000 Units  As needed (PRN)         08/10/23 1046     IP VTE HIGH RISK PATIENT  Once         08/09/23 1317     Place sequential compression device  Until discontinued         08/09/23 1317                Discharge Planning   TOBY: 8/28/2023     Code Status: Full Code   Is the patient medically ready for discharge?: No    Reason for  patient still in hospital (select all that apply): Patient trending condition and Consult recommendations  Discharge Plan A: Rehab   Discharge Delays: (!) Procedure Scheduling (IR, OR, Labs, Echo, Cath, Echo, EEG) (eye surgery next week)              Pat Montoya MD  Department of Hospital Medicine   [facility] - Intensive Care ([place]-)

## 2023-08-27 NOTE — ASSESSMENT & PLAN NOTE
- See HPI and hospital course; previous AV graft was removed at OSH and found to be colonized with pseudomonas from prior pseudomonal bacteremia  - Staples still in place  - F/u with [facility] vascular surgery on discharge

## 2023-08-27 NOTE — NURSING
Telemetry Tech to call RN asking about pts posted HR in the 200s. RN looked at pts chart and no high heart rates have been verified. RN attempted to explain to Telemetry Tech that with continuous monitoring inaccurate readings sometimes occur. Tech continued to attempt argue with RN. RN to verify pts actual heart rate of 69-72 and verified to chart.

## 2023-08-27 NOTE — SUBJECTIVE & OBJECTIVE
Interval History: Patient had a good nights sleep after being sedated for MRI and procedures yesterday. Received 1U PRBC overnight, and tube feeds were restarted.     This morning, his mentation was slightly better, however, he remained oriented only to self and would repeat phrases.    Furthermore, this morning nurses noted a large tarry bowel movement; patient had not had melena for several days until now.    Pending EEG today and CTA GIB protocol.      Review of Systems   Unable to perform ROS: Mental status change   Gastrointestinal:  Positive for blood in stool.   Psychiatric/Behavioral:  Positive for agitation and confusion.      Objective:     Vital Signs (Most Recent):  Temp: 98.2 °F (36.8 °C) (08/27/23 0822)  Pulse: 71 (08/27/23 1116)  Resp: (!) 24 (08/27/23 1100)  BP: (!) 196/93 (08/27/23 1100)  SpO2: (!) 82 % (08/27/23 1100) Vital Signs (24h Range):  Temp:  [97.7 °F (36.5 °C)-98.8 °F (37.1 °C)] 98.2 °F (36.8 °C)  Pulse:  [57-72] 71  Resp:  [15-24] 24  SpO2:  [82 %-100 %] 82 %  BP: (153-196)/(76-96) 196/93     Weight: 62.8 kg (138 lb 7.2 oz)  Body mass index is 19.87 kg/m².    Intake/Output Summary (Last 24 hours) at 8/27/2023 1346  Last data filed at 8/27/2023 0618  Gross per 24 hour   Intake 1493.42 ml   Output --   Net 1493.42 ml         Physical Exam  Vitals reviewed.   Constitutional:       Appearance: He is ill-appearing (chronic).   HENT:      Mouth/Throat:      Mouth: Mucous membranes are dry.   Eyes:      General:         Left eye: Discharge present.     Comments: R eye clear. Pupils reactive.  L eye with packing in place; taped shut.   Cardiovascular:      Rate and Rhythm: Normal rate and regular rhythm.      Pulses: Normal pulses.      Heart sounds: Normal heart sounds. No murmur heard.  Pulmonary:      Breath sounds: Normal breath sounds. No wheezing or rales.   Abdominal:      General: There is no distension.      Palpations: Abdomen is soft.      Tenderness: There is no abdominal tenderness.       Comments: PEG tube in place   Musculoskeletal:      Right lower leg: No edema.      Left lower leg: No edema.   Skin:     General: Skin is warm and dry.      Capillary Refill: Capillary refill takes less than 2 seconds.   Neurological:      Mental Status: He is alert.      Comments: Oriented to self only; repeats phrases           Significant Labs: All pertinent labs within the past 24 hours have been reviewed.  CBC:   Recent Labs   Lab 08/26/23  1812 08/27/23  0116 08/27/23  0905   WBC 10.01 8.92  9.58 9.00   HGB 6.9* 8.6*  8.4* 9.0*   HCT 22.2* 27.8*  26.8* 28.2*    141*  151 154     CMP:   Recent Labs   Lab 08/26/23  0601 08/27/23  0116   * 133*   K 4.6 5.1    105   CO2 21* 17*   GLU 70 71   BUN 24* 30*   CREATININE 4.8* 5.4*   CALCIUM 9.1 9.2   PROT 6.2 6.4   ALBUMIN 2.2* 2.2*   BILITOT 0.4 0.5   ALKPHOS 96 104   AST 19 25   ALT <5* 6*   ANIONGAP 9 11       Significant Imaging: I have reviewed all pertinent imaging results/findings within the past 24 hours.

## 2023-08-27 NOTE — ASSESSMENT & PLAN NOTE
Nutrition consulted. Most recent weight and BMI monitored-     Measurements:  Wt Readings from Last 1 Encounters:   08/23/23 62.8 kg (138 lb 7.2 oz)   Body mass index is 19.87 kg/m².    Patient has been screened and assessed by RD.    Malnutrition Type:  Context: chronic illness  Level: severe    Malnutrition Characteristic Summary:  Weight Loss (Malnutrition): greater than 10% in 6 months  Energy Intake (Malnutrition): other (see comments) (LAMONT)  Subcutaneous Fat (Malnutrition): severe depletion  Muscle Mass (Malnutrition): severe depletion    Interventions/Recommendations (treatment strategy):  1. Resume Renal diet + Novasource ONS when able - encourage PO intake as tolerated. 2. If EN to resume, please reconsult for updated recommendations. 3. RD to monitor & follow-up.     Able to take in PO per SLP assessment on 8/15, on regular diet with thin liquids  Continue tube feeds for now due to altered mentation

## 2023-08-27 NOTE — ASSESSMENT & PLAN NOTE
Patient started to have melena on 8/22, and on 8/24, underwent EGD.   Found to have a large oozing duodenal ulcer with adherent clot. 2 clips placed however ulcer could not be addressed completely. Also had 2 more nonbleeding ulcers in the duodenal bulb and 2nd part of the duodenum.  3U PRBC transfused so far this admission    - Per GI, no further endoscopic intervention warranted  - Due to ongoing melena, ordered CTA GIB protocol today  - If CTA positive will consult IR  - Trend CBC Q12. Transfuse for Hb >7, unless otherwise indicated  - Maintain IV access with 2 large bore IVs  - Keep NPO  - Hold all NSAIDs and anticoagulants, unless contraindicated  - IV [id number] BID  - Correct any coagulopathy with platelets and FFP for goal of platelets >50K and INR <2.0

## 2023-08-27 NOTE — ASSESSMENT & PLAN NOTE
Current hypertension is worsened secondary to agitation  On CCB at home but will uptitrate GDMT while here    - Coreg 6.25mg BID, limited by HR  - BiDil 2 tablets TID  - Losartan 100mg daily

## 2023-08-27 NOTE — SUBJECTIVE & OBJECTIVE
Interval History: Remained afebrile overnight. Pt more interactive today; encephalopathy appears to be improving.     Review of Systems   Unable to perform ROS: Mental status change   Gastrointestinal:  Negative for abdominal pain.     Objective:     Vital Signs (Most Recent):  Temp: 98.2 °F (36.8 °C) (08/27/23 1534)  Pulse: 64 (08/27/23 1537)  Resp: 20 (08/27/23 1534)  BP: (!) 172/91 (08/27/23 1537)  SpO2: 100 % (08/27/23 1534) Vital Signs (24h Range):  Temp:  [97.7 °F (36.5 °C)-98.2 °F (36.8 °C)] 98.2 °F (36.8 °C)  Pulse:  [58-72] 64  Resp:  [16-24] 20  SpO2:  [82 %-100 %] 100 %  BP: (168-196)/(80-96) 172/91     Weight: 62.8 kg (138 lb 7.2 oz)  Body mass index is 19.87 kg/m².    Estimated Creatinine Clearance: 13.1 mL/min (A) (based on SCr of 5.4 mg/dL (H)).     Physical Exam  Vitals reviewed.   Constitutional:       Appearance: He is ill-appearing.   HENT:      Head: Normocephalic and atraumatic.      Comments: EEG cap in place.  Eyes:      Comments: Lt eyelid edematous- swollen shut   Cardiovascular:      Rate and Rhythm: Normal rate and regular rhythm.   Pulmonary:      Effort: Pulmonary effort is normal.      Breath sounds: Normal breath sounds.   Abdominal:      General: Abdomen is flat.      Palpations: Abdomen is soft.   Musculoskeletal:      Right lower leg: No edema.      Left lower leg: No edema.   Skin:     General: Skin is warm.      Findings: No lesion or rash.   Neurological:      Mental Status: He is alert.      Comments: Oriented to self, also knew his age.           Significant Labs: Blood Culture:   Recent Labs   Lab 08/09/23  1434   LABBLOO No growth after 5 days.  No growth after 5 days.     All pertinent labs within the past 24 hours have been reviewed.    Significant Imaging: I have reviewed all pertinent imaging results/findings within the past 24 hours.

## 2023-08-27 NOTE — PLAN OF CARE
Chart reviewed  Remains encephalopathic.  Repeat MRI orbits concerning for continued infection and orbital cellulitis, but no evidence of abscess formation.     LP today not concerning for bacterial meningitis with only 1WBC, normal P 26 and gluc 52. Meningitis / encephalitis panel neg.   --will f/u csf cultures and pending studies  --continue broad spectrum eunice, vanc, fluc  --f/u EEG results  --f/u ophtha recs    Bria Guzman MD  Infectious Disease

## 2023-08-27 NOTE — PLAN OF CARE
Problem: Adult Inpatient Plan of Care  Goal: Plan of Care Review  Outcome: Ongoing, Progressing  Goal: Patient-Specific Goal (Individualized)  Outcome: Ongoing, Progressing  Goal: Absence of Hospital-Acquired Illness or Injury  Outcome: Ongoing, Progressing  Goal: Optimal Comfort and Wellbeing  Outcome: Ongoing, Progressing  Goal: Readiness for Transition of Care  Outcome: Ongoing, Progressing     Problem: Diabetes Comorbidity  Goal: Blood Glucose Level Within Targeted Range  Outcome: Ongoing, Progressing     Problem: Skin Injury Risk Increased  Goal: Skin Health and Integrity  Outcome: Ongoing, Progressing     Problem: Impaired Wound Healing  Goal: Optimal Wound Healing  Outcome: Ongoing, Progressing     Problem: Fall Injury Risk  Goal: Absence of Fall and Fall-Related Injury  Outcome: Ongoing, Progressing

## 2023-08-27 NOTE — CARE UPDATE
RAPID RESPONSE NURSE ROUND       Rounding completed with charge RNMara and bedside RNDavid for confusion, low H/H reports patient sleeping comfortably at this time, 1 u PRBC ordered and currently infusing. No additional concerns verbalized at this time. Instructed to call [phone] for further concerns or assistance.

## 2023-08-28 VITALS — HEART RATE: 63 BPM

## 2023-08-28 LAB
ALBUMIN SERPL BCP-MCNC: 2.1 G/DL (ref 3.5–5.2)
ALP SERPL-CCNC: 119 U/L (ref 55–135)
ALT SERPL W/O P-5'-P-CCNC: <5 U/L (ref 10–44)
ANION GAP SERPL CALC-SCNC: 11 MMOL/L (ref 8–16)
ANISOCYTOSIS BLD QL SMEAR: SLIGHT
AST SERPL-CCNC: 14 U/L (ref 10–40)
BASOPHILS # BLD AUTO: 0.04 K/UL (ref 0–0.2)
BASOPHILS NFR BLD: 0.6 % (ref 0–1.9)
BILIRUB SERPL-MCNC: 0.3 MG/DL (ref 0.1–1)
BUN SERPL-MCNC: 18 MG/DL (ref 6–20)
CALCIUM SERPL-MCNC: 7.9 MG/DL (ref 8.7–10.5)
CHLORIDE SERPL-SCNC: 101 MMOL/L (ref 95–110)
CO2 SERPL-SCNC: 23 MMOL/L (ref 23–29)
CREAT SERPL-MCNC: 2.9 MG/DL (ref 0.5–1.4)
CRYPTOC AG CSF QL LA: NEGATIVE
DIFFERENTIAL METHOD: ABNORMAL
EOSINOPHIL # BLD AUTO: 0.5 K/UL (ref 0–0.5)
EOSINOPHIL NFR BLD: 7.3 % (ref 0–8)
ERYTHROCYTE [DISTWIDTH] IN BLOOD BY AUTOMATED COUNT: 18.4 % (ref 11.5–14.5)
ERYTHROCYTE [DISTWIDTH] IN BLOOD BY AUTOMATED COUNT: 18.9 % (ref 11.5–14.5)
EST. GFR  (NO RACE VARIABLE): 24.2 ML/MIN/1.73 M^2
GLUCOSE SERPL-MCNC: 96 MG/DL (ref 70–110)
HCT VFR BLD AUTO: 26 % (ref 40–54)
HCT VFR BLD AUTO: 26.9 % (ref 40–54)
HGB BLD-MCNC: 8.5 G/DL (ref 14–18)
HGB BLD-MCNC: 8.5 G/DL (ref 14–18)
HYPOCHROMIA BLD QL SMEAR: ABNORMAL
IMM GRANULOCYTES # BLD AUTO: 0.03 K/UL (ref 0–0.04)
IMM GRANULOCYTES NFR BLD AUTO: 0.4 % (ref 0–0.5)
LYMPHOCYTES # BLD AUTO: 0.7 K/UL (ref 1–4.8)
LYMPHOCYTES NFR BLD: 9 % (ref 18–48)
MCH RBC QN AUTO: 31.1 PG (ref 27–31)
MCH RBC QN AUTO: 31.3 PG (ref 27–31)
MCHC RBC AUTO-ENTMCNC: 31.6 G/DL (ref 32–36)
MCHC RBC AUTO-ENTMCNC: 32.7 G/DL (ref 32–36)
MCV RBC AUTO: 95 FL (ref 82–98)
MCV RBC AUTO: 99 FL (ref 82–98)
MONOCYTES # BLD AUTO: 0.8 K/UL (ref 0.3–1)
MONOCYTES NFR BLD: 11.3 % (ref 4–15)
NEUTROPHILS # BLD AUTO: 5.2 K/UL (ref 1.8–7.7)
NEUTROPHILS NFR BLD: 71.4 % (ref 38–73)
NRBC BLD-RTO: 0 /100 WBC
OVALOCYTES BLD QL SMEAR: ABNORMAL
PLATELET # BLD AUTO: 172 K/UL (ref 150–450)
PLATELET # BLD AUTO: 176 K/UL (ref 150–450)
PMV BLD AUTO: 11.1 FL (ref 9.2–12.9)
PMV BLD AUTO: 11.9 FL (ref 9.2–12.9)
POCT GLUCOSE: 114 MG/DL (ref 70–110)
POCT GLUCOSE: 117 MG/DL (ref 70–110)
POCT GLUCOSE: 81 MG/DL (ref 70–110)
POCT GLUCOSE: 86 MG/DL (ref 70–110)
POIKILOCYTOSIS BLD QL SMEAR: SLIGHT
POLYCHROMASIA BLD QL SMEAR: ABNORMAL
POTASSIUM SERPL-SCNC: 3.4 MMOL/L (ref 3.5–5.1)
PROT SERPL-MCNC: 6.1 G/DL (ref 6–8.4)
RBC # BLD AUTO: 2.72 M/UL (ref 4.6–6.2)
RBC # BLD AUTO: 2.73 M/UL (ref 4.6–6.2)
SODIUM SERPL-SCNC: 135 MMOL/L (ref 136–145)
VANCOMYCIN SERPL-MCNC: 15.9 UG/ML
WBC # BLD AUTO: 7.25 K/UL (ref 3.9–12.7)
WBC # BLD AUTO: 9.49 K/UL (ref 3.9–12.7)

## 2023-08-28 PROCEDURE — 63600175 PHARM REV CODE 636 W HCPCS

## 2023-08-28 PROCEDURE — 99223 1ST HOSP IP/OBS HIGH 75: CPT | Mod: ,,,

## 2023-08-28 PROCEDURE — 80202 ASSAY OF VANCOMYCIN: CPT | Performed by: FAMILY MEDICINE

## 2023-08-28 PROCEDURE — 99233 PR SUBSEQUENT HOSPITAL CARE,LEVL III: ICD-10-PCS | Mod: GC,,, | Performed by: FAMILY MEDICINE

## 2023-08-28 PROCEDURE — 11000001 HC ACUTE MED/SURG PRIVATE ROOM

## 2023-08-28 PROCEDURE — 94640 AIRWAY INHALATION TREATMENT: CPT

## 2023-08-28 PROCEDURE — 25000003 PHARM REV CODE 250

## 2023-08-28 PROCEDURE — 97530 THERAPEUTIC ACTIVITIES: CPT

## 2023-08-28 PROCEDURE — 99233 PR SUBSEQUENT HOSPITAL CARE,LEVL III: ICD-10-PCS | Mod: ,,, | Performed by: INTERNAL MEDICINE

## 2023-08-28 PROCEDURE — 63600175 PHARM REV CODE 636 W HCPCS: Mod: JZ | Performed by: NURSE PRACTITIONER

## 2023-08-28 PROCEDURE — 25000242 PHARM REV CODE 250 ALT 637 W/ HCPCS

## 2023-08-28 PROCEDURE — 99900035 HC TECH TIME PER 15 MIN (STAT)

## 2023-08-28 PROCEDURE — C9113 INJ PANTOPRAZOLE SODIUM, VIA: HCPCS

## 2023-08-28 PROCEDURE — 21400001 HC TELEMETRY ROOM

## 2023-08-28 PROCEDURE — 85027 COMPLETE CBC AUTOMATED: CPT

## 2023-08-28 PROCEDURE — 63600175 PHARM REV CODE 636 W HCPCS: Performed by: NURSE PRACTITIONER

## 2023-08-28 PROCEDURE — 63600175 PHARM REV CODE 636 W HCPCS: Performed by: FAMILY MEDICINE

## 2023-08-28 PROCEDURE — 80100014 HC HEMODIALYSIS 1:1

## 2023-08-28 PROCEDURE — 99223 PR INITIAL HOSPITAL CARE,LEVL III: ICD-10-PCS | Mod: ,,,

## 2023-08-28 PROCEDURE — 97535 SELF CARE MNGMENT TRAINING: CPT | Mod: CO

## 2023-08-28 PROCEDURE — 36415 COLL VENOUS BLD VENIPUNCTURE: CPT

## 2023-08-28 PROCEDURE — 95718 EEG PHYS/QHP 2-12 HR W/VEEG: CPT | Mod: ,,, | Performed by: STUDENT IN AN ORGANIZED HEALTH CARE EDUCATION/TRAINING PROGRAM

## 2023-08-28 PROCEDURE — 99233 SBSQ HOSP IP/OBS HIGH 50: CPT | Mod: ,,, | Performed by: INTERNAL MEDICINE

## 2023-08-28 PROCEDURE — 25000003 PHARM REV CODE 250: Performed by: FAMILY MEDICINE

## 2023-08-28 PROCEDURE — 27000221 HC OXYGEN, UP TO 24 HOURS

## 2023-08-28 PROCEDURE — 80053 COMPREHEN METABOLIC PANEL: CPT

## 2023-08-28 PROCEDURE — 95718 PR EEG, W/VIDEO, CONT RECORD, I&R, 2-12 HRS: ICD-10-PCS | Mod: ,,, | Performed by: STUDENT IN AN ORGANIZED HEALTH CARE EDUCATION/TRAINING PROGRAM

## 2023-08-28 PROCEDURE — 85025 COMPLETE CBC W/AUTO DIFF WBC: CPT

## 2023-08-28 PROCEDURE — 99233 SBSQ HOSP IP/OBS HIGH 50: CPT | Mod: GC,,, | Performed by: FAMILY MEDICINE

## 2023-08-28 PROCEDURE — 94761 N-INVAS EAR/PLS OXIMETRY MLT: CPT

## 2023-08-28 RX ORDER — VALSARTAN 160 MG/1
160 TABLET ORAL 2 TIMES DAILY
Status: DISCONTINUED | OUTPATIENT
Start: 2023-08-28 | End: 2023-09-19 | Stop reason: HOSPADM

## 2023-08-28 RX ORDER — SODIUM CHLORIDE 0.9 % (FLUSH) 0.9 %
10 SYRINGE (ML) INJECTION
Status: DISCONTINUED | OUTPATIENT
Start: 2023-08-28 | End: 2023-09-19 | Stop reason: HOSPADM

## 2023-08-28 RX ADMIN — VANCOMYCIN HYDROCHLORIDE 500 MG: 500 INJECTION, POWDER, LYOPHILIZED, FOR SOLUTION INTRAVENOUS at 11:08

## 2023-08-28 RX ADMIN — TOBRAMYCIN AND DEXAMETHASONE: 3; 1 OINTMENT OPHTHALMIC at 09:08

## 2023-08-28 RX ADMIN — THERA TABS 1 TABLET: TAB at 09:08

## 2023-08-28 RX ADMIN — ATORVASTATIN CALCIUM 40 MG: 40 TABLET, FILM COATED ORAL at 09:08

## 2023-08-28 RX ADMIN — MEROPENEM 500 MG: 500 INJECTION INTRAVENOUS at 06:08

## 2023-08-28 RX ADMIN — TOBRAMYCIN AND DEXAMETHASONE 1 DROP: 3; 1 SUSPENSION/ DROPS OPHTHALMIC at 12:08

## 2023-08-28 RX ADMIN — SENNOSIDES AND DOCUSATE SODIUM 1 TABLET: 50; 8.6 TABLET ORAL at 09:08

## 2023-08-28 RX ADMIN — IPRATROPIUM BROMIDE AND ALBUTEROL SULFATE 3 ML: .5; 3 SOLUTION RESPIRATORY (INHALATION) at 12:08

## 2023-08-28 RX ADMIN — SEVELAMER CARBONATE 0.8 G: 0.8 POWDER, FOR SUSPENSION ORAL at 05:08

## 2023-08-28 RX ADMIN — HYDRALAZINE HYDROCHLORIDE: 10 TABLET, FILM COATED ORAL at 09:08

## 2023-08-28 RX ADMIN — ACETAMINOPHEN 1000 MG: 500 TABLET ORAL at 09:08

## 2023-08-28 RX ADMIN — VALSARTAN 160 MG: 160 TABLET, FILM COATED ORAL at 09:08

## 2023-08-28 RX ADMIN — POLYETHYLENE GLYCOL 3350 17 G: 17 POWDER, FOR SOLUTION ORAL at 09:08

## 2023-08-28 RX ADMIN — SEVELAMER CARBONATE 0.8 G: 0.8 POWDER, FOR SUSPENSION ORAL at 09:08

## 2023-08-28 RX ADMIN — TOBRAMYCIN AND DEXAMETHASONE: 3; 1 OINTMENT OPHTHALMIC at 04:08

## 2023-08-28 RX ADMIN — CARVEDILOL 6.25 MG: 6.25 TABLET, FILM COATED ORAL at 09:08

## 2023-08-28 RX ADMIN — ERYTHROPOIETIN 3000 UNITS: 3000 INJECTION, SOLUTION INTRAVENOUS; SUBCUTANEOUS at 09:08

## 2023-08-28 RX ADMIN — IPRATROPIUM BROMIDE AND ALBUTEROL SULFATE 3 ML: .5; 3 SOLUTION RESPIRATORY (INHALATION) at 08:08

## 2023-08-28 RX ADMIN — TOBRAMYCIN AND DEXAMETHASONE 1 DROP: 3; 1 SUSPENSION/ DROPS OPHTHALMIC at 09:08

## 2023-08-28 RX ADMIN — PANTOPRAZOLE SODIUM 40 MG: 40 INJECTION, POWDER, FOR SOLUTION INTRAVENOUS at 09:08

## 2023-08-28 RX ADMIN — FLUCONAZOLE 400 MG: 200 TABLET ORAL at 09:08

## 2023-08-28 RX ADMIN — HEPARIN SODIUM 1000 UNITS: 1000 INJECTION, SOLUTION INTRAVENOUS; SUBCUTANEOUS at 06:08

## 2023-08-28 RX ADMIN — POTASSIUM BICARBONATE 10 MEQ: 391 TABLET, EFFERVESCENT ORAL at 09:08

## 2023-08-28 RX ADMIN — HYDRALAZINE HYDROCHLORIDE: 10 TABLET, FILM COATED ORAL at 04:08

## 2023-08-28 RX ADMIN — QUETIAPINE FUMARATE 50 MG: 25 TABLET ORAL at 09:08

## 2023-08-28 RX ADMIN — CALCITRIOL CAPSULES 0.25 MCG 0.25 MCG: 0.25 CAPSULE ORAL at 09:08

## 2023-08-28 RX ADMIN — SEVELAMER CARBONATE 0.8 G: 0.8 POWDER, FOR SUSPENSION ORAL at 12:08

## 2023-08-28 RX ADMIN — Medication 6 MG: at 09:08

## 2023-08-28 RX ADMIN — Medication 1 CAPSULE: at 09:08

## 2023-08-28 RX ADMIN — TOBRAMYCIN AND DEXAMETHASONE 1 DROP: 3; 1 SUSPENSION/ DROPS OPHTHALMIC at 04:08

## 2023-08-28 NOTE — ASSESSMENT & PLAN NOTE
- target Hg of 10-12  - hemoglobin this morning 8.3  - continue epogen 3,000 units every Monday, Wednesday and Friday

## 2023-08-28 NOTE — ASSESSMENT & PLAN NOTE
Patient started to have melena on 8/22, and on 8/24, underwent EGD.   Found to have a large oozing duodenal ulcer with adherent clot. 2 clips placed however ulcer could not be addressed completely. Also had 2 more nonbleeding ulcers in the duodenal bulb and 2nd part of the duodenum.  3U PRBC transfused so far this admission  Per GI, no further endoscopic intervention warranted  Due to ongoing melena, ordered CTA GIB protocol - negative    Problem now stable    - Trend CBC Q12. Transfuse for Hb >7, unless otherwise indicated  - Maintain IV access with 2 large bore IVs  - Hold all NSAIDs and anticoagulants, unless contraindicated  - IV pantoprazole 40mg BID - switch to PO pantroprazole on discharge  - Correct any coagulopathy with platelets and FFP for goal of platelets >50K and INR <2.0

## 2023-08-28 NOTE — SUBJECTIVE & OBJECTIVE
Interval History: Patient seen and examined. Underwent iHD earlier this morning for 3.5 hours with 2 liters removed. Afebrile with pulse ranging from 60s bpm. Systolic blood pressures ranging from 160-140s mmHg. He is saturating +91% on 2 liters via nasal cannula. He is net negative ~2.5 liters in the last 24 hours. Metabolic panel reviewed.    Review of patient's allergies indicates:   Allergen Reactions    Morphine Rash    Amiodarone analogues Itching     Other reaction(s): Unknown     Current Facility-Administered Medications   Medication Frequency    0.9%  NaCl infusion (for blood administration) Q24H PRN    0.9%  NaCl infusion (for blood administration) Q24H PRN    0.9%  NaCl infusion PRN    0.9%  NaCl infusion Once    acetaminophen tablet 1,000 mg BID    albuterol-ipratropium 2.5 mg-0.5 mg/3 mL nebulizer solution 3 mL Q6H WAKE    atorvastatin tablet 40 mg Daily    calcitRIOL capsule 0.25 mcg Daily    carvediloL tablet 6.25 mg BID    dextrose 10% bolus 125 mL 125 mL PRN    dextrose 10% bolus 250 mL 250 mL PRN    epoetin thee injection 3,000 Units Every Mon, Wed, Fri    fluconazole tablet 400 mg Daily    glucagon (human recombinant) injection 1 mg PRN    glucose chewable tablet 16 g PRN    glucose chewable tablet 24 g PRN    heparin (porcine) injection 1,000 Units PRN    hydrALAZINE 10 mg 2 tablets, hydrALAZINE 25 mg 2 tablets, isorsorbide dinitrate 20 mg  2 tablets combination TID    hydrALAZINE injection 10 mg Q6H PRN    meclizine tablet 12.5 mg TID PRN    melatonin tablet 6 mg Nightly    meropenem (MERREM) 500 mg in sodium chloride 0.9 % 100 mL IVPB (MB+) Q12H    multivitamin tablet Daily    naloxone 0.4 mg/mL injection 0.02 mg PRN    OLANZapine injection 5 mg Nightly PRN    ondansetron injection 4 mg Q6H PRN    oxyCODONE immediate release tablet Tab 10 mg Q4H PRN    oxyCODONE-acetaminophen 5-325 mg per tablet 1 tablet Q4H PRN    pantoprazole injection 40 mg BID    polyethylene glycol packet 17 g BID     prochlorperazine injection Soln 10 mg Q6H PRN    QUEtiapine tablet 50 mg BID    senna-docusate 8.6-50 mg per tablet 1 tablet BID    sevelamer carbonate pwpk 0.8 g TID WM    sodium chloride 0.9% flush 10 mL Q12H PRN    tobramycin-dexAMETHasone 0.3-0.1% ophthalmic ointment TID    tobramycin-dexAMETHasone 0.3-0.1% ophthalmic suspension 1 drop QID    valsartan tablet 160 mg BID    vancomycin - pharmacy to dose pharmacy to manage frequency       Objective:     Vital Signs (Most Recent):  Temp: 97.9 °F (36.6 °C) (08/28/23 0500)  Pulse: 66 (08/28/23 0805)  Resp: 18 (08/28/23 0805)  BP: (!) 168/83 (08/28/23 0805)  SpO2: 100 % (08/28/23 0805) Vital Signs (24h Range):  Temp:  [97.9 °F (36.6 °C)-98.2 °F (36.8 °C)] 97.9 °F (36.6 °C)  Pulse:  [61-72] 66  Resp:  [16-24] 18  SpO2:  [82 %-100 %] 100 %  BP: (141-196)/(72-96) 168/83     Weight: 62.8 kg (138 lb 7.2 oz) (08/23/23 0900)  Body mass index is 19.87 kg/m².  Body surface area is 1.76 meters squared.    I/O last 3 completed shifts:  In: 1973.4 [Blood:290.4; Other:250; NG/GT:1433]  Out: 3000 [Other:3000]     Physical Exam  Vitals and nursing note reviewed.   Constitutional:       General: He is awake. He is not in acute distress.     Appearance: He is cachectic. He is ill-appearing. He is not diaphoretic.      Comments: Muscle wasting/atrophy noted.   HENT:      Head: Normocephalic and atraumatic.      Right Ear: External ear normal.      Left Ear: External ear normal.      Nose: Nose normal.      Mouth/Throat:      Mouth: Mucous membranes are moist.      Pharynx: Oropharynx is clear. No oropharyngeal exudate or posterior oropharyngeal erythema.   Eyes:      General: No scleral icterus.        Right eye: No discharge.         Left eye: No discharge.      Extraocular Movements: Extraocular movements intact.      Conjunctiva/sclera: Conjunctivae normal.   Cardiovascular:      Rate and Rhythm: Normal rate.      Heart sounds: No murmur heard.     No friction rub. No gallop.    Pulmonary:      Effort: Pulmonary effort is normal. No respiratory distress.      Breath sounds: Rhonchi present. No wheezing or rales.   Chest:      Comments: Tunneled HD catheter to right chest wall.  Abdominal:      General: Bowel sounds are normal. There is no distension.      Palpations: Abdomen is soft.      Tenderness: There is no abdominal tenderness.   Musculoskeletal:      Cervical back: Neck supple.      Right lower leg: No edema.      Left lower leg: No edema.   Skin:     General: Skin is warm and dry.      Coloration: Skin is not jaundiced.      Comments: Stable noted to right upper extremity from recent surgery. Incision appears intact.   Neurological:      Mental Status: He is confused.      Cranial Nerves: No cranial nerve deficit.      Motor: Weakness present.   Psychiatric:         Speech: Speech is delayed.         Behavior: Behavior is slowed.          Significant Labs:  ABGs:   Recent Labs   Lab 08/24/23  0836   PH 7.449   PCO2 31.1*   HCO3 21.6*   POCSATURATED 93*   BE -2     BMP:   Recent Labs   Lab 08/25/23  0529 08/25/23  0800 08/27/23  0116   GLU 91   < > 71   *   < > 133*   K 5.4*   < > 5.1      < > 105   CO2 18*   < > 17*   BUN 40*   < > 30*   CREATININE 7.6*   < > 5.4*   CALCIUM 9.7   < > 9.2   MG 2.6  --   --     < > = values in this interval not displayed.     CBC:   Recent Labs   Lab 08/27/23  1704   WBC 8.46   RBC 2.70*   HGB 8.3*   HCT 26.6*      MCV 99*   MCH 30.7   MCHC 31.2*     CMP:   Recent Labs   Lab 08/27/23  0116   GLU 71   CALCIUM 9.2   ALBUMIN 2.2*   PROT 6.4   *   K 5.1   CO2 17*      BUN 30*   CREATININE 5.4*   ALKPHOS 104   ALT 6*   AST 25   BILITOT 0.5     LFTs:   Recent Labs   Lab 08/27/23  0116   ALT 6*   AST 25   ALKPHOS 104   BILITOT 0.5   PROT 6.4   ALBUMIN 2.2*     Microbiology Results (last 7 days)       Procedure Component Value Units Date/Time    CSF culture [[id number]] Collected: 08/26/23 1315    Order Status: Completed  Specimen: CSF (Spinal Fluid) from CSF Tap, Tube 3 Updated: 08/28/23 0713     CSF CULTURE No Growth to date     Gram Stain Result Cytospin indicates:      No WBC's      No organisms seen    AFB Culture & Smear [[id number]] Collected: 08/26/23 1315    Order Status: Completed Specimen: CSF (Spinal Fluid) from CSF Tap, Tube 3 Updated: 08/27/23 2127     AFB Culture & Smear Culture in progress    Cryptococcal antigen, CSF [[id number]] Collected: 08/26/23 1315    Order Status: Sent Specimen: CSF (Spinal Fluid) from CSF Tap, Tube 3 Updated: 08/26/23 1400    Fungus culture [[id number]] Collected: 08/26/23 1315    Order Status: Sent Specimen: CSF (Spinal Fluid) from CSF Tap, Tube 3 Updated: 08/26/23 1359    Gram stain [[id number]] Collected: 08/26/23 1315    Order Status: Canceled Specimen: CSF (Spinal Fluid) from CSF Tap, Tube 3     Culture, Anaerobe [[id number]]  (Abnormal) Collected: 08/16/23 1632    Order Status: Completed Specimen: Wound from Cornea, Left Updated: 08/23/23 1108     Anaerobic Culture CUTIBACTERIUM ACNES  From broth only      Culture, Anaerobe [[id number]] Collected: 08/16/23 1636    Order Status: Completed Specimen: Abscess from Eyelid, Left Updated: 08/23/23 0842     Anaerobic Culture No anaerobes isolated    Narrative:      Sclera abscess    Culture, Anaerobe [[id number]] Collected: 08/16/23 1647    Order Status: Completed Specimen: Abscess from Eyelid, Left Updated: 08/23/23 0842     Anaerobic Culture No anaerobes isolated    Culture, Anaerobe [[id number]] Collected: 08/16/23 1628    Order Status: Completed Specimen: Wound from Eyelid, Left Updated: 08/23/23 0842     Anaerobic Culture No anaerobes isolated          Specimen (24h ago, onward)      None          PTH:   Recent Labs   Lab 08/21/23  1200   PTH 78.5*     Significant Imaging:  I have reviewed all imagining in the last 24 hours.

## 2023-08-28 NOTE — PT/OT/SLP PROGRESS
Physical Therapy Treatment    Patient Name:  Immanuel Bernal   MRN:  [id number]  Admitting Diagnosis: Delirium  Recent Surgery: Procedure(s) (LRB):  MRI (Magnetic Resonance Imagine) (N/A) 2 Days Post-Op    Recommendations:     Discharge Recommendations:  rehabilitation facility   Discharge Equipment Recommendations: to be determined by next level of care   Barriers to discharge: None    Highest Level of Mobility: sit to stand  Assistance Needed: maximal assistance    Assessment:     Immanuel Bernal is a 57 y.o. male admitted with a medical diagnosis of Delirium. Patient tolerated PT treatment well today. He  is most limited today by weakness.  He was able to practice bed mobility and standing - majority of time spent assisting with clean up. Focus of treatment was improving functional mobility. Pt is progressing well. See detailed treatment note below:    Problem List: weakness, impaired endurance, impaired self care skills, impaired functional mobility, impaired balance, visual deficits, decreased coordination, decreased upper extremity function, decreased lower extremity function, impaired cardiopulmonary response to activity, decreased safety awareness, impaired cognition. weakness, impaired endurance, impaired self care skills, impaired functional mobility, impaired balance, visual deficits, decreased coordination, decreased upper extremity function, decreased lower extremity function, impaired cardiopulmonary response to activity, decreased safety awareness, impaired cognition  Rehab Prognosis: Good     GOALS:   Multidisciplinary Problems       Physical Therapy Goals          Problem: Physical Therapy    Goal Priority Disciplines Outcome Goal Variances Interventions   Physical Therapy Goal     PT, PT/OT Ongoing, Progressing     Description: Goals to be met by: 2023    Patient will increase functional independence with mobility by performin. Supine to sit with MInimal Assistance  2. Sit to stand  "transfer with Minimal Assistance using LRAD  3. Gait  x 10 feet with Minimal Assistance using LRAD.                        Plan:     During this hospitalization, patient to be seen 3 x/week to address the listed problems via gait training, therapeutic activities, therapeutic exercises, neuromuscular re-education  Plan of Care Expires:  09/14/23  Plan of Care Reviewed with: patient    Subjective     Communicated with RN prior to session.  Patient found resting in bed upon PT entry to room.   "I'm ok"    Pain/Comfort:  Pain Rating 1: 0/10  Pain Rating Post-Intervention 1: 0/10    Objective:     Patient found with: telemetry, pulse ox (continuous), PICC line, PEG Tube, blood pressure cuff, central line   Mental Status: Patient is Alert and Cooperative during session.    General Precautions: Standard, fall, vision impaired   Orthopedic Precautions:N/A   Braces: N/A   Respiratory Status: room air  Vital Signs (Most Recent):    Temp: 97.8 °F (36.6 °C) (08/28/23 0700)  Pulse: 65 (08/28/23 1222)  Resp: 18 (08/28/23 1222)  BP: (!) 148/76 (08/28/23 1142)  SpO2: 97 % (08/28/23 1222)    Functional Mobility:  Bed Mobility:   Rolling/Turning to Left: contact guard assistance  Rolling/Turning to Right: contact guard assistance  Supine to Sit: maximal assistance  Scooting anteriorly to EOB to have both feet planted on floor: moderate assistance  Sit to Supine: maximal assistance    Sitting Balance at Edge of Bed:  Assistance Level Required: standy by assistance  Time: ~10 minutes  Postural deviations noted: rounded shoulders, forward head  Patient reported dizziness in sitting: BP taken, 188/115    Transfers:   Sit <> Stand Transfer: maximal assistance with no assistive device     Education:  Patient was educated on the following:  Progress of PT goals and plan of care  In room safety and use of call button  Importance of continued upright mobility and exercise    Patient left HOB elevated with all lines intact, call button in " reach, and RN notified.    AM-PAC 6 CLICK MOBILITY  Turning over in bed (including adjusting bedclothes, sheets and blankets)?: 3  Sitting down on and standing up from a chair with arms (e.g., wheelchair, bedside commode, etc.): 2  Moving from lying on back to sitting on the side of the bed?: 2  Moving to and from a bed to a chair (including a wheelchair)?: 2  Need to walk in hospital room?: 1  Climbing 3-5 steps with a railing?: 1  Basic Mobility Total Score: 11       Time Tracking:     PT Received On: 08/28/23  PT Start Time: 0940     PT Stop Time: 1010  PT Total Time (min): 30 min     Additional staff present: CABRERA - Co-treatment with OT due to patient complexity and need for two skilled therapists to ensure safe mobilization.     Billable Minutes:   Therapeutic Activity 30    Treatment Type: Treatment  PT/PTA: KAI Boland PT, DPT  08/28/2023

## 2023-08-28 NOTE — ASSESSMENT & PLAN NOTE
Current hypertension is worsened secondary to agitation  On CCB at home but will uptitrate GDMT while here    - Coreg 6.25mg BID, limited by HR  - BiDil 2 tablets TID  - Valsartan 160mg BID

## 2023-08-28 NOTE — NURSING
Patient resting in bed, call light within reach, bed in low position, no distress at this time, A & O X 1 patient knows who he is but confused other wise with respond with simple answers just still confused at this time, lungs clear, NSR on heart monitor, staples in right arm intact, left eye less swelling overnight, no melena this shift, CT ABD done over night no results at this time, tube running peptide 1.5 @ 50 ml/hr, blood sugar 104 over night, getting dialysis this AM running 3 1/2 hrs per dialysis taking 2-2 1/2 L off.

## 2023-08-28 NOTE — ASSESSMENT & PLAN NOTE
S/p L eye evisceration on 8/16/23.  Repeat MRI head/orbit with ongoing L scleral infection and cellulitis    - ID and ophthalmology following  - Continue vancomycin, meropenem, fluconazole, and tobramycin ointments/eye drops  - Anticipated 4-6 weeks of therapy from evisceration. Anticipated end date: 9/12-9/26. See OPAT note from 8/19.  - IR consulted for tunneled line  - Follow up all culture data  - Ophthalmology to perform packing changes every 2-3 days  - L eye to be taped shut at night

## 2023-08-28 NOTE — PROGRESS NOTES
Hemodialysis treatment completed. Blood returned. Dialyzed patient for 3.5 hours with net fluid removal of 2 L. Patient tolerated treatment well.     R tunneled IJ CVC flushed with saline and locked with heparin. Lumens capped and wrapped in sterile gauze.     Report given to primary nurse.

## 2023-08-28 NOTE — PLAN OF CARE
Plan to finish evisceration with orbital implant left eye this coming Wednesday 8/30/23 as long as patient healthy enough to undergo general anesthesia for procedure and no other medically necessary procedures need to be done at that time.     NPO at midnight, hold all blood thinners night before (pt not on any currently)     Consent to be signed by sister prior to procedure (have not seen on 2x visits so far today, will check again later vs tomorrow am)     Discussed and reviewed with Dr Stewart, including recent MRI brain orbit which shows as expected inflammation orbitally s/p surgery.     Martín Izaguirre MD   PGY-3 [facility] ophthalmology

## 2023-08-28 NOTE — CARE UPDATE
"RAPID RESPONSE NURSE CHART REVIEW        Chart Reviewed: 08/28/2023, 9:39 AM    MRN: [id number]  Bed: 40546/43589 A    Dx: Pamela Bernal has a past medical history of Bilateral retinal detachment, BPH (benign prostatic hyperplasia), Cataract, CHF (congestive heart failure), CKD (chronic kidney disease) stage 3, GFR 30-59 ml/min, Diabetes mellitus, type 2, Hypertension, KENIA (obstructive sleep apnea), Pancreatitis, Persistent proteinuria, PTSD (post-traumatic stress disorder), and Stroke.    Last VS: BP (!) 165/78   Pulse 66   Temp 97.9 °F (36.6 °C) (Axillary)   Resp 18   Ht 5' 10" (1.778 m)   Wt 62.8 kg (138 lb 7.2 oz)   SpO2 100%   BMI 19.87 kg/m²     24H Vital Sign Range:  Temp:  [97.9 °F (36.6 °C)-98.2 °F (36.8 °C)]   Pulse:  [61-72]   Resp:  [17-24]   BP: (141-196)/(72-93)   SpO2:  [82 %-100 %]     Level of Consciousness (AVPU): alert    Recent Labs     08/27/23  0116 08/27/23  0905 08/27/23  1704   WBC 8.92  9.58 9.00 8.46   HGB 8.6*  8.4* 9.0* 8.3*   HCT 27.8*  26.8* 28.2* 26.6*   *  151 154 154       Recent Labs     08/26/23  0601 08/27/23  0116 08/28/23  0727   * 133* 135*   K 4.6 5.1 3.4*    105 101   CO2 21* 17* 23   BUN 24* 30* 18   CREATININE 4.8* 5.4* 2.9*   GLU 70 71 96      OXYGEN:  Flow (L/min): 2    MEWS score: 1    Charge RN, Kylie  contacted for hypertension, PRN medications noted in MAR. No additional concerns verbalized at this time. Instructed to call [phone] for further concerns or assistance.    Hermila Claros RN          "

## 2023-08-28 NOTE — CONSULTS
Tunneled Catheter Placement Consult Note  Interventional Radiology      Date: 8/28/2023   Primary team: [facility], Michel Mcbride III*   Room/bed: 05516/67952 A    Inpatient consult to Interventional Radiology  Consult performed by: Rochelle May PA-C  Consult ordered by: Pat Montoya MD           Reason for Consult:   tunneled line for abx, per nephro and ID     SUBJECTIVE:     Chief Complaint:  Endophthalmitis     History of Present Illness:  Immanuel Bernal is a 57 y.o. male with a past medical history of ESRD, HFrEF 30-35%, DM2, HTN, endophthalmitis s/p L eye enucleation who was admitted on 8/14/23 for encephalopathy.  Interventional Radiology has been consulted for tunneled catheter placement for outpatient IV abx. Non tunneled PICC unable to be placed due to patients kidney function (CrCl 24). ID has evaluated the pt with recs to continue IV abx with EOT ~9/26/23. WBC is 7, patient is afebrile, and is hemodynamically stable.     Review of Systems   Reason unable to perform ROS: mental status.        Scheduled Meds:   sodium chloride 0.9%   Intravenous Once    acetaminophen  1,000 mg Per G Tube BID    albuterol-ipratropium  3 mL Nebulization Q6H WAKE    atorvastatin  40 mg Per G Tube Daily    calcitRIOL  0.25 mcg Per G Tube Daily    carvediloL  6.25 mg Per G Tube BID    epoetin thee (PROCRIT) injection  3,000 Units Intravenous Every Mon, Wed, Fri    fluconazole  400 mg Per G Tube Daily    hydrALAZINE 10 mg 2 tablets, hydrALAZINE 25 mg 2 tablets, isorsorbide dinitrate 20 mg  2 tablets combination   Per G Tube TID    melatonin  6 mg Oral Nightly    meropenem (MERREM) IVPB  500 mg Intravenous Q12H    multivitamin  1 tablet Per G Tube Daily    pantoprazole  40 mg Intravenous BID    polyethylene glycol  17 g Per G Tube BID    QUEtiapine  50 mg Per G Tube BID    senna-docusate 8.6-50 mg  1 tablet Per G Tube BID    sevelamer carbonate  0.8 g Per G Tube TID WM    tobramycin-dexAMETHasone 0.3-0.1%   Left  Eye TID    tobramycin-dexAMETHasone 0.3-0.1%  1 drop Both Eyes QID    valsartan  160 mg Oral BID    vancomycin (VANCOCIN) IV (PEDS and ADULTS)  500 mg Intravenous Once     Continuous Infusions:  PRN Meds:0.9%  NaCl infusion (for blood administration), 0.9%  NaCl infusion (for blood administration), sodium chloride 0.9%, dextrose 10%, dextrose 10%, glucagon (human recombinant), glucose, glucose, heparin (porcine), hydrALAZINE, meclizine, naloxone, OLANZapine, ondansetron, oxyCODONE, oxyCODONE-acetaminophen, prochlorperazine, sodium chloride 0.9%, Pharmacy to dose Vancomycin consult **AND** vancomycin - pharmacy to dose    Review of patient's allergies indicates:   Allergen Reactions    Morphine Rash    Amiodarone analogues Itching     Other reaction(s): Unknown       Past Medical History:   Diagnosis Date    Bilateral retinal detachment 7/18/2023    BPH (benign prostatic hyperplasia)     Cataract     CHF (congestive heart failure)     CKD (chronic kidney disease) stage 3, GFR 30-59 ml/min     Diabetes mellitus, type 2     Hypertension     KENIA (obstructive sleep apnea)     Pancreatitis     Persistent proteinuria 11/12/2020    2 g proteinuria noted Resumed ACE Lower BP systolic to less than 130     PTSD (post-traumatic stress disorder)     Stroke      Past Surgical History:   Procedure Laterality Date    CATARACT EXTRACTION Bilateral     ESOPHAGOGASTRODUODENOSCOPY N/A 8/23/2023    Procedure: EGD (ESOPHAGOGASTRODUODENOSCOPY);  Surgeon: Bharath Ya MD;  Location: [facility] ([address] [place] [address]);  Service: Endoscopy;  Laterality: N/A;    EVISCERATION OF OCULAR CONTENTS Left 8/16/2023    Procedure: EVISCERATION, OCULAR CONTENTS;  Surgeon: Vicki Stewart MD;  [address];  Service: Ophthalmology;  Laterality: Left;    MAGNETIC RESONANCE IMAGING N/A 8/26/2023    Procedure: MRI (Magnetic Resonance Imagine);  Surgeon: Mckenzie De Jesus;  Location: [facility];  Service: Anesthesiology;  Laterality: N/A;    RETROBULBAR  INJECTION OF MEDICATION Left 8/16/2023    Procedure: INJECTION, MEDICATION, RETROBULBAR;  Surgeon: Vicki Stewart MD;  Location: [facility] OR [address] [place] [address];  Service: Ophthalmology;  Laterality: Left;    TARSORRHAPHY Left 8/16/2023    Procedure: BLEPHARORRHAPHY;  Surgeon: Vicki Stewart MD;  Location: [facility] OR [address] [place] [address];  Service: Ophthalmology;  Laterality: Left;     Family History   Problem Relation Age of Onset    Stroke Mother     Diabetes Father     Heart disease Father     Kidney disease Father     Diabetes Sister     Hyperlipidemia Brother     Amblyopia Neg Hx     Blindness Neg Hx     Cataracts Neg Hx     Glaucoma Neg Hx     Macular degeneration Neg Hx     Strabismus Neg Hx     Retinal detachment Neg Hx      Social History     Tobacco Use    Smoking status: Former     Types: Cigarettes, Cigars    Smokeless tobacco: Never   Substance Use Topics    Alcohol use: Not Currently    Drug use: Not Currently       OBJECTIVE:     Vital Signs (Most Recent)  Temp: 97.8 °F (36.6 °C) (08/28/23 0700)  Pulse: 66 (08/28/23 0805)  Resp: 18 (08/28/23 0805)  BP: (!) 165/78 (08/28/23 0916)  SpO2: 100 % (08/28/23 0805)    Physical Exam:   Physical Exam  Constitutional:       Comments: Awake, not oriented.    HENT:      Head: Normocephalic.   Eyes:      Comments: S/p L eye enucleation   Cardiovascular:      Rate and Rhythm: Normal rate.   Pulmonary:      Effort: Pulmonary effort is normal.   Abdominal:      General: Abdomen is flat.   Skin:     Comments: R chest: HD catheter in place   Neurological:      Mental Status: He is disoriented.         Laboratory  I have reviewed all pertinent lab results within the past 24 hours.  CBC:   Recent Labs   Lab 08/28/23  1152   WBC 7.25   RBC 2.72*   HGB 8.5*   HCT 26.9*      MCV 99*   MCH 31.3*   MCHC 31.6*     CMP:   Recent Labs   Lab 08/28/23  0727   GLU 96   CALCIUM 7.9*   ALBUMIN 2.1*   PROT 6.1   *   K 3.4*   CO2 23      BUN 18   CREATININE 2.9*   ALKPHOS 119   ALT <5*   AST 14  "  BILITOT 0.3     Coagulation: No results for input(s): "LABPROT", "INR", "APTT" in the last 168 hours.    ASA/Mallampati  ASA: 3  Mallampati: 2      ASSESSMENT/PLAN:     Assessment:  57 y.o. male with a past medical history of  ESRD, HFrEF 30-35%, DM2, HTN, endophthalmitis s/p L eye enucleation who was admitted on 8/14/23 for encephalopathy who has been referred to IR for tunneled catheter placement for long term antibiotics. Nephrology recommending a tunneled line due to kidney function (CrCl 24)    Plan:  Will proceed with tunneled line placement for IV abx on 8/29/23.   Sedation plan: up to moderate   Anticoagulation history reviewed.   Coagulation labs reviewed.  Thank you for the consult.Please contact with questions via [employer] secure chat.    Rochelle May PA-C  Interventional Radiology  8/28/2023         "

## 2023-08-28 NOTE — PROCEDURES
VIDEO ELECTROENCEPHALOGRAM  REPORT    DATE OF SERVICE:August 28, 2023  EEG NUMBER: FH   REQUESTED BY:  Dr Mcbride  LOCATION OF SERVICE:  [facility]    [facility]   Electroencephalographic (EEG) recording is with electrodes placed according to the International 10-20 placement system.  Thirty two (32) channels of digital signal (sampling rate of 512/sec) including T1 and T2 was simultaneously recorded from the scalp and may include  EKG, EMG, and/or eye monitors.  Recording band pass was 0.1 to 512 hz.  Digital video recording of the patient is simultaneously recorded with the EEG.  The patient is instructed report clinical symptoms which may occur during the recording session.  EEG and video recording is stored and archived in digital format.  Activation procedures which include photic stimulation, hyperventilation and instructing patients to perform simple task are done in selected patients.   The EEG is displayed on a monitor screen and can be reviewed using different montages.  Computer assisted analysis is employed to detect spike and electrographic seizure activity.   The entire record is submitted for computer analysis.  The entire recording is visually reviewed and the times identified by computer analysis as being spikes or seizures are reviewed again.  Compresses spectral analysis (CSA) is also performed on the activity recorded from each individual channel.  This is displayed as a power display of frequencies from 0 to 30 Hz over time.   The CSA is reviewed looking for asymmetries in power between homologous areas of the scalp and then compared with the original EEG recording.     Y[employer] software was also utilized in the review of this study.  This software suite analyzes the EEG recording in multiple domains.  Coherence and rhythmicity is computed to identify EEG sections which may contain organized seizures.  Each channel undergoes analysis to detect presence of spike and sharp waves which have  special and morphological characteristic of epileptic activity.  The routine EEG recording is converted from spacial into frequency domain.  This is then displayed comparing homologous areas to identify areas of significant asymmetry.  Algorithm to identify non-cortically generated artifact is used to separate eye movement, EMG and other artifact from the EEG.      ELECTROENCEPHALOGRAM:    RECORDING TIMES:  Start on 8/27/23 at 11:36  Stop on 8/27/23 at 15:22    A total of 3 hrs and 40 min of EEG recording was obtained.    Indication: 58 yo male hx of CHF, ESRD on HD prior stroke admitted with endophthalmitis, RUE thrombosis with encephalopathy.    State of Consciousness:   Awake and asleep    Background:   The background is moderately disorganized, symmetric and continuous.   Activity is predominantly in the delta range with some intermittent theta activity.  There is no clear posterior dominant rhythm appreciated.  Intermittent triphasic waves occur throughout the record.        Sleep:   Transition between wakefulness and sleep is noted, although sleep architecture is not well formed    Epileptiform Abnormalities  None    EKG:   Regular rate and rhythm on single lead EKG    Activating procedures:   Not performed    Events:   None    Other findings:  Patient removes electrodes at 13:05 making the rest of the record not interpretable    Impression:   Abnormal awake and sleep EEG showing slowing and disorganization of the background with intermittent triphasic waves.  There are no epileptiform discharges or lateralizing signs recorded.     Clinical interpretation: This awake and sleep EEG is consistent with a moderate diffuse encephalopathy.  There are numerous possible etiologies including metabolic derangements, drug intoxication, systemic infections, or a primary neuronal disorder.  There is no evidence of seizure activity or focal abnormalities in the record.     Antonia Quach MD  [facility]    Department of Neurology/Epilepsy

## 2023-08-28 NOTE — H&P
Please see IR consult note dated 8/28/2023    Rochelle May PA-C  Interventional Radiology  Spectra [phone]  8/28/2023

## 2023-08-28 NOTE — PROGRESS NOTES
Bedside hemodialysis treatment 1:1 started per MD orders via  R chest wall CVC. VS stable to start tx. Report received from primary nurse.

## 2023-08-28 NOTE — PLAN OF CARE
[facility] ([facility])  Discharge Reassessment    Primary Care Provider: No, Primary Doctor    Expected Discharge Date: 8/28/2023    Reassessment (most recent)       Discharge Reassessment - 08/28/23 0914          Discharge Reassessment    Assessment Type Discharge Planning Reassessment (P)      Did the patient's condition or plan change since previous assessment? Yes (P)      Discharge Plan discussed with: Patient (P)      Communicated TOBY with patient/caregiver Yes (P)      Discharge Plan A Rehab (P)      Discharge Plan B Rehab (P)                    Patient is not stable to dc at this time. [facility]         Mirta Melendez LMSW  [facility]   [id number]

## 2023-08-28 NOTE — PLAN OF CARE
Problem: Adult Inpatient Plan of Care  Goal: Plan of Care Review  Outcome: Ongoing, Progressing  Goal: Patient-Specific Goal (Individualized)  Outcome: Ongoing, Progressing  Goal: Absence of Hospital-Acquired Illness or Injury  Outcome: Ongoing, Progressing  Goal: Optimal Comfort and Wellbeing  Outcome: Ongoing, Progressing  Goal: Readiness for Transition of Care  Outcome: Ongoing, Progressing     Problem: Diabetes Comorbidity  Goal: Blood Glucose Level Within Targeted Range  Outcome: Ongoing, Progressing     Problem: Skin Injury Risk Increased  Goal: Skin Health and Integrity  Outcome: Ongoing, Progressing     Problem: Infection (Hemodialysis)  Goal: Absence of Infection Signs and Symptoms  Outcome: Ongoing, Progressing     Problem: Infection  Goal: Absence of Infection Signs and Symptoms  Outcome: Ongoing, Progressing     Problem: Impaired Wound Healing  Goal: Optimal Wound Healing  Outcome: Ongoing, Progressing     Problem: Fall Injury Risk  Goal: Absence of Fall and Fall-Related Injury  Outcome: Ongoing, Progressing

## 2023-08-28 NOTE — PT/OT/SLP PROGRESS
Occupational Therapy   Co-Treatment with PT    Name: Immanuel Bernal  MRN: [id number]  Admitting Diagnosis:  Delirium  2 Days Post-Op    Recommendations:     Discharge Recommendations: rehabilitation facility  Discharge Equipment Recommendations:  none  Barriers to discharge:       Assessment:     Immanuel Bernal is a 57 y.o. male with a medical diagnosis of Delirium.  He presents with the following performance deficits affecting function: weakness, impaired functional mobility, gait instability, impaired endurance, impaired balance, visual deficits, impaired self care skills, decreased upper extremity function, decreased lower extremity function, impaired coordination, impaired cognition, impaired cardiopulmonary response to activity.     Pt agreeable to therapy and tolerated session well. Pt following simple commands and respond appropriately to therapists' questions. Pt required significant assistance with bed mobility and transfers. Seated BP reading 188/115. Pt c/o fatigue and dizziness. Pt had large BM, majority time spent with clean up.    Rehab Prognosis:  Good; patient would benefit from acute skilled OT services to address these deficits and reach maximum level of function.       Plan:     Patient to be seen 4 x/week to address the above listed problems via self-care/home management, therapeutic activities, therapeutic exercises, neuromuscular re-education  Plan of Care Expires: 08/25/23  Plan of Care Reviewed with: patient    Subjective     Patient/Family Comments/goals: to improve function  Pain/Comfort:  Pain Rating 1: 0/10  Pain Rating Post-Intervention 1: 0/10    Objective:     Communicated with: RN prior to session.  Patient found HOB elevated with telemetry, pulse ox (continuous), PICC line, PEG Tube upon OT entry to room.  A client care conference was completed by the OTR and the CABRERA prior to treatment by the CABRERA to discuss the patient's POC and current status.    General Precautions: Standard,  fall, vision impaired    Orthopedic Precautions:N/A  Braces: N/A  Respiratory Status: Room air     Occupational Performance:     Bed Mobility:    Patient completed Rolling/Turning to Left with  contact guard assistance  Patient completed Rolling/Turning to Right with contact guard assistance  Patient completed Scooting/Bridging with moderate assistance and to EOB to plant feet on floor  Patient completed Supine to Sit with maximal assistance  Patient completed Sit to Supine with maximal assistance     Functional Mobility/Transfers:  Patient completed Sit <> Stand Transfer with maximal assistance  with  no assistive device     Activities of Daily Living:  Toileting: total assistance for all aspects      [facility] 6 Click ADL: 16    Treatment & Education:  Pt educated on OT POC and frequency during hospital stay.     Pt sat EOB with SBA for ~10min.     Pt educated on the importance of OOB activity to improve function    Addressed all patient questions/concerns within CABRERA scope of practice.     Patient left HOB elevated with all lines intact, call button in reach, and RN notified    GOALS:   Multidisciplinary Problems       Occupational Therapy Goals          Problem: Occupational Therapy    Goal Priority Disciplines Outcome Interventions   Occupational Therapy Goal     OT, PT/OT Ongoing, Progressing    Description: Goals to be met by: 8/25/23     Patient will increase functional independence with ADLs by performing:    UE Dressing with Minimal Assistance.  Grooming while seated with Stand-by Assistance.  Toileting from bedside commode with Minimal Assistance for hygiene and clothing management.   Sit to stand transfer with Contact Guard Assistance and use of RW.   Sitting at edge of bed >25 minutes with Supervision.  Step transfer with Minimal Assistance and use of RW.   Toilet transfer to bedside commode with Minimal Assistance and use of RW.                          Time Tracking:     OT Date of Treatment: 08/28/23  OT  Start Time: 0940  OT Stop Time: 1011  OT Total Time (min): 31 min    Billable Minutes:Self Care/Home Management 31    OT/MORRO: MORRO     Number of MORRO visits since last OT visit: 1 8/28/2023

## 2023-08-28 NOTE — PROGRESS NOTES
[facility] ([place] [address])  Nephrology  Progress Note    Patient Name: Immanuel Bernal  MRN: [id number]  Admission Date: 8/9/2023  Hospital Length of Stay: 19 days  Attending Provider: Michel Mcbride III*   Primary Care Physician: Dolly, Primary Doctor  Principal Problem:Delirium    Subjective:     HPI: HPI obtained per medical record as patient unable to communicate     57-year-old male with a history of CHF, diabetes, hypertension, chronic disability, end-stage renal disease on hemodialysis, PEG placement, bowel obstruction, sleep apnea, TIA, left eye vitreous hemorrhage, stroke, and bowel obstruction with bowel resection admitted to [facility] in [place] July 18 from nursing home with left eye pain and blurred vision.  He was admitted with concern for bilateral endophthalmitis.  Other admit diagnoses included right upper extremity thrombus, end-stage renal disease on hemodialysis, hyperkalemia, sleep apnea, diabetes, and CHF.  He was treated with broad-spectrum antibiotics.  He was seen by Infectious Diseases,, and he was treated with vancomycin, ceftriaxone, and amphotericin.  Blood cultures were positive for Pseudomonas, and amphotericin/vancomycin were stopped.  IV cefepime was continued.  He was seen by Ophthalmology, and he received intravitreal vancomycin and ceftazidime (July 18).  He also had intravitreal tap July 18 that had no yeast or fungal elements observed.  Left eye endophthalmitis did not respond to treatment, and he subsequently developed abscess formation, significant proptosis, and drainage of purulent material from the orbit.  Ophthalmology recommended enucleation.  He was continued on cefepime for pseudomonal and ophthalmitis.  Recommendation was for 6 weeks of treatment to be followed by indefinite fluoroquinolone.  He was seen by Pulmonology during his stay for a right upper lung mass with peripheral nodules.  It was felt that he would need further  investigation of this once his current clinical issues stabilized.  Right upper extremity AV graft was excised during his stay with concern for infection.  A right IJ tunneled line was placed on July 27.  Left eye endophthalmitis continued to worsen, and ophthalmology spoke with patient and family about the need for enucleation.  Despite several conversations, family declined enucleation.  Plans were for transitioned to skilled nursing facility for continued treatment, but family did not want him to go to a skilled nursing facility.  He was subsequently discharged AMA from the hospital there on August 7.  Please see the August 7 Internal Medicine note for further details.     He subsequently presented to [facility] Emergency Department.  He will not go back to [facility] in [place]. He received Zosyn and vancomycin.  ED team at [facility] spoke with the patient and his power-of- (sister).  CT of the orbits noted scleral abscess along the lateral aspect of the left globe.  Patient and family are aware and in agreement that the patient needs enucleation of the eye at this time. Referring provider spoke with Oculoplastics at [facility]. Requesting transfer to [facility] for Oculoplastics specialty evaluation of persistent endophthalmitis.  ED provider noted patient is hemodynamically stable.  He does not appear volume overloaded or in respiratory distress.        The patient has a significant previous medical course as listed below  August 3: MRI brain and orbits showed worsening ophthalmitis and inflammatory changes of the left orbital tissues with slight increase in proptosis.  No evidence of intracranial inflammatory process observed.    July 25: Transesophageal echocardiogram had no evidence of endocarditis.  Moderate to severely decreased left ventricular systolic function with EF 30-35%.    July 22:  CT chest showed right upper lobe mass with numerous bilateral  nodularity predominantly in the right with associated mediastinal lymph nodes.  July 21: Blood cultures with Pseudomonas aeruginosa  July 19: MRI brain/orbits with and without contrast had findings suggestive of chronic microvascular ischemic disease of the white matter with remote ischemic event in the left insula/basal ganglia/subependymal white matter/right middle cerebellar peduncle and hemisphere.  Findings consistent with left endophthalmitis.  No abnormal findings observed in the right globe.  July 18:  Blood cultures with Pseudomonas aeruginosa and coag-negative staph    HPI obtained from hospital med's note. Patient with AMS at time of nephrology evaluation and unable to provide HPI or ROS.          Interval History: Patient seen and examined. Underwent iHD earlier this morning for 3.5 hours with 2 liters removed. Afebrile with pulse ranging from 60s bpm. Systolic blood pressures ranging from 160-140s mmHg. He is saturating +91% on 2 liters via nasal cannula. He is net negative ~2.5 liters in the last 24 hours. Metabolic panel reviewed.    Review of patient's allergies indicates:   Allergen Reactions    Morphine Rash    Amiodarone analogues Itching     Other reaction(s): Unknown     Current Facility-Administered Medications   Medication Frequency    0.9%  NaCl infusion (for blood administration) Q24H PRN    0.9%  NaCl infusion (for blood administration) Q24H PRN    0.9%  NaCl infusion PRN    0.9%  NaCl infusion Once    acetaminophen tablet 1,000 mg BID    albuterol-ipratropium 2.5 mg-0.5 mg/3 mL nebulizer solution 3 mL Q6H WAKE    atorvastatin tablet 40 mg Daily    calcitRIOL capsule 0.25 mcg Daily    carvediloL tablet 6.25 mg BID    dextrose 10% bolus 125 mL 125 mL PRN    dextrose 10% bolus 250 mL 250 mL PRN    epoetin thee injection 3,000 Units Every Mon, Wed, Fri    fluconazole tablet 400 mg Daily    glucagon (human recombinant) injection 1 mg PRN    glucose chewable tablet 16 g PRN     glucose chewable tablet 24 g PRN    heparin (porcine) injection 1,000 Units PRN    hydrALAZINE 10 mg 2 tablets, hydrALAZINE 25 mg 2 tablets, isorsorbide dinitrate 20 mg  2 tablets combination TID    hydrALAZINE injection 10 mg Q6H PRN    meclizine tablet 12.5 mg TID PRN    melatonin tablet 6 mg Nightly    meropenem (MERREM) 500 mg in sodium chloride 0.9 % 100 mL IVPB (MB+) Q12H    multivitamin tablet Daily    naloxone 0.4 mg/mL injection 0.02 mg PRN    OLANZapine injection 5 mg Nightly PRN    ondansetron injection 4 mg Q6H PRN    oxyCODONE immediate release tablet Tab 10 mg Q4H PRN    oxyCODONE-acetaminophen 5-325 mg per tablet 1 tablet Q4H PRN    pantoprazole injection 40 mg BID    polyethylene glycol packet 17 g BID    prochlorperazine injection Soln 10 mg Q6H PRN    QUEtiapine tablet 50 mg BID    senna-docusate 8.6-50 mg per tablet 1 tablet BID    sevelamer carbonate pwpk 0.8 g TID WM    sodium chloride 0.9% flush 10 mL Q12H PRN    tobramycin-dexAMETHasone 0.3-0.1% ophthalmic ointment TID    tobramycin-dexAMETHasone 0.3-0.1% ophthalmic suspension 1 drop QID    valsartan tablet 160 mg BID    vancomycin - pharmacy to dose pharmacy to manage frequency       Objective:     Vital Signs (Most Recent):  Temp: 97.9 °F (36.6 °C) (08/28/23 0500)  Pulse: 66 (08/28/23 0805)  Resp: 18 (08/28/23 0805)  BP: (!) 168/83 (08/28/23 0805)  SpO2: 100 % (08/28/23 0805) Vital Signs (24h Range):  Temp:  [97.9 °F (36.6 °C)-98.2 °F (36.8 °C)] 97.9 °F (36.6 °C)  Pulse:  [61-72] 66  Resp:  [16-24] 18  SpO2:  [82 %-100 %] 100 %  BP: (141-196)/(72-96) 168/83     Weight: 62.8 kg (138 lb 7.2 oz) (08/23/23 0900)  Body mass index is 19.87 kg/m².  Body surface area is 1.76 meters squared.    I/O last 3 completed shifts:  In: 1973.4 [Blood:290.4; Other:250; NG/GT:1433]  Out: 3000 [Other:3000]     Physical Exam  Vitals and nursing note reviewed.   Constitutional:       General: He is awake. He is not in acute distress.      Appearance: He is cachectic. He is ill-appearing. He is not diaphoretic.      Comments: Muscle wasting/atrophy noted.   HENT:      Head: Normocephalic and atraumatic.      Right Ear: External ear normal.      Left Ear: External ear normal.      Nose: Nose normal.      Mouth/Throat:      Mouth: Mucous membranes are moist.      Pharynx: Oropharynx is clear. No oropharyngeal exudate or posterior oropharyngeal erythema.   Eyes:      General: No scleral icterus.        Right eye: No discharge.         Left eye: No discharge.      Extraocular Movements: Extraocular movements intact.      Conjunctiva/sclera: Conjunctivae normal.   Cardiovascular:      Rate and Rhythm: Normal rate.      Heart sounds: No murmur heard.     No friction rub. No gallop.   Pulmonary:      Effort: Pulmonary effort is normal. No respiratory distress.      Breath sounds: Rhonchi present. No wheezing or rales.   Chest:      Comments: Tunneled HD catheter to right chest wall.  Abdominal:      General: Bowel sounds are normal. There is no distension.      Palpations: Abdomen is soft.      Tenderness: There is no abdominal tenderness.   Musculoskeletal:      Cervical back: Neck supple.      Right lower leg: No edema.      Left lower leg: No edema.   Skin:     General: Skin is warm and dry.      Coloration: Skin is not jaundiced.      Comments: Stable noted to right upper extremity from recent surgery. Incision appears intact.   Neurological:      Mental Status: He is confused.      Cranial Nerves: No cranial nerve deficit.      Motor: Weakness present.   Psychiatric:         Speech: Speech is delayed.         Behavior: Behavior is slowed.          Significant Labs:  ABGs:   Recent Labs   Lab 08/24/23  0836   PH 7.449   PCO2 31.1*   HCO3 21.6*   POCSATURATED 93*   BE -2     BMP:   Recent Labs   Lab 08/25/23  0529 08/25/23  0800 08/27/23  0116   GLU 91   < > 71   *   < > 133*   K 5.4*   < > 5.1      < > 105   CO2 18*   < > 17*   BUN 40*   < >  30*   CREATININE 7.6*   < > 5.4*   CALCIUM 9.7   < > 9.2   MG 2.6  --   --     < > = values in this interval not displayed.     CBC:   Recent Labs   Lab 08/27/23  1704   WBC 8.46   RBC 2.70*   HGB 8.3*   HCT 26.6*      MCV 99*   MCH 30.7   MCHC 31.2*     CMP:   Recent Labs   Lab 08/27/23  0116   GLU 71   CALCIUM 9.2   ALBUMIN 2.2*   PROT 6.4   *   K 5.1   CO2 17*      BUN 30*   CREATININE 5.4*   ALKPHOS 104   ALT 6*   AST 25   BILITOT 0.5     LFTs:   Recent Labs   Lab 08/27/23  0116   ALT 6*   AST 25   ALKPHOS 104   BILITOT 0.5   PROT 6.4   ALBUMIN 2.2*     Microbiology Results (last 7 days)       Procedure Component Value Units Date/Time    CSF culture [[id number]] Collected: 08/26/23 1315    Order Status: Completed Specimen: CSF (Spinal Fluid) from CSF Tap, Tube 3 Updated: 08/28/23 0713     CSF CULTURE No Growth to date     Gram Stain Result Cytospin indicates:      No WBC's      No organisms seen    AFB Culture & Smear [[id number]] Collected: 08/26/23 1315    Order Status: Completed Specimen: CSF (Spinal Fluid) from CSF Tap, Tube 3 Updated: 08/27/23 2127     AFB Culture & Smear Culture in progress    Cryptococcal antigen, CSF [[id number]] Collected: 08/26/23 1315    Order Status: Sent Specimen: CSF (Spinal Fluid) from CSF Tap, Tube 3 Updated: 08/26/23 1400    Fungus culture [[id number]] Collected: 08/26/23 1315    Order Status: Sent Specimen: CSF (Spinal Fluid) from CSF Tap, Tube 3 Updated: 08/26/23 1359    Gram stain [[id number]] Collected: 08/26/23 1315    Order Status: Canceled Specimen: CSF (Spinal Fluid) from CSF Tap, Tube 3     Culture, Anaerobe [[id number]]  (Abnormal) Collected: 08/16/23 1632    Order Status: Completed Specimen: Wound from Cornea, Left Updated: 08/23/23 1108     Anaerobic Culture CUTIBACTERIUM ACNES  From broth only      Culture, Anaerobe [[id number]] Collected: 08/16/23 1636    Order Status: Completed Specimen: Abscess from Eyelid, Left Updated: 08/23/23 4867     Anaerobic  Culture No anaerobes isolated    Narrative:      Sclera abscess    Culture, Anaerobe [[id number]] Collected: 08/16/23 1647    Order Status: Completed Specimen: Abscess from Eyelid, Left Updated: 08/23/23 0842     Anaerobic Culture No anaerobes isolated    Culture, Anaerobe [[id number]] Collected: 08/16/23 1628    Order Status: Completed Specimen: Wound from Eyelid, Left Updated: 08/23/23 0842     Anaerobic Culture No anaerobes isolated          Specimen (24h ago, onward)      None          PTH:   Recent Labs   Lab 08/21/23  1200   PTH 78.5*     Significant Imaging:  I have reviewed all imagining in the last 24 hours.    Assessment/Plan:     Ophtho  Endophthalmitis  - Ophthalmology consulted and following    Renal/  ESRD (end stage renal disease)  Outpatient HD Information:  -Outpatient HD unit: [facility]   -HD tx days: MWF   -HD tx time: 210min  -HD access: R IJ TDC   -HD modality: iHD   -Residual urine: ?    Plan/Recommendations:  - s/p iHD this morning for metabolic clearance and volume management   - renal diet when not NPO  - strict I/O's and daily weights  - daily renal function panels and magnesium levels  - renally all dose medications to eGFR  - avoid gadolinium, fleets, phos-based laxatives, NSAIDs, etc.    ID  AV fistula infection  - secondary to Pseudomonas  - s/p right upper extremity AV graft excision in July  - Infectious Disease consulted and following     Oncology  Anemia in ESRD (end-stage renal disease)  - target Hg of 10-12  - hemoglobin this morning 8.3  - continue epogen 3,000 units every Monday, Wednesday and Friday     GI  Duodenal ulceration  - management per primary team      Thank you for your consult. I will follow-up with patient. Please contact us if you have any additional questions.    Herbert Cedeño MD  Nephrology  [facility] ([place]

## 2023-08-28 NOTE — NURSING
Pt's status stable and unchanged. VSS. Review POC. Aox3. Room air. BM x2, brown. Linen change x2. NAD. Safety precautions maintained. Call light within reach.

## 2023-08-28 NOTE — PROGRESS NOTES
[facility] - [facility] ([address] Medicine  Progress Note    Patient Name: Immanuel Bernal  MRN: [id number]  Patient Class: IP- Inpatient   Admission Date: 8/9/2023  Length of Stay: 19 days  Attending Physician: Michel Mcbride III*  Primary Care Provider: Dolly, Primary Doctor        Subjective:     Principal Problem:Delirium        HPI:  Immanuel Bernal is a 57 y.o. male with ESRD, HFrEF 30-35%, DM2, HTN, history of bowel obstruction s/p bowel resection, now with PEG in place, ?KENIA, history of CVA, ??AFib who was recently hospitalized at [facility] in 7/2023 for b/l endophthalmitis, Pseudomonas bacteremia, RUE thrombus, RUE AVG infection s/p excision, and RUL mass. Left eye did not improve & subsequently developed abscess, but POA declined enucleation and patient ultimately left AMA. Re-presented to [place] ED with worsening eye symptoms with imaging showing left scleral abscess, so he was transferred to [facility] on 8/9/2023 for Oculoplastics evaluation & enucleation.       Overview/Hospital Course:  Patient was admitted initially on 8/9/2023 to [facility] medicine for Oculoplastics evaluation for L eye endophthalmitis with abscess. Due to prior Pseudomonal bacteremia, patient was kept of vancomycin and meropenem per infectious disease recommendations. Initially, surgery was planned, however, patient's POA wanted to repeat imaging to confirm the severe infection before proceeding. Unfortunately, patient had worsening delirium of unknown etiology, thus MRI could not be completed properly. Patient was stepped up to ICU on 8/11 and briefly required a precedex drip, and got a full MRI on 8/12. Delirium self-resolved and patient was stepped down to medicine again on 8/14. After back and forth discussion with patient's POA and ophthalmology, patient underwent L eye evisceration on 8/16. Surgical cultures additionally grew yeast (not yet speciated) and cutibacterium acnes. Patient was started on fluconazole  and tobramycin drops/ointment. In preparation for discharge, meropenem was changed to cefepime on 8/21 for pseudomonal coverage, as meropenem needed to be dosed daily (and patient would need another tunneled Gil line) and cefepime could be dosed with dialysis.     On 8/24, patient once again had worsening delirium of unclear etiology. Cefepime was switched back to meropenem due to concern for neurotoxicity, and reglan was discontinued. Patient underwent HD however this did not improve his mentation. Patient underwent repeat MRI jim under sedation on 8/26, which showed ongoing L scleral infection and orbital cellulitis; per ophthalmology, no further surgical intervention is warranted. LP was obtained as well and negative for infection. CTA was ordered to r/o PE in setting of intermittent hypoxia, and was largely negative. LE US was also ordered, and did not show DVT, thus ruling out thromboembolism as a cause for agitation/confusion. EEG was completed and showed encephalopathy but no seizures. Patient's delirium ultimately improved with another session of HD on 8/29, thus, delirium was most likely related to medication adverse effects.     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 His R Permacath was still functioning and was left in place. Patient had an old AVF on the R arm that was operated on 1-2 months ago (see HPI; infected graft) and still had staples in place; vascular surgery recommend outpatient follow up for staple removal once site fully healed. For DVTs, Eliquis was started on 8/17, however, patient developed melena on 8/22. Eliquis was stopped, 1U PRBC was transfused, GI was consulted, and patient underwent EGD on 8/24. EGD found a large duodenal ulcer with adherent clot - 2 clips were placed adjacent to the ulcer but the clot was left intact. Patient remained hemodynamically stable, however has  required 3U PRBC total since melena started. Per GI, no further endoscopic intervention would be useful for the large ulcer. CTA was ordered on 8/27 and was negative for active bleeding. No further bleeding has been noted and hemoglobin has stabilized.    Disposition: IR consulted for tunneled line, will DC to [facility] once line place and patient stable.       Interval History: CTA done overnight, no active GIB.  No further melena overnight.  HD done overnight afterwards.  Mentation significantly improved today, pt able to hold a conversation now.    IR consulted for tunneled line for meropenem.      Review of Systems   Unable to perform ROS: Mental status change   Gastrointestinal:  Negative for blood in stool.   Psychiatric/Behavioral:  Positive for agitation and confusion.      Objective:     Vital Signs (Most Recent):  Temp: 97.8 °F (36.6 °C) (08/28/23 0700)  Pulse: 65 (08/28/23 1222)  Resp: 18 (08/28/23 1222)  BP: (!) 148/76 (08/28/23 1142)  SpO2: 97 % (08/28/23 1222) Vital Signs (24h Range):  Temp:  [97.8 °F (36.6 °C)-98.2 °F (36.8 °C)] 97.8 °F (36.6 °C)  Pulse:  [61-68] 65  Resp:  [12-20] 18  SpO2:  [91 %-100 %] 97 %  BP: (141-173)/(70-91) 148/76     Weight: 62.8 kg (138 lb 7.2 oz)  Body mass index is 19.87 kg/m².    Intake/Output Summary (Last 24 hours) at 8/28/2023 1406  Last data filed at 8/28/2023 0920  Gross per 24 hour   Intake 310 ml   Output 3000 ml   Net -2690 ml           Physical Exam  Vitals reviewed.   Constitutional:       Appearance: He is ill-appearing (chronic).   HENT:      Mouth/Throat:      Mouth: Mucous membranes are dry.   Eyes:      General:         Left eye: No discharge.      Comments: R eye clear. Pupils reactive.  L eye with packing in place; taped shut.   Cardiovascular:      Rate and Rhythm: Normal rate and regular rhythm.      Pulses: Normal pulses.      Heart sounds: Normal heart sounds. No murmur heard.  Pulmonary:      Breath sounds: Normal breath sounds. No wheezing or  rales.   Abdominal:      General: There is no distension.      Palpations: Abdomen is soft.      Tenderness: There is no abdominal tenderness.      Comments: PEG tube in place   Musculoskeletal:      Right lower leg: No edema.      Left lower leg: No edema.   Skin:     General: Skin is warm and dry.      Capillary Refill: Capillary refill takes less than 2 seconds.   Neurological:      Mental Status: He is alert.      Comments: Oriented to self and place today, able to follow commands and engage in conversation           Significant Labs: All pertinent labs within the past 24 hours have been reviewed.  CBC:   Recent Labs   Lab 08/27/23  1704 08/28/23  0731 08/28/23  1152   WBC 8.46 9.49 7.25   HGB 8.3* 8.5* 8.5*   HCT 26.6* 26.0* 26.9*    176 172       CMP:   Recent Labs   Lab 08/27/23  0116 08/28/23  0727   * 135*   K 5.1 3.4*    101   CO2 17* 23   GLU 71 96   BUN 30* 18   CREATININE 5.4* 2.9*   CALCIUM 9.2 7.9*   PROT 6.4 6.1   ALBUMIN 2.2* 2.1*   BILITOT 0.5 0.3   ALKPHOS 104 119   AST 25 14   ALT 6* <5*   ANIONGAP 11 11         Significant Imaging: I have reviewed all pertinent imaging results/findings within the past 24 hours.      Assessment/Plan:      * Delirium  Patient currently with severe delirium and altered mentation of uncertain etiology. He was briefly stepped up to the ICU on 8/11 for delirium that self-resolved. Current episode started on 8/24.  Workup so far:  Cefepime (received for 3 days) switched back to meropenem.   CTA PE study and US LE studies rule out thromboembolism  Electrolytes have been maintained wnl with dialysis, but mentation has not improved significantly with dialysis   CTH without acute changes.  Repeated MRI under sedation on 8/26, showed ongoing L scleral and orbital infection but no new processes nor new strokes  LP under sedation on 8/26 non-infectious  EEG without seizures, but showed encephalopathy    Delirium has significantly improved since stopping  cefepime and reglan and s/p 2 HD sessions. Thus most likely 2/2 mediation adverse effect    Plan  - Continue HD sessions  - Delirium likely due to medication adverse effect + prolonged hospitalization, altered sleep/wake cycle, vision loss  - Seroqul 50mg BID  - Olanzapine 5mg IM PRN for severe agitation  - Delirium precautions    Endophthalmitis  S/p L eye evisceration on 8/16/23.  Repeat MRI head/orbit with ongoing L scleral infection and cellulitis    - ID and ophthalmology following  - Continue vancomycin, meropenem, fluconazole, and tobramycin ointments/eye drops  - Anticipated 4-6 weeks of therapy from evisceration. Anticipated end date: 9/12-9/26. See OPAT note from 8/19.  - IR consulted for tunneled line  - Follow up all culture data  - Ophthalmology to perform packing changes every 2-3 days  - L eye to be taped shut at night    Duodenal ulceration  Patient started to have melena on 8/22, and on 8/24, underwent EGD.   Found to have a large oozing duodenal ulcer with adherent clot. 2 clips placed however ulcer could not be addressed completely. Also had 2 more nonbleeding ulcers in the duodenal bulb and 2nd part of the duodenum.  3U PRBC transfused so far this admission  Per GI, no further endoscopic intervention warranted  Due to ongoing melena, ordered CTA GIB protocol - negative    Problem now stable    - Trend CBC Q12. Transfuse for Hb >7, unless otherwise indicated  - Maintain IV access with 2 large bore IVs  - Hold all NSAIDs and anticoagulants, unless contraindicated  - IV pantoprazole 40mg BID - switch to PO pantroprazole on discharge  - Correct any coagulopathy with platelets and FFP for goal of platelets >50K and INR <2.0    AV fistula infection  - See HPI and hospital course; previous AV graft was removed at OSH and found to be colonized with pseudomonas from prior pseudomonal bacteremia  - Staples still in place  - F/u with [facility] vascular surgery on discharge    Impaired mobility  - Plan to DC to  [facility] rehab when medically stable    PEG (percutaneous endoscopic gastrostomy) status  - Previously cleared by SLP for a regular diet without restrictions  - Currently ability to take PO limited by mentation  - Also frequently hypoglycemic due to malnutrition  - Tube feeds    Anemia in ESRD (end-stage renal disease)  EPO per nephrology    Severe malnutrition  Nutrition consulted. Most recent weight and BMI monitored-     Measurements:  Wt Readings from Last 1 Encounters:   08/23/23 62.8 kg (138 lb 7.2 oz)   Body mass index is 19.87 kg/m².    Patient has been screened and assessed by RD.    Malnutrition Type:  Context: chronic illness  Level: severe    Malnutrition Characteristic Summary:  Weight Loss (Malnutrition): greater than 10% in 6 months  Energy Intake (Malnutrition): other (see comments) (LAMONT)  Subcutaneous Fat (Malnutrition): severe depletion  Muscle Mass (Malnutrition): severe depletion    Interventions/Recommendations (treatment strategy):  1. Resume Renal diet + Novasource ONS when able - encourage PO intake as tolerated. 2. If EN to resume, please reconsult for updated recommendations. 3. RD to monitor & follow-up.     Able to take in PO per SLP assessment on 8/15, on regular diet with thin liquids  Continue tube feeds for now due to altered mentation    ESRD (end stage renal disease)  Nephrology consulted for HD needs while inpatient    Hyperlipidemia  Home statin    Chronic diastolic heart failure  Continue to monitor for signs of volume overload; volume removal with dialysis    Hypertension  Current hypertension is worsened secondary to agitation  On CCB at home but will uptitrate GDMT while here    - Coreg 6.25mg BID, limited by HR  - BiDil 2 tablets TID  - Valsartan 160mg BID    VTE Risk Mitigation (From admission, onward)         Ordered     heparin (porcine) injection 1,000 Units  As needed (PRN)         08/10/23 1046     IP VTE HIGH RISK PATIENT  Once         08/09/23 1317     Place sequential  compression device  Until discontinued         08/09/23 1317                Discharge Planning   TOBY: 9/1/2023     Code Status: Full Code   Is the patient medically ready for discharge?: No    Reason for patient still in hospital (select all that apply): Patient trending condition  Discharge Plan A: Rehab   Discharge Delays: (!) Procedure Scheduling (IR, OR, Labs, Echo, Cath, Echo, EEG) (eye surgery next week)              Pat Montoya MD  Department of [facility] Medicine   [facility] - Intensive Care ([place]-14)

## 2023-08-28 NOTE — ASSESSMENT & PLAN NOTE
- secondary to Pseudomonas  - s/p right upper extremity AV graft excision in July  - Infectious Disease consulted and following

## 2023-08-28 NOTE — ASSESSMENT & PLAN NOTE
Patient currently with severe delirium and altered mentation of uncertain etiology. He was briefly stepped up to the ICU on 8/11 for delirium that self-resolved. Current episode started on 8/24.  Workup so far:  Cefepime (received for 3 days) switched back to meropenem.   CTA PE study and US LE studies rule out thromboembolism  Electrolytes have been maintained wnl with dialysis, but mentation has not improved significantly with dialysis   CTH without acute changes.  Repeated MRI under sedation on 8/26, showed ongoing L scleral and orbital infection but no new processes nor new strokes  LP under sedation on 8/26 non-infectious  EEG without seizures, but showed encephalopathy    Delirium has significantly improved since stopping cefepime and reglan and s/p 2 HD sessions. Thus most likely 2/2 mediation adverse effect    Plan  - Continue HD sessions  - Delirium likely due to medication adverse effect + prolonged hospitalization, altered sleep/wake cycle, vision loss  - Seroqul 50mg BID  - Olanzapine 5mg IM PRN for severe agitation  - Delirium precautions

## 2023-08-28 NOTE — PROGRESS NOTES
Pharmacokinetic Assessment Follow Up: IV Vancomycin    Vancomycin serum concentration assessment(s):    Vanc level is 15.9 mcg/ml, drawn appropriately  Patient is over the goal of 15-20 mcg/ml    Vancomycin Regimen Plan:    Will re-dose vancomycin 500 mg once  Redraw vanc random on Tuesday with AM labs    Drug levels (last 3 results):  Recent Labs   Lab Result Units 08/28/23  0727   Vancomycin, Random ug/mL 15.9       Pharmacy will continue to follow and monitor vancomycin.    Please contact pharmacy at extension [phone] for questions regarding this assessment.    Thank you for the consult,   Elsa Solorzano  [phone]     Patient brief summary:  Immanuel Bernal is a 57 y.o. male initiated on antimicrobial therapy with IV Vancomycin for treatment of endophthalmitis.    The patient's current regimen is vancomycin pulse dosing.     Actual Body Weight:   62.8 kg    Renal Function:   Estimated Creatinine Clearance: 24.4 mL/min (A) (based on SCr of 2.9 mg/dL (H)).,     Dialysis Method (if applicable):  intermittent HD    CBC (last 72 hours):  Recent Labs   Lab Result Units 08/25/23  1841 08/26/23  0601 08/26/23  0926 08/26/23  1812 08/27/23  0116 08/27/23  0905 08/27/23  1704   WBC K/uL 8.77 9.36 10.00 10.01 8.92  9.58 9.00 8.46   Hemoglobin g/dL 7.6* 7.5* 7.5* 6.9* 8.6*  8.4* 9.0* 8.3*   Hematocrit % 24.2* 23.8* 24.1* 22.2* 27.8*  26.8* 28.2* 26.6*   Platelets K/uL 157 150 153 150 141*  151 154 154   Gran % % 70.8  --  75.2* 72.8 81.0*  --   --    Lymph % % 9.6*  --  7.6* 9.2* 9.0*  --   --    Mono % % 13.8  --  11.8 10.5 5.0  --   --    Eosinophil % % 4.4  --  4.3 6.3 4.0  --   --    Basophil % % 0.7  --  0.6 0.6 0.0  --   --    Differential Method  Automated  --  Automated Automated Automated  --   --          Metabolic Panel (last 72 hours):  Recent Labs   Lab Result Units 08/26/23  0601 08/26/23  1315 08/27/23  0116 08/28/23  0727   Sodium mmol/L 135*  --  133* 135*   Potassium mmol/L 4.6  --  5.1 3.4*    Chloride mmol/L 105  --  105 101   CO2 mmol/L 21*  --  17* 23   Glucose mg/dL 70  --  71 96   Glucose, CSF mg/dL  --  52  --   --    BUN mg/dL 24*  --  30* 18   Creatinine mg/dL 4.8*  --  5.4* 2.9*   Albumin g/dL 2.2*  --  2.2* 2.1*   Total Bilirubin mg/dL 0.4  --  0.5 0.3   Alkaline Phosphatase U/L 96  --  104 119   AST U/L 19  --  25 14   ALT U/L <5*  --  6* <5*         Vancomycin Administrations:  vancomycin given in the last 96 hours                     vancomycin (VANCOCIN) 500 mg in dextrose 5 % in water (D5W) 100 mL IVPB (MB+) (mg) 500 mg New Bag 08/23/23 1724    vancomycin (VANCOCIN) 500 mg in dextrose 5 % in water (D5W) 100 mL IVPB (MB+) (mg) 500 mg New Bag 08/21/23 1318                    Microbiologic Results:  Microbiology Results (last 7 days)       Procedure Component Value Units Date/Time    Cryptococcal antigen, CSF [[id number]] Collected: 08/26/23 1315    Order Status: Completed Specimen: CSF (Spinal Fluid) from CSF Tap, Tube 3 Updated: 08/28/23 0853     Crypto Ag, CSF Negative    CSF culture [[id number]] Collected: 08/26/23 1315    Order Status: Completed Specimen: CSF (Spinal Fluid) from CSF Tap, Tube 3 Updated: 08/28/23 0713     CSF CULTURE No Growth to date     Gram Stain Result Cytospin indicates:      No WBC's      No organisms seen    AFB Culture & Smear [[id number]] Collected: 08/26/23 1315    Order Status: Completed Specimen: CSF (Spinal Fluid) from CSF Tap, Tube 3 Updated: 08/27/23 2127     AFB Culture & Smear Culture in progress    Fungus culture [[id number]] Collected: 08/26/23 1315    Order Status: Sent Specimen: CSF (Spinal Fluid) from CSF Tap, Tube 3 Updated: 08/26/23 1359    Gram stain [[id number]] Collected: 08/26/23 1315    Order Status: Canceled Specimen: CSF (Spinal Fluid) from CSF Tap, Tube 3     Culture, Anaerobe [[id number]]  (Abnormal) Collected: 08/16/23 4691    Order Status: Completed Specimen: Wound from Cornea, Left Updated: 08/23/23 1108     Anaerobic Culture CUTIBACTERIUM  ACNES  From broth only      Culture, Anaerobe [[id number]] Collected: 08/16/23 1636    Order Status: Completed Specimen: Abscess from Eyelid, Left Updated: 08/23/23 0842     Anaerobic Culture No anaerobes isolated    Narrative:      Sclera abscess    Culture, Anaerobe [[id number]] Collected: 08/16/23 1647    Order Status: Completed Specimen: Abscess from Eyelid, Left Updated: 08/23/23 0842     Anaerobic Culture No anaerobes isolated    Culture, Anaerobe [[id number]] Collected: 08/16/23 1628    Order Status: Completed Specimen: Wound from Eyelid, Left Updated: 08/23/23 0842     Anaerobic Culture No anaerobes isolated

## 2023-08-28 NOTE — SUBJECTIVE & OBJECTIVE
Interval History: CTA done overnight, no active GIB.  No further melena overnight.  HD done overnight afterwards.  Mentation significantly improved today, pt able to hold a conversation now.    IR consulted for tunneled line for meropenem.      Review of Systems   Unable to perform ROS: Mental status change   Gastrointestinal:  Negative for blood in stool.   Psychiatric/Behavioral:  Positive for agitation and confusion.      Objective:     Vital Signs (Most Recent):  Temp: 97.8 °F (36.6 °C) (08/28/23 0700)  Pulse: 65 (08/28/23 1222)  Resp: 18 (08/28/23 1222)  BP: (!) 148/76 (08/28/23 1142)  SpO2: 97 % (08/28/23 1222) Vital Signs (24h Range):  Temp:  [97.8 °F (36.6 °C)-98.2 °F (36.8 °C)] 97.8 °F (36.6 °C)  Pulse:  [61-68] 65  Resp:  [12-20] 18  SpO2:  [91 %-100 %] 97 %  BP: (141-173)/(70-91) 148/76     Weight: 62.8 kg (138 lb 7.2 oz)  Body mass index is 19.87 kg/m².    Intake/Output Summary (Last 24 hours) at 8/28/2023 1406  Last data filed at 8/28/2023 0920  Gross per 24 hour   Intake 310 ml   Output 3000 ml   Net -2690 ml           Physical Exam  Vitals reviewed.   Constitutional:       Appearance: He is ill-appearing (chronic).   HENT:      Mouth/Throat:      Mouth: Mucous membranes are dry.   Eyes:      General:         Left eye: No discharge.      Comments: R eye clear. Pupils reactive.  L eye with packing in place; taped shut.   Cardiovascular:      Rate and Rhythm: Normal rate and regular rhythm.      Pulses: Normal pulses.      Heart sounds: Normal heart sounds. No murmur heard.  Pulmonary:      Breath sounds: Normal breath sounds. No wheezing or rales.   Abdominal:      General: There is no distension.      Palpations: Abdomen is soft.      Tenderness: There is no abdominal tenderness.      Comments: PEG tube in place   Musculoskeletal:      Right lower leg: No edema.      Left lower leg: No edema.   Skin:     General: Skin is warm and dry.      Capillary Refill: Capillary refill takes less than 2 seconds.    Neurological:      Mental Status: He is alert.      Comments: Oriented to self and place today, able to follow commands and engage in conversation           Significant Labs: All pertinent labs within the past 24 hours have been reviewed.  CBC:   Recent Labs   Lab 08/27/23  1704 08/28/23  0731 08/28/23  1152   WBC 8.46 9.49 7.25   HGB 8.3* 8.5* 8.5*   HCT 26.6* 26.0* 26.9*    176 172       CMP:   Recent Labs   Lab 08/27/23  0116 08/28/23  0727   * 135*   K 5.1 3.4*    101   CO2 17* 23   GLU 71 96   BUN 30* 18   CREATININE 5.4* 2.9*   CALCIUM 9.2 7.9*   PROT 6.4 6.1   ALBUMIN 2.2* 2.1*   BILITOT 0.5 0.3   ALKPHOS 104 119   AST 25 14   ALT 6* <5*   ANIONGAP 11 11         Significant Imaging: I have reviewed all pertinent imaging results/findings within the past 24 hours.

## 2023-08-28 NOTE — ASSESSMENT & PLAN NOTE
Outpatient HD Information:  -Outpatient HD unit: C   -HD tx days: MWF   -HD tx time: 210min  -HD access: R IJ TDC   -HD modality: iHD   -Residual urine: ?    Plan/Recommendations:  - s/p iHD this morning for metabolic clearance and volume management   - renal diet when not NPO  - strict I/O's and daily weights  - daily renal function panels and magnesium levels  - renally all dose medications to eGFR  - avoid gadolinium, fleets, phos-based laxatives, NSAIDs, etc.

## 2023-08-29 ENCOUNTER — TELEPHONE (OUTPATIENT)
Dept: OPHTHALMOLOGY | Facility: CLINIC | Age: 58
End: 2023-08-29
Payer: MEDICARE

## 2023-08-29 LAB
ALBUMIN SERPL BCP-MCNC: 1.9 G/DL (ref 3.5–5.2)
ALP SERPL-CCNC: 112 U/L (ref 55–135)
ALT SERPL W/O P-5'-P-CCNC: <5 U/L (ref 10–44)
ANION GAP SERPL CALC-SCNC: 9 MMOL/L (ref 8–16)
AST SERPL-CCNC: 12 U/L (ref 10–40)
BILIRUB SERPL-MCNC: 0.3 MG/DL (ref 0.1–1)
BUN SERPL-MCNC: 37 MG/DL (ref 6–20)
CALCIUM SERPL-MCNC: 8.4 MG/DL (ref 8.7–10.5)
CHLORIDE SERPL-SCNC: 100 MMOL/L (ref 95–110)
CO2 SERPL-SCNC: 26 MMOL/L (ref 23–29)
CREAT SERPL-MCNC: 4.4 MG/DL (ref 0.5–1.4)
ERYTHROCYTE [DISTWIDTH] IN BLOOD BY AUTOMATED COUNT: 18.7 % (ref 11.5–14.5)
EST. GFR  (NO RACE VARIABLE): 14.7 ML/MIN/1.73 M^2
GLUCOSE SERPL-MCNC: 75 MG/DL (ref 70–110)
HCT VFR BLD AUTO: 26.9 % (ref 40–54)
HGB BLD-MCNC: 8.2 G/DL (ref 14–18)
MCH RBC QN AUTO: 29.7 PG (ref 27–31)
MCHC RBC AUTO-ENTMCNC: 30.5 G/DL (ref 32–36)
MCV RBC AUTO: 98 FL (ref 82–98)
PLATELET # BLD AUTO: 176 K/UL (ref 150–450)
PMV BLD AUTO: 11.1 FL (ref 9.2–12.9)
POCT GLUCOSE: 149 MG/DL (ref 70–110)
POCT GLUCOSE: 83 MG/DL (ref 70–110)
POCT GLUCOSE: 84 MG/DL (ref 70–110)
POCT GLUCOSE: 93 MG/DL (ref 70–110)
POTASSIUM SERPL-SCNC: 4.1 MMOL/L (ref 3.5–5.1)
PROT SERPL-MCNC: 5.7 G/DL (ref 6–8.4)
RBC # BLD AUTO: 2.76 M/UL (ref 4.6–6.2)
SODIUM SERPL-SCNC: 135 MMOL/L (ref 136–145)
VANCOMYCIN SERPL-MCNC: 23.1 UG/ML
VDRL CSF QL: NEGATIVE
WBC # BLD AUTO: 5.45 K/UL (ref 3.9–12.7)

## 2023-08-29 PROCEDURE — 94640 AIRWAY INHALATION TREATMENT: CPT

## 2023-08-29 PROCEDURE — 25000003 PHARM REV CODE 250

## 2023-08-29 PROCEDURE — 80202 ASSAY OF VANCOMYCIN: CPT | Performed by: FAMILY MEDICINE

## 2023-08-29 PROCEDURE — 94761 N-INVAS EAR/PLS OXIMETRY MLT: CPT

## 2023-08-29 PROCEDURE — 25000003 PHARM REV CODE 250: Performed by: STUDENT IN AN ORGANIZED HEALTH CARE EDUCATION/TRAINING PROGRAM

## 2023-08-29 PROCEDURE — 80053 COMPREHEN METABOLIC PANEL: CPT

## 2023-08-29 PROCEDURE — 63600175 PHARM REV CODE 636 W HCPCS

## 2023-08-29 PROCEDURE — 99233 SBSQ HOSP IP/OBS HIGH 50: CPT | Mod: GC,,, | Performed by: FAMILY MEDICINE

## 2023-08-29 PROCEDURE — 93010 EKG 12-LEAD: ICD-10-PCS | Mod: ,,, | Performed by: INTERNAL MEDICINE

## 2023-08-29 PROCEDURE — 21400001 HC TELEMETRY ROOM

## 2023-08-29 PROCEDURE — 93010 ELECTROCARDIOGRAM REPORT: CPT | Mod: ,,, | Performed by: INTERNAL MEDICINE

## 2023-08-29 PROCEDURE — 85027 COMPLETE CBC AUTOMATED: CPT

## 2023-08-29 PROCEDURE — 63600175 PHARM REV CODE 636 W HCPCS: Performed by: FAMILY MEDICINE

## 2023-08-29 PROCEDURE — 99233 PR SUBSEQUENT HOSPITAL CARE,LEVL III: ICD-10-PCS | Mod: GC,,, | Performed by: FAMILY MEDICINE

## 2023-08-29 PROCEDURE — 63600175 PHARM REV CODE 636 W HCPCS: Performed by: STUDENT IN AN ORGANIZED HEALTH CARE EDUCATION/TRAINING PROGRAM

## 2023-08-29 PROCEDURE — 11000001 HC ACUTE MED/SURG PRIVATE ROOM

## 2023-08-29 PROCEDURE — 93005 ELECTROCARDIOGRAM TRACING: CPT

## 2023-08-29 PROCEDURE — 25000242 PHARM REV CODE 250 ALT 637 W/ HCPCS

## 2023-08-29 RX ORDER — HYDRALAZINE HYDROCHLORIDE 50 MG/1
50 TABLET, FILM COATED ORAL ONCE
Status: COMPLETED | OUTPATIENT
Start: 2023-08-29 | End: 2023-08-29

## 2023-08-29 RX ORDER — FLUCONAZOLE 40 MG/ML
400 POWDER, FOR SUSPENSION ORAL DAILY
Status: DISCONTINUED | OUTPATIENT
Start: 2023-08-30 | End: 2023-09-19 | Stop reason: HOSPADM

## 2023-08-29 RX ORDER — PANTOPRAZOLE SODIUM 40 MG/1
40 FOR SUSPENSION ORAL 2 TIMES DAILY
Status: DISCONTINUED | OUTPATIENT
Start: 2023-08-29 | End: 2023-09-19 | Stop reason: HOSPADM

## 2023-08-29 RX ORDER — SODIUM CHLORIDE 9 MG/ML
INJECTION, SOLUTION INTRAVENOUS ONCE
Status: DISCONTINUED | OUTPATIENT
Start: 2023-08-29 | End: 2023-08-29

## 2023-08-29 RX ORDER — AMLODIPINE BESYLATE 5 MG/1
5 TABLET ORAL DAILY
Status: DISCONTINUED | OUTPATIENT
Start: 2023-08-29 | End: 2023-08-29

## 2023-08-29 RX ORDER — CEFAZOLIN SODIUM 1 G/50ML
SOLUTION INTRAVENOUS
Status: COMPLETED | OUTPATIENT
Start: 2023-08-29 | End: 2023-08-29

## 2023-08-29 RX ORDER — LIDOCAINE HYDROCHLORIDE 10 MG/ML
INJECTION INFILTRATION; PERINEURAL
Status: COMPLETED | OUTPATIENT
Start: 2023-08-29 | End: 2023-08-29

## 2023-08-29 RX ORDER — HEPARIN SODIUM 1000 [USP'U]/ML
1000 INJECTION, SOLUTION INTRAVENOUS; SUBCUTANEOUS
Status: ACTIVE | OUTPATIENT
Start: 2023-08-30 | End: 2023-08-30

## 2023-08-29 RX ORDER — ONDANSETRON 4 MG/1
4 TABLET, ORALLY DISINTEGRATING ORAL EVERY 8 HOURS
Status: DISCONTINUED | OUTPATIENT
Start: 2023-08-29 | End: 2023-09-14

## 2023-08-29 RX ORDER — MIDAZOLAM HYDROCHLORIDE 1 MG/ML
INJECTION INTRAMUSCULAR; INTRAVENOUS
Status: COMPLETED | OUTPATIENT
Start: 2023-08-29 | End: 2023-08-29

## 2023-08-29 RX ORDER — FENTANYL CITRATE 50 UG/ML
INJECTION, SOLUTION INTRAMUSCULAR; INTRAVENOUS
Status: COMPLETED | OUTPATIENT
Start: 2023-08-29 | End: 2023-08-29

## 2023-08-29 RX ORDER — AMLODIPINE BESYLATE 5 MG/1
5 TABLET ORAL DAILY
Status: DISCONTINUED | OUTPATIENT
Start: 2023-08-30 | End: 2023-08-30

## 2023-08-29 RX ORDER — SODIUM CHLORIDE 9 MG/ML
INJECTION, SOLUTION INTRAVENOUS ONCE
Status: DISCONTINUED | OUTPATIENT
Start: 2023-08-30 | End: 2023-09-06

## 2023-08-29 RX ADMIN — HYDRALAZINE HYDROCHLORIDE: 10 TABLET, FILM COATED ORAL at 08:08

## 2023-08-29 RX ADMIN — FENTANYL CITRATE 50 MCG: 0.05 INJECTION, SOLUTION INTRAMUSCULAR; INTRAVENOUS at 10:08

## 2023-08-29 RX ADMIN — IPRATROPIUM BROMIDE AND ALBUTEROL SULFATE 3 ML: .5; 3 SOLUTION RESPIRATORY (INHALATION) at 08:08

## 2023-08-29 RX ADMIN — TOBRAMYCIN AND DEXAMETHASONE 1 DROP: 3; 1 SUSPENSION/ DROPS OPHTHALMIC at 12:08

## 2023-08-29 RX ADMIN — PANTOPRAZOLE SODIUM 40 MG: 40 GRANULE, DELAYED RELEASE ORAL at 05:08

## 2023-08-29 RX ADMIN — SENNOSIDES AND DOCUSATE SODIUM 1 TABLET: 50; 8.6 TABLET ORAL at 12:08

## 2023-08-29 RX ADMIN — ONDANSETRON 4 MG: 4 TABLET, ORALLY DISINTEGRATING ORAL at 02:08

## 2023-08-29 RX ADMIN — POLYETHYLENE GLYCOL 3350 17 G: 17 POWDER, FOR SOLUTION ORAL at 08:08

## 2023-08-29 RX ADMIN — AMLODIPINE BESYLATE 5 MG: 5 TABLET ORAL at 07:08

## 2023-08-29 RX ADMIN — SEVELAMER CARBONATE 0.8 G: 0.8 POWDER, FOR SUSPENSION ORAL at 05:08

## 2023-08-29 RX ADMIN — HYDRALAZINE HYDROCHLORIDE 50 MG: 50 TABLET ORAL at 07:08

## 2023-08-29 RX ADMIN — PROCHLORPERAZINE EDISYLATE 10 MG: 5 INJECTION INTRAMUSCULAR; INTRAVENOUS at 03:08

## 2023-08-29 RX ADMIN — VALSARTAN 160 MG: 160 TABLET, FILM COATED ORAL at 12:08

## 2023-08-29 RX ADMIN — TOBRAMYCIN AND DEXAMETHASONE 1 DROP: 3; 1 SUSPENSION/ DROPS OPHTHALMIC at 08:08

## 2023-08-29 RX ADMIN — TOBRAMYCIN AND DEXAMETHASONE 1 DROP: 3; 1 SUSPENSION/ DROPS OPHTHALMIC at 05:08

## 2023-08-29 RX ADMIN — QUETIAPINE FUMARATE 50 MG: 25 TABLET ORAL at 08:08

## 2023-08-29 RX ADMIN — Medication 6 MG: at 08:08

## 2023-08-29 RX ADMIN — THERA TABS 1 TABLET: TAB at 12:08

## 2023-08-29 RX ADMIN — OXYCODONE HYDROCHLORIDE 10 MG: 10 TABLET ORAL at 05:08

## 2023-08-29 RX ADMIN — IPRATROPIUM BROMIDE AND ALBUTEROL SULFATE 3 ML: .5; 3 SOLUTION RESPIRATORY (INHALATION) at 07:08

## 2023-08-29 RX ADMIN — HYDRALAZINE HYDROCHLORIDE: 10 TABLET, FILM COATED ORAL at 02:08

## 2023-08-29 RX ADMIN — Medication 1 CAPSULE: at 08:08

## 2023-08-29 RX ADMIN — MEROPENEM 500 MG: 500 INJECTION INTRAVENOUS at 06:08

## 2023-08-29 RX ADMIN — IPRATROPIUM BROMIDE AND ALBUTEROL SULFATE 3 ML: .5; 3 SOLUTION RESPIRATORY (INHALATION) at 01:08

## 2023-08-29 RX ADMIN — TOBRAMYCIN AND DEXAMETHASONE: 3; 1 OINTMENT OPHTHALMIC at 08:08

## 2023-08-29 RX ADMIN — ACETAMINOPHEN 1000 MG: 500 TABLET ORAL at 12:08

## 2023-08-29 RX ADMIN — SENNOSIDES AND DOCUSATE SODIUM 1 TABLET: 50; 8.6 TABLET ORAL at 08:08

## 2023-08-29 RX ADMIN — ATORVASTATIN CALCIUM 40 MG: 40 TABLET, FILM COATED ORAL at 12:08

## 2023-08-29 RX ADMIN — POLYETHYLENE GLYCOL 3350 17 G: 17 POWDER, FOR SOLUTION ORAL at 12:08

## 2023-08-29 RX ADMIN — VALSARTAN 160 MG: 160 TABLET, FILM COATED ORAL at 08:08

## 2023-08-29 RX ADMIN — ACETAMINOPHEN 1000 MG: 500 TABLET ORAL at 08:08

## 2023-08-29 RX ADMIN — MIDAZOLAM HYDROCHLORIDE 1 MG: 2 INJECTION, SOLUTION INTRAMUSCULAR; INTRAVENOUS at 10:08

## 2023-08-29 RX ADMIN — TOBRAMYCIN AND DEXAMETHASONE: 3; 1 OINTMENT OPHTHALMIC at 02:08

## 2023-08-29 RX ADMIN — SEVELAMER CARBONATE 0.8 G: 0.8 POWDER, FOR SUSPENSION ORAL at 12:08

## 2023-08-29 RX ADMIN — CEFAZOLIN SODIUM 1 G: 1 SOLUTION INTRAVENOUS at 10:08

## 2023-08-29 RX ADMIN — TOBRAMYCIN AND DEXAMETHASONE: 3; 1 OINTMENT OPHTHALMIC at 12:08

## 2023-08-29 RX ADMIN — ONDANSETRON 4 MG: 4 TABLET, ORALLY DISINTEGRATING ORAL at 08:08

## 2023-08-29 RX ADMIN — HYDRALAZINE HYDROCHLORIDE 10 MG: 20 INJECTION, SOLUTION INTRAMUSCULAR; INTRAVENOUS at 02:08

## 2023-08-29 RX ADMIN — LIDOCAINE HYDROCHLORIDE 10 ML: 10 INJECTION, SOLUTION INFILTRATION; PERINEURAL at 10:08

## 2023-08-29 RX ADMIN — QUETIAPINE FUMARATE 50 MG: 25 TABLET ORAL at 12:08

## 2023-08-29 RX ADMIN — Medication 1 CAPSULE: at 12:08

## 2023-08-29 RX ADMIN — HYDRALAZINE HYDROCHLORIDE: 10 TABLET, FILM COATED ORAL at 12:08

## 2023-08-29 RX ADMIN — MEROPENEM 500 MG: 500 INJECTION INTRAVENOUS at 05:08

## 2023-08-29 NOTE — PROGRESS NOTES
[facility] - [facility] ([place] [address])  Nephrology  Progress Note    Patient Name: Immanuel Bernal  MRN: [id number]  Admission Date: 8/9/2023  Hospital Length of Stay: 20 days  Attending Provider: Michel Mcbride III*   Primary Care Physician: Dolly, Primary Doctor  Principal Problem:Delirium    Subjective:     HPI: HPI obtained per medical record as patient unable to communicate     57-year-old male with a history of CHF, diabetes, hypertension, chronic disability, end-stage renal disease on hemodialysis, PEG placement, bowel obstruction, sleep apnea, TIA, left eye vitreous hemorrhage, stroke, and bowel obstruction with bowel resection admitted to [facility] in [place] July 18 from nursing home with left eye pain and blurred vision.  He was admitted with concern for bilateral endophthalmitis.  Other admit diagnoses included right upper extremity thrombus, end-stage renal disease on hemodialysis, hyperkalemia, sleep apnea, diabetes, and CHF.  He was treated with broad-spectrum antibiotics.  He was seen by Infectious Diseases,, and he was treated with vancomycin, ceftriaxone, and amphotericin.  Blood cultures were positive for Pseudomonas, and amphotericin/vancomycin were stopped.  IV cefepime was continued.  He was seen by Ophthalmology, and he received intravitreal vancomycin and ceftazidime (July 18).  He also had intravitreal tap July 18 that had no yeast or fungal elements observed.  Left eye endophthalmitis did not respond to treatment, and he subsequently developed abscess formation, significant proptosis, and drainage of purulent material from the orbit.  Ophthalmology recommended enucleation.  He was continued on cefepime for pseudomonal and ophthalmitis.  Recommendation was for 6 weeks of treatment to be followed by indefinite fluoroquinolone.  He was seen by Pulmonology during his stay for a right upper lung mass with peripheral nodules.  It was felt that he would need further  investigation of this once his current clinical issues stabilized.  Right upper extremity AV graft was excised during his stay with concern for infection.  A right IJ tunneled line was placed on July 27.  Left eye endophthalmitis continued to worsen, and ophthalmology spoke with patient and family about the need for enucleation.  Despite several conversations, family declined enucleation.  Plans were for transitioned to skilled nursing facility for continued treatment, but family did not want him to go to a skilled nursing facility.  He was subsequently discharged AMA from the hospital there on August 7.  Please see the August 7 Internal Medicine note for further details.     He subsequently presented to [facility] Emergency Department.  He will not go back to [facility] in [place]. He received Zosyn and vancomycin.  ED team at [facility] spoke with the patient and his power-of- (sister).  CT of the orbits noted scleral abscess along the lateral aspect of the left globe.  Patient and family are aware and in agreement that the patient needs enucleation of the eye at this time. Referring provider spoke with Oculoplastics at [facility]. Requesting transfer to [facility] Medicine for Oculoplastics specialty evaluation of persistent endophthalmitis.  ED provider noted patient is hemodynamically stable.  He does not appear volume overloaded or in respiratory distress.        The patient has a significant previous medical course as listed below  August 3: MRI brain and orbits showed worsening ophthalmitis and inflammatory changes of the left orbital tissues with slight increase in proptosis.  No evidence of intracranial inflammatory process observed.    July 25: Transesophageal echocardiogram had no evidence of endocarditis.  Moderate to severely decreased left ventricular systolic function with EF 30-35%.    July 22:  CT chest showed right upper lobe mass with numerous bilateral  nodularity predominantly in the right with associated mediastinal lymph nodes.  July 21: Blood cultures with Pseudomonas aeruginosa  July 19: MRI brain/orbits with and without contrast had findings suggestive of chronic microvascular ischemic disease of the white matter with remote ischemic event in the left insula/basal ganglia/subependymal white matter/right middle cerebellar peduncle and hemisphere.  Findings consistent with left endophthalmitis.  No abnormal findings observed in the right globe.  July 18:  Blood cultures with Pseudomonas aeruginosa and coag-negative staph    HPI obtained from [facility] med's note. Patient with AMS at time of nephrology evaluation and unable to provide HPI or ROS.          Interval History: Patient seen and examined. NPO for tunneled cuffed catheter today for IV antibiotics moving forward. Tentative plans for left complete evisceration & eye implant. Afebrile with pulse ranging from 70-60s bpm. Systolic blood pressures ranging from 170-130s mmHg. He is saturating +95% on room air from 2 liters. One unmeasured void in the last 24 hours and he is net positive +1.2 liters in the last 24 hours. Metabolic panel reviewed. Plan for iHD at bedside overnight.    Review of patient's allergies indicates:   Allergen Reactions    Morphine Rash    Amiodarone analogues Itching     Other reaction(s): Unknown     Current Facility-Administered Medications   Medication Frequency    0.9%  NaCl infusion (for blood administration) Q24H PRN    0.9%  NaCl infusion (for blood administration) Q24H PRN    0.9%  NaCl infusion PRN    0.9%  NaCl infusion Once    acetaminophen tablet 1,000 mg BID    albuterol-ipratropium 2.5 mg-0.5 mg/3 mL nebulizer solution 3 mL Q6H WAKE    atorvastatin tablet 40 mg Daily    calcitRIOL capsule 0.25 mcg Daily    carvediloL tablet 6.25 mg BID    dextrose 10% bolus 125 mL 125 mL PRN    dextrose 10% bolus 250 mL 250 mL PRN    epoetin thee injection 3,000 Units Every  Mon, Wed, Fri    fluconazole tablet 400 mg Daily    glucagon (human recombinant) injection 1 mg PRN    glucose chewable tablet 16 g PRN    glucose chewable tablet 24 g PRN    heparin (porcine) injection 1,000 Units PRN    hydrALAZINE 10 mg 2 tablets, hydrALAZINE 25 mg 2 tablets, isorsorbide dinitrate 20 mg  2 tablets combination TID    hydrALAZINE injection 10 mg Q6H PRN    Lactobacillus rhamnosus GG capsule 1 capsule BID    meclizine tablet 12.5 mg TID PRN    melatonin tablet 6 mg Nightly    meropenem (MERREM) 500 mg in sodium chloride 0.9 % 100 mL IVPB (MB+) Q12H    multivitamin tablet Daily    naloxone 0.4 mg/mL injection 0.02 mg PRN    OLANZapine injection 5 mg Nightly PRN    ondansetron injection 4 mg Q6H PRN    oxyCODONE immediate release tablet Tab 10 mg Q4H PRN    oxyCODONE-acetaminophen 5-325 mg per tablet 1 tablet Q4H PRN    pantoprazole injection 40 mg BID    polyethylene glycol packet 17 g BID    prochlorperazine injection Soln 10 mg Q6H PRN    QUEtiapine tablet 50 mg BID    senna-docusate 8.6-50 mg per tablet 1 tablet BID    sevelamer carbonate pwpk 0.8 g TID WM    sodium chloride 0.9% flush 10 mL Q12H PRN    sodium chloride 0.9% flush 10 mL PRN    tobramycin-dexAMETHasone 0.3-0.1% ophthalmic ointment TID    tobramycin-dexAMETHasone 0.3-0.1% ophthalmic suspension 1 drop QID    valsartan tablet 160 mg BID    vancomycin - pharmacy to dose pharmacy to manage frequency       Objective:     Vital Signs (Most Recent):  Temp: 98.2 °F (36.8 °C) (08/29/23 0406)  Pulse: 65 (08/29/23 0406)  Resp: 18 (08/29/23 0406)  BP: (!) 171/79 (08/29/23 0406)  SpO2: 95 % (08/29/23 0406) Vital Signs (24h Range):  Temp:  [98.1 °F (36.7 °C)-98.8 °F (37.1 °C)] 98.2 °F (36.8 °C)  Pulse:  [64-75] 65  Resp:  [12-20] 18  SpO2:  [95 %-100 %] 95 %  BP: (135-171)/(64-83) 171/79     Weight: 62.8 kg (138 lb 7.2 oz) (08/23/23 0900)  Body mass index is 19.87 kg/m².  Body surface area is 1.76 meters  squared.    I/O last 3 completed shifts:  In: 1458 [Other:250; NG/GT:1208]  Out: 3000 [Other:3000]    Physical Exam  Vitals and nursing note reviewed.   Constitutional:       General: He is awake. He is not in acute distress.     Appearance: He is cachectic. He is ill-appearing. He is not diaphoretic.      Comments: Muscle wasting/atrophy noted.   HENT:      Head: Normocephalic and atraumatic.      Right Ear: External ear normal.      Left Ear: External ear normal.      Nose: Nose normal.      Mouth/Throat:      Mouth: Mucous membranes are moist.      Pharynx: Oropharynx is clear. No oropharyngeal exudate or posterior oropharyngeal erythema.   Eyes:      General: No scleral icterus.        Right eye: No discharge.         Left eye: No discharge.      Extraocular Movements: Extraocular movements intact.      Conjunctiva/sclera: Conjunctivae normal.   Cardiovascular:      Rate and Rhythm: Normal rate.      Heart sounds: No murmur heard.     No friction rub. No gallop.   Pulmonary:      Effort: Pulmonary effort is normal. No respiratory distress.      Breath sounds: Rhonchi present. No wheezing or rales.   Chest:      Comments: Tunneled HD catheter to right chest wall.  Abdominal:      General: Bowel sounds are normal. There is no distension.      Palpations: Abdomen is soft.      Tenderness: There is no abdominal tenderness.   Musculoskeletal:      Cervical back: Neck supple.      Right lower leg: No edema.      Left lower leg: No edema.   Skin:     General: Skin is warm and dry.      Coloration: Skin is not jaundiced.      Comments: Stable noted to right upper extremity from recent surgery. Incision appears intact.   Neurological:      Mental Status: He is confused.      Cranial Nerves: No cranial nerve deficit.      Motor: Weakness present.   Psychiatric:         Speech: Speech is delayed.         Behavior: Behavior is slowed.          Significant Labs:  ABGs:   Recent Labs   Lab 08/24/23  0836   PH 7.449   PCO2  31.1*   HCO3 21.6*   POCSATURATED 93*   BE -2       BMP:   Recent Labs   Lab 08/25/23  0529 08/25/23  0800 08/29/23  0454   GLU 91   < > 75   *   < > 135*   K 5.4*   < > 4.1      < > 100   CO2 18*   < > 26   BUN 40*   < > 37*   CREATININE 7.6*   < > 4.4*   CALCIUM 9.7   < > 8.4*   MG 2.6  --   --     < > = values in this interval not displayed.       CBC:   Recent Labs   Lab 08/29/23 0454   WBC 5.45   RBC 2.76*   HGB 8.2*   HCT 26.9*      MCV 98   MCH 29.7   MCHC 30.5*       CMP:   Recent Labs   Lab 08/29/23 0454   GLU 75   CALCIUM 8.4*   ALBUMIN 1.9*   PROT 5.7*   *   K 4.1   CO2 26      BUN 37*   CREATININE 4.4*   ALKPHOS 112   ALT <5*   AST 12   BILITOT 0.3       LFTs:   Recent Labs   Lab 08/29/23 0454   ALT <5*   AST 12   ALKPHOS 112   BILITOT 0.3   PROT 5.7*   ALBUMIN 1.9*       Microbiology Results (last 7 days)       Procedure Component Value Units Date/Time    CSF culture [[id number]] Collected: 08/26/23 1315    Order Status: Completed Specimen: CSF (Spinal Fluid) from CSF Tap, Tube 3 Updated: 08/29/23 0719     CSF CULTURE No Growth to date     Gram Stain Result Cytospin indicates:      No WBC's      No organisms seen    AFB Culture & Smear [[id number]] Collected: 08/26/23 1315    Order Status: Completed Specimen: CSF (Spinal Fluid) from CSF Tap, Tube 3 Updated: 08/28/23 1801     AFB Culture & Smear Culture in progress     AFB CULTURE STAIN No acid fast bacilli seen.    Fungus culture [[id number]] Collected: 08/26/23 1315    Order Status: Completed Specimen: CSF (Spinal Fluid) from CSF Tap, Tube 3 Updated: 08/28/23 1115     Fungus (Mycology) Culture Culture in progress    Cryptococcal antigen, CSF [[id number]] Collected: 08/26/23 1315    Order Status: Completed Specimen: CSF (Spinal Fluid) from CSF Tap, Tube 3 Updated: 08/28/23 0853     Crypto Ag, CSF Negative    Gram stain [[id number]] Collected: 08/26/23 1315    Order Status: Canceled Specimen: CSF (Spinal Fluid) from CSF Tap,  Tube 3     Culture, Anaerobe [[id number]]  (Abnormal) Collected: 08/16/23 1632    Order Status: Completed Specimen: Wound from Cornea, Left Updated: 08/23/23 1108     Anaerobic Culture CUTIBACTERIUM ACNES  From broth only      Culture, Anaerobe [[id number]] Collected: 08/16/23 1636    Order Status: Completed Specimen: Abscess from Eyelid, Left Updated: 08/23/23 0842     Anaerobic Culture No anaerobes isolated    Narrative:      Sclera abscess    Culture, Anaerobe [[id number]] Collected: 08/16/23 1647    Order Status: Completed Specimen: Abscess from Eyelid, Left Updated: 08/23/23 0842     Anaerobic Culture No anaerobes isolated    Culture, Anaerobe [[id number]] Collected: 08/16/23 1628    Order Status: Completed Specimen: Wound from Eyelid, Left Updated: 08/23/23 0842     Anaerobic Culture No anaerobes isolated          Specimen (24h ago, onward)      None          Significant Imaging:  I have reviewed all imagining in the last 24 hours.    Assessment/Plan:     Ophtho  Endophthalmitis  - Ophthalmology consulted and following  - tentative plans for complete left evisceration and eye implant tomorrow    Renal/  ESRD (end stage renal disease)  Outpatient HD Information:  -Outpatient HD unit: C   -HD tx days: MWF   -HD tx time: 210min  -HD access: R IJ TDC   -HD modality: iHD   -Residual urine: ?    Plan/Recommendations:  - plan for iHD overnight for metabolic clearance and volume management   - renal diet when not NPO  - strict I/O's and daily weights  - daily renal function panels and magnesium levels  - renally all dose medications to eGFR  - avoid gadolinium, fleets, phos-based laxatives, NSAIDs, etc.    ID  AV fistula infection  - secondary to Pseudomonas  - s/p right upper extremity AV graft excision in July  - Infectious Disease consulted and following   - plan for 4-6 weeks total of meropenem, vancomycin and fluconazole    Oncology  Anemia in ESRD (end-stage renal disease)  - target Hg of 10-12  - hemoglobin  this morning 8.2  - continue epogen 3,000 units every Monday, Wednesday and Friday     GI  Duodenal ulceration  - management per primary team    Thank you for your consult. I will follow-up with patient. Please contact us if you have any additional questions.    Herbert Cedeño MD  Nephrology  [facility] - Intensive Care ([place]-)

## 2023-08-29 NOTE — ASSESSMENT & PLAN NOTE
- secondary to Pseudomonas  - s/p right upper extremity AV graft excision in July  - Infectious Disease consulted and following   - plan for 4-6 weeks total of meropenem, vancomycin and fluconazole

## 2023-08-29 NOTE — SUBJECTIVE & OBJECTIVE
Interval History: Patient seen and examined. NPO for tunneled cuffed catheter today for IV antibiotics moving forward. Tentative plans for left complete evisceration & eye implant. Afebrile with pulse ranging from 70-60s bpm. Systolic blood pressures ranging from 170-130s mmHg. He is saturating +95% on room air from 2 liters. One unmeasured void in the last 24 hours and he is net positive +1.2 liters in the last 24 hours. Metabolic panel reviewed. Plan for iHD at bedside overnight.    Review of patient's allergies indicates:   Allergen Reactions    Morphine Rash    Amiodarone analogues Itching     Other reaction(s): Unknown     Current Facility-Administered Medications   Medication Frequency    0.9%  NaCl infusion (for blood administration) Q24H PRN    0.9%  NaCl infusion (for blood administration) Q24H PRN    0.9%  NaCl infusion PRN    0.9%  NaCl infusion Once    acetaminophen tablet 1,000 mg BID    albuterol-ipratropium 2.5 mg-0.5 mg/3 mL nebulizer solution 3 mL Q6H WAKE    atorvastatin tablet 40 mg Daily    calcitRIOL capsule 0.25 mcg Daily    carvediloL tablet 6.25 mg BID    dextrose 10% bolus 125 mL 125 mL PRN    dextrose 10% bolus 250 mL 250 mL PRN    epoetin thee injection 3,000 Units Every Mon, Wed, Fri    fluconazole tablet 400 mg Daily    glucagon (human recombinant) injection 1 mg PRN    glucose chewable tablet 16 g PRN    glucose chewable tablet 24 g PRN    heparin (porcine) injection 1,000 Units PRN    hydrALAZINE 10 mg 2 tablets, hydrALAZINE 25 mg 2 tablets, isorsorbide dinitrate 20 mg  2 tablets combination TID    hydrALAZINE injection 10 mg Q6H PRN    Lactobacillus rhamnosus GG capsule 1 capsule BID    meclizine tablet 12.5 mg TID PRN    melatonin tablet 6 mg Nightly    meropenem (MERREM) 500 mg in sodium chloride 0.9 % 100 mL IVPB (MB+) Q12H    multivitamin tablet Daily    naloxone 0.4 mg/mL injection 0.02 mg PRN    OLANZapine injection 5 mg Nightly PRN    ondansetron injection 4 mg Q6H PRN     oxyCODONE immediate release tablet Tab 10 mg Q4H PRN    oxyCODONE-acetaminophen 5-325 mg per tablet 1 tablet Q4H PRN    pantoprazole injection 40 mg BID    polyethylene glycol packet 17 g BID    prochlorperazine injection Soln 10 mg Q6H PRN    QUEtiapine tablet 50 mg BID    senna-docusate 8.6-50 mg per tablet 1 tablet BID    sevelamer carbonate pwpk 0.8 g TID WM    sodium chloride 0.9% flush 10 mL Q12H PRN    sodium chloride 0.9% flush 10 mL PRN    tobramycin-dexAMETHasone 0.3-0.1% ophthalmic ointment TID    tobramycin-dexAMETHasone 0.3-0.1% ophthalmic suspension 1 drop QID    valsartan tablet 160 mg BID    vancomycin - pharmacy to dose pharmacy to manage frequency       Objective:     Vital Signs (Most Recent):  Temp: 98.2 °F (36.8 °C) (08/29/23 0406)  Pulse: 65 (08/29/23 0406)  Resp: 18 (08/29/23 0406)  BP: (!) 171/79 (08/29/23 0406)  SpO2: 95 % (08/29/23 0406) Vital Signs (24h Range):  Temp:  [98.1 °F (36.7 °C)-98.8 °F (37.1 °C)] 98.2 °F (36.8 °C)  Pulse:  [64-75] 65  Resp:  [12-20] 18  SpO2:  [95 %-100 %] 95 %  BP: (135-171)/(64-83) 171/79     Weight: 62.8 kg (138 lb 7.2 oz) (08/23/23 0900)  Body mass index is 19.87 kg/m².  Body surface area is 1.76 meters squared.    I/O last 3 completed shifts:  In: 1458 [Other:250; NG/GT:1208]  Out: 3000 [Other:3000]     Physical Exam  Vitals and nursing note reviewed.   Constitutional:       General: He is awake. He is not in acute distress.     Appearance: He is cachectic. He is ill-appearing. He is not diaphoretic.      Comments: Muscle wasting/atrophy noted.   HENT:      Head: Normocephalic and atraumatic.      Right Ear: External ear normal.      Left Ear: External ear normal.      Nose: Nose normal.      Mouth/Throat:      Mouth: Mucous membranes are moist.      Pharynx: Oropharynx is clear. No oropharyngeal exudate or posterior oropharyngeal erythema.   Eyes:      General: No scleral icterus.        Right eye: No discharge.         Left eye: No discharge.       Extraocular Movements: Extraocular movements intact.      Conjunctiva/sclera: Conjunctivae normal.   Cardiovascular:      Rate and Rhythm: Normal rate.      Heart sounds: No murmur heard.     No friction rub. No gallop.   Pulmonary:      Effort: Pulmonary effort is normal. No respiratory distress.      Breath sounds: Rhonchi present. No wheezing or rales.   Chest:      Comments: Tunneled HD catheter to right chest wall.  Abdominal:      General: Bowel sounds are normal. There is no distension.      Palpations: Abdomen is soft.      Tenderness: There is no abdominal tenderness.   Musculoskeletal:      Cervical back: Neck supple.      Right lower leg: No edema.      Left lower leg: No edema.   Skin:     General: Skin is warm and dry.      Coloration: Skin is not jaundiced.      Comments: Stable noted to right upper extremity from recent surgery. Incision appears intact.   Neurological:      Mental Status: He is confused.      Cranial Nerves: No cranial nerve deficit.      Motor: Weakness present.   Psychiatric:         Speech: Speech is delayed.         Behavior: Behavior is slowed.          Significant Labs:  ABGs:   Recent Labs   Lab 08/24/23  0836   PH 7.449   PCO2 31.1*   HCO3 21.6*   POCSATURATED 93*   BE -2       BMP:   Recent Labs   Lab 08/25/23  0529 08/25/23  0800 08/29/23  0454   GLU 91   < > 75   *   < > 135*   K 5.4*   < > 4.1      < > 100   CO2 18*   < > 26   BUN 40*   < > 37*   CREATININE 7.6*   < > 4.4*   CALCIUM 9.7   < > 8.4*   MG 2.6  --   --     < > = values in this interval not displayed.       CBC:   Recent Labs   Lab 08/29/23  0454   WBC 5.45   RBC 2.76*   HGB 8.2*   HCT 26.9*      MCV 98   MCH 29.7   MCHC 30.5*       CMP:   Recent Labs   Lab 08/29/23  0454   GLU 75   CALCIUM 8.4*   ALBUMIN 1.9*   PROT 5.7*   *   K 4.1   CO2 26      BUN 37*   CREATININE 4.4*   ALKPHOS 112   ALT <5*   AST 12   BILITOT 0.3       LFTs:   Recent Labs   Lab 08/29/23  0454   ALT <5*   AST  12   ALKPHOS 112   BILITOT 0.3   PROT 5.7*   ALBUMIN 1.9*       Microbiology Results (last 7 days)       Procedure Component Value Units Date/Time    CSF culture [[id number]] Collected: 08/26/23 1315    Order Status: Completed Specimen: CSF (Spinal Fluid) from CSF Tap, Tube 3 Updated: 08/29/23 0719     CSF CULTURE No Growth to date     Gram Stain Result Cytospin indicates:      No WBC's      No organisms seen    AFB Culture & Smear [[id number]] Collected: 08/26/23 1315    Order Status: Completed Specimen: CSF (Spinal Fluid) from CSF Tap, Tube 3 Updated: 08/28/23 1801     AFB Culture & Smear Culture in progress     AFB CULTURE STAIN No acid fast bacilli seen.    Fungus culture [[id number]] Collected: 08/26/23 1315    Order Status: Completed Specimen: CSF (Spinal Fluid) from CSF Tap, Tube 3 Updated: 08/28/23 1115     Fungus (Mycology) Culture Culture in progress    Cryptococcal antigen, CSF [[id number]] Collected: 08/26/23 1315    Order Status: Completed Specimen: CSF (Spinal Fluid) from CSF Tap, Tube 3 Updated: 08/28/23 0853     Crypto Ag, CSF Negative    Gram stain [[id number]] Collected: 08/26/23 1315    Order Status: Canceled Specimen: CSF (Spinal Fluid) from CSF Tap, Tube 3     Culture, Anaerobe [[id number]]  (Abnormal) Collected: 08/16/23 1632    Order Status: Completed Specimen: Wound from Cornea, Left Updated: 08/23/23 1108     Anaerobic Culture CUTIBACTERIUM ACNES  From broth only      Culture, Anaerobe [[id number]] Collected: 08/16/23 1636    Order Status: Completed Specimen: Abscess from Eyelid, Left Updated: 08/23/23 0842     Anaerobic Culture No anaerobes isolated    Narrative:      Sclera abscess    Culture, Anaerobe [[id number]] Collected: 08/16/23 1647    Order Status: Completed Specimen: Abscess from Eyelid, Left Updated: 08/23/23 0842     Anaerobic Culture No anaerobes isolated    Culture, Anaerobe [[id number]] Collected: 08/16/23 1628    Order Status: Completed Specimen: Wound from Eyelid, Left Updated:  08/23/23 0842     Anaerobic Culture No anaerobes isolated          Specimen (24h ago, onward)      None          Significant Imaging:  I have reviewed all imagining in the last 24 hours.

## 2023-08-29 NOTE — H&P
Pre-operative History and Physical  Ophthalmology Service    CC: endophthalmitis left eye    HPI:   Patient is a 57 y.o. male presents with an eye problem endophthalmitis left eye requiring surgery as noted in the most recent ophthalmology progress note.        Past Medical History:   Diagnosis Date    Bilateral retinal detachment 7/18/2023    BPH (benign prostatic hyperplasia)     Cataract     CHF (congestive heart failure)     CKD (chronic kidney disease) stage 3, GFR 30-59 ml/min     Diabetes mellitus, type 2     Hypertension     KENIA (obstructive sleep apnea)     Pancreatitis     Persistent proteinuria 11/12/2020    2 g proteinuria noted Resumed ACE Lower BP systolic to less than 130     PTSD (post-traumatic stress disorder)     Stroke        Past Surgical History:   Procedure Laterality Date    CATARACT EXTRACTION Bilateral     ESOPHAGOGASTRODUODENOSCOPY N/A 8/23/2023    Procedure: EGD (ESOPHAGOGASTRODUODENOSCOPY);  Surgeon: Bharath Ya MD;  Location: [address]);  Service: Endoscopy;  Laterality: N/A;    EVISCERATION OF OCULAR CONTENTS Left 8/16/2023    Procedure: EVISCERATION, OCULAR CONTENTS;  Surgeon: Vicki Stewart MD;  Location: [address];  Service: Ophthalmology;  Laterality: Left;    MAGNETIC RESONANCE IMAGING N/A 8/26/2023    Procedure: MRI (Magnetic Resonance Imagine);  Surgeon: Mckenzie De Jesus;  Location: [facility];  Service: Anesthesiology;  Laterality: N/A;    RETROBULBAR INJECTION OF MEDICATION Left 8/16/2023    Procedure: INJECTION, MEDICATION, RETROBULBAR;  Surgeon: Vicki Stewart MD;  Location: [address];  Service: Ophthalmology;  Laterality: Left;    TARSORRHAPHY Left 8/16/2023    Procedure: BLEPHARORRHAPHY;  Surgeon: Vicki Stewart MD;  Location: [address];  Service: Ophthalmology;  Laterality: Left;       Family History   Problem Relation Age of Onset    Stroke Mother     Diabetes Father     Heart disease Father     Kidney disease Father     Diabetes Sister      Hyperlipidemia Brother     Amblyopia Neg Hx     Blindness Neg Hx     Cataracts Neg Hx     Glaucoma Neg Hx     Macular degeneration Neg Hx     Strabismus Neg Hx     Retinal detachment Neg Hx        Social History     Socioeconomic History    Marital status: Single   Tobacco Use    Smoking status: Former     Types: Cigarettes, Cigars    Smokeless tobacco: Never   Substance and Sexual Activity    Alcohol use: Not Currently    Drug use: Not Currently     Social Determinants of Health     Financial Resource Strain: Low Risk  (8/10/2023)    Overall Financial Resource Strain (CARDIA)     Difficulty of Paying Living Expenses: Not very hard   Food Insecurity: No Food Insecurity (8/10/2023)    Hunger Vital Sign     Worried About Running Out of Food in the Last Year: Never true     Ran Out of Food in the Last Year: Never true   Transportation Needs: No Transportation Needs (8/10/2023)    PRAPARE - Transportation     Lack of Transportation (Medical): No     Lack of Transportation (Non-Medical): No   Physical Activity: Insufficiently Active (8/10/2023)    Exercise Vital Sign     Days of Exercise per Week: 5 days     Minutes of Exercise per Session: 20 min   Stress: Stress Concern Present (8/10/2023)    [employer] - Occupational Stress Questionnaire     Feeling of Stress : Rather much   Social Connections: Socially Isolated (8/10/2023)    Social Connection and Isolation Panel [NHANES]     Frequency of Communication with Friends and Family: Twice a week     Frequency of Social Gatherings with Friends and Family: Once a week     Attends Church Services: Never     Active Member of Clubs or Organizations: No     Attends Club or Organization Meetings: Never     Marital Status: Never    Housing Stability: Unknown (8/10/2023)    Housing Stability Vital Sign     Unable to Pay for Housing in the Last Year: No     Unstable Housing in the Last Year: No       Current Facility-Administered  Medications   Medication Dose Route Frequency Provider Last Rate Last Admin    0.9%  NaCl infusion (for blood administration)   Intravenous Q24H PRN Pat Montoya MD        0.9%  NaCl infusion (for blood administration)   Intravenous Q24H PRN Sharon Law MD        0.9%  NaCl infusion   Intravenous PRN Pete Grimes NP        0.9%  NaCl infusion   Intravenous Once Rachael Barraza DNP, FNP-C        acetaminophen tablet 1,000 mg  1,000 mg Per G Tube BID Pat Montoya MD   1,000 mg at 08/28/23 2115    albuterol-ipratropium 2.5 mg-0.5 mg/3 mL nebulizer solution 3 mL  3 mL Nebulization Q6H WAKE Pat Montoya MD   3 mL at 08/29/23 0803    atorvastatin tablet 40 mg  40 mg Per G Tube Daily Pat Montoya MD   40 mg at 08/28/23 0916    calcitRIOL capsule 0.25 mcg  0.25 mcg Per G Tube Daily Pat Montoya MD   0.25 mcg at 08/28/23 0917    carvediloL tablet 6.25 mg  6.25 mg Per G Tube BID Pat Montoya MD   6.25 mg at 08/28/23 2116    dextrose 10% bolus 125 mL 125 mL  12.5 g Intravenous PRN Porsha Funez MD   Stopped at 08/25/23 0834    dextrose 10% bolus 250 mL 250 mL  25 g Intravenous PRN Porsha Funez MD   Stopped at 08/25/23 1435    epoetin thee injection 3,000 Units  3,000 Units Intravenous Every Mon, Wed, Fri Rachael Barraza DNP, FNP-JANETH   3,000 Units at 08/28/23 0900    fluconazole tablet 400 mg  400 mg Per G Tube Daily Pat Montoya MD   400 mg at 08/28/23 0917    glucagon (human recombinant) injection 1 mg  1 mg Intramuscular PRN Leroy Park MD        glucose chewable tablet 16 g  16 g Oral PRN Leroy Park MD   16 g at 08/19/23 0534    glucose chewable tablet 24 g  24 g Oral PRN Leroy Park MD        heparin (porcine) injection 1,000 Units  1,000 Units Intra-Catheter PRN Pete Grimes NP   1,000 Units at 08/28/23 0622    hydrALAZINE 10 mg 2 tablets, hydrALAZINE 25 mg 2 tablets, isorsorbide dinitrate 20 mg  2 tablets combination   Per G Tube TID Pat Montoya MD   Given at  08/28/23 2115    hydrALAZINE injection 10 mg  10 mg Intravenous Q6H PRN Michel Mcbride III, MD   10 mg at 08/27/23 1223    Lactobacillus rhamnosus GG capsule 1 capsule  1 capsule Per G Tube BID Pat Montoya MD   1 capsule at 08/28/23 2116    meclizine tablet 12.5 mg  12.5 mg Oral TID PRN Kim Watts MD   12.5 mg at 08/18/23 1622    melatonin tablet 6 mg  6 mg Oral Nightly Pat Montoya MD   6 mg at 08/28/23 2116    meropenem (MERREM) 500 mg in sodium chloride 0.9 % 100 mL IVPB (MB+)  500 mg Intravenous Q12H Leroy Hong MD   Stopped at 08/29/23 0611    multivitamin tablet  1 tablet Per G Tube Daily Pat Montoya MD   1 tablet at 08/28/23 0917    naloxone 0.4 mg/mL injection 0.02 mg  0.02 mg Intravenous PRN Leroy Park MD        OLANZapine injection 5 mg  5 mg Intramuscular Nightly PRN Rosalie Goldman MD   5 mg at 08/26/23 0240    ondansetron injection 4 mg  4 mg Intravenous Q6H PRN Michel Mcbride III, MD   4 mg at 08/24/23 1729    oxyCODONE immediate release tablet Tab 10 mg  10 mg Oral Q4H PRN Pat Montoya MD   10 mg at 08/26/23 1656    oxyCODONE-acetaminophen 5-325 mg per tablet 1 tablet  1 tablet Oral Q4H PRN Pat Montoya MD   1 tablet at 08/21/23 1735    pantoprazole injection 40 mg  40 mg Intravenous BID Pat Montoya MD   40 mg at 08/28/23 2116    polyethylene glycol packet 17 g  17 g Per G Tube BID Pat Montoya MD   17 g at 08/28/23 2100    prochlorperazine injection Soln 10 mg  10 mg Intravenous Q6H PRN Michel Mcbride III, MD   10 mg at 08/27/23 1656    QUEtiapine tablet 50 mg  50 mg Per G Tube BID Pat Montoya MD   50 mg at 08/28/23 2116    senna-docusate 8.6-50 mg per tablet 1 tablet  1 tablet Per G Tube BID Pat Montoya MD   1 tablet at 08/28/23 2116    sevelamer carbonate pwpk 0.8 g  0.8 g Per G Tube TID WM Pat Montoya MD   0.8 g at 08/28/23 1735    sodium chloride 0.9% flush 10 mL  10 mL Intravenous Q12H PRN Leroy Park MD        sodium chloride  0.9% flush 10 mL  10 mL Intravenous PRN Martín Izaguirre MD        tobramycin-dexAMETHasone 0.3-0.1% ophthalmic ointment   Left Eye TID Deanna Celestin MD   Given at 08/28/23 2100    tobramycin-dexAMETHasone 0.3-0.1% ophthalmic suspension 1 drop  1 drop Both Eyes QID Sharon Law MD   1 drop at 08/28/23 2100    valsartan tablet 160 mg  160 mg Oral BID Pat Montoya MD   160 mg at 08/28/23 2116    vancomycin - pharmacy to dose   Intravenous pharmacy to manage frequency Leroy Park MD           Review of patient's allergies indicates:   Allergen Reactions    Morphine Rash    Amiodarone analogues Itching     Other reaction(s): Unknown         Review of systems:  Gen: denies fevers, chills, nausea, vomitting, weight changes  HEENT: Denies discharge or coughing/sneezing.   Resp: Denies SOB  CV: Denies CP or palpitations  Abd: Denies tenderness, diarrhea, constipation, nausea  Ext:  Denies pain or edema    Physical Exam  BP: Vital signs stable  General: No apparent distress  HEENT: Normocephalic, atraumatic, see past ophtho note for eye exam OS  Lungs: Adequate respirations, no respiratory distress  Heart: Pulses intact  Abdomen: Soft, no distention  Rectal/pelvic: Deferred    Assessment  Patient is a 57 y.o. male with eye problem endophthalmitis left eye   - Risks/benefits/alternatives of the procedure including, but not limited to scarring, bleeding, infection, loss or decreased vision, and/or need for possible repeat surgery discussed with the patient and/or family, and Informed consent obtained prior to surgery and the patient/family voiced good understanding, witnessed, and placed in chart.  - Will proceed with completion of evisceration of left eye with placement of implant/closure of sclera and conjunctiva/temporary tarsorrhaphy   - Plan for General anesthesia with retrobulbar block  - no blood thinners night before

## 2023-08-29 NOTE — ASSESSMENT & PLAN NOTE
- Ophthalmology consulted and following  - tentative plans for complete left evisceration and eye implant tomorrow

## 2023-08-29 NOTE — PROGRESS NOTES
Pharmacokinetic Assessment Follow Up: IV Vancomycin    Vancomycin serum concentration assessment(s):    Vanc level is 23.1 mcg/ml, drawn appropriately  Patient is over the goal of 15-20 mcg/ml    Vancomycin Regimen Plan:    Will hold vancomycin today, no HD plan   Redraw vanc random on Wednesday with AM labs    Drug levels (last 3 results):  Recent Labs   Lab Result Units 08/28/23  0727 08/29/23  0454   Vancomycin, Random ug/mL 15.9 23.1       Pharmacy will continue to follow and monitor vancomycin.    Please contact pharmacy at extension [phone] for questions regarding this assessment.    Thank you for the consult,   Elsa Solorzano    Patient brief summary:  Immanuel Bernal is a 57 y.o. male initiated on antimicrobial therapy with IV Vancomycin for treatment of endophthalmitis.    The patient's current regimen is vancomycin pulse dosing.     Actual Body Weight:   62.8 kg    Renal Function:   Estimated Creatinine Clearance: 16.1 mL/min (A) (based on SCr of 4.4 mg/dL (H)).,     Dialysis Method (if applicable):  intermittent HD    CBC (last 72 hours):  Recent Labs   Lab Result Units 08/26/23  1812 08/27/23  0116 08/27/23  0905 08/27/23  1704 08/28/23  0731 08/28/23  1152 08/29/23  0454   WBC K/uL 10.01 8.92  9.58 9.00 8.46 9.49 7.25 5.45   Hemoglobin g/dL 6.9* 8.6*  8.4* 9.0* 8.3* 8.5* 8.5* 8.2*   Hematocrit % 22.2* 27.8*  26.8* 28.2* 26.6* 26.0* 26.9* 26.9*   Platelets K/uL 150 141*  151 154 154 176 172 176   Gran % % 72.8 81.0*  --   --   --  71.4  --    Lymph % % 9.2* 9.0*  --   --   --  9.0*  --    Mono % % 10.5 5.0  --   --   --  11.3  --    Eosinophil % % 6.3 4.0  --   --   --  7.3  --    Basophil % % 0.6 0.0  --   --   --  0.6  --    Differential Method  Automated Automated  --   --   --  Automated  --          Metabolic Panel (last 72 hours):  Recent Labs   Lab Result Units 08/26/23  1315 08/27/23  0116 08/28/23  0727 08/29/23  0454   Sodium mmol/L  --  133* 135* 135*   Potassium mmol/L  --  5.1 3.4*  4.1   Chloride mmol/L  --  105 101 100   CO2 mmol/L  --  17* 23 26   Glucose mg/dL  --  71 96 75   Glucose, CSF mg/dL 52  --   --   --    BUN mg/dL  --  30* 18 37*   Creatinine mg/dL  --  5.4* 2.9* 4.4*   Albumin g/dL  --  2.2* 2.1* 1.9*   Total Bilirubin mg/dL  --  0.5 0.3 0.3   Alkaline Phosphatase U/L  --  104 119 112   AST U/L  --  25 14 12   ALT U/L  --  6* <5* <5*         Vancomycin Administrations:  vancomycin given in the last 96 hours                     vancomycin (VANCOCIN) 500 mg in dextrose 5 % in water (D5W) 100 mL IVPB (MB+) (mg) 500 mg New Bag 08/23/23 1724    vancomycin (VANCOCIN) 500 mg in dextrose 5 % in water (D5W) 100 mL IVPB (MB+) (mg) 500 mg New Bag 08/21/23 1318                    Microbiologic Results:  Microbiology Results (last 7 days)       Procedure Component Value Units Date/Time    CSF culture [[id number]] Collected: 08/26/23 1315    Order Status: Completed Specimen: CSF (Spinal Fluid) from CSF Tap, Tube 3 Updated: 08/29/23 0719     CSF CULTURE No Growth to date     Gram Stain Result Cytospin indicates:      No WBC's      No organisms seen    AFB Culture & Smear [[id number]] Collected: 08/26/23 1315    Order Status: Completed Specimen: CSF (Spinal Fluid) from CSF Tap, Tube 3 Updated: 08/28/23 1801     AFB Culture & Smear Culture in progress     AFB CULTURE STAIN No acid fast bacilli seen.    Fungus culture [[id number]] Collected: 08/26/23 1315    Order Status: Completed Specimen: CSF (Spinal Fluid) from CSF Tap, Tube 3 Updated: 08/28/23 1115     Fungus (Mycology) Culture Culture in progress    Cryptococcal antigen, CSF [[id number]] Collected: 08/26/23 1315    Order Status: Completed Specimen: CSF (Spinal Fluid) from CSF Tap, Tube 3 Updated: 08/28/23 0853     Crypto Ag, CSF Negative    Gram stain [[id number]] Collected: 08/26/23 1315    Order Status: Canceled Specimen: CSF (Spinal Fluid) from CSF Tap, Tube 3     Culture, Anaerobe [[id number]]  (Abnormal) Collected: 08/16/23 1632    Order  Status: Completed Specimen: Wound from Cornea, Left Updated: 08/23/23 1108     Anaerobic Culture CUTIBACTERIUM ACNES  From broth only      Culture, Anaerobe [[id number]] Collected: 08/16/23 1636    Order Status: Completed Specimen: Abscess from Eyelid, Left Updated: 08/23/23 0842     Anaerobic Culture No anaerobes isolated    Narrative:      Sclera abscess    Culture, Anaerobe [[id number]] Collected: 08/16/23 1647    Order Status: Completed Specimen: Abscess from Eyelid, Left Updated: 08/23/23 0842     Anaerobic Culture No anaerobes isolated    Culture, Anaerobe [[id number]] Collected: 08/16/23 1628    Order Status: Completed Specimen: Wound from Eyelid, Left Updated: 08/23/23 0842     Anaerobic Culture No anaerobes isolated

## 2023-08-29 NOTE — PLAN OF CARE
Problem: Adult Inpatient Plan of Care  Goal: Plan of Care Review  Outcome: Ongoing, Progressing  Goal: Patient-Specific Goal (Individualized)  Outcome: Ongoing, Progressing  Goal: Absence of Hospital-Acquired Illness or Injury  Outcome: Ongoing, Progressing  Goal: Optimal Comfort and Wellbeing  Outcome: Ongoing, Progressing  Goal: Readiness for Transition of Care  Outcome: Ongoing, Progressing     Problem: Diabetes Comorbidity  Goal: Blood Glucose Level Within Targeted Range  Outcome: Ongoing, Progressing     Problem: Skin Injury Risk Increased  Goal: Skin Health and Integrity  Outcome: Ongoing, Progressing     Problem: Infection  Goal: Absence of Infection Signs and Symptoms  Outcome: Ongoing, Progressing     Problem: Coping Ineffective  Goal: Effective Coping  Outcome: Ongoing, Progressing     Problem: Impaired Wound Healing  Goal: Optimal Wound Healing  Outcome: Ongoing, Progressing     Problem: Fall Injury Risk  Goal: Absence of Fall and Fall-Related Injury  Outcome: Ongoing, Progressing

## 2023-08-29 NOTE — PROGRESS NOTES
[facility] ([address] Medicine  Progress Note    Patient Name: Immanuel Bernal  MRN: [id number]  Patient Class: IP- Inpatient   Admission Date: 8/9/2023  Length of Stay: 20 days  Attending Physician: Michel Mcbride III*  Primary Care Provider: Dolly, Primary Doctor        Subjective:     Principal Problem:Delirium        HPI:  Immanuel Bernal is a 57 y.o. male with ESRD, HFrEF 30-35%, DM2, HTN, history of bowel obstruction s/p bowel resection, now with PEG in place, ?KENIA, history of CVA, ??AFib who was recently hospitalized at [facility] in 7/2023 for b/l endophthalmitis, Pseudomonas bacteremia, RUE thrombus, RUE AVG infection s/p excision, and RUL mass. Left eye did not improve & subsequently developed abscess, but POA declined enucleation and patient ultimately left AMA. Re-presented to [place] ED with worsening eye symptoms with imaging showing left scleral abscess, so [place] [facility] Oculoplastics evaluation & enucleation.       Overview/Hospital Course:  Patient was admitted initially on 8/9/2023 to [facility] medicine for Oculoplastics evaluation for L eye endophthalmitis with abscess. Due to prior Pseudomonal bacteremia, patient was kept of vancomycin and meropenem per infectious disease recommendations. Initially, surgery was planned, however, patient's POA wanted to repeat imaging to confirm the severe infection before proceeding. Unfortunately, patient had worsening delirium of unknown etiology, thus MRI could not be completed properly. Patient was stepped up to ICU on 8/11 and briefly required a precedex drip, and got a full MRI on 8/12. Delirium self-resolved and patient was stepped down to medicine again on 8/14. After back and forth discussion with patient's POA and ophthalmology, patient underwent L eye evisceration on 8/16. Surgical cultures additionally grew yeast (not yet speciated) and cutibacterium acnes. Patient was started on fluconazole  and tobramycin drops/ointment. In preparation for discharge, meropenem was changed to cefepime on 8/21 for pseudomonal coverage, as meropenem needed to be dosed daily (and patient would need another tunneled Gil line) and cefepime could be dosed with dialysis.     On 8/24, patient once again had worsening delirium of unclear etiology. Cefepime was switched back to meropenem due to concern for neurotoxicity, and reglan was discontinued. Patient underwent HD however this did not improve his mentation. Patient underwent repeat MRI jim under sedation on 8/26, which showed ongoing L scleral infection and orbital cellulitis; per ophthalmology, no further surgical intervention is warranted. LP was obtained as well and negative for infection. CTA was ordered to r/o PE in setting of intermittent hypoxia, and was largely negative. LE US was also ordered, and did not show DVT, thus ruling out thromboembolism as a cause for agitation/confusion. EEG was completed and showed encephalopathy but no seizures. Patient's delirium ultimately improved with another session of HD on 8/28, thus, delirium was most likely related to medication adverse effects.     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 His R Permacath was still functioning and was left in place. Patient had an old AVF on the R arm that was operated on 1-2 months ago (see HPI; infected graft) and still had staples in place; vascular surgery recommend outpatient follow up for staple removal once site fully healed. For DVTs, Eliquis was started on 8/17, however, patient developed melena on 8/22. Eliquis was stopped, 1U PRBC was transfused, GI was consulted, and patient underwent EGD on 8/24. EGD found a large duodenal ulcer with adherent clot - 2 clips were placed adjacent to the ulcer but the clot was left intact. Patient remained hemodynamically stable, however has  required 3U PRBC total since melena started. Per GI, no further endoscopic intervention would be useful for the large ulcer. CTA was ordered on 8/27 and was negative for active bleeding. No further bleeding has been noted and hemoglobin has stabilized.    Disposition: Tunneled PICC line was placed today (08/29). Left eye evisceration completion and implant scheduled for (0830). Will DC to [facility] following line placement, eye implant, and patient stable.       Interval History: Patient was seen today and states he feels well aside from some nausea and vomiting. Reports 5 estimates of vomiting since yesterday but denies any hemoptysis/hematemesis. He will have a tunneled PICC line placed this morning. Also, he is scheduled for left eye evisceration completion with implant tomorrow (08/30). Will be discharged to rehab once these are complete and patient is stable.    Review of Systems   Constitutional:  Negative for activity change, chills, diaphoresis and fever.   HENT:  Negative for ear discharge.    Eyes:  Negative for pain.   Respiratory:  Negative for cough, chest tightness, shortness of breath and wheezing.    Cardiovascular:  Negative for chest pain, palpitations and leg swelling.   Gastrointestinal:  Positive for nausea and vomiting. Negative for abdominal distention, abdominal pain, constipation and diarrhea.   Genitourinary:  Negative for dysuria.   Neurological:  Negative for dizziness, syncope, weakness, light-headedness and headaches.   Psychiatric/Behavioral:  Negative for agitation and confusion.      Objective:     Vital Signs (Most Recent):  Temp: 98 °F (36.7 °C) (08/29/23 1035)  Pulse: (!) 55 (08/29/23 1115)  Resp: 16 (08/29/23 1115)  BP: (!) 175/96 (08/29/23 1115)  SpO2: 100 % (08/29/23 1115) Vital Signs (24h Range):  Temp:  [98 °F (36.7 °C)-98.8 °F (37.1 °C)] 98 °F (36.7 °C)  Pulse:  [55-75] 55  Resp:  [14-20] 16  SpO2:  [95 %-100 %] 100 %  BP: (135-180)/() 175/96     Weight: 62.8 kg  (138 lb 7.2 oz)  Body mass index is 19.87 kg/m².    Intake/Output Summary (Last 24 hours) at 8/29/2023 1155  Last data filed at 8/29/2023 0745  Gross per 24 hour   Intake 1148 ml   Output --   Net 1148 ml         Physical Exam  Constitutional:       Appearance: He is ill-appearing.   HENT:      Head: Normocephalic and atraumatic.   Eyes:      Comments: Right eye clear. Pupil reactive. Left orbit swollen with eye enucleation.   Cardiovascular:      Rate and Rhythm: Normal rate and regular rhythm.      Pulses: Normal pulses.      Heart sounds: Normal heart sounds.   Pulmonary:      Effort: Pulmonary effort is normal. No respiratory distress.      Breath sounds: Normal breath sounds. No wheezing or rhonchi.   Abdominal:      General: Abdomen is flat. There is no distension.      Palpations: Abdomen is soft.      Tenderness: There is no abdominal tenderness.   Musculoskeletal:         General: No swelling or tenderness.   Skin:     General: Skin is warm and dry.   Neurological:      General: No focal deficit present.      Cranial Nerves: No cranial nerve deficit.      Comments: Alert and oriented to name and place; able to converse well.   Psychiatric:         Mood and Affect: Mood normal.         Thought Content: Thought content normal.             Significant Labs: All pertinent labs within the past 24 hours have been reviewed.  CBC:   Recent Labs   Lab 08/28/23  0731 08/28/23  1152 08/29/23  0454   WBC 9.49 7.25 5.45   HGB 8.5* 8.5* 8.2*   HCT 26.0* 26.9* 26.9*    172 176     CMP:   Recent Labs   Lab 08/28/23  0727 08/29/23  0454   * 135*   K 3.4* 4.1    100   CO2 23 26   GLU 96 75   BUN 18 37*   CREATININE 2.9* 4.4*   CALCIUM 7.9* 8.4*   PROT 6.1 5.7*   ALBUMIN 2.1* 1.9*   BILITOT 0.3 0.3   ALKPHOS 119 112   AST 14 12   ALT <5* <5*   ANIONGAP 11 9       Significant Imaging: I have reviewed all pertinent imaging results/findings within the past 24 hours.      Assessment/Plan:      *  Delirium  Patient's mentation has currently improved substantially. Oriented to name and place and able to converse well. He was briefly stepped up to the ICU on 8/11 for delirium that self-resolved. Current episode started on 8/24.  Workup so far:  Cefepime (received for 3 days) switched back to meropenem.   CTA PE study and US LE studies rule out thromboembolism  Electrolytes have been maintained wnl with dialysis; mentation eventually began to improve following 2 rounds of HD. Likely due to medication effects.  CTH without acute changes.  Repeated MRI under sedation on 8/26, showed ongoing L scleral and orbital infection but no new processes nor new strokes  LP under sedation on 8/26 non-infectious  EEG without seizures, but showed encephalopathy    Delirium has significantly improved since stopping cefepime and reglan and s/p 2 HD sessions. Thus most likely 2/2 mediation adverse effect    Plan  - Continue HD sessions  - Delirium likely due to medication adverse effect + prolonged hospitalization, altered sleep/wake cycle, vision loss  - Seroqul 50mg BID  - Olanzapine 5mg IM PRN for severe agitation  - Delirium precautions    AV fistula infection  - See HPI and hospital course; previous AV graft was removed at OSH and found to be colonized with pseudomonas from prior pseudomonal bacteremia  - Staples still in place  - F/u with [facility] vascular surgery on discharge    Duodenal ulceration  Patient started to have melena on 8/22, and on 8/24, underwent EGD.   Found to have a large oozing duodenal ulcer with adherent clot. 2 clips placed however ulcer could not be addressed completely. Also had 2 more nonbleeding ulcers in the duodenal bulb and 2nd part of the duodenum.  3U PRBC transfused so far this admission  Per GI, no further endoscopic intervention warranted  Due to ongoing melena, ordered CTA GIB protocol - negative    Problem now stable    - Trend CBC Q12. Transfuse for Hb >7, unless otherwise indicated  -  Maintain IV access with 2 large bore IVs  - Hold all NSAIDs and anticoagulants, unless contraindicated  - IV pantoprazole 40mg BID - switch to PO pantroprazole on discharge  - Correct any coagulopathy with platelets and FFP for goal of platelets >50K and INR <2.0    Impaired mobility  - Plan to DC to [facility] when medically stable    PEG (percutaneous endoscopic gastrostomy) status  - Previously cleared by SLP for a regular diet without restrictions  - Currently ability to take PO limited by mentation  - Also frequently hypoglycemic due to malnutrition  - Tube feeds    Anemia in ESRD (end-stage renal disease)  EPO per nephrology    Severe malnutrition  Nutrition consulted. Most recent weight and BMI monitored-     Measurements:  Wt Readings from Last 1 Encounters:   08/23/23 62.8 kg (138 lb 7.2 oz)   Body mass index is 19.87 kg/m².    Patient has been screened and assessed by RD.    Malnutrition Type:  Context: chronic illness  Level: severe    Malnutrition Characteristic Summary:  Weight Loss (Malnutrition): greater than 10% in 6 months  Energy Intake (Malnutrition): other (see comments) (LAMONT)  Subcutaneous Fat (Malnutrition): severe depletion  Muscle Mass (Malnutrition): severe depletion    Interventions/Recommendations (treatment strategy):  1. Resume Renal diet + Novasource ONS when able - encourage PO intake as tolerated. 2. If EN to resume, please reconsult for updated recommendations. 3. RD to monitor & follow-up.     Able to take in PO per SLP assessment on 8/15, on regular diet with thin liquids  Continue tube feeds for now due to altered mentation    Endophthalmitis  S/p L eye evisceration on 8/16/23.  Repeat MRI head/orbit with ongoing L scleral infection and cellulitis    - ID and ophthalmology following  - Continue vancomycin, meropenem, fluconazole, and tobramycin ointments/eye drops  - Anticipated 4-6 weeks of therapy from evisceration. Anticipated end date: 9/12-9/26. See OPAT note from 8/19.  -  Tunneled PICC line was placed today (08/29)  - Follow up all culture data  - Ophthalmology to perform packing changes every 2-3 days  - L eye to be taped shut at night  - Completion of left eye evisceration plus eye implant scheduled for 08/30.    ESRD (end stage renal disease)  Nephrology consulted for HD needs while inpatient    Hyperlipidemia  Home statin    Chronic diastolic heart failure  Continue to monitor for signs of volume overload; volume removal with dialysis    Hypertension  Current hypertension is worsened secondary to agitation  On CCB at home but will uptitrate GDMT while here    - Coreg 6.25mg BID, limited by HR  - BiDil 2 tablets TID  - Valsartan 160mg BID    VTE Risk Mitigation (From admission, onward)         Ordered     heparin (porcine) injection 1,000 Units  As needed (PRN)         08/29/23 1322     heparin (porcine) injection 1,000 Units  As needed (PRN)         08/10/23 1046     IP VTE HIGH RISK PATIENT  Once         08/09/23 1317     Place sequential compression device  Until discontinued         08/09/23 1317                Discharge Planning   TOBY: 9/5/2023     Code Status: Full Code   Is the patient medically ready for discharge?: No    Reason for patient still in hospital (select all that apply): Treatment and Consult recommendations  Discharge Plan A: Rehab   Discharge Delays: (!) Procedure Scheduling (IR, OR, Labs, Echo, Cath, Echo, EEG) (eye surgery next week)              Eric Koo DO  Department of Hospital Medicine   [facility] - Int[facility] ([address])

## 2023-08-29 NOTE — ASSESSMENT & PLAN NOTE
- See HPI and hospital course; previous AV graft was removed at OSH and found to be colonized with pseudomonas from prior pseudomonal bacteremia  - Staples still in place  - F/u with [facility] vascular surgery on discharge

## 2023-08-29 NOTE — PATIENT CARE CONFERENCE
[id number] MARQUISE   Immanuel Bernal  :1965     AGE:57 y.o.     SEX:male      STATUS:Full Code      ISOLATION:No active isolations   ALLERGIES:Morphine and Amiodarone analogues   ADMIT DATE:  2023   PHYSICIAN:  Michel Mcbride III*   DIAGNOSIS: Endophthalmitis [H44.009] Delirium  CC: Eye Problem    CONSULTS: Hospital Medicine, Neurology, Ophthalmology, interventional Radiology  Past Medical History:   Diagnosis Date    Bilateral retinal detachment 2023    BPH (benign prostatic hyperplasia)     Cataract     CHF (congestive heart failure)     CKD (chronic kidney disease) stage 3, GFR 30-59 ml/min     Diabetes mellitus, type 2     Hypertension     KENIA (obstructive sleep apnea)     Pancreatitis     Persistent proteinuria 2020    2 g proteinuria noted Resumed ACE Lower BP systolic to less than 130     PTSD (post-traumatic stress disorder)     Stroke      Scheduled procedure(s): tunnel catheter placement today.   Labs to Monitor (no values):   Recent Vitals:  Temp: 98.2 °F (36.8 °C)  Pulse: 65  Resp: 18  SpO2: 95 %  BP: (!) 171/79    Respiratory:    Cardiac: Rhythm: normal sinus rhythm      Wt Readings from Last 2 Encounters:   23 62.8 kg (138 lb 7.2 oz)   23 88.9 kg (196 lb)     GI/: Diet NPO   Last Bowel Movement: 23    Neuro: AxOx3 disoriented to time   Mcknight catheter: No  Skin:ex:SEE LDA     Wu Score: 12      Lines/Drains/Airways       Central Venous Catheter Line  Duration                  Hemodialysis Catheter right internal jugular -- days              Drain  Duration                  Gastrostomy/Enterostomy 23 1213 Percutaneous endoscopic gastrostomy (PEG) LUQ 5 days              Peripheral Intravenous Line  Duration                  Peripheral IV - Single Lumen 23 1541 20 G;1 3/4 in Left Upper Arm 4 days                  Antibiotics (From admission, onward)      Start     Stop Route Frequency Ordered    23 1030  meropenem (MERREM) 500 mg in  sodium chloride 0.9 % 100 mL IVPB (MB+)         -- IV Every 12 hours (non-standard times) 08/24/23 0918    08/18/23 1700  tobramycin-dexAMETHasone 0.3-0.1% ophthalmic suspension 1 drop         09/15/23 2059 Both Eyes 4 times daily 08/18/23 1411    08/16/23 2343  meropenem (MERREM) 500 mg injection        Note to Pharmacy: Created by cabinet override    08/17/23 1144   08/16/23 2343    08/16/23 2100  tobramycin-dexAMETHasone 0.3-0.1% ophthalmic ointment         09/15/23 2059 LEFT EYE 3 times daily 08/16/23 1732    08/13/23 0945  mupirocin 2 % ointment         08/18/23 0859 Nasl 2 times daily 08/13/23 0840    08/09/23 1418  vancomycin - pharmacy to dose  (vancomycin IVPB (PEDS and ADULTS))        See Hyperspace for full Linked Orders Report.    -- IV pharmacy to manage frequency 08/09/23 1319          VTE Risk Mitigation (From admission, onward)           Ordered     heparin (porcine) injection 1,000 Units  As needed (PRN)         08/10/23 1046     IP VTE HIGH RISK PATIENT  Once         08/09/23 1317     Place sequential compression device  Until discontinued         08/09/23 1317                  Glycemic control:  Recent Labs   Lab 08/28/23  0758 08/28/23  1146 08/28/23  1533 08/28/23 2004   POCTGLUCOSE 81 114* 86 117*     Fall risk: bed alarm  Mobility: GEMS ([place] Early Mobility Scale): Level 1-Primary in bed activities    Nursing Update/Plan of Care: Patient in bed resting, call light within reach, bed in low position, no distress at this time, patients lungs clear, 2L NC, NSR on monitor, b/p slightly elevated @ 171/79 this AM, other VS WNL, patient NPO since midnight for tunnel catheter placement today, ophthalmology stated evisceration on left eye on 8/30/23.

## 2023-08-29 NOTE — NURSING
Pt's status stable. Elevated BP. Bradycardic. Provider team notified. Meds ordered. C/o nausea. Antiemetic given. Right chest tunneled catheter placed in IR. NPO at midnight d/t evisceration of left eye tomorrow. Review POC. Aox2. Room air. NAD. Safety precautions maintained. Call light within reach.

## 2023-08-29 NOTE — ASSESSMENT & PLAN NOTE
S/p L eye evisceration on 8/16/23.  Repeat MRI head/orbit with ongoing L scleral infection and cellulitis    - ID and ophthalmology following  - Continue vancomycin, meropenem, fluconazole, and tobramycin ointments/eye drops  - Anticipated 4-6 weeks of therapy from evisceration. Anticipated end date: 9/12-9/26. See OPAT note from 8/19.  - Tunneled PICC line was placed today (08/29)  - Follow up all culture data  - Ophthalmology to perform packing changes every 2-3 days  - L eye to be taped shut at night  - Completion of left eye evisceration plus eye implant scheduled for 08/30.

## 2023-08-29 NOTE — ANESTHESIA PREPROCEDURE EVALUATION
[facility]  Anesthesia Pre-Operative Evaluation         Patient Name: Immanuel Bernal  YOB: 1965  MRN: [id number]    SUBJECTIVE:     Pre-operative evaluation for Procedure(s) (LRB):  EVISCERATION, OCULAR CONTENTS (Left)     08/29/2023    Immanuel Bernal is a 57 y.o. male w/ a significant PMHx of ESRD, HFrEF 30-35%, DM2, HTN, bowel obstruction s/p bowel resection, KENIA, CVA, s/p PEG placement (usually tolerates PO diet), AFib (s/p cardioversion 2022 per sister, unclear hx of AC). Admitted with endophthalmitis s/p L eye evisceration. Hospital course c/b GIB (stopped eliquis, now stable) and encephalopathy. Presents for completion of evisceration with orbital implant of L eye.    Patient is confused and not consentable. E-consent completed with patient's DYAN Roach over telephone.     Prev airway:   [place] 3, Grade I view      LDA:        Hemodialysis Catheter right internal jugular (Active)   Line Necessity Review CRRT/HD 08/29/23 0714   Verification by X-ray Yes 08/28/23 0920   Site Assessment No drainage;No redness;No swelling;No warmth 08/29/23 0714   Line Securement Device Secured with sutures 08/29/23 0714   Dressing Type CHG impregnated dressing/sponge;Central line dressing;Transparent (Tegaderm) 08/29/23 0714   Dressing Status Clean;Dry;Intact 08/29/23 0714   Dressing Intervention Integrity maintained 08/29/23 0714   Date on Dressing 08/24/23 08/29/23 0714   Dressing Due to be Changed 08/31/23 08/29/23 0714   Venous Patency/Care heparin locked 08/28/23 0630   Arterial Patency/Care heparin locked 08/28/23 0630   Waveform Not being transduced 08/28/23 0630   Number of days:             Peripheral IV - Single Lumen 08/24/23 1541 20 G;1 3/4 in Left Upper Arm (Active)   Site Assessment Clean;Dry;Intact;No redness;No swelling 08/29/23 0714   Extremity Assessment Distal to IV No abnormal discoloration;No redness;No swelling;No warmth 08/29/23 0714   Line Status Saline locked  08/29/23 0714   Dressing Status Clean;Dry;Intact 08/29/23 0714   Dressing Intervention Integrity maintained 08/29/23 0714   Dressing Change Due 08/29/23 08/28/23 1920   Site Change Due 08/29/23 08/28/23 1920   Reason Not Rotated Not due 08/28/23 1920   Number of days: 4            Gastrostomy/Enterostomy 08/23/23 1213 Percutaneous endoscopic gastrostomy (PEG) LUQ (Active)   Securement secured to abdomen w/ adhesive device 08/29/23 0714   Interventions Prior to Feeding patency checked;residual checked 08/29/23 0714   Feeding Type continuous;by pump 08/29/23 0714   Clamp Status/Tolerance clamped 08/29/23 0714   Feeding Action feeding held 08/29/23 0714   Dressing dry and intact 08/29/23 0714   Flush/Irrigation flushed w/;water 08/28/23 1920   Current Rate (mL/hr) 50 mL/hr 08/28/23 0920   Goal Rate (mL/hr) 50 mL/hr 08/28/23 0920   Water Bolus (mL) 60 mL 08/28/23 1844   Formula Name Peptide 1.5 08/28/23 1920   Tube Feeding Intake (mL) 1028 08/28/23 1920   Residual Amount (ml) 0 ml 08/27/23 1920   Number of days: 5       Drips: None documented.      Patient Active Problem List   Diagnosis    Hypertension    Caries    Chronic diastolic heart failure    Hemiplegia    Hyperlipidemia    Benign prostate hyperplasia    Major depressive disorder    Bilateral retinal detachment    ESRD (end stage renal disease)    Endophthalmitis    Severe malnutrition    Anemia in ESRD (end-stage renal disease)    PEG (percutaneous endoscopic gastrostomy) status    Impaired mobility    Duodenal ulceration    Delirium    AV fistula infection       Review of patient's allergies indicates:   Allergen Reactions    Morphine Rash    Amiodarone analogues Itching     Other reaction(s): Unknown       Current Inpatient Medications:   sodium chloride 0.9%   Intravenous Once    acetaminophen  1,000 mg Per G Tube BID    albuterol-ipratropium  3 mL Nebulization Q6H WAKE    atorvastatin  40 mg Per G Tube Daily    calcitRIOL  0.25 mcg  Per G Tube Daily    carvediloL  6.25 mg Per G Tube BID    epoetin thee (PROCRIT) injection  3,000 Units Intravenous Every Mon, Wed, Fri    fluconazole  400 mg Per G Tube Daily    hydrALAZINE 10 mg 2 tablets, hydrALAZINE 25 mg 2 tablets, isorsorbide dinitrate 20 mg  2 tablets combination   Per G Tube TID    Lactobacillus rhamnosus GG  1 capsule Per G Tube BID    melatonin  6 mg Oral Nightly    meropenem (MERREM) IVPB  500 mg Intravenous Q12H    multivitamin  1 tablet Per G Tube Daily    pantoprazole  40 mg Intravenous BID    polyethylene glycol  17 g Per G Tube BID    QUEtiapine  50 mg Per G Tube BID    senna-docusate 8.6-50 mg  1 tablet Per G Tube BID    sevelamer carbonate  0.8 g Per G Tube TID WM    tobramycin-dexAMETHasone 0.3-0.1%   Left Eye TID    tobramycin-dexAMETHasone 0.3-0.1%  1 drop Both Eyes QID    valsartan  160 mg Oral BID       No current facility-administered medications on file prior to encounter.     Current Outpatient Medications on File Prior to Encounter   Medication Sig Dispense Refill    acetaminophen (TYLENOL) 325 MG tablet Take 650 mg by mouth every 6 (six) hours as needed for Pain.      albuterol-ipratropium (DUO-NEB) 2.5 mg-0.5 mg/3 mL nebulizer solution Inhale 3 mLs into the lungs 3 (three) times daily.      ascorbic acid, vitamin C, (VITAMIN C) 500 MG tablet Take 500 mg by mouth once daily.      aspirin 81 MG Chew Take 81 mg by mouth once daily.      calcium carbonate-vitamin D3 (OYSTER SHELL CALCIUM-VIT D3) 500 mg-5 mcg (200 unit) PwPk Take 1 tablet by mouth 2 (two) times a day.      carvediloL (COREG) 25 MG tablet Take 1 tablet (25 mg total) by mouth 2 (two) times daily with meals. 60 tablet 6    folic acid (FOLVITE) 1 MG tablet Take 1 tablet by mouth every morning.      megestroL (MEGACE) 400 mg/10 mL (40 mg/mL) Susp Take 10 mLs by mouth 2 (two) times a day.      multivitamin (THERAGRAN) per tablet Take 1 tablet by mouth once daily.      pantoprazole  (PROTONIX) 40 MG tablet Take 40 mg by mouth once daily.      polyethylene glycol (GLYCOLAX) 17 gram/dose powder Take 17 g by mouth 2 (two) times daily.      sevelamer carbonate (RENVELA) 800 mg Tab Take 1 tablet by mouth 5 times per day      tamsulosin (FLOMAX) 0.4 mg Cap Take 1 capsule (0.4 mg total) by mouth once daily. 30 capsule 6    VITAMIN B COMPLEX ORAL Take 1 tablet by mouth every morning.      zinc gluconate 50 mg tablet Take 50 mg by mouth once daily.         Past Surgical History:   Procedure Laterality Date    CATARACT EXTRACTION Bilateral     ESOPHAGOGASTRODUODENOSCOPY N/A 8/23/2023    Procedure: EGD (ESOPHAGOGASTRODUODENOSCOPY);  Surgeon: Bharath Ya MD;  Location: [address]);  Service: Endoscopy;  Laterality: N/A;    EVISCERATION OF OCULAR CONTENTS Left 8/16/2023    Procedure: EVISCERATION, OCULAR CONTENTS;  Surgeon: Vicki Stewart MD;  Location: [address];  Service: Ophthalmology;  Laterality: Left;    MAGNETIC RESONANCE IMAGING N/A 8/26/2023    Procedure: MRI (Magnetic Resonance Imagine);  Surgeon: Ar De Jesus;  Location: [facility];  Service: Anesthesiology;  Laterality: N/A;    RETROBULBAR INJECTION OF MEDICATION Left 8/16/2023    Procedure: INJECTION, MEDICATION, RETROBULBAR;  Surgeon: Vicki Stewart MD;  Location: [address];  Service: Ophthalmology;  Laterality: Left;    TARSORRHAPHY Left 8/16/2023    Procedure: BLEPHARORRHAPHY;  Surgeon: Vicki Stewart MD;  Location: [address];  Service: Ophthalmology;  Laterality: Left;       Social History     Socioeconomic History    Marital status: Single   Tobacco Use    Smoking status: Former     Types: Cigarettes, Cigars    Smokeless tobacco: Never   Substance and Sexual Activity    Alcohol use: Not Currently    Drug use: Not Currently         OBJECTIVE:     Vital Signs Range (Last 24H):  Temp:  [36.7 °C (98.1 °F)-37.1 °C (98.8 °F)]   Pulse:  [60-75]   Resp:  [12-20]   BP: (135-171)/(64-79)   SpO2:   [95 %-100 %]       Significant Labs:  Lab Results   Component Value Date    WBC 5.45 08/29/2023    HGB 8.2 (L) 08/29/2023    HCT 26.9 (L) 08/29/2023     08/29/2023    CHOL 212 (H) 11/09/2020    TRIG 177 (H) 11/09/2020    HDL 46 11/09/2020    ALT <5 (L) 08/29/2023    AST 12 08/29/2023     (L) 08/29/2023    K 4.1 08/29/2023     08/29/2023    CREATININE 4.4 (H) 08/29/2023    BUN 37 (H) 08/29/2023    CO2 26 08/29/2023    TSH 2.270 11/09/2020    INR 1.2 08/09/2023    HGBA1C 4.7 08/12/2023       Diagnostic Studies: No relevant studies.    EKG:   Results for orders placed or performed during the hospital encounter of 08/09/23   EKG 12-lead    Collection Time: 08/23/23  7:57 AM    Narrative    Test Reason : R07.9,    Vent. Rate : 061 BPM     Atrial Rate : 061 BPM     P-R Int : 216 ms          QRS Dur : 122 ms      QT Int : 424 ms       P-R-T Axes : 065 -51 -71 degrees     QTc Int : 426 ms    Sinus rhythm with 1st degree A-V block  Left axis deviation  Right bundle branch block  Possible  Septal infarct (cited on or before 09-AUG-2023)vs due to  conduction  Abnormal ECG  When compared with ECG of 12-AUG-2023 09:38,  Questionable change in The axis  Nonspecific T wave abnormality has replaced inverted T waves in Anterior  leads  Confirmed by Candelairo REICH MD ([id number]) on 8/23/2023 11:22:54 AM    Referred By: LUNA LOW           Confirmed By:Candelario REICH MD       2D ECHO:  TTE:  No results found for this or any previous visit.      ASSESSMENT/PLAN:         Pre-op Assessment    I have reviewed the Patient Summary Reports.     I have reviewed the Nursing Notes. I have reviewed the NPO Status.   I have reviewed the Medications.     Review of Systems  Anesthesia Hx:  No problems with previous Anesthesia  History of prior surgery of interest to airway management or planning: Previous anesthesia: General Airway issues documented on chart review include videolaryngoscope used, mask, easy  Denies Family Hx of Anesthesia  complications.   Denies Personal Hx of Anesthesia complications.   Social:  Former Smoker    Hematology/Oncology:     Oncology Normal    -- Anemia:   EENT/Dental:EENT/Dental Normal   Cardiovascular:   Hypertension CHF    Pulmonary:   Sleep Apnea    Renal/:   Chronic Renal Disease, ESRD, Dialysis    Hepatic/GI:   PUD,    Musculoskeletal:  Musculoskeletal Normal    Neurological:   CVA    Endocrine:   Diabetes    Psych:   depression +delirium/agitation         Physical Exam  General: Alert and Confusion    Airway:  Mallampati: IV   Mouth Opening: < 3 cm  TM Distance: Normal  Tongue: Normal  Neck ROM: Normal ROM    Dental:        Anesthesia Plan  Type of Anesthesia, risks & benefits discussed:    Anesthesia Type: Gen ETT  Intra-op Monitoring Plan: Standard ASA Monitors  Post Op Pain Control Plan: multimodal analgesia and IV/PO Opioids PRN  Induction:  IV  Airway Plan: Direct and Video, Post-Induction  Informed Consent: Informed consent signed with the Patient representative and all parties understand the risks and agree with anesthesia plan.  All questions answered.   ASA Score: 3  Day of Surgery Review of History & Physical: H&P Update referred to the surgeon/provider.    Ready For Surgery From Anesthesia Perspective.     .

## 2023-08-29 NOTE — PLAN OF CARE
Pt arrived to 190 for tunneled line. Pt oriented to unit and staff. Plan of care reviewed with patient, patient verbalizes understanding. Comfort measures utilized. Pt safely transferred from stretcher to procedural table. Fall risk reviewed with patient, fall risk interventions maintained. Safety strap applied, positioner pillows utilized to minimize pressure points. Blankets applied. Pt prepped and draped utilizing standard sterile technique. Patient placed on continuous monitoring, as required by sedation policy. Timeouts completed utilizing standard universal time-out, per department and facility policy. RN to remain at bedside, continuous monitoring maintained. Pt resting comfortably. Denies pain/discomfort. Will continue to monitor. See flow sheets for monitoring, medication administration, and updates.

## 2023-08-29 NOTE — SUBJECTIVE & OBJECTIVE
Interval History: Patient was seen today and states he feels well aside from some nausea and vomiting. Reports 5 estimates of vomiting since yesterday but denies any hemoptysis/hematemesis. He will have a tunneled PICC line placed this morning. Also, he is scheduled for left eye evisceration completion with implant tomorrow (08/30). Will be discharged to rehab once these are complete and patient is stable.    Review of Systems   Constitutional:  Negative for activity change, chills, diaphoresis and fever.   HENT:  Negative for ear discharge.    Eyes:  Negative for pain.   Respiratory:  Negative for cough, chest tightness, shortness of breath and wheezing.    Cardiovascular:  Negative for chest pain, palpitations and leg swelling.   Gastrointestinal:  Positive for nausea and vomiting. Negative for abdominal distention, abdominal pain, constipation and diarrhea.   Genitourinary:  Negative for dysuria.   Neurological:  Negative for dizziness, syncope, weakness, light-headedness and headaches.   Psychiatric/Behavioral:  Negative for agitation and confusion.      Objective:     Vital Signs (Most Recent):  Temp: 98 °F (36.7 °C) (08/29/23 1035)  Pulse: (!) 55 (08/29/23 1115)  Resp: 16 (08/29/23 1115)  BP: (!) 175/96 (08/29/23 1115)  SpO2: 100 % (08/29/23 1115) Vital Signs (24h Range):  Temp:  [98 °F (36.7 °C)-98.8 °F (37.1 °C)] 98 °F (36.7 °C)  Pulse:  [55-75] 55  Resp:  [14-20] 16  SpO2:  [95 %-100 %] 100 %  BP: (135-180)/() 175/96     Weight: 62.8 kg (138 lb 7.2 oz)  Body mass index is 19.87 kg/m².    Intake/Output Summary (Last 24 hours) at 8/29/2023 1155  Last data filed at 8/29/2023 0745  Gross per 24 hour   Intake 1148 ml   Output --   Net 1148 ml         Physical Exam  Constitutional:       Appearance: He is ill-appearing.   HENT:      Head: Normocephalic and atraumatic.   Eyes:      Comments: Right eye clear. Pupil reactive. Left orbit swollen with eye enucleation.   Cardiovascular:      Rate and Rhythm:  Normal rate and regular rhythm.      Pulses: Normal pulses.      Heart sounds: Normal heart sounds.   Pulmonary:      Effort: Pulmonary effort is normal. No respiratory distress.      Breath sounds: Normal breath sounds. No wheezing or rhonchi.   Abdominal:      General: Abdomen is flat. There is no distension.      Palpations: Abdomen is soft.      Tenderness: There is no abdominal tenderness.   Musculoskeletal:         General: No swelling or tenderness.   Skin:     General: Skin is warm and dry.   Neurological:      General: No focal deficit present.      Cranial Nerves: No cranial nerve deficit.      Comments: Alert and oriented to name and place; able to converse well.   Psychiatric:         Mood and Affect: Mood normal.         Thought Content: Thought content normal.             Significant Labs: All pertinent labs within the past 24 hours have been reviewed.  CBC:   Recent Labs   Lab 08/28/23  0731 08/28/23  1152 08/29/23  0454   WBC 9.49 7.25 5.45   HGB 8.5* 8.5* 8.2*   HCT 26.0* 26.9* 26.9*    172 176     CMP:   Recent Labs   Lab 08/28/23  0727 08/29/23  0454   * 135*   K 3.4* 4.1    100   CO2 23 26   GLU 96 75   BUN 18 37*   CREATININE 2.9* 4.4*   CALCIUM 7.9* 8.4*   PROT 6.1 5.7*   ALBUMIN 2.1* 1.9*   BILITOT 0.3 0.3   ALKPHOS 119 112   AST 14 12   ALT <5* <5*   ANIONGAP 11 9       Significant Imaging: I have reviewed all pertinent imaging results/findings within the past 24 hours.

## 2023-08-29 NOTE — ASSESSMENT & PLAN NOTE
Outpatient HD Information:  -Outpatient HD unit: C   -HD tx days: MWF   -HD tx time: 210min  -HD access: R IJ TDC   -HD modality: iHD   -Residual urine: ?    Plan/Recommendations:  - plan for iHD overnight for metabolic clearance and volume management   - renal diet when not NPO  - strict I/O's and daily weights  - daily renal function panels and magnesium levels  - renally all dose medications to eGFR  - avoid gadolinium, fleets, phos-based laxatives, NSAIDs, etc.

## 2023-08-29 NOTE — ASSESSMENT & PLAN NOTE
Patient's mentation has currently improved substantially. Oriented to name and place and able to converse well. He was briefly stepped up to the ICU on 8/11 for delirium that self-resolved. Current episode started on 8/24.  Workup so far:  Cefepime (received for 3 days) switched back to meropenem.   CTA PE study and US LE studies rule out thromboembolism  Electrolytes have been maintained wnl with dialysis; mentation eventually began to improve following 2 rounds of HD. Likely due to medication effects.  CTH without acute changes.  Repeated MRI under sedation on 8/26, showed ongoing L scleral and orbital infection but no new processes nor new strokes  LP under sedation on 8/26 non-infectious  EEG without seizures, but showed encephalopathy    Delirium has significantly improved since stopping cefepime and reglan and s/p 2 HD sessions. Thus most likely 2/2 mediation adverse effect    Plan  - Continue HD sessions  - Delirium likely due to medication adverse effect + prolonged hospitalization, altered sleep/wake cycle, vision loss  - Seroqul 50mg BID  - Olanzapine 5mg IM PRN for severe agitation  - Delirium precautions

## 2023-08-29 NOTE — ASSESSMENT & PLAN NOTE
- target Hg of 10-12  - hemoglobin this morning 8.2  - continue epogen 3,000 units every Monday, Wednesday and Friday

## 2023-08-29 NOTE — PROCEDURES
"  Pre Op Diagnosis: INfection  Post Op Diagnosis: Same    Procedure: TUnneled PICC    Procedure performed by: Saúl    Written Informed Consent Obtained: Yes  Specimen Removed: NO  Estimated Blood Loss: Minimal    Findings:   Successful palcementof 6 Fr 20cm TUnneled PICC> ready for immediate use.    Patient tolerated procedure well.    Satnam Hays MD ([place])  Interventional Radiology  [phone]      "

## 2023-08-30 LAB
ALBUMIN SERPL BCP-MCNC: 2.1 G/DL (ref 3.5–5.2)
ALP SERPL-CCNC: 121 U/L (ref 55–135)
ALT SERPL W/O P-5'-P-CCNC: <5 U/L (ref 10–44)
ANION GAP SERPL CALC-SCNC: 14 MMOL/L (ref 8–16)
AST SERPL-CCNC: 16 U/L (ref 10–40)
BILIRUB SERPL-MCNC: 0.3 MG/DL (ref 0.1–1)
BUN SERPL-MCNC: 43 MG/DL (ref 6–20)
CALCIUM SERPL-MCNC: 9.4 MG/DL (ref 8.7–10.5)
CHLORIDE SERPL-SCNC: 101 MMOL/L (ref 95–110)
CO2 SERPL-SCNC: 19 MMOL/L (ref 23–29)
CREAT SERPL-MCNC: 5.1 MG/DL (ref 0.5–1.4)
ERYTHROCYTE [DISTWIDTH] IN BLOOD BY AUTOMATED COUNT: 18.1 % (ref 11.5–14.5)
EST. GFR  (NO RACE VARIABLE): 12.3 ML/MIN/1.73 M^2
GLUCOSE SERPL-MCNC: 77 MG/DL (ref 70–110)
HCT VFR BLD AUTO: 33.5 % (ref 40–54)
HGB BLD-MCNC: 9.9 G/DL (ref 14–18)
MAGNESIUM SERPL-MCNC: 2.3 MG/DL (ref 1.6–2.6)
MCH RBC QN AUTO: 30.6 PG (ref 27–31)
MCHC RBC AUTO-ENTMCNC: 29.6 G/DL (ref 32–36)
MCV RBC AUTO: 103 FL (ref 82–98)
PLATELET # BLD AUTO: 205 K/UL (ref 150–450)
PMV BLD AUTO: 10.9 FL (ref 9.2–12.9)
POCT GLUCOSE: 104 MG/DL (ref 70–110)
POCT GLUCOSE: 67 MG/DL (ref 70–110)
POCT GLUCOSE: 71 MG/DL (ref 70–110)
POCT GLUCOSE: 80 MG/DL (ref 70–110)
POCT GLUCOSE: 88 MG/DL (ref 70–110)
POTASSIUM SERPL-SCNC: 4.6 MMOL/L (ref 3.5–5.1)
PROT SERPL-MCNC: 6.3 G/DL (ref 6–8.4)
RBC # BLD AUTO: 3.24 M/UL (ref 4.6–6.2)
SODIUM SERPL-SCNC: 134 MMOL/L (ref 136–145)
SPECIMEN SOURCE: NORMAL
VANCOMYCIN SERPL-MCNC: 21.7 UG/ML
VARICELLA ZOSTER BY PCR RESULT: NEGATIVE
WBC # BLD AUTO: 5.45 K/UL (ref 3.9–12.7)

## 2023-08-30 PROCEDURE — 25000003 PHARM REV CODE 250: Performed by: STUDENT IN AN ORGANIZED HEALTH CARE EDUCATION/TRAINING PROGRAM

## 2023-08-30 PROCEDURE — 63600175 PHARM REV CODE 636 W HCPCS: Performed by: STUDENT IN AN ORGANIZED HEALTH CARE EDUCATION/TRAINING PROGRAM

## 2023-08-30 PROCEDURE — 80053 COMPREHEN METABOLIC PANEL: CPT

## 2023-08-30 PROCEDURE — 25000003 PHARM REV CODE 250

## 2023-08-30 PROCEDURE — 99900035 HC TECH TIME PER 15 MIN (STAT)

## 2023-08-30 PROCEDURE — C1781 MESH (IMPLANTABLE): HCPCS | Performed by: OPHTHALMOLOGY

## 2023-08-30 PROCEDURE — 88307 PR  SURG PATH,LEVEL V: ICD-10-PCS | Mod: 26,,, | Performed by: STUDENT IN AN ORGANIZED HEALTH CARE EDUCATION/TRAINING PROGRAM

## 2023-08-30 PROCEDURE — 37000009 HC ANESTHESIA EA ADD 15 MINS: Performed by: OPHTHALMOLOGY

## 2023-08-30 PROCEDURE — 88307 TISSUE EXAM BY PATHOLOGIST: CPT | Mod: 26,,, | Performed by: STUDENT IN AN ORGANIZED HEALTH CARE EDUCATION/TRAINING PROGRAM

## 2023-08-30 PROCEDURE — D9220A PRA ANESTHESIA: ICD-10-PCS | Mod: CRNA,,, | Performed by: NURSE ANESTHETIST, CERTIFIED REGISTERED

## 2023-08-30 PROCEDURE — D9220A PRA ANESTHESIA: Mod: CRNA,,, | Performed by: NURSE ANESTHETIST, CERTIFIED REGISTERED

## 2023-08-30 PROCEDURE — 25000242 PHARM REV CODE 250 ALT 637 W/ HCPCS

## 2023-08-30 PROCEDURE — 80202 ASSAY OF VANCOMYCIN: CPT | Performed by: FAMILY MEDICINE

## 2023-08-30 PROCEDURE — 37000008 HC ANESTHESIA 1ST 15 MINUTES: Performed by: OPHTHALMOLOGY

## 2023-08-30 PROCEDURE — 82962 GLUCOSE BLOOD TEST: CPT | Performed by: OPHTHALMOLOGY

## 2023-08-30 PROCEDURE — 25000003 PHARM REV CODE 250: Performed by: OPHTHALMOLOGY

## 2023-08-30 PROCEDURE — 65105 REMOVE EYE/ATTACH IMPLANT: CPT | Mod: 78,LT,, | Performed by: OPHTHALMOLOGY

## 2023-08-30 PROCEDURE — 63600175 PHARM REV CODE 636 W HCPCS: Performed by: NURSE PRACTITIONER

## 2023-08-30 PROCEDURE — 80100014 HC HEMODIALYSIS 1:1

## 2023-08-30 PROCEDURE — 88312 SPECIAL STAINS GROUP 1: CPT | Mod: 59 | Performed by: STUDENT IN AN ORGANIZED HEALTH CARE EDUCATION/TRAINING PROGRAM

## 2023-08-30 PROCEDURE — 63600175 PHARM REV CODE 636 W HCPCS: Mod: JZ | Performed by: NURSE PRACTITIONER

## 2023-08-30 PROCEDURE — 83735 ASSAY OF MAGNESIUM: CPT | Performed by: FAMILY MEDICINE

## 2023-08-30 PROCEDURE — 90935 PR HEMODIALYSIS, ONE EVALUATION: ICD-10-PCS | Mod: ,,, | Performed by: NURSE PRACTITIONER

## 2023-08-30 PROCEDURE — 36000707: Performed by: OPHTHALMOLOGY

## 2023-08-30 PROCEDURE — 94761 N-INVAS EAR/PLS OXIMETRY MLT: CPT

## 2023-08-30 PROCEDURE — 90935 HEMODIALYSIS ONE EVALUATION: CPT | Mod: ,,, | Performed by: NURSE PRACTITIONER

## 2023-08-30 PROCEDURE — 71000033 HC RECOVERY, INTIAL HOUR: Performed by: OPHTHALMOLOGY

## 2023-08-30 PROCEDURE — L8610 OCULAR IMPLANT: HCPCS | Performed by: OPHTHALMOLOGY

## 2023-08-30 PROCEDURE — D9220A PRA ANESTHESIA: Mod: ANES,,, | Performed by: ANESTHESIOLOGY

## 2023-08-30 PROCEDURE — 25000003 PHARM REV CODE 250: Performed by: NURSE ANESTHETIST, CERTIFIED REGISTERED

## 2023-08-30 PROCEDURE — 63600175 PHARM REV CODE 636 W HCPCS: Performed by: FAMILY MEDICINE

## 2023-08-30 PROCEDURE — 27000221 HC OXYGEN, UP TO 24 HOURS

## 2023-08-30 PROCEDURE — 67875 PR TEMP CLOSURE EYELID BY SUTURE: ICD-10-PCS | Mod: 78,51,LT, | Performed by: OPHTHALMOLOGY

## 2023-08-30 PROCEDURE — 99233 SBSQ HOSP IP/OBS HIGH 50: CPT | Mod: GC,,, | Performed by: FAMILY MEDICINE

## 2023-08-30 PROCEDURE — 25000003 PHARM REV CODE 250: Performed by: FAMILY MEDICINE

## 2023-08-30 PROCEDURE — 36415 COLL VENOUS BLD VENIPUNCTURE: CPT | Performed by: FAMILY MEDICINE

## 2023-08-30 PROCEDURE — 63600175 PHARM REV CODE 636 W HCPCS: Performed by: OPHTHALMOLOGY

## 2023-08-30 PROCEDURE — 99233 PR SUBSEQUENT HOSPITAL CARE,LEVL III: ICD-10-PCS | Mod: GC,,, | Performed by: FAMILY MEDICINE

## 2023-08-30 PROCEDURE — 25000003 PHARM REV CODE 250: Performed by: ANESTHESIOLOGY

## 2023-08-30 PROCEDURE — 87070 CULTURE OTHR SPECIMN AEROBIC: CPT | Performed by: OPHTHALMOLOGY

## 2023-08-30 PROCEDURE — 88312 PR  SPECIAL STAINS,GROUP I: ICD-10-PCS | Mod: 26,,, | Performed by: STUDENT IN AN ORGANIZED HEALTH CARE EDUCATION/TRAINING PROGRAM

## 2023-08-30 PROCEDURE — 20000000 HC ICU ROOM

## 2023-08-30 PROCEDURE — D9220A PRA ANESTHESIA: ICD-10-PCS | Mod: ANES,,, | Performed by: ANESTHESIOLOGY

## 2023-08-30 PROCEDURE — 67875 CLOSURE OF EYELID BY SUTURE: CPT | Mod: 78,51,LT, | Performed by: OPHTHALMOLOGY

## 2023-08-30 PROCEDURE — 88307 TISSUE EXAM BY PATHOLOGIST: CPT | Performed by: STUDENT IN AN ORGANIZED HEALTH CARE EDUCATION/TRAINING PROGRAM

## 2023-08-30 PROCEDURE — 85027 COMPLETE CBC AUTOMATED: CPT

## 2023-08-30 PROCEDURE — 63600175 PHARM REV CODE 636 W HCPCS

## 2023-08-30 PROCEDURE — 94640 AIRWAY INHALATION TREATMENT: CPT

## 2023-08-30 PROCEDURE — 65105 PR REMOVE EYE W MUSC TO IMPLANT: ICD-10-PCS | Mod: 78,LT,, | Performed by: OPHTHALMOLOGY

## 2023-08-30 PROCEDURE — 88312 SPECIAL STAINS GROUP 1: CPT | Mod: 26,,, | Performed by: STUDENT IN AN ORGANIZED HEALTH CARE EDUCATION/TRAINING PROGRAM

## 2023-08-30 PROCEDURE — 87075 CULTR BACTERIA EXCEPT BLOOD: CPT | Performed by: OPHTHALMOLOGY

## 2023-08-30 PROCEDURE — 36000706: Performed by: OPHTHALMOLOGY

## 2023-08-30 PROCEDURE — 71000015 HC POSTOP RECOV 1ST HR: Performed by: OPHTHALMOLOGY

## 2023-08-30 DEVICE — MESH VICRYL 6 X 6: Type: IMPLANTABLE DEVICE | Site: EYE | Status: FUNCTIONAL

## 2023-08-30 DEVICE — IMPLANTABLE DEVICE: Type: IMPLANTABLE DEVICE | Site: EYE | Status: FUNCTIONAL

## 2023-08-30 RX ORDER — DEXTROSE MONOHYDRATE 50 MG/ML
INJECTION, SOLUTION INTRAVENOUS CONTINUOUS
Status: DISCONTINUED | OUTPATIENT
Start: 2023-08-30 | End: 2023-09-02

## 2023-08-30 RX ORDER — LIDOCAINE HYDROCHLORIDE 20 MG/ML
INJECTION INTRAVENOUS
Status: DISCONTINUED | OUTPATIENT
Start: 2023-08-30 | End: 2023-08-30

## 2023-08-30 RX ORDER — MIDAZOLAM HYDROCHLORIDE 1 MG/ML
INJECTION, SOLUTION INTRAMUSCULAR; INTRAVENOUS
Status: DISCONTINUED | OUTPATIENT
Start: 2023-08-30 | End: 2023-08-30

## 2023-08-30 RX ORDER — LIDOCAINE HYDROCHLORIDE AND EPINEPHRINE 10; 10 MG/ML; UG/ML
10 INJECTION, SOLUTION INFILTRATION; PERINEURAL ONCE
Status: COMPLETED | OUTPATIENT
Start: 2023-08-30 | End: 2023-08-30

## 2023-08-30 RX ORDER — EPHEDRINE SULFATE 50 MG/ML
INJECTION, SOLUTION INTRAVENOUS
Status: DISCONTINUED | OUTPATIENT
Start: 2023-08-30 | End: 2023-08-30

## 2023-08-30 RX ORDER — TETRACAINE HYDROCHLORIDE 5 MG/ML
1 SOLUTION OPHTHALMIC
Status: DISCONTINUED | OUTPATIENT
Start: 2023-08-30 | End: 2023-08-30

## 2023-08-30 RX ORDER — FENTANYL CITRATE 50 UG/ML
INJECTION, SOLUTION INTRAMUSCULAR; INTRAVENOUS
Status: DISCONTINUED | OUTPATIENT
Start: 2023-08-30 | End: 2023-08-30

## 2023-08-30 RX ORDER — PHENYLEPHRINE HYDROCHLORIDE 10 MG/ML
INJECTION INTRAVENOUS
Status: DISCONTINUED | OUTPATIENT
Start: 2023-08-30 | End: 2023-08-30

## 2023-08-30 RX ORDER — DEXAMETHASONE SODIUM PHOSPHATE 4 MG/ML
INJECTION, SOLUTION INTRA-ARTICULAR; INTRALESIONAL; INTRAMUSCULAR; INTRAVENOUS; SOFT TISSUE
Status: DISCONTINUED | OUTPATIENT
Start: 2023-08-30 | End: 2023-08-30

## 2023-08-30 RX ORDER — OXYCODONE HYDROCHLORIDE 5 MG/1
5 TABLET ORAL
Status: DISCONTINUED | OUTPATIENT
Start: 2023-08-30 | End: 2023-08-30 | Stop reason: HOSPADM

## 2023-08-30 RX ORDER — QUETIAPINE FUMARATE 25 MG/1
50 TABLET, FILM COATED ORAL NIGHTLY
Status: DISCONTINUED | OUTPATIENT
Start: 2023-08-30 | End: 2023-09-03

## 2023-08-30 RX ORDER — PROPOFOL 10 MG/ML
VIAL (ML) INTRAVENOUS
Status: DISCONTINUED | OUTPATIENT
Start: 2023-08-30 | End: 2023-08-30

## 2023-08-30 RX ORDER — BUPIVACAINE HYDROCHLORIDE 5 MG/ML
INJECTION, SOLUTION EPIDURAL; INTRACAUDAL
Status: DISCONTINUED | OUTPATIENT
Start: 2023-08-30 | End: 2023-08-30 | Stop reason: HOSPADM

## 2023-08-30 RX ORDER — TOBRAMYCIN AND DEXAMETHASONE 3; 1 MG/ML; MG/ML
SUSPENSION/ DROPS OPHTHALMIC
Status: DISCONTINUED | OUTPATIENT
Start: 2023-08-30 | End: 2023-08-30 | Stop reason: HOSPADM

## 2023-08-30 RX ORDER — AMLODIPINE BESYLATE 5 MG/1
5 TABLET ORAL DAILY
Status: DISCONTINUED | OUTPATIENT
Start: 2023-08-30 | End: 2023-09-04

## 2023-08-30 RX ORDER — FENTANYL CITRATE 50 UG/ML
25 INJECTION, SOLUTION INTRAMUSCULAR; INTRAVENOUS EVERY 5 MIN PRN
Status: DISCONTINUED | OUTPATIENT
Start: 2023-08-30 | End: 2023-08-30 | Stop reason: HOSPADM

## 2023-08-30 RX ORDER — ONDANSETRON 2 MG/ML
INJECTION INTRAMUSCULAR; INTRAVENOUS
Status: DISCONTINUED | OUTPATIENT
Start: 2023-08-30 | End: 2023-08-30

## 2023-08-30 RX ORDER — AMLODIPINE BESYLATE 10 MG/1
10 TABLET ORAL DAILY
Status: DISCONTINUED | OUTPATIENT
Start: 2023-08-30 | End: 2023-08-30

## 2023-08-30 RX ORDER — BUPIVACAINE HYDROCHLORIDE 5 MG/ML
INJECTION, SOLUTION EPIDURAL; INTRACAUDAL
Status: DISPENSED
Start: 2023-08-30 | End: 2023-08-31

## 2023-08-30 RX ORDER — HALOPERIDOL 5 MG/ML
0.5 INJECTION INTRAMUSCULAR EVERY 10 MIN PRN
Status: DISCONTINUED | OUTPATIENT
Start: 2023-08-30 | End: 2023-08-30 | Stop reason: HOSPADM

## 2023-08-30 RX ORDER — TOBRAMYCIN AND DEXAMETHASONE 3; 1 MG/ML; MG/ML
SUSPENSION/ DROPS OPHTHALMIC
Status: DISPENSED
Start: 2023-08-30 | End: 2023-08-31

## 2023-08-30 RX ORDER — ROCURONIUM BROMIDE 10 MG/ML
INJECTION, SOLUTION INTRAVENOUS
Status: DISCONTINUED | OUTPATIENT
Start: 2023-08-30 | End: 2023-08-30

## 2023-08-30 RX ADMIN — MIDAZOLAM HYDROCHLORIDE 1 MG: 1 INJECTION, SOLUTION INTRAMUSCULAR; INTRAVENOUS at 03:08

## 2023-08-30 RX ADMIN — SEVELAMER CARBONATE 0.8 G: 0.8 POWDER, FOR SUSPENSION ORAL at 12:08

## 2023-08-30 RX ADMIN — VALSARTAN 160 MG: 160 TABLET, FILM COATED ORAL at 11:08

## 2023-08-30 RX ADMIN — SEVELAMER CARBONATE 0.8 G: 0.8 POWDER, FOR SUSPENSION ORAL at 08:08

## 2023-08-30 RX ADMIN — AMLODIPINE BESYLATE 5 MG: 5 TABLET ORAL at 11:08

## 2023-08-30 RX ADMIN — POLYETHYLENE GLYCOL 3350 17 G: 17 POWDER, FOR SOLUTION ORAL at 10:08

## 2023-08-30 RX ADMIN — ONDANSETRON 4 MG: 2 INJECTION INTRAMUSCULAR; INTRAVENOUS at 04:08

## 2023-08-30 RX ADMIN — TOBRAMYCIN AND DEXAMETHASONE 1 DROP: 3; 1 SUSPENSION/ DROPS OPHTHALMIC at 11:08

## 2023-08-30 RX ADMIN — DEXAMETHASONE SODIUM PHOSPHATE 8 MG: 4 INJECTION, SOLUTION INTRAMUSCULAR; INTRAVENOUS at 03:08

## 2023-08-30 RX ADMIN — SODIUM CHLORIDE: 0.9 INJECTION, SOLUTION INTRAVENOUS at 03:08

## 2023-08-30 RX ADMIN — FLUCONAZOLE 400 MG: 40 POWDER, FOR SUSPENSION ORAL at 08:08

## 2023-08-30 RX ADMIN — CALCITRIOL CAPSULES 0.25 MCG 0.25 MCG: 0.25 CAPSULE ORAL at 08:08

## 2023-08-30 RX ADMIN — HYDRALAZINE HYDROCHLORIDE: 10 TABLET, FILM COATED ORAL at 10:08

## 2023-08-30 RX ADMIN — EPHEDRINE SULFATE 10 MG: 50 INJECTION INTRAVENOUS at 04:08

## 2023-08-30 RX ADMIN — PHENYLEPHRINE HYDROCHLORIDE 100 MCG: 10 INJECTION INTRAVENOUS at 03:08

## 2023-08-30 RX ADMIN — TOBRAMYCIN AND DEXAMETHASONE 1 DROP: 3; 1 SUSPENSION/ DROPS OPHTHALMIC at 12:08

## 2023-08-30 RX ADMIN — SUGAMMADEX 200 MG: 100 INJECTION, SOLUTION INTRAVENOUS at 05:08

## 2023-08-30 RX ADMIN — ERYTHROPOIETIN 3000 UNITS: 3000 INJECTION, SOLUTION INTRAVENOUS; SUBCUTANEOUS at 09:08

## 2023-08-30 RX ADMIN — VALSARTAN 160 MG: 160 TABLET, FILM COATED ORAL at 10:08

## 2023-08-30 RX ADMIN — Medication 6 MG: at 10:08

## 2023-08-30 RX ADMIN — Medication 1 CAPSULE: at 08:08

## 2023-08-30 RX ADMIN — FENTANYL CITRATE 25 MCG: 50 INJECTION, SOLUTION INTRAMUSCULAR; INTRAVENOUS at 03:08

## 2023-08-30 RX ADMIN — ATORVASTATIN CALCIUM 40 MG: 40 TABLET, FILM COATED ORAL at 08:08

## 2023-08-30 RX ADMIN — ONDANSETRON 4 MG: 4 TABLET, ORALLY DISINTEGRATING ORAL at 01:08

## 2023-08-30 RX ADMIN — LIDOCAINE HYDROCHLORIDE 9.5 ML: 10 INJECTION, SOLUTION INFILTRATION; PERINEURAL at 04:08

## 2023-08-30 RX ADMIN — CARVEDILOL 6.25 MG: 6.25 TABLET, FILM COATED ORAL at 10:08

## 2023-08-30 RX ADMIN — HEPARIN SODIUM 1000 UNITS: 1000 INJECTION, SOLUTION INTRAVENOUS; SUBCUTANEOUS at 07:08

## 2023-08-30 RX ADMIN — QUETIAPINE FUMARATE 50 MG: 25 TABLET ORAL at 10:08

## 2023-08-30 RX ADMIN — MEROPENEM 500 MG: 500 INJECTION INTRAVENOUS at 10:08

## 2023-08-30 RX ADMIN — POLYETHYLENE GLYCOL 3350 17 G: 17 POWDER, FOR SOLUTION ORAL at 08:08

## 2023-08-30 RX ADMIN — ROCURONIUM BROMIDE 20 MG: 10 INJECTION INTRAVENOUS at 04:08

## 2023-08-30 RX ADMIN — CARVEDILOL 6.25 MG: 6.25 TABLET, FILM COATED ORAL at 11:08

## 2023-08-30 RX ADMIN — TOBRAMYCIN AND DEXAMETHASONE 1 DROP: 3; 1 SUSPENSION/ DROPS OPHTHALMIC at 08:08

## 2023-08-30 RX ADMIN — Medication 1 CAPSULE: at 10:08

## 2023-08-30 RX ADMIN — QUETIAPINE FUMARATE 50 MG: 25 TABLET ORAL at 08:08

## 2023-08-30 RX ADMIN — FENTANYL CITRATE 50 MCG: 50 INJECTION, SOLUTION INTRAMUSCULAR; INTRAVENOUS at 03:08

## 2023-08-30 RX ADMIN — IPRATROPIUM BROMIDE AND ALBUTEROL SULFATE 3 ML: .5; 3 SOLUTION RESPIRATORY (INHALATION) at 01:08

## 2023-08-30 RX ADMIN — GLYCOPYRROLATE 0.2 MG: 0.2 INJECTION, SOLUTION INTRAMUSCULAR; INTRAVENOUS at 03:08

## 2023-08-30 RX ADMIN — HYDRALAZINE HYDROCHLORIDE 10 MG: 20 INJECTION, SOLUTION INTRAMUSCULAR; INTRAVENOUS at 04:08

## 2023-08-30 RX ADMIN — ACETAMINOPHEN 1000 MG: 500 TABLET ORAL at 08:08

## 2023-08-30 RX ADMIN — PANTOPRAZOLE SODIUM 40 MG: 40 GRANULE, DELAYED RELEASE ORAL at 08:08

## 2023-08-30 RX ADMIN — SENNOSIDES AND DOCUSATE SODIUM 1 TABLET: 50; 8.6 TABLET ORAL at 08:08

## 2023-08-30 RX ADMIN — THERA TABS 1 TABLET: TAB at 08:08

## 2023-08-30 RX ADMIN — IPRATROPIUM BROMIDE AND ALBUTEROL SULFATE 3 ML: .5; 3 SOLUTION RESPIRATORY (INHALATION) at 07:08

## 2023-08-30 RX ADMIN — SENNOSIDES AND DOCUSATE SODIUM 1 TABLET: 50; 8.6 TABLET ORAL at 10:08

## 2023-08-30 RX ADMIN — HYDRALAZINE HYDROCHLORIDE: 10 TABLET, FILM COATED ORAL at 11:08

## 2023-08-30 RX ADMIN — PROPOFOL 100 MG: 10 INJECTION, EMULSION INTRAVENOUS at 03:08

## 2023-08-30 RX ADMIN — ROCURONIUM BROMIDE 10 MG: 10 INJECTION INTRAVENOUS at 05:08

## 2023-08-30 RX ADMIN — DEXTROSE MONOHYDRATE: 50 INJECTION, SOLUTION INTRAVENOUS at 02:08

## 2023-08-30 RX ADMIN — PHENYLEPHRINE HYDROCHLORIDE 100 MCG: 10 INJECTION INTRAVENOUS at 04:08

## 2023-08-30 RX ADMIN — ONDANSETRON 4 MG: 4 TABLET, ORALLY DISINTEGRATING ORAL at 10:08

## 2023-08-30 RX ADMIN — ROCURONIUM BROMIDE 50 MG: 10 INJECTION INTRAVENOUS at 03:08

## 2023-08-30 RX ADMIN — MEROPENEM 500 MG: 500 INJECTION INTRAVENOUS at 05:08

## 2023-08-30 RX ADMIN — ACETAMINOPHEN 1000 MG: 500 TABLET ORAL at 10:08

## 2023-08-30 RX ADMIN — VANCOMYCIN HYDROCHLORIDE 500 MG: 500 INJECTION, POWDER, LYOPHILIZED, FOR SOLUTION INTRAVENOUS at 11:08

## 2023-08-30 RX ADMIN — TOBRAMYCIN AND DEXAMETHASONE: 3; 1 OINTMENT OPHTHALMIC at 08:08

## 2023-08-30 RX ADMIN — PANTOPRAZOLE SODIUM 40 MG: 40 GRANULE, DELAYED RELEASE ORAL at 10:08

## 2023-08-30 RX ADMIN — LIDOCAINE HYDROCHLORIDE 75 MG: 20 INJECTION INTRAVENOUS at 03:08

## 2023-08-30 NOTE — TRANSFER OF CARE
"Anesthesia Transfer of Care Note    Patient: Immanuel Bernal    Procedure(s) Performed: Procedure(s) (LRB):  ENUCLEATION, EYE (Left)  BLEPHARORRHAPHY (Left)  INJECTION, MEDICATION, RETROBULBAR (Left)    Patient location: PACU    Anesthesia Type: general    Transport from OR: Transported from OR on 6-10 L/min O2 by face mask with adequate spontaneous ventilation    Post pain: adequate analgesia    Post assessment: no apparent anesthetic complications and tolerated procedure well    Post vital signs: stable    Level of consciousness: responds to stimulation and sedated    Nausea/Vomiting: no nausea/vomiting    Complications: none    Transfer of care protocol was followed      Last vitals:   Visit Vitals  BP (!) 146/80 (BP Location: Left arm, Patient Position: Lying)   Pulse 63   Temp 36.6 °C (97.8 °F) (Temporal)   Resp 18   Ht 5' 10" (1.778 m)   Wt 62.8 kg (138 lb 7.2 oz)   SpO2 100%   BMI 19.87 kg/m²     "

## 2023-08-30 NOTE — BRIEF OP NOTE
Brief Operative Note  Ophthalmology Service      Date of Procedure: 8/9/2023     Attending Physician: Vicki Stewart MD     Assistant: Martín Izaguirre MD    Pre-Operative Diagnosis: Endophthalmitis [H44.009]     Post-Operative Diagnosis: Same as pre-operative diagnosis    Treatments/Procedures: enucleation of left eye with placement of temporary tarsorrhaphy and conformer    Intraoperative Findings: scleral shell s/p evisceration of left eye    Anesthesia: General    Complications: None    Estimated Blood Loss: < 20 cc    Specimens: None    -------------------------------------------------------------  Full dictated Operative Report to follow.  -------------------------------------------------------------

## 2023-08-30 NOTE — PROGRESS NOTES
[facility]FF HEMODIALYSIS NOTE     Patient currently on hemodialysis for removal of uremic toxins and volume.     Patient seen and evaluated on hemodialysis, tolerating treatment, see HD flowsheet for vitals and assessments.    Labs have been reviewed and the dialysate bath has been adjusted.       Assessment/Plan:      -Patient seen on HD, tolerating treatment well, w/o complaints   -Tentative plans fopr left complete evisceration and eye implant   -TDC line placed 8/29  -UF goal of 2L  -Renal diet, if not NPO   -Strict I/O's and daily weights  -Daily renal function panels  -Keep MAP >65 while on HD   -Hgb goal 10-11  -continue epo on HD days   -Will continue to follow while inpatient     Kylie Okeefe DNP-FNP, C  Nephrology  Pager: [phone]

## 2023-08-30 NOTE — ASSESSMENT & PLAN NOTE
Patient's mentation has currently improved substantially. Alert and Oriented x3 and able to converse well. He was briefly stepped up to the ICU on 8/11 for delirium that self-resolved. Current episode started on 8/24.  Workup so far:  Cefepime (received for 3 days) switched back to meropenem.   CTA PE study and US LE studies rule out thromboembolism  Electrolytes have been maintained wnl with dialysis; mentation eventually began to improve following 2 rounds of HD. Likely due to medication effects.  CTH without acute changes.  Repeated MRI under sedation on 8/26, showed ongoing L scleral and orbital infection but no new processes nor new strokes  LP under sedation on 8/26; CSF cultures grew Gram positive rods, adequate coverage with current Meropenem.  EEG without seizures, but showed encephalopathy    Delirium has significantly improved since stopping cefepime and reglan and s/p 2 HD sessions. Thus most likely 2/2 mediation adverse effect    Plan  - Continue HD sessions  - Delirium likely due to medication adverse effect + prolonged hospitalization, altered sleep/wake cycle, vision loss  - Continue Seroquel 50 mg nightly.  - Olanzapine 5mg IM PRN for severe agitation  - Delirium precautions in place.  - Patient pending for discharge to [facility] tomorrow.

## 2023-08-30 NOTE — ASSESSMENT & PLAN NOTE
Patient's mentation has currently improved substantially. Alert and Oriented x3 and able to converse well. He was briefly stepped up to the ICU on 8/11 for delirium that self-resolved. Current episode started on 8/24.  Workup so far:  Cefepime (received for 3 days) switched back to meropenem.   CTA PE study and US LE studies rule out thromboembolism  Electrolytes have been maintained wnl with dialysis; mentation eventually began to improve following 2 rounds of HD. Likely due to medication effects.  CTH without acute changes.  Repeated MRI under sedation on 8/26, showed ongoing L scleral and orbital infection but no new processes nor new strokes  LP under sedation on 8/26; CSF cultures grew Gram positive rods, adequate coverage with current Meropenem.  EEG without seizures, but showed encephalopathy    Delirium has significantly improved since stopping cefepime and reglan and s/p 2 HD sessions. Thus most likely 2/2 mediation adverse effect    Plan  - Continue HD sessions  - Delirium likely due to medication adverse effect + prolonged hospitalization, altered sleep/wake cycle, vision loss  - Holding Seroquel for now as could potentially be contributing to HTN; delirium/agitation has improved.  - Olanzapine 5mg IM PRN for severe agitation  - Delirium precautions

## 2023-08-30 NOTE — PT/OT/SLP PROGRESS
Physical Therapy      Patient Name:  Immanuel Bernal   MRN:  [id number]    Patient not seen today secondary to Dialysis. Will follow-up.

## 2023-08-30 NOTE — PROGRESS NOTES
Pharmacokinetic Assessment Follow Up: IV Vancomycin    Vancomycin serum concentration assessment(s):    Vanc level is 21.7 mcg/ml, drawn appropriately  Patient is over the goal of 15-20 mcg/ml    Vancomycin Regimen Plan:    Plan for HD today  Will re-dose vancomycin 500 mg x 1   Redraw vanc random on Thursday with AM labs    Drug levels (last 3 results):  Recent Labs   Lab Result Units 08/28/23  0727 08/29/23  0454 08/30/23  0612   Vancomycin, Random ug/mL 15.9 23.1 21.7       Pharmacy will continue to follow and monitor vancomycin.    Please contact pharmacy at extension [phone] for questions regarding this assessment.    Thank you for the consult,   Elsa Solorzano    Patient brief summary:  Immanuel Bernal is a 57 y.o. male initiated on antimicrobial therapy with IV Vancomycin for treatment of endophthalmitis.    The patient's current regimen is vancomycin pulse dosing.     Actual Body Weight:   62.8 kg    Renal Function:   Estimated Creatinine Clearance: 13.9 mL/min (A) (based on SCr of 5.1 mg/dL (H)).,     Dialysis Method (if applicable):  intermittent HD    CBC (last 72 hours):  Recent Labs   Lab Result Units 08/27/23  1704 08/28/23  0731 08/28/23  1152 08/29/23  0454 08/30/23  0612   WBC K/uL 8.46 9.49 7.25 5.45 5.45   Hemoglobin g/dL 8.3* 8.5* 8.5* 8.2* 9.9*   Hematocrit % 26.6* 26.0* 26.9* 26.9* 33.5*   Platelets K/uL 154 176 172 176 205   Gran % %  --   --  71.4  --   --    Lymph % %  --   --  9.0*  --   --    Mono % %  --   --  11.3  --   --    Eosinophil % %  --   --  7.3  --   --    Basophil % %  --   --  0.6  --   --    Differential Method   --   --  Automated  --   --          Metabolic Panel (last 72 hours):  Recent Labs   Lab Result Units 08/28/23  0727 08/29/23  0454 08/30/23  0612   Sodium mmol/L 135* 135* 134*   Potassium mmol/L 3.4* 4.1 4.6   Chloride mmol/L 101 100 101   CO2 mmol/L 23 26 19*   Glucose mg/dL 96 75 77   BUN mg/dL 18 37* 43*   Creatinine mg/dL 2.9* 4.4* 5.1*   Albumin g/dL  2.1* 1.9* 2.1*   Total Bilirubin mg/dL 0.3 0.3 0.3   Alkaline Phosphatase U/L 119 112 121   AST U/L 14 12 16   ALT U/L <5* <5* <5*   Magnesium mg/dL  --   --  2.3         Vancomycin Administrations:  vancomycin given in the last 96 hours                     vancomycin (VANCOCIN) 500 mg in dextrose 5 % in water (D5W) 100 mL IVPB (MB+) (mg) 500 mg New Bag 08/23/23 1724    vancomycin (VANCOCIN) 500 mg in dextrose 5 % in water (D5W) 100 mL IVPB (MB+) (mg) 500 mg New Bag 08/21/23 1318                    Microbiologic Results:  Microbiology Results (last 7 days)       Procedure Component Value Units Date/Time    CSF culture [[id number]]  (Abnormal) Collected: 08/26/23 1315    Order Status: Completed Specimen: CSF (Spinal Fluid) from CSF Tap, Tube 3 Updated: 08/30/23 0745     CSF CULTURE Results called to and read back by:Kashif Zhang RN 08/30/2023  07:44      GRAM-POSITIVE VERONICA  From broth only  Identification pending       Gram Stain Result Cytospin indicates:      No WBC's      No organisms seen    AFB Culture & Smear [[id number]] Collected: 08/26/23 1315    Order Status: Completed Specimen: CSF (Spinal Fluid) from CSF Tap, Tube 3 Updated: 08/28/23 1801     AFB Culture & Smear Culture in progress     AFB CULTURE STAIN No acid fast bacilli seen.    Fungus culture [[id number]] Collected: 08/26/23 1315    Order Status: Completed Specimen: CSF (Spinal Fluid) from CSF Tap, Tube 3 Updated: 08/28/23 1115     Fungus (Mycology) Culture Culture in progress    Cryptococcal antigen, CSF [[id number]] Collected: 08/26/23 1315    Order Status: Completed Specimen: CSF (Spinal Fluid) from CSF Tap, Tube 3 Updated: 08/28/23 0853     Crypto Ag, CSF Negative    Gram stain [[id number]] Collected: 08/26/23 1315    Order Status: Canceled Specimen: CSF (Spinal Fluid) from CSF Tap, Tube 3     Culture, Anaerobe [[id number]]  (Abnormal) Collected: 08/16/23 1632    Order Status: Completed Specimen: Wound from Cornea, Left Updated: 08/23/23 0482      Anaerobic Culture CUTIBACTERIUM ACNES  From broth only

## 2023-08-30 NOTE — ANESTHESIA PROCEDURE NOTES
Intubation    Date/Time: 8/30/2023 3:24 PM    Performed by: Sophia Tiwari CRNA  Authorized by: Isi Mo MD    Intubation:     Induction:  Intravenous    Intubated:  Postinduction    Mask Ventilation:  Easy with oral airway    Attempts:  1    Attempted By:  CRNA    Method of Intubation:  Direct    Blade:  Torres 2    Laryngeal View Grade: Grade I - full view of cords      Difficult Airway Encountered?: No      Complications:  None    Airway Device:  Oral endotracheal tube    Airway Device Size:  7.5    Style/Cuff Inflation:  Cuffed (inflated to minimal occlusive pressure)    Tube secured:  23    Secured at:  The lips    Placement Verified By:  Capnometry    Complicating Factors:  None    Findings Post-Intubation:  BS equal bilateral and atraumatic/condition of teeth unchanged

## 2023-08-30 NOTE — PROGRESS NOTES
Bedside HD completed.tolerated . Net uf removed 2 liters. Post B/P 196/95. Pulse 67, o2 sat 100 ojn room air. Patient awake and alert

## 2023-08-30 NOTE — PROGRESS NOTES
Bedside HD initiated, / uf net goal set for 2 liters as tolerated. B/P 178/87 , pulse 65, o02 sat on room air  !00%. Patient alert.

## 2023-08-30 NOTE — ASSESSMENT & PLAN NOTE
S/p L eye evisceration on 8/16/23.  Repeat MRI head/orbit with ongoing L scleral infection and cellulitis    - ID and ophthalmology following  - Continue vancomycin, meropenem, fluconazole, and tobramycin ointments/eye drops  - Anticipated 4-6 weeks of therapy from evisceration. Anticipated end date: 9/12-9/26. See OPAT note from 8/19.  - Tunneled PICC line was placed yesterday for Antibiotics. (08/29)  - Follow up all culture data  - Ophthalmology to perform packing changes every 2-3 days  - L eye to be taped shut at night  - Completion of left eye evisceration plus eye implant scheduled for 08/30.

## 2023-08-30 NOTE — PATIENT CARE CONFERENCE
[id number] MARQUISE   Immanuel Bernal  :1965     AGE:57 y.o.     SEX:male      STATUS:Full Code      ISOLATION:No active isolations   ALLERGIES:Morphine and Amiodarone analogues   ADMIT DATE:  2023   PHYSICIAN:  Michel Mcbride III*   DIAGNOSIS: Endophthalmitis [H44.009] Delirium  CC: Eye Problem    CONSULTS: Hospital Medicine, Neurology, Interventional Radiology, Ophthalmology   Past Medical History:   Diagnosis Date    Bilateral retinal detachment 2023    BPH (benign prostatic hyperplasia)     Cataract     CHF (congestive heart failure)     CKD (chronic kidney disease) stage 3, GFR 30-59 ml/min     Diabetes mellitus, type 2     Hypertension     KENIA (obstructive sleep apnea)     Pancreatitis     Persistent proteinuria 2020    2 g proteinuria noted Resumed ACE Lower BP systolic to less than 130     PTSD (post-traumatic stress disorder)     Stroke      Scheduled procedure(s): evisceration Left eye  Labs to Monitor (no values):   Recent Vitals:  Temp: 98.2 °F (36.8 °C)  Pulse: 61  Resp: 16  SpO2: 100 %  BP: (!) 168/89    Respiratory:    Cardiac: Rhythm: normal sinus rhythm      Wt Readings from Last 2 Encounters:   23 62.8 kg (138 lb 7.2 oz)   23 88.9 kg (196 lb)     GI/: Diet NPO   Last Bowel Movement: 23    Neuro: ex:AXOX2 With confusion   Mcknight catheter: No  Skin:ex:SEE LDA     Wu Score: 12      Lines/Drains/Airways       Central Venous Catheter Line  Duration                  Hemodialysis Catheter right internal jugular -- days    Percutaneous Central Line Insertion/Assessment - Double Lumen  23 1016 Internal Jugular Right <1 day              Drain  Duration                  Gastrostomy/Enterostomy 23 1213 Percutaneous endoscopic gastrostomy (PEG) LUQ 6 days              Peripheral Intravenous Line  Duration                  Peripheral IV - Single Lumen 23 1541 20 G;1 3/4 in Left Upper Arm 5 days                  Antibiotics (From admission,  onward)      Start     Stop Route Frequency Ordered    08/24/23 1030  meropenem (MERREM) 500 mg in sodium chloride 0.9 % 100 mL IVPB (MB+)         -- IV Every 12 hours (non-standard times) 08/24/23 0918    08/18/23 1700  tobramycin-dexAMETHasone 0.3-0.1% ophthalmic suspension 1 drop         09/15/23 2059 Both Eyes 4 times daily 08/18/23 1411    08/16/23 2343  meropenem (MERREM) 500 mg injection        Note to Pharmacy: Created by cabinet override    08/17/23 1144   08/16/23 2343    08/16/23 2100  tobramycin-dexAMETHasone 0.3-0.1% ophthalmic ointment         09/15/23 2059 LEFT EYE 3 times daily 08/16/23 1732    08/13/23 0945  mupirocin 2 % ointment         08/18/23 0859 Nasl 2 times daily 08/13/23 0840    08/09/23 1418  vancomycin - pharmacy to dose  (vancomycin IVPB (PEDS and ADULTS))        See Hyperspace for full Linked Orders Report.    -- IV pharmacy to manage frequency 08/09/23 1319          VTE Risk Mitigation (From admission, onward)           Ordered     heparin (porcine) injection 1,000 Units  As needed (PRN)         08/29/23 1322     heparin (porcine) injection 1,000 Units  As needed (PRN)         08/10/23 1046     IP VTE HIGH RISK PATIENT  Once         08/09/23 1317     Place sequential compression device  Until discontinued         08/09/23 1317                  Glycemic control:  Recent Labs   Lab 08/29/23  0814 08/29/23  1240 08/29/23  1632 08/29/23  1949   POCTGLUCOSE 149* 83 84 93     Fall risk: bed alarm  Mobility: GEMS ([place] Early Mobility Scale): Level 1-Primary in bed activities    Nursing Update/Plan of Care: Patient in bed resting, call light within reach, bed in low position, no distress at this time, lungs clear, NSR on monitor, patient b/p been elevated most of the night had to give PRN dose hydralazine this AM b/p in 190's /90's, patient blood pressure has came down to 168/89, patient scheduled form left eye removal today been NPO since midnight.

## 2023-08-30 NOTE — ASSESSMENT & PLAN NOTE
Nephrology consulted for HD needs while inpatient   A-T Advancement Flap Text: The defect edges were debeveled with a #15 scalpel blade.  Given the location of the defect, shape of the defect and the proximity to free margins an A-T advancement flap was deemed most appropriate.  Using a sterile surgical marker, an appropriate advancement flap was drawn incorporating the defect and placing the expected incisions within the relaxed skin tension lines where possible.    The area thus outlined was incised deep to adipose tissue with a #15 scalpel blade.  The skin margins were undermined to an appropriate distance in all directions utilizing iris scissors.

## 2023-08-30 NOTE — DISCHARGE SUMMARY
[facility] (1st Fl)  Discharge Note  Short Stay    Procedure(s) (LRB):  EVISCERATION, OCULAR CONTENTS (Left)      OUTCOME: Patient tolerated treatment/procedure well without complication and is now ready for discharge.    DISPOSITION: Admitted as an Inpatient    FINAL DIAGNOSIS:  endophthalmitis of left eye   FOLLOWUP: In clinic    DISCHARGE INSTRUCTIONS:  Patients eye shield will remain on until seen in clinic in 1 week. Does not need any drops or ointments until that time     TIME SPENT ON DISCHARGE: 15 minutes

## 2023-08-30 NOTE — PROGRESS NOTES
[facility] - Surgery ([facility])  [facility] Medicine  Progress Note    Patient Name: Immanuel Bernal  MRN: [id number]  Patient Class: IP- Inpatient   Admission Date: 8/9/2023  Length of Stay: 21 days  Attending Physician: Michel Mcbride III*  Primary Care Provider: Dolly, Primary Doctor        Subjective:     Principal Problem:Delirium        HPI:  Immanuel Bernal is a 57 y.o. male with ESRD, HFrEF 30-35%, DM2, HTN, history of bowel obstruction s/p bowel resection, now with PEG in place, ?KENIA, history of CVA, ??AFib who was recently hospitalized at [facility] in 7/2023 for b/l endophthalmitis, Pseudomonas bacteremia, RUE thrombus, RUE AVG infection s/p excision, and RUL mass. Left eye did not improve & subsequently developed abscess, but POA declined enucleation and patient ultimately left AMA. Re-presented to [place] ED with worsening eye symptoms with imaging showing left scleral abscess, so he was transferred to [facility] on 8/9/2023 for Oculoplastics evaluation & enucleation.       Overview/Hospital Course:  Patient was admitted initially on 8/9/2023 to [facility] medicine for Oculoplastics evaluation for L eye endophthalmitis with abscess. Due to prior Pseudomonal bacteremia, patient was kept of vancomycin and meropenem per infectious disease recommendations. Initially, surgery was planned, however, patient's POA wanted to repeat imaging to confirm the severe infection before proceeding. Unfortunately, patient had worsening delirium of unknown etiology, thus MRI could not be completed properly. Patient was stepped up to ICU on 8/11 and briefly required a precedex drip, and got a full MRI on 8/12. Delirium self-resolved and patient was stepped down to medicine again on 8/14. After back and forth discussion with patient's POA and ophthalmology, patient underwent L eye evisceration on 8/16. Surgical cultures additionally grew yeast (not yet speciated) and cutibacterium acnes. Patient was started on fluconazole and tobramycin  drops/ointment. In preparation for discharge, meropenem was changed to cefepime on 8/21 for pseudomonal coverage, as meropenem needed to be dosed daily (and patient would need another tunneled Gil line) and cefepime could be dosed with dialysis.     On 8/24, patient once again had worsening delirium of unclear etiology. Cefepime was switched back to meropenem due to concern for neurotoxicity, and reglan was discontinued. Patient underwent HD however this did not improve his mentation. Patient underwent repeat MRI jim under sedation on 8/26, which showed ongoing L scleral infection and orbital cellulitis; per ophthalmology, no further surgical intervention is warranted. LP was obtained as well and negative for infection. CTA was ordered to r/o PE in setting of intermittent hypoxia, and was largely negative. LE US was also ordered, and did not show DVT, thus ruling out thromboembolism as a cause for agitation/confusion. EEG was completed and showed encephalopathy but no seizures. Patient's delirium ultimately improved with another session of HD on 8/28, thus, delirium was most likely related to medication adverse effects.     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 His R Permacath was still functioning and was left in place. Patient had an old AVF on the R arm that was operated on 1-2 months ago (see HPI; infected graft) and still had staples in place; vascular surgery recommend outpatient follow up for staple removal once site fully healed. For DVTs, Eliquis was started on 8/17, however, patient developed melena on 8/22. Eliquis was stopped, 1U PRBC was transfused, GI was consulted, and patient underwent EGD on 8/24. EGD found a large duodenal ulcer with adherent clot - 2 clips were placed adjacent to the ulcer but the clot was left intact. Patient remained hemodynamically stable, however has required 3U PRBC total  since melena started. Per GI, no further endoscopic intervention would be useful for the large ulcer. CTA was ordered on 8/27 and was negative for active bleeding. No further bleeding has been noted and hemoglobin has stabilized.    Disposition: Tunneled PICC line was placed yesterday (08/29). Left eye evisceration completion and implant scheduled for (0830). Will DC to [facility] following line placement, eye implant, and patient stable.       Interval History: Patient was seen this morning prior to his scheduled surgery in the afternoon. He had just finished dialysis and tolerated the session well. He stated he feels well today and his nausea/vomiting has improved since yesterday. He reports no additional episodes of emesis. Denies any chest or abdominal pain. Mentation is overall still much improved from previous days and is able to converse well. He is scheduled for left eye evisceration completion and eye implant later today.    Review of Systems   Constitutional:  Negative for activity change, chills and fever.   Eyes:  Negative for pain, discharge and visual disturbance.   Respiratory:  Negative for choking, chest tightness and shortness of breath.    Cardiovascular:  Negative for chest pain and leg swelling.   Gastrointestinal:  Negative for abdominal pain, nausea and vomiting.   Genitourinary:  Negative for dysuria.   Neurological:  Negative for dizziness, weakness, light-headedness and headaches.   Psychiatric/Behavioral:  Negative for agitation and confusion.      Objective:     Vital Signs (Most Recent):  Temp: 97.4 °F (36.3 °C) (08/30/23 1115)  Pulse: (!) 59 (08/30/23 1345)  Resp: 11 (08/30/23 1345)  BP: (!) 160/93 (08/30/23 1345)  SpO2: 100 % (08/30/23 1345) Vital Signs (24h Range):  Temp:  [97.4 °F (36.3 °C)-98.3 °F (36.8 °C)] 97.4 °F (36.3 °C)  Pulse:  [58-81] 59  Resp:  [7-20] 11  SpO2:  [97 %-100 %] 100 %  BP: (153-203)/() 160/93     Weight: 62.8 kg (138 lb 7.2 oz)  Body mass index is  19.87 kg/m².    Intake/Output Summary (Last 24 hours) at 8/30/2023 1432  Last data filed at 8/30/2023 1130  Gross per 24 hour   Intake 755 ml   Output 2500 ml   Net -1745 ml         Physical Exam  Constitutional:       Appearance: Normal appearance.   HENT:      Head: Normocephalic and atraumatic.      Nose: Nose normal.   Eyes:      Comments: Right eye clear. Pupil reactive. Left orbit swollen with eye enucleation.    Cardiovascular:      Rate and Rhythm: Normal rate and regular rhythm.      Pulses: Normal pulses.      Heart sounds: Normal heart sounds.   Pulmonary:      Effort: Pulmonary effort is normal. No respiratory distress.      Breath sounds: Normal breath sounds. No wheezing.   Abdominal:      General: Abdomen is flat. There is no distension.      Palpations: Abdomen is soft.      Tenderness: There is no abdominal tenderness.   Musculoskeletal:         General: No tenderness.      Right lower leg: No edema.      Left lower leg: No edema.   Skin:     General: Skin is warm and dry.   Neurological:      General: No focal deficit present.      Mental Status: He is alert and oriented to person, place, and time.      Cranial Nerves: No cranial nerve deficit.   Psychiatric:         Mood and Affect: Mood normal.         Behavior: Behavior normal.             Significant Labs: All pertinent labs within the past 24 hours have been reviewed.    Significant Imaging: I have reviewed all pertinent imaging results/findings within the past 24 hours.      Assessment/Plan:      * Delirium  Patient's mentation has currently improved substantially. Alert and Oriented x3 and able to converse well. He was briefly stepped up to the ICU on 8/11 for delirium that self-resolved. Current episode started on 8/24.  Workup so far:  Cefepime (received for 3 days) switched back to meropenem.   CTA PE study and US LE studies rule out thromboembolism  Electrolytes have been maintained wnl with dialysis; mentation eventually began to  improve following 2 rounds of HD. Likely due to medication effects.  CTH without acute changes.  Repeated MRI under sedation on 8/26, showed ongoing L scleral and orbital infection but no new processes nor new strokes  LP under sedation on 8/26; CSF cultures grew Gram positive rods, adequate coverage with current Meropenem.  EEG without seizures, but showed encephalopathy    Delirium has significantly improved since stopping cefepime and reglan and s/p 2 HD sessions. Thus most likely 2/2 mediation adverse effect    Plan  - Continue HD sessions  - Delirium likely due to medication adverse effect + prolonged hospitalization, altered sleep/wake cycle, vision loss  - Changed Seroquel to nightly for now as could potentially be contributing to HTN; delirium/agitation has improved.  - Olanzapine 5mg IM PRN for severe agitation  - Delirium precautions    AV fistula infection  - See HPI and hospital course; previous AV graft was removed at OSH and found to be colonized with pseudomonas from prior pseudomonal bacteremia  - Staples still in place  - F/u with [facility] vascular surgery on discharge    Duodenal ulceration  Patient started to have melena on 8/22, and on 8/24, underwent EGD.   Found to have a large oozing duodenal ulcer with adherent clot. 2 clips placed however ulcer could not be addressed completely. Also had 2 more nonbleeding ulcers in the duodenal bulb and 2nd part of the duodenum.  3U PRBC transfused so far this admission  Per GI, no further endoscopic intervention warranted  Due to ongoing melena, ordered CTA GIB protocol - negative    Problem now stable    - Trend CBC Q12. Transfuse for Hb >7, unless otherwise indicated  - Maintain IV access with 2 large bore IVs  - Hold all NSAIDs and anticoagulants, unless contraindicated  - IV pantoprazole 40mg BID - switch to PO pantroprazole on discharge  - Correct any coagulopathy with platelets and FFP for goal of platelets >50K and INR <2.0    Impaired mobility  - Plan  to DC to [facility] when medically stable    PEG (percutaneous endoscopic gastrostomy) status  - Previously cleared by SLP for a regular diet without restrictions  - Currently ability to take PO limited by mentation  - Also frequently hypoglycemic due to malnutrition  - Tube feeds    Anemia in ESRD (end-stage renal disease)  EPO per nephrology    Severe malnutrition  Nutrition consulted. Most recent weight and BMI monitored-     Measurements:  Wt Readings from Last 1 Encounters:   08/23/23 62.8 kg (138 lb 7.2 oz)   Body mass index is 19.87 kg/m².    Patient has been screened and assessed by RD.    Malnutrition Type:  Context: chronic illness  Level: severe    Malnutrition Characteristic Summary:  Weight Loss (Malnutrition): greater than 10% in 6 months  Energy Intake (Malnutrition): other (see comments) (LAMONT)  Subcutaneous Fat (Malnutrition): severe depletion  Muscle Mass (Malnutrition): severe depletion    Interventions/Recommendations (treatment strategy):  1. Resume Renal diet + Novasource ONS when able - encourage PO intake as tolerated. 2. If EN to resume, please reconsult for updated recommendations. 3. RD to monitor & follow-up.     Able to take in PO per SLP assessment on 8/15, on regular diet with thin liquids  Continue tube feeds for now due to altered mentation    Endophthalmitis  S/p L eye evisceration on 8/16/23.  Repeat MRI head/orbit with ongoing L scleral infection and cellulitis    - ID and ophthalmology following  - Continue vancomycin, meropenem, fluconazole, and tobramycin ointments/eye drops  - Anticipated 4-6 weeks of therapy from evisceration. Anticipated end date: 9/12-9/26. See OPAT note from 8/19.  - Tunneled PICC line was placed yesterday for Antibiotics. (08/29)  - Follow up all culture data  - Ophthalmology to perform packing changes every 2-3 days  - L eye to be taped shut at night  - Completion of left eye evisceration plus eye implant scheduled for 08/30.    ESRD (end stage renal  disease)  Nephrology consulted for HD needs while inpatient    Hyperlipidemia  Home statin    Chronic diastolic heart failure  Continue to monitor for signs of volume overload; volume removal with dialysis    Hypertension  Current hypertension is worsened secondary to agitation  On CCB at home but will uptitrate GDMT while here    - Coreg 6.25mg BID, limited by HR  - BiDil 2 tablets TID  - Valsartan 160mg BID      VTE Risk Mitigation (From admission, onward)           Ordered     heparin (porcine) injection 1,000 Units  As needed (PRN)         08/29/23 1322     heparin (porcine) injection 1,000 Units  As needed (PRN)         08/10/23 1046     IP VTE HIGH RISK PATIENT  Once         08/09/23 1317     Place sequential compression device  Until discontinued         08/09/23 1317                    Discharge Planning   TOBY: 9/5/2023     Code Status: Full Code   Is the patient medically ready for discharge?: No    Reason for patient still in hospital (select all that apply): Treatment  Discharge Plan A: Rehab   Discharge Delays: (!) Procedure Scheduling (IR, OR, Labs, Echo, Cath, Echo, EEG) (eye surgery next week)              Eric Koo DO  Department of Hospital Medicine   [facility] - Surgery (1st Fl)

## 2023-08-30 NOTE — MEDICARE RECERTIFICATION
MEDICARE INPATIENT  PHYSICIAN RECERTIFICATION  OF MEDICAL NECESSITY      1st Recertification (required no later than the 20th day of hospitalization)     I certify that this patient's continued medical needs require an Inpatient level of care due to medical necessity and trending condition .  I estimate that the patient will be in the hospital for another 7  days.  Post-acute planning is in process, and currently the patient will likely be discharged to Inpatient rehab.

## 2023-08-30 NOTE — SUBJECTIVE & OBJECTIVE
Interval History: Patient was seen this morning prior to his scheduled surgery in the afternoon. He had just finished dialysis and tolerated the session well. He stated he feels well today and his nausea/vomiting has improved since yesterday. He reports no additional episodes of emesis. Denies any chest or abdominal pain. Mentation is overall still much improved from previous days and is able to converse well. He is scheduled for left eye evisceration completion and eye implant later today.    Review of Systems   Constitutional:  Negative for activity change, chills and fever.   Eyes:  Negative for pain, discharge and visual disturbance.   Respiratory:  Negative for choking, chest tightness and shortness of breath.    Cardiovascular:  Negative for chest pain and leg swelling.   Gastrointestinal:  Negative for abdominal pain, nausea and vomiting.   Genitourinary:  Negative for dysuria.   Neurological:  Negative for dizziness, weakness, light-headedness and headaches.   Psychiatric/Behavioral:  Negative for agitation and confusion.      Objective:     Vital Signs (Most Recent):  Temp: 97.4 °F (36.3 °C) (08/30/23 1115)  Pulse: (!) 59 (08/30/23 1345)  Resp: 11 (08/30/23 1345)  BP: (!) 160/93 (08/30/23 1345)  SpO2: 100 % (08/30/23 1345) Vital Signs (24h Range):  Temp:  [97.4 °F (36.3 °C)-98.3 °F (36.8 °C)] 97.4 °F (36.3 °C)  Pulse:  [58-81] 59  Resp:  [7-20] 11  SpO2:  [97 %-100 %] 100 %  BP: (153-203)/() 160/93     Weight: 62.8 kg (138 lb 7.2 oz)  Body mass index is 19.87 kg/m².    Intake/Output Summary (Last 24 hours) at 8/30/2023 1432  Last data filed at 8/30/2023 1130  Gross per 24 hour   Intake 755 ml   Output 2500 ml   Net -1745 ml         Physical Exam  Constitutional:       Appearance: Normal appearance.   HENT:      Head: Normocephalic and atraumatic.      Nose: Nose normal.   Eyes:      Comments: Right eye clear. Pupil reactive. Left orbit swollen with eye enucleation.    Cardiovascular:      Rate and  Rhythm: Normal rate and regular rhythm.      Pulses: Normal pulses.      Heart sounds: Normal heart sounds.   Pulmonary:      Effort: Pulmonary effort is normal. No respiratory distress.      Breath sounds: Normal breath sounds. No wheezing.   Abdominal:      General: Abdomen is flat. There is no distension.      Palpations: Abdomen is soft.      Tenderness: There is no abdominal tenderness.   Musculoskeletal:         General: No tenderness.      Right lower leg: No edema.      Left lower leg: No edema.   Skin:     General: Skin is warm and dry.   Neurological:      General: No focal deficit present.      Mental Status: He is alert and oriented to person, place, and time.      Cranial Nerves: No cranial nerve deficit.   Psychiatric:         Mood and Affect: Mood normal.         Behavior: Behavior normal.             Significant Labs: All pertinent labs within the past 24 hours have been reviewed.    Significant Imaging: I have reviewed all pertinent imaging results/findings within the past 24 hours.

## 2023-08-31 PROBLEM — R83.5: Status: ACTIVE | Noted: 2023-08-31

## 2023-08-31 LAB
ALBUMIN SERPL BCP-MCNC: 2.2 G/DL (ref 3.5–5.2)
ALP SERPL-CCNC: 127 U/L (ref 55–135)
ALT SERPL W/O P-5'-P-CCNC: <5 U/L (ref 10–44)
ANION GAP SERPL CALC-SCNC: 12 MMOL/L (ref 8–16)
AST SERPL-CCNC: 12 U/L (ref 10–40)
BACTERIA #/AREA URNS AUTO: ABNORMAL /HPF
BILIRUB SERPL-MCNC: 0.2 MG/DL (ref 0.1–1)
BILIRUB UR QL STRIP: NEGATIVE
BUN SERPL-MCNC: 24 MG/DL (ref 6–20)
CALCIUM SERPL-MCNC: 9.1 MG/DL (ref 8.7–10.5)
CHLORIDE SERPL-SCNC: 101 MMOL/L (ref 95–110)
CLARITY UR REFRACT.AUTO: CLEAR
CO2 SERPL-SCNC: 22 MMOL/L (ref 23–29)
COLOR UR AUTO: YELLOW
CREAT SERPL-MCNC: 3.7 MG/DL (ref 0.5–1.4)
ERYTHROCYTE [DISTWIDTH] IN BLOOD BY AUTOMATED COUNT: 17.8 % (ref 11.5–14.5)
EST. GFR  (NO RACE VARIABLE): 18 ML/MIN/1.73 M^2
GLUCOSE SERPL-MCNC: 103 MG/DL (ref 70–110)
GLUCOSE UR QL STRIP: ABNORMAL
HCT VFR BLD AUTO: 35.9 % (ref 40–54)
HGB BLD-MCNC: 11.1 G/DL (ref 14–18)
HGB UR QL STRIP: NEGATIVE
HSV-1 DNA BY PCR: NEGATIVE
HSV-2 DNA BY PCR: NEGATIVE
HYALINE CASTS UR QL AUTO: 0 /LPF
KETONES UR QL STRIP: NEGATIVE
LEUKOCYTE ESTERASE UR QL STRIP: NEGATIVE
MCH RBC QN AUTO: 30.2 PG (ref 27–31)
MCHC RBC AUTO-ENTMCNC: 30.9 G/DL (ref 32–36)
MCV RBC AUTO: 98 FL (ref 82–98)
MICROSCOPIC COMMENT: ABNORMAL
NITRITE UR QL STRIP: NEGATIVE
PH UR STRIP: 8 [PH] (ref 5–8)
PLATELET # BLD AUTO: 209 K/UL (ref 150–450)
PMV BLD AUTO: 10.5 FL (ref 9.2–12.9)
POCT GLUCOSE: 101 MG/DL (ref 70–110)
POCT GLUCOSE: 106 MG/DL (ref 70–110)
POCT GLUCOSE: 85 MG/DL (ref 70–110)
POCT GLUCOSE: 90 MG/DL (ref 70–110)
POCT GLUCOSE: 91 MG/DL (ref 70–110)
POCT GLUCOSE: 95 MG/DL (ref 70–110)
POTASSIUM SERPL-SCNC: 4.7 MMOL/L (ref 3.5–5.1)
PROT SERPL-MCNC: 6.5 G/DL (ref 6–8.4)
PROT UR QL STRIP: ABNORMAL
RBC # BLD AUTO: 3.67 M/UL (ref 4.6–6.2)
RBC #/AREA URNS AUTO: 7 /HPF (ref 0–4)
SODIUM SERPL-SCNC: 135 MMOL/L (ref 136–145)
SP GR UR STRIP: 1.02 (ref 1–1.03)
SQUAMOUS #/AREA URNS AUTO: 1 /HPF
URN SPEC COLLECT METH UR: ABNORMAL
VANCOMYCIN SERPL-MCNC: 23.7 UG/ML
WBC # BLD AUTO: 4.01 K/UL (ref 3.9–12.7)
WBC #/AREA URNS AUTO: 6 /HPF (ref 0–5)

## 2023-08-31 PROCEDURE — 80053 COMPREHEN METABOLIC PANEL: CPT | Performed by: STUDENT IN AN ORGANIZED HEALTH CARE EDUCATION/TRAINING PROGRAM

## 2023-08-31 PROCEDURE — 25000003 PHARM REV CODE 250

## 2023-08-31 PROCEDURE — 94640 AIRWAY INHALATION TREATMENT: CPT

## 2023-08-31 PROCEDURE — 81001 URINALYSIS AUTO W/SCOPE: CPT

## 2023-08-31 PROCEDURE — 63600175 PHARM REV CODE 636 W HCPCS

## 2023-08-31 PROCEDURE — 97110 THERAPEUTIC EXERCISES: CPT | Mod: CQ

## 2023-08-31 PROCEDURE — 99232 SBSQ HOSP IP/OBS MODERATE 35: CPT | Mod: ,,, | Performed by: NURSE PRACTITIONER

## 2023-08-31 PROCEDURE — 25000003 PHARM REV CODE 250: Performed by: STUDENT IN AN ORGANIZED HEALTH CARE EDUCATION/TRAINING PROGRAM

## 2023-08-31 PROCEDURE — 99232 SBSQ HOSP IP/OBS MODERATE 35: CPT | Mod: GC,,, | Performed by: STUDENT IN AN ORGANIZED HEALTH CARE EDUCATION/TRAINING PROGRAM

## 2023-08-31 PROCEDURE — 20000000 HC ICU ROOM

## 2023-08-31 PROCEDURE — 80202 ASSAY OF VANCOMYCIN: CPT | Performed by: STUDENT IN AN ORGANIZED HEALTH CARE EDUCATION/TRAINING PROGRAM

## 2023-08-31 PROCEDURE — 99232 PR SUBSEQUENT HOSPITAL CARE,LEVL II: ICD-10-PCS | Mod: GC,,, | Performed by: STUDENT IN AN ORGANIZED HEALTH CARE EDUCATION/TRAINING PROGRAM

## 2023-08-31 PROCEDURE — 94761 N-INVAS EAR/PLS OXIMETRY MLT: CPT

## 2023-08-31 PROCEDURE — 85027 COMPLETE CBC AUTOMATED: CPT

## 2023-08-31 PROCEDURE — 99232 PR SUBSEQUENT HOSPITAL CARE,LEVL II: ICD-10-PCS | Mod: ,,, | Performed by: NURSE PRACTITIONER

## 2023-08-31 PROCEDURE — 99232 SBSQ HOSP IP/OBS MODERATE 35: CPT | Mod: GC,,, | Performed by: HOSPITALIST

## 2023-08-31 PROCEDURE — 25000242 PHARM REV CODE 250 ALT 637 W/ HCPCS: Performed by: STUDENT IN AN ORGANIZED HEALTH CARE EDUCATION/TRAINING PROGRAM

## 2023-08-31 PROCEDURE — 99232 PR SUBSEQUENT HOSPITAL CARE,LEVL II: ICD-10-PCS | Mod: GC,,, | Performed by: HOSPITALIST

## 2023-08-31 PROCEDURE — 97535 SELF CARE MNGMENT TRAINING: CPT | Mod: CO

## 2023-08-31 PROCEDURE — 97530 THERAPEUTIC ACTIVITIES: CPT | Mod: CO

## 2023-08-31 PROCEDURE — 97530 THERAPEUTIC ACTIVITIES: CPT | Mod: CQ

## 2023-08-31 RX ORDER — HEPARIN SODIUM 1000 [USP'U]/ML
1000 INJECTION, SOLUTION INTRAVENOUS; SUBCUTANEOUS
Status: ACTIVE | OUTPATIENT
Start: 2023-09-01 | End: 2023-09-01

## 2023-08-31 RX ORDER — SODIUM CHLORIDE 9 MG/ML
INJECTION, SOLUTION INTRAVENOUS ONCE
Status: DISCONTINUED | OUTPATIENT
Start: 2023-09-01 | End: 2023-09-06

## 2023-08-31 RX ADMIN — HYDRALAZINE HYDROCHLORIDE: 10 TABLET, FILM COATED ORAL at 02:08

## 2023-08-31 RX ADMIN — CARVEDILOL 6.25 MG: 6.25 TABLET, FILM COATED ORAL at 09:08

## 2023-08-31 RX ADMIN — SEVELAMER CARBONATE 0.8 G: 0.8 POWDER, FOR SUSPENSION ORAL at 07:08

## 2023-08-31 RX ADMIN — ACETAMINOPHEN 1000 MG: 500 TABLET ORAL at 09:08

## 2023-08-31 RX ADMIN — ONDANSETRON 4 MG: 4 TABLET, ORALLY DISINTEGRATING ORAL at 02:08

## 2023-08-31 RX ADMIN — ATORVASTATIN CALCIUM 40 MG: 40 TABLET, FILM COATED ORAL at 08:08

## 2023-08-31 RX ADMIN — SENNOSIDES AND DOCUSATE SODIUM 1 TABLET: 50; 8.6 TABLET ORAL at 08:08

## 2023-08-31 RX ADMIN — IPRATROPIUM BROMIDE AND ALBUTEROL SULFATE 3 ML: .5; 3 SOLUTION RESPIRATORY (INHALATION) at 08:08

## 2023-08-31 RX ADMIN — IPRATROPIUM BROMIDE AND ALBUTEROL SULFATE 3 ML: .5; 3 SOLUTION RESPIRATORY (INHALATION) at 01:08

## 2023-08-31 RX ADMIN — Medication 1 CAPSULE: at 08:08

## 2023-08-31 RX ADMIN — ACETAMINOPHEN 1000 MG: 500 TABLET ORAL at 08:08

## 2023-08-31 RX ADMIN — MEROPENEM 500 MG: 500 INJECTION INTRAVENOUS at 07:08

## 2023-08-31 RX ADMIN — QUETIAPINE FUMARATE 50 MG: 25 TABLET ORAL at 09:08

## 2023-08-31 RX ADMIN — THERA TABS 1 TABLET: TAB at 08:08

## 2023-08-31 RX ADMIN — FLUCONAZOLE 400 MG: 40 POWDER, FOR SUSPENSION ORAL at 08:08

## 2023-08-31 RX ADMIN — VALSARTAN 160 MG: 160 TABLET, FILM COATED ORAL at 08:08

## 2023-08-31 RX ADMIN — SEVELAMER CARBONATE 0.8 G: 0.8 POWDER, FOR SUSPENSION ORAL at 04:08

## 2023-08-31 RX ADMIN — POLYETHYLENE GLYCOL 3350 17 G: 17 POWDER, FOR SOLUTION ORAL at 08:08

## 2023-08-31 RX ADMIN — ONDANSETRON 4 MG: 4 TABLET, ORALLY DISINTEGRATING ORAL at 05:08

## 2023-08-31 RX ADMIN — MEROPENEM 500 MG: 500 INJECTION INTRAVENOUS at 09:08

## 2023-08-31 RX ADMIN — AMLODIPINE BESYLATE 5 MG: 5 TABLET ORAL at 08:08

## 2023-08-31 RX ADMIN — CARVEDILOL 6.25 MG: 6.25 TABLET, FILM COATED ORAL at 08:08

## 2023-08-31 RX ADMIN — PANTOPRAZOLE SODIUM 40 MG: 40 GRANULE, DELAYED RELEASE ORAL at 08:08

## 2023-08-31 RX ADMIN — HYDRALAZINE HYDROCHLORIDE: 10 TABLET, FILM COATED ORAL at 08:08

## 2023-08-31 RX ADMIN — CALCITRIOL CAPSULES 0.25 MCG 0.25 MCG: 0.25 CAPSULE ORAL at 08:08

## 2023-08-31 RX ADMIN — SENNOSIDES AND DOCUSATE SODIUM 1 TABLET: 50; 8.6 TABLET ORAL at 09:08

## 2023-08-31 RX ADMIN — VALSARTAN 160 MG: 160 TABLET, FILM COATED ORAL at 09:08

## 2023-08-31 RX ADMIN — PANTOPRAZOLE SODIUM 40 MG: 40 GRANULE, DELAYED RELEASE ORAL at 09:08

## 2023-08-31 RX ADMIN — ONDANSETRON 4 MG: 4 TABLET, ORALLY DISINTEGRATING ORAL at 09:08

## 2023-08-31 RX ADMIN — SEVELAMER CARBONATE 0.8 G: 0.8 POWDER, FOR SUSPENSION ORAL at 01:08

## 2023-08-31 RX ADMIN — Medication 1 CAPSULE: at 09:08

## 2023-08-31 RX ADMIN — HYDRALAZINE HYDROCHLORIDE: 10 TABLET, FILM COATED ORAL at 09:08

## 2023-08-31 RX ADMIN — Medication 6 MG: at 09:08

## 2023-08-31 NOTE — PROGRESS NOTES
[facility] - Intensive Care ([place] [id number])  Physical Medicine & Rehab  Progress Note    Patient Name: Immanuel Bernal  MRN: [id number]  Admission Date: 8/9/2023  Length of Stay: 22 days  Attending Physician: Porsha Funez MD    Subjective:     Principal Problem:Delirium    Hospital Course:   8/19/23: Participated w/ OT. Patient completed trials x3 of sit to stand exercises at EOB. Initial plan was to have patient complete functional mobility within room, however, when EOB patient started to experience dizziness (nursing notified) and therapist opted against initial plan for safety. Patient required added time for rest breaks between trials of standing. Patient also completed 3-4 steps to R to reach HOB with min A using RW. Standing trials between 2-3 mins each with easy fatigue noted. [facility] 15.  8/28/23: Participated w/ PT. Sat EOB SBA x 10 minutes. Sit to stand maxA. [facility] 11.     Interval History 8/31/2023:  Patient is seen for follow-up PM&R evaluation and recommendations: Participating with therapy. Opthalmology follow up scheduled 9/7/23.     HPI, Past Medical, Family, and Social History remains the same as documented in the initial encounter.    Scheduled Medications:    sodium chloride 0.9%   Intravenous Once    sodium chloride 0.9%   Intravenous Once    acetaminophen  1,000 mg Per G Tube BID    albuterol-ipratropium  3 mL Nebulization Q6H WAKE    amLODIPine  5 mg Per G Tube Daily    atorvastatin  40 mg Per G Tube Daily    calcitRIOL  0.25 mcg Per G Tube Daily    carvediloL  6.25 mg Per G Tube BID    epoetin thee (PROCRIT) injection  3,000 Units Intravenous Every Mon, Wed, Fri    fluconazole 40 mg/ml  400 mg Per G Tube Daily    hydrALAZINE 10 mg 2 tablets, hydrALAZINE 25 mg 2 tablets, isorsorbide dinitrate 20 mg  2 tablets combination   Per G Tube TID    Lactobacillus rhamnosus GG  1 capsule Per G Tube BID    melatonin  6 mg Oral Nightly    meropenem (MERREM) IVPB  500 mg Intravenous Q12H     multivitamin  1 tablet Per G Tube Daily    ondansetron  4 mg Oral Q8H    pantoprazole  40 mg Per G Tube BID    polyethylene glycol  17 g Per G Tube BID    QUEtiapine  50 mg Per G Tube QHS    senna-docusate 8.6-50 mg  1 tablet Per G Tube BID    sevelamer carbonate  0.8 g Per G Tube TID WM    valsartan  160 mg Oral BID     Diagnostic Results:   Labs: Reviewed  ECG: Reviewed  CT: Reviewed    PRN Medications: 0.9%  NaCl infusion (for blood administration), 0.9%  NaCl infusion (for blood administration), sodium chloride 0.9%, dextrose 10%, dextrose 10%, glucagon (human recombinant), glucose, glucose, heparin (porcine), hydrALAZINE, meclizine, naloxone, OLANZapine, oxyCODONE, oxyCODONE-acetaminophen, prochlorperazine, sodium chloride 0.9%, sodium chloride 0.9%, sodium chloride 0.9%, Pharmacy to dose Vancomycin consult **AND** vancomycin - pharmacy to dose    Review of Systems   Musculoskeletal:  Positive for gait problem.   Neurological:  Positive for weakness. Negative for speech difficulty.   Psychiatric/Behavioral:  Negative for agitation and behavioral problems.      Objective:     Vital Signs (Most Recent):  Temp: 98.5 °F (36.9 °C) (08/31/23 1321)  Pulse: 65 (08/31/23 1321)  Resp: 15 (08/31/23 1321)  BP: 135/77 (08/31/23 1321)  SpO2: 95 % (08/31/23 1321)    Vital Signs (24h Range):  Temp:  [97.8 °F (36.6 °C)-98.6 °F (37 °C)] 98.5 °F (36.9 °C)  Pulse:  [59-71] 65  Resp:  [7-19] 15  SpO2:  [95 %-100 %] 95 %  BP: (135-168)/(77-94) 135/77     Physical Exam  Vitals and nursing note reviewed.   HENT:      Nose: Nose normal.      Mouth/Throat:      Mouth: Mucous membranes are moist.   Eyes:      Comments: L eye with dressing in place   Pulmonary:      Effort: Pulmonary effort is normal. No respiratory distress.   Musculoskeletal:      Comments: Deconditioned and frail  RUE mild swelling with staples   Skin:     General: Skin is warm.   Neurological:      Motor: Weakness present.      Gait: Gait abnormal.    Psychiatric:         Mood and Affect: Mood normal.         Behavior: Behavior normal.     Assessment/Plan:      Impaired mobility  - Related to prolonged/acute hospital course.     Recommendations  -  Encourage mobility, OOB in chair at least 3 hours per day, and early ambulation as appropriate  -  PT/OT evaluate and treat  -  Pain management  -  Monitor for and prevent skin breakdown and pressure ulcers  · Early mobility, repositioning/weight shifting every 20-30 minutes when sitting, turn patient every 2 hours, proper mattress/overlay and chair cushioning, pressure relief/heel protector boots  -  DVT prophylaxis    -  Reviewed discharge options (IP rehab, SNF, HH therapy, and OP therapy)    Endophthalmitis  - s/p evisceration on 8/16/23.  - Opthalmology last changed dressing 8/21/23 and recommending continue dressing changes (xeroform sheets) while inpt or outpt q2-3 days, Continue tobradex drops 4 times daily in the left eye, and Continue tobradex ointment 3 times daily in the left eye, including at night.   -[employer] plans to replace scleral shell packing x 14 days after surgery. As well as retina specialist f/u, which will be 8/30/23.     Delirium  -resolved     ESRD (end stage renal disease)  - HD MWF  - Continue through [facility] per vasc sx    PM&R Recommendation:     At this time, the PM&R team has reviewed this patient's ongoing medical case including inpatient diagnosis, medical history, clinical examination, labs, vitals, current social and functional history to provide the post-acute recommendation as follows:     RECOMMENDATIONS: inpatient rehabilitation due to good motivation/participation with therapies, has been determined to tolerate 3 hours of therapy and good potential for recovery.     The patient will be admitted for comprehensive interdisciplinary inpatient rehabilitation to address the impairments due to medical diagnosis of Debility. The patient will benefit from an inpatient rehabilitation  program to promote functional recovery, implement compensatory strategies and will undergo assessment for needs for durable medical equipment for safe discharge to the community. This patient will benefit from a coordinated interdisciplinary rehabilitation program services that require close monitoring and treatment with 24-hour rehabilitative nursing and physical/occupational therapies for 3 hours/day for 5 days/week.This interdisciplinary program will be performed under the direction of a physiatrist.    MEDICAL STABILITY:     At this time, no barriers for post-acute rehab admission.     We will continue to follow.    Kylah Crawford NP  Department of Physical Medicine & Rehab   [facility] ([place])

## 2023-08-31 NOTE — SUBJECTIVE & OBJECTIVE
Interval History: Patient seen and examined. Underwent left eye enucleation yesterday for which he tolerated well. Afebrile with pulse ranging from 70-60s bpm. Systolic blood pressures ranging from 160-130s mmHg. He is saturating +99% on room air. Metabolic panel reviewed. Plans for iHD tomorrow per schedule.    Review of patient's allergies indicates:   Allergen Reactions    Morphine Rash    Amiodarone analogues Itching     Other reaction(s): Unknown     Current Facility-Administered Medications   Medication Frequency    0.9%  NaCl infusion (for blood administration) Q24H PRN    0.9%  NaCl infusion (for blood administration) Q24H PRN    0.9%  NaCl infusion PRN    0.9%  NaCl infusion Once    0.9%  NaCl infusion Once    acetaminophen tablet 1,000 mg BID    albuterol-ipratropium 2.5 mg-0.5 mg/3 mL nebulizer solution 3 mL Q6H WAKE    amLODIPine tablet 5 mg Daily    atorvastatin tablet 40 mg Daily    calcitRIOL capsule 0.25 mcg Daily    carvediloL tablet 6.25 mg BID    dextrose 10% bolus 125 mL 125 mL PRN    dextrose 10% bolus 250 mL 250 mL PRN    dextrose 5 % (D5W) infusion Continuous    epoetin thee injection 3,000 Units Every Mon, Wed, Fri    fluconazole 40 mg/ml suspension 400 mg Daily    glucagon (human recombinant) injection 1 mg PRN    glucose chewable tablet 16 g PRN    glucose chewable tablet 24 g PRN    heparin (porcine) injection 1,000 Units PRN    hydrALAZINE 10 mg 2 tablets, hydrALAZINE 25 mg 2 tablets, isorsorbide dinitrate 20 mg  2 tablets combination TID    hydrALAZINE injection 10 mg Q6H PRN    Lactobacillus rhamnosus GG capsule 1 capsule BID    meclizine tablet 12.5 mg TID PRN    melatonin tablet 6 mg Nightly    meropenem (MERREM) 500 mg in sodium chloride 0.9 % 100 mL IVPB (MB+) Q12H    multivitamin tablet Daily    naloxone 0.4 mg/mL injection 0.02 mg PRN    OLANZapine injection 5 mg Nightly PRN    ondansetron disintegrating tablet 4 mg Q8H    oxyCODONE immediate release tablet Tab 10 mg Q4H PRN     oxyCODONE-acetaminophen 5-325 mg per tablet 1 tablet Q4H PRN    pantoprazole suspension 40 mg BID    polyethylene glycol packet 17 g BID    prochlorperazine injection Soln 10 mg Q6H PRN    QUEtiapine tablet 50 mg QHS    senna-docusate 8.6-50 mg per tablet 1 tablet BID    sevelamer carbonate pwpk 0.8 g TID WM    sodium chloride 0.9% bolus 250 mL 250 mL PRN    sodium chloride 0.9% flush 10 mL Q12H PRN    sodium chloride 0.9% flush 10 mL PRN    tobramycin-dexAMETHasone 0.3-0.1% ophthalmic ointment TID    tobramycin-dexAMETHasone 0.3-0.1% ophthalmic suspension 1 drop QID    valsartan tablet 160 mg BID    vancomycin - pharmacy to dose pharmacy to manage frequency       Objective:     Vital Signs (Most Recent):  Temp: 97.8 °F (36.6 °C) (08/30/23 1915)  Pulse: 67 (08/31/23 0324)  Resp: (!) 7 (08/31/23 0205)  BP: 136/84 (08/30/23 2345)  SpO2: 99 % (08/31/23 0500) Vital Signs (24h Range):  Temp:  [97.4 °F (36.3 °C)-98.3 °F (36.8 °C)] 97.8 °F (36.6 °C)  Pulse:  [59-71] 67  Resp:  [7-18] 7  SpO2:  [97 %-100 %] 99 %  BP: (136-196)/() 136/84     Weight: 62.8 kg (138 lb 7.2 oz) (08/23/23 0900)  Body mass index is 19.87 kg/m².  Body surface area is 1.76 meters squared.    I/O last 3 completed shifts:  In: 1448 [I.V.:150; Other:250; NG/GT:448; IV Piggyback:600]  Out: 2500 [Other:2500]     Physical Exam  Vitals and nursing note reviewed.   Constitutional:       General: He is awake. He is not in acute distress.     Appearance: He is cachectic. He is ill-appearing. He is not diaphoretic.      Comments: Muscle wasting/atrophy noted.   HENT:      Head: Normocephalic and atraumatic.      Right Ear: External ear normal.      Left Ear: External ear normal.      Nose: Nose normal.      Mouth/Throat:      Mouth: Mucous membranes are moist.      Pharynx: Oropharynx is clear. No oropharyngeal exudate or posterior oropharyngeal erythema.   Eyes:      Comments: Left eye with bandage/dressing in place.   Cardiovascular:      Rate and  Rhythm: Normal rate.      Heart sounds: No murmur heard.     No friction rub. No gallop.   Pulmonary:      Effort: Pulmonary effort is normal. No respiratory distress.      Breath sounds: No wheezing, rhonchi or rales.   Chest:      Comments: Tunneled HD catheter to right chest wall.  Abdominal:      General: Bowel sounds are normal. There is no distension.      Palpations: Abdomen is soft.      Tenderness: There is no abdominal tenderness.      Comments: PEG in place.   Musculoskeletal:      Cervical back: Neck supple.      Right lower leg: No edema.      Left lower leg: No edema.   Skin:     General: Skin is warm and dry.      Coloration: Skin is not jaundiced.      Comments: Stable noted to right upper extremity from recent surgery. Incision appears intact.   Neurological:      Mental Status: He is alert.      Cranial Nerves: No cranial nerve deficit.   Psychiatric:         Mood and Affect: Mood normal.         Behavior: Behavior normal.          Significant Labs:  ABGs:   Recent Labs   Lab 08/24/23  0836   PH 7.449   PCO2 31.1*   HCO3 21.6*   POCSATURATED 93*   BE -2       BMP:   Recent Labs   Lab 08/30/23  0612 08/31/23  0459   GLU 77 103   * 135*   K 4.6 4.7    101   CO2 19* 22*   BUN 43* 24*   CREATININE 5.1* 3.7*   CALCIUM 9.4 9.1   MG 2.3  --        CBC:   Recent Labs   Lab 08/31/23  0459   WBC 4.01   RBC 3.67*   HGB 11.1*   HCT 35.9*      MCV 98   MCH 30.2   MCHC 30.9*       CMP:   Recent Labs   Lab 08/31/23  0459      CALCIUM 9.1   ALBUMIN 2.2*   PROT 6.5   *   K 4.7   CO2 22*      BUN 24*   CREATININE 3.7*   ALKPHOS 127   ALT <5*   AST 12   BILITOT 0.2       LFTs:   Recent Labs   Lab 08/31/23  0459   ALT <5*   AST 12   ALKPHOS 127   BILITOT 0.2   PROT 6.5   ALBUMIN 2.2*       Microbiology Results (last 7 days)       Procedure Component Value Units Date/Time    CSF culture [[id number]]  (Abnormal) Collected: 08/26/23 1315    Order Status: Completed Specimen: CSF  (Spinal Fluid) from CSF Tap, Tube 3 Updated: 08/31/23 0658     CSF CULTURE Results called to and read back by:Kashif Zhang RN 08/30/2023  07:44      GRAM-POSITIVE VERONICA  From broth only  Identification pending       Gram Stain Result Cytospin indicates:      No WBC's      No organisms seen    Aerobic culture [[id number]] Collected: 08/30/23 1724    Order Status: Sent Specimen: Wound from Cornea, Left Updated: 08/30/23 1745    Culture, Anaerobe [[id number]] Collected: 08/30/23 1724    Order Status: Sent Specimen: Wound from Cornea, Left Updated: 08/30/23 1745    AFB Culture & Smear [[id number]] Collected: 08/26/23 1315    Order Status: Completed Specimen: CSF (Spinal Fluid) from CSF Tap, Tube 3 Updated: 08/28/23 1801     AFB Culture & Smear Culture in progress     AFB CULTURE STAIN No acid fast bacilli seen.    Fungus culture [[id number]] Collected: 08/26/23 1315    Order Status: Completed Specimen: CSF (Spinal Fluid) from CSF Tap, Tube 3 Updated: 08/28/23 1115     Fungus (Mycology) Culture Culture in progress    Cryptococcal antigen, CSF [[id number]] Collected: 08/26/23 1315    Order Status: Completed Specimen: CSF (Spinal Fluid) from CSF Tap, Tube 3 Updated: 08/28/23 0853     Crypto Ag, CSF Negative    Gram stain [[id number]] Collected: 08/26/23 1315    Order Status: Canceled Specimen: CSF (Spinal Fluid) from CSF Tap, Tube 3           Specimen (24h ago, onward)       Start     Ordered    08/30/23 1800  Specimen to Pathology, Surgery Ophthalmology  Once        Comments: Pre-op Diagnosis: Endophthalmitis [H44.009]Procedure(s):EVISCERATION, OCULAR CONTENTSORBITOTOMY Number of specimens: 1Name of specimens: LEFT EYEBALL     References:    Click here for ordering Quick Tip   Question Answer Comment   Procedure Type: Ophthalmology    Specimen Class: Routine/Screening    Which provider would you like to cc? ALEX JORGENSEN    Release to patient Immediate        08/30/23 1840                  Significant Imaging:  I have  reviewed all imagining in the last 24 hours.

## 2023-08-31 NOTE — NURSING TRANSFER
Nursing Transfer Note      8/30/2023   7:20 PM    Nurse giving handoff:PETER Luis RN   Nurse receiving handoff:evan Pradhan RN     Reason patient is being transferred: post procedure     Transfer To: [facility]    Transfer via bed    Transported by PCT     Transfer Vital Signs:  Blood Pressure:149/84  Heart Rate:62  O2:100% on RA   Temperature:97.8  Respirations:13    Medicines sent: yes     Any special needs or follow-up needed: routine     Chart send with patient: Yes    Patient reassessed at: 1915 on 8/30

## 2023-08-31 NOTE — ASSESSMENT & PLAN NOTE
Lumbar puncture was performed on 08/26 which initially showed no growth; On 08/30, CSF cultures were positive for Gram positive rods. Patient was already on Meropenem at time of known culture growth, and ID communicated that this would be adequate coverage for this infection. Delirium had already improved prior to culture growth as well, and patient is still alert and oriented x3 with no signs of delirium.    - Continue Meropenem per ID recs.

## 2023-08-31 NOTE — NURSING
Provider made aware that pt's diet has not been resumed since returning from surgery. Dr. Juarez also made aware pt has PEG in place. Orders received to advance diet as tolerated.

## 2023-08-31 NOTE — PLAN OF CARE
Problem: Adult Inpatient Plan of Care  Goal: Plan of Care Review  Outcome: Ongoing, Progressing  Goal: Patient-Specific Goal (Individualized)  Outcome: Ongoing, Progressing  Goal: Absence of Hospital-Acquired Illness or Injury  Outcome: Ongoing, Progressing  Goal: Optimal Comfort and Wellbeing  Outcome: Ongoing, Progressing  Goal: Readiness for Transition of Care  Outcome: Ongoing, Progressing     Alert and oriented x4 but intermittent forgetfulness/confusion noted. Stood up OOB with physical therapy today. Right eye dilated this morning. Patient denies pain in left eye; surgical dressing remains in place. Continuing meropenem. Tube feeds restarted. No BM today. Oliguria; condom catheter applied. Pt reports dysuria, MD aware and urinalysis collected. Plan of care reviewed.

## 2023-08-31 NOTE — ASSESSMENT & PLAN NOTE
- management per primary   - currently on Norvasc 5 mg daily, Coreg 6.25 mg BID, hydralazine 35 mg BID, isosorbide dinitrate 20 mg BID and valsartan 160 mg BID

## 2023-08-31 NOTE — PROGRESS NOTES
[facility] ([facility]  Adult Nutrition  Progress Note    SUMMARY       Recommendations    Diet advancement per MD. Rec'd Renal diet w/ texture per SLP.  Modify TF formula to Novasource. As tolerated, increase to goal rate of 40 mL/hr = 1920 kcals, 87 g of protein, 688 mL fluid.   - Decrease rate depending on PO intake/tolerance.   RD to monitor & follow-up.    Goals: Meet % EEN, EPN by RD f/u date  Nutrition Goal Status: progressing towards goal  Communication of RD Recs: reviewed with RN    Assessment and Plan    Severe malnutrition    Nutrition Problem:  Severe Protein-Calorie Malnutrition  Malnutrition in the context of Chronic Illness/Injury    Related to (etiology):  Inability to consume sufficient energy     Signs and Symptoms (as evidenced by):  Body Fat Depletion: severe depletion of orbitals and triceps   Muscle Mass Depletion: severe depletion of temples, clavicle region, scapular region and lower extremities   Weight Loss: 31% x 6-7 months     Interventions(treatment strategy):  Collaboration of nutrition care w/ other providers  EN    Nutrition Diagnosis Status:  Continues    Malnutrition Assessment    Malnutrition Context: chronic illness  Malnutrition Level: severe    Weight Loss (Malnutrition): greater than 10% in 6 months  Energy Intake (Malnutrition): other (see comments) (LAMONT)  Subcutaneous Fat (Malnutrition): severe depletion  Muscle Mass (Malnutrition): severe depletion   Orbital Region (Subcutaneous Fat Loss): severe depletion  Upper Arm Region (Subcutaneous Fat Loss): severe depletion   Islam Region (Muscle Loss): severe depletion  Clavicle Bone Region (Muscle Loss): severe depletion  Dorsal Hand (Muscle Loss): severe depletion  Patellar Region (Muscle Loss): severe depletion  Anterior Thigh Region (Muscle Loss): severe depletion     Reason for Assessment    Reason For Assessment: RD follow-up  Diagnosis: other (see comments) (Endophtalmitis)  Relevant Medical  "History: CHF, DM, ESRD on HD, PEG  Interdisciplinary Rounds: did not attend    General Information Comments: TFs restarted this AM following left eye enucleation yesterday. Team also planning to ADAT. Pt scheduled to discharge tomorrow. Pt received HD . UBW: 196# per chart review. NFPE complete 8/10 w/ severe wasting found. RD feels pt meets criteria for severe malnutrition. Please see PES statement for details.   Nutrition Discharge Planning: Pending clinical course    Nutrition/Diet History    Patient Reported Diet/Restrictions/Preferences: other (see comments) (LAMONT)  Spiritual, Cultural Beliefs, Mandaen Practices, Values that Affect Care: no  Factors Affecting Nutritional Intake: NPO  Nutrition Support Formula Prior to Admit: Other (see commments) (LAMONT)    Anthropometrics    Temp: 98.6 °F (37 °C)  Height Method: Stated  Height: 5' 10" (177.8 cm)  Height (inches): 70 in  Weight Method: Bed Scale  Weight: 62.8 kg (138 lb 7.2 oz)  Weight (lb): 138.45 lb  Ideal Body Weight (IBW), Male: 166 lb  % Ideal Body Weight, Male (lb): 83.4 %  BMI (Calculated): 19.9  BMI Grade: 18.5-24.9 - normal  Usual Body Weight (UBW), k kg  % Usual Body Weight: 69.47  % Weight Change From Usual Weight: -30.67 %    Lab/Procedures/Meds    Pertinent Labs Reviewed: reviewed  Pertinent Labs Comments: Creat 3.7, GFR 18, P 4.8  Pertinent Medications Reviewed: reviewed  Pertinent Medications Comments: D5, Statin, MVI    Estimated/Assessed Needs    Weight Used For Calorie Calculations: 62.8 kg (138 lb 7.2 oz)    Energy Calorie Requirements (kcal): 1884 kcal/d  Energy Need Method: Kcal/kg (30 kcal/kg)    Protein Requirements: 82 g/d (1.3 g/kg)  Weight Used For Protein Calculations: 62.8 kg (138 lb 7.2 oz)    Estimated Fluid Requirement Method: other (see comments) (Per MD or 1 mL/kcal)  RDA Method (mL): 1884    CHO Requirement: 236g    Nutrition Prescription Ordered    Current Diet Order: NPO  Current Nutrition Support Formula Ordered: " Impact Peptide 1.5  Current Nutrition Support Rate Ordered: 20 mL/hr    Evaluation of Received Nutrient/Fluid Intake    Enteral Calories (kcal): 720  Enteral Protein (gm): 45  Enteral (Free Water) Fluid (mL): 370  Free Water Flush Fluid (mL): 1200    % Kcal Needs: 38%  % Protein Needs: 55%    I/O: -3.7L since 8/17    Energy Calories Required: not meeting needs  Protein Required: not meeting needs  Fluid Required: other (see comments) (Per MD)    Comments: LBM: 8/30    Tolerance: tolerating    Nutrition Risk    Level of Risk/Frequency of Follow-up:  (1x/week)     Monitor and Evaluation    Food and Nutrient Intake: enteral nutrition intake, food and beverage intake, energy intake  Food and Nutrient Adminstration: enteral and parenteral nutrition administration, diet order  Physical Activity and Function: nutrition-related ADLs and IADLs  Anthropometric Measurements: weight, weight change  Biochemical Data, Medical Tests and Procedures: inflammatory profile, lipid profile, glucose/endocrine profile, gastrointestinal profile, electrolyte and renal panel  Nutrition-Focused Physical Findings: overall appearance     Nutrition Follow-Up    RD Follow-up?: Yes

## 2023-08-31 NOTE — PT/OT/SLP PROGRESS
"Occupational Therapy   Treatment    Name: Immanuel Bernal  MRN: [id number]  Admitting Diagnosis:  Delirium  1 Day Post-Op    Recommendations:     Discharge Recommendations: rehabilitation facility  Discharge Equipment Recommendations:  none  Barriers to discharge:       Assessment:     Immanuel Bernal is a 57 y.o. male with a medical diagnosis of Delirium.  He presents with the following performance deficits affecting function: weakness, impaired functional mobility, gait instability, impaired endurance, impaired self care skills, impaired balance, visual deficits, decreased lower extremity function, decreased upper extremity function, decreased safety awareness, impaired coordination.     Pt agreeable to therapy and tolerated session well. Pt requires light assistance for Bed mobility and moderate assistance for transfers. Pt showing improved cognition. He is c/o visual deficits and requiring assistance for visual scanning. Pt was able to take side steps with RW today.    Rehab Prognosis:  Good; patient would benefit from acute skilled OT services to address these deficits and reach maximum level of function.       Plan:     Patient to be seen 4 x/week to address the above listed problems via self-care/home management, therapeutic activities, therapeutic exercises, neuromuscular re-education  Plan of Care Expires: 08/25/23  Plan of Care Reviewed with: patient    Subjective     Chief Complaint: visual deficits  Patient/Family Comments/goals: "my right eye is dilated"   Pain/Comfort:  Pain Rating 1: 0/10  Pain Rating Post-Intervention 1: 0/10    Objective:     Communicated with: RN prior to session.  Patient found HOB elevated with telemetry, PEG Tube, blood pressure cuff, bed alarm, central line, pulse ox (continuous), PICC line upon OT entry to room.  A client care conference was completed by the OTR and the CABRERA prior to treatment by the CABRERA to discuss the patient's POC and current status.    General " Precautions: Standard, fall, vision impaired    Orthopedic Precautions:N/A  Braces: N/A  Respiratory Status: Room air     Occupational Performance:     Bed Mobility:    Patient completed Scooting/Bridging with contact guard assistance  Patient completed Supine to Sit with contact guard assistance  Patient completed Sit to Supine with contact guard assistance     Functional Mobility/Transfers:  Patient completed Sit <> Stand Transfer with moderate assistance  with  no assistive device and rolling walker  x 2 trials  Functional Mobility: pt taking 3-4 lateral steps to R side with Min A using RW. Pt presents BLE weakness and posterior lean. No SOB noted    Activities of Daily Living:  Grooming: minimum assistance d/t visual deficits requiring verbal cues and hand-over-hand assistance to find toothbrush/toothpaste      [facility] 6 Click ADL: 15    Treatment & Education:  Pt educated on OT POC and frequency during hospital stay.     Pt educated on compensatory strategies to address visual deficits.    Pt educated on proper hand placement for RW mgmt to improve safety.    Addressed all patient questions/concerns within CABRERA scope of practice.     Patient left HOB elevated with all lines intact, call button in reach, and RN notified    GOALS:   Multidisciplinary Problems       Occupational Therapy Goals          Problem: Occupational Therapy    Goal Priority Disciplines Outcome Interventions   Occupational Therapy Goal     OT, PT/OT Ongoing, Progressing    Description: Goals to be met by: 8/25/23 . Goals reviewed and updated as needed to be met by 09-15-23    Patient will increase functional independence with ADLs by performing:    UE Dressing with Minimal Assistance.  Grooming while seated with Stand-by Assistance.  Toileting from bedside commode with Minimal Assistance for hygiene and clothing management. NOT MET  Revised: Toileting from bedside commode with Mod A  Sit to stand transfer with Contact Guard Assistance and use  of RW. NOT MET  Revised: sit to stand transfer with Min A  Sitting at edge of bed >25 minutes with Supervision.  Step transfer with Minimal Assistance and use of RW.   Toilet transfer to bedside commode with Minimal Assistance and use of RW.                          Time Tracking:     OT Date of Treatment: 08/31/23  OT Start Time: 0919  OT Stop Time: 0946  OT Total Time (min): 27 min    Billable Minutes:Self Care/Home Management 10  Therapeutic Activity 17    OT/MORRO: MORRO     Number of MORRO visits since last OT visit: 2    8/31/2023

## 2023-08-31 NOTE — ASSESSMENT & PLAN NOTE
S/p L eye evisceration on 8/16/23.  Repeat MRI head/orbit with ongoing L scleral infection and cellulitis    - ID and ophthalmology following  - Continue meropenem and fluconazole; Tobramycin drops discontinued following successful eye evisceration completion with eye implant.  - Anticipated 4-6 weeks of therapy from evisceration. Anticipated end date: 9/12-9/26. See OPAT note from 8/19.  - Tunneled PICC line was placed for Antibiotics. (08/29)  - Follow up all culture data  - Ophthalmology to perform packing changes every 2-3 days  - Completion of left eye evisceration plus eye implant done 08/30, patient tolerated well.  - Per Ophthalmology, continue to cover left eye for 1 week; does not need eye drops within one week.  - Follow up in Ophthalmology clinic on Thursday (09/07).

## 2023-08-31 NOTE — PLAN OF CARE
Pt Aox4 and follows commands. Room air. NS. Pt arrived back to floor from PACU. Gauze and patch over left eye. Pt denies pain. Provider notified that pt was hungry. Orders received to advance diet as tolerated. Pt given broth and ginger ale. Pt tolerated well. No reports of nausea. No acute events overnight. Sister updated. Bed in lowest position and call light within reach.

## 2023-08-31 NOTE — PLAN OF CARE
[facility] ([facility])  Discharge Reassessment    Primary Care Provider: No, Primary Doctor    Expected Discharge Date: 9/1/2023    Reassessment (most recent)       Discharge Reassessment - 08/31/23 1127          Discharge Reassessment    Assessment Type Discharge Planning Reassessment (P)      Did the patient's condition or plan change since previous assessment? Yes (P)      Communicated TOBY with patient/caregiver Yes (P)      Discharge Plan A Rehab (P)      Discharge Plan B Rehab (P)                      Patient is expected to dc to [facility] tomorrow. SW will continue to follow up.       Mirta Melendez LMSW  [facility]   [id number]

## 2023-08-31 NOTE — PROGRESS NOTES
[facility] ([place] [address])  Nephrology  Progress Note    Patient Name: Immanuel Bernal  MRN: [id number]  Admission Date: 8/9/2023  Hospital Length of Stay: 22 days  Attending Provider: Porsha Funez MD   Primary Care Physician: Dolly, Primary Doctor  Principal Problem:Delirium    Subjective:     HPI: HPI obtained per medical record as patient unable to communicate     57-year-old male with a history of CHF, diabetes, hypertension, chronic disability, end-stage renal disease on hemodialysis, PEG placement, bowel obstruction, sleep apnea, TIA, left eye vitreous hemorrhage, stroke, and bowel obstruction with bowel resection admitted to [facility] in [place] July 18 from nursing home with left eye pain and blurred vision.  He was admitted with concern for bilateral endophthalmitis.  Other admit diagnoses included right upper extremity thrombus, end-stage renal disease on hemodialysis, hyperkalemia, sleep apnea, diabetes, and CHF.  He was treated with broad-spectrum antibiotics.  He was seen by Infectious Diseases,, and he was treated with vancomycin, ceftriaxone, and amphotericin.  Blood cultures were positive for Pseudomonas, and amphotericin/vancomycin were stopped.  IV cefepime was continued.  He was seen by Ophthalmology, and he received intravitreal vancomycin and ceftazidime (July 18).  He also had intravitreal tap July 18 that had no yeast or fungal elements observed.  Left eye endophthalmitis did not respond to treatment, and he subsequently developed abscess formation, significant proptosis, and drainage of purulent material from the orbit.  Ophthalmology recommended enucleation.  He was continued on cefepime for pseudomonal and ophthalmitis.  Recommendation was for 6 weeks of treatment to be followed by indefinite fluoroquinolone.  He was seen by Pulmonology during his stay for a right upper lung mass with peripheral nodules.  It was felt that he would need further investigation  of this once his current clinical issues stabilized.  Right upper extremity AV graft was excised during his stay with concern for infection.  A right IJ tunneled line was placed on July 27.  Left eye endophthalmitis continued to worsen, and ophthalmology spoke with patient and family about the need for enucleation.  Despite several conversations, family declined enucleation.  Plans were for transitioned to skilled nursing facility for continued treatment, but family did not want him to go to a skilled nursing facility.  He was subsequently discharged AMA from the hospital there on August 7.  Please see the August 7 Internal Medicine note for further details.     He subsequently presented to [facility] Emergency Department.  He will not go back to [facility] in [place]. He received Zosyn and vancomycin.  ED team at [facility] spoke with the patient and his power-of- (sister).  CT of the orbits noted scleral abscess along the lateral aspect of the left globe.  Patient and family are aware and in agreement that the patient needs enucleation of the eye at this time. Referring provider spoke with Oculoplastics at [facility]. Requesting transfer to [facility] Medicine for Oculoplastics specialty evaluation of persistent endophthalmitis.  ED provider noted patient is hemodynamically stable.  He does not appear volume overloaded or in respiratory distress.        The patient has a significant previous medical course as listed below  August 3: MRI brain and orbits showed worsening ophthalmitis and inflammatory changes of the left orbital tissues with slight increase in proptosis.  No evidence of intracranial inflammatory process observed.    July 25: Transesophageal echocardiogram had no evidence of endocarditis.  Moderate to severely decreased left ventricular systolic function with EF 30-35%.    July 22:  CT chest showed right upper lobe mass with numerous bilateral nodularity  predominantly in the right with associated mediastinal lymph nodes.  July 21: Blood cultures with Pseudomonas aeruginosa  July 19: MRI brain/orbits with and without contrast had findings suggestive of chronic microvascular ischemic disease of the white matter with remote ischemic event in the left insula/basal ganglia/subependymal white matter/right middle cerebellar peduncle and hemisphere.  Findings consistent with left endophthalmitis.  No abnormal findings observed in the right globe.  July 18:  Blood cultures with Pseudomonas aeruginosa and coag-negative staph    HPI obtained from hospital med's note. Patient with AMS at time of nephrology evaluation and unable to provide HPI or ROS.          Interval History: Patient seen and examined. Underwent left eye enucleation yesterday for which he tolerated well. Afebrile with pulse ranging from 70-60s bpm. Systolic blood pressures ranging from 160-130s mmHg. He is saturating +99% on room air. Metabolic panel reviewed. Plans for iHD tomorrow per schedule.    Review of patient's allergies indicates:   Allergen Reactions    Morphine Rash    Amiodarone analogues Itching     Other reaction(s): Unknown     Current Facility-Administered Medications   Medication Frequency    0.9%  NaCl infusion (for blood administration) Q24H PRN    0.9%  NaCl infusion (for blood administration) Q24H PRN    0.9%  NaCl infusion PRN    0.9%  NaCl infusion Once    0.9%  NaCl infusion Once    acetaminophen tablet 1,000 mg BID    albuterol-ipratropium 2.5 mg-0.5 mg/3 mL nebulizer solution 3 mL Q6H WAKE    amLODIPine tablet 5 mg Daily    atorvastatin tablet 40 mg Daily    calcitRIOL capsule 0.25 mcg Daily    carvediloL tablet 6.25 mg BID    dextrose 10% bolus 125 mL 125 mL PRN    dextrose 10% bolus 250 mL 250 mL PRN    dextrose 5 % (D5W) infusion Continuous    epoetin thee injection 3,000 Units Every Mon, Wed, Fri    fluconazole 40 mg/ml suspension 400 mg Daily    glucagon (human  recombinant) injection 1 mg PRN    glucose chewable tablet 16 g PRN    glucose chewable tablet 24 g PRN    heparin (porcine) injection 1,000 Units PRN    hydrALAZINE 10 mg 2 tablets, hydrALAZINE 25 mg 2 tablets, isorsorbide dinitrate 20 mg  2 tablets combination TID    hydrALAZINE injection 10 mg Q6H PRN    Lactobacillus rhamnosus GG capsule 1 capsule BID    meclizine tablet 12.5 mg TID PRN    melatonin tablet 6 mg Nightly    meropenem (MERREM) 500 mg in sodium chloride 0.9 % 100 mL IVPB (MB+) Q12H    multivitamin tablet Daily    naloxone 0.4 mg/mL injection 0.02 mg PRN    OLANZapine injection 5 mg Nightly PRN    ondansetron disintegrating tablet 4 mg Q8H    oxyCODONE immediate release tablet Tab 10 mg Q4H PRN    oxyCODONE-acetaminophen 5-325 mg per tablet 1 tablet Q4H PRN    pantoprazole suspension 40 mg BID    polyethylene glycol packet 17 g BID    prochlorperazine injection Soln 10 mg Q6H PRN    QUEtiapine tablet 50 mg QHS    senna-docusate 8.6-50 mg per tablet 1 tablet BID    sevelamer carbonate pwpk 0.8 g TID WM    sodium chloride 0.9% bolus 250 mL 250 mL PRN    sodium chloride 0.9% flush 10 mL Q12H PRN    sodium chloride 0.9% flush 10 mL PRN    tobramycin-dexAMETHasone 0.3-0.1% ophthalmic ointment TID    tobramycin-dexAMETHasone 0.3-0.1% ophthalmic suspension 1 drop QID    valsartan tablet 160 mg BID    vancomycin - pharmacy to dose pharmacy to manage frequency       Objective:     Vital Signs (Most Recent):  Temp: 97.8 °F (36.6 °C) (08/30/23 1915)  Pulse: 67 (08/31/23 0324)  Resp: (!) 7 (08/31/23 0205)  BP: 136/84 (08/30/23 2345)  SpO2: 99 % (08/31/23 0500) Vital Signs (24h Range):  Temp:  [97.4 °F (36.3 °C)-98.3 °F (36.8 °C)] 97.8 °F (36.6 °C)  Pulse:  [59-71] 67  Resp:  [7-18] 7  SpO2:  [97 %-100 %] 99 %  BP: (136-196)/() 136/84     Weight: 62.8 kg (138 lb 7.2 oz) (08/23/23 0900)  Body mass index is 19.87 kg/m².  Body surface area is 1.76 meters squared.    I/O last 3  completed shifts:  In: 1448 [I.V.:150; Other:250; NG/GT:448; IV Piggyback:600]  Out: 2500 [Other:2500]    Physical Exam  Vitals and nursing note reviewed.   Constitutional:       General: He is awake. He is not in acute distress.     Appearance: He is cachectic. He is ill-appearing. He is not diaphoretic.      Comments: Muscle wasting/atrophy noted.   HENT:      Head: Normocephalic and atraumatic.      Right Ear: External ear normal.      Left Ear: External ear normal.      Nose: Nose normal.      Mouth/Throat:      Mouth: Mucous membranes are moist.      Pharynx: Oropharynx is clear. No oropharyngeal exudate or posterior oropharyngeal erythema.   Eyes:      Comments: Left eye with bandage/dressing in place.   Cardiovascular:      Rate and Rhythm: Normal rate.      Heart sounds: No murmur heard.     No friction rub. No gallop.   Pulmonary:      Effort: Pulmonary effort is normal. No respiratory distress.      Breath sounds: No wheezing, rhonchi or rales.   Chest:      Comments: Tunneled HD catheter to right chest wall.  Abdominal:      General: Bowel sounds are normal. There is no distension.      Palpations: Abdomen is soft.      Tenderness: There is no abdominal tenderness.      Comments: PEG in place.   Musculoskeletal:      Cervical back: Neck supple.      Right lower leg: No edema.      Left lower leg: No edema.   Skin:     General: Skin is warm and dry.      Coloration: Skin is not jaundiced.      Comments: Stable noted to right upper extremity from recent surgery. Incision appears intact.   Neurological:      Mental Status: He is alert.      Cranial Nerves: No cranial nerve deficit.   Psychiatric:         Mood and Affect: Mood normal.         Behavior: Behavior normal.          Significant Labs:  ABGs:   Recent Labs   Lab 08/24/23  0836   PH 7.449   PCO2 31.1*   HCO3 21.6*   POCSATURATED 93*   BE -2       BMP:   Recent Labs   Lab 08/30/23  0612 08/31/23  0459   GLU 77 103   * 135*   K 4.6 4.7     101   CO2 19* 22*   BUN 43* 24*   CREATININE 5.1* 3.7*   CALCIUM 9.4 9.1   MG 2.3  --        CBC:   Recent Labs   Lab 08/31/23 0459   WBC 4.01   RBC 3.67*   HGB 11.1*   HCT 35.9*      MCV 98   MCH 30.2   MCHC 30.9*       CMP:   Recent Labs   Lab 08/31/23 0459      CALCIUM 9.1   ALBUMIN 2.2*   PROT 6.5   *   K 4.7   CO2 22*      BUN 24*   CREATININE 3.7*   ALKPHOS 127   ALT <5*   AST 12   BILITOT 0.2       LFTs:   Recent Labs   Lab 08/31/23 0459   ALT <5*   AST 12   ALKPHOS 127   BILITOT 0.2   PROT 6.5   ALBUMIN 2.2*       Microbiology Results (last 7 days)       Procedure Component Value Units Date/Time    CSF culture [[id number]]  (Abnormal) Collected: 08/26/23 1315    Order Status: Completed Specimen: CSF (Spinal Fluid) from CSF Tap, Tube 3 Updated: 08/31/23 0658     CSF CULTURE Results called to and read back by:Kashif Zhang RN 08/30/2023  07:44      GRAM-POSITIVE VERONICA  From broth only  Identification pending       Gram Stain Result Cytospin indicates:      No WBC's      No organisms seen    Aerobic culture [[id number]] Collected: 08/30/23 1724    Order Status: Sent Specimen: Wound from Cornea, Left Updated: 08/30/23 1745    Culture, Anaerobe [[id number]] Collected: 08/30/23 1724    Order Status: Sent Specimen: Wound from Cornea, Left Updated: 08/30/23 1745    AFB Culture & Smear [[id number]] Collected: 08/26/23 1315    Order Status: Completed Specimen: CSF (Spinal Fluid) from CSF Tap, Tube 3 Updated: 08/28/23 1801     AFB Culture & Smear Culture in progress     AFB CULTURE STAIN No acid fast bacilli seen.    Fungus culture [[id number]] Collected: 08/26/23 1315    Order Status: Completed Specimen: CSF (Spinal Fluid) from CSF Tap, Tube 3 Updated: 08/28/23 1115     Fungus (Mycology) Culture Culture in progress    Cryptococcal antigen, CSF [[id number]] Collected: 08/26/23 1315    Order Status: Completed Specimen: CSF (Spinal Fluid) from CSF Tap, Tube 3 Updated: 08/28/23 0853     Madelin Ag,  CSF Negative    Gram stain [[id number]] Collected: 08/26/23 1315    Order Status: Canceled Specimen: CSF (Spinal Fluid) from CSF Tap, Tube 3           Specimen (24h ago, onward)       Start     Ordered    08/30/23 1800  Specimen to Pathology, Surgery Ophthalmology  Once        Comments: Pre-op Diagnosis: Endophthalmitis [H44.009]Procedure(s):EVISCERATION, OCULAR CONTENTSORBITOTOMY Number of specimens: 1Name of specimens: LEFT EYEBALL     References:    Click here for ordering Quick Tip   Question Answer Comment   Procedure Type: Ophthalmology    Specimen Class: Routine/Screening    Which provider would you like to cc? ALEX JORGENSEN    Release to patient Immediate        08/30/23 1840                  Significant Imaging:  I have reviewed all imagining in the last 24 hours.    Assessment/Plan:     Ophtho  Endophthalmitis  - Ophthalmology consulted and following  - s/p left eye enucleation on 8/30    Cardiac/Vascular  Hypertension  - management per primary   - currently on Norvasc 5 mg daily, Coreg 6.25 mg BID, hydralazine 35 mg BID, isosorbide dinitrate 20 mg BID and valsartan 160 mg BID    Renal/  ESRD (end stage renal disease)  Outpatient HD Information:  -Outpatient HD unit: C   -HD tx days: MWF   -HD tx time: 210min  -HD access: R IJ TDC   -HD modality: iHD   -Residual urine: ?    Plan/Recommendations:  - plan for iHD tomorrow for metabolic clearance and volume management   - can continue calcitriol 0.25 mcg daily for now  - renal diet when not NPO  - strict I/O's and daily weights  - daily renal function panels and magnesium levels  - renally all dose medications to eGFR  - avoid gadolinium, fleets, phos-based laxatives, NSAIDs, etc.    ID  AV fistula infection  - secondary to Pseudomonas  - s/p right upper extremity AV graft excision in July  - Infectious Disease consulted and following   - plan for 4-6 weeks total of meropenem, vancomycin and fluconazole    Oncology  Anemia in ESRD (end-stage renal  disease)  - target Hg of 10-12  - hemoglobin this morning 11.1  - continue epogen 3,000 units every Monday, Wednesday and Friday     GI  Duodenal ulceration  - management per primary team    Thank you for your consult. I will follow-up with patient. Please contact us if you have any additional questions.    Herbert Cedeño MD  Nephrology  [facility] - Intensive Care ([place]-[address])

## 2023-08-31 NOTE — PLAN OF CARE
Problem: Physical Therapy  Goal: Physical Therapy Goal  Description: Goals to be met by: 23     Patient will increase functional independence with mobility by performin. Supine to sit with MInimal Assistance  2. Sit to stand transfer with Minimal Assistance using LRAD  3. Gait  x 10 feet with Minimal Assistance using LRAD.     Outcome: Ongoing, Progressing

## 2023-08-31 NOTE — SUBJECTIVE & OBJECTIVE
Interval History: Patient was seen this morning, and he stated he felt well overall. Reported the eye surgery went well yesterday, and he isn't having any eye pain. He does endorse some report some burning with urination, increased urinary frequency, and incomplete voiding. Also he says he tolerated the liquid diet well following the surgery and would like his diet advanced to solid food. Discussed with the patient that he will likely be discharged to [facility] tomorrow following his early morning dialysis.     Review of Systems   Constitutional:  Negative for chills, diaphoresis and fever.   HENT:  Negative for facial swelling and sinus pain.    Eyes:  Positive for visual disturbance. Negative for pain and discharge.   Respiratory: Negative.     Cardiovascular: Negative.    Gastrointestinal: Negative.    Genitourinary:  Positive for decreased urine volume, dysuria and urgency.   Musculoskeletal: Negative.    Neurological: Negative.    Psychiatric/Behavioral:  Negative for agitation, behavioral problems and confusion.      Objective:     Vital Signs (Most Recent):  Temp: 98.5 °F (36.9 °C) (08/31/23 1321)  Pulse: 66 (08/31/23 1515)  Resp: 15 (08/31/23 1321)  BP: 135/77 (08/31/23 1321)  SpO2: 95 % (08/31/23 1321) Vital Signs (24h Range):  Temp:  [97.8 °F (36.6 °C)-98.6 °F (37 °C)] 98.5 °F (36.9 °C)  Pulse:  [62-71] 66  Resp:  [7-19] 15  SpO2:  [95 %-100 %] 95 %  BP: (135-168)/(77-94) 135/77     Weight: 62.8 kg (138 lb 7.2 oz)  Body mass index is 19.87 kg/m².    Intake/Output Summary (Last 24 hours) at 8/31/2023 1537  Last data filed at 8/31/2023 0750  Gross per 24 hour   Intake 750 ml   Output --   Net 750 ml         Physical Exam  Constitutional:       Appearance: Normal appearance.   HENT:      Head: Normocephalic.      Comments: Eye shield covering left eye     Mouth/Throat:      Mouth: Mucous membranes are dry.   Eyes:      Comments: Right eye clear and pupil round and reactive; left eye covered by eye shield.    Cardiovascular:      Rate and Rhythm: Normal rate and regular rhythm.      Pulses: Normal pulses.      Heart sounds: Normal heart sounds.   Pulmonary:      Effort: Pulmonary effort is normal. No respiratory distress.      Breath sounds: Normal breath sounds. No wheezing.   Abdominal:      General: Abdomen is flat. There is no distension.      Palpations: Abdomen is soft.      Tenderness: There is no abdominal tenderness.   Genitourinary:     Penis: Normal.    Musculoskeletal:      Right lower leg: No edema.      Left lower leg: No edema.   Skin:     General: Skin is warm and dry.   Neurological:      General: No focal deficit present.      Mental Status: He is alert and oriented to person, place, and time.      Cranial Nerves: No cranial nerve deficit.   Psychiatric:         Mood and Affect: Mood normal.         Behavior: Behavior normal.             Significant Labs: All pertinent labs within the past 24 hours have been reviewed.    Significant Imaging: I have reviewed all pertinent imaging results/findings within the past 24 hours.

## 2023-08-31 NOTE — ASSESSMENT & PLAN NOTE
- target Hg of 10-12  - hemoglobin this morning 11.1  - continue epogen 3,000 units every Monday, Wednesday and Friday

## 2023-08-31 NOTE — PLAN OF CARE
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]  Yes    Duodenal ulceration [K26.9]  Yes    Impaired mobility [Z74.09]  Yes     Chronic    Anemia in ESRD (end-stage renal disease) [N18.6, D63.1]  Yes     Chronic    PEG (percutaneous endoscopic gastrostomy) status [Z93.1]  Not Applicable     Chronic    Severe malnutrition [E43]  Yes     Chronic    ESRD (end stage renal disease) [N18.6]  Yes     Chronic    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 High amounts of sodium can be found in many foods that are served at fast food restaurants.  Sodium controls fluid balance in our bodies and maintains blood volume and blood pressure. Eating too much sodium may raise blood pressure and cause fluid retention, which could lead to swelling of the legs and feet or other health issues. When limiting sodium in your diet, a common target is to eat less than 2,000 milligrams of sodium per day.          Resolved Hospital Problems    Diagnosis Date Resolved POA    Hypoglycemia [E16.2] 08/27/2023 Yes    Acute metabolic encephalopathy [G93.41] 08/15/2023 Yes    Cholecystostomy care [Z43.4] 08/15/2023 Not Applicable    Chronic kidney disease-mineral and bone disorder [N18.9, E83.9, M89.9] 08/24/2023 Unknown     Anemia in chronic kidney disease [N18.9, D63.1] 08/10/2023 Yes    Bipolar disorder [F31.9] 08/15/2023 Yes    Atrial fibrillation [I48.91] 08/27/2023 Yes    Persistent proteinuria [R80.1] 08/15/2023 Yes     Chronic     2 g proteinuria noted  Resumed ACE  Lower BP systolic to less than 130       CKD (chronic kidney disease) stage 3, GFR 30-59 ml/min [N18.30] 08/15/2023 Yes     Diabetic since 2009    CKD diagnosed in [facility] 2018  Not clear when the CKD was diagnosed    GFR 25-30 ml/min  Proteinuria >2g  Resume ACE and increase as tolerated         Allergies:  Review of patient's allergies indicates:   Allergen Reactions    Morphine Rash    Amiodarone analogues Itching     Other reaction(s): Unknown       Diet: renal diet    Activities: Activity as tolerated    Goals of Care Treatment Preferences:  Code Status: Full Code    Health care agent: Marley Bernal  [facility] care agent number: No value filed.          What is most important right now is to focus on extending life as long as possible, even it it means sacrificing quality.  Accordingly, we have decided that the best plan to meet the patient's goals includes continuing with treatment.      Nursing:      Labs:       CONSULTS:    Physical Therapy to evaluate and treat.  and Occupational Therapy to evaluate and treat.    MISCELLANEOUS CARE:  PEG Care: Clean site every 24 hours.  and Diabetes Care:   SN to perform and educate Diabetic management with blood glucose monitoring:    WOUND CARE ORDERS  Right Arm--Cleanse with Vashe, apply xeroform to incision, cover with gauze 4x4, cast padding, tubinetting.    L lower butt - Always cleanse wound before applying Triad.  Triad can be directly applied from the tube or by using a gloved finger.  Gently spread Triad evenly over the area of application to the thickness of a dime.  To remove Triad:   Use pH-balanced wound cleanser to soften Triad  Gently wipe without scrubbing  For complete removal, repeat as  needed.    Medications: Review discharge medications with patient and family and provide education.      Antibiotic Therapy Plan:        1) Infection: Pseudomonas bacteremia and Endophthalmitis with concern for extension into CNS      2) Discharge Antibiotics:     Intravenous antibiotics:  Meropenem 500 mg IV q 12 hours   Vancomycin 500 mg 3x/weekly after HD     Oral antibiotics:  Fluconazole 400 mg PO q 24 hours      3) Therapy Duration:  4-6 weeks     Estimated end date of IV antibiotics: 9/12/23-9/26/23     4) Outpatient Weekly Labs:     Order the following labs to be drawn on Mondays:   CBC  CMP   CRP     Vancomycin trough. Target 15-20     If vancomycin trough is not at target (15-20) prior to discharge, schedule vancomycin trough to be drawn before their fourth outpatient dose.     5) Fax Lab Results to Infectious Diseases Provider: Dr. Gideon Meneses     [facility] Fax Number: [phone]     6) Outpatient Infectious Diseases Follow-up     Follow-up appointment will be arranged by the ID clinic and will be found in the patient's appointments tab.     Prior to discharge, please ensure the patient's follow-up has been scheduled.    If there is still no follow-up scheduled prior to discharge, please send an EPIC message to Gisele Castro in Infectious Diseases.       Current Discharge Medication List        START taking these medications    Details   dextrose 5 % in water (D5W) PgBk 100 mL with vancomycin 500 mg SolR 500 mg Inject 500 mg into the vein every Mon, Wed, Fri. Administer vancomycin 500 mg post dialysis on HD days. Goal pre-HD vancomycin level 15 - 20    Associated Diagnoses: Endophthalmitis, unspecified laterality      fluconazole 40 mg/ml (DIFLUCAN) 40 mg/mL suspension 10 mLs (400 mg total) by Per G Tube route once daily.  Refills: 0      isosorbide-hydrALAZINE 20-37.5 mg (BIDIL) 20-37.5 mg Tab Take 2 tablets by mouth 3 (three) times daily.  Qty: 90 tablet, Refills: 11      Lactobacillus  rhamnosus GG (CULTURELLE) 10 billion cell capsule 1 capsule by Per G Tube route 2 (two) times daily.      melatonin (MELATIN) 3 mg tablet Take 2 tablets (6 mg total) by mouth nightly.  Refills: 0      ondansetron (ZOFRAN-ODT) 8 MG TbDL Take 1 tablet (8 mg total) by mouth every 6 (six) hours as needed.      pantoprazole (PROTONIX) 40 mg suspension 1 packet (40 mg total) by Per G Tube route 2 (two) times daily.  Qty: 60 packet, Refills: 11      QUEtiapine (SEROQUEL) 50 MG tablet 1 tablet (50 mg total) by Per G Tube route every evening.  Qty: 30 tablet, Refills: 11      senna-docusate 8.6-50 mg (PERICOLACE) 8.6-50 mg per tablet 1 tablet by Per G Tube route 2 (two) times daily.      sevelamer carbonate (RENVELA) 0.8 gram PwPk 1 packet (0.8 g total) by Per G Tube route 3 (three) times daily with meals.  Qty: 90 packet, Refills: 11      valsartan (DIOVAN) 160 MG tablet Take 1 tablet (160 mg total) by mouth 2 (two) times daily.  Qty: 180 tablet, Refills: 3    Comments: .           CONTINUE these medications which have CHANGED    Details   atorvastatin (LIPITOR) 40 MG tablet 1 tablet (40 mg total) by Per G Tube route once daily.  Qty: 90 tablet, Refills: 3      calcitRIOL (ROCALTROL) 0.25 MCG Cap Take 1 capsule (0.25 mcg total) by mouth once daily.      carvediloL (COREG) 6.25 MG tablet 1 tablet (6.25 mg total) by Per G Tube route 2 (two) times daily.  Qty: 60 tablet, Refills: 11    Comments: .           CONTINUE these medications which have NOT CHANGED    Details   acetaminophen (TYLENOL) 325 MG tablet Take 650 mg by mouth every 6 (six) hours as needed for Pain.      albuterol-ipratropium (DUO-NEB) 2.5 mg-0.5 mg/3 mL nebulizer solution Inhale 3 mLs into the lungs 3 (three) times daily.      ascorbic acid, vitamin C, (VITAMIN C) 500 MG tablet Take 500 mg by mouth once daily.      aspirin 81 MG Chew Take 81 mg by mouth once daily.      calcium carbonate-vitamin D3 (OYSTER SHELL CALCIUM-VIT D3) 500 mg-5 mcg (200 unit) PwPk  Take 1 tablet by mouth 2 (two) times a day.      folic acid (FOLVITE) 1 MG tablet Take 1 tablet by mouth every morning.      megestroL (MEGACE) 400 mg/10 mL (40 mg/mL) Susp Take 10 mLs by mouth 2 (two) times a day.      multivitamin (THERAGRAN) per tablet Take 1 tablet by mouth once daily.      polyethylene glycol (GLYCOLAX) 17 gram/dose powder Take 17 g by mouth 2 (two) times daily.      tamsulosin (FLOMAX) 0.4 mg Cap Take 1 capsule (0.4 mg total) by mouth once daily.  Qty: 30 capsule, Refills: 6      VITAMIN B COMPLEX ORAL Take 1 tablet by mouth every morning.      zinc gluconate 50 mg tablet Take 50 mg by mouth once daily.           STOP taking these medications       amLODIPine (NORVASC) 10 MG tablet Comments:   Reason for Stopping:         citalopram (CELEXA) 20 MG tablet Comments:   Reason for Stopping:         coffee xt/phosphatidyl serine (NEURIVA ORIGINAL ORAL) Comments:   Reason for Stopping:         flecainide (TAMBOCOR) 50 MG Tab Comments:   Reason for Stopping:         ibuprofen (ADVIL,MOTRIN) 600 MG tablet Comments:   Reason for Stopping:         lactulose (CHRONULAC) 10 gram/15 mL solution Comments:   Reason for Stopping:         metoclopramide HCl (REGLAN) 5 MG tablet Comments:   Reason for Stopping:         pantoprazole (PROTONIX) 40 MG tablet Comments:   Reason for Stopping:         pentoxifylline (TRENTAL) 400 mg TbSR Comments:   Reason for Stopping:         rosuvastatin (CRESTOR) 5 MG tablet Comments:   Reason for Stopping:         sevelamer carbonate (RENVELA) 800 mg Tab Comments:   Reason for Stopping:         UNABLE TO FIND Comments:   Reason for Stopping:         UNABLE TO FIND Comments:   Reason for Stopping:                  Immunizations Administered as of 9/1/2023       No immunizations on file.            Covid Vaccination status is unknown.    Some patients may experience side effects after vaccination.  These may include fever, headache, muscle or joint aches.  Most symptoms resolve  with 24-48 hours and do not require urgent medical evaluation unless they persist for more than 72 hours or symptoms are concerning for an unrelated medical condition.          _________________________________  Porsha Funez MD  09/01/2023

## 2023-08-31 NOTE — ASSESSMENT & PLAN NOTE
Outpatient HD Information:  -Outpatient HD unit: C   -HD tx days: MWF   -HD tx time: 210min  -HD access: R IJ TDC   -HD modality: iHD   -Residual urine: ?    Plan/Recommendations:  - plan for iHD tomorrow for metabolic clearance and volume management   - can continue calcitriol 0.25 mcg daily for now  - renal diet when not NPO  - strict I/O's and daily weights  - daily renal function panels and magnesium levels  - renally all dose medications to eGFR  - avoid gadolinium, fleets, phos-based laxatives, NSAIDs, etc.

## 2023-08-31 NOTE — PLAN OF CARE
Recommendations     Diet advancement per MD. Rec'd Renal diet w/ texture per SLP.  Modify TF formula to Novasource. As tolerated, increase to goal rate of 40 mL/hr = 1920 kcals, 87 g of protein, 688 mL fluid.   - Decrease rate depending on PO intake/tolerance.   RD to monitor & follow-up.     Goals: Meet % EEN, EPN by RD f/u date  Nutrition Goal Status: progressing towards goal  Communication of RD Recs: reviewed with RN

## 2023-08-31 NOTE — ANESTHESIA POSTPROCEDURE EVALUATION
Anesthesia Post Evaluation    Patient: Immanuel Bernal    Procedure(s) Performed: Procedure(s) (LRB):  ENUCLEATION, EYE (Left)  BLEPHARORRHAPHY (Left)  INJECTION, MEDICATION, RETROBULBAR (Left)    Final Anesthesia Type: general      Patient location during evaluation: PACU  Patient participation: Yes- Able to Participate  Level of consciousness: awake and alert  Post-procedure vital signs: reviewed and stable  Pain management: adequate  Airway patency: patent    PONV status at discharge: No PONV  Anesthetic complications: no      Cardiovascular status: blood pressure returned to baseline  Respiratory status: unassisted  Hydration status: euvolemic  Follow-up not needed.          Vitals Value Taken Time   /92 08/31/23 0747   Temp 37 °C (98.6 °F) 08/31/23 0745   Pulse 77 08/31/23 0814   Resp 18 08/31/23 0814   SpO2 97 % 08/31/23 0814   Vitals shown include unvalidated device data.      Event Time   Out of Recovery 08/30/2023 18:45:00         Pain/Marco A Score: Pain Rating Prior to Med Admin: 3 (8/30/2023 10:03 PM)  Pain Rating Post Med Admin: 0 (8/30/2023 11:05 PM)  Marco A Score: 9 (8/30/2023  6:45 PM)

## 2023-08-31 NOTE — ASSESSMENT & PLAN NOTE
- Previously cleared by SLP for a regular diet without restrictions  - Also frequently hypoglycemic due to malnutrition.  - Resumed tube feeds following eye surgery.

## 2023-08-31 NOTE — PLAN OF CARE
Patient is expected to dc to [facility] today. SW will continue to follow up.       Mirta Melendez LMSW  [facility]   [id number]

## 2023-08-31 NOTE — PROGRESS NOTES
Progress Note  Ophthalmology Service    Date: 08/31/2023    Presenting HPI:  Immanuel Bernal is a 57-year-old male with a history of CHF, diabetes, hypertension, chronic disability, end-stage renal disease on hemodialysis, PEG placement, bowel obstruction, sleep apnea, TIA, left eye vitreous hemorrhage, stroke, and bowel obstruction with bowel resection admitted to [facility] in [place] July 18 from nursing home with left eye pain and blurred vision.  He was admitted with concern for bilateral endophthalmitis.  Other admit diagnoses included right upper extremity thrombus, end-stage renal disease on hemodialysis, hyperkalemia, sleep apnea, diabetes, and CHF.  He was treated with broad-spectrum antibiotics.  He was seen by Infectious Diseases, and he was treated with vancomycin, ceftriaxone, and amphotericin.  Blood cultures were positive for Pseudomonas, and amphotericin/vancomycin were stopped.  IV cefepime was continued.  He was seen by Ophthalmology, and he received intravitreal vancomycin and ceftazidime (July 18).  He also had intravitreal tap July 18 that had no yeast or fungal elements observed.  Left eye endophthalmitis did not respond to treatment, and he subsequently developed abscess formation, significant proptosis, and drainage of purulent material from the orbit.  Ophthalmology recommended enucleation.  He was continued on cefepime for pseudomonal and ophthalmitis.  Recommendation was for 6 weeks of treatment to be followed by indefinite fluoroquinolone.  He was seen by Pulmonology during his stay for a right upper lung mass with peripheral nodules.  It was felt that he would need further investigation of this once his current clinical issues stabilized.  Right upper extremity AV graft was excised during his stay with concern for infection.  A right IJ tunneled line was placed on July 27.  Left eye endophthalmitis continued to worsen, and ophthalmology spoke with patient and family  about the need for enucleation.  Despite several conversations, family declined enucleation.  Plans were for transitioned to skilled nursing facility for continued treatment, but family did not want him to go to a skilled nursing facility.  He was subsequently discharged AMA from the hospital there on August 7.  He subsequently presented to [facility] Emergency Department.  He will not go back to [facility] in [place]. He received Zosyn and vancomycin.  ED team at [facility] spoke with the patient and his power-of- (sister).  CT of the orbits noted scleral abscess along the lateral aspect of the left globe.  Patient and family are aware and in agreement that the patient needs enucleation of the eye at this time. Referring provider spoke with Oculoplastics at [facility]. Requesting transfer to Hospital Medicine for Oculoplastics specialty evaluation of persistent endophthalmitis.  ED provider noted patient is hemodynamically stable.  He does not appear volume overloaded or in respiratory distress. Ophthalmology is being consulted to evaluate for bilateral endophthalmitis requiring enucleation OS.      Interval History:   The patient is doing well this morning. Had enucleation performed OS yesterday, patient tolerated well.     Patient denies any visual changes, visual disturbances, such as flashes, floaters, or curtain-veil in visual field,  in his right eye. Has no pain in his left orbit.    Current eye gtts:   Tobradex QID OU     Medications this encounter:    sodium chloride 0.9%   Intravenous Once    sodium chloride 0.9%   Intravenous Once    acetaminophen  1,000 mg Per G Tube BID    albuterol-ipratropium  3 mL Nebulization Q6H WAKE    amLODIPine  5 mg Per G Tube Daily    atorvastatin  40 mg Per G Tube Daily    calcitRIOL  0.25 mcg Per G Tube Daily    carvediloL  6.25 mg Per G Tube BID    epoetin thee (PROCRIT) injection  3,000 Units Intravenous Every Mon, Wed, Fri     fluconazole 40 mg/ml  400 mg Per G Tube Daily    hydrALAZINE 10 mg 2 tablets, hydrALAZINE 25 mg 2 tablets, isorsorbide dinitrate 20 mg  2 tablets combination   Per G Tube TID    Lactobacillus rhamnosus GG  1 capsule Per G Tube BID    melatonin  6 mg Oral Nightly    meropenem (MERREM) IVPB  500 mg Intravenous Q12H    multivitamin  1 tablet Per G Tube Daily    ondansetron  4 mg Oral Q8H    pantoprazole  40 mg Per G Tube BID    polyethylene glycol  17 g Per G Tube BID    QUEtiapine  50 mg Per G Tube QHS    senna-docusate 8.6-50 mg  1 tablet Per G Tube BID    sevelamer carbonate  0.8 g Per G Tube TID WM    tobramycin-dexAMETHasone 0.3-0.1%   Left Eye TID    tobramycin-dexAMETHasone 0.3-0.1%  1 drop Both Eyes QID    valsartan  160 mg Oral BID       Allergies: is allergic to morphine and amiodarone analogues.     ROS: As per HPI    Ocular examination/Dilated fundus examination:  Base Eye Exam       Visual Acuity (Snellen - Linear)         Right Left    Dist sc 20/40 +2               Tonometry (Applanation, 9:57 AM)         Right Left    Pressure 8               Pupils         Dark Light Shape React    Right 3 3 Round Minimal    Left                  Visual Fields         Right Left     Full               Extraocular Movement         Right Left     Full Full                  Slit Lamp and Fundus Exam       External Exam         Right Left    External Normal Bandage covering eye              Slit Lamp Exam         Right Left    Lids/Lashes Normal     Conjunctiva/Sclera White and quiet     Cornea Clear     Anterior Chamber Deep and formed     Iris Round and pharm dialted     Lens 1-2+ NS     Anterior Vitreous trace haze, retina visualized below               Fundus Exam         Right Left    Disc Sharp and pink     C/D Ratio 0.3     Macula flat     Vessels Normal     Periphery flat 360, trace vitreous flare, few snow banking inferiorly and nasally                     Assessment/Plan:     Endophthalmitis OU  Status post  Evisceration followed by Enucleation Left eye  - patient with complex history noted above in HPI. Diagnosed with endophthalmitis and scleral abscess at [facility] with NLP vision. Initially refused enucleation/evisceration and left [facility] AMA -- transferred to [facility] for enucleation/evisceration 3 days later. Patient now s/p evisceration with subsequent enucleation, left eye.  - Right eye:   Patient states OD is stable, denies pain or visual changes. Vision stable at 20/40 (20/100 on presentation). DFE unremarkable, inferior & nasal area of resolving vitritis present. Continue Tobradex ointment QID OD.   - Left eye:  POD1 enucleation OS, patient is doing well with mild pain. Bandage covering wound. Keep left eye covered for one week per plastics instructions, does not need drops/ointment within 1 week.    - Ophthalmology will continue to follow while inpatient, and will need q2-q3 day follow up in oculoplastics clinic  - Ok for discharge from ophthalmology standpoint with close outpatient follow up      Handy Birch MD PGY-2  [employer] Ophthalmology Resident  08/31/2023  9:58 AM

## 2023-08-31 NOTE — PROGRESS NOTES
[facility] - Intensive Care ([address] Medicine  Progress Note    Patient Name: Immanuel Bernal  MRN: [id number]  Patient Class: IP- Inpatient   Admission Date: 8/9/2023  Length of Stay: 22 days  Attending Physician: Porsha Funez MD  Primary Care Provider: Dolly, Primary Doctor        Subjective:     Principal Problem:Delirium        HPI:  Immanuel Bernal is a 57 y.o. male with ESRD, HFrEF 30-35%, DM2, HTN, history of bowel obstruction s/p bowel resection, now with PEG in place, ?KENIA, history of CVA, ??AFib who was recently hospitalized at [facility] in 7/2023 for b/l endophthalmitis, Pseudomonas bacteremia, RUE thrombus, RUE AVG infection s/p excision, and RUL mass. Left eye did not improve & subsequently developed abscess, but POA declined enucleation and patient ultimately left AMA. Re-presented to [facility] ED with worsening eye symptoms with imaging showing left scleral abscess, so he was [place] [facility] [place] for Oculoplastics evaluation & enucleation.       Overview/Hospital Course:  Patient was admitted initially on 8/9/2023 to [facility] medicine for Oculoplastics evaluation for L eye endophthalmitis with abscess. Due to prior Pseudomonal bacteremia, patient was kept of vancomycin and meropenem per infectious disease recommendations. Initially, surgery was planned, however, patient's POA wanted to repeat imaging to confirm the severe infection before proceeding. Unfortunately, patient had worsening delirium of unknown etiology, thus MRI could not be completed properly. Patient was stepped up to ICU on 8/11 and briefly required a precedex drip, and got a full MRI on 8/12. Delirium self-resolved and patient was stepped down to medicine again on 8/14. After back and forth discussion with patient's POA and ophthalmology, patient underwent L eye evisceration on 8/16. Surgical cultures additionally grew yeast (not yet speciated) and cutibacterium acnes. Patient was started on fluconazole and  tobramycin drops/ointment. In preparation for discharge, meropenem was changed to cefepime on 8/21 for pseudomonal coverage, as meropenem needed to be dosed daily (and patient would need another tunneled Gil line) and cefepime could be dosed with dialysis.     On 8/24, patient once again had worsening delirium of unclear etiology. Cefepime was switched back to meropenem due to concern for neurotoxicity, and reglan was discontinued. Patient underwent HD however this did not improve his mentation. Patient underwent repeat MRI jim under sedation on 8/26, which showed ongoing L scleral infection and orbital cellulitis; per ophthalmology, no further surgical intervention is warranted. LP was obtained as well and negative for infection. CTA was ordered to r/o PE in setting of intermittent hypoxia, and was largely negative. LE US was also ordered, and did not show DVT, thus ruling out thromboembolism as a cause for agitation/confusion. EEG was completed and showed encephalopathy but no seizures. Patient's delirium ultimately improved with another session of HD on 8/28, thus, delirium was most likely related to medication adverse effects.     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 His R Permacath was still functioning and was left in place. Patient had an old AVF on the R arm that was operated on 1-2 months ago (see HPI; infected graft) and still had staples in place; vascular surgery recommend outpatient follow up for staple removal once site fully healed. For DVTs, Eliquis was started on 8/17, however, patient developed melena on 8/22. Eliquis was stopped, 1U PRBC was transfused, GI was consulted, and patient underwent EGD on 8/24. EGD found a large duodenal ulcer with adherent clot - 2 clips were placed adjacent to the ulcer but the clot was left intact. Patient remained hemodynamically stable, however has required 3U  PRBC total since melena started. Per GI, no further endoscopic intervention would be useful for the large ulcer. CTA was ordered on 8/27 and was negative for active bleeding. No further bleeding has been noted and hemoglobin has stabilized.    Disposition: Tunneled PICC line was placed on 08/29. Patient underwent left eye evisceration completion and eye implant on 08/30 and tolerated the procedure well. Will DC to [facility] tomorrow (09/01).      Interval History: Patient was seen this morning, and he stated he felt well overall. Reported the eye surgery went well yesterday, and he isn't having any eye pain. He does endorse some report some burning with urination, increased urinary frequency, and incomplete voiding. Also he says he tolerated the liquid diet well following the surgery and would like his diet advanced to solid food. Discussed with the patient that he will likely be discharged to [facility] tomorrow following his early morning dialysis.     Review of Systems   Constitutional:  Negative for chills, diaphoresis and fever.   HENT:  Negative for facial swelling and sinus pain.    Eyes:  Positive for visual disturbance. Negative for pain and discharge.   Respiratory: Negative.     Cardiovascular: Negative.    Gastrointestinal: Negative.    Genitourinary:  Positive for decreased urine volume, dysuria and urgency.   Musculoskeletal: Negative.    Neurological: Negative.    Psychiatric/Behavioral:  Negative for agitation, behavioral problems and confusion.      Objective:     Vital Signs (Most Recent):  Temp: 98.5 °F (36.9 °C) (08/31/23 1321)  Pulse: 66 (08/31/23 1515)  Resp: 15 (08/31/23 1321)  BP: 135/77 (08/31/23 1321)  SpO2: 95 % (08/31/23 1321) Vital Signs (24h Range):  Temp:  [97.8 °F (36.6 °C)-98.6 °F (37 °C)] 98.5 °F (36.9 °C)  Pulse:  [62-71] 66  Resp:  [7-19] 15  SpO2:  [95 %-100 %] 95 %  BP: (135-168)/(77-94) 135/77     Weight: 62.8 kg (138 lb 7.2 oz)  Body mass index is 19.87  kg/m².    Intake/Output Summary (Last 24 hours) at 8/31/2023 1537  Last data filed at 8/31/2023 0750  Gross per 24 hour   Intake 750 ml   Output --   Net 750 ml         Physical Exam  Constitutional:       Appearance: Normal appearance.   HENT:      Head: Normocephalic.      Comments: Eye shield covering left eye     Mouth/Throat:      Mouth: Mucous membranes are dry.   Eyes:      Comments: Right eye clear and pupil round and reactive; left eye covered by eye shield.   Cardiovascular:      Rate and Rhythm: Normal rate and regular rhythm.      Pulses: Normal pulses.      Heart sounds: Normal heart sounds.   Pulmonary:      Effort: Pulmonary effort is normal. No respiratory distress.      Breath sounds: Normal breath sounds. No wheezing.   Abdominal:      General: Abdomen is flat. There is no distension.      Palpations: Abdomen is soft.      Tenderness: There is no abdominal tenderness.   Genitourinary:     Penis: Normal.    Musculoskeletal:      Right lower leg: No edema.      Left lower leg: No edema.   Skin:     General: Skin is warm and dry.   Neurological:      General: No focal deficit present.      Mental Status: He is alert and oriented to person, place, and time.      Cranial Nerves: No cranial nerve deficit.   Psychiatric:         Mood and Affect: Mood normal.         Behavior: Behavior normal.             Significant Labs: All pertinent labs within the past 24 hours have been reviewed.    Significant Imaging: I have reviewed all pertinent imaging results/findings within the past 24 hours.      Assessment/Plan:      * Delirium  Patient's mentation has currently improved substantially. Alert and Oriented x3 and able to converse well. He was briefly stepped up to the ICU on 8/11 for delirium that self-resolved. Current episode started on 8/24.  Workup so far:  Cefepime (received for 3 days) switched back to meropenem.   CTA PE study and US LE studies rule out thromboembolism  Electrolytes have been maintained  wnl with dialysis; mentation eventually began to improve following 2 rounds of HD. Likely due to medication effects.  CTH without acute changes.  Repeated MRI under sedation on 8/26, showed ongoing L scleral and orbital infection but no new processes nor new strokes  LP under sedation on 8/26; CSF cultures grew Gram positive rods, adequate coverage with current Meropenem.  EEG without seizures, but showed encephalopathy    Delirium has significantly improved since stopping cefepime and reglan and s/p 2 HD sessions. Thus most likely 2/2 mediation adverse effect    Plan  - Continue HD sessions  - Delirium likely due to medication adverse effect + prolonged hospitalization, altered sleep/wake cycle, vision loss  - Continue Seroquel 50 mg nightly.  - Olanzapine 5mg IM PRN for severe agitation  - Delirium precautions in place.  - Patient pending for discharge to [facility] tomorrow.    Abnormal microbiological findings in CSF  Lumbar puncture was performed on 08/26 which initially showed no growth; On 08/30, CSF cultures were positive for Gram positive rods. Patient was already on Meropenem at time of known culture growth, and ID communicated that this would be adequate coverage for this infection. Delirium had already improved prior to culture growth as well, and patient is still alert and oriented x3 with no signs of delirium.    - Continue Meropenem per ID recs.      AV fistula infection  - See HPI and hospital course; previous AV graft was removed at OSH and found to be colonized with pseudomonas from prior pseudomonal bacteremia  - Staples still in place  - F/u with [facility] vascular surgery on discharge    Duodenal ulceration  Patient started to have melena on 8/22, and on 8/24, underwent EGD.   Found to have a large oozing duodenal ulcer with adherent clot. 2 clips placed however ulcer could not be addressed completely. Also had 2 more nonbleeding ulcers in the duodenal bulb and 2nd part of the duodenum.  3U PRBC  transfused so far this admission  Per GI, no further endoscopic intervention warranted  Due to ongoing melena, ordered CTA GIB protocol - negative    Problem now stable    - Trend CBC Q12. Transfuse for Hb >7, unless otherwise indicated  - Maintain IV access with 2 large bore IVs  - Hold all NSAIDs and anticoagulants, unless contraindicated  - IV pantoprazole 40mg BID - switch to PO pantroprazole on discharge  - Correct any coagulopathy with platelets and FFP for goal of platelets >50K and INR <2.0    Impaired mobility  - Plan to DC to [facility] when medically stable    PEG (percutaneous endoscopic gastrostomy) status  - Previously cleared by SLP for a regular diet without restrictions  - Also frequently hypoglycemic due to malnutrition.  - Resumed tube feeds following eye surgery.      Anemia in ESRD (end-stage renal disease)  EPO per nephrology    Severe malnutrition  Nutrition consulted. Most recent weight and BMI monitored-     Measurements:  Wt Readings from Last 1 Encounters:   08/23/23 62.8 kg (138 lb 7.2 oz)   Body mass index is 19.87 kg/m².    Patient has been screened and assessed by RD.    Malnutrition Type:  Context: chronic illness  Level: severe    Malnutrition Characteristic Summary:  Weight Loss (Malnutrition): greater than 10% in 6 months  Energy Intake (Malnutrition): other (see comments) (LAMONT)  Subcutaneous Fat (Malnutrition): severe depletion  Muscle Mass (Malnutrition): severe depletion    Interventions/Recommendations (treatment strategy):  1. Resume Renal diet + Novasource ONS when able - encourage PO intake as tolerated. 2. If EN to resume, please reconsult for updated recommendations. 3. RD to monitor & follow-up.     Able to take in PO per SLP assessment on 8/15, on regular diet with thin liquids  Continue tube feeds for now due to altered mentation    Endophthalmitis  S/p L eye evisceration on 8/16/23.  Repeat MRI head/orbit with ongoing L scleral infection and cellulitis    - ID and  ophthalmology following  - Continue meropenem and fluconazole; Tobramycin drops discontinued following successful eye evisceration completion with eye implant.  - Anticipated 4-6 weeks of therapy from evisceration. Anticipated end date: 9/12-9/26. See OPAT note from 8/19.  - Tunneled PICC line was placed for Antibiotics. (08/29)  - Follow up all culture data  - Ophthalmology to perform packing changes every 2-3 days  - Completion of left eye evisceration plus eye implant done 08/30, patient tolerated well.  - Per Ophthalmology, continue to cover left eye for 1 week; does not need eye drops within one week.  - Follow up in Ophthalmology clinic on Thursday (09/07).    ESRD (end stage renal disease)  Nephrology consulted for HD needs while inpatient    Hyperlipidemia  Home statin    Chronic diastolic heart failure  Continue to monitor for signs of volume overload; volume removal with dialysis    Hypertension  Current hypertension is worsened secondary to agitation  On CCB at home but will uptitrate GDMT while here    - Coreg 6.25mg BID, limited by HR  - BiDil 2 tablets TID  - Valsartan 160mg BID    VTE Risk Mitigation (From admission, onward)         Ordered     heparin (porcine) injection 1,000 Units  As needed (PRN)         08/29/23 1322     heparin (porcine) injection 1,000 Units  As needed (PRN)         08/10/23 1046     IP VTE HIGH RISK PATIENT  Once         08/09/23 1317     Place sequential compression device  Until discontinued         08/09/23 1317                Discharge Planning   TOBY: 9/1/2023     Code Status: Full Code   Is the patient medically ready for discharge?: Yes    Reason for patient still in hospital (select all that apply): Pending disposition  Discharge Plan A: Rehab   Discharge Delays: (!) Procedure Scheduling (IR, OR, Labs, Echo, Cath, Echo, EEG) (eye surgery next week)              Eric Koo DO  Department of Hospital Medicine   [facility] - Intensive Care ([place]

## 2023-08-31 NOTE — PROGRESS NOTES
Pharmacokinetic Assessment Follow Up: IV Vancomycin    Vancomycin serum concentration assessment(s):    Vanc level is 23.7 mcg/ml, drawn appropriately  Patient is over the goal of 15-20 mcg/ml    Vancomycin Regimen Plan:    Will hold vancomycin  Redraw vanc random on Friday with AM labs    Drug levels (last 3 results):  Recent Labs   Lab Result Units 08/29/23 0454 08/30/23 0612 08/31/23 0459   Vancomycin, Random ug/mL 23.1 21.7 23.7       Pharmacy will continue to follow and monitor vancomycin.    Please contact pharmacy at extension [phone] for questions regarding this assessment.    Thank you for the consult,   Elsa Solorzano    Patient brief summary:  Immanuel Bernal is a 57 y.o. male initiated on antimicrobial therapy with IV Vancomycin for treatment of endophthalmitis.    The patient's current regimen is vancomycin pulse dosing.     Actual Body Weight:   62.8 kg    Renal Function:   Estimated Creatinine Clearance: 19.1 mL/min (A) (based on SCr of 3.7 mg/dL (H)).,     Dialysis Method (if applicable):  intermittent HD    CBC (last 72 hours):  Recent Labs   Lab Result Units 08/28/23  1152 08/29/23 0454 08/30/23 0612 08/31/23 0459   WBC K/uL 7.25 5.45 5.45 4.01   Hemoglobin g/dL 8.5* 8.2* 9.9* 11.1*   Hematocrit % 26.9* 26.9* 33.5* 35.9*   Platelets K/uL 172 176 205 209   Gran % % 71.4  --   --   --    Lymph % % 9.0*  --   --   --    Mono % % 11.3  --   --   --    Eosinophil % % 7.3  --   --   --    Basophil % % 0.6  --   --   --    Differential Method  Automated  --   --   --          Metabolic Panel (last 72 hours):  Recent Labs   Lab Result Units 08/29/23 0454 08/30/23 0612 08/31/23 0459   Sodium mmol/L 135* 134* 135*   Potassium mmol/L 4.1 4.6 4.7   Chloride mmol/L 100 101 101   CO2 mmol/L 26 19* 22*   Glucose mg/dL 75 77 103   BUN mg/dL 37* 43* 24*   Creatinine mg/dL 4.4* 5.1* 3.7*   Albumin g/dL 1.9* 2.1* 2.2*   Total Bilirubin mg/dL 0.3 0.3 0.2   Alkaline Phosphatase U/L 112 121 127   AST U/L  12 16 12   ALT U/L <5* <5* <5*   Magnesium mg/dL  --  2.3  --          Vancomycin Administrations:  vancomycin given in the last 96 hours                     vancomycin (VANCOCIN) 500 mg in dextrose 5 % in water (D5W) 100 mL IVPB (MB+) (mg) 500 mg New Bag 08/23/23 1724    vancomycin (VANCOCIN) 500 mg in dextrose 5 % in water (D5W) 100 mL IVPB (MB+) (mg) 500 mg New Bag 08/21/23 1318                    Microbiologic Results:  Microbiology Results (last 7 days)       Procedure Component Value Units Date/Time    AFB Culture & Smear [[id number]] Collected: 08/26/23 1315    Order Status: Completed Specimen: CSF (Spinal Fluid) from CSF Tap, Tube 3 Updated: 08/31/23 0854     AFB Culture & Smear Culture in progress     AFB CULTURE STAIN No acid fast bacilli seen.    Aerobic culture [[id number]] Collected: 08/30/23 1724    Order Status: Completed Specimen: Wound from Cornea, Left Updated: 08/31/23 0830     Aerobic Bacterial Culture No growth    Narrative:      LEFT INTRAOCULAR SPACE    Culture, Anaerobe [[id number]] Collected: 08/30/23 1724    Order Status: Completed Specimen: Wound from Cornea, Left Updated: 08/31/23 0736     Anaerobic Culture Culture in progress    Narrative:      LEFT INTRAOCULAR SPACE    CSF culture [[id number]]  (Abnormal) Collected: 08/26/23 1315    Order Status: Completed Specimen: CSF (Spinal Fluid) from CSF Tap, Tube 3 Updated: 08/31/23 0658     CSF CULTURE Results called to and read back by:Kashif Zhang RN 08/30/2023  07:44      GRAM-POSITIVE VERONICA  From broth only  Identification pending       Gram Stain Result Cytospin indicates:      No WBC's      No organisms seen    Fungus culture [[id number]] Collected: 08/26/23 1315    Order Status: Completed Specimen: CSF (Spinal Fluid) from CSF Tap, Tube 3 Updated: 08/28/23 1115     Fungus (Mycology) Culture Culture in progress    Cryptococcal antigen, CSF [[id number]] Collected: 08/26/23 1315    Order Status: Completed Specimen: CSF (Spinal Fluid) from CSF  Tap, Tube 3 Updated: 08/28/23 0853     Crypto Ag, CSF Negative    Gram stain [[id number]] Collected: 08/26/23 1315    Order Status: Canceled Specimen: CSF (Spinal Fluid) from CSF Tap, Tube 3

## 2023-08-31 NOTE — PT/OT/SLP PROGRESS
"Physical Therapy Treatment    Patient Name:  Immanuel Bernal   MRN:  [id number]    Recommendations:     Discharge Recommendations: rehabilitation facility  Discharge Equipment Recommendations: to be determined by next level of care  Barriers to discharge: None    Assessment:     Immanuel Bernal is a 57 y.o. male admitted with a medical diagnosis of Delirium.  He presents with the following impairments/functional limitations: weakness, impaired endurance, impaired self care skills, impaired functional mobility, gait instability, impaired balance, decreased coordination, decreased upper extremity function, decreased lower extremity function, decreased safety awareness, pain, decreased ROM.    Pt was pleasant and willing to participate. Pt demo improvement on Bed Mobility and Standing activity during therapy session today. Pt will cont to benefit from skilled therapy with multidisciplinary approach at [facility] to reach goals.    Rehab Prognosis: Good; patient would benefit from acute skilled PT services to address these deficits and reach maximum level of function.    Recent Surgery: Procedure(s) (LRB):  ENUCLEATION, EYE (Left)  BLEPHARORRHAPHY (Left)  INJECTION, MEDICATION, RETROBULBAR (Left) 1 Day Post-Op    Plan:     During this hospitalization, patient to be seen 3 x/week to address the identified rehab impairments via gait training, therapeutic activities, therapeutic exercises, neuromuscular re-education and progress toward the following goals:    Plan of Care Expires:  09/14/23    Subjective     Chief Complaint: weakness and "I feel like I need to pee."  Patient/Family Comments/goals: to get stronger. Pt agreed to participate.  Pain/Comfort:  Pain Rating 1:  (yes, a little pt did not rate)  Location - Side 1: Left  Location - Orientation 1: generalized  Location 1: eye  Pain Addressed 1: Distraction  Pain Rating Post-Intervention 1:  (pt did not rate)      Objective:     Communicated with nurse Graham prior to " session.  Patient found HOB elevated with telemetry, pulse ox (continuous), PICC line, PEG Tube, blood pressure cuff, central line upon PT entry to room.     General Precautions: Standard, fall, vision impaired  Orthopedic Precautions: N/A  Braces: N/A  Respiratory Status: Room air     Functional Mobility:  Bed Mobility:     Scooting: anterior scoot to EOB with contact guard assistance  Bridging: maximal assistance and of 2 persons toward HOB via Drawsheet and pads  Supine to Sit: moderate assistance for Trunk and BLE management with HOB elevated  Sit to Supine: minimum assistance for Trunk management, pt was able to lift BLE up to bed  Transfers:   gait belt donned prior standing activity  Sit to Stand: 3 trials from EOB with moderate assistance and maximal assistance with rolling walker; R foot blocked from sliding fwd, v/c for proper hand placement  Gait: Attempted to have pt taking sidesteps toward R side HOB, however, pt with RLE weakness and could not pivot his R foot over and then sat back down to bed with c/o feeling fatigue.    Balance: Fair/Fair+ Sitting; Poor+ Standing      AM-PAC 6 CLICK MOBILITY  Turning over in bed (including adjusting bedclothes, sheets and blankets)?: 3  Sitting down on and standing up from a chair with arms (e.g., wheelchair, bedside commode, etc.): 2  Moving from lying on back to sitting on the side of the bed?: 3  Moving to and from a bed to a chair (including a wheelchair)?: 2  Need to walk in hospital room?: 1  Climbing 3-5 steps with a railing?: 1  Basic Mobility Total Score: 12       Treatment & Education:  Educated pt on importance of Bed Mobility and performing BLE ex throughout the day, pt verbalized understanding.    BLE ex in seated 10 reps Marching, LAQ; in supine 10 reps AP, GS, QS    Pt completed 3 trials standing with PTA for bed changed by nurse, briefly ~2-3 min each stand    Pt reported no issue post treatment.    Patient left HOB elevated with BLE  offloaded/elevated by pillows, tray table at bedside, all lines intact, call button in reach, bed alarm on, and nurse notified.    GOALS:   Multidisciplinary Problems       Physical Therapy Goals          Problem: Physical Therapy    Goal Priority Disciplines Outcome Goal Variances Interventions   Physical Therapy Goal     PT, PT/OT Ongoing, Progressing     Description: Goals to be met by: 23     Patient will increase functional independence with mobility by performin. Supine to sit with MInimal Assistance  2. Sit to stand transfer with Minimal Assistance using LRAD  3. Gait  x 10 feet with Minimal Assistance using LRAD.                          Time Tracking:     PT Received On: 23  PT Start Time: 1127     PT Stop Time: 1157  PT Total Time (min): 30 min     Billable Minutes: Therapeutic Activity 15 min and Therapeutic Exercise 15 min    Treatment Type: Treatment  PT/PTA: PTA     Number of PTA visits since last PT visit: 2023

## 2023-08-31 NOTE — SUBJECTIVE & OBJECTIVE
Interval History 8/31/2023:  Patient is seen for follow-up PM&R evaluation and recommendations: Participating with therapy. Opthalmology follow up scheduled 9/7/23.     HPI, Past Medical, Family, and Social History remains the same as documented in the initial encounter.    Scheduled Medications:    sodium chloride 0.9%   Intravenous Once    sodium chloride 0.9%   Intravenous Once    acetaminophen  1,000 mg Per G Tube BID    albuterol-ipratropium  3 mL Nebulization Q6H WAKE    amLODIPine  5 mg Per G Tube Daily    atorvastatin  40 mg Per G Tube Daily    calcitRIOL  0.25 mcg Per G Tube Daily    carvediloL  6.25 mg Per G Tube BID    epoetin thee (PROCRIT) injection  3,000 Units Intravenous Every Mon, Wed, Fri    fluconazole 40 mg/ml  400 mg Per G Tube Daily    hydrALAZINE 10 mg 2 tablets, hydrALAZINE 25 mg 2 tablets, isorsorbide dinitrate 20 mg  2 tablets combination   Per G Tube TID    Lactobacillus rhamnosus GG  1 capsule Per G Tube BID    melatonin  6 mg Oral Nightly    meropenem (MERREM) IVPB  500 mg Intravenous Q12H    multivitamin  1 tablet Per G Tube Daily    ondansetron  4 mg Oral Q8H    pantoprazole  40 mg Per G Tube BID    polyethylene glycol  17 g Per G Tube BID    QUEtiapine  50 mg Per G Tube QHS    senna-docusate 8.6-50 mg  1 tablet Per G Tube BID    sevelamer carbonate  0.8 g Per G Tube TID WM    valsartan  160 mg Oral BID       Diagnostic Results: Labs: Reviewed  ECG: Reviewed  CT: Reviewed    PRN Medications: 0.9%  NaCl infusion (for blood administration), 0.9%  NaCl infusion (for blood administration), sodium chloride 0.9%, dextrose 10%, dextrose 10%, glucagon (human recombinant), glucose, glucose, heparin (porcine), hydrALAZINE, meclizine, naloxone, OLANZapine, oxyCODONE, oxyCODONE-acetaminophen, prochlorperazine, sodium chloride 0.9%, sodium chloride 0.9%, sodium chloride 0.9%, Pharmacy to dose Vancomycin consult **AND** vancomycin - pharmacy to dose    Review of Systems   Musculoskeletal:  Positive  for gait problem.   Neurological:  Positive for weakness. Negative for speech difficulty.   Psychiatric/Behavioral:  Negative for agitation and behavioral problems.      Objective:     Vital Signs (Most Recent):  Temp: 98.5 °F (36.9 °C) (08/31/23 1321)  Pulse: 65 (08/31/23 1321)  Resp: 15 (08/31/23 1321)  BP: 135/77 (08/31/23 1321)  SpO2: 95 % (08/31/23 1321)    Vital Signs (24h Range):  Temp:  [97.8 °F (36.6 °C)-98.6 °F (37 °C)] 98.5 °F (36.9 °C)  Pulse:  [59-71] 65  Resp:  [7-19] 15  SpO2:  [95 %-100 %] 95 %  BP: (135-168)/(77-94) 135/77         Physical Exam  Vitals and nursing note reviewed.   Constitutional:       Comments: Closing eyes, not speaking just nodding yes and no   HENT:      Nose: Nose normal.      Mouth/Throat:      Mouth: Mucous membranes are moist.   Eyes:      Comments: L eye with dressing in place   Pulmonary:      Effort: Pulmonary effort is normal. No respiratory distress.   Musculoskeletal:      Comments: Deconditioned and frail  RUE mild swelling with staples   Skin:     General: Skin is warm.   Neurological:      Motor: Weakness present.      Gait: Gait abnormal.   Psychiatric:         Mood and Affect: Mood normal.         Behavior: Behavior normal.

## 2023-09-01 PROBLEM — H33.23 BILATERAL RETINAL DETACHMENT: Chronic | Status: ACTIVE | Noted: 2023-07-18

## 2023-09-01 LAB
ALBUMIN SERPL BCP-MCNC: 2.2 G/DL (ref 3.5–5.2)
ALP SERPL-CCNC: 116 U/L (ref 55–135)
ALT SERPL W/O P-5'-P-CCNC: <5 U/L (ref 10–44)
ANION GAP SERPL CALC-SCNC: 9 MMOL/L (ref 8–16)
AST SERPL-CCNC: 11 U/L (ref 10–40)
BACTERIA CSF CULT: ABNORMAL
BACTERIA CSF CULT: ABNORMAL
BILIRUB SERPL-MCNC: 0.2 MG/DL (ref 0.1–1)
BUN SERPL-MCNC: 17 MG/DL (ref 6–20)
CALCIUM SERPL-MCNC: 8.5 MG/DL (ref 8.7–10.5)
CHLORIDE SERPL-SCNC: 100 MMOL/L (ref 95–110)
CO2 SERPL-SCNC: 24 MMOL/L (ref 23–29)
CREAT SERPL-MCNC: 2.7 MG/DL (ref 0.5–1.4)
ERYTHROCYTE [DISTWIDTH] IN BLOOD BY AUTOMATED COUNT: 17.6 % (ref 11.5–14.5)
EST. GFR  (NO RACE VARIABLE): 26.3 ML/MIN/1.73 M^2
GLUCOSE SERPL-MCNC: 113 MG/DL (ref 70–110)
GRAM STN SPEC: ABNORMAL
HCT VFR BLD AUTO: 29.6 % (ref 40–54)
HGB BLD-MCNC: 9 G/DL (ref 14–18)
MCH RBC QN AUTO: 30.1 PG (ref 27–31)
MCHC RBC AUTO-ENTMCNC: 30.4 G/DL (ref 32–36)
MCV RBC AUTO: 99 FL (ref 82–98)
PLATELET # BLD AUTO: 172 K/UL (ref 150–450)
PMV BLD AUTO: 10.1 FL (ref 9.2–12.9)
POCT GLUCOSE: 107 MG/DL (ref 70–110)
POCT GLUCOSE: 114 MG/DL (ref 70–110)
POCT GLUCOSE: 115 MG/DL (ref 70–110)
POCT GLUCOSE: 96 MG/DL (ref 70–110)
POTASSIUM SERPL-SCNC: 3.5 MMOL/L (ref 3.5–5.1)
PROT SERPL-MCNC: 5.9 G/DL (ref 6–8.4)
RBC # BLD AUTO: 2.99 M/UL (ref 4.6–6.2)
SODIUM SERPL-SCNC: 133 MMOL/L (ref 136–145)
VANCOMYCIN SERPL-MCNC: 17.7 UG/ML
WBC # BLD AUTO: 5.51 K/UL (ref 3.9–12.7)

## 2023-09-01 PROCEDURE — 63600175 PHARM REV CODE 636 W HCPCS

## 2023-09-01 PROCEDURE — 80053 COMPREHEN METABOLIC PANEL: CPT | Performed by: STUDENT IN AN ORGANIZED HEALTH CARE EDUCATION/TRAINING PROGRAM

## 2023-09-01 PROCEDURE — 99232 SBSQ HOSP IP/OBS MODERATE 35: CPT | Mod: GC,,, | Performed by: HOSPITALIST

## 2023-09-01 PROCEDURE — 99232 PR SUBSEQUENT HOSPITAL CARE,LEVL II: ICD-10-PCS | Mod: GC,,, | Performed by: STUDENT IN AN ORGANIZED HEALTH CARE EDUCATION/TRAINING PROGRAM

## 2023-09-01 PROCEDURE — 99900035 HC TECH TIME PER 15 MIN (STAT)

## 2023-09-01 PROCEDURE — 25000003 PHARM REV CODE 250: Performed by: STUDENT IN AN ORGANIZED HEALTH CARE EDUCATION/TRAINING PROGRAM

## 2023-09-01 PROCEDURE — 25000003 PHARM REV CODE 250

## 2023-09-01 PROCEDURE — 63600175 PHARM REV CODE 636 W HCPCS: Performed by: HOSPITALIST

## 2023-09-01 PROCEDURE — 80100014 HC HEMODIALYSIS 1:1

## 2023-09-01 PROCEDURE — 36415 COLL VENOUS BLD VENIPUNCTURE: CPT | Performed by: STUDENT IN AN ORGANIZED HEALTH CARE EDUCATION/TRAINING PROGRAM

## 2023-09-01 PROCEDURE — 20000000 HC ICU ROOM

## 2023-09-01 PROCEDURE — 94761 N-INVAS EAR/PLS OXIMETRY MLT: CPT

## 2023-09-01 PROCEDURE — 63600175 PHARM REV CODE 636 W HCPCS: Mod: JZ | Performed by: STUDENT IN AN ORGANIZED HEALTH CARE EDUCATION/TRAINING PROGRAM

## 2023-09-01 PROCEDURE — 99232 SBSQ HOSP IP/OBS MODERATE 35: CPT | Mod: GC,,, | Performed by: STUDENT IN AN ORGANIZED HEALTH CARE EDUCATION/TRAINING PROGRAM

## 2023-09-01 PROCEDURE — 25000003 PHARM REV CODE 250: Performed by: HOSPITALIST

## 2023-09-01 PROCEDURE — 85027 COMPLETE CBC AUTOMATED: CPT

## 2023-09-01 PROCEDURE — 99232 PR SUBSEQUENT HOSPITAL CARE,LEVL II: ICD-10-PCS | Mod: GC,,, | Performed by: HOSPITALIST

## 2023-09-01 PROCEDURE — 80202 ASSAY OF VANCOMYCIN: CPT | Performed by: HOSPITALIST

## 2023-09-01 PROCEDURE — 25000242 PHARM REV CODE 250 ALT 637 W/ HCPCS: Performed by: STUDENT IN AN ORGANIZED HEALTH CARE EDUCATION/TRAINING PROGRAM

## 2023-09-01 PROCEDURE — 94640 AIRWAY INHALATION TREATMENT: CPT

## 2023-09-01 RX ORDER — AMOXICILLIN 250 MG
1 CAPSULE ORAL 2 TIMES DAILY
Start: 2023-09-01

## 2023-09-01 RX ORDER — SEVELAMER CARBONATE FOR ORAL SUSPENSION 800 MG/1
0.8 POWDER, FOR SUSPENSION ORAL
Qty: 90 PACKET | Refills: 11
Start: 2023-09-01 | End: 2024-08-31

## 2023-09-01 RX ORDER — TALC
6 POWDER (GRAM) TOPICAL NIGHTLY
Refills: 0
Start: 2023-09-01

## 2023-09-01 RX ORDER — TRAMADOL HYDROCHLORIDE 50 MG/1
50 TABLET ORAL 3 TIMES DAILY PRN
Status: DISCONTINUED | OUTPATIENT
Start: 2023-09-01 | End: 2023-09-19 | Stop reason: HOSPADM

## 2023-09-01 RX ORDER — ACETAMINOPHEN 500 MG
1000 TABLET ORAL 3 TIMES DAILY
Status: DISCONTINUED | OUTPATIENT
Start: 2023-09-01 | End: 2023-09-19 | Stop reason: HOSPADM

## 2023-09-01 RX ORDER — ATORVASTATIN CALCIUM 40 MG/1
40 TABLET, FILM COATED ORAL DAILY
Qty: 90 TABLET | Refills: 3
Start: 2023-09-01 | End: 2024-08-31

## 2023-09-01 RX ORDER — QUETIAPINE FUMARATE 50 MG/1
50 TABLET, FILM COATED ORAL NIGHTLY
Qty: 30 TABLET | Refills: 11
Start: 2023-09-01 | End: 2023-09-14 | Stop reason: HOSPADM

## 2023-09-01 RX ORDER — PANTOPRAZOLE SODIUM 40 MG/1
40 FOR SUSPENSION ORAL 2 TIMES DAILY
Qty: 60 PACKET | Refills: 11
Start: 2023-09-01 | End: 2024-08-31

## 2023-09-01 RX ORDER — FLUCONAZOLE 40 MG/ML
400 POWDER, FOR SUSPENSION ORAL DAILY
Refills: 0
Start: 2023-09-01 | End: 2023-10-01

## 2023-09-01 RX ORDER — CARVEDILOL 6.25 MG/1
6.25 TABLET ORAL 2 TIMES DAILY
Qty: 60 TABLET | Refills: 11
Start: 2023-09-01 | End: 2023-09-14 | Stop reason: HOSPADM

## 2023-09-01 RX ORDER — ISOSORBIDE DINITRATE AND HYDRALAZINE HYDROCHLORIDE 37.5; 2 MG/1; MG/1
2 TABLET ORAL 3 TIMES DAILY
Qty: 90 TABLET | Refills: 11
Start: 2023-09-01 | End: 2024-08-31

## 2023-09-01 RX ORDER — VALSARTAN 160 MG/1
160 TABLET ORAL 2 TIMES DAILY
Qty: 180 TABLET | Refills: 3 | Status: ON HOLD
Start: 2023-09-01 | End: 2023-10-30 | Stop reason: HOSPADM

## 2023-09-01 RX ADMIN — IPRATROPIUM BROMIDE AND ALBUTEROL SULFATE 3 ML: .5; 3 SOLUTION RESPIRATORY (INHALATION) at 08:09

## 2023-09-01 RX ADMIN — CARVEDILOL 6.25 MG: 6.25 TABLET, FILM COATED ORAL at 09:09

## 2023-09-01 RX ADMIN — ACETAMINOPHEN 1000 MG: 500 TABLET ORAL at 09:09

## 2023-09-01 RX ADMIN — Medication 1 CAPSULE: at 08:09

## 2023-09-01 RX ADMIN — ONDANSETRON 4 MG: 4 TABLET, ORALLY DISINTEGRATING ORAL at 02:09

## 2023-09-01 RX ADMIN — Medication 6 MG: at 08:09

## 2023-09-01 RX ADMIN — IPRATROPIUM BROMIDE AND ALBUTEROL SULFATE 3 ML: .5; 3 SOLUTION RESPIRATORY (INHALATION) at 02:09

## 2023-09-01 RX ADMIN — VALSARTAN 160 MG: 160 TABLET, FILM COATED ORAL at 08:09

## 2023-09-01 RX ADMIN — SEVELAMER CARBONATE 0.8 G: 0.8 POWDER, FOR SUSPENSION ORAL at 12:09

## 2023-09-01 RX ADMIN — FLUCONAZOLE 400 MG: 40 POWDER, FOR SUSPENSION ORAL at 02:09

## 2023-09-01 RX ADMIN — Medication 1 CAPSULE: at 09:09

## 2023-09-01 RX ADMIN — ATORVASTATIN CALCIUM 40 MG: 40 TABLET, FILM COATED ORAL at 09:09

## 2023-09-01 RX ADMIN — HYDRALAZINE HYDROCHLORIDE: 10 TABLET, FILM COATED ORAL at 08:09

## 2023-09-01 RX ADMIN — CARVEDILOL 6.25 MG: 6.25 TABLET, FILM COATED ORAL at 08:09

## 2023-09-01 RX ADMIN — SENNOSIDES AND DOCUSATE SODIUM 1 TABLET: 50; 8.6 TABLET ORAL at 09:09

## 2023-09-01 RX ADMIN — POLYETHYLENE GLYCOL 3350 17 G: 17 POWDER, FOR SOLUTION ORAL at 08:09

## 2023-09-01 RX ADMIN — VALSARTAN 160 MG: 160 TABLET, FILM COATED ORAL at 09:09

## 2023-09-01 RX ADMIN — HYDRALAZINE HYDROCHLORIDE: 10 TABLET, FILM COATED ORAL at 02:09

## 2023-09-01 RX ADMIN — VANCOMYCIN HYDROCHLORIDE 500 MG: 500 INJECTION, POWDER, LYOPHILIZED, FOR SOLUTION INTRAVENOUS at 11:09

## 2023-09-01 RX ADMIN — ONDANSETRON 4 MG: 4 TABLET, ORALLY DISINTEGRATING ORAL at 06:09

## 2023-09-01 RX ADMIN — SEVELAMER CARBONATE 0.8 G: 0.8 POWDER, FOR SUSPENSION ORAL at 09:09

## 2023-09-01 RX ADMIN — PANTOPRAZOLE SODIUM 40 MG: 40 GRANULE, DELAYED RELEASE ORAL at 08:09

## 2023-09-01 RX ADMIN — THERA TABS 1 TABLET: TAB at 09:09

## 2023-09-01 RX ADMIN — PANTOPRAZOLE SODIUM 40 MG: 40 GRANULE, DELAYED RELEASE ORAL at 09:09

## 2023-09-01 RX ADMIN — ACETAMINOPHEN 1000 MG: 500 TABLET ORAL at 03:09

## 2023-09-01 RX ADMIN — SENNOSIDES AND DOCUSATE SODIUM 1 TABLET: 50; 8.6 TABLET ORAL at 08:09

## 2023-09-01 RX ADMIN — SEVELAMER CARBONATE 0.8 G: 0.8 POWDER, FOR SUSPENSION ORAL at 06:09

## 2023-09-01 RX ADMIN — ERYTHROPOIETIN 3000 UNITS: 3000 INJECTION, SOLUTION INTRAVENOUS; SUBCUTANEOUS at 11:09

## 2023-09-01 RX ADMIN — TRAMADOL HYDROCHLORIDE 50 MG: 50 TABLET, COATED ORAL at 06:09

## 2023-09-01 RX ADMIN — AMLODIPINE BESYLATE 5 MG: 5 TABLET ORAL at 09:09

## 2023-09-01 RX ADMIN — MEROPENEM 500 MG: 500 INJECTION INTRAVENOUS at 08:09

## 2023-09-01 RX ADMIN — CALCITRIOL CAPSULES 0.25 MCG 0.25 MCG: 0.25 CAPSULE ORAL at 09:09

## 2023-09-01 RX ADMIN — OXYCODONE HYDROCHLORIDE 10 MG: 10 TABLET ORAL at 08:09

## 2023-09-01 RX ADMIN — QUETIAPINE FUMARATE 50 MG: 25 TABLET ORAL at 08:09

## 2023-09-01 NOTE — SUBJECTIVE & OBJECTIVE
Interval History: Patient seen and examined. Underwent iHD overnight with 2 liters UF. Afebrile with pulse ranging from 70-60s bpm. Systolic blood pressures ranging from 140-110s mmHg. He is saturating +94% on room air. Metabolic panel still in process this AM. Tentative plans for discharge today to inpatient rehabilitation facility today.    Review of patient's allergies indicates:   Allergen Reactions    Morphine Rash    Amiodarone analogues Itching     Other reaction(s): Unknown     Current Facility-Administered Medications   Medication Frequency    0.9%  NaCl infusion (for blood administration) Q24H PRN    0.9%  NaCl infusion (for blood administration) Q24H PRN    0.9%  NaCl infusion PRN    0.9%  NaCl infusion Once    0.9%  NaCl infusion Once    0.9%  NaCl infusion Once    acetaminophen tablet 1,000 mg BID    albuterol-ipratropium 2.5 mg-0.5 mg/3 mL nebulizer solution 3 mL Q6H WAKE    amLODIPine tablet 5 mg Daily    atorvastatin tablet 40 mg Daily    calcitRIOL capsule 0.25 mcg Daily    carvediloL tablet 6.25 mg BID    dextrose 10% bolus 125 mL 125 mL PRN    dextrose 10% bolus 250 mL 250 mL PRN    dextrose 5 % (D5W) infusion Continuous    epoetin thee injection 3,000 Units Every Mon, Wed, Fri    fluconazole 40 mg/ml suspension 400 mg Daily    glucagon (human recombinant) injection 1 mg PRN    glucose chewable tablet 16 g PRN    glucose chewable tablet 24 g PRN    heparin (porcine) injection 1,000 Units PRN    heparin (porcine) injection 1,000 Units PRN    hydrALAZINE 10 mg 2 tablets, hydrALAZINE 25 mg 2 tablets, isorsorbide dinitrate 20 mg  2 tablets combination TID    hydrALAZINE injection 10 mg Q6H PRN    Lactobacillus rhamnosus GG capsule 1 capsule BID    meclizine tablet 12.5 mg TID PRN    melatonin tablet 6 mg Nightly    meropenem (MERREM) 500 mg in sodium chloride 0.9 % 100 mL IVPB (MB+) Q12H    multivitamin tablet Daily    naloxone 0.4 mg/mL injection 0.02 mg PRN    OLANZapine injection 5 mg Nightly PRN     ondansetron disintegrating tablet 4 mg Q8H    oxyCODONE immediate release tablet Tab 10 mg Q4H PRN    oxyCODONE-acetaminophen 5-325 mg per tablet 1 tablet Q4H PRN    pantoprazole suspension 40 mg BID    polyethylene glycol packet 17 g BID    prochlorperazine injection Soln 10 mg Q6H PRN    QUEtiapine tablet 50 mg QHS    senna-docusate 8.6-50 mg per tablet 1 tablet BID    sevelamer carbonate pwpk 0.8 g TID WM    sodium chloride 0.9% bolus 250 mL 250 mL PRN    sodium chloride 0.9% bolus 250 mL 250 mL PRN    sodium chloride 0.9% flush 10 mL Q12H PRN    sodium chloride 0.9% flush 10 mL PRN    valsartan tablet 160 mg BID    vancomycin - pharmacy to dose pharmacy to manage frequency       Objective:     Vital Signs (Most Recent):  Temp: 98.8 °F (37.1 °C) (08/31/23 2345)  Pulse: 69 (09/01/23 0530)  Resp: 19 (09/01/23 0530)  BP: 118/68 (09/01/23 0530)  SpO2: 98 % (09/01/23 0530) Vital Signs (24h Range):  Temp:  [97.6 °F (36.4 °C)-99.4 °F (37.4 °C)] 98.8 °F (37.1 °C)  Pulse:  [63-75] 69  Resp:  [13-25] 19  SpO2:  [94 %-98 %] 98 %  BP: (118-167)/(68-92) 118/68     Weight: 62.8 kg (138 lb 7.2 oz) (08/31/23 1146)  Body mass index is 19.87 kg/m².  Body surface area is 1.76 meters squared.    I/O last 3 completed shifts:  In: 1620 [I.V.:150; Other:250; NG/GT:620; IV Piggyback:600]  Out: 2510 [Urine:10; Other:2500]     Physical Exam  Vitals and nursing note reviewed.   Constitutional:       General: He is awake. He is not in acute distress.     Appearance: He is cachectic. He is ill-appearing. He is not diaphoretic.      Comments: Muscle wasting/atrophy noted.   HENT:      Head: Normocephalic and atraumatic.      Right Ear: External ear normal.      Left Ear: External ear normal.      Nose: Nose normal.      Mouth/Throat:      Mouth: Mucous membranes are moist.      Pharynx: Oropharynx is clear. No oropharyngeal exudate or posterior oropharyngeal erythema.   Eyes:      Comments: Left eye with bandage/dressing in place.    Cardiovascular:      Rate and Rhythm: Normal rate.      Heart sounds: No murmur heard.     No friction rub. No gallop.   Pulmonary:      Effort: Pulmonary effort is normal. No respiratory distress.      Breath sounds: No wheezing, rhonchi or rales.   Chest:      Comments: Tunneled HD catheter to right chest wall.  Abdominal:      General: Bowel sounds are normal. There is no distension.      Palpations: Abdomen is soft.      Tenderness: There is no abdominal tenderness.      Comments: PEG in place.   Musculoskeletal:      Cervical back: Neck supple.      Right lower leg: No edema.      Left lower leg: No edema.   Skin:     General: Skin is warm and dry.      Coloration: Skin is not jaundiced.      Comments: Stable noted to right upper extremity from recent surgery. Incision appears intact.   Neurological:      Mental Status: He is alert.      Cranial Nerves: No cranial nerve deficit.   Psychiatric:         Mood and Affect: Mood normal.         Behavior: Behavior normal.          Significant Labs:  BMP:   Recent Labs   Lab 08/30/23  0612 08/31/23 0459   GLU 77 103   * 135*   K 4.6 4.7    101   CO2 19* 22*   BUN 43* 24*   CREATININE 5.1* 3.7*   CALCIUM 9.4 9.1   MG 2.3  --        CBC:   Recent Labs   Lab 08/31/23 0459   WBC 4.01   RBC 3.67*   HGB 11.1*   HCT 35.9*      MCV 98   MCH 30.2   MCHC 30.9*       CMP:   Recent Labs   Lab 08/31/23 0459      CALCIUM 9.1   ALBUMIN 2.2*   PROT 6.5   *   K 4.7   CO2 22*      BUN 24*   CREATININE 3.7*   ALKPHOS 127   ALT <5*   AST 12   BILITOT 0.2       LFTs:   Recent Labs   Lab 08/31/23 0459   ALT <5*   AST 12   ALKPHOS 127   BILITOT 0.2   PROT 6.5   ALBUMIN 2.2*       Microbiology Results (last 7 days)       Procedure Component Value Units Date/Time    AFB Culture & Smear [[id number]] Collected: 08/26/23 1315    Order Status: Completed Specimen: CSF (Spinal Fluid) from CSF Tap, Tube 3 Updated: 08/31/23 0859     AFB Culture & Smear  Culture in progress     AFB CULTURE STAIN No acid fast bacilli seen.    Aerobic culture [[id number]] Collected: 08/30/23 1724    Order Status: Completed Specimen: Wound from Cornea, Left Updated: 08/31/23 0830     Aerobic Bacterial Culture No growth    Narrative:      LEFT INTRAOCULAR SPACE    Culture, Anaerobe [[id number]] Collected: 08/30/23 1724    Order Status: Completed Specimen: Wound from Cornea, Left Updated: 08/31/23 0736     Anaerobic Culture Culture in progress    Narrative:      LEFT INTRAOCULAR SPACE    CSF culture [[id number]]  (Abnormal) Collected: 08/26/23 1315    Order Status: Completed Specimen: CSF (Spinal Fluid) from CSF Tap, Tube 3 Updated: 08/31/23 0658     CSF CULTURE Results called to and read back by:Kashif Zhang RN 08/30/2023  07:44      GRAM-POSITIVE VERONICA  From broth only  Identification pending       Gram Stain Result Cytospin indicates:      No WBC's      No organisms seen    Fungus culture [[id number]] Collected: 08/26/23 1315    Order Status: Completed Specimen: CSF (Spinal Fluid) from CSF Tap, Tube 3 Updated: 08/28/23 1115     Fungus (Mycology) Culture Culture in progress    Cryptococcal antigen, CSF [[id number]] Collected: 08/26/23 1315    Order Status: Completed Specimen: CSF (Spinal Fluid) from CSF Tap, Tube 3 Updated: 08/28/23 0853     Crypto Ag, CSF Negative    Gram stain [[id number]] Collected: 08/26/23 1315    Order Status: Canceled Specimen: CSF (Spinal Fluid) from CSF Tap, Tube 3           Specimen (24h ago, onward)      None          Significant Imaging:  I have reviewed all imagining in the last 24 hours.

## 2023-09-01 NOTE — PROGRESS NOTES
[facility] ([place] [address])  Nephrology  Progress Note    Patient Name: Immanuel Bernal  MRN: [id number]  Admission Date: 8/9/2023  Hospital Length of Stay: 23 days  Attending Provider: Porsha Funez MD   Primary Care Physician: Dolly, Primary Doctor  Principal Problem:Delirium    Subjective:     HPI: HPI obtained per medical record as patient unable to communicate     57-year-old male with a history of CHF, diabetes, hypertension, chronic disability, end-stage renal disease on hemodialysis, PEG placement, bowel obstruction, sleep apnea, TIA, left eye vitreous hemorrhage, stroke, and bowel obstruction with bowel resection admitted to [facility] in [place] July 18 from nursing home with left eye pain and blurred vision.  He was admitted with concern for bilateral endophthalmitis.  Other admit diagnoses included right upper extremity thrombus, end-stage renal disease on hemodialysis, hyperkalemia, sleep apnea, diabetes, and CHF.  He was treated with broad-spectrum antibiotics.  He was seen by Infectious Diseases,, and he was treated with vancomycin, ceftriaxone, and amphotericin.  Blood cultures were positive for Pseudomonas, and amphotericin/vancomycin were stopped.  IV cefepime was continued.  He was seen by Ophthalmology, and he received intravitreal vancomycin and ceftazidime (July 18).  He also had intravitreal tap July 18 that had no yeast or fungal elements observed.  Left eye endophthalmitis did not respond to treatment, and he subsequently developed abscess formation, significant proptosis, and drainage of purulent material from the orbit.  Ophthalmology recommended enucleation.  He was continued on cefepime for pseudomonal and ophthalmitis.  Recommendation was for 6 weeks of treatment to be followed by indefinite fluoroquinolone.  He was seen by Pulmonology during his stay for a right upper lung mass with peripheral nodules.  It was felt that he would need further investigation  of this once his current clinical issues stabilized.  Right upper extremity AV graft was excised during his stay with concern for infection.  A right IJ tunneled line was placed on July 27.  Left eye endophthalmitis continued to worsen, and ophthalmology spoke with patient and family about the need for enucleation.  Despite several conversations, family declined enucleation.  Plans were for transitioned to skilled nursing facility for continued treatment, but family did not want him to go to a skilled nursing facility.  He was subsequently discharged AMA from the hospital there on August 7.  Please see the August 7 Internal Medicine note for further details.     He subsequently presented to [facility] Emergency Department.  He will not go back to [facility] in [place]. He received Zosyn and vancomycin.  ED team at [facility] spoke with the patient and his power-of- (sister).  CT of the orbits noted scleral abscess along the lateral aspect of the left globe.  Patient and family are aware and in agreement that the patient needs enucleation of the eye at this time. Referring provider spoke with Oculoplastics at [facility]. Requesting transfer to [facility] Medicine for Oculoplastics specialty evaluation of persistent endophthalmitis.  ED provider noted patient is hemodynamically stable.  He does not appear volume overloaded or in respiratory distress.        The patient has a significant previous medical course as listed below  August 3: MRI brain and orbits showed worsening ophthalmitis and inflammatory changes of the left orbital tissues with slight increase in proptosis.  No evidence of intracranial inflammatory process observed.    July 25: Transesophageal echocardiogram had no evidence of endocarditis.  Moderate to severely decreased left ventricular systolic function with EF 30-35%.    July 22:  CT chest showed right upper lobe mass with numerous bilateral nodularity  predominantly in the right with associated mediastinal lymph nodes.  July 21: Blood cultures with Pseudomonas aeruginosa  July 19: MRI brain/orbits with and without contrast had findings suggestive of chronic microvascular ischemic disease of the white matter with remote ischemic event in the left insula/basal ganglia/subependymal white matter/right middle cerebellar peduncle and hemisphere.  Findings consistent with left endophthalmitis.  No abnormal findings observed in the right globe.  July 18:  Blood cultures with Pseudomonas aeruginosa and coag-negative staph    HPI obtained from hospital med's note. Patient with AMS at time of nephrology evaluation and unable to provide HPI or ROS.          Interval History: Patient seen and examined. Underwent iHD overnight with 2 liters UF. Afebrile with pulse ranging from 70-60s bpm. Systolic blood pressures ranging from 140-110s mmHg. He is saturating +94% on room air. Metabolic panel still in process this AM. Tentative plans for discharge today to inpatient rehabilitation facility today.    Review of patient's allergies indicates:   Allergen Reactions    Morphine Rash    Amiodarone analogues Itching     Other reaction(s): Unknown     Current Facility-Administered Medications   Medication Frequency    0.9%  NaCl infusion (for blood administration) Q24H PRN    0.9%  NaCl infusion (for blood administration) Q24H PRN    0.9%  NaCl infusion PRN    0.9%  NaCl infusion Once    0.9%  NaCl infusion Once    0.9%  NaCl infusion Once    acetaminophen tablet 1,000 mg BID    albuterol-ipratropium 2.5 mg-0.5 mg/3 mL nebulizer solution 3 mL Q6H WAKE    amLODIPine tablet 5 mg Daily    atorvastatin tablet 40 mg Daily    calcitRIOL capsule 0.25 mcg Daily    carvediloL tablet 6.25 mg BID    dextrose 10% bolus 125 mL 125 mL PRN    dextrose 10% bolus 250 mL 250 mL PRN    dextrose 5 % (D5W) infusion Continuous    epoetin thee injection 3,000 Units Every Mon, Wed, Fri     fluconazole 40 mg/ml suspension 400 mg Daily    glucagon (human recombinant) injection 1 mg PRN    glucose chewable tablet 16 g PRN    glucose chewable tablet 24 g PRN    heparin (porcine) injection 1,000 Units PRN    heparin (porcine) injection 1,000 Units PRN    hydrALAZINE 10 mg 2 tablets, hydrALAZINE 25 mg 2 tablets, isorsorbide dinitrate 20 mg  2 tablets combination TID    hydrALAZINE injection 10 mg Q6H PRN    Lactobacillus rhamnosus GG capsule 1 capsule BID    meclizine tablet 12.5 mg TID PRN    melatonin tablet 6 mg Nightly    meropenem (MERREM) 500 mg in sodium chloride 0.9 % 100 mL IVPB (MB+) Q12H    multivitamin tablet Daily    naloxone 0.4 mg/mL injection 0.02 mg PRN    OLANZapine injection 5 mg Nightly PRN    ondansetron disintegrating tablet 4 mg Q8H    oxyCODONE immediate release tablet Tab 10 mg Q4H PRN    oxyCODONE-acetaminophen 5-325 mg per tablet 1 tablet Q4H PRN    pantoprazole suspension 40 mg BID    polyethylene glycol packet 17 g BID    prochlorperazine injection Soln 10 mg Q6H PRN    QUEtiapine tablet 50 mg QHS    senna-docusate 8.6-50 mg per tablet 1 tablet BID    sevelamer carbonate pwpk 0.8 g TID WM    sodium chloride 0.9% bolus 250 mL 250 mL PRN    sodium chloride 0.9% bolus 250 mL 250 mL PRN    sodium chloride 0.9% flush 10 mL Q12H PRN    sodium chloride 0.9% flush 10 mL PRN    valsartan tablet 160 mg BID    vancomycin - pharmacy to dose pharmacy to manage frequency       Objective:     Vital Signs (Most Recent):  Temp: 98.8 °F (37.1 °C) (08/31/23 2345)  Pulse: 69 (09/01/23 0530)  Resp: 19 (09/01/23 0530)  BP: 118/68 (09/01/23 0530)  SpO2: 98 % (09/01/23 0530) Vital Signs (24h Range):  Temp:  [97.6 °F (36.4 °C)-99.4 °F (37.4 °C)] 98.8 °F (37.1 °C)  Pulse:  [63-75] 69  Resp:  [13-25] 19  SpO2:  [94 %-98 %] 98 %  BP: (118-167)/(68-92) 118/68     Weight: 62.8 kg (138 lb 7.2 oz) (08/31/23 1146)  Body mass index is 19.87 kg/m².  Body surface area is 1.76 meters  squared.    I/O last 3 completed shifts:  In: 1620 [I.V.:150; Other:250; NG/GT:620; IV Piggyback:600]  Out: 2510 [Urine:10; Other:2500]    Physical Exam  Vitals and nursing note reviewed.   Constitutional:       General: He is awake. He is not in acute distress.     Appearance: He is cachectic. He is ill-appearing. He is not diaphoretic.      Comments: Muscle wasting/atrophy noted.   HENT:      Head: Normocephalic and atraumatic.      Right Ear: External ear normal.      Left Ear: External ear normal.      Nose: Nose normal.      Mouth/Throat:      Mouth: Mucous membranes are moist.      Pharynx: Oropharynx is clear. No oropharyngeal exudate or posterior oropharyngeal erythema.   Eyes:      Comments: Left eye with bandage/dressing in place.   Cardiovascular:      Rate and Rhythm: Normal rate.      Heart sounds: No murmur heard.     No friction rub. No gallop.   Pulmonary:      Effort: Pulmonary effort is normal. No respiratory distress.      Breath sounds: No wheezing, rhonchi or rales.   Chest:      Comments: Tunneled HD catheter to right chest wall.  Abdominal:      General: Bowel sounds are normal. There is no distension.      Palpations: Abdomen is soft.      Tenderness: There is no abdominal tenderness.      Comments: PEG in place.   Musculoskeletal:      Cervical back: Neck supple.      Right lower leg: No edema.      Left lower leg: No edema.   Skin:     General: Skin is warm and dry.      Coloration: Skin is not jaundiced.      Comments: Stable noted to right upper extremity from recent surgery. Incision appears intact.   Neurological:      Mental Status: He is alert.      Cranial Nerves: No cranial nerve deficit.   Psychiatric:         Mood and Affect: Mood normal.         Behavior: Behavior normal.          Significant Labs:  BMP:   Recent Labs   Lab 08/30/23  0612 08/31/23  0459   GLU 77 103   * 135*   K 4.6 4.7    101   CO2 19* 22*   BUN 43* 24*   CREATININE 5.1* 3.7*   CALCIUM 9.4 9.1   MG  2.3  --        CBC:   Recent Labs   Lab 08/31/23 0459   WBC 4.01   RBC 3.67*   HGB 11.1*   HCT 35.9*      MCV 98   MCH 30.2   MCHC 30.9*       CMP:   Recent Labs   Lab 08/31/23 0459      CALCIUM 9.1   ALBUMIN 2.2*   PROT 6.5   *   K 4.7   CO2 22*      BUN 24*   CREATININE 3.7*   ALKPHOS 127   ALT <5*   AST 12   BILITOT 0.2       LFTs:   Recent Labs   Lab 08/31/23 0459   ALT <5*   AST 12   ALKPHOS 127   BILITOT 0.2   PROT 6.5   ALBUMIN 2.2*       Microbiology Results (last 7 days)       Procedure Component Value Units Date/Time    AFB Culture & Smear [[id number]] Collected: 08/26/23 1315    Order Status: Completed Specimen: CSF (Spinal Fluid) from CSF Tap, Tube 3 Updated: 08/31/23 0854     AFB Culture & Smear Culture in progress     AFB CULTURE STAIN No acid fast bacilli seen.    Aerobic culture [[id number]] Collected: 08/30/23 1724    Order Status: Completed Specimen: Wound from Cornea, Left Updated: 08/31/23 0830     Aerobic Bacterial Culture No growth    Narrative:      LEFT INTRAOCULAR SPACE    Culture, Anaerobe [[id number]] Collected: 08/30/23 1724    Order Status: Completed Specimen: Wound from Cornea, Left Updated: 08/31/23 0736     Anaerobic Culture Culture in progress    Narrative:      LEFT INTRAOCULAR SPACE    CSF culture [[id number]]  (Abnormal) Collected: 08/26/23 1315    Order Status: Completed Specimen: CSF (Spinal Fluid) from CSF Tap, Tube 3 Updated: 08/31/23 0658     CSF CULTURE Results called to and read back by:Kashif Zhang RN 08/30/2023  07:44      GRAM-POSITIVE VERONICA  From broth only  Identification pending       Gram Stain Result Cytospin indicates:      No WBC's      No organisms seen    Fungus culture [[id number]] Collected: 08/26/23 1315    Order Status: Completed Specimen: CSF (Spinal Fluid) from CSF Tap, Tube 3 Updated: 08/28/23 1115     Fungus (Mycology) Culture Culture in progress    Cryptococcal antigen, CSF [[id number]] Collected: 08/26/23 1315    Order Status:  Completed Specimen: CSF (Spinal Fluid) from CSF Tap, Tube 3 Updated: 08/28/23 0853     Crypto Ag, CSF Negative    Gram stain [[id number]] Collected: 08/26/23 1315    Order Status: Canceled Specimen: CSF (Spinal Fluid) from CSF Tap, Tube 3           Specimen (24h ago, onward)      None          Significant Imaging:  I have reviewed all imagining in the last 24 hours.    Assessment/Plan:     Ophtho  Endophthalmitis  - Ophthalmology consulted and following  - s/p left eye enucleation on 8/30    Cardiac/Vascular  Hypertension  - management per primary   - currently on Norvasc 5 mg daily, Coreg 6.25 mg BID, hydralazine 35 mg BID, isosorbide dinitrate 20 mg BID and valsartan 160 mg BID    Renal/  ESRD (end stage renal disease)  Outpatient HD Information:  -[place] [facility] [place]   -HD tx days: MWF   -HD tx time: 210min  -HD access: R IJ TDC   -HD modality: iHD   -Residual urine: ?    Plan/Recommendations:  - s/p iHD overnight for metabolic clearance and volume management   - can continue calcitriol 0.25 mcg daily and Renvela 800 mg TIDWM  - renal diet when not NPO  - strict I/O's and daily weights  - daily renal function panels and magnesium levels  - renally all dose medications to eGFR  - avoid gadolinium, fleets, phos-based laxatives, NSAIDs, etc.    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 I will follow-up with patient. Please contact us if you have any additional questions.    Herbert Cedeño MD  Nephrology  [facility] - Intensive Care ([place]-)

## 2023-09-01 NOTE — ASSESSMENT & PLAN NOTE
Patient's mentation has currently improved substantially. Alert and Oriented x3 and able to converse well. He was briefly stepped up to the ICU on 8/11 for delirium that self-resolved. Current episode started on 8/24.  Workup so far:  Cefepime (received for 3 days) switched back to meropenem.   CTA PE study and US LE studies rule out thromboembolism  Electrolytes have been maintained wnl with dialysis; mentation eventually began to improve following 2 rounds of HD. Likely due to medication effects.  CTH without acute changes.  Repeated MRI under sedation on 8/26, showed ongoing L scleral and orbital infection but no new processes nor new strokes  LP under sedation on 8/26; CSF cultures grew Gram positive rods, adequate coverage with current Meropenem.  EEG without seizures, but showed encephalopathy    Delirium has significantly improved since stopping cefepime and reglan and s/p 2 HD sessions. Thus most likely 2/2 mediation adverse effect    Plan  - Continue HD sessions  - Delirium likely due to medication adverse effect + prolonged hospitalization, altered sleep/wake cycle, vision loss  - Continue Seroquel 50 mg nightly.  - Olanzapine 5mg IM PRN for severe agitation  - Delirium precautions in place.  - Patient pending for discharge to [facility] or Home health

## 2023-09-01 NOTE — ASSESSMENT & PLAN NOTE
- target Hg of 10-12  - hemoglobin this morning 9  - continue epogen 3,000 units every Monday, Wednesday and Friday

## 2023-09-01 NOTE — SUBJECTIVE & OBJECTIVE
Interval History: Patient was seen this morning after dilaysis, and he stated he felt well overall. . He does endorse some report some burning pain around the tip of his penis and increased urinary frequency. No other complaints at this time. Discussed with the patient that he will likely be discharged to [facility] today.    Review of Systems   Constitutional: Negative.  Negative for chills, diaphoresis and fever.   HENT: Negative.  Negative for facial swelling and sinus pain.    Eyes:  Positive for visual disturbance. Negative for pain, discharge, redness and itching.   Respiratory: Negative.     Cardiovascular: Negative.    Gastrointestinal: Negative.    Genitourinary:  Positive for decreased urine volume, dysuria and urgency.   Musculoskeletal: Negative.    Skin: Negative.    Neurological: Negative.    Psychiatric/Behavioral: Negative.  Negative for agitation, behavioral problems and confusion.      Objective:     Vital Signs (Most Recent):  Temp: 98.2 °F (36.8 °C) (09/01/23 1522)  Pulse: 66 (09/01/23 1522)  Resp: 18 (09/01/23 1522)  BP: 125/69 (09/01/23 1522)  SpO2: (!) 92 % (09/01/23 1522) Vital Signs (24h Range):  Temp:  [97.5 °F (36.4 °C)-99.4 °F (37.4 °C)] 98.2 °F (36.8 °C)  Pulse:  [66-75] 66  Resp:  [13-30] 18  SpO2:  [92 %-98 %] 92 %  BP: (118-153)/(68-88) 125/69     Weight: 62.8 kg (138 lb 7.2 oz)  Body mass index is 19.87 kg/m².    Intake/Output Summary (Last 24 hours) at 9/1/2023 9328  Last data filed at 9/1/2023 0547  Gross per 24 hour   Intake 290 ml   Output 2510 ml   Net -2220 ml           Physical Exam  Constitutional:       Appearance: Normal appearance.   HENT:      Head: Normocephalic.      Comments: Eye shield covering left eye     Mouth/Throat:      Mouth: Mucous membranes are dry.   Eyes:      Comments: Right eye clear and pupil round and reactive; left eye covered by eye shield.   Cardiovascular:      Rate and Rhythm: Normal rate and regular rhythm.      Pulses: Normal pulses.       Heart sounds: Normal heart sounds.   Pulmonary:      Effort: Pulmonary effort is normal. No respiratory distress.      Breath sounds: Normal breath sounds. No wheezing.   Abdominal:      General: Abdomen is flat. There is no distension.      Palpations: Abdomen is soft.      Tenderness: There is no abdominal tenderness.   Genitourinary:     Penis: Normal.    Musculoskeletal:      Right lower leg: No edema.      Left lower leg: No edema.   Skin:     General: Skin is warm and dry.   Neurological:      General: No focal deficit present.      Mental Status: He is alert and oriented to person, place, and time.      Cranial Nerves: No cranial nerve deficit.   Psychiatric:         Mood and Affect: Mood normal.         Behavior: Behavior normal.             Significant Labs: All pertinent labs within the past 24 hours have been reviewed.    Significant Imaging: I have reviewed all pertinent imaging results/findings within the past 24 hours.

## 2023-09-01 NOTE — NURSING
09/01/23 0540 09/01/23 0547   During Hemodialysis Assessment   Blood Flow Rate (mL/min) 400 mL/min  --    Dialysate Flow Rate (mL/min) 800 ml/min  --    Ultrafiltration Rate (mL/Hr) 830 mL/Hr  --    Arteriovenous Lines Secure Yes  --    Arterial Pressure (mmHg) -190 mmHg  --    Venous Pressure (mmHg) 190  --    UF Removed (mL) 2500 mL  --    TMP 60  --    Venous Line in Air Detector Yes  --    Intake (mL) 250 mL  --    Transducer Dry Yes  --    Access Visible Yes  --    Intra-Hemodialysis Comments Tx complete  --    Post-Hemodialysis Assessment   Blood Volume Processed (Liters)  --  72 L   Dialyzer Clearance  --  Moderately streaked   Duration of Treatment  --  180 minutes   Total UF (mL)  --  2500 mL   Net Fluid Removal  --  2000   Patient Response to Treatment  --  Pt tolerated HD very well   Post-Hemodialysis Comments  --  Tx complete     HD x 3 hours via RCW TDC. UF: -2000ml net. Pt tolerated HD very well.

## 2023-09-01 NOTE — ASSESSMENT & PLAN NOTE
Outpatient HD Information:  [place] [facility] [place] tx days: MWF   -HD tx time: 210min  -HD access: R IJ TDC   -HD modality: iHD   -Residual urine: ?    Plan/Recommendations:  - s/p iHD overnight for metabolic clearance and volume management   - can continue calcitriol 0.25 mcg daily and Renvela 800 mg TIDWM  - renal diet when not NPO  - strict I/O's and daily weights  - daily renal function panels and magnesium levels  - renally all dose medications to eGFR  - avoid gadolinium, fleets, phos-based laxatives, NSAIDs, etc.

## 2023-09-01 NOTE — PROGRESS NOTES
Pharmacokinetic Assessment Follow Up: IV Vancomycin    Vancomycin serum concentration assessment(s):    Vanc level is 17.1 mcg/ml, drawn appropriately  Patient was dialyzed earlier today    Vancomycin Regimen Plan:    Will re-dose vancomycin 500 mg x 1   Redraw vanc random on Sunday with AM labs    Drug levels (last 3 results):  Recent Labs   Lab Result Units 08/30/23  0612 08/31/23  0459 09/01/23  0816   Vancomycin, Random ug/mL 21.7 23.7 17.7       Pharmacy will continue to follow and monitor vancomycin.    Please contact pharmacy at extension [phone] for questions regarding this assessment.    Thank you for the consult,   Elsa Solorzano    Patient brief summary:  Immanuel Bernal is a 57 y.o. male initiated on antimicrobial therapy with IV Vancomycin for treatment of endophthalmitis.    The patient's current regimen is vancomycin pulse dosing.     Actual Body Weight:   62.8 kg    Renal Function:   Estimated Creatinine Clearance: 26.2 mL/min (A) (based on SCr of 2.7 mg/dL (H)).,     Dialysis Method (if applicable):  intermittent HD    CBC (last 72 hours):  Recent Labs   Lab Result Units 08/30/23  0612 08/31/23  0459 09/01/23  0815   WBC K/uL 5.45 4.01 5.51   Hemoglobin g/dL 9.9* 11.1* 9.0*   Hematocrit % 33.5* 35.9* 29.6*   Platelets K/uL 205 209 172         Metabolic Panel (last 72 hours):  Recent Labs   Lab Result Units 08/30/23  0612 08/31/23  0459 08/31/23  1632 09/01/23  0816   Sodium mmol/L 134* 135*  --  133*   Potassium mmol/L 4.6 4.7  --  3.5   Chloride mmol/L 101 101  --  100   CO2 mmol/L 19* 22*  --  24   Glucose mg/dL 77 103  --  113*   Glucose, UA   --   --  Trace*  --    BUN mg/dL 43* 24*  --  17   Creatinine mg/dL 5.1* 3.7*  --  2.7*   Albumin g/dL 2.1* 2.2*  --  2.2*   Total Bilirubin mg/dL 0.3 0.2  --  0.2   Alkaline Phosphatase U/L 121 127  --  116   AST U/L 16 12  --  11   ALT U/L <5* <5*  --  <5*   Magnesium mg/dL 2.3  --   --   --          Vancomycin Administrations:  vancomycin given in  the last 96 hours                     vancomycin (VANCOCIN) 500 mg in dextrose 5 % in water (D5W) 100 mL IVPB (MB+) (mg) 500 mg New Bag 08/23/23 1724    vancomycin (VANCOCIN) 500 mg in dextrose 5 % in water (D5W) 100 mL IVPB (MB+) (mg) 500 mg New Bag 08/21/23 1318                    Microbiologic Results:  Microbiology Results (last 7 days)       Procedure Component Value Units Date/Time    Culture, Anaerobe [[id number]] Collected: 08/30/23 1724    Order Status: Completed Specimen: Wound from Cornea, Left Updated: 09/01/23 0859     Anaerobic Culture Culture in progress    Narrative:      LEFT INTRAOCULAR SPACE    CSF culture [[id number]]  (Abnormal) Collected: 08/26/23 1315    Order Status: Completed Specimen: CSF (Spinal Fluid) from CSF Tap, Tube 3 Updated: 09/01/23 0705     CSF CULTURE Results called to and read back by:Kashif Zhang RN 08/30/2023  07:44      GRAM-POSITIVE VERONICA  From broth only  Identification pending       Gram Stain Result Cytospin indicates:      No WBC's      No organisms seen    AFB Culture & Smear [[id number]] Collected: 08/26/23 1315    Order Status: Completed Specimen: CSF (Spinal Fluid) from CSF Tap, Tube 3 Updated: 08/31/23 0854     AFB Culture & Smear Culture in progress     AFB CULTURE STAIN No acid fast bacilli seen.    Aerobic culture [[id number]] Collected: 08/30/23 1724    Order Status: Completed Specimen: Wound from Cornea, Left Updated: 08/31/23 0830     Aerobic Bacterial Culture No growth    Narrative:      LEFT INTRAOCULAR SPACE    Fungus culture [[id number]] Collected: 08/26/23 1315    Order Status: Completed Specimen: CSF (Spinal Fluid) from CSF Tap, Tube 3 Updated: 08/28/23 1115     Fungus (Mycology) Culture Culture in progress    Cryptococcal antigen, CSF [[id number]] Collected: 08/26/23 1315    Order Status: Completed Specimen: CSF (Spinal Fluid) from CSF Tap, Tube 3 Updated: 08/28/23 0853     Crypto Ag, CSF Negative    Gram stain [[id number]] Collected: 08/26/23 1315    Order  Status: Canceled Specimen: CSF (Spinal Fluid) from CSF Tap, Tube 3

## 2023-09-01 NOTE — NURSING
09/01/23 0240   During Hemodialysis Assessment   Blood Flow Rate (mL/min) 400 mL/min   Dialysate Flow Rate (mL/min) 800 ml/min   Ultrafiltration Rate (mL/Hr) 830 mL/Hr   Arteriovenous Lines Secure Yes   Arterial Pressure (mmHg) -180 mmHg   Venous Pressure (mmHg) 170   Blood Volume Processed (Liters) 0 L   UF Removed (mL) 0 mL   TMP 60   Venous Line in Air Detector Yes   Intake (mL) 250 mL   Transducer Dry Yes   Access Visible Yes    notified of access issue? N/A   Heparin given? N/A   Intra-Hemodialysis Comments Tx started per MD orders     Report received from primary nurse. HD started per MD orders.

## 2023-09-01 NOTE — PLAN OF CARE
Pt Aox3 and follows commands. Room air. NSR. Peptide 1.5 infusing at 50 cc/hr. Tolerated some PO intake. Large BM. Dialysis in progress. No acute events overnight. Bed in lowest position and call light within reach.

## 2023-09-01 NOTE — PROGRESS NOTES
[facility] - [facility] ([address] Medicine  Progress Note    Patient Name: Immanuel Bernal  MRN: [id number]  Patient Class: IP- Inpatient   Admission Date: 8/9/2023  Length of Stay: 23 days  Attending Physician: Porsha Funez MD  Primary Care Provider: Dolly, Primary Doctor        Subjective:     Principal Problem:Delirium        HPI:  Immanuel Bernal is a 57 y.o. male with ESRD, HFrEF 30-35%, DM2, HTN, history of bowel obstruction s/p bowel resection, now with PEG in place, ?KENIA, history of CVA, ??AFib who was recently hospitalized at [facility] in 7/2023 for b/l endophthalmitis, Pseudomonas bacteremia, RUE thrombus, RUE AVG infection s/p excision, and RUL mass. Left eye did not improve & subsequently developed abscess, but POA declined enucleation and patient ultimately left AMA. Re-presented to [place] ED with worsening eye symptoms with imaging showing left scleral abscess, so [place] was [place] [facility] [place] for Oculoplastics evaluation & enucleation.       Overview/Hospital Course:  Patient was admitted initially on 8/9/2023 to [facility] medicine for Oculoplastics evaluation for L eye endophthalmitis with abscess. Due to prior Pseudomonal bacteremia, patient was kept of vancomycin and meropenem per infectious disease recommendations. Initially, surgery was planned, however, patient's POA wanted to repeat imaging to confirm the severe infection before proceeding. Unfortunately, patient had worsening delirium of unknown etiology, thus MRI could not be completed properly. Patient was stepped up to ICU on 8/11 and briefly required a precedex drip, and got a full MRI on 8/12. Delirium self-resolved and patient was stepped down to medicine again on 8/14. After back and forth discussion with patient's POA and ophthalmology, patient underwent L eye evisceration on 8/16. Surgical cultures additionally grew yeast (not yet speciated) and cutibacterium acnes. Patient was started on fluconazole and  tobramycin drops/ointment. In preparation for discharge, meropenem was changed to cefepime on 8/21 for pseudomonal coverage, as meropenem needed to be dosed daily (and patient would need another tunneled Gil line) and cefepime could be dosed with dialysis.     On 8/24, patient once again had worsening delirium of unclear etiology. Cefepime was switched back to meropenem due to concern for neurotoxicity, and reglan was discontinued. Patient underwent HD however this did not improve his mentation. Patient underwent repeat MRI jim under sedation on 8/26, which showed ongoing L scleral infection and orbital cellulitis; per ophthalmology, no further surgical intervention is warranted. LP was obtained as well and negative for infection. CTA was ordered to r/o PE in setting of intermittent hypoxia, and was largely negative. LE US was also ordered, and did not show DVT, thus ruling out thromboembolism as a cause for agitation/confusion. EEG was completed and showed encephalopathy but no seizures. Patient's delirium ultimately improved with another session of HD on 8/28, thus, delirium was most likely related to medication adverse effects.     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 His R Permacath was still functioning and was left in place. Patient had an old AVF on the R arm that was operated on 1-2 months ago (see HPI; infected graft) and still had staples in place; vascular surgery recommend outpatient follow up for staple removal once site fully healed. For DVTs, Eliquis was started on 8/17, however, patient developed melena on 8/22. Eliquis was stopped, 1U PRBC was transfused, GI was consulted, and patient underwent EGD on 8/24. EGD found a large duodenal ulcer with adherent clot - 2 clips were placed adjacent to the ulcer but the clot was left intact. Patient remained hemodynamically stable, however has required 3U  PRBC total since melena started. Per GI, no further endoscopic intervention would be useful for the large ulcer. CTA was ordered on 8/27 and was negative for active bleeding. No further bleeding has been noted and hemoglobin has stabilized.    Disposition: Tunneled PICC line was placed on 08/29. Patient underwent left eye evisceration completion and eye implant on 08/30 and tolerated the procedure well. Patient is currently pending discharge to [facility] or possibly home health.      Interval History: Patient was seen this morning after dilaysis, and he stated he felt well overall. . He does endorse some report some burning pain around the tip of his penis and increased urinary frequency. No other complaints at this time. Discussed with the patient that he will likely be discharged to [facility] today.    Review of Systems   Constitutional: Negative.  Negative for chills, diaphoresis and fever.   HENT: Negative.  Negative for facial swelling and sinus pain.    Eyes:  Positive for visual disturbance. Negative for pain, discharge, redness and itching.   Respiratory: Negative.     Cardiovascular: Negative.    Gastrointestinal: Negative.    Genitourinary:  Positive for decreased urine volume, dysuria and urgency.   Musculoskeletal: Negative.    Skin: Negative.    Neurological: Negative.    Psychiatric/Behavioral: Negative.  Negative for agitation, behavioral problems and confusion.      Objective:     Vital Signs (Most Recent):  Temp: 98.2 °F (36.8 °C) (09/01/23 1522)  Pulse: 66 (09/01/23 1522)  Resp: 18 (09/01/23 1522)  BP: 125/69 (09/01/23 1522)  SpO2: (!) 92 % (09/01/23 1522) Vital Signs (24h Range):  Temp:  [97.5 °F (36.4 °C)-99.4 °F (37.4 °C)] 98.2 °F (36.8 °C)  Pulse:  [66-75] 66  Resp:  [13-30] 18  SpO2:  [92 %-98 %] 92 %  BP: (118-153)/(68-88) 125/69     Weight: 62.8 kg (138 lb 7.2 oz)  Body mass index is 19.87 kg/m².    Intake/Output Summary (Last 24 hours) at 9/1/2023 5546  Last data filed at 9/1/2023  0547  Gross per 24 hour   Intake 290 ml   Output 2510 ml   Net -2220 ml           Physical Exam  Constitutional:       Appearance: Normal appearance.   HENT:      Head: Normocephalic.      Comments: Eye shield covering left eye     Mouth/Throat:      Mouth: Mucous membranes are dry.   Eyes:      Comments: Right eye clear and pupil round and reactive; left eye covered by eye shield.   Cardiovascular:      Rate and Rhythm: Normal rate and regular rhythm.      Pulses: Normal pulses.      Heart sounds: Normal heart sounds.   Pulmonary:      Effort: Pulmonary effort is normal. No respiratory distress.      Breath sounds: Normal breath sounds. No wheezing.   Abdominal:      General: Abdomen is flat. There is no distension.      Palpations: Abdomen is soft.      Tenderness: There is no abdominal tenderness.   Genitourinary:     Penis: Normal.    Musculoskeletal:      Right lower leg: No edema.      Left lower leg: No edema.   Skin:     General: Skin is warm and dry.   Neurological:      General: No focal deficit present.      Mental Status: He is alert and oriented to person, place, and time.      Cranial Nerves: No cranial nerve deficit.   Psychiatric:         Mood and Affect: Mood normal.         Behavior: Behavior normal.             Significant Labs: All pertinent labs within the past 24 hours have been reviewed.    Significant Imaging: I have reviewed all pertinent imaging results/findings within the past 24 hours.      Assessment/Plan:      * Delirium  Patient's mentation has currently improved substantially. Alert and Oriented x3 and able to converse well. He was briefly stepped up to the ICU on 8/11 for delirium that self-resolved. Current episode started on 8/24.  Workup so far:  Cefepime (received for 3 days) switched back to meropenem.   CTA PE study and US LE studies rule out thromboembolism  Electrolytes have been maintained wnl with dialysis; mentation eventually began to improve following 2 rounds of HD.  Likely due to medication effects.  CTH without acute changes.  Repeated MRI under sedation on 8/26, showed ongoing L scleral and orbital infection but no new processes nor new strokes  LP under sedation on 8/26; CSF cultures grew Gram positive rods, adequate coverage with current Meropenem.  EEG without seizures, but showed encephalopathy    Delirium has significantly improved since stopping cefepime and reglan and s/p 2 HD sessions. Thus most likely 2/2 mediation adverse effect    Plan  - Continue HD sessions  - Delirium likely due to medication adverse effect + prolonged hospitalization, altered sleep/wake cycle, vision loss  - Continue Seroquel 50 mg nightly.  - Olanzapine 5mg IM PRN for severe agitation  - Delirium precautions in place.  - Patient pending for discharge to [facility] or Home health    Abnormal microbiological findings in CSF  Lumbar puncture was performed on 08/26 which initially showed no growth; On 08/30, CSF cultures were positive for Gram positive rods. Patient was already on Meropenem at time of known culture growth, and ID communicated that this would be adequate coverage for this infection. Delirium had already improved prior to culture growth as well, and patient is still alert and oriented x3 with no signs of delirium.    - Continue Meropenem per ID recs.      AV fistula infection  - See HPI and hospital course; previous AV graft was removed at OSH and found to be colonized with pseudomonas from prior pseudomonal bacteremia  - Staples still in place  - F/u with [facility] vascular surgery on discharge    Duodenal ulceration  Patient started to have melena on 8/22, and on 8/24, underwent EGD.   Found to have a large oozing duodenal ulcer with adherent clot. 2 clips placed however ulcer could not be addressed completely. Also had 2 more nonbleeding ulcers in the duodenal bulb and 2nd part of the duodenum.  3U PRBC transfused so far this admission  Per GI, no further endoscopic intervention  warranted  Due to ongoing melena, ordered CTA GIB protocol - negative    Problem now stable    - Trend CBC Q12. Transfuse for Hb >7, unless otherwise indicated  - Maintain IV access with 2 large bore IVs  - Hold all NSAIDs and anticoagulants, unless contraindicated  - IV pantoprazole 40mg BID - switch to PO pantroprazole on discharge  - Correct any coagulopathy with platelets and FFP for goal of platelets >50K and INR <2.0    Impaired mobility  - Plan to DC to [facility] when medically stable    PEG (percutaneous endoscopic gastrostomy) status  - Previously cleared by SLP for a regular diet without restrictions  - Also frequently hypoglycemic due to malnutrition.  - Resumed tube feeds following eye surgery.      Anemia in ESRD (end-stage renal disease)  EPO per nephrology    Severe malnutrition  Nutrition consulted. Most recent weight and BMI monitored-     Measurements:  Wt Readings from Last 1 Encounters:   08/23/23 62.8 kg (138 lb 7.2 oz)   Body mass index is 19.87 kg/m².    Patient has been screened and assessed by RD.    Malnutrition Type:  Context: chronic illness  Level: severe    Malnutrition Characteristic Summary:  Weight Loss (Malnutrition): greater than 10% in 6 months  Energy Intake (Malnutrition): other (see comments) (LAMONT)  Subcutaneous Fat (Malnutrition): severe depletion  Muscle Mass (Malnutrition): severe depletion    Interventions/Recommendations (treatment strategy):  1. Resume Renal diet + Novasource ONS when able - encourage PO intake as tolerated. 2. If EN to resume, please reconsult for updated recommendations. 3. RD to monitor & follow-up.     Able to take in PO per SLP assessment on 8/15, on regular diet with thin liquids  Continue tube feeds for now due to altered mentation    Endophthalmitis  S/p L eye evisceration on 8/16/23.  Repeat MRI head/orbit with ongoing L scleral infection and cellulitis    - ID and ophthalmology following  - Continue meropenem and fluconazole; Tobramycin  drops discontinued following successful eye evisceration completion with eye implant.  - Anticipated 4-6 weeks of therapy from evisceration. Anticipated end date: 9/12-9/26. See OPAT note from 8/19.  - Tunneled PICC line was placed for Antibiotics. (08/29)  - Follow up all culture data  - Ophthalmology to perform packing changes every 2-3 days  - Completion of left eye evisceration plus eye implant done 08/30, patient tolerated well.  - Per Ophthalmology, continue to cover left eye for 1 week; does not need eye drops within one week.  - Follow up in Ophthalmology clinic on Thursday (09/07).    ESRD (end stage renal disease)  Nephrology consulted for HD needs while inpatient    Hyperlipidemia  Home statin    Chronic diastolic heart failure  Continue to monitor for signs of volume overload; volume removal with dialysis    Hypertension  Current hypertension is worsened secondary to agitation  On CCB at home but will uptitrate GDMT while here    - Coreg 6.25mg BID, limited by HR  - BiDil 2 tablets TID  - Valsartan 160mg BID    VTE Risk Mitigation (From admission, onward)         Ordered     heparin (porcine) injection 1,000 Units  As needed (PRN)         08/31/23 1702     heparin (porcine) injection 1,000 Units  As needed (PRN)         08/29/23 1322     heparin (porcine) injection 1,000 Units  As needed (PRN)         08/10/23 1046     IP VTE HIGH RISK PATIENT  Once         08/09/23 1317     Place sequential compression device  Until discontinued         08/09/23 1317                Discharge Planning   TOBY: 9/1/2023     Code Status: Full Code   Is the patient medically ready for discharge?: Yes    Reason for patient still in hospital (select all that apply): Pending disposition  Discharge Plan A: Rehab   Discharge Delays: (!) Procedure Scheduling (IR, OR, Labs, Echo, Cath, Echo, EEG) (eye surgery next week)              Eric Koo DO  Department of Hospital Medicine   [facility] - Intensive Care ([place]

## 2023-09-01 NOTE — PLAN OF CARE
Patient is stable to dc today, and had an accepting with [facility], but YULI has received information indicating that patient would not be accepted due to patient has only limited Medicare lifetime reserve days. YULI previously received information from Janessa Motley, clinical liaison of [facility] indicating that patient would be accepting using his Medicaid insurance. YULI has attempted to contact Yoni, with [facility] and left a vm as Janessa is currently out of the office. YULI will reach out to the  Leadership team for assistance.    YULI has spoken to Yoni, coordinator with [facility] indicating that due to patient having those 4 lifetime reserve days they would not be able to accept him and that those days do interfere with using the secondary insurance of Medicaid.YULI has escalated this matter to the  Leadership team for further assistance as patient is still needing placement.    Updated 4:30  SW has receive feedback from the  Leadership team reporting that if patient family if unable to care for him he will need dc to a nursing home. YULI will include evening/ weekend team of this information.     Updated 4:50pm  YULI has informed patient sister Marley of the updated information as patient recently was supposed to admit to [facility]. YULI has informed her that due to patient insurance [facility] will not accept patient. YULI proceeded to discuss nursing home placement and she reported not wanted her brother to be placed in a nursing home but is highly concerned of all the follow up appts that are needed as patient lives 5 hours away in [place].         Mirta Melendez, ADWOA  [facility]   [id number]

## 2023-09-02 LAB
ALBUMIN SERPL BCP-MCNC: 2.1 G/DL (ref 3.5–5.2)
ALP SERPL-CCNC: 112 U/L (ref 55–135)
ALT SERPL W/O P-5'-P-CCNC: <5 U/L (ref 10–44)
ANION GAP SERPL CALC-SCNC: 11 MMOL/L (ref 8–16)
AST SERPL-CCNC: 13 U/L (ref 10–40)
BACTERIA SPEC AEROBE CULT: NO GROWTH
BILIRUB SERPL-MCNC: 0.3 MG/DL (ref 0.1–1)
BUN SERPL-MCNC: 31 MG/DL (ref 6–20)
CALCIUM SERPL-MCNC: 9.1 MG/DL (ref 8.7–10.5)
CHLORIDE SERPL-SCNC: 100 MMOL/L (ref 95–110)
CO2 SERPL-SCNC: 21 MMOL/L (ref 23–29)
CREAT SERPL-MCNC: 3.5 MG/DL (ref 0.5–1.4)
ERYTHROCYTE [DISTWIDTH] IN BLOOD BY AUTOMATED COUNT: 17.3 % (ref 11.5–14.5)
EST. GFR  (NO RACE VARIABLE): 19.3 ML/MIN/1.73 M^2
GLUCOSE SERPL-MCNC: 90 MG/DL (ref 70–110)
HCT VFR BLD AUTO: 33.2 % (ref 40–54)
HGB BLD-MCNC: 10.1 G/DL (ref 14–18)
MCH RBC QN AUTO: 31.2 PG (ref 27–31)
MCHC RBC AUTO-ENTMCNC: 30.4 G/DL (ref 32–36)
MCV RBC AUTO: 103 FL (ref 82–98)
PLATELET # BLD AUTO: 155 K/UL (ref 150–450)
PMV BLD AUTO: 10 FL (ref 9.2–12.9)
POCT GLUCOSE: 107 MG/DL (ref 70–110)
POCT GLUCOSE: 131 MG/DL (ref 70–110)
POTASSIUM SERPL-SCNC: 4.1 MMOL/L (ref 3.5–5.1)
PROT SERPL-MCNC: 5.9 G/DL (ref 6–8.4)
RBC # BLD AUTO: 3.24 M/UL (ref 4.6–6.2)
SODIUM SERPL-SCNC: 132 MMOL/L (ref 136–145)
WBC # BLD AUTO: 5.34 K/UL (ref 3.9–12.7)

## 2023-09-02 PROCEDURE — 20000000 HC ICU ROOM

## 2023-09-02 PROCEDURE — 25000003 PHARM REV CODE 250

## 2023-09-02 PROCEDURE — 99900035 HC TECH TIME PER 15 MIN (STAT)

## 2023-09-02 PROCEDURE — 36415 COLL VENOUS BLD VENIPUNCTURE: CPT | Performed by: STUDENT IN AN ORGANIZED HEALTH CARE EDUCATION/TRAINING PROGRAM

## 2023-09-02 PROCEDURE — 85027 COMPLETE CBC AUTOMATED: CPT

## 2023-09-02 PROCEDURE — 25000003 PHARM REV CODE 250: Performed by: HOSPITALIST

## 2023-09-02 PROCEDURE — 97535 SELF CARE MNGMENT TRAINING: CPT

## 2023-09-02 PROCEDURE — 80053 COMPREHEN METABOLIC PANEL: CPT | Performed by: STUDENT IN AN ORGANIZED HEALTH CARE EDUCATION/TRAINING PROGRAM

## 2023-09-02 PROCEDURE — 25000242 PHARM REV CODE 250 ALT 637 W/ HCPCS: Performed by: STUDENT IN AN ORGANIZED HEALTH CARE EDUCATION/TRAINING PROGRAM

## 2023-09-02 PROCEDURE — 25000003 PHARM REV CODE 250: Performed by: STUDENT IN AN ORGANIZED HEALTH CARE EDUCATION/TRAINING PROGRAM

## 2023-09-02 PROCEDURE — 94640 AIRWAY INHALATION TREATMENT: CPT

## 2023-09-02 PROCEDURE — 97530 THERAPEUTIC ACTIVITIES: CPT

## 2023-09-02 PROCEDURE — 63600175 PHARM REV CODE 636 W HCPCS

## 2023-09-02 PROCEDURE — 99232 PR SUBSEQUENT HOSPITAL CARE,LEVL II: ICD-10-PCS | Mod: GC,,, | Performed by: HOSPITALIST

## 2023-09-02 PROCEDURE — 99232 SBSQ HOSP IP/OBS MODERATE 35: CPT | Mod: GC,,, | Performed by: HOSPITALIST

## 2023-09-02 PROCEDURE — 94761 N-INVAS EAR/PLS OXIMETRY MLT: CPT

## 2023-09-02 RX ORDER — LIDOCAINE HYDROCHLORIDE 20 MG/ML
JELLY TOPICAL
Status: DISCONTINUED | OUTPATIENT
Start: 2023-09-02 | End: 2023-09-02

## 2023-09-02 RX ORDER — HEPARIN SODIUM 5000 [USP'U]/ML
5000 INJECTION, SOLUTION INTRAVENOUS; SUBCUTANEOUS EVERY 8 HOURS
Status: DISCONTINUED | OUTPATIENT
Start: 2023-09-02 | End: 2023-09-19 | Stop reason: HOSPADM

## 2023-09-02 RX ORDER — LIDOCAINE HYDROCHLORIDE 20 MG/ML
JELLY TOPICAL
Status: DISCONTINUED | OUTPATIENT
Start: 2023-09-02 | End: 2023-09-19 | Stop reason: HOSPADM

## 2023-09-02 RX ADMIN — AMLODIPINE BESYLATE 5 MG: 5 TABLET ORAL at 09:09

## 2023-09-02 RX ADMIN — TRAMADOL HYDROCHLORIDE 50 MG: 50 TABLET, COATED ORAL at 05:09

## 2023-09-02 RX ADMIN — CARVEDILOL 6.25 MG: 6.25 TABLET, FILM COATED ORAL at 09:09

## 2023-09-02 RX ADMIN — PANTOPRAZOLE SODIUM 40 MG: 40 GRANULE, DELAYED RELEASE ORAL at 09:09

## 2023-09-02 RX ADMIN — ACETAMINOPHEN 1000 MG: 500 TABLET ORAL at 09:09

## 2023-09-02 RX ADMIN — MEROPENEM 500 MG: 500 INJECTION INTRAVENOUS at 09:09

## 2023-09-02 RX ADMIN — HEPARIN SODIUM 5000 UNITS: 5000 INJECTION INTRAVENOUS; SUBCUTANEOUS at 03:09

## 2023-09-02 RX ADMIN — SEVELAMER CARBONATE 0.8 G: 0.8 POWDER, FOR SUSPENSION ORAL at 12:09

## 2023-09-02 RX ADMIN — SEVELAMER CARBONATE 0.8 G: 0.8 POWDER, FOR SUSPENSION ORAL at 05:09

## 2023-09-02 RX ADMIN — SEVELAMER CARBONATE 0.8 G: 0.8 POWDER, FOR SUSPENSION ORAL at 09:09

## 2023-09-02 RX ADMIN — CALCITRIOL CAPSULES 0.25 MCG 0.25 MCG: 0.25 CAPSULE ORAL at 09:09

## 2023-09-02 RX ADMIN — HEPARIN SODIUM 5000 UNITS: 5000 INJECTION INTRAVENOUS; SUBCUTANEOUS at 09:09

## 2023-09-02 RX ADMIN — QUETIAPINE FUMARATE 50 MG: 25 TABLET ORAL at 09:09

## 2023-09-02 RX ADMIN — ONDANSETRON 4 MG: 4 TABLET, ORALLY DISINTEGRATING ORAL at 03:09

## 2023-09-02 RX ADMIN — Medication 6 MG: at 09:09

## 2023-09-02 RX ADMIN — THERA TABS 1 TABLET: TAB at 09:09

## 2023-09-02 RX ADMIN — SENNOSIDES AND DOCUSATE SODIUM 1 TABLET: 50; 8.6 TABLET ORAL at 09:09

## 2023-09-02 RX ADMIN — TRAMADOL HYDROCHLORIDE 50 MG: 50 TABLET, COATED ORAL at 10:09

## 2023-09-02 RX ADMIN — ONDANSETRON 4 MG: 4 TABLET, ORALLY DISINTEGRATING ORAL at 09:09

## 2023-09-02 RX ADMIN — IPRATROPIUM BROMIDE AND ALBUTEROL SULFATE 3 ML: .5; 3 SOLUTION RESPIRATORY (INHALATION) at 07:09

## 2023-09-02 RX ADMIN — Medication 1 CAPSULE: at 09:09

## 2023-09-02 RX ADMIN — TRAMADOL HYDROCHLORIDE 50 MG: 50 TABLET, COATED ORAL at 02:09

## 2023-09-02 RX ADMIN — ATORVASTATIN CALCIUM 40 MG: 40 TABLET, FILM COATED ORAL at 09:09

## 2023-09-02 RX ADMIN — ACETAMINOPHEN 1000 MG: 500 TABLET ORAL at 03:09

## 2023-09-02 RX ADMIN — FLUCONAZOLE 400 MG: 40 POWDER, FOR SUSPENSION ORAL at 09:09

## 2023-09-02 RX ADMIN — HYDRALAZINE HYDROCHLORIDE: 10 TABLET, FILM COATED ORAL at 03:09

## 2023-09-02 RX ADMIN — IPRATROPIUM BROMIDE AND ALBUTEROL SULFATE 3 ML: .5; 3 SOLUTION RESPIRATORY (INHALATION) at 08:09

## 2023-09-02 RX ADMIN — VALSARTAN 160 MG: 160 TABLET, FILM COATED ORAL at 09:09

## 2023-09-02 RX ADMIN — HYDRALAZINE HYDROCHLORIDE: 10 TABLET, FILM COATED ORAL at 09:09

## 2023-09-02 RX ADMIN — LIDOCAINE HYDROCHLORIDE 10 ML: 20 JELLY TOPICAL at 03:09

## 2023-09-02 RX ADMIN — IPRATROPIUM BROMIDE AND ALBUTEROL SULFATE 3 ML: .5; 3 SOLUTION RESPIRATORY (INHALATION) at 02:09

## 2023-09-02 RX ADMIN — POLYETHYLENE GLYCOL 3350 17 G: 17 POWDER, FOR SOLUTION ORAL at 09:09

## 2023-09-02 NOTE — PROGRESS NOTES
[facility] - [facility] ([address] Medicine  Progress Note    Patient Name: Immanuel Bernal  MRN: [id number]  Patient Class: IP- Inpatient   Admission Date: 8/9/2023  Length of Stay: 24 days  Attending Physician: Porsha Funez MD  Primary Care Provider: Dolly, Primary Doctor        Subjective:     Principal Problem:Delirium        HPI:  Immanuel Bernal is a 57 y.o. male with ESRD, HFrEF 30-35%, DM2, HTN, history of bowel obstruction s/p bowel resection, now with PEG in place, ?KENIA, history of CVA, ??AFib who was recently hospitalized at [facility] in 7/2023 for b/l endophthalmitis, Pseudomonas bacteremia, RUE thrombus, RUE AVG infection s/p excision, and RUL mass. Left eye did not improve & subsequently developed abscess, but POA declined enucleation and patient ultimately left AMA. Re-presented to [place] ED with worsening eye symptoms with imaging showing left scleral abscess, so he was [place] [facility] [place] for Oculoplastics evaluation & enucleation.       Overview/Hospital Course:  Patient was admitted initially on 8/9/2023 to [facility] medicine for Oculoplastics evaluation for L eye endophthalmitis with abscess. Due to prior Pseudomonal bacteremia, patient was kept of vancomycin and meropenem per infectious disease recommendations. Initially, surgery was planned, however, patient's POA wanted to repeat imaging to confirm the severe infection before proceeding. Unfortunately, patient had worsening delirium of unknown etiology, thus MRI could not be completed properly. Patient was stepped up to ICU on 8/11 and briefly required a precedex drip, and got a full MRI on 8/12. Delirium self-resolved and patient was stepped down to medicine again on 8/14. After back and forth discussion with patient's POA and ophthalmology, patient underwent L eye evisceration on 8/16. Surgical cultures additionally grew yeast (not yet speciated) and cutibacterium acnes. Patient was started on fluconazole and  tobramycin drops/ointment. In preparation for discharge, meropenem was changed to cefepime on 8/21 for pseudomonal coverage, as meropenem needed to be dosed daily (and patient would need another tunneled Gil line) and cefepime could be dosed with dialysis.     On 8/24, patient once again had worsening delirium of unclear etiology. Cefepime was switched back to meropenem due to concern for neurotoxicity, and reglan was discontinued. Patient underwent HD however this did not improve his mentation. Patient underwent repeat MRI jim under sedation on 8/26, which showed ongoing L scleral infection and orbital cellulitis; per ophthalmology, no further surgical intervention is warranted. LP was obtained as well and negative for infection. CTA was ordered to r/o PE in setting of intermittent hypoxia, and was largely negative. LE US was also ordered, and did not show DVT, thus ruling out thromboembolism as a cause for agitation/confusion. EEG was completed and showed encephalopathy but no seizures. Patient's delirium ultimately improved with another session of HD on 8/28, thus, delirium was most likely related to medication adverse effects.     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 His R Permacath was still functioning and was left in place. Patient had an old AVF on the R arm that was operated on 1-2 months ago (see HPI; infected graft) and still had staples in place; vascular surgery recommend outpatient follow up for staple removal once site fully healed. For DVTs, Eliquis was started on 8/17, however, patient developed melena on 8/22. Eliquis was stopped, 1U PRBC was transfused, GI was consulted, and patient underwent EGD on 8/24. EGD found a large duodenal ulcer with adherent clot - 2 clips were placed adjacent to the ulcer but the clot was left intact. Patient remained hemodynamically stable, however has required 3U  PRBC total since melena started. Per GI, no further endoscopic intervention would be useful for the large ulcer. CTA was ordered on 8/27 and was negative for active bleeding. No further bleeding has been noted and hemoglobin has stabilized.    Disposition: Tunneled PICC line was placed on 08/29. Patient underwent left eye evisceration completion and eye implant on 08/30 and tolerated the procedure well. Patient is currently pending discharge to [facility] rehab or possibly home health.      Interval History: Patient was seen this morning, and he stated he felt well overall. . He does endorse some report some 10/10 burning pain around the tip of his penis. No other complaints at this time. Still awaiting facility placement or discharge to home.  Review of Systems   Constitutional: Negative.  Negative for chills, diaphoresis and fever.   HENT: Negative.  Negative for facial swelling and sinus pain.    Eyes:  Negative for pain, discharge, redness, itching and visual disturbance.   Respiratory: Negative.     Cardiovascular: Negative.    Gastrointestinal: Negative.    Genitourinary:  Positive for decreased urine volume and urgency. Negative for dysuria.        Positive for burning pain to head of penis   Musculoskeletal: Negative.    Skin: Negative.    Neurological: Negative.    Psychiatric/Behavioral: Negative.  Negative for agitation, behavioral problems and confusion.      Objective:     Vital Signs (Most Recent):  Temp: 98.2 °F (36.8 °C) (09/02/23 0825)  Pulse: 72 (09/02/23 0825)  Resp: 17 (09/02/23 0825)  BP: (!) 145/86 (09/02/23 0825)  SpO2: (!) 92 % (09/02/23 0825) Vital Signs (24h Range):  Temp:  [97.2 °F (36.2 °C)-98.2 °F (36.8 °C)] 98.2 °F (36.8 °C)  Pulse:  [64-80] 72  Resp:  [16-30] 17  SpO2:  [92 %-98 %] 92 %  BP: (125-162)/(69-93) 145/86     Weight: 62.8 kg (138 lb 7.2 oz)  Body mass index is 19.87 kg/m².    Intake/Output Summary (Last 24 hours) at 9/2/2023 0827  Last data filed at 9/2/2023 0701  Gross per 24  hour   Intake 1200 ml   Output --   Net 1200 ml           Physical Exam  Constitutional:       Appearance: Normal appearance.   HENT:      Head: Normocephalic.      Comments: Eye shield covering left eye     Mouth/Throat:      Mouth: Mucous membranes are dry.   Eyes:      Comments: Right eye clear and pupil round and reactive; left eye covered by eye shield.   Cardiovascular:      Rate and Rhythm: Normal rate and regular rhythm.      Pulses: Normal pulses.      Heart sounds: Normal heart sounds.   Pulmonary:      Effort: Pulmonary effort is normal. No respiratory distress.      Breath sounds: Normal breath sounds. No wheezing.   Abdominal:      General: Abdomen is flat. There is no distension.      Palpations: Abdomen is soft.      Tenderness: There is no abdominal tenderness.   Genitourinary:     Penis: Normal.    Musculoskeletal:      Right lower leg: No edema.      Left lower leg: No edema.   Skin:     General: Skin is warm and dry.   Neurological:      General: No focal deficit present.      Mental Status: He is alert and oriented to person, place, and time.      Cranial Nerves: No cranial nerve deficit.   Psychiatric:         Mood and Affect: Mood normal.         Behavior: Behavior normal.             Significant Labs: All pertinent labs within the past 24 hours have been reviewed.    Significant Imaging: I have reviewed all pertinent imaging results/findings within the past 24 hours.      Assessment/Plan:      * Delirium  Patient's mentation has currently improved substantially. Alert and Oriented x3 and able to converse well. He was briefly stepped up to the ICU on 8/11 for delirium that self-resolved. Current episode started on 8/24.  Workup so far:  Cefepime (received for 3 days) switched back to meropenem.   CTA PE study and US LE studies rule out thromboembolism  Electrolytes have been maintained wnl with dialysis; mentation eventually began to improve following 2 rounds of HD. Likely due to medication  effects.  CTH without acute changes.  Repeated MRI under sedation on 8/26, showed ongoing L scleral and orbital infection but no new processes nor new strokes  LP under sedation on 8/26; CSF cultures grew Gram positive rods, adequate coverage with current Meropenem.  EEG without seizures, but showed encephalopathy    Delirium has significantly improved since stopping cefepime and reglan and s/p 2 HD sessions. Thus most likely 2/2 mediation adverse effect    Plan  - Continue HD sessions  - Delirium likely due to medication adverse effect + prolonged hospitalization, altered sleep/wake cycle, vision loss  - Continue Seroquel 50 mg nightly.  - Olanzapine 5mg IM PRN for severe agitation  - Delirium precautions in place.  - Patient pending for discharge to [facility] or Home health    Abnormal microbiological findings in CSF  Lumbar puncture was performed on 08/26 which initially showed no growth; On 08/30, CSF cultures were positive for Gram positive rods. Patient was already on Meropenem at time of known culture growth, and ID communicated that this would be adequate coverage for this infection. Delirium had already improved prior to culture growth as well, and patient is still alert and oriented x3 with no signs of delirium.    - Continue Meropenem per ID recs.      AV fistula infection  - See HPI and hospital course; previous AV graft was removed at OSH and found to be colonized with pseudomonas from prior pseudomonal bacteremia  - Staples still in place  - F/u with [facility] vascular surgery on discharge    Duodenal ulceration  Patient started to have melena on 8/22, and on 8/24, underwent EGD.   Found to have a large oozing duodenal ulcer with adherent clot. 2 clips placed however ulcer could not be addressed completely. Also had 2 more nonbleeding ulcers in the duodenal bulb and 2nd part of the duodenum.  3U PRBC transfused so far this admission  Per GI, no further endoscopic intervention warranted  Due to ongoing  melena, ordered CTA GIB protocol - negative    Problem now stable    - Trend CBC Q12. Transfuse for Hb >7, unless otherwise indicated  - Maintain IV access with 2 large bore IVs  - Hold all NSAIDs and anticoagulants, unless contraindicated  - IV pantoprazole 40mg BID - switch to PO pantroprazole on discharge  - Correct any coagulopathy with platelets and FFP for goal of platelets >50K and INR <2.0    Impaired mobility  - Plan to DC to [facility] when medically stable    PEG (percutaneous endoscopic gastrostomy) status  - Previously cleared by SLP for a regular diet without restrictions  - Also frequently hypoglycemic due to malnutrition.  - Resumed tube feeds following eye surgery.      Anemia in ESRD (end-stage renal disease)  EPO per nephrology    Severe malnutrition  Nutrition consulted. Most recent weight and BMI monitored-     Measurements:  Wt Readings from Last 1 Encounters:   08/23/23 62.8 kg (138 lb 7.2 oz)   Body mass index is 19.87 kg/m².    Patient has been screened and assessed by RD.    Malnutrition Type:  Context: chronic illness  Level: severe    Malnutrition Characteristic Summary:  Weight Loss (Malnutrition): greater than 10% in 6 months  Energy Intake (Malnutrition): other (see comments) ([place]AMONT)  Subcutaneous Fat (Malnutrition): severe depletion  Muscle Mass (Malnutrition): severe depletion    Interventions/Recommendations (treatment strategy):  1. Resume Renal diet + Novasource ONS when able - encourage PO intake as tolerated. 2. If EN to resume, please reconsult for updated recommendations. 3. RD to monitor & follow-up.     Able to take in PO per SLP assessment on 8/15, on regular diet with thin liquids  Continue tube feeds for now due to altered mentation    Endophthalmitis  S/p L eye evisceration on 8/16/23.  Repeat MRI head/orbit with ongoing L scleral infection and cellulitis    - ID and ophthalmology following  - Continue meropenem and fluconazole; Tobramycin drops discontinued following  successful eye evisceration completion with eye implant.  - Anticipated 4-6 weeks of therapy from evisceration. Anticipated end date: 9/12-9/26. See OPAT note from 8/19.  - Tunneled PICC line was placed for Antibiotics. (08/29)  - Follow up all culture data  - Ophthalmology to perform packing changes every 2-3 days  - Completion of left eye evisceration plus eye implant done 08/30, patient tolerated well.  - Per Ophthalmology, continue to cover left eye for 1 week; does not need eye drops within one week.  - Follow up in Ophthalmology clinic on Thursday (09/07).    ESRD (end stage renal disease)  Nephrology consulted for HD needs while inpatient    Hyperlipidemia  Home statin    Chronic diastolic heart failure  Continue to monitor for signs of volume overload; volume removal with dialysis    Hypertension  Current hypertension is worsened secondary to agitation  On CCB at home but will uptitrate GDMT while here    - Coreg 6.25mg BID, limited by HR  - BiDil 2 tablets TID  - Valsartan 160mg BID      VTE Risk Mitigation (From admission, onward)         Ordered     heparin (porcine) injection 5,000 Units  Every 8 hours         09/02/23 0809     heparin (porcine) injection 1,000 Units  As needed (PRN)         08/31/23 1702     heparin (porcine) injection 1,000 Units  As needed (PRN)         08/29/23 1322     heparin (porcine) injection 1,000 Units  As needed (PRN)         08/10/23 1046     IP VTE HIGH RISK PATIENT  Once         08/09/23 1317     Place sequential compression device  Until discontinued         08/09/23 1317                Discharge Planning   TOBY: 9/4/2023     Code Status: Full Code   Is the patient medically ready for discharge?: Yes    Reason for patient still in hospital (select all that apply): Pending disposition  Discharge Plan A: Rehab   Discharge Delays: (!) Procedure Scheduling (IR, OR, Labs, Echo, Cath, Echo, EEG) (eye surgery next week)              Eric Koo DO  Department of Hospital  Medicine   [facility] - [facility] ([place])

## 2023-09-02 NOTE — PT/OT/SLP PROGRESS
"Occupational Therapy   Treatment    Name: Immanuel Bernal  MRN: [id number]  Admitting Diagnosis:  Delirium  3 Days Post-Op    Recommendations:     Discharge Recommendations: rehabilitation facility  Discharge Equipment Recommendations:   (TBD)  Barriers to discharge:  Inaccessible home environment, Decreased caregiver support    Assessment:     Immanuel Bernal is a 57 y.o. male with a medical diagnosis of Delirium.  He presents with deficits in self-care tasks as well as mobility. Pt. Tolerated session well other than pain noted in penile region. Pt. Requires extensive assist for ADL tasks and mobility. Patient would benefit from continued OT services to maximize level of safety and independence with self-care tasks.   . Performance deficits affecting function are weakness, impaired endurance, impaired self care skills, impaired functional mobility, gait instability, decreased lower extremity function, decreased safety awareness, pain.     Rehab Prognosis:  Good; patient would benefit from acute skilled OT services to address these deficits and reach maximum level of function.       Plan:     Patient to be seen 4 x/week to address the above listed problems via self-care/home management, therapeutic activities, therapeutic exercises, neuromuscular re-education  Plan of Care Expires: 09/15/23  Plan of Care Reviewed with: patient    Subjective     Chief Complaint: pain in penile region "burning"  Patient/Family Comments/goals: to get better and have rehab  Pain/Comfort:  Pain Rating 1:  (did not rate)  Location 1:  (burning in penile region. Nursing and MD aware)  Pain Addressed 1: Reposition, Distraction, Nurse notified, Pre-medicate for activity  Pain Rating Post-Intervention 1:  (appeared more comfortable in chair)    Objective:     Communicated with: nurse prior to session.  Patient found supine with telemetry, PEG Tube, central line, pulse ox (continuous), blood pressure cuff, PICC line (cover on left eye) upon " OT entry to room.    General Precautions: Standard, fall, vision impaired    Orthopedic Precautions:N/A  Braces: N/A  Respiratory Status: Room air     Occupational Performance:     Bed Mobility:    Patient completed Supine to Sit with minimum assistance     Functional Mobility/Transfers:  Patient completed Sit <> Stand Transfer with moderate assistance  with  no assistive device   Patient completed Bed <> Chair Transfer using Stand Pivot technique with moderate assistance with no assistive device  Functional Mobility: not tested    Activities of Daily Living:  Toileting: total assistance for cleaning BM      [facility] 6 Click ADL: 16    Treatment & Education:  Pt. Educated on role of OT and POC  Pt. Educated on safety with transfers and need for staff assist  Pt. Instructed to perform ankle pumps    Patient left up in chair with all lines intact, call button in reach, nurse notified, and BLE elevated    GOALS:   Multidisciplinary Problems       Occupational Therapy Goals          Problem: Occupational Therapy    Goal Priority Disciplines Outcome Interventions   Occupational Therapy Goal     OT, PT/OT Ongoing, Progressing    Description: Goals to be met by: 8/25/23 . Goals reviewed and updated as needed to be met by 09-15-23    Patient will increase functional independence with ADLs by performing:    UE Dressing with Minimal Assistance. MET  Grooming while seated with Stand-by Assistance. MET  Toileting from bedside commode with Minimal Assistance for hygiene and clothing management. NOT MET  Revised: Toileting from bedside commode with Mod A  Sit to stand transfer with Contact Guard Assistance and use of RW. NOT MET  Revised: sit to stand transfer with Min A  Sitting at edge of bed >25 minutes with Supervision.MET  Step transfer with Minimal Assistance and use of RW.   Toilet transfer to bedside commode with Minimal Assistance and use of RW.                          Time Tracking:     OT Date of Treatment: 09/02/23  OT  Start Time: 1024  OT Stop Time: 1047  OT Total Time (min): 23 min    Billable Minutes:Self Care/Home Management 15  Therapeutic Activity 8    OT/MORRO: OT     Number of MORRO visits since last OT visit: 2    9/2/2023

## 2023-09-02 NOTE — NURSING
Pt has been complaining of a constant burning sensation to his penis. Drs have been made aware PRN Lidocaine jelly ordered and applied as well tramadol have been used to adequately control the pain. Pt has been started on a pureed diet and is still receiving Peptide 1.5 at 50mL/Hr. Pt is AAOx3 resting comfortably in chair chest rise and fall observed call light within reach, vitals stable, with family at bedside.

## 2023-09-02 NOTE — SUBJECTIVE & OBJECTIVE
Interval History: Patient was seen this morning, and he stated he felt well overall. . He does endorse some report some 10/10 burning pain around the tip of his penis. No other complaints at this time. Still awaiting facility placement or discharge to home.  Review of Systems   Constitutional: Negative.  Negative for chills, diaphoresis and fever.   HENT: Negative.  Negative for facial swelling and sinus pain.    Eyes:  Negative for pain, discharge, redness, itching and visual disturbance.   Respiratory: Negative.     Cardiovascular: Negative.    Gastrointestinal: Negative.    Genitourinary:  Positive for decreased urine volume and urgency. Negative for dysuria.        Positive for burning pain to head of penis   Musculoskeletal: Negative.    Skin: Negative.    Neurological: Negative.    Psychiatric/Behavioral: Negative.  Negative for agitation, behavioral problems and confusion.      Objective:     Vital Signs (Most Recent):  Temp: 98.2 °F (36.8 °C) (09/02/23 0825)  Pulse: 72 (09/02/23 0825)  Resp: 17 (09/02/23 0825)  BP: (!) 145/86 (09/02/23 0825)  SpO2: (!) 92 % (09/02/23 0825) Vital Signs (24h Range):  Temp:  [97.2 °F (36.2 °C)-98.2 °F (36.8 °C)] 98.2 °F (36.8 °C)  Pulse:  [64-80] 72  Resp:  [16-30] 17  SpO2:  [92 %-98 %] 92 %  BP: (125-162)/(69-93) 145/86     Weight: 62.8 kg (138 lb 7.2 oz)  Body mass index is 19.87 kg/m².    Intake/Output Summary (Last 24 hours) at 9/2/2023 0838  Last data filed at 9/2/2023 0701  Gross per 24 hour   Intake 1200 ml   Output --   Net 1200 ml           Physical Exam  Constitutional:       Appearance: Normal appearance.   HENT:      Head: Normocephalic.      Comments: Eye shield covering left eye     Mouth/Throat:      Mouth: Mucous membranes are dry.   Eyes:      Comments: Right eye clear and pupil round and reactive; left eye covered by eye shield.   Cardiovascular:      Rate and Rhythm: Normal rate and regular rhythm.      Pulses: Normal pulses.      Heart sounds: Normal heart  sounds.   Pulmonary:      Effort: Pulmonary effort is normal. No respiratory distress.      Breath sounds: Normal breath sounds. No wheezing.   Abdominal:      General: Abdomen is flat. There is no distension.      Palpations: Abdomen is soft.      Tenderness: There is no abdominal tenderness.   Genitourinary:     Penis: Normal.    Musculoskeletal:      Right lower leg: No edema.      Left lower leg: No edema.   Skin:     General: Skin is warm and dry.   Neurological:      General: No focal deficit present.      Mental Status: He is alert and oriented to person, place, and time.      Cranial Nerves: No cranial nerve deficit.   Psychiatric:         Mood and Affect: Mood normal.         Behavior: Behavior normal.             Significant Labs: All pertinent labs within the past 24 hours have been reviewed.    Significant Imaging: I have reviewed all pertinent imaging results/findings within the past 24 hours.

## 2023-09-03 LAB
ALBUMIN SERPL BCP-MCNC: 2 G/DL (ref 3.5–5.2)
ALP SERPL-CCNC: 120 U/L (ref 55–135)
ALT SERPL W/O P-5'-P-CCNC: <5 U/L (ref 10–44)
ANION GAP SERPL CALC-SCNC: 10 MMOL/L (ref 8–16)
AST SERPL-CCNC: 12 U/L (ref 10–40)
BILIRUB SERPL-MCNC: 0.2 MG/DL (ref 0.1–1)
BUN SERPL-MCNC: 54 MG/DL (ref 6–20)
CALCIUM SERPL-MCNC: 8.9 MG/DL (ref 8.7–10.5)
CHLORIDE SERPL-SCNC: 99 MMOL/L (ref 95–110)
CO2 SERPL-SCNC: 21 MMOL/L (ref 23–29)
CREAT SERPL-MCNC: 4.5 MG/DL (ref 0.5–1.4)
ERYTHROCYTE [DISTWIDTH] IN BLOOD BY AUTOMATED COUNT: 17 % (ref 11.5–14.5)
EST. GFR  (NO RACE VARIABLE): 14.3 ML/MIN/1.73 M^2
GLUCOSE SERPL-MCNC: 117 MG/DL (ref 70–110)
HCT VFR BLD AUTO: 33.7 % (ref 40–54)
HGB BLD-MCNC: 10.8 G/DL (ref 14–18)
MCH RBC QN AUTO: 31.5 PG (ref 27–31)
MCHC RBC AUTO-ENTMCNC: 32 G/DL (ref 32–36)
MCV RBC AUTO: 98 FL (ref 82–98)
PLATELET # BLD AUTO: 152 K/UL (ref 150–450)
PMV BLD AUTO: 11.5 FL (ref 9.2–12.9)
POCT GLUCOSE: 111 MG/DL (ref 70–110)
POCT GLUCOSE: 115 MG/DL (ref 70–110)
POCT GLUCOSE: 151 MG/DL (ref 70–110)
POCT GLUCOSE: 164 MG/DL (ref 70–110)
POTASSIUM SERPL-SCNC: 4.8 MMOL/L (ref 3.5–5.1)
PROT SERPL-MCNC: 5.8 G/DL (ref 6–8.4)
RBC # BLD AUTO: 3.43 M/UL (ref 4.6–6.2)
SODIUM SERPL-SCNC: 130 MMOL/L (ref 136–145)
VANCOMYCIN SERPL-MCNC: 21.7 UG/ML
WBC # BLD AUTO: 5.32 K/UL (ref 3.9–12.7)

## 2023-09-03 PROCEDURE — 80053 COMPREHEN METABOLIC PANEL: CPT | Performed by: STUDENT IN AN ORGANIZED HEALTH CARE EDUCATION/TRAINING PROGRAM

## 2023-09-03 PROCEDURE — 25000003 PHARM REV CODE 250

## 2023-09-03 PROCEDURE — 20000000 HC ICU ROOM

## 2023-09-03 PROCEDURE — 80202 ASSAY OF VANCOMYCIN: CPT | Performed by: HOSPITALIST

## 2023-09-03 PROCEDURE — 63600175 PHARM REV CODE 636 W HCPCS

## 2023-09-03 PROCEDURE — 99232 SBSQ HOSP IP/OBS MODERATE 35: CPT | Mod: GC,,, | Performed by: HOSPITALIST

## 2023-09-03 PROCEDURE — 25000003 PHARM REV CODE 250: Performed by: HOSPITALIST

## 2023-09-03 PROCEDURE — 99232 PR SUBSEQUENT HOSPITAL CARE,LEVL II: ICD-10-PCS | Mod: GC,,, | Performed by: HOSPITALIST

## 2023-09-03 PROCEDURE — 94761 N-INVAS EAR/PLS OXIMETRY MLT: CPT

## 2023-09-03 PROCEDURE — 85027 COMPLETE CBC AUTOMATED: CPT

## 2023-09-03 PROCEDURE — 25000003 PHARM REV CODE 250: Performed by: STUDENT IN AN ORGANIZED HEALTH CARE EDUCATION/TRAINING PROGRAM

## 2023-09-03 PROCEDURE — 94640 AIRWAY INHALATION TREATMENT: CPT

## 2023-09-03 PROCEDURE — 25000242 PHARM REV CODE 250 ALT 637 W/ HCPCS: Performed by: STUDENT IN AN ORGANIZED HEALTH CARE EDUCATION/TRAINING PROGRAM

## 2023-09-03 RX ADMIN — LIDOCAINE HYDROCHLORIDE: 20 JELLY TOPICAL at 10:09

## 2023-09-03 RX ADMIN — FLUCONAZOLE 400 MG: 40 POWDER, FOR SUSPENSION ORAL at 08:09

## 2023-09-03 RX ADMIN — TRAMADOL HYDROCHLORIDE 50 MG: 50 TABLET, COATED ORAL at 02:09

## 2023-09-03 RX ADMIN — IPRATROPIUM BROMIDE AND ALBUTEROL SULFATE 3 ML: .5; 3 SOLUTION RESPIRATORY (INHALATION) at 02:09

## 2023-09-03 RX ADMIN — SENNOSIDES AND DOCUSATE SODIUM 1 TABLET: 50; 8.6 TABLET ORAL at 08:09

## 2023-09-03 RX ADMIN — PANTOPRAZOLE SODIUM 40 MG: 40 GRANULE, DELAYED RELEASE ORAL at 08:09

## 2023-09-03 RX ADMIN — CARVEDILOL 6.25 MG: 6.25 TABLET, FILM COATED ORAL at 08:09

## 2023-09-03 RX ADMIN — THERA TABS 1 TABLET: TAB at 08:09

## 2023-09-03 RX ADMIN — LIDOCAINE HYDROCHLORIDE 1 ML: 20 JELLY TOPICAL at 12:09

## 2023-09-03 RX ADMIN — HEPARIN SODIUM 5000 UNITS: 5000 INJECTION INTRAVENOUS; SUBCUTANEOUS at 10:09

## 2023-09-03 RX ADMIN — SEVELAMER CARBONATE 0.8 G: 0.8 POWDER, FOR SUSPENSION ORAL at 05:09

## 2023-09-03 RX ADMIN — Medication 6 MG: at 08:09

## 2023-09-03 RX ADMIN — Medication 1 CAPSULE: at 08:09

## 2023-09-03 RX ADMIN — HYDRALAZINE HYDROCHLORIDE: 10 TABLET, FILM COATED ORAL at 02:09

## 2023-09-03 RX ADMIN — CALCITRIOL CAPSULES 0.25 MCG 0.25 MCG: 0.25 CAPSULE ORAL at 08:09

## 2023-09-03 RX ADMIN — IPRATROPIUM BROMIDE AND ALBUTEROL SULFATE 3 ML: .5; 3 SOLUTION RESPIRATORY (INHALATION) at 08:09

## 2023-09-03 RX ADMIN — HYDRALAZINE HYDROCHLORIDE: 10 TABLET, FILM COATED ORAL at 08:09

## 2023-09-03 RX ADMIN — ATORVASTATIN CALCIUM 40 MG: 40 TABLET, FILM COATED ORAL at 08:09

## 2023-09-03 RX ADMIN — TRAMADOL HYDROCHLORIDE 50 MG: 50 TABLET, COATED ORAL at 08:09

## 2023-09-03 RX ADMIN — MEROPENEM 500 MG: 500 INJECTION INTRAVENOUS at 08:09

## 2023-09-03 RX ADMIN — ACETAMINOPHEN 1000 MG: 500 TABLET ORAL at 02:09

## 2023-09-03 RX ADMIN — HEPARIN SODIUM 5000 UNITS: 5000 INJECTION INTRAVENOUS; SUBCUTANEOUS at 02:09

## 2023-09-03 RX ADMIN — MEROPENEM 500 MG: 500 INJECTION INTRAVENOUS at 09:09

## 2023-09-03 RX ADMIN — VALSARTAN 160 MG: 160 TABLET, FILM COATED ORAL at 08:09

## 2023-09-03 RX ADMIN — POLYETHYLENE GLYCOL 3350 17 G: 17 POWDER, FOR SOLUTION ORAL at 08:09

## 2023-09-03 RX ADMIN — IPRATROPIUM BROMIDE AND ALBUTEROL SULFATE 3 ML: .5; 3 SOLUTION RESPIRATORY (INHALATION) at 07:09

## 2023-09-03 RX ADMIN — ACETAMINOPHEN 1000 MG: 500 TABLET ORAL at 08:09

## 2023-09-03 RX ADMIN — ONDANSETRON 4 MG: 4 TABLET, ORALLY DISINTEGRATING ORAL at 10:09

## 2023-09-03 RX ADMIN — ONDANSETRON 4 MG: 4 TABLET, ORALLY DISINTEGRATING ORAL at 06:09

## 2023-09-03 RX ADMIN — SEVELAMER CARBONATE 0.8 G: 0.8 POWDER, FOR SUSPENSION ORAL at 12:09

## 2023-09-03 RX ADMIN — AMLODIPINE BESYLATE 5 MG: 5 TABLET ORAL at 08:09

## 2023-09-03 RX ADMIN — SEVELAMER CARBONATE 0.8 G: 0.8 POWDER, FOR SUSPENSION ORAL at 08:09

## 2023-09-03 RX ADMIN — ONDANSETRON 4 MG: 4 TABLET, ORALLY DISINTEGRATING ORAL at 02:09

## 2023-09-03 RX ADMIN — TRAMADOL HYDROCHLORIDE 50 MG: 50 TABLET, COATED ORAL at 12:09

## 2023-09-03 RX ADMIN — HEPARIN SODIUM 5000 UNITS: 5000 INJECTION INTRAVENOUS; SUBCUTANEOUS at 06:09

## 2023-09-03 NOTE — CARE UPDATE
Patient's sister insisting on applying aoe vera cream which the patient used to receive back home on his groin area to improve the irritation.    She states he is not allergic to it and that he used to apply it before.    No issues with commencing with that    He is still in some pain but his most recent pain medications were around midnight.    No need for extra pain medications aside from those that are prescribed to him at the time being.

## 2023-09-03 NOTE — NURSING
Patient AAOx4 today, up to the chair today for about 2 hours.  New bag of tube feed along with tubing replaced.  Patient had 2 bms.  Patient now back in bed resting comfortably, bed alarm on with call light in reach.

## 2023-09-03 NOTE — PROGRESS NOTES
Pharmacokinetic Assessment Follow Up: IV Vancomycin    Vancomycin serum concentration assessment(s):    The random level was drawn correctly and can be used to guide therapy at this time. The measurement is above the desired definitive target range of 15 to 20 mcg/mL.    Vancomycin Regimen Plan:  Patients HD schedule is MWF, and he makes no urine. Do not expect level to change until after HD.   - level scheduled post HD to determine if pulse dose is required     Re-dose when the random level is less than 20 mcg/mL, next level to be drawn at 1400 on 9/4/2023    Drug levels (last 3 results):  Recent Labs   Lab Result Units 09/01/23  0816 09/03/23  0719   Vancomycin, Random ug/mL 17.7 21.7       Pharmacy will continue to follow and monitor vancomycin.    Please contact pharmacy at extension [phone] for questions regarding this assessment.    Thank you for the consult,   Yasmine Wilson       Patient brief summary:  Immanuel Bernal is a 57 y.o. male initiated on antimicrobial therapy with IV Vancomycin for treatment of  endophthalmitis    The patient's current regimen is pulse dose for level < 20     Drug Allergies:   Review of patient's allergies indicates:   Allergen Reactions    Morphine Rash    Amiodarone analogues Itching     Other reaction(s): Unknown       Actual Body Weight:   62.8    Renal Function:   Estimated Creatinine Clearance: 20.2 mL/min (A) (based on SCr of 3.5 mg/dL (H)).,     Dialysis Method (if applicable):  intermittent HD    CBC (last 72 hours):  Recent Labs   Lab Result Units 09/01/23  0815 09/02/23  0518 09/03/23  0719   WBC K/uL 5.51 5.34 5.32   Hemoglobin g/dL 9.0* 10.1* 10.8*   Hematocrit % 29.6* 33.2* 33.7*   Platelets K/uL 172 155 152       Metabolic Panel (last 72 hours):  Recent Labs   Lab Result Units 08/31/23  1632 09/01/23  0816 09/02/23  0518   Sodium mmol/L  --  133* 132*   Potassium mmol/L  --  3.5 4.1   Chloride mmol/L  --  100 100   CO2 mmol/L  --  24 21*   Glucose mg/dL  --  113* 90    Glucose, UA  Trace*  --   --    BUN mg/dL  --  17 31*   Creatinine mg/dL  --  2.7* 3.5*   Albumin g/dL  --  2.2* 2.1*   Total Bilirubin mg/dL  --  0.2 0.3   Alkaline Phosphatase U/L  --  116 112   AST U/L  --  11 13   ALT U/L  --  <5* <5*       Vancomycin Administrations:  vancomycin given in the last 96 hours                     vancomycin (VANCOCIN) 500 mg in dextrose 5 % in water (D5W) 100 mL IVPB (MB+) (mg) 500 mg New Bag 09/01/23 1149    vancomycin (VANCOCIN) 500 mg in dextrose 5 % in water (D5W) 100 mL IVPB (MB+) (mg) 500 mg New Bag 08/30/23 1136                    Microbiologic Results:  Microbiology Results (last 7 days)       Procedure Component Value Units Date/Time    Aerobic culture [[id number]] Collected: 08/30/23 1724    Order Status: Completed Specimen: Wound from Cornea, Left Updated: 09/02/23 1304     Aerobic Bacterial Culture No growth    Narrative:      LEFT INTRAOCULAR SPACE    CSF culture [[id number]]  (Abnormal) Collected: 08/26/23 1315    Order Status: Completed Specimen: CSF (Spinal Fluid) from CSF Tap, Tube 3 Updated: 09/01/23 1434     CSF CULTURE Results called to and read back by:Kashif Zhang RN 08/30/2023  07:44      PROPIONIBACTERIUM SPECIES  From broth only       Gram Stain Result Cytospin indicates:      No WBC's      No organisms seen    Culture, Anaerobe [[id number]] Collected: 08/30/23 1724    Order Status: Completed Specimen: Wound from Cornea, Left Updated: 09/01/23 0859     Anaerobic Culture Culture in progress    Narrative:      LEFT INTRAOCULAR SPACE    AFB Culture & Smear [[id number]] Collected: 08/26/23 1315    Order Status: Completed Specimen: CSF (Spinal Fluid) from CSF Tap, Tube 3 Updated: 08/31/23 0854     AFB Culture & Smear Culture in progress     AFB CULTURE STAIN No acid fast bacilli seen.    Fungus culture [[id number]] Collected: 08/26/23 1315    Order Status: Completed Specimen: CSF (Spinal Fluid) from CSF Tap, Tube 3 Updated: 08/28/23 1115     Fungus (Mycology)  Culture Culture in progress    Cryptococcal antigen, CSF [[id number]] Collected: 08/26/23 1315    Order Status: Completed Specimen: CSF (Spinal Fluid) from CSF Tap, Tube 3 Updated: 08/28/23 0854     Crypto Ag, CSF Negative

## 2023-09-03 NOTE — SUBJECTIVE & OBJECTIVE
Interval History: Patient was seen this morning, and he stated he felt well overall. He still is having some burning pain around the tip of his penis. Reports mild improvement with lidocaine jelly and aloe vera. No other complaints at this time. Still awaiting facility placement or discharge to home.  Review of Systems   Constitutional: Negative.  Negative for chills, diaphoresis and fever.   HENT: Negative.  Negative for facial swelling and sinus pain.    Eyes:  Negative for pain, discharge, redness, itching and visual disturbance.   Respiratory: Negative.     Cardiovascular: Negative.    Gastrointestinal: Negative.    Genitourinary:  Positive for decreased urine volume and urgency. Negative for dysuria.        Positive for burning pain to head of penis   Musculoskeletal: Negative.    Skin: Negative.    Neurological: Negative.    Psychiatric/Behavioral: Negative.  Negative for agitation, behavioral problems and confusion.      Objective:     Vital Signs (Most Recent):  Temp: 97.9 °F (36.6 °C) (09/03/23 1147)  Pulse: 71 (09/03/23 1147)  Resp: 17 (09/03/23 1147)  BP: (!) 159/89 (09/03/23 1147)  SpO2: 95 % (09/03/23 1147) Vital Signs (24h Range):  Temp:  [97.8 °F (36.6 °C)-98.6 °F (37 °C)] 97.9 °F (36.6 °C)  Pulse:  [66-89] 71  Resp:  [16-20] 17  SpO2:  [94 %-99 %] 95 %  BP: (112-169)/(73-97) 159/89     Weight: 62.8 kg (138 lb 7.2 oz)  Body mass index is 19.87 kg/m².    Intake/Output Summary (Last 24 hours) at 9/3/2023 1351  Last data filed at 9/3/2023 0002  Gross per 24 hour   Intake 1320 ml   Output --   Net 1320 ml           Physical Exam  Constitutional:       Appearance: Normal appearance.   HENT:      Head: Normocephalic.      Comments: Eye shield covering left eye     Mouth/Throat:      Mouth: Mucous membranes are dry.   Eyes:      Comments: Right eye clear and pupil round and reactive; left eye covered by eye shield.   Cardiovascular:      Rate and Rhythm: Normal rate and regular rhythm.      Pulses: Normal  pulses.      Heart sounds: Normal heart sounds.   Pulmonary:      Effort: Pulmonary effort is normal. No respiratory distress.      Breath sounds: Normal breath sounds. No wheezing.   Abdominal:      General: Abdomen is flat. There is no distension.      Palpations: Abdomen is soft.      Tenderness: There is no abdominal tenderness.   Genitourinary:     Penis: Normal.    Musculoskeletal:      Right lower leg: No edema.      Left lower leg: No edema.   Skin:     General: Skin is warm and dry.   Neurological:      General: No focal deficit present.      Mental Status: He is alert and oriented to person, place, and time.      Cranial Nerves: No cranial nerve deficit.   Psychiatric:         Mood and Affect: Mood normal.         Behavior: Behavior normal.             Significant Labs: All pertinent labs within the past 24 hours have been reviewed.    Significant Imaging: I have reviewed all pertinent imaging results/findings within the past 24 hours.

## 2023-09-03 NOTE — ASSESSMENT & PLAN NOTE
Patient's mentation has currently improved substantially. Alert and Oriented x3 and able to converse well. He was briefly stepped up to the ICU on 8/11 for delirium that self-resolved. Current episode started on 8/24.  Workup so far:  Cefepime (received for 3 days) switched back to meropenem.   CTA PE study and US LE studies rule out thromboembolism  Electrolytes have been maintained wnl with dialysis; mentation eventually began to improve following 2 rounds of HD. Likely due to medication effects.  CTH without acute changes.  Repeated MRI under sedation on 8/26, showed ongoing L scleral and orbital infection but no new processes nor new strokes  LP under sedation on 8/26; CSF cultures grew Gram positive rods, adequate coverage with current Meropenem.  EEG without seizures, but showed encephalopathy    Delirium has significantly improved since stopping cefepime and reglan and s/p 2 HD sessions. Thus most likely 2/2 mediation adverse effect    Plan  - Continue HD sessions  - Delirium likely due to medication adverse effect + prolonged hospitalization, altered sleep/wake cycle, vision loss  - Continue Seroquel 50 mg nightly.  - Olanzapine 5mg IM PRN for severe agitation  - Delirium precautions in place.  - Patient pending for discharge to nursing home or with home health.

## 2023-09-03 NOTE — PROGRESS NOTES
[facility] ([address] Medicine  Progress Note    Patient Name: Immanuel Bernal  MRN: [id number]  Patient Class: IP- Inpatient   Admission Date: 8/9/2023  Length of Stay: 25 days  Attending Physician: Porsha Funez MD  Primary Care Provider: Dolly, Primary Doctor        Subjective:     Principal Problem:Delirium        HPI:  Immanuel Bernal is a 57 y.o. male with ESRD, HFrEF 30-35%, DM2, HTN, history of bowel obstruction s/p bowel resection, now with PEG in place, ?KENIA, history of CVA, ??AFib who was recently hospitalized at [facility] in 7/2023 for b/l endophthalmitis, Pseudomonas bacteremia, RUE thrombus, RUE AVG infection s/p excision, and RUL mass. Left eye did not improve & subsequently developed abscess, but POA declined enucleation and patient ultimately left AMA. Re-presented to [place] ED with worsening eye symptoms with imaging showing left scleral abscess, so he was transferred to [facility] on 8/9/2023 for Oculoplastics evaluation & enucleation.       Overview/Hospital Course:  Patient was admitted initially on 8/9/2023 to [facility] medicine for Oculoplastics evaluation for L eye endophthalmitis with abscess. Due to prior Pseudomonal bacteremia, patient was kept of vancomycin and meropenem per infectious disease recommendations. Initially, surgery was planned, however, patient's POA wanted to repeat imaging to confirm the severe infection before proceeding. Unfortunately, patient had worsening delirium of unknown etiology, thus MRI could not be completed properly. Patient was stepped up to ICU on 8/11 and briefly required a precedex drip, and got a full MRI on 8/12. Delirium self-resolved and patient was stepped down to medicine again on 8/14. After back and forth discussion with patient's POA and ophthalmology, patient underwent L eye evisceration on 8/16. Surgical cultures additionally grew yeast (not yet speciated) and cutibacterium acnes. Patient was started on fluconazole and  tobramycin drops/ointment. In preparation for discharge, meropenem was changed to cefepime on 8/21 for pseudomonal coverage, as meropenem needed to be dosed daily (and patient would need another tunneled Gil line) and cefepime could be dosed with dialysis.     On 8/24, patient once again had worsening delirium of unclear etiology. Cefepime was switched back to meropenem due to concern for neurotoxicity, and reglan was discontinued. Patient underwent HD however this did not improve his mentation. Patient underwent repeat MRI jim under sedation on 8/26, which showed ongoing L scleral infection and orbital cellulitis; per ophthalmology, no further surgical intervention is warranted. LP was obtained as well and negative for infection. CTA was ordered to r/o PE in setting of intermittent hypoxia, and was largely negative. LE US was also ordered, and did not show DVT, thus ruling out thromboembolism as a cause for agitation/confusion. EEG was completed and showed encephalopathy but no seizures. Patient's delirium ultimately improved with another session of HD on 8/28, thus, delirium was most likely related to medication adverse effects.     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 His R Permacath was still functioning and was left in place. Patient had an old AVF on the R arm that was operated on 1-2 months ago (see HPI; infected graft) and still had staples in place; vascular surgery recommend outpatient follow up for staple removal once site fully healed. For DVTs, Eliquis was started on 8/17, however, patient developed melena on 8/22. Eliquis was stopped, 1U PRBC was transfused, GI was consulted, and patient underwent EGD on 8/24. EGD found a large duodenal ulcer with adherent clot - 2 clips were placed adjacent to the ulcer but the clot was left intact. Patient remained hemodynamically stable, however has required 3U  PRBC total since melena started. Per GI, no further endoscopic intervention would be useful for the large ulcer. CTA was ordered on 8/27 and was negative for active bleeding. No further bleeding has been noted and hemoglobin has stabilized.    Disposition: Tunneled PICC line was placed on 08/29. Patient underwent left eye evisceration completion and eye implant on 08/30 and tolerated the procedure well. Patient is currently pending discharge to nursing home; didn't qualify for [facility] rehab facility.      Interval History: Patient was seen this morning, and he stated he felt well overall. He still is having some burning pain around the tip of his penis. Reports mild improvement with lidocaine jelly and aloe vera. No other complaints at this time. Still awaiting facility placement or discharge to home.  Review of Systems   Constitutional: Negative.  Negative for chills, diaphoresis and fever.   HENT: Negative.  Negative for facial swelling and sinus pain.    Eyes:  Negative for pain, discharge, redness, itching and visual disturbance.   Respiratory: Negative.     Cardiovascular: Negative.    Gastrointestinal: Negative.    Genitourinary:  Positive for decreased urine volume and urgency. Negative for dysuria.        Positive for burning pain to head of penis   Musculoskeletal: Negative.    Skin: Negative.    Neurological: Negative.    Psychiatric/Behavioral: Negative.  Negative for agitation, behavioral problems and confusion.      Objective:     Vital Signs (Most Recent):  Temp: 97.9 °F (36.6 °C) (09/03/23 1147)  Pulse: 71 (09/03/23 1147)  Resp: 17 (09/03/23 1147)  BP: (!) 159/89 (09/03/23 1147)  SpO2: 95 % (09/03/23 1147) Vital Signs (24h Range):  Temp:  [97.8 °F (36.6 °C)-98.6 °F (37 °C)] 97.9 °F (36.6 °C)  Pulse:  [66-89] 71  Resp:  [16-20] 17  SpO2:  [94 %-99 %] 95 %  BP: (112-169)/(73-97) 159/89     Weight: 62.8 kg (138 lb 7.2 oz)  Body mass index is 19.87 kg/m².    Intake/Output Summary (Last 24 hours) at  9/3/2023 1351  Last data filed at 9/3/2023 0002  Gross per 24 hour   Intake 1320 ml   Output --   Net 1320 ml           Physical Exam  Constitutional:       Appearance: Normal appearance.   HENT:      Head: Normocephalic.      Comments: Eye shield covering left eye     Mouth/Throat:      Mouth: Mucous membranes are dry.   Eyes:      Comments: Right eye clear and pupil round and reactive; left eye covered by eye shield.   Cardiovascular:      Rate and Rhythm: Normal rate and regular rhythm.      Pulses: Normal pulses.      Heart sounds: Normal heart sounds.   Pulmonary:      Effort: Pulmonary effort is normal. No respiratory distress.      Breath sounds: Normal breath sounds. No wheezing.   Abdominal:      General: Abdomen is flat. There is no distension.      Palpations: Abdomen is soft.      Tenderness: There is no abdominal tenderness.   Genitourinary:     Penis: Normal.    Musculoskeletal:      Right lower leg: No edema.      Left lower leg: No edema.   Skin:     General: Skin is warm and dry.   Neurological:      General: No focal deficit present.      Mental Status: He is alert and oriented to person, place, and time.      Cranial Nerves: No cranial nerve deficit.   Psychiatric:         Mood and Affect: Mood normal.         Behavior: Behavior normal.             Significant Labs: All pertinent labs within the past 24 hours have been reviewed.    Significant Imaging: I have reviewed all pertinent imaging results/findings within the past 24 hours.      Assessment/Plan:      * Delirium  Patient's mentation has currently improved substantially. Alert and Oriented x3 and able to converse well. He was briefly stepped up to the ICU on 8/11 for delirium that self-resolved. Current episode started on 8/24.  Workup so far:  Cefepime (received for 3 days) switched back to meropenem.   CTA PE study and US LE studies rule out thromboembolism  Electrolytes have been maintained wnl with dialysis; mentation eventually began to  improve following 2 rounds of HD. Likely due to medication effects.  CTH without acute changes.  Repeated MRI under sedation on 8/26, showed ongoing L scleral and orbital infection but no new processes nor new strokes  LP under sedation on 8/26; CSF cultures grew Gram positive rods, adequate coverage with current Meropenem.  EEG without seizures, but showed encephalopathy    Delirium has significantly improved since stopping cefepime and reglan and s/p 2 HD sessions. Thus most likely 2/2 mediation adverse effect    Plan  - Continue HD sessions  - Delirium likely due to medication adverse effect + prolonged hospitalization, altered sleep/wake cycle, vision loss  - Continue Seroquel 50 mg nightly.  - Olanzapine 5mg IM PRN for severe agitation  - Delirium precautions in place.  - Patient pending for discharge to nursing home or with home health.    Abnormal microbiological findings in CSF  Lumbar puncture was performed on 08/26 which initially showed no growth; On 08/30, CSF cultures were positive for Gram positive rods. Patient was already on Meropenem at time of known culture growth, and ID communicated that this would be adequate coverage for this infection. Delirium had already improved prior to culture growth as well, and patient is still alert and oriented x3 with no signs of delirium.    - Continue Meropenem per ID recs.      AV fistula infection  - See HPI and hospital course; previous AV graft was removed at OSH and found to be colonized with pseudomonas from prior pseudomonal bacteremia  - Staples still in place  - F/u with [facility] vascular surgery on discharge    Duodenal ulceration  Patient started to have melena on 8/22, and on 8/24, underwent EGD.   Found to have a large oozing duodenal ulcer with adherent clot. 2 clips placed however ulcer could not be addressed completely. Also had 2 more nonbleeding ulcers in the duodenal bulb and 2nd part of the duodenum.  3U PRBC transfused so far this admission  Per GI,  no further endoscopic intervention warranted  Due to ongoing melena, ordered CTA GIB protocol - negative    Problem now stable    - Trend CBC Q12. Transfuse for Hb >7, unless otherwise indicated  - Maintain IV access with 2 large bore IVs  - Hold all NSAIDs and anticoagulants, unless contraindicated  - IV pantoprazole 40mg BID - switch to PO pantroprazole on discharge  - Correct any coagulopathy with platelets and FFP for goal of platelets >50K and INR <2.0    Impaired mobility  - Plan to DC to [facility] rehab when medically stable    PEG (percutaneous endoscopic gastrostomy) status  - Previously cleared by SLP for a regular diet without restrictions  - Also frequently hypoglycemic due to malnutrition.  - Resumed tube feeds following eye surgery.      Anemia in ESRD (end-stage renal disease)  EPO per nephrology    Severe malnutrition  Nutrition consulted. Most recent weight and BMI monitored-     Measurements:  Wt Readings from Last 1 Encounters:   08/23/23 62.8 kg (138 lb 7.2 oz)   Body mass index is 19.87 kg/m².    Patient has been screened and assessed by RD.    Malnutrition Type:  Context: chronic illness  Level: severe    Malnutrition Characteristic Summary:  Weight Loss (Malnutrition): greater than 10% in 6 months  Energy Intake (Malnutrition): other (see comments) (LAMONT)  Subcutaneous Fat (Malnutrition): severe depletion  Muscle Mass (Malnutrition): severe depletion    Interventions/Recommendations (treatment strategy):  1. Resume Renal diet + Novasource ONS when able - encourage PO intake as tolerated. 2. If EN to resume, please reconsult for updated recommendations. 3. RD to monitor & follow-up.     Able to take in PO per SLP assessment on 8/15, on regular diet with thin liquids  Continue tube feeds for now due to altered mentation    Endophthalmitis  S/p L eye evisceration on 8/16/23.  Repeat MRI head/orbit with ongoing L scleral infection and cellulitis    - ID and ophthalmology following  - Continue  meropenem and fluconazole; Tobramycin drops discontinued following successful eye evisceration completion with eye implant.  - Anticipated 4-6 weeks of therapy from evisceration. Anticipated end date: 9/12-9/26. See OPAT note from 8/19.  - Tunneled PICC line was placed for Antibiotics. (08/29)  - Follow up all culture data  - Ophthalmology to perform packing changes every 2-3 days  - Completion of left eye evisceration plus eye implant done 08/30, patient tolerated well.  - Per Ophthalmology, continue to cover left eye for 1 week; does not need eye drops within one week.  - Follow up in Ophthalmology clinic on Thursday (09/07).    ESRD (end stage renal disease)  Nephrology consulted for HD needs while inpatient    Hyperlipidemia  Home statin    Chronic diastolic heart failure  Continue to monitor for signs of volume overload; volume removal with dialysis    Hypertension  Current hypertension is worsened secondary to agitation  On CCB at home but will uptitrate GDMT while here    - Coreg 6.25mg BID, limited by HR  - BiDil 2 tablets TID  - Valsartan 160mg BID    VTE Risk Mitigation (From admission, onward)         Ordered     heparin (porcine) injection 5,000 Units  Every 8 hours         09/02/23 0809     heparin (porcine) injection 1,000 Units  As needed (PRN)         08/31/23 1702     heparin (porcine) injection 1,000 Units  As needed (PRN)         08/29/23 1322     heparin (porcine) injection 1,000 Units  As needed (PRN)         08/10/23 1046     IP VTE HIGH RISK PATIENT  Once         08/09/23 1317     Place sequential compression device  Until discontinued         08/09/23 1317                Discharge Planning   TOBY: 9/5/2023     Code Status: Full Code   Is the patient medically ready for discharge?: Yes    Reason for patient still in hospital (select all that apply): Pending disposition  Discharge Plan A: Rehab   Discharge Delays: (!) Procedure Scheduling (IR, OR, Labs, Echo, Cath, Echo, EEG) (eye surgery next  week)              Eric Koo DO  Department of Hospital Medicine   [facility] - Intensive Care ([place]-[facility])

## 2023-09-04 LAB
ALBUMIN SERPL BCP-MCNC: 2.1 G/DL (ref 3.5–5.2)
ALP SERPL-CCNC: 131 U/L (ref 55–135)
ALT SERPL W/O P-5'-P-CCNC: <5 U/L (ref 10–44)
ANION GAP SERPL CALC-SCNC: 10 MMOL/L (ref 8–16)
AST SERPL-CCNC: 12 U/L (ref 10–40)
BILIRUB SERPL-MCNC: 0.2 MG/DL (ref 0.1–1)
BUN SERPL-MCNC: 49 MG/DL (ref 6–20)
CALCIUM SERPL-MCNC: 8 MG/DL (ref 8.7–10.5)
CHLORIDE SERPL-SCNC: 97 MMOL/L (ref 95–110)
CO2 SERPL-SCNC: 25 MMOL/L (ref 23–29)
CREAT SERPL-MCNC: 3.5 MG/DL (ref 0.5–1.4)
ERYTHROCYTE [DISTWIDTH] IN BLOOD BY AUTOMATED COUNT: 16.7 % (ref 11.5–14.5)
EST. GFR  (NO RACE VARIABLE): 19.3 ML/MIN/1.73 M^2
GLUCOSE SERPL-MCNC: 87 MG/DL (ref 70–110)
HCT VFR BLD AUTO: 32.5 % (ref 40–54)
HGB BLD-MCNC: 10.1 G/DL (ref 14–18)
MCH RBC QN AUTO: 31 PG (ref 27–31)
MCHC RBC AUTO-ENTMCNC: 31.1 G/DL (ref 32–36)
MCV RBC AUTO: 100 FL (ref 82–98)
PLATELET # BLD AUTO: 174 K/UL (ref 150–450)
PMV BLD AUTO: 11.2 FL (ref 9.2–12.9)
POCT GLUCOSE: 104 MG/DL (ref 70–110)
POCT GLUCOSE: 136 MG/DL (ref 70–110)
POCT GLUCOSE: 147 MG/DL (ref 70–110)
POTASSIUM SERPL-SCNC: 4.3 MMOL/L (ref 3.5–5.1)
PROT SERPL-MCNC: 6 G/DL (ref 6–8.4)
RBC # BLD AUTO: 3.26 M/UL (ref 4.6–6.2)
SODIUM SERPL-SCNC: 132 MMOL/L (ref 136–145)
VANCOMYCIN SERPL-MCNC: 16.7 UG/ML
WBC # BLD AUTO: 4.42 K/UL (ref 3.9–12.7)

## 2023-09-04 PROCEDURE — 63600175 PHARM REV CODE 636 W HCPCS: Performed by: HOSPITALIST

## 2023-09-04 PROCEDURE — 85027 COMPLETE CBC AUTOMATED: CPT

## 2023-09-04 PROCEDURE — 36415 COLL VENOUS BLD VENIPUNCTURE: CPT | Performed by: STUDENT IN AN ORGANIZED HEALTH CARE EDUCATION/TRAINING PROGRAM

## 2023-09-04 PROCEDURE — 36415 COLL VENOUS BLD VENIPUNCTURE: CPT | Performed by: HOSPITALIST

## 2023-09-04 PROCEDURE — 99232 SBSQ HOSP IP/OBS MODERATE 35: CPT | Mod: GC,,, | Performed by: HOSPITALIST

## 2023-09-04 PROCEDURE — 63600175 PHARM REV CODE 636 W HCPCS

## 2023-09-04 PROCEDURE — 99233 SBSQ HOSP IP/OBS HIGH 50: CPT | Mod: ,,, | Performed by: NURSE PRACTITIONER

## 2023-09-04 PROCEDURE — 25000242 PHARM REV CODE 250 ALT 637 W/ HCPCS: Performed by: STUDENT IN AN ORGANIZED HEALTH CARE EDUCATION/TRAINING PROGRAM

## 2023-09-04 PROCEDURE — 25000003 PHARM REV CODE 250

## 2023-09-04 PROCEDURE — 94761 N-INVAS EAR/PLS OXIMETRY MLT: CPT

## 2023-09-04 PROCEDURE — 63600175 PHARM REV CODE 636 W HCPCS: Mod: JZ | Performed by: STUDENT IN AN ORGANIZED HEALTH CARE EDUCATION/TRAINING PROGRAM

## 2023-09-04 PROCEDURE — 20000000 HC ICU ROOM

## 2023-09-04 PROCEDURE — 80053 COMPREHEN METABOLIC PANEL: CPT | Performed by: STUDENT IN AN ORGANIZED HEALTH CARE EDUCATION/TRAINING PROGRAM

## 2023-09-04 PROCEDURE — 25000003 PHARM REV CODE 250: Performed by: HOSPITALIST

## 2023-09-04 PROCEDURE — 63600175 PHARM REV CODE 636 W HCPCS: Performed by: STUDENT IN AN ORGANIZED HEALTH CARE EDUCATION/TRAINING PROGRAM

## 2023-09-04 PROCEDURE — 25000003 PHARM REV CODE 250: Performed by: STUDENT IN AN ORGANIZED HEALTH CARE EDUCATION/TRAINING PROGRAM

## 2023-09-04 PROCEDURE — 99233 PR SUBSEQUENT HOSPITAL CARE,LEVL III: ICD-10-PCS | Mod: ,,, | Performed by: NURSE PRACTITIONER

## 2023-09-04 PROCEDURE — 99232 PR SUBSEQUENT HOSPITAL CARE,LEVL II: ICD-10-PCS | Mod: GC,,, | Performed by: HOSPITALIST

## 2023-09-04 PROCEDURE — 94640 AIRWAY INHALATION TREATMENT: CPT

## 2023-09-04 PROCEDURE — 80100014 HC HEMODIALYSIS 1:1

## 2023-09-04 PROCEDURE — 80202 ASSAY OF VANCOMYCIN: CPT | Performed by: HOSPITALIST

## 2023-09-04 RX ORDER — AMLODIPINE BESYLATE 5 MG/1
5 TABLET ORAL DAILY
Status: DISCONTINUED | OUTPATIENT
Start: 2023-09-04 | End: 2023-09-08

## 2023-09-04 RX ADMIN — MEROPENEM 500 MG: 500 INJECTION INTRAVENOUS at 09:09

## 2023-09-04 RX ADMIN — AMLODIPINE BESYLATE 5 MG: 5 TABLET ORAL at 09:09

## 2023-09-04 RX ADMIN — HEPARIN SODIUM 5000 UNITS: 5000 INJECTION INTRAVENOUS; SUBCUTANEOUS at 09:09

## 2023-09-04 RX ADMIN — IPRATROPIUM BROMIDE AND ALBUTEROL SULFATE 3 ML: .5; 3 SOLUTION RESPIRATORY (INHALATION) at 09:09

## 2023-09-04 RX ADMIN — ONDANSETRON 4 MG: 4 TABLET, ORALLY DISINTEGRATING ORAL at 02:09

## 2023-09-04 RX ADMIN — CALCITRIOL CAPSULES 0.25 MCG 0.25 MCG: 0.25 CAPSULE ORAL at 09:09

## 2023-09-04 RX ADMIN — POLYETHYLENE GLYCOL 3350 17 G: 17 POWDER, FOR SOLUTION ORAL at 09:09

## 2023-09-04 RX ADMIN — Medication 1 CAPSULE: at 09:09

## 2023-09-04 RX ADMIN — HYDRALAZINE HYDROCHLORIDE: 10 TABLET, FILM COATED ORAL at 09:09

## 2023-09-04 RX ADMIN — VANCOMYCIN HYDROCHLORIDE 500 MG: 500 INJECTION, POWDER, LYOPHILIZED, FOR SOLUTION INTRAVENOUS at 02:09

## 2023-09-04 RX ADMIN — VALSARTAN 160 MG: 160 TABLET, FILM COATED ORAL at 09:09

## 2023-09-04 RX ADMIN — ONDANSETRON 4 MG: 4 TABLET, ORALLY DISINTEGRATING ORAL at 09:09

## 2023-09-04 RX ADMIN — ACETAMINOPHEN 1000 MG: 500 TABLET ORAL at 09:09

## 2023-09-04 RX ADMIN — LIDOCAINE HYDROCHLORIDE: 20 JELLY TOPICAL at 12:09

## 2023-09-04 RX ADMIN — THERA TABS 1 TABLET: TAB at 09:09

## 2023-09-04 RX ADMIN — CARVEDILOL 6.25 MG: 6.25 TABLET, FILM COATED ORAL at 09:09

## 2023-09-04 RX ADMIN — Medication 6 MG: at 09:09

## 2023-09-04 RX ADMIN — SENNOSIDES AND DOCUSATE SODIUM 1 TABLET: 50; 8.6 TABLET ORAL at 11:09

## 2023-09-04 RX ADMIN — ONDANSETRON 4 MG: 4 TABLET, ORALLY DISINTEGRATING ORAL at 06:09

## 2023-09-04 RX ADMIN — ERYTHROPOIETIN 3000 UNITS: 3000 INJECTION, SOLUTION INTRAVENOUS; SUBCUTANEOUS at 05:09

## 2023-09-04 RX ADMIN — IPRATROPIUM BROMIDE AND ALBUTEROL SULFATE 3 ML: .5; 3 SOLUTION RESPIRATORY (INHALATION) at 08:09

## 2023-09-04 RX ADMIN — SEVELAMER CARBONATE 0.8 G: 0.8 POWDER, FOR SUSPENSION ORAL at 05:09

## 2023-09-04 RX ADMIN — ACETAMINOPHEN 1000 MG: 500 TABLET ORAL at 02:09

## 2023-09-04 RX ADMIN — PANTOPRAZOLE SODIUM 40 MG: 40 GRANULE, DELAYED RELEASE ORAL at 09:09

## 2023-09-04 RX ADMIN — LIDOCAINE HYDROCHLORIDE: 20 JELLY TOPICAL at 11:09

## 2023-09-04 RX ADMIN — HYDRALAZINE HYDROCHLORIDE: 10 TABLET, FILM COATED ORAL at 02:09

## 2023-09-04 RX ADMIN — TRAMADOL HYDROCHLORIDE 50 MG: 50 TABLET, COATED ORAL at 09:09

## 2023-09-04 RX ADMIN — TRAMADOL HYDROCHLORIDE 50 MG: 50 TABLET, COATED ORAL at 02:09

## 2023-09-04 RX ADMIN — FLUCONAZOLE 400 MG: 40 POWDER, FOR SUSPENSION ORAL at 09:09

## 2023-09-04 RX ADMIN — LIDOCAINE HYDROCHLORIDE: 20 JELLY TOPICAL at 09:09

## 2023-09-04 RX ADMIN — LIDOCAINE HYDROCHLORIDE: 20 JELLY TOPICAL at 06:09

## 2023-09-04 RX ADMIN — IPRATROPIUM BROMIDE AND ALBUTEROL SULFATE 3 ML: .5; 3 SOLUTION RESPIRATORY (INHALATION) at 02:09

## 2023-09-04 RX ADMIN — HEPARIN SODIUM 5000 UNITS: 5000 INJECTION INTRAVENOUS; SUBCUTANEOUS at 02:09

## 2023-09-04 RX ADMIN — SEVELAMER CARBONATE 0.8 G: 0.8 POWDER, FOR SUSPENSION ORAL at 09:09

## 2023-09-04 RX ADMIN — HEPARIN SODIUM 5000 UNITS: 5000 INJECTION INTRAVENOUS; SUBCUTANEOUS at 06:09

## 2023-09-04 RX ADMIN — HEPARIN SODIUM 1000 UNITS: 1000 INJECTION, SOLUTION INTRAVENOUS; SUBCUTANEOUS at 06:09

## 2023-09-04 RX ADMIN — ATORVASTATIN CALCIUM 40 MG: 40 TABLET, FILM COATED ORAL at 09:09

## 2023-09-04 NOTE — PROGRESS NOTES
[facility] - Intensive Care ([address] Medicine  Progress Note    Patient Name: Immanuel Bernal  MRN: [id number]  Patient Class: IP- Inpatient   Admission Date: 8/9/2023  Length of Stay: 26 days  Attending Physician: Porsha Funez MD  Primary Care Provider: Dolly, Primary Doctor        Subjective:     Principal Problem:Endophthalmitis        HPI:  Immanuel Bernal is a 57 y.o. male with ESRD, HFrEF 30-35%, DM2, HTN, history of bowel obstruction s/p bowel resection, now with PEG in place, ?KENIA, history of CVA, ??AFib who was recently hospitalized at [facility] in 7/2023 for b/l endophthalmitis, Pseudomonas bacteremia, RUE thrombus, RUE AVG infection s/p excision, and RUL mass. Left eye did not improve & subsequently developed abscess, but POA declined enucleation and patient ultimately left AMA. Re-presented to [facility] ED with worsening eye symptoms with imaging showing left scleral abscess, so he was [place] [facility] [place] for Oculoplastics evaluation & enucleation.       Overview/Hospital Course:  Patient was admitted initially on 8/9/2023 to [facility] medicine for Oculoplastics evaluation for L eye endophthalmitis with abscess. Due to prior Pseudomonal bacteremia, patient was kept of vancomycin and meropenem per infectious disease recommendations. Initially, surgery was planned, however, patient's POA wanted to repeat imaging to confirm the severe infection before proceeding. Unfortunately, patient had worsening delirium of unknown etiology, thus MRI could not be completed properly. Patient was stepped up to ICU on 8/11 and briefly required a precedex drip, and got a full MRI on 8/12. Delirium self-resolved and patient was stepped down to medicine again on 8/14. After back and forth discussion with patient's POA and ophthalmology, patient underwent L eye evisceration on 8/16. Surgical cultures additionally grew yeast (not yet speciated) and cutibacterium acnes. Patient was started on fluconazole  and tobramycin drops/ointment. In preparation for discharge, meropenem was changed to cefepime on 8/21 for pseudomonal coverage, as meropenem needed to be dosed daily (and patient would need another tunneled Gil line) and cefepime could be dosed with dialysis.     On 8/24, patient once again had worsening delirium of unclear etiology. Cefepime was switched back to meropenem due to concern for neurotoxicity, and reglan was discontinued. Patient underwent HD however this did not improve his mentation. Patient underwent repeat MRI jim under sedation on 8/26, which showed ongoing L scleral infection and orbital cellulitis; per ophthalmology, no further surgical intervention is warranted. LP was obtained as well and ultimately CSF cultures grew Propionibacterium species. CTA was ordered to r/o PE in setting of intermittent hypoxia, and was largely negative. LE US was also ordered, and did not show DVT, thus ruling out thromboembolism as a cause for agitation/confusion. EEG was completed and showed encephalopathy but no seizures. Patient's delirium ultimately improved with another session of HD on 8/28, and meropenem, thus, delirium was most likely related to medication adverse effects + meningitis. Patient underwent left eye evisceration completion and eye implant on 08/30 and tolerated the procedure well.    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 His R Permacath was still functioning and was left in place. Patient had an old AVF on the R arm that was operated on 7/21/2023 at [facility] (see HPI; infected graft) and still had staples in place; vascular surgery recommend outpatient follow up for staple removal once site fully healed. For DVTs, Eliquis was started on 8/17, however, patient developed melena on 8/22. Eliquis was stopped, 1U PRBC was transfused, GI was consulted, and patient underwent EGD on 8/24. EGD found a  large duodenal ulcer with adherent clot - 2 clips were placed adjacent to the ulcer but the clot was left intact. Patient remained hemodynamically stable, however has required 3U PRBC total since melena started. Per GI, no further endoscopic intervention would be useful for the large ulcer. CTA was ordered on 8/27 and was negative for active bleeding. No further bleeding has been noted and hemoglobin has stabilized.    Disposition: Tunneled PICC line was placed on 08/29. Patient is currently pending discharge to nursing home. CM working on alf placement. Once again family is asking vascular surgery to remove the patient's arm staples - reaching out to surgeons at [facility].      Interval History: [place]  Patient feeling well this morning  BP has been stable  12 beats of VT with dialysis, that then stabilized  Penile pain still present but there continues to be no skin changes present nor any discharge    Pending alf NH placement    Review of Systems   Constitutional: Negative.  Negative for fever.   HENT: Negative.     Eyes:  Negative for pain and discharge.   Respiratory: Negative.     Cardiovascular: Negative.    Gastrointestinal: Negative.    Genitourinary:  Negative for dysuria and urgency.        Positive for burning pain to head of penis   Musculoskeletal: Negative.    Skin: Negative.    Neurological: Negative.    Psychiatric/Behavioral: Negative.  Negative for agitation, behavioral problems and confusion.      Objective:     Vital Signs (Most Recent):  Temp: 98.7 °F (37.1 °C) (09/04/23 0700)  Pulse: 76 (09/04/23 1110)  Resp: 20 (09/04/23 0913)  BP: 124/74 (09/04/23 0740)  SpO2: 96 % (09/04/23 1110) Vital Signs (24h Range):  Temp:  [97.8 °F (36.6 °C)-98.7 °F (37.1 °C)] 98.7 °F (37.1 °C)  Pulse:  [67-89] 76  Resp:  [10-20] 20  SpO2:  [92 %-97 %] 96 %  BP: (102-159)/(66-92) 124/74     Weight: 62.8 kg (138 lb 7.2 oz)  Body mass index is 19.87 kg/m².    Intake/Output Summary (Last 24 hours) at 9/4/2023  1133  Last data filed at 9/4/2023 0700  Gross per 24 hour   Intake 910 ml   Output 2540 ml   Net -1630 ml         Physical Exam  Constitutional:       Appearance: Normal appearance.   HENT:      Head: Normocephalic.      Comments: Eye shield covering left eye     Mouth/Throat:      Mouth: Mucous membranes are dry.   Eyes:      Comments: Right eye clear and pupil round and reactive; left eye covered by eye shield.   Cardiovascular:      Rate and Rhythm: Normal rate and regular rhythm.      Pulses: Normal pulses.      Heart sounds: Normal heart sounds.   Pulmonary:      Effort: Pulmonary effort is normal. No respiratory distress.      Breath sounds: Normal breath sounds.   Abdominal:      General: Abdomen is flat.      Palpations: Abdomen is soft.   Genitourinary:     Penis: Normal.       Comments: No penile abnormalities  L groin with hernia present, nontender and fully reducible  Musculoskeletal:      Right lower leg: No edema.      Left lower leg: No edema.   Skin:     General: Skin is warm and dry.   Neurological:      Mental Status: He is alert and oriented to person, place, and time.             Significant Labs: All pertinent labs within the past 24 hours have been reviewed.  CBC:   Recent Labs   Lab 09/03/23  0719 09/04/23  0404   WBC 5.32 4.42   HGB 10.8* 10.1*   HCT 33.7* 32.5*    174     CMP:   Recent Labs   Lab 09/03/23  0719 09/04/23  0404   * 132*   K 4.8 4.3   CL 99 97   CO2 21* 25   * 87   BUN 54* 49*   CREATININE 4.5* 3.5*   CALCIUM 8.9 8.0*   PROT 5.8* 6.0   ALBUMIN 2.0* 2.1*   BILITOT 0.2 0.2   ALKPHOS 120 131   AST 12 12   ALT <5* <5*   ANIONGAP 10 10       Significant Imaging: I have reviewed all pertinent imaging results/findings within the past 24 hours.      Assessment/Plan:      * Endophthalmitis  Meningitis    S/p L eye evisceration on 8/16/23 and completed enucleation with implant placed on 8/30/20.  Repeat MRI head/orbit with ongoing L scleral infection and  cellulitis  LP completed and CSF grew propinobacterium spp.    - Ophthalmology following  - Continue meropenem and fluconazole; Tobramycin drops discontinued following successful eye evisceration completion with eye implant.  - Anticipated 4-6 weeks of therapy from evisceration. Anticipated end date: 9/12-9/26. See OPAT note from 8/19.  - Tunneled PICC line was placed for Antibiotics. (08/29)  - Ophthalmology to perform packing changes every 2-3 days  - Per Ophthalmology, continue to cover left eye for 1 week; does not need eye drops within one week.  - Follow up in Ophthalmology clinic on Thursday (09/07) - if not discharged, will alert the inpatient ophthalmology service    Delirium  Patient's mentation has currently improved substantially. Alert and Oriented x3 and able to converse well. He was briefly stepped up to the ICU on 8/11 for delirium that self-resolved. Delirium then recurred on 8/24.  Workup so far:  Cefepime (received for 3 days) switched back to meropenem.   CTA PE study and US LE studies rule out thromboembolism  Electrolytes have been maintained wnl with dialysis; mentation eventually began to improve following 2 rounds of HD. Likely due to medication effects.  CTH without acute changes.  Repeated MRI under sedation on 8/26, showed ongoing L scleral and orbital infection but no new processes nor new strokes  LP under sedation on 8/26; CSF cultures grew Gram positive rods, adequate coverage with current Meropenem.  EEG without seizures, but showed encephalopathy    Delirium has significantly improved since stopping cefepime and reglan and s/p 2 HD sessions. Thus most likely 2/2 mediation adverse effect plus possibly meningitis    Plan  - Continue HD sessions  - Delirium likely due to medication adverse effect + prolonged hospitalization, altered sleep/wake cycle, vision loss  - Discontinued Seroquel  - Olanzapine 5mg IM PRN for severe agitation  - Delirium precautions in place.    Duodenal  ulceration  Patient started to have melena on 8/22, and on 8/24, underwent EGD.   Found to have a large oozing duodenal ulcer with adherent clot. 2 clips placed however ulcer could not be addressed completely. Also had 2 more nonbleeding ulcers in the duodenal bulb and 2nd part of the duodenum.  3U PRBC transfused so far this admission  Per GI, no further endoscopic intervention warranted  Due to ongoing melena, ordered CTA GIB protocol - negative    Problem now stable    - Trend CBC. Transfuse for Hb >7, unless otherwise indicated  - Hold all NSAIDs and anticoagulants, unless contraindicated - patient had bleeding even with DVT prophylaxis   - PO pantoprazole 40mg BID   - Correct any coagulopathy with platelets and FFP for goal of platelets >50K and INR <2.0    AV fistula infection  - See HPI and hospital course; previous AV graft was removed at OSH on 7/21/2023 and found to be colonized with pseudomonas from prior pseudomonal bacteremia  - Staples still in place  - Will contact [facility] surgeons about timing of staple removal    Abnormal microbiological findings in CSF  Lumbar puncture was performed on 08/26 which initially showed no growth; On 08/30, CSF cultures were positive for Gram positive rods. Patient was already on Meropenem at time of known culture growth, and ID communicated that this would be adequate coverage for this infection. Delirium had already improved prior to culture growth as well, and patient is still alert and oriented x3 with no signs of delirium.    - Continue Meropenem per ID recs.    Impaired mobility  - Plan to DC to [facility] rehab when medically stable - unsuitable for rehab  - Pending DC now to care home NH    PEG (percutaneous endoscopic gastrostomy) status  - Previously cleared by SLP for a regular diet without restrictions  - Also frequently hypoglycemic due to malnutrition.  - Resumed tube feeds following eye surgery.    Anemia in ESRD (end-stage renal disease)  EPO per  nephrology    Severe malnutrition  Nutrition consulted. Most recent weight and BMI monitored-     Measurements:  Wt Readings from Last 1 Encounters:   08/31/23 62.8 kg (138 lb 7.2 oz)   Body mass index is 19.87 kg/m².    Patient has been screened and assessed by RD.    Malnutrition Type:  Context: chronic illness  Level: severe    Malnutrition Characteristic Summary:  Weight Loss (Malnutrition): greater than 10% in 6 months  Energy Intake (Malnutrition): other (see comments) ([place])  Subcutaneous Fat (Malnutrition): severe depletion  Muscle Mass (Malnutrition): severe depletion    Interventions/Recommendations (treatment strategy):  1,     Able to take in PO per SLP assessment on 8/15, on regular diet with thin liquids  Continue tube feeds for now in addition to oral diet    ESRD (end stage renal disease)  Nephrology consulted for HD needs while inpatient    Hyperlipidemia  Home statin    Chronic diastolic heart failure  Continue to monitor for signs of volume overload; volume removal with dialysis    Hypertension  Current hypertension is worsened secondary to agitation  On CCB at home but will uptitrate GDMT while here    - Coreg 6.25mg BID, limited by HR  - BiDil 2 tablets TID  - Valsartan 160mg BID  - Amlodipine 5mg daily      VTE Risk Mitigation (From admission, onward)         Ordered     heparin (porcine) injection 5,000 Units  Every 8 hours         09/02/23 0809     heparin (porcine) injection 1,000 Units  As needed (PRN)         08/31/23 1702     heparin (porcine) injection 1,000 Units  As needed (PRN)         08/29/23 1322     heparin (porcine) injection 1,000 Units  As needed (PRN)         08/10/23 1046     IP VTE HIGH RISK PATIENT  Once         08/09/23 1317     Place sequential compression device  Until discontinued         08/09/23 1317                Discharge Planning   TOBY: 9/5/2023     Code Status: Full Code   Is the patient medically ready for discharge?: Yes    Reason for patient still in hospital  (select all that apply): Pending disposition  Discharge Plan A: Rehab   Discharge Delays: (!) Procedure Scheduling (IR, OR, Labs, Echo, Cath, Echo, EEG) (eye surgery next week)              Pat Montoya MD  Department of Hospital Medicine   [facility] - Intensive Care ([place]-14)

## 2023-09-04 NOTE — ASSESSMENT & PLAN NOTE
Current hypertension is worsened secondary to agitation  On CCB at home but will uptitrate GDMT while here    - Coreg 6.25mg BID, limited by HR  - BiDil 2 tablets TID  - Valsartan 160mg BID  - Amlodipine 5mg daily

## 2023-09-04 NOTE — CONSULTS
[facility] - Int[facility] ([place] [address])  Wound Care    Patient Name:  Immanuel Bernal   MRN:  [id number]  Date: 9/4/2023  Diagnosis: Endophthalmitis    History:     Past Medical History:   Diagnosis Date    Bilateral retinal detachment 7/18/2023    BPH (benign prostatic hyperplasia)     Cataract     CHF (congestive heart failure)     CKD (chronic kidney disease) stage 3, GFR 30-59 ml/min     Diabetes mellitus, type 2     Hypertension     KENIA (obstructive sleep apnea)     Pancreatitis     Persistent proteinuria 11/12/2020    2 g proteinuria noted Resumed ACE Lower BP systolic to less than 130     PTSD (post-traumatic stress disorder)     Stroke        Social History     Socioeconomic History    Marital status: Single   Tobacco Use    Smoking status: Former     Types: Cigarettes, Cigars    Smokeless tobacco: Never   Substance and Sexual Activity    Alcohol use: Not Currently    Drug use: Not Currently     Social Determinants of Health     Financial Resource Strain: Low Risk  (8/10/2023)    Overall Financial Resource Strain (CARDIA)     Difficulty of Paying Living Expenses: Not very hard   Food Insecurity: No Food Insecurity (8/10/2023)    Hunger Vital Sign     Worried About Running Out of Food in the Last Year: Never true     Ran Out of Food in the Last Year: Never true   Transportation Needs: No Transportation Needs (8/10/2023)    PRAPARE - Transportation     Lack of Transportation (Medical): No     Lack of Transportation (Non-Medical): No   Physical Activity: Insufficiently Active (8/10/2023)    Exercise Vital Sign     Days of Exercise per Week: 5 days     Minutes of Exercise per Session: 20 min   Stress: Stress Concern Present (8/10/2023)    [employer] [place] [employer] - Occupational Stress Questionnaire     Feeling of Stress : Rather much   Social Connections: Socially Isolated (8/10/2023)    Social Connection and Isolation Panel [NHANES]     Frequency of Communication with Friends and Family: Twice  a week     Frequency of Social Gatherings with Friends and Family: Once a week     Attends Muslim Services: Never     Active Member of Clubs or Organizations: No     Attends Club or Organization Meetings: Never     Marital Status: Never    Housing Stability: Unknown (8/10/2023)    Housing Stability Vital Sign     Unable to Pay for Housing in the Last Year: No     Unstable Housing in the Last Year: No       Precautions:     Allergies as of 08/08/2023 - Reviewed 10/01/2021   Allergen Reaction Noted    Morphine Rash 05/04/2023    Amiodarone analogues Itching 07/07/2023       WOC Assessment Details/Treatment     Wound care consult received.  Patient seen and penis assessed after discussing with patient. No redness or other signs of irritation or broken skin. Patient reports that having the condom cath removed has not helped him tremendously. Unsure of reason as no visible signs are noted. Discussed with patient and nurse. No need for wound care at this time. Will sign off. Please reconsult if needed.  Nursing to continue care.

## 2023-09-04 NOTE — PROGRESS NOTES
Bedside hemodialysis treatment 1:1 started per MD orders via  R perm cath accessed using aseptic technique. Sister at bedside. VS stable to start HD.

## 2023-09-04 NOTE — ASSESSMENT & PLAN NOTE
- See HPI and hospital course; previous AV graft was removed at OSH on 7/21/2023 and found to be colonized with pseudomonas from prior pseudomonal bacteremia  - Staples still in place  - Will contact [facility] surgeons about timing of staple removal

## 2023-09-04 NOTE — SUBJECTIVE & OBJECTIVE
Interval History: TIM  Patient feeling well this morning  BP has been stable  12 beats of VT with dialysis, that then stabilized  Penile pain still present but there continues to be no skin changes present nor any discharge    Pending [facility] NH placement    Review of Systems   Constitutional: Negative.  Negative for fever.   HENT: Negative.     Eyes:  Negative for pain and discharge.   Respiratory: Negative.     Cardiovascular: Negative.    Gastrointestinal: Negative.    Genitourinary:  Negative for dysuria and urgency.        Positive for burning pain to head of penis   Musculoskeletal: Negative.    Skin: Negative.    Neurological: Negative.    Psychiatric/Behavioral: Negative.  Negative for agitation, behavioral problems and confusion.      Objective:     Vital Signs (Most Recent):  Temp: 98.7 °F (37.1 °C) (09/04/23 0700)  Pulse: 76 (09/04/23 1110)  Resp: 20 (09/04/23 0913)  BP: 124/74 (09/04/23 0740)  SpO2: 96 % (09/04/23 1110) Vital Signs (24h Range):  Temp:  [97.8 °F (36.6 °C)-98.7 °F (37.1 °C)] 98.7 °F (37.1 °C)  Pulse:  [67-89] 76  Resp:  [10-20] 20  SpO2:  [92 %-97 %] 96 %  BP: (102-159)/(66-92) 124/74     Weight: 62.8 kg (138 lb 7.2 oz)  Body mass index is 19.87 kg/m².    Intake/Output Summary (Last 24 hours) at 9/4/2023 1133  Last data filed at 9/4/2023 0700  Gross per 24 hour   Intake 910 ml   Output 2540 ml   Net -1630 ml         Physical Exam  Constitutional:       Appearance: Normal appearance.   HENT:      Head: Normocephalic.      Comments: Eye shield covering left eye     Mouth/Throat:      Mouth: Mucous membranes are dry.   Eyes:      Comments: Right eye clear and pupil round and reactive; left eye covered by eye shield.   Cardiovascular:      Rate and Rhythm: Normal rate and regular rhythm.      Pulses: Normal pulses.      Heart sounds: Normal heart sounds.   Pulmonary:      Effort: Pulmonary effort is normal. No respiratory distress.      Breath sounds: Normal breath sounds.   Abdominal:       General: Abdomen is flat.      Palpations: Abdomen is soft.   Genitourinary:     Penis: Normal.       Comments: No penile abnormalities  L groin with hernia present, nontender and fully reducible  Musculoskeletal:      Right lower leg: No edema.      Left lower leg: No edema.   Skin:     General: Skin is warm and dry.   Neurological:      Mental Status: He is alert and oriented to person, place, and time.             Significant Labs: All pertinent labs within the past 24 hours have been reviewed.  CBC:   Recent Labs   Lab 09/03/23  0719 09/04/23  0404   WBC 5.32 4.42   HGB 10.8* 10.1*   HCT 33.7* 32.5*    174     CMP:   Recent Labs   Lab 09/03/23  0719 09/04/23  0404   * 132*   K 4.8 4.3   CL 99 97   CO2 21* 25   * 87   BUN 54* 49*   CREATININE 4.5* 3.5*   CALCIUM 8.9 8.0*   PROT 5.8* 6.0   ALBUMIN 2.0* 2.1*   BILITOT 0.2 0.2   ALKPHOS 120 131   AST 12 12   ALT <5* <5*   ANIONGAP 10 10       Significant Imaging: I have reviewed all pertinent imaging results/findings within the past 24 hours.

## 2023-09-04 NOTE — PLAN OF CARE
Pt slept well this shift. A&Ox4. VSS. Pain controlled with PRN's. R chest permcath and central line in place. Drsg C/D/I. Pt sister asking for ok to use aloe vera gel topically to penis irritation. MD notified. Nursing communication order received. 2 episodes this AM of V-tach (8 beats & 12 beats). Pt currently receiving HD. MD and HD nurse aware. T&RQ2. Skin care completed. Safety precautions in place. Call light in reach. No further concerns noted at this time.    Problem: Adult Inpatient Plan of Care  Goal: Plan of Care Review  Outcome: Ongoing, Progressing  Goal: Patient-Specific Goal (Individualized)  Outcome: Ongoing, Progressing  Goal: Absence of Hospital-Acquired Illness or Injury  Outcome: Ongoing, Progressing  Goal: Optimal Comfort and Wellbeing  Outcome: Ongoing, Progressing  Goal: Readiness for Transition of Care  Outcome: Ongoing, Progressing     Problem: Diabetes Comorbidity  Goal: Blood Glucose Level Within Targeted Range  Outcome: Ongoing, Progressing     Problem: Skin Injury Risk Increased  Goal: Skin Health and Integrity  Outcome: Ongoing, Progressing     Problem: Device-Related Complication Risk (Hemodialysis)  Goal: Safe, Effective Therapy Delivery  Outcome: Ongoing, Progressing     Problem: Hemodynamic Instability (Hemodialysis)  Goal: Effective Tissue Perfusion  Outcome: Ongoing, Progressing     Problem: Infection (Hemodialysis)  Goal: Absence of Infection Signs and Symptoms  Outcome: Ongoing, Progressing     Problem: Infection  Goal: Absence of Infection Signs and Symptoms  Outcome: Ongoing, Progressing     Problem: Coping Ineffective  Goal: Effective Coping  Outcome: Ongoing, Progressing     Problem: Device-Related Complication Risk (CRRT (Continuous Renal Replacement Therapy))  Goal: Safe, Effective Therapy Delivery  Outcome: Ongoing, Progressing     Problem: Hypothermia (CRRT (Continuous Renal Replacement Therapy))  Goal: Body Temperature Maintained in Desired Range  Outcome: Ongoing,  Progressing     Problem: Infection (CRRT (Continuous Renal Replacement Therapy))  Goal: Absence of Infection Signs and Symptoms  Outcome: Ongoing, Progressing     Problem: Impaired Wound Healing  Goal: Optimal Wound Healing  Outcome: Ongoing, Progressing     Problem: Fall Injury Risk  Goal: Absence of Fall and Fall-Related Injury  Outcome: Ongoing, Progressing

## 2023-09-04 NOTE — PROGRESS NOTES
Hemodialysis treatment completed. Blood returned. Dialyzed patient for 3.5 hours with net fluid removal of 2 L. Patient had brief run of V tach shown on bedside monitor once on HD but immediately converted to NSR without change in VS, pt has no complaints voiced out during event. HD tolerated treatment well.    R perma cath flushed with saline and locked with heparin. Lumens capped and wrapped in sterile gauze.     Report given to primary nurse

## 2023-09-04 NOTE — NURSING
Patient resting in bed with no new complaints.  Tube feed bag and tubing changed.  Bed alarm on with call light in reach.

## 2023-09-04 NOTE — ASSESSMENT & PLAN NOTE
Outpatient HD Information:  -Outpatient HD unit: C   -HD tx days: MWF   -HD tx time: 210min  -HD access: R IJ TDC   -HD modality: iHD   -Residual urine: ?    Plan/Recommendations:  - Continue MWF HD schedule while IP   - can continue calcitriol 0.25 mcg daily and Renvela 800 mg TIDWM  - renal diet when not NPO  - strict I/O's and daily weights  - daily renal function panels and magnesium levels  - renally all dose medications to eGFR  - avoid gadolinium, fleets, phos-based laxatives, NSAIDs, etc.

## 2023-09-04 NOTE — PROGRESS NOTES
[facility] ([place] [id number])  Nephrology  Progress Note    Patient Name: Immanuel Bernal  MRN: [id number]  Admission Date: 8/9/2023  Hospital Length of Stay: 26 days  Attending Provider: Porsha Funez MD   Primary Care Physician: Dolly, Primary Doctor  Principal Problem:Endophthalmitis    Subjective:       Interval History: HD completed this am, tolerated well. UF 2L. Pending NH placement.     Review of patient's allergies indicates:   Allergen Reactions    Morphine Rash    Amiodarone analogues Itching     Other reaction(s): Unknown     Current Facility-Administered Medications   Medication Frequency    0.9%  NaCl infusion (for blood administration) Q24H PRN    0.9%  NaCl infusion (for blood administration) Q24H PRN    0.9%  NaCl infusion PRN    0.9%  NaCl infusion Once    0.9%  NaCl infusion Once    0.9%  NaCl infusion Once    acetaminophen tablet 1,000 mg TID    albuterol-ipratropium 2.5 mg-0.5 mg/3 mL nebulizer solution 3 mL Q6H WAKE    amLODIPine tablet 5 mg Daily    atorvastatin tablet 40 mg Daily    calcitRIOL capsule 0.25 mcg Daily    carvediloL tablet 6.25 mg BID    dextrose 10% bolus 125 mL 125 mL PRN    dextrose 10% bolus 250 mL 250 mL PRN    epoetin thee injection 3,000 Units Every Mon, Wed, Fri    fluconazole 40 mg/ml suspension 400 mg Daily    glucagon (human recombinant) injection 1 mg PRN    glucose chewable tablet 16 g PRN    glucose chewable tablet 24 g PRN    heparin (porcine) injection 1,000 Units PRN    heparin (porcine) injection 5,000 Units Q8H    hydrALAZINE 10 mg 2 tablets, hydrALAZINE 25 mg 2 tablets, isorsorbide dinitrate 20 mg  2 tablets combination TID    hydrALAZINE injection 10 mg Q6H PRN    Lactobacillus rhamnosus GG capsule 1 capsule BID    LIDOcaine HCl 2% urojet PRN    meclizine tablet 12.5 mg TID PRN    melatonin tablet 6 mg Nightly    meropenem (MERREM) 500 mg in sodium chloride 0.9 % 100 mL IVPB (MB+) Q12H    multivitamin tablet Daily     naloxone 0.4 mg/mL injection 0.02 mg PRN    OLANZapine injection 5 mg Nightly PRN    ondansetron disintegrating tablet 4 mg Q8H    pantoprazole suspension 40 mg BID    polyethylene glycol packet 17 g BID    prochlorperazine injection Soln 10 mg Q6H PRN    senna-docusate 8.6-50 mg per tablet 1 tablet BID    sevelamer carbonate pwpk 0.8 g TID WM    sodium chloride 0.9% bolus 250 mL 250 mL PRN    sodium chloride 0.9% bolus 250 mL 250 mL PRN    sodium chloride 0.9% flush 10 mL Q12H PRN    sodium chloride 0.9% flush 10 mL PRN    traMADoL tablet 50 mg TID PRN    valsartan tablet 160 mg BID    vancomycin - pharmacy to dose pharmacy to manage frequency    white petrolatum 41 % ointment PRN       Objective:     Vital Signs (Most Recent):  Temp: 98.8 °F (37.1 °C) (09/04/23 1140)  Pulse: 73 (09/04/23 1600)  Resp: (!) 7 (09/04/23 1600)  BP: 120/67 (09/04/23 1600)  SpO2: 96 % (09/04/23 1430) Vital Signs (24h Range):  Temp:  [97.8 °F (36.6 °C)-98.8 °F (37.1 °C)] 98.8 °F (37.1 °C)  Pulse:  [67-89] 73  Resp:  [7-20] 7  SpO2:  [92 %-97 %] 96 %  BP: (102-155)/(66-92) 120/67     Weight: 62.8 kg (138 lb 7.2 oz) (08/31/23 1146)  Body mass index is 19.87 kg/m².  Body surface area is 1.76 meters squared.    I/O last 3 completed shifts:  In: 2230 [P.O.:60; Other:250; NG/GT:1920]  Out: 2540 [Urine:40; Other:2500]     Physical Exam  Vitals and nursing note reviewed.   Constitutional:       General: He is awake. He is not in acute distress.     Appearance: He is cachectic. He is ill-appearing. He is not diaphoretic.      Comments: Muscle wasting/atrophy noted.   HENT:      Head: Normocephalic and atraumatic.      Right Ear: External ear normal.      Left Ear: External ear normal.      Nose: Nose normal.      Mouth/Throat:      Mouth: Mucous membranes are moist.      Pharynx: Oropharynx is clear. No oropharyngeal exudate or posterior oropharyngeal erythema.   Eyes:      Comments: Left eye with bandage/dressing in place.    Cardiovascular:      Rate and Rhythm: Normal rate.      Heart sounds: No murmur heard.     No friction rub. No gallop.   Pulmonary:      Effort: Pulmonary effort is normal. No respiratory distress.      Breath sounds: No wheezing, rhonchi or rales.   Chest:      Comments: Tunneled HD catheter to right chest wall.  Abdominal:      General: Bowel sounds are normal. There is no distension.      Palpations: Abdomen is soft.      Tenderness: There is no abdominal tenderness.      Comments: PEG in place.   Musculoskeletal:      Cervical back: Neck supple.      Right lower leg: No edema.      Left lower leg: No edema.   Skin:     General: Skin is warm and dry.      Coloration: Skin is not jaundiced.      Comments: Stable noted to right upper extremity from recent surgery. Incision appears intact.   Neurological:      Mental Status: He is alert.      Cranial Nerves: No cranial nerve deficit.   Psychiatric:         Mood and Affect: Mood normal.         Behavior: Behavior normal.          Significant Labs:  CBC:   Recent Labs   Lab 09/04/23  0404   WBC 4.42   RBC 3.26*   HGB 10.1*   HCT 32.5*      *   MCH 31.0   MCHC 31.1*     CMP:   Recent Labs   Lab 09/04/23  0404   GLU 87   CALCIUM 8.0*   ALBUMIN 2.1*   PROT 6.0   *   K 4.3   CO2 25   CL 97   BUN 49*   CREATININE 3.5*   ALKPHOS 131   ALT <5*   AST 12   BILITOT 0.2     All labs within the past 24 hours have been reviewed.         Assessment/Plan:     Ophtho  * Endophthalmitis  - Ophthalmology consulted and following  - s/p left eye enucleation on 8/30    Cardiac/Vascular  Hypertension  - management per primary   - currently on Norvasc 5 mg daily, Coreg 6.25 mg BID, hydralazine 35 mg BID, isosorbide dinitrate 20 mg BID and valsartan 160 mg BID    Renal/  ESRD (end stage renal disease)  Outpatient HD Information:  -Outpatient HD unit: [facility]   -HD tx days: MWF   -HD tx time: 210min  -HD access: R IJ TDC   -HD modality: iHD   -Residual urine:  ?    Plan/Recommendations:  - Continue MWF HD schedule while IP   - can continue calcitriol 0.25 mcg daily and Renvela 800 mg TIDWM  - renal diet when not NPO  - strict I/O's and daily weights  - daily renal function panels and magnesium levels  - renally all dose medications to eGFR  - avoid gadolinium, fleets, phos-based laxatives, NSAIDs, etc.    ID  AV fistula infection  - secondary to Pseudomonas  - s/p right upper extremity AV graft excision in July  - Infectious Disease consulted and following   - plan for 4-6 weeks total of meropenem, vancomycin and fluconazole    Oncology  Anemia in ESRD (end-stage renal disease)  - target Hg of 10-12  - continue epogen 3,000 units every Monday, Wednesday and Friday     GI  Duodenal ulceration  - management per primary team        Thank you for your consult. I will follow-up with patient. Please contact us if you have any additional questions.    Kylie Okeefe DNP  Nephrology  [facility] ([place]

## 2023-09-04 NOTE — SUBJECTIVE & OBJECTIVE
Interval History: HD completed this am, tolerated well. UF 2L. Pending NH placement.     Review of patient's allergies indicates:   Allergen Reactions    Morphine Rash    Amiodarone analogues Itching     Other reaction(s): Unknown     Current Facility-Administered Medications   Medication Frequency    0.9%  NaCl infusion (for blood administration) Q24H PRN    0.9%  NaCl infusion (for blood administration) Q24H PRN    0.9%  NaCl infusion PRN    0.9%  NaCl infusion Once    0.9%  NaCl infusion Once    0.9%  NaCl infusion Once    acetaminophen tablet 1,000 mg TID    albuterol-ipratropium 2.5 mg-0.5 mg/3 mL nebulizer solution 3 mL Q6H WAKE    amLODIPine tablet 5 mg Daily    atorvastatin tablet 40 mg Daily    calcitRIOL capsule 0.25 mcg Daily    carvediloL tablet 6.25 mg BID    dextrose 10% bolus 125 mL 125 mL PRN    dextrose 10% bolus 250 mL 250 mL PRN    epoetin thee injection 3,000 Units Every Mon, Wed, Fri    fluconazole 40 mg/ml suspension 400 mg Daily    glucagon (human recombinant) injection 1 mg PRN    glucose chewable tablet 16 g PRN    glucose chewable tablet 24 g PRN    heparin (porcine) injection 1,000 Units PRN    heparin (porcine) injection 5,000 Units Q8H    hydrALAZINE 10 mg 2 tablets, hydrALAZINE 25 mg 2 tablets, isorsorbide dinitrate 20 mg  2 tablets combination TID    hydrALAZINE injection 10 mg Q6H PRN    Lactobacillus rhamnosus GG capsule 1 capsule BID    LIDOcaine HCl 2% urojet PRN    meclizine tablet 12.5 mg TID PRN    melatonin tablet 6 mg Nightly    meropenem (MERREM) 500 mg in sodium chloride 0.9 % 100 mL IVPB (MB+) Q12H    multivitamin tablet Daily    naloxone 0.4 mg/mL injection 0.02 mg PRN    OLANZapine injection 5 mg Nightly PRN    ondansetron disintegrating tablet 4 mg Q8H    pantoprazole suspension 40 mg BID    polyethylene glycol packet 17 g BID    prochlorperazine injection Soln 10 mg Q6H PRN    senna-docusate 8.6-50 mg per tablet 1 tablet BID    sevelamer carbonate pwpk 0.8 g TID WM     sodium chloride 0.9% bolus 250 mL 250 mL PRN    sodium chloride 0.9% bolus 250 mL 250 mL PRN    sodium chloride 0.9% flush 10 mL Q12H PRN    sodium chloride 0.9% flush 10 mL PRN    traMADoL tablet 50 mg TID PRN    valsartan tablet 160 mg BID    vancomycin - pharmacy to dose pharmacy to manage frequency    white petrolatum 41 % ointment PRN       Objective:     Vital Signs (Most Recent):  Temp: 98.8 °F (37.1 °C) (09/04/23 1140)  Pulse: 73 (09/04/23 1600)  Resp: (!) 7 (09/04/23 1600)  BP: 120/67 (09/04/23 1600)  SpO2: 96 % (09/04/23 1430) Vital Signs (24h Range):  Temp:  [97.8 °F (36.6 °C)-98.8 °F (37.1 °C)] 98.8 °F (37.1 °C)  Pulse:  [67-89] 73  Resp:  [7-20] 7  SpO2:  [92 %-97 %] 96 %  BP: (102-155)/(66-92) 120/67     Weight: 62.8 kg (138 lb 7.2 oz) (08/31/23 1146)  Body mass index is 19.87 kg/m².  Body surface area is 1.76 meters squared.    I/O last 3 completed shifts:  In: 2230 [P.O.:60; Other:250; NG/GT:1920]  Out: 2540 [Urine:40; Other:2500]     Physical Exam  Vitals and nursing note reviewed.   Constitutional:       General: He is awake. He is not in acute distress.     Appearance: He is cachectic. He is ill-appearing. He is not diaphoretic.      Comments: Muscle wasting/atrophy noted.   HENT:      Head: Normocephalic and atraumatic.      Right Ear: External ear normal.      Left Ear: External ear normal.      Nose: Nose normal.      Mouth/Throat:      Mouth: Mucous membranes are moist.      Pharynx: Oropharynx is clear. No oropharyngeal exudate or posterior oropharyngeal erythema.   Eyes:      Comments: Left eye with bandage/dressing in place.   Cardiovascular:      Rate and Rhythm: Normal rate.      Heart sounds: No murmur heard.     No friction rub. No gallop.   Pulmonary:      Effort: Pulmonary effort is normal. No respiratory distress.      Breath sounds: No wheezing, rhonchi or rales.   Chest:      Comments: Tunneled HD catheter to right chest wall.  Abdominal:      General: Bowel sounds are normal.  There is no distension.      Palpations: Abdomen is soft.      Tenderness: There is no abdominal tenderness.      Comments: PEG in place.   Musculoskeletal:      Cervical back: Neck supple.      Right lower leg: No edema.      Left lower leg: No edema.   Skin:     General: Skin is warm and dry.      Coloration: Skin is not jaundiced.      Comments: Stable noted to right upper extremity from recent surgery. Incision appears intact.   Neurological:      Mental Status: He is alert.      Cranial Nerves: No cranial nerve deficit.   Psychiatric:         Mood and Affect: Mood normal.         Behavior: Behavior normal.          Significant Labs:  CBC:   Recent Labs   Lab 09/04/23 0404   WBC 4.42   RBC 3.26*   HGB 10.1*   HCT 32.5*      *   MCH 31.0   MCHC 31.1*     CMP:   Recent Labs   Lab 09/04/23 0404   GLU 87   CALCIUM 8.0*   ALBUMIN 2.1*   PROT 6.0   *   K 4.3   CO2 25   CL 97   BUN 49*   CREATININE 3.5*   ALKPHOS 131   ALT <5*   AST 12   BILITOT 0.2     All labs within the past 24 hours have been reviewed.

## 2023-09-04 NOTE — PROGRESS NOTES
Pharmacokinetic Assessment Follow Up: IV Vancomycin    Vancomycin serum concentration assessment(s):    The random level was drawn correctly and can be used to guide therapy at this time. The measurement is within the desired definitive target range of 15 to 20 mcg/mL.    Vancomycin Regimen Plan:    Patient is on HD MWF and had HD this AM 9/4  - Do no expect to need another vancomycin dose until next HD on 9/6    Continue pulse dosing with Vancomycin 500 mg IV x 1 dose  - Re-dose when random level is less than 20 mcg/mL  - Draw random serum concentration measured at 0500 on 9/5    Drug levels (last 3 results):  Recent Labs   Lab Result Units 09/03/23 0719 09/04/23  1139   Vancomycin, Random ug/mL 21.7 16.7       Pharmacy will continue to follow and monitor vancomycin.    Please contact pharmacy at extension [phone] for questions regarding this assessment.    Thank you for the consult,   Cary Crespo, PatienceD       Patient brief summary:  Immanuel Bernal is a 57 y.o. male initiated on antimicrobial therapy with IV Vancomycin for treatment of  Endophthalmitis      Drug Allergies:   Review of patient's allergies indicates:   Allergen Reactions    Morphine Rash    Amiodarone analogues Itching     Other reaction(s): Unknown       Actual Body Weight:   62.8 kg    Renal Function:   Estimated Creatinine Clearance: 20.2 mL/min (A) (based on SCr of 3.5 mg/dL (H)).,     Dialysis Method (if applicable):  intermittent HD MWF schedule    CBC (last 72 hours):  Recent Labs   Lab Result Units 09/02/23 0518 09/03/23 0719 09/04/23  0404   WBC K/uL 5.34 5.32 4.42   Hemoglobin g/dL 10.1* 10.8* 10.1*   Hematocrit % 33.2* 33.7* 32.5*   Platelets K/uL 155 152 174       Metabolic Panel (last 72 hours):  Recent Labs   Lab Result Units 09/02/23  0518 09/03/23 0719 09/04/23  0404   Sodium mmol/L 132* 130* 132*   Potassium mmol/L 4.1 4.8 4.3   Chloride mmol/L 100 99 97   CO2 mmol/L 21* 21* 25   Glucose mg/dL 90 117* 87   BUN mg/dL 31*  54* 49*   Creatinine mg/dL 3.5* 4.5* 3.5*   Albumin g/dL 2.1* 2.0* 2.1*   Total Bilirubin mg/dL 0.3 0.2 0.2   Alkaline Phosphatase U/L 112 120 131   AST U/L 13 12 12   ALT U/L <5* <5* <5*       Vancomycin Administrations:  vancomycin given in the last 96 hours                     vancomycin (VANCOCIN) 500 mg in dextrose 5 % in water (D5W) 100 mL IVPB (MB+) (mg) 500 mg New Bag 09/01/23 1149                    Microbiologic Results:  Microbiology Results (last 7 days)       Procedure Component Value Units Date/Time    Fungus culture [[id number]] Collected: 08/16/23 1647    Order Status: Completed Specimen: Abscess from Eyelid, Left Updated: 09/04/23 1250     Fungus (Mycology) Culture Culture in progress      No fungus isolated after 2 weeks    Fungus culture [[id number]] Collected: 08/16/23 1632    Order Status: Completed Specimen: Wound from Cornea, Left Updated: 09/04/23 1250     Fungus (Mycology) Culture Culture in progress      No fungus isolated after 2 weeks    Fungus culture [[id number]] Collected: 08/16/23 1629    Order Status: Completed Specimen: Wound from Eyelid, Left Updated: 09/04/23 1250     Fungus (Mycology) Culture Culture in progress      No fungus isolated after 2 weeks    Fungus culture [[id number]] Collected: 08/16/23 1636    Order Status: Completed Specimen: Abscess from Eyelid, Left Updated: 09/04/23 1250     Fungus (Mycology) Culture Culture in progress      No fungus isolated after 2 weeks    Narrative:      Sclera abscess    Culture, Anaerobe [[id number]] Collected: 08/30/23 1724    Order Status: Completed Specimen: Wound from Cornea, Left Updated: 09/04/23 0954     Anaerobic Culture Culture in progress    Narrative:      LEFT INTRAOCULAR SPACE    Aerobic culture [[id number]] Collected: 08/30/23 1724    Order Status: Completed Specimen: Wound from Cornea, Left Updated: 09/02/23 1304     Aerobic Bacterial Culture No growth    Narrative:      LEFT INTRAOCULAR SPACE    CSF culture [[id number]]   (Abnormal) Collected: 08/26/23 1315    Order Status: Completed Specimen: CSF (Spinal Fluid) from CSF Tap, Tube 3 Updated: 09/01/23 1434     CSF CULTURE Results called to and read back by:Kashif Zhang RN 08/30/2023  07:44      PROPIONIBACTERIUM SPECIES  From broth only       Gram Stain Result Cytospin indicates:      No WBC's      No organisms seen    AFB Culture & Smear [[id number]] Collected: 08/26/23 1315    Order Status: Completed Specimen: CSF (Spinal Fluid) from CSF Tap, Tube 3 Updated: 08/31/23 0854     AFB Culture & Smear Culture in progress     AFB CULTURE STAIN No acid fast bacilli seen.

## 2023-09-04 NOTE — ASSESSMENT & PLAN NOTE
Meningitis    S/p L eye evisceration on 8/16/23 and completed enucleation with implant placed on 8/30/20.  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    - Ophthalmology following  - Continue meropenem and fluconazole; Tobramycin drops discontinued following successful eye evisceration completion with eye implant.  - Anticipated 4-6 weeks of therapy from evisceration. Anticipated end date: 9/12-9/26. See OPAT note from 8/19.  - Tunneled PICC line was placed for Antibiotics. (08/29)  - Ophthalmology to perform packing changes every 2-3 days  - Per Ophthalmology, continue to cover left eye for 1 week; does not need eye drops within one week.  - Follow up in Ophthalmology clinic on Thursday (09/07) - if not discharged, will alert the inpatient ophthalmology service

## 2023-09-04 NOTE — ASSESSMENT & PLAN NOTE
Nutrition consulted. Most recent weight and BMI monitored-     Measurements:  Wt Readings from Last 1 Encounters:   08/31/23 62.8 kg (138 lb 7.2 oz)   Body mass index is 19.87 kg/m².    Patient has been screened and assessed by RD.    Malnutrition Type:  Context: chronic illness  Level: severe    Malnutrition Characteristic Summary:  Weight Loss (Malnutrition): greater than 10% in 6 months  Energy Intake (Malnutrition): other (see comments) ([place])  Subcutaneous Fat (Malnutrition): severe depletion  Muscle Mass (Malnutrition): severe depletion    Interventions/Recommendations (treatment strategy):  1,     Able to take in PO per SLP assessment on 8/15, on regular diet with thin liquids  Continue tube feeds for now in addition to oral diet

## 2023-09-04 NOTE — ASSESSMENT & PLAN NOTE
Patient's mentation has currently improved substantially. Alert and Oriented x3 and able to converse well. He was briefly stepped up to the ICU on 8/11 for delirium that self-resolved. Delirium then recurred on 8/24.  Workup so far:  Cefepime (received for 3 days) switched back to meropenem.   CTA PE study and US LE studies rule out thromboembolism  Electrolytes have been maintained wnl with dialysis; mentation eventually began to improve following 2 rounds of HD. Likely due to medication effects.  CTH without acute changes.  Repeated MRI under sedation on 8/26, showed ongoing L scleral and orbital infection but no new processes nor new strokes  LP under sedation on 8/26; CSF cultures grew Gram positive rods, adequate coverage with current Meropenem.  EEG without seizures, but showed encephalopathy    Delirium has significantly improved since stopping cefepime and reglan and s/p 2 HD sessions. Thus most likely 2/2 mediation adverse effect plus possibly meningitis    Plan  - Continue HD sessions  - Delirium likely due to medication adverse effect + prolonged hospitalization, altered sleep/wake cycle, vision loss  - Discontinued Seroquel  - Olanzapine 5mg IM PRN for severe agitation  - Delirium precautions in place.

## 2023-09-04 NOTE — ASSESSMENT & PLAN NOTE
- Plan to DC to [facility] rehab when medically stable - unsuitable for rehab  - Pending DC now to assisted NH

## 2023-09-05 LAB
ALBUMIN SERPL BCP-MCNC: 2.2 G/DL (ref 3.5–5.2)
ALP SERPL-CCNC: 132 U/L (ref 55–135)
ALT SERPL W/O P-5'-P-CCNC: <5 U/L (ref 10–44)
ANION GAP SERPL CALC-SCNC: 10 MMOL/L (ref 8–16)
AST SERPL-CCNC: 14 U/L (ref 10–40)
BACTERIA #/AREA URNS AUTO: ABNORMAL /HPF
BILIRUB SERPL-MCNC: 0.2 MG/DL (ref 0.1–1)
BILIRUB UR QL STRIP: NEGATIVE
BUN SERPL-MCNC: 65 MG/DL (ref 6–20)
CALCIUM SERPL-MCNC: 8.8 MG/DL (ref 8.7–10.5)
CHLORIDE SERPL-SCNC: 98 MMOL/L (ref 95–110)
CLARITY UR REFRACT.AUTO: CLEAR
CO2 SERPL-SCNC: 21 MMOL/L (ref 23–29)
COLOR UR AUTO: YELLOW
CREAT SERPL-MCNC: 4.3 MG/DL (ref 0.5–1.4)
ERYTHROCYTE [DISTWIDTH] IN BLOOD BY AUTOMATED COUNT: 17.1 % (ref 11.5–14.5)
EST. GFR  (NO RACE VARIABLE): 15.1 ML/MIN/1.73 M^2
GLUCOSE SERPL-MCNC: 91 MG/DL (ref 70–110)
GLUCOSE UR QL STRIP: NEGATIVE
HCT VFR BLD AUTO: 33.5 % (ref 40–54)
HGB BLD-MCNC: 10.2 G/DL (ref 14–18)
HGB UR QL STRIP: NEGATIVE
HYALINE CASTS UR QL AUTO: 0 /LPF
KETONES UR QL STRIP: NEGATIVE
LEUKOCYTE ESTERASE UR QL STRIP: NEGATIVE
MAGNESIUM SERPL-MCNC: 2.1 MG/DL (ref 1.6–2.6)
MCH RBC QN AUTO: 30.4 PG (ref 27–31)
MCHC RBC AUTO-ENTMCNC: 30.4 G/DL (ref 32–36)
MCV RBC AUTO: 100 FL (ref 82–98)
MICROSCOPIC COMMENT: ABNORMAL
NITRITE UR QL STRIP: NEGATIVE
PH UR STRIP: 8 [PH] (ref 5–8)
PHOSPHATE SERPL-MCNC: 2.6 MG/DL (ref 2.7–4.5)
PLATELET # BLD AUTO: 170 K/UL (ref 150–450)
PMV BLD AUTO: 10.8 FL (ref 9.2–12.9)
POCT GLUCOSE: 103 MG/DL (ref 70–110)
POCT GLUCOSE: 95 MG/DL (ref 70–110)
POCT GLUCOSE: 97 MG/DL (ref 70–110)
POTASSIUM SERPL-SCNC: 5.1 MMOL/L (ref 3.5–5.1)
PROT SERPL-MCNC: 6.1 G/DL (ref 6–8.4)
PROT UR QL STRIP: ABNORMAL
RBC # BLD AUTO: 3.36 M/UL (ref 4.6–6.2)
RBC #/AREA URNS AUTO: 5 /HPF (ref 0–4)
SODIUM SERPL-SCNC: 129 MMOL/L (ref 136–145)
SP GR UR STRIP: 1.01 (ref 1–1.03)
URN SPEC COLLECT METH UR: ABNORMAL
VANCOMYCIN SERPL-MCNC: 23.6 UG/ML
WBC # BLD AUTO: 6.62 K/UL (ref 3.9–12.7)
WBC #/AREA URNS AUTO: 6 /HPF (ref 0–5)

## 2023-09-05 PROCEDURE — 84100 ASSAY OF PHOSPHORUS: CPT

## 2023-09-05 PROCEDURE — 81001 URINALYSIS AUTO W/SCOPE: CPT | Performed by: HOSPITALIST

## 2023-09-05 PROCEDURE — 99232 PR SUBSEQUENT HOSPITAL CARE,LEVL II: ICD-10-PCS | Mod: GC,,, | Performed by: HOSPITALIST

## 2023-09-05 PROCEDURE — 83735 ASSAY OF MAGNESIUM: CPT

## 2023-09-05 PROCEDURE — 97112 NEUROMUSCULAR REEDUCATION: CPT

## 2023-09-05 PROCEDURE — 80053 COMPREHEN METABOLIC PANEL: CPT | Performed by: STUDENT IN AN ORGANIZED HEALTH CARE EDUCATION/TRAINING PROGRAM

## 2023-09-05 PROCEDURE — 97530 THERAPEUTIC ACTIVITIES: CPT

## 2023-09-05 PROCEDURE — 25000003 PHARM REV CODE 250: Performed by: STUDENT IN AN ORGANIZED HEALTH CARE EDUCATION/TRAINING PROGRAM

## 2023-09-05 PROCEDURE — 94761 N-INVAS EAR/PLS OXIMETRY MLT: CPT

## 2023-09-05 PROCEDURE — 63600175 PHARM REV CODE 636 W HCPCS

## 2023-09-05 PROCEDURE — 25000003 PHARM REV CODE 250

## 2023-09-05 PROCEDURE — 94640 AIRWAY INHALATION TREATMENT: CPT

## 2023-09-05 PROCEDURE — 20000000 HC ICU ROOM

## 2023-09-05 PROCEDURE — 25000003 PHARM REV CODE 250: Performed by: HOSPITALIST

## 2023-09-05 PROCEDURE — 99232 SBSQ HOSP IP/OBS MODERATE 35: CPT | Mod: GC,,, | Performed by: HOSPITALIST

## 2023-09-05 PROCEDURE — 25000242 PHARM REV CODE 250 ALT 637 W/ HCPCS: Performed by: STUDENT IN AN ORGANIZED HEALTH CARE EDUCATION/TRAINING PROGRAM

## 2023-09-05 PROCEDURE — 99233 SBSQ HOSP IP/OBS HIGH 50: CPT | Mod: ,,, | Performed by: STUDENT IN AN ORGANIZED HEALTH CARE EDUCATION/TRAINING PROGRAM

## 2023-09-05 PROCEDURE — 80202 ASSAY OF VANCOMYCIN: CPT | Performed by: HOSPITALIST

## 2023-09-05 PROCEDURE — 63600175 PHARM REV CODE 636 W HCPCS: Performed by: STUDENT IN AN ORGANIZED HEALTH CARE EDUCATION/TRAINING PROGRAM

## 2023-09-05 PROCEDURE — 85027 COMPLETE CBC AUTOMATED: CPT

## 2023-09-05 PROCEDURE — 99233 PR SUBSEQUENT HOSPITAL CARE,LEVL III: ICD-10-PCS | Mod: ,,, | Performed by: STUDENT IN AN ORGANIZED HEALTH CARE EDUCATION/TRAINING PROGRAM

## 2023-09-05 RX ORDER — OXYBUTYNIN CHLORIDE 5 MG/1
5 TABLET ORAL DAILY
Status: DISCONTINUED | OUTPATIENT
Start: 2023-09-05 | End: 2023-09-05

## 2023-09-05 RX ORDER — SODIUM CHLORIDE 9 MG/ML
INJECTION, SOLUTION INTRAVENOUS ONCE
Status: COMPLETED | OUTPATIENT
Start: 2023-09-06 | End: 2023-09-06

## 2023-09-05 RX ORDER — OXYBUTYNIN CHLORIDE 5 MG/1
5 TABLET, EXTENDED RELEASE ORAL DAILY
Status: CANCELLED | OUTPATIENT
Start: 2023-09-05

## 2023-09-05 RX ORDER — HEPARIN SODIUM 1000 [USP'U]/ML
1000 INJECTION, SOLUTION INTRAVENOUS; SUBCUTANEOUS
Status: DISPENSED | OUTPATIENT
Start: 2023-09-06 | End: 2023-09-06

## 2023-09-05 RX ORDER — OXYBUTYNIN CHLORIDE 5 MG/1
5 TABLET, EXTENDED RELEASE ORAL DAILY
Status: DISCONTINUED | OUTPATIENT
Start: 2023-09-05 | End: 2023-09-06

## 2023-09-05 RX ADMIN — HEPARIN SODIUM 5000 UNITS: 5000 INJECTION INTRAVENOUS; SUBCUTANEOUS at 09:09

## 2023-09-05 RX ADMIN — PANTOPRAZOLE SODIUM 40 MG: 40 GRANULE, DELAYED RELEASE ORAL at 08:09

## 2023-09-05 RX ADMIN — ACETAMINOPHEN 1000 MG: 500 TABLET ORAL at 09:09

## 2023-09-05 RX ADMIN — HEPARIN SODIUM 5000 UNITS: 5000 INJECTION INTRAVENOUS; SUBCUTANEOUS at 02:09

## 2023-09-05 RX ADMIN — VALSARTAN 160 MG: 160 TABLET, FILM COATED ORAL at 08:09

## 2023-09-05 RX ADMIN — Medication 6 MG: at 08:09

## 2023-09-05 RX ADMIN — ACETAMINOPHEN 1000 MG: 500 TABLET ORAL at 02:09

## 2023-09-05 RX ADMIN — ATORVASTATIN CALCIUM 40 MG: 40 TABLET, FILM COATED ORAL at 09:09

## 2023-09-05 RX ADMIN — ACETAMINOPHEN 1000 MG: 500 TABLET ORAL at 08:09

## 2023-09-05 RX ADMIN — TRAMADOL HYDROCHLORIDE 50 MG: 50 TABLET, COATED ORAL at 09:09

## 2023-09-05 RX ADMIN — POLYETHYLENE GLYCOL 3350 17 G: 17 POWDER, FOR SOLUTION ORAL at 09:09

## 2023-09-05 RX ADMIN — TRAMADOL HYDROCHLORIDE 50 MG: 50 TABLET, COATED ORAL at 02:09

## 2023-09-05 RX ADMIN — IPRATROPIUM BROMIDE AND ALBUTEROL SULFATE 3 ML: .5; 3 SOLUTION RESPIRATORY (INHALATION) at 08:09

## 2023-09-05 RX ADMIN — VALSARTAN 160 MG: 160 TABLET, FILM COATED ORAL at 09:09

## 2023-09-05 RX ADMIN — CALCITRIOL CAPSULES 0.25 MCG 0.25 MCG: 0.25 CAPSULE ORAL at 09:09

## 2023-09-05 RX ADMIN — MEROPENEM 500 MG: 500 INJECTION INTRAVENOUS at 09:09

## 2023-09-05 RX ADMIN — Medication 1 CAPSULE: at 09:09

## 2023-09-05 RX ADMIN — PANTOPRAZOLE SODIUM 40 MG: 40 GRANULE, DELAYED RELEASE ORAL at 09:09

## 2023-09-05 RX ADMIN — ONDANSETRON 4 MG: 4 TABLET, ORALLY DISINTEGRATING ORAL at 09:09

## 2023-09-05 RX ADMIN — ONDANSETRON 4 MG: 4 TABLET, ORALLY DISINTEGRATING ORAL at 05:09

## 2023-09-05 RX ADMIN — OXYBUTYNIN CHLORIDE 5 MG: 5 TABLET, EXTENDED RELEASE ORAL at 11:09

## 2023-09-05 RX ADMIN — PROCHLORPERAZINE EDISYLATE 10 MG: 5 INJECTION INTRAMUSCULAR; INTRAVENOUS at 10:09

## 2023-09-05 RX ADMIN — IPRATROPIUM BROMIDE AND ALBUTEROL SULFATE 3 ML: .5; 3 SOLUTION RESPIRATORY (INHALATION) at 01:09

## 2023-09-05 RX ADMIN — TRAMADOL HYDROCHLORIDE 50 MG: 50 TABLET, COATED ORAL at 08:09

## 2023-09-05 RX ADMIN — HYDRALAZINE HYDROCHLORIDE: 10 TABLET, FILM COATED ORAL at 08:09

## 2023-09-05 RX ADMIN — CARVEDILOL 6.25 MG: 6.25 TABLET, FILM COATED ORAL at 08:09

## 2023-09-05 RX ADMIN — CARVEDILOL 6.25 MG: 6.25 TABLET, FILM COATED ORAL at 09:09

## 2023-09-05 RX ADMIN — HYDRALAZINE HYDROCHLORIDE: 10 TABLET, FILM COATED ORAL at 02:09

## 2023-09-05 RX ADMIN — FLUCONAZOLE 400 MG: 40 POWDER, FOR SUSPENSION ORAL at 09:09

## 2023-09-05 RX ADMIN — ONDANSETRON 4 MG: 4 TABLET, ORALLY DISINTEGRATING ORAL at 01:09

## 2023-09-05 RX ADMIN — AMLODIPINE BESYLATE 5 MG: 5 TABLET ORAL at 09:09

## 2023-09-05 RX ADMIN — LIDOCAINE HYDROCHLORIDE: 20 JELLY TOPICAL at 05:09

## 2023-09-05 RX ADMIN — THERA TABS 1 TABLET: TAB at 09:09

## 2023-09-05 RX ADMIN — HEPARIN SODIUM 5000 UNITS: 5000 INJECTION INTRAVENOUS; SUBCUTANEOUS at 05:09

## 2023-09-05 RX ADMIN — MEROPENEM 500 MG: 500 INJECTION INTRAVENOUS at 08:09

## 2023-09-05 RX ADMIN — SENNOSIDES AND DOCUSATE SODIUM 1 TABLET: 50; 8.6 TABLET ORAL at 09:09

## 2023-09-05 NOTE — NURSING
Patient A&OX4, and is resting in bed. Patient initially complained of urinary pain and was subsequently given lidocaine urojet. Patient said pain subsided. Patient had bmx1. Personal items within reach. No acute events overnight.

## 2023-09-05 NOTE — ASSESSMENT & PLAN NOTE
Immanuel Bernal is a 57-year-old man with a history of ESRD on HD, HFrEF (EF 30-35%), DM2, HTN, bowel obstruction s/p bowel resection, KENIA, CVA, atrial fibrillation who was hospitalized at [facility] in 7/2023 for b/l endophthalmitis, Pseudomonas bacteremia, RUE thrombus, RUE AVG infection s/p excision, and RUL mass. Left eye did not improve & subsequently developed abscess, but POA declined enucleation and patient ultimately left AMA. Re-presented to [place] ED with worsening eye symptoms with imaging showing left scleral abscess. He was transferred to [facility] for Oculoplastics evaluation & enucleation. S/p evisceration of left eye on 8/16/23, and completion of evisceration with orbital implant on 8/30. Hospitalization has been complicated by pulmonary embolism, recurrent GI bleed for which anticoagulation has been withheld, encephalopathy work-up with lumbar puncture findings of GPR's, for which he remains on broad spectrum IV antibiotics by guidance of ID and debility. Palliative and Supportive Care was consulted to explore goals of care.    Advance Care Planning   Goals of Care:  - Code status: Full code  - HCPOA: sister Marley Bernal   - HCPOA form signed at [facility]  - Patient does not have decision making capacity at this time  - Prognosis: guarded  - Goals: life prolongation  - Plans: continuing full supportive care    Goals of Care Conversation:  - 9/5/23: Met Mr. Bernal and his sister Marley at bedside. He is feeling nauseous and has dysuria. Listened to Marley during encounter, learned more about her sick uncle at nursing home for which he is not receiving adequate care. She shares how she understands that the systems within nursing homes and hospitals are not set up for success, and therefore has to take it upon herself to help her family from suffering the complications that they are vulnerable to because of their living situation. She is hopeful that she can keep Mr. Bernal  "either at [facility] for another week so that she can go to [place] to get her RV and allow for both Immanuel and her uncle to live with her in the RV. She emphasizes how much can go wrong in her absence, noting the example of being gone for the last 4.5 days and her brother having likely another UTI for which he is not receiving adequate care - and having a condom catheter on when she was counseled that this condom cath poses a risk for UTI's. She expresses frustration at the lack of thoughtfulness of some medical staff members (example is directed at uncle's nursing home) and how she needs to learn how to do the same exercises that the therapists provide so that she can do them for her family instead. She shares she will likely write a book about how she is able to care for her uncle and brother simultaneously, as if being in two places at once. Validated her strength and loyalty, praised her diligence and love for her family. 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  - 8/18/23: Met Mr. Bernal and his sister Marley at bedside. He tried to eat breakfast but felt nauseous and could not finish very much of it. Sister wonders if there is a component of vertigo contributing to his overall symptoms as he had shared in the past that with position changes, he gets a room-spinning sensation. Denies room spinning sensation at this time. Agreed that while at this time, his nausea will not likely respond to meclizine if it's not directly related to vertigo, but worth trying a low dose as needed. Will continue to follow along during hospitalization and return on Monday, 8/21/23.  - 8/14/23: Met Mr. Bernal, who wakes easily and answers appropriately and respectfully. Sister Marley at bedside. Had long supportive conversation with Marley, who is also " trying to help their uncle who is in a nursing home, having his own serious medical issues related to severe weight loss and swallowing issues. His experience has been extremely negative in the nursing home and she says that unfortunately 80% of the nursing homes quality are like this, so she plans to get Mr. Bernal into an actual rehab and not a nursing home with rehab. Marley shares that she feels that having signed the HCPOA form with her brother, that she has a legal duty to advocate for him. She shares that she and other caregivers are not compensated for doing the hard job of shouldering the burden and protecting their loved ones from the mistakes and shortcomings that are from the medical providers/system. She is hoping to take legal action on behalf of her brother and the neglectful and bad care that he received. She shared that it was likely helpful that he had this weekend to adjust to his new vision loss. That although he hasn't been able to see in the last 3-4 weeks, this past Thursday he cried out and she believes he did so in realization that he cannot see. She put a mirror in front of him to help assess how much he can see, and he realized he cannot. She believes he needed time to adjust to this. She also worries that if he is delirious before the surgery, then it won't be a good thing to proceed. I thanked her for sharing these hardships with me and will return on Wednesday to continue to provide support for Mr. Bernal and his family.  - 8/11/23: Meeting with Marley Bernal (sister), Dr. Gautam, Dr. Celestin (ophthalmology) and writer (Mac, palliative). I asked Ms. Bernal to share with me about Mr. Bernal's condition and she started back 5 years ago when he was 52 years old when he suffered a stroke at work (related to his history of non-treated DM). His health deteriorated over the years and notes that he has been traumatized by his decline and prior hospitalizations. She shared how  upsetting the hospitalization at [facility] was, noting how nothing was being done for him and alternatively was always being pressured to proceed with surgery. She recalls the day they signed papers to leave AMA, and being told that if she didn't agree with surgery, then he needed to sign out AMA and needed to leave in 15 minutes. Allowed for Ms. Bernal to express her frustrations and disappointment about the poor care he received and the poor communication she felt she received during this time. She wishes to advocate for him and protect him, knowing that the surgery and enucleation will be something he has to live with. Her desire was to preserve his eyes, but if left with no other options, understands that enucleation would need to occur. She eventually expresses gratefulness for Dr. Celestin for going out of her way to communicate and help her brother. She was agreeable when I asked if I could follow along.

## 2023-09-05 NOTE — PLAN OF CARE
Update at 12:00 Noon  CM back to patient's room. Discussed plan with patient. He would rather go home than into NH. Explained process. Patient's sister not in room but left note with NH she requested referral go to ([facility] in [place]).     CM attempted to see sister Neelam, not in patient's room. Will try back later.    Massiel Shukla RN  Weekend  - [facility]  Spectralink: [phone]

## 2023-09-05 NOTE — PROGRESS NOTES
Pharmacokinetic Assessment Follow Up: IV Vancomycin    Vancomycin serum concentration assessment(s):    The random level was drawn correctly and can be used to guide therapy at this time. The measurement is above the desired definitive target range of 15 to 20 mcg/mL.    Vancomycin Regimen Plan:    Patient is on HD MWF and had HD on 9/4  Will hold vancomycin today  Repeat random level with AM labs on Wednesday    Drug levels (last 3 results):  Recent Labs   Lab Result Units 09/03/23  0719 09/04/23  1139 09/05/23  0742   Vancomycin, Random ug/mL 21.7 16.7 23.6       Pharmacy will continue to follow and monitor vancomycin.    Please contact pharmacy at extension [phone] for questions regarding this assessment.    Thank you for the consult,   Elsa Solorzano, PharmD    Patient brief summary:  Immanuel Bernal is a 57 y.o. male initiated on antimicrobial therapy with IV Vancomycin for treatment of  Endophthalmitis      Drug Allergies:   Review of patient's allergies indicates:   Allergen Reactions    Morphine Rash    Amiodarone analogues Itching     Other reaction(s): Unknown       Actual Body Weight:   62.8 kg    Renal Function:   Estimated Creatinine Clearance: 16.4 mL/min (A) (based on SCr of 4.3 mg/dL (H)).,     Dialysis Method (if applicable):  intermittent HD MWF schedule    CBC (last 72 hours):  Recent Labs   Lab Result Units 09/03/23  0719 09/04/23  0404 09/05/23  0742   WBC K/uL 5.32 4.42 6.62   Hemoglobin g/dL 10.8* 10.1* 10.2*   Hematocrit % 33.7* 32.5* 33.5*   Platelets K/uL 152 174 170         Metabolic Panel (last 72 hours):  Recent Labs   Lab Result Units 09/03/23  0719 09/04/23  0404 09/05/23  0742   Sodium mmol/L 130* 132* 129*   Potassium mmol/L 4.8 4.3 5.1   Chloride mmol/L 99 97 98   CO2 mmol/L 21* 25 21*   Glucose mg/dL 117* 87 91   BUN mg/dL 54* 49* 65*   Creatinine mg/dL 4.5* 3.5* 4.3*   Albumin g/dL 2.0* 2.1* 2.2*   Total Bilirubin mg/dL 0.2 0.2 0.2   Alkaline Phosphatase U/L 120 131 132   AST  U/L 12 12 14   ALT U/L <5* <5* <5*   Magnesium mg/dL  --   --  2.1   Phosphorus mg/dL  --   --  2.6*         Vancomycin Administrations:  vancomycin given in the last 96 hours                     vancomycin (VANCOCIN) 500 mg in dextrose 5 % in water (D5W) 100 mL IVPB (MB+) (mg) 500 mg New Bag 09/01/23 1149                    Microbiologic Results:  Microbiology Results (last 7 days)       Procedure Component Value Units Date/Time    Fungus culture [[id number]] Collected: 08/16/23 1647    Order Status: Completed Specimen: Abscess from Eyelid, Left Updated: 09/04/23 1250     Fungus (Mycology) Culture Culture in progress      No fungus isolated after 2 weeks    Fungus culture [[id number]] Collected: 08/16/23 1632    Order Status: Completed Specimen: Wound from Cornea, Left Updated: 09/04/23 1250     Fungus (Mycology) Culture Culture in progress      No fungus isolated after 2 weeks    Fungus culture [[id number]] Collected: 08/16/23 1629    Order Status: Completed Specimen: Wound from Eyelid, Left Updated: 09/04/23 1250     Fungus (Mycology) Culture Culture in progress      No fungus isolated after 2 weeks    Fungus culture [[id number]] Collected: 08/16/23 1636    Order Status: Completed Specimen: Abscess from Eyelid, Left Updated: 09/04/23 1250     Fungus (Mycology) Culture Culture in progress      No fungus isolated after 2 weeks    Narrative:      Sclera abscess    Culture, Anaerobe [[id number]] Collected: 08/30/23 1724    Order Status: Completed Specimen: Wound from Cornea, Left Updated: 09/04/23 0954     Anaerobic Culture Culture in progress    Narrative:      LEFT INTRAOCULAR SPACE    Aerobic culture [[id number]] Collected: 08/30/23 1724    Order Status: Completed Specimen: Wound from Cornea, Left Updated: 09/02/23 1304     Aerobic Bacterial Culture No growth    Narrative:      LEFT INTRAOCULAR SPACE    CSF culture [[id number]]  (Abnormal) Collected: 08/26/23 1315    Order Status: Completed Specimen: CSF (Spinal Fluid)  from CSF Tap, Tube 3 Updated: 09/01/23 1434     CSF CULTURE Results called to and read back by:Kashif Zhang RN 08/30/2023  07:44      PROPIONIBACTERIUM SPECIES  From broth only       Gram Stain Result Cytospin indicates:      No WBC's      No organisms seen    AFB Culture & Smear [[id number]] Collected: 08/26/23 1315    Order Status: Completed Specimen: CSF (Spinal Fluid) from CSF Tap, Tube 3 Updated: 08/31/23 0854     AFB Culture & Smear Culture in progress     AFB CULTURE STAIN No acid fast bacilli seen.

## 2023-09-05 NOTE — NURSING
Patient vomited several times today, tube feedings were held and no residual was found.  PRNs given, patient later stated the nausea has gone away,  will resume tube feed when delivered.  Sister at the bedside, bed alarm on with call light in reach.  No other complaints at this time.

## 2023-09-05 NOTE — ASSESSMENT & PLAN NOTE
- Previously cleared by SLP for a regular diet without restrictions  - Also frequently hypoglycemic due to malnutrition.  - Continuing tube feeds for now in addition to oral diet.

## 2023-09-05 NOTE — SUBJECTIVE & OBJECTIVE
Interval History: Patient seen and examined. Family member at bedside. Underwent iHD yesterday with 2 liters UF. Afebrile with pulse ranging from 80-60s bpm. Systolic blood pressures ranging from 130-110s mmHg. He is saturating +95% on room air. Roughly 100 mL documented UOP with two unmeasured voids in the last 24 hours.    Review of patient's allergies indicates:   Allergen Reactions    Morphine Rash    Amiodarone analogues Itching     Other reaction(s): Unknown     Current Facility-Administered Medications   Medication Frequency    0.9%  NaCl infusion (for blood administration) Q24H PRN    0.9%  NaCl infusion (for blood administration) Q24H PRN    0.9%  NaCl infusion PRN    0.9%  NaCl infusion Once    0.9%  NaCl infusion Once    0.9%  NaCl infusion Once    acetaminophen tablet 1,000 mg TID    albuterol-ipratropium 2.5 mg-0.5 mg/3 mL nebulizer solution 3 mL Q6H WAKE    amLODIPine tablet 5 mg Daily    atorvastatin tablet 40 mg Daily    calcitRIOL capsule 0.25 mcg Daily    carvediloL tablet 6.25 mg BID    dextrose 10% bolus 125 mL 125 mL PRN    dextrose 10% bolus 250 mL 250 mL PRN    epoetin thee injection 3,000 Units Every Mon, Wed, Fri    fluconazole 40 mg/ml suspension 400 mg Daily    glucagon (human recombinant) injection 1 mg PRN    glucose chewable tablet 16 g PRN    glucose chewable tablet 24 g PRN    heparin (porcine) injection 1,000 Units PRN    heparin (porcine) injection 5,000 Units Q8H    hydrALAZINE 10 mg 2 tablets, hydrALAZINE 25 mg 2 tablets, isorsorbide dinitrate 20 mg  2 tablets combination TID    hydrALAZINE injection 10 mg Q6H PRN    Lactobacillus rhamnosus GG capsule 1 capsule BID    LIDOcaine HCl 2% urojet PRN    meclizine tablet 12.5 mg TID PRN    melatonin tablet 6 mg Nightly    meropenem (MERREM) 500 mg in sodium chloride 0.9 % 100 mL IVPB (MB+) Q12H    multivitamin tablet Daily    naloxone 0.4 mg/mL injection 0.02 mg PRN    OLANZapine injection 5 mg Nightly PRN    ondansetron disintegrating  tablet 4 mg Q8H    pantoprazole suspension 40 mg BID    polyethylene glycol packet 17 g BID    prochlorperazine injection Soln 10 mg Q6H PRN    senna-docusate 8.6-50 mg per tablet 1 tablet BID    sevelamer carbonate pwpk 0.8 g TID WM    sodium chloride 0.9% bolus 250 mL 250 mL PRN    sodium chloride 0.9% bolus 250 mL 250 mL PRN    sodium chloride 0.9% flush 10 mL Q12H PRN    sodium chloride 0.9% flush 10 mL PRN    traMADoL tablet 50 mg TID PRN    valsartan tablet 160 mg BID    vancomycin - pharmacy to dose pharmacy to manage frequency    white petrolatum 41 % ointment PRN       Objective:     Vital Signs (Most Recent):  Temp: 98.8 °F (37.1 °C) (09/05/23 0430)  Pulse: 72 (09/05/23 0430)  Resp: 14 (09/05/23 0430)  BP: 133/78 (09/05/23 0430)  SpO2: 95 % (09/05/23 0430) Vital Signs (24h Range):  Temp:  [97.4 °F (36.3 °C)-98.9 °F (37.2 °C)] 98.8 °F (37.1 °C)  Pulse:  [68-80] 72  Resp:  [10-20] 14  SpO2:  [95 %-98 %] 95 %  BP: (119-145)/(67-80) 133/78     Weight: 62.8 kg (138 lb 7.2 oz) (08/31/23 1146)  Body mass index is 19.87 kg/m².  Body surface area is 1.76 meters squared.    I/O last 3 completed shifts:  In: 910 [P.O.:60; Other:250; NG/GT:600]  Out: 2640 [Urine:140; Other:2500]     Physical Exam  Vitals and nursing note reviewed.   Constitutional:       General: He is awake. He is not in acute distress.     Appearance: He is cachectic. He is ill-appearing. He is not diaphoretic.      Comments: Muscle wasting/atrophy noted.   HENT:      Head: Normocephalic and atraumatic.      Right Ear: External ear normal.      Left Ear: External ear normal.      Nose: Nose normal.      Mouth/Throat:      Mouth: Mucous membranes are moist.      Pharynx: Oropharynx is clear. No oropharyngeal exudate or posterior oropharyngeal erythema.   Eyes:      Comments: Left eye currently sutured closed.   Cardiovascular:      Rate and Rhythm: Normal rate.      Heart sounds: No murmur heard.     No friction rub. No gallop.   Pulmonary:       Effort: Pulmonary effort is normal. No respiratory distress.      Breath sounds: No wheezing, rhonchi or rales.   Chest:      Comments: Tunneled HD catheter to right chest wall.  Abdominal:      General: Bowel sounds are normal. There is no distension.      Palpations: Abdomen is soft.      Tenderness: There is no abdominal tenderness.      Comments: PEG in place.   Musculoskeletal:      Cervical back: Neck supple.      Right lower leg: No edema.      Left lower leg: No edema.   Skin:     General: Skin is warm and dry.      Coloration: Skin is not jaundiced.      Comments: Stable noted to right upper extremity from recent surgery. Incision appears intact.   Neurological:      Mental Status: He is alert.      Cranial Nerves: No cranial nerve deficit.   Psychiatric:         Mood and Affect: Mood normal.         Behavior: Behavior normal.          Significant Labs:  BMP:   Recent Labs   Lab 08/30/23  0612 08/31/23  0459 09/04/23  0404   GLU 77   < > 87   *   < > 132*   K 4.6   < > 4.3      < > 97   CO2 19*   < > 25   BUN 43*   < > 49*   CREATININE 5.1*   < > 3.5*   CALCIUM 9.4   < > 8.0*   MG 2.3  --   --     < > = values in this interval not displayed.       CBC:   Recent Labs   Lab 09/04/23  0404   WBC 4.42   RBC 3.26*   HGB 10.1*   HCT 32.5*      *   MCH 31.0   MCHC 31.1*       CMP:   Recent Labs   Lab 09/04/23  0404   GLU 87   CALCIUM 8.0*   ALBUMIN 2.1*   PROT 6.0   *   K 4.3   CO2 25   CL 97   BUN 49*   CREATININE 3.5*   ALKPHOS 131   ALT <5*   AST 12   BILITOT 0.2       LFTs:   Recent Labs   Lab 09/04/23  0404   ALT <5*   AST 12   ALKPHOS 131   BILITOT 0.2   PROT 6.0   ALBUMIN 2.1*       Microbiology Results (last 7 days)       Procedure Component Value Units Date/Time    Fungus culture [[id number]] Collected: 08/16/23 1647    Order Status: Completed Specimen: Abscess from Eyelid, Left Updated: 09/04/23 1250     Fungus (Mycology) Culture Culture in progress      No fungus isolated  after 2 weeks    Fungus culture [[id number]] Collected: 08/16/23 1632    Order Status: Completed Specimen: Wound from Cornea, Left Updated: 09/04/23 1250     Fungus (Mycology) Culture Culture in progress      No fungus isolated after 2 weeks    Fungus culture [[id number]] Collected: 08/16/23 1629    Order Status: Completed Specimen: Wound from Eyelid, Left Updated: 09/04/23 1250     Fungus (Mycology) Culture Culture in progress      No fungus isolated after 2 weeks    Fungus culture [[id number]] Collected: 08/16/23 1636    Order Status: Completed Specimen: Abscess from Eyelid, Left Updated: 09/04/23 1250     Fungus (Mycology) Culture Culture in progress      No fungus isolated after 2 weeks    Narrative:      Sclera abscess    Culture, Anaerobe [[id number]] Collected: 08/30/23 1724    Order Status: Completed Specimen: Wound from Cornea, Left Updated: 09/04/23 0954     Anaerobic Culture Culture in progress    Narrative:      LEFT INTRAOCULAR SPACE    Aerobic culture [[id number]] Collected: 08/30/23 1724    Order Status: Completed Specimen: Wound from Cornea, Left Updated: 09/02/23 1304     Aerobic Bacterial Culture No growth    Narrative:      LEFT INTRAOCULAR SPACE    CSF culture [[id number]]  (Abnormal) Collected: 08/26/23 1315    Order Status: Completed Specimen: CSF (Spinal Fluid) from CSF Tap, Tube 3 Updated: 09/01/23 1434     CSF CULTURE Results called to and read back by:Kashif Zhang RN 08/30/2023  07:44      PROPIONIBACTERIUM SPECIES  From broth only       Gram Stain Result Cytospin indicates:      No WBC's      No organisms seen    AFB Culture & Smear [[id number]] Collected: 08/26/23 1315    Order Status: Completed Specimen: CSF (Spinal Fluid) from CSF Tap, Tube 3 Updated: 08/31/23 0854     AFB Culture & Smear Culture in progress     AFB CULTURE STAIN No acid fast bacilli seen.          Specimen (24h ago, onward)      None          Significant Imaging:  I have reviewed all imagining in the last 24 hours.
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
## 2023-09-05 NOTE — SUBJECTIVE & OBJECTIVE
Interval History:  Patient feeling well this morning. BP has been stable. Penile pain still present but there continues to be no skin changes present nor any discharge. Adding on oxybutynin for possible bladder spasm and relief of discomfort. Patient reporting some nausea/vomiting, and tube feeds were adjusted to slow the rate of administration.     Pending senior care NH placement    Review of Systems   Constitutional: Negative.  Negative for fever.   HENT: Negative.     Eyes:  Negative for pain and discharge.   Respiratory: Negative.     Cardiovascular: Negative.    Gastrointestinal:  Positive for nausea and vomiting.   Genitourinary:  Positive for urgency. Negative for dysuria.        Positive for burning pain to head of penis   Musculoskeletal: Negative.    Skin: Negative.    Neurological: Negative.    Psychiatric/Behavioral: Negative.  Negative for agitation, behavioral problems and confusion.      Objective:     Vital Signs (Most Recent):  Temp: 98.8 °F (37.1 °C) (09/05/23 0430)  Pulse: 69 (09/05/23 1356)  Resp: 20 (09/05/23 1433)  BP: 127/74 (09/05/23 1200)  SpO2: 97 % (09/05/23 1356) Vital Signs (24h Range):  Temp:  [97.4 °F (36.3 °C)-98.9 °F (37.2 °C)] 98.8 °F (37.1 °C)  Pulse:  [68-80] 69  Resp:  [10-37] 20  SpO2:  [95 %-98 %] 97 %  BP: (119-145)/(67-88) 127/74     Weight: 62.8 kg (138 lb 7.2 oz)  Body mass index is 19.87 kg/m².    Intake/Output Summary (Last 24 hours) at 9/5/2023 1446  Last data filed at 9/5/2023 0519  Gross per 24 hour   Intake --   Output 100 ml   Net -100 ml         Physical Exam  Constitutional:       Appearance: Normal appearance.   HENT:      Head: Normocephalic.      Mouth/Throat:      Mouth: Mucous membranes are dry.   Eyes:      Comments: Right eye clear and pupil round and reactive;    Cardiovascular:      Rate and Rhythm: Normal rate and regular rhythm.      Pulses: Normal pulses.      Heart sounds: Normal heart sounds.   Pulmonary:      Effort: Pulmonary effort is normal. No  respiratory distress.      Breath sounds: Normal breath sounds.   Abdominal:      General: Abdomen is flat.      Palpations: Abdomen is soft.   Genitourinary:     Penis: Normal.       Comments: No penile abnormalities  L groin with hernia present, nontender and fully reducible  Musculoskeletal:      Right lower leg: No edema.      Left lower leg: No edema.   Skin:     General: Skin is warm and dry.   Neurological:      Mental Status: He is alert and oriented to person, place, and time.             Significant Labs: All pertinent labs within the past 24 hours have been reviewed.  CBC:   Recent Labs   Lab 09/04/23  0404 09/05/23  0742   WBC 4.42 6.62   HGB 10.1* 10.2*   HCT 32.5* 33.5*    170     CMP:   Recent Labs   Lab 09/04/23  0404 09/05/23  0742   * 129*   K 4.3 5.1   CL 97 98   CO2 25 21*   GLU 87 91   BUN 49* 65*   CREATININE 3.5* 4.3*   CALCIUM 8.0* 8.8   PROT 6.0 6.1   ALBUMIN 2.1* 2.2*   BILITOT 0.2 0.2   ALKPHOS 131 132   AST 12 14   ALT <5* <5*   ANIONGAP 10 10       Significant Imaging: I have reviewed all pertinent imaging results/findings within the past 24 hours.

## 2023-09-05 NOTE — ASSESSMENT & PLAN NOTE
- management per primary   - currently on Norvasc 5 mg daily, Coreg 6.25 mg BID, hydralazine 70 mg BID, isosorbide dinitrate 40 mg BID and valsartan 160 mg BID

## 2023-09-05 NOTE — ASSESSMENT & PLAN NOTE
Outpatient HD Information:  -Outpatient HD unit: C   -HD tx days: MWF   -HD tx time: 210min  -HD access: R IJ TDC   -HD modality: iHD   -Residual urine: ?    Plan/Recommendations:  - plan for iHD tomorrow per schedule, every Monday, Wednesday & Friday  - can continue calcitriol 0.25 mcg daily and Renvela 800 mg TIDWM  - renal diet/tube feeds when not NPO  - strict I/O's and daily weights  - daily renal function panels and magnesium levels  - renally all dose medications to eGFR  - avoid gadolinium, fleets, phos-based laxatives, NSAIDs, etc.

## 2023-09-05 NOTE — ASSESSMENT & PLAN NOTE
Patient started to have melena on 8/22, and on 8/24, underwent EGD.   Found to have a large oozing duodenal ulcer with adherent clot. 2 clips placed however ulcer could not be addressed completely. Also had 2 more nonbleeding ulcers in the duodenal bulb and 2nd part of the duodenum.  3U PRBC transfused so far this admission  Per GI, no further endoscopic intervention warranted  Due to ongoing melena, ordered CTA GIB protocol - negative    Problem now stable    - Trend CBC. Transfuse for Hb >7, unless otherwise indicated  - Hold all NSAIDs and anticoagulants, unless contraindicated - patient had bleeding even with DVT prophylaxis   - PO pantoprazole 40mg BID   - Correct any coagulopathy with platelets and FFP for goal of platelets >50K and INR <2.0

## 2023-09-05 NOTE — PT/OT/SLP PROGRESS
Physical Therapy Treatment    Patient Name:  Immanuel Bernal   MRN:  [id number]  Admitting Diagnosis:  Endophthalmitis   Recent Surgery: Procedure(s) (LRB):  ENUCLEATION, EYE (Left)  BLEPHARORRHAPHY (Left)  INJECTION, MEDICATION, RETROBULBAR (Left) 6 Days Post-Op  Admit Date: 8/9/2023  Length of Stay: 27 days    Recommendations:     Discharge Recommendations:  rehabilitation facility   Discharge Equipment Recommendations: to be determined by next level of care   Barriers to discharge: Evolving Clinical Presentation    Assessment:     Immanuel Bernal is a 57 y.o. male admitted with a medical diagnosis of Endophthalmitis.  He presents with the following impairments/functional limitations:  weakness, impaired self care skills, impaired functional mobility, impaired endurance, gait instability, decreased lower extremity function, impaired balance, decreased safety awareness.     Pt agreeable to therapy after given some time to rest, reports dizziness throughout treatment but is cooperative. Pt sits EOB from supine with mod A, CGA for EOB balance, stands from EOB to RW with mod A, ambulates few steps with RW and mod A, back into supine with min A and scoots to HOB with max A. Continue to progress functional mobility and increase ambulation distance as able to facilitate return to PLOF.    Rehab Prognosis: Fair; patient would benefit from acute skilled PT services to address these deficits and reach maximum level of function.    Recent Surgery: Procedure(s) (LRB):  ENUCLEATION, EYE (Left)  BLEPHARORRHAPHY (Left)  INJECTION, MEDICATION, RETROBULBAR (Left) 6 Days Post-Op    Treatment Tolerated: Fairly Well    Highest level of mobility achieved this visit: few steps forward and backward with RW and mod A    Activity with RN/PCT: transfer with 1 person assist    Plan:     During this hospitalization, patient to be seen 3 x/week to address the identified rehab impairments via gait training, therapeutic activities,  "therapeutic exercises, neuromuscular re-education and progress toward the following goals:    Plan of Care Expires:  09/14/23    Subjective     RN Emery notified prior to session. Pt's sister present upon PT entrance into room.    Chief Complaint: fatigue  Patient/Family Comments/goals: "I feel better I'm just very sleepy"  Pain/Comfort:  Pain Rating 1: 0/10      Objective:     Additional staff present: none    Patient found HOB elevated with: telemetry, PEG Tube, central line, pulse ox (continuous), blood pressure cuff, PICC line   Cognition:   Alert and Cooperative  Command following: Follows two-step verbal commands  Fluency: clear/fluent  General Precautions: Standard, Cardiac fall, vision impaired   Orthopedic Precautions:N/A   Braces: N/A   Body mass index is 19.87 kg/m².  Oxygen Device: Room Air    Vitals: /74   Pulse 71   Temp 97.9 °F (36.6 °C) (Oral)   Resp (!) 33   Ht 5' 10" (1.778 m)   Wt 62.8 kg (138 lb 7.2 oz)   SpO2 97%   BMI 19.87 kg/m²     Outcome Measures:  AM-PAC 6 CLICK MOBILITY  Turning over in bed (including adjusting bedclothes, sheets and blankets)?: 2  Sitting down on and standing up from a chair with arms (e.g., wheelchair, bedside commode, etc.): 2  Moving from lying on back to sitting on the side of the bed?: 2  Moving to and from a bed to a chair (including a wheelchair)?: 2  Need to walk in hospital room?: 2  Climbing 3-5 steps with a railing?: 1  Basic Mobility Total Score: 11     Functional Mobility:    Bed Mobility:   Rolling/Turning to Left: minimum assistance  Rolling/Turning to Right: minimum assistance  Scooting to HOB via supine bridge: maximal assistance  Supine to Sit: moderate assistance; from R side of bed  Scooting anteriorly to EOB to have both feet planted on floor: minimum assistance  Sit to Supine: minimum assistance; to L side of bed    Sitting Balance at Edge of Bed:  Assistance Level Required: Stand-by Assistance  Time: 15 min  Postural deviations noted: " rounded shoulders and forward head  Encouraged: upright sitting with eyes open    Transfers:   Sit <> Stand Transfer: moderate assistance with rolling walker   Stand <> Sit Transfer: moderate assistance with rolling walker   [id number] from EOB    Standing Balance:  Assistance Level Required: Moderate Assistance  Patient used: rolling walker   Time: 3 min x 2  Postural deviations noted: forward head, hip flexion  Encouraged: upright stance      Gait:  Patient ambulated: 3 Lateral steps to R, 2 fwd back steps then 6 fwd and back steps   Patient required: moderate assist  Patient used:  rolling walker   Gait Pattern observed: swing to  Gait Deviation(s): unsteady gait, narrow base of support, and flexed posture  Impairments due to: impaired balance, decreased strength, decreased endurance, impaired postural control, and impaired motor control  all lines remained intact throughout ambulation trial  Gait belt utilized    Therapeutic Exercises:   Static and dynamic sitting balance at EOB  Postural correction  Sitting tolerance  Static and dynamic standing balance at EOB  Postural correction  Standing tolerance  Foot placement    Education:  Time provided for education, counseling and discussion of health disposition in regards to patient's current status  All questions answered within PT scope of practice and to patient's satisfaction  PT role in POC to address current functional deficits  Pt educated on proper body mechanics, safety techniques, and energy conservation with PT facilitation and cueing throughout session    Patient left supine with all lines intact, call button in reach, and sister present.    GOALS:   Multidisciplinary Problems       Physical Therapy Goals          Problem: Physical Therapy    Goal Priority Disciplines Outcome Goal Variances Interventions   Physical Therapy Goal     PT, PT/OT Ongoing, Progressing     Description: Goals to be met by: 9/21/23     Patient will increase functional independence  with mobility by performin. Supine to sit with MInimal Assistance  2. Sit to stand transfer with Minimal Assistance using LRAD  3. Gait  x 10 feet with Minimal Assistance using LRAD.                        Time Tracking:     PT Received On: 23  PT Start Time: 1600     PT Stop Time: 1623  PT Total Time (min): 23 min     Billable Minutes:   Therapeutic Activity 15 min and Neuromuscular Re-education 8 min    Treatment Type: Treatment  PT/PTA: PT       Billy George, PT, DPT  2023

## 2023-09-05 NOTE — SUBJECTIVE & OBJECTIVE
Interval History: S/p completion of evisceration of left eye. Has nausea and dysuria for the last few days. No tenderness in actual penile area, but condom catheter is uncomfortable. Sister at bedside    Past Medical History:   Diagnosis Date    Bilateral retinal detachment 7/18/2023    BPH (benign prostatic hyperplasia)     Cataract     CHF (congestive heart failure)     CKD (chronic kidney disease) stage 3, GFR 30-59 ml/min     Diabetes mellitus, type 2     Hypertension     KENIA (obstructive sleep apnea)     Pancreatitis     Persistent proteinuria 11/12/2020    2 g proteinuria noted Resumed ACE Lower BP systolic to less than 130     PTSD (post-traumatic stress disorder)     Stroke        Past Surgical History:   Procedure Laterality Date    CATARACT EXTRACTION Bilateral     ENUCLEATION Left 8/30/2023    Procedure: ENUCLEATION, EYE;  Surgeon: Vicki Stewart MD;  Location: [address];  Service: Ophthalmology;  Laterality: Left;    ESOPHAGOGASTRODUODENOSCOPY N/A 8/23/2023    Procedure: EGD (ESOPHAGOGASTRODUODENOSCOPY);  Surgeon: Bharath Ya MD;  [address]  Service: Endoscopy;  Laterality: N/A;    EVISCERATION OF OCULAR CONTENTS Left 8/16/2023    Procedure: EVISCERATION, OCULAR CONTENTS;  Surgeon: Vicki Stewart MD;  Location: [address];  Service: Ophthalmology;  Laterality: Left;    MAGNETIC RESONANCE IMAGING N/A 8/26/2023    Procedure: MRI (Magnetic Resonance Imagine);  Surgeon: Mckenzie De Jesus;  Location: [facility];  Service: Anesthesiology;  Laterality: N/A;    RETROBULBAR INJECTION OF MEDICATION Left 8/16/2023    Procedure: INJECTION, MEDICATION, RETROBULBAR;  Surgeon: Vicki Stewart MD;  Location: [address];  Service: Ophthalmology;  Laterality: Left;    RETROBULBAR INJECTION OF MEDICATION Left 8/30/2023    Procedure: INJECTION, MEDICATION, RETROBULBAR;  Surgeon: Vicki Stewart MD;  Location: [address];  Service: Ophthalmology;  Laterality: Left;    TARSORRHAPHY  Left 8/16/2023    Procedure: BLEPHARORRHAPHY;  Surgeon: Vicki Stewart MD;  [facility];  Service: Ophthalmology;  Laterality: Left;    TARSORRHAPHY Left 8/30/2023    Procedure: BLEPHARORRHAPHY;  Surgeon: Vicki Stewart MD;  [facility];  Service: Ophthalmology;  Laterality: Left;       Review of patient's allergies indicates:   Allergen Reactions    Morphine Rash    Amiodarone analogues Itching     Other reaction(s): Unknown       Medications:  Continuous Infusions:  Scheduled Meds:   sodium chloride 0.9%   Intravenous Once    sodium chloride 0.9%   Intravenous Once    sodium chloride 0.9%   Intravenous Once    acetaminophen  1,000 mg Per G Tube TID    albuterol-ipratropium  3 mL Nebulization Q6H WAKE    amLODIPine  5 mg Per G Tube Daily    atorvastatin  40 mg Per G Tube Daily    calcitRIOL  0.25 mcg Per G Tube Daily    carvediloL  6.25 mg Per G Tube BID    epoetin thee (PROCRIT) injection  3,000 Units Intravenous Every Mon, Wed, Fri    fluconazole 40 mg/ml  400 mg Per G Tube Daily    heparin (porcine)  5,000 Units Subcutaneous Q8H    hydrALAZINE 10 mg 2 tablets, hydrALAZINE 25 mg 2 tablets, isorsorbide dinitrate 20 mg  2 tablets combination   Per G Tube TID    Lactobacillus rhamnosus GG  1 capsule Per G Tube BID    melatonin  6 mg Oral Nightly    meropenem (MERREM) IVPB  500 mg Intravenous Q12H    multivitamin  1 tablet Per G Tube Daily    ondansetron  4 mg Oral Q8H    oxybutynin  5 mg Oral Daily    pantoprazole  40 mg Per G Tube BID    polyethylene glycol  17 g Per G Tube BID    senna-docusate 8.6-50 mg  1 tablet Per G Tube BID    sevelamer carbonate  0.8 g Per G Tube TID WM    valsartan  160 mg Oral BID     PRN Meds:0.9%  NaCl infusion (for blood administration), 0.9%  NaCl infusion (for blood administration), sodium chloride 0.9%, dextrose 10%, dextrose 10%, glucagon (human recombinant), glucose, glucose, heparin (porcine), hydrALAZINE, LIDOcaine HCl 2%, meclizine, naloxone,  OLANZapine, prochlorperazine, sodium chloride 0.9%, sodium chloride 0.9%, sodium chloride 0.9%, sodium chloride 0.9%, traMADoL, Pharmacy to dose Vancomycin consult **AND** vancomycin - pharmacy to dose, white petrolatum    Family History       Problem Relation (Age of Onset)    Diabetes Father, Sister    Heart disease Father    Hyperlipidemia Brother    Kidney disease Father    Stroke Mother          Tobacco Use    Smoking status: Former     Types: Cigarettes, Cigars    Smokeless tobacco: Never   Substance and Sexual Activity    Alcohol use: Not Currently    Drug use: Not Currently    Sexual activity: Not on file       Review of Systems   Unable to perform ROS: Other   Gastrointestinal:  Positive for nausea.   Genitourinary:  Positive for dysuria.     Objective:     Vital Signs (Most Recent):  Temp: 98.8 °F (37.1 °C) (09/05/23 0430)  Pulse: 75 (09/05/23 0815)  Resp: 20 (09/05/23 0900)  BP: (!) 145/88 (09/05/23 0800)  SpO2: 98 % (09/05/23 0815) Vital Signs (24h Range):  Temp:  [97.4 °F (36.3 °C)-98.9 °F (37.2 °C)] 98.8 °F (37.1 °C)  Pulse:  [68-80] 75  Resp:  [10-20] 20  SpO2:  [95 %-98 %] 98 %  BP: (119-145)/(67-88) 145/88     Weight: 62.8 kg (138 lb 7.2 oz)  Body mass index is 19.87 kg/m².       Physical Exam  Constitutional:       General: He is not in acute distress.     Appearance: He is ill-appearing.   HENT:      Head: Normocephalic and atraumatic.      Right Ear: External ear normal.      Left Ear: External ear normal.      Nose: Nose normal.      Mouth/Throat:      Mouth: Mucous membranes are dry.   Eyes:      Comments: Left eye closed   Cardiovascular:      Rate and Rhythm: Normal rate.   Pulmonary:      Effort: Pulmonary effort is normal.      Breath sounds: Normal breath sounds.   Abdominal:      General: Bowel sounds are normal.      Palpations: Abdomen is soft.      Comments: G tube   Musculoskeletal:         General: No swelling.   Skin:     General: Skin is dry.   Neurological:      Mental Status: He  is alert.      Comments: Oriented to self, place            Review of Symptoms      Symptom Assessment (ESAS 0-10 Scale)  Pain:  0  Dyspnea:  0  Anxiety:  0  Nausea:  0  Depression:  0  Anorexia:  0  Fatigue:  0  Insomnia:  0  Restlessness:  0  Agitation:  0 due to Other         Pain Assessment in Advanced Demential Scale (PAINAD)   Breathing - Independent of vocalization:  0  Negative vocalization:  0  Facial expression:  0  Body language:  0  Consolability:  0  Total:  0    Psychosocial/Cultural:   See Palliative Psychosocial Note: No  No longer working since stroke. Has sister Marley. Has adult children  **Primary  to Follow**  Palliative Care  Consult: No        Advance Care Planning   Advance Directives:   Living Will: No    LaPOST: No    Do Not Resuscitate Status: No    Medical Power of : Yes    Agent's Name:  Marley Bernal    Decision Making:  Family answered questions and Patient unable to communicate due to disease severity/cognitive impairment  Goals of Care: What is most important right now is to focus on extending life as long as possible, even it it means sacrificing quality. Accordingly, we have decided that the best plan to meet the patient's goals includes continuing with treatment.         Significant Labs: CBC:   Recent Labs   Lab 09/04/23  0404 09/05/23  0742   WBC 4.42 6.62   HGB 10.1* 10.2*   HCT 32.5* 33.5*    170       CMP:   Recent Labs   Lab 09/04/23  0404 09/05/23  0742   * 129*   K 4.3 5.1   CL 97 98   CO2 25 21*   GLU 87 91   BUN 49* 65*   CREATININE 3.5* 4.3*   CALCIUM 8.0* 8.8   PROT 6.0 6.1   ALBUMIN 2.1* 2.2*   BILITOT 0.2 0.2   ALKPHOS 131 132   AST 12 14   ALT <5* <5*   ANIONGAP 10 10       CBC:   Recent Labs   Lab 09/05/23  0742   WBC 6.62   HGB 10.2*   HCT 33.5*   *          BMP:  Recent Labs   Lab 09/05/23  0742   GLU 91   *   K 5.1   CL 98   CO2 21*   BUN 65*   CREATININE 4.3*   CALCIUM 8.8   MG 2.1        LFT:  Lab Results   Component Value Date    AST 14 09/05/2023    GGT 13 11/09/2020    ALKPHOS 132 09/05/2023    BILITOT 0.2 09/05/2023     Albumin:   Albumin   Date Value Ref Range Status   09/05/2023 2.2 (L) 3.5 - 5.2 g/dL Final     Protein:   Total Protein   Date Value Ref Range Status   09/05/2023 6.1 6.0 - 8.4 g/dL Final     Lactic acid:   Lab Results   Component Value Date    LACTATE 0.6 08/09/2023       Significant Imaging: I have reviewed all pertinent imaging results/findings within the past 24 hours.
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
## 2023-09-05 NOTE — PROGRESS NOTES
[facility] - [facility] ([place] [id number])  Nephrology  Progress Note    Patient Name: Immanuel Bernal  MRN: [id number]  Admission Date: 8/9/2023  Hospital Length of Stay: 27 days  Attending Provider: Porsha Funez MD   Primary Care Physician: Dolly, Primary Doctor  Principal Problem:Endophthalmitis    Subjective:     HPI: HPI obtained per medical record as patient unable to communicate     57-year-old male with a history of CHF, diabetes, hypertension, chronic disability, end-stage renal disease on hemodialysis, PEG placement, bowel obstruction, sleep apnea, TIA, left eye vitreous hemorrhage, stroke, and bowel obstruction with bowel resection admitted to [facility] in [place] July 18 from nursing home with left eye pain and blurred vision.  He was admitted with concern for bilateral endophthalmitis.  Other admit diagnoses included right upper extremity thrombus, end-stage renal disease on hemodialysis, hyperkalemia, sleep apnea, diabetes, and CHF.  He was treated with broad-spectrum antibiotics.  He was seen by Infectious Diseases,, and he was treated with vancomycin, ceftriaxone, and amphotericin.  Blood cultures were positive for Pseudomonas, and amphotericin/vancomycin were stopped.  IV cefepime was continued.  He was seen by Ophthalmology, and he received intravitreal vancomycin and ceftazidime (July 18).  He also had intravitreal tap July 18 that had no yeast or fungal elements observed.  Left eye endophthalmitis did not respond to treatment, and he subsequently developed abscess formation, significant proptosis, and drainage of purulent material from the orbit.  Ophthalmology recommended enucleation.  He was continued on cefepime for pseudomonal and ophthalmitis.  Recommendation was for 6 weeks of treatment to be followed by indefinite fluoroquinolone.  He was seen by Pulmonology during his stay for a right upper lung mass with peripheral nodules.  It was felt that he would need further  investigation of this once his current clinical issues stabilized.  Right upper extremity AV graft was excised during his stay with concern for infection.  A right IJ tunneled line was placed on July 27.  Left eye endophthalmitis continued to worsen, and ophthalmology spoke with patient and family about the need for enucleation.  Despite several conversations, family declined enucleation.  Plans were for transitioned to skilled nursing facility for continued treatment, but family did not want him to go to a skilled nursing facility.  He was subsequently discharged AMA from the hospital there on August 7.  Please see the August 7 Internal Medicine note for further details.     He subsequently presented to [facility] Emergency Department.  He will not go back to [facility] in [place]. He received Zosyn and vancomycin.  ED team at [place] spoke with the patient and his power-of- (sister).  CT of the orbits noted scleral abscess along the lateral aspect of the left globe.  Patient and family are aware and in agreement that the patient needs enucleation of the eye at this time. Referring provider spoke with Oculoplastics at [facility]. Requesting transfer to [facility] for Oculoplastics specialty evaluation of persistent endophthalmitis.  ED provider noted patient is hemodynamically stable.  He does not appear volume overloaded or in respiratory distress.        The patient has a significant previous medical course as listed below  August 3: MRI brain and orbits showed worsening ophthalmitis and inflammatory changes of the left orbital tissues with slight increase in proptosis.  No evidence of intracranial inflammatory process observed.    July 25: Transesophageal echocardiogram had no evidence of endocarditis.  Moderate to severely decreased left ventricular systolic function with EF 30-35%.    July 22:  CT chest showed right upper lobe mass with numerous bilateral  nodularity predominantly in the right with associated mediastinal lymph nodes.  July 21: Blood cultures with Pseudomonas aeruginosa  July 19: MRI brain/orbits with and without contrast had findings suggestive of chronic microvascular ischemic disease of the white matter with remote ischemic event in the left insula/basal ganglia/subependymal white matter/right middle cerebellar peduncle and hemisphere.  Findings consistent with left endophthalmitis.  No abnormal findings observed in the right globe.  July 18:  Blood cultures with Pseudomonas aeruginosa and coag-negative staph    HPI obtained from hospital med's note. Patient with AMS at time of nephrology evaluation and unable to provide HPI or ROS.          Interval History: Patient seen and examined. Family member at bedside. Underwent iHD yesterday with 2 liters UF. Afebrile with pulse ranging from 80-60s bpm. Systolic blood pressures ranging from 130-110s mmHg. He is saturating +95% on room air. Roughly 100 mL documented UOP with two unmeasured voids in the last 24 hours.    Review of patient's allergies indicates:   Allergen Reactions    Morphine Rash    Amiodarone analogues Itching     Other reaction(s): Unknown     Current Facility-Administered Medications   Medication Frequency    0.9%  NaCl infusion (for blood administration) Q24H PRN    0.9%  NaCl infusion (for blood administration) Q24H PRN    0.9%  NaCl infusion PRN    0.9%  NaCl infusion Once    0.9%  NaCl infusion Once    0.9%  NaCl infusion Once    acetaminophen tablet 1,000 mg TID    albuterol-ipratropium 2.5 mg-0.5 mg/3 mL nebulizer solution 3 mL Q6H WAKE    amLODIPine tablet 5 mg Daily    atorvastatin tablet 40 mg Daily    calcitRIOL capsule 0.25 mcg Daily    carvediloL tablet 6.25 mg BID    dextrose 10% bolus 125 mL 125 mL PRN    dextrose 10% bolus 250 mL 250 mL PRN    epoetin thee injection 3,000 Units Every Mon, Wed, Fri    fluconazole 40 mg/ml suspension 400 mg Daily     glucagon (human recombinant) injection 1 mg PRN    glucose chewable tablet 16 g PRN    glucose chewable tablet 24 g PRN    heparin (porcine) injection 1,000 Units PRN    heparin (porcine) injection 5,000 Units Q8H    hydrALAZINE 10 mg 2 tablets, hydrALAZINE 25 mg 2 tablets, isorsorbide dinitrate 20 mg  2 tablets combination TID    hydrALAZINE injection 10 mg Q6H PRN    Lactobacillus rhamnosus GG capsule 1 capsule BID    LIDOcaine HCl 2% urojet PRN    meclizine tablet 12.5 mg TID PRN    melatonin tablet 6 mg Nightly    meropenem (MERREM) 500 mg in sodium chloride 0.9 % 100 mL IVPB (MB+) Q12H    multivitamin tablet Daily    naloxone 0.4 mg/mL injection 0.02 mg PRN    OLANZapine injection 5 mg Nightly PRN    ondansetron disintegrating tablet 4 mg Q8H    pantoprazole suspension 40 mg BID    polyethylene glycol packet 17 g BID    prochlorperazine injection Soln 10 mg Q6H PRN    senna-docusate 8.6-50 mg per tablet 1 tablet BID    sevelamer carbonate pwpk 0.8 g TID WM    sodium chloride 0.9% bolus 250 mL 250 mL PRN    sodium chloride 0.9% bolus 250 mL 250 mL PRN    sodium chloride 0.9% flush 10 mL Q12H PRN    sodium chloride 0.9% flush 10 mL PRN    traMADoL tablet 50 mg TID PRN    valsartan tablet 160 mg BID    vancomycin - pharmacy to dose pharmacy to manage frequency    white petrolatum 41 % ointment PRN       Objective:     Vital Signs (Most Recent):  Temp: 98.8 °F (37.1 °C) (09/05/23 0430)  Pulse: 72 (09/05/23 0430)  Resp: 14 (09/05/23 0430)  BP: 133/78 (09/05/23 0430)  SpO2: 95 % (09/05/23 0430) Vital Signs (24h Range):  Temp:  [97.4 °F (36.3 °C)-98.9 °F (37.2 °C)] 98.8 °F (37.1 °C)  Pulse:  [68-80] 72  Resp:  [10-20] 14  SpO2:  [95 %-98 %] 95 %  BP: (119-145)/(67-80) 133/78     Weight: 62.8 kg (138 lb 7.2 oz) (08/31/23 1146)  Body mass index is 19.87 kg/m².  Body surface area is 1.76 meters squared.    I/O last 3 completed shifts:  In: 910 [P.O.:60; Other:250; NG/GT:600]  Out: 2640  [Urine:140; Other:2500]    Physical Exam  Vitals and nursing note reviewed.   Constitutional:       General: He is awake. He is not in acute distress.     Appearance: He is cachectic. He is ill-appearing. He is not diaphoretic.      Comments: Muscle wasting/atrophy noted.   HENT:      Head: Normocephalic and atraumatic.      Right Ear: External ear normal.      Left Ear: External ear normal.      Nose: Nose normal.      Mouth/Throat:      Mouth: Mucous membranes are moist.      Pharynx: Oropharynx is clear. No oropharyngeal exudate or posterior oropharyngeal erythema.   Eyes:      Comments: Left eye currently sutured closed.   Cardiovascular:      Rate and Rhythm: Normal rate.      Heart sounds: No murmur heard.     No friction rub. No gallop.   Pulmonary:      Effort: Pulmonary effort is normal. No respiratory distress.      Breath sounds: No wheezing, rhonchi or rales.   Chest:      Comments: Tunneled HD catheter to right chest wall.  Abdominal:      General: Bowel sounds are normal. There is no distension.      Palpations: Abdomen is soft.      Tenderness: There is no abdominal tenderness.      Comments: PEG in place.   Musculoskeletal:      Cervical back: Neck supple.      Right lower leg: No edema.      Left lower leg: No edema.   Skin:     General: Skin is warm and dry.      Coloration: Skin is not jaundiced.      Comments: Stable noted to right upper extremity from recent surgery. Incision appears intact.   Neurological:      Mental Status: He is alert.      Cranial Nerves: No cranial nerve deficit.   Psychiatric:         Mood and Affect: Mood normal.         Behavior: Behavior normal.          Significant Labs:  BMP:   Recent Labs   Lab 08/30/23  0612 08/31/23  0459 09/04/23  0404   GLU 77   < > 87   *   < > 132*   K 4.6   < > 4.3      < > 97   CO2 19*   < > 25   BUN 43*   < > 49*   CREATININE 5.1*   < > 3.5*   CALCIUM 9.4   < > 8.0*   MG 2.3  --   --     < > = values in this interval not  displayed.       CBC:   Recent Labs   Lab 09/04/23  0404   WBC 4.42   RBC 3.26*   HGB 10.1*   HCT 32.5*      *   MCH 31.0   MCHC 31.1*       CMP:   Recent Labs   Lab 09/04/23  0404   GLU 87   CALCIUM 8.0*   ALBUMIN 2.1*   PROT 6.0   *   K 4.3   CO2 25   CL 97   BUN 49*   CREATININE 3.5*   ALKPHOS 131   ALT <5*   AST 12   BILITOT 0.2       LFTs:   Recent Labs   Lab 09/04/23  0404   ALT <5*   AST 12   ALKPHOS 131   BILITOT 0.2   PROT 6.0   ALBUMIN 2.1*       Microbiology Results (last 7 days)       Procedure Component Value Units Date/Time    Fungus culture [[id number]] Collected: 08/16/23 1647    Order Status: Completed Specimen: Abscess from Eyelid, Left Updated: 09/04/23 1250     Fungus (Mycology) Culture Culture in progress      No fungus isolated after 2 weeks    Fungus culture [[id number]] Collected: 08/16/23 1632    Order Status: Completed Specimen: Wound from Cornea, Left Updated: 09/04/23 1250     Fungus (Mycology) Culture Culture in progress      No fungus isolated after 2 weeks    Fungus culture [[id number]] Collected: 08/16/23 1629    Order Status: Completed Specimen: Wound from Eyelid, Left Updated: 09/04/23 1250     Fungus (Mycology) Culture Culture in progress      No fungus isolated after 2 weeks    Fungus culture [[id number]] Collected: 08/16/23 1636    Order Status: Completed Specimen: Abscess from Eyelid, Left Updated: 09/04/23 1250     Fungus (Mycology) Culture Culture in progress      No fungus isolated after 2 weeks    Narrative:      Sclera abscess    Culture, Anaerobe [[id number]] Collected: 08/30/23 1724    Order Status: Completed Specimen: Wound from Cornea, Left Updated: 09/04/23 0954     Anaerobic Culture Culture in progress    Narrative:      LEFT INTRAOCULAR SPACE    Aerobic culture [[id number]] Collected: 08/30/23 1724    Order Status: Completed Specimen: Wound from Cornea, Left Updated: 09/02/23 1304     Aerobic Bacterial Culture No growth    Narrative:      LEFT  INTRAOCULAR SPACE    CSF culture [[id number]]  (Abnormal) Collected: 08/26/23 1315    Order Status: Completed Specimen: CSF (Spinal Fluid) from CSF Tap, Tube 3 Updated: 09/01/23 1434     CSF CULTURE Results called to and read back by:Kashif Zhang RN 08/30/2023  07:44      PROPIONIBACTERIUM SPECIES  From broth only       Gram Stain Result Cytospin indicates:      No WBC's      No organisms seen    AFB Culture & Smear [[id number]] Collected: 08/26/23 1315    Order Status: Completed Specimen: CSF (Spinal Fluid) from CSF Tap, Tube 3 Updated: 08/31/23 0854     AFB Culture & Smear Culture in progress     AFB CULTURE STAIN No acid fast bacilli seen.          Specimen (24h ago, onward)      None          Significant Imaging:  I have reviewed all imagining in the last 24 hours.    Assessment/Plan:     Ophtho  * Endophthalmitis  - Ophthalmology consulted and following  - s/p left eye enucleation on 8/30    Cardiac/Vascular  Hypertension  - management per primary   - currently on Norvasc 5 mg daily, Coreg 6.25 mg BID, hydralazine 70 mg BID, isosorbide dinitrate 40 mg BID and valsartan 160 mg BID    Renal/  ESRD (end stage renal disease)  Outpatient HD Information:  -Outpatient HD unit: C   -HD tx days: MWF   -HD tx time: 210min  -HD access: R IJ TDC   -HD modality: iHD   -Residual urine: ?    Plan/Recommendations:  - plan for iHD tomorrow per schedule, every Monday, Wednesday & Friday  - can continue calcitriol 0.25 mcg daily and Renvela 800 mg TIDWM  - renal diet/tube feeds when not NPO  - strict I/O's and daily weights  - daily renal function panels and magnesium levels  - renally all dose medications to eGFR  - avoid gadolinium, fleets, phos-based laxatives, NSAIDs, etc.    ID  AV fistula infection  - secondary to Pseudomonas  - s/p right upper extremity AV graft excision in July  - Infectious Disease consulted and following   - plan for 4-6 weeks total of meropenem, vancomycin and fluconazole    Oncology  Anemia in  ESRD (end-stage renal disease)  - target Hg of 10-12  - continue epogen 3,000 units every Monday, Wednesday and Friday       Thank you for your consult. I will follow-up with patient. Please contact us if you have any additional questions.    Herbert Cedeño MD  Nephrology  [facility] - Intensive Care ([place]

## 2023-09-05 NOTE — ASSESSMENT & PLAN NOTE
- See HPI and hospital course; previous AV graft was removed at OSH on 7/21/2023 and found to be colonized with pseudomonas from prior pseudomonal bacteremia  - Staples still in place  - Will contact [facility] surgeons about timing of staple removal

## 2023-09-05 NOTE — PROGRESS NOTES
[facility] - Intensive Care ([place] [id number])  Palliative Medicine  Progress Note    Patient Name: Immanuel Bernal  MRN: [id number]  Admission Date: 8/9/2023  Hospital Length of Stay: 27 days  Code Status: Full Code   Attending Provider: Porsha Funez MD  Consulting Provider: Kim Watts MD  Primary Care Physician: Dolly, Primary Doctor  Principal Problem:Endophthalmitis    Patient information was obtained from patient, relative(s) and primary team.      Assessment/Plan:     Palliative Care  Encounter for palliative care  Immanuel Bernal is a 57-year-old man with a history of ESRD on HD, HFrEF (EF 30-35%), DM2, HTN, bowel obstruction s/p bowel resection, KENIA, CVA, atrial fibrillation who was hospitalized at [facility] in 7/2023 for b/l endophthalmitis, Pseudomonas bacteremia, RUE thrombus, RUE AVG infection s/p excision, and RUL mass. Left eye did not improve & subsequently developed abscess, but POA declined enucleation and patient ultimately left AMA. Re-presented to [place] ED with worsening eye symptoms with imaging showing left scleral abscess. He was transferred to [facility] for Oculoplastics evaluation & enucleation. S/p evisceration of left eye on 8/16/23, and completion of evisceration with orbital implant on 8/30. Hospitalization has been complicated by pulmonary embolism, recurrent GI bleed for which anticoagulation has been withheld, encephalopathy work-up with lumbar puncture findings of GPR's, for which he remains on broad spectrum IV antibiotics by guidance of ID and debility. Palliative and Supportive Care was consulted to explore goals of care.    Advance Care Planning   Goals of Care:  - Code status: Full code  - HCPOA: sister Marley Bernal   - HCPOA form signed at [facility]  - Patient does not have decision making capacity at this time  - Prognosis: guarded  - Goals: life prolongation  - Plans: continuing full supportive care    Goals of Care Conversation:  - 9/5/23: Met Mr. Bernal and  "his sister Marley at bedside. He is feeling nauseous and has dysuria. Listened to Marley during encounter, learned more about her sick uncle at nursing home for which he is not receiving adequate care. She shares how she understands that the systems within nursing homes and hospitals are not set up for success, and therefore has to take it upon herself to help her family from suffering the complications that they are vulnerable to because of their living situation. She is hopeful that she can keep [facility] so that she can go to [place] to get her RV and allow for both Immanuel and her uncle to live with her in the . She emphasizes how much can go wrong in her absence, noting the example of being gone for the last 4.5 days and her brother having likely another UTI for which he is not receiving adequate care - and having a condom catheter on when she was counseled that this condom cath poses a risk for UTI's. She expresses frustration at the lack of thoughtfulness of some medical staff members (example is directed at uncle's nursing home) and how she needs to learn how to do the same exercises that the therapists provide so that she can do them for her family instead. She shares she will likely write a book about how she is able to care for her uncle and brother simultaneously, as if being in two places at once. Validated her strength and loyalty, praised her diligence and love for her family. 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  - 8/18/23: Met Mr. Bernal and his sister Marley at bedside. He tried to eat breakfast but felt nauseous and could not finish very much of it. Sister wonders if there is a component of vertigo contributing to his overall symptoms as he had shared in the past that with " position changes, he gets a room-spinning sensation. Denies room spinning sensation at this time. Agreed that while at this time, his nausea will not likely respond to meclizine if it's not directly related to vertigo, but worth trying a low dose as needed. Will continue to follow along during hospitalization and return on Monday, 8/21/23.  - 8/14/23: Met Mr. Bernal, who wakes easily and answers appropriately and respectfully. Sister Marley at bedside. Had long supportive conversation with Marley, who is also trying to help their uncle who is in a nursing home, having his own serious medical issues related to severe weight loss and swallowing issues. His experience has been extremely negative in the nursing home and she says that unfortunately 80% of the nursing homes quality are like this, so she plans to get Mr. Bernal into an actual rehab and not a nursing home with rehab. Marley shares that she feels that having signed the HCPOA form with her brother, that she has a legal duty to advocate for him. She shares that she and other caregivers are not compensated for doing the hard job of shouldering the burden and protecting their loved ones from the mistakes and shortcomings that are from the medical providers/system. She is hoping to take legal action on behalf of her brother and the neglectful and bad care that he received. She shared that it was likely helpful that he had this weekend to adjust to his new vision loss. That although he hasn't been able to see in the last 3-4 weeks, this past Thursday he cried out and she believes he did so in realization that he cannot see. She put a mirror in front of him to help assess how much he can see, and he realized he cannot. She believes he needed time to adjust to this. She also worries that if he is delirious before the surgery, then it won't be a good thing to proceed. I thanked her for sharing these hardships with me and will return on Wednesday to continue  "to provide support for Mr. Bernal and his family.  - 8/11/23: Meeting with Marley Bernal (sister), Dr. Gautam, Dr. Celestin (ophthalmology) and writer (Mac, palliative). I asked Ms. Bernal to share with me about Mr. Bernal's condition and she started back 5 years ago when he was 52 years old when he suffered a stroke at work (related to his history of non-treated DM). His health deteriorated over the years and notes that he has been traumatized by his decline and prior hospitalizations. She shared how upsetting the hospitalization at [facility] was, noting how nothing was being done for him and alternatively was always being pressured to proceed with surgery. She recalls the day they signed papers to leave AMA, and being told that if she didn't agree with surgery, then he needed to sign out AMA and needed to leave in 15 minutes. Allowed for Ms. Bernal to express her frustrations and disappointment about the poor care he received and the poor communication she felt she received during this time. She wishes to advocate for him and protect him, knowing that the surgery and enucleation will be something he has to live with. Her desire was to preserve his eyes, but if left with no other options, understands that enucleation would need to occur. She eventually expresses gratefulness for Dr. Celestin for going out of her way to communicate and help her brother. She was agreeable when I asked if I could follow along.          Will continue to follow along peripherally. Goals remain unchanged, and hopefully will be able to engage with Mr. Bernal in clarifying his own perspective/priorities of his goals.    Subjective:     Chief Complaint:   Chief Complaint   Patient presents with    Eye Problem       HPI:   Per primary HPI:     "Immanuel Bernal is a 57-year-old man with a history of ESRD on HD, HFrEF (EF 30-35%), DM2, HTN, bowel obstruction s/p bowel resection, KENIA, CVA, atrial fibrillation " "who was hospitalized at [facility] in 7/2023 for b/l endophthalmitis, Pseudomonas bacteremia, RUE thrombus, RUE AVG infection s/p excision, and RUL mass. Left eye did not improve & subsequently developed abscess, but POA declined enucleation and patient ultimately left AMA. Re-presented to [facility] with worsening eye symptoms with imaging showing left scleral abscess. He was transferred to [facility] for Oculoplastics evaluation & enucleation."    Palliative and Supportive Care was consulted to explore goals of care.      Hospital Course:  No notes on file    Interval History: S/p completion of evisceration of left eye. Has nausea and dysuria for the last few days. No tenderness in actual penile area, but condom catheter is uncomfortable. Sister at bedside    Past Medical History:   Diagnosis Date    Bilateral retinal detachment 7/18/2023    BPH (benign prostatic hyperplasia)     Cataract     CHF (congestive heart failure)     CKD (chronic kidney disease) stage 3, GFR 30-59 ml/min     Diabetes mellitus, type 2     Hypertension     KENIA (obstructive sleep apnea)     Pancreatitis     Persistent proteinuria 11/12/2020    2 g proteinuria noted Resumed ACE Lower BP systolic to less than 130     PTSD (post-traumatic stress disorder)     Stroke        Past Surgical History:   Procedure Laterality Date    CATARACT EXTRACTION Bilateral     ENUCLEATION Left 8/30/2023    Procedure: ENUCLEATION, EYE;  Surgeon: Vicki Stewart MD;  Location: [facility] OR [address] [place] [address];  Service: Ophthalmology;  Laterality: Left;    ESOPHAGOGASTRODUODENOSCOPY N/A 8/23/2023    Procedure: EGD (ESOPHAGOGASTRODUODENOSCOPY);  Surgeon: Bharath Ya MD;  Location: [facility] (2ND FLR);  Service: Endoscopy;  Laterality: N/A;    EVISCERATION OF OCULAR CONTENTS Left 8/16/2023    Procedure: EVISCERATION, OCULAR CONTENTS;  Surgeon: Vicki Stewart MD;  Location: [facility] OR 1ST FLR;  Service: Ophthalmology;  Laterality: Left;    MAGNETIC RESONANCE IMAGING N/A " 8/26/2023    Procedure: MRI (Magnetic Resonance Imagine);  Surgeon: Mckenzie De Jesus;  Location: [facility];  Service: Anesthesiology;  Laterality: N/A;    RETROBULBAR INJECTION OF MEDICATION Left 8/16/2023    Procedure: INJECTION, MEDICATION, RETROBULBAR;  Surgeon: Vicki Stewart MD;  Location: [facility] OR [facility] FLR;  Service: Ophthalmology;  Laterality: Left;    RETROBULBAR INJECTION OF MEDICATION Left 8/30/2023    Procedure: INJECTION, MEDICATION, RETROBULBAR;  Surgeon: Vicki Stewart MD;  [facility];  Service: Ophthalmology;  Laterality: Left;    TARSORRHAPHY Left 8/16/2023    Procedure: BLEPHARORRHAPHY;  Surgeon: Vicki Stewart MD;  [facility];  Service: Ophthalmology;  Laterality: Left;    TARSORRHAPHY Left 8/30/2023    Procedure: BLEPHARORRHAPHY;  Surgeon: Vicki Stewart MD;  Location: [facility] OR [address] [place] [address];  Service: Ophthalmology;  Laterality: Left;       Review of patient's allergies indicates:   Allergen Reactions    Morphine Rash    Amiodarone analogues Itching     Other reaction(s): Unknown       Medications:  Continuous Infusions:  Scheduled Meds:   sodium chloride 0.9%   Intravenous Once    sodium chloride 0.9%   Intravenous Once    sodium chloride 0.9%   Intravenous Once    acetaminophen  1,000 mg Per G Tube TID    albuterol-ipratropium  3 mL Nebulization Q6H WAKE    amLODIPine  5 mg Per G Tube Daily    atorvastatin  40 mg Per G Tube Daily    calcitRIOL  0.25 mcg Per G Tube Daily    carvediloL  6.25 mg Per G Tube BID    epoetin thee (PROCRIT) injection  3,000 Units Intravenous Every Mon, Wed, Fri    fluconazole 40 mg/ml  400 mg Per G Tube Daily    heparin (porcine)  5,000 Units Subcutaneous Q8H    hydrALAZINE 10 mg 2 tablets, hydrALAZINE 25 mg 2 tablets, isorsorbide dinitrate 20 mg  2 tablets combination   Per G Tube TID    Lactobacillus rhamnosus GG  1 capsule Per G Tube BID    melatonin  6 mg Oral Nightly    meropenem (MERREM) IVPB  500 mg Intravenous Q12H     multivitamin  1 tablet Per G Tube Daily    ondansetron  4 mg Oral Q8H    oxybutynin  5 mg Oral Daily    pantoprazole  40 mg Per G Tube BID    polyethylene glycol  17 g Per G Tube BID    senna-docusate 8.6-50 mg  1 tablet Per G Tube BID    sevelamer carbonate  0.8 g Per G Tube TID WM    valsartan  160 mg Oral BID     PRN Meds:0.9%  NaCl infusion (for blood administration), 0.9%  NaCl infusion (for blood administration), sodium chloride 0.9%, dextrose 10%, dextrose 10%, glucagon (human recombinant), glucose, glucose, heparin (porcine), hydrALAZINE, LIDOcaine HCl 2%, meclizine, naloxone, OLANZapine, prochlorperazine, sodium chloride 0.9%, sodium chloride 0.9%, sodium chloride 0.9%, sodium chloride 0.9%, traMADoL, Pharmacy to dose Vancomycin consult **AND** vancomycin - pharmacy to dose, white petrolatum    Family History       Problem Relation (Age of Onset)    Diabetes Father, Sister    Heart disease Father    Hyperlipidemia Brother    Kidney disease Father    Stroke Mother          Tobacco Use    Smoking status: Former     Types: Cigarettes, Cigars    Smokeless tobacco: Never   Substance and Sexual Activity    Alcohol use: Not Currently    Drug use: Not Currently    Sexual activity: Not on file       Review of Systems   Unable to perform ROS: Other   Gastrointestinal:  Positive for nausea.   Genitourinary:  Positive for dysuria.     Objective:     Vital Signs (Most Recent):  Temp: 98.8 °F (37.1 °C) (09/05/23 0430)  Pulse: 75 (09/05/23 0815)  Resp: 20 (09/05/23 0900)  BP: (!) 145/88 (09/05/23 0800)  SpO2: 98 % (09/05/23 0815) Vital Signs (24h Range):  Temp:  [97.4 °F (36.3 °C)-98.9 °F (37.2 °C)] 98.8 °F (37.1 °C)  Pulse:  [68-80] 75  Resp:  [10-20] 20  SpO2:  [95 %-98 %] 98 %  BP: (119-145)/(67-88) 145/88     Weight: 62.8 kg (138 lb 7.2 oz)  Body mass index is 19.87 kg/m².       Physical Exam  Constitutional:       General: He is not in acute distress.     Appearance: He is ill-appearing.   HENT:       Head: Normocephalic and atraumatic.      Right Ear: External ear normal.      Left Ear: External ear normal.      Nose: Nose normal.      Mouth/Throat:      Mouth: Mucous membranes are dry.   Eyes:      Comments: Left eye closed   Cardiovascular:      Rate and Rhythm: Normal rate.   Pulmonary:      Effort: Pulmonary effort is normal.      Breath sounds: Normal breath sounds.   Abdominal:      General: Bowel sounds are normal.      Palpations: Abdomen is soft.      Comments: G tube   Musculoskeletal:         General: No swelling.   Skin:     General: Skin is dry.   Neurological:      Mental Status: He is alert.      Comments: Oriented to self, place            Review of Symptoms      Symptom Assessment (ESAS 0-10 Scale)  Pain:  0  Dyspnea:  0  Anxiety:  0  Nausea:  0  Depression:  0  Anorexia:  0  Fatigue:  0  Insomnia:  0  Restlessness:  0  Agitation:  0 due to Other         Pain Assessment in Advanced Demential Scale (PAINAD)   Breathing - Independent of vocalization:  0  Negative vocalization:  0  Facial expression:  0  Body language:  0  Consolability:  0  Total:  0    Psychosocial/Cultural:   See Palliative Psychosocial Note: No  No longer working since stroke. Has sister Marley. Has adult children  **Primary  to Follow**  Palliative Care  Consult: No        Advance Care Planning  Advance Directives:   Living Will: No    LaPOST: No    Do Not Resuscitate Status: No    Medical Power of : Yes    Agent's Name:  Marley Bernal    Decision Making:  Family answered questions and Patient unable to communicate due to disease severity/cognitive impairment  Goals of Care: What is most important right now is to focus on extending life as long as possible, even it it means sacrificing quality. Accordingly, we have decided that the best plan to meet the patient's goals includes continuing with treatment.         Significant Labs: CBC:   Recent Labs   Lab 09/04/23  0404 09/05/23  0742    WBC 4.42 6.62   HGB 10.1* 10.2*   HCT 32.5* 33.5*    170       CMP:   Recent Labs   Lab 09/04/23  0404 09/05/23  0742   * 129*   K 4.3 5.1   CL 97 98   CO2 25 21*   GLU 87 91   BUN 49* 65*   CREATININE 3.5* 4.3*   CALCIUM 8.0* 8.8   PROT 6.0 6.1   ALBUMIN 2.1* 2.2*   BILITOT 0.2 0.2   ALKPHOS 131 132   AST 12 14   ALT <5* <5*   ANIONGAP 10 10       CBC:   Recent Labs   Lab 09/05/23  0742   WBC 6.62   HGB 10.2*   HCT 33.5*   *          BMP:  Recent Labs   Lab 09/05/23  0742   GLU 91   *   K 5.1   CL 98   CO2 21*   BUN 65*   CREATININE 4.3*   CALCIUM 8.8   MG 2.1       LFT:  Lab Results   Component Value Date    AST 14 09/05/2023    GGT 13 11/09/2020    ALKPHOS 132 09/05/2023    BILITOT 0.2 09/05/2023     Albumin:   Albumin   Date Value Ref Range Status   09/05/2023 2.2 (L) 3.5 - 5.2 g/dL Final     Protein:   Total Protein   Date Value Ref Range Status   09/05/2023 6.1 6.0 - 8.4 g/dL Final     Lactic acid:   Lab Results   Component Value Date    LACTATE 0.6 08/09/2023       Significant Imaging: I have reviewed all pertinent imaging results/findings within the past 24 hours.    I spent a total of 50 minutes on the day of the visit. This includes face to face time in discussion of goals of care, symptom assessment, coordination of care and emotional support. This also includes non-face to face time preparing to see the patient (eg, review of tests/imaging), obtaining and/or reviewing separately obtained history, documenting clinical information in the electronic or other health record, independently interpreting results and communicating results to the patient/family/caregiver, or care coordinator.         Kim Watts MD  Palliative Medicine  [facility] - Intensive Care ([place] [id number])

## 2023-09-05 NOTE — PLAN OF CARE
CM received a call from Rosi at [facility], received referral. Facility is not able to accept patient since they are not able for patient to receive HD. Dr. Cassandra Ratliff is the only nephrologist in [place] and refuses to see patient again. Patient has no SNF days and almost out of lifetime reserve days. Patient has 32 days at time of admit to [facility] 8/9/23. Rosi said she contacted all HD centers within 50 miles of [place] and patient not current with ANY of them. Last year at local hospital EPS claim filed due to patient's sister giving non-prescribed medications to there patient. [facility] discharged patient 8/12/22.  sitter service discharged patient due to not receiving HH care as reported by patient's sister. Patient has been admit to multiple hospitals and post acute facilities. Patient's sister, Neelam Bernal is one of few named persons that share healthcare power of  (2018).    CM discussed details with attending physician and team members. CM feels the safest discharge plan for patient is new [facility] NH placement around [facility]. Updated charge nurse on discharge status. Dr. Funez agreed with plan. CM will prepare new list of [facility] nursing homes for patient's sister to select top 3 choices.     CM staff will continue to follow and assist team as needed. CM escalated to leadership.      Massiel Shukla RN  Weekend  - [facility] [address]  Spectralink: [phone]

## 2023-09-06 ENCOUNTER — OFFICE VISIT (OUTPATIENT)
Dept: DIALYSIS | Facility: HOSPITAL | Age: 58
DRG: 113 | End: 2023-09-06
Attending: INTERNAL MEDICINE
Payer: MEDICARE

## 2023-09-06 LAB
ALBUMIN SERPL BCP-MCNC: 2.1 G/DL (ref 3.5–5.2)
ALP SERPL-CCNC: 105 U/L (ref 55–135)
ALT SERPL W/O P-5'-P-CCNC: <5 U/L (ref 10–44)
ANION GAP SERPL CALC-SCNC: 12 MMOL/L (ref 8–16)
AST SERPL-CCNC: 15 U/L (ref 10–40)
BACTERIA SPEC ANAEROBE CULT: NORMAL
BILIRUB SERPL-MCNC: 0.3 MG/DL (ref 0.1–1)
BUN SERPL-MCNC: 73 MG/DL (ref 6–20)
CALCIUM SERPL-MCNC: 9.1 MG/DL (ref 8.7–10.5)
CHLORIDE SERPL-SCNC: 98 MMOL/L (ref 95–110)
CO2 SERPL-SCNC: 20 MMOL/L (ref 23–29)
CREAT SERPL-MCNC: 5 MG/DL (ref 0.5–1.4)
ERYTHROCYTE [DISTWIDTH] IN BLOOD BY AUTOMATED COUNT: 17 % (ref 11.5–14.5)
EST. GFR  (NO RACE VARIABLE): 12.6 ML/MIN/1.73 M^2
GLUCOSE SERPL-MCNC: 68 MG/DL (ref 70–110)
HBV CORE AB SERPL QL IA: NORMAL
HBV SURFACE AB SER-ACNC: <3 MIU/ML
HBV SURFACE AB SER-ACNC: NORMAL M[IU]/ML
HBV SURFACE AG SERPL QL IA: NORMAL
HCT VFR BLD AUTO: 31.3 % (ref 40–54)
HGB BLD-MCNC: 9.6 G/DL (ref 14–18)
MAGNESIUM SERPL-MCNC: 2.2 MG/DL (ref 1.6–2.6)
MCH RBC QN AUTO: 30.3 PG (ref 27–31)
MCHC RBC AUTO-ENTMCNC: 30.7 G/DL (ref 32–36)
MCV RBC AUTO: 99 FL (ref 82–98)
PHOSPHATE SERPL-MCNC: 3.6 MG/DL (ref 2.7–4.5)
PLATELET # BLD AUTO: 171 K/UL (ref 150–450)
PMV BLD AUTO: 10.1 FL (ref 9.2–12.9)
POCT GLUCOSE: 148 MG/DL (ref 70–110)
POCT GLUCOSE: 62 MG/DL (ref 70–110)
POCT GLUCOSE: 62 MG/DL (ref 70–110)
POCT GLUCOSE: 65 MG/DL (ref 70–110)
POCT GLUCOSE: 67 MG/DL (ref 70–110)
POCT GLUCOSE: 72 MG/DL (ref 70–110)
POCT GLUCOSE: 73 MG/DL (ref 70–110)
POTASSIUM SERPL-SCNC: 5.2 MMOL/L (ref 3.5–5.1)
PROT SERPL-MCNC: 5.9 G/DL (ref 6–8.4)
RBC # BLD AUTO: 3.17 M/UL (ref 4.6–6.2)
SODIUM SERPL-SCNC: 130 MMOL/L (ref 136–145)
VANCOMYCIN SERPL-MCNC: 22 UG/ML
WBC # BLD AUTO: 4.99 K/UL (ref 3.9–12.7)

## 2023-09-06 PROCEDURE — 20000000 HC ICU ROOM

## 2023-09-06 PROCEDURE — 86706 HEP B SURFACE ANTIBODY: CPT | Mod: 91

## 2023-09-06 PROCEDURE — 80100016 HC MAINTENANCE HEMODIALYSIS

## 2023-09-06 PROCEDURE — 25000003 PHARM REV CODE 250: Performed by: HOSPITALIST

## 2023-09-06 PROCEDURE — 36415 COLL VENOUS BLD VENIPUNCTURE: CPT

## 2023-09-06 PROCEDURE — 63600175 PHARM REV CODE 636 W HCPCS

## 2023-09-06 PROCEDURE — 87340 HEPATITIS B SURFACE AG IA: CPT

## 2023-09-06 PROCEDURE — 25000003 PHARM REV CODE 250

## 2023-09-06 PROCEDURE — 25000003 PHARM REV CODE 250: Performed by: STUDENT IN AN ORGANIZED HEALTH CARE EDUCATION/TRAINING PROGRAM

## 2023-09-06 PROCEDURE — 63600175 PHARM REV CODE 636 W HCPCS: Performed by: STUDENT IN AN ORGANIZED HEALTH CARE EDUCATION/TRAINING PROGRAM

## 2023-09-06 PROCEDURE — 99222 1ST HOSP IP/OBS MODERATE 55: CPT | Mod: ,,, | Performed by: SURGERY

## 2023-09-06 PROCEDURE — 83735 ASSAY OF MAGNESIUM: CPT

## 2023-09-06 PROCEDURE — 80053 COMPREHEN METABOLIC PANEL: CPT | Performed by: STUDENT IN AN ORGANIZED HEALTH CARE EDUCATION/TRAINING PROGRAM

## 2023-09-06 PROCEDURE — 94761 N-INVAS EAR/PLS OXIMETRY MLT: CPT

## 2023-09-06 PROCEDURE — 25000242 PHARM REV CODE 250 ALT 637 W/ HCPCS: Performed by: STUDENT IN AN ORGANIZED HEALTH CARE EDUCATION/TRAINING PROGRAM

## 2023-09-06 PROCEDURE — 80202 ASSAY OF VANCOMYCIN: CPT | Performed by: HOSPITALIST

## 2023-09-06 PROCEDURE — 99232 PR SUBSEQUENT HOSPITAL CARE,LEVL II: ICD-10-PCS | Mod: GC,,, | Performed by: HOSPITALIST

## 2023-09-06 PROCEDURE — 84100 ASSAY OF PHOSPHORUS: CPT

## 2023-09-06 PROCEDURE — 99232 SBSQ HOSP IP/OBS MODERATE 35: CPT | Mod: GC,,, | Performed by: HOSPITALIST

## 2023-09-06 PROCEDURE — 99222 PR INITIAL HOSPITAL CARE,LEVL II: ICD-10-PCS | Mod: ,,, | Performed by: SURGERY

## 2023-09-06 PROCEDURE — 63600175 PHARM REV CODE 636 W HCPCS: Mod: JZ | Performed by: STUDENT IN AN ORGANIZED HEALTH CARE EDUCATION/TRAINING PROGRAM

## 2023-09-06 PROCEDURE — 85027 COMPLETE CBC AUTOMATED: CPT

## 2023-09-06 PROCEDURE — 99900035 HC TECH TIME PER 15 MIN (STAT)

## 2023-09-06 PROCEDURE — 94640 AIRWAY INHALATION TREATMENT: CPT

## 2023-09-06 PROCEDURE — 86704 HEP B CORE ANTIBODY TOTAL: CPT

## 2023-09-06 RX ORDER — CALCITRIOL 1 UG/ML
0.25 SOLUTION ORAL DAILY
Status: DISCONTINUED | OUTPATIENT
Start: 2023-09-07 | End: 2023-09-19 | Stop reason: HOSPADM

## 2023-09-06 RX ORDER — OXYBUTYNIN CHLORIDE 5 MG/1
10 TABLET, EXTENDED RELEASE ORAL DAILY
Status: DISCONTINUED | OUTPATIENT
Start: 2023-09-07 | End: 2023-09-19 | Stop reason: HOSPADM

## 2023-09-06 RX ORDER — TOBRAMYCIN AND DEXAMETHASONE 3; 1 MG/ML; MG/ML
1 SUSPENSION/ DROPS OPHTHALMIC 4 TIMES DAILY
Status: DISCONTINUED | OUTPATIENT
Start: 2023-09-06 | End: 2023-09-06

## 2023-09-06 RX ADMIN — HEPARIN SODIUM 1000 UNITS: 1000 INJECTION, SOLUTION INTRAVENOUS; SUBCUTANEOUS at 03:09

## 2023-09-06 RX ADMIN — VALSARTAN 160 MG: 160 TABLET, FILM COATED ORAL at 09:09

## 2023-09-06 RX ADMIN — Medication 6 MG: at 08:09

## 2023-09-06 RX ADMIN — IPRATROPIUM BROMIDE AND ALBUTEROL SULFATE 3 ML: .5; 3 SOLUTION RESPIRATORY (INHALATION) at 07:09

## 2023-09-06 RX ADMIN — ACETAMINOPHEN 1000 MG: 500 TABLET ORAL at 09:09

## 2023-09-06 RX ADMIN — LIDOCAINE HYDROCHLORIDE: 20 JELLY TOPICAL at 01:09

## 2023-09-06 RX ADMIN — HEPARIN SODIUM 5000 UNITS: 5000 INJECTION INTRAVENOUS; SUBCUTANEOUS at 01:09

## 2023-09-06 RX ADMIN — ONDANSETRON 4 MG: 4 TABLET, ORALLY DISINTEGRATING ORAL at 09:09

## 2023-09-06 RX ADMIN — SODIUM CHLORIDE: 9 INJECTION, SOLUTION INTRAVENOUS at 02:09

## 2023-09-06 RX ADMIN — HEPARIN SODIUM 5000 UNITS: 5000 INJECTION INTRAVENOUS; SUBCUTANEOUS at 09:09

## 2023-09-06 RX ADMIN — ACETAMINOPHEN 1000 MG: 500 TABLET ORAL at 08:09

## 2023-09-06 RX ADMIN — ONDANSETRON 4 MG: 4 TABLET, ORALLY DISINTEGRATING ORAL at 01:09

## 2023-09-06 RX ADMIN — DEXTROSE MONOHYDRATE 125 ML: 10 INJECTION, SOLUTION INTRAVENOUS at 08:09

## 2023-09-06 RX ADMIN — THERA TABS 1 TABLET: TAB at 09:09

## 2023-09-06 RX ADMIN — VALSARTAN 160 MG: 160 TABLET, FILM COATED ORAL at 08:09

## 2023-09-06 RX ADMIN — SEVELAMER CARBONATE 0.8 G: 0.8 POWDER, FOR SUSPENSION ORAL at 09:09

## 2023-09-06 RX ADMIN — TRAMADOL HYDROCHLORIDE 50 MG: 50 TABLET, COATED ORAL at 04:09

## 2023-09-06 RX ADMIN — Medication 1 CAPSULE: at 09:09

## 2023-09-06 RX ADMIN — CARVEDILOL 6.25 MG: 6.25 TABLET, FILM COATED ORAL at 09:09

## 2023-09-06 RX ADMIN — ATORVASTATIN CALCIUM 40 MG: 40 TABLET, FILM COATED ORAL at 09:09

## 2023-09-06 RX ADMIN — CALCITRIOL CAPSULES 0.25 MCG 0.25 MCG: 0.25 CAPSULE ORAL at 09:09

## 2023-09-06 RX ADMIN — SODIUM ZIRCONIUM CYCLOSILICATE 10 G: 10 POWDER, FOR SUSPENSION ORAL at 09:09

## 2023-09-06 RX ADMIN — IPRATROPIUM BROMIDE AND ALBUTEROL SULFATE 3 ML: .5; 3 SOLUTION RESPIRATORY (INHALATION) at 08:09

## 2023-09-06 RX ADMIN — SENNOSIDES AND DOCUSATE SODIUM 1 TABLET: 50; 8.6 TABLET ORAL at 09:09

## 2023-09-06 RX ADMIN — PANTOPRAZOLE SODIUM 40 MG: 40 GRANULE, DELAYED RELEASE ORAL at 09:09

## 2023-09-06 RX ADMIN — POLYETHYLENE GLYCOL 3350 17 G: 17 POWDER, FOR SOLUTION ORAL at 09:09

## 2023-09-06 RX ADMIN — DEXTROSE MONOHYDRATE 125 ML: 10 INJECTION, SOLUTION INTRAVENOUS at 01:09

## 2023-09-06 RX ADMIN — HEPARIN SODIUM 5000 UNITS: 5000 INJECTION INTRAVENOUS; SUBCUTANEOUS at 05:09

## 2023-09-06 RX ADMIN — MEROPENEM 500 MG: 500 INJECTION INTRAVENOUS at 08:09

## 2023-09-06 RX ADMIN — CARVEDILOL 6.25 MG: 6.25 TABLET, FILM COATED ORAL at 08:09

## 2023-09-06 RX ADMIN — OXYBUTYNIN CHLORIDE 5 MG: 5 TABLET, EXTENDED RELEASE ORAL at 09:09

## 2023-09-06 RX ADMIN — PANTOPRAZOLE SODIUM 40 MG: 40 GRANULE, DELAYED RELEASE ORAL at 08:09

## 2023-09-06 RX ADMIN — Medication 1 CAPSULE: at 08:09

## 2023-09-06 RX ADMIN — TOBRAMYCIN AND DEXAMETHASONE: 3; 1 OINTMENT OPHTHALMIC at 10:09

## 2023-09-06 RX ADMIN — AMLODIPINE BESYLATE 5 MG: 5 TABLET ORAL at 09:09

## 2023-09-06 RX ADMIN — SEVELAMER CARBONATE 0.8 G: 0.8 POWDER, FOR SUSPENSION ORAL at 01:09

## 2023-09-06 RX ADMIN — Medication 16 G: at 12:09

## 2023-09-06 RX ADMIN — MEROPENEM 500 MG: 500 INJECTION INTRAVENOUS at 09:09

## 2023-09-06 RX ADMIN — ERYTHROPOIETIN 3000 UNITS: 3000 INJECTION, SOLUTION INTRAVENOUS; SUBCUTANEOUS at 03:09

## 2023-09-06 RX ADMIN — ONDANSETRON 4 MG: 4 TABLET, ORALLY DISINTEGRATING ORAL at 05:09

## 2023-09-06 RX ADMIN — FLUCONAZOLE 400 MG: 40 POWDER, FOR SUSPENSION ORAL at 09:09

## 2023-09-06 RX ADMIN — HYDRALAZINE HYDROCHLORIDE: 10 TABLET, FILM COATED ORAL at 08:09

## 2023-09-06 RX ADMIN — SENNOSIDES AND DOCUSATE SODIUM 1 TABLET: 50; 8.6 TABLET ORAL at 08:09

## 2023-09-06 RX ADMIN — HYDRALAZINE HYDROCHLORIDE: 10 TABLET, FILM COATED ORAL at 09:09

## 2023-09-06 NOTE — NURSING
Patient is  back tot he unit at around [address]  .  VSS .  Patient's  BG  was  at  60  at day  time before  dialysis , 16gm glucose  tablet  but still was at 60s . Patient complains of  nausea  , Zofran  given,   .  Patient's  BG  came to  148 after  10%Dextrose  ,  125 ml  bolus  given and send to  Dialysis .Call light within reach . Safety precaution  maintained . Will continue monitoring .

## 2023-09-06 NOTE — PROGRESS NOTES
"SW was referred to this pt for outpt HD coordination by Mirta Melendez CM.    Pt will need updated hepatitis labs for dialysis referral. SW requested labs from inpt treatment team via secure chat.    UPDATE 12:24PM: SW spoke with Thais at [facility] ([phone]). SW requested pt's 2728 form needed for dialysis clinic transfer. Thais informed SW that pt did not have a 2728 form on file. Per Thais, pt only received dialysis at [facility] from 05/24/2023 until "July 2023".     UPDATE 12:32PM: SW spoke with Marilee at [facility] ([phone]). Marilee informed pt does not have a 2728 form on file at [facility]. Per Marilee, pt's file states his first date of dialysis is listed as 07/19/2022. Marilee informed she has no other records of where pt has obtained outpt dialysis.    SW will send dialysis referral to [facility] central intake for placement at [facility] pending the result of pt's hepatitis labs.    SW to continue to follow with updates.    Silva Segovia LMSW  [facility]  [phone]    "

## 2023-09-06 NOTE — ASSESSMENT & PLAN NOTE
- Ophthalmology consulted and following  - s/p left eye enucleation on 8/30   Aklief Pregnancy And Lactation Text: It is unknown if this medication is safe to use during pregnancy.  It is unknown if this medication is excreted in breast milk.  Breastfeeding women should use the topical cream on the smallest area of the skin for the shortest time needed while breastfeeding.  Do not apply to nipple and areola.

## 2023-09-06 NOTE — SUBJECTIVE & OBJECTIVE
Interval History:  Patient feeling well this morning. BP has been stable. Reports improvement in penile pain after adding the Oxybutynin, but didn't last the entire day and returned late last night. Patient reporting no additional nausea/vomiting since yesterday and states he was able to eat half of his dinner without difficulty. Denies any other complaints at this time. Still awaiting placement.     Pending residential NH placement    Review of Systems   Constitutional: Negative.  Negative for fever.   HENT: Negative.     Eyes:  Negative for pain and discharge.   Respiratory: Negative.     Cardiovascular: Negative.    Gastrointestinal:  Positive for nausea and vomiting.   Genitourinary:  Positive for urgency. Negative for dysuria.        Positive for burning pain to head of penis   Musculoskeletal: Negative.    Skin: Negative.    Neurological: Negative.    Psychiatric/Behavioral: Negative.  Negative for agitation, behavioral problems and confusion.      Objective:     Vital Signs (Most Recent):  Temp: 98.2 °F (36.8 °C) (09/06/23 1200)  Pulse: 68 (09/06/23 1745)  Resp: (!) 21 (09/06/23 1200)  BP: (!) 145/85 (09/06/23 1745)  SpO2: 96 % (09/06/23 1200) Vital Signs (24h Range):  Temp:  [98 °F (36.7 °C)-99 °F (37.2 °C)] 98.2 °F (36.8 °C)  Pulse:  [67-78] 68  Resp:  [16-45] 21  SpO2:  [94 %-99 %] 96 %  BP: (126-154)/(75-85) 145/85     Weight: 62.8 kg (138 lb 7.2 oz)  Body mass index is 19.87 kg/m².    Intake/Output Summary (Last 24 hours) at 9/6/2023 6957  Last data filed at 9/6/2023 0557  Gross per 24 hour   Intake 280 ml   Output 350 ml   Net -70 ml           Physical Exam  Constitutional:       Appearance: Normal appearance.   HENT:      Head: Normocephalic.      Mouth/Throat:      Mouth: Mucous membranes are dry.   Eyes:      Comments: Right eye clear and pupil round and reactive;    Cardiovascular:      Rate and Rhythm: Normal rate and regular rhythm.      Pulses: Normal pulses.      Heart sounds: Normal heart sounds.    Pulmonary:      Effort: Pulmonary effort is normal. No respiratory distress.      Breath sounds: Normal breath sounds.   Abdominal:      General: Abdomen is flat.      Palpations: Abdomen is soft.   Genitourinary:     Penis: Normal.       Comments: No penile abnormalities  L groin with hernia present, nontender and fully reducible  Musculoskeletal:      Right lower leg: No edema.      Left lower leg: No edema.   Skin:     General: Skin is warm and dry.   Neurological:      Mental Status: He is alert and oriented to person, place, and time.             Significant Labs: All pertinent labs within the past 24 hours have been reviewed.  CBC:   Recent Labs   Lab 09/05/23  0742 09/06/23  0532   WBC 6.62 4.99   HGB 10.2* 9.6*   HCT 33.5* 31.3*    171       CMP:   Recent Labs   Lab 09/05/23  0742 09/06/23  0532   * 130*   K 5.1 5.2*   CL 98 98   CO2 21* 20*   GLU 91 68*   BUN 65* 73*   CREATININE 4.3* 5.0*   CALCIUM 8.8 9.1   PROT 6.1 5.9*   ALBUMIN 2.2* 2.1*   BILITOT 0.2 0.3   ALKPHOS 132 105   AST 14 15   ALT <5* <5*   ANIONGAP 10 12         Significant Imaging: I have reviewed all pertinent imaging results/findings within the past 24 hours.

## 2023-09-06 NOTE — PROGRESS NOTES
Pharmacokinetic Assessment Follow Up: IV Vancomycin    Vancomycin serum concentration assessment/plan(s):    -Vancomycin pre-HD level 22, above goal of 15 - 20 for endophthalmitis  -Patient to receive HD today (M/W/F), will not re-dose  -Check vancomycin random level this Friday prior to next HD, plans for re-dosing if < 20    Drug levels (last 3 results):  Recent Labs   Lab Result Units 09/04/23  1139 09/05/23  0742 09/06/23  0532   Vancomycin, Random ug/mL 16.7 23.6 22.0       Pharmacy will continue to follow and monitor vancomycin.    Please contact pharmacy at extension [phone] for questions regarding this assessment.    Thank you for the consult,   Ubaldo Whiteyamile       Patient brief summary:  Immanuel Bernal is a 57 y.o. male initiated on antimicrobial therapy with IV Vancomycin for treatment of  endophthalmitis    The patient's current regimen is vancomyhcin     Drug Allergies:   Review of patient's allergies indicates:   Allergen Reactions    Morphine Rash    Amiodarone analogues Itching     Other reaction(s): Unknown       Actual Body Weight:   63 kg    Renal Function:   Estimated Creatinine Clearance: 14.1 mL/min (A) (based on SCr of 5 mg/dL (H)).,     Dialysis Method (if applicable):  intermittent HD    CBC (last 72 hours):  Recent Labs   Lab Result Units 09/04/23  0404 09/05/23  0742 09/06/23  0532   WBC K/uL 4.42 6.62 4.99   Hemoglobin g/dL 10.1* 10.2* 9.6*   Hematocrit % 32.5* 33.5* 31.3*   Platelets K/uL 174 170 171       Metabolic Panel (last 72 hours):  Recent Labs   Lab Result Units 09/04/23  0404 09/05/23  0742 09/05/23  1119 09/06/23  0532   Sodium mmol/L 132* 129*  --  130*   Potassium mmol/L 4.3 5.1  --  5.2*   Chloride mmol/L 97 98  --  98   CO2 mmol/L 25 21*  --  20*   Glucose mg/dL 87 91  --  68*   Glucose, UA   --   --  Negative  --    BUN mg/dL 49* 65*  --  73*   Creatinine mg/dL 3.5* 4.3*  --  5.0*   Albumin g/dL 2.1* 2.2*  --  2.1*   Total Bilirubin mg/dL 0.2 0.2  --  0.3   Alkaline  Phosphatase U/L 131 132  --  105   AST U/L 12 14  --  15   ALT U/L <5* <5*  --  <5*   Magnesium mg/dL  --  2.1  --  2.2   Phosphorus mg/dL  --  2.6*  --  3.6       Vancomycin Administrations:  vancomycin given in the last 96 hours                     vancomycin (VANCOCIN) 500 mg in dextrose 5 % in water (D5W) 100 mL IVPB (MB+) (mg) 500 mg New Bag 09/04/23 1438                    Microbiologic Results:  Microbiology Results (last 7 days)       Procedure Component Value Units Date/Time    Culture, Anaerobe [[id number]] Collected: 08/30/23 1724    Order Status: Completed Specimen: Wound from Cornea, Left Updated: 09/06/23 0843     Anaerobic Culture No anaerobes isolated    Narrative:      LEFT INTRAOCULAR SPACE    Fungus culture [[id number]] Collected: 08/16/23 1647    Order Status: Completed Specimen: Abscess from Eyelid, Left Updated: 09/04/23 1250     Fungus (Mycology) Culture Culture in progress      No fungus isolated after 2 weeks    Fungus culture [[id number]] Collected: 08/16/23 1632    Order Status: Completed Specimen: Wound from Cornea, Left Updated: 09/04/23 1250     Fungus (Mycology) Culture Culture in progress      No fungus isolated after 2 weeks    Fungus culture [[id number]] Collected: 08/16/23 1629    Order Status: Completed Specimen: Wound from Eyelid, Left Updated: 09/04/23 1250     Fungus (Mycology) Culture Culture in progress      No fungus isolated after 2 weeks    Fungus culture [[id number]] Collected: 08/16/23 1636    Order Status: Completed Specimen: Abscess from Eyelid, Left Updated: 09/04/23 1250     Fungus (Mycology) Culture Culture in progress      No fungus isolated after 2 weeks    Narrative:      Sclera abscess    Aerobic culture [[id number]] Collected: 08/30/23 1724    Order Status: Completed Specimen: Wound from Cornea, Left Updated: 09/02/23 1304     Aerobic Bacterial Culture No growth    Narrative:      LEFT INTRAOCULAR SPACE    CSF culture [[id number]]  (Abnormal) Collected: 08/26/23 1315     Order Status: Completed Specimen: CSF (Spinal Fluid) from CSF Tap, Tube 3 Updated: 09/01/23 1434     CSF CULTURE Results called to and read back by:Kashif Zhang RN 08/30/2023  07:44      PROPIONIBACTERIUM SPECIES  From broth only       Gram Stain Result Cytospin indicates:      No WBC's      No organisms seen    AFB Culture & Smear [[id number]] Collected: 08/26/23 1315    Order Status: Completed Specimen: CSF (Spinal Fluid) from CSF Tap, Tube 3 Updated: 08/31/23 0854     AFB Culture & Smear Culture in progress     AFB CULTURE STAIN No acid fast bacilli seen.

## 2023-09-06 NOTE — PLAN OF CARE
Patient seen for pow1 s/p enucleation of left eye.   Doing well with no pain. Tarsorrhaphy released. Conformer in place. No sign of infection or extrusion of implant.     Patient will continue tobradex drops 4x daily in left eye and ointment 3x daily in left eye until seen back in plastics clinic in 5 weeks (around October 11th) . Conformer will remain in place for the duration.     All discussed with pts sister and ty galan.     Martín Izaguirre MD   PGY-3 [facility]

## 2023-09-06 NOTE — SUBJECTIVE & OBJECTIVE
Medications Prior to Admission   Medication Sig Dispense Refill Last Dose    acetaminophen (TYLENOL) 325 MG tablet Take 650 mg by mouth every 6 (six) hours as needed for Pain.       albuterol-ipratropium (DUO-NEB) 2.5 mg-0.5 mg/3 mL nebulizer solution Inhale 3 mLs into the lungs 3 (three) times daily.       ascorbic acid, vitamin C, (VITAMIN C) 500 MG tablet Take 500 mg by mouth once daily.       aspirin 81 MG Chew Take 81 mg by mouth once daily.       calcium carbonate-vitamin D3 (OYSTER SHELL CALCIUM-VIT D3) 500 mg-5 mcg (200 unit) PwPk Take 1 tablet by mouth 2 (two) times a day.       folic acid (FOLVITE) 1 MG tablet Take 1 tablet by mouth every morning.       megestroL (MEGACE) 400 mg/10 mL (40 mg/mL) Susp Take 10 mLs by mouth 2 (two) times a day.       multivitamin (THERAGRAN) per tablet Take 1 tablet by mouth once daily.       polyethylene glycol (GLYCOLAX) 17 gram/dose powder Take 17 g by mouth 2 (two) times daily.       tamsulosin (FLOMAX) 0.4 mg Cap Take 1 capsule (0.4 mg total) by mouth once daily. 30 capsule 6     VITAMIN B COMPLEX ORAL Take 1 tablet by mouth every morning.       zinc gluconate 50 mg tablet Take 50 mg by mouth once daily.       [DISCONTINUED] amLODIPine (NORVASC) 10 MG tablet Take 10 mg by mouth once daily.       [DISCONTINUED] carvediloL (COREG) 25 MG tablet Take 1 tablet (25 mg total) by mouth 2 (two) times daily with meals. 60 tablet 6 Past Week    [DISCONTINUED] citalopram (CELEXA) 20 MG tablet Take 20 mg by mouth once daily.       [DISCONTINUED] coffee xt/phosphatidyl serine (NEURIVA ORIGINAL ORAL) Take 1 capsule by mouth once daily.       [DISCONTINUED] flecainide (TAMBOCOR) 50 MG Tab Take 50 mg by mouth 2 (two) times daily.       [DISCONTINUED] ibuprofen (ADVIL,MOTRIN) 600 MG tablet Take 600 mg by mouth every 6 (six) hours as needed for Pain.       [DISCONTINUED] lactulose (CHRONULAC) 10 gram/15 mL solution Take 45 mLs by mouth daily as needed (constipation).       [DISCONTINUED]  metoclopramide HCl (REGLAN) 5 MG tablet Take 5 mg by mouth once daily.       [DISCONTINUED] pantoprazole (PROTONIX) 40 MG tablet Take 40 mg by mouth once daily.       [DISCONTINUED] pentoxifylline (TRENTAL) 400 mg TbSR Take 400 mg by mouth 2 (two) times daily.       [DISCONTINUED] rosuvastatin (CRESTOR) 5 MG tablet Take 5 mg by mouth once daily.       [DISCONTINUED] sevelamer carbonate (RENVELA) 800 mg Tab Take 1 tablet by mouth 5 times per day       [DISCONTINUED] UNABLE TO FIND Take 1 tablet by mouth once daily. medication name: OPTIMAL ANTIOX       [DISCONTINUED] UNABLE TO FIND Take 1 tablet by mouth nightly. medication name: QUNOL SLEEP SUPPORT          Review of patient's allergies indicates:   Allergen Reactions    Morphine Rash    Amiodarone analogues Itching     Other reaction(s): Unknown       Past Medical History:   Diagnosis Date    Bilateral retinal detachment 7/18/2023    BPH (benign prostatic hyperplasia)     Cataract     CHF (congestive heart failure)     CKD (chronic kidney disease) stage 3, GFR 30-59 ml/min     Diabetes mellitus, type 2     Hypertension     KENIA (obstructive sleep apnea)     Pancreatitis     Persistent proteinuria 11/12/2020    2 g proteinuria noted Resumed ACE Lower BP systolic to less than 130     PTSD (post-traumatic stress disorder)     Stroke      Past Surgical History:   Procedure Laterality Date    CATARACT EXTRACTION Bilateral     ENUCLEATION Left 8/30/2023    Procedure: ENUCLEATION, EYE;  Surgeon: Vicki Stewart MD;  Location: [address];  Service: Ophthalmology;  Laterality: Left;    ESOPHAGOGASTRODUODENOSCOPY N/A 8/23/2023    Procedure: EGD (ESOPHAGOGASTRODUODENOSCOPY);  Surgeon: Bharath Ya MD;  Location: [facility] F[facility]);  Service: Endoscopy;  Laterality: N/A;    EVISCERATION OF OCULAR CONTENTS Left 8/16/2023    Procedure: EVISCERATION, OCULAR CONTENTS;  Surgeon: Vicki Stewart MD;  Location: [address];  Service: Ophthalmology;  Laterality: Left;     MAGNETIC RESONANCE IMAGING N/A 8/26/2023    Procedure: MRI (Magnetic Resonance Imagine);  Surgeon: Mckenzie De Jesus;  Location: [facility];  Service: Anesthesiology;  Laterality: N/A;    RETROBULBAR INJECTION OF MEDICATION Left 8/16/2023    Procedure: INJECTION, MEDICATION, RETROBULBAR;  Surgeon: Vicki Stewart MD;  Location: [facility] OR [facility] FLR;  Service: Ophthalmology;  Laterality: Left;    RETROBULBAR INJECTION OF MEDICATION Left 8/30/2023    Procedure: INJECTION, MEDICATION, RETROBULBAR;  Surgeon: Vicki Stewart MD;  [facility];  Service: Ophthalmology;  Laterality: Left;    TARSORRHAPHY Left 8/16/2023    Procedure: BLEPHARORRHAPHY;  Surgeon: Vicki Stewart MD;  [facility];  Service: Ophthalmology;  Laterality: Left;    TARSORRHAPHY Left 8/30/2023    Procedure: BLEPHARORRHAPHY;  Surgeon: Vicki Stewart MD;  Location: [facility] OR [address] [place] [address];  Service: Ophthalmology;  Laterality: Left;     Family History       Problem Relation (Age of Onset)    Diabetes Father, Sister    Heart disease Father    Hyperlipidemia Brother    Kidney disease Father    Stroke Mother          Tobacco Use    Smoking status: Former     Types: Cigarettes, Cigars    Smokeless tobacco: Never   Substance and Sexual Activity    Alcohol use: Not Currently    Drug use: Not Currently    Sexual activity: Not on file     Review of Systems   All other systems reviewed and are negative.    Objective:     Vital Signs (Most Recent):  Temp: 98.2 °F (36.8 °C) (09/06/23 1200)  Pulse: 68 (09/06/23 1745)  Resp: (!) 21 (09/06/23 1200)  BP: (!) 145/85 (09/06/23 1745)  SpO2: 96 % (09/06/23 1200) Vital Signs (24h Range):  Temp:  [98 °F (36.7 °C)-99 °F (37.2 °C)] 98.2 °F (36.8 °C)  Pulse:  [67-78] 68  Resp:  [16-45] 21  SpO2:  [94 %-99 %] 96 %  BP: (126-154)/(75-85) 145/85     Weight: 62.8 kg (138 lb 7.2 oz)  Body mass index is 19.87 kg/m².      Physical Exam   Constitutional:       Appearance: Normal appearance.   HENT:      Head: Normocephalic  and atraumatic.   Eyes:      Comments: L eye s/p surgical intervention   Cardiovascular:      Rate and Rhythm: Normal rate and regular rhythm.      Pulses: Normal pulses.      Comments: Palpable 2+ R radial and ulnar pulse   Pulmonary:      Effort: No respiratory distress.   Musculoskeletal:         General: Swelling present.      Cervical back: Neck supple.      Comments: Significant swelling/pitting edema of RUE   Skin:     General: Skin is warm.      Comments: Incisions on RUE closed with staples. Incisions not tender, erythematous, or with any discharge or purulence. Incision well healed     Significant Labs:  All pertinent labs from the last 24 hours have been reviewed.    Significant Diagnostics:  I have reviewed all pertinent imaging results/findings within the past 24 hours.

## 2023-09-06 NOTE — SUBJECTIVE & OBJECTIVE
Interval History: Patient seen and examined. Family member at bedside. Complaints of insomnia this morning secondary to pain overnight. UA not indicative of infection. Afebrile with pulse ranging from 70-60s bpm. Systolic blood pressures ranging from 150-130s mmHg. He is saturating +95% on room air. Roughly 350 mL documented UOP in the last 24 hours. Laboratory studies reviewed. Plan for iHD today.    Review of patient's allergies indicates:   Allergen Reactions    Morphine Rash    Amiodarone analogues Itching     Other reaction(s): Unknown     Current Facility-Administered Medications   Medication Frequency    0.9%  NaCl infusion (for blood administration) Q24H PRN    0.9%  NaCl infusion (for blood administration) Q24H PRN    0.9%  NaCl infusion PRN    0.9%  NaCl infusion Once    0.9%  NaCl infusion Once    0.9%  NaCl infusion Once    0.9%  NaCl infusion Once    acetaminophen tablet 1,000 mg TID    albuterol-ipratropium 2.5 mg-0.5 mg/3 mL nebulizer solution 3 mL Q6H WAKE    amLODIPine tablet 5 mg Daily    atorvastatin tablet 40 mg Daily    calcitRIOL capsule 0.25 mcg Daily    carvediloL tablet 6.25 mg BID    dextrose 10% bolus 125 mL 125 mL PRN    dextrose 10% bolus 250 mL 250 mL PRN    epoetin thee injection 3,000 Units Every Mon, Wed, Fri    fluconazole 40 mg/ml suspension 400 mg Daily    glucagon (human recombinant) injection 1 mg PRN    glucose chewable tablet 16 g PRN    glucose chewable tablet 24 g PRN    heparin (porcine) injection 1,000 Units PRN    heparin (porcine) injection 1,000 Units PRN    heparin (porcine) injection 5,000 Units Q8H    hydrALAZINE 10 mg 2 tablets, hydrALAZINE 25 mg 2 tablets, isorsorbide dinitrate 20 mg  2 tablets combination TID    hydrALAZINE injection 10 mg Q6H PRN    Lactobacillus rhamnosus GG capsule 1 capsule BID    LIDOcaine HCl 2% urojet PRN    meclizine tablet 12.5 mg TID PRN    melatonin tablet 6 mg Nightly    meropenem (MERREM) 500 mg in sodium chloride 0.9 % 100 mL IVPB  (MB+) Q12H    multivitamin tablet Daily    naloxone 0.4 mg/mL injection 0.02 mg PRN    OLANZapine injection 5 mg Nightly PRN    ondansetron disintegrating tablet 4 mg Q8H    oxybutynin 24 hr tablet 5 mg Daily    pantoprazole suspension 40 mg BID    polyethylene glycol packet 17 g BID    prochlorperazine injection Soln 10 mg Q6H PRN    senna-docusate 8.6-50 mg per tablet 1 tablet BID    sevelamer carbonate pwpk 0.8 g TID WM    sodium chloride 0.9% bolus 250 mL 250 mL PRN    sodium chloride 0.9% bolus 250 mL 250 mL PRN    sodium chloride 0.9% bolus 250 mL 250 mL PRN    sodium chloride 0.9% flush 10 mL Q12H PRN    sodium chloride 0.9% flush 10 mL PRN    traMADoL tablet 50 mg TID PRN    valsartan tablet 160 mg BID    vancomycin - pharmacy to dose pharmacy to manage frequency    white petrolatum 41 % ointment PRN       Objective:     Vital Signs (Most Recent):  Temp: 98 °F (36.7 °C) (09/06/23 0426)  Pulse: 75 (09/06/23 0426)  Resp: 17 (09/06/23 0426)  BP: 133/78 (09/06/23 0426)  SpO2: 96 % (09/06/23 0426) Vital Signs (24h Range):  Temp:  [97.9 °F (36.6 °C)-98.2 °F (36.8 °C)] 98 °F (36.7 °C)  Pulse:  [67-78] 75  Resp:  [10-43] 17  SpO2:  [95 %-99 %] 96 %  BP: (121-154)/(72-88) 133/78     Weight: 62.8 kg (138 lb 7.2 oz) (08/31/23 1146)  Body mass index is 19.87 kg/m².  Body surface area is 1.76 meters squared.    I/O last 3 completed shifts:  In: -   Out: 100 [Urine:100]     Physical Exam  Vitals and nursing note reviewed.   Constitutional:       General: He is awake. He is not in acute distress.     Appearance: He is cachectic. He is ill-appearing. He is not diaphoretic.      Comments: Muscle wasting/atrophy noted.   HENT:      Head: Normocephalic and atraumatic.      Right Ear: External ear normal.      Left Ear: External ear normal.      Nose: Nose normal.      Mouth/Throat:      Mouth: Mucous membranes are moist.      Pharynx: Oropharynx is clear. No oropharyngeal exudate or posterior oropharyngeal erythema.   Eyes:       Comments: Left eye currently sutured closed.   Cardiovascular:      Rate and Rhythm: Normal rate.      Heart sounds: No murmur heard.     No friction rub. No gallop.   Pulmonary:      Effort: Pulmonary effort is normal. No respiratory distress.      Breath sounds: No wheezing, rhonchi or rales.   Chest:      Comments: Tunneled HD catheter to right chest wall.  Abdominal:      General: Bowel sounds are normal. There is no distension.      Palpations: Abdomen is soft.      Tenderness: There is no abdominal tenderness.      Comments: PEG in place.   Musculoskeletal:      Cervical back: Neck supple.      Right lower leg: No edema.      Left lower leg: No edema.   Skin:     General: Skin is warm and dry.      Coloration: Skin is not jaundiced.      Comments: Stable noted to right upper extremity from recent surgery. Incision appears intact.   Neurological:      General: No focal deficit present.      Mental Status: He is alert. Mental status is at baseline.      Cranial Nerves: No cranial nerve deficit.      Motor: No weakness.   Psychiatric:         Mood and Affect: Mood normal.         Behavior: Behavior normal.          Significant Labs:  BMP:   Recent Labs   Lab 09/05/23  0742   GLU 91   *   K 5.1   CL 98   CO2 21*   BUN 65*   CREATININE 4.3*   CALCIUM 8.8   MG 2.1       CBC:   Recent Labs   Lab 09/06/23  0532   WBC 4.99   RBC 3.17*   HGB 9.6*   HCT 31.3*      MCV 99*   MCH 30.3   MCHC 30.7*       CMP:   Recent Labs   Lab 09/05/23  0742   GLU 91   CALCIUM 8.8   ALBUMIN 2.2*   PROT 6.1   *   K 5.1   CO2 21*   CL 98   BUN 65*   CREATININE 4.3*   ALKPHOS 132   ALT <5*   AST 14   BILITOT 0.2       LFTs:   Recent Labs   Lab 09/05/23  0742   ALT <5*   AST 14   ALKPHOS 132   BILITOT 0.2   PROT 6.1   ALBUMIN 2.2*       Microbiology Results (last 7 days)       Procedure Component Value Units Date/Time    Fungus culture [[id number]] Collected: 08/16/23 4467    Order Status: Completed Specimen: Abscess from  Eyelid, Left Updated: 09/04/23 1250     Fungus (Mycology) Culture Culture in progress      No fungus isolated after 2 weeks    Fungus culture [[id number]] Collected: 08/16/23 1632    Order Status: Completed Specimen: Wound from Cornea, Left Updated: 09/04/23 1250     Fungus (Mycology) Culture Culture in progress      No fungus isolated after 2 weeks    Fungus culture [[id number]] Collected: 08/16/23 1629    Order Status: Completed Specimen: Wound from Eyelid, Left Updated: 09/04/23 1250     Fungus (Mycology) Culture Culture in progress      No fungus isolated after 2 weeks    Fungus culture [[id number]] Collected: 08/16/23 1636    Order Status: Completed Specimen: Abscess from Eyelid, Left Updated: 09/04/23 1250     Fungus (Mycology) Culture Culture in progress      No fungus isolated after 2 weeks    Narrative:      Sclera abscess    Culture, Anaerobe [[id number]] Collected: 08/30/23 1724    Order Status: Completed Specimen: Wound from Cornea, Left Updated: 09/04/23 0954     Anaerobic Culture Culture in progress    Narrative:      LEFT INTRAOCULAR SPACE    Aerobic culture [[id number]] Collected: 08/30/23 1724    Order Status: Completed Specimen: Wound from Cornea, Left Updated: 09/02/23 1304     Aerobic Bacterial Culture No growth    Narrative:      LEFT INTRAOCULAR SPACE    CSF culture [[id number]]  (Abnormal) Collected: 08/26/23 1315    Order Status: Completed Specimen: CSF (Spinal Fluid) from CSF Tap, Tube 3 Updated: 09/01/23 1434     CSF CULTURE Results called to and read back by:Kashif Zhang RN 08/30/2023  07:44      PROPIONIBACTERIUM SPECIES  From broth only       Gram Stain Result Cytospin indicates:      No WBC's      No organisms seen    AFB Culture & Smear [[id number]] Collected: 08/26/23 1315    Order Status: Completed Specimen: CSF (Spinal Fluid) from CSF Tap, Tube 3 Updated: 08/31/23 0854     AFB Culture & Smear Culture in progress     AFB CULTURE STAIN No acid fast bacilli seen.          Specimen (24h  ago, onward)      None          Significant Imaging:  I have reviewed all imagining in the last 24 hours.

## 2023-09-06 NOTE — PLAN OF CARE
Problem: Adult Inpatient Plan of Care  Goal: Plan of Care Review  Outcome: Ongoing, Progressing  Goal: Patient-Specific Goal (Individualized)  Outcome: Ongoing, Progressing  Goal: Absence of Hospital-Acquired Illness or Injury  Outcome: Ongoing, Progressing  Goal: Optimal Comfort and Wellbeing  Outcome: Ongoing, Progressing  Goal: Readiness for Transition of Care  Outcome: Ongoing, Progressing     Problem: Diabetes Comorbidity  Goal: Blood Glucose Level Within Targeted Range  Outcome: Ongoing, Progressing     Problem: Skin Injury Risk Increased  Goal: Skin Health and Integrity  Outcome: Ongoing, Progressing     Problem: Hemodynamic Instability (Hemodialysis)  Goal: Effective Tissue Perfusion  Outcome: Ongoing, Progressing     Problem: Infection (Hemodialysis)  Goal: Absence of Infection Signs and Symptoms  Outcome: Ongoing, Progressing     Problem: Infection  Goal: Absence of Infection Signs and Symptoms  Outcome: Ongoing, Progressing     Problem: Coping Ineffective  Goal: Effective Coping  Outcome: Ongoing, Progressing     Problem: Device-Related Complication Risk (CRRT (Continuous Renal Replacement Therapy))  Goal: Safe, Effective Therapy Delivery  Outcome: Ongoing, Progressing     Problem: Hypothermia (CRRT (Continuous Renal Replacement Therapy))  Goal: Body Temperature Maintained in Desired Range  Outcome: Ongoing, Progressing     Problem: Infection (CRRT (Continuous Renal Replacement Therapy))  Goal: Absence of Infection Signs and Symptoms  Outcome: Ongoing, Progressing

## 2023-09-06 NOTE — NURSING
Report received from Emery RN , care assumed, pt is AAO x4, resting in bed with siderails up x3, bed locked in lowest position with bed alarm set, call light in reach, safety maintained, NAD noted, POC reviewed with pt and sister at bedside, all questions answered, pt reminded to use call light for all requests, pt verbalized understanding, will continue to monitor.

## 2023-09-06 NOTE — NURSING
"Pt had  an ok night, pt was restarted on Tube feeds Novasource Renal at 10ml/h,no nausea or vomiting throughout the shift, pt did complain of pain throughout the shift and was given Tramadol x2, Tylenoll and Lidocaine, each given once throughout the shift, pt can be seen sleeping and his sister will wake him up and tell him to ask for pain medication, She pulled out a bottle of aloe vera from home  that she wanted injected into his urethra, this nurse refused and informed the pt's sister he had just gotten medicated with Tramadol 50mg orally and 2 hours prior he was given Lidocaine urojet. This nurse also informed "Marley", that I wasn't giving the patient any medications that were not prescribed by his physician, Pt and his sister verbalized understanding, pt resting in bed quietly, safety maintained, call light in reach, sister remains at bedside.  "

## 2023-09-06 NOTE — PT/OT/SLP PROGRESS
Physical Therapy      Patient Name:  Immanuel Bernal   MRN:  [id number]    Patient not seen today secondary to  (Pt leaving the floor for HD on attempt and remains VÍCTOR when PT later f/u.). Will follow-up as appropriate.

## 2023-09-06 NOTE — PT/OT/SLP PROGRESS
Occupational Therapy      Patient Name:  Immanuel Bernal   MRN:  [id number]    Patient not seen today secondary to Dialysis. Will follow-up at earliest convenience.    9/6/2023

## 2023-09-06 NOTE — ASSESSMENT & PLAN NOTE
Staples and sutures removed from RUE. Steri strips placed over incision. Vascular signing off at this time.

## 2023-09-06 NOTE — ASSESSMENT & PLAN NOTE
Meningitis    S/p L eye evisceration on 8/16/23 and completed enucleation with implant placed on 8/30/20.  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    - Ophthalmology following  - Continue meropenem and fluconazole; Tobramycin drops discontinued following successful eye evisceration completion with eye implant.  - Anticipated 4-6 weeks of therapy from evisceration. Anticipated end date: 9/12-9/26. See OPAT note from 8/19.  - Tunneled PICC line was placed for Antibiotics. (08/29)  - Ophthalmology removed eye binder and ordered tobramycin-dexamethasone eye drops and ointment.  - Follow up with outpatient Ophthalmology in 5 weeks.

## 2023-09-06 NOTE — NURSING
Patient left the unit for  dialysis at around 1410horus . Patient was transferred  on bed . Room air . Tele  on . Report  given to dialysis nurse .
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
## 2023-09-06 NOTE — CONSULTS
"[facility] - Intensive Care ([facility]-)  Vascular Surgery  Consult Note    Inpatient consult to Vascular Surgery  Consult performed by: Tl Knight MD  Consult ordered by: Porsha Funez MD        Subjective:     Chief Complaint/Reason for Admission:     History of Present Illness: 57yoM w/ multiple medical comorbidities including ESRD, AVF with potential infection and subsequent removal, who presented to [facility] [place] [facility] after leaving ama from [facility] for management of endopthalmitis. Vascular surgery is consulted for the management of staples on his right arm from AVF removal at OSH. Per patient's sister, he had an uncomplicated AVF done at an OSH about 6 weeks ago. About two weeks after that he developed sxs of an "eye infection" later diagnosed at endophthalmitis. There was concern that the AVF placement may have been the source of infection, hence the fistula was removed the next week. After the removal the incision on the R UE was closed with staples. At the hospital the patient was also diagnosed with R UE "blood clots" given significant RUE swelling and tenderness. However, patient left AMA before receiving treatment d/t nursing concerns. He then came to [facility] for management of endophthalmitis which has since been treated with IV abx and opthalmologic intervention. He had a RUE US yesterday that showed multiple DVTs. Vascular surgery is consulted for potential SSI at staple site.                 Medications Prior to Admission   Medication Sig Dispense Refill Last Dose    acetaminophen (TYLENOL) 325 MG tablet Take 650 mg by mouth every 6 (six) hours as needed for Pain.       albuterol-ipratropium (DUO-NEB) 2.5 mg-0.5 mg/3 mL nebulizer solution Inhale 3 mLs into the lungs 3 (three) times daily.       ascorbic acid, vitamin C, (VITAMIN C) 500 MG tablet Take 500 mg by mouth once daily.       aspirin 81 MG Chew Take 81 mg by mouth once daily.       calcium carbonate-vitamin D3 (OYSTER SHELL CALCIUM-VIT D3) 500 mg-5 " mcg (200 unit) PwPk Take 1 tablet by mouth 2 (two) times a day.       folic acid (FOLVITE) 1 MG tablet Take 1 tablet by mouth every morning.       megestroL (MEGACE) 400 mg/10 mL (40 mg/mL) Susp Take 10 mLs by mouth 2 (two) times a day.       multivitamin (THERAGRAN) per tablet Take 1 tablet by mouth once daily.       polyethylene glycol (GLYCOLAX) 17 gram/dose powder Take 17 g by mouth 2 (two) times daily.       tamsulosin (FLOMAX) 0.4 mg Cap Take 1 capsule (0.4 mg total) by mouth once daily. 30 capsule 6     VITAMIN B COMPLEX ORAL Take 1 tablet by mouth every morning.       zinc gluconate 50 mg tablet Take 50 mg by mouth once daily.       [DISCONTINUED] amLODIPine (NORVASC) 10 MG tablet Take 10 mg by mouth once daily.       [DISCONTINUED] carvediloL (COREG) 25 MG tablet Take 1 tablet (25 mg total) by mouth 2 (two) times daily with meals. 60 tablet 6 Past Week    [DISCONTINUED] citalopram (CELEXA) 20 MG tablet Take 20 mg by mouth once daily.       [DISCONTINUED] coffee xt/phosphatidyl serine (NEURIVA ORIGINAL ORAL) Take 1 capsule by mouth once daily.       [DISCONTINUED] flecainide (TAMBOCOR) 50 MG Tab Take 50 mg by mouth 2 (two) times daily.       [DISCONTINUED] ibuprofen (ADVIL,MOTRIN) 600 MG tablet Take 600 mg by mouth every 6 (six) hours as needed for Pain.       [DISCONTINUED] lactulose (CHRONULAC) 10 gram/15 mL solution Take 45 mLs by mouth daily as needed (constipation).       [DISCONTINUED] metoclopramide HCl (REGLAN) 5 MG tablet Take 5 mg by mouth once daily.       [DISCONTINUED] pantoprazole (PROTONIX) 40 MG tablet Take 40 mg by mouth once daily.       [DISCONTINUED] pentoxifylline (TRENTAL) 400 mg TbSR Take 400 mg by mouth 2 (two) times daily.       [DISCONTINUED] rosuvastatin (CRESTOR) 5 MG tablet Take 5 mg by mouth once daily.       [DISCONTINUED] sevelamer carbonate (RENVELA) 800 mg Tab Take 1 tablet by mouth 5 times per day       [DISCONTINUED] UNABLE TO FIND Take 1 tablet by  mouth once daily. medication name: OPTIMAL ANTIOX       [DISCONTINUED] UNABLE TO FIND Take 1 tablet by mouth nightly. medication name: QUNOL SLEEP SUPPORT          Review of patient's allergies indicates:   Allergen Reactions    Morphine Rash    Amiodarone analogues Itching     Other reaction(s): Unknown       Past Medical History:   Diagnosis Date    Bilateral retinal detachment 7/18/2023    BPH (benign prostatic hyperplasia)     Cataract     CHF (congestive heart failure)     CKD (chronic kidney disease) stage 3, GFR 30-59 ml/min     Diabetes mellitus, type 2     Hypertension     KENIA (obstructive sleep apnea)     Pancreatitis     Persistent proteinuria 11/12/2020    2 g proteinuria noted Resumed ACE Lower BP systolic to less than 130     PTSD (post-traumatic stress disorder)     Stroke      Past Surgical History:   Procedure Laterality Date    CATARACT EXTRACTION Bilateral     ENUCLEATION Left 8/30/2023    Procedure: ENUCLEATION, EYE;  Surgeon: Vicki Stewart MD;  Location: [facility] OR [address] [place] [address];  Service: Ophthalmology;  Laterality: Left;    ESOPHAGOGASTRODUODENOSCOPY N/A 8/23/2023    Procedure: EGD (ESOPHAGOGASTRODUODENOSCOPY);  Surgeon: Bharath Ya MD;  Location: [facility] (2ND FLR);  Service: Endoscopy;  Laterality: N/A;    EVISCERATION OF OCULAR CONTENTS Left 8/16/2023    Procedure: EVISCERATION, OCULAR CONTENTS;  Surgeon: Vicki Stewart MD;  Location: [address];  Service: Ophthalmology;  Laterality: Left;    MAGNETIC RESONANCE IMAGING N/A 8/26/2023    Procedure: MRI (Magnetic Resonance Imagine);  Surgeon: Mckenzie De Jesus;  Location: [facility];  Service: Anesthesiology;  Laterality: N/A;    RETROBULBAR INJECTION OF MEDICATION Left 8/16/2023    Procedure: INJECTION, MEDICATION, RETROBULBAR;  Surgeon: Vicki Stewart MD;  Location: [facility] OR [address] [place] [address];  Service: Ophthalmology;  Laterality: Left;    RETROBULBAR INJECTION OF MEDICATION Left 8/30/2023    Procedure: INJECTION, MEDICATION,  RETROBULBAR;  Surgeon: Vicki Stewart MD;  Location: [address];  Service: Ophthalmology;  Laterality: Left;    TARSORRHAPHY Left 8/16/2023    Procedure: BLEPHARORRHAPHY;  Surgeon: Vicki Stewart MD;  Location: [facility] OR [address] [place] [address];  Service: Ophthalmology;  Laterality: Left;    TARSORRHAPHY Left 8/30/2023    Procedure: BLEPHARORRHAPHY;  Surgeon: Vicki Stewart MD;  Location: [facility] OR [address] [place] [address];  Service: Ophthalmology;  Laterality: Left;     Family History       Problem Relation (Age of Onset)    Diabetes Father, Sister    Heart disease Father    Hyperlipidemia Brother    Kidney disease Father    Stroke Mother          Tobacco Use    Smoking status: Former     Types: Cigarettes, Cigars    Smokeless tobacco: Never   Substance and Sexual Activity    Alcohol use: Not Currently    Drug use: Not Currently    Sexual activity: Not on file     Review of Systems   All other systems reviewed and are negative.    Objective:     Vital Signs (Most Recent):  Temp: 98.2 °F (36.8 °C) (09/06/23 1200)  Pulse: 68 (09/06/23 1745)  Resp: (!) 21 (09/06/23 1200)  BP: (!) 145/85 (09/06/23 1745)  SpO2: 96 % (09/06/23 1200) Vital Signs (24h Range):  Temp:  [98 °F (36.7 °C)-99 °F (37.2 °C)] 98.2 °F (36.8 °C)  Pulse:  [67-78] 68  Resp:  [16-45] 21  SpO2:  [94 %-99 %] 96 %  BP: (126-154)/(75-85) 145/85     Weight: 62.8 kg (138 lb 7.2 oz)  Body mass index is 19.87 kg/m².      Physical Exam   Constitutional:       Appearance: Normal appearance.   HENT:      Head: Normocephalic and atraumatic.   Eyes:      Comments: L eye s/p surgical intervention   Cardiovascular:      Rate and Rhythm: Normal rate and regular rhythm.      Pulses: Normal pulses.      Comments: Palpable 2+ R radial and ulnar pulse   Pulmonary:      Effort: No respiratory distress.   Musculoskeletal:         General: Swelling present.      Cervical back: Neck supple.      Comments: Significant swelling/pitting edema of RUE   Skin:     General: Skin is warm.      Comments:  Incisions on RUE closed with staples. Incisions not tender, erythematous, or with any discharge or purulence. Incision well healed     Significant Labs:  All pertinent labs from the last 24 hours have been reviewed.    Significant Diagnostics:  I have reviewed all pertinent imaging results/findings within the past 24 hours.    Assessment/Plan:     ESRD (end stage renal disease)  Staples and sutures removed from RUE. Steri strips placed over incision. Vascular signing off at this time.        Thank you for your consult. I will sign off. Please contact us if you have any additional questions.    Tl Knight MD  Vascular Surgery  [facility] - Intensive Care ([place]

## 2023-09-06 NOTE — PLAN OF CARE
YULI has spoken with Janessa coordinator with [facility] and she requested YULI to reach out to [facility], director [phone] to discuss patient possibly being admitted there for rehab and she informed SW that patient has no more Medicare Lifetime Reserve days. She reports that they can possibly admit patient if his Medicaid plan gets changed to a Managed Medicaid Plan. YULI has sent an email to the [facility] team requesting patient Medicaid plan be changed and if changed the coordinator can submit for insurance auth.    YULI has left a message for patient sister Marley, [phone]  and awaiting a call back. Patient will be needing a new HD chair. YULI will follow up with [facility] YULI.  YULI spoke with Mara ,admission coordinator [phone] reporting patient that pt is accepted for nursing home placement will be needing his last 3 months of bank statements.     Updated 11:32am  YULI poke to the coordinator Mara of [facility] verifying he is accepted for nursing home placement, YULI will continue to try and reach patient sister so that she can provide his last 3 months of bank statements.     Updated 2:59  YULI has spoken to patient sister Marley in regard to the dc plan of nursing home placement at [facility] and she reports that she will be working on obtaining the patient last 3 months of bank statements.      Mirta Melendez, ADWOA  [facility]   [id number]

## 2023-09-06 NOTE — ASSESSMENT & PLAN NOTE
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

## 2023-09-06 NOTE — NURSING
3.5 hours HD tx completed. 2 L of fluid removed.  Patient tolerated well.    Epoetin given as ordered.    Blood returned. Lines flushed with NS. Catheter locked with Heparin. Clamped and capped.    BG 72 @ 2629.     Report given to ACACIA Montes RN.
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
## 2023-09-06 NOTE — ASSESSMENT & PLAN NOTE
Outpatient HD Information:  -Outpatient HD unit: C   -HD tx days: MWF   -HD tx time: 210min  -HD access: R IJ TDC   -HD modality: iHD   -Residual urine: ?    Plan/Recommendations:  - plan for iHD today per schedule, every Monday, Wednesday & Friday  - can continue calcitriol 0.25 mcg daily and Renvela 800 mg TIDWM  - renal diet/tube feeds when not NPO  - strict I/O's and daily weights  - daily renal function panels and magnesium levels  - renally all dose medications to eGFR  - avoid gadolinium, fleets, phos-based laxatives, NSAIDs, etc.

## 2023-09-06 NOTE — PROGRESS NOTES
[facility] - [facility] ([place] [address])  Nephrology  Progress Note    Patient Name: Immanuel Bernal  MRN: [id number]  Admission Date: 8/9/2023  Hospital Length of Stay: 28 days  Attending Provider: Porsha Funez MD   Primary Care Physician: Dolly, Primary Doctor  Principal Problem:Endophthalmitis    Subjective:     HPI: HPI obtained per medical record as patient unable to communicate     57-year-old male with a history of CHF, diabetes, hypertension, chronic disability, end-stage renal disease on hemodialysis, PEG placement, bowel obstruction, sleep apnea, TIA, left eye vitreous hemorrhage, stroke, and bowel obstruction with bowel resection admitted to [facility] in [place] July 18 from nursing home with left eye pain and blurred vision.  He was admitted with concern for bilateral endophthalmitis.  Other admit diagnoses included right upper extremity thrombus, end-stage renal disease on hemodialysis, hyperkalemia, sleep apnea, diabetes, and CHF.  He was treated with broad-spectrum antibiotics.  He was seen by Infectious Diseases,, and he was treated with vancomycin, ceftriaxone, and amphotericin.  Blood cultures were positive for Pseudomonas, and amphotericin/vancomycin were stopped.  IV cefepime was continued.  He was seen by Ophthalmology, and he received intravitreal vancomycin and ceftazidime (July 18).  He also had intravitreal tap July 18 that had no yeast or fungal elements observed.  Left eye endophthalmitis did not respond to treatment, and he subsequently developed abscess formation, significant proptosis, and drainage of purulent material from the orbit.  Ophthalmology recommended enucleation.  He was continued on cefepime for pseudomonal and ophthalmitis.  Recommendation was for 6 weeks of treatment to be followed by indefinite fluoroquinolone.  He was seen by Pulmonology during his stay for a right upper lung mass with peripheral nodules.  It was felt that he would need further  investigation of this once his current clinical issues stabilized.  Right upper extremity AV graft was excised during his stay with concern for infection.  A right IJ tunneled line was placed on July 27.  Left eye endophthalmitis continued to worsen, and ophthalmology spoke with patient and family about the need for enucleation.  Despite several conversations, family declined enucleation.  Plans were for transitioned to skilled nursing facility for continued treatment, but family did not want him to go to a skilled nursing facility.  He was subsequently discharged AMA from the hospital there on August 7.  Please see the August 7 Internal Medicine note for further details.     He subsequently presented to [facility] Emergency Department.  He will not go back to [facility] in [place]. He received Zosyn and vancomycin.  ED team at [place] spoke with the patient and his power-of- (sister).  CT of the orbits noted scleral abscess along the lateral aspect of the left globe.  Patient and family are aware and in agreement that the patient needs enucleation of the eye at this time. Referring provider spoke with Oculoplastics at [facility]. Requesting transfer to [facility] Medicine for Oculoplastics specialty evaluation of persistent endophthalmitis.  ED provider noted patient is hemodynamically stable.  He does not appear volume overloaded or in respiratory distress.        The patient has a significant previous medical course as listed below  August 3: MRI brain and orbits showed worsening ophthalmitis and inflammatory changes of the left orbital tissues with slight increase in proptosis.  No evidence of intracranial inflammatory process observed.    July 25: Transesophageal echocardiogram had no evidence of endocarditis.  Moderate to severely decreased left ventricular systolic function with EF 30-35%.    July 22:  CT chest showed right upper lobe mass with numerous bilateral  nodularity predominantly in the right with associated mediastinal lymph nodes.  July 21: Blood cultures with Pseudomonas aeruginosa  July 19: MRI brain/orbits with and without contrast had findings suggestive of chronic microvascular ischemic disease of the white matter with remote ischemic event in the left insula/basal ganglia/subependymal white matter/right middle cerebellar peduncle and hemisphere.  Findings consistent with left endophthalmitis.  No abnormal findings observed in the right globe.  July 18:  Blood cultures with Pseudomonas aeruginosa and coag-negative staph    HPI obtained from hospital med's note. Patient with AMS at time of nephrology evaluation and unable to provide HPI or ROS.          Interval History: Patient seen and examined. Family member at bedside. Complaints of insomnia this morning secondary to pain overnight. UA not indicative of infection. Afebrile with pulse ranging from 70-60s bpm. Systolic blood pressures ranging from 150-130s mmHg. He is saturating +95% on room air. Roughly 350 mL documented UOP in the last 24 hours. Laboratory studies reviewed. Plan for iHD today.    Review of patient's allergies indicates:   Allergen Reactions    Morphine Rash    Amiodarone analogues Itching     Other reaction(s): Unknown     Current Facility-Administered Medications   Medication Frequency    0.9%  NaCl infusion (for blood administration) Q24H PRN    0.9%  NaCl infusion (for blood administration) Q24H PRN    0.9%  NaCl infusion PRN    0.9%  NaCl infusion Once    0.9%  NaCl infusion Once    0.9%  NaCl infusion Once    0.9%  NaCl infusion Once    acetaminophen tablet 1,000 mg TID    albuterol-ipratropium 2.5 mg-0.5 mg/3 mL nebulizer solution 3 mL Q6H WAKE    amLODIPine tablet 5 mg Daily    atorvastatin tablet 40 mg Daily    calcitRIOL capsule 0.25 mcg Daily    carvediloL tablet 6.25 mg BID    dextrose 10% bolus 125 mL 125 mL PRN    dextrose 10% bolus 250 mL 250 mL PRN     epoetin thee injection 3,000 Units Every Mon, Wed, Fri    fluconazole 40 mg/ml suspension 400 mg Daily    glucagon (human recombinant) injection 1 mg PRN    glucose chewable tablet 16 g PRN    glucose chewable tablet 24 g PRN    heparin (porcine) injection 1,000 Units PRN    heparin (porcine) injection 1,000 Units PRN    heparin (porcine) injection 5,000 Units Q8H    hydrALAZINE 10 mg 2 tablets, hydrALAZINE 25 mg 2 tablets, isorsorbide dinitrate 20 mg  2 tablets combination TID    hydrALAZINE injection 10 mg Q6H PRN    Lactobacillus rhamnosus GG capsule 1 capsule BID    LIDOcaine HCl 2% urojet PRN    meclizine tablet 12.5 mg TID PRN    melatonin tablet 6 mg Nightly    meropenem (MERREM) 500 mg in sodium chloride 0.9 % 100 mL IVPB (MB+) Q12H    multivitamin tablet Daily    naloxone 0.4 mg/mL injection 0.02 mg PRN    OLANZapine injection 5 mg Nightly PRN    ondansetron disintegrating tablet 4 mg Q8H    oxybutynin 24 hr tablet 5 mg Daily    pantoprazole suspension 40 mg BID    polyethylene glycol packet 17 g BID    prochlorperazine injection Soln 10 mg Q6H PRN    senna-docusate 8.6-50 mg per tablet 1 tablet BID    sevelamer carbonate pwpk 0.8 g TID WM    sodium chloride 0.9% bolus 250 mL 250 mL PRN    sodium chloride 0.9% bolus 250 mL 250 mL PRN    sodium chloride 0.9% bolus 250 mL 250 mL PRN    sodium chloride 0.9% flush 10 mL Q12H PRN    sodium chloride 0.9% flush 10 mL PRN    traMADoL tablet 50 mg TID PRN    valsartan tablet 160 mg BID    vancomycin - pharmacy to dose pharmacy to manage frequency    white petrolatum 41 % ointment PRN       Objective:     Vital Signs (Most Recent):  Temp: 98 °F (36.7 °C) (09/06/23 0426)  Pulse: 75 (09/06/23 0426)  Resp: 17 (09/06/23 0426)  BP: 133/78 (09/06/23 0426)  SpO2: 96 % (09/06/23 0426) Vital Signs (24h Range):  Temp:  [97.9 °F (36.6 °C)-98.2 °F (36.8 °C)] 98 °F (36.7 °C)  Pulse:  [67-78] 75  Resp:  [10-43] 17  SpO2:  [95 %-99 %] 96 %  BP:  (121-154)/(72-88) 133/78     Weight: 62.8 kg (138 lb 7.2 oz) (08/31/23 1146)  Body mass index is 19.87 kg/m².  Body surface area is 1.76 meters squared.    I/O last 3 completed shifts:  In: -   Out: 100 [Urine:100]    Physical Exam  Vitals and nursing note reviewed.   Constitutional:       General: He is awake. He is not in acute distress.     Appearance: He is cachectic. He is ill-appearing. He is not diaphoretic.      Comments: Muscle wasting/atrophy noted.   HENT:      Head: Normocephalic and atraumatic.      Right Ear: External ear normal.      Left Ear: External ear normal.      Nose: Nose normal.      Mouth/Throat:      Mouth: Mucous membranes are moist.      Pharynx: Oropharynx is clear. No oropharyngeal exudate or posterior oropharyngeal erythema.   Eyes:      Comments: Left eye currently sutured closed.   Cardiovascular:      Rate and Rhythm: Normal rate.      Heart sounds: No murmur heard.     No friction rub. No gallop.   Pulmonary:      Effort: Pulmonary effort is normal. No respiratory distress.      Breath sounds: No wheezing, rhonchi or rales.   Chest:      Comments: Tunneled HD catheter to right chest wall.  Abdominal:      General: Bowel sounds are normal. There is no distension.      Palpations: Abdomen is soft.      Tenderness: There is no abdominal tenderness.      Comments: PEG in place.   Musculoskeletal:      Cervical back: Neck supple.      Right lower leg: No edema.      Left lower leg: No edema.   Skin:     General: Skin is warm and dry.      Coloration: Skin is not jaundiced.      Comments: Stable noted to right upper extremity from recent surgery. Incision appears intact.   Neurological:      General: No focal deficit present.      Mental Status: He is alert. Mental status is at baseline.      Cranial Nerves: No cranial nerve deficit.      Motor: No weakness.   Psychiatric:         Mood and Affect: Mood normal.         Behavior: Behavior normal.          Significant Labs:  BMP:   Recent  Labs   Lab 09/05/23  0742   GLU 91   *   K 5.1   CL 98   CO2 21*   BUN 65*   CREATININE 4.3*   CALCIUM 8.8   MG 2.1       CBC:   Recent Labs   Lab 09/06/23  0532   WBC 4.99   RBC 3.17*   HGB 9.6*   HCT 31.3*      MCV 99*   MCH 30.3   MCHC 30.7*       CMP:   Recent Labs   Lab 09/05/23  0742   GLU 91   CALCIUM 8.8   ALBUMIN 2.2*   PROT 6.1   *   K 5.1   CO2 21*   CL 98   BUN 65*   CREATININE 4.3*   ALKPHOS 132   ALT <5*   AST 14   BILITOT 0.2       LFTs:   Recent Labs   Lab 09/05/23  0742   ALT <5*   AST 14   ALKPHOS 132   BILITOT 0.2   PROT 6.1   ALBUMIN 2.2*       Microbiology Results (last 7 days)       Procedure Component Value Units Date/Time    Fungus culture [[id number]] Collected: 08/16/23 1647    Order Status: Completed Specimen: Abscess from Eyelid, Left Updated: 09/04/23 1250     Fungus (Mycology) Culture Culture in progress      No fungus isolated after 2 weeks    Fungus culture [[id number]] Collected: 08/16/23 1632    Order Status: Completed Specimen: Wound from Cornea, Left Updated: 09/04/23 1250     Fungus (Mycology) Culture Culture in progress      No fungus isolated after 2 weeks    Fungus culture [[id number]] Collected: 08/16/23 1629    Order Status: Completed Specimen: Wound from Eyelid, Left Updated: 09/04/23 1250     Fungus (Mycology) Culture Culture in progress      No fungus isolated after 2 weeks    Fungus culture [[id number]] Collected: 08/16/23 1636    Order Status: Completed Specimen: Abscess from Eyelid, Left Updated: 09/04/23 1250     Fungus (Mycology) Culture Culture in progress      No fungus isolated after 2 weeks    Narrative:      Sclera abscess    Culture, Anaerobe [[id number]] Collected: 08/30/23 1724    Order Status: Completed Specimen: Wound from Cornea, Left Updated: 09/04/23 0954     Anaerobic Culture Culture in progress    Narrative:      LEFT INTRAOCULAR SPACE    Aerobic culture [[id number]] Collected: 08/30/23 1724    Order Status: Completed Specimen: Wound from  Cornea, Left Updated: 09/02/23 1304     Aerobic Bacterial Culture No growth    Narrative:      LEFT INTRAOCULAR SPACE    CSF culture [[id number]]  (Abnormal) Collected: 08/26/23 1315    Order Status: Completed Specimen: CSF (Spinal Fluid) from CSF Tap, Tube 3 Updated: 09/01/23 1434     CSF CULTURE Results called to and read back by:Kashif Zhang RN 08/30/2023  07:44      PROPIONIBACTERIUM SPECIES  From broth only       Gram Stain Result Cytospin indicates:      No WBC's      No organisms seen    AFB Culture & Smear [[id number]] Collected: 08/26/23 1315    Order Status: Completed Specimen: CSF (Spinal Fluid) from CSF Tap, Tube 3 Updated: 08/31/23 0854     AFB Culture & Smear Culture in progress     AFB CULTURE STAIN No acid fast bacilli seen.          Specimen (24h ago, onward)      None          Significant Imaging:  I have reviewed all imagining in the last 24 hours.    Assessment/Plan:     Ophtho  * Endophthalmitis  - Ophthalmology consulted and following  - s/p left eye enucleation on 8/30    Cardiac/Vascular  Hypertension  - management per primary   - currently on Norvasc 5 mg daily, Coreg 6.25 mg BID, hydralazine 70 mg BID, isosorbide dinitrate 40 mg BID and valsartan 160 mg BID    Renal/  ESRD (end stage renal disease)  Outpatient HD Information:  -Outpatient HD unit: C   -HD tx days: MWF   -HD tx time: 210min  -HD access: R IJ TDC   -HD modality: iHD   -Residual urine: ?    Plan/Recommendations:  - plan for iHD today per schedule, every Monday, Wednesday & Friday  - can continue calcitriol 0.25 mcg daily and Renvela 800 mg TIDWM  - renal diet/tube feeds when not NPO  - strict I/O's and daily weights  - daily renal function panels and magnesium levels  - renally all dose medications to eGFR  - avoid gadolinium, fleets, phos-based laxatives, NSAIDs, etc.    ID  AV fistula infection  - secondary to Pseudomonas  - s/p right upper extremity AV graft excision in July  - Infectious Disease consulted and  following   - plan for 4-6 weeks total of meropenem, vancomycin and fluconazole    Oncology  Anemia in ESRD (end-stage renal disease)  - target Hg of 10-12  - continue epogen 3,000 units every Monday, Wednesday and Friday       Thank you for your consult. I will follow-up with patient. Please contact us if you have any additional questions.    Herbert Cedeño MD  Nephrology  [facility] - Intensive Care ([place]-)

## 2023-09-06 NOTE — NURSING
Report received from ACACIA Montes RN.   Patient arrived to [facility] via bed. Awake and alert.    HD tx initiated via Right IJ tunneled cath.

## 2023-09-07 LAB
ALBUMIN SERPL BCP-MCNC: 2.1 G/DL (ref 3.5–5.2)
ALP SERPL-CCNC: 111 U/L (ref 55–135)
ALT SERPL W/O P-5'-P-CCNC: <5 U/L (ref 10–44)
ANION GAP SERPL CALC-SCNC: 10 MMOL/L (ref 8–16)
AST SERPL-CCNC: 18 U/L (ref 10–40)
BILIRUB SERPL-MCNC: 0.3 MG/DL (ref 0.1–1)
BUN SERPL-MCNC: 30 MG/DL (ref 6–20)
CALCIUM SERPL-MCNC: 8.5 MG/DL (ref 8.7–10.5)
CHLORIDE SERPL-SCNC: 97 MMOL/L (ref 95–110)
CO2 SERPL-SCNC: 25 MMOL/L (ref 23–29)
CREAT SERPL-MCNC: 3 MG/DL (ref 0.5–1.4)
ERYTHROCYTE [DISTWIDTH] IN BLOOD BY AUTOMATED COUNT: 17 % (ref 11.5–14.5)
EST. GFR  (NO RACE VARIABLE): 23.2 ML/MIN/1.73 M^2
GLUCOSE SERPL-MCNC: 83 MG/DL (ref 70–110)
HCT VFR BLD AUTO: 31 % (ref 40–54)
HGB BLD-MCNC: 9.5 G/DL (ref 14–18)
MAGNESIUM SERPL-MCNC: 2 MG/DL (ref 1.6–2.6)
MCH RBC QN AUTO: 30.4 PG (ref 27–31)
MCHC RBC AUTO-ENTMCNC: 30.6 G/DL (ref 32–36)
MCV RBC AUTO: 99 FL (ref 82–98)
PHOSPHATE SERPL-MCNC: 2.7 MG/DL (ref 2.7–4.5)
PLATELET # BLD AUTO: 176 K/UL (ref 150–450)
PMV BLD AUTO: 10.4 FL (ref 9.2–12.9)
POCT GLUCOSE: 117 MG/DL (ref 70–110)
POCT GLUCOSE: 121 MG/DL (ref 70–110)
POCT GLUCOSE: 144 MG/DL (ref 70–110)
POCT GLUCOSE: 96 MG/DL (ref 70–110)
POTASSIUM SERPL-SCNC: 3.9 MMOL/L (ref 3.5–5.1)
PROT SERPL-MCNC: 5.9 G/DL (ref 6–8.4)
RBC # BLD AUTO: 3.12 M/UL (ref 4.6–6.2)
SODIUM SERPL-SCNC: 132 MMOL/L (ref 136–145)
WBC # BLD AUTO: 4.63 K/UL (ref 3.9–12.7)

## 2023-09-07 PROCEDURE — 25000242 PHARM REV CODE 250 ALT 637 W/ HCPCS: Performed by: STUDENT IN AN ORGANIZED HEALTH CARE EDUCATION/TRAINING PROGRAM

## 2023-09-07 PROCEDURE — 97116 GAIT TRAINING THERAPY: CPT

## 2023-09-07 PROCEDURE — 36415 COLL VENOUS BLD VENIPUNCTURE: CPT

## 2023-09-07 PROCEDURE — 25000003 PHARM REV CODE 250: Performed by: HOSPITALIST

## 2023-09-07 PROCEDURE — 25000003 PHARM REV CODE 250

## 2023-09-07 PROCEDURE — 99232 SBSQ HOSP IP/OBS MODERATE 35: CPT | Mod: GC,,, | Performed by: HOSPITALIST

## 2023-09-07 PROCEDURE — 84100 ASSAY OF PHOSPHORUS: CPT

## 2023-09-07 PROCEDURE — 97530 THERAPEUTIC ACTIVITIES: CPT | Mod: CO

## 2023-09-07 PROCEDURE — 80053 COMPREHEN METABOLIC PANEL: CPT | Performed by: STUDENT IN AN ORGANIZED HEALTH CARE EDUCATION/TRAINING PROGRAM

## 2023-09-07 PROCEDURE — 99232 PR SUBSEQUENT HOSPITAL CARE,LEVL II: ICD-10-PCS | Mod: GC,,, | Performed by: HOSPITALIST

## 2023-09-07 PROCEDURE — 97530 THERAPEUTIC ACTIVITIES: CPT

## 2023-09-07 PROCEDURE — 94761 N-INVAS EAR/PLS OXIMETRY MLT: CPT

## 2023-09-07 PROCEDURE — 25000003 PHARM REV CODE 250: Performed by: STUDENT IN AN ORGANIZED HEALTH CARE EDUCATION/TRAINING PROGRAM

## 2023-09-07 PROCEDURE — 85027 COMPLETE CBC AUTOMATED: CPT

## 2023-09-07 PROCEDURE — 63600175 PHARM REV CODE 636 W HCPCS

## 2023-09-07 PROCEDURE — 94640 AIRWAY INHALATION TREATMENT: CPT

## 2023-09-07 PROCEDURE — 97535 SELF CARE MNGMENT TRAINING: CPT | Mod: CO

## 2023-09-07 PROCEDURE — 83735 ASSAY OF MAGNESIUM: CPT

## 2023-09-07 PROCEDURE — 63700000 PHARM REV CODE 250 ALT 637 W/O HCPCS: Performed by: HOSPITALIST

## 2023-09-07 PROCEDURE — 20000000 HC ICU ROOM

## 2023-09-07 RX ORDER — HEPARIN SODIUM 1000 [USP'U]/ML
1000 INJECTION, SOLUTION INTRAVENOUS; SUBCUTANEOUS
Status: ACTIVE | OUTPATIENT
Start: 2023-09-08 | End: 2023-09-08

## 2023-09-07 RX ORDER — SODIUM CHLORIDE 9 MG/ML
INJECTION, SOLUTION INTRAVENOUS ONCE
Status: DISCONTINUED | OUTPATIENT
Start: 2023-09-08 | End: 2023-09-09

## 2023-09-07 RX ADMIN — TOBRAMYCIN AND DEXAMETHASONE: 3; 1 OINTMENT OPHTHALMIC at 03:09

## 2023-09-07 RX ADMIN — HYDRALAZINE HYDROCHLORIDE: 10 TABLET, FILM COATED ORAL at 03:09

## 2023-09-07 RX ADMIN — ACETAMINOPHEN 1000 MG: 500 TABLET ORAL at 09:09

## 2023-09-07 RX ADMIN — POLYETHYLENE GLYCOL 3350 17 G: 17 POWDER, FOR SOLUTION ORAL at 08:09

## 2023-09-07 RX ADMIN — CARVEDILOL 6.25 MG: 6.25 TABLET, FILM COATED ORAL at 08:09

## 2023-09-07 RX ADMIN — MEROPENEM 500 MG: 500 INJECTION INTRAVENOUS at 08:09

## 2023-09-07 RX ADMIN — TOBRAMYCIN AND DEXAMETHASONE: 3; 1 OINTMENT OPHTHALMIC at 08:09

## 2023-09-07 RX ADMIN — ONDANSETRON 4 MG: 4 TABLET, ORALLY DISINTEGRATING ORAL at 03:09

## 2023-09-07 RX ADMIN — SEVELAMER CARBONATE 0.8 G: 0.8 POWDER, FOR SUSPENSION ORAL at 08:09

## 2023-09-07 RX ADMIN — LIDOCAINE HYDROCHLORIDE: 20 JELLY TOPICAL at 06:09

## 2023-09-07 RX ADMIN — Medication 6 MG: at 09:09

## 2023-09-07 RX ADMIN — CARVEDILOL 6.25 MG: 6.25 TABLET, FILM COATED ORAL at 09:09

## 2023-09-07 RX ADMIN — Medication 1 CAPSULE: at 09:09

## 2023-09-07 RX ADMIN — POLYETHYLENE GLYCOL 3350 17 G: 17 POWDER, FOR SOLUTION ORAL at 09:09

## 2023-09-07 RX ADMIN — VALSARTAN 160 MG: 160 TABLET, FILM COATED ORAL at 08:09

## 2023-09-07 RX ADMIN — AMLODIPINE BESYLATE 5 MG: 5 TABLET ORAL at 08:09

## 2023-09-07 RX ADMIN — SENNOSIDES AND DOCUSATE SODIUM 1 TABLET: 50; 8.6 TABLET ORAL at 08:09

## 2023-09-07 RX ADMIN — Medication 1 CAPSULE: at 08:09

## 2023-09-07 RX ADMIN — CALCITRIOL 0.25 MCG: 1 SOLUTION ORAL at 08:09

## 2023-09-07 RX ADMIN — THERA TABS 1 TABLET: TAB at 08:09

## 2023-09-07 RX ADMIN — OXYBUTYNIN CHLORIDE 10 MG: 5 TABLET, EXTENDED RELEASE ORAL at 08:09

## 2023-09-07 RX ADMIN — SEVELAMER CARBONATE 0.8 G: 0.8 POWDER, FOR SUSPENSION ORAL at 05:09

## 2023-09-07 RX ADMIN — FLUCONAZOLE 400 MG: 40 POWDER, FOR SUSPENSION ORAL at 10:09

## 2023-09-07 RX ADMIN — IPRATROPIUM BROMIDE AND ALBUTEROL SULFATE 3 ML: .5; 3 SOLUTION RESPIRATORY (INHALATION) at 07:09

## 2023-09-07 RX ADMIN — PANTOPRAZOLE SODIUM 40 MG: 40 GRANULE, DELAYED RELEASE ORAL at 09:09

## 2023-09-07 RX ADMIN — HEPARIN SODIUM 5000 UNITS: 5000 INJECTION INTRAVENOUS; SUBCUTANEOUS at 05:09

## 2023-09-07 RX ADMIN — HEPARIN SODIUM 5000 UNITS: 5000 INJECTION INTRAVENOUS; SUBCUTANEOUS at 03:09

## 2023-09-07 RX ADMIN — ONDANSETRON 4 MG: 4 TABLET, ORALLY DISINTEGRATING ORAL at 05:09

## 2023-09-07 RX ADMIN — TOBRAMYCIN AND DEXAMETHASONE: 3; 1 OINTMENT OPHTHALMIC at 09:09

## 2023-09-07 RX ADMIN — MEROPENEM 500 MG: 500 INJECTION INTRAVENOUS at 09:09

## 2023-09-07 RX ADMIN — HYDRALAZINE HYDROCHLORIDE: 10 TABLET, FILM COATED ORAL at 09:09

## 2023-09-07 RX ADMIN — ATORVASTATIN CALCIUM 40 MG: 40 TABLET, FILM COATED ORAL at 08:09

## 2023-09-07 RX ADMIN — ACETAMINOPHEN 1000 MG: 500 TABLET ORAL at 08:09

## 2023-09-07 RX ADMIN — VALSARTAN 160 MG: 160 TABLET, FILM COATED ORAL at 09:09

## 2023-09-07 RX ADMIN — HYDRALAZINE HYDROCHLORIDE: 10 TABLET, FILM COATED ORAL at 08:09

## 2023-09-07 RX ADMIN — SEVELAMER CARBONATE 0.8 G: 0.8 POWDER, FOR SUSPENSION ORAL at 12:09

## 2023-09-07 RX ADMIN — TRAMADOL HYDROCHLORIDE 50 MG: 50 TABLET, COATED ORAL at 05:09

## 2023-09-07 RX ADMIN — PANTOPRAZOLE SODIUM 40 MG: 40 GRANULE, DELAYED RELEASE ORAL at 08:09

## 2023-09-07 RX ADMIN — ACETAMINOPHEN 1000 MG: 500 TABLET ORAL at 03:09

## 2023-09-07 RX ADMIN — IPRATROPIUM BROMIDE AND ALBUTEROL SULFATE 3 ML: .5; 3 SOLUTION RESPIRATORY (INHALATION) at 02:09

## 2023-09-07 NOTE — PLAN OF CARE
Accepted by [facility] [place] [facility] for admissions to further discuss - Nephrology SW updated

## 2023-09-07 NOTE — SUBJECTIVE & OBJECTIVE
Interval History: Patient seen and examined while undergoing iHD for which he is tolerating well. Afebrile with pulse ranging from 70-60s bpm. Systolic blood pressures ranging from 150-140s mmHg. He is saturating +94% on room air. Roughly 50 mL documented UOP in the last 24 hours with one unmeasured void.     Review of patient's allergies indicates:   Allergen Reactions    Morphine Rash    Amiodarone analogues Itching     Other reaction(s): Unknown     Current Facility-Administered Medications   Medication Frequency    0.9%  NaCl infusion (for blood administration) Q24H PRN    0.9%  NaCl infusion (for blood administration) Q24H PRN    acetaminophen tablet 1,000 mg TID    albuterol-ipratropium 2.5 mg-0.5 mg/3 mL nebulizer solution 3 mL Q6H WAKE    amLODIPine tablet 5 mg Daily    atorvastatin tablet 40 mg Daily    calcitrioL solution 0.25 mcg Daily    carvediloL tablet 6.25 mg BID    dextrose 10% bolus 125 mL 125 mL PRN    dextrose 10% bolus 250 mL 250 mL PRN    epoetin thee injection 3,000 Units Every Mon, Wed, Fri    fluconazole 40 mg/ml suspension 400 mg Daily    glucagon (human recombinant) injection 1 mg PRN    glucose chewable tablet 16 g PRN    glucose chewable tablet 24 g PRN    heparin (porcine) injection 5,000 Units Q8H    hydrALAZINE 10 mg 2 tablets, hydrALAZINE 25 mg 2 tablets, isorsorbide dinitrate 20 mg  2 tablets combination TID    hydrALAZINE injection 10 mg Q6H PRN    Lactobacillus rhamnosus GG capsule 1 capsule BID    LIDOcaine HCl 2% urojet PRN    meclizine tablet 12.5 mg TID PRN    melatonin tablet 6 mg Nightly    meropenem (MERREM) 500 mg in sodium chloride 0.9 % 100 mL IVPB (MB+) Q12H    multivitamin tablet Daily    naloxone 0.4 mg/mL injection 0.02 mg PRN    OLANZapine injection 5 mg Nightly PRN    ondansetron disintegrating tablet 4 mg Q8H    oxybutynin 24 hr tablet 10 mg Daily    pantoprazole suspension 40 mg BID    polyethylene glycol packet 17 g BID    prochlorperazine injection Soln 10 mg  Q6H PRN    senna-docusate 8.6-50 mg per tablet 1 tablet BID    sevelamer carbonate pwpk 0.8 g TID WM    sodium chloride 0.9% bolus 250 mL 250 mL PRN    sodium chloride 0.9% flush 10 mL Q12H PRN    sodium chloride 0.9% flush 10 mL PRN    tobramycin-dexAMETHasone 0.3-0.1% ophthalmic ointment TID    traMADoL tablet 50 mg TID PRN    valsartan tablet 160 mg BID    vancomycin - pharmacy to dose pharmacy to manage frequency    white petrolatum 41 % ointment PRN       Objective:     Vital Signs (Most Recent):  Temp: 98 °F (36.7 °C) (09/07/23 0418)  Pulse: 76 (09/07/23 0713)  Resp: 18 (09/07/23 0713)  BP: (!) 157/84 (09/07/23 0418)  SpO2: 98 % (09/07/23 0713) Vital Signs (24h Range):  Temp:  [98 °F (36.7 °C)-99 °F (37.2 °C)] 98 °F (36.7 °C)  Pulse:  [67-78] 76  Resp:  [16-45] 18  SpO2:  [94 %-99 %] 98 %  BP: (126-159)/(75-88) 157/84     Weight: 62.8 kg (138 lb 7.2 oz) (08/31/23 1146)  Body mass index is 19.87 kg/m².  Body surface area is 1.76 meters squared.    I/O last 3 completed shifts:  In: 780 [NG/GT:780]  Out: 3000 [Urine:350; Other:2650]     Physical Exam  Vitals and nursing note reviewed.   Constitutional:       General: He is awake. He is not in acute distress.     Appearance: He is cachectic. He is ill-appearing. He is not diaphoretic.      Comments: Muscle wasting/atrophy noted.   HENT:      Head: Normocephalic and atraumatic.      Right Ear: External ear normal.      Left Ear: External ear normal.      Nose: Nose normal.      Mouth/Throat:      Mouth: Mucous membranes are moist.      Pharynx: Oropharynx is clear. No oropharyngeal exudate or posterior oropharyngeal erythema.   Eyes:      Comments: Left eye currently sutured closed.   Cardiovascular:      Rate and Rhythm: Normal rate.      Heart sounds: No murmur heard.     No friction rub. No gallop.   Pulmonary:      Effort: Pulmonary effort is normal. No respiratory distress.      Breath sounds: No wheezing, rhonchi or rales.   Chest:      Comments: Tunneled HD  catheter to right chest wall.  Abdominal:      General: Bowel sounds are normal. There is no distension.      Palpations: Abdomen is soft.      Tenderness: There is no abdominal tenderness.      Comments: PEG in place.   Musculoskeletal:      Cervical back: Neck supple.      Right lower leg: No edema.      Left lower leg: No edema.   Skin:     General: Skin is warm and dry.      Coloration: Skin is not jaundiced.      Comments: Stable noted to right upper extremity from recent surgery. Incision appears intact.   Neurological:      General: No focal deficit present.      Mental Status: He is alert. Mental status is at baseline.      Cranial Nerves: No cranial nerve deficit.      Motor: No weakness.   Psychiatric:         Mood and Affect: Mood normal.         Behavior: Behavior normal.          Significant Labs:  BMP:   Recent Labs   Lab 09/07/23  0340   GLU 83   *   K 3.9   CL 97   CO2 25   BUN 30*   CREATININE 3.0*   CALCIUM 8.5*   MG 2.0       CBC:   Recent Labs   Lab 09/07/23  0341   WBC 4.63   RBC 3.12*   HGB 9.5*   HCT 31.0*      MCV 99*   MCH 30.4   MCHC 30.6*       CMP:   Recent Labs   Lab 09/07/23  0340   GLU 83   CALCIUM 8.5*   ALBUMIN 2.1*   PROT 5.9*   *   K 3.9   CO2 25   CL 97   BUN 30*   CREATININE 3.0*   ALKPHOS 111   ALT <5*   AST 18   BILITOT 0.3       LFTs:   Recent Labs   Lab 09/07/23  0340   ALT <5*   AST 18   ALKPHOS 111   BILITOT 0.3   PROT 5.9*   ALBUMIN 2.1*       Microbiology Results (last 7 days)       Procedure Component Value Units Date/Time    Culture, Anaerobe [[id number]] Collected: 08/30/23 1724    Order Status: Completed Specimen: Wound from Cornea, Left Updated: 09/06/23 0843     Anaerobic Culture No anaerobes isolated    Narrative:      LEFT INTRAOCULAR SPACE    Fungus culture [[id number]] Collected: 08/16/23 1647    Order Status: Completed Specimen: Abscess from Eyelid, Left Updated: 09/04/23 1250     Fungus (Mycology) Culture Culture in progress      No fungus  isolated after 2 weeks    Fungus culture [[id number]] Collected: 08/16/23 1632    Order Status: Completed Specimen: Wound from Cornea, Left Updated: 09/04/23 1250     Fungus (Mycology) Culture Culture in progress      No fungus isolated after 2 weeks    Fungus culture [[id number]] Collected: 08/16/23 1629    Order Status: Completed Specimen: Wound from Eyelid, Left Updated: 09/04/23 1250     Fungus (Mycology) Culture Culture in progress      No fungus isolated after 2 weeks    Fungus culture [[id number]] Collected: 08/16/23 1636    Order Status: Completed Specimen: Abscess from Eyelid, Left Updated: 09/04/23 1250     Fungus (Mycology) Culture Culture in progress      No fungus isolated after 2 weeks    Narrative:      Sclera abscess    Aerobic culture [[id number]] Collected: 08/30/23 1724    Order Status: Completed Specimen: Wound from Cornea, Left Updated: 09/02/23 1304     Aerobic Bacterial Culture No growth    Narrative:      LEFT INTRAOCULAR SPACE    CSF culture [[id number]]  (Abnormal) Collected: 08/26/23 1315    Order Status: Completed Specimen: CSF (Spinal Fluid) from CSF Tap, Tube 3 Updated: 09/01/23 1434     CSF CULTURE Results called to and read back by:Kashif Zhang RN 08/30/2023  07:44      PROPIONIBACTERIUM SPECIES  From broth only       Gram Stain Result Cytospin indicates:      No WBC's      No organisms seen    AFB Culture & Smear [[id number]] Collected: 08/26/23 1315    Order Status: Completed Specimen: CSF (Spinal Fluid) from CSF Tap, Tube 3 Updated: 08/31/23 0854     AFB Culture & Smear Culture in progress     AFB CULTURE STAIN No acid fast bacilli seen.          Specimen (24h ago, onward)      None          Significant Imaging:  I have reviewed all imagining in the last 24 hours.

## 2023-09-07 NOTE — SUBJECTIVE & OBJECTIVE
Interval History:  Patient feeling well this morning. BP has been stable. States he hasn't had been eating much due to a fear of becoming nauseous and vomiting, but denies any other issues/complaints at this time. Still awaiting placement.       Review of Systems   Constitutional: Negative.  Negative for fever.   HENT: Negative.     Eyes:  Negative for pain and discharge.   Respiratory: Negative.     Cardiovascular: Negative.    Gastrointestinal:  Negative for nausea and vomiting.   Genitourinary:  Negative for dysuria and urgency.        Positive for burning pain to head of penis   Musculoskeletal: Negative.    Skin: Negative.    Neurological: Negative.    Psychiatric/Behavioral: Negative.  Negative for agitation, behavioral problems and confusion.      Objective:     Vital Signs (Most Recent):  Temp: 97.9 °F (36.6 °C) (09/07/23 1139)  Pulse: 67 (09/07/23 1426)  Resp: 18 (09/07/23 1426)  BP: 135/73 (09/07/23 1139)  SpO2: 99 % (09/07/23 1426) Vital Signs (24h Range):  Temp:  [97.9 °F (36.6 °C)-98.5 °F (36.9 °C)] 97.9 °F (36.6 °C)  Pulse:  [67-76] 67  Resp:  [16-18] 18  SpO2:  [96 %-99 %] 99 %  BP: (126-159)/(73-88) 135/73     Weight: 62.8 kg (138 lb 7.2 oz)  Body mass index is 19.87 kg/m².    Intake/Output Summary (Last 24 hours) at 9/7/2023 1444  Last data filed at 9/7/2023 0000  Gross per 24 hour   Intake 500 ml   Output 2650 ml   Net -2150 ml           Physical Exam  Constitutional:       Appearance: Normal appearance.   HENT:      Head: Normocephalic.      Mouth/Throat:      Mouth: Mucous membranes are dry.   Eyes:      Comments: Right eye clear and pupil round and reactive;    Cardiovascular:      Rate and Rhythm: Normal rate and regular rhythm.      Pulses: Normal pulses.      Heart sounds: Normal heart sounds.   Pulmonary:      Effort: Pulmonary effort is normal. No respiratory distress.      Breath sounds: Normal breath sounds.   Abdominal:      General: Abdomen is flat.      Palpations: Abdomen is soft.    Genitourinary:     Penis: Normal.       Comments: No penile abnormalities  L groin with hernia present, nontender and fully reducible  Musculoskeletal:      Right lower leg: No edema.      Left lower leg: No edema.   Skin:     General: Skin is warm and dry.   Neurological:      Mental Status: He is alert and oriented to person, place, and time.             Significant Labs: All pertinent labs within the past 24 hours have been reviewed.  CBC:   Recent Labs   Lab 09/06/23  0532 09/07/23  0341   WBC 4.99 4.63   HGB 9.6* 9.5*   HCT 31.3* 31.0*    176       CMP:   Recent Labs   Lab 09/06/23  0532 09/07/23  0340   * 132*   K 5.2* 3.9   CL 98 97   CO2 20* 25   GLU 68* 83   BUN 73* 30*   CREATININE 5.0* 3.0*   CALCIUM 9.1 8.5*   PROT 5.9* 5.9*   ALBUMIN 2.1* 2.1*   BILITOT 0.3 0.3   ALKPHOS 105 111   AST 15 18   ALT <5* <5*   ANIONGAP 12 10         Significant Imaging: I have reviewed all pertinent imaging results/findings within the past 24 hours.

## 2023-09-07 NOTE — ASSESSMENT & PLAN NOTE
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

## 2023-09-07 NOTE — PLAN OF CARE
Problem: Adult Inpatient Plan of Care  Goal: Plan of Care Review  Outcome: Ongoing, Progressing  Goal: Patient-Specific Goal (Individualized)  Outcome: Ongoing, Progressing  Goal: Absence of Hospital-Acquired Illness or Injury  Outcome: Ongoing, Progressing  Goal: Optimal Comfort and Wellbeing  Outcome: Ongoing, Progressing  Goal: Readiness for Transition of Care  Outcome: Ongoing, Progressing     Problem: Diabetes Comorbidity  Goal: Blood Glucose Level Within Targeted Range  Outcome: Ongoing, Progressing     Problem: Skin Injury Risk Increased  Goal: Skin Health and Integrity  Outcome: Ongoing, Progressing     Problem: Device-Related Complication Risk (Hemodialysis)  Goal: Safe, Effective Therapy Delivery  Outcome: Ongoing, Progressing     Problem: Hemodynamic Instability (Hemodialysis)  Goal: Effective Tissue Perfusion  Outcome: Ongoing, Progressing     Problem: Infection (Hemodialysis)  Goal: Absence of Infection Signs and Symptoms  Outcome: Ongoing, Progressing     Problem: Infection  Goal: Absence of Infection Signs and Symptoms  Outcome: Ongoing, Progressing     Problem: Coping Ineffective  Goal: Effective Coping  Outcome: Ongoing, Progressing     Problem: Device-Related Complication Risk (CRRT (Continuous Renal Replacement Therapy))  Goal: Safe, Effective Therapy Delivery  Outcome: Ongoing, Progressing     Problem: Hypothermia (CRRT (Continuous Renal Replacement Therapy))  Goal: Body Temperature Maintained in Desired Range  Outcome: Ongoing, Progressing     Problem: Impaired Wound Healing  Goal: Optimal Wound Healing  Outcome: Ongoing, Progressing     Problem: Fall Injury Risk  Goal: Absence of Fall and Fall-Related Injury  Outcome: Ongoing, Progressing    Patient alert and oriented. Cooperative with care. Medicated for pain as ordered. Patient up to bedside chair with therapy. Patient currently in bed resting comfortably

## 2023-09-07 NOTE — PT/OT/SLP PROGRESS
Physical Therapy Co-Treatment    Patient Name:  Immanuel Bernal   MRN:  [id number]    Recommendations:     Discharge Recommendations: rehabilitation facility  Discharge Equipment Recommendations: to be determined by next level of care  Barriers to discharge: None    Co-treatment performed due to patient's multiple deficits requiring two skilled therapists to appropriately and safely assess patient's strength and endurance while facilitating functional tasks in addition to accommodating for patient's activity tolerance.     Assessment:     Immanuel Bernal is a 57 y.o. male admitted with a medical diagnosis of Endophthalmitis.  He presents with the following impairments/functional limitations: weakness, gait instability, impaired cardiopulmonary response to activity, impaired endurance, impaired balance, decreased lower extremity function, impaired self care skills, impaired functional mobility.    Pt agreeable to PT session with good tolerance. Pt performed bed mobility and t/f to chair with CGA-Min A today. Pt continues to be limited by fatigue, but is continuing to require decreased assistance for functional mobility. Pt would benefit from increasing ambulation distance in following session to maximize return to PLOF.    Rehab Prognosis: Good; patient would benefit from acute skilled PT services to address these deficits and reach maximum level of function.    Recent Surgery: Procedure(s) (LRB):  ENUCLEATION, EYE (Left)  BLEPHARORRHAPHY (Left)  INJECTION, MEDICATION, RETROBULBAR (Left) 8 Days Post-Op    Plan:     During this hospitalization, patient to be seen 4 x/week to address the identified rehab impairments via gait training, therapeutic activities, therapeutic exercises, neuromuscular re-education and progress toward the following goals:    Plan of Care Expires:  09/14/23    Subjective     Chief Complaint: None stated  Patient/Family Comments/goals: None stated  Pain/Comfort:  Pain Rating 1:  0/10      Objective:     Communicated with nurse prior to session.  Patient found supine with blood pressure cuff, central line, PEG Tube, pulse ox (continuous), telemetry upon PT entry to room.     General Precautions: Standard, fall, vision impaired  Orthopedic Precautions: N/A  Braces: N/A  Respiratory Status: Room air     Functional Mobility:  Bed Mobility:     Scooting to EOB in Sitting: minimum assistance  Supine to Sit: minimum assistance  Transfers:     Sit to Stand:  minimum assistance and of 2 persons with rolling walker; cueing for technique  Gait: x4 steps from bed > chair with RW & CGA  Static/Dynamic Sitting Balance: SBA      AM-PAC 6 CLICK MOBILITY  Turning over in bed (including adjusting bedclothes, sheets and blankets)?: 3  Sitting down on and standing up from a chair with arms (e.g., wheelchair, bedside commode, etc.): 3  Moving from lying on back to sitting on the side of the bed?: 3  Moving to and from a bed to a chair (including a wheelchair)?: 3  Need to walk in hospital room?: 3  Climbing 3-5 steps with a railing?: 1  Basic Mobility Total Score: 16       Treatment & Education:  Sitting EOB x10 min; SBA  Performed oral care with OT  Pt c/o dizziness, but vitals appropriate; symptoms improved with increased time   Patient educated on role of therapy, goals of session, and benefits of mobilizing.   Patient educated on importance/benefits of sitting up in chair.  All questions answered within PT scope of practice.     Patient left up in chair with all lines intact, call button in reach, nurse notified, and sister present..    GOALS:   Multidisciplinary Problems       Physical Therapy Goals          Problem: Physical Therapy    Goal Priority Disciplines Outcome Goal Variances Interventions   Physical Therapy Goal     PT, PT/OT Ongoing, Progressing     Description: Goals to be met by: 23     Patient will increase functional independence with mobility by performin. Supine to sit with  MInimal Assistance  2. Sit to stand transfer with Minimal Assistance using LRAD  3. Gait  x 10 feet with Minimal Assistance using LRAD.                          Time Tracking:     PT Received On: 09/07/23  PT Start Time: 0911     PT Stop Time: 0939  PT Total Time (min): 28 min     Billable Minutes: Gait Training 16 and Therapeutic Activity 12    Treatment Type: Treatment  PT/PTA: PT     Number of PTA visits since last PT visit: 0     09/07/2023
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
## 2023-09-07 NOTE — PLAN OF CARE
Mara ([facility]) reports they will not be able to accept patient Medicaid pending - patient & sister Marley updated at bedside - will continue to follow up with pending referrals/forward more referrals - Team updated

## 2023-09-07 NOTE — PLAN OF CARE
Referral forwarded to [facility] [phone] per sister's request as they currently have another family member there - spoke with Mona - under review

## 2023-09-07 NOTE — PLAN OF CARE
Recommendations     Continue Minced & Moist (level 5) diet - add Renal diet restrictions if necessary. Continue Novasource ONS.  Consider decreasing Novasource to 30 mL/hr if PO intake remains adequate = 1440 kcals, 65 g of protein - would meet 75% EEN, 78% EPN.  RD to monitor & follow-up.     Goals: Meet % EEN, EPN by RD f/u date  Nutrition Goal Status: goal met  Communication of RD Recs: reviewed with RN

## 2023-09-07 NOTE — PLAN OF CARE
Nephrology Chart Review    Intake/Output Summary (Last 24 hours) at 9/7/2023 1025  Last data filed at 9/7/2023 0000  Gross per 24 hour   Intake 500 ml   Output 2650 ml   Net -2150 ml       Vitals:    09/07/23 0418 09/07/23 0713 09/07/23 0744 09/07/23 0900   BP: (!) 157/84  132/74    BP Location: Left arm  Left arm    Patient Position: Lying  Lying    Pulse: 76 76 71    Resp: 18 18 18    Temp: 98 °F (36.7 °C)  97.9 °F (36.6 °C)    TempSrc: Oral  Oral    SpO2: 96% 98% 97% 97%   Weight:       Height:           Recent Labs   Lab 09/05/23  0742 09/06/23  0532 09/07/23  0340   * 130* 132*   K 5.1 5.2* 3.9   CL 98 98 97   CO2 21* 20* 25   BUN 65* 73* 30*   CREATININE 4.3* 5.0* 3.0*   CALCIUM 8.8 9.1 8.5*   PHOS 2.6* 3.6 2.7     A1c - 08/12/2023 4.7     Patients' chart and laboratory studies reviewed. Overnight with hypoglycemia and nausea. Vascular Surgery removed stitches/sutures from recent AVG excision. He underwent iHD yesterday with UF of 2 liters. Will plan for iHD again tomorrow for metabolic clearance and volume management per schedule. No other related issues identified. Please call nephrology as needed. We will continue to follow.    Herbert Cedeño MD  Nephrology

## 2023-09-07 NOTE — PROGRESS NOTES
[facility] - Int[facility] ([facility]-)  Adult Nutrition  Progress Note    SUMMARY       Recommendations    Continue Minced & Moist (level 5) diet - add Renal diet restrictions if necessary. Continue Novasource ONS.  Consider decreasing Novasource to 30 mL/hr if PO intake remains adequate = 1440 kcals, 65 g of protein - would meet 75% EEN, 78% EPN.  RD to monitor & follow-up.    Goals: Meet % EEN, EPN by RD f/u date  Nutrition Goal Status: goal met  Communication of RD Recs: reviewed with RN    Assessment and Plan    Severe malnutrition    Nutrition Problem:  Severe Protein-Calorie Malnutrition  Malnutrition in the context of Chronic Illness/Injury    Related to (etiology):  Inability to consume sufficient energy     Signs and Symptoms (as evidenced by):  Body Fat Depletion: severe depletion of orbitals and triceps   Muscle Mass Depletion: severe depletion of temples, clavicle region, scapular region and lower extremities   Weight Loss: 31% x 6-7 months     Interventions(treatment strategy):  Collaboration of nutrition care w/ other providers  EN    Nutrition Diagnosis Status:  Continues    Malnutrition Assessment    Malnutrition Context: chronic illness  Malnutrition Level: severe    Weight Loss (Malnutrition): greater than 10% in 6 months  Energy Intake (Malnutrition): other (see comments) (LAMONT)  Subcutaneous Fat (Malnutrition): severe depletion  Muscle Mass (Malnutrition): severe depletion   Orbital Region (Subcutaneous Fat Loss): severe depletion  Upper Arm Region (Subcutaneous Fat Loss): severe depletion   [place] Region (Muscle Loss): severe depletion  Clavicle Bone Region (Muscle Loss): severe depletion  Dorsal Hand (Muscle Loss): severe depletion  Patellar Region (Muscle Loss): severe depletion  Anterior Thigh Region (Muscle Loss): severe depletion     Reason for Assessment    Reason For Assessment: RD follow-up  Diagnosis: other (see comments) (Endophtalmitis)  Relevant Medical History: CHF, DM, ESRD  "on HD, PEG  Interdisciplinary Rounds: did not attend    General Information Comments: Pt tolerating diet + ONS, consuming breakfast during visit. Decreased appetite noted yesterday 2/2 nausea. Pt also receiving continuous TFs via PEG. Pt received HD yesterday. Severe malnutrition diagnosis continues - please see PES statement for details; NFPE 8/10.  Nutrition Discharge Planning: Pending clinical course    Nutrition/Diet History    Patient Reported Diet/Restrictions/Preferences: other (see comments) (LAMONT)  Spiritual, Cultural Beliefs, Buddhism Practices, Values that Affect Care: no  Factors Affecting Nutritional Intake: nausea/vomiting  Nutrition Support Formula Prior to Admit: Other (see commments) (LAMONT)    Anthropometrics    Temp: 97.9 °F (36.6 °C)  Height Method: Stated  Height: 5' 10" (177.8 cm)  Height (inches): 70 in  Weight Method: Bed Scale  Weight: 62.8 kg (138 lb 7.2 oz)  Weight (lb): 138.45 lb  Ideal Body Weight (IBW), Male: 166 lb  % Ideal Body Weight, Male (lb): 83.4 %  BMI (Calculated): 19.9  BMI Grade: 18.5-24.9 - normal  Usual Body Weight (UBW), k kg  % Usual Body Weight: 69.47  % Weight Change From Usual Weight: -30.67 %    Lab/Procedures/Meds    Pertinent Labs Reviewed: reviewed  Pertinent Labs Comments: Creat 3, GFR 23.2  Pertinent Medications Reviewed: reviewed  Pertinent Medications Comments: -    Estimated/Assessed Needs    Weight Used For Calorie Calculations: 62.8 kg (138 lb 7.2 oz)    Energy Calorie Requirements (kcal): 1884 kcal/d  Energy Need Method: Kcal/kg (30 kcal/kg)    Protein Requirements: 82 g/d (1.3 g/kg)  Weight Used For Protein Calculations: 62.8 kg (138 lb 7.2 oz)    Estimated Fluid Requirement Method: other (see comments) (Per MD or 1 mL/kcal)  RDA Method (mL): 1884    CHO Requirement: 236g    Nutrition Prescription Ordered    Current Diet Order: Minced & Moist (level 5)  Nutrition Order Comments: Novasource ONS    Current Nutrition Support Formula Ordered: Novasource " Renal  Current Nutrition Support Rate Ordered: 40 mL/hr    Evaluation of Received Nutrient/Fluid Intake    Enteral Calories (kcal): 1920  Enteral Protein (gm): 87  Enteral (Free Water) Fluid (mL): 688  Free Water Flush Fluid (mL): 1200    % Kcal Needs: 102%  % Protein Needs: 106%    I/O: -4.7L since 8/24    Energy Calories Required: meeting needs  Protein Required: meeting needs  Fluid Required: other (see comments) (Per MD)    Comments: LBM: 9/4    Tolerance: tolerating    Nutrition Risk    Level of Risk/Frequency of Follow-up:  (1x/week)     Monitor and Evaluation    Food and Nutrient Intake: enteral nutrition intake, food and beverage intake, energy intake  Food and Nutrient Adminstration: enteral and parenteral nutrition administration, diet order  Physical Activity and Function: nutrition-related ADLs and IADLs  Anthropometric Measurements: weight, weight change  Biochemical Data, Medical Tests and Procedures: inflammatory profile, lipid profile, glucose/endocrine profile, gastrointestinal profile, electrolyte and renal panel  Nutrition-Focused Physical Findings: overall appearance     Nutrition Follow-Up    RD Follow-up?: Yes

## 2023-09-07 NOTE — PT/OT/SLP PROGRESS
Occupational Therapy   Co-Treatment with PT  Co-treatment performed due to patient's multiple deficits requiring two skilled therapists to appropriately and safely assess patient's strength and endurance while facilitating functional tasks in addition to accommodating for patient's activity tolerance.     Name: Immanuel Bernal  MRN: [id number]  Admitting Diagnosis:  Endophthalmitis  8 Days Post-Op    Recommendations:     Discharge Recommendations: rehabilitation facility  Discharge Equipment Recommendations:   (TBD)  Barriers to discharge:       Assessment:     Immanuel Bernal is a 57 y.o. male with a medical diagnosis of Endophthalmitis.  He presents with the following performance deficits affecting function: impaired functional mobility, weakness, impaired endurance, gait instability, impaired balance, impaired sensation, visual deficits, impaired self care skills, decreased upper extremity function, decreased lower extremity function, decreased coordination.     Pt agreeable to therapy and tolerated session well. Pt requires light assistance with transfers. Pt is feeling better and improving with performance. Pt transfers from bed >chair with RW.     Rehab Prognosis:  Good; patient would benefit from acute skilled OT services to address these deficits and reach maximum level of function.       Plan:     Patient to be seen 4 x/week to address the above listed problems via self-care/home management, therapeutic activities, therapeutic exercises, neuromuscular re-education  Plan of Care Expires: 09/15/23  Plan of Care Reviewed with: patient, sibling    Subjective     Patient/Family Comments/goals: to improve performance  Pain/Comfort:  Pain Rating 1: 0/10  Pain Rating Post-Intervention 1: 0/10    Objective:     Communicated with: RN prior to session.  Patient found HOB elevated with blood pressure cuff, PEG Tube, pulse ox (continuous), telemetry, central line upon OT entry to room.    General Precautions:  Standard, vision impaired, fall    Orthopedic Precautions:N/A  Braces: N/A  Respiratory Status: Room air     Occupational Performance:     Bed Mobility:    Patient completed Scooting/Bridging with minimum assistance  Patient completed Supine to Sit with minimum assistance     Functional Mobility/Transfers:  Patient completed Sit <> Stand Transfer with minimum assistance and of 2 persons  with  rolling walker   Patient completed Bed <> Chair Transfer using Step Transfer technique with contact guard assistance with hand-held assist  Functional Mobility: Pt ambulating 4 steps with RW from bed>chair with CGA. Pt requiring min A to guide bottom into seat.    Activities of Daily Living:  Grooming: stand by assistance seated EOB for facial care and oral hygiene.      [facility] 6 Click ADL: 16    Treatment & Education:  Pt educated on OT POC and frequency during hospital stay.     Pt educated on importance of OOB activity to improve function and activity tolerance.    Pt educated on proper hand placement and techniques for RW mgmt to improve safety awareness.     Addressed all patient questions/concerns within [facility] scope of practice.     Patient left up in chair with all lines intact, call button in reach, and rn notified and sister present    GOALS:   Multidisciplinary Problems       Occupational Therapy Goals          Problem: Occupational Therapy    Goal Priority Disciplines Outcome Interventions   Occupational Therapy Goal     OT, PT/OT Ongoing, Progressing    Description: Goals to be met by: 8/25/23 . Goals reviewed and updated as needed to be met by 09-15-23    Patient will increase functional independence with ADLs by performing:    UE Dressing with Minimal Assistance. MET  Grooming while seated with Stand-by Assistance. MET  Toileting from bedside commode with Minimal Assistance for hygiene and clothing management. NOT MET  Revised: Toileting from bedside commode with Mod A  Sit to stand transfer with Contact Guard  Assistance and use of RW. NOT MET  Revised: sit to stand transfer with Min A  Sitting at edge of bed >25 minutes with Supervision.MET  Step transfer with Minimal Assistance and use of RW.   Toilet transfer to bedside commode with Minimal Assistance and use of RW.                          Time Tracking:     OT Date of Treatment: 09/07/23  OT Start Time: 0911  OT Stop Time: 0939  OT Total Time (min): 28 min    Billable Minutes:Self Care/Home Management 12  Therapeutic Activity 16    OT/MORRO: MORRO     Number of MORRO visits since last OT visit: 1 9/7/2023

## 2023-09-07 NOTE — PROGRESS NOTES
YULI spoke with [facility] pt coordinator No ([phone]). YULI informed that pt's discharge disposition has changed, and pt will now be discharging to [facility]. YULI informed that pt's dialysis referral will need to be rerouted to [facility] for placement. No confirmed pt's referral will be sent to [facility] for review.    YULI will continue to follow with updates.    Silva Segovia, ADWOA  [facility]  [phone]

## 2023-09-07 NOTE — PROGRESS NOTES
"Referral successfully faced to [facility] central intake for outpt HD coordination.    SW will continue to follow with updates.    Silva Segovia, LMSW [facility]  [phone]    Your fax has been successfully sent to [phone] at [phone].  ------------------------------------------------------------  From: [id number]  ------------------------------------------------------------  9/6/2023 3:28:16 PM Transmission Record          Sent to [phone] with remote ID "Ash"          Result: (0/339;0/0) Success          Page record: 1 - 61          Elapsed time: 33:23 on channel 50  "

## 2023-09-07 NOTE — PLAN OF CARE
Bank statements forwarded to [facility] via [facility]

## 2023-09-08 PROBLEM — I50.32 CHRONIC DIASTOLIC HEART FAILURE: Status: RESOLVED | Noted: 2021-10-01 | Resolved: 2023-09-08

## 2023-09-08 PROBLEM — G00.9 BACTERIAL MENINGITIS: Status: ACTIVE | Noted: 2023-08-31

## 2023-09-08 PROBLEM — N48.89 PENILE PAIN: Status: ACTIVE | Noted: 2023-09-08

## 2023-09-08 PROBLEM — I47.29 NSVT (NONSUSTAINED VENTRICULAR TACHYCARDIA): Status: ACTIVE | Noted: 2023-09-08

## 2023-09-08 LAB
ALBUMIN SERPL BCP-MCNC: 2.1 G/DL (ref 3.5–5.2)
ALP SERPL-CCNC: 144 U/L (ref 55–135)
ALT SERPL W/O P-5'-P-CCNC: <5 U/L (ref 10–44)
ANION GAP SERPL CALC-SCNC: 13 MMOL/L (ref 8–16)
AST SERPL-CCNC: 16 U/L (ref 10–40)
BILIRUB SERPL-MCNC: 0.3 MG/DL (ref 0.1–1)
BUN SERPL-MCNC: 45 MG/DL (ref 6–20)
CALCIUM SERPL-MCNC: 9.1 MG/DL (ref 8.7–10.5)
CHLORIDE SERPL-SCNC: 94 MMOL/L (ref 95–110)
CO2 SERPL-SCNC: 24 MMOL/L (ref 23–29)
CREAT SERPL-MCNC: 4.1 MG/DL (ref 0.5–1.4)
ERYTHROCYTE [DISTWIDTH] IN BLOOD BY AUTOMATED COUNT: 16.7 % (ref 11.5–14.5)
EST. GFR  (NO RACE VARIABLE): 15.9 ML/MIN/1.73 M^2
FINAL PATHOLOGIC DIAGNOSIS: NORMAL
GLUCOSE SERPL-MCNC: 106 MG/DL (ref 70–110)
GROSS: NORMAL
HCT VFR BLD AUTO: 29.1 % (ref 40–54)
HGB BLD-MCNC: 9.1 G/DL (ref 14–18)
Lab: NORMAL
MAGNESIUM SERPL-MCNC: 2.1 MG/DL (ref 1.6–2.6)
MCH RBC QN AUTO: 31.1 PG (ref 27–31)
MCHC RBC AUTO-ENTMCNC: 31.3 G/DL (ref 32–36)
MCV RBC AUTO: 99 FL (ref 82–98)
MICROSCOPIC EXAM: NORMAL
PHOSPHATE SERPL-MCNC: 3.3 MG/DL (ref 2.7–4.5)
PLATELET # BLD AUTO: 187 K/UL (ref 150–450)
PMV BLD AUTO: 10.5 FL (ref 9.2–12.9)
POCT GLUCOSE: 113 MG/DL (ref 70–110)
POCT GLUCOSE: 93 MG/DL (ref 70–110)
POTASSIUM SERPL-SCNC: 4.1 MMOL/L (ref 3.5–5.1)
PROT SERPL-MCNC: 5.8 G/DL (ref 6–8.4)
RBC # BLD AUTO: 2.93 M/UL (ref 4.6–6.2)
SODIUM SERPL-SCNC: 131 MMOL/L (ref 136–145)
VANCOMYCIN SERPL-MCNC: 15.3 UG/ML
WBC # BLD AUTO: 5.08 K/UL (ref 3.9–12.7)

## 2023-09-08 PROCEDURE — 83735 ASSAY OF MAGNESIUM: CPT

## 2023-09-08 PROCEDURE — 90935 PR HEMODIALYSIS, ONE EVALUATION: ICD-10-PCS | Mod: ,,, | Performed by: NURSE PRACTITIONER

## 2023-09-08 PROCEDURE — 63600175 PHARM REV CODE 636 W HCPCS

## 2023-09-08 PROCEDURE — 25000003 PHARM REV CODE 250: Performed by: HOSPITALIST

## 2023-09-08 PROCEDURE — 63700000 PHARM REV CODE 250 ALT 637 W/O HCPCS: Performed by: HOSPITALIST

## 2023-09-08 PROCEDURE — 25000003 PHARM REV CODE 250: Performed by: STUDENT IN AN ORGANIZED HEALTH CARE EDUCATION/TRAINING PROGRAM

## 2023-09-08 PROCEDURE — 25000003 PHARM REV CODE 250

## 2023-09-08 PROCEDURE — 99232 SBSQ HOSP IP/OBS MODERATE 35: CPT | Mod: GC,,, | Performed by: HOSPITALIST

## 2023-09-08 PROCEDURE — 80100016 HC MAINTENANCE HEMODIALYSIS

## 2023-09-08 PROCEDURE — 80202 ASSAY OF VANCOMYCIN: CPT | Performed by: HOSPITALIST

## 2023-09-08 PROCEDURE — 90935 HEMODIALYSIS ONE EVALUATION: CPT | Mod: ,,, | Performed by: NURSE PRACTITIONER

## 2023-09-08 PROCEDURE — 80053 COMPREHEN METABOLIC PANEL: CPT | Performed by: STUDENT IN AN ORGANIZED HEALTH CARE EDUCATION/TRAINING PROGRAM

## 2023-09-08 PROCEDURE — 85027 COMPLETE CBC AUTOMATED: CPT

## 2023-09-08 PROCEDURE — 99232 PR SUBSEQUENT HOSPITAL CARE,LEVL II: ICD-10-PCS | Mod: GC,,, | Performed by: HOSPITALIST

## 2023-09-08 PROCEDURE — 99222 PR INITIAL HOSPITAL CARE,LEVL II: ICD-10-PCS | Mod: GC,,, | Performed by: UROLOGY

## 2023-09-08 PROCEDURE — 63600175 PHARM REV CODE 636 W HCPCS: Performed by: HOSPITALIST

## 2023-09-08 PROCEDURE — 99222 1ST HOSP IP/OBS MODERATE 55: CPT | Mod: GC,,, | Performed by: UROLOGY

## 2023-09-08 PROCEDURE — 84100 ASSAY OF PHOSPHORUS: CPT

## 2023-09-08 PROCEDURE — 20000000 HC ICU ROOM

## 2023-09-08 RX ORDER — PHENAZOPYRIDINE HYDROCHLORIDE 100 MG/1
100 TABLET, FILM COATED ORAL 2 TIMES DAILY
Status: DISPENSED | OUTPATIENT
Start: 2023-09-08 | End: 2023-09-10

## 2023-09-08 RX ORDER — CARVEDILOL 12.5 MG/1
12.5 TABLET ORAL 2 TIMES DAILY
Status: DISCONTINUED | OUTPATIENT
Start: 2023-09-08 | End: 2023-09-11

## 2023-09-08 RX ORDER — IPRATROPIUM BROMIDE AND ALBUTEROL SULFATE 2.5; .5 MG/3ML; MG/3ML
3 SOLUTION RESPIRATORY (INHALATION) EVERY 6 HOURS PRN
Status: DISCONTINUED | OUTPATIENT
Start: 2023-09-08 | End: 2023-09-19 | Stop reason: HOSPADM

## 2023-09-08 RX ADMIN — VALSARTAN 160 MG: 160 TABLET, FILM COATED ORAL at 08:09

## 2023-09-08 RX ADMIN — CARVEDILOL 6.25 MG: 6.25 TABLET, FILM COATED ORAL at 08:09

## 2023-09-08 RX ADMIN — VALSARTAN 160 MG: 160 TABLET, FILM COATED ORAL at 09:09

## 2023-09-08 RX ADMIN — VANCOMYCIN HYDROCHLORIDE 500 MG: 500 INJECTION, POWDER, LYOPHILIZED, FOR SOLUTION INTRAVENOUS at 02:09

## 2023-09-08 RX ADMIN — HYDRALAZINE HYDROCHLORIDE: 10 TABLET, FILM COATED ORAL at 10:09

## 2023-09-08 RX ADMIN — PHENAZOPYRIDINE HYDROCHLORIDE 100 MG: 100 TABLET ORAL at 09:09

## 2023-09-08 RX ADMIN — CARVEDILOL 12.5 MG: 12.5 TABLET, FILM COATED ORAL at 09:09

## 2023-09-08 RX ADMIN — HEPARIN SODIUM 5000 UNITS: 5000 INJECTION INTRAVENOUS; SUBCUTANEOUS at 02:09

## 2023-09-08 RX ADMIN — THERA TABS 1 TABLET: TAB at 08:09

## 2023-09-08 RX ADMIN — Medication 1 CAPSULE: at 08:09

## 2023-09-08 RX ADMIN — SEVELAMER CARBONATE 0.8 G: 0.8 POWDER, FOR SUSPENSION ORAL at 08:09

## 2023-09-08 RX ADMIN — FLUCONAZOLE 400 MG: 40 POWDER, FOR SUSPENSION ORAL at 08:09

## 2023-09-08 RX ADMIN — TOBRAMYCIN AND DEXAMETHASONE: 3; 1 OINTMENT OPHTHALMIC at 09:09

## 2023-09-08 RX ADMIN — TRAMADOL HYDROCHLORIDE 50 MG: 50 TABLET, COATED ORAL at 08:09

## 2023-09-08 RX ADMIN — MEROPENEM 500 MG: 500 INJECTION INTRAVENOUS at 09:09

## 2023-09-08 RX ADMIN — ACETAMINOPHEN 1000 MG: 500 TABLET ORAL at 02:09

## 2023-09-08 RX ADMIN — OXYBUTYNIN CHLORIDE 10 MG: 5 TABLET, EXTENDED RELEASE ORAL at 08:09

## 2023-09-08 RX ADMIN — HYDRALAZINE HYDROCHLORIDE: 10 TABLET, FILM COATED ORAL at 02:09

## 2023-09-08 RX ADMIN — TOBRAMYCIN AND DEXAMETHASONE: 3; 1 OINTMENT OPHTHALMIC at 02:09

## 2023-09-08 RX ADMIN — CALCITRIOL 0.25 MCG: 1 SOLUTION ORAL at 08:09

## 2023-09-08 RX ADMIN — ACETAMINOPHEN 1000 MG: 500 TABLET ORAL at 08:09

## 2023-09-08 RX ADMIN — PANTOPRAZOLE SODIUM 40 MG: 40 GRANULE, DELAYED RELEASE ORAL at 08:09

## 2023-09-08 RX ADMIN — ONDANSETRON 4 MG: 4 TABLET, ORALLY DISINTEGRATING ORAL at 12:09

## 2023-09-08 RX ADMIN — Medication 6 MG: at 09:09

## 2023-09-08 RX ADMIN — HEPARIN SODIUM 5000 UNITS: 5000 INJECTION INTRAVENOUS; SUBCUTANEOUS at 05:09

## 2023-09-08 RX ADMIN — TOBRAMYCIN AND DEXAMETHASONE: 3; 1 OINTMENT OPHTHALMIC at 08:09

## 2023-09-08 RX ADMIN — AMLODIPINE BESYLATE 5 MG: 5 TABLET ORAL at 08:09

## 2023-09-08 RX ADMIN — Medication 1 CAPSULE: at 09:09

## 2023-09-08 RX ADMIN — PANTOPRAZOLE SODIUM 40 MG: 40 GRANULE, DELAYED RELEASE ORAL at 09:09

## 2023-09-08 RX ADMIN — ONDANSETRON 4 MG: 4 TABLET, ORALLY DISINTEGRATING ORAL at 09:09

## 2023-09-08 RX ADMIN — TRAMADOL HYDROCHLORIDE 50 MG: 50 TABLET, COATED ORAL at 09:09

## 2023-09-08 RX ADMIN — HEPARIN SODIUM 5000 UNITS: 5000 INJECTION INTRAVENOUS; SUBCUTANEOUS at 09:09

## 2023-09-08 RX ADMIN — SENNOSIDES AND DOCUSATE SODIUM 1 TABLET: 50; 8.6 TABLET ORAL at 08:09

## 2023-09-08 RX ADMIN — MEROPENEM 500 MG: 500 INJECTION INTRAVENOUS at 08:09

## 2023-09-08 RX ADMIN — ACETAMINOPHEN 1000 MG: 500 TABLET ORAL at 09:09

## 2023-09-08 RX ADMIN — ONDANSETRON 4 MG: 4 TABLET, ORALLY DISINTEGRATING ORAL at 08:09

## 2023-09-08 RX ADMIN — ATORVASTATIN CALCIUM 40 MG: 40 TABLET, FILM COATED ORAL at 08:09

## 2023-09-08 RX ADMIN — ONDANSETRON 4 MG: 4 TABLET, ORALLY DISINTEGRATING ORAL at 02:09

## 2023-09-08 RX ADMIN — POLYETHYLENE GLYCOL 3350 17 G: 17 POWDER, FOR SOLUTION ORAL at 08:09

## 2023-09-08 RX ADMIN — POLYETHYLENE GLYCOL 3350 17 G: 17 POWDER, FOR SOLUTION ORAL at 09:09

## 2023-09-08 RX ADMIN — TRAMADOL HYDROCHLORIDE 50 MG: 50 TABLET, COATED ORAL at 12:09

## 2023-09-08 NOTE — HPI
Immanuel Bernal is a 57 y.o. male with ESRD requiring HD, HFrEF 30-35%, DM2, HTN, history of bowel obstruction and Afib. Recently hospitalized at [facility] in 7/2023 for b/l endophthalmitis, Pseudomonas bacteremia, RUE thrombus, RUE AVG infection s/p excision, and RUL mass. Developed left eye abscess, denied enucleation and left AMA. Represented to [facility] with worsening of symptoms  and was transferred to [facility] 8/9/23 for Opthalmologic evaluation & enucleation. Urology was consulted for penile pain and presumptive bladder spasms.     Symptoms started on 8/31. Oxybutynin and lidocaine gel has previously been prescribed and provided some relief, but patient continued to have pain and bladder spasms. UA's on 9/5 and 8/31 were both negative any indication of infection. WBC 2.93. Hg 9.1. Patient got HD today. States that his pain is mainly located in the penile shaft, with mild pain in his lower abdomen. The pain seems to worsen when the patient feels like he needs to urinate. Does not make much urine, but has noticed no blood in what does make. Denies fevers, chills, flank pain, scrotal or perineal pain. Patient states he has never seen a Urologist. Denies any prior  history.

## 2023-09-08 NOTE — PLAN OF CARE
Problem: Adult Inpatient Plan of Care  Goal: Plan of Care Review  Outcome: Ongoing, Progressing  Goal: Patient-Specific Goal (Individualized)  Outcome: Ongoing, Progressing  Goal: Absence of Hospital-Acquired Illness or Injury  Outcome: Ongoing, Progressing  Goal: Optimal Comfort and Wellbeing  Outcome: Ongoing, Progressing  Goal: Readiness for Transition of Care  Outcome: Ongoing, Progressing     Problem: Diabetes Comorbidity  Goal: Blood Glucose Level Within Targeted Range  Outcome: Ongoing, Progressing     Problem: Skin Injury Risk Increased  Goal: Skin Health and Integrity  Outcome: Ongoing, Progressing     Problem: Device-Related Complication Risk (Hemodialysis)  Goal: Safe, Effective Therapy Delivery  Outcome: Ongoing, Progressing     Problem: Hemodynamic Instability (Hemodialysis)  Goal: Effective Tissue Perfusion  Outcome: Ongoing, Progressing     Problem: Infection (Hemodialysis)  Goal: Absence of Infection Signs and Symptoms  Outcome: Ongoing, Progressing     Problem: Infection  Goal: Absence of Infection Signs and Symptoms  Outcome: Ongoing, Progressing     Problem: Coping Ineffective  Goal: Effective Coping  Outcome: Ongoing, Progressing     Problem: Device-Related Complication Risk (CRRT (Continuous Renal Replacement Therapy))  Goal: Safe, Effective Therapy Delivery  Outcome: Ongoing, Progressing     Problem: Hypothermia (CRRT (Continuous Renal Replacement Therapy))  Goal: Body Temperature Maintained in Desired Range  Outcome: Ongoing, Progressing     Problem: Infection (CRRT (Continuous Renal Replacement Therapy))  Goal: Absence of Infection Signs and Symptoms  Outcome: Ongoing, Progressing     Problem: Impaired Wound Healing  Goal: Optimal Wound Healing  Outcome: Ongoing, Progressing     Problem: Fall Injury Risk  Goal: Absence of Fall and Fall-Related Injury  Outcome: Ongoing, Progressing       Patient alert and cooperative with care. Medicated as ordered. Continues on iv abx and tube feeding at  40ml/hr. Patient had dialysis this shift. Tolerated well. Patient resting comfortably at this time

## 2023-09-08 NOTE — ASSESSMENT & PLAN NOTE
Meningitis    S/p L eye evisceration on 8/16/23 and completed enucleation with implant placed on 8/30/20.  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  L eye enucleated, and implant placed on 8/30    - Continue vanc, meropenem and fluconazole, tobramycin  - Anticipated 4-6 weeks of therapy from evisceration. Anticipated end date: 9/12-9/26. See OPAT note from 8/19.  - Tunneled PICC line was placed for antibiotics. (08/29)  - Follow up with outpatient Ophthalmology in 5 weeks.

## 2023-09-08 NOTE — NURSING
Patient stable throughout the night. Complained of pain in penis throughout the night. Lidocaine was put on penis throughout the night. Patient also received tramadol for pain. Patients family member complained at the end of the shift about patients 02 sat dropping to 89 and not sustaining. I educated the family member on patient not needing oxygen because O2 sats have been sustaining in mid to high 90's. Notified charge nurse and oncoming nurse about family members concerns.

## 2023-09-08 NOTE — SUBJECTIVE & OBJECTIVE
Past Medical History:   Diagnosis Date    Bilateral retinal detachment 7/18/2023    BPH (benign prostatic hyperplasia)     Cataract     CHF (congestive heart failure)     CKD (chronic kidney disease) stage 3, GFR 30-59 ml/min     Diabetes mellitus, type 2     Hypertension     KENIA (obstructive sleep apnea)     Pancreatitis     Persistent proteinuria 11/12/2020    2 g proteinuria noted Resumed ACE Lower BP systolic to less than 130     PTSD (post-traumatic stress disorder)     Stroke        Past Surgical History:   Procedure Laterality Date    CATARACT EXTRACTION Bilateral     ENUCLEATION Left 8/30/2023    Procedure: ENUCLEATION, EYE;  Surgeon: Vicki Stewart MD;  Location: [facility] OR [address] [place] [address];  Service: Ophthalmology;  Laterality: Left;    ESOPHAGOGASTRODUODENOSCOPY N/A 8/23/2023    Procedure: EGD (ESOPHAGOGASTRODUODENOSCOPY);  Surgeon: Bharath Ya MD;  Location: [facility] (2ND FLR);  Service: Endoscopy;  Laterality: N/A;    EVISCERATION OF OCULAR CONTENTS Left 8/16/2023    Procedure: EVISCERATION, OCULAR CONTENTS;  Surgeon: Vicki Stewart MD;  [facility];  Service: Ophthalmology;  Laterality: Left;    MAGNETIC RESONANCE IMAGING N/A 8/26/2023    Procedure: MRI (Magnetic Resonance Imagine);  Surgeon: Mckenzei De Jesus;  Location: [facility];  Service: Anesthesiology;  Laterality: N/A;    RETROBULBAR INJECTION OF MEDICATION Left 8/16/2023    Procedure: INJECTION, MEDICATION, RETROBULBAR;  Surgeon: Vicki Stewart MD;  Location: [address];  Service: Ophthalmology;  Laterality: Left;    RETROBULBAR INJECTION OF MEDICATION Left 8/30/2023    Procedure: INJECTION, MEDICATION, RETROBULBAR;  Surgeon: Vicki Stewart MD;  Location: [facility] OR [address] [place] [address];  Service: Ophthalmology;  Laterality: Left;    TARSORRHAPHY Left 8/16/2023    Procedure: BLEPHARORRHAPHY;  Surgeon: Vicki Stewart MD;  Location: [facility] OR [address] [place] [address];  Service: Ophthalmology;  Laterality: Left;    TARSORRHAPHY Left 8/30/2023    Procedure:  BLEPHARORRHAPHY;  Surgeon: Vicki Stewart MD;  Location: [facility] OR [address] [place] [address];  Service: Ophthalmology;  Laterality: Left;       Review of patient's allergies indicates:   Allergen Reactions    Morphine Rash    Amiodarone analogues Itching     Other reaction(s): Unknown       Family History       Problem Relation (Age of Onset)    Diabetes Father, Sister    Heart disease Father    Hyperlipidemia Brother    Kidney disease Father    Stroke Mother            Tobacco Use    Smoking status: Former     Types: Cigarettes, Cigars    Smokeless tobacco: Never   Substance and Sexual Activity    Alcohol use: Not Currently    Drug use: Not Currently    Sexual activity: Not on file       Review of Systems   Gastrointestinal:  Positive for abdominal pain. Negative for abdominal distention, constipation, nausea and vomiting.   Genitourinary:  Positive for dysuria and penile pain. Negative for flank pain, genital sores, hematuria, penile swelling, scrotal swelling and testicular pain.       Objective:     Temp:  [97.8 °F (36.6 °C)-99.4 °F (37.4 °C)] 98.6 °F (37 °C)  Pulse:  [67-82] 67  Resp:  [14-18] 14  SpO2:  [89 %-100 %] 100 %  BP: (132-158)/(73-90) 152/90  Weight: 62.8 kg (138 lb 7.2 oz)  Body mass index is 19.87 kg/m².    Date 09/08/23 0700 - 09/09/23 0659   Shift 1930-7716 [id number] 1340-4102 24 Hour Total   INTAKE   Shift Total(mL/kg)       OUTPUT   Other 2500   2500   Shift Total(mL/kg) 2500(39.8)   2500(39.8)   Weight (kg) 62.8 62.8 62.8 62.8          Drains       Drain  Duration                  Hemodialysis Catheter right internal jugular -- days         Gastrostomy/Enterostomy 08/23/23 1213 Percutaneous endoscopic gastrostomy (PEG) LUQ 16 days                     Physical Exam  Constitutional:       General: He is not in acute distress.     Appearance: Normal appearance.   HENT:      Head: Normocephalic and atraumatic.   Eyes:      Extraocular Movements: Extraocular movements intact.      Pupils: Pupils are equal, round, and  "reactive to light.   Cardiovascular:      Rate and Rhythm: Normal rate.   Pulmonary:      Effort: Pulmonary effort is normal. No respiratory distress.   Abdominal:      General: Abdomen is flat. There is no distension.      Tenderness: There is no abdominal tenderness. There is no right CVA tenderness or left CVA tenderness.   Genitourinary:     Comments: Circumcised penis, no swelling, erythema or deformity, patent meatus.   Small atrophic testicles bilaterally.  SEYMOUR: revealed hypertonic rectal tone, smooth prostate, no nodules or indurations.       Skin:     Coloration: Skin is not jaundiced.   Neurological:      General: No focal deficit present.      Mental Status: He is alert and oriented to person, place, and time.   Psychiatric:         Mood and Affect: Mood normal.         Behavior: Behavior normal.          Significant Labs:    BMP:  Recent Labs   Lab 09/06/23  0532 09/07/23  0340 09/08/23  0449   * 132* 131*   K 5.2* 3.9 4.1   CL 98 97 94*   CO2 20* 25 24   BUN 73* 30* 45*   CREATININE 5.0* 3.0* 4.1*   CALCIUM 9.1 8.5* 9.1       CBC:  Recent Labs   Lab 09/06/23  0532 09/07/23  0341 09/08/23  0449   WBC 4.99 4.63 5.08   HGB 9.6* 9.5* 9.1*   HCT 31.3* 31.0* 29.1*    176 187       Blood Culture: No results for input(s): "LABBLOO" in the last 168 hours.  Urine Studies:   Recent Labs   Lab 09/05/23  1119   COLORU Yellow   APPEARANCEUA Clear   PHUR 8.0   SPECGRAV 1.015   PROTEINUA 2+*   GLUCUA Negative   KETONESU Negative   BILIRUBINUA Negative   OCCULTUA Negative   NITRITE Negative   LEUKOCYTESUR Negative   RBCUA 5*   WBCUA 6*   BACTERIA Occasional   HYALINECASTS 0     All pertinent labs results from the past 24 hours have been reviewed.    Significant Imaging:  All pertinent imaging results/findings from the past 24 hours have been reviewed.                  "

## 2023-09-08 NOTE — PROGRESS NOTES
HD completed, net uf 2 liters removed. Blood returned and catheter ports flushed with normal saline, heparin instilled as indicated, ports capped and secured. . Post B/P 138/76, pulse 68, o2 sat 100% on 1 liters nasal cannula . Patient alert and stable and denies any chest pain.

## 2023-09-08 NOTE — PROGRESS NOTES
[facility] HEMODIALYSIS NOTE     Patient currently on hemodialysis for removal of uremic toxins and volume.     Patient seen and evaluated on hemodialysis, tolerating treatment, see HD flowsheet for vitals and assessments.    Labs have been reviewed and the dialysate bath has been adjusted.       Assessment/Plan:    -ESRD on HD   -Patient seen on HD, tolerating treatment well, w/o complaints   -UF goal of 2L   -Renal diet, if not NPO  -Continue calcitriol    -Strict I/O's and daily weights  -Daily renal function panels  -Keep MAP >65 while on HD   -Maintain Hgb >7.0  -Epo with HD   -Will continue to follow while inpatient       Pete Grimes, [facility]  Nephrology

## 2023-09-08 NOTE — ASSESSMENT & PLAN NOTE
Secondary to ?medcaition side effect (cefepime, reglan), vs meningitis, vs prolonged hospitalization - see hospital course. Resolved.     Plan  - Continue HD sessions  - Olanzapine 5mg IM PRN for severe agitation  - Delirium precautions in place

## 2023-09-08 NOTE — PT/OT/SLP PROGRESS
Occupational Therapy      Patient Name:  Immanuel Bernal   MRN:  [id number]    Patient not seen today secondary to Dialysis. Will follow-up at earliest convenience.    9/8/2023

## 2023-09-08 NOTE — ASSESSMENT & PLAN NOTE
See hospital course for details  Problem now stable    - Trend CBC. Transfuse for Hb >7, unless otherwise indicated  - Hold all NSAIDs and anticoagulants, unless contraindicated - patient had bleeding even with DVT prophylaxis   - PO pantoprazole 40mg BID   - Correct any coagulopathy with platelets and FFP for goal of platelets >50K and INR <2.0

## 2023-09-08 NOTE — SUBJECTIVE & OBJECTIVE
Interval History: Patient had ongoing burning penile and suprapubic pain overnight that did not resolve with a higher dose of oxybutynin.   Sister requesting urology consult.  Otherwise, no acute events occurred overnight.     Patient later in the day had a 12 beat run on NSVT during dialysis.     Review of Systems   Constitutional:  Negative for fever.   HENT: Negative.     Respiratory: Negative.     Cardiovascular: Negative.  Negative for chest pain and palpitations.   Genitourinary:  Positive for penile pain. Negative for dysuria.   All other systems reviewed and are negative.    Objective:     Vital Signs (Most Recent):  Temp: 98.6 °F (37 °C) (09/08/23 1055)  Pulse: 79 (09/08/23 1300)  Resp: 16 (09/08/23 1300)  BP: (!) 147/84 (09/08/23 1300)  SpO2: 100 % (09/08/23 1300) Vital Signs (24h Range):  Temp:  [97.8 °F (36.6 °C)-99.4 °F (37.4 °C)] 98.6 °F (37 °C)  Pulse:  [67-82] 79  Resp:  [14-20] 16  SpO2:  [89 %-100 %] 100 %  BP: (129-158)/(71-90) 147/84     Weight: 62.8 kg (138 lb 7.2 oz)  Body mass index is 19.87 kg/m².  No intake or output data in the 24 hours ending 09/08/23 1400      Physical Exam  Vitals and nursing note reviewed.   Constitutional:       Appearance: He is ill-appearing.   Cardiovascular:      Rate and Rhythm: Regular rhythm.      Heart sounds: Normal heart sounds.   Pulmonary:      Breath sounds: Normal breath sounds.   Abdominal:      Comments: PEG tube present   Genitourinary:     Penis: Normal.    Musculoskeletal:      Right lower leg: No edema.      Left lower leg: No edema.   Skin:     General: Skin is warm and dry.   Neurological:      Mental Status: He is alert and oriented to person, place, and time.             Significant Labs: All pertinent labs within the past 24 hours have been reviewed.  CBC:   Recent Labs   Lab 09/07/23  0341 09/08/23  0449   WBC 4.63 5.08   HGB 9.5* 9.1*   HCT 31.0* 29.1*    187     CMP:   Recent Labs   Lab 09/07/23  0340 09/08/23  0449   * 131*   K  3.9 4.1   CL 97 94*   CO2 25 24   GLU 83 106   BUN 30* 45*   CREATININE 3.0* 4.1*   CALCIUM 8.5* 9.1   PROT 5.9* 5.8*   ALBUMIN 2.1* 2.1*   BILITOT 0.3 0.3   ALKPHOS 111 144*   AST 18 16   ALT <5* <5*   ANIONGAP 10 13       Significant Imaging: I have reviewed all pertinent imaging results/findings within the past 24 hours.

## 2023-09-08 NOTE — ASSESSMENT & PLAN NOTE
Unclear source, no broken skin or abnormal findings on exam    - Continue oxybutynin in case of bladder spasms and PRN lidocaine gel  - Tramadol for severe pain  - Urology consulted

## 2023-09-08 NOTE — ASSESSMENT & PLAN NOTE
Lumbar puncture - CSF cultures were positive for Propionibacterium. See endophthalmitis.    - Continue Meropenem per ID recs

## 2023-09-08 NOTE — PLAN OF CARE
YULI received message from [facility] admissions-they are waiting for pts sister to bring documents. YULI advised they call Marley to speak with her.    YULI attempted to call Marley-no answer, unable to leave .    UPDATE 3:30pm-Marley confirmed she provided bank statements to  yesterday. CM uploaded statements to [facility]. YULI sent message to Jeannette at [facility] informing her statements were uploaded. YULI also provided Marley's email address to receive consents.    Kendy Wakefield LCSW  PRN

## 2023-09-08 NOTE — PROGRESS NOTES
Pharmacokinetic Assessment Follow Up: IV Vancomycin    Vancomycin serum concentration assessment/plan(s):    -Vancomycin pre-HD level 15.3, within goal of 15 - 20 for endophthalmitis  -Patient to receive HD today (M/W/F)  - Will re-dose vancomycin 500 mg x 1 dose   -Check vancomycin random level with AM labs on Monday 09/11   Drug levels (last 3 results):  Recent Labs   Lab Result Units 09/06/23  0532 09/08/23  0449   Vancomycin, Random ug/mL 22.0 15.3         Pharmacy will continue to follow and monitor vancomycin.    Please contact pharmacy at extension [phone] for questions regarding this assessment.    Thank you for the consult,   Jennifer Vela       Patient brief summary:  Immanuel Bernal is a 57 y.o. male initiated on antimicrobial therapy with IV Vancomycin for treatment of  endophthalmitis    The patient's current regimen is vancomyhcin     Drug Allergies:   Review of patient's allergies indicates:   Allergen Reactions    Morphine Rash    Amiodarone analogues Itching     Other reaction(s): Unknown       Actual Body Weight:   63 kg    Renal Function:   Estimated Creatinine Clearance: 17.2 mL/min (A) (based on SCr of 4.1 mg/dL (H)).,     Dialysis Method (if applicable):  intermittent HD    CBC (last 72 hours):  Recent Labs   Lab Result Units 09/06/23  0532 09/07/23  0341 09/08/23  0449   WBC K/uL 4.99 4.63 5.08   Hemoglobin g/dL 9.6* 9.5* 9.1*   Hematocrit % 31.3* 31.0* 29.1*   Platelets K/uL 171 176 187         Metabolic Panel (last 72 hours):  Recent Labs   Lab Result Units 09/06/23  0532 09/07/23  0340 09/08/23  0449   Sodium mmol/L 130* 132* 131*   Potassium mmol/L 5.2* 3.9 4.1   Chloride mmol/L 98 97 94*   CO2 mmol/L 20* 25 24   Glucose mg/dL 68* 83 106   BUN mg/dL 73* 30* 45*   Creatinine mg/dL 5.0* 3.0* 4.1*   Albumin g/dL 2.1* 2.1* 2.1*   Total Bilirubin mg/dL 0.3 0.3 0.3   Alkaline Phosphatase U/L 105 111 144*   AST U/L 15 18 16   ALT U/L <5* <5* <5*   Magnesium mg/dL 2.2 2.0 2.1   Phosphorus mg/dL  3.6 2.7 3.3         Vancomycin Administrations:  vancomycin given in the last 96 hours                     vancomycin (VANCOCIN) 500 mg in dextrose 5 % in water (D5W) 100 mL IVPB (MB+) (mg) 500 mg New Bag 09/04/23 1438                    Microbiologic Results:  Microbiology Results (last 7 days)       Procedure Component Value Units Date/Time    Culture, Anaerobe [[id number]] Collected: 08/30/23 1724    Order Status: Completed Specimen: Wound from Cornea, Left Updated: 09/06/23 0843     Anaerobic Culture No anaerobes isolated    Narrative:      LEFT INTRAOCULAR SPACE    Fungus culture [[id number]] Collected: 08/16/23 1647    Order Status: Completed Specimen: Abscess from Eyelid, Left Updated: 09/04/23 1250     Fungus (Mycology) Culture Culture in progress      No fungus isolated after 2 weeks    Fungus culture [[id number]] Collected: 08/16/23 1632    Order Status: Completed Specimen: Wound from Cornea, Left Updated: 09/04/23 1250     Fungus (Mycology) Culture Culture in progress      No fungus isolated after 2 weeks    Fungus culture [[id number]] Collected: 08/16/23 1629    Order Status: Completed Specimen: Wound from Eyelid, Left Updated: 09/04/23 1250     Fungus (Mycology) Culture Culture in progress      No fungus isolated after 2 weeks    Fungus culture [[id number]] Collected: 08/16/23 1636    Order Status: Completed Specimen: Abscess from Eyelid, Left Updated: 09/04/23 1250     Fungus (Mycology) Culture Culture in progress      No fungus isolated after 2 weeks    Narrative:      Sclera abscess    Aerobic culture [[id number]] Collected: 08/30/23 1724    Order Status: Completed Specimen: Wound from Cornea, Left Updated: 09/02/23 1304     Aerobic Bacterial Culture No growth    Narrative:      LEFT INTRAOCULAR SPACE    CSF culture [[id number]]  (Abnormal) Collected: 08/26/23 1315    Order Status: Completed Specimen: CSF (Spinal Fluid) from CSF Tap, Tube 3 Updated: 09/01/23 1434     CSF CULTURE Results called to and read  back by:Kashif Zhang RN 08/30/2023  07:44      PROPIONIBACTERIUM SPECIES  From broth only       Gram Stain Result Cytospin indicates:      No WBC's      No organisms seen

## 2023-09-08 NOTE — PROGRESS NOTES
"YULI received call from oNrberto at [facility] ([phone]). Norberto requested additional records faxed directly to [facility]. Norberto informed earliest potential start date for outpt dialysis will be 09/13/2023.    SW to fax additional records to [facility] (F: [phone])    Inpt treatment team updated via secure chat.      SW to continue to follow with updates.    Silva Segovia, LMSW [facility]  [phone]      Your fax has been successfully sent to [phone] at [phone].  ------------------------------------------------------------  From: [id number]  ------------------------------------------------------------  9/8/2023 8:38:57 AM Transmission Record          Sent to [phone] with remote ID "          Result: (4/3;0/0) Line broken (no loop current)          Page record: NONE SENT          Elapsed time: 00:03 on channel 32    9/8/2023 8:44:04 AM Transmission Record          Sent to [phone] with remote ID "          Result: (0/301;0/0) Normal Busy          Page record: NONE SENT          Elapsed time: 00:04 on channel 45    9/8/2023 8:49:14 AM Transmission Record          Sent to [phone] with remote ID "[email or web address]"          Result: (0/339;0/0) Success          Page record: 1 - 32          Elapsed time: 10:09 on channel 40  "

## 2023-09-08 NOTE — ASSESSMENT & PLAN NOTE
Hypertension  Current hypertension is worsened secondary to agitation  On CCB at home but will uptitrate GDMT while here    - Coreg 12.5mg BID  - BiDil 2 tablets TID  - Valsartan 160mg BID

## 2023-09-08 NOTE — CONSULTS
[facility] ([place] [id number])  Urology  Consult Note    Patient Name: Immanuel Bernal  MRN: [id number]  Admission Date: 8/9/2023  Hospital Length of Stay: 30   Code Status: Full Code   Attending Provider: Ubaldo Torres MD   Consulting Provider: Delfino Ramos DO  Primary Care Physician: Dolly, Primary Doctor  Principal Problem:Endophthalmitis    Inpatient consult to Urology  Consult performed by: Delfino Ramos DO  Consult ordered by: Pat Montoya MD  Reason for consult: Penile pain and presumed bladder spasm  Assessment/Recommendations: -Due to the elevated rectal tone, pelvic floor muscle dysfunction could be contributing to this patients penile and lower abdominal pain  -Recommend pelvic floor physical therapy outpatient    -Continue Ditropan  -No indication for acute Urologic intervention at this time   -Rest of care per primary          Subjective:     HPI:  Immanuel Bernal is a 57 y.o. male with ESRD requiring HD, HFrEF 30-35%, DM2, HTN, history of bowel obstruction and Afib. Recently hospitalized at [facility] in 7/2023 for b/l endophthalmitis, Pseudomonas bacteremia, RUE thrombus, RUE AVG infection s/p excision, and RUL mass. Developed left eye abscess, denied enucleation and left AMA. Represented to [place] ED with worsening of symptoms  and was transferred to [facility] on 8/9/23 for Opthalmologic evaluation & enucleation. Urology was consulted for penile pain and presumptive bladder spasms.     Symptoms started on 8/31. Oxybutynin and lidocaine gel has previously been prescribed and provided some relief, but patient continued to have pain and bladder spasms. UA's on 9/5 and 8/31 were both negative any indication of infection. WBC 2.93. Hg 9.1. Patient got HD today. States that his pain is mainly located in the penile shaft, with mild pain in his lower abdomen. The pain seems to worsen when the patient feels like he needs to urinate. Does not make much urine, but has noticed no blood in what does  make. Denies fevers, chills, flank pain, scrotal or perineal pain. Patient states he has never seen a Urologist. Denies any prior  history.       Past Medical History:   Diagnosis Date    Bilateral retinal detachment 7/18/2023    BPH (benign prostatic hyperplasia)     Cataract     CHF (congestive heart failure)     CKD (chronic kidney disease) stage 3, GFR 30-59 ml/min     Diabetes mellitus, type 2     Hypertension     KENIA (obstructive sleep apnea)     Pancreatitis     Persistent proteinuria 11/12/2020    2 g proteinuria noted Resumed ACE Lower BP systolic to less than 130     PTSD (post-traumatic stress disorder)     Stroke        Past Surgical History:   Procedure Laterality Date    CATARACT EXTRACTION Bilateral     ENUCLEATION Left 8/30/2023    Procedure: ENUCLEATION, EYE;  Surgeon: Vicki Stewart MD;  Location: [address];  Service: Ophthalmology;  Laterality: Left;    ESOPHAGOGASTRODUODENOSCOPY N/A 8/23/2023    Procedure: EGD (ESOPHAGOGASTRODUODENOSCOPY);  Surgeon: Bharath Ya MD;  [address]  Service: Endoscopy;  Laterality: N/A;    EVISCERATION OF OCULAR CONTENTS Left 8/16/2023    Procedure: EVISCERATION, OCULAR CONTENTS;  Surgeon: Vicki Stewart MD;  Location: [address];  Service: Ophthalmology;  Laterality: Left;    MAGNETIC RESONANCE IMAGING N/A 8/26/2023    Procedure: MRI (Magnetic Resonance Imagine);  Surgeon: Mckenzie De Jesus;  Location: [facility];  Service: Anesthesiology;  Laterality: N/A;    RETROBULBAR INJECTION OF MEDICATION Left 8/16/2023    Procedure: INJECTION, MEDICATION, RETROBULBAR;  Surgeon: Vicki Stewart MD;  Location: [address];  Service: Ophthalmology;  Laterality: Left;    RETROBULBAR INJECTION OF MEDICATION Left 8/30/2023    Procedure: INJECTION, MEDICATION, RETROBULBAR;  Surgeon: Vicki Stewart MD;  Location: [address];  Service: Ophthalmology;  Laterality: Left;    TARSORRHAPHY Left 8/16/2023    Procedure:  BLEPHARORRHAPHY;  Surgeon: Vicki Stewart MD;  Location: [facility] [address] [place] [address];  Service: Ophthalmology;  Laterality: Left;    TARSORRHAPHY Left 8/30/2023    Procedure: BLEPHARORRHAPHY;  Surgeon: Vicki Stewart MD;  Location: [facility] OR [address] [place] [address];  Service: Ophthalmology;  Laterality: Left;       Review of patient's allergies indicates:   Allergen Reactions    Morphine Rash    Amiodarone analogues Itching     Other reaction(s): Unknown       Family History       Problem Relation (Age of Onset)    Diabetes Father, Sister    Heart disease Father    Hyperlipidemia Brother    Kidney disease Father    Stroke Mother            Tobacco Use    Smoking status: Former     Types: Cigarettes, Cigars    Smokeless tobacco: Never   Substance and Sexual Activity    Alcohol use: Not Currently    Drug use: Not Currently    Sexual activity: Not on file       Review of Systems   Gastrointestinal:  Positive for abdominal pain. Negative for abdominal distention, constipation, nausea and vomiting.   Genitourinary:  Positive for dysuria and penile pain. Negative for flank pain, genital sores, hematuria, penile swelling, scrotal swelling and testicular pain.       Objective:     Temp:  [97.8 °F (36.6 °C)-99.4 °F (37.4 °C)] 98.6 °F (37 °C)  Pulse:  [67-82] 67  Resp:  [14-18] 14  SpO2:  [89 %-100 %] 100 %  BP: (132-158)/(73-90) 152/90  Weight: 62.8 kg (138 lb 7.2 oz)  Body mass index is 19.87 kg/m².    Date 09/08/23 0700 - 09/09/23 0659   Shift 0315-9116 0009-0527 [id number] 24 Hour Total   INTAKE   Shift Total(mL/kg)       OUTPUT   Other 2500   2500   Shift Total(mL/kg) 2500(39.8)   2500(39.8)   Weight (kg) 62.8 62.8 62.8 62.8          Drains       Drain  Duration                  Hemodialysis Catheter right internal jugular -- days         Gastrostomy/Enterostomy 08/23/23 1213 Percutaneous endoscopic gastrostomy (PEG) LUQ 16 days                     Physical Exam  Constitutional:       General: He is not in acute distress.     Appearance:  "Normal appearance.   HENT:      Head: Normocephalic and atraumatic.   Eyes:      Extraocular Movements: Extraocular movements intact.      Pupils: Pupils are equal, round, and reactive to light.   Cardiovascular:      Rate and Rhythm: Normal rate.   Pulmonary:      Effort: Pulmonary effort is normal. No respiratory distress.   Abdominal:      General: Abdomen is flat. There is no distension.      Tenderness: There is no abdominal tenderness. There is no right CVA tenderness or left CVA tenderness.   Genitourinary:     Comments: Circumcised penis, no swelling, erythema or deformity, patent meatus.   Small atrophic testicles bilaterally.  SEYMOUR: revealed hypertonic rectal tone, smooth prostate, no nodules or indurations.       Skin:     Coloration: Skin is not jaundiced.   Neurological:      General: No focal deficit present.      Mental Status: He is alert and oriented to person, place, and time.   Psychiatric:         Mood and Affect: Mood normal.         Behavior: Behavior normal.          Significant Labs:    BMP:  Recent Labs   Lab 09/06/23  0532 09/07/23  0340 09/08/23  0449   * 132* 131*   K 5.2* 3.9 4.1   CL 98 97 94*   CO2 20* 25 24   BUN 73* 30* 45*   CREATININE 5.0* 3.0* 4.1*   CALCIUM 9.1 8.5* 9.1       CBC:  Recent Labs   Lab 09/06/23  0532 09/07/23  0341 09/08/23  0449   WBC 4.99 4.63 5.08   HGB 9.6* 9.5* 9.1*   HCT 31.3* 31.0* 29.1*    176 187       Blood Culture: No results for input(s): "LABBLOO" in the last 168 hours.  Urine Studies:   Recent Labs   Lab 09/05/23  1119   COLORU Yellow   APPEARANCEUA Clear   PHUR 8.0   SPECGRAV 1.015   PROTEINUA 2+*   GLUCUA Negative   KETONESU Negative   BILIRUBINUA Negative   OCCULTUA Negative   NITRITE Negative   LEUKOCYTESUR Negative   RBCUA 5*   WBCUA 6*   BACTERIA Occasional   HYALINECASTS 0     All pertinent labs results from the past 24 hours have been reviewed.    Significant Imaging:  All pertinent imaging results/findings from the past 24 hours " have been reviewed.                      Assessment and Plan:     Penile pain  Urology was consulted for penile pain and presumptive bladder spasms in this 58 y/o male. He has no previous Urologic history.    -Due to the elevated rectal tone, pelvic floor muscle dysfunction could be contributing to this patients penile and lower abdominal pain  -Recommend pelvic floor physical therapy outpatient    -Continue Ditropan  -No indication for acute Urologic intervention at this time   -Rest of care per primary        VTE Risk Mitigation (From admission, onward)         Ordered     heparin (porcine) injection 1,000 Units  As needed (PRN)         09/07/23 1028     heparin (porcine) injection 1,000 Units  As needed (PRN)         09/05/23 1028     heparin (porcine) injection 5,000 Units  Every 8 hours         09/02/23 0809     heparin (porcine) injection 1,000 Units  As needed (PRN)         08/31/23 1702     heparin (porcine) injection 1,000 Units  As needed (PRN)         08/29/23 1322     IP VTE HIGH RISK PATIENT  Once         08/09/23 1317     Place sequential compression device  Until discontinued         08/09/23 1317                Thank you for your consult. I will sign off. Please contact us if you have any additional questions.    Delfino Ramos, DO  Urology  [facility] ([place]

## 2023-09-08 NOTE — NURSING
Nurses Note -- 4 Eyes      9/8/2023   7:41 AM      Skin assessed during: Q Shift Change      [] No Altered Skin Integrity Present    []Prevention Measures Documented      [x] Yes- Altered Skin Integrity Present or Discovered   [x] LDA Added if Not in Epic (Describe Wound)   [] New Altered Skin Integrity was Present on Admit and Documented in LDA   [] Wound Image Taken    Wound Care Consulted? No    Attending Nurse:  SWAPNIL Dunne    Second RN/Staff Member:  SWAPNIL Leroy
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
## 2023-09-08 NOTE — ASSESSMENT & PLAN NOTE
Urology was consulted for penile pain and presumptive bladder spasms in this 56 y/o male. He has no previous Urologic history.    -Due to the elevated rectal tone, pelvic floor muscle dysfunction could be contributing to this patients penile and lower abdominal pain  -Recommend pelvic floor physical therapy outpatient    -Continue Ditropan  -No indication for acute Urologic intervention at this time   -Rest of care per primary

## 2023-09-08 NOTE — PLAN OF CARE
09/08/23 1053   Post-Acute Status   Post-Acute Authorization Placement   Post-Acute Placement Status Pending post-acute provider review/more information requested   Diaylsis Status   (pending med director approval)   Discharge Delays (!) Dialysis Set-up   Discharge Plan   Discharge Plan A Skilled Nursing Facility     Pt has been clinically accepted by [facility]. HD chair transfer is in process. HD SW is working with [facility] to transfer chair. Clinic is pending medical director approval. Per [facility], the clinic would not be able to start HD until Wed 9/13.     Primary team would like to have pt admit to [facility] on mon 9/11 after HD.    SW left message for admissions at [facility].    YULI spoke with pts sister Marley ([phone])-updated her with the above info. Marley will be traveling today and this weekend but is able to complete NH paperwork via email.    UPDATE 1pm-YULI received notice from [facility] requesting records for level 2 review. Recors faxed to [facility].    Second  left for admissions at [facility].    Kendy Wakefield, JASPER  PRN

## 2023-09-08 NOTE — PROGRESS NOTES
Bedside Hd initiated, Catheter with good blood flow. . B/P 136/82, pulse 76, o2 sat onn 2 liters nasal cannula  100%. Patient awake and alert

## 2023-09-09 LAB
ALBUMIN SERPL BCP-MCNC: 2.1 G/DL (ref 3.5–5.2)
ALP SERPL-CCNC: 150 U/L (ref 55–135)
ALT SERPL W/O P-5'-P-CCNC: 5 U/L (ref 10–44)
ANION GAP SERPL CALC-SCNC: 12 MMOL/L (ref 8–16)
AST SERPL-CCNC: 18 U/L (ref 10–40)
BILIRUB SERPL-MCNC: 0.3 MG/DL (ref 0.1–1)
BUN SERPL-MCNC: 27 MG/DL (ref 6–20)
CALCIUM SERPL-MCNC: 9 MG/DL (ref 8.7–10.5)
CHLORIDE SERPL-SCNC: 102 MMOL/L (ref 95–110)
CO2 SERPL-SCNC: 21 MMOL/L (ref 23–29)
CREAT SERPL-MCNC: 3 MG/DL (ref 0.5–1.4)
ERYTHROCYTE [DISTWIDTH] IN BLOOD BY AUTOMATED COUNT: 17.4 % (ref 11.5–14.5)
EST. GFR  (NO RACE VARIABLE): 23.2 ML/MIN/1.73 M^2
GLUCOSE SERPL-MCNC: 97 MG/DL (ref 70–110)
HCT VFR BLD AUTO: 30.4 % (ref 40–54)
HGB BLD-MCNC: 9.5 G/DL (ref 14–18)
MAGNESIUM SERPL-MCNC: 2.1 MG/DL (ref 1.6–2.6)
MCH RBC QN AUTO: 32.1 PG (ref 27–31)
MCHC RBC AUTO-ENTMCNC: 31.3 G/DL (ref 32–36)
MCV RBC AUTO: 103 FL (ref 82–98)
PHOSPHATE SERPL-MCNC: 2.9 MG/DL (ref 2.7–4.5)
PLATELET # BLD AUTO: 172 K/UL (ref 150–450)
PMV BLD AUTO: 10.7 FL (ref 9.2–12.9)
POCT GLUCOSE: 125 MG/DL (ref 70–110)
POCT GLUCOSE: 125 MG/DL (ref 70–110)
POCT GLUCOSE: 137 MG/DL (ref 70–110)
POCT GLUCOSE: 89 MG/DL (ref 70–110)
POCT GLUCOSE: 98 MG/DL (ref 70–110)
POTASSIUM SERPL-SCNC: 3.8 MMOL/L (ref 3.5–5.1)
PROT SERPL-MCNC: 5.8 G/DL (ref 6–8.4)
RBC # BLD AUTO: 2.96 M/UL (ref 4.6–6.2)
SODIUM SERPL-SCNC: 135 MMOL/L (ref 136–145)
WBC # BLD AUTO: 5.03 K/UL (ref 3.9–12.7)

## 2023-09-09 PROCEDURE — 85027 COMPLETE CBC AUTOMATED: CPT

## 2023-09-09 PROCEDURE — 25000003 PHARM REV CODE 250

## 2023-09-09 PROCEDURE — 63700000 PHARM REV CODE 250 ALT 637 W/O HCPCS: Performed by: HOSPITALIST

## 2023-09-09 PROCEDURE — 63600175 PHARM REV CODE 636 W HCPCS

## 2023-09-09 PROCEDURE — 83735 ASSAY OF MAGNESIUM: CPT

## 2023-09-09 PROCEDURE — 20000000 HC ICU ROOM

## 2023-09-09 PROCEDURE — 84100 ASSAY OF PHOSPHORUS: CPT

## 2023-09-09 PROCEDURE — 25000003 PHARM REV CODE 250: Performed by: STUDENT IN AN ORGANIZED HEALTH CARE EDUCATION/TRAINING PROGRAM

## 2023-09-09 PROCEDURE — 99232 SBSQ HOSP IP/OBS MODERATE 35: CPT | Mod: GC,,, | Performed by: HOSPITALIST

## 2023-09-09 PROCEDURE — 25000003 PHARM REV CODE 250: Performed by: HOSPITALIST

## 2023-09-09 PROCEDURE — 99232 PR SUBSEQUENT HOSPITAL CARE,LEVL II: ICD-10-PCS | Mod: GC,,, | Performed by: HOSPITALIST

## 2023-09-09 PROCEDURE — 97530 THERAPEUTIC ACTIVITIES: CPT | Mod: CQ

## 2023-09-09 PROCEDURE — 36415 COLL VENOUS BLD VENIPUNCTURE: CPT | Performed by: STUDENT IN AN ORGANIZED HEALTH CARE EDUCATION/TRAINING PROGRAM

## 2023-09-09 PROCEDURE — 80053 COMPREHEN METABOLIC PANEL: CPT | Performed by: STUDENT IN AN ORGANIZED HEALTH CARE EDUCATION/TRAINING PROGRAM

## 2023-09-09 RX ORDER — TAMSULOSIN HYDROCHLORIDE 0.4 MG/1
0.4 CAPSULE ORAL DAILY
Status: DISCONTINUED | OUTPATIENT
Start: 2023-09-09 | End: 2023-09-19 | Stop reason: HOSPADM

## 2023-09-09 RX ORDER — METHOCARBAMOL 500 MG/1
500 TABLET, FILM COATED ORAL 3 TIMES DAILY
Status: DISCONTINUED | OUTPATIENT
Start: 2023-09-09 | End: 2023-09-19 | Stop reason: HOSPADM

## 2023-09-09 RX ORDER — POTASSIUM CHLORIDE 750 MG/1
10 CAPSULE, EXTENDED RELEASE ORAL ONCE
Status: COMPLETED | OUTPATIENT
Start: 2023-09-09 | End: 2023-09-09

## 2023-09-09 RX ORDER — SODIUM CHLORIDE 9 MG/ML
INJECTION, SOLUTION INTRAVENOUS
Status: DISCONTINUED | OUTPATIENT
Start: 2023-09-11 | End: 2023-09-19 | Stop reason: HOSPADM

## 2023-09-09 RX ADMIN — CALCITRIOL 0.25 MCG: 1 SOLUTION ORAL at 10:09

## 2023-09-09 RX ADMIN — TAMSULOSIN HYDROCHLORIDE 0.4 MG: 0.4 CAPSULE ORAL at 04:09

## 2023-09-09 RX ADMIN — SENNOSIDES AND DOCUSATE SODIUM 1 TABLET: 50; 8.6 TABLET ORAL at 09:09

## 2023-09-09 RX ADMIN — ONDANSETRON 4 MG: 4 TABLET, ORALLY DISINTEGRATING ORAL at 02:09

## 2023-09-09 RX ADMIN — POTASSIUM CHLORIDE 10 MEQ: 10 CAPSULE, COATED, EXTENDED RELEASE ORAL at 10:09

## 2023-09-09 RX ADMIN — PHENAZOPYRIDINE HYDROCHLORIDE 100 MG: 100 TABLET ORAL at 09:09

## 2023-09-09 RX ADMIN — ACETAMINOPHEN 1000 MG: 500 TABLET ORAL at 10:09

## 2023-09-09 RX ADMIN — TRAMADOL HYDROCHLORIDE 50 MG: 50 TABLET, COATED ORAL at 10:09

## 2023-09-09 RX ADMIN — PANTOPRAZOLE SODIUM 40 MG: 40 GRANULE, DELAYED RELEASE ORAL at 09:09

## 2023-09-09 RX ADMIN — FLUCONAZOLE 400 MG: 40 POWDER, FOR SUSPENSION ORAL at 10:09

## 2023-09-09 RX ADMIN — HYDRALAZINE HYDROCHLORIDE: 10 TABLET, FILM COATED ORAL at 10:09

## 2023-09-09 RX ADMIN — TOBRAMYCIN AND DEXAMETHASONE: 3; 1 OINTMENT OPHTHALMIC at 02:09

## 2023-09-09 RX ADMIN — Medication 6 MG: at 09:09

## 2023-09-09 RX ADMIN — HEPARIN SODIUM 5000 UNITS: 5000 INJECTION INTRAVENOUS; SUBCUTANEOUS at 09:09

## 2023-09-09 RX ADMIN — MEROPENEM 500 MG: 500 INJECTION INTRAVENOUS at 09:09

## 2023-09-09 RX ADMIN — POLYETHYLENE GLYCOL 3350 17 G: 17 POWDER, FOR SOLUTION ORAL at 09:09

## 2023-09-09 RX ADMIN — HYDRALAZINE HYDROCHLORIDE: 10 TABLET, FILM COATED ORAL at 11:09

## 2023-09-09 RX ADMIN — ACETAMINOPHEN 1000 MG: 500 TABLET ORAL at 09:09

## 2023-09-09 RX ADMIN — Medication 1 CAPSULE: at 10:09

## 2023-09-09 RX ADMIN — VALSARTAN 160 MG: 160 TABLET, FILM COATED ORAL at 10:09

## 2023-09-09 RX ADMIN — OXYBUTYNIN CHLORIDE 10 MG: 5 TABLET, EXTENDED RELEASE ORAL at 10:09

## 2023-09-09 RX ADMIN — ATORVASTATIN CALCIUM 40 MG: 40 TABLET, FILM COATED ORAL at 10:09

## 2023-09-09 RX ADMIN — ACETAMINOPHEN 1000 MG: 500 TABLET ORAL at 02:09

## 2023-09-09 RX ADMIN — CARVEDILOL 12.5 MG: 12.5 TABLET, FILM COATED ORAL at 10:09

## 2023-09-09 RX ADMIN — POLYETHYLENE GLYCOL 3350 17 G: 17 POWDER, FOR SOLUTION ORAL at 10:09

## 2023-09-09 RX ADMIN — LIDOCAINE HYDROCHLORIDE: 20 JELLY TOPICAL at 10:09

## 2023-09-09 RX ADMIN — ONDANSETRON 4 MG: 4 TABLET, ORALLY DISINTEGRATING ORAL at 05:09

## 2023-09-09 RX ADMIN — HEPARIN SODIUM 5000 UNITS: 5000 INJECTION INTRAVENOUS; SUBCUTANEOUS at 02:09

## 2023-09-09 RX ADMIN — LIDOCAINE HYDROCHLORIDE 10 ML: 20 JELLY TOPICAL at 05:09

## 2023-09-09 RX ADMIN — METHOCARBAMOL 500 MG: 500 TABLET ORAL at 02:09

## 2023-09-09 RX ADMIN — Medication 1 CAPSULE: at 09:09

## 2023-09-09 RX ADMIN — CARVEDILOL 12.5 MG: 12.5 TABLET, FILM COATED ORAL at 09:09

## 2023-09-09 RX ADMIN — SENNOSIDES AND DOCUSATE SODIUM 1 TABLET: 50; 8.6 TABLET ORAL at 10:09

## 2023-09-09 RX ADMIN — PANTOPRAZOLE SODIUM 40 MG: 40 GRANULE, DELAYED RELEASE ORAL at 10:09

## 2023-09-09 RX ADMIN — METHOCARBAMOL 500 MG: 500 TABLET ORAL at 09:09

## 2023-09-09 RX ADMIN — TOBRAMYCIN AND DEXAMETHASONE: 3; 1 OINTMENT OPHTHALMIC at 09:09

## 2023-09-09 RX ADMIN — VALSARTAN 160 MG: 160 TABLET, FILM COATED ORAL at 09:09

## 2023-09-09 RX ADMIN — HEPARIN SODIUM 5000 UNITS: 5000 INJECTION INTRAVENOUS; SUBCUTANEOUS at 05:09

## 2023-09-09 RX ADMIN — HYDRALAZINE HYDROCHLORIDE: 10 TABLET, FILM COATED ORAL at 02:09

## 2023-09-09 RX ADMIN — ONDANSETRON 4 MG: 4 TABLET, ORALLY DISINTEGRATING ORAL at 09:09

## 2023-09-09 RX ADMIN — PHENAZOPYRIDINE HYDROCHLORIDE 100 MG: 100 TABLET ORAL at 10:09

## 2023-09-09 RX ADMIN — THERA TABS 1 TABLET: TAB at 10:09

## 2023-09-09 NOTE — ASSESSMENT & PLAN NOTE
Meningitis    S/p L eye evisceration on 8/16/23 and completed enucleation with implant placed on 8/30/20.  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  L eye enucleated, and implant placed on 8/30    - Continue vanc, meropenem and fluconazole, and tobramycin eye drops.  - Anticipated 4-6 weeks of therapy from evisceration. Anticipated end date: 9/12-9/26. See OPAT note from 8/19.  - Tunneled PICC line was placed for antibiotics. (08/29)  - Follow up with outpatient Ophthalmology in 5 weeks.

## 2023-09-09 NOTE — PLAN OF CARE
Pt AAOx4, /87, all other VSS, lying asleep in bed most of shift.  Complained of penile pain this morning, administered lidocaine per order this AM and pt had no other complaints of pain through out shift.    Spoke to pt sister this AM who stated pt was previously on flomax which she believes helped the pain.  Notified , and orders received to start med.    0 residual per PEG entire shift, flushes well.  Pt stood at side of bed and took steps with PT today.  Call light within reach, bed low and locked, will continue to monitor.
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
## 2023-09-09 NOTE — SUBJECTIVE & OBJECTIVE
Interval History: No acute events overnight. Patient states he slept well through the night. Still reporting penile burning/cramping pain but states it is getting better. No other complaints at this time.    Review of Systems   Constitutional:  Negative for fever.   HENT: Negative.     Respiratory: Negative.     Cardiovascular: Negative.  Negative for chest pain and palpitations.   Genitourinary:  Positive for penile pain. Negative for dysuria.   All other systems reviewed and are negative.    Objective:     Vital Signs (Most Recent):  Temp: 97.9 °F (36.6 °C) (09/09/23 0708)  Pulse: 70 (09/09/23 0708)  Resp: 18 (09/09/23 0708)  BP: (!) 157/87 (09/09/23 0708)  SpO2: 97 % (09/09/23 0708) Vital Signs (24h Range):  Temp:  [97.9 °F (36.6 °C)-98.6 °F (37 °C)] 97.9 °F (36.6 °C)  Pulse:  [67-79] 70  Resp:  [14-18] 18  SpO2:  [97 %-100 %] 97 %  BP: (132-157)/(74-90) 157/87     Weight: 62.8 kg (138 lb 7.2 oz)  Body mass index is 19.87 kg/m².    Intake/Output Summary (Last 24 hours) at 9/9/2023 1123  Last data filed at 9/8/2023 1345  Gross per 24 hour   Intake --   Output 2500 ml   Net -2500 ml         Physical Exam  Vitals and nursing note reviewed.   Constitutional:       Appearance: He is ill-appearing.   Cardiovascular:      Rate and Rhythm: Regular rhythm.      Heart sounds: Normal heart sounds.   Pulmonary:      Breath sounds: Normal breath sounds.   Abdominal:      Comments: PEG tube present   Genitourinary:     Penis: Normal.    Musculoskeletal:      Right lower leg: No edema.      Left lower leg: No edema.   Skin:     General: Skin is warm and dry.   Neurological:      Mental Status: He is alert and oriented to person, place, and time.             Significant Labs: All pertinent labs within the past 24 hours have been reviewed.  CBC:   Recent Labs   Lab 09/08/23  0449 09/09/23  0526   WBC 5.08 5.03   HGB 9.1* 9.5*   HCT 29.1* 30.4*    172       CMP:   Recent Labs   Lab 09/08/23  0449 09/09/23  0526   *  135*   K 4.1 3.8   CL 94* 102   CO2 24 21*    97   BUN 45* 27*   CREATININE 4.1* 3.0*   CALCIUM 9.1 9.0   PROT 5.8* 5.8*   ALBUMIN 2.1* 2.1*   BILITOT 0.3 0.3   ALKPHOS 144* 150*   AST 16 18   ALT <5* 5*   ANIONGAP 13 12         Significant Imaging: I have reviewed all pertinent imaging results/findings within the past 24 hours.

## 2023-09-09 NOTE — PT/OT/SLP PROGRESS
Physical Therapy Treatment    Patient Name:  Immanuel Bernal   MRN:  [id number]    Recommendations:     Discharge Recommendations: rehabilitation facility  Discharge Equipment Recommendations: to be determined by next level of care  Barriers to discharge: None    Assessment:     Immanuel Bernal is a 57 y.o. male admitted with a medical diagnosis of Endophthalmitis.  He presents with the following impairments/functional limitations: weakness, impaired endurance, impaired sensation, impaired self care skills, impaired functional mobility, gait instability, impaired balance, visual deficits, decreased coordination, decreased upper extremity function, decreased lower extremity function, decreased safety awareness, pain, decreased ROM, impaired coordination requiring significant assistance and verbal cues for bed mob, scooting to/along the EOB, sit < > stand transitions, static standing to prevent falls due to weakness, pain, L post lean, dizziness, visual deficits.   In light of pt's current functional level and deficits, it is anticipated that pt will need to participate in an intense rehab program consisting of PT and OT in order to achieve full rehab potential to return to previous level of function and roles.  Pt remains motivated to participate in PT session and will cont to benefit from skilled PT intervention..    Rehab Prognosis: Good; patient would benefit from acute skilled PT services to address these deficits and reach maximum level of function.    Recent Surgery: Procedure(s) (LRB):  ENUCLEATION, EYE (Left)  BLEPHARORRHAPHY (Left)  INJECTION, MEDICATION, RETROBULBAR (Left) 10 Days Post-Op    Plan:     During this hospitalization, patient to be seen 4 x/week to address the identified rehab impairments via gait training, therapeutic activities, therapeutic exercises, neuromuscular re-education and progress toward the following goals:    Plan of Care Expires:  09/14/23    Subjective     Chief Complaint:  dizziness upon sitting  Pain/Comfort:  Pain Rating 1:  (no rating provided)  Location - Side 1: Bilateral  Location - Orientation 1: generalized  Location 1: leg (spasms)  Pain Addressed 1: Reposition, Distraction, Cessation of Activity, Nurse notified  Pain Rating Post-Intervention 1:  (no rating provided)      Objective:     Communicated with nurse (Kylah) prior to session.  Patient found HOB elevated with blood pressure cuff, oxygen, PEG Tube, telemetry (specialty pressure relief air mattress at 3 bars) upon PT entry to room.     General Precautions: Standard, fall, vision impaired  Orthopedic Precautions: N/A  Braces: N/A  Respiratory Status: Nasal cannula, flow 2 L/min     Functional Mobility:  Bed Mobility:     Rolling Left:  minimum assistance  Rolling Right: minimum assistance  Scooting: anteriorly to EOB with mod/min A; laterally along the EOB to the R with mod A  Bridging: SBA  Supine to Sit: mod A for trunk elevation and LE's, exiting on the R side, HOB flat, no use of rail  Sit to Supine: mod/max A for trunk and LE', HOB flat  Attempted sit > stand from EOB with RW with max A, however, pt unable to achieve full stand and demonstrating severe L post lean with narrow WILLIAM  Sit > stand from EOB with B HHA max to mod A of 1 x2 trials.  Pt able to achieve full stand, still he demonstrates post lean  Static standing with B HHA with max to mod A of 1 x2 trials due to L post lean  Pt requires assistance for change of pads due to wet with urine      AM-PAC 6 CLICK MOBILITY  Turning over in bed (including adjusting bedclothes, sheets and blankets)?: 2  Sitting down on and standing up from a chair with arms (e.g., wheelchair, bedside commode, etc.): 2  Moving from lying on back to sitting on the side of the bed?: 2  Moving to and from a bed to a chair (including a wheelchair)?: 2  Need to walk in hospital room?: 1  Climbing 3-5 steps with a railing?: 1  Basic Mobility Total Score: 10       Treatment &  Education:  Patient provided with daily orientation and goals of this PT session. They were educated to call for assistance and to transfer with hospital staff only.  Also, pt was educated on the effects of prolonged immobility and the importance of performing OOB activity and exercises to promote healing and reduce recovery time    Patient left HOB elevated with all lines intact, call button in reach, and nurse notified..    GOALS:   Multidisciplinary Problems       Physical Therapy Goals          Problem: Physical Therapy    Goal Priority Disciplines Outcome Goal Variances Interventions   Physical Therapy Goal     PT, PT/OT Ongoing, Progressing     Description: Goals to be met by: 23     Patient will increase functional independence with mobility by performin. Supine to sit with MInimal Assistance  2. Sit to stand transfer with Minimal Assistance using LRAD  3. Gait  x 10 feet with Minimal Assistance using LRAD.                          Time Tracking:     PT Received On: 23  PT Start Time: 1623     PT Stop Time: 1655  PT Total Time (min): 32 min     Billable Minutes: Therapeutic Activity 32    Treatment Type: Treatment  PT/PTA: PTA     Number of PTA visits since last PT visit: 2023

## 2023-09-09 NOTE — ASSESSMENT & PLAN NOTE
Unclear source, no broken skin or abnormal findings on exam    - Continue oxybutynin in case of bladder spasms and PRN lidocaine gel  - Tramadol for severe pain  - Robaxin 500mg TID  - Urology recommended outpatient pelvic floor therapy.

## 2023-09-10 PROBLEM — G72.81 CRITICAL ILLNESS MYOPATHY: Status: ACTIVE | Noted: 2023-09-10

## 2023-09-10 LAB
POCT GLUCOSE: 118 MG/DL (ref 70–110)
POCT GLUCOSE: 121 MG/DL (ref 70–110)
POCT GLUCOSE: 124 MG/DL (ref 70–110)
POCT GLUCOSE: 75 MG/DL (ref 70–110)

## 2023-09-10 PROCEDURE — 99232 PR SUBSEQUENT HOSPITAL CARE,LEVL II: ICD-10-PCS | Mod: GC,,, | Performed by: HOSPITALIST

## 2023-09-10 PROCEDURE — 20000000 HC ICU ROOM

## 2023-09-10 PROCEDURE — 99232 SBSQ HOSP IP/OBS MODERATE 35: CPT | Mod: GC,,, | Performed by: HOSPITALIST

## 2023-09-10 PROCEDURE — 63600175 PHARM REV CODE 636 W HCPCS

## 2023-09-10 PROCEDURE — 99900035 HC TECH TIME PER 15 MIN (STAT)

## 2023-09-10 PROCEDURE — 97535 SELF CARE MNGMENT TRAINING: CPT

## 2023-09-10 PROCEDURE — 25000003 PHARM REV CODE 250: Performed by: HOSPITALIST

## 2023-09-10 PROCEDURE — 25000003 PHARM REV CODE 250: Performed by: STUDENT IN AN ORGANIZED HEALTH CARE EDUCATION/TRAINING PROGRAM

## 2023-09-10 PROCEDURE — 25000003 PHARM REV CODE 250

## 2023-09-10 PROCEDURE — 97530 THERAPEUTIC ACTIVITIES: CPT

## 2023-09-10 PROCEDURE — 27000221 HC OXYGEN, UP TO 24 HOURS

## 2023-09-10 PROCEDURE — 63700000 PHARM REV CODE 250 ALT 637 W/O HCPCS: Performed by: HOSPITALIST

## 2023-09-10 PROCEDURE — 63600175 PHARM REV CODE 636 W HCPCS: Performed by: STUDENT IN AN ORGANIZED HEALTH CARE EDUCATION/TRAINING PROGRAM

## 2023-09-10 RX ADMIN — PANTOPRAZOLE SODIUM 40 MG: 40 GRANULE, DELAYED RELEASE ORAL at 08:09

## 2023-09-10 RX ADMIN — TOBRAMYCIN AND DEXAMETHASONE: 3; 1 OINTMENT OPHTHALMIC at 08:09

## 2023-09-10 RX ADMIN — TRAMADOL HYDROCHLORIDE 50 MG: 50 TABLET, COATED ORAL at 11:09

## 2023-09-10 RX ADMIN — CALCITRIOL 0.25 MCG: 1 SOLUTION ORAL at 09:09

## 2023-09-10 RX ADMIN — FLUCONAZOLE 400 MG: 40 POWDER, FOR SUSPENSION ORAL at 09:09

## 2023-09-10 RX ADMIN — MEROPENEM 500 MG: 500 INJECTION INTRAVENOUS at 08:09

## 2023-09-10 RX ADMIN — CARVEDILOL 12.5 MG: 12.5 TABLET, FILM COATED ORAL at 09:09

## 2023-09-10 RX ADMIN — ACETAMINOPHEN 1000 MG: 500 TABLET ORAL at 08:09

## 2023-09-10 RX ADMIN — VALSARTAN 160 MG: 160 TABLET, FILM COATED ORAL at 08:09

## 2023-09-10 RX ADMIN — THERA TABS 1 TABLET: TAB at 09:09

## 2023-09-10 RX ADMIN — Medication 6 MG: at 08:09

## 2023-09-10 RX ADMIN — ONDANSETRON 4 MG: 4 TABLET, ORALLY DISINTEGRATING ORAL at 02:09

## 2023-09-10 RX ADMIN — METHOCARBAMOL 500 MG: 500 TABLET ORAL at 02:09

## 2023-09-10 RX ADMIN — POLYETHYLENE GLYCOL 3350 17 G: 17 POWDER, FOR SOLUTION ORAL at 08:09

## 2023-09-10 RX ADMIN — OXYBUTYNIN CHLORIDE 10 MG: 5 TABLET, EXTENDED RELEASE ORAL at 09:09

## 2023-09-10 RX ADMIN — ONDANSETRON 4 MG: 4 TABLET, ORALLY DISINTEGRATING ORAL at 05:09

## 2023-09-10 RX ADMIN — HYDRALAZINE HYDROCHLORIDE: 10 TABLET, FILM COATED ORAL at 02:09

## 2023-09-10 RX ADMIN — METHOCARBAMOL 500 MG: 500 TABLET ORAL at 08:09

## 2023-09-10 RX ADMIN — Medication 1 CAPSULE: at 08:09

## 2023-09-10 RX ADMIN — ATORVASTATIN CALCIUM 40 MG: 40 TABLET, FILM COATED ORAL at 09:09

## 2023-09-10 RX ADMIN — TRAMADOL HYDROCHLORIDE 50 MG: 50 TABLET, COATED ORAL at 05:09

## 2023-09-10 RX ADMIN — TOBRAMYCIN AND DEXAMETHASONE: 3; 1 OINTMENT OPHTHALMIC at 09:09

## 2023-09-10 RX ADMIN — ONDANSETRON 4 MG: 4 TABLET, ORALLY DISINTEGRATING ORAL at 08:09

## 2023-09-10 RX ADMIN — CARVEDILOL 12.5 MG: 12.5 TABLET, FILM COATED ORAL at 08:09

## 2023-09-10 RX ADMIN — TAMSULOSIN HYDROCHLORIDE 0.4 MG: 0.4 CAPSULE ORAL at 09:09

## 2023-09-10 RX ADMIN — HEPARIN SODIUM 5000 UNITS: 5000 INJECTION INTRAVENOUS; SUBCUTANEOUS at 08:09

## 2023-09-10 RX ADMIN — METHOCARBAMOL 500 MG: 500 TABLET ORAL at 09:09

## 2023-09-10 RX ADMIN — TOBRAMYCIN AND DEXAMETHASONE: 3; 1 OINTMENT OPHTHALMIC at 02:09

## 2023-09-10 RX ADMIN — SENNOSIDES AND DOCUSATE SODIUM 1 TABLET: 50; 8.6 TABLET ORAL at 08:09

## 2023-09-10 RX ADMIN — PANTOPRAZOLE SODIUM 40 MG: 40 GRANULE, DELAYED RELEASE ORAL at 09:09

## 2023-09-10 RX ADMIN — ACETAMINOPHEN 1000 MG: 500 TABLET ORAL at 02:09

## 2023-09-10 RX ADMIN — MEROPENEM 500 MG: 500 INJECTION INTRAVENOUS at 09:09

## 2023-09-10 RX ADMIN — ACETAMINOPHEN 1000 MG: 500 TABLET ORAL at 09:09

## 2023-09-10 RX ADMIN — HEPARIN SODIUM 5000 UNITS: 5000 INJECTION INTRAVENOUS; SUBCUTANEOUS at 05:09

## 2023-09-10 RX ADMIN — HEPARIN SODIUM 5000 UNITS: 5000 INJECTION INTRAVENOUS; SUBCUTANEOUS at 02:09

## 2023-09-10 RX ADMIN — HYDRALAZINE HYDROCHLORIDE: 10 TABLET, FILM COATED ORAL at 09:09

## 2023-09-10 RX ADMIN — Medication 1 CAPSULE: at 09:09

## 2023-09-10 RX ADMIN — HYDRALAZINE HYDROCHLORIDE: 10 TABLET, FILM COATED ORAL at 08:09

## 2023-09-10 RX ADMIN — VALSARTAN 160 MG: 160 TABLET, FILM COATED ORAL at 09:09

## 2023-09-10 RX ADMIN — PROCHLORPERAZINE EDISYLATE 10 MG: 5 INJECTION INTRAMUSCULAR; INTRAVENOUS at 09:09

## 2023-09-10 NOTE — PT/OT/SLP PROGRESS
Occupational Therapy Treatment    Patient Name:  Immanuel Bernal   MRN:  [id number]  Admit Date: 8/9/2023  Admitting Diagnosis:  Endophthalmitis   Length of Stay: 32 days  Recent Surgery: Procedure(s) (LRB):  ENUCLEATION, EYE (Left)  BLEPHARORRHAPHY (Left)  INJECTION, MEDICATION, RETROBULBAR (Left) 11 Days Post-Op    Recommendations:     Discharge Recommendations: rehabilitation facility  Discharge Equipment Recommendations:  to be determined by next level of care  Barriers to discharge:  Decreased caregiver support, Inaccessible home environment    Plan:     Patient to be seen 4 x/week to address the above listed problems via self-care/home management, therapeutic activities, therapeutic exercises, neuromuscular re-education  Plan of Care Expires: 09/15/23  Plan of Care Reviewed with: patient    Assessment:   Immanuel Bernal is a 57 y.o. male with a medical diagnosis of Endophthalmitis.  He presents with the following performance deficits affecting function: weakness, impaired endurance, impaired self care skills, impaired functional mobility, gait instability, impaired balance, decreased safety awareness, decreased lower extremity function, decreased upper extremity function, decreased coordination, impaired coordination, impaired fine motor, decreased ROM, visual deficits, impaired skin.      Pt tolerated session fairly this date and was willing to participate. Demonstrating continued increased weakness, impaired endurance, increased fatigue, impaired balance, poor trunk stability/postural control, and decreased safety awareness, requiring increased time and assistance to complete functional tasks. Patient with increased left lateral lean during static stance, unable to correct with increased tactile cueing. Patient is progressing towards established goals, and continues to benefit from acute skilled OT services to increase functional performance and improve quality of life. OT to continue to recommend Rehab at  "discharge to improve pt functional independence and increase patient safety before returning home.      Rehab Prognosis: Fair; patient would benefit from acute skilled OT services to address these deficits and reach maximum level of function.        Subjective   Communicated with: Nurse prior to session.  Patient found HOB elevated with oxygen, pulse ox (continuous), PEG Tube upon OT entry to room. Pt agreeable to participate at this time.    Patient: " I was just about to clean up these dishes " -patient knows he's in hospital, uncertain of confusion to environment/situation    Pain/Comfort:  Pain Rating 1: 0/10  Pain Rating Post-Intervention 1: 0/10    Objective:   Patient found with: oxygen, pulse ox (continuous), PEG Tube   General Precautions: Standard, Cardiac fall, vision impaired   Orthopedic Precautions:N/A   Braces: N/A   Oxygen Device: Nasal Cannula 2L  Vitals: /81 (BP Location: Right arm, Patient Position: Lying)   Pulse 66   Temp 98.5 °F (36.9 °C) (Oral)   Resp 20   Ht 5' 10" (1.778 m)   Wt 62.8 kg (138 lb 7.2 oz)   SpO2 100%   BMI 19.87 kg/m²     Outcome Measures:  [facility] 6 Click ADL: 16    Cognition:   Alert and Cooperative  Command following: follows one-step commands  Communication: clear/fluent    Occupational Performance:  Bed Mobility:    Patient completed Rolling/Turning to Right with minimum assistance  Patient completed Supine to Sit with minimum assistance on R side of bed  Scooting anteriorly to EOB to have both feet planted on floor: maximal assistance  Patient completed Sit to Supine with minimum assistance on R side of bed  Scooting to HOB in supine: dependent and draw-sheet pull    Functional Mobility/Transfers:  Static Sitting EOB: SBA  Dynamic Sitting EOB: SBA  Patient completed Sit <> Stand Transfer from EOB with maximal assistance  with  rolling walker ; increased time  Static Standing Balance: Max A, left lateral lean  Deferred step trials due to increased left lateral " lean      Activities of Daily Living:  Grooming: stand by assistance with set up to perform oral care and wash face seated EOB using LUE    AMPAC 6 Click ADL:  AMPAC Total Score: 16    Treatment & Education:  -Pt education on OT role and POC.  -Importance of E/OOB activity with staff assistance, emphasis on daily participation  -Safety during functional transfer and mobility ensured  -Patient provided with education on importance of Bilateral UB/LB integration during functional tasks for improvement in functional performance.   -Education provided/reviewed, questions answered within OT scope of practice.   -Patient demonstrates understanding and learning this date.         Patient left HOB elevated with all lines intact, call button in reach, and nurse notified    GOALS:   Multidisciplinary Problems       Occupational Therapy Goals          Problem: Occupational Therapy    Goal Priority Disciplines Outcome Interventions   Occupational Therapy Goal     OT, PT/OT Ongoing, Progressing    Description: Goals to be met by: 8/25/23 . Goals reviewed and updated as needed to be met by 09-15-23    Patient will increase functional independence with ADLs by performing:    UE Dressing with Minimal Assistance. MET  Grooming while seated with Stand-by Assistance. MET  Toileting from bedside commode with Minimal Assistance for hygiene and clothing management. NOT MET  Revised: Toileting from bedside commode with Mod A  Sit to stand transfer with Contact Guard Assistance and use of RW. NOT MET  Revised: sit to stand transfer with Min A  Sitting at edge of bed >25 minutes with Supervision.MET  Step transfer with Minimal Assistance and use of RW.   Toilet transfer to bedside commode with Minimal Assistance and use of RW.                          Time Tracking:     OT Date of Treatment: 09/10/23  OT Start Time: 1149  OT Stop Time: 1212  OT Total Time (min): 23 min    Billable Minutes:Self Care/Home Management 13  Therapeutic Activity  10      9/10/2023

## 2023-09-10 NOTE — NURSING
Patient is alert and ox4. NSR on cardiac monitor. Vitals stable. Patient on room air. Tramadol for pain control. Blood glucose in normal range. PEG feedings still going and being tolerated by patient. No new events overnight. Patient in bed with bed in lowest position, side rails up , and call light in reach.

## 2023-09-10 NOTE — PLAN OF CARE
Patient to discharge to [facility] nursing home, pending HD chair transfer to [facility].  staff will continue to follow and assist team as needed.    Massiel Shukla RN  Weekend  - [facility] [place], [place] Sathya Sr: [phone]

## 2023-09-10 NOTE — SUBJECTIVE & OBJECTIVE
Interval History:  No acute events overnight.  Reports improvement in penile pain. Denies other complaints at this time.   Review of Systems   Constitutional:  Negative for fever.   HENT: Negative.     Respiratory: Negative.     Cardiovascular: Negative.  Negative for chest pain and palpitations.   Genitourinary:  Positive for penile pain. Negative for dysuria.   All other systems reviewed and are negative.    Objective:     Vital Signs (Most Recent):  Temp: 97.6 °F (36.4 °C) (09/10/23 0500)  Pulse: 70 (09/10/23 0500)  Resp: 18 (09/10/23 0503)  BP: (!) 152/88 (09/10/23 0503)  SpO2: 98 % (09/10/23 0500) Vital Signs (24h Range):  Temp:  [97.6 °F (36.4 °C)-98.2 °F (36.8 °C)] 97.6 °F (36.4 °C)  Pulse:  [66-79] 70  Resp:  [16-19] 18  SpO2:  [95 %-100 %] 98 %  BP: (140-157)/(82-89) 152/88     Weight: 62.8 kg (138 lb 7.2 oz)  Body mass index is 19.87 kg/m².    Intake/Output Summary (Last 24 hours) at 9/10/2023 0707  Last data filed at 9/9/2023 2111  Gross per 24 hour   Intake 360 ml   Output --   Net 360 ml           Physical Exam  Vitals and nursing note reviewed.   Constitutional:       Appearance: He is ill-appearing.   Cardiovascular:      Rate and Rhythm: Regular rhythm.      Heart sounds: Normal heart sounds.   Pulmonary:      Breath sounds: Normal breath sounds.   Abdominal:      Comments: PEG tube present   Genitourinary:     Penis: Normal.    Musculoskeletal:      Right lower leg: No edema.      Left lower leg: No edema.   Skin:     General: Skin is warm and dry.   Neurological:      Mental Status: He is alert and oriented to person, place, and time.             Significant Labs: All pertinent labs within the past 24 hours have been reviewed.  CBC:   Recent Labs   Lab 09/09/23 0526   WBC 5.03   HGB 9.5*   HCT 30.4*          CMP:   Recent Labs   Lab 09/09/23 0526   *   K 3.8      CO2 21*   GLU 97   BUN 27*   CREATININE 3.0*   CALCIUM 9.0   PROT 5.8*   ALBUMIN 2.1*   BILITOT 0.3   ALKPHOS 150*    AST 18   ALT 5*   ANIONGAP 12         Significant Imaging: I have reviewed all pertinent imaging results/findings within the past 24 hours.
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
## 2023-09-10 NOTE — PLAN OF CARE
Pt AAOx4 lying in bed, NSR on tele, VSS, O2 sats 90% placed pt on 1L O2 NC, no complaints of pain, pt complained of nausea PRN compazine given.  TF running 40/hr 0 residual.   No acute events this shift, call light within reach, bed low and locked, will continue to monitor.

## 2023-09-10 NOTE — ASSESSMENT & PLAN NOTE
Unclear source, no broken skin or abnormal findings on exam    - Continue oxybutynin in case of bladder spasms and PRN lidocaine gel  - Tramadol for severe pain  - Robaxin 500mg TID  - Flomax 0.4 mg daily  - Urology recommended outpatient pelvic floor therapy.

## 2023-09-10 NOTE — ASSESSMENT & PLAN NOTE
- Activity as tolerated  - Skilled PT 4x weekly in the hospital  - Continued PT as tolerated in outpatient setting  - Maintain regular diet and protein supplementation with Boost drinks.

## 2023-09-11 LAB
ALBUMIN SERPL BCP-MCNC: 2.1 G/DL (ref 3.5–5.2)
ALP SERPL-CCNC: 140 U/L (ref 55–135)
ALT SERPL W/O P-5'-P-CCNC: 6 U/L (ref 10–44)
ANION GAP SERPL CALC-SCNC: 15 MMOL/L (ref 8–16)
AST SERPL-CCNC: 18 U/L (ref 10–40)
BILIRUB SERPL-MCNC: 0.3 MG/DL (ref 0.1–1)
BUN SERPL-MCNC: 52 MG/DL (ref 6–20)
CALCIUM SERPL-MCNC: 9.4 MG/DL (ref 8.7–10.5)
CHLORIDE SERPL-SCNC: 100 MMOL/L (ref 95–110)
CO2 SERPL-SCNC: 18 MMOL/L (ref 23–29)
CREAT SERPL-MCNC: 4.7 MG/DL (ref 0.5–1.4)
ERYTHROCYTE [DISTWIDTH] IN BLOOD BY AUTOMATED COUNT: 17.3 % (ref 11.5–14.5)
EST. GFR  (NO RACE VARIABLE): 13.5 ML/MIN/1.73 M^2
GLUCOSE SERPL-MCNC: 110 MG/DL (ref 70–110)
HCT VFR BLD AUTO: 29.1 % (ref 40–54)
HGB BLD-MCNC: 8.8 G/DL (ref 14–18)
MAGNESIUM SERPL-MCNC: 2.3 MG/DL (ref 1.6–2.6)
MCH RBC QN AUTO: 31.3 PG (ref 27–31)
MCHC RBC AUTO-ENTMCNC: 30.2 G/DL (ref 32–36)
MCV RBC AUTO: 104 FL (ref 82–98)
PHOSPHATE SERPL-MCNC: 4 MG/DL (ref 2.7–4.5)
PLATELET # BLD AUTO: 158 K/UL (ref 150–450)
PMV BLD AUTO: 9.9 FL (ref 9.2–12.9)
POCT GLUCOSE: 109 MG/DL (ref 70–110)
POCT GLUCOSE: 81 MG/DL (ref 70–110)
POCT GLUCOSE: 83 MG/DL (ref 70–110)
POTASSIUM SERPL-SCNC: 4.4 MMOL/L (ref 3.5–5.1)
PROT SERPL-MCNC: 6 G/DL (ref 6–8.4)
RBC # BLD AUTO: 2.81 M/UL (ref 4.6–6.2)
SODIUM SERPL-SCNC: 133 MMOL/L (ref 136–145)
VANCOMYCIN SERPL-MCNC: 15.2 UG/ML
WBC # BLD AUTO: 7.73 K/UL (ref 3.9–12.7)

## 2023-09-11 PROCEDURE — 99231 SBSQ HOSP IP/OBS SF/LOW 25: CPT | Mod: GC,,, | Performed by: HOSPITALIST

## 2023-09-11 PROCEDURE — 25000003 PHARM REV CODE 250: Performed by: STUDENT IN AN ORGANIZED HEALTH CARE EDUCATION/TRAINING PROGRAM

## 2023-09-11 PROCEDURE — 63600175 PHARM REV CODE 636 W HCPCS

## 2023-09-11 PROCEDURE — 25000003 PHARM REV CODE 250

## 2023-09-11 PROCEDURE — 36415 COLL VENOUS BLD VENIPUNCTURE: CPT | Performed by: HOSPITALIST

## 2023-09-11 PROCEDURE — 25000003 PHARM REV CODE 250: Performed by: HOSPITALIST

## 2023-09-11 PROCEDURE — 63600175 PHARM REV CODE 636 W HCPCS: Mod: JZ | Performed by: STUDENT IN AN ORGANIZED HEALTH CARE EDUCATION/TRAINING PROGRAM

## 2023-09-11 PROCEDURE — 27000221 HC OXYGEN, UP TO 24 HOURS

## 2023-09-11 PROCEDURE — 99232 SBSQ HOSP IP/OBS MODERATE 35: CPT | Mod: GC,,, | Performed by: STUDENT IN AN ORGANIZED HEALTH CARE EDUCATION/TRAINING PROGRAM

## 2023-09-11 PROCEDURE — 99232 PR SUBSEQUENT HOSPITAL CARE,LEVL II: ICD-10-PCS | Mod: GC,,, | Performed by: STUDENT IN AN ORGANIZED HEALTH CARE EDUCATION/TRAINING PROGRAM

## 2023-09-11 PROCEDURE — 80053 COMPREHEN METABOLIC PANEL: CPT

## 2023-09-11 PROCEDURE — 63700000 PHARM REV CODE 250 ALT 637 W/O HCPCS: Performed by: HOSPITALIST

## 2023-09-11 PROCEDURE — 85027 COMPLETE CBC AUTOMATED: CPT

## 2023-09-11 PROCEDURE — 94761 N-INVAS EAR/PLS OXIMETRY MLT: CPT

## 2023-09-11 PROCEDURE — 63600175 PHARM REV CODE 636 W HCPCS: Performed by: HOSPITALIST

## 2023-09-11 PROCEDURE — 99900035 HC TECH TIME PER 15 MIN (STAT)

## 2023-09-11 PROCEDURE — 63600175 PHARM REV CODE 636 W HCPCS: Performed by: STUDENT IN AN ORGANIZED HEALTH CARE EDUCATION/TRAINING PROGRAM

## 2023-09-11 PROCEDURE — 80202 ASSAY OF VANCOMYCIN: CPT | Performed by: HOSPITALIST

## 2023-09-11 PROCEDURE — 84100 ASSAY OF PHOSPHORUS: CPT

## 2023-09-11 PROCEDURE — 80100016 HC MAINTENANCE HEMODIALYSIS

## 2023-09-11 PROCEDURE — 20000000 HC ICU ROOM

## 2023-09-11 PROCEDURE — 83735 ASSAY OF MAGNESIUM: CPT

## 2023-09-11 PROCEDURE — 25000003 PHARM REV CODE 250: Performed by: NURSE PRACTITIONER

## 2023-09-11 PROCEDURE — 99231 PR SUBSEQUENT HOSPITAL CARE,LEVL I: ICD-10-PCS | Mod: GC,,, | Performed by: HOSPITALIST

## 2023-09-11 RX ORDER — CARVEDILOL 25 MG/1
25 TABLET ORAL 2 TIMES DAILY
Status: DISCONTINUED | OUTPATIENT
Start: 2023-09-11 | End: 2023-09-19 | Stop reason: HOSPADM

## 2023-09-11 RX ORDER — HEPARIN SODIUM 1000 [USP'U]/ML
1000 INJECTION, SOLUTION INTRAVENOUS; SUBCUTANEOUS
Status: DISCONTINUED | OUTPATIENT
Start: 2023-09-11 | End: 2023-09-19 | Stop reason: HOSPADM

## 2023-09-11 RX ADMIN — Medication 1 CAPSULE: at 08:09

## 2023-09-11 RX ADMIN — SENNOSIDES AND DOCUSATE SODIUM 1 TABLET: 50; 8.6 TABLET ORAL at 08:09

## 2023-09-11 RX ADMIN — HYDRALAZINE HYDROCHLORIDE: 10 TABLET, FILM COATED ORAL at 08:09

## 2023-09-11 RX ADMIN — PANTOPRAZOLE SODIUM 40 MG: 40 GRANULE, DELAYED RELEASE ORAL at 08:09

## 2023-09-11 RX ADMIN — ACETAMINOPHEN 1000 MG: 500 TABLET ORAL at 01:09

## 2023-09-11 RX ADMIN — ONDANSETRON 4 MG: 4 TABLET, ORALLY DISINTEGRATING ORAL at 08:09

## 2023-09-11 RX ADMIN — METHOCARBAMOL 500 MG: 500 TABLET ORAL at 01:09

## 2023-09-11 RX ADMIN — POLYETHYLENE GLYCOL 3350 17 G: 17 POWDER, FOR SOLUTION ORAL at 01:09

## 2023-09-11 RX ADMIN — ONDANSETRON 4 MG: 4 TABLET, ORALLY DISINTEGRATING ORAL at 01:09

## 2023-09-11 RX ADMIN — TOBRAMYCIN AND DEXAMETHASONE: 3; 1 OINTMENT OPHTHALMIC at 01:09

## 2023-09-11 RX ADMIN — TRAMADOL HYDROCHLORIDE 50 MG: 50 TABLET, COATED ORAL at 08:09

## 2023-09-11 RX ADMIN — ERYTHROPOIETIN 3000 UNITS: 3000 INJECTION, SOLUTION INTRAVENOUS; SUBCUTANEOUS at 10:09

## 2023-09-11 RX ADMIN — CALCITRIOL 0.25 MCG: 1 SOLUTION ORAL at 01:09

## 2023-09-11 RX ADMIN — HEPARIN SODIUM 5000 UNITS: 5000 INJECTION INTRAVENOUS; SUBCUTANEOUS at 01:09

## 2023-09-11 RX ADMIN — MEROPENEM 500 MG: 500 INJECTION INTRAVENOUS at 08:09

## 2023-09-11 RX ADMIN — THERA TABS 1 TABLET: TAB at 01:09

## 2023-09-11 RX ADMIN — Medication 6 MG: at 08:09

## 2023-09-11 RX ADMIN — FLUCONAZOLE 400 MG: 40 POWDER, FOR SUSPENSION ORAL at 01:09

## 2023-09-11 RX ADMIN — HEPARIN SODIUM 5000 UNITS: 5000 INJECTION INTRAVENOUS; SUBCUTANEOUS at 08:09

## 2023-09-11 RX ADMIN — SENNOSIDES AND DOCUSATE SODIUM 1 TABLET: 50; 8.6 TABLET ORAL at 01:09

## 2023-09-11 RX ADMIN — HEPARIN SODIUM 1000 UNITS: 1000 INJECTION, SOLUTION INTRAVENOUS; SUBCUTANEOUS at 10:09

## 2023-09-11 RX ADMIN — VALSARTAN 160 MG: 160 TABLET, FILM COATED ORAL at 08:09

## 2023-09-11 RX ADMIN — TOBRAMYCIN AND DEXAMETHASONE: 3; 1 OINTMENT OPHTHALMIC at 08:09

## 2023-09-11 RX ADMIN — HEPARIN SODIUM 5000 UNITS: 5000 INJECTION INTRAVENOUS; SUBCUTANEOUS at 06:09

## 2023-09-11 RX ADMIN — HYDRALAZINE HYDROCHLORIDE: 10 TABLET, FILM COATED ORAL at 01:09

## 2023-09-11 RX ADMIN — OXYBUTYNIN CHLORIDE 10 MG: 5 TABLET, EXTENDED RELEASE ORAL at 01:09

## 2023-09-11 RX ADMIN — ACETAMINOPHEN 1000 MG: 500 TABLET ORAL at 08:09

## 2023-09-11 RX ADMIN — VALSARTAN 160 MG: 160 TABLET, FILM COATED ORAL at 01:09

## 2023-09-11 RX ADMIN — METHOCARBAMOL 500 MG: 500 TABLET ORAL at 08:09

## 2023-09-11 RX ADMIN — VANCOMYCIN HYDROCHLORIDE 500 MG: 500 INJECTION, POWDER, LYOPHILIZED, FOR SOLUTION INTRAVENOUS at 01:09

## 2023-09-11 RX ADMIN — SODIUM CHLORIDE: 9 INJECTION, SOLUTION INTRAVENOUS at 09:09

## 2023-09-11 RX ADMIN — ONDANSETRON 4 MG: 4 TABLET, ORALLY DISINTEGRATING ORAL at 06:09

## 2023-09-11 RX ADMIN — PANTOPRAZOLE SODIUM 40 MG: 40 GRANULE, DELAYED RELEASE ORAL at 01:09

## 2023-09-11 RX ADMIN — CARVEDILOL 12.5 MG: 12.5 TABLET, FILM COATED ORAL at 01:09

## 2023-09-11 RX ADMIN — CARVEDILOL 25 MG: 25 TABLET, FILM COATED ORAL at 08:09

## 2023-09-11 RX ADMIN — TAMSULOSIN HYDROCHLORIDE 0.4 MG: 0.4 CAPSULE ORAL at 01:09

## 2023-09-11 RX ADMIN — ATORVASTATIN CALCIUM 40 MG: 40 TABLET, FILM COATED ORAL at 01:09

## 2023-09-11 RX ADMIN — POLYETHYLENE GLYCOL 3350 17 G: 17 POWDER, FOR SOLUTION ORAL at 08:09

## 2023-09-11 RX ADMIN — MEROPENEM 500 MG: 500 INJECTION INTRAVENOUS at 01:09

## 2023-09-11 RX ADMIN — Medication 1 CAPSULE: at 01:09

## 2023-09-11 NOTE — PROGRESS NOTES
Pharmacokinetic Assessment Follow Up: IV Vancomycin    Vancomycin serum concentration assessment/plan(s):    -Vancomycin pre-HD level 15.2, within goal of 15 - 20 for endophthalmitis  -Patient to receive HD today (M/W/F)  -Will re-dose vancomycin 500 mg x 1 dose   -Check vancomycin random level with AM labs on Wednesday     Drug levels (last 3 results):  Recent Labs   Lab Result Units 09/11/23  0259   Vancomycin, Random ug/mL 15.2       Pharmacy will continue to follow and monitor vancomycin.    Please contact pharmacy at extension [phone] for questions regarding this assessment.    Thank you for the consult,   Elsa Solorzano    Patient brief summary:  Immanuel Bernal is a 57 y.o. male initiated on antimicrobial therapy with IV Vancomycin for treatment of  endophthalmitis    The patient's current regimen is vancomyhcin     Drug Allergies:   Review of patient's allergies indicates:   Allergen Reactions    Morphine Rash    Amiodarone analogues Itching     Other reaction(s): Unknown       Actual Body Weight:   63 kg    Renal Function:   Estimated Creatinine Clearance: 15 mL/min (A) (based on SCr of 4.7 mg/dL (H)).,     Dialysis Method (if applicable):  intermittent HD    CBC (last 72 hours):  Recent Labs   Lab Result Units 09/09/23  0526 09/11/23  0259   WBC K/uL 5.03 7.73   Hemoglobin g/dL 9.5* 8.8*   Hematocrit % 30.4* 29.1*   Platelets K/uL 172 158         Metabolic Panel (last 72 hours):  Recent Labs   Lab Result Units 09/09/23  0526 09/11/23  0259   Sodium mmol/L 135* 133*   Potassium mmol/L 3.8 4.4   Chloride mmol/L 102 100   CO2 mmol/L 21* 18*   Glucose mg/dL 97 110   BUN mg/dL 27* 52*   Creatinine mg/dL 3.0* 4.7*   Albumin g/dL 2.1* 2.1*   Total Bilirubin mg/dL 0.3 0.3   Alkaline Phosphatase U/L 150* 140*   AST U/L 18 18   ALT U/L 5* 6*   Magnesium mg/dL 2.1 2.3   Phosphorus mg/dL 2.9 4.0         Vancomycin Administrations:  vancomycin given in the last 96 hours                     vancomycin (VANCOCIN)  500 mg in dextrose 5 % in water (D5W) 100 mL IVPB (MB+) (mg) 500 mg New Bag 09/04/23 1438                    Microbiologic Results:  Microbiology Results (last 7 days)       Procedure Component Value Units Date/Time    Fungus culture [[id number]] Collected: 08/26/23 1315    Order Status: Completed Specimen: CSF (Spinal Fluid) from CSF Tap, Tube 3 Updated: 09/11/23 0749     Fungus (Mycology) Culture Culture in progress      No fungus isolated after 2 weeks    Culture, Anaerobe [[id number]] Collected: 08/30/23 1724    Order Status: Completed Specimen: Wound from Cornea, Left Updated: 09/06/23 0843     Anaerobic Culture No anaerobes isolated    Narrative:      LEFT INTRAOCULAR SPACE    Fungus culture [[id number]] Collected: 08/16/23 1647    Order Status: Completed Specimen: Abscess from Eyelid, Left Updated: 09/04/23 1250     Fungus (Mycology) Culture Culture in progress      No fungus isolated after 2 weeks    Fungus culture [[id number]] Collected: 08/16/23 1632    Order Status: Completed Specimen: Wound from Cornea, Left Updated: 09/04/23 1250     Fungus (Mycology) Culture Culture in progress      No fungus isolated after 2 weeks    Fungus culture [[id number]] Collected: 08/16/23 1629    Order Status: Completed Specimen: Wound from Eyelid, Left Updated: 09/04/23 1250     Fungus (Mycology) Culture Culture in progress      No fungus isolated after 2 weeks    Fungus culture [[id number]] Collected: 08/16/23 1636    Order Status: Completed Specimen: Abscess from Eyelid, Left Updated: 09/04/23 1250     Fungus (Mycology) Culture Culture in progress      No fungus isolated after 2 weeks    Narrative:      Sclera abscess

## 2023-09-11 NOTE — ASSESSMENT & PLAN NOTE
Nutrition consulted. Most recent weight and BMI monitored-     Measurements:  Wt Readings from Last 1 Encounters:   09/07/23 62.8 kg (138 lb 7.2 oz)   Body mass index is 19.87 kg/m².    Patient has been screened and assessed by RD.    Malnutrition Type:  Context: chronic illness  Level: severe    Malnutrition Characteristic Summary:  Weight Loss (Malnutrition): greater than 10% in 6 months  Energy Intake (Malnutrition): other (see comments) ([place])  Subcutaneous Fat (Malnutrition): severe depletion  Muscle Mass (Malnutrition): severe depletion    Interventions/Recommendations (treatment strategy):  1.     Able to take in PO per SLP assessment on 8/15, on regular diet with thin liquids  Continue tube feeds for now in addition to oral diet

## 2023-09-11 NOTE — PROGRESS NOTES
[facility] ([place] [id number])  Nephrology  Progress Note    Patient Name: Immanuel Bernal  MRN: [id number]  Admission Date: 8/9/2023  Hospital Length of Stay: 33 days  Attending Provider: Ubaldo Torres MD   Primary Care Physician: Dolly, Primary Doctor  Principal Problem:Endophthalmitis    Subjective:     HPI: HPI obtained per medical record as patient unable to communicate     57-year-old male with a history of CHF, diabetes, hypertension, chronic disability, end-stage renal disease on hemodialysis, PEG placement, bowel obstruction, sleep apnea, TIA, left eye vitreous hemorrhage, stroke, and bowel obstruction with bowel resection admitted to [facility] in [place] July 18 from nursing home with left eye pain and blurred vision.  He was admitted with concern for bilateral endophthalmitis.  Other admit diagnoses included right upper extremity thrombus, end-stage renal disease on hemodialysis, hyperkalemia, sleep apnea, diabetes, and CHF.  He was treated with broad-spectrum antibiotics.  He was seen by Infectious Diseases,, and he was treated with vancomycin, ceftriaxone, and amphotericin.  Blood cultures were positive for Pseudomonas, and amphotericin/vancomycin were stopped.  IV cefepime was continued.  He was seen by Ophthalmology, and he received intravitreal vancomycin and ceftazidime (July 18).  He also had intravitreal tap July 18 that had no yeast or fungal elements observed.  Left eye endophthalmitis did not respond to treatment, and he subsequently developed abscess formation, significant proptosis, and drainage of purulent material from the orbit.  Ophthalmology recommended enucleation.  He was continued on cefepime for pseudomonal and ophthalmitis.  Recommendation was for 6 weeks of treatment to be followed by indefinite fluoroquinolone.  He was seen by Pulmonology during his stay for a right upper lung mass with peripheral nodules.  It was felt that he would need further  investigation of this once his current clinical issues stabilized.  Right upper extremity AV graft was excised during his stay with concern for infection.  A right IJ tunneled line was placed on July 27.  Left eye endophthalmitis continued to worsen, and ophthalmology spoke with patient and family about the need for enucleation.  Despite several conversations, family declined enucleation.  Plans were for transitioned to skilled nursing facility for continued treatment, but family did not want him to go to a skilled nursing facility.  He was subsequently discharged AMA from the hospital there on August 7.  Please see the August 7 Internal Medicine note for further details.     He subsequently presented to [facility] Emergency Department.  He will not go back to [facility] in [place]. He received Zosyn and vancomycin.  ED team at [facility] spoke with the patient and his power-of- (sister).  CT of the orbits noted scleral abscess along the lateral aspect of the left globe.  Patient and family are aware and in agreement that the patient needs enucleation of the eye at this time. Referring provider spoke with Oculoplastics at [facility]. Requesting transfer to [facility] for Oculoplastics specialty evaluation of persistent endophthalmitis.  ED provider noted patient is hemodynamically stable.  He does not appear volume overloaded or in respiratory distress.        The patient has a significant previous medical course as listed below  August 3: MRI brain and orbits showed worsening ophthalmitis and inflammatory changes of the left orbital tissues with slight increase in proptosis.  No evidence of intracranial inflammatory process observed.    July 25: Transesophageal echocardiogram had no evidence of endocarditis.  Moderate to severely decreased left ventricular systolic function with EF 30-35%.    July 22:  CT chest showed right upper lobe mass with numerous bilateral  nodularity predominantly in the right with associated mediastinal lymph nodes.  July 21: Blood cultures with Pseudomonas aeruginosa  July 19: MRI brain/orbits with and without contrast had findings suggestive of chronic microvascular ischemic disease of the white matter with remote ischemic event in the left insula/basal ganglia/subependymal white matter/right middle cerebellar peduncle and hemisphere.  Findings consistent with left endophthalmitis.  No abnormal findings observed in the right globe.  July 18:  Blood cultures with Pseudomonas aeruginosa and coag-negative staph    HPI obtained from [facility]'s note. Patient with AMS at time of nephrology evaluation and unable to provide HPI or ROS.          Interval History: Patient seen and examined while undergoing iHD for which he is tolerating well. Afebrile with pulse ranging from 70-60s bpm. Systolic blood pressures ranging from 150-140s mmHg. He is saturating +94% on room air. Roughly 50 mL documented UOP in the last 24 hours with one unmeasured void.     Review of patient's allergies indicates:   Allergen Reactions    Morphine Rash    Amiodarone analogues Itching     Other reaction(s): Unknown     Current Facility-Administered Medications   Medication Frequency    0.9%  NaCl infusion (for blood administration) Q24H PRN    0.9%  NaCl infusion (for blood administration) Q24H PRN    acetaminophen tablet 1,000 mg TID    albuterol-ipratropium 2.5 mg-0.5 mg/3 mL nebulizer solution 3 mL Q6H WAKE    amLODIPine tablet 5 mg Daily    atorvastatin tablet 40 mg Daily    calcitrioL solution 0.25 mcg Daily    carvediloL tablet 6.25 mg BID    dextrose 10% bolus 125 mL 125 mL PRN    dextrose 10% bolus 250 mL 250 mL PRN    epoetin thee injection 3,000 Units Every Mon, Wed, Fri    fluconazole 40 mg/ml suspension 400 mg Daily    glucagon (human recombinant) injection 1 mg PRN    glucose chewable tablet 16 g PRN    glucose chewable tablet 24 g PRN    heparin (porcine) injection  5,000 Units Q8H    hydrALAZINE 10 mg 2 tablets, hydrALAZINE 25 mg 2 tablets, isorsorbide dinitrate 20 mg  2 tablets combination TID    hydrALAZINE injection 10 mg Q6H PRN    Lactobacillus rhamnosus GG capsule 1 capsule BID    LIDOcaine HCl 2% urojet PRN    meclizine tablet 12.5 mg TID PRN    melatonin tablet 6 mg Nightly    meropenem (MERREM) 500 mg in sodium chloride 0.9 % 100 mL IVPB (MB+) Q12H    multivitamin tablet Daily    naloxone 0.4 mg/mL injection 0.02 mg PRN    OLANZapine injection 5 mg Nightly PRN    ondansetron disintegrating tablet 4 mg Q8H    oxybutynin 24 hr tablet 10 mg Daily    pantoprazole suspension 40 mg BID    polyethylene glycol packet 17 g BID    prochlorperazine injection Soln 10 mg Q6H PRN    senna-docusate 8.6-50 mg per tablet 1 tablet BID    sevelamer carbonate pwpk 0.8 g TID WM    sodium chloride 0.9% bolus 250 mL 250 mL PRN    sodium chloride 0.9% flush 10 mL Q12H PRN    sodium chloride 0.9% flush 10 mL PRN    tobramycin-dexAMETHasone 0.3-0.1% ophthalmic ointment TID    traMADoL tablet 50 mg TID PRN    valsartan tablet 160 mg BID    vancomycin - pharmacy to dose pharmacy to manage frequency    white petrolatum 41 % ointment PRN       Objective:     Vital Signs (Most Recent):  Temp: 98 °F (36.7 °C) (09/07/23 0418)  Pulse: 76 (09/07/23 0713)  Resp: 18 (09/07/23 0713)  BP: (!) 157/84 (09/07/23 0418)  SpO2: 98 % (09/07/23 0713) Vital Signs (24h Range):  Temp:  [98 °F (36.7 °C)-99 °F (37.2 °C)] 98 °F (36.7 °C)  Pulse:  [67-78] 76  Resp:  [16-45] 18  SpO2:  [94 %-99 %] 98 %  BP: (126-159)/(75-88) 157/84     Weight: 62.8 kg (138 lb 7.2 oz) (08/31/23 1146)  Body mass index is 19.87 kg/m².  Body surface area is 1.76 meters squared.    I/O last 3 completed shifts:  In: 780 [NG/GT:780]  Out: 3000 [Urine:350; Other:2650]    Physical Exam  Vitals and nursing note reviewed.   Constitutional:       General: He is awake. He is not in acute distress.     Appearance: He is cachectic. He is ill-appearing.  He is not diaphoretic.      Comments: Muscle wasting/atrophy noted.   HENT:      Head: Normocephalic and atraumatic.      Right Ear: External ear normal.      Left Ear: External ear normal.      Nose: Nose normal.      Mouth/Throat:      Mouth: Mucous membranes are moist.      Pharynx: Oropharynx is clear. No oropharyngeal exudate or posterior oropharyngeal erythema.   Eyes:      Comments: Left eye currently sutured closed.   Cardiovascular:      Rate and Rhythm: Normal rate.      Heart sounds: No murmur heard.     No friction rub. No gallop.   Pulmonary:      Effort: Pulmonary effort is normal. No respiratory distress.      Breath sounds: No wheezing, rhonchi or rales.   Chest:      Comments: Tunneled HD catheter to right chest wall.  Abdominal:      General: Bowel sounds are normal. There is no distension.      Palpations: Abdomen is soft.      Tenderness: There is no abdominal tenderness.      Comments: PEG in place.   Musculoskeletal:      Cervical back: Neck supple.      Right lower leg: No edema.      Left lower leg: No edema.   Skin:     General: Skin is warm and dry.      Coloration: Skin is not jaundiced.      Comments: Stable noted to right upper extremity from recent surgery. Incision appears intact.   Neurological:      General: No focal deficit present.      Mental Status: He is alert. Mental status is at baseline.      Cranial Nerves: No cranial nerve deficit.      Motor: No weakness.   Psychiatric:         Mood and Affect: Mood normal.         Behavior: Behavior normal.          Significant Labs:  BMP:   Recent Labs   Lab 09/07/23  0340   GLU 83   *   K 3.9   CL 97   CO2 25   BUN 30*   CREATININE 3.0*   CALCIUM 8.5*   MG 2.0       CBC:   Recent Labs   Lab 09/07/23  0341   WBC 4.63   RBC 3.12*   HGB 9.5*   HCT 31.0*      MCV 99*   MCH 30.4   MCHC 30.6*       CMP:   Recent Labs   Lab 09/07/23  0340   GLU 83   CALCIUM 8.5*   ALBUMIN 2.1*   PROT 5.9*   *   K 3.9   CO2 25   CL 97   BUN  30*   CREATININE 3.0*   ALKPHOS 111   ALT <5*   AST 18   BILITOT 0.3       LFTs:   Recent Labs   Lab 09/07/23  0340   ALT <5*   AST 18   ALKPHOS 111   BILITOT 0.3   PROT 5.9*   ALBUMIN 2.1*       Microbiology Results (last 7 days)       Procedure Component Value Units Date/Time    Culture, Anaerobe [[id number]] Collected: 08/30/23 1724    Order Status: Completed Specimen: Wound from Cornea, Left Updated: 09/06/23 0843     Anaerobic Culture No anaerobes isolated    Narrative:      LEFT INTRAOCULAR SPACE    Fungus culture [[id number]] Collected: 08/16/23 1647    Order Status: Completed Specimen: Abscess from Eyelid, Left Updated: 09/04/23 1250     Fungus (Mycology) Culture Culture in progress      No fungus isolated after 2 weeks    Fungus culture [[id number]] Collected: 08/16/23 1632    Order Status: Completed Specimen: Wound from Cornea, Left Updated: 09/04/23 1250     Fungus (Mycology) Culture Culture in progress      No fungus isolated after 2 weeks    Fungus culture [[id number]] Collected: 08/16/23 1629    Order Status: Completed Specimen: Wound from Eyelid, Left Updated: 09/04/23 1250     Fungus (Mycology) Culture Culture in progress      No fungus isolated after 2 weeks    Fungus culture [[id number]] Collected: 08/16/23 1636    Order Status: Completed Specimen: Abscess from Eyelid, Left Updated: 09/04/23 1250     Fungus (Mycology) Culture Culture in progress      No fungus isolated after 2 weeks    Narrative:      Sclera abscess    Aerobic culture [[id number]] Collected: 08/30/23 1724    Order Status: Completed Specimen: Wound from Cornea, Left Updated: 09/02/23 1304     Aerobic Bacterial Culture No growth    Narrative:      LEFT INTRAOCULAR SPACE    CSF culture [[id number]]  (Abnormal) Collected: 08/26/23 1315    Order Status: Completed Specimen: CSF (Spinal Fluid) from CSF Tap, Tube 3 Updated: 09/01/23 1434     CSF CULTURE Results called to and read back by:Kashif Zhang RN 08/30/2023  07:44       PROPIONIBACTERIUM SPECIES  From broth only       Gram Stain Result Cytospin indicates:      No WBC's      No organisms seen    AFB Culture & Smear [[id number]] Collected: 08/26/23 1315    Order Status: Completed Specimen: CSF (Spinal Fluid) from CSF Tap, Tube 3 Updated: 08/31/23 0854     AFB Culture & Smear Culture in progress     AFB CULTURE STAIN No acid fast bacilli seen.          Specimen (24h ago, onward)      None          Significant Imaging:  I have reviewed all imagining in the last 24 hours.    Assessment/Plan:     Ophtho  * Endophthalmitis  - Ophthalmology consulted and following  - s/p left eye enucleation on 8/30    Cardiac/Vascular  HFrEF (heart failure with reduced ejection fraction)  - management per primary   - currently on Norvasc 5 mg daily, Coreg 6.25 mg BID, hydralazine 70 mg BID, isosorbide dinitrate 40 mg BID and valsartan 160 mg BID    Renal/  ESRD (end stage renal disease)  Outpatient HD Information:  -Outpatient HD unit: C   -HD tx days: MWF   -HD tx time: 210min  -HD access: R IJ TDC   -HD modality: iHD   -Residual urine: ?    Plan/Recommendations:  - iHD today for metabolic clearance and volume management, will continue per outpatient schedule, every Monday, Wednesday & Friday while inpatient unless acute indications arise  - can continue calcitriol 0.25 mcg daily and Renvela 800 mg TIDWM  - renal diet/tube feeds when not NPO  - strict I/O's and daily weights  - daily renal function panels and magnesium levels  - renally all dose medications to eGFR  - avoid gadolinium, fleets, phos-based laxatives, NSAIDs, etc.    ID  AV fistula infection  - secondary to Pseudomonas  - s/p right upper extremity AV graft excision in July  - Infectious Disease consulted and following   - plan for 4-6 weeks total of meropenem, vancomycin and fluconazole    Oncology  Anemia in ESRD (end-stage renal disease)  - target Hg of 10-12  - continue epogen 3,000 units every Monday, Wednesday and Friday       Thank  you for your consult. I will follow-up with patient. Please contact us if you have any additional questions.    Herbert Cedeño MD  Nephrology  [facility] - Intensive Care ([place]-[facility])

## 2023-09-11 NOTE — NURSING
3.5 hours HD tx completed. 2 L of fluid removed.  Patient tolerated well.    Epoetin given as ordered.    Blood returned. Lines flushed. Catheter locked with Heparin. Clamped and capped.     83 @1214.     Report given to UZMA Solano RN.

## 2023-09-11 NOTE — PT/OT/SLP PROGRESS
Physical Therapy      Patient Name:  Immanuel Bernal   MRN:  [id number]    Patient not seen today. Pt out of room for HD on attempt. Will follow-up per POC.
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
## 2023-09-11 NOTE — ASSESSMENT & PLAN NOTE
Secondary to ?medcaition side effect (cefepime, reglan), vs meningitis, vs prolonged hospitalization - see hospital course. Resolved.     Plan  - Continue HD sessions  - Olanzapine 5mg IM PRN for severe agitation  - Delirium precautions in place.

## 2023-09-11 NOTE — ASSESSMENT & PLAN NOTE
Outpatient HD Information:  -Outpatient HD unit: [facility]   -HD tx days: MWF   -HD tx time: 210min  -HD access: R IJ TDC   -HD modality: iHD   -Residual urine: ?    Plan/Recommendations:  - iHD today for metabolic clearance and volume management, will continue per outpatient schedule, every Monday, Wednesday & Friday while inpatient unless acute indications arise  - can continue calcitriol 0.25 mcg daily and Renvela 800 mg TIDWM  - renal diet/tube feeds when not NPO  - strict I/O's and daily weights  - daily renal function panels and magnesium levels  - renally all dose medications to eGFR  - avoid gadolinium, fleets, phos-based laxatives, NSAIDs, etc.

## 2023-09-11 NOTE — PLAN OF CARE
[facility] - [facility] ([facility]  Discharge Reassessment    Primary Care Provider: Dolly, Primary Doctor    Expected Discharge Date: 9/13/2023    Discharge pending approval by Medical Director for dialysis chair at [facility]  and review of PASSR Level 2 screen for nursing home placement with [facility].    Reassessment (most recent)       Discharge Reassessment - 09/11/23 1436          Discharge Reassessment    Assessment Type Discharge Planning Reassessment     Did the patient's condition or plan change since previous assessment? No     Discharge Plan A New Nursing Home placement - assisted care facility     Discharge Plan B New Nursing Home placement - assisted care facility     DME Needed Upon Discharge  --   TBD    Transition of Care Barriers None     Why the patient remains in the hospital Requires continued medical care        Post-Acute Status    Post-Acute Authorization Placement     Post-Acute Placement Status Pending post-acute provider review/more information requested     Diaylsis Status --   pending med director approval

## 2023-09-11 NOTE — NURSING
Pt's status stable and unchanged. VSS. Dialysis today- 2L removed. 2 small BM's. No c/o pain. Safety precautions maintained. Call light within reach.

## 2023-09-11 NOTE — SUBJECTIVE & OBJECTIVE
Interval History: JED DUMONT, Denies any abdominal pain, slept well overnight. Underwent HD this morning. Patient waiting for HD chair and state PSSR screening approval.    Review of Systems   Constitutional:  Negative for chills, fatigue and fever.   HENT:  Negative for congestion and facial swelling.    Eyes:  Positive for discharge (watery discharge). Negative for photophobia, pain, redness, itching and visual disturbance.   Respiratory:  Negative for cough and shortness of breath.    Cardiovascular:  Negative for chest pain, palpitations and leg swelling.   Gastrointestinal:  Negative for abdominal distention, abdominal pain, nausea and vomiting.   Genitourinary:  Negative for difficulty urinating, dysuria and urgency.   Musculoskeletal:  Negative for arthralgias, back pain and myalgias.   Skin:  Negative for color change and pallor.   Neurological:  Negative for dizziness, weakness, light-headedness and headaches.   Psychiatric/Behavioral:  Negative for agitation, behavioral problems and confusion.      Objective:     Vital Signs (Most Recent):  Temp: 98.5 °F (36.9 °C) (09/11/23 0748)  Pulse: 69 (09/11/23 1230)  Resp: 15 (09/11/23 0748)  BP: (!) 143/85 (09/11/23 1230)  SpO2: (!) 92 % (09/11/23 0850) Vital Signs (24h Range):  Temp:  [97.7 °F (36.5 °C)-98.8 °F (37.1 °C)] 98.5 °F (36.9 °C)  Pulse:  [66-73] 69  Resp:  [14-20] 15  SpO2:  [92 %-100 %] 92 %  BP: (131-152)/(74-88) 143/85     Weight: 62.8 kg (138 lb 7.2 oz)  Body mass index is 19.87 kg/m².    Intake/Output Summary (Last 24 hours) at 9/11/2023 1349  Last data filed at 9/11/2023 1230  Gross per 24 hour   Intake 180 ml   Output 2700 ml   Net -2520 ml         Physical Exam  Constitutional:       Appearance: He is cachectic. He is ill-appearing.   Eyes:      General:         Left eye: Discharge present.     Conjunctiva/sclera: Conjunctivae normal.      Comments: Left eye sutured closed   Cardiovascular:      Rate and Rhythm: Normal rate and regular rhythm.       Heart sounds: Normal heart sounds.   Pulmonary:      Breath sounds: Normal breath sounds.      Comments: HD cath to the right chest wall.  Abdominal:      General: Bowel sounds are normal.      Comments: PEG tube in place.   Neurological:      General: No focal deficit present.      Mental Status: He is oriented to person, place, and time.             Significant Labs: All pertinent labs within the past 24 hours have been reviewed.    Significant Imaging: I have reviewed all pertinent imaging results/findings within the past 24 hours.

## 2023-09-11 NOTE — NURSING
Report received from UZMA Solano RN.   Patient arrived to [facility] via stretcher. Alert and awake.    HD tx initiated via Right IJ tunneled cath.

## 2023-09-12 LAB
POCT GLUCOSE: 101 MG/DL (ref 70–110)
POCT GLUCOSE: 133 MG/DL (ref 70–110)
POCT GLUCOSE: 144 MG/DL (ref 70–110)
POCT GLUCOSE: 147 MG/DL (ref 70–110)
POCT GLUCOSE: 97 MG/DL (ref 70–110)

## 2023-09-12 PROCEDURE — 97535 SELF CARE MNGMENT TRAINING: CPT | Mod: CO

## 2023-09-12 PROCEDURE — 99232 SBSQ HOSP IP/OBS MODERATE 35: CPT | Mod: GC,,, | Performed by: HOSPITALIST

## 2023-09-12 PROCEDURE — 27000221 HC OXYGEN, UP TO 24 HOURS

## 2023-09-12 PROCEDURE — 25000003 PHARM REV CODE 250

## 2023-09-12 PROCEDURE — 25000003 PHARM REV CODE 250: Performed by: STUDENT IN AN ORGANIZED HEALTH CARE EDUCATION/TRAINING PROGRAM

## 2023-09-12 PROCEDURE — 99232 PR SUBSEQUENT HOSPITAL CARE,LEVL II: ICD-10-PCS | Mod: GC,,, | Performed by: HOSPITALIST

## 2023-09-12 PROCEDURE — 25000003 PHARM REV CODE 250: Performed by: HOSPITALIST

## 2023-09-12 PROCEDURE — 20000000 HC ICU ROOM

## 2023-09-12 PROCEDURE — 63700000 PHARM REV CODE 250 ALT 637 W/O HCPCS: Performed by: HOSPITALIST

## 2023-09-12 PROCEDURE — 94761 N-INVAS EAR/PLS OXIMETRY MLT: CPT

## 2023-09-12 PROCEDURE — 63600175 PHARM REV CODE 636 W HCPCS

## 2023-09-12 PROCEDURE — 63600175 PHARM REV CODE 636 W HCPCS: Performed by: STUDENT IN AN ORGANIZED HEALTH CARE EDUCATION/TRAINING PROGRAM

## 2023-09-12 PROCEDURE — 97530 THERAPEUTIC ACTIVITIES: CPT | Mod: CO

## 2023-09-12 PROCEDURE — 97530 THERAPEUTIC ACTIVITIES: CPT

## 2023-09-12 PROCEDURE — 99900035 HC TECH TIME PER 15 MIN (STAT)

## 2023-09-12 RX ORDER — HEPARIN SODIUM 1000 [USP'U]/ML
1000 INJECTION, SOLUTION INTRAVENOUS; SUBCUTANEOUS
Status: ACTIVE | OUTPATIENT
Start: 2023-09-13 | End: 2023-09-13

## 2023-09-12 RX ORDER — SODIUM CHLORIDE 9 MG/ML
INJECTION, SOLUTION INTRAVENOUS ONCE
Status: COMPLETED | OUTPATIENT
Start: 2023-09-13 | End: 2023-09-13

## 2023-09-12 RX ADMIN — Medication 1 CAPSULE: at 09:09

## 2023-09-12 RX ADMIN — Medication 1 CAPSULE: at 08:09

## 2023-09-12 RX ADMIN — MEROPENEM 500 MG: 500 INJECTION INTRAVENOUS at 08:09

## 2023-09-12 RX ADMIN — TAMSULOSIN HYDROCHLORIDE 0.4 MG: 0.4 CAPSULE ORAL at 09:09

## 2023-09-12 RX ADMIN — POLYETHYLENE GLYCOL 3350 17 G: 17 POWDER, FOR SOLUTION ORAL at 08:09

## 2023-09-12 RX ADMIN — CALCITRIOL 0.25 MCG: 1 SOLUTION ORAL at 09:09

## 2023-09-12 RX ADMIN — SENNOSIDES AND DOCUSATE SODIUM 1 TABLET: 50; 8.6 TABLET ORAL at 08:09

## 2023-09-12 RX ADMIN — VALSARTAN 160 MG: 160 TABLET, FILM COATED ORAL at 08:09

## 2023-09-12 RX ADMIN — LIDOCAINE HYDROCHLORIDE: 20 JELLY TOPICAL at 02:09

## 2023-09-12 RX ADMIN — ONDANSETRON 4 MG: 4 TABLET, ORALLY DISINTEGRATING ORAL at 02:09

## 2023-09-12 RX ADMIN — VALSARTAN 160 MG: 160 TABLET, FILM COATED ORAL at 09:09

## 2023-09-12 RX ADMIN — TOBRAMYCIN AND DEXAMETHASONE: 3; 1 OINTMENT OPHTHALMIC at 02:09

## 2023-09-12 RX ADMIN — SENNOSIDES AND DOCUSATE SODIUM 1 TABLET: 50; 8.6 TABLET ORAL at 09:09

## 2023-09-12 RX ADMIN — ACETAMINOPHEN 1000 MG: 500 TABLET ORAL at 08:09

## 2023-09-12 RX ADMIN — TRAMADOL HYDROCHLORIDE 50 MG: 50 TABLET, COATED ORAL at 10:09

## 2023-09-12 RX ADMIN — MEROPENEM 500 MG: 500 INJECTION INTRAVENOUS at 09:09

## 2023-09-12 RX ADMIN — METHOCARBAMOL 500 MG: 500 TABLET ORAL at 08:09

## 2023-09-12 RX ADMIN — HYDRALAZINE HYDROCHLORIDE: 10 TABLET, FILM COATED ORAL at 08:09

## 2023-09-12 RX ADMIN — ONDANSETRON 4 MG: 4 TABLET, ORALLY DISINTEGRATING ORAL at 05:09

## 2023-09-12 RX ADMIN — ONDANSETRON 4 MG: 4 TABLET, ORALLY DISINTEGRATING ORAL at 09:09

## 2023-09-12 RX ADMIN — THERA TABS 1 TABLET: TAB at 09:09

## 2023-09-12 RX ADMIN — ATORVASTATIN CALCIUM 40 MG: 40 TABLET, FILM COATED ORAL at 09:09

## 2023-09-12 RX ADMIN — TOBRAMYCIN AND DEXAMETHASONE: 3; 1 OINTMENT OPHTHALMIC at 09:09

## 2023-09-12 RX ADMIN — HEPARIN SODIUM 5000 UNITS: 5000 INJECTION INTRAVENOUS; SUBCUTANEOUS at 09:09

## 2023-09-12 RX ADMIN — ACETAMINOPHEN 1000 MG: 500 TABLET ORAL at 09:09

## 2023-09-12 RX ADMIN — PROCHLORPERAZINE EDISYLATE 10 MG: 5 INJECTION INTRAMUSCULAR; INTRAVENOUS at 12:09

## 2023-09-12 RX ADMIN — HYDRALAZINE HYDROCHLORIDE: 10 TABLET, FILM COATED ORAL at 09:09

## 2023-09-12 RX ADMIN — Medication 6 MG: at 08:09

## 2023-09-12 RX ADMIN — PANTOPRAZOLE SODIUM 40 MG: 40 GRANULE, DELAYED RELEASE ORAL at 08:09

## 2023-09-12 RX ADMIN — PANTOPRAZOLE SODIUM 40 MG: 40 GRANULE, DELAYED RELEASE ORAL at 09:09

## 2023-09-12 RX ADMIN — OXYBUTYNIN CHLORIDE 10 MG: 5 TABLET, EXTENDED RELEASE ORAL at 09:09

## 2023-09-12 RX ADMIN — FLUCONAZOLE 400 MG: 40 POWDER, FOR SUSPENSION ORAL at 09:09

## 2023-09-12 RX ADMIN — CARVEDILOL 25 MG: 25 TABLET, FILM COATED ORAL at 09:09

## 2023-09-12 RX ADMIN — CARVEDILOL 25 MG: 25 TABLET, FILM COATED ORAL at 08:09

## 2023-09-12 RX ADMIN — HYDRALAZINE HYDROCHLORIDE: 10 TABLET, FILM COATED ORAL at 02:09

## 2023-09-12 RX ADMIN — HEPARIN SODIUM 5000 UNITS: 5000 INJECTION INTRAVENOUS; SUBCUTANEOUS at 02:09

## 2023-09-12 RX ADMIN — METHOCARBAMOL 500 MG: 500 TABLET ORAL at 09:09

## 2023-09-12 RX ADMIN — METHOCARBAMOL 500 MG: 500 TABLET ORAL at 02:09

## 2023-09-12 RX ADMIN — ACETAMINOPHEN 1000 MG: 500 TABLET ORAL at 02:09

## 2023-09-12 RX ADMIN — HEPARIN SODIUM 5000 UNITS: 5000 INJECTION INTRAVENOUS; SUBCUTANEOUS at 05:09

## 2023-09-12 NOTE — NURSING
Patient is alert and ox4. NSR on cardiac monitor. Vitals stable. Patient on room air. Tramadol for pain control. PEG feedings still going and being tolerated by patient. 5 beat run of vtach. Patient asymptomatic. Continuing to monitor. Patient in bed with bed in lowest position, side rails up , and call light in reach.

## 2023-09-12 NOTE — SUBJECTIVE & OBJECTIVE
Interval History: Patient seen and examined this AM. Complaints of diarrhea this morning. Underwent iHD yesterday with 2 liters removed. Afebrile with pulse ranging from 70-60s bpm. Systolic blood pressures in the 140s mmHg. He is saturating +98% on 2 liters via nasal cannula with two unmeasured voids in the last 24 hours. Plan for iHD tomorrow per schedule.    Review of patient's allergies indicates:   Allergen Reactions    Morphine Rash    Amiodarone analogues Itching     Other reaction(s): Unknown     Current Facility-Administered Medications   Medication Frequency    0.9%  NaCl infusion (for blood administration) Q24H PRN    0.9%  NaCl infusion (for blood administration) Q24H PRN    0.9%  NaCl infusion PRN    acetaminophen tablet 1,000 mg TID    albuterol-ipratropium 2.5 mg-0.5 mg/3 mL nebulizer solution 3 mL Q6H PRN    atorvastatin tablet 40 mg Daily    calcitrioL solution 0.25 mcg Daily    carvediloL tablet 25 mg BID    dextrose 10% bolus 125 mL 125 mL PRN    dextrose 10% bolus 250 mL 250 mL PRN    epoetin thee injection 3,000 Units Every Mon, Wed, Fri    fluconazole 40 mg/ml suspension 400 mg Daily    glucagon (human recombinant) injection 1 mg PRN    glucose chewable tablet 16 g PRN    glucose chewable tablet 24 g PRN    heparin (porcine) injection 1,000 Units PRN    heparin (porcine) injection 5,000 Units Q8H    hydrALAZINE 10 mg 2 tablets, hydrALAZINE 25 mg 2 tablets, isorsorbide dinitrate 20 mg  2 tablets combination TID    hydrALAZINE injection 10 mg Q6H PRN    Lactobacillus rhamnosus GG capsule 1 capsule BID    LIDOcaine HCl 2% urojet PRN    meclizine tablet 12.5 mg TID PRN    melatonin tablet 6 mg Nightly    meropenem (MERREM) 500 mg in sodium chloride 0.9 % 100 mL IVPB (MB+) Q12H    methocarbamoL tablet 500 mg TID    multivitamin tablet Daily    naloxone 0.4 mg/mL injection 0.02 mg PRN    OLANZapine injection 5 mg Nightly PRN    ondansetron disintegrating tablet 4 mg Q8H    oxybutynin 24 hr tablet 10  mg Daily    pantoprazole suspension 40 mg BID    polyethylene glycol packet 17 g BID    prochlorperazine injection Soln 10 mg Q6H PRN    senna-docusate 8.6-50 mg per tablet 1 tablet BID    sodium chloride 0.9% bolus 250 mL 250 mL PRN    sodium chloride 0.9% bolus 250 mL 250 mL PRN    sodium chloride 0.9% flush 10 mL Q12H PRN    sodium chloride 0.9% flush 10 mL PRN    tamsulosin 24 hr capsule 0.4 mg Daily    tobramycin-dexAMETHasone 0.3-0.1% ophthalmic ointment TID    traMADoL tablet 50 mg TID PRN    valsartan tablet 160 mg BID    vancomycin - pharmacy to dose pharmacy to manage frequency    white petrolatum 41 % ointment PRN       Objective:     Vital Signs (Most Recent):  Temp: 98.5 °F (36.9 °C) (09/11/23 2015)  Pulse: 64 (09/12/23 0236)  Resp: 16 (09/11/23 2015)  BP: (!) 142/80 (09/11/23 2015)  SpO2: 99 % (09/11/23 2015) Vital Signs (24h Range):  Temp:  [98.5 °F (36.9 °C)-98.6 °F (37 °C)] 98.5 °F (36.9 °C)  Pulse:  [64-73] 64  Resp:  [15-20] 16  SpO2:  [92 %-100 %] 99 %  BP: (131-152)/(79-88) 142/80     Weight: 62.8 kg (138 lb 7.2 oz) (09/07/23 1000)  Body mass index is 19.87 kg/m².  Body surface area is 1.76 meters squared.    I/O last 3 completed shifts:  In: 667 [P.O.:667]  Out: 2700 [Urine:50; Other:2650]     Physical Exam  Vitals and nursing note reviewed.   Constitutional:       General: He is awake. He is not in acute distress.     Appearance: He is cachectic. He is ill-appearing. He is not diaphoretic.      Comments: Muscle wasting/atrophy noted.   HENT:      Head: Normocephalic and atraumatic.      Right Ear: External ear normal.      Left Ear: External ear normal.      Nose: Nose normal.      Mouth/Throat:      Mouth: Mucous membranes are moist.      Pharynx: Oropharynx is clear. No oropharyngeal exudate or posterior oropharyngeal erythema.   Eyes:      Comments: Left eye currently sutured closed.   Cardiovascular:      Rate and Rhythm: Normal rate.      Heart sounds: No murmur heard.     No friction  rub. No gallop.   Pulmonary:      Effort: Pulmonary effort is normal. No respiratory distress.      Breath sounds: Rhonchi present. No wheezing or rales.   Chest:      Comments: Tunneled HD catheter to right chest wall.  Abdominal:      General: Bowel sounds are normal. There is no distension.      Palpations: Abdomen is soft.      Tenderness: There is no abdominal tenderness.      Comments: PEG in place.   Musculoskeletal:      Cervical back: Neck supple.      Right lower leg: No edema.      Left lower leg: No edema.   Skin:     General: Skin is warm and dry.      Coloration: Skin is not jaundiced.      Comments: Stable noted to right upper extremity from recent surgery. Incision appears intact.   Neurological:      General: No focal deficit present.      Mental Status: He is alert. Mental status is at baseline.      Cranial Nerves: No cranial nerve deficit.      Motor: No weakness.   Psychiatric:         Mood and Affect: Mood normal.         Behavior: Behavior normal.          Significant Labs:  BMP:   Recent Labs   Lab 09/11/23 0259      *   K 4.4      CO2 18*   BUN 52*   CREATININE 4.7*   CALCIUM 9.4   MG 2.3       CBC:   Recent Labs   Lab 09/11/23 0259   WBC 7.73   RBC 2.81*   HGB 8.8*   HCT 29.1*      *   MCH 31.3*   MCHC 30.2*       CMP:   Recent Labs   Lab 09/11/23 0259      CALCIUM 9.4   ALBUMIN 2.1*   PROT 6.0   *   K 4.4   CO2 18*      BUN 52*   CREATININE 4.7*   ALKPHOS 140*   ALT 6*   AST 18   BILITOT 0.3       LFTs:   Recent Labs   Lab 09/11/23 0259   ALT 6*   AST 18   ALKPHOS 140*   BILITOT 0.3   PROT 6.0   ALBUMIN 2.1*       Microbiology Results (last 7 days)       Procedure Component Value Units Date/Time    Fungus culture [[id number]] Collected: 08/26/23 1315    Order Status: Completed Specimen: CSF (Spinal Fluid) from CSF Tap, Tube 3 Updated: 09/11/23 0749     Fungus (Mycology) Culture Culture in progress      No fungus isolated after 2 weeks     Culture, Anaerobe [[id number]] Collected: 08/30/23 1724    Order Status: Completed Specimen: Wound from Cornea, Left Updated: 09/06/23 0843     Anaerobic Culture No anaerobes isolated    Narrative:      LEFT INTRAOCULAR SPACE          Specimen (24h ago, onward)      None          Significant Imaging:  I have reviewed all imagining in the last 24 hours.

## 2023-09-12 NOTE — PT/OT/SLP PROGRESS
Physical Therapy Treatment    Patient Name:  Immanuel Bernal   MRN:  [id number]    Recommendations:     Discharge Recommendations:  (post acute placement)  Discharge Equipment Recommendations: to be determined by next level of care  Barriers to discharge: Inaccessible home and Decreased caregiver support    Assessment:     Immanuel Bernal is a 57 y.o. male admitted with a medical diagnosis of Endophthalmitis.  He presents with the following impairments/functional limitations: weakness, impaired endurance, impaired self care skills, impaired functional mobility, gait instability, impaired balance, visual deficits, decreased lower extremity function, decreased upper extremity function, decreased safety awareness. Pt would benefit from post acute inpatient rehabilitation for: Dynamic/static standing/sitting balance through skilled balance training, strengthening with the use of skilled therapeutic exercises interventions, mobility and safety training to ensure safe discharge home through skilled patient, and mobility through adaptive equipment training. Pt highly motivated to return to independent PLOF and can tolerate 3+hours of therapy. Pt continues to benefit from a collaborative multidisciplinary program to improve quality of life and focus on recovery of impairments.      Rehab Prognosis: Good; patient would benefit from acute skilled PT services to address these deficits and reach maximum level of function.    Recent Surgery: Procedure(s) (LRB):  ENUCLEATION, EYE (Left)  BLEPHARORRHAPHY (Left)  INJECTION, MEDICATION, RETROBULBAR (Left) 13 Days Post-Op    Plan:     During this hospitalization, patient to be seen 3 x/week to address the identified rehab impairments via gait training, therapeutic activities, therapeutic exercises, neuromuscular re-education and progress toward the following goals:    Plan of Care Expires:  09/14/23    Subjective     Chief Complaint: L leg hurting from being up in the  chair  Patient/Family Comments/goals: to get back to bed  Pain/Comfort:  Pain Rating 1: 8/10  Location - Side 1: Left  Location - Orientation 1: generalized  Location 1: leg  Pain Addressed 1: Reposition, Distraction  Pain Rating Post-Intervention 1: 0/10      Objective:     Communicated with RN prior to session.  Patient found up in chair with oxygen, pulse ox (continuous), PEG Tube, telemetry upon PT entry to room.     General Precautions: Standard, fall, vision impaired  Orthopedic Precautions: N/A  Braces: N/A  Respiratory Status: Nasal cannula     Functional Mobility:  Bed Mobility:     Scooting: minimum assistance with B feet braced on bed by PT  Sit to Supine: moderate assistance  Transfers:     Sit to Stand:  maximal assistance with rolling walker, posterior lean, pt not responding for Vcing for anterior lean, When Ptb assisted with anterior lean pt resisted and began walking his feet forward increasing posterior lean; pt returned to seated position  Bed to Chair: maximal assistance with  no AD  using  Step Transfer  Gait: 3-4 small steps R to bed, max A with posterior lean (less than when RW used), decreased BLE  clearance and step length  Balance:   Static Sitting: SBA  Dynamic Sitting: SBA-CGA  Static Standing: maxA  Dynamic Standing: maxA      AM-PAC 6 CLICK MOBILITY  Turning over in bed (including adjusting bedclothes, sheets and blankets)?: 2  Sitting down on and standing up from a chair with arms (e.g., wheelchair, bedside commode, etc.): 2  Moving from lying on back to sitting on the side of the bed?: 2  Moving to and from a bed to a chair (including a wheelchair)?: 2  Need to walk in hospital room?: 2  Climbing 3-5 steps with a railing?: 1  Basic Mobility Total Score: 11       Treatment & Education:  Patient educated on role of therapy, goals of session, and benefits of mobilizing.   Discussed PT plan of care during hospitalization.   Patient educated on calling for assistance.   Patient educated on  how their diagnosis impacts their mobility within PT scope of practice.   All questions answered within PT scope of practice.    Patient left HOB elevated with all lines intact, call button in reach, and RN present.    GOALS:   Multidisciplinary Problems       Physical Therapy Goals          Problem: Physical Therapy    Goal Priority Disciplines Outcome Goal Variances Interventions   Physical Therapy Goal     PT, PT/OT Ongoing, Progressing     Description: Goals to be met by: 23     Patient will increase functional independence with mobility by performin. Supine to sit with MInimal Assistance  2. Sit to stand transfer with Minimal Assistance using LRAD  3. Gait  x 10 feet with Minimal Assistance using LRAD.                          Time Tracking:     PT Received On: 23  PT Start Time: 1336     PT Stop Time: 1348  PT Total Time (min): 12 min     Billable Minutes: Therapeutic Activity 12    Treatment Type: Treatment  PT/PTA: PT     Number of PTA visits since last PT visit: 0     2023
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
## 2023-09-12 NOTE — NURSING
Pt status stable and unchanged. Hemodialysis today- 2L removed. 2 runs of Vtach during shift, pt asymptomatic- Med team aware. No orders. Safety precautions maintained. Call light within reach.

## 2023-09-12 NOTE — PROGRESS NOTES
[facility] ([place] [id number])  Nephrology  Progress Note    Patient Name: Immanuel Bernal  MRN: [id number]  Admission Date: 8/9/2023  Hospital Length of Stay: 34 days  Attending Provider: Ubaldo Torres MD   Primary Care Physician: Dolly, Primary Doctor  Principal Problem:Endophthalmitis    Subjective:     HPI: HPI obtained per medical record as patient unable to communicate     57-year-old male with a history of CHF, diabetes, hypertension, chronic disability, end-stage renal disease on hemodialysis, PEG placement, bowel obstruction, sleep apnea, TIA, left eye vitreous hemorrhage, stroke, and bowel obstruction with bowel resection admitted to [facility] in [place] July 18 from nursing home with left eye pain and blurred vision.  He was admitted with concern for bilateral endophthalmitis.  Other admit diagnoses included right upper extremity thrombus, end-stage renal disease on hemodialysis, hyperkalemia, sleep apnea, diabetes, and CHF.  He was treated with broad-spectrum antibiotics.  He was seen by Infectious Diseases,, and he was treated with vancomycin, ceftriaxone, and amphotericin.  Blood cultures were positive for Pseudomonas, and amphotericin/vancomycin were stopped.  IV cefepime was continued.  He was seen by Ophthalmology, and he received intravitreal vancomycin and ceftazidime (July 18).  He also had intravitreal tap July 18 that had no yeast or fungal elements observed.  Left eye endophthalmitis did not respond to treatment, and he subsequently developed abscess formation, significant proptosis, and drainage of purulent material from the orbit.  Ophthalmology recommended enucleation.  He was continued on cefepime for pseudomonal and ophthalmitis.  Recommendation was for 6 weeks of treatment to be followed by indefinite fluoroquinolone.  He was seen by Pulmonology during his stay for a right upper lung mass with peripheral nodules.  It was felt that he would need further  investigation of this once his current clinical issues stabilized.  Right upper extremity AV graft was excised during his stay with concern for infection.  A right IJ tunneled line was placed on July 27.  Left eye endophthalmitis continued to worsen, and ophthalmology spoke with patient and family about the need for enucleation.  Despite several conversations, family declined enucleation.  Plans were for transitioned to skilled nursing facility for continued treatment, but family did not want him to go to a skilled nursing facility.  He was subsequently discharged AMA from the hospital there on August 7.  Please see the August 7 Internal Medicine note for further details.     He subsequently presented to [facility] Emergency Department.  He will not go back to [facility] in [place]. He received Zosyn and vancomycin.  ED team at [facility] spoke with the patient and his power-of- (sister).  CT of the orbits noted scleral abscess along the lateral aspect of the left globe.  Patient and family are aware and in agreement that the patient needs enucleation of the eye at this time. Referring provider spoke with Oculoplastics at [facility]. Requesting transfer to [facility] Medicine for Oculoplastics specialty evaluation of persistent endophthalmitis.  ED provider noted patient is hemodynamically stable.  He does not appear volume overloaded or in respiratory distress.        The patient has a significant previous medical course as listed below  August 3: MRI brain and orbits showed worsening ophthalmitis and inflammatory changes of the left orbital tissues with slight increase in proptosis.  No evidence of intracranial inflammatory process observed.    July 25: Transesophageal echocardiogram had no evidence of endocarditis.  Moderate to severely decreased left ventricular systolic function with EF 30-35%.    July 22:  CT chest showed right upper lobe mass with numerous bilateral  nodularity predominantly in the right with associated mediastinal lymph nodes.  July 21: Blood cultures with Pseudomonas aeruginosa  July 19: MRI brain/orbits with and without contrast had findings suggestive of chronic microvascular ischemic disease of the white matter with remote ischemic event in the left insula/basal ganglia/subependymal white matter/right middle cerebellar peduncle and hemisphere.  Findings consistent with left endophthalmitis.  No abnormal findings observed in the right globe.  July 18:  Blood cultures with Pseudomonas aeruginosa and coag-negative staph    HPI obtained from hospital med's note. Patient with AMS at time of nephrology evaluation and unable to provide HPI or ROS.          Interval History: Patient seen and examined this AM. Complaints of diarrhea this morning. Underwent iHD yesterday with 2 liters removed. Afebrile with pulse ranging from 70-60s bpm. Systolic blood pressures in the 140s mmHg. He is saturating +98% on 2 liters via nasal cannula with two unmeasured voids in the last 24 hours. Plan for iHD tomorrow per schedule.    Review of patient's allergies indicates:   Allergen Reactions    Morphine Rash    Amiodarone analogues Itching     Other reaction(s): Unknown     Current Facility-Administered Medications   Medication Frequency    0.9%  NaCl infusion (for blood administration) Q24H PRN    0.9%  NaCl infusion (for blood administration) Q24H PRN    0.9%  NaCl infusion PRN    acetaminophen tablet 1,000 mg TID    albuterol-ipratropium 2.5 mg-0.5 mg/3 mL nebulizer solution 3 mL Q6H PRN    atorvastatin tablet 40 mg Daily    calcitrioL solution 0.25 mcg Daily    carvediloL tablet 25 mg BID    dextrose 10% bolus 125 mL 125 mL PRN    dextrose 10% bolus 250 mL 250 mL PRN    epoetin thee injection 3,000 Units Every Mon, Wed, Fri    fluconazole 40 mg/ml suspension 400 mg Daily    glucagon (human recombinant) injection 1 mg PRN    glucose chewable tablet 16 g PRN     glucose chewable tablet 24 g PRN    heparin (porcine) injection 1,000 Units PRN    heparin (porcine) injection 5,000 Units Q8H    hydrALAZINE 10 mg 2 tablets, hydrALAZINE 25 mg 2 tablets, isorsorbide dinitrate 20 mg  2 tablets combination TID    hydrALAZINE injection 10 mg Q6H PRN    Lactobacillus rhamnosus GG capsule 1 capsule BID    LIDOcaine HCl 2% urojet PRN    meclizine tablet 12.5 mg TID PRN    melatonin tablet 6 mg Nightly    meropenem (MERREM) 500 mg in sodium chloride 0.9 % 100 mL IVPB (MB+) Q12H    methocarbamoL tablet 500 mg TID    multivitamin tablet Daily    naloxone 0.4 mg/mL injection 0.02 mg PRN    OLANZapine injection 5 mg Nightly PRN    ondansetron disintegrating tablet 4 mg Q8H    oxybutynin 24 hr tablet 10 mg Daily    pantoprazole suspension 40 mg BID    polyethylene glycol packet 17 g BID    prochlorperazine injection Soln 10 mg Q6H PRN    senna-docusate 8.6-50 mg per tablet 1 tablet BID    sodium chloride 0.9% bolus 250 mL 250 mL PRN    sodium chloride 0.9% bolus 250 mL 250 mL PRN    sodium chloride 0.9% flush 10 mL Q12H PRN    sodium chloride 0.9% flush 10 mL PRN    tamsulosin 24 hr capsule 0.4 mg Daily    tobramycin-dexAMETHasone 0.3-0.1% ophthalmic ointment TID    traMADoL tablet 50 mg TID PRN    valsartan tablet 160 mg BID    vancomycin - pharmacy to dose pharmacy to manage frequency    white petrolatum 41 % ointment PRN       Objective:     Vital Signs (Most Recent):  Temp: 98.5 °F (36.9 °C) (09/11/23 2015)  Pulse: 64 (09/12/23 0236)  Resp: 16 (09/11/23 2015)  BP: (!) 142/80 (09/11/23 2015)  SpO2: 99 % (09/11/23 2015) Vital Signs (24h Range):  Temp:  [98.5 °F (36.9 °C)-98.6 °F (37 °C)] 98.5 °F (36.9 °C)  Pulse:  [64-73] 64  Resp:  [15-20] 16  SpO2:  [92 %-100 %] 99 %  BP: (131-152)/(79-88) 142/80     Weight: 62.8 kg (138 lb 7.2 oz) (09/07/23 1000)  Body mass index is 19.87 kg/m².  Body surface area is 1.76 meters squared.    I/O last 3 completed shifts:  In:  667 [P.O.:667]  Out: 2700 [Urine:50; Other:2650]    Physical Exam  Vitals and nursing note reviewed.   Constitutional:       General: He is awake. He is not in acute distress.     Appearance: He is cachectic. He is ill-appearing. He is not diaphoretic.      Comments: Muscle wasting/atrophy noted.   HENT:      Head: Normocephalic and atraumatic.      Right Ear: External ear normal.      Left Ear: External ear normal.      Nose: Nose normal.      Mouth/Throat:      Mouth: Mucous membranes are moist.      Pharynx: Oropharynx is clear. No oropharyngeal exudate or posterior oropharyngeal erythema.   Eyes:      Comments: Left eye currently sutured closed.   Cardiovascular:      Rate and Rhythm: Normal rate.      Heart sounds: No murmur heard.     No friction rub. No gallop.   Pulmonary:      Effort: Pulmonary effort is normal. No respiratory distress.      Breath sounds: Rhonchi present. No wheezing or rales.   Chest:      Comments: Tunneled HD catheter to right chest wall.  Abdominal:      General: Bowel sounds are normal. There is no distension.      Palpations: Abdomen is soft.      Tenderness: There is no abdominal tenderness.      Comments: PEG in place.   Musculoskeletal:      Cervical back: Neck supple.      Right lower leg: No edema.      Left lower leg: No edema.   Skin:     General: Skin is warm and dry.      Coloration: Skin is not jaundiced.      Comments: Stable noted to right upper extremity from recent surgery. Incision appears intact.   Neurological:      General: No focal deficit present.      Mental Status: He is alert. Mental status is at baseline.      Cranial Nerves: No cranial nerve deficit.      Motor: No weakness.   Psychiatric:         Mood and Affect: Mood normal.         Behavior: Behavior normal.          Significant Labs:  BMP:   Recent Labs   Lab 09/11/23  0259      *   K 4.4      CO2 18*   BUN 52*   CREATININE 4.7*   CALCIUM 9.4   MG 2.3       CBC:   Recent Labs   Lab  09/11/23  0259   WBC 7.73   RBC 2.81*   HGB 8.8*   HCT 29.1*      *   MCH 31.3*   MCHC 30.2*       CMP:   Recent Labs   Lab 09/11/23 0259      CALCIUM 9.4   ALBUMIN 2.1*   PROT 6.0   *   K 4.4   CO2 18*      BUN 52*   CREATININE 4.7*   ALKPHOS 140*   ALT 6*   AST 18   BILITOT 0.3       LFTs:   Recent Labs   Lab 09/11/23  0259   ALT 6*   AST 18   ALKPHOS 140*   BILITOT 0.3   PROT 6.0   ALBUMIN 2.1*       Microbiology Results (last 7 days)       Procedure Component Value Units Date/Time    Fungus culture [[id number]] Collected: 08/26/23 1315    Order Status: Completed Specimen: CSF (Spinal Fluid) from CSF Tap, Tube 3 Updated: 09/11/23 0749     Fungus (Mycology) Culture Culture in progress      No fungus isolated after 2 weeks    Culture, Anaerobe [[id number]] Collected: 08/30/23 1724    Order Status: Completed Specimen: Wound from Cornea, Left Updated: 09/06/23 0843     Anaerobic Culture No anaerobes isolated    Narrative:      LEFT INTRAOCULAR SPACE          Specimen (24h ago, onward)      None          Significant Imaging:  I have reviewed all imagining in the last 24 hours.    Assessment/Plan:     Ophtho  * Endophthalmitis  - Ophthalmology consulted and following  - s/p left eye enucleation on 8/30    Cardiac/Vascular  HFrEF (heart failure with reduced ejection fraction)  - management per primary   - currently on Norvasc 5 mg daily, Coreg 6.25 mg BID, hydralazine 70 mg BID, isosorbide dinitrate 40 mg BID and valsartan 160 mg BID    Renal/  ESRD (end stage renal disease)  Outpatient HD Information:  -Outpatient HD unit: [facility]   -HD tx days: MWF   -HD tx time: 210min  -HD access: R IJ TDC   -HD modality: iHD   -Residual urine: ?    Plan/Recommendations:  - plan for iHD tomorrow as per outpatient schedule, every Monday, Wednesday & Friday  - can continue calcitriol 0.25 mcg daily  - renal diet/tube feeds when not NPO  - strict I/O's and daily weights  - daily renal function panels and  magnesium levels  - renally all dose medications to eGFR  - avoid gadolinium, fleets, phos-based laxatives, NSAIDs, etc.    ID  AV fistula infection  - secondary to Pseudomonas  - s/p right upper extremity AV graft excision in July  - Infectious Disease consulted and following   - plan for 4-6 weeks total of meropenem, vancomycin and fluconazole    Oncology  Anemia in ESRD (end-stage renal disease)  - target Hg of 10-12  - continue epogen 3,000 units every Monday, Wednesday and Friday       Thank you for your consult. I will follow-up with patient. Please contact us if you have any additional questions.    Herbert Cedeño MD  Nephrology  [facility] - Intensive Care ([place]-)

## 2023-09-12 NOTE — ASSESSMENT & PLAN NOTE
Outpatient HD Information:  -Outpatient HD unit: [facility]   -HD tx days: MWF   -HD tx time: 210min  -HD access: R IJ TDC   -HD modality: iHD   -Residual urine: ?    Plan/Recommendations:  - plan for iHD tomorrow as per outpatient schedule, every Monday, Wednesday & Friday  - can continue calcitriol 0.25 mcg daily  - renal diet/tube feeds when not NPO  - strict I/O's and daily weights  - daily renal function panels and magnesium levels  - renally all dose medications to eGFR  - avoid gadolinium, fleets, phos-based laxatives, NSAIDs, etc.

## 2023-09-12 NOTE — PLAN OF CARE
[phone] for an update x2, staff is at lunch and requested that I call after 1pm.  Will continue to follow for d/c needs.    Attempted to contact state for update on level II PASSAR, no answer after 10 minute wait.    Carolyn Quijano RN CM  Case Management  [phone]

## 2023-09-12 NOTE — SUBJECTIVE & OBJECTIVE
Interval History: JED DUMONT, Denies any abdominal pain, slept well overnight. Underwent HD this morning. Patient waiting for HD chair and state PSSR screening approval.       Review of Systems   Constitutional:  Negative for chills, fatigue and fever.   HENT:  Negative for congestion and facial swelling.    Eyes:  Negative for pain, discharge and visual disturbance.   Respiratory:  Negative for cough and shortness of breath.    Cardiovascular:  Negative for chest pain, palpitations and leg swelling.   Gastrointestinal:  Negative for abdominal distention, abdominal pain, nausea and vomiting.        Lower abdominal and groin pain   Genitourinary:  Negative for difficulty urinating, dysuria and urgency.   Musculoskeletal:  Negative for arthralgias, back pain and myalgias.   Skin:  Negative for color change and pallor.   Neurological:  Negative for dizziness, weakness, light-headedness and headaches.   Psychiatric/Behavioral:  Negative for agitation, behavioral problems and confusion.      Objective:     Vital Signs (Most Recent):  Temp: 98.4 °F (36.9 °C) (09/12/23 1145)  Pulse: 66 (09/12/23 1145)  Resp: 17 (09/12/23 1145)  BP: 138/78 (09/12/23 1145)  SpO2: 100 % (09/12/23 1300) Vital Signs (24h Range):  Temp:  [98.1 °F (36.7 °C)-99.1 °F (37.3 °C)] 98.4 °F (36.9 °C)  Pulse:  [64-73] 66  Resp:  [14-20] 17  SpO2:  [95 %-100 %] 100 %  BP: (138-151)/(78-86) 138/78     Weight: 62.8 kg (138 lb 7.2 oz)  Body mass index is 19.87 kg/m².    Intake/Output Summary (Last 24 hours) at 9/12/2023 1425  Last data filed at 9/12/2023 0745  Gross per 24 hour   Intake 1597 ml   Output --   Net 1597 ml         Physical Exam  Constitutional:       General: He is not in acute distress.     Appearance: He is not ill-appearing, toxic-appearing or diaphoretic.   HENT:      Head: Atraumatic.      Nose: No rhinorrhea.   Eyes:      Extraocular Movements: Extraocular movements intact.   Neck:      Vascular: No carotid bruit.   Cardiovascular:       Rate and Rhythm: Normal rate and regular rhythm.   Pulmonary:      Effort: No respiratory distress.      Breath sounds: Normal breath sounds. No stridor. No wheezing.      Comments: HD cath to the right chest walll  Abdominal:      General: Bowel sounds are normal. There is no distension.      Palpations: Abdomen is soft.      Tenderness: There is no abdominal tenderness.      Comments: PEG tube in place.   Musculoskeletal:         General: No swelling or tenderness. Normal range of motion.      Cervical back: No rigidity or tenderness.   Skin:     Findings: No erythema or rash.   Neurological:      Mental Status: He is oriented to person, place, and time.   Psychiatric:         Mood and Affect: Mood normal.         Behavior: Behavior normal.             Significant Labs: All pertinent labs within the past 24 hours have been reviewed.    Significant Imaging: I have reviewed all pertinent imaging results/findings within the past 24 hours.

## 2023-09-12 NOTE — PT/OT/SLP PROGRESS
Occupational Therapy   Treatment    Name: Immanuel Bernal  MRN: [id number]  Admitting Diagnosis:  Endophthalmitis  13 Days Post-Op    Recommendations:     Discharge Recommendations: rehabilitation facility  Discharge Equipment Recommendations:  to be determined by next level of care  Barriers to discharge:       Assessment:     Immanuel Bernal is a 57 y.o. male with a medical diagnosis of Endophthalmitis.  He presents with the following performance deficits affecting function: weakness, impaired functional mobility, gait instability, impaired endurance, impaired balance, impaired self care skills, visual deficits, decreased lower extremity function, decreased upper extremity function, decreased coordination, impaired cardiopulmonary response to activity.     Pt agreeable to therapy and tolerated session well. Pt requires light assistance for bed mobility and significant assistance for transfers. Pt presents with a strong posterior lean when standing. He c/o pain in the groin area initially but mentions pain subsided with movement. Pt c/o dizziness upon sup>sit transfers while sitting upright. Assisted with feeding using built-up utensils to improve .     Rehab Prognosis:  Good; patient would benefit from acute skilled OT services to address these deficits and reach maximum level of function.       Plan:     Patient to be seen 4 x/week to address the above listed problems via self-care/home management, therapeutic activities, therapeutic exercises, neuromuscular re-education  Plan of Care Expires: 09/15/23  Plan of Care Reviewed with: patient    Subjective     Chief Complaint: visual deficits, pain in R groin  Patient/Family Comments/goals: to get out of bed  Pain/Comfort:  Pain Rating 1: 8/10  Location - Orientation 1: generalized  Location 1: groin  Pain Addressed 1: Distraction, Reposition  Pain Rating Post-Intervention 1: 0/10    Objective:     Communicated with: RN prior to session.  Patient found HOB  elevated with oxygen, pulse ox (continuous), PEG Tube, telemetry upon OT entry to room.  A client care conference was completed by the OTR and the CABRERA prior to treatment by the CABRERA to discuss the patient's POC and current status.    General Precautions: Standard, fall, vision impaired    Orthopedic Precautions:N/A  Braces: N/A  Respiratory Status: Nasal cannula, flow 2 L/min     Occupational Performance:     Bed Mobility:    Patient completed Supine to Sit with minimum assistance   Patient completed rolling R/L with min A using bed rails.     Functional Mobility/Transfers:  Patient completed Sit <> Stand Transfer with maximal assistance  with  rolling walker x 3trials  Patient completed Bed <> Chair Transfer using Step Transfer technique with maximal assistance with rolling walker  Functional Mobility: pt completed steps from bed>chair (~4ft) with Max A using RW. Posterior lean and SOB noted. Pt is unsteady with tranfers and requires assistance lifting/lowering.     Activities of Daily Living:  Feeding:  stand by assistance to eat breakfast seated in chair, verbal cues to assist/encourage feeding. Built-up utensils used to improve  and fine motor for feeding  Grooming: stand by assistance to complete facial care seated in chair  Toileting: maximal assistance for all aspects, performed with rolling R/L in bed      AMPA 6 Click ADL: 16    Treatment & Education:  Pt educated on OT POC and frequency during hospital stay.     Pt educated on proper hand placement and techniques for RW mgmt to improve safety awareness.     Pt educated on use of built-up utensils to improve  and fine motor when feeding himself.     Addressed all patient questions/concerns within CABRERA scope of practice.    Patient left up in chair with all lines intact, call button in reach, and RN present    GOALS:   Multidisciplinary Problems       Occupational Therapy Goals          Problem: Occupational Therapy    Goal Priority Disciplines  Outcome Interventions   Occupational Therapy Goal     OT, PT/OT Ongoing, Progressing    Description: Goals to be met by: 8/25/23 . Goals reviewed and updated as needed to be met by 09-15-23    Patient will increase functional independence with ADLs by performing:    UE Dressing with Minimal Assistance. MET  Grooming while seated with Stand-by Assistance. MET  Toileting from bedside commode with Minimal Assistance for hygiene and clothing management. NOT MET  Revised: Toileting from bedside commode with Mod A  Sit to stand transfer with Contact Guard Assistance and use of RW. NOT MET  Revised: sit to stand transfer with Min A  Sitting at edge of bed >25 minutes with Supervision.MET  Step transfer with Minimal Assistance and use of RW.   Toilet transfer to bedside commode with Minimal Assistance and use of RW.                          Time Tracking:     OT Date of Treatment: 09/12/23  OT Start Time: 0902  OT Stop Time: 0942  OT Total Time (min): 40 min    Billable Minutes:Self Care/Home Management 15  Therapeutic Activity 25    OT/MORRO: MORRO     Number of MORRO visits since last OT visit: 1    9/12/2023

## 2023-09-12 NOTE — NURSING
Pt's status stable and unchanged. NAD. VSS. Up to chair today. No significant events on my shift. Safety precautions maintained. Call light within reach.

## 2023-09-13 ENCOUNTER — OFFICE VISIT (OUTPATIENT)
Dept: DIALYSIS | Facility: HOSPITAL | Age: 58
DRG: 113 | End: 2023-09-13
Attending: INTERNAL MEDICINE
Payer: MEDICARE

## 2023-09-13 PROBLEM — E87.1 HYPONATREMIA: Status: ACTIVE | Noted: 2023-09-13

## 2023-09-13 PROBLEM — E88.09 HYPOALBUMINEMIA: Status: ACTIVE | Noted: 2023-09-13

## 2023-09-13 LAB
ALBUMIN SERPL BCP-MCNC: 2.1 G/DL (ref 3.5–5.2)
ALP SERPL-CCNC: 166 U/L (ref 55–135)
ALT SERPL W/O P-5'-P-CCNC: 6 U/L (ref 10–44)
ANION GAP SERPL CALC-SCNC: 14 MMOL/L (ref 8–16)
AST SERPL-CCNC: 14 U/L (ref 10–40)
BILIRUB SERPL-MCNC: 0.4 MG/DL (ref 0.1–1)
BUN SERPL-MCNC: 50 MG/DL (ref 6–20)
CALCIUM SERPL-MCNC: 9.4 MG/DL (ref 8.7–10.5)
CHLORIDE SERPL-SCNC: 97 MMOL/L (ref 95–110)
CO2 SERPL-SCNC: 21 MMOL/L (ref 23–29)
CREAT SERPL-MCNC: 4.1 MG/DL (ref 0.5–1.4)
ERYTHROCYTE [DISTWIDTH] IN BLOOD BY AUTOMATED COUNT: 17.3 % (ref 11.5–14.5)
EST. GFR  (NO RACE VARIABLE): 15.9 ML/MIN/1.73 M^2
GLUCOSE SERPL-MCNC: 125 MG/DL (ref 70–110)
HCT VFR BLD AUTO: 26.1 % (ref 40–54)
HGB BLD-MCNC: 8.1 G/DL (ref 14–18)
MAGNESIUM SERPL-MCNC: 2.3 MG/DL (ref 1.6–2.6)
MCH RBC QN AUTO: 31.9 PG (ref 27–31)
MCHC RBC AUTO-ENTMCNC: 31 G/DL (ref 32–36)
MCV RBC AUTO: 103 FL (ref 82–98)
PHOSPHATE SERPL-MCNC: 4.1 MG/DL (ref 2.7–4.5)
PLATELET # BLD AUTO: 126 K/UL (ref 150–450)
PMV BLD AUTO: 11.1 FL (ref 9.2–12.9)
POCT GLUCOSE: 121 MG/DL (ref 70–110)
POCT GLUCOSE: 124 MG/DL (ref 70–110)
POCT GLUCOSE: 131 MG/DL (ref 70–110)
POCT GLUCOSE: 92 MG/DL (ref 70–110)
POTASSIUM SERPL-SCNC: 4.3 MMOL/L (ref 3.5–5.1)
PROT SERPL-MCNC: 6.1 G/DL (ref 6–8.4)
RBC # BLD AUTO: 2.54 M/UL (ref 4.6–6.2)
SODIUM SERPL-SCNC: 132 MMOL/L (ref 136–145)
VANCOMYCIN SERPL-MCNC: 17.5 UG/ML
WBC # BLD AUTO: 5.48 K/UL (ref 3.9–12.7)

## 2023-09-13 PROCEDURE — 20000000 HC ICU ROOM

## 2023-09-13 PROCEDURE — 63600175 PHARM REV CODE 636 W HCPCS: Performed by: HOSPITALIST

## 2023-09-13 PROCEDURE — 63600175 PHARM REV CODE 636 W HCPCS: Performed by: STUDENT IN AN ORGANIZED HEALTH CARE EDUCATION/TRAINING PROGRAM

## 2023-09-13 PROCEDURE — 25000003 PHARM REV CODE 250

## 2023-09-13 PROCEDURE — 63600175 PHARM REV CODE 636 W HCPCS

## 2023-09-13 PROCEDURE — 99232 SBSQ HOSP IP/OBS MODERATE 35: CPT | Mod: ,,, | Performed by: INTERNAL MEDICINE

## 2023-09-13 PROCEDURE — 99232 PR SUBSEQUENT HOSPITAL CARE,LEVL II: ICD-10-PCS | Mod: GC,,, | Performed by: HOSPITALIST

## 2023-09-13 PROCEDURE — 25000003 PHARM REV CODE 250: Performed by: STUDENT IN AN ORGANIZED HEALTH CARE EDUCATION/TRAINING PROGRAM

## 2023-09-13 PROCEDURE — 80202 ASSAY OF VANCOMYCIN: CPT | Performed by: HOSPITALIST

## 2023-09-13 PROCEDURE — 83735 ASSAY OF MAGNESIUM: CPT

## 2023-09-13 PROCEDURE — 80100016 HC MAINTENANCE HEMODIALYSIS

## 2023-09-13 PROCEDURE — 63700000 PHARM REV CODE 250 ALT 637 W/O HCPCS: Performed by: HOSPITALIST

## 2023-09-13 PROCEDURE — 85027 COMPLETE CBC AUTOMATED: CPT

## 2023-09-13 PROCEDURE — 25000003 PHARM REV CODE 250: Performed by: HOSPITALIST

## 2023-09-13 PROCEDURE — 99232 PR SUBSEQUENT HOSPITAL CARE,LEVL II: ICD-10-PCS | Mod: ,,, | Performed by: INTERNAL MEDICINE

## 2023-09-13 PROCEDURE — 80053 COMPREHEN METABOLIC PANEL: CPT

## 2023-09-13 PROCEDURE — 99232 SBSQ HOSP IP/OBS MODERATE 35: CPT | Mod: GC,,, | Performed by: HOSPITALIST

## 2023-09-13 PROCEDURE — 63600175 PHARM REV CODE 636 W HCPCS: Mod: JZ | Performed by: STUDENT IN AN ORGANIZED HEALTH CARE EDUCATION/TRAINING PROGRAM

## 2023-09-13 PROCEDURE — 84100 ASSAY OF PHOSPHORUS: CPT

## 2023-09-13 RX ADMIN — HEPARIN SODIUM 5000 UNITS: 5000 INJECTION INTRAVENOUS; SUBCUTANEOUS at 10:09

## 2023-09-13 RX ADMIN — CALCITRIOL 0.25 MCG: 1 SOLUTION ORAL at 01:09

## 2023-09-13 RX ADMIN — HYDRALAZINE HYDROCHLORIDE: 10 TABLET, FILM COATED ORAL at 12:09

## 2023-09-13 RX ADMIN — ONDANSETRON 4 MG: 4 TABLET, ORALLY DISINTEGRATING ORAL at 10:09

## 2023-09-13 RX ADMIN — POLYETHYLENE GLYCOL 3350 17 G: 17 POWDER, FOR SOLUTION ORAL at 08:09

## 2023-09-13 RX ADMIN — TOBRAMYCIN AND DEXAMETHASONE: 3; 1 OINTMENT OPHTHALMIC at 12:09

## 2023-09-13 RX ADMIN — TAMSULOSIN HYDROCHLORIDE 0.4 MG: 0.4 CAPSULE ORAL at 12:09

## 2023-09-13 RX ADMIN — TOBRAMYCIN AND DEXAMETHASONE: 3; 1 OINTMENT OPHTHALMIC at 02:09

## 2023-09-13 RX ADMIN — ACETAMINOPHEN 1000 MG: 500 TABLET ORAL at 08:09

## 2023-09-13 RX ADMIN — ACETAMINOPHEN 1000 MG: 500 TABLET ORAL at 12:09

## 2023-09-13 RX ADMIN — POLYETHYLENE GLYCOL 3350 17 G: 17 POWDER, FOR SOLUTION ORAL at 12:09

## 2023-09-13 RX ADMIN — THERA TABS 1 TABLET: TAB at 12:09

## 2023-09-13 RX ADMIN — METHOCARBAMOL 500 MG: 500 TABLET ORAL at 08:09

## 2023-09-13 RX ADMIN — ATORVASTATIN CALCIUM 40 MG: 40 TABLET, FILM COATED ORAL at 12:09

## 2023-09-13 RX ADMIN — HYDRALAZINE HYDROCHLORIDE: 10 TABLET, FILM COATED ORAL at 02:09

## 2023-09-13 RX ADMIN — PROCHLORPERAZINE EDISYLATE 10 MG: 5 INJECTION INTRAMUSCULAR; INTRAVENOUS at 06:09

## 2023-09-13 RX ADMIN — METHOCARBAMOL 500 MG: 500 TABLET ORAL at 12:09

## 2023-09-13 RX ADMIN — Medication 1 CAPSULE: at 08:09

## 2023-09-13 RX ADMIN — OXYBUTYNIN CHLORIDE 10 MG: 5 TABLET, EXTENDED RELEASE ORAL at 12:09

## 2023-09-13 RX ADMIN — Medication 1 CAPSULE: at 12:09

## 2023-09-13 RX ADMIN — ACETAMINOPHEN 1000 MG: 500 TABLET ORAL at 02:09

## 2023-09-13 RX ADMIN — MEROPENEM 500 MG: 500 INJECTION INTRAVENOUS at 08:09

## 2023-09-13 RX ADMIN — TOBRAMYCIN AND DEXAMETHASONE: 3; 1 OINTMENT OPHTHALMIC at 08:09

## 2023-09-13 RX ADMIN — MEROPENEM 500 MG: 500 INJECTION INTRAVENOUS at 01:09

## 2023-09-13 RX ADMIN — ONDANSETRON 4 MG: 4 TABLET, ORALLY DISINTEGRATING ORAL at 05:09

## 2023-09-13 RX ADMIN — VALSARTAN 160 MG: 160 TABLET, FILM COATED ORAL at 12:09

## 2023-09-13 RX ADMIN — HEPARIN SODIUM 1000 UNITS: 1000 INJECTION, SOLUTION INTRAVENOUS; SUBCUTANEOUS at 11:09

## 2023-09-13 RX ADMIN — FLUCONAZOLE 400 MG: 40 POWDER, FOR SUSPENSION ORAL at 01:09

## 2023-09-13 RX ADMIN — CARVEDILOL 25 MG: 25 TABLET, FILM COATED ORAL at 08:09

## 2023-09-13 RX ADMIN — HYDRALAZINE HYDROCHLORIDE: 10 TABLET, FILM COATED ORAL at 08:09

## 2023-09-13 RX ADMIN — VANCOMYCIN HYDROCHLORIDE 500 MG: 500 INJECTION, POWDER, LYOPHILIZED, FOR SOLUTION INTRAVENOUS at 12:09

## 2023-09-13 RX ADMIN — PANTOPRAZOLE SODIUM 40 MG: 40 GRANULE, DELAYED RELEASE ORAL at 12:09

## 2023-09-13 RX ADMIN — SENNOSIDES AND DOCUSATE SODIUM 1 TABLET: 50; 8.6 TABLET ORAL at 12:09

## 2023-09-13 RX ADMIN — Medication 6 MG: at 08:09

## 2023-09-13 RX ADMIN — SENNOSIDES AND DOCUSATE SODIUM 1 TABLET: 50; 8.6 TABLET ORAL at 08:09

## 2023-09-13 RX ADMIN — PANTOPRAZOLE SODIUM 40 MG: 40 GRANULE, DELAYED RELEASE ORAL at 08:09

## 2023-09-13 RX ADMIN — HEPARIN SODIUM 5000 UNITS: 5000 INJECTION INTRAVENOUS; SUBCUTANEOUS at 05:09

## 2023-09-13 RX ADMIN — ONDANSETRON 4 MG: 4 TABLET, ORALLY DISINTEGRATING ORAL at 02:09

## 2023-09-13 RX ADMIN — VALSARTAN 160 MG: 160 TABLET, FILM COATED ORAL at 08:09

## 2023-09-13 RX ADMIN — HEPARIN SODIUM 5000 UNITS: 5000 INJECTION INTRAVENOUS; SUBCUTANEOUS at 02:09

## 2023-09-13 RX ADMIN — SODIUM CHLORIDE: 9 INJECTION, SOLUTION INTRAVENOUS at 08:09

## 2023-09-13 RX ADMIN — ERYTHROPOIETIN 3000 UNITS: 3000 INJECTION, SOLUTION INTRAVENOUS; SUBCUTANEOUS at 10:09

## 2023-09-13 RX ADMIN — METHOCARBAMOL 500 MG: 500 TABLET ORAL at 02:09

## 2023-09-13 NOTE — SUBJECTIVE & OBJECTIVE
Interval History: Patient seen and examined this AM while undergoing iHD for which he is tolerating well. Complaints of continued diarrhea. Afebrile with pulse in the 60s bpm. Systolic blood pressures ranging from 140-130s mmHg. He is saturating +98% on 2 liters via nasal cannula with no documented UOP in the last 24 hours.     Review of patient's allergies indicates:   Allergen Reactions    Morphine Rash    Amiodarone analogues Itching     Other reaction(s): Unknown     Current Facility-Administered Medications   Medication Frequency    0.9%  NaCl infusion (for blood administration) Q24H PRN    0.9%  NaCl infusion (for blood administration) Q24H PRN    0.9%  NaCl infusion PRN    0.9%  NaCl infusion Once    acetaminophen tablet 1,000 mg TID    albuterol-ipratropium 2.5 mg-0.5 mg/3 mL nebulizer solution 3 mL Q6H PRN    atorvastatin tablet 40 mg Daily    calcitrioL solution 0.25 mcg Daily    carvediloL tablet 25 mg BID    dextrose 10% bolus 125 mL 125 mL PRN    dextrose 10% bolus 250 mL 250 mL PRN    epoetin thee injection 3,000 Units Every Mon, Wed, Fri    fluconazole 40 mg/ml suspension 400 mg Daily    glucagon (human recombinant) injection 1 mg PRN    glucose chewable tablet 16 g PRN    glucose chewable tablet 24 g PRN    heparin (porcine) injection 1,000 Units PRN    heparin (porcine) injection 1,000 Units PRN    heparin (porcine) injection 5,000 Units Q8H    hydrALAZINE 10 mg 2 tablets, hydrALAZINE 25 mg 2 tablets, isorsorbide dinitrate 20 mg  2 tablets combination TID    hydrALAZINE injection 10 mg Q6H PRN    Lactobacillus rhamnosus GG capsule 1 capsule BID    LIDOcaine HCl 2% urojet PRN    meclizine tablet 12.5 mg TID PRN    melatonin tablet 6 mg Nightly    meropenem (MERREM) 500 mg in sodium chloride 0.9 % 100 mL IVPB (MB+) Q12H    methocarbamoL tablet 500 mg TID    multivitamin tablet Daily    naloxone 0.4 mg/mL injection 0.02 mg PRN    OLANZapine injection 5 mg Nightly PRN    ondansetron disintegrating  tablet 4 mg Q8H    oxybutynin 24 hr tablet 10 mg Daily    pantoprazole suspension 40 mg BID    polyethylene glycol packet 17 g BID    prochlorperazine injection Soln 10 mg Q6H PRN    senna-docusate 8.6-50 mg per tablet 1 tablet BID    sodium chloride 0.9% bolus 250 mL 250 mL PRN    sodium chloride 0.9% bolus 250 mL 250 mL PRN    sodium chloride 0.9% bolus 250 mL 250 mL PRN    sodium chloride 0.9% flush 10 mL Q12H PRN    sodium chloride 0.9% flush 10 mL PRN    tamsulosin 24 hr capsule 0.4 mg Daily    tobramycin-dexAMETHasone 0.3-0.1% ophthalmic ointment TID    traMADoL tablet 50 mg TID PRN    valsartan tablet 160 mg BID    vancomycin - pharmacy to dose pharmacy to manage frequency    white petrolatum 41 % ointment PRN       Objective:     Vital Signs (Most Recent):  Temp: 98.8 °F (37.1 °C) (09/13/23 0745)  Pulse: 67 (09/13/23 0745)  Resp: 17 (09/13/23 0745)  BP: (!) 146/86 (09/13/23 0745)  SpO2: 98 % (09/13/23 0745) Vital Signs (24h Range):  Temp:  [97.8 °F (36.6 °C)-98.9 °F (37.2 °C)] 98.8 °F (37.1 °C)  Pulse:  [63-67] 67  Resp:  [16-20] 17  SpO2:  [95 %-100 %] 98 %  BP: (132-146)/(76-86) 146/86     Weight: 62.8 kg (138 lb 7.2 oz) (09/07/23 1000)  Body mass index is 19.87 kg/m².  Body surface area is 1.76 meters squared.    I/O last 3 completed shifts:  In: 1100 [P.O.:120; NG/GT:980]  Out: -      Physical Exam  Vitals and nursing note reviewed.   Constitutional:       General: He is awake. He is not in acute distress.     Appearance: He is cachectic. He is ill-appearing. He is not diaphoretic.      Comments: Muscle wasting/atrophy noted.   HENT:      Head: Normocephalic and atraumatic.      Right Ear: External ear normal.      Left Ear: External ear normal.      Nose: Nose normal.      Mouth/Throat:      Mouth: Mucous membranes are moist.      Pharynx: Oropharynx is clear. No oropharyngeal exudate or posterior oropharyngeal erythema.   Eyes:      Comments: Left eye currently sutured closed.   Cardiovascular:       Rate and Rhythm: Normal rate.      Heart sounds: No murmur heard.     No friction rub. No gallop.   Pulmonary:      Effort: Pulmonary effort is normal. No respiratory distress.      Breath sounds: Rhonchi present. No wheezing or rales.   Chest:      Comments: Tunneled HD catheter to right chest wall.  Abdominal:      General: Bowel sounds are normal. There is no distension.      Palpations: Abdomen is soft.      Tenderness: There is no abdominal tenderness.      Comments: PEG in place.   Musculoskeletal:      Cervical back: Neck supple.      Right lower leg: No edema.      Left lower leg: No edema.   Skin:     General: Skin is warm and dry.      Coloration: Skin is not jaundiced.      Comments: Stable noted to right upper extremity from recent surgery. Incision appears intact.   Neurological:      General: No focal deficit present.      Mental Status: He is alert. Mental status is at baseline.      Cranial Nerves: No cranial nerve deficit.      Motor: No weakness.   Psychiatric:         Mood and Affect: Mood normal.         Behavior: Behavior normal.          Significant Labs:  BMP:   Recent Labs   Lab 09/13/23  0609   *   *   K 4.3   CL 97   CO2 21*   BUN 50*   CREATININE 4.1*   CALCIUM 9.4   MG 2.3       CBC:   Recent Labs   Lab 09/13/23  0609   WBC 5.48   RBC 2.54*   HGB 8.1*   HCT 26.1*   *   *   MCH 31.9*   MCHC 31.0*       CMP:   Recent Labs   Lab 09/13/23  0609   *   CALCIUM 9.4   ALBUMIN 2.1*   PROT 6.1   *   K 4.3   CO2 21*   CL 97   BUN 50*   CREATININE 4.1*   ALKPHOS 166*   ALT 6*   AST 14   BILITOT 0.4       LFTs:   Recent Labs   Lab 09/13/23  0609   ALT 6*   AST 14   ALKPHOS 166*   BILITOT 0.4   PROT 6.1   ALBUMIN 2.1*       Microbiology Results (last 7 days)       Procedure Component Value Units Date/Time    Fungus culture [[id number]] Collected: 08/26/23 1315    Order Status: Completed Specimen: CSF (Spinal Fluid) from CSF Tap, Tube 3 Updated: 09/11/23 0749      Fungus (Mycology) Culture Culture in progress      No fungus isolated after 2 weeks    Culture, Anaerobe [[id number]] Collected: 08/30/23 1724    Order Status: Completed Specimen: Wound from Cornea, Left Updated: 09/06/23 0843     Anaerobic Culture No anaerobes isolated    Narrative:      LEFT INTRAOCULAR SPACE          Specimen (24h ago, onward)      None          Significant Imaging:  I have reviewed all imagining in the last 24 hours.

## 2023-09-13 NOTE — PROGRESS NOTES
[facility] ([facility]-)  Adult Nutrition  Progress Note    SUMMARY       Recommendations    Continue Renal diet + Novasource ONS.  Updated TF recommendations: Novasource @ 35 mL/hr x 12 hours/day to provide 840 kcals, 38 g of protein, 301 mL fluid - this would meet 45% EEN, 46% EPN. Remaining needs to be met via PO/ONS intake.  RD to monitor & follow-up.    Goals: Meet % EEN, EPN by RD f/u date  Nutrition Goal Status: progressing towards goal  Communication of RD Recs: reviewed with physician    Assessment and Plan    Severe malnutrition    Nutrition Problem:  Severe Protein-Calorie Malnutrition  Malnutrition in the context of Chronic Illness/Injury    Related to (etiology):  Inability to consume sufficient energy     Signs and Symptoms (as evidenced by):  Body Fat Depletion: severe depletion of orbitals and triceps   Muscle Mass Depletion: severe depletion of temples, clavicle region, scapular region and lower extremities   Weight Loss: 31% x 6-7 months     Interventions(treatment strategy):  Collaboration of nutrition care w/ other providers  EN    Nutrition Diagnosis Status:  Continues     Malnutrition Assessment    Malnutrition Context: chronic illness  Malnutrition Level: severe    Weight Loss (Malnutrition): greater than 10% in 6 months  Energy Intake (Malnutrition): other (see comments) (LAMONT)  Subcutaneous Fat (Malnutrition): severe depletion  Muscle Mass (Malnutrition): severe depletion   Orbital Region (Subcutaneous Fat Loss): severe depletion  Upper Arm Region (Subcutaneous Fat Loss): severe depletion   [place] Region (Muscle Loss): severe depletion  Clavicle Bone Region (Muscle Loss): severe depletion  Dorsal Hand (Muscle Loss): severe depletion  Patellar Region (Muscle Loss): severe depletion  Anterior Thigh Region (Muscle Loss): severe depletion     Reason for Assessment    Reason For Assessment: RD follow-up  Diagnosis: other (see comments) (Endophtalmitis)  Relevant Medical  "History: CHF, DM, ESRD on HD, PEG  Interdisciplinary Rounds: did not attend    General Information Comments: VÍCTOR this AM for HD. Per RN, pt continues to tolerate diet w/ 50-75% PO intake. Pt was on TFs as well, but held 2/2 episode of vomiting this morning. Spoke w/ MD, planning to run nocturnal TFs to promote PO intake/prevent vomiting. Severe malnutrition diagnosis continues - please see PES statement for details; NFPE 8/10.  Nutrition Discharge Planning: Pending clinical course    Nutrition/Diet History    Patient Reported Diet/Restrictions/Preferences: other (see comments) (LAMONT)  Spiritual, Cultural Beliefs, Mosque Practices, Values that Affect Care: no  Factors Affecting Nutritional Intake: nausea/vomiting  Nutrition Support Formula Prior to Admit: Other (see commments) (LAMONT)    Anthropometrics    Temp: 98.8 °F (37.1 °C)  Height Method: Stated  Height: 5' 10" (177.8 cm)  Height (inches): 70 in  Weight Method: Bed Scale  Weight: 62.8 kg (138 lb 7.2 oz)  Weight (lb): 138.45 lb  Ideal Body Weight (IBW), Male: 166 lb  % Ideal Body Weight, Male (lb): 83.4 %  BMI (Calculated): 19.9  BMI Grade: 18.5-24.9 - normal  Usual Body Weight (UBW), k kg  % Usual Body Weight: 69.47  % Weight Change From Usual Weight: -30.67 %    Lab/Procedures/Meds    Pertinent Labs Reviewed: reviewed  Pertinent Labs Comments: Creat 4.1, GFR 15.9  Pertinent Medications Reviewed: reviewed  Pertinent Medications Comments: -    Estimated/Assessed Needs    Weight Used For Calorie Calculations: 62.8 kg (138 lb 7.2 oz)    Energy Calorie Requirements (kcal): 1884 kcal/d  Energy Need Method: Kcal/kg (30 kcal/kg)    Protein Requirements: 82 g/d (1.3 g/kg)  Weight Used For Protein Calculations: 62.8 kg (138 lb 7.2 oz)    Estimated Fluid Requirement Method: other (see comments) (Per MD or 1 mL/kcal)  RDA Method (mL): 1884    CHO Requirement: 236g    Nutrition Prescription Ordered    Current Diet Order: Renal  Nutrition Order Comments: Novasource " ONS  Current Nutrition Support Formula Ordered: Novasource Renal  Current Nutrition Support Rate Ordered: 40 mL/hr    Evaluation of Received Nutrient/Fluid Intake    Enteral Calories (kcal): 1920  Enteral Protein (gm): 87  Enteral (Free Water) Fluid (mL): 688  Free Water Flush Fluid (mL): 1200    % Kcal Needs: 102%  % Protein Needs: 106%    I/O: -7.2L since 8/30    Energy Calories Required: exceeds needs (+ PO intake)  Protein Required: exceeds needs (+ PO intake)  Fluid Required: other (see comments) (Per MD)    Comments: LBM: 9/12    Tolerance: tolerating    Nutrition Risk    Level of Risk/Frequency of Follow-up:  (1x/week)     Monitor and Evaluation    Food and Nutrient Intake: enteral nutrition intake, food and beverage intake, energy intake  Food and Nutrient Adminstration: enteral and parenteral nutrition administration, diet order  Physical Activity and Function: nutrition-related ADLs and IADLs  Anthropometric Measurements: weight, weight change  Biochemical Data, Medical Tests and Procedures: inflammatory profile, lipid profile, glucose/endocrine profile, gastrointestinal profile, electrolyte and renal panel  Nutrition-Focused Physical Findings: overall appearance     Nutrition Follow-Up    RD Follow-up?: Yes

## 2023-09-13 NOTE — PLAN OF CARE
Recommendations     Continue Renal diet + Novasource ONS.  Updated TF recommendations: Novasource @ 35 mL/hr x 12 hours/day to provide 840 kcals, 38 g of protein, 301 mL fluid - this would meet 45% EEN, 46% EPN. Remaining needs to be met via PO/ONS intake.  RD to monitor & follow-up.     Goals: Meet % EEN, EPN by RD f/u date  Nutrition Goal Status: progressing towards goal  Communication of RD Recs: reviewed with physician

## 2023-09-13 NOTE — PROGRESS NOTES
Pharmacokinetic Assessment Follow Up: IV Vancomycin    Vancomycin serum concentration assessment/plan(s):    -Vancomycin pre-HD level 17.2, within goal of 15 - 20 for endophthalmitis  -Patient to receive HD today (M/W/F)  -Will re-dose vancomycin 500 mg x 1 dose   -Check vancomycin random level with AM labs on Friday    Drug levels (last 3 results):  Recent Labs   Lab Result Units 09/11/23  0259 09/13/23  0609   Vancomycin, Random ug/mL 15.2 17.5       Pharmacy will continue to follow and monitor vancomycin.    Please contact pharmacy at extension [phone] for questions regarding this assessment.    Thank you for the consult,   Elsa Solorzano    Patient brief summary:  Immanuel Bernal is a 57 y.o. male initiated on antimicrobial therapy with IV Vancomycin for treatment of  endophthalmitis    The patient's current regimen is vancomyhcin     Drug Allergies:   Review of patient's allergies indicates:   Allergen Reactions    Morphine Rash    Amiodarone analogues Itching     Other reaction(s): Unknown       Actual Body Weight:   63 kg    Renal Function:   Estimated Creatinine Clearance: 17.2 mL/min (A) (based on SCr of 4.1 mg/dL (H)).,     Dialysis Method (if applicable):  intermittent HD    CBC (last 72 hours):  Recent Labs   Lab Result Units 09/11/23  0259 09/13/23  0609   WBC K/uL 7.73 5.48   Hemoglobin g/dL 8.8* 8.1*   Hematocrit % 29.1* 26.1*   Platelets K/uL 158 126*         Metabolic Panel (last 72 hours):  Recent Labs   Lab Result Units 09/11/23  0259 09/13/23  0609   Sodium mmol/L 133* 132*   Potassium mmol/L 4.4 4.3   Chloride mmol/L 100 97   CO2 mmol/L 18* 21*   Glucose mg/dL 110 125*   BUN mg/dL 52* 50*   Creatinine mg/dL 4.7* 4.1*   Albumin g/dL 2.1* 2.1*   Total Bilirubin mg/dL 0.3 0.4   Alkaline Phosphatase U/L 140* 166*   AST U/L 18 14   ALT U/L 6* 6*   Magnesium mg/dL 2.3 2.3   Phosphorus mg/dL 4.0 4.1         Vancomycin Administrations:  vancomycin given in the last 96 hours                      vancomycin (VANCOCIN) 500 mg in dextrose 5 % in water (D5W) 100 mL IVPB (MB+) (mg) 500 mg New Bag 09/04/23 1438                    Microbiologic Results:  Microbiology Results (last 7 days)       Procedure Component Value Units Date/Time    Fungus culture [[id number]] Collected: 08/26/23 1315    Order Status: Completed Specimen: CSF (Spinal Fluid) from CSF Tap, Tube 3 Updated: 09/11/23 0796     Fungus (Mycology) Culture Culture in progress      No fungus isolated after 2 weeks

## 2023-09-13 NOTE — PROGRESS NOTES
Continued Stay SW/CM Assessment/Plan of Care Note       Active Substitute Decision Maker (SDM)    There are no active Substitute Decision Maker (SDM) on file.           Progress note:  SW following for discharge planning. Record reviewed, case discussed in OFTs. RN CM following pt to assist with establishing home care at discharge. Aware pt's estimated discharge date is 8/29. RN CM requested SW assistance to discuss discharge transportation with pt. Pt was offered wheelchair van for transportation, pt insistent on taking city bus and stated wants independence. Aware pt ambulates via electric wheelchair, pt stated called security regarding wheelchair. Pt uses city bus for transportation at baseline.    SW to continue to follow.    Courtesy Patient Transportation Assessment:  Is the patient seeking and/or receiving medically necessary services? Yes   Patient cannot secure a ride on their own (e.g. self, family, friend, community organization, patty)? Yes   Patient cannot financially afford a ride-share or taxi? Yes   No insurance benefit is available? Yes   Residence is within a 25-mile radius of the [facility] facility (unless transporting to residence post inpatient stay or 24 hours+ observation stay)? Yes     If all answers above are 'yes' then this patient does qualify for Courtesy Patient Transportation assistance.   Patient was instructed to carry mobile phone with them on day of transportation.   Restrictions:  • Only one caregiver may accompany the patient  • There can be no stops in between the [facility] facility and the patients residence.  • Qualification must be assessed each visit to accommodate for changing circumstances.    Scheduled transportation   Pt refused wheelchair van transportation; plans to take city bus         See CHARLENE/ALEXANDRA flowsheets for other objective data.    Disposition Recommendations:  Preliminary discharge destination: Planned Discharge Destination: Location not determined at this time  CHARLENE/ALEXANDRA  Patient arrived in a  stretcher to dialysis unit.   Report received from primary nurse  VS's per dialysis Flowsheet.   Dialysate Na bath verified with Dr. Cedeño   Hemodialysis initiated using the following:     Dialysis Access: right subclavian cath     Insertion with no complications.     Will Maintain telemetry and blood pressure monitoring throughout treatment.  Refer to dialysis flowsheet and MAR for details.    recommendation for discharge: Home care, 24 Hour assist, Other (comment) (pt refusing CAROLINA)    Discharge Plan/Needs:     Continued Care and Services - Admitted Since 8/4/2023    Coordination has not been started for this encounter.     Continued Care and Services - Linked Episodes Includes continued care and service providers from the active episodes linked to this encounter, which are listed below    Care Transitions Episode start date: 8/5/2023   There are no active outsourced providers for this episode.                   Devices/ Equipment that need to be arranged for discharge:     Accepted   Pending insurance authorization   Others:    Anticipated date of DME availability:     Prior To Hospitalization:    Living Situation: Friends and residing at Apartment    .  Support Systems: Friends   Home Devices/Equipment: Shower chair, Mobility assist device ,   ,    ,      Mobility Assist Devices: Wheelchair   Type of Service Prior to Hospitalization: Other (comment) (boyfriend/friend, caregiver???) ,   ,   ,   ,    ,       Patient/Family discharge goal (s):  Home Care     Resources provided:           Therapy Recommendations for Discharge:   PT:      Last Filed Values       Value Time User    PT Discharge Needs  therapy 5 or more times per week 8/18/2023  3:16 PM Natalie Bob, PT        OT:       Last Filed Values       Value Time User    OT Discharge Needs  therapy 5 or more times per week 8/17/2023  2:57 PM Selene Stewart, OT        SLP:    Last Filed Values     None          Mobility Equipment Recommended for Discharge: has power w/c, getting hospital bed and steff      Barriers to Discharge  Identified Barriers to Discharge/Transition Planning: Rehab plan of care obstacle, Consult Pending/Clearance
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
## 2023-09-13 NOTE — ASSESSMENT & PLAN NOTE
Unclear source, no broken skin or abnormal findings on exam. Improved    - Continue oxybutynin in case of bladder spasms and PRN lidocaine gel  - Tramadol for severe pain  - Robaxin 500mg TID  - Flomax 0.4 mg daily  - Urology recommended outpatient pelvic floor therapy.

## 2023-09-13 NOTE — PROGRESS NOTES
[facility] - [facility] ([place] [address])  Nephrology  Progress Note    Patient Name: Immanuel Bernal  MRN: [id number]  Admission Date: 8/9/2023  Hospital Length of Stay: 35 days  Attending Provider: Ubaldo Torres MD   Primary Care Physician: Dolly, Primary Doctor  Principal Problem:Endophthalmitis    Subjective:     HPI: HPI obtained per medical record as patient unable to communicate     57-year-old male with a history of CHF, diabetes, hypertension, chronic disability, end-stage renal disease on hemodialysis, PEG placement, bowel obstruction, sleep apnea, TIA, left eye vitreous hemorrhage, stroke, and bowel obstruction with bowel resection admitted to [facility] in [place] July 18 from nursing home with left eye pain and blurred vision.  He was admitted with concern for bilateral endophthalmitis.  Other admit diagnoses included right upper extremity thrombus, end-stage renal disease on hemodialysis, hyperkalemia, sleep apnea, diabetes, and CHF.  He was treated with broad-spectrum antibiotics.  He was seen by Infectious Diseases,, and he was treated with vancomycin, ceftriaxone, and amphotericin.  Blood cultures were positive for Pseudomonas, and amphotericin/vancomycin were stopped.  IV cefepime was continued.  He was seen by Ophthalmology, and he received intravitreal vancomycin and ceftazidime (July 18).  He also had intravitreal tap July 18 that had no yeast or fungal elements observed.  Left eye endophthalmitis did not respond to treatment, and he subsequently developed abscess formation, significant proptosis, and drainage of purulent material from the orbit.  Ophthalmology recommended enucleation.  He was continued on cefepime for pseudomonal and ophthalmitis.  Recommendation was for 6 weeks of treatment to be followed by indefinite fluoroquinolone.  He was seen by Pulmonology during his stay for a right upper lung mass with peripheral nodules.  It was felt that he would need further  investigation of this once his current clinical issues stabilized.  Right upper extremity AV graft was excised during his stay with concern for infection.  A right IJ tunneled line was placed on July 27.  Left eye endophthalmitis continued to worsen, and ophthalmology spoke with patient and family about the need for enucleation.  Despite several conversations, family declined enucleation.  Plans were for transitioned to skilled nursing facility for continued treatment, but family did not want him to go to a skilled nursing facility.  He was subsequently discharged AMA from the hospital there on August 7.  Please see the August 7 Internal Medicine note for further details.     He subsequently presented to [facility] Emergency Department.  He will not go back to [facility] in [place]. He received Zosyn and vancomycin.  ED team at [facility] spoke with the patient and his power-of- (sister).  CT of the orbits noted scleral abscess along the lateral aspect of the left globe.  Patient and family are aware and in agreement that the patient needs enucleation of the eye at this time. Referring provider spoke with Oculoplastics at [facility]. Requesting transfer to [facility] for Oculoplastics specialty evaluation of persistent endophthalmitis.  ED provider noted patient is hemodynamically stable.  He does not appear volume overloaded or in respiratory distress.        The patient has a significant previous medical course as listed below  August 3: MRI brain and orbits showed worsening ophthalmitis and inflammatory changes of the left orbital tissues with slight increase in proptosis.  No evidence of intracranial inflammatory process observed.    July 25: Transesophageal echocardiogram had no evidence of endocarditis.  Moderate to severely decreased left ventricular systolic function with EF 30-35%.    July 22:  CT chest showed right upper lobe mass with numerous bilateral  nodularity predominantly in the right with associated mediastinal lymph nodes.  July 21: Blood cultures with Pseudomonas aeruginosa  July 19: MRI brain/orbits with and without contrast had findings suggestive of chronic microvascular ischemic disease of the white matter with remote ischemic event in the left insula/basal ganglia/subependymal white matter/right middle cerebellar peduncle and hemisphere.  Findings consistent with left endophthalmitis.  No abnormal findings observed in the right globe.  July 18:  Blood cultures with Pseudomonas aeruginosa and coag-negative staph    HPI obtained from [facility] med's note. Patient with AMS at time of nephrology evaluation and unable to provide HPI or ROS.          Interval History: Patient seen and examined this AM while undergoing iHD for which he is tolerating well. Complaints of continued diarrhea. Afebrile with pulse in the 60s bpm. Systolic blood pressures ranging from 140-130s mmHg. He is saturating +98% on 2 liters via nasal cannula with no documented UOP in the last 24 hours.     Review of patient's allergies indicates:   Allergen Reactions    Morphine Rash    Amiodarone analogues Itching     Other reaction(s): Unknown     Current Facility-Administered Medications   Medication Frequency    0.9%  NaCl infusion (for blood administration) Q24H PRN    0.9%  NaCl infusion (for blood administration) Q24H PRN    0.9%  NaCl infusion PRN    0.9%  NaCl infusion Once    acetaminophen tablet 1,000 mg TID    albuterol-ipratropium 2.5 mg-0.5 mg/3 mL nebulizer solution 3 mL Q6H PRN    atorvastatin tablet 40 mg Daily    calcitrioL solution 0.25 mcg Daily    carvediloL tablet 25 mg BID    dextrose 10% bolus 125 mL 125 mL PRN    dextrose 10% bolus 250 mL 250 mL PRN    epoetin thee injection 3,000 Units Every Mon, Wed, Fri    fluconazole 40 mg/ml suspension 400 mg Daily    glucagon (human recombinant) injection 1 mg PRN    glucose chewable tablet 16 g PRN    glucose chewable tablet  24 g PRN    heparin (porcine) injection 1,000 Units PRN    heparin (porcine) injection 1,000 Units PRN    heparin (porcine) injection 5,000 Units Q8H    hydrALAZINE 10 mg 2 tablets, hydrALAZINE 25 mg 2 tablets, isorsorbide dinitrate 20 mg  2 tablets combination TID    hydrALAZINE injection 10 mg Q6H PRN    Lactobacillus rhamnosus GG capsule 1 capsule BID    LIDOcaine HCl 2% urojet PRN    meclizine tablet 12.5 mg TID PRN    melatonin tablet 6 mg Nightly    meropenem (MERREM) 500 mg in sodium chloride 0.9 % 100 mL IVPB (MB+) Q12H    methocarbamoL tablet 500 mg TID    multivitamin tablet Daily    naloxone 0.4 mg/mL injection 0.02 mg PRN    OLANZapine injection 5 mg Nightly PRN    ondansetron disintegrating tablet 4 mg Q8H    oxybutynin 24 hr tablet 10 mg Daily    pantoprazole suspension 40 mg BID    polyethylene glycol packet 17 g BID    prochlorperazine injection Soln 10 mg Q6H PRN    senna-docusate 8.6-50 mg per tablet 1 tablet BID    sodium chloride 0.9% bolus 250 mL 250 mL PRN    sodium chloride 0.9% bolus 250 mL 250 mL PRN    sodium chloride 0.9% bolus 250 mL 250 mL PRN    sodium chloride 0.9% flush 10 mL Q12H PRN    sodium chloride 0.9% flush 10 mL PRN    tamsulosin 24 hr capsule 0.4 mg Daily    tobramycin-dexAMETHasone 0.3-0.1% ophthalmic ointment TID    traMADoL tablet 50 mg TID PRN    valsartan tablet 160 mg BID    vancomycin - pharmacy to dose pharmacy to manage frequency    white petrolatum 41 % ointment PRN       Objective:     Vital Signs (Most Recent):  Temp: 98.8 °F (37.1 °C) (09/13/23 0745)  Pulse: 67 (09/13/23 0745)  Resp: 17 (09/13/23 0745)  BP: (!) 146/86 (09/13/23 0745)  SpO2: 98 % (09/13/23 0745) Vital Signs (24h Range):  Temp:  [97.8 °F (36.6 °C)-98.9 °F (37.2 °C)] 98.8 °F (37.1 °C)  Pulse:  [63-67] 67  Resp:  [16-20] 17  SpO2:  [95 %-100 %] 98 %  BP: (132-146)/(76-86) 146/86     Weight: 62.8 kg (138 lb 7.2 oz) (09/07/23 1000)  Body mass index is 19.87 kg/m².  Body surface area is 1.76 meters  squared.    I/O last 3 completed shifts:  In: 1100 [P.O.:120; NG/GT:980]  Out: -     Physical Exam  Vitals and nursing note reviewed.   Constitutional:       General: He is awake. He is not in acute distress.     Appearance: He is cachectic. He is ill-appearing. He is not diaphoretic.      Comments: Muscle wasting/atrophy noted.   HENT:      Head: Normocephalic and atraumatic.      Right Ear: External ear normal.      Left Ear: External ear normal.      Nose: Nose normal.      Mouth/Throat:      Mouth: Mucous membranes are moist.      Pharynx: Oropharynx is clear. No oropharyngeal exudate or posterior oropharyngeal erythema.   Eyes:      Comments: Left eye currently sutured closed.   Cardiovascular:      Rate and Rhythm: Normal rate.      Heart sounds: No murmur heard.     No friction rub. No gallop.   Pulmonary:      Effort: Pulmonary effort is normal. No respiratory distress.      Breath sounds: Rhonchi present. No wheezing or rales.   Chest:      Comments: Tunneled HD catheter to right chest wall.  Abdominal:      General: Bowel sounds are normal. There is no distension.      Palpations: Abdomen is soft.      Tenderness: There is no abdominal tenderness.      Comments: PEG in place.   Musculoskeletal:      Cervical back: Neck supple.      Right lower leg: No edema.      Left lower leg: No edema.   Skin:     General: Skin is warm and dry.      Coloration: Skin is not jaundiced.      Comments: Stable noted to right upper extremity from recent surgery. Incision appears intact.   Neurological:      General: No focal deficit present.      Mental Status: He is alert. Mental status is at baseline.      Cranial Nerves: No cranial nerve deficit.      Motor: No weakness.   Psychiatric:         Mood and Affect: Mood normal.         Behavior: Behavior normal.          Significant Labs:  BMP:   Recent Labs   Lab 09/13/23  0609   *   *   K 4.3   CL 97   CO2 21*   BUN 50*   CREATININE 4.1*   CALCIUM 9.4   MG 2.3        CBC:   Recent Labs   Lab 09/13/23  0609   WBC 5.48   RBC 2.54*   HGB 8.1*   HCT 26.1*   *   *   MCH 31.9*   MCHC 31.0*       CMP:   Recent Labs   Lab 09/13/23  0609   *   CALCIUM 9.4   ALBUMIN 2.1*   PROT 6.1   *   K 4.3   CO2 21*   CL 97   BUN 50*   CREATININE 4.1*   ALKPHOS 166*   ALT 6*   AST 14   BILITOT 0.4       LFTs:   Recent Labs   Lab 09/13/23  0609   ALT 6*   AST 14   ALKPHOS 166*   BILITOT 0.4   PROT 6.1   ALBUMIN 2.1*       Microbiology Results (last 7 days)       Procedure Component Value Units Date/Time    Fungus culture [[id number]] Collected: 08/26/23 1315    Order Status: Completed Specimen: CSF (Spinal Fluid) from CSF Tap, Tube 3 Updated: 09/11/23 0749     Fungus (Mycology) Culture Culture in progress      No fungus isolated after 2 weeks    Culture, Anaerobe [[id number]] Collected: 08/30/23 1724    Order Status: Completed Specimen: Wound from Cornea, Left Updated: 09/06/23 0843     Anaerobic Culture No anaerobes isolated    Narrative:      LEFT INTRAOCULAR SPACE          Specimen (24h ago, onward)      None          Significant Imaging:  I have reviewed all imagining in the last 24 hours.    Assessment/Plan:     Ophtho  * Endophthalmitis  - Ophthalmology consulted and following  - s/p left eye enucleation on 8/30    Cardiac/Vascular  HFrEF (heart failure with reduced ejection fraction)  - management per primary   - currently on Norvasc 5 mg daily, Coreg 6.25 mg BID, hydralazine 70 mg BID, isosorbide dinitrate 40 mg BID and valsartan 160 mg BID    Renal/  ESRD (end stage renal disease)  Outpatient HD Information:  -Outpatient HD unit: [facility]   -HD tx days: MWF   -HD tx time: 210min  -HD access: R IJ TDC   -HD modality: iHD   -Residual urine: ?    Plan/Recommendations:  - iHD today for metabolic clearance and volume management   - can continue calcitriol 0.25 mcg daily  - renal diet/tube feeds when not NPO  - strict I/O's and daily weights  - daily renal function panels  and magnesium levels  - renally all dose medications to eGFR  - avoid gadolinium, fleets, phos-based laxatives, NSAIDs, etc.    ID  AV fistula infection  - secondary to Pseudomonas  - s/p right upper extremity AV graft excision in July  - Infectious Disease consulted and following   - plan for 4-6 weeks total of meropenem, vancomycin and fluconazole    Oncology  Anemia in ESRD (end-stage renal disease)  - target Hg of 10-12  - continue epogen 3,000 units every Monday, Wednesday and Friday       Thank you for your consult. I will follow-up with patient. Please contact us if you have any additional questions.    Herbert Cedeño MD  Nephrology  [facility] - Intensive Care ([place] [address])    ATTENDING PHYSICIAN ATTESTATION  I have personally verified the history and examined the patient. I thoroughly reviewed the demographic, clinical, laboratorial and imaging information available in medical records. I agree with the assessment and recommendations provided by the subspecialty resident who was under my supervision.

## 2023-09-13 NOTE — ASSESSMENT & PLAN NOTE
Meningitis    S/p L eye evisceration on 8/16/23 and completed enucleation with implant placed on 8/30/20.  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  L eye enucleated, and implant placed on 8/30    - Continue vanc, meropenem and fluconazole, and tobramycin eye drops.  - Anticipated 4-6 weeks of therapy from evisceration (8/30).   - Tunneled PICC line was placed for antibiotics. (08/29)  - Follow up with outpatient Ophthalmology in 5 weeks.

## 2023-09-13 NOTE — PLAN OF CARE
Left VM with LDH level 2 [facility] representative for update on progress or if a date has been scheduled.  Will continue to update plan as needed.  Handy Mccarty RN,BSN

## 2023-09-13 NOTE — NURSING
Pt's status stable and unchanged. NAD. VSS. Dialysis- 2.5L removed. No significant events on my shift. Review POC. Safety precautions maintained. Call light within reach.

## 2023-09-13 NOTE — PROGRESS NOTES
09/13/23 1134   Post-Hemodialysis Assessment   Rinseback Volume (mL) 250 mL   Blood Volume Processed (Liters) 62.9 L   Dialyzer Clearance Lightly streaked   Duration of Treatment 210 minutes   Additional Fluid Intake (mL) 300 mL   Total UF (mL) 3100 mL   Net Fluid Removal 2500   Patient Response to Treatment toolerated well   Post-Treatment Weight   (unable to stand on scale)   Post-Hemodialysis Comments see notes     HD TX complete. Net removal 2500 ml. Pt awake, alert, VSS, NAD. Report given to primary care nurse.

## 2023-09-13 NOTE — ASSESSMENT & PLAN NOTE
Outpatient HD Information:  -Outpatient HD unit: [facility]   -HD tx days: MWF   -HD tx time: 210min  -HD access: R IJ TDC   -HD modality: iHD   -Residual urine: ?    Plan/Recommendations:  - iHD today for metabolic clearance and volume management   - can continue calcitriol 0.25 mcg daily  - renal diet/tube feeds when not NPO  - strict I/O's and daily weights  - daily renal function panels and magnesium levels  - renally all dose medications to eGFR  - avoid gadolinium, fleets, phos-based laxatives, NSAIDs, etc.

## 2023-09-13 NOTE — SUBJECTIVE & OBJECTIVE
Interval History: No acute events overnight.  Receiving HD this am.  Denies diarrhea.  Feeling well otherwise.  Reports improvement in penile pain.    Review of Systems   Constitutional:  Negative for chills, fatigue and fever.   HENT:  Negative for congestion and facial swelling.    Eyes:  Negative for pain, discharge and visual disturbance.   Respiratory:  Negative for cough and shortness of breath.    Cardiovascular:  Negative for chest pain, palpitations and leg swelling.   Gastrointestinal:  Negative for abdominal distention, abdominal pain, nausea and vomiting.        Lower abdominal and groin pain   Genitourinary:  Negative for difficulty urinating, dysuria and urgency.   Musculoskeletal:  Negative for arthralgias, back pain and myalgias.   Skin:  Negative for color change and pallor.   Neurological:  Negative for dizziness, weakness, light-headedness and headaches.   Psychiatric/Behavioral:  Negative for agitation, behavioral problems and confusion.      Objective:     Vital Signs (Most Recent):  Temp: 98.8 °F (37.1 °C) (09/13/23 0745)  Pulse: 82 (09/13/23 1130)  Resp: 16 (09/13/23 0800)  BP: (!) 148/80 (09/13/23 1130)  SpO2: 98 % (09/13/23 0745) Vital Signs (24h Range):  Temp:  [97.8 °F (36.6 °C)-98.9 °F (37.2 °C)] 98.8 °F (37.1 °C)  Pulse:  [61-82] 82  Resp:  [16-20] 16  SpO2:  [98 %-100 %] 98 %  BP: (132-154)/(75-86) 148/80     Weight: 62.8 kg (138 lb 7.2 oz)  Body mass index is 19.87 kg/m².    Intake/Output Summary (Last 24 hours) at 9/13/2023 1143  Last data filed at 9/13/2023 1134  Gross per 24 hour   Intake 470 ml   Output 3100 ml   Net -2630 ml         Physical Exam  Vitals and nursing note reviewed.   Constitutional:       General: He is not in acute distress.     Appearance: He is not toxic-appearing or diaphoretic.   HENT:      Head: Atraumatic.      Nose: No rhinorrhea.   Eyes:      Extraocular Movements: Extraocular movements intact.      Comments: S/p left enucleation   Neck:      Vascular: No  carotid bruit.   Cardiovascular:      Rate and Rhythm: Normal rate and regular rhythm.   Pulmonary:      Effort: No respiratory distress.      Breath sounds: Normal breath sounds. No stridor. No wheezing.      Comments: HD cath to the right chest walll  Abdominal:      General: Bowel sounds are normal. There is no distension.      Palpations: Abdomen is soft.      Tenderness: There is no abdominal tenderness.      Comments: PEG tube in place.   Musculoskeletal:         General: No swelling or tenderness. Normal range of motion.      Cervical back: No rigidity or tenderness.   Skin:     Findings: No erythema or rash.   Neurological:      Mental Status: He is alert and oriented to person, place, and time.   Psychiatric:         Mood and Affect: Mood normal.         Behavior: Behavior normal.             Significant Labs: All pertinent labs within the past 24 hours have been reviewed.    Significant Imaging: I have reviewed all pertinent imaging results/findings within the past 24 hours.

## 2023-09-14 LAB
POCT GLUCOSE: 100 MG/DL (ref 70–110)
POCT GLUCOSE: 106 MG/DL (ref 70–110)
POCT GLUCOSE: 141 MG/DL (ref 70–110)
POCT GLUCOSE: 161 MG/DL (ref 70–110)

## 2023-09-14 PROCEDURE — 25000003 PHARM REV CODE 250

## 2023-09-14 PROCEDURE — 63600175 PHARM REV CODE 636 W HCPCS

## 2023-09-14 PROCEDURE — 94761 N-INVAS EAR/PLS OXIMETRY MLT: CPT

## 2023-09-14 PROCEDURE — 30200315 PPD INTRADERMAL TEST REV CODE 302

## 2023-09-14 PROCEDURE — 99232 SBSQ HOSP IP/OBS MODERATE 35: CPT | Mod: ,,, | Performed by: INTERNAL MEDICINE

## 2023-09-14 PROCEDURE — 99900035 HC TECH TIME PER 15 MIN (STAT)

## 2023-09-14 PROCEDURE — 63700000 PHARM REV CODE 250 ALT 637 W/O HCPCS: Performed by: HOSPITALIST

## 2023-09-14 PROCEDURE — 99232 PR SUBSEQUENT HOSPITAL CARE,LEVL II: ICD-10-PCS | Mod: ,,, | Performed by: INTERNAL MEDICINE

## 2023-09-14 PROCEDURE — 99232 PR SUBSEQUENT HOSPITAL CARE,LEVL II: ICD-10-PCS | Mod: GC,,, | Performed by: HOSPITALIST

## 2023-09-14 PROCEDURE — 25000003 PHARM REV CODE 250: Performed by: STUDENT IN AN ORGANIZED HEALTH CARE EDUCATION/TRAINING PROGRAM

## 2023-09-14 PROCEDURE — 97530 THERAPEUTIC ACTIVITIES: CPT | Mod: CQ

## 2023-09-14 PROCEDURE — 20000000 HC ICU ROOM

## 2023-09-14 PROCEDURE — 86580 TB INTRADERMAL TEST: CPT

## 2023-09-14 PROCEDURE — 99232 SBSQ HOSP IP/OBS MODERATE 35: CPT | Mod: GC,,, | Performed by: HOSPITALIST

## 2023-09-14 PROCEDURE — 25000003 PHARM REV CODE 250: Performed by: HOSPITALIST

## 2023-09-14 RX ORDER — CARVEDILOL 25 MG/1
25 TABLET ORAL 2 TIMES DAILY
Qty: 60 TABLET | Refills: 11
Start: 2023-09-14 | End: 2024-09-13

## 2023-09-14 RX ORDER — IPRATROPIUM BROMIDE AND ALBUTEROL SULFATE 2.5; .5 MG/3ML; MG/3ML
3 SOLUTION RESPIRATORY (INHALATION) 3 TIMES DAILY PRN
Qty: 75 ML | Refills: 0
Start: 2023-09-14

## 2023-09-14 RX ORDER — HEPARIN SODIUM 1000 [USP'U]/ML
1000 INJECTION, SOLUTION INTRAVENOUS; SUBCUTANEOUS
Status: ACTIVE | OUTPATIENT
Start: 2023-09-15 | End: 2023-09-15

## 2023-09-14 RX ORDER — ONDANSETRON 4 MG/1
4 TABLET, FILM COATED ORAL EVERY 6 HOURS PRN
Status: DISCONTINUED | OUTPATIENT
Start: 2023-09-14 | End: 2023-09-19 | Stop reason: HOSPADM

## 2023-09-14 RX ORDER — TIZANIDINE 2 MG/1
2 TABLET ORAL EVERY 8 HOURS PRN
Qty: 30 TABLET | Refills: 0 | Status: SHIPPED | OUTPATIENT
Start: 2023-09-14 | End: 2023-09-14 | Stop reason: HOSPADM

## 2023-09-14 RX ORDER — OXYBUTYNIN CHLORIDE 10 MG/1
10 TABLET, EXTENDED RELEASE ORAL DAILY
Qty: 30 TABLET | Refills: 11 | Status: SHIPPED | OUTPATIENT
Start: 2023-09-15 | End: 2024-09-14

## 2023-09-14 RX ORDER — SODIUM CHLORIDE 9 MG/ML
INJECTION, SOLUTION INTRAVENOUS ONCE
Status: DISCONTINUED | OUTPATIENT
Start: 2023-09-15 | End: 2023-09-16

## 2023-09-14 RX ADMIN — Medication 6 MG: at 09:09

## 2023-09-14 RX ADMIN — CARVEDILOL 25 MG: 25 TABLET, FILM COATED ORAL at 08:09

## 2023-09-14 RX ADMIN — HYDRALAZINE HYDROCHLORIDE: 10 TABLET, FILM COATED ORAL at 08:09

## 2023-09-14 RX ADMIN — ATORVASTATIN CALCIUM 40 MG: 40 TABLET, FILM COATED ORAL at 08:09

## 2023-09-14 RX ADMIN — ACETAMINOPHEN 1000 MG: 500 TABLET ORAL at 02:09

## 2023-09-14 RX ADMIN — PANTOPRAZOLE SODIUM 40 MG: 40 GRANULE, DELAYED RELEASE ORAL at 09:09

## 2023-09-14 RX ADMIN — PANTOPRAZOLE SODIUM 40 MG: 40 GRANULE, DELAYED RELEASE ORAL at 08:09

## 2023-09-14 RX ADMIN — ONDANSETRON 4 MG: 4 TABLET, ORALLY DISINTEGRATING ORAL at 05:09

## 2023-09-14 RX ADMIN — Medication 1 CAPSULE: at 09:09

## 2023-09-14 RX ADMIN — TUBERCULIN PURIFIED PROTEIN DERIVATIVE 5 UNITS: 5 INJECTION, SOLUTION INTRADERMAL at 02:09

## 2023-09-14 RX ADMIN — VALSARTAN 160 MG: 160 TABLET, FILM COATED ORAL at 08:09

## 2023-09-14 RX ADMIN — VALSARTAN 160 MG: 160 TABLET, FILM COATED ORAL at 09:09

## 2023-09-14 RX ADMIN — HEPARIN SODIUM 5000 UNITS: 5000 INJECTION INTRAVENOUS; SUBCUTANEOUS at 05:09

## 2023-09-14 RX ADMIN — TOBRAMYCIN AND DEXAMETHASONE: 3; 1 OINTMENT OPHTHALMIC at 08:09

## 2023-09-14 RX ADMIN — METHOCARBAMOL 500 MG: 500 TABLET ORAL at 09:09

## 2023-09-14 RX ADMIN — CARVEDILOL 25 MG: 25 TABLET, FILM COATED ORAL at 09:09

## 2023-09-14 RX ADMIN — HEPARIN SODIUM 5000 UNITS: 5000 INJECTION INTRAVENOUS; SUBCUTANEOUS at 09:09

## 2023-09-14 RX ADMIN — FLUCONAZOLE 400 MG: 40 POWDER, FOR SUSPENSION ORAL at 08:09

## 2023-09-14 RX ADMIN — LIDOCAINE HYDROCHLORIDE: 20 JELLY TOPICAL at 09:09

## 2023-09-14 RX ADMIN — TAMSULOSIN HYDROCHLORIDE 0.4 MG: 0.4 CAPSULE ORAL at 08:09

## 2023-09-14 RX ADMIN — SENNOSIDES AND DOCUSATE SODIUM 1 TABLET: 50; 8.6 TABLET ORAL at 09:09

## 2023-09-14 RX ADMIN — CALCITRIOL 0.25 MCG: 1 SOLUTION ORAL at 08:09

## 2023-09-14 RX ADMIN — MEROPENEM 500 MG: 500 INJECTION INTRAVENOUS at 09:09

## 2023-09-14 RX ADMIN — TOBRAMYCIN AND DEXAMETHASONE: 3; 1 OINTMENT OPHTHALMIC at 09:09

## 2023-09-14 RX ADMIN — HYDRALAZINE HYDROCHLORIDE: 10 TABLET, FILM COATED ORAL at 02:09

## 2023-09-14 RX ADMIN — OXYBUTYNIN CHLORIDE 10 MG: 5 TABLET, EXTENDED RELEASE ORAL at 08:09

## 2023-09-14 RX ADMIN — POLYETHYLENE GLYCOL 3350 17 G: 17 POWDER, FOR SOLUTION ORAL at 09:09

## 2023-09-14 RX ADMIN — HEPARIN SODIUM 5000 UNITS: 5000 INJECTION INTRAVENOUS; SUBCUTANEOUS at 02:09

## 2023-09-14 RX ADMIN — ACETAMINOPHEN 1000 MG: 500 TABLET ORAL at 09:09

## 2023-09-14 RX ADMIN — ACETAMINOPHEN 1000 MG: 500 TABLET ORAL at 08:09

## 2023-09-14 RX ADMIN — LIDOCAINE HYDROCHLORIDE: 20 JELLY TOPICAL at 08:09

## 2023-09-14 RX ADMIN — HYDRALAZINE HYDROCHLORIDE: 10 TABLET, FILM COATED ORAL at 09:09

## 2023-09-14 RX ADMIN — METHOCARBAMOL 500 MG: 500 TABLET ORAL at 02:09

## 2023-09-14 RX ADMIN — TOBRAMYCIN AND DEXAMETHASONE: 3; 1 OINTMENT OPHTHALMIC at 02:09

## 2023-09-14 RX ADMIN — Medication 1 CAPSULE: at 08:09

## 2023-09-14 RX ADMIN — METHOCARBAMOL 500 MG: 500 TABLET ORAL at 08:09

## 2023-09-14 RX ADMIN — MEROPENEM 500 MG: 500 INJECTION INTRAVENOUS at 08:09

## 2023-09-14 RX ADMIN — THERA TABS 1 TABLET: TAB at 08:09

## 2023-09-14 NOTE — PLAN OF CARE
YULI Contacted [facility] x[facility] and spoke with intake. [phone] Patient has been accepted at this facility. Facility stated that they are aware of making transportation plans for the patient to get dialysis.    Patient has dialysis is set up M/W/F @ [facility] in [place] [phone]. YULI spoke with Sophia Rodriguez.     YULI contacted patient's sister Marley (DEMAR). Sister stated that she is out of town and cannot sign paperwork. YULI contacted [facility] x 4 and spoke with Sophia Leonard about any other options that can be taken. Sophia leonard stated that she will have to speak with legal.     SW contacted sister again and spoke with her at length. POA is requesting to not use doctor at facility and get a PCP at [facility]. She would also like patient to have a sleep study ordered before patient leaves.     SW made doctor aware of these requests. Doctor stated that these request can be done on discharge.     SW did try and contact NH to give update on patient. SW left message.     LIN Bales, MSW-LMSW  Medical Social Worker/  ER Department

## 2023-09-14 NOTE — PROGRESS NOTES
[facility] ([place] [id number])  Nephrology  Progress Note    Patient Name: Immanuel Bernal  MRN: [id number]  Admission Date: 8/9/2023  Hospital Length of Stay: 36 days  Attending Provider: Porsha Funez MD   Primary Care Physician: Dolly, Primary Doctor  Principal Problem:Endophthalmitis    Subjective:     HPI: HPI obtained per medical record as patient unable to communicate     57-year-old male with a history of CHF, diabetes, hypertension, chronic disability, end-stage renal disease on hemodialysis, PEG placement, bowel obstruction, sleep apnea, TIA, left eye vitreous hemorrhage, stroke, and bowel obstruction with bowel resection admitted to [facility] in [place] July 18 from nursing home with left eye pain and blurred vision.  He was admitted with concern for bilateral endophthalmitis.  Other admit diagnoses included right upper extremity thrombus, end-stage renal disease on hemodialysis, hyperkalemia, sleep apnea, diabetes, and CHF.  He was treated with broad-spectrum antibiotics.  He was seen by Infectious Diseases,, and he was treated with vancomycin, ceftriaxone, and amphotericin.  Blood cultures were positive for Pseudomonas, and amphotericin/vancomycin were stopped.  IV cefepime was continued.  He was seen by Ophthalmology, and he received intravitreal vancomycin and ceftazidime (July 18).  He also had intravitreal tap July 18 that had no yeast or fungal elements observed.  Left eye endophthalmitis did not respond to treatment, and he subsequently developed abscess formation, significant proptosis, and drainage of purulent material from the orbit.  Ophthalmology recommended enucleation.  He was continued on cefepime for pseudomonal and ophthalmitis.  Recommendation was for 6 weeks of treatment to be followed by indefinite fluoroquinolone.  He was seen by Pulmonology during his stay for a right upper lung mass with peripheral nodules.  It was felt that he would need further  investigation of this once his current clinical issues stabilized.  Right upper extremity AV graft was excised during his stay with concern for infection.  A right IJ tunneled line was placed on July 27.  Left eye endophthalmitis continued to worsen, and ophthalmology spoke with patient and family about the need for enucleation.  Despite several conversations, family declined enucleation.  Plans were for transitioned to skilled nursing facility for continued treatment, but family did not want him to go to a skilled nursing facility.  He was subsequently discharged AMA from the hospital there on August 7.  Please see the August 7 Internal Medicine note for further details.     He subsequently presented to [facility] Emergency Department.  He will not go back to [facility] in [place]. He received Zosyn and vancomycin.  ED team at [place] spoke with the patient and his power-of- (sister).  CT of the orbits noted scleral abscess along the lateral aspect of the left globe.  Patient and family are aware and in agreement that the patient needs enucleation of the eye at this time. Referring provider spoke with Oculoplastics at [facility]. Requesting transfer to [facility] Medicine for Oculoplastics specialty evaluation of persistent endophthalmitis.  ED provider noted patient is hemodynamically stable.  He does not appear volume overloaded or in respiratory distress.        The patient has a significant previous medical course as listed below  August 3: MRI brain and orbits showed worsening ophthalmitis and inflammatory changes of the left orbital tissues with slight increase in proptosis.  No evidence of intracranial inflammatory process observed.    July 25: Transesophageal echocardiogram had no evidence of endocarditis.  Moderate to severely decreased left ventricular systolic function with EF 30-35%.    July 22:  CT chest showed right upper lobe mass with numerous bilateral  nodularity predominantly in the right with associated mediastinal lymph nodes.  July 21: Blood cultures with Pseudomonas aeruginosa  July 19: MRI brain/orbits with and without contrast had findings suggestive of chronic microvascular ischemic disease of the white matter with remote ischemic event in the left insula/basal ganglia/subependymal white matter/right middle cerebellar peduncle and hemisphere.  Findings consistent with left endophthalmitis.  No abnormal findings observed in the right globe.  July 18:  Blood cultures with Pseudomonas aeruginosa and coag-negative staph    HPI obtained from hospital med's note. Patient with AMS at time of nephrology evaluation and unable to provide HPI or ROS.          Interval History: Patient seen and examined this AM. Underwent iHD yesterday with UF 2.5 liters. Afebrile with pulse in the 60s bpm. Systolic blood pressures ranging from 150-140s mmHg. He is saturating +96% on 2 liters via nasal cannula with no documented UOP in the last 24 hours. Plan for iHD tomorrow per schedule. He is still pending placement upon discharge at this time.    Review of patient's allergies indicates:   Allergen Reactions    Morphine Rash    Amiodarone analogues Itching     Other reaction(s): Unknown     Current Facility-Administered Medications   Medication Frequency    0.9%  NaCl infusion (for blood administration) Q24H PRN    0.9%  NaCl infusion (for blood administration) Q24H PRN    0.9%  NaCl infusion PRN    acetaminophen tablet 1,000 mg TID    albuterol-ipratropium 2.5 mg-0.5 mg/3 mL nebulizer solution 3 mL Q6H PRN    atorvastatin tablet 40 mg Daily    calcitrioL solution 0.25 mcg Daily    carvediloL tablet 25 mg BID    dextrose 10% bolus 125 mL 125 mL PRN    dextrose 10% bolus 250 mL 250 mL PRN    epoetin thee injection 3,000 Units Every Mon, Wed, Fri    fluconazole 40 mg/ml suspension 400 mg Daily    glucagon (human recombinant) injection 1 mg PRN    glucose chewable tablet 16 g PRN     glucose chewable tablet 24 g PRN    heparin (porcine) injection 1,000 Units PRN    heparin (porcine) injection 5,000 Units Q8H    hydrALAZINE 10 mg 2 tablets, hydrALAZINE 25 mg 2 tablets, isorsorbide dinitrate 20 mg  2 tablets combination TID    hydrALAZINE injection 10 mg Q6H PRN    Lactobacillus rhamnosus GG capsule 1 capsule BID    LIDOcaine HCl 2% urojet PRN    meclizine tablet 12.5 mg TID PRN    melatonin tablet 6 mg Nightly    meropenem (MERREM) 500 mg in sodium chloride 0.9 % 100 mL IVPB (MB+) Q12H    methocarbamoL tablet 500 mg TID    multivitamin tablet Daily    naloxone 0.4 mg/mL injection 0.02 mg PRN    OLANZapine injection 5 mg Nightly PRN    ondansetron disintegrating tablet 4 mg Q8H    oxybutynin 24 hr tablet 10 mg Daily    pantoprazole suspension 40 mg BID    polyethylene glycol packet 17 g BID    prochlorperazine injection Soln 10 mg Q6H PRN    senna-docusate 8.6-50 mg per tablet 1 tablet BID    sodium chloride 0.9% bolus 250 mL 250 mL PRN    sodium chloride 0.9% bolus 250 mL 250 mL PRN    sodium chloride 0.9% bolus 250 mL 250 mL PRN    sodium chloride 0.9% flush 10 mL Q12H PRN    sodium chloride 0.9% flush 10 mL PRN    tamsulosin 24 hr capsule 0.4 mg Daily    tobramycin-dexAMETHasone 0.3-0.1% ophthalmic ointment TID    traMADoL tablet 50 mg TID PRN    valsartan tablet 160 mg BID    vancomycin - pharmacy to dose pharmacy to manage frequency    white petrolatum 41 % ointment PRN       Objective:     Vital Signs (Most Recent):  Temp: 98 °F (36.7 °C) (09/14/23 0733)  Pulse: 67 (09/14/23 0733)  Resp: 18 (09/14/23 0733)  BP: (!) 159/86 (09/14/23 0733)  SpO2: 96 % (09/14/23 0733) Vital Signs (24h Range):  Temp:  [98 °F (36.7 °C)-99.5 °F (37.5 °C)] 98 °F (36.7 °C)  Pulse:  [61-82] 67  Resp:  [16-18] 18  SpO2:  [94 %-100 %] 96 %  BP: (137-159)/(75-89) 159/86     Weight: 62.8 kg (138 lb 7.2 oz) (09/13/23 1200)  Body mass index is 19.87 kg/m².  Body surface area is 1.76 meters squared.    I/O last 3 completed  shifts:  In: 550 [Other:300; NG/GT:250]  Out: 3100 [Other:3100]    Physical Exam  Vitals and nursing note reviewed.   Constitutional:       General: He is awake. He is not in acute distress.     Appearance: He is cachectic. He is ill-appearing. He is not diaphoretic.      Comments: Muscle wasting/atrophy noted.   HENT:      Head: Normocephalic and atraumatic.      Right Ear: External ear normal.      Left Ear: External ear normal.      Nose: Nose normal.      Mouth/Throat:      Mouth: Mucous membranes are moist.      Pharynx: Oropharynx is clear. No oropharyngeal exudate or posterior oropharyngeal erythema.   Eyes:      Comments: Left eye currently sutured closed.   Cardiovascular:      Rate and Rhythm: Normal rate.      Heart sounds: No murmur heard.     No friction rub. No gallop.   Pulmonary:      Effort: Pulmonary effort is normal. No respiratory distress.      Breath sounds: Rhonchi present. No wheezing or rales.   Chest:      Comments: Tunneled HD catheter to right chest wall.  Abdominal:      General: Bowel sounds are normal. There is no distension.      Palpations: Abdomen is soft.      Tenderness: There is no abdominal tenderness.      Comments: PEG in place.   Musculoskeletal:      Cervical back: Neck supple.      Right lower leg: No edema.      Left lower leg: No edema.   Skin:     General: Skin is warm and dry.      Coloration: Skin is not jaundiced.      Comments: Stable noted to right upper extremity from recent surgery. Incision appears intact.   Neurological:      General: No focal deficit present.      Mental Status: He is alert. Mental status is at baseline.      Cranial Nerves: No cranial nerve deficit.      Motor: No weakness.      Comments: Patient remains confused.   Psychiatric:         Mood and Affect: Mood normal.         Behavior: Behavior normal.          Significant Labs:  BMP:   Recent Labs   Lab 09/13/23  0609   *   *   K 4.3   CL 97   CO2 21*   BUN 50*   CREATININE 4.1*    CALCIUM 9.4   MG 2.3       CBC:   Recent Labs   Lab 09/13/23  0609   WBC 5.48   RBC 2.54*   HGB 8.1*   HCT 26.1*   *   *   MCH 31.9*   MCHC 31.0*       CMP:   Recent Labs   Lab 09/13/23  0609   *   CALCIUM 9.4   ALBUMIN 2.1*   PROT 6.1   *   K 4.3   CO2 21*   CL 97   BUN 50*   CREATININE 4.1*   ALKPHOS 166*   ALT 6*   AST 14   BILITOT 0.4       LFTs:   Recent Labs   Lab 09/13/23  0609   ALT 6*   AST 14   ALKPHOS 166*   BILITOT 0.4   PROT 6.1   ALBUMIN 2.1*       Microbiology Results (last 7 days)       Procedure Component Value Units Date/Time    Fungus culture [[id number]] Collected: 08/26/23 1315    Order Status: Completed Specimen: CSF (Spinal Fluid) from CSF Tap, Tube 3 Updated: 09/11/23 0749     Fungus (Mycology) Culture Culture in progress      No fungus isolated after 2 weeks          Specimen (24h ago, onward)      None          Significant Imaging:  I have reviewed all imagining in the last 24 hours.    Assessment/Plan:     Ophtho  * Endophthalmitis  - Ophthalmology consulted and following  - s/p left eye enucleation on 8/30    Cardiac/Vascular  HFrEF (heart failure with reduced ejection fraction)  - management per primary   - currently on Norvasc 5 mg daily, Coreg 6.25 mg BID, hydralazine 70 mg BID, isosorbide dinitrate 40 mg BID and valsartan 160 mg BID    Renal/  ESRD (end stage renal disease)  Outpatient HD Information:  -Outpatient HD unit: [facility]   -HD tx days: MWF   -HD tx time: 210min  -HD access: R IJ TDC   -HD modality: iHD   -Residual urine: ?    Plan/Recommendations:  - plan for iHD tomorrow for metabolic clearance and volume management   - can continue calcitriol 0.25 mcg daily  - renal diet/tube feeds when not NPO  - strict I/O's and daily weights  - daily renal function panels and magnesium levels  - renally all dose medications to eGFR  - avoid gadolinium, fleets, phos-based laxatives, NSAIDs, etc.    ID  AV fistula infection  - secondary to Pseudomonas  - s/p  right upper extremity AV graft excision in July  - Infectious Disease consulted and following   - plan for 4-6 weeks total of meropenem, vancomycin and fluconazole    Oncology  Anemia in ESRD (end-stage renal disease)  - target Hg of 10-12  - continue epogen 3,000 units every Monday, Wednesday and Friday     Thank you for your consult. I will follow-up with patient. Please contact us if you have any additional questions.    Herbert Cedeño MD  Nephrology  [facility] - Intensive Care ([place] [address])    ATTENDING PHYSICIAN ATTESTATION  I have personally verified the history and examined the patient. I thoroughly reviewed the demographic, clinical, laboratorial and imaging information available in medical records. I agree with the assessment and recommendations provided by the subspecialty resident who was under my supervision.
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
## 2023-09-14 NOTE — SUBJECTIVE & OBJECTIVE
Interval History: NAEON, VSS, Dialysis yesterday evening removed 2.5 L. Coreg was increased for short run of V-tach from 12.5-25 bid. No overnight episodes of tachycardia. Patient waiting for HD chair and state PSSR screening approval.    Review of Systems   Constitutional:  Negative for chills, fatigue and fever.   HENT:  Negative for congestion and facial swelling.    Eyes:  Negative for pain, discharge, redness and visual disturbance.   Respiratory:  Negative for cough and shortness of breath.    Cardiovascular:  Negative for chest pain, palpitations and leg swelling.   Gastrointestinal:  Positive for abdominal pain (lower abdomianl and groin pain occassionally). Negative for abdominal distention, nausea and vomiting.   Genitourinary:  Negative for difficulty urinating, dysuria and urgency.   Musculoskeletal:  Negative for arthralgias, back pain and myalgias.   Skin:  Negative for color change and pallor.   Neurological:  Negative for dizziness, weakness, light-headedness and headaches.   Psychiatric/Behavioral:  Negative for agitation, behavioral problems and confusion.      Objective:     Vital Signs (Most Recent):  Temp: 99.1 °F (37.3 °C) (09/14/23 0317)  Pulse: 64 (09/14/23 0317)  Resp: 16 (09/14/23 0317)  BP: (!) 146/83 (09/14/23 0317)  SpO2: 97 % (09/14/23 0317) Vital Signs (24h Range):  Temp:  [98.2 °F (36.8 °C)-99.5 °F (37.5 °C)] 99.1 °F (37.3 °C)  Pulse:  [61-82] 64  Resp:  [16-17] 16  SpO2:  [94 %-100 %] 97 %  BP: (137-154)/(75-89) 146/83     Weight: 62.8 kg (138 lb 7.2 oz)  Body mass index is 19.87 kg/m².    Intake/Output Summary (Last 24 hours) at 9/14/2023 0733  Last data filed at 9/14/2023 0600  Gross per 24 hour   Intake 550 ml   Output 3100 ml   Net -2550 ml         Physical Exam  HENT:      Head: Atraumatic.   Eyes:      General:         Left eye: Discharge present.     Comments: Left enucleation   Cardiovascular:      Rate and Rhythm: Normal rate and regular rhythm.   Pulmonary:      Breath  sounds: Normal breath sounds.      Comments: HD cath to the left chest wall  Abdominal:      General: Bowel sounds are normal.      Palpations: Abdomen is soft.      Comments: PEG tube in place   Musculoskeletal:         General: Normal range of motion.   Skin:     General: Skin is warm.   Neurological:      General: No focal deficit present.      Mental Status: He is oriented to person, place, and time.             Significant Labs: All pertinent labs within the past 24 hours have been reviewed.    Significant Imaging: I have reviewed all pertinent imaging results/findings within the past 24 hours.

## 2023-09-14 NOTE — PT/OT/SLP PROGRESS
Physical Therapy Treatment    Patient Name:  Immanuel Bernal   MRN:  [id number]    Recommendations:     Discharge Recommendations:  (post acute placment)  Discharge Equipment Recommendations: to be determined by next level of care  Barriers to discharge: Decreased caregiver support    Assessment:     Immanuel Bernal is a 57 y.o. male admitted with a medical diagnosis of Endophthalmitis.  He presents with the following impairments/functional limitations: weakness, impaired endurance, impaired self care skills, impaired functional mobility, gait instability, impaired balance, visual deficits, decreased coordination, decreased upper extremity function, decreased lower extremity function, decreased safety awareness, decreased ROM, impaired fine motor, impaired coordination ;pt with good participation in session today, able to stand from EOB with max.A x 1 x 2 trials w/ a RW, though somewhat nervous, pt w/ inc SOB noted with each stand, though O2:99%. Pt needing cueing for balance, has posterior COG and leans to L side.     Pt highly motivated to return to independent PLOF and can tolerate 3+hours of therapy. Pt continues to benefit from a collaborative multidisciplinary program to improve quality of life and focus on recovery of impairments.    Rehab Prognosis: Good; patient would benefit from acute skilled PT services to address these deficits and reach maximum level of function.    Recent Surgery: Procedure(s) (LRB):  ENUCLEATION, EYE (Left)  BLEPHARORRHAPHY (Left)  INJECTION, MEDICATION, RETROBULBAR (Left) 15 Days Post-Op    Plan:     During this hospitalization, patient to be seen 3 x/week to address the identified rehab impairments via gait training, therapeutic activities, therapeutic exercises, neuromuscular re-education and progress toward the following goals:    Plan of Care Expires:  09/14/23    Subjective     Chief Complaint: weakness  Patient/Family Comments/goals: pt agreeable to session.    Pain/Comfort:  Pain Rating 1: 0/10 (pt did not report pain today)  Pain Rating Post-Intervention 1: 0/10      Objective:     Communicated with nurse prior to session.  Patient found supine with PEG Tube, pulse ox (continuous), oxygen, telemetry, blood pressure cuff (HD cath) upon PT entry to room.     General Precautions: Standard, fall, vision impaired  Orthopedic Precautions: N/A  Braces: N/A  Respiratory Status: Nasal cannula, flow 2 L/min     Functional Mobility:  Bed Mobility:     Scooting: minimum assistance and at LE's for scooting in supine  Supine to Sit: moderate assistance and maximal assistance  Sit to Supine: moderate assistance, maximal assistance, and at LE's  Transfers:     Sit to Stand:  maximal assistance and of 1 persons with rolling walker and for ~:5-:10 sec each x 2 trials, assistance for blocking L foot and cueing for midline orientation, pt leans to L side.       AM-PAC 6 CLICK MOBILITY  Turning over in bed (including adjusting bedclothes, sheets and blankets)?: 3  Sitting down on and standing up from a chair with arms (e.g., wheelchair, bedside commode, etc.): 2  Moving from lying on back to sitting on the side of the bed?: 2  Moving to and from a bed to a chair (including a wheelchair)?: 2  Need to walk in hospital room?: 2  Climbing 3-5 steps with a railing?: 1  Basic Mobility Total Score: 12       Treatment & Education:  BP seated : 144/86 (pt c/o some dizziness)  BP seated after standin/80  HR:70's  O2:99% on 2L  Pt sat up EOB~20 min total time w/ SBA  Perf'd seated LAQ's and AP's x 10 ea.     Patient left HOB elevated with all lines intact, call button in reach, and nurse notified..    GOALS:   Multidisciplinary Problems       Physical Therapy Goals          Problem: Physical Therapy    Goal Priority Disciplines Outcome Goal Variances Interventions   Physical Therapy Goal     PT, PT/OT Ongoing, Progressing     Description: Goals to be met by: 23     Patient will increase  functional independence with mobility by performin. Supine to sit with MInimal Assistance  2. Sit to stand transfer with Minimal Assistance using LRAD  3. Gait  x 10 feet with Minimal Assistance using LRAD.                          Time Tracking:     PT Received On: 23  PT Start Time:      PT Stop Time: 1608  PT Total Time (min): 31 min     Billable Minutes: Therapeutic Activity 31    Treatment Type: Treatment  PT/PTA: PTA     Number of PTA visits since last PT visit: 2023
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
## 2023-09-14 NOTE — ASSESSMENT & PLAN NOTE
Outpatient HD Information:  -Outpatient HD unit: C   -HD tx days: MWF   -HD tx time: 210min  -HD access: R IJ TDC   -HD modality: iHD   -Residual urine: ?    Plan/Recommendations:  - plan for iHD tomorrow for metabolic clearance and volume management   - can continue calcitriol 0.25 mcg daily  - renal diet/tube feeds when not NPO  - strict I/O's and daily weights  - daily renal function panels and magnesium levels  - renally all dose medications to eGFR  - avoid gadolinium, fleets, phos-based laxatives, NSAIDs, etc.

## 2023-09-14 NOTE — ASSESSMENT & PLAN NOTE
Noted intermittently during HD sessions    - Uptitrate beta blocker as tolerated coreg increased from 12.5- 25mg BID

## 2023-09-14 NOTE — ASSESSMENT & PLAN NOTE
Nutrition consulted. Most recent weight and BMI monitored-     Measurements:  Wt Readings from Last 1 Encounters:   09/13/23 62.8 kg (138 lb 7.2 oz)   Body mass index is 19.87 kg/m².    Patient has been screened and assessed by RD.    Malnutrition Type:  Context: chronic illness  Level: severe    Malnutrition Characteristic Summary:  Weight Loss (Malnutrition): greater than 10% in 6 months  Energy Intake (Malnutrition): other (see comments) (LAMONT)  Subcutaneous Fat (Malnutrition): severe depletion  Muscle Mass (Malnutrition): severe depletion    Interventions/Recommendations (treatment strategy):  1.     Able to take in PO per SLP assessment on 8/15, on regular diet with thin liquids  Continue tube feeds for now in addition to oral diet

## 2023-09-14 NOTE — SUBJECTIVE & OBJECTIVE
Interval History: Patient seen and examined this AM. Underwent iHD yesterday with UF 2.5 liters. Afebrile with pulse in the 60s bpm. Systolic blood pressures ranging from 150-140s mmHg. He is saturating +96% on 2 liters via nasal cannula with no documented UOP in the last 24 hours. Plan for iHD tomorrow per schedule. He is still pending placement upon discharge at this time.    Review of patient's allergies indicates:   Allergen Reactions    Morphine Rash    Amiodarone analogues Itching     Other reaction(s): Unknown     Current Facility-Administered Medications   Medication Frequency    0.9%  NaCl infusion (for blood administration) Q24H PRN    0.9%  NaCl infusion (for blood administration) Q24H PRN    0.9%  NaCl infusion PRN    acetaminophen tablet 1,000 mg TID    albuterol-ipratropium 2.5 mg-0.5 mg/3 mL nebulizer solution 3 mL Q6H PRN    atorvastatin tablet 40 mg Daily    calcitrioL solution 0.25 mcg Daily    carvediloL tablet 25 mg BID    dextrose 10% bolus 125 mL 125 mL PRN    dextrose 10% bolus 250 mL 250 mL PRN    epoetin thee injection 3,000 Units Every Mon, Wed, Fri    fluconazole 40 mg/ml suspension 400 mg Daily    glucagon (human recombinant) injection 1 mg PRN    glucose chewable tablet 16 g PRN    glucose chewable tablet 24 g PRN    heparin (porcine) injection 1,000 Units PRN    heparin (porcine) injection 5,000 Units Q8H    hydrALAZINE 10 mg 2 tablets, hydrALAZINE 25 mg 2 tablets, isorsorbide dinitrate 20 mg  2 tablets combination TID    hydrALAZINE injection 10 mg Q6H PRN    Lactobacillus rhamnosus GG capsule 1 capsule BID    LIDOcaine HCl 2% urojet PRN    meclizine tablet 12.5 mg TID PRN    melatonin tablet 6 mg Nightly    meropenem (MERREM) 500 mg in sodium chloride 0.9 % 100 mL IVPB (MB+) Q12H    methocarbamoL tablet 500 mg TID    multivitamin tablet Daily    naloxone 0.4 mg/mL injection 0.02 mg PRN    OLANZapine injection 5 mg Nightly PRN    ondansetron disintegrating tablet 4 mg Q8H    oxybutynin  24 hr tablet 10 mg Daily    pantoprazole suspension 40 mg BID    polyethylene glycol packet 17 g BID    prochlorperazine injection Soln 10 mg Q6H PRN    senna-docusate 8.6-50 mg per tablet 1 tablet BID    sodium chloride 0.9% bolus 250 mL 250 mL PRN    sodium chloride 0.9% bolus 250 mL 250 mL PRN    sodium chloride 0.9% bolus 250 mL 250 mL PRN    sodium chloride 0.9% flush 10 mL Q12H PRN    sodium chloride 0.9% flush 10 mL PRN    tamsulosin 24 hr capsule 0.4 mg Daily    tobramycin-dexAMETHasone 0.3-0.1% ophthalmic ointment TID    traMADoL tablet 50 mg TID PRN    valsartan tablet 160 mg BID    vancomycin - pharmacy to dose pharmacy to manage frequency    white petrolatum 41 % ointment PRN       Objective:     Vital Signs (Most Recent):  Temp: 98 °F (36.7 °C) (09/14/23 0733)  Pulse: 67 (09/14/23 0733)  Resp: 18 (09/14/23 0733)  BP: (!) 159/86 (09/14/23 0733)  SpO2: 96 % (09/14/23 0733) Vital Signs (24h Range):  Temp:  [98 °F (36.7 °C)-99.5 °F (37.5 °C)] 98 °F (36.7 °C)  Pulse:  [61-82] 67  Resp:  [16-18] 18  SpO2:  [94 %-100 %] 96 %  BP: (137-159)/(75-89) 159/86     Weight: 62.8 kg (138 lb 7.2 oz) (09/13/23 1200)  Body mass index is 19.87 kg/m².  Body surface area is 1.76 meters squared.    I/O last 3 completed shifts:  In: 550 [Other:300; NG/GT:250]  Out: 3100 [Other:3100]     Physical Exam  Vitals and nursing note reviewed.   Constitutional:       General: He is awake. He is not in acute distress.     Appearance: He is cachectic. He is ill-appearing. He is not diaphoretic.      Comments: Muscle wasting/atrophy noted.   HENT:      Head: Normocephalic and atraumatic.      Right Ear: External ear normal.      Left Ear: External ear normal.      Nose: Nose normal.      Mouth/Throat:      Mouth: Mucous membranes are moist.      Pharynx: Oropharynx is clear. No oropharyngeal exudate or posterior oropharyngeal erythema.   Eyes:      Comments: Left eye currently sutured closed.   Cardiovascular:      Rate and Rhythm: Normal  rate.      Heart sounds: No murmur heard.     No friction rub. No gallop.   Pulmonary:      Effort: Pulmonary effort is normal. No respiratory distress.      Breath sounds: Rhonchi present. No wheezing or rales.   Chest:      Comments: Tunneled HD catheter to right chest wall.  Abdominal:      General: Bowel sounds are normal. There is no distension.      Palpations: Abdomen is soft.      Tenderness: There is no abdominal tenderness.      Comments: PEG in place.   Musculoskeletal:      Cervical back: Neck supple.      Right lower leg: No edema.      Left lower leg: No edema.   Skin:     General: Skin is warm and dry.      Coloration: Skin is not jaundiced.      Comments: Stable noted to right upper extremity from recent surgery. Incision appears intact.   Neurological:      General: No focal deficit present.      Mental Status: He is alert. Mental status is at baseline.      Cranial Nerves: No cranial nerve deficit.      Motor: No weakness.      Comments: Patient remains confused.   Psychiatric:         Mood and Affect: Mood normal.         Behavior: Behavior normal.          Significant Labs:  BMP:   Recent Labs   Lab 09/13/23  0609   *   *   K 4.3   CL 97   CO2 21*   BUN 50*   CREATININE 4.1*   CALCIUM 9.4   MG 2.3       CBC:   Recent Labs   Lab 09/13/23  0609   WBC 5.48   RBC 2.54*   HGB 8.1*   HCT 26.1*   *   *   MCH 31.9*   MCHC 31.0*       CMP:   Recent Labs   Lab 09/13/23  0609   *   CALCIUM 9.4   ALBUMIN 2.1*   PROT 6.1   *   K 4.3   CO2 21*   CL 97   BUN 50*   CREATININE 4.1*   ALKPHOS 166*   ALT 6*   AST 14   BILITOT 0.4       LFTs:   Recent Labs   Lab 09/13/23  0609   ALT 6*   AST 14   ALKPHOS 166*   BILITOT 0.4   PROT 6.1   ALBUMIN 2.1*       Microbiology Results (last 7 days)       Procedure Component Value Units Date/Time    Fungus culture [[id number]] Collected: 08/26/23 1315    Order Status: Completed Specimen: CSF (Spinal Fluid) from CSF Tap, Tube 3 Updated:  09/11/23 0749     Fungus (Mycology) Culture Culture in progress      No fungus isolated after 2 weeks          Specimen (24h ago, onward)      None          Significant Imaging:  I have reviewed all imagining in the last 24 hours.

## 2023-09-14 NOTE — NURSING
Pt's status stable and unchanged. NAD. VSS. Bmx1. Poss DC tomorrow. TB skin test placed in Left forearm. No significant events on my shift. Review POC. Safety precautions maintained. Call light within reach.

## 2023-09-14 NOTE — ASSESSMENT & PLAN NOTE
Hypertension    Current hypertension is worsened secondary to agitation  On CCB at home but will uptitrate GDMT while here    - Coreg 25mg BID  - BiDil 2 tablets TID  - Valsartan 160mg BID

## 2023-09-15 DIAGNOSIS — R60.1 GENERALIZED EDEMA: ICD-10-CM

## 2023-09-15 DIAGNOSIS — N18.6 ESRD (END STAGE RENAL DISEASE): Primary | Chronic | ICD-10-CM

## 2023-09-15 LAB
ALBUMIN SERPL BCP-MCNC: 2.3 G/DL (ref 3.5–5.2)
ALP SERPL-CCNC: 143 U/L (ref 55–135)
ALT SERPL W/O P-5'-P-CCNC: 7 U/L (ref 10–44)
ANION GAP SERPL CALC-SCNC: 9 MMOL/L (ref 8–16)
AST SERPL-CCNC: 18 U/L (ref 10–40)
BILIRUB SERPL-MCNC: 0.4 MG/DL (ref 0.1–1)
BUN SERPL-MCNC: 21 MG/DL (ref 6–20)
CALCIUM SERPL-MCNC: 8.9 MG/DL (ref 8.7–10.5)
CHLORIDE SERPL-SCNC: 100 MMOL/L (ref 95–110)
CO2 SERPL-SCNC: 27 MMOL/L (ref 23–29)
CREAT SERPL-MCNC: 2.5 MG/DL (ref 0.5–1.4)
ERYTHROCYTE [DISTWIDTH] IN BLOOD BY AUTOMATED COUNT: 17 % (ref 11.5–14.5)
EST. GFR  (NO RACE VARIABLE): 28.9 ML/MIN/1.73 M^2
GLUCOSE SERPL-MCNC: 106 MG/DL (ref 70–110)
HCT VFR BLD AUTO: 26.8 % (ref 40–54)
HGB BLD-MCNC: 8.4 G/DL (ref 14–18)
MAGNESIUM SERPL-MCNC: 2 MG/DL (ref 1.6–2.6)
MCH RBC QN AUTO: 31.8 PG (ref 27–31)
MCHC RBC AUTO-ENTMCNC: 31.3 G/DL (ref 32–36)
MCV RBC AUTO: 102 FL (ref 82–98)
PHOSPHATE SERPL-MCNC: 2.3 MG/DL (ref 2.7–4.5)
PLATELET # BLD AUTO: 125 K/UL (ref 150–450)
PMV BLD AUTO: 11.3 FL (ref 9.2–12.9)
POCT GLUCOSE: 105 MG/DL (ref 70–110)
POCT GLUCOSE: 154 MG/DL (ref 70–110)
POCT GLUCOSE: 163 MG/DL (ref 70–110)
POCT GLUCOSE: 93 MG/DL (ref 70–110)
POTASSIUM SERPL-SCNC: 4.1 MMOL/L (ref 3.5–5.1)
PROT SERPL-MCNC: 6.4 G/DL (ref 6–8.4)
RBC # BLD AUTO: 2.64 M/UL (ref 4.6–6.2)
SODIUM SERPL-SCNC: 136 MMOL/L (ref 136–145)
VANCOMYCIN SERPL-MCNC: 12.6 UG/ML
WBC # BLD AUTO: 4.87 K/UL (ref 3.9–12.7)

## 2023-09-15 PROCEDURE — 99232 SBSQ HOSP IP/OBS MODERATE 35: CPT | Mod: ,,, | Performed by: NURSE PRACTITIONER

## 2023-09-15 PROCEDURE — 36415 COLL VENOUS BLD VENIPUNCTURE: CPT | Performed by: HOSPITALIST

## 2023-09-15 PROCEDURE — 63600175 PHARM REV CODE 636 W HCPCS: Performed by: HOSPITALIST

## 2023-09-15 PROCEDURE — 85027 COMPLETE CBC AUTOMATED: CPT

## 2023-09-15 PROCEDURE — 25000003 PHARM REV CODE 250

## 2023-09-15 PROCEDURE — 97535 SELF CARE MNGMENT TRAINING: CPT | Mod: CO

## 2023-09-15 PROCEDURE — 63600175 PHARM REV CODE 636 W HCPCS

## 2023-09-15 PROCEDURE — 99231 PR SUBSEQUENT HOSPITAL CARE,LEVL I: ICD-10-PCS | Mod: GC,,, | Performed by: HOSPITALIST

## 2023-09-15 PROCEDURE — 20000000 HC ICU ROOM

## 2023-09-15 PROCEDURE — 99231 SBSQ HOSP IP/OBS SF/LOW 25: CPT | Mod: GC,,, | Performed by: HOSPITALIST

## 2023-09-15 PROCEDURE — 84100 ASSAY OF PHOSPHORUS: CPT

## 2023-09-15 PROCEDURE — 80202 ASSAY OF VANCOMYCIN: CPT | Performed by: HOSPITALIST

## 2023-09-15 PROCEDURE — 97530 THERAPEUTIC ACTIVITIES: CPT | Mod: CO

## 2023-09-15 PROCEDURE — 25000003 PHARM REV CODE 250: Performed by: STUDENT IN AN ORGANIZED HEALTH CARE EDUCATION/TRAINING PROGRAM

## 2023-09-15 PROCEDURE — 80100014 HC HEMODIALYSIS 1:1

## 2023-09-15 PROCEDURE — 63600175 PHARM REV CODE 636 W HCPCS: Mod: JZ | Performed by: STUDENT IN AN ORGANIZED HEALTH CARE EDUCATION/TRAINING PROGRAM

## 2023-09-15 PROCEDURE — 27000221 HC OXYGEN, UP TO 24 HOURS

## 2023-09-15 PROCEDURE — 99232 PR SUBSEQUENT HOSPITAL CARE,LEVL II: ICD-10-PCS | Mod: ,,, | Performed by: NURSE PRACTITIONER

## 2023-09-15 PROCEDURE — 83735 ASSAY OF MAGNESIUM: CPT

## 2023-09-15 PROCEDURE — 94761 N-INVAS EAR/PLS OXIMETRY MLT: CPT

## 2023-09-15 PROCEDURE — 97530 THERAPEUTIC ACTIVITIES: CPT | Mod: CQ

## 2023-09-15 PROCEDURE — 80053 COMPREHEN METABOLIC PANEL: CPT

## 2023-09-15 PROCEDURE — 63700000 PHARM REV CODE 250 ALT 637 W/O HCPCS: Performed by: HOSPITALIST

## 2023-09-15 PROCEDURE — 99900035 HC TECH TIME PER 15 MIN (STAT)

## 2023-09-15 PROCEDURE — 25000003 PHARM REV CODE 250: Performed by: HOSPITALIST

## 2023-09-15 RX ADMIN — HYDRALAZINE HYDROCHLORIDE: 10 TABLET, FILM COATED ORAL at 08:09

## 2023-09-15 RX ADMIN — HYDRALAZINE HYDROCHLORIDE: 10 TABLET, FILM COATED ORAL at 09:09

## 2023-09-15 RX ADMIN — HEPARIN SODIUM 5000 UNITS: 5000 INJECTION INTRAVENOUS; SUBCUTANEOUS at 03:09

## 2023-09-15 RX ADMIN — MEROPENEM 500 MG: 500 INJECTION INTRAVENOUS at 07:09

## 2023-09-15 RX ADMIN — THERA TABS 1 TABLET: TAB at 09:09

## 2023-09-15 RX ADMIN — TOBRAMYCIN AND DEXAMETHASONE: 3; 1 OINTMENT OPHTHALMIC at 03:09

## 2023-09-15 RX ADMIN — MEROPENEM 500 MG: 500 INJECTION INTRAVENOUS at 08:09

## 2023-09-15 RX ADMIN — METHOCARBAMOL 500 MG: 500 TABLET ORAL at 09:09

## 2023-09-15 RX ADMIN — ERYTHROPOIETIN 3000 UNITS: 3000 INJECTION, SOLUTION INTRAVENOUS; SUBCUTANEOUS at 05:09

## 2023-09-15 RX ADMIN — TRAMADOL HYDROCHLORIDE 50 MG: 50 TABLET, COATED ORAL at 12:09

## 2023-09-15 RX ADMIN — TOBRAMYCIN AND DEXAMETHASONE: 3; 1 OINTMENT OPHTHALMIC at 09:09

## 2023-09-15 RX ADMIN — HEPARIN SODIUM 5000 UNITS: 5000 INJECTION INTRAVENOUS; SUBCUTANEOUS at 06:09

## 2023-09-15 RX ADMIN — FLUCONAZOLE 400 MG: 40 POWDER, FOR SUSPENSION ORAL at 09:09

## 2023-09-15 RX ADMIN — METHOCARBAMOL 500 MG: 500 TABLET ORAL at 03:09

## 2023-09-15 RX ADMIN — Medication 1 CAPSULE: at 09:09

## 2023-09-15 RX ADMIN — CARVEDILOL 25 MG: 25 TABLET, FILM COATED ORAL at 09:09

## 2023-09-15 RX ADMIN — HEPARIN SODIUM 5000 UNITS: 5000 INJECTION INTRAVENOUS; SUBCUTANEOUS at 09:09

## 2023-09-15 RX ADMIN — PANTOPRAZOLE SODIUM 40 MG: 40 GRANULE, DELAYED RELEASE ORAL at 09:09

## 2023-09-15 RX ADMIN — Medication 6 MG: at 08:09

## 2023-09-15 RX ADMIN — CARVEDILOL 25 MG: 25 TABLET, FILM COATED ORAL at 08:09

## 2023-09-15 RX ADMIN — VALSARTAN 160 MG: 160 TABLET, FILM COATED ORAL at 09:09

## 2023-09-15 RX ADMIN — METHOCARBAMOL 500 MG: 500 TABLET ORAL at 08:09

## 2023-09-15 RX ADMIN — CALCITRIOL 0.25 MCG: 1 SOLUTION ORAL at 09:09

## 2023-09-15 RX ADMIN — VANCOMYCIN HYDROCHLORIDE 500 MG: 500 INJECTION, POWDER, LYOPHILIZED, FOR SOLUTION INTRAVENOUS at 03:09

## 2023-09-15 RX ADMIN — ACETAMINOPHEN 1000 MG: 500 TABLET ORAL at 09:09

## 2023-09-15 RX ADMIN — OXYBUTYNIN CHLORIDE 10 MG: 5 TABLET, EXTENDED RELEASE ORAL at 09:09

## 2023-09-15 RX ADMIN — Medication 1 CAPSULE: at 08:09

## 2023-09-15 RX ADMIN — TAMSULOSIN HYDROCHLORIDE 0.4 MG: 0.4 CAPSULE ORAL at 09:09

## 2023-09-15 RX ADMIN — PANTOPRAZOLE SODIUM 40 MG: 40 GRANULE, DELAYED RELEASE ORAL at 08:09

## 2023-09-15 RX ADMIN — VALSARTAN 160 MG: 160 TABLET, FILM COATED ORAL at 08:09

## 2023-09-15 RX ADMIN — HYDRALAZINE HYDROCHLORIDE: 10 TABLET, FILM COATED ORAL at 03:09

## 2023-09-15 RX ADMIN — ACETAMINOPHEN 1000 MG: 500 TABLET ORAL at 03:09

## 2023-09-15 RX ADMIN — ATORVASTATIN CALCIUM 40 MG: 40 TABLET, FILM COATED ORAL at 09:09

## 2023-09-15 NOTE — PLAN OF CARE
Problem: Adult Inpatient Plan of Care  Goal: Plan of Care Review  Outcome: Ongoing, Progressing  Goal: Patient-Specific Goal (Individualized)  Outcome: Ongoing, Progressing  Goal: Absence of Hospital-Acquired Illness or Injury  Outcome: Ongoing, Progressing  Goal: Optimal Comfort and Wellbeing  Outcome: Ongoing, Progressing  Goal: Readiness for Transition of Care  Outcome: Ongoing, Progressing     Problem: Diabetes Comorbidity  Goal: Blood Glucose Level Within Targeted Range  Outcome: Ongoing, Progressing     Problem: Skin Injury Risk Increased  Goal: Skin Health and Integrity  Outcome: Ongoing, Progressing     Problem: Device-Related Complication Risk (Hemodialysis)  Goal: Safe, Effective Therapy Delivery  Outcome: Ongoing, Progressing     Problem: Hemodynamic Instability (Hemodialysis)  Goal: Effective Tissue Perfusion  Outcome: Ongoing, Progressing     Problem: Infection (Hemodialysis)  Goal: Absence of Infection Signs and Symptoms  Outcome: Ongoing, Progressing     Problem: Infection  Goal: Absence of Infection Signs and Symptoms  Outcome: Ongoing, Progressing     Problem: Coping Ineffective  Goal: Effective Coping  Outcome: Ongoing, Progressing     Problem: Infection (CRRT (Continuous Renal Replacement Therapy))  Goal: Absence of Infection Signs and Symptoms  Outcome: Ongoing, Progressing     Problem: Impaired Wound Healing  Goal: Optimal Wound Healing  Outcome: Ongoing, Progressing     Problem: Fall Injury Risk  Goal: Absence of Fall and Fall-Related Injury  Outcome: Ongoing, Progressing

## 2023-09-15 NOTE — ASSESSMENT & PLAN NOTE
Outpatient HD Information:  -Outpatient HD unit: [facility]   -HD tx days: MWF   -HD tx time: 210min  -HD access: R IJ TDC   -HD modality: iHD   -Residual urine: ?    Plan/Recommendations:  - plan for iHD Monday (9/18) for metabolic clearance and volume management. Please call for any acute changes throughout the weekend.   - can continue calcitriol 0.25 mcg daily  - strict I/O's and daily weights  - daily renal function panels and magnesium levels  - renally all dose medications to eGFR  - avoid gadolinium, fleets, phos-based laxatives, NSAIDs, etc.
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
## 2023-09-15 NOTE — ASSESSMENT & PLAN NOTE
- [facility] as sister is out of town and wasn't available to sign the paperwork.

## 2023-09-15 NOTE — PROGRESS NOTES
Pharmacokinetic Assessment Follow Up: IV Vancomycin    Vancomycin serum concentration assessment(s):    -Vancomycin post-HD level 12.6 below goal 15-20 for endophthalmitis  -Patient to receive HD today (M/W/F)  -Will re-dose vancomycin 500 mg x 1 dose  -Check vancomycin random level with AM labs on Monday       Drug levels (last 3 results):  Recent Labs   Lab Result Units 09/13/23  0609 09/15/23  0935   Vancomycin, Random ug/mL 17.5 12.6       Pharmacy will continue to follow and monitor vancomycin.    Please contact pharmacy at extension [phone] for questions regarding this assessment.    Thank you for the consult,   Jennifer Vela       Patient brief summary:  Immanuel Bernal is a 57 y.o. male initiated on antimicrobial therapy with IV Vancomycin for treatment of  endophthalmitis    The patient's current regimen is vancomycin    Drug Allergies:   Review of patient's allergies indicates:   Allergen Reactions    Morphine Rash    Amiodarone analogues Itching     Other reaction(s): Unknown       Actual Body Weight:   62.8 kg    Renal Function:   Estimated Creatinine Clearance: 28.3 mL/min (A) (based on SCr of 2.5 mg/dL (H)).,     Dialysis Method (if applicable):  intermittent HD    CBC (last 72 hours):  Recent Labs   Lab Result Units 09/13/23  0609 09/15/23  0935   WBC K/uL 5.48 4.87   Hemoglobin g/dL 8.1* 8.4*   Hematocrit % 26.1* 26.8*   Platelets K/uL 126* 125*       Metabolic Panel (last 72 hours):  Recent Labs   Lab Result Units 09/13/23  0609 09/15/23  0935   Sodium mmol/L 132* 136   Potassium mmol/L 4.3 4.1   Chloride mmol/L 97 100   CO2 mmol/L 21* 27   Glucose mg/dL 125* 106   BUN mg/dL 50* 21*   Creatinine mg/dL 4.1* 2.5*   Albumin g/dL 2.1* 2.3*   Total Bilirubin mg/dL 0.4 0.4   Alkaline Phosphatase U/L 166* 143*   AST U/L 14 18   ALT U/L 6* 7*   Magnesium mg/dL 2.3 2.0   Phosphorus mg/dL 4.1 2.3*       Vancomycin Administrations:  vancomycin given in the last 96 hours                     vancomycin  (VANCOCIN) 500 mg in dextrose 5 % in water (D5W) 100 mL IVPB (MB+) (mg) 500 mg New Bag 09/13/23 1240                    Microbiologic Results:  Microbiology Results (last 7 days)       Procedure Component Value Units Date/Time    Fungus culture [[id number]] Collected: 08/26/23 1315    Order Status: Completed Specimen: CSF (Spinal Fluid) from CSF Tap, Tube 3 Updated: 09/11/23 0749     Fungus (Mycology) Culture Culture in progress      No fungus isolated after 2 weeks

## 2023-09-15 NOTE — SUBJECTIVE & OBJECTIVE
Interval History: HD completed this AM with 1.5 L removed. No distress on exam. Appears oriented this AM. PT/OT at bedside. VSS.  Pending dc to [facility] with HD on MWF at [facility] in [place].     Review of patient's allergies indicates:   Allergen Reactions    Morphine Rash    Amiodarone analogues Itching     Other reaction(s): Unknown     Current Facility-Administered Medications   Medication Frequency    0.9%  NaCl infusion (for blood administration) Q24H PRN    0.9%  NaCl infusion (for blood administration) Q24H PRN    0.9%  NaCl infusion PRN    0.9%  NaCl infusion Once    acetaminophen tablet 1,000 mg TID    albuterol-ipratropium 2.5 mg-0.5 mg/3 mL nebulizer solution 3 mL Q6H PRN    atorvastatin tablet 40 mg Daily    calcitrioL solution 0.25 mcg Daily    carvediloL tablet 25 mg BID    dextrose 10% bolus 125 mL 125 mL PRN    dextrose 10% bolus 250 mL 250 mL PRN    epoetin thee injection 3,000 Units Every Mon, Wed, Fri    fluconazole 40 mg/ml suspension 400 mg Daily    glucagon (human recombinant) injection 1 mg PRN    glucose chewable tablet 16 g PRN    glucose chewable tablet 24 g PRN    heparin (porcine) injection 1,000 Units PRN    heparin (porcine) injection 1,000 Units PRN    heparin (porcine) injection 5,000 Units Q8H    hydrALAZINE 10 mg 2 tablets, hydrALAZINE 25 mg 2 tablets, isorsorbide dinitrate 20 mg  2 tablets combination TID    hydrALAZINE injection 10 mg Q6H PRN    Lactobacillus rhamnosus GG capsule 1 capsule BID    LIDOcaine HCl 2% urojet PRN    meclizine tablet 12.5 mg TID PRN    melatonin tablet 6 mg Nightly    meropenem (MERREM) 500 mg in sodium chloride 0.9 % 100 mL IVPB (MB+) Q12H    methocarbamoL tablet 500 mg TID    multivitamin tablet Daily    naloxone 0.4 mg/mL injection 0.02 mg PRN    OLANZapine injection 5 mg Nightly PRN    ondansetron tablet 4 mg Q6H PRN    oxybutynin 24 hr tablet 10 mg Daily    pantoprazole suspension 40 mg BID    polyethylene glycol packet 17 g BID     prochlorperazine injection Soln 10 mg Q6H PRN    senna-docusate 8.6-50 mg per tablet 1 tablet BID    sodium chloride 0.9% bolus 250 mL 250 mL PRN    sodium chloride 0.9% bolus 250 mL 250 mL PRN    sodium chloride 0.9% bolus 250 mL 250 mL PRN    sodium chloride 0.9% bolus 250 mL 250 mL PRN    sodium chloride 0.9% flush 10 mL Q12H PRN    sodium chloride 0.9% flush 10 mL PRN    tamsulosin 24 hr capsule 0.4 mg Daily    tobramycin-dexAMETHasone 0.3-0.1% ophthalmic ointment TID    traMADoL tablet 50 mg TID PRN    valsartan tablet 160 mg BID    vancomycin - pharmacy to dose pharmacy to manage frequency    white petrolatum 41 % ointment PRN       Objective:     Vital Signs (Most Recent):  Temp: 98.2 °F (36.8 °C) (09/15/23 0733)  Pulse: 69 (09/15/23 0733)  Resp: 18 (09/15/23 0733)  BP: (!) 161/84 (09/15/23 0733)  SpO2: (!) 94 % (09/15/23 0733) Vital Signs (24h Range):  Temp:  [97.8 °F (36.6 °C)-98.6 °F (37 °C)] 98.2 °F (36.8 °C)  Pulse:  [66-75] 69  Resp:  [11-28] 18  SpO2:  [92 %-100 %] 94 %  BP: (145-174)/() 161/84     Weight: 62.8 kg (138 lb 7.2 oz) (09/13/23 1200)  Body mass index is 19.87 kg/m².  Body surface area is 1.76 meters squared.    I/O last 3 completed shifts:  In: 925 [P.O.:225; Other:500; NG/GT:200]  Out: 2310 [Urine:310; Other:2000]     Physical Exam  Vitals and nursing note reviewed.   Constitutional:       General: He is awake. He is not in acute distress.     Appearance: He is cachectic. He is ill-appearing. He is not diaphoretic.      Comments: Muscle wasting/atrophy noted.   HENT:      Head: Normocephalic and atraumatic.      Right Ear: External ear normal.      Left Ear: External ear normal.      Nose: Nose normal.      Mouth/Throat:      Mouth: Mucous membranes are moist.      Pharynx: Oropharynx is clear. No oropharyngeal exudate or posterior oropharyngeal erythema.   Eyes:      Comments: Left eye currently sutured closed.   Cardiovascular:      Rate and Rhythm: Normal rate.      Heart sounds:  No murmur heard.     No friction rub. No gallop.   Pulmonary:      Effort: Pulmonary effort is normal. No respiratory distress.   Chest:      Comments: Tunneled HD catheter to right chest wall.  Abdominal:      General: Bowel sounds are normal. There is no distension.      Palpations: Abdomen is soft.      Tenderness: There is no abdominal tenderness.      Comments: PEG in place.   Musculoskeletal:      Cervical back: Neck supple.      Right lower leg: No edema.      Left lower leg: No edema.   Skin:     General: Skin is warm and dry.      Coloration: Skin is not jaundiced.   Neurological:      General: No focal deficit present.      Mental Status: He is alert. Mental status is at baseline.   Psychiatric:         Mood and Affect: Mood normal.         Behavior: Behavior normal.          Significant Labs:  CBC:   Recent Labs   Lab 09/15/23  0935   WBC 4.87   RBC 2.64*   HGB 8.4*   HCT 26.8*   *   *   MCH 31.8*   MCHC 31.3*     CMP:   Recent Labs   Lab 09/15/23  0935      CALCIUM 8.9   ALBUMIN 2.3*   PROT 6.4      K 4.1   CO2 27      BUN 21*   CREATININE 2.5*   ALKPHOS 143*   ALT 7*   AST 18   BILITOT 0.4     All labs within the past 24 hours have been reviewed.

## 2023-09-15 NOTE — PROGRESS NOTES
09/15/23 0600   Post-Hemodialysis Assessment   Rinseback Volume (mL) 250 mL   Blood Volume Processed (Liters) 79.3 L   Dialyzer Clearance Lightly streaked   Duration of Treatment 210 minutes   Additional Fluid Intake (mL) 500 mL   Total UF (mL) 2000 mL   Net Fluid Removal 1500   Patient Response to Treatment maximus well   Post-Hemodialysis Comments stable vs     HD completed per MD order. Report given to primary RN.

## 2023-09-15 NOTE — SUBJECTIVE & OBJECTIVE
Interval History:  NAEON, VSS, Dialysis yesterday evening removed 2.5 L. Coreg was increased for short run of V-tach from 12.5-25 bid. No overnight episodes of tachycardia. Patient got HD chair and approved for state PSSR screening. Pending signature from sister for discharge paper work.       Review of Systems  Constitutional:  Negative for chills, fatigue and fever.   HENT:  Negative for congestion and facial swelling.    Eyes:  Negative for pain, discharge, redness and visual disturbance.   Respiratory:  Negative for cough and shortness of breath.    Cardiovascular:  Negative for chest pain, palpitations and leg swelling.   Gastrointestinal:  Positive for abdominal pain (lower abdomianl and groin pain occassionally). Negative for abdominal distention, nausea and vomiting.   Genitourinary:  Negative for difficulty urinating, dysuria and urgency.   Musculoskeletal:  Negative for arthralgias, back pain and myalgias.   Skin:  Negative for color change and pallor.   Neurological:  Negative for dizziness, weakness, light-headedness and headaches.   Psychiatric/Behavioral:  Negative for agitation, behavioral problems and confusion  Objective:     Vital Signs (Most Recent):  Temp: 98.2 °F (36.8 °C) (09/15/23 0733)  Pulse: 69 (09/15/23 0733)  Resp: 18 (09/15/23 0733)  BP: (!) 161/84 (09/15/23 0733)  SpO2: (!) 94 % (09/15/23 0733) Vital Signs (24h Range):  Temp:  [97.8 °F (36.6 °C)-98.6 °F (37 °C)] 98.2 °F (36.8 °C)  Pulse:  [66-75] 69  Resp:  [11-28] 18  SpO2:  [92 %-100 %] 94 %  BP: (145-174)/() 161/84     Weight: 62.8 kg (138 lb 7.2 oz)  Body mass index is 19.87 kg/m².    Intake/Output Summary (Last 24 hours) at 9/15/2023 0848  Last data filed at 9/15/2023 0600  Gross per 24 hour   Intake 725 ml   Output 2310 ml   Net -1585 ml         Physical Exam      HENT:      Head: Atraumatic.   Eyes:      General:         Left eye: Discharge present.     Comments: Left enucleation   Cardiovascular:      Rate and Rhythm:  Normal rate and regular rhythm.   Pulmonary:      Breath sounds: Normal breath sounds.      Comments: HD cath to the left chest wall  Abdominal:      General: Bowel sounds are normal.      Palpations: Abdomen is soft.      Comments: PEG tube in place   Musculoskeletal:         General: Normal range of motion.   Skin:     General: Skin is warm.   Neurological:      General: No focal deficit present.      Mental Status: He is oriented to person, place, and time.      Significant Labs: All pertinent labs within the past 24 hours have been reviewed.    Significant Imaging: I have reviewed all pertinent imaging results/findings within the past 24 hours.

## 2023-09-15 NOTE — ASSESSMENT & PLAN NOTE
Secondary to medcaition side effect (cefepime, reglan), vs meningitis, vs prolonged hospitalization - see hospital course. Resolved.     Plan  - Continue HD sessions  - Olanzapine 5mg IM PRN for severe agitation  - Delirium precautions in place.

## 2023-09-15 NOTE — PT/OT/SLP PROGRESS
Occupational Therapy   Treatment    Name: Immanuel Bernal  MRN: [id number]  Admitting Diagnosis:  Endophthalmitis  16 Days Post-Op    Recommendations:     Discharge Recommendations: rehabilitation facility  Discharge Equipment Recommendations:  to be determined by next level of care  Barriers to discharge:       Assessment:     Immanuel Bernal is a 57 y.o. male with a medical diagnosis of Endophthalmitis.  He presents with the following performance deficits affecting function: weakness, impaired functional mobility, impaired endurance, gait instability, impaired balance, impaired self care skills, visual deficits, decreased lower extremity function, decreased upper extremity function, impaired coordination, impaired fine motor.     Pt agreeable to therapy and tolerated session well. Pt requires light assistance with bed mobility and significant assistance with transfers. Pt c/o visual deficits in R eye.     Rehab Prognosis:  Good; patient would benefit from acute skilled OT services to address these deficits and reach maximum level of function.       Plan:     Patient to be seen 4 x/week to address the above listed problems via self-care/home management, therapeutic activities, therapeutic exercises, neuromuscular re-education  Plan of Care Expires: 09/15/23  Plan of Care Reviewed with: patient    Subjective     Chief Complaint: blurred vision  Patient/Family Comments/goals: to eat breakfast  Pain/Comfort:  Pain Rating 1: 0/10  Pain Rating Post-Intervention 1: 0/10    Objective:     Communicated with: RN prior to session.  Patient found HOB elevated with PEG Tube, pulse ox (continuous), telemetry, oxygen upon OT entry to room.  A client care conference was completed by the OTR and the CABRERA prior to treatment by the CABRERA to discuss the patient's POC and current status.    General Precautions: Standard, fall, vision impaired    Orthopedic Precautions:N/A  Braces: N/A  Respiratory Status: Room air     Occupational  Performance:     Bed Mobility:    Patient completed Supine to Sit with stand by assistance     Functional Mobility/Transfers:  Patient completed Sit <> Stand Transfer with moderate assistance  with  no assistive device and rolling walker   Patient completed Bed <> Chair Transfer using Stand Pivot technique with maximal assistance with rolling walker    Activities of Daily Living:  Feeding:  stand by assistance with verbal cues and education on adaptive utensils      [facility] 6 Click ADL: 16    Treatment & Education:  Pt educated on adaptive utensils to promote self-feeding and improve /fine motor    Pt educated on OT POC and frequency during hospital stay.     Pt educated on importance of OOB activity to improve function and activity tolerance.    Pt educated on proper hand placement and techniques for RW mgmt to improve safety awareness.     Addressed all patient questions/concerns within CABRERA scope of practice.     Patient left up in chair with all lines intact, call button in reach, and RN notified    GOALS:   Multidisciplinary Problems       Occupational Therapy Goals          Problem: Occupational Therapy    Goal Priority Disciplines Outcome Interventions   Occupational Therapy Goal     OT, PT/OT Ongoing, Progressing    Description: Goals to be met by: 8/25/23 . Goals reviewed and updated as needed to be met by 09-15-23    Patient will increase functional independence with ADLs by performing:    UE Dressing with Minimal Assistance. MET  Grooming while seated with Stand-by Assistance. MET  Toileting from bedside commode with Minimal Assistance for hygiene and clothing management. NOT MET  Revised: Toileting from bedside commode with Mod A  Sit to stand transfer with Contact Guard Assistance and use of RW. NOT MET  Revised: sit to stand transfer with Min A  Sitting at edge of bed >25 minutes with Supervision.MET  Step transfer with Minimal Assistance and use of RW.   Toilet transfer to bedside commode with  Minimal Assistance and use of RW.                          Time Tracking:     OT Date of Treatment: 09/15/23  OT Start Time: 0941  OT Stop Time: 1006  OT Total Time (min): 25 min    Billable Minutes:Self Care/Home Management 12  Therapeutic Activity 13    OT/MORRO: MORRO     Number of MORRO visits since last OT visit: 2    9/15/2023

## 2023-09-15 NOTE — PROGRESS NOTES
[facility] ([place] [address])  Nephrology  Progress Note    Patient Name: Immanuel Bernal  MRN: [id number]  Admission Date: 8/9/2023  Hospital Length of Stay: 37 days  Attending Provider: Porsha Funez MD   Primary Care Physician: Dolly, Primary Doctor  Principal Problem:Endophthalmitis    Subjective:     Interval History: HD completed this AM with 1.5 L removed. No distress on exam. Appears oriented this AM. PT/OT at bedside. VSS.  Pending dc to [facility] with HD on MWF at [facility] in [place].     Review of patient's allergies indicates:   Allergen Reactions    Morphine Rash    Amiodarone analogues Itching     Other reaction(s): Unknown     Current Facility-Administered Medications   Medication Frequency    0.9%  NaCl infusion (for blood administration) Q24H PRN    0.9%  NaCl infusion (for blood administration) Q24H PRN    0.9%  NaCl infusion PRN    0.9%  NaCl infusion Once    acetaminophen tablet 1,000 mg TID    albuterol-ipratropium 2.5 mg-0.5 mg/3 mL nebulizer solution 3 mL Q6H PRN    atorvastatin tablet 40 mg Daily    calcitrioL solution 0.25 mcg Daily    carvediloL tablet 25 mg BID    dextrose 10% bolus 125 mL 125 mL PRN    dextrose 10% bolus 250 mL 250 mL PRN    epoetin thee injection 3,000 Units Every Mon, Wed, Fri    fluconazole 40 mg/ml suspension 400 mg Daily    glucagon (human recombinant) injection 1 mg PRN    glucose chewable tablet 16 g PRN    glucose chewable tablet 24 g PRN    heparin (porcine) injection 1,000 Units PRN    heparin (porcine) injection 1,000 Units PRN    heparin (porcine) injection 5,000 Units Q8H    hydrALAZINE 10 mg 2 tablets, hydrALAZINE 25 mg 2 tablets, isorsorbide dinitrate 20 mg  2 tablets combination TID    hydrALAZINE injection 10 mg Q6H PRN    Lactobacillus rhamnosus GG capsule 1 capsule BID    LIDOcaine HCl 2% urojet PRN    meclizine tablet 12.5 mg TID PRN    melatonin tablet 6 mg Nightly    meropenem (MERREM) 500 mg in sodium  chloride 0.9 % 100 mL IVPB (MB+) Q12H    methocarbamoL tablet 500 mg TID    multivitamin tablet Daily    naloxone 0.4 mg/mL injection 0.02 mg PRN    OLANZapine injection 5 mg Nightly PRN    ondansetron tablet 4 mg Q6H PRN    oxybutynin 24 hr tablet 10 mg Daily    pantoprazole suspension 40 mg BID    polyethylene glycol packet 17 g BID    prochlorperazine injection Soln 10 mg Q6H PRN    senna-docusate 8.6-50 mg per tablet 1 tablet BID    sodium chloride 0.9% bolus 250 mL 250 mL PRN    sodium chloride 0.9% bolus 250 mL 250 mL PRN    sodium chloride 0.9% bolus 250 mL 250 mL PRN    sodium chloride 0.9% bolus 250 mL 250 mL PRN    sodium chloride 0.9% flush 10 mL Q12H PRN    sodium chloride 0.9% flush 10 mL PRN    tamsulosin 24 hr capsule 0.4 mg Daily    tobramycin-dexAMETHasone 0.3-0.1% ophthalmic ointment TID    traMADoL tablet 50 mg TID PRN    valsartan tablet 160 mg BID    vancomycin - pharmacy to dose pharmacy to manage frequency    white petrolatum 41 % ointment PRN       Objective:     Vital Signs (Most Recent):  Temp: 98.2 °F (36.8 °C) (09/15/23 0733)  Pulse: 69 (09/15/23 0733)  Resp: 18 (09/15/23 0733)  BP: (!) 161/84 (09/15/23 0733)  SpO2: (!) 94 % (09/15/23 0733) Vital Signs (24h Range):  Temp:  [97.8 °F (36.6 °C)-98.6 °F (37 °C)] 98.2 °F (36.8 °C)  Pulse:  [66-75] 69  Resp:  [11-28] 18  SpO2:  [92 %-100 %] 94 %  BP: (145-174)/() 161/84     Weight: 62.8 kg (138 lb 7.2 oz) (09/13/23 1200)  Body mass index is 19.87 kg/m².  Body surface area is 1.76 meters squared.    I/O last 3 completed shifts:  In: 925 [P.O.:225; Other:500; NG/GT:200]  Out: 2310 [Urine:310; Other:2000]     Physical Exam  Vitals and nursing note reviewed.   Constitutional:       General: He is awake. He is not in acute distress.     Appearance: He is cachectic. He is ill-appearing. He is not diaphoretic.      Comments: Muscle wasting/atrophy noted.   HENT:      Head: Normocephalic and atraumatic.      Right Ear:  External ear normal.      Left Ear: External ear normal.      Nose: Nose normal.      Mouth/Throat:      Mouth: Mucous membranes are moist.      Pharynx: Oropharynx is clear. No oropharyngeal exudate or posterior oropharyngeal erythema.   Eyes:      Comments: Left eye currently sutured closed.   Cardiovascular:      Rate and Rhythm: Normal rate.      Heart sounds: No murmur heard.     No friction rub. No gallop.   Pulmonary:      Effort: Pulmonary effort is normal. No respiratory distress.   Chest:      Comments: Tunneled HD catheter to right chest wall.  Abdominal:      General: Bowel sounds are normal. There is no distension.      Palpations: Abdomen is soft.      Tenderness: There is no abdominal tenderness.      Comments: PEG in place.   Musculoskeletal:      Cervical back: Neck supple.      Right lower leg: No edema.      Left lower leg: No edema.   Skin:     General: Skin is warm and dry.      Coloration: Skin is not jaundiced.   Neurological:      General: No focal deficit present.      Mental Status: He is alert. Mental status is at baseline.   Psychiatric:         Mood and Affect: Mood normal.         Behavior: Behavior normal.          Significant Labs:  CBC:   Recent Labs   Lab 09/15/23  0935   WBC 4.87   RBC 2.64*   HGB 8.4*   HCT 26.8*   *   *   MCH 31.8*   MCHC 31.3*     CMP:   Recent Labs   Lab 09/15/23  0935      CALCIUM 8.9   ALBUMIN 2.3*   PROT 6.4      K 4.1   CO2 27      BUN 21*   CREATININE 2.5*   ALKPHOS 143*   ALT 7*   AST 18   BILITOT 0.4     All labs within the past 24 hours have been reviewed.       Assessment/Plan:     Ophtho  * Endophthalmitis  - Ophthalmology consulted and following  - s/p left eye enucleation on 8/30    Renal/  Chronic kidney disease-mineral and bone disorder  -Continue calcitriol   -Renal diet/tube feeds when not NPO    ESRD (end stage renal disease)  Outpatient HD Information:  -Outpatient HD unit: [facility]   -HD tx days: MWF   -HD tx  time: 210min  -HD access: R IJ TDC   -HD modality: iHD   -Residual urine: ?    Plan/Recommendations:  - plan for iHD Monday (9/18) for metabolic clearance and volume management. Please call for any acute changes throughout the weekend.   - can continue calcitriol 0.25 mcg daily  - strict I/O's and daily weights  - daily renal function panels and magnesium levels  - renally all dose medications to eGFR  - avoid gadolinium, fleets, phos-based laxatives, NSAIDs, etc.    Oncology  Anemia in ESRD (end-stage renal disease)  - target Hg of 10-12  - continue epogen 3,000 units every Monday, Wednesday and Friday         Thank you for your consult. I will follow-up with patient. Please contact us if you have any additional questions.    Rachael Barraza DNP, FNP-C  Nephrology  [facility] ([place]

## 2023-09-15 NOTE — PLAN OF CARE
YULI finalized patient's discharge plan. YULI contacted [facility] Spoke with Sophia got time of operation and times that are available for the patient's POA to physically come in and sign paperwork. Hours of operation is 9 am -2 pm. YULI also faxed a copy of POA to this facility. Patient's chair times are M/W/F @10:15 am.    YULI contacted patient's POA. POA was informed the times that will allocated for signing paperwork. POA stated that she will go at 9 am on 09/18/2023. YULI also informed POA of discharge plans.     [facility] was made aware of the patient discharge plan. YULI faxed chest xray, PPD, Level II acceptance and copy of POA to this facility. Patient will resume dialysis at [facility] on Wednesday @ 10:15 am.    Patient will get dialysis here and then be discharged once we have confirmed that POA has signed all paperwork at [facility].    Doctors were made aware of this discharge plan.     LIN Bales, MSW-LMSW  Medical Social Worker/  ER Department

## 2023-09-15 NOTE — PROGRESS NOTES
09/15/23 0215   During Hemodialysis Assessment   Blood Flow Rate (mL/min) 400 mL/min   Dialysate Flow Rate (mL/min) 800 ml/min   Ultrafiltration Rate (mL/Hr) 860 mL/Hr   Arteriovenous Lines Secure Yes   Arterial Pressure (mmHg) -150 mmHg   Venous Pressure (mmHg) 140   Blood Volume Processed (Liters) 0 L   UF Removed (mL) 0 mL   TMP 50   Venous Line in Air Detector Yes   Intake (mL) 250 mL   Transducer Dry Yes   Access Visible Yes    notified of access issue? N/A   Heparin given? N/A   Intra-Hemodialysis Comments HD initiated     Report received from primary RN. HD started via [facility] permacath per MD order.

## 2023-09-16 LAB
POCT GLUCOSE: 111 MG/DL (ref 70–110)
POCT GLUCOSE: 113 MG/DL (ref 70–110)
POCT GLUCOSE: 130 MG/DL (ref 70–110)
POCT GLUCOSE: 211 MG/DL (ref 70–110)

## 2023-09-16 PROCEDURE — 99231 PR SUBSEQUENT HOSPITAL CARE,LEVL I: ICD-10-PCS | Mod: GC,,, | Performed by: HOSPITALIST

## 2023-09-16 PROCEDURE — 25000003 PHARM REV CODE 250

## 2023-09-16 PROCEDURE — S0166 INJ OLANZAPINE 2.5MG: HCPCS

## 2023-09-16 PROCEDURE — 25000003 PHARM REV CODE 250: Performed by: HOSPITALIST

## 2023-09-16 PROCEDURE — S0166 INJ OLANZAPINE 2.5MG: HCPCS | Performed by: STUDENT IN AN ORGANIZED HEALTH CARE EDUCATION/TRAINING PROGRAM

## 2023-09-16 PROCEDURE — 25000003 PHARM REV CODE 250: Performed by: STUDENT IN AN ORGANIZED HEALTH CARE EDUCATION/TRAINING PROGRAM

## 2023-09-16 PROCEDURE — 63700000 PHARM REV CODE 250 ALT 637 W/O HCPCS: Performed by: HOSPITALIST

## 2023-09-16 PROCEDURE — 63600175 PHARM REV CODE 636 W HCPCS

## 2023-09-16 PROCEDURE — 20000000 HC ICU ROOM

## 2023-09-16 PROCEDURE — 27000221 HC OXYGEN, UP TO 24 HOURS

## 2023-09-16 PROCEDURE — 99231 SBSQ HOSP IP/OBS SF/LOW 25: CPT | Mod: GC,,, | Performed by: HOSPITALIST

## 2023-09-16 PROCEDURE — 94761 N-INVAS EAR/PLS OXIMETRY MLT: CPT

## 2023-09-16 RX ORDER — SODIUM CHLORIDE 9 MG/ML
INJECTION, SOLUTION INTRAVENOUS ONCE
Status: COMPLETED | OUTPATIENT
Start: 2023-09-18 | End: 2023-09-18

## 2023-09-16 RX ORDER — OLANZAPINE 10 MG/2ML
2.5 INJECTION, POWDER, FOR SOLUTION INTRAMUSCULAR ONCE
Status: COMPLETED | OUTPATIENT
Start: 2023-09-16 | End: 2023-09-16

## 2023-09-16 RX ORDER — AMLODIPINE BESYLATE 10 MG/1
10 TABLET ORAL DAILY
Status: DISCONTINUED | OUTPATIENT
Start: 2023-09-16 | End: 2023-09-19 | Stop reason: HOSPADM

## 2023-09-16 RX ADMIN — CARVEDILOL 25 MG: 25 TABLET, FILM COATED ORAL at 09:09

## 2023-09-16 RX ADMIN — ACETAMINOPHEN 1000 MG: 500 TABLET ORAL at 09:09

## 2023-09-16 RX ADMIN — CARVEDILOL 25 MG: 25 TABLET, FILM COATED ORAL at 08:09

## 2023-09-16 RX ADMIN — MEROPENEM 500 MG: 500 INJECTION INTRAVENOUS at 08:09

## 2023-09-16 RX ADMIN — PANTOPRAZOLE SODIUM 40 MG: 40 GRANULE, DELAYED RELEASE ORAL at 09:09

## 2023-09-16 RX ADMIN — CALCITRIOL 0.25 MCG: 1 SOLUTION ORAL at 09:09

## 2023-09-16 RX ADMIN — Medication 6 MG: at 08:09

## 2023-09-16 RX ADMIN — ACETAMINOPHEN 1000 MG: 500 TABLET ORAL at 05:09

## 2023-09-16 RX ADMIN — POLYETHYLENE GLYCOL 3350 17 G: 17 POWDER, FOR SOLUTION ORAL at 08:09

## 2023-09-16 RX ADMIN — TRAMADOL HYDROCHLORIDE 50 MG: 50 TABLET, COATED ORAL at 08:09

## 2023-09-16 RX ADMIN — ATORVASTATIN CALCIUM 40 MG: 40 TABLET, FILM COATED ORAL at 09:09

## 2023-09-16 RX ADMIN — HEPARIN SODIUM 5000 UNITS: 5000 INJECTION INTRAVENOUS; SUBCUTANEOUS at 02:09

## 2023-09-16 RX ADMIN — PANTOPRAZOLE SODIUM 40 MG: 40 GRANULE, DELAYED RELEASE ORAL at 08:09

## 2023-09-16 RX ADMIN — METHOCARBAMOL 500 MG: 500 TABLET ORAL at 05:09

## 2023-09-16 RX ADMIN — THERA TABS 1 TABLET: TAB at 09:09

## 2023-09-16 RX ADMIN — ACETAMINOPHEN 1000 MG: 500 TABLET ORAL at 08:09

## 2023-09-16 RX ADMIN — Medication 1 CAPSULE: at 09:09

## 2023-09-16 RX ADMIN — TOBRAMYCIN AND DEXAMETHASONE: 3; 1 OINTMENT OPHTHALMIC at 02:09

## 2023-09-16 RX ADMIN — AMLODIPINE BESYLATE 10 MG: 10 TABLET ORAL at 02:09

## 2023-09-16 RX ADMIN — OXYBUTYNIN CHLORIDE 10 MG: 5 TABLET, EXTENDED RELEASE ORAL at 10:09

## 2023-09-16 RX ADMIN — METHOCARBAMOL 500 MG: 500 TABLET ORAL at 08:09

## 2023-09-16 RX ADMIN — HEPARIN SODIUM 5000 UNITS: 5000 INJECTION INTRAVENOUS; SUBCUTANEOUS at 05:09

## 2023-09-16 RX ADMIN — TOBRAMYCIN AND DEXAMETHASONE: 3; 1 OINTMENT OPHTHALMIC at 05:09

## 2023-09-16 RX ADMIN — OLANZAPINE 2.5 MG: 10 INJECTION, POWDER, FOR SOLUTION INTRAMUSCULAR at 03:09

## 2023-09-16 RX ADMIN — METHOCARBAMOL 500 MG: 500 TABLET ORAL at 09:09

## 2023-09-16 RX ADMIN — TOBRAMYCIN AND DEXAMETHASONE: 3; 1 OINTMENT OPHTHALMIC at 08:09

## 2023-09-16 RX ADMIN — SODIUM PHOSPHATE, MONOBASIC, MONOHYDRATE AND SODIUM PHOSPHATE, DIBASIC, ANHYDROUS 15 MMOL: 142; 276 INJECTION, SOLUTION INTRAVENOUS at 10:09

## 2023-09-16 RX ADMIN — OLANZAPINE 5 MG: 10 INJECTION, POWDER, FOR SOLUTION INTRAMUSCULAR at 12:09

## 2023-09-16 RX ADMIN — HYDRALAZINE HYDROCHLORIDE: 10 TABLET, FILM COATED ORAL at 08:09

## 2023-09-16 RX ADMIN — OXYBUTYNIN CHLORIDE 10 MG: 5 TABLET, EXTENDED RELEASE ORAL at 09:09

## 2023-09-16 RX ADMIN — VALSARTAN 160 MG: 160 TABLET, FILM COATED ORAL at 08:09

## 2023-09-16 RX ADMIN — FLUCONAZOLE 400 MG: 40 POWDER, FOR SUSPENSION ORAL at 02:09

## 2023-09-16 RX ADMIN — VALSARTAN 160 MG: 160 TABLET, FILM COATED ORAL at 09:09

## 2023-09-16 RX ADMIN — HYDRALAZINE HYDROCHLORIDE: 10 TABLET, FILM COATED ORAL at 09:09

## 2023-09-16 RX ADMIN — Medication 1 CAPSULE: at 08:09

## 2023-09-16 RX ADMIN — TAMSULOSIN HYDROCHLORIDE 0.4 MG: 0.4 CAPSULE ORAL at 09:09

## 2023-09-16 RX ADMIN — HYDRALAZINE HYDROCHLORIDE: 10 TABLET, FILM COATED ORAL at 05:09

## 2023-09-16 NOTE — SUBJECTIVE & OBJECTIVE
Interval History: Patient has been anxious overnight and kept waking up. One time Zyprexa was given. This morning he has been feeling better. Vitally stable except for a high pressure of 153/90, amlodipine 10 mg have been added. Awaiting discharge to NH.    Review of Systems    Constitutional:  Negative for chills, fatigue and fever.   HENT:  Negative for congestion and facial swelling.    Eyes:  Negative for pain, discharge, redness and visual disturbance.   Respiratory:  Negative for cough and shortness of breath.    Cardiovascular:  Negative for chest pain, palpitations and leg swelling.   Gastrointestinal:  Negative for abdominal distention, nausea and vomiting.   Genitourinary:  Negative for difficulty urinating, dysuria and urgency.   Musculoskeletal:  Negative for arthralgias, back pain and myalgias.   Skin:  Negative for color change and pallor.   Neurological:  Negative for dizziness, weakness, light-headedness and headaches.   Objective:     Vital Signs (Most Recent):  Temp: 99.1 °F (37.3 °C) (09/16/23 1139)  Pulse: 77 (09/16/23 1139)  Resp: 20 (09/16/23 1139)  BP: (!) 153/90 (09/16/23 1139)  SpO2: 95 % (09/16/23 1139) Vital Signs (24h Range):  Temp:  [97.7 °F (36.5 °C)-99.1 °F (37.3 °C)] 99.1 °F (37.3 °C)  Pulse:  [65-77] 77  Resp:  [11-20] 20  SpO2:  [95 %-100 %] 95 %  BP: (149-177)/(80-99) 153/90     Weight: 62.8 kg (138 lb 7.2 oz)  Body mass index is 19.87 kg/m².    Intake/Output Summary (Last 24 hours) at 9/16/2023 1236  Last data filed at 9/16/2023 0439  Gross per 24 hour   Intake 230 ml   Output 200 ml   Net 30 ml         Physical Exam      HENT:      Head: Atraumatic.   Eyes:      General:         Left eye: Discharge present.     Comments: Left enucleation   Cardiovascular:      Rate and Rhythm: Normal rate and regular rhythm.   Pulmonary:      Breath sounds: Normal breath sounds.      Comments: HD cath to the left chest wall  Abdominal:      General: Bowel sounds are normal.      Palpations:  Abdomen is soft.      Comments: PEG tube in place   Musculoskeletal:         General: Normal range of motion.   Skin:     General: Skin is warm.   Neurological:      General: No focal deficit present.      Mental Status: He is oriented to person, place, and time.   Significant Labs: All pertinent labs within the past 24 hours have been reviewed.    Significant Imaging: I have reviewed all pertinent imaging results/findings within the past 24 hours.

## 2023-09-16 NOTE — NURSING
Pt c/o anxiety, restless, unable to sleep or calm down with relaxation techniques. Hospital Med team 3, Sharon Law notified. Said to give prn Zyprexa even though order reads for agitation. Prn Zyprexa given 0048, full relief obtained, pt sleeping.

## 2023-09-16 NOTE — NURSING
Alert and oriented x 4. Speech is clear. No WOB or sign of distress. Is able to feed self. O2 at 1L/NC. Safety measures in place. Bed in low position. Call light in reach.

## 2023-09-16 NOTE — PLAN OF CARE
Pt A&Ox4, anxious and restless throughout night. Reports mild to moderate relief from zyprexa, able to drift in and out of sleep. Denies pain. Hypertensive w/ systolic BP up to 170s, no prn until >180 per orders. Safety precautions maintained throughout shift and frequent rounding performed.    Problem: Adult Inpatient Plan of Care  Goal: Plan of Care Review  Outcome: Ongoing, Progressing  Goal: Patient-Specific Goal (Individualized)  Outcome: Ongoing, Progressing  Goal: Absence of Hospital-Acquired Illness or Injury  Outcome: Ongoing, Progressing  Goal: Optimal Comfort and Wellbeing  Outcome: Ongoing, Progressing  Goal: Readiness for Transition of Care  Outcome: Ongoing, Progressing     Problem: Diabetes Comorbidity  Goal: Blood Glucose Level Within Targeted Range  Outcome: Ongoing, Progressing     Problem: Skin Injury Risk Increased  Goal: Skin Health and Integrity  Outcome: Ongoing, Progressing     Problem: Device-Related Complication Risk (Hemodialysis)  Goal: Safe, Effective Therapy Delivery  Outcome: Ongoing, Progressing     Problem: Hemodynamic Instability (Hemodialysis)  Goal: Effective Tissue Perfusion  Outcome: Ongoing, Progressing     Problem: Infection (Hemodialysis)  Goal: Absence of Infection Signs and Symptoms  Outcome: Ongoing, Progressing     Problem: Infection  Goal: Absence of Infection Signs and Symptoms  Outcome: Ongoing, Progressing     Problem: Coping Ineffective  Goal: Effective Coping  Outcome: Ongoing, Progressing     Problem: Device-Related Complication Risk (CRRT (Continuous Renal Replacement Therapy))  Goal: Safe, Effective Therapy Delivery  Outcome: Ongoing, Progressing     Problem: Hypothermia (CRRT (Continuous Renal Replacement Therapy))  Goal: Body Temperature Maintained in Desired Range  Outcome: Ongoing, Progressing     Problem: Infection (CRRT (Continuous Renal Replacement Therapy))  Goal: Absence of Infection Signs and Symptoms  Outcome: Ongoing, Progressing     Problem: Impaired  Wound Healing  Goal: Optimal Wound Healing  Outcome: Ongoing, Progressing     Problem: Fall Injury Risk  Goal: Absence of Fall and Fall-Related Injury  Outcome: Ongoing, Progressing

## 2023-09-16 NOTE — ASSESSMENT & PLAN NOTE
- Discharging to detention NH pending as sister is out of town and wasn't available to sign the paperwork.

## 2023-09-16 NOTE — NURSING
Alert and oriented x 4. Speech is clear. Appetite improved. Feeds self. Vs stable. No sign of distress. Safety measures in place. Bed in low position. Call light in reach.
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
## 2023-09-16 NOTE — NURSING
0200 pt c/o anxiety, restless, unable to sleep. Hospital Med team 3 notified again. One time order of Zyprexa 2.5mg ordered to be given 2hrs after first dose. Given 0300 w/ mild relief, pt slept for 30 min.

## 2023-09-16 NOTE — CARE UPDATE
I was called as patient is feeling anxious and restless with a background of ana vital stability    Patient received 5mg Zyprexa IM with minor improvement   He is still restless and anxious, still    According to guideline patient can receive another dose of olanzapine (zyprexa)      Plan:  After 2 hours at least elapse from the last zyprexa dose, administer 2.5mg olanzapine (zyprexa) IM    Monitor of any signs of vital instability    In any case of deterioration please inform me

## 2023-09-17 LAB
ALBUMIN SERPL BCP-MCNC: 2.2 G/DL (ref 3.5–5.2)
ALP SERPL-CCNC: 144 U/L (ref 55–135)
ALT SERPL W/O P-5'-P-CCNC: 6 U/L (ref 10–44)
ANION GAP SERPL CALC-SCNC: 11 MMOL/L (ref 8–16)
AST SERPL-CCNC: 14 U/L (ref 10–40)
BILIRUB SERPL-MCNC: 0.4 MG/DL (ref 0.1–1)
BUN SERPL-MCNC: 47 MG/DL (ref 6–20)
CALCIUM SERPL-MCNC: 9.6 MG/DL (ref 8.7–10.5)
CHLORIDE SERPL-SCNC: 100 MMOL/L (ref 95–110)
CO2 SERPL-SCNC: 24 MMOL/L (ref 23–29)
CREAT SERPL-MCNC: 4.1 MG/DL (ref 0.5–1.4)
ERYTHROCYTE [DISTWIDTH] IN BLOOD BY AUTOMATED COUNT: 16.9 % (ref 11.5–14.5)
EST. GFR  (NO RACE VARIABLE): 15.9 ML/MIN/1.73 M^2
GLUCOSE SERPL-MCNC: 88 MG/DL (ref 70–110)
HCT VFR BLD AUTO: 24.9 % (ref 40–54)
HGB BLD-MCNC: 8.1 G/DL (ref 14–18)
MAGNESIUM SERPL-MCNC: 2.1 MG/DL (ref 1.6–2.6)
MCH RBC QN AUTO: 32 PG (ref 27–31)
MCHC RBC AUTO-ENTMCNC: 32.5 G/DL (ref 32–36)
MCV RBC AUTO: 98 FL (ref 82–98)
PHOSPHATE SERPL-MCNC: 4.6 MG/DL (ref 2.7–4.5)
PLATELET # BLD AUTO: 156 K/UL (ref 150–450)
PMV BLD AUTO: 11 FL (ref 9.2–12.9)
POCT GLUCOSE: 101 MG/DL (ref 70–110)
POCT GLUCOSE: 141 MG/DL (ref 70–110)
POCT GLUCOSE: 149 MG/DL (ref 70–110)
POCT GLUCOSE: 150 MG/DL (ref 70–110)
POTASSIUM SERPL-SCNC: 4.2 MMOL/L (ref 3.5–5.1)
PROT SERPL-MCNC: 6.3 G/DL (ref 6–8.4)
RBC # BLD AUTO: 2.53 M/UL (ref 4.6–6.2)
SODIUM SERPL-SCNC: 135 MMOL/L (ref 136–145)
TB INDURATION 48 - 72 HR READ: 0 MM
WBC # BLD AUTO: 5.03 K/UL (ref 3.9–12.7)

## 2023-09-17 PROCEDURE — 25000003 PHARM REV CODE 250: Performed by: HOSPITALIST

## 2023-09-17 PROCEDURE — 25000003 PHARM REV CODE 250: Performed by: STUDENT IN AN ORGANIZED HEALTH CARE EDUCATION/TRAINING PROGRAM

## 2023-09-17 PROCEDURE — 25000003 PHARM REV CODE 250

## 2023-09-17 PROCEDURE — 36415 COLL VENOUS BLD VENIPUNCTURE: CPT

## 2023-09-17 PROCEDURE — 20000000 HC ICU ROOM

## 2023-09-17 PROCEDURE — 85027 COMPLETE CBC AUTOMATED: CPT

## 2023-09-17 PROCEDURE — 99231 PR SUBSEQUENT HOSPITAL CARE,LEVL I: ICD-10-PCS | Mod: GC,,, | Performed by: HOSPITALIST

## 2023-09-17 PROCEDURE — 80053 COMPREHEN METABOLIC PANEL: CPT

## 2023-09-17 PROCEDURE — 99231 SBSQ HOSP IP/OBS SF/LOW 25: CPT | Mod: GC,,, | Performed by: HOSPITALIST

## 2023-09-17 PROCEDURE — 63600175 PHARM REV CODE 636 W HCPCS

## 2023-09-17 PROCEDURE — 63700000 PHARM REV CODE 250 ALT 637 W/O HCPCS: Performed by: HOSPITALIST

## 2023-09-17 PROCEDURE — 84100 ASSAY OF PHOSPHORUS: CPT

## 2023-09-17 PROCEDURE — 83735 ASSAY OF MAGNESIUM: CPT

## 2023-09-17 RX ADMIN — HYDRALAZINE HYDROCHLORIDE: 10 TABLET, FILM COATED ORAL at 09:09

## 2023-09-17 RX ADMIN — TAMSULOSIN HYDROCHLORIDE 0.4 MG: 0.4 CAPSULE ORAL at 09:09

## 2023-09-17 RX ADMIN — CARVEDILOL 25 MG: 25 TABLET, FILM COATED ORAL at 09:09

## 2023-09-17 RX ADMIN — ACETAMINOPHEN 1000 MG: 500 TABLET ORAL at 09:09

## 2023-09-17 RX ADMIN — POLYETHYLENE GLYCOL 3350 17 G: 17 POWDER, FOR SOLUTION ORAL at 09:09

## 2023-09-17 RX ADMIN — HEPARIN SODIUM 5000 UNITS: 5000 INJECTION INTRAVENOUS; SUBCUTANEOUS at 09:09

## 2023-09-17 RX ADMIN — CALCITRIOL 0.25 MCG: 1 SOLUTION ORAL at 09:09

## 2023-09-17 RX ADMIN — Medication 1 CAPSULE: at 09:09

## 2023-09-17 RX ADMIN — SENNOSIDES AND DOCUSATE SODIUM 1 TABLET: 50; 8.6 TABLET ORAL at 09:09

## 2023-09-17 RX ADMIN — MEROPENEM 500 MG: 500 INJECTION INTRAVENOUS at 08:09

## 2023-09-17 RX ADMIN — TOBRAMYCIN AND DEXAMETHASONE: 3; 1 OINTMENT OPHTHALMIC at 04:09

## 2023-09-17 RX ADMIN — MEROPENEM 500 MG: 500 INJECTION INTRAVENOUS at 09:09

## 2023-09-17 RX ADMIN — PANTOPRAZOLE SODIUM 40 MG: 40 GRANULE, DELAYED RELEASE ORAL at 09:09

## 2023-09-17 RX ADMIN — AMLODIPINE BESYLATE 10 MG: 10 TABLET ORAL at 09:09

## 2023-09-17 RX ADMIN — TOBRAMYCIN AND DEXAMETHASONE: 3; 1 OINTMENT OPHTHALMIC at 09:09

## 2023-09-17 RX ADMIN — VALSARTAN 160 MG: 160 TABLET, FILM COATED ORAL at 09:09

## 2023-09-17 RX ADMIN — FLUCONAZOLE 400 MG: 40 POWDER, FOR SUSPENSION ORAL at 09:09

## 2023-09-17 RX ADMIN — HEPARIN SODIUM 5000 UNITS: 5000 INJECTION INTRAVENOUS; SUBCUTANEOUS at 04:09

## 2023-09-17 RX ADMIN — ATORVASTATIN CALCIUM 40 MG: 40 TABLET, FILM COATED ORAL at 09:09

## 2023-09-17 RX ADMIN — THERA TABS 1 TABLET: TAB at 09:09

## 2023-09-17 RX ADMIN — METHOCARBAMOL 500 MG: 500 TABLET ORAL at 04:09

## 2023-09-17 RX ADMIN — METHOCARBAMOL 500 MG: 500 TABLET ORAL at 09:09

## 2023-09-17 RX ADMIN — OXYBUTYNIN CHLORIDE 10 MG: 5 TABLET, EXTENDED RELEASE ORAL at 09:09

## 2023-09-17 RX ADMIN — HEPARIN SODIUM 5000 UNITS: 5000 INJECTION INTRAVENOUS; SUBCUTANEOUS at 05:09

## 2023-09-17 RX ADMIN — Medication 6 MG: at 09:09

## 2023-09-17 RX ADMIN — HYDRALAZINE HYDROCHLORIDE: 10 TABLET, FILM COATED ORAL at 04:09

## 2023-09-17 RX ADMIN — ACETAMINOPHEN 1000 MG: 500 TABLET ORAL at 04:09

## 2023-09-17 NOTE — PLAN OF CARE
Problem: Adult Inpatient Plan of Care  Goal: Plan of Care Review  Outcome: Ongoing, Progressing  Goal: Patient-Specific Goal (Individualized)  Outcome: Ongoing, Progressing  Goal: Absence of Hospital-Acquired Illness or Injury  Outcome: Ongoing, Progressing  Goal: Optimal Comfort and Wellbeing  Outcome: Ongoing, Progressing  Goal: Readiness for Transition of Care  Outcome: Ongoing, Progressing     Problem: Diabetes Comorbidity  Goal: Blood Glucose Level Within Targeted Range  Outcome: Ongoing, Progressing     Problem: Skin Injury Risk Increased  Goal: Skin Health and Integrity  Outcome: Ongoing, Progressing     Problem: Device-Related Complication Risk (Hemodialysis)  Goal: Safe, Effective Therapy Delivery  Outcome: Ongoing, Progressing     Problem: Hemodynamic Instability (Hemodialysis)  Goal: Effective Tissue Perfusion  Outcome: Ongoing, Progressing     Problem: Infection (Hemodialysis)  Goal: Absence of Infection Signs and Symptoms  Outcome: Ongoing, Progressing     Problem: Infection  Goal: Absence of Infection Signs and Symptoms  Outcome: Ongoing, Progressing     Problem: Coping Ineffective  Goal: Effective Coping  Outcome: Ongoing, Progressing     Problem: Device-Related Complication Risk (CRRT (Continuous Renal Replacement Therapy))  Goal: Safe, Effective Therapy Delivery  Outcome: Ongoing, Progressing     Problem: Hypothermia (CRRT (Continuous Renal Replacement Therapy))  Goal: Body Temperature Maintained in Desired Range  Outcome: Ongoing, Progressing     Problem: Infection (CRRT (Continuous Renal Replacement Therapy))  Goal: Absence of Infection Signs and Symptoms  Outcome: Ongoing, Progressing     Problem: Impaired Wound Healing  Goal: Optimal Wound Healing  Outcome: Ongoing, Progressing     Problem: Fall Injury Risk  Goal: Absence of Fall and Fall-Related Injury  Outcome: Ongoing, Progressing     Patient alert and oriented. Cooperative with care. Medicated as ordered. Meal set up done. Patient  repositioned as needed. Free water in peg tube given. Patient resting comfortably at this time

## 2023-09-17 NOTE — SUBJECTIVE & OBJECTIVE
Interval History: NAEON, /85. Patient is started on Amlodipine 100 mg yesterday will trend the BP now. Labs look unremarkable. Awaiting discharge to [facility].    Review of Systems    Constitutional:  Negative for chills, fatigue and fever.   HENT:  Negative for congestion and facial swelling.    Eyes:  Negative for pain, discharge, redness and visual disturbance.   Respiratory:  Negative for cough and shortness of breath.    Cardiovascular:  Negative for chest pain, palpitations and leg swelling.   Gastrointestinal:  Negative for abdominal distention, nausea and vomiting.   Genitourinary:  Negative for difficulty urinating, dysuria and urgency.   Musculoskeletal:  Negative for arthralgias, back pain and myalgias.   Skin:  Negative for color change and pallor.   Neurological:  Negative for dizziness, weakness, light-headedness and headaches.   Objective:     Vital Signs (Most Recent):  Temp: 98.1 °F (36.7 °C) (09/17/23 0734)  Pulse: 73 (09/17/23 0734)  Resp: 15 (09/17/23 0734)  BP: (!) 154/85 (09/17/23 0734)  SpO2: 98 % (09/17/23 0900) Vital Signs (24h Range):  Temp:  [97.8 °F (36.6 °C)-99.1 °F (37.3 °C)] 98.1 °F (36.7 °C)  Pulse:  [69-77] 73  Resp:  [12-20] 15  SpO2:  [95 %-98 %] 98 %  BP: (143-161)/(82-90) 154/85     Weight: 62.8 kg (138 lb 7.2 oz)  Body mass index is 19.87 kg/m².    Intake/Output Summary (Last 24 hours) at 9/17/2023 1103  Last data filed at 9/17/2023 0427  Gross per 24 hour   Intake --   Output 250 ml   Net -250 ml         Physical Exam  HENT:      Head: Atraumatic.   Eyes:      General:         Left eye: Discharge present.     Comments: Left enucleation   Cardiovascular:      Rate and Rhythm: Normal rate and regular rhythm.   Pulmonary:      Breath sounds: Normal breath sounds.      Comments: HD cath to the left chest wall  Abdominal:      General: Bowel sounds are normal.      Palpations: Abdomen is soft.      Comments: PEG tube in place   Musculoskeletal:         General: Normal range  of motion.   Skin:     General: Skin is warm.   Neurological:      General: No focal deficit present.      Mental Status: He is oriented to person, place, and time.       Significant Labs: All pertinent labs within the past 24 hours have been reviewed.    Significant Imaging: I have reviewed all pertinent imaging results/findings within the past 24 hours.
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
## 2023-09-17 NOTE — NURSING
Patient is alert and oriented x4 and is not in any disress. Speech is clear. Personal items and call light are within reach, bed in lowest, locked position.     Patient had no acute events overnight. VSS. Patient had no complaints of pain or distress.

## 2023-09-17 NOTE — PLAN OF CARE
09/17/23 1306   Discharge Reassessment   Assessment Type Discharge Planning Reassessment   Discharge Plan discussed with: Sibling   Name(s) and Number(s) Marley Bernal (Sister)   [phone] (Mobile   Communicated TOBY with patient/caregiver Yes   Discharge Plan A New Nursing Home placement - correction care facility   Discharge Plan B New Nursing Home placement - correction care facility   DME Needed Upon Discharge  other (see comments)  (TBD)   Transition of Care Barriers DIalysis placement issues   Why the patient remains in the hospital Social issues   Post-Acute Status   Post-Acute Authorization Placement;Dialysis   Post-Acute Placement Status Pending medical clearance/testing   Home Health Status Patient declined/refused   Diaylsis Status Pending Payor Review   Hospital Resources/Appts/Education Provided Provided education on problems/symptoms using teachback   Discharge Delays (!) Dialysis Set-up     CM spoke with patients family to discuss any changes in discharge planning.  Barriers: None  Discharge delays: HD set up is pending.  No changes in DC plans.   Patient accepted by Jacques TAVERA TOBY:  9/18/23    [facility] and will send a welcoming letter confirming  patient's chair times are M/W/F @10:15 am.    Jerri Colon RN  Case Management  [facility]  [phone]

## 2023-09-17 NOTE — ASSESSMENT & PLAN NOTE
- Discharging to correction NH pending as sister is out of town and wasn't available to sign the paperwork.

## 2023-09-18 VITALS
TEMPERATURE: 99 F | HEART RATE: 76 BPM | BODY MASS INDEX: 19.82 KG/M2 | HEIGHT: 70 IN | RESPIRATION RATE: 16 BRPM | OXYGEN SATURATION: 95 % | WEIGHT: 138.44 LBS | SYSTOLIC BLOOD PRESSURE: 144 MMHG | DIASTOLIC BLOOD PRESSURE: 82 MMHG

## 2023-09-18 LAB
ALBUMIN SERPL BCP-MCNC: 2.1 G/DL (ref 3.5–5.2)
ANION GAP SERPL CALC-SCNC: 10 MMOL/L (ref 8–16)
BUN SERPL-MCNC: 47 MG/DL (ref 6–20)
CALCIUM SERPL-MCNC: 9.1 MG/DL (ref 8.7–10.5)
CHLORIDE SERPL-SCNC: 100 MMOL/L (ref 95–110)
CO2 SERPL-SCNC: 25 MMOL/L (ref 23–29)
CREAT SERPL-MCNC: 4.1 MG/DL (ref 0.5–1.4)
EST. GFR  (NO RACE VARIABLE): 15.9 ML/MIN/1.73 M^2
GLUCOSE SERPL-MCNC: 113 MG/DL (ref 70–110)
PHOSPHATE SERPL-MCNC: 3.9 MG/DL (ref 2.7–4.5)
POCT GLUCOSE: 140 MG/DL (ref 70–110)
POCT GLUCOSE: 97 MG/DL (ref 70–110)
POTASSIUM SERPL-SCNC: 4 MMOL/L (ref 3.5–5.1)
SODIUM SERPL-SCNC: 135 MMOL/L (ref 136–145)
VANCOMYCIN SERPL-MCNC: 17.1 UG/ML

## 2023-09-18 PROCEDURE — 63700000 PHARM REV CODE 250 ALT 637 W/O HCPCS: Performed by: HOSPITALIST

## 2023-09-18 PROCEDURE — 25000003 PHARM REV CODE 250: Performed by: STUDENT IN AN ORGANIZED HEALTH CARE EDUCATION/TRAINING PROGRAM

## 2023-09-18 PROCEDURE — 63600175 PHARM REV CODE 636 W HCPCS: Performed by: HOSPITALIST

## 2023-09-18 PROCEDURE — 36415 COLL VENOUS BLD VENIPUNCTURE: CPT | Performed by: HOSPITALIST

## 2023-09-18 PROCEDURE — 25000003 PHARM REV CODE 250

## 2023-09-18 PROCEDURE — 63600175 PHARM REV CODE 636 W HCPCS

## 2023-09-18 PROCEDURE — 99232 PR SUBSEQUENT HOSPITAL CARE,LEVL II: ICD-10-PCS | Mod: ,,, | Performed by: INTERNAL MEDICINE

## 2023-09-18 PROCEDURE — 25000003 PHARM REV CODE 250: Performed by: HOSPITALIST

## 2023-09-18 PROCEDURE — S0166 INJ OLANZAPINE 2.5MG: HCPCS | Performed by: STUDENT IN AN ORGANIZED HEALTH CARE EDUCATION/TRAINING PROGRAM

## 2023-09-18 PROCEDURE — 99232 SBSQ HOSP IP/OBS MODERATE 35: CPT | Mod: ,,, | Performed by: INTERNAL MEDICINE

## 2023-09-18 PROCEDURE — 80100016 HC MAINTENANCE HEMODIALYSIS

## 2023-09-18 PROCEDURE — 63600175 PHARM REV CODE 636 W HCPCS: Performed by: STUDENT IN AN ORGANIZED HEALTH CARE EDUCATION/TRAINING PROGRAM

## 2023-09-18 PROCEDURE — 63600175 PHARM REV CODE 636 W HCPCS: Mod: JZ | Performed by: STUDENT IN AN ORGANIZED HEALTH CARE EDUCATION/TRAINING PROGRAM

## 2023-09-18 PROCEDURE — 99239 PR HOSPITAL DISCHARGE DAY,>30 MIN: ICD-10-PCS | Mod: GC,,, | Performed by: HOSPITALIST

## 2023-09-18 PROCEDURE — 80069 RENAL FUNCTION PANEL: CPT | Performed by: HOSPITALIST

## 2023-09-18 PROCEDURE — 25000003 PHARM REV CODE 250: Performed by: NURSE PRACTITIONER

## 2023-09-18 PROCEDURE — 99239 HOSP IP/OBS DSCHRG MGMT >30: CPT | Mod: GC,,, | Performed by: HOSPITALIST

## 2023-09-18 PROCEDURE — 80202 ASSAY OF VANCOMYCIN: CPT | Performed by: HOSPITALIST

## 2023-09-18 RX ORDER — AMLODIPINE BESYLATE 10 MG/1
10 TABLET ORAL DAILY
Qty: 30 TABLET | Refills: 11 | Status: SHIPPED | OUTPATIENT
Start: 2023-09-19 | End: 2024-09-18

## 2023-09-18 RX ADMIN — SENNOSIDES AND DOCUSATE SODIUM 1 TABLET: 50; 8.6 TABLET ORAL at 10:09

## 2023-09-18 RX ADMIN — VALSARTAN 160 MG: 160 TABLET, FILM COATED ORAL at 10:09

## 2023-09-18 RX ADMIN — POLYETHYLENE GLYCOL 3350 17 G: 17 POWDER, FOR SOLUTION ORAL at 10:09

## 2023-09-18 RX ADMIN — HEPARIN SODIUM 5000 UNITS: 5000 INJECTION INTRAVENOUS; SUBCUTANEOUS at 10:09

## 2023-09-18 RX ADMIN — OLANZAPINE 5 MG: 10 INJECTION, POWDER, FOR SOLUTION INTRAMUSCULAR at 10:09

## 2023-09-18 RX ADMIN — VALSARTAN 160 MG: 160 TABLET, FILM COATED ORAL at 01:09

## 2023-09-18 RX ADMIN — PANTOPRAZOLE SODIUM 40 MG: 40 GRANULE, DELAYED RELEASE ORAL at 01:09

## 2023-09-18 RX ADMIN — HYDRALAZINE HYDROCHLORIDE: 10 TABLET, FILM COATED ORAL at 03:09

## 2023-09-18 RX ADMIN — CARVEDILOL 25 MG: 25 TABLET, FILM COATED ORAL at 01:09

## 2023-09-18 RX ADMIN — METHOCARBAMOL 500 MG: 500 TABLET ORAL at 08:09

## 2023-09-18 RX ADMIN — TOBRAMYCIN AND DEXAMETHASONE: 3; 1 OINTMENT OPHTHALMIC at 10:09

## 2023-09-18 RX ADMIN — THERA TABS 1 TABLET: TAB at 01:09

## 2023-09-18 RX ADMIN — TAMSULOSIN HYDROCHLORIDE 0.4 MG: 0.4 CAPSULE ORAL at 01:09

## 2023-09-18 RX ADMIN — VANCOMYCIN HYDROCHLORIDE 500 MG: 500 INJECTION, POWDER, LYOPHILIZED, FOR SOLUTION INTRAVENOUS at 01:09

## 2023-09-18 RX ADMIN — SODIUM CHLORIDE: 9 INJECTION, SOLUTION INTRAVENOUS at 08:09

## 2023-09-18 RX ADMIN — ERYTHROPOIETIN 3000 UNITS: 3000 INJECTION, SOLUTION INTRAVENOUS; SUBCUTANEOUS at 09:09

## 2023-09-18 RX ADMIN — Medication 6 MG: at 10:09

## 2023-09-18 RX ADMIN — ACETAMINOPHEN 1000 MG: 500 TABLET ORAL at 08:09

## 2023-09-18 RX ADMIN — AMLODIPINE BESYLATE 10 MG: 10 TABLET ORAL at 01:09

## 2023-09-18 RX ADMIN — TOBRAMYCIN AND DEXAMETHASONE: 3; 1 OINTMENT OPHTHALMIC at 01:09

## 2023-09-18 RX ADMIN — SENNOSIDES AND DOCUSATE SODIUM 1 TABLET: 50; 8.6 TABLET ORAL at 01:09

## 2023-09-18 RX ADMIN — OXYBUTYNIN CHLORIDE 10 MG: 5 TABLET, EXTENDED RELEASE ORAL at 01:09

## 2023-09-18 RX ADMIN — FLUCONAZOLE 400 MG: 40 POWDER, FOR SUSPENSION ORAL at 01:09

## 2023-09-18 RX ADMIN — HEPARIN SODIUM 5000 UNITS: 5000 INJECTION INTRAVENOUS; SUBCUTANEOUS at 05:09

## 2023-09-18 RX ADMIN — METHOCARBAMOL 500 MG: 500 TABLET ORAL at 03:09

## 2023-09-18 RX ADMIN — HEPARIN SODIUM 5000 UNITS: 5000 INJECTION INTRAVENOUS; SUBCUTANEOUS at 03:09

## 2023-09-18 RX ADMIN — Medication 1 CAPSULE: at 10:09

## 2023-09-18 RX ADMIN — HEPARIN SODIUM 1000 UNITS: 1000 INJECTION, SOLUTION INTRAVENOUS; SUBCUTANEOUS at 09:09

## 2023-09-18 RX ADMIN — CALCITRIOL 0.25 MCG: 1 SOLUTION ORAL at 01:09

## 2023-09-18 RX ADMIN — ATORVASTATIN CALCIUM 40 MG: 40 TABLET, FILM COATED ORAL at 01:09

## 2023-09-18 RX ADMIN — HYDRALAZINE HYDROCHLORIDE: 10 TABLET, FILM COATED ORAL at 10:09

## 2023-09-18 RX ADMIN — CARVEDILOL 25 MG: 25 TABLET, FILM COATED ORAL at 10:09

## 2023-09-18 RX ADMIN — ACETAMINOPHEN 1000 MG: 500 TABLET ORAL at 03:09

## 2023-09-18 RX ADMIN — PANTOPRAZOLE SODIUM 40 MG: 40 GRANULE, DELAYED RELEASE ORAL at 10:09

## 2023-09-18 NOTE — NURSING
3 hours HD tx completed. 1.5 L of fluid removed.  Patient tolerated well.    Epoetin given as ordered.    Blood returned. Lines flushed with NS. Catheter locked with Heparin. Clamped and capped.    Report given to FLACA Mix LPN.
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
## 2023-09-18 NOTE — PLAN OF CARE
Problem: Adult Inpatient Plan of Care  Goal: Plan of Care Review  Outcome: Adequate for Care Transition  Goal: Patient-Specific Goal (Individualized)  Outcome: Adequate for Care Transition  Goal: Absence of Hospital-Acquired Illness or Injury  Outcome: Adequate for Care Transition  Goal: Optimal Comfort and Wellbeing  Outcome: Adequate for Care Transition  Goal: Readiness for Transition of Care  Outcome: Adequate for Care Transition     Problem: Diabetes Comorbidity  Goal: Blood Glucose Level Within Targeted Range  Outcome: Adequate for Care Transition     Problem: Skin Injury Risk Increased  Goal: Skin Health and Integrity  Outcome: Adequate for Care Transition     Problem: Device-Related Complication Risk (Hemodialysis)  Goal: Safe, Effective Therapy Delivery  Outcome: Adequate for Care Transition     Problem: Hemodynamic Instability (Hemodialysis)  Goal: Effective Tissue Perfusion  Outcome: Adequate for Care Transition     Problem: Infection (Hemodialysis)  Goal: Absence of Infection Signs and Symptoms  Outcome: Adequate for Care Transition     Problem: Infection  Goal: Absence of Infection Signs and Symptoms  Outcome: Adequate for Care Transition     Problem: Coping Ineffective  Goal: Effective Coping  Outcome: Adequate for Care Transition     Problem: Device-Related Complication Risk (CRRT (Continuous Renal Replacement Therapy))  Goal: Safe, Effective Therapy Delivery  Outcome: Adequate for Care Transition     Problem: Hypothermia (CRRT (Continuous Renal Replacement Therapy))  Goal: Body Temperature Maintained in Desired Range  Outcome: Adequate for Care Transition     Problem: Infection (CRRT (Continuous Renal Replacement Therapy))  Goal: Absence of Infection Signs and Symptoms  Outcome: Adequate for Care Transition     Problem: Impaired Wound Healing  Goal: Optimal Wound Healing  Outcome: Adequate for Care Transition     Problem: Fall Injury Risk  Goal: Absence of Fall and Fall-Related Injury  Outcome:  Adequate for Care Transition    Patient alert and oriented. Cooperative with care. Dialysis done this shift . Patient tolerated well. Patient plans to discharge to nursing home today. Awaiting EMS for transport. Patient aware.

## 2023-09-18 NOTE — NURSING
Patient is alert and oriented x4 and is not in any disress. Speech is clear. Personal items and call light are within reach, bed in lowest, locked position.

## 2023-09-18 NOTE — PROGRESS NOTES
Pharmacokinetic Assessment Follow Up: IV Vancomycin    Vancomycin serum concentration assessment(s):    - Vancomycin pre-HD level 17.1, within goal of 15 - 20 for endophthalmitis  - Patient to receive HD today (M/W/F)  - Will re-dose vancomycin 500 mg x 1 dose  - Check vancomycin random level with AM labs on Wednesday       Drug levels (last 3 results):  Recent Labs   Lab Result Units 09/18/23  0446   Vancomycin, Random ug/mL 17.1       Pharmacy will continue to follow and monitor vancomycin.    Please contact pharmacy at extension [phone] for questions regarding this assessment.    Thank you for the consult,   Jennifer Vela       Patient brief summary:  Immanuel Bernal is a 57 y.o. male initiated on antimicrobial therapy with IV Vancomycin for treatment of  endophthalmitis     The patient's current regimen is vancomycin    Drug Allergies:   Review of patient's allergies indicates:   Allergen Reactions    Morphine Rash    Amiodarone analogues Itching     Other reaction(s): Unknown       Actual Body Weight:   62.8 kg    Renal Function:   Estimated Creatinine Clearance: 17.2 mL/min (A) (based on SCr of 4.1 mg/dL (H)).,     Dialysis Method (if applicable):  intermittent HD    CBC (last 72 hours):  Recent Labs   Lab Result Units 09/17/23  0610   WBC K/uL 5.03   Hemoglobin g/dL 8.1*   Hematocrit % 24.9*   Platelets K/uL 156       Metabolic Panel (last 72 hours):  Recent Labs   Lab Result Units 09/17/23  0610 09/18/23  0830   Sodium mmol/L 135* 135*   Potassium mmol/L 4.2 4.0   Chloride mmol/L 100 100   CO2 mmol/L 24 25   Glucose mg/dL 88 113*   BUN mg/dL 47* 47*   Creatinine mg/dL 4.1* 4.1*   Albumin g/dL 2.2* 2.1*   Total Bilirubin mg/dL 0.4  --    Alkaline Phosphatase U/L 144*  --    AST U/L 14  --    ALT U/L 6*  --    Magnesium mg/dL 2.1  --    Phosphorus mg/dL 4.6* 3.9       Vancomycin Administrations:  vancomycin given in the last 96 hours                     vancomycin (VANCOCIN) 500 mg in dextrose 5 % in water  (D5W) 100 mL IVPB (MB+) (mg) 500 mg New Bag 09/15/23 1546                    Microbiologic Results:  Microbiology Results (last 7 days)       ** No results found for the last 168 hours. **

## 2023-09-18 NOTE — PT/OT/SLP PROGRESS
Physical Therapy      Patient Name:  Immanuel Bernal   MRN:  [id number]    Patient not seen today secondary to Dialysis. Will follow-up as appropriate.

## 2023-09-18 NOTE — PT/OT/SLP PROGRESS
Occupational Therapy      Patient Name:  Immanuel Bernal   MRN:  [id number]    Patient not seen today secondary to Dialysis. Will follow-up 09-19. 9/18/2023

## 2023-09-18 NOTE — ASSESSMENT & PLAN NOTE
- management per primary   - currently on Norvasc 10 mg daily, Coreg 25 mg BID, hydralazine 70 mg TID, isosorbide dinitrate 40 mg TID, valsartan 160 mg BID and Flomax 0.4 mg daily

## 2023-09-18 NOTE — PROGRESS NOTES
[facility] - Intensive Care ([place] [address])  Nephrology  Progress Note    Patient Name: Immanuel Bernal  MRN: [id number]  Admission Date: 8/9/2023  Hospital Length of Stay: 40 days  Attending Provider: Porsha Funez MD   Primary Care Physician: Dolly, Primary Doctor  Principal Problem:Endophthalmitis    Subjective:     HPI: HPI obtained per medical record as patient unable to communicate     57-year-old male with a history of CHF, diabetes, hypertension, chronic disability, end-stage renal disease on hemodialysis, PEG placement, bowel obstruction, sleep apnea, TIA, left eye vitreous hemorrhage, stroke, and bowel obstruction with bowel resection admitted to [facility] in [place] July 18 from nursing home with left eye pain and blurred vision.  He was admitted with concern for bilateral endophthalmitis.  Other admit diagnoses included right upper extremity thrombus, end-stage renal disease on hemodialysis, hyperkalemia, sleep apnea, diabetes, and CHF.  He was treated with broad-spectrum antibiotics.  He was seen by Infectious Diseases,, and he was treated with vancomycin, ceftriaxone, and amphotericin.  Blood cultures were positive for Pseudomonas, and amphotericin/vancomycin were stopped.  IV cefepime was continued.  He was seen by Ophthalmology, and he received intravitreal vancomycin and ceftazidime (July 18).  He also had intravitreal tap July 18 that had no yeast or fungal elements observed.  Left eye endophthalmitis did not respond to treatment, and he subsequently developed abscess formation, significant proptosis, and drainage of purulent material from the orbit.  Ophthalmology recommended enucleation.  He was continued on cefepime for pseudomonal and ophthalmitis.  Recommendation was for 6 weeks of treatment to be followed by indefinite fluoroquinolone.  He was seen by Pulmonology during his stay for a right upper lung mass with peripheral nodules.  It was felt that he would need further  investigation of this once his current clinical issues stabilized.  Right upper extremity AV graft was excised during his stay with concern for infection.  A right IJ tunneled line was placed on July 27.  Left eye endophthalmitis continued to worsen, and ophthalmology spoke with patient and family about the need for enucleation.  Despite several conversations, family declined enucleation.  Plans were for transitioned to skilled nursing facility for continued treatment, but family did not want him to go to a skilled nursing facility.  He was subsequently discharged AMA from the hospital there on August 7.  Please see the August 7 Internal Medicine note for further details.     He subsequently presented to [facility] Emergency Department.  He will not go back to [facility] in [place]. He received Zosyn and vancomycin.  ED team at [place] spoke with the patient and his power-of- (sister).  CT of the orbits noted scleral abscess along the lateral aspect of the left globe.  Patient and family are aware and in agreement that the patient needs enucleation of the eye at this time. Referring provider spoke with Oculoplastics at [facility]. Requesting transfer to [facility] for Oculoplastics specialty evaluation of persistent endophthalmitis.  ED provider noted patient is hemodynamically stable.  He does not appear volume overloaded or in respiratory distress.        The patient has a significant previous medical course as listed below  August 3: MRI brain and orbits showed worsening ophthalmitis and inflammatory changes of the left orbital tissues with slight increase in proptosis.  No evidence of intracranial inflammatory process observed.    July 25: Transesophageal echocardiogram had no evidence of endocarditis.  Moderate to severely decreased left ventricular systolic function with EF 30-35%.    July 22:  CT chest showed right upper lobe mass with numerous bilateral  nodularity predominantly in the right with associated mediastinal lymph nodes.  July 21: Blood cultures with Pseudomonas aeruginosa  July 19: MRI brain/orbits with and without contrast had findings suggestive of chronic microvascular ischemic disease of the white matter with remote ischemic event in the left insula/basal ganglia/subependymal white matter/right middle cerebellar peduncle and hemisphere.  Findings consistent with left endophthalmitis.  No abnormal findings observed in the right globe.  July 18:  Blood cultures with Pseudomonas aeruginosa and coag-negative staph    HPI obtained from hospital med's note. Patient with AMS at time of nephrology evaluation and unable to provide HPI or ROS.          Interval History: Patient seen and examined this AM while undergoing iHD for which he is currently tolerating well. Afebrile with pulse ranging from 70-60s bpm. Systolic blood pressures ranging from 150-130s mmHg. He is saturating +95% on 2 liters via nasal cannula with 150 mL documented UOP in the last 24 hours. Plans to discharge to nursing home today per primary team.    Review of patient's allergies indicates:   Allergen Reactions    Morphine Rash    Amiodarone analogues Itching     Other reaction(s): Unknown     Current Facility-Administered Medications   Medication Frequency    0.9%  NaCl infusion (for blood administration) Q24H PRN    0.9%  NaCl infusion (for blood administration) Q24H PRN    0.9%  NaCl infusion PRN    0.9%  NaCl infusion Once    acetaminophen tablet 1,000 mg TID    albuterol-ipratropium 2.5 mg-0.5 mg/3 mL nebulizer solution 3 mL Q6H PRN    amLODIPine tablet 10 mg Daily    atorvastatin tablet 40 mg Daily    calcitrioL solution 0.25 mcg Daily    carvediloL tablet 25 mg BID    dextrose 10% bolus 125 mL 125 mL PRN    dextrose 10% bolus 250 mL 250 mL PRN    epoetin thee injection 3,000 Units Every Mon, Wed, Fri    fluconazole 40 mg/ml suspension 400 mg Daily    glucagon (human recombinant)  injection 1 mg PRN    glucose chewable tablet 16 g PRN    glucose chewable tablet 24 g PRN    heparin (porcine) injection 1,000 Units PRN    heparin (porcine) injection 5,000 Units Q8H    hydrALAZINE 10 mg 2 tablets, hydrALAZINE 25 mg 2 tablets, isorsorbide dinitrate 20 mg  2 tablets combination TID    hydrALAZINE injection 10 mg Q6H PRN    Lactobacillus rhamnosus GG capsule 1 capsule BID    LIDOcaine HCl 2% urojet PRN    meclizine tablet 12.5 mg TID PRN    melatonin tablet 6 mg Nightly    meropenem (MERREM) 500 mg in sodium chloride 0.9 % 100 mL IVPB (MB+) Q12H    methocarbamoL tablet 500 mg TID    multivitamin tablet Daily    naloxone 0.4 mg/mL injection 0.02 mg PRN    OLANZapine injection 5 mg Nightly PRN    ondansetron tablet 4 mg Q6H PRN    oxybutynin 24 hr tablet 10 mg Daily    pantoprazole suspension 40 mg BID    polyethylene glycol packet 17 g BID    prochlorperazine injection Soln 10 mg Q6H PRN    senna-docusate 8.6-50 mg per tablet 1 tablet BID    sodium chloride 0.9% bolus 250 mL 250 mL PRN    sodium chloride 0.9% bolus 250 mL 250 mL PRN    sodium chloride 0.9% bolus 250 mL 250 mL PRN    sodium chloride 0.9% bolus 250 mL 250 mL PRN    sodium chloride 0.9% flush 10 mL Q12H PRN    sodium chloride 0.9% flush 10 mL PRN    tamsulosin 24 hr capsule 0.4 mg Daily    tobramycin-dexAMETHasone 0.3-0.1% ophthalmic ointment TID    traMADoL tablet 50 mg TID PRN    valsartan tablet 160 mg BID    vancomycin - pharmacy to dose pharmacy to manage frequency    white petrolatum 41 % ointment PRN       Objective:     Vital Signs (Most Recent):  Temp: 98.6 °F (37 °C) (09/18/23 0449)  Pulse: 66 (09/18/23 0538)  Resp: 14 (09/18/23 0449)  BP: 138/77 (09/18/23 0449)  SpO2: 100 % (09/18/23 0449) Vital Signs (24h Range):  Temp:  [98.2 °F (36.8 °C)-98.8 °F (37.1 °C)] 98.6 °F (37 °C)  Pulse:  [66-81] 66  Resp:  [0-20] 14  SpO2:  [95 %-100 %] 100 %  BP: (137-157)/(77-96) 138/77     Weight: 62.8 kg (138 lb 7.2 oz) (09/13/23 1200)  Body  mass index is 19.87 kg/m².  Body surface area is 1.76 meters squared.    I/O last 3 completed shifts:  In: 230 [NG/GT:230]  Out: 400 [Urine:400]    Physical Exam  Vitals and nursing note reviewed.   Constitutional:       General: He is awake. He is not in acute distress.     Appearance: He is cachectic. He is ill-appearing. He is not diaphoretic.      Comments: Muscle wasting/atrophy noted.   HENT:      Head: Normocephalic and atraumatic.      Right Ear: External ear normal.      Left Ear: External ear normal.      Nose: Nose normal.      Mouth/Throat:      Mouth: Mucous membranes are moist.      Pharynx: Oropharynx is clear. No oropharyngeal exudate or posterior oropharyngeal erythema.   Eyes:      Comments: Left eye currently closed.   Cardiovascular:      Rate and Rhythm: Normal rate.      Heart sounds: No murmur heard.     No friction rub. No gallop.   Pulmonary:      Effort: Pulmonary effort is normal. No respiratory distress.      Breath sounds: No wheezing, rhonchi or rales.   Chest:      Comments: Tunneled HD catheter to right chest wall.  Abdominal:      General: Bowel sounds are normal. There is no distension.      Palpations: Abdomen is soft.      Tenderness: There is no abdominal tenderness.      Comments: PEG in place.   Musculoskeletal:      Cervical back: Neck supple.      Right lower leg: No edema.      Left lower leg: No edema.   Skin:     General: Skin is warm and dry.      Coloration: Skin is not jaundiced.      Comments: Stable noted to right upper extremity from recent surgery. Incision appears intact.   Neurological:      General: No focal deficit present.      Mental Status: He is alert. Mental status is at baseline.      Cranial Nerves: No cranial nerve deficit.      Motor: No weakness.      Comments: Patient remains confused.   Psychiatric:         Mood and Affect: Mood normal.         Behavior: Behavior normal.          Significant Labs:  BMP:   Recent Labs   Lab 09/17/23  0610   GLU 88    *   K 4.2      CO2 24   BUN 47*   CREATININE 4.1*   CALCIUM 9.6   MG 2.1       CBC:   Recent Labs   Lab 09/17/23  0610   WBC 5.03   RBC 2.53*   HGB 8.1*   HCT 24.9*      MCV 98   MCH 32.0*   MCHC 32.5       CMP:   Recent Labs   Lab 09/17/23  0610   GLU 88   CALCIUM 9.6   ALBUMIN 2.2*   PROT 6.3   *   K 4.2   CO2 24      BUN 47*   CREATININE 4.1*   ALKPHOS 144*   ALT 6*   AST 14   BILITOT 0.4       LFTs:   Recent Labs   Lab 09/17/23  0610   ALT 6*   AST 14   ALKPHOS 144*   BILITOT 0.4   PROT 6.3   ALBUMIN 2.2*       Microbiology Results (last 7 days)       ** No results found for the last 168 hours. **          Specimen (24h ago, onward)      None          Significant Imaging:  I have reviewed all imagining in the last 24 hours.    Assessment/Plan:     Ophtho  * Endophthalmitis  - Ophthalmology consulted and following  - s/p left eye enucleation on 8/30    Cardiac/Vascular  HFrEF (heart failure with reduced ejection fraction)  - management per primary   - currently on Norvasc 10 mg daily, Coreg 25 mg BID, hydralazine 70 mg TID, isosorbide dinitrate 40 mg TID, valsartan 160 mg BID and Flomax 0.4 mg daily    Renal/  Chronic kidney disease-mineral and bone disorder  - continue calcitriol as detailed above  - renal diet/tube feeds when not NPO  - daily RFPs    ESRD (end stage renal disease)  Outpatient HD Information:  -Outpatient HD unit: [facility] [place]   -HD tx days: MWF   -HD tx time: 210min  -HD access: R IJ TDC   -HD modality: iHD   -Residual urine: ?    Plan/Recommendations:  - iHD today for metabolic clearance and volume management   - can continue calcitriol 0.25 mcg daily  - strict I/O's and daily weights  - daily renal function panels and magnesium levels  - renally all dose medications to eGFR  - avoid gadolinium, fleets, phos-based laxatives, NSAIDs, etc.    ID  AV fistula infection  - secondary to Pseudomonas  - s/p right upper extremity AV graft excision in July  - Infectious Disease  consulted and following   - plan for 4-6 weeks total of meropenem, vancomycin and fluconazole    Oncology  Anemia in ESRD (end-stage renal disease)  - target Hg of 10-12  - continue epogen 3,000 units every Monday, Wednesday and Friday     Thank you for your consult. I will follow-up with patient. Please contact us if you have any additional questions.    Herbert Cedeño MD  Nephrology  [facility] - Intensive Care ([place] [address])    ATTENDING PHYSICIAN ATTESTATION  I have personally verified the history and examined the patient. I thoroughly reviewed the demographic, clinical, laboratorial and imaging information available in medical records. I agree with the assessment and recommendations provided by the subspecialty resident who was under my supervision.

## 2023-09-18 NOTE — PLAN OF CARE
NURSING HOME ORDERS    09/18/2023  [facility]  [facility] - INTENSIVE CARE ([address] [place])  [address]  [facility] [address]  Dept: [phone]  Loc: [phone]     Admit to Nursing Home:  shelter nursing home    Diagnoses:  Active Hospital Problems    Diagnosis  POA    *Endophthalmitis [H44.009]  Yes    Critical illness myopathy [G72.81]  Yes    NSVT (nonsustained ventricular tachycardia) [I47.29]  Yes    Penile pain [N48.89]  Yes    Bacterial meningitis [G00.9]  Yes    AV fistula infection [T82.7XXA]  Yes    Delirium [R41.0]  Yes    Duodenal ulceration [K26.9]  Yes    Impaired mobility [Z74.09]  Yes     Chronic    Anemia in ESRD (end-stage renal disease) [N18.6, D63.1]  Yes     Chronic    PEG (percutaneous endoscopic gastrostomy) status [Z93.1]  Not Applicable     Chronic    Encounter for palliative care [Z51.5]  Not Applicable    Severe malnutrition [E43]  Yes     Chronic    Chronic kidney disease-mineral and bone disorder [N18.9, E83.9, M89.9]  Unknown    ESRD (end stage renal disease) [N18.6]  Yes     Chronic    HFrEF (heart failure with reduced ejection fraction) [I50.20]  Yes     Amlodipine can cause significant swelling, stop  Hydralazine is 3 times a osborn, poor compliance    Add CTH 25 g a day (leg swelling)   Resume ACE for better proteinuria control  RTC 3-4 weeks    Large amounts of sodium can be hidden in canned, processed and convenience foods. High amounts of sodium can be found in many foods that are served at fast food restaurants.  Sodium controls fluid balance in our bodies and maintains blood volume and blood pressure. Eating too much sodium may raise blood pressure and cause fluid retention, which could lead to swelling of the legs and feet or other health issues. When limiting sodium in your diet, a common target is to eat less than 2,000 milligrams of sodium per day.          Resolved Hospital Problems    Diagnosis Date Resolved POA    Hypoglycemia [E16.2]  08/27/2023 Yes    Acute metabolic encephalopathy [G93.41] 08/15/2023 Yes    Cholecystostomy care [Z43.4] 08/15/2023 Not Applicable    Anemia in chronic kidney disease [N18.9, D63.1] 08/10/2023 Yes    Bipolar disorder [F31.9] 08/15/2023 Yes    Atrial fibrillation [I48.91] 08/27/2023 Yes    Persistent proteinuria [R80.1] 08/15/2023 Yes     Chronic     2 g proteinuria noted  Resumed ACE  Lower BP systolic to less than 130       CKD (chronic kidney disease) stage 3, GFR 30-59 ml/min [N18.30] 08/15/2023 Yes     Diabetic since 2009    CKD diagnosed in [facility] 2018  Not clear when the CKD was diagnosed    GFR 25-30 ml/min  Proteinuria >2g  Resume ACE and increase as tolerated         Patient is homebound due to:  Endophthalmitis    Allergies:  Review of patient's allergies indicates:   Allergen Reactions    Morphine Rash    Amiodarone analogues Itching     Other reaction(s): Unknown       Vitals:  Routine    Diet: renal diet and fluid consistency thin    Activities:   Activity as tolerated    Goals of Care Treatment Preferences:  Code Status: Full Code    [facility] number: No value filed.          What is most important right now is to focus on extending life as long as possible, even it it means sacrificing quality.  Accordingly, we have decided that the best plan to meet the patient's goals includes continuing with treatment.      Labs:  As below    Nursing Precautions:  Fall and Pressure ulcer prevention    Consults:   PT to evaluate and treat- 3 times a week and OT to evaluate and treat- 3 times a week     Miscellaneous Care:   Antibiotic Therapy Plan:        1) Infection: Pseudomonas bacteremia and Endophthalmitis with concern for extension into CNS      2) Discharge Antibiotics:     Intravenous antibiotics:  Meropenem 500 mg IV q 12 hours   Vancomycin at dose recommended by PharmD service      Oral antibiotics:  Fluconazole 400 mg PO q 24 hours      3)  Therapy Duration:  4-6 weeks from evisceration on 8/30     Estimated end date of IV antibiotics: 9/27/23-10/11/23     4) Outpatient Weekly Labs:     Order the following labs to be drawn on Mondays:   CBC  CMP   CRP     Vancomycin trough. Target 15-20     If vancomycin trough is not at target (15-20) prior to discharge, schedule vancomycin trough to be drawn before their fourth outpatient dose.     5) Fax Lab Results to Infectious Diseases Provider: Dr. Gideon Meneses     [facility] Fax Number: [phone]     6) Outpatient Infectious Diseases Follow-up     Follow-up appointment will be arranged by the ID clinic and will be found in the patient's appointments tab.     Prior to discharge, please ensure the patient's follow-up has been scheduled.    If there is still no follow-up scheduled prior to discharge, please send an EPIC message to Gisele Castro in Infectious Diseases.                   Medications: Discontinue all previous medication orders, if any. See new list below.     Medication List        START taking these medications      atorvastatin 40 MG tablet  Commonly known as: LIPITOR  1 tablet (40 mg total) by Per G Tube route once daily.     dextrose 5 % in water (D5W) PgBk 100 mL with vancomycin 500 mg SolR 500 mg  Inject 500 mg into the vein every Mon, Wed, Fri. Administer vancomycin 500 mg post dialysis on HD days. Goal pre-HD vancomycin level 15 - 20     fluconazole 40 mg/ml 40 mg/mL suspension  Commonly known as: DIFLUCAN  10 mLs (400 mg total) by Per G Tube route once daily.     isosorbide-hydrALAZINE 20-37.5 mg 20-37.5 mg Tab  Commonly known as: BIDIL  Take 2 tablets by mouth 3 (three) times daily.     Lactobacillus rhamnosus GG 10 billion cell capsule  Commonly known as: CULTURELLE  1 capsule by Per G Tube route 2 (two) times daily.     melatonin 3 mg tablet  Commonly known as: MELATIN  Take 2 tablets (6 mg total) by mouth nightly.     ondansetron 8 MG Tbdl  Commonly known as: ZOFRAN-ODT  Take  1 tablet (8 mg total) by mouth every 6 (six) hours as needed.     oxybutynin 10 MG 24 hr tablet  Commonly known as: DITROPAN-XL  Take 1 tablet (10 mg total) by mouth once daily.     pantoprazole 40 mg suspension  Commonly known as: PROTONIX  1 packet (40 mg total) by Per G Tube route 2 (two) times daily.  Replaces: pantoprazole 40 MG tablet     senna-docusate 8.6-50 mg 8.6-50 mg per tablet  Commonly known as: PERICOLACE  1 tablet by Per G Tube route 2 (two) times daily.     sevelamer carbonate 0.8 gram Pwpk  Commonly known as: RENVELA  1 packet (0.8 g total) by Per G Tube route 3 (three) times daily with meals.  Replaces: sevelamer carbonate 800 mg Tab     tobramycin-dexAMETHasone 0.3-0.1% 0.3-0.1 % Oint  Commonly known as: TOBRADEX  Place into the left eye 3 (three) times daily.     valsartan 160 MG tablet  Commonly known as: DIOVAN  Take 1 tablet (160 mg total) by mouth 2 (two) times daily.            CHANGE how you take these medications      albuterol-ipratropium 2.5 mg-0.5 mg/3 mL nebulizer solution  Commonly known as: DUO-NEB  Take 3 mLs by nebulization 3 (three) times daily as needed for Wheezing or Shortness of Breath.  What changed:   how to take this  when to take this  reasons to take this     calcitRIOL 0.25 MCG Cap  Commonly known as: ROCALTROL  Take 1 capsule (0.25 mcg total) by mouth once daily.  What changed: when to take this     carvediloL 25 MG tablet  Commonly known as: COREG  1 tablet (25 mg total) by Per G Tube route 2 (two) times daily.  What changed:   how to take this  when to take this            CONTINUE taking these medications      acetaminophen 325 MG tablet  Commonly known as: TYLENOL  Take 650 mg by mouth every 6 (six) hours as needed for Pain.     amLODIPine 10 MG tablet  Commonly known as: NORVASC  Take 1 tablet (10 mg total) by mouth once daily.  Start taking on: September 19, 2023     aspirin 81 MG Chew  Take 81 mg by mouth once daily.     folic acid 1 MG tablet  Commonly known  as: FOLVITE  Take 1 tablet by mouth every morning.     megestroL 400 mg/10 mL (40 mg/mL) Susp  Commonly known as: MEGACE  Take 10 mLs by mouth 2 (two) times a day.     multivitamin per tablet  Commonly known as: THERAGRAN  Take 1 tablet by mouth once daily.     OYSTER SHELL CALCIUM-VIT D3 500 mg-5 mcg (200 unit) Pwpk  Generic drug: calcium carbonate-vitamin D3  Take 1 tablet by mouth 2 (two) times a day.     polyethylene glycol 17 gram/dose powder  Commonly known as: GLYCOLAX  Take 17 g by mouth 2 (two) times daily.     tamsulosin 0.4 mg Cap  Commonly known as: FLOMAX  Take 1 capsule (0.4 mg total) by mouth once daily.     VITAMIN B COMPLEX ORAL  Take 1 tablet by mouth every morning.     VITAMIN C 500 MG tablet  Generic drug: ascorbic acid (vitamin C)  Take 500 mg by mouth once daily.     zinc gluconate 50 mg tablet  Take 50 mg by mouth once daily.            STOP taking these medications      citalopram 20 MG tablet  Commonly known as: CeleXA     flecainide 50 MG Tab  Commonly known as: TAMBOCOR     ibuprofen 600 MG tablet  Commonly known as: ADVIL,MOTRIN     lactulose 10 gram/15 mL solution  Commonly known as: CHRONULAC     metoclopramide HCl 5 MG tablet  Commonly known as: REGLAN     NEURIVA ORIGINAL ORAL     pantoprazole 40 MG tablet  Commonly known as: PROTONIX  Replaced by: pantoprazole 40 mg suspension     pentoxifylline 400 mg Tbsr  Commonly known as: TRENTAL     rosuvastatin 5 MG tablet  Commonly known as: CRESTOR     sevelamer carbonate 800 mg Tab  Commonly known as: RENVELA  Replaced by: sevelamer carbonate 0.8 gram Pwpk     UNABLE TO FIND     UNABLE TO FIND                Immunizations Administered as of 9/18/2023       No immunizations on file.            This patient has had both covid vaccinations    Some patients may experience side effects after vaccination.  These may include fever, headache, muscle or joint aches.  Most symptoms resolve with 24-48 hours and do not require urgent medical evaluation  unless they persist for more than 72 hours or symptoms are concerning for an unrelated medical condition.          _________________________________  Guy Peres MD  09/18/2023

## 2023-09-18 NOTE — NURSING
Attempted to call facility report. Was informed they wernt ready for report yet Gave phone number to have nurse call back

## 2023-09-18 NOTE — SUBJECTIVE & OBJECTIVE
Interval History: Patient seen and examined this AM while undergoing iHD for which he is currently tolerating well. Afebrile with pulse ranging from 70-60s bpm. Systolic blood pressures ranging from 150-130s mmHg. He is saturating +95% on 2 liters via nasal cannula with 150 mL documented UOP in the last 24 hours. Plans to discharge to nursing home today per primary team.    Review of patient's allergies indicates:   Allergen Reactions    Morphine Rash    Amiodarone analogues Itching     Other reaction(s): Unknown     Current Facility-Administered Medications   Medication Frequency    0.9%  NaCl infusion (for blood administration) Q24H PRN    0.9%  NaCl infusion (for blood administration) Q24H PRN    0.9%  NaCl infusion PRN    0.9%  NaCl infusion Once    acetaminophen tablet 1,000 mg TID    albuterol-ipratropium 2.5 mg-0.5 mg/3 mL nebulizer solution 3 mL Q6H PRN    amLODIPine tablet 10 mg Daily    atorvastatin tablet 40 mg Daily    calcitrioL solution 0.25 mcg Daily    carvediloL tablet 25 mg BID    dextrose 10% bolus 125 mL 125 mL PRN    dextrose 10% bolus 250 mL 250 mL PRN    epoetin thee injection 3,000 Units Every Mon, Wed, Fri    fluconazole 40 mg/ml suspension 400 mg Daily    glucagon (human recombinant) injection 1 mg PRN    glucose chewable tablet 16 g PRN    glucose chewable tablet 24 g PRN    heparin (porcine) injection 1,000 Units PRN    heparin (porcine) injection 5,000 Units Q8H    hydrALAZINE 10 mg 2 tablets, hydrALAZINE 25 mg 2 tablets, isorsorbide dinitrate 20 mg  2 tablets combination TID    hydrALAZINE injection 10 mg Q6H PRN    Lactobacillus rhamnosus GG capsule 1 capsule BID    LIDOcaine HCl 2% urojet PRN    meclizine tablet 12.5 mg TID PRN    melatonin tablet 6 mg Nightly    meropenem (MERREM) 500 mg in sodium chloride 0.9 % 100 mL IVPB (MB+) Q12H    methocarbamoL tablet 500 mg TID    multivitamin tablet Daily    naloxone 0.4 mg/mL injection 0.02 mg PRN    OLANZapine injection 5 mg Nightly PRN     ondansetron tablet 4 mg Q6H PRN    oxybutynin 24 hr tablet 10 mg Daily    pantoprazole suspension 40 mg BID    polyethylene glycol packet 17 g BID    prochlorperazine injection Soln 10 mg Q6H PRN    senna-docusate 8.6-50 mg per tablet 1 tablet BID    sodium chloride 0.9% bolus 250 mL 250 mL PRN    sodium chloride 0.9% bolus 250 mL 250 mL PRN    sodium chloride 0.9% bolus 250 mL 250 mL PRN    sodium chloride 0.9% bolus 250 mL 250 mL PRN    sodium chloride 0.9% flush 10 mL Q12H PRN    sodium chloride 0.9% flush 10 mL PRN    tamsulosin 24 hr capsule 0.4 mg Daily    tobramycin-dexAMETHasone 0.3-0.1% ophthalmic ointment TID    traMADoL tablet 50 mg TID PRN    valsartan tablet 160 mg BID    vancomycin - pharmacy to dose pharmacy to manage frequency    white petrolatum 41 % ointment PRN       Objective:     Vital Signs (Most Recent):  Temp: 98.6 °F (37 °C) (09/18/23 0449)  Pulse: 66 (09/18/23 0538)  Resp: 14 (09/18/23 0449)  BP: 138/77 (09/18/23 0449)  SpO2: 100 % (09/18/23 0449) Vital Signs (24h Range):  Temp:  [98.2 °F (36.8 °C)-98.8 °F (37.1 °C)] 98.6 °F (37 °C)  Pulse:  [66-81] 66  Resp:  [0-20] 14  SpO2:  [95 %-100 %] 100 %  BP: (137-157)/(77-96) 138/77     Weight: 62.8 kg (138 lb 7.2 oz) (09/13/23 1200)  Body mass index is 19.87 kg/m².  Body surface area is 1.76 meters squared.    I/O last 3 completed shifts:  In: 230 [NG/GT:230]  Out: 400 [Urine:400]     Physical Exam  Vitals and nursing note reviewed.   Constitutional:       General: He is awake. He is not in acute distress.     Appearance: He is cachectic. He is ill-appearing. He is not diaphoretic.      Comments: Muscle wasting/atrophy noted.   HENT:      Head: Normocephalic and atraumatic.      Right Ear: External ear normal.      Left Ear: External ear normal.      Nose: Nose normal.      Mouth/Throat:      Mouth: Mucous membranes are moist.      Pharynx: Oropharynx is clear. No oropharyngeal exudate or posterior oropharyngeal erythema.   Eyes:      Comments:  Left eye currently closed.   Cardiovascular:      Rate and Rhythm: Normal rate.      Heart sounds: No murmur heard.     No friction rub. No gallop.   Pulmonary:      Effort: Pulmonary effort is normal. No respiratory distress.      Breath sounds: No wheezing, rhonchi or rales.   Chest:      Comments: Tunneled HD catheter to right chest wall.  Abdominal:      General: Bowel sounds are normal. There is no distension.      Palpations: Abdomen is soft.      Tenderness: There is no abdominal tenderness.      Comments: PEG in place.   Musculoskeletal:      Cervical back: Neck supple.      Right lower leg: No edema.      Left lower leg: No edema.   Skin:     General: Skin is warm and dry.      Coloration: Skin is not jaundiced.      Comments: Stable noted to right upper extremity from recent surgery. Incision appears intact.   Neurological:      General: No focal deficit present.      Mental Status: He is alert. Mental status is at baseline.      Cranial Nerves: No cranial nerve deficit.      Motor: No weakness.      Comments: Patient remains confused.   Psychiatric:         Mood and Affect: Mood normal.         Behavior: Behavior normal.          Significant Labs:  BMP:   Recent Labs   Lab 09/17/23  0610   GLU 88   *   K 4.2      CO2 24   BUN 47*   CREATININE 4.1*   CALCIUM 9.6   MG 2.1       CBC:   Recent Labs   Lab 09/17/23  0610   WBC 5.03   RBC 2.53*   HGB 8.1*   HCT 24.9*      MCV 98   MCH 32.0*   MCHC 32.5       CMP:   Recent Labs   Lab 09/17/23  0610   GLU 88   CALCIUM 9.6   ALBUMIN 2.2*   PROT 6.3   *   K 4.2   CO2 24      BUN 47*   CREATININE 4.1*   ALKPHOS 144*   ALT 6*   AST 14   BILITOT 0.4       LFTs:   Recent Labs   Lab 09/17/23  0610   ALT 6*   AST 14   ALKPHOS 144*   BILITOT 0.4   PROT 6.3   ALBUMIN 2.2*       Microbiology Results (last 7 days)       ** No results found for the last 168 hours. **          Specimen (24h ago, onward)      None          Significant Imaging:  I  have reviewed all imagining in the last 24 hours.

## 2023-09-18 NOTE — ASSESSMENT & PLAN NOTE
Outpatient HD Information:  -Outpatient HD unit: C   -HD tx days: MWF   -HD tx time: 210min  -HD access: R IJ TDC   -HD modality: iHD   -Residual urine: ?    Plan/Recommendations:  - iHD today for metabolic clearance and volume management   - can continue calcitriol 0.25 mcg daily  - strict I/O's and daily weights  - daily renal function panels and magnesium levels  - renally all dose medications to eGFR  - avoid gadolinium, fleets, phos-based laxatives, NSAIDs, etc.

## 2023-09-18 NOTE — PLAN OF CARE
"YULI spoke with patient's sister POA. Sister has completed all paperwork and patient has now cleared to go to dialysis.     YULI spoke with Lul several times. YULI faxed remainder of needed paperwork to [facility]. Fax#[phone].     Patient on 1.5 liters of O2. Patient has a Peg tube but no longer getting feeding and can take food orally.    Doctor stated that a referral to GI will be put in to remove Peg tube.     YULI contacted accepting nurse at [facility] "Suni". YULI informed nurse of all the information and asked if patient can be transported now to the facility. Arslan stated yes.    Nurse will need to call report to the facility. Ph#[phone] and ask for Elsy and or Station [address].    YULI put in transport orders for 5:30pm.    YULI spoke with patient's POA several times about a missing wallet. YULI contacted hospital security and spoke with Officer Eladia. Officer eladia stated that they will need to speak to the nurse and that she will be sending an officer up once one is clear.    Christopher Armendariz, LORENA, MSW-SW  Medical Social Worker/  ER Department     "

## 2023-09-18 NOTE — NURSING
Patient arrived to [facility] via bed. Alert and awake.    HD tx initiated via Right IJ tunneled cath.

## 2023-09-19 NOTE — PROGRESS NOTES
Pt discharge completed at this time. All medications given prior to discharge. All pt belongings gathered and given to [facility]. Pt's sister Marnie concerned about pt wallet, updated her that security is aware and if wallet found, she would be contacted. Notified pt's sister at time of discharge and that he was on the way to the facility. All discharge questions/concerned answered. Discharge paperwork placed with pt belongings.

## 2023-09-19 NOTE — NURSING
0750: Called [facility] following up on patient pickup time. Given ETA 30MIN.   0928 Following up again on  time. Given ETA: 1hr

## 2023-09-20 LAB
FUNGUS SPEC CULT: NORMAL

## 2023-09-22 ENCOUNTER — TELEPHONE (OUTPATIENT)
Dept: OPHTHALMOLOGY | Facility: CLINIC | Age: 58
End: 2023-09-22
Payer: MEDICARE

## 2023-09-22 ENCOUNTER — TELEPHONE (OUTPATIENT)
Dept: INFECTIOUS DISEASES | Facility: CLINIC | Age: 58
End: 2023-09-22
Payer: MEDICARE

## 2023-09-22 NOTE — TELEPHONE ENCOUNTER
----- Message from Janet Celestin sent at 9/22/2023 10:36 AM CDT -----  Regarding: FW: Requesting Call Back    ----- Message -----  From: Soheila Jackson  Sent: 9/22/2023  10:10 AM CDT  To: Pat Cohen Staff  Subject: Requesting Call Back                             Patients sister asked to speak to office about questions she has on pt's eye drops and next post op. Pts sister states pt was just released from hospital and nursing home needs clarification on eye drop instructions because nursing home has been putting antibiotic eye drops in incorrect eye .     Please call back to further [phone]

## 2023-09-22 NOTE — TELEPHONE ENCOUNTER
----- Message from Ana Candelaria sent at 9/22/2023 10:18 AM CDT -----  Regarding: Appt Access  Contact: Marley [phone]  Marley/   calling to reschedule appt to 9/26 due to another appt conflict with dialysis. Pls call

## 2023-09-25 ENCOUNTER — TELEPHONE (OUTPATIENT)
Dept: INFECTIOUS DISEASES | Facility: CLINIC | Age: 58
End: 2023-09-25

## 2023-09-25 LAB — FUNGUS SPEC CULT: NORMAL

## 2023-09-25 NOTE — TELEPHONE ENCOUNTER
----- Message from Aster Estrada MA sent at 9/25/2023 12:19 PM CDT -----  Regarding: FW: Appt update  Contact: Marley () @ [phone]    ----- Message -----  From: Bravo Santillan  Sent: 9/25/2023   8:10 AM CDT  To: Hetal Erickson Staff  Subject: Appt update                                      Pts sister Marley is calling in wanting to speak with the provider in regards to the appt tomorrow. Pts  states that she will pick him up and bring him to the appt. She wanted the Dr that is going to be apart of the appt know that the situation has been resolved. Please call pts sister to discuss further

## 2023-09-26 ENCOUNTER — TELEPHONE (OUTPATIENT)
Dept: OPHTHALMOLOGY | Facility: CLINIC | Age: 58
End: 2023-09-26
Payer: MEDICARE

## 2023-09-26 ENCOUNTER — OFFICE VISIT (OUTPATIENT)
Dept: INFECTIOUS DISEASES | Facility: CLINIC | Age: 58
End: 2023-09-26
Payer: MEDICARE

## 2023-09-26 VITALS
WEIGHT: 138.44 LBS | DIASTOLIC BLOOD PRESSURE: 79 MMHG | HEART RATE: 76 BPM | BODY MASS INDEX: 19.82 KG/M2 | HEIGHT: 70 IN | TEMPERATURE: 100 F | SYSTOLIC BLOOD PRESSURE: 148 MMHG

## 2023-09-26 DIAGNOSIS — Z79.2 RECEIVING INTRAVENOUS ANTIBIOTIC TREATMENT AS OUTPATIENT: ICD-10-CM

## 2023-09-26 DIAGNOSIS — M79.606 PAIN AND SWELLING OF LOWER EXTREMITY, UNSPECIFIED LATERALITY: Primary | ICD-10-CM

## 2023-09-26 DIAGNOSIS — M79.89 PAIN AND SWELLING OF LOWER EXTREMITY, UNSPECIFIED LATERALITY: Primary | ICD-10-CM

## 2023-09-26 DIAGNOSIS — I80.03 PHLEBITIS AND THROMBOPHLEBITIS OF SUPERFICIAL VESSELS OF LOWER EXTREMITIES, BILATERAL: ICD-10-CM

## 2023-09-26 DIAGNOSIS — H44.002 LEFT ENDOPHTHALMIA: Primary | ICD-10-CM

## 2023-09-26 PROCEDURE — 99215 OFFICE O/P EST HI 40 MIN: CPT | Mod: S$PBB,,, | Performed by: INTERNAL MEDICINE

## 2023-09-26 PROCEDURE — 99999 PR PBB SHADOW E&M-EST. PATIENT-LVL IV: ICD-10-PCS | Mod: PBBFAC,,, | Performed by: INTERNAL MEDICINE

## 2023-09-26 PROCEDURE — 99999 PR PBB SHADOW E&M-EST. PATIENT-LVL IV: CPT | Mod: PBBFAC,,, | Performed by: INTERNAL MEDICINE

## 2023-09-26 PROCEDURE — 99215 PR OFFICE/OUTPT VISIT, EST, LEVL V, 40-54 MIN: ICD-10-PCS | Mod: S$PBB,,, | Performed by: INTERNAL MEDICINE

## 2023-09-26 PROCEDURE — 99214 OFFICE O/P EST MOD 30 MIN: CPT | Mod: PBBFAC | Performed by: INTERNAL MEDICINE

## 2023-09-26 NOTE — PATIENT INSTRUCTIONS
To request a vascular surgery appointment please call [phone] or [phone] and request the Vascular Surgery Clinic appointment    If you wish to move his Primary Care (internal medicine appointment) please call [phone] and request a sooner appointment time    Please discuss your concerns about BiPAP, Neurology follow up, and arm swelling with primary care    Wait for Interventional Radiology to call and schedule his tunneled picc removal. They will keep the dialysis catheter in place.

## 2023-09-26 NOTE — PROGRESS NOTES
Infectious Disease Clinic  [facility]    Subjective:      Patient ID:. Immanuel Bernal is a 57 y.o. male       Chief Complaint:   Chief Complaint   Patient presents with    Hospital Follow Up       History of Present Illness    A 57 y.o. male patient with HFrEF, HTN, ESRD on HD, DM2, bowel obstruction s/p resection, inpatient admission a [facility] for Pseudomonas bacteremia with endophthalmitis, and recent inpatient admission to [facility] for management of a scleral abscess with endophthalmitis who is seen for hospital follow up. Mr. 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 He developed encephalopathy with cefepime. He is seen today, with his sister, who brought the patient in her persona vehicle. He feels he has improved however his sister disagrees as she has multiple complaints about the nursing home and her perceived lack of inaction by medical providers at the institution.     Review of Systems   Constitutional: Negative for chills, decreased appetite, fever, malaise/fatigue, night sweats, weight gain and weight loss.   HENT:  Negative for congestion, ear pain, hearing loss, hoarse voice, sore throat and tinnitus.    Eyes:  Negative for blurred vision, redness and visual disturbance.   Cardiovascular:  Negative for chest pain, leg swelling and palpitations.   Respiratory:  Negative for cough, hemoptysis, shortness of breath, sputum production and wheezing.    Hematologic/Lymphatic: Negative for adenopathy. Does not bruise/bleed easily.   Skin:  Negative for dry skin, itching, rash and suspicious lesions.   Musculoskeletal:  Negative for back pain, joint pain, myalgias and neck pain.   Gastrointestinal:  Negative for abdominal pain, constipation, diarrhea, heartburn, nausea and vomiting.   Genitourinary:  Negative for dysuria, flank pain,  frequency, hematuria, hesitancy and urgency.   Neurological:  Negative for dizziness, headaches, numbness, paresthesias and weakness.   Psychiatric/Behavioral:  Negative for depression and memory loss. The patient does not have insomnia and is not nervous/anxious.        Objective:     Physical Exam  Vitals reviewed.   Constitutional:       Appearance: He is well-developed.   HENT:      Head: Normocephalic and atraumatic.      Right Ear: External ear normal.      Left Ear: External ear normal.   Eyes:      Conjunctiva/sclera: Conjunctivae normal.      Comments: Left sided enucleation. No swelling or erythema noted.    Neck:      Thyroid: No thyromegaly.   Cardiovascular:      Rate and Rhythm: Normal rate and regular rhythm.      Heart sounds: Normal heart sounds. No murmur heard.  Pulmonary:      Effort: Pulmonary effort is normal.      Breath sounds: Normal breath sounds. No wheezing or rales.   Abdominal:      General: Bowel sounds are normal.      Palpations: Abdomen is soft. There is no mass.      Tenderness: There is no abdominal tenderness. There is no rebound.   Musculoskeletal:         General: Normal range of motion.      Cervical back: Normal range of motion and neck supple.   Lymphadenopathy:      Cervical: No cervical adenopathy.   Skin:     General: Skin is warm and dry.   Neurological:      Mental Status: He is alert and oriented to person, place, and time.   Psychiatric:         Behavior: Behavior normal.         Assessment:       ICD-10-CM ICD-9-CM   1. Left endophthalmia  H44.002 360.00       Plan:   I have reviewed clinic notes, laboratory, imaging tests, and pathology from the referring provider and other providers, as indicated for this visit, with my interpretation as documented below.     The patient was late to his appointment as his sister brought the patient in her personal vehicle. I spent over 30 minutes discussing with the the patients antimicrobial plan as detailed below. She demanded  his line be removed in clinic as well as multiple referrals for non infectious issues. I explained to the patient that the PICC was a tunneled catheter and would need to be removed by Interventional Radiology. I also explained that the non infectious issues would need to be managed by his provider in the nursing home. She also demanded a vascular surgery appointment and I advise the patient he should see the surgeon who performed his AVF/AVG however she was unhappy with my advise. She was given the phone number to request an appointment. Recommendations sent to Nursing Home.   Complete antimicrobials today.   PICC removal by IR.  Orders Placed This Encounter    IR Tunneled Cath Removal w/o Port    Ambulatory referral/consult to Interventional RAD   See patient instructions for other patient advise.  RTC as needed.           The total time for evaluation and management services performed on 9/26/23 was greater than 50 minutes

## 2023-09-26 NOTE — TELEPHONE ENCOUNTER
----- Message from Janet Celestin sent at 9/26/2023  1:31 PM CDT -----  Regarding: FW: Reschedule Post op  Contact: Marley (Sister)    ----- Message -----  From: Mara Peguero  Sent: 9/26/2023   1:25 PM CDT  To: Pat Cohen Staff  Subject: Reschedule Post op                               Pt  is requesting a callback  regarding post op appt schedule Mon 10/16. Pt have dialysis on Mon, Wed and Friday. Please adv         Confirmed contact below:   Marley Bernal (Sister)   [phone] (Mobile)

## 2023-09-29 ENCOUNTER — TELEPHONE (OUTPATIENT)
Dept: INTERVENTIONAL RADIOLOGY/VASCULAR | Facility: CLINIC | Age: 58
End: 2023-09-29
Payer: MEDICARE

## 2023-10-04 LAB
ACID FAST MOD KINY STN SPEC: NORMAL
MYCOBACTERIUM SPEC QL CULT: NORMAL

## 2023-10-05 ENCOUNTER — TELEPHONE (OUTPATIENT)
Dept: OPHTHALMOLOGY | Facility: CLINIC | Age: 58
End: 2023-10-05
Payer: MEDICARE

## 2023-10-06 DIAGNOSIS — H44.002 LEFT ENDOPHTHALMIA: Primary | ICD-10-CM

## 2023-10-14 LAB
ACID FAST MOD KINY STN SPEC: NORMAL
MYCOBACTERIUM SPEC QL CULT: NORMAL

## 2023-10-24 ENCOUNTER — TELEPHONE (OUTPATIENT)
Dept: INTERVENTIONAL RADIOLOGY/VASCULAR | Facility: CLINIC | Age: 58
End: 2023-10-24
Payer: MEDICARE

## 2023-10-24 ENCOUNTER — TELEPHONE (OUTPATIENT)
Dept: NEUROLOGY | Facility: CLINIC | Age: 58
End: 2023-10-24
Payer: MEDICARE

## 2023-10-24 NOTE — TELEPHONE ENCOUNTER
Pt is residing in [facility]. Spoke to Nurse Martinez and she stated that pt is seeing a vascular surgeon and they will be handling the PICC, verbally understood, thanks

## 2023-10-24 NOTE — TELEPHONE ENCOUNTER
----- Message from Rona Noonan sent at 10/24/2023  8:02 AM CDT -----  Regarding: Appt  Contact: [phone]  Patient sister Marley is calling is calling. Marley is stating both patients were to be seen today Carmen by Neurology. Please call patient back at  [phone]

## 2023-10-25 ENCOUNTER — TELEPHONE (OUTPATIENT)
Dept: NEUROLOGY | Facility: CLINIC | Age: 58
End: 2023-10-25
Payer: MEDICARE

## 2023-10-25 NOTE — TELEPHONE ENCOUNTER
----- Message from Rona Noonan sent at 10/24/2023  8:02 AM CDT -----  Regarding: Appt  Contact: [phone]  Patient sister Marley is calling is calling. Marley is stating both patients were to be seen today Carmen by Neurology. Please call patient back at  [phone]

## 2023-10-25 NOTE — TELEPHONE ENCOUNTER
Called  back again today and was able to reach Mrs Marley Bernal, pt sister and Power of .  I was asked to schedule pt with Dr Napoles.   Mr Bernal has been living in a Nursing  Facility for over 17 years.  Mrs Roach would like for him to get establish with a Neurologist.  I informed her that a referral is need for scheduling .   Mrs Roach states that the nursing home will charge $1200 for his records.  She also mention that he is going to his PCP next week.  I informed her to ask for a referral from his doctor  and give us a call back for scheduling.   Mrs Roach was ok with this.

## 2023-10-26 ENCOUNTER — OFFICE VISIT (OUTPATIENT)
Dept: INTERNAL MEDICINE | Facility: CLINIC | Age: 58
DRG: 069 | End: 2023-10-26
Payer: MEDICARE

## 2023-10-26 ENCOUNTER — HOSPITAL ENCOUNTER (INPATIENT)
Facility: HOSPITAL | Age: 58
LOS: 3 days | Discharge: NURSING, REHAB OR LONG-TERM CARE | DRG: 069 | End: 2023-10-30
Attending: EMERGENCY MEDICINE | Admitting: STUDENT IN AN ORGANIZED HEALTH CARE EDUCATION/TRAINING PROGRAM
Payer: MEDICARE

## 2023-10-26 ENCOUNTER — TELEPHONE (OUTPATIENT)
Dept: OPHTHALMOLOGY | Facility: CLINIC | Age: 58
End: 2023-10-26
Payer: MEDICARE

## 2023-10-26 VITALS
DIASTOLIC BLOOD PRESSURE: 60 MMHG | OXYGEN SATURATION: 97 % | BODY MASS INDEX: 19.76 KG/M2 | WEIGHT: 138 LBS | HEART RATE: 84 BPM | TEMPERATURE: 100 F | SYSTOLIC BLOOD PRESSURE: 120 MMHG | HEIGHT: 70 IN

## 2023-10-26 DIAGNOSIS — R29.810 FACIAL DROOP: Primary | ICD-10-CM

## 2023-10-26 DIAGNOSIS — G45.9 TIA (TRANSIENT ISCHEMIC ATTACK): ICD-10-CM

## 2023-10-26 DIAGNOSIS — E87.5 HYPERKALEMIA: ICD-10-CM

## 2023-10-26 DIAGNOSIS — R91.8 INFILTRATE OF RIGHT LUNG PRESENT ON CHEST X-RAY: ICD-10-CM

## 2023-10-26 DIAGNOSIS — N18.9 ACUTE RENAL FAILURE SUPERIMPOSED ON CHRONIC KIDNEY DISEASE, UNSPECIFIED ACUTE RENAL FAILURE TYPE, UNSPECIFIED CKD STAGE: ICD-10-CM

## 2023-10-26 DIAGNOSIS — N18.6 ANEMIA IN CHRONIC KIDNEY DISEASE, ON CHRONIC DIALYSIS: ICD-10-CM

## 2023-10-26 DIAGNOSIS — I63.9 STROKE: ICD-10-CM

## 2023-10-26 DIAGNOSIS — Z99.2 ANEMIA IN CHRONIC KIDNEY DISEASE, ON CHRONIC DIALYSIS: ICD-10-CM

## 2023-10-26 DIAGNOSIS — D63.1 ANEMIA IN CHRONIC KIDNEY DISEASE, ON CHRONIC DIALYSIS: ICD-10-CM

## 2023-10-26 DIAGNOSIS — I69.30 HISTORY OF CVA WITH RESIDUAL DEFICIT: ICD-10-CM

## 2023-10-26 DIAGNOSIS — R53.1 WEAKNESS: ICD-10-CM

## 2023-10-26 DIAGNOSIS — N17.9 ACUTE RENAL FAILURE SUPERIMPOSED ON CHRONIC KIDNEY DISEASE, UNSPECIFIED ACUTE RENAL FAILURE TYPE, UNSPECIFIED CKD STAGE: ICD-10-CM

## 2023-10-26 DIAGNOSIS — G47.33 OSA (OBSTRUCTIVE SLEEP APNEA): ICD-10-CM

## 2023-10-26 PROBLEM — J18.9 PNEUMONIA: Status: ACTIVE | Noted: 2023-10-26

## 2023-10-26 LAB
ALBUMIN SERPL BCP-MCNC: 2.8 G/DL (ref 3.5–5.2)
ALP SERPL-CCNC: 77 U/L (ref 55–135)
ALT SERPL W/O P-5'-P-CCNC: 15 U/L (ref 10–44)
ANION GAP SERPL CALC-SCNC: 15 MMOL/L (ref 8–16)
AST SERPL-CCNC: 20 U/L (ref 10–40)
BASOPHILS # BLD AUTO: 0.03 K/UL (ref 0–0.2)
BASOPHILS NFR BLD: 0.4 % (ref 0–1.9)
BILIRUB SERPL-MCNC: 0.6 MG/DL (ref 0.1–1)
BUN SERPL-MCNC: 56 MG/DL (ref 6–20)
CALCIUM SERPL-MCNC: 8.8 MG/DL (ref 8.7–10.5)
CHLORIDE SERPL-SCNC: 99 MMOL/L (ref 95–110)
CHOLEST SERPL-MCNC: 109 MG/DL (ref 120–199)
CHOLEST/HDLC SERPL: 2.4 {RATIO} (ref 2–5)
CO2 SERPL-SCNC: 26 MMOL/L (ref 23–29)
CREAT SERPL-MCNC: 5 MG/DL (ref 0.5–1.4)
CREAT SERPL-MCNC: 5.5 MG/DL (ref 0.5–1.4)
DIFFERENTIAL METHOD: ABNORMAL
EOSINOPHIL # BLD AUTO: 0.1 K/UL (ref 0–0.5)
EOSINOPHIL NFR BLD: 1.3 % (ref 0–8)
ERYTHROCYTE [DISTWIDTH] IN BLOOD BY AUTOMATED COUNT: 15.3 % (ref 11.5–14.5)
EST. GFR  (NO RACE VARIABLE): 12.6 ML/MIN/1.73 M^2
GLUCOSE SERPL-MCNC: 98 MG/DL (ref 70–110)
HBV SURFACE AG SERPL QL IA: NORMAL
HCT VFR BLD AUTO: 26.4 % (ref 40–54)
HCV AB SERPL QL IA: NORMAL
HDLC SERPL-MCNC: 46 MG/DL (ref 40–75)
HDLC SERPL: 42.2 % (ref 20–50)
HGB BLD-MCNC: 8.3 G/DL (ref 14–18)
HIV 1+2 AB+HIV1 P24 AG SERPL QL IA: NORMAL
IMM GRANULOCYTES # BLD AUTO: 0.04 K/UL (ref 0–0.04)
IMM GRANULOCYTES NFR BLD AUTO: 0.5 % (ref 0–0.5)
INR PPP: 1.1 (ref 0.8–1.2)
LDLC SERPL CALC-MCNC: 52.8 MG/DL (ref 63–159)
LYMPHOCYTES # BLD AUTO: 0.6 K/UL (ref 1–4.8)
LYMPHOCYTES NFR BLD: 6.9 % (ref 18–48)
MCH RBC QN AUTO: 33.5 PG (ref 27–31)
MCHC RBC AUTO-ENTMCNC: 31.4 G/DL (ref 32–36)
MCV RBC AUTO: 107 FL (ref 82–98)
MONOCYTES # BLD AUTO: 0.8 K/UL (ref 0.3–1)
MONOCYTES NFR BLD: 9.2 % (ref 4–15)
NEUTROPHILS # BLD AUTO: 6.8 K/UL (ref 1.8–7.7)
NEUTROPHILS NFR BLD: 81.7 % (ref 38–73)
NONHDLC SERPL-MCNC: 63 MG/DL
NRBC BLD-RTO: 0 /100 WBC
PLATELET # BLD AUTO: 177 K/UL (ref 150–450)
PMV BLD AUTO: 9.9 FL (ref 9.2–12.9)
POCT GLUCOSE: 108 MG/DL (ref 70–110)
POTASSIUM SERPL-SCNC: 5.5 MMOL/L (ref 3.5–5.1)
PROT SERPL-MCNC: 6.4 G/DL (ref 6–8.4)
PROTHROMBIN TIME: 12.2 SEC (ref 9–12.5)
RBC # BLD AUTO: 2.48 M/UL (ref 4.6–6.2)
SAMPLE: ABNORMAL
SODIUM SERPL-SCNC: 140 MMOL/L (ref 136–145)
TRIGL SERPL-MCNC: 51 MG/DL (ref 30–150)
TSH SERPL DL<=0.005 MIU/L-ACNC: 1.61 UIU/ML (ref 0.4–4)
WBC # BLD AUTO: 8.29 K/UL (ref 3.9–12.7)

## 2023-10-26 PROCEDURE — 36415 COLL VENOUS BLD VENIPUNCTURE: CPT | Performed by: STUDENT IN AN ORGANIZED HEALTH CARE EDUCATION/TRAINING PROGRAM

## 2023-10-26 PROCEDURE — S0179 MEGESTROL 20 MG: HCPCS | Performed by: STUDENT IN AN ORGANIZED HEALTH CARE EDUCATION/TRAINING PROGRAM

## 2023-10-26 PROCEDURE — 25500020 PHARM REV CODE 255: Performed by: EMERGENCY MEDICINE

## 2023-10-26 PROCEDURE — 63700000 PHARM REV CODE 250 ALT 637 W/O HCPCS: Performed by: EMERGENCY MEDICINE

## 2023-10-26 PROCEDURE — 25000003 PHARM REV CODE 250: Performed by: EMERGENCY MEDICINE

## 2023-10-26 PROCEDURE — 93010 EKG 12-LEAD: ICD-10-PCS | Mod: ,,, | Performed by: INTERNAL MEDICINE

## 2023-10-26 PROCEDURE — 99215 OFFICE O/P EST HI 40 MIN: CPT | Mod: PBBFAC,25 | Performed by: STUDENT IN AN ORGANIZED HEALTH CARE EDUCATION/TRAINING PROGRAM

## 2023-10-26 PROCEDURE — 99900035 HC TECH TIME PER 15 MIN (STAT)

## 2023-10-26 PROCEDURE — 93010 ELECTROCARDIOGRAM REPORT: CPT | Mod: ,,, | Performed by: INTERNAL MEDICINE

## 2023-10-26 PROCEDURE — 96365 THER/PROPH/DIAG IV INF INIT: CPT

## 2023-10-26 PROCEDURE — 93005 ELECTROCARDIOGRAM TRACING: CPT

## 2023-10-26 PROCEDURE — 87389 HIV-1 AG W/HIV-1&-2 AB AG IA: CPT | Performed by: PHYSICIAN ASSISTANT

## 2023-10-26 PROCEDURE — 80053 COMPREHEN METABOLIC PANEL: CPT | Performed by: EMERGENCY MEDICINE

## 2023-10-26 PROCEDURE — 85610 PROTHROMBIN TIME: CPT | Performed by: EMERGENCY MEDICINE

## 2023-10-26 PROCEDURE — 99999 PR PBB SHADOW E&M-EST. PATIENT-LVL V: ICD-10-PCS | Mod: PBBFAC,,, | Performed by: STUDENT IN AN ORGANIZED HEALTH CARE EDUCATION/TRAINING PROGRAM

## 2023-10-26 PROCEDURE — 85610 PROTHROMBIN TIME: CPT

## 2023-10-26 PROCEDURE — G0378 HOSPITAL OBSERVATION PER HR: HCPCS

## 2023-10-26 PROCEDURE — 80061 LIPID PANEL: CPT | Performed by: EMERGENCY MEDICINE

## 2023-10-26 PROCEDURE — 99223 PR INITIAL HOSPITAL CARE,LEVL III: ICD-10-PCS | Mod: GC,,, | Performed by: STUDENT IN AN ORGANIZED HEALTH CARE EDUCATION/TRAINING PROGRAM

## 2023-10-26 PROCEDURE — 85025 COMPLETE CBC W/AUTO DIFF WBC: CPT | Performed by: EMERGENCY MEDICINE

## 2023-10-26 PROCEDURE — 25000003 PHARM REV CODE 250: Performed by: STUDENT IN AN ORGANIZED HEALTH CARE EDUCATION/TRAINING PROGRAM

## 2023-10-26 PROCEDURE — 82962 GLUCOSE BLOOD TEST: CPT

## 2023-10-26 PROCEDURE — 86803 HEPATITIS C AB TEST: CPT | Performed by: PHYSICIAN ASSISTANT

## 2023-10-26 PROCEDURE — 84443 ASSAY THYROID STIM HORMONE: CPT | Performed by: EMERGENCY MEDICINE

## 2023-10-26 PROCEDURE — 63600175 PHARM REV CODE 636 W HCPCS: Performed by: EMERGENCY MEDICINE

## 2023-10-26 PROCEDURE — 82565 ASSAY OF CREATININE: CPT

## 2023-10-26 PROCEDURE — 99214 PR OFFICE/OUTPT VISIT, EST, LEVL IV, 30-39 MIN: ICD-10-PCS | Mod: S$PBB,,, | Performed by: STUDENT IN AN ORGANIZED HEALTH CARE EDUCATION/TRAINING PROGRAM

## 2023-10-26 PROCEDURE — 87340 HEPATITIS B SURFACE AG IA: CPT | Performed by: STUDENT IN AN ORGANIZED HEALTH CARE EDUCATION/TRAINING PROGRAM

## 2023-10-26 PROCEDURE — 99285 EMERGENCY DEPT VISIT HI MDM: CPT | Mod: 25,27

## 2023-10-26 PROCEDURE — 99999 PR PBB SHADOW E&M-EST. PATIENT-LVL V: CPT | Mod: PBBFAC,,, | Performed by: STUDENT IN AN ORGANIZED HEALTH CARE EDUCATION/TRAINING PROGRAM

## 2023-10-26 PROCEDURE — 99223 1ST HOSP IP/OBS HIGH 75: CPT | Mod: GC,,, | Performed by: STUDENT IN AN ORGANIZED HEALTH CARE EDUCATION/TRAINING PROGRAM

## 2023-10-26 PROCEDURE — 99214 OFFICE O/P EST MOD 30 MIN: CPT | Mod: S$PBB,,, | Performed by: STUDENT IN AN ORGANIZED HEALTH CARE EDUCATION/TRAINING PROGRAM

## 2023-10-26 RX ORDER — PROCHLORPERAZINE EDISYLATE 5 MG/ML
5 INJECTION INTRAMUSCULAR; INTRAVENOUS EVERY 6 HOURS PRN
Status: DISCONTINUED | OUTPATIENT
Start: 2023-10-26 | End: 2023-10-30 | Stop reason: HOSPADM

## 2023-10-26 RX ORDER — ONDANSETRON 8 MG/1
8 TABLET, ORALLY DISINTEGRATING ORAL EVERY 8 HOURS PRN
Status: DISCONTINUED | OUTPATIENT
Start: 2023-10-26 | End: 2023-10-30 | Stop reason: HOSPADM

## 2023-10-26 RX ORDER — MEGESTROL ACETATE 40 MG/ML
400 SUSPENSION ORAL 2 TIMES DAILY
Status: DISCONTINUED | OUTPATIENT
Start: 2023-10-26 | End: 2023-10-28

## 2023-10-26 RX ORDER — NAPROXEN SODIUM 220 MG/1
81 TABLET, FILM COATED ORAL DAILY
Status: DISCONTINUED | OUTPATIENT
Start: 2023-10-27 | End: 2023-10-28

## 2023-10-26 RX ORDER — PANTOPRAZOLE SODIUM 40 MG/1
40 TABLET, DELAYED RELEASE ORAL DAILY
Status: DISCONTINUED | OUTPATIENT
Start: 2023-10-27 | End: 2023-10-28

## 2023-10-26 RX ORDER — SEVELAMER CARBONATE FOR ORAL SUSPENSION 800 MG/1
0.8 POWDER, FOR SUSPENSION ORAL
Status: DISCONTINUED | OUTPATIENT
Start: 2023-10-27 | End: 2023-10-30 | Stop reason: HOSPADM

## 2023-10-26 RX ORDER — TAMSULOSIN HYDROCHLORIDE 0.4 MG/1
1 CAPSULE ORAL DAILY
Status: DISCONTINUED | OUTPATIENT
Start: 2023-10-27 | End: 2023-10-30 | Stop reason: HOSPADM

## 2023-10-26 RX ORDER — AZITHROMYCIN 250 MG/1
250 TABLET, FILM COATED ORAL DAILY
Status: DISCONTINUED | OUTPATIENT
Start: 2023-10-27 | End: 2023-10-28

## 2023-10-26 RX ORDER — OXYBUTYNIN CHLORIDE 10 MG/1
10 TABLET, EXTENDED RELEASE ORAL DAILY
Status: DISCONTINUED | OUTPATIENT
Start: 2023-10-27 | End: 2023-10-30 | Stop reason: HOSPADM

## 2023-10-26 RX ORDER — POLYETHYLENE GLYCOL 3350 17 G/17G
17 POWDER, FOR SOLUTION ORAL DAILY
Status: DISCONTINUED | OUTPATIENT
Start: 2023-10-27 | End: 2023-10-28

## 2023-10-26 RX ORDER — SENNOSIDES 8.6 MG/1
8.6 TABLET ORAL DAILY
Status: DISCONTINUED | OUTPATIENT
Start: 2023-10-26 | End: 2023-10-26

## 2023-10-26 RX ORDER — CARVEDILOL 25 MG/1
25 TABLET ORAL 2 TIMES DAILY
Status: DISCONTINUED | OUTPATIENT
Start: 2023-10-26 | End: 2023-10-28

## 2023-10-26 RX ORDER — CALCITRIOL 0.25 UG/1
0.25 CAPSULE ORAL DAILY
Status: DISCONTINUED | OUTPATIENT
Start: 2023-10-27 | End: 2023-10-28

## 2023-10-26 RX ORDER — TALC
6 POWDER (GRAM) TOPICAL NIGHTLY PRN
Status: DISCONTINUED | OUTPATIENT
Start: 2023-10-26 | End: 2023-10-30 | Stop reason: HOSPADM

## 2023-10-26 RX ORDER — ATORVASTATIN CALCIUM 40 MG/1
40 TABLET, FILM COATED ORAL DAILY
Status: DISCONTINUED | OUTPATIENT
Start: 2023-10-27 | End: 2023-10-28

## 2023-10-26 RX ORDER — POLYETHYLENE GLYCOL 3350 17 G/17G
17 POWDER, FOR SOLUTION ORAL DAILY PRN
Status: DISCONTINUED | OUTPATIENT
Start: 2023-10-26 | End: 2023-10-30 | Stop reason: HOSPADM

## 2023-10-26 RX ORDER — ASCORBIC ACID 500 MG
500 TABLET ORAL DAILY
Status: DISCONTINUED | OUTPATIENT
Start: 2023-10-27 | End: 2023-10-28

## 2023-10-26 RX ORDER — AMLODIPINE BESYLATE 10 MG/1
10 TABLET ORAL DAILY
Status: DISCONTINUED | OUTPATIENT
Start: 2023-10-27 | End: 2023-10-28

## 2023-10-26 RX ORDER — SODIUM CHLORIDE 0.9 % (FLUSH) 0.9 %
10 SYRINGE (ML) INJECTION EVERY 12 HOURS PRN
Status: DISCONTINUED | OUTPATIENT
Start: 2023-10-26 | End: 2023-10-30 | Stop reason: HOSPADM

## 2023-10-26 RX ORDER — BISACODYL 10 MG
10 SUPPOSITORY, RECTAL RECTAL DAILY PRN
Status: DISCONTINUED | OUTPATIENT
Start: 2023-10-26 | End: 2023-10-30 | Stop reason: HOSPADM

## 2023-10-26 RX ORDER — MAG HYDROX/ALUMINUM HYD/SIMETH 200-200-20
30 SUSPENSION, ORAL (FINAL DOSE FORM) ORAL
Status: DISCONTINUED | OUTPATIENT
Start: 2023-10-26 | End: 2023-10-28

## 2023-10-26 RX ORDER — IPRATROPIUM BROMIDE AND ALBUTEROL SULFATE 2.5; .5 MG/3ML; MG/3ML
3 SOLUTION RESPIRATORY (INHALATION) 3 TIMES DAILY PRN
Status: DISCONTINUED | OUTPATIENT
Start: 2023-10-26 | End: 2023-10-30 | Stop reason: HOSPADM

## 2023-10-26 RX ORDER — NALOXONE HCL 0.4 MG/ML
0.02 VIAL (ML) INJECTION
Status: DISCONTINUED | OUTPATIENT
Start: 2023-10-26 | End: 2023-10-30 | Stop reason: HOSPADM

## 2023-10-26 RX ORDER — AZITHROMYCIN 250 MG/1
500 TABLET, FILM COATED ORAL
Status: COMPLETED | OUTPATIENT
Start: 2023-10-26 | End: 2023-10-26

## 2023-10-26 RX ORDER — ACETAMINOPHEN 325 MG/1
650 TABLET ORAL EVERY 8 HOURS PRN
Status: DISCONTINUED | OUTPATIENT
Start: 2023-10-26 | End: 2023-10-30 | Stop reason: HOSPADM

## 2023-10-26 RX ADMIN — Medication 6 MG: at 10:10

## 2023-10-26 RX ADMIN — ISOSORBIDE DINITRATE: 20 TABLET ORAL at 10:10

## 2023-10-26 RX ADMIN — CARVEDILOL 25 MG: 25 TABLET, FILM COATED ORAL at 10:10

## 2023-10-26 RX ADMIN — Medication 1 CAPSULE: at 10:10

## 2023-10-26 RX ADMIN — MEGESTROL ACETATE 400 MG: 400 SUSPENSION ORAL at 10:10

## 2023-10-26 RX ADMIN — ALUMINUM HYDROXIDE, MAGNESIUM HYDROXIDE, AND SIMETHICONE 30 ML: 200; 200; 20 SUSPENSION ORAL at 10:10

## 2023-10-26 RX ADMIN — POLYETHYLENE GLYCOL 3350 17 G: 17 POWDER, FOR SOLUTION ORAL at 10:10

## 2023-10-26 RX ADMIN — CEFTRIAXONE 1 G: 1 INJECTION, POWDER, FOR SOLUTION INTRAMUSCULAR; INTRAVENOUS at 02:10

## 2023-10-26 RX ADMIN — AZITHROMYCIN 500 MG: 250 TABLET, FILM COATED ORAL at 02:10

## 2023-10-26 RX ADMIN — IOHEXOL 100 ML: 350 INJECTION, SOLUTION INTRAVENOUS at 11:10

## 2023-10-26 NOTE — PROVIDER PROGRESS NOTES - EMERGENCY DEPT.
ED [place] of Care Note  2:26 PM 10/26/2023  Immanuel Bernal is a 57 y.o. male who presented to the ED on 10/26/2023 and has been managed by , who reports patient C/O facial droop. I assumed care of patient from off-going ED physician team at 2:26 PM pending MRI brain.    On my evaluation, Immanuel Bernal appears well and is hemodynamically stable. Thus far, Immanuel Bernal has received:  Medications   cefTRIAXone (ROCEPHIN) 1 g in dextrose 5 % in water (D5W) 100 mL IVPB (MB+) (1 g Intravenous New Bag 10/26/23 1415)   iohexoL (OMNIPAQUE 350) injection 100 mL (100 mLs Intravenous Given 10/26/23 1137)   azithromycin tablet 500 mg (500 mg Oral Given 10/26/23 1413)       On my exam, I appreciate:  /64 (BP Location: Right arm, Patient Position: Lying)   Pulse 82   Temp 98.3 °F (36.8 °C) (Oral)   Resp 20   SpO2 96%     ED Course as of 10/26/23 1426   Thu Oct 26, 2023   1159 WBC: 8.29 [DC]   1159 Hemoglobin(!): 8.3 [DC]   1159 Platelet Count: 177 [DC]   1255 Creatinine(!): 5.0  Steadily increasing from 2.5 one month ago [DC]   1345 X-Ray Chest AP Portable  Right-sided diffuse infiltrate per my independent interpretation.     [DC]      ED Course User Index  [DC] Joby Tee MD        Additional ED course:  Discussed with patient's family member/POA at bedside.  She is concerned about his left eye, he had enucleation recently and she had discussed this with his ophthalmologist who recommended evaluation in the ED. MRI shows no acute stroke.  Patient admitted to hospital medicine for worsening kidney function/mild hyperkalemia as well as pneumonia.    Disposition:  Admit  I have discussed and counseled Immanuel Bernal regarding exam, results, diagnosis, treatment, and plan.  ______________________  Lon Torres MD   Emergency Medicine Physician  10/26/2023

## 2023-10-26 NOTE — PROGRESS NOTES
SUBJECTIVE     Chief Complaint   Patient presents with    ER follow up       HPI  Immanuel Bernal is a 57 y.o. 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     Patient coming in today for recent hospital discharge.  Was admitted for an extended period from August to September 2023 for left-sided endophthalmitis.  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  Was also found to have DVTs in the right IJ, right subclavian, and right axillary veins and was started on Eliquis.  Stay was also complicated by GI bleed requiring 3 units packed red blood cells that was caused by duodenal ulcer demonstrated on EGD.  Patient has undergone an extensive course of IV antibiotics.    Patient presents for a hospital follow-up examination.  He is accompanied today by his sister Marley who is his power of .  Patient lives in a nursing home in [place], [place].     Patient's sister raises concern that he has left facial droop.  Patient has a history of CVA with right hemiparesis.  States that he was last seen normal yesterday evening.  Patient is status post left eye enucleation during his hospitalization, but she states that his left lip is drooping.  Patient denies any numbness in his face or body, no sudden worsening of weakness on the right side.  Vital signs stable.  PAST MEDICAL HISTORY:  Past Medical History:   Diagnosis Date    Bilateral retinal detachment 7/18/2023    BPH (benign prostatic hyperplasia)     Cataract     CHF (congestive heart  failure)     CKD (chronic kidney disease) stage 3, GFR 30-59 ml/min     Diabetes mellitus, type 2     Hypertension     KENIA (obstructive sleep apnea)     Pancreatitis     Persistent proteinuria 11/12/2020    2 g proteinuria noted Resumed ACE Lower BP systolic to less than 130     PTSD (post-traumatic stress disorder)     Stroke        PAST SURGICAL HISTORY:  Past Surgical History:   Procedure Laterality Date    CATARACT EXTRACTION Bilateral     ENUCLEATION Left 8/30/2023    Procedure: ENUCLEATION, EYE;  Surgeon: Vicki Stewart MD;  Location: [facility] [address] [place] [address];  Service: Ophthalmology;  Laterality: Left;    ESOPHAGOGASTRODUODENOSCOPY N/A 8/23/2023    Procedure: EGD (ESOPHAGOGASTRODUODENOSCOPY);  Surgeon: Bharath Ya MD;  Location: [facility] (2ND FLR);  Service: Endoscopy;  Laterality: N/A;    EVISCERATION OF OCULAR CONTENTS Left 8/16/2023    Procedure: EVISCERATION, OCULAR CONTENTS;  Surgeon: Vicki Stewart MD;  Location: [address];  Service: Ophthalmology;  Laterality: Left;    MAGNETIC RESONANCE IMAGING N/A 8/26/2023    Procedure: MRI (Magnetic Resonance Imagine);  Surgeon: Mckenzie De Jesus;  [facility]: [facility];  Service: Anesthesiology;  Laterality: N/A;    RETROBULBAR INJECTION OF MEDICATION Left 8/16/2023    Procedure: INJECTION, MEDICATION, RETROBULBAR;  Surgeon: Vicki Stewart MD;  Location: [address];  Service: Ophthalmology;  Laterality: Left;    RETROBULBAR INJECTION OF MEDICATION Left 8/30/2023    Procedure: INJECTION, MEDICATION, RETROBULBAR;  Surgeon: Vicki Stewart MD;  Location: [address];  Service: Ophthalmology;  Laterality: Left;    TARSORRHAPHY Left 8/16/2023    Procedure: BLEPHARORRHAPHY;  Surgeon: Vicki Stewart MD;  Location: [address];  Service: Ophthalmology;  Laterality: Left;    TARSORRHAPHY Left 8/30/2023    Procedure: BLEPHARORRHAPHY;  Surgeon: Vicki Stewart MD;  Location: [address];  Service: Ophthalmology;  Laterality: Left;       FAMILY  HISTORY:  Family History   Problem Relation Age of Onset    Stroke Mother     Diabetes Father     Heart disease Father     Kidney disease Father     Diabetes Sister     Hyperlipidemia Brother     Amblyopia Neg Hx     Blindness Neg Hx     Cataracts Neg Hx     Glaucoma Neg Hx     Macular degeneration Neg Hx     Strabismus Neg Hx     Retinal detachment Neg Hx        ALLERGIES AND MEDICATIONS: updated and reviewed.  Review of patient's allergies indicates:   Allergen Reactions    Morphine Rash    Amiodarone analogues Itching     Other reaction(s): Unknown     Current Outpatient Medications   Medication Sig Dispense Refill    acetaminophen (TYLENOL) 325 MG tablet Take 650 mg by mouth every 6 (six) hours as needed for Pain.      albuterol-ipratropium (DUO-NEB) 2.5 mg-0.5 mg/3 mL nebulizer solution Take 3 mLs by nebulization 3 (three) times daily as needed for Wheezing or Shortness of Breath. 75 mL 0    amLODIPine (NORVASC) 10 MG tablet Take 1 tablet (10 mg total) by mouth once daily. 30 tablet 11    ascorbic acid, vitamin C, (VITAMIN C) 500 MG tablet Take 500 mg by mouth once daily.      aspirin 81 MG Chew Take 81 mg by mouth once daily.      atorvastatin (LIPITOR) 40 MG tablet 1 tablet (40 mg total) by Per G Tube route once daily. 90 tablet 3    calcitRIOL (ROCALTROL) 0.25 MCG Cap Take 1 capsule (0.25 mcg total) by mouth once daily.      calcium carbonate-vitamin D3 (OYSTER SHELL CALCIUM-VIT D3) 500 mg-5 mcg (200 unit) PwPk Take 1 tablet by mouth 2 (two) times a day.      carvediloL (COREG) 25 MG tablet 1 tablet (25 mg total) by Per G Tube route 2 (two) times daily. 60 tablet 11    folic acid (FOLVITE) 1 MG tablet Take 1 tablet by mouth every morning.      isosorbide-hydrALAZINE 20-37.5 mg (BIDIL) 20-37.5 mg Tab Take 2 tablets by mouth 3 (three) times daily. 90 tablet 11    Lactobacillus rhamnosus GG (CULTURELLE) 10 billion cell capsule 1 capsule by Per G Tube route 2 (two) times daily.      megestroL (MEGACE) 400 mg/10  mL (40 mg/mL) Susp Take 10 mLs by mouth 2 (two) times a day.      melatonin (MELATIN) 3 mg tablet Take 2 tablets (6 mg total) by mouth nightly.  0    multivitamin (THERAGRAN) per tablet Take 1 tablet by mouth once daily.      ondansetron (ZOFRAN-ODT) 8 MG TbDL Take 1 tablet (8 mg total) by mouth every 6 (six) hours as needed.      oxybutynin (DITROPAN-XL) 10 MG 24 hr tablet Take 1 tablet (10 mg total) by mouth once daily. 30 tablet 11    pantoprazole (PROTONIX) 40 mg suspension 1 packet (40 mg total) by Per G Tube route 2 (two) times daily. 60 packet 11    polyethylene glycol (GLYCOLAX) 17 gram/dose powder Take 17 g by mouth 2 (two) times daily.      senna-docusate 8.6-50 mg (PERICOLACE) 8.6-50 mg per tablet 1 tablet by Per G Tube route 2 (two) times daily.      sevelamer carbonate (RENVELA) 0.8 gram PwPk 1 packet (0.8 g total) by Per G Tube route 3 (three) times daily with meals. 90 packet 11    tamsulosin (FLOMAX) 0.4 mg Cap Take 1 capsule (0.4 mg total) by mouth once daily. 30 capsule 6    tobramycin-dexAMETHasone 0.3-0.1% (TOBRADEX) 0.3-0.1 % Oint Place into the left eye 3 (three) times daily. 3.5 g 1    valsartan (DIOVAN) 160 MG tablet Take 1 tablet (160 mg total) by mouth 2 (two) times daily. 180 tablet 3    VITAMIN B COMPLEX ORAL Take 1 tablet by mouth every morning.      zinc gluconate 50 mg tablet Take 50 mg by mouth once daily.       No current facility-administered medications for this visit.       ROS  Review of Systems      OBJECTIVE     Physical Exam  There were no vitals filed for this visit. There is no height or weight on file to calculate BMI.            Physical Exam  Vitals reviewed.   Constitutional:       General: He is not in acute distress.     Appearance: Normal appearance.   HENT:      Head: Normocephalic and atraumatic.      Mouth/Throat:      Mouth: Mucous membranes are moist.      Pharynx: Oropharynx is clear.   Eyes:      Extraocular Movements: Extraocular movements intact.       Conjunctiva/sclera: Conjunctivae normal.      Pupils: Pupils are equal, round, and reactive to light.   Cardiovascular:      Rate and Rhythm: Normal rate and regular rhythm.      Pulses: Normal pulses.      Heart sounds: Normal heart sounds.   Pulmonary:      Effort: Pulmonary effort is normal.      Breath sounds: Normal breath sounds.   Abdominal:      General: There is no distension.   Musculoskeletal:         General: Normal range of motion.      Cervical back: Normal range of motion and neck supple.   Skin:     General: Skin is warm and dry.   Neurological:      General: No focal deficit present.      Mental Status: He is alert.      Comments: Left corner of lip with noticeable, droop. Left nasolabial fold noticeably more shallow when patient asked to smile. Has drooping over left eye, but s/p left eye enucleation in 9/2023. Sensation over face intact. No pronator drift.            Health Maintenance         Date Due Completion Date    Hepatitis C Screening Never done ---    COVID-19 Vaccine (1) Never done ---    HIV Screening Never done ---    TETANUS VACCINE Never done ---    Colorectal Cancer Screening Never done ---    Shingles Vaccine (1 of 2) Never done ---    Influenza Vaccine (1) 09/01/2023 11/14/2000    Lipid Panel 11/09/2025 11/9/2020              ASSESSMENT     57 y.o. male with     1. Facial droop    2. History of CVA with residual deficit    3. Anemia in chronic kidney disease, on chronic dialysis        PLAN:     1. Facial droop  - acute left-sided facial droop in patient with prior history of CVA with right residual deficit.  Last known normal on the evening of 10/25/2023 as reported by sister.  - patient referred to the ED for further evaluation and workup.  Ambulance called.  Report given to ED triage nurse.  Patient and sister verbalized understanding and were in agreement with plan.    2. History of CVA with residual deficit  - As per #1.     3. Anemia in chronic kidney disease, on chronic  dialysis  - Ambulatory referral/consult to Internal Medicine        RTC following dc from ED/Hospital.        Kevan Atwood MD  Family Medicine  [facility]  10/26/2023    This document was created using voice recognition software ([employer]). Although it may be edited, this document may contain errors related to incorrect recognition of the spoken word. Please call the physician if clarification is needed.       No follow-ups on file.

## 2023-10-26 NOTE — ASSESSMENT & PLAN NOTE
57 year old male w/ multiple medical problems including prior stroke w/ residual RSW and recent endophthalmitis s/p L eye evisceration who presents for left facial droop noted by sister earlier this morning. Unclear exact time of onset but was last seen normal 8pm on 10/25. Appears to be largely resolved at time of exam and patient does not feel his face is any different from baseline. Difficult to differentiate possible facial weakness due to central cause vs peripheral cause given recent removal of L eye. CTH/CTA w/o acute hemorrhage, LVO, or major vascular distribution infarct.    -MRI brain pending, stroke team will f/u results and provide further recommendations at that time

## 2023-10-26 NOTE — ED NOTES
Nurses Note -- 4 Eyes      10/26/2023   12:00 PM      Skin assessed during: Admit      [x] No Altered Skin Integrity Present    []Prevention Measures Documented      [] Yes- Altered Skin Integrity Present or Discovered   [] LDA Added if Not in Epic (Describe Wound)   [] New Altered Skin Integrity was Present on Admit and Documented in LDA   [] Wound Image Taken    Wound Care Consulted? No    Attending Nurse:  Porsha Marion RN/Staff Member:   Rylie

## 2023-10-26 NOTE — ASSESSMENT & PLAN NOTE
57 year old male w/ multiple medical problems including prior stroke w/ residual RSW and recent endophthalmitis s/p L eye evisceration who presents for left facial droop noted by sister earlier this morning. Unclear exact time of onset but was last seen normal 8pm on 10/25. Appears to be largely resolved at time of exam and patient does not feel his face is any different from baseline. Difficult to differentiate possible facial weakness due to central cause vs peripheral cause given recent removal of L eye. CTH/CTA w/o acute hemorrhage, LVO, or major vascular distribution infarct. MRI Brain without acute ischemia.     Suspect previously noticed facial droop related to ocular issues rather than stroke given negative MRI. Vascular neurology will sign off. Please call with any questions.

## 2023-10-26 NOTE — ED PROVIDER NOTES
Chief Complaint   Facial Droop (Pt arrived via EMS from PCP clinic with c/o left sided drooping. Pt had left eye removed last month, he was at clinic for a follow up)      History Of Present Illness   Immanuel Bernal is a 57 y.o. male presenting with right facial droop that family felt appeared new this morning when they went to clinic to be seen.  The patient has a history of old right-sided stroke.  Denies any new numbness or weakness in his arms or legs.  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  No chest pain or shortness of breath.        Review of patient's allergies indicates:   Allergen Reactions    Morphine Rash    Amiodarone analogues Itching     Other reaction(s): Unknown       No current facility-administered medications on file prior to encounter.     Current Outpatient Medications on File Prior to Encounter   Medication Sig Dispense Refill    acetaminophen (TYLENOL) 325 MG tablet Take 650 mg by mouth every 6 (six) hours as needed for Pain.      albuterol-ipratropium (DUO-NEB) 2.5 mg-0.5 mg/3 mL nebulizer solution Take 3 mLs by nebulization 3 (three) times daily as needed for Wheezing or Shortness of Breath. 75 mL 0    amLODIPine (NORVASC) 10 MG tablet Take 1 tablet (10 mg total) by mouth once daily. 30 tablet 11    ascorbic acid, vitamin C, (VITAMIN C) 500 MG tablet Take 500 mg by mouth once daily.      aspirin 81 MG Chew Take 81 mg by mouth once daily.      atorvastatin (LIPITOR) 40 MG tablet 1 tablet (40 mg total) by Per G Tube route once daily. 90 tablet 3    calcitRIOL (ROCALTROL) 0.25 MCG Cap Take 1 capsule (0.25 mcg total) by mouth once daily.      calcium carbonate-vitamin D3 (OYSTER SHELL CALCIUM-VIT D3) 500 mg-5 mcg (200 unit) PwPk Take 1 tablet by mouth 2 (two) times a day.      carvediloL (COREG) 25 MG tablet 1 tablet (25 mg total) by Per G Tube route 2 (two) times daily. 60 tablet 11    folic acid (FOLVITE) 1 MG tablet Take 1 tablet by mouth every morning.      isosorbide-hydrALAZINE  20-37.5 mg (BIDIL) 20-37.5 mg Tab Take 2 tablets by mouth 3 (three) times daily. 90 tablet 11    Lactobacillus rhamnosus GG (CULTURELLE) 10 billion cell capsule 1 capsule by Per G Tube route 2 (two) times daily.      megestroL (MEGACE) 400 mg/10 mL (40 mg/mL) Susp Take 10 mLs by mouth 2 (two) times a day.      melatonin (MELATIN) 3 mg tablet Take 2 tablets (6 mg total) by mouth nightly.  0    multivitamin (THERAGRAN) per tablet Take 1 tablet by mouth once daily.      ondansetron (ZOFRAN-ODT) 8 MG TbDL Take 1 tablet (8 mg total) by mouth every 6 (six) hours as needed.      oxybutynin (DITROPAN-XL) 10 MG 24 hr tablet Take 1 tablet (10 mg total) by mouth once daily. 30 tablet 11    pantoprazole (PROTONIX) 40 mg suspension 1 packet (40 mg total) by Per G Tube route 2 (two) times daily. 60 packet 11    polyethylene glycol (GLYCOLAX) 17 gram/dose powder Take 17 g by mouth 2 (two) times daily.      senna-docusate 8.6-50 mg (PERICOLACE) 8.6-50 mg per tablet 1 tablet by Per G Tube route 2 (two) times daily.      sevelamer carbonate (RENVELA) 0.8 gram PwPk 1 packet (0.8 g total) by Per G Tube route 3 (three) times daily with meals. 90 packet 11    tamsulosin (FLOMAX) 0.4 mg Cap Take 1 capsule (0.4 mg total) by mouth once daily. 30 capsule 6    tobramycin-dexAMETHasone 0.3-0.1% (TOBRADEX) 0.3-0.1 % Oint Place into the left eye 3 (three) times daily. 3.5 g 1    valsartan (DIOVAN) 160 MG tablet Take 1 tablet (160 mg total) by mouth 2 (two) times daily. 180 tablet 3    VITAMIN B COMPLEX ORAL Take 1 tablet by mouth every morning.      zinc gluconate 50 mg tablet Take 50 mg by mouth once daily.         Past History   As per HPI and below:  Past Medical History:   Diagnosis Date    Bilateral retinal detachment 7/18/2023    BPH (benign prostatic hyperplasia)     Cataract     CHF (congestive heart failure)     CKD (chronic kidney disease) stage 3, GFR 30-59 ml/min     Diabetes mellitus, type 2     Hypertension     KENIA (obstructive  sleep apnea)     Pancreatitis     Persistent proteinuria 11/12/2020    2 g proteinuria noted Resumed ACE Lower BP systolic to less than 130     PTSD (post-traumatic stress disorder)     Stroke      Past Surgical History:   Procedure Laterality Date    CATARACT EXTRACTION Bilateral     ENUCLEATION Left 8/30/2023    Procedure: ENUCLEATION, EYE;  Surgeon: Vicki Stewart MD;  Location: [facility] OR [address] [place] [address];  Service: Ophthalmology;  Laterality: Left;    ESOPHAGOGASTRODUODENOSCOPY N/A 8/23/2023    Procedure: EGD (ESOPHAGOGASTRODUODENOSCOPY);  Surgeon: Bharath Ya MD;  Location: [facility] ENDO (2ND FLR);  Service: Endoscopy;  Laterality: N/A;    EVISCERATION OF OCULAR CONTENTS Left 8/16/2023    Procedure: EVISCERATION, OCULAR CONTENTS;  Surgeon: Vicki Stewart MD;  Location: [address];  Service: Ophthalmology;  Laterality: Left;    MAGNETIC RESONANCE IMAGING N/A 8/26/2023    Procedure: MRI (Magnetic Resonance Imagine);  Surgeon: Mckenzie De Jesus;  Location: [facility];  Service: Anesthesiology;  Laterality: N/A;    RETROBULBAR INJECTION OF MEDICATION Left 8/16/2023    Procedure: INJECTION, MEDICATION, RETROBULBAR;  Surgeon: Vicki Stewart MD;  Location: [address];  Service: Ophthalmology;  Laterality: Left;    RETROBULBAR INJECTION OF MEDICATION Left 8/30/2023    Procedure: INJECTION, MEDICATION, RETROBULBAR;  Surgeon: Vicki Stewart MD;  Location: [address];  Service: Ophthalmology;  Laterality: Left;    TARSORRHAPHY Left 8/16/2023    Procedure: BLEPHARORRHAPHY;  Surgeon: Vicki Stewart MD;  Location: [address];  Service: Ophthalmology;  Laterality: Left;    TARSORRHAPHY Left 8/30/2023    Procedure: BLEPHARORRHAPHY;  Surgeon: Vciki Stewart MD;  Location: [address];  Service: Ophthalmology;  Laterality: Left;       Social History     Socioeconomic History    Marital status: Single   Tobacco Use    Smoking status: Former     Types: Cigarettes, Cigars    Smokeless tobacco: Never   Substance and  Sexual Activity    Alcohol use: Not Currently    Drug use: Not Currently     Social Determinants of Health     Financial Resource Strain: Low Risk  (8/10/2023)    Overall Financial Resource Strain (CARDIA)     Difficulty of Paying Living Expenses: Not very hard   Food Insecurity: No Food Insecurity (8/10/2023)    Hunger Vital Sign     Worried About Running Out of Food in the Last Year: Never true     Ran Out of Food in the Last Year: Never true   Transportation Needs: No Transportation Needs (8/10/2023)    PRAPARE - Transportation     Lack of Transportation (Medical): No     Lack of Transportation (Non-Medical): No   Physical Activity: Insufficiently Active (8/10/2023)    Exercise Vital Sign     Days of Exercise per Week: 5 days     Minutes of Exercise per Session: 20 min   Stress: Stress Concern Present (8/10/2023)    [employer] - Occupational Stress Questionnaire     Feeling of Stress : Rather much   Social Connections: Socially Isolated (8/10/2023)    Social Connection and Isolation Panel [NHANES]     Frequency of Communication with Friends and Family: Twice a week     Frequency of Social Gatherings with Friends and Family: Once a week     Attends [employer] Services: Never     Active Member of Clubs or Organizations: No     Attends Club or Organization Meetings: Never     Marital Status: Never    Housing Stability: Unknown (8/10/2023)    Housing Stability Vital Sign     Unable to Pay for Housing in the Last Year: No     Unstable Housing in the Last Year: No       Family History   Problem Relation Age of Onset    Stroke Mother     Diabetes Father     Heart disease Father     Kidney disease Father     Diabetes Sister     Hyperlipidemia Brother     Amblyopia Neg Hx     Blindness Neg Hx     Cataracts Neg Hx     Glaucoma Neg Hx     Macular degeneration Neg Hx     Strabismus Neg Hx     Retinal detachment Neg Hx        Physical Exam     Vitals:    10/26/23 1232 10/26/23 1302 10/26/23  1332 10/26/23 1402   BP: 136/71 139/77 (!) 143/75 131/64   BP Location: Right arm Right arm Right arm Right arm   Patient Position: Lying Lying Lying Lying   Pulse: 79 80 80 82   Resp: 18 20 19 20   Temp:   98.3 °F (36.8 °C)    TempSrc:   Oral    SpO2: 97% 96% 97% 96%     Appearance: No acute distress.  Skin: No rashes seen.  Good turgor.  No abrasions.  No ecchymoses.  Eyes: Left eye removed.   Chest: Clear to auscultation bilaterally.  Good air movement.  No wheezes.  No rhonchi.  Cardiovascular: Regular rate and rhythm.  No murmurs. No gallops. No rubs.  Abdomen: Soft.  Not distended.  Nontender.  No guarding.  No rebound.  Musculoskeletal: Good range of motion all joints.  No deformities.  Neck supple.  No meningismus.  Neurologic: Slight right facial droop.  Right-sided weakness, chronic.  Mental Status:  Alert and oriented x 3.  Appropriate, conversant.      Initial MDM   Facial droop that is worse today according to family in this patient with history of stroke.  Given acute onset, normal last night, will activate code stroke protocol.  Disposition per stroke team recommendations.        Medications Given     Medications   cefTRIAXone (ROCEPHIN) 1 g in dextrose 5 % in water (D5W) 100 mL IVPB (MB+) (1 g Intravenous New Bag 10/26/23 1415)   iohexoL (OMNIPAQUE 350) injection 100 mL (100 mLs Intravenous Given 10/26/23 1137)   azithromycin tablet 500 mg (500 mg Oral Given 10/26/23 1413)       Results and Course     Labs Reviewed   CBC W/ AUTO DIFFERENTIAL - Abnormal; Notable for the following components:       Result Value    RBC 2.48 (*)     Hemoglobin 8.3 (*)     Hematocrit 26.4 (*)      (*)     MCH 33.5 (*)     MCHC 31.4 (*)     RDW 15.3 (*)     Lymph # 0.6 (*)     Gran % 81.7 (*)     Lymph % 6.9 (*)     All other components within normal limits    Narrative:     Release to patient->Immediate   COMPREHENSIVE METABOLIC PANEL - Abnormal; Notable for the following components:    Potassium 5.5 (*)     BUN 56  (*)     Creatinine 5.0 (*)     Albumin 2.8 (*)     eGFR 12.6 (*)     All other components within normal limits    Narrative:     Release to patient->Immediate   LIPID PANEL - Abnormal; Notable for the following components:    Cholesterol 109 (*)     LDL Cholesterol 52.8 (*)     All other components within normal limits    Narrative:     Release to patient->Immediate   ISTAT CREATININE - Abnormal; Notable for the following components:    POC Creatinine 5.5 (*)     All other components within normal limits   HIV 1 / 2 ANTIBODY    Narrative:     Release to patient->Immediate   HEPATITIS C ANTIBODY    Narrative:     Release to patient->Immediate   PROTIME-INR    Narrative:     Release to patient->Immediate   TSH    Narrative:     Release to patient->Immediate   POCT GLUCOSE, HAND-HELD DEVICE   POCT GLUCOSE       Imaging Results              X-Ray Chest AP Portable (Final result)  Result time 10/26/23 13:51:36      Final result by Richi Stovall III, MD (10/26/23 13:51:36)                   Impression:      Pulmonary edema pneumonia aspiration or sepsis.      Electronically signed by: Richi Stovall MD  Date:    10/26/2023  Time:    13:51               Narrative:    EXAMINATION:  XR CHEST AP PORTABLE    CLINICAL HISTORY:  Weakness    FINDINGS:  Chest one view portable.    Central line tip lower SVC.  Heart size is upper normal.  There is aortic plaque.  There is moderate edema.                                        CTA STROKE MULTI-PHASE (Final result)  Result time 10/26/23 11:52:56      Final result by Guy Mosher MD (10/26/23 11:52:56)                   Impression:      No evidence of acute hemorrhage or major vascular distribution infarct.    Small vessel ischemic change with remote left basal ganglia/corona radiata lacunar type infarct.    Notably asymmetric right cerebellar atrophy.    CT arteriogram demonstrates scattered atherosclerotic calcification as above.  No evidence of high-grade stenosis or  intracranial large vessel occlusion.    Large irregular airspace opacity appearance most suggestive in the partially visualized right lung apex.  Appearance most suggestive of a pneumonia, but nonspecific.  Clinical correlation advised with dedicated imaging of the chest recommended.    Pathologically enlarged right supraclavicular and mediastinal lymph nodes.    Additional incidental findings as above.    This report was flagged in Epic as abnormal.      Electronically signed by: Guy Mosher MD  Date:    10/26/2023  Time:    11:52               Narrative:    EXAMINATION:  CTA STROKE MULTI-PHASE    CLINICAL HISTORY:  Neuro deficit, acute, stroke suspected;    TECHNIQUE:  Axial CT images obtained throughout the region of the head before the administration of intravenous contrast.    CT angiogram was performed through the cervical and intracranial vasculature during the IV bolus administration of 140mL of Omnipaque 350.  Additional phases through the intracranial vasculature via multiphase technique, although timing out of order, requiring a repeat acquisition of neck..    Multiplanar MPR and MIP reformats were performed.    CT source data was analyzed using artificial intelligence software for detection of a large vessel occlusions (LVO) in order to enable computer assisted triage notification and aid clinical stroke decision making.    COMPARISON:  MRI 08/26/2023    FINDINGS:  Remote lacunar-type infarct extending from the dorsal left putamen into the corona radiata.  Mild chronic small vessel ischemic change in the periventricular supratentorial white matter.  No parenchymal changes of an acute major vascular distribution infarct.    Asymmetric right cerebellar atrophy.    No evidence of acute parenchymal hemorrhage.  No significant intracranial mass effect or midline shift.    No extra-axial blood or fluid collections.    The cranium appears intact.    Mastoid air cells are clear. Mild patchy mucosal thickening  in the visualized paranasal sinuses.    Postsurgical change left globe.    Large irregular opacity in the partially visualized right lung apex.  This spans over 7 cm, crosses the major fissure involves the upper and lower lobes.    Enlarged mediastinal lymph nodes including lesion in the prevascular space.    Enlarged right supraclavicular lymph node.    Endodontal disease.    Mild cervical spondylosis.    Large-bore central venous catheter right IJ.      CTA:    The aortic arch demonstrates mild atherosclerotic calcification.  No significant stenosis at the major branch vessel origins.    The right common carotid artery is normal in caliber.  Calcification without significant luminal narrowing at the carotid bifurcation.The cervical right internal carotid artery is normal in caliber.  Calcification throughout the cavernous segment without significant luminal narrowing.    The left common carotid artery is normal in caliber. Calcification without significant luminal narrowing at the carotid bifurcation.The cervical left internal carotid artery is normal in caliber. Calcification throughout the cavernous segment without significant luminal narrowing.    The right vertebral artery is normal in caliber.  Scattered atherosclerotic calcification in intracranial segment without significant luminal narrowing    The left vertebral artery is normal in caliber.    Basilar artery is within normal limits without focal abnormality.    The proximal anterior, middle, and posterior cerebral arteries are within normal limits.  No evidence of significant stenosis, focal occlusion, or intracranial aneurysm.                                      ED Course as of 10/26/23 1416   Thu Oct 26, 2023   1159 WBC: 8.29 [DC]   1159 Hemoglobin(!): 8.3 [DC]   1159 Platelet Count: 177 [DC]   1255 Creatinine(!): 5.0  Steadily increasing from 2.5 one month ago [DC]   1345 X-Ray Chest AP Portable  Right-sided diffuse infiltrate per my independent  interpretation.     [DC]      ED Course User Index  [DC] Joby Tee MD     Critical Care   Date: 10/26/2023  Performed by: Joby Tee MD   Authorized by: Joby Tee MD    Total critical care time (exclusive of procedural time) : 40 minutes  Critical care was necessary to treat or prevent imminent or life-threatening deterioration of the following conditions:  stroke activation        MDM, Impression and Plan   57 y.o. male with acute facial droop.  CTA does not show anything obvious, stroke team would like MRI to make final disposition.  Workup also shows worsening renal function and right-sided infiltrates.  Antibiotics given.  Plan admission to either stroke team or hospital medicine depending on results of MRI.  Turned over to Dr. Torres pending results.         Final diagnoses:  [I63.9] Stroke  [R53.1] Weakness  [R29.810] Facial droop (Primary)  [N17.9, N18.9] Acute renal failure superimposed on chronic kidney disease, unspecified acute renal failure type, unspecified CKD stage  [R91.8] Infiltrate of right lung present on chest x-ray                 Joby Tee MD  10/26/23 1413

## 2023-10-26 NOTE — CONSULTS
[facility]  Vascular Neurology  [facility]  Consult Note    Inpatient consult to Vascular (Stroke) Neurology  Consult performed by: Nikhil Archer MD  Consult ordered by: Joby Tee MD        Assessment/Plan:     Patient is a 57 y.o. year old male with:    Facial droop  57 year old male w/ multiple medical problems including prior stroke w/ residual RSW and recent endophthalmitis s/p L eye evisceration who presents for left facial droop noted by sister earlier this morning. Unclear exact time of onset but was last seen normal 8pm on 10/25. Appears to be largely resolved at time of exam and patient does not feel his face is any different from baseline. Difficult to differentiate possible facial weakness due to central cause vs peripheral cause given recent removal of L eye. CTH/CTA w/o acute hemorrhage, LVO, or major vascular distribution infarct. MRI Brain without acute ischemia.     Suspect previously noticed facial droop related to ocular issues rather than stroke given negative MRI. Vascular neurology will sign off. Please call with any questions.        STROKE DOCUMENTATION     Patient arrived to [facility] ED at 1101 AM however stroke team was not contacted. I noted a consult placed on the list and contacted the ED at 11:30 AM. They stated that they had forgotten to contact the stroke team about the stroke code. I arrived to the patient's room at 11:37 AM.    Acute Stroke Times   Last Known Normal Date: 10/25/23  Last Known Normal Time: 2000  Symptom Onset Date: 10/26/23  Unknown Symptom Onset Time: Unknown Time  Stroke Team Called Date:  (stroke team not called; I contacted ED after seeing new consult on the list on 10/26/23)  Stroke Team Called Time:  (I contacted the ED at 1130 AM)  Stroke Team Arrival Date: 10/26/23  Stroke Team Arrival Time: 1137  CT Interpretation Time: 1140  Thrombolytic Therapy Recommended: No  CTA Interpretation Time: 1145  Thrombectomy  Recommended: No    NIH Scale:  Interval: baseline  1a. Level of Consciousness: 0-->Alert, keenly responsive  1b. LOC Questions: 0-->Answers both questions correctly  1c. LOC Commands: 0-->Performs both tasks correctly  2. Best Gaze: 0-->Normal  3. Visual: 0-->No visual loss  4. Facial Palsy: 1-->Minor paralysis (flattened nasolabial fold, asymmetry on smiling)  5a. Motor Arm, Left: 0-->No drift, limb holds 90 (or 45) degrees for full 10 secs  5b. Motor Arm, Right: 0-->No drift, limb holds 90 (or 45) degrees for full 10 secs  6a. Motor Leg, Left: 0-->No drift, leg holds 30 degree position for full 5 secs  6b. Motor Leg, Right: 1-->Drift, leg falls by the end of the 5-sec period but does not hit bed  7. Limb Ataxia: 0-->Absent  8. Sensory: 0-->Normal, no sensory loss  9. Best Language: 0-->No aphasia, normal  10. Dysarthria: 1-->Mild-to-moderate dysarthria, patient slurs at least some words and, at worst, can be understood with some difficulty  11. Extinction and Inattention (formerly Neglect): 0-->No abnormality  Total (NIH Stroke Scale): 3    Modified [place] Score: 2  Bc Coma Scale:15   ABCD2 Score:    [id number] Score:   HAS -BLED Score:   ICH Score:   Hunt & Carlson Classification:       Thrombolysis Candidate? No, Out of window - Symptom onset > 4.5 hours, Non-disabling symptoms - Low NIHSS , Strong suspicion for stroke mimic or alternative diagnosis     Delays to Thrombolysis?  Not Applicable    Interventional Revascularization Candidate?   Is the patient eligible for mechanical endovascular reperfusion (SANDRA)?  No; at this time symptoms not suggestive of large vessel occlusion    Delays to Thrombectomy? Not Applicable    Hemorrhagic change of an Ischemic Stroke: Does this patient have an ischemic stroke with hemorrhagic changes? No     Subjective:     History of Present Illness:  Mr. Bernal is a 57 year old male PMH of ESRD on HD, HFrEF 30-35%, DM2, HTN, bowel obstruction s/p bowel resection, KENIA, CVA s/p  PEG placement (tolerates PO diet), Afib (no AC due to GIB), thrombus of RUE started on Eliquis (stopped due to GIB), RUE AVG infection s/p excision, andrecent extensive hospitalizations at [facility] and [facility] for endophthalmitis/orbital cellulitis/meningitis s/p left eye evisceration on 8/30 who presents on 10/26 for LFD noticed by patient's sister. She accompanied him to a follow up appt earlier today and at that time she noticed the L side of his face seemed to be drooping more than usual. Patient was sent to the ED from his appt and stroke code was called. LKN was yesterday evening at 8pm when she saw him. Patient denies noticing any difference in his facial expression. Denies any other acute onset neuro symptoms (numbness, weakness, vision loss, slurred speech).            Past Medical History:   Diagnosis Date    Bilateral retinal detachment 7/18/2023    BPH (benign prostatic hyperplasia)     Cataract     CHF (congestive heart failure)     CKD (chronic kidney disease) stage 3, GFR 30-59 ml/min     Diabetes mellitus, type 2     Hypertension     KENIA (obstructive sleep apnea)     Pancreatitis     Persistent proteinuria 11/12/2020    2 g proteinuria noted Resumed ACE Lower BP systolic to less than 130     PTSD (post-traumatic stress disorder)     Stroke      Past Surgical History:   Procedure Laterality Date    CATARACT EXTRACTION Bilateral     ENUCLEATION Left 8/30/2023    Procedure: ENUCLEATION, EYE;  Surgeon: Vicki Stewart MD;  Location: [address];  Service: Ophthalmology;  Laterality: Left;    ESOPHAGOGASTRODUODENOSCOPY N/A 8/23/2023    Procedure: EGD (ESOPHAGOGASTRODUODENOSCOPY);  Surgeon: Bharath Ya MD;  Location: [facility]);  Service: Endoscopy;  Laterality: N/A;    EVISCERATION OF OCULAR CONTENTS Left 8/16/2023    Procedure: EVISCERATION, OCULAR CONTENTS;  Surgeon: Vicki Stewart MD;  Location: [address];  Service: Ophthalmology;  Laterality: Left;    MAGNETIC RESONANCE IMAGING N/A  8/26/2023    Procedure: MRI (Magnetic Resonance Imagine);  Surgeon: Mckenzie De Jesus;  Location: [facility];  Service: Anesthesiology;  Laterality: N/A;    RETROBULBAR INJECTION OF MEDICATION Left 8/16/2023    Procedure: INJECTION, MEDICATION, RETROBULBAR;  Surgeon: Vicki Stewart MD;  [facility];  Service: Ophthalmology;  Laterality: Left;    RETROBULBAR INJECTION OF MEDICATION Left 8/30/2023    Procedure: INJECTION, MEDICATION, RETROBULBAR;  Surgeon: Vicki Stewart MD;  [facility];  Service: Ophthalmology;  Laterality: Left;    TARSORRHAPHY Left 8/16/2023    Procedure: BLEPHARORRHAPHY;  Surgeon: Vicki Stewart MD;  Location: [facility] OR [address] [place] [address];  Service: Ophthalmology;  Laterality: Left;    TARSORRHAPHY Left 8/30/2023    Procedure: BLEPHARORRHAPHY;  Surgeon: Vicki Stewart MD;  Location: [facility] OR [address] [place] [address];  Service: Ophthalmology;  Laterality: Left;     COMPLETED  Family History   Problem Relation Age of Onset    Stroke Mother     Diabetes Father     Heart disease Father     Kidney disease Father     Diabetes Sister     Hyperlipidemia Brother     Amblyopia Neg Hx     Blindness Neg Hx     Cataracts Neg Hx     Glaucoma Neg Hx     Macular degeneration Neg Hx     Strabismus Neg Hx     Retinal detachment Neg Hx      Social History     Tobacco Use    Smoking status: Former     Types: Cigarettes, Cigars    Smokeless tobacco: Never   Substance Use Topics    Alcohol use: Not Currently    Drug use: Not Currently     Review of patient's allergies indicates:   Allergen Reactions    Morphine Rash    Amiodarone analogues Itching     Other reaction(s): Unknown       Medications: I have reviewed the current medication administration record.    (Not in a hospital admission)      Review of Systems   Neurological:  Positive for facial asymmetry. Negative for speech difficulty, weakness, numbness and headaches.     Objective:     Vital Signs (Most Recent):  Temp: 98.3 °F (36.8 °C) (10/26/23 1332)  Pulse: 82  (10/26/23 1402)  Resp: 20 (10/26/23 1402)  BP: 131/64 (10/26/23 1402)  SpO2: 96 % (10/26/23 1402)    Vital Signs Range (Last 24H):  Temp:  [98.3 °F (36.8 °C)-99.9 °F (37.7 °C)]   Pulse:  [79-84]   Resp:  [16-20]   BP: (106-143)/(60-77)   SpO2:  [96 %-97 %]        Physical Exam  Constitutional:       General: He is not in acute distress.  Abdominal:      General: Abdomen is flat. There is no distension.   Neurological:      Mental Status: He is alert.   Psychiatric:         Mood and Affect: Mood normal.         Behavior: Behavior normal.              Neurological Exam:   LOC: alert  Attention Span: Good   Language: Naming impaired  Articulation: Dysarthria  Orientation: Person, Place, Time   Visual Fields: Full  L eye absent  EOM (CN III, IV, VI): Full/intact  Pupils (CN II, III): PERRL  Facial Sensation (CN V): Normal  Facial Movement (CN VII): Upper facial weakness on the Left  Motor: Arm left  Normal 5/5  Leg left  Normal 5/5  Arm right  Paresis: 4/5  Leg right Paresis: 4/5  Cerebellum: No evidence of appendicular or axial ataxia  Sensation: intact to light touch  Tone: Normal tone throughout      Laboratory:  CMP:   Recent Labs   Lab 10/26/23  1134   CALCIUM 8.8   ALBUMIN 2.8*   PROT 6.4      K 5.5*   CO2 26   CL 99   BUN 56*   CREATININE 5.0*   ALKPHOS 77   ALT 15   AST 20   BILITOT 0.6     CBC:   Recent Labs   Lab 10/26/23  1134   WBC 8.29   RBC 2.48*   HGB 8.3*   HCT 26.4*      *   MCH 33.5*   MCHC 31.4*       Diagnostic Results:      Brain imaging:  Imaging Results        CTA STROKE MULTI-PHASE (Final result)  Result time 10/26/23 11:52:56      Final result by Guy Mosher MD (10/26/23 11:52:56)                   Impression:      No evidence of acute hemorrhage or major vascular distribution infarct.    Small vessel ischemic change with remote left basal ganglia/corona radiata lacunar type infarct.    Notably asymmetric right cerebellar atrophy.    CT arteriogram demonstrates  scattered atherosclerotic calcification as above.  No evidence of high-grade stenosis or intracranial large vessel occlusion.    Large irregular airspace opacity appearance most suggestive in the partially visualized right lung apex.  Appearance most suggestive of a pneumonia, but nonspecific.  Clinical correlation advised with dedicated imaging of the chest recommended.    Pathologically enlarged right supraclavicular and mediastinal lymph nodes.    Additional incidental findings as above.    This report was flagged in Epic as abnormal.      Electronically signed by: Guy Mosher MD  Date:    10/26/2023  Time:    11:52               Narrative:    EXAMINATION:  CTA STROKE MULTI-PHASE    CLINICAL HISTORY:  Neuro deficit, acute, stroke suspected;    TECHNIQUE:  Axial CT images obtained throughout the region of the head before the administration of intravenous contrast.    CT angiogram was performed through the cervical and intracranial vasculature during the IV bolus administration of 140mL of Omnipaque 350.  Additional phases through the intracranial vasculature via multiphase technique, although timing out of order, requiring a repeat acquisition of neck..    Multiplanar MPR and MIP reformats were performed.    CT source data was analyzed using artificial intelligence software for detection of a large vessel occlusions (LVO) in order to enable computer assisted triage notification and aid clinical stroke decision making.    COMPARISON:  MRI 08/26/2023    FINDINGS:  Remote lacunar-type infarct extending from the dorsal left putamen into the corona radiata.  Mild chronic small vessel ischemic change in the periventricular supratentorial white matter.  No parenchymal changes of an acute major vascular distribution infarct.    Asymmetric right cerebellar atrophy.    No evidence of acute parenchymal hemorrhage.  No significant intracranial mass effect or midline shift.    No extra-axial blood or fluid  collections.    The cranium appears intact.    Mastoid air cells are clear. Mild patchy mucosal thickening in the visualized paranasal sinuses.    Postsurgical change left globe.    Large irregular opacity in the partially visualized right lung apex.  This spans over 7 cm, crosses the major fissure involves the upper and lower lobes.    Enlarged mediastinal lymph nodes including lesion in the prevascular space.    Enlarged right supraclavicular lymph node.    Endodontal disease.    Mild cervical spondylosis.    Large-bore central venous catheter right IJ.      CTA:    The aortic arch demonstrates mild atherosclerotic calcification.  No significant stenosis at the major branch vessel origins.    The right common carotid artery is normal in caliber.  Calcification without significant luminal narrowing at the carotid bifurcation.The cervical right internal carotid artery is normal in caliber.  Calcification throughout the cavernous segment without significant luminal narrowing.    The left common carotid artery is normal in caliber. Calcification without significant luminal narrowing at the carotid bifurcation.The cervical left internal carotid artery is normal in caliber. Calcification throughout the cavernous segment without significant luminal narrowing.    The right vertebral artery is normal in caliber.  Scattered atherosclerotic calcification in intracranial segment without significant luminal narrowing    The left vertebral artery is normal in caliber.    Basilar artery is within normal limits without focal abnormality.    The proximal anterior, middle, and posterior cerebral arteries are within normal limits.  No evidence of significant stenosis, focal occlusion, or intracranial aneurysm.                                        Vessel Imaging:  See above    Cardiac Evaluation:   N/a        Nikhil Archer MD  Com[facility] St[facility]  Department of Vascular Neurology   [facility]

## 2023-10-26 NOTE — SUBJECTIVE & OBJECTIVE
Past Medical History:   Diagnosis Date    Bilateral retinal detachment 7/18/2023    BPH (benign prostatic hyperplasia)     Cataract     CHF (congestive heart failure)     CKD (chronic kidney disease) stage 3, GFR 30-59 ml/min     Diabetes mellitus, type 2     Hypertension     KENIA (obstructive sleep apnea)     Pancreatitis     Persistent proteinuria 11/12/2020    2 g proteinuria noted Resumed ACE Lower BP systolic to less than 130     PTSD (post-traumatic stress disorder)     Stroke      Past Surgical History:   Procedure Laterality Date    CATARACT EXTRACTION Bilateral     ENUCLEATION Left 8/30/2023    Procedure: ENUCLEATION, EYE;  Surgeon: Vicki Stewart MD;  Location: [facility] OR [address] [place] [address];  Service: Ophthalmology;  Laterality: Left;    ESOPHAGOGASTRODUODENOSCOPY N/A 8/23/2023    Procedure: EGD (ESOPHAGOGASTRODUODENOSCOPY);  Surgeon: Bharath Ya MD;  Location: [facility] (2ND FLR);  Service: Endoscopy;  Laterality: N/A;    EVISCERATION OF OCULAR CONTENTS Left 8/16/2023    Procedure: EVISCERATION, OCULAR CONTENTS;  Surgeon: Vicki Stewart MD;  [facility];  Service: Ophthalmology;  Laterality: Left;    MAGNETIC RESONANCE IMAGING N/A 8/26/2023    Procedure: MRI (Magnetic Resonance Imagine);  Surgeon: Mckenzie De Jesus;  Location: [facility];  Service: Anesthesiology;  Laterality: N/A;    RETROBULBAR INJECTION OF MEDICATION Left 8/16/2023    Procedure: INJECTION, MEDICATION, RETROBULBAR;  Surgeon: Vicki Stewart MD;  Location: [address];  Service: Ophthalmology;  Laterality: Left;    RETROBULBAR INJECTION OF MEDICATION Left 8/30/2023    Procedure: INJECTION, MEDICATION, RETROBULBAR;  Surgeon: Vicki Stewart MD;  Location: [facility] OR [address] [place] [address];  Service: Ophthalmology;  Laterality: Left;    TARSORRHAPHY Left 8/16/2023    Procedure: BLEPHARORRHAPHY;  Surgeon: Vicki Stewart MD;  Location: [facility] OR [address] [place] [address];  Service: Ophthalmology;  Laterality: Left;    TARSORRHAPHY Left 8/30/2023    Procedure:  BLEPHARORRHAPHY;  Surgeon: Vicki Stewart MD;  Location: [facility] OR [address] [place] [address];  Service: Ophthalmology;  Laterality: Left;     COMPLETED  Family History   Problem Relation Age of Onset    Stroke Mother     Diabetes Father     Heart disease Father     Kidney disease Father     Diabetes Sister     Hyperlipidemia Brother     Amblyopia Neg Hx     Blindness Neg Hx     Cataracts Neg Hx     Glaucoma Neg Hx     Macular degeneration Neg Hx     Strabismus Neg Hx     Retinal detachment Neg Hx      Social History     Tobacco Use    Smoking status: Former     Types: Cigarettes, Cigars    Smokeless tobacco: Never   Substance Use Topics    Alcohol use: Not Currently    Drug use: Not Currently     Review of patient's allergies indicates:   Allergen Reactions    Morphine Rash    Amiodarone analogues Itching     Other reaction(s): Unknown       Medications: I have reviewed the current medication administration record.    (Not in a hospital admission)      Review of Systems   Neurological:  Positive for facial asymmetry. Negative for speech difficulty, weakness, numbness and headaches.     Objective:     Vital Signs (Most Recent):  Temp: 98.3 °F (36.8 °C) (10/26/23 1332)  Pulse: 82 (10/26/23 1402)  Resp: 20 (10/26/23 1402)  BP: 131/64 (10/26/23 1402)  SpO2: 96 % (10/26/23 1402)    Vital Signs Range (Last 24H):  Temp:  [98.3 °F (36.8 °C)-99.9 °F (37.7 °C)]   Pulse:  [79-84]   Resp:  [16-20]   BP: (106-143)/(60-77)   SpO2:  [96 %-97 %]        Physical Exam  Constitutional:       General: He is not in acute distress.  Abdominal:      General: Abdomen is flat. There is no distension.   Neurological:      Mental Status: He is alert.   Psychiatric:         Mood and Affect: Mood normal.         Behavior: Behavior normal.              Neurological Exam:   LOC: alert  Attention Span: Good   Language: Naming impaired  Articulation: Dysarthria  Orientation: Person, Place, Time   Visual Fields: Full  L eye absent  EOM (CN III, IV, VI):  Full/intact  Pupils (CN II, III): PERRL  Facial Sensation (CN V): Normal  Facial Movement (CN VII): Upper facial weakness on the Left  Motor: Arm left  Normal 5/5  Leg left  Normal 5/5  Arm right  Paresis: 4/5  Leg right Paresis: 4/5  Cerebellum: No evidence of appendicular or axial ataxia  Sensation: intact to light touch  Tone: Normal tone throughout      Laboratory:  CMP:   Recent Labs   Lab 10/26/23  1134   CALCIUM 8.8   ALBUMIN 2.8*   PROT 6.4      K 5.5*   CO2 26   CL 99   BUN 56*   CREATININE 5.0*   ALKPHOS 77   ALT 15   AST 20   BILITOT 0.6     CBC:   Recent Labs   Lab 10/26/23  1134   WBC 8.29   RBC 2.48*   HGB 8.3*   HCT 26.4*      *   MCH 33.5*   MCHC 31.4*       Diagnostic Results:      Brain imaging:  Imaging Results        CTA STROKE MULTI-PHASE (Final result)  Result time 10/26/23 11:52:56      Final result by Guy Mosher MD (10/26/23 11:52:56)                   Impression:      No evidence of acute hemorrhage or major vascular distribution infarct.    Small vessel ischemic change with remote left basal ganglia/corona radiata lacunar type infarct.    Notably asymmetric right cerebellar atrophy.    CT arteriogram demonstrates scattered atherosclerotic calcification as above.  No evidence of high-grade stenosis or intracranial large vessel occlusion.    Large irregular airspace opacity appearance most suggestive in the partially visualized right lung apex.  Appearance most suggestive of a pneumonia, but nonspecific.  Clinical correlation advised with dedicated imaging of the chest recommended.    Pathologically enlarged right supraclavicular and mediastinal lymph nodes.    Additional incidental findings as above.    This report was flagged in Epic as abnormal.      Electronically signed by: Guy Mosher MD  Date:    10/26/2023  Time:    11:52               Narrative:    EXAMINATION:  CTA STROKE MULTI-PHASE    CLINICAL HISTORY:  Neuro deficit, acute, stroke  suspected;    TECHNIQUE:  Axial CT images obtained throughout the region of the head before the administration of intravenous contrast.    CT angiogram was performed through the cervical and intracranial vasculature during the IV bolus administration of 140mL of Omnipaque 350.  Additional phases through the intracranial vasculature via multiphase technique, although timing out of order, requiring a repeat acquisition of neck..    Multiplanar MPR and MIP reformats were performed.    CT source data was analyzed using artificial intelligence software for detection of a large vessel occlusions (LVO) in order to enable computer assisted triage notification and aid clinical stroke decision making.    COMPARISON:  MRI 08/26/2023    FINDINGS:  Remote lacunar-type infarct extending from the dorsal left putamen into the corona radiata.  Mild chronic small vessel ischemic change in the periventricular supratentorial white matter.  No parenchymal changes of an acute major vascular distribution infarct.    Asymmetric right cerebellar atrophy.    No evidence of acute parenchymal hemorrhage.  No significant intracranial mass effect or midline shift.    No extra-axial blood or fluid collections.    The cranium appears intact.    Mastoid air cells are clear. Mild patchy mucosal thickening in the visualized paranasal sinuses.    Postsurgical change left globe.    Large irregular opacity in the partially visualized right lung apex.  This spans over 7 cm, crosses the major fissure involves the upper and lower lobes.    Enlarged mediastinal lymph nodes including lesion in the prevascular space.    Enlarged right supraclavicular lymph node.    Endodontal disease.    Mild cervical spondylosis.    Large-bore central venous catheter right IJ.      CTA:    The aortic arch demonstrates mild atherosclerotic calcification.  No significant stenosis at the major branch vessel origins.    The right common carotid artery is normal in caliber.   Calcification without significant luminal narrowing at the carotid bifurcation.The cervical right internal carotid artery is normal in caliber.  Calcification throughout the cavernous segment without significant luminal narrowing.    The left common carotid artery is normal in caliber. Calcification without significant luminal narrowing at the carotid bifurcation.The cervical left internal carotid artery is normal in caliber. Calcification throughout the cavernous segment without significant luminal narrowing.    The right vertebral artery is normal in caliber.  Scattered atherosclerotic calcification in intracranial segment without significant luminal narrowing    The left vertebral artery is normal in caliber.    Basilar artery is within normal limits without focal abnormality.    The proximal anterior, middle, and posterior cerebral arteries are within normal limits.  No evidence of significant stenosis, focal occlusion, or intracranial aneurysm.                                        Vessel Imaging:  See above    Cardiac Evaluation:   N/a

## 2023-10-26 NOTE — HPI
Mr. Bernal is a 57 year old male PMH of ESRD on HD, HFrEF 30-35%, DM2, HTN, bowel obstruction s/p bowel resection, KENIA, CVA s/p PEG placement (tolerates PO diet), Afib (no AC due to GIB), thrombus of RUE started on Eliquis (stopped due to GIB), RUE AVG infection s/p excision, andrecent extensive hospitalizations at [facility] [place]is/orbital cellulitis/meningitis s/p left eye evisceration on 8/30 who presents on 10/26 for LFD noticed by patient's sister. She accompanied him to a follow up appt earlier today and at that time she noticed the L side of his face seemed to be drooping more than usual. Patient was sent to the ED from his appt and stroke code was called. LKN was yesterday evening at 8pm when she saw him. Patient denies noticing any difference in his facial expression. Denies any other acute onset neuro symptoms (numbness, weakness, vision loss, slurred speech).

## 2023-10-26 NOTE — PLAN OF CARE
Pt s/p enucleation of left eye 8/30. Presented to ED for facial droop and stroke code. Ophthalmology consulted to evaluate eye socket. No signs of drainage, infection, or extrusion. Patient denies new visual complaints. No acute intervention from ophthalmology.        Erlin Izaguirre MD  Ophthalmology PGY-2

## 2023-10-26 NOTE — TELEPHONE ENCOUNTER
----- Message from Coco Sanon sent at 10/26/2023  1:53 PM CDT -----  [phone]    Type: Patient Call Back       Who called: Sister Marley       What is the request in detail: Patient is in the Hospital and is requesting to cancel the appt.       Can the clinic reply by [facility]?       Would the patient rather a call back or a response via [facility]? Call back       Best call back number: [phone]         Additional Information:

## 2023-10-26 NOTE — TELEPHONE ENCOUNTER
PDMP reviewed; no aberrant behavior identified, prescription authorized.     spoke to sister about concerns of blurry OD and nursing home ran out of drops and ointment 3 weeks ago   she is now with him at ED after PCP referred for right facial droop w/ h/o stroke   i asked her to ask for an ophtho consult while she is there for concerns of OD with history of endophthalmitis

## 2023-10-26 NOTE — ED TRIAGE NOTES
Immanuel Bernal, a 57 y.o. male presents to the ED w/ complaint of left sided facial dropping last seen normal last night , arrived via ems from clinic follow up appointment     Triage note:  Chief Complaint   Patient presents with    Facial Droop     Pt arrived via EMS from PCP clinic with c/o left sided drooping. Pt had left eye removed last month, he was at clinic for a follow up     Review of patient's allergies indicates:   Allergen Reactions    Morphine Rash    Amiodarone analogues Itching     Other reaction(s): Unknown     Past Medical History:   Diagnosis Date    Bilateral retinal detachment 7/18/2023    BPH (benign prostatic hyperplasia)     Cataract     CHF (congestive heart failure)     CKD (chronic kidney disease) stage 3, GFR 30-59 ml/min     Diabetes mellitus, type 2     Hypertension     KENIA (obstructive sleep apnea)     Pancreatitis     Persistent proteinuria 11/12/2020    2 g proteinuria noted Resumed ACE Lower BP systolic to less than 130     PTSD (post-traumatic stress disorder)     Stroke

## 2023-10-26 NOTE — ED NOTES
Telemetry Verification   Patient placed on Telemetry Box  Verified with War Room  Box # [id number]   Monitor Tech inan   Rate 79   Rhythm ns

## 2023-10-27 LAB
ABO + RH BLD: NORMAL
ALBUMIN SERPL BCP-MCNC: 2.9 G/DL (ref 3.5–5.2)
ALP SERPL-CCNC: 83 U/L (ref 55–135)
ALT SERPL W/O P-5'-P-CCNC: 11 U/L (ref 10–44)
ANION GAP SERPL CALC-SCNC: 12 MMOL/L (ref 8–16)
ANION GAP SERPL CALC-SCNC: 18 MMOL/L (ref 8–16)
ASCENDING AORTA: 3.16 CM
AST SERPL-CCNC: 17 U/L (ref 10–40)
AV INDEX (PROSTH): 0.56
AV MEAN GRADIENT: 5 MMHG
AV PEAK GRADIENT: 10 MMHG
AV VALVE AREA BY VELOCITY RATIO: 2.32 CM²
AV VALVE AREA: 2.16 CM²
AV VELOCITY RATIO: 0.61
BASOPHILS # BLD AUTO: 0.02 K/UL (ref 0–0.2)
BASOPHILS NFR BLD: 0.2 % (ref 0–1.9)
BILIRUB SERPL-MCNC: 0.6 MG/DL (ref 0.1–1)
BLD GP AB SCN CELLS X3 SERPL QL: NORMAL
BLD PROD TYP BPU: NORMAL
BLOOD UNIT EXPIRATION DATE: NORMAL
BLOOD UNIT TYPE CODE: 5100
BLOOD UNIT TYPE: NORMAL
BSA FOR ECHO PROCEDURE: 1.76 M2
BUN SERPL-MCNC: 37 MG/DL (ref 6–20)
BUN SERPL-MCNC: 69 MG/DL (ref 6–20)
CALCIUM SERPL-MCNC: 8.6 MG/DL (ref 8.7–10.5)
CALCIUM SERPL-MCNC: 9.1 MG/DL (ref 8.7–10.5)
CHLORIDE SERPL-SCNC: 95 MMOL/L (ref 95–110)
CHLORIDE SERPL-SCNC: 98 MMOL/L (ref 95–110)
CO2 SERPL-SCNC: 20 MMOL/L (ref 23–29)
CO2 SERPL-SCNC: 25 MMOL/L (ref 23–29)
CODING SYSTEM: NORMAL
CREAT SERPL-MCNC: 3.5 MG/DL (ref 0.5–1.4)
CREAT SERPL-MCNC: 5.7 MG/DL (ref 0.5–1.4)
CROSSMATCH INTERPRETATION: NORMAL
CV ECHO LV RWT: 0.4 CM
DIFFERENTIAL METHOD: ABNORMAL
DISPENSE STATUS: NORMAL
DOP CALC AO PEAK VEL: 1.57 M/S
DOP CALC AO VTI: 30.02 CM
DOP CALC LVOT AREA: 3.8 CM2
DOP CALC LVOT DIAMETER: 2.21 CM
DOP CALC LVOT PEAK VEL: 0.95 M/S
DOP CALC LVOT STROKE VOLUME: 64.72 CM3
DOP CALCLVOT PEAK VEL VTI: 16.88 CM
E WAVE DECELERATION TIME: 128.57 MSEC
E/A RATIO: 2.55
E/E' RATIO: 17.83 M/S
ECHO LV POSTERIOR WALL: 1.02 CM (ref 0.6–1.1)
EOSINOPHIL # BLD AUTO: 0.2 K/UL (ref 0–0.5)
EOSINOPHIL NFR BLD: 1.8 % (ref 0–8)
ERYTHROCYTE [DISTWIDTH] IN BLOOD BY AUTOMATED COUNT: 15.9 % (ref 11.5–14.5)
EST. GFR  (NO RACE VARIABLE): 10.7 ML/MIN/1.73 M^2
EST. GFR  (NO RACE VARIABLE): 19.3 ML/MIN/1.73 M^2
FRACTIONAL SHORTENING: 22 % (ref 28–44)
GLUCOSE SERPL-MCNC: 119 MG/DL (ref 70–110)
GLUCOSE SERPL-MCNC: 179 MG/DL (ref 70–110)
HCT VFR BLD AUTO: 23.3 % (ref 40–54)
HGB BLD-MCNC: 6 G/DL (ref 14–18)
HGB BLD-MCNC: 6.9 G/DL (ref 14–18)
IMM GRANULOCYTES # BLD AUTO: 0.05 K/UL (ref 0–0.04)
IMM GRANULOCYTES NFR BLD AUTO: 0.6 % (ref 0–0.5)
INTERVENTRICULAR SEPTUM: 0.97 CM (ref 0.6–1.1)
LA MAJOR: 6.98 CM
LA MINOR: 7.49 CM
LA WIDTH: 4.47 CM
LEFT ATRIUM SIZE: 5.03 CM
LEFT ATRIUM VOLUME INDEX MOD: 51 ML/M2
LEFT ATRIUM VOLUME INDEX: 77.6 ML/M2
LEFT ATRIUM VOLUME MOD: 90.83 CM3
LEFT ATRIUM VOLUME: 138.1 CM3
LEFT INTERNAL DIMENSION IN SYSTOLE: 3.95 CM (ref 2.1–4)
LEFT VENTRICLE DIASTOLIC VOLUME INDEX: 69.28 ML/M2
LEFT VENTRICLE DIASTOLIC VOLUME: 123.31 ML
LEFT VENTRICLE MASS INDEX: 105 G/M2
LEFT VENTRICLE SYSTOLIC VOLUME INDEX: 38.2 ML/M2
LEFT VENTRICLE SYSTOLIC VOLUME: 67.96 ML
LEFT VENTRICULAR INTERNAL DIMENSION IN DIASTOLE: 5.09 CM (ref 3.5–6)
LEFT VENTRICULAR MASS: 186.15 G
LV LATERAL E/E' RATIO: 13.38 M/S
LV SEPTAL E/E' RATIO: 26.75 M/S
LYMPHOCYTES # BLD AUTO: 0.7 K/UL (ref 1–4.8)
LYMPHOCYTES NFR BLD: 8.5 % (ref 18–48)
MAGNESIUM SERPL-MCNC: 1.9 MG/DL (ref 1.6–2.6)
MCH RBC QN AUTO: 33 PG (ref 27–31)
MCHC RBC AUTO-ENTMCNC: 29.6 G/DL (ref 32–36)
MCV RBC AUTO: 112 FL (ref 82–98)
MONOCYTES # BLD AUTO: 0.6 K/UL (ref 0.3–1)
MONOCYTES NFR BLD: 7.6 % (ref 4–15)
MV A" WAVE DURATION": 7.99 MSEC
MV PEAK A VEL: 0.42 M/S
MV PEAK E VEL: 1.07 M/S
MV STENOSIS PRESSURE HALF TIME: 37.28 MS
MV VALVE AREA P 1/2 METHOD: 5.9 CM2
NEUTROPHILS # BLD AUTO: 6.8 K/UL (ref 1.8–7.7)
NEUTROPHILS NFR BLD: 81.3 % (ref 38–73)
NRBC BLD-RTO: 0 /100 WBC
NUM UNITS TRANS PACKED RBC: NORMAL
PHOSPHATE SERPL-MCNC: 4.3 MG/DL (ref 2.7–4.5)
PISA TR MAX VEL: 3.93 M/S
PLATELET # BLD AUTO: 145 K/UL (ref 150–450)
PMV BLD AUTO: 10.3 FL (ref 9.2–12.9)
POTASSIUM SERPL-SCNC: 4.5 MMOL/L (ref 3.5–5.1)
POTASSIUM SERPL-SCNC: 5.8 MMOL/L (ref 3.5–5.1)
PROT SERPL-MCNC: 6.4 G/DL (ref 6–8.4)
PULM VEIN S/D RATIO: 0.48
PV PEAK D VEL: 0.73 M/S
PV PEAK S VEL: 0.35 M/S
RA MAJOR: 5.46 CM
RA PRESSURE ESTIMATED: 15 MMHG
RA WIDTH: 3.78 CM
RBC # BLD AUTO: 2.09 M/UL (ref 4.6–6.2)
RIGHT VENTRICULAR END-DIASTOLIC DIMENSION: 3.72 CM
RV TB RVSP: 19 MMHG
SINUS: 2.98 CM
SODIUM SERPL-SCNC: 133 MMOL/L (ref 136–145)
SODIUM SERPL-SCNC: 135 MMOL/L (ref 136–145)
SPECIMEN OUTDATE: NORMAL
STJ: 2.25 CM
TDI LATERAL: 0.08 M/S
TDI SEPTAL: 0.04 M/S
TDI: 0.06 M/S
TR MAX PG: 62 MMHG
TRICUSPID ANNULAR PLANE SYSTOLIC EXCURSION: 1.03 CM
TV REST PULMONARY ARTERY PRESSURE: 77 MMHG
WBC # BLD AUTO: 8.4 K/UL (ref 3.9–12.7)
Z-SCORE OF LEFT VENTRICULAR DIMENSION IN END DIASTOLE: 0.34
Z-SCORE OF LEFT VENTRICULAR DIMENSION IN END SYSTOLE: 2.07

## 2023-10-27 PROCEDURE — 63600175 PHARM REV CODE 636 W HCPCS: Performed by: STUDENT IN AN ORGANIZED HEALTH CARE EDUCATION/TRAINING PROGRAM

## 2023-10-27 PROCEDURE — 25000003 PHARM REV CODE 250: Performed by: STUDENT IN AN ORGANIZED HEALTH CARE EDUCATION/TRAINING PROGRAM

## 2023-10-27 PROCEDURE — 63700000 PHARM REV CODE 250 ALT 637 W/O HCPCS: Performed by: STUDENT IN AN ORGANIZED HEALTH CARE EDUCATION/TRAINING PROGRAM

## 2023-10-27 PROCEDURE — 21400001 HC TELEMETRY ROOM

## 2023-10-27 PROCEDURE — 86900 BLOOD TYPING SEROLOGIC ABO: CPT | Performed by: STUDENT IN AN ORGANIZED HEALTH CARE EDUCATION/TRAINING PROGRAM

## 2023-10-27 PROCEDURE — 90935 HEMODIALYSIS ONE EVALUATION: CPT

## 2023-10-27 PROCEDURE — 80048 BASIC METABOLIC PNL TOTAL CA: CPT | Mod: XB | Performed by: STUDENT IN AN ORGANIZED HEALTH CARE EDUCATION/TRAINING PROGRAM

## 2023-10-27 PROCEDURE — 85018 HEMOGLOBIN: CPT | Mod: 91 | Performed by: STUDENT IN AN ORGANIZED HEALTH CARE EDUCATION/TRAINING PROGRAM

## 2023-10-27 PROCEDURE — G0257 UNSCHED DIALYSIS ESRD PT HOS: HCPCS

## 2023-10-27 PROCEDURE — P9016 RBC LEUKOCYTES REDUCED: HCPCS | Performed by: STUDENT IN AN ORGANIZED HEALTH CARE EDUCATION/TRAINING PROGRAM

## 2023-10-27 PROCEDURE — 99223 PR INITIAL HOSPITAL CARE,LEVL III: ICD-10-PCS | Mod: ,,, | Performed by: INTERNAL MEDICINE

## 2023-10-27 PROCEDURE — 36430 TRANSFUSION BLD/BLD COMPNT: CPT

## 2023-10-27 PROCEDURE — 80053 COMPREHEN METABOLIC PANEL: CPT | Performed by: STUDENT IN AN ORGANIZED HEALTH CARE EDUCATION/TRAINING PROGRAM

## 2023-10-27 PROCEDURE — 36415 COLL VENOUS BLD VENIPUNCTURE: CPT | Mod: XB | Performed by: STUDENT IN AN ORGANIZED HEALTH CARE EDUCATION/TRAINING PROGRAM

## 2023-10-27 PROCEDURE — 83735 ASSAY OF MAGNESIUM: CPT | Performed by: STUDENT IN AN ORGANIZED HEALTH CARE EDUCATION/TRAINING PROGRAM

## 2023-10-27 PROCEDURE — 99223 1ST HOSP IP/OBS HIGH 75: CPT | Mod: ,,, | Performed by: INTERNAL MEDICINE

## 2023-10-27 PROCEDURE — 85025 COMPLETE CBC W/AUTO DIFF WBC: CPT | Performed by: STUDENT IN AN ORGANIZED HEALTH CARE EDUCATION/TRAINING PROGRAM

## 2023-10-27 PROCEDURE — 11000001 HC ACUTE MED/SURG PRIVATE ROOM

## 2023-10-27 PROCEDURE — 25000003 PHARM REV CODE 250

## 2023-10-27 PROCEDURE — 84100 ASSAY OF PHOSPHORUS: CPT | Performed by: STUDENT IN AN ORGANIZED HEALTH CARE EDUCATION/TRAINING PROGRAM

## 2023-10-27 PROCEDURE — 86920 COMPATIBILITY TEST SPIN: CPT | Performed by: STUDENT IN AN ORGANIZED HEALTH CARE EDUCATION/TRAINING PROGRAM

## 2023-10-27 PROCEDURE — S0179 MEGESTROL 20 MG: HCPCS | Performed by: STUDENT IN AN ORGANIZED HEALTH CARE EDUCATION/TRAINING PROGRAM

## 2023-10-27 PROCEDURE — 25000003 PHARM REV CODE 250: Performed by: NURSE PRACTITIONER

## 2023-10-27 PROCEDURE — 25500020 PHARM REV CODE 255: Performed by: STUDENT IN AN ORGANIZED HEALTH CARE EDUCATION/TRAINING PROGRAM

## 2023-10-27 RX ORDER — IBUPROFEN 200 MG
16 TABLET ORAL
Status: DISCONTINUED | OUTPATIENT
Start: 2023-10-27 | End: 2023-10-30 | Stop reason: HOSPADM

## 2023-10-27 RX ORDER — HYDROXYZINE HYDROCHLORIDE 25 MG/1
25 TABLET, FILM COATED ORAL ONCE
Status: COMPLETED | OUTPATIENT
Start: 2023-10-27 | End: 2023-10-27

## 2023-10-27 RX ORDER — INSULIN ASPART 100 [IU]/ML
0-5 INJECTION, SOLUTION INTRAVENOUS; SUBCUTANEOUS
Status: DISCONTINUED | OUTPATIENT
Start: 2023-10-27 | End: 2023-10-30 | Stop reason: HOSPADM

## 2023-10-27 RX ORDER — GLUCAGON 1 MG
1 KIT INJECTION
Status: DISCONTINUED | OUTPATIENT
Start: 2023-10-27 | End: 2023-10-30 | Stop reason: HOSPADM

## 2023-10-27 RX ORDER — HYDROCODONE BITARTRATE AND ACETAMINOPHEN 500; 5 MG/1; MG/1
TABLET ORAL
Status: DISCONTINUED | OUTPATIENT
Start: 2023-10-27 | End: 2023-10-30 | Stop reason: HOSPADM

## 2023-10-27 RX ORDER — SODIUM CHLORIDE 9 MG/ML
INJECTION, SOLUTION INTRAVENOUS ONCE
Status: COMPLETED | OUTPATIENT
Start: 2023-10-27 | End: 2023-10-27

## 2023-10-27 RX ORDER — MUPIROCIN 20 MG/G
OINTMENT TOPICAL 2 TIMES DAILY
Status: DISCONTINUED | OUTPATIENT
Start: 2023-10-27 | End: 2023-10-30 | Stop reason: HOSPADM

## 2023-10-27 RX ORDER — IBUPROFEN 200 MG
24 TABLET ORAL
Status: DISCONTINUED | OUTPATIENT
Start: 2023-10-27 | End: 2023-10-30 | Stop reason: HOSPADM

## 2023-10-27 RX ORDER — HEPARIN SODIUM 1000 [USP'U]/ML
1000 INJECTION, SOLUTION INTRAVENOUS; SUBCUTANEOUS
Status: DISPENSED | OUTPATIENT
Start: 2023-10-27 | End: 2023-10-28

## 2023-10-27 RX ORDER — TOBRAMYCIN AND DEXAMETHASONE 3; 1 MG/ML; MG/ML
1 SUSPENSION/ DROPS OPHTHALMIC 4 TIMES DAILY
Status: DISCONTINUED | OUTPATIENT
Start: 2023-10-27 | End: 2023-10-30 | Stop reason: HOSPADM

## 2023-10-27 RX ADMIN — CARVEDILOL 25 MG: 25 TABLET, FILM COATED ORAL at 09:10

## 2023-10-27 RX ADMIN — ALUMINUM HYDROXIDE, MAGNESIUM HYDROXIDE, AND SIMETHICONE 30 ML: 200; 200; 20 SUSPENSION ORAL at 09:10

## 2023-10-27 RX ADMIN — OXYCODONE HYDROCHLORIDE AND ACETAMINOPHEN 500 MG: 500 TABLET ORAL at 02:10

## 2023-10-27 RX ADMIN — SEVELAMER CARBONATE 0.8 G: 0.8 POWDER, FOR SUSPENSION ORAL at 02:10

## 2023-10-27 RX ADMIN — PANTOPRAZOLE SODIUM 40 MG: 40 TABLET, DELAYED RELEASE ORAL at 02:10

## 2023-10-27 RX ADMIN — TAMSULOSIN HYDROCHLORIDE 0.4 MG: 0.4 CAPSULE ORAL at 02:10

## 2023-10-27 RX ADMIN — AZITHROMYCIN 250 MG: 250 TABLET, FILM COATED ORAL at 02:10

## 2023-10-27 RX ADMIN — AMLODIPINE BESYLATE 10 MG: 10 TABLET ORAL at 02:10

## 2023-10-27 RX ADMIN — ATORVASTATIN CALCIUM 40 MG: 40 TABLET, FILM COATED ORAL at 02:10

## 2023-10-27 RX ADMIN — HYDROXYZINE HYDROCHLORIDE 25 MG: 25 TABLET, FILM COATED ORAL at 11:10

## 2023-10-27 RX ADMIN — Medication 1 CAPSULE: at 09:10

## 2023-10-27 RX ADMIN — Medication 1 CAPSULE: at 02:10

## 2023-10-27 RX ADMIN — ISOSORBIDE DINITRATE: 20 TABLET ORAL at 02:10

## 2023-10-27 RX ADMIN — TOBRAMYCIN AND DEXAMETHASONE: 3; 1 OINTMENT OPHTHALMIC at 09:10

## 2023-10-27 RX ADMIN — CALCITRIOL CAPSULES 0.25 MCG 0.25 MCG: 0.25 CAPSULE ORAL at 02:10

## 2023-10-27 RX ADMIN — Medication 6 MG: at 08:10

## 2023-10-27 RX ADMIN — CEFTRIAXONE 2 G: 2 INJECTION, POWDER, FOR SOLUTION INTRAMUSCULAR; INTRAVENOUS at 02:10

## 2023-10-27 RX ADMIN — ALUMINUM HYDROXIDE, MAGNESIUM HYDROXIDE, AND SIMETHICONE 30 ML: 200; 200; 20 SUSPENSION ORAL at 06:10

## 2023-10-27 RX ADMIN — MEGESTROL ACETATE 400 MG: 400 SUSPENSION ORAL at 02:10

## 2023-10-27 RX ADMIN — ASPIRIN 81 MG CHEWABLE TABLET 81 MG: 81 TABLET CHEWABLE at 02:10

## 2023-10-27 RX ADMIN — ALUMINUM HYDROXIDE, MAGNESIUM HYDROXIDE, AND SIMETHICONE 30 ML: 200; 200; 20 SUSPENSION ORAL at 04:10

## 2023-10-27 RX ADMIN — MUPIROCIN: 20 OINTMENT TOPICAL at 09:10

## 2023-10-27 RX ADMIN — POLYETHYLENE GLYCOL 3350 17 G: 17 POWDER, FOR SOLUTION ORAL at 02:10

## 2023-10-27 RX ADMIN — TOBRAMYCIN AND DEXAMETHASONE 1 DROP: 3; 1 SUSPENSION/ DROPS OPHTHALMIC at 05:10

## 2023-10-27 RX ADMIN — THERA TABS 1 TABLET: TAB at 02:10

## 2023-10-27 RX ADMIN — OXYBUTYNIN CHLORIDE 10 MG: 10 TABLET, EXTENDED RELEASE ORAL at 02:10

## 2023-10-27 RX ADMIN — MEGESTROL ACETATE 400 MG: 400 SUSPENSION ORAL at 09:10

## 2023-10-27 RX ADMIN — SODIUM CHLORIDE: 9 INJECTION, SOLUTION INTRAVENOUS at 09:10

## 2023-10-27 RX ADMIN — IOHEXOL 75 ML: 350 INJECTION, SOLUTION INTRAVENOUS at 12:10

## 2023-10-27 RX ADMIN — ISOSORBIDE DINITRATE: 20 TABLET ORAL at 08:10

## 2023-10-27 RX ADMIN — TOBRAMYCIN AND DEXAMETHASONE: 3; 1 OINTMENT OPHTHALMIC at 05:10

## 2023-10-27 RX ADMIN — CARVEDILOL 25 MG: 25 TABLET, FILM COATED ORAL at 02:10

## 2023-10-27 RX ADMIN — TOBRAMYCIN AND DEXAMETHASONE 1 DROP: 3; 1 SUSPENSION/ DROPS OPHTHALMIC at 09:10

## 2023-10-27 RX ADMIN — HEPARIN SODIUM 1000 UNITS: 1000 INJECTION, SOLUTION INTRAVENOUS; SUBCUTANEOUS at 12:10

## 2023-10-27 NOTE — PLAN OF CARE
Problem: Adult Inpatient Plan of Care  Goal: Plan of Care Review  Outcome: Ongoing, Progressing  Goal: Patient-Specific Goal (Individualized)  Outcome: Ongoing, Progressing  Goal: Absence of Hospital-Acquired Illness or Injury  Outcome: Ongoing, Progressing  Goal: Optimal Comfort and Wellbeing  Outcome: Ongoing, Progressing  Goal: Readiness for Transition of Care  Outcome: Ongoing, Progressing     Problem: Infection  Goal: Absence of Infection Signs and Symptoms  Outcome: Ongoing, Progressing     Problem: Impaired Wound Healing  Goal: Optimal Wound Healing  Outcome: Ongoing, Progressing     Problem: Fluid Imbalance (Pneumonia)  Goal: Fluid Balance  Outcome: Ongoing, Progressing     Problem: Infection (Pneumonia)  Goal: Resolution of Infection Signs and Symptoms  Outcome: Ongoing, Progressing     Problem: Respiratory Compromise (Pneumonia)  Goal: Effective Oxygenation and Ventilation  Outcome: Ongoing, Progressing     Problem: Skin Injury Risk Increased  Goal: Skin Health and Integrity  Outcome: Ongoing, Progressing     Problem: Device-Related Complication Risk (Hemodialysis)  Goal: Safe, Effective Therapy Delivery  Outcome: Ongoing, Progressing     Problem: Hemodynamic Instability (Hemodialysis)  Goal: Effective Tissue Perfusion  Outcome: Ongoing, Progressing     Problem: Infection (Hemodialysis)  Goal: Absence of Infection Signs and Symptoms  Outcome: Ongoing, Progressing     Problem: Electrolyte Imbalance (Chronic Kidney Disease)  Goal: Electrolyte Balance  Outcome: Ongoing, Progressing     Problem: Fluid Volume Excess (Chronic Kidney Disease)  Goal: Fluid Balance  Outcome: Ongoing, Progressing

## 2023-10-27 NOTE — CONSULTS
"Consultation Report  Ophthalmology Service    Date: 10/27/2023    Reason for Consult: "Post enucleation, drainage"     History of Present Illness: Immanuel Bernal is a 57 y.o. male with recent b/l endophthalmitis with enucleation of Left eye who [place] [facility] [place]. Ophthalmology is being consulted as he missed his post op apointment yesterday. Enucleation wound looks clean, healthy, and intact.     Endorses occasional blurry vision OD. Patient denies any other changes in vision, flashes, floaters, or curtain-veil in visual field ou. Denies any ocular discomfort ou.    POcularHx: b/l endophthalmitis and enucleation OS.     Current eye gtts: Tobradex drops QID and ointment TID OS     Family Hx: family history includes Diabetes in his father and sister; Heart disease in his father; Hyperlipidemia in his brother; Kidney disease in his father; Stroke in his mother.     PMHx:  has a past medical history of Bilateral retinal detachment (7/18/2023), BPH (benign prostatic hyperplasia), Cataract, CHF (congestive heart failure), CKD (chronic kidney disease) stage 3, GFR 30-59 ml/min, Diabetes mellitus, type 2, Hypertension, KENIA (obstructive sleep apnea), Pancreatitis, Persistent proteinuria (11/12/2020), PTSD (post-traumatic stress disorder), and Stroke.     PSurgHx:  has a past surgical history that includes Cataract extraction (Bilateral); Tarsorrhaphy (Left, 8/16/2023); Retrobulbar injection of medication (Left, 8/16/2023); Evisceration of ocular contents (Left, 8/16/2023); Esophagogastroduodenoscopy (N/A, 8/23/2023); Magnetic resonance imaging (N/A, 8/26/2023); Enucleation (Left, 8/30/2023); Tarsorrhaphy (Left, 8/30/2023); and Retrobulbar injection of medication (Left, 8/30/2023).     Home Medications:   Prior to Admission medications    Medication Sig Start Date End Date Taking? Authorizing Provider   acetaminophen (TYLENOL) 325 MG tablet Take 650 mg by mouth every 6 (six) hours as needed for Pain.    " Provider, Historical   albuterol-ipratropium (DUO-NEB) 2.5 mg-0.5 mg/3 mL nebulizer solution Take 3 mLs by nebulization 3 (three) times daily as needed for Wheezing or Shortness of Breath. 9/14/23   Porsha Funez MD   amLODIPine (NORVASC) 10 MG tablet Take 1 tablet (10 mg total) by mouth once daily. 9/19/23 9/18/24  Guy Peres MD   ascorbic acid, vitamin C, (VITAMIN C) 500 MG tablet Take 500 mg by mouth once daily.    Provider, Historical   aspirin 81 MG Chew Take 81 mg by mouth once daily.    Provider, Historical   atorvastatin (LIPITOR) 40 MG tablet 1 tablet (40 mg total) by Per G Tube route once daily. 9/1/23 8/31/24  Porsha Funez MD   calcitRIOL (ROCALTROL) 0.25 MCG Cap Take 1 capsule (0.25 mcg total) by mouth once daily. 8/21/23   Pat Montoya MD   calcium carbonate-vitamin D3 (OYSTER SHELL CALCIUM-VIT D3) 500 mg-5 mcg (200 unit) PwPk Take 1 tablet by mouth 2 (two) times a day.    Provider, Historical   carvediloL (COREG) 25 MG tablet 1 tablet (25 mg total) by Per G Tube route 2 (two) times daily. 9/14/23 9/13/24  Porsha Funez MD   folic acid (FOLVITE) 1 MG tablet Take 1 tablet by mouth every morning.    Provider, Historical   isosorbide-hydrALAZINE 20-37.5 mg (BIDIL) 20-37.5 mg Tab Take 2 tablets by mouth 3 (three) times daily. 9/1/23 8/31/24  Porsha Funez MD   Lactobacillus rhamnosus GG (CULTURELLE) 10 billion cell capsule 1 capsule by Per G Tube route 2 (two) times daily. 9/1/23   Porsha Funez MD   megestroL (MEGACE) 400 mg/10 mL (40 mg/mL) Susp Take 10 mLs by mouth 2 (two) times a day. 7/17/23   Provider, Historical   melatonin (MELATIN) 3 mg tablet Take 2 tablets (6 mg total) by mouth nightly. 9/1/23   Porsha Funez MD   multivitamin (THERAGRAN) per tablet Take 1 tablet by mouth once daily.    Provider, Historical   ondansetron (ZOFRAN-ODT) 8 MG TbDL Take 1 tablet (8 mg total) by mouth every 6 (six) hours as needed. 8/21/23   Pat Montoya MD   oxybutynin (DITROPAN-XL) 10 MG 24 hr tablet  Take 1 tablet (10 mg total) by mouth once daily. 9/15/23 9/14/24  Guy Peres MD   pantoprazole (PROTONIX) 40 mg suspension 1 packet (40 mg total) by Per G Tube route 2 (two) times daily. 9/1/23 8/31/24  Porsha Funez MD   polyethylene glycol (GLYCOLAX) 17 gram/dose powder Take 17 g by mouth 2 (two) times daily.    Provider, Historical   senna-docusate 8.6-50 mg (PERICOLACE) 8.6-50 mg per tablet 1 tablet by Per G Tube route 2 (two) times daily. 9/1/23   Porsha Funez MD   sevelamer carbonate (RENVELA) 0.8 gram PwPk 1 packet (0.8 g total) by Per G Tube route 3 (three) times daily with meals. 9/1/23 8/31/24  Porsha Funez MD   tamsulosin (FLOMAX) 0.4 mg Cap Take 1 capsule (0.4 mg total) by mouth once daily. 11/12/20   Yuliana Mayberry MD   tobramycin-dexAMETHasone 0.3-0.1% (TOBRADEX) 0.3-0.1 % Oint Place into the left eye 3 (three) times daily. 9/14/23   Guy Peres MD   valsartan (DIOVAN) 160 MG tablet Take 1 tablet (160 mg total) by mouth 2 (two) times daily. 9/1/23 8/31/24  Porsha Funez MD   VITAMIN B COMPLEX ORAL Take 1 tablet by mouth every morning.    Provider, Historical   zinc gluconate 50 mg tablet Take 50 mg by mouth once daily.    Provider, Historical        Medications this encounter:    aluminum-magnesium hydroxide-simethicone  30 mL Oral QID (AC & HS)    amLODIPine  10 mg Oral Daily    ascorbic acid (vitamin C)  500 mg Oral Daily    aspirin  81 mg Oral Daily    atorvastatin  40 mg Per G Tube Daily    azithromycin  250 mg Oral Daily    calcitRIOL  0.25 mcg Oral Daily    carvediloL  25 mg Per G Tube BID    cefTRIAXone (ROCEPHIN) IVPB  2 g Intravenous Q24H    hydrALAZINE 10 mg 1 tablet, hydrALAZINE 25 mg 1 tablet, isorsorbide dinitrate 20 mg 1 tablet combination   Oral TID    Lactobacillus rhamnosus GG  1 capsule Oral BID    megestroL  400 mg Oral BID    multivitamin  1 tablet Oral Daily    mupirocin   Nasal BID    oxybutynin  10 mg Oral Daily    pantoprazole  40 mg Oral Daily    polyethylene  glycol  17 g Oral Daily    sevelamer carbonate  0.8 g Per G Tube TID WM    sodium zirconium cyclosilicate  10 g Oral Once    tamsulosin  1 capsule Oral Daily    tobramycin-dexAMETHasone 0.3-0.1%   Left Eye TID    tobramycin-dexAMETHasone 0.3-0.1%  1 drop Left Eye QID       Allergies: is allergic to morphine and amiodarone analogues.     Social:  reports that he has quit smoking. His smoking use included cigarettes and cigars. He has never used smokeless tobacco. He reports that he does not currently use alcohol. He reports that he does not currently use drugs.     ROS: As per HPI    Ocular examination/Dilated fundus examination:  Base Eye Exam       Visual Acuity (Snellen - Linear)         Right Left    Dist sc 20/25     Dist ph sc NI               Tonometry (Tonopen, 1:48 PM)         Right Left    Pressure 11               Pupils         Dark Light Shape React    Right 3 2 Round Brisk    Left                  Extraocular Movement         Right Left     Full Full                  Slit Lamp and Fundus Exam       External Exam         Right Left    External Normal Bandage covering eye              Slit Lamp Exam         Right Left    Lids/Lashes Normal enucleated, conformed placed today    Conjunctiva/Sclera White and quiet     Cornea scattered PEEs     Anterior Chamber Deep and formed     Iris Round and pharm dialted     Lens PCIOL     Anterior Vitreous Normal               Fundus Exam         Right Left    Disc Sharp and pink     C/D Ratio 0.3     Macula flat, few DBH     Vessels Normal     Periphery flat 360, pigmentary changes vs mild snow banking inferiorly. Improved from prior.                         Assessment/Plan:     1. S/p Enucleation OS  - Wouns clean dry intact. Healing well  - Continue Tobradex ointment TID left eye only  - Large conformer replaced 10/27/23, to remain at all times  - Sister provided with written  prescription and Ocularists business card  - Fu with Dr. Stewart's office once  prosthesis and conformer have been made.  - Instructions provided to Patients sister   - Monocular precautions discussed needs Mrx for Polycarbonate glasses to protect his one good eye.     2. Dry eye OD  - Dryness on exam  - Start artificial tear QID OD only  - Start Systane gel at bedtime OD only    Instructions for Nursing Home  - Continue Tobradex ointment TID left eye only  - Large conformer to remain in left eye socket just behind patients eyelids at all times. If it falls out, clean with soap and water and put it back into the eye socket.   - Start artificial tear QID right eye only  - Start Systane gel at bedtime right eye only  - Needs Mrx for Polycarbonate glasses to protect his one good eye. Please provide or contact our office for optometry appointment.       Adalberto Boyd MD   [facility] Ophthalmology PGY2  10/27/2023  9:56 AM      Common Ophthalmologic Abbreviations  OD: right eye  OS: left eye  OU: both eyes  IOP: intraocular pressure  VA: visual acuity  PH: pinhole  HM: hand motion  LP: light perception  NLP: no light perception  DFE: dilated fundus examination  SLE: slit lamp examination  RD: retinal detachment   AT: artificial tears  PFAT: preservative free artificial tears

## 2023-10-27 NOTE — ASSESSMENT & PLAN NOTE
-Dialysis modality: Hemodialysis  -Outpatient HD unit: [facility]  -Nephrologist: Dr. Jain  -HD TX days: Monday/Wednesday/Friday, duration of treatment: 3.5hrs  -Last HD TX prior to hospital admission: 10/25/23  -Dialysis access: dialysis catheter     57 y.o. Black or  Male ESRD-HD M-W-F presents to ED on 10/26/2023 with diagnosis of: Hyperkalemia [E87.5];Weakness [R53.1];Facial droop [R29.810];Stroke [I63.9];Infiltrate of right lung present on chest x-ray [R91.8];Acute renal failure superimposed on chronic kidney disease, unspecified acute renal failure type, unspecified CKD stage [N17.9, N18.9]   Nephrology consulted for inpatient ESRD-HD management      Assessment:   - Will provide dialysis (low flow 300/600). today (10/27/2023) with UF -500cc-1L as BP tolerates for metabolic clearance and volume management    - Labs reviewed and dialysate to be adjusted to current labs.   - Continue to monitor intake and output  - Please avoid gadolinium, fleets, phos-based laxatives, NSAIDs  - Dialysis thrice weekly unless more urgent indications arise. Will evaluate RRT requirements Daily.    Anemia of ESRD   Recent Labs   Lab 10/26/23  1134 10/27/23  0707   WBC 8.29 8.40   HGB 8.3* 6.9*   HCT 26.4* 23.3*    145*     Lab Results   Component Value Date    FESATURATED 45 08/10/2023    FERRITIN 3,415 (H) 08/10/2023       - Goal in ESRD is Hgb of 10-11.     Mineral Bone Disease in ESRD   Lab Results   Component Value Date    PTH 78.5 (H) 08/21/2023    CALCIUM 9.1 10/27/2023    ALBUMIN 2.9 (L) 10/27/2023    PHOS 4.3 10/27/2023     - Renal diet with protein intake goal 1.5 g/kg/d with 1 L fluid restriction   - Novasource with meals  - Continue home phos binder   - Cont Sevelamer.   - Daily renal panel so that phos and albumin is monitored daily.

## 2023-10-27 NOTE — PROGRESS NOTES
Patient received in the acute dialysis unit by bed. Observation dialysis started via right IJ tunneled CVC.

## 2023-10-27 NOTE — CONSULTS
[facility] - Med Surg  Nephrology  Consult Note    Patient Name: Immanuel Bernal  MRN: [id number]  Admission Date: 10/26/2023  Hospital Length of Stay: 0 days  Attending Provider: John Beckham MD   Primary Care Physician: Dolly, Primary Doctor  Principal Problem:Facial droop    Inpatient consult to Nephrology  Consult performed by: Paul Vázquez DO  Consult ordered by: John Beckham MD        Subjective:     HPI:  Mr. Bernal is inpatient male with a past medical history of ESRD on HD, prior CVA, diabetes, hypertension who presents to the ED from clinic with concerns of facial droop.  In the ED, patient HDS.  Labs notable for for potassium 5.5, creatinine 5, BUN 56, glucose 98, and WBC 8.3.  MRI brain without contrast with no evidence of acute infarct or hemorrhage; but notable for chronic small-vessel ischemic changes and remote left basal ganglia/corona radiata lacunar type infarct.  CTA head with no evidence of high-grade stenosis or large intracranial large vessel occlusion.  Lung in the CTA head finding with irregular airspace opacity in the posterior right apex concerning for pneumonia.    Patient was evaluated by vascular neurology. No intevention indicated per note. Today, he reports he is back to baseline. Scheduled for iHD on MWF outpatient.     Outpatient HD Information:  -Dialysis modality: Hemodialysis  -Outpatient HD unit: [place]  -Nephrologist: Dr. Jain  -HD TX days: Monday/Wednesday/Friday, duration of treatment: 3.5hrs  -Last HD TX prior to hospital admission: 10/25/23  -Dialysis access: dialysis catheter     Nephrology consulted for inpatient dialysis needs.         Past Medical History:   Diagnosis Date    Bilateral retinal detachment 7/18/2023    BPH (benign prostatic hyperplasia)     Cataract     CHF (congestive heart failure)     CKD (chronic kidney disease) stage 3, GFR 30-59 ml/min     Diabetes mellitus, type 2     Hypertension     KENIA (obstructive sleep apnea)      Pancreatitis     Persistent proteinuria 11/12/2020    2 g proteinuria noted Resumed ACE Lower BP systolic to less than 130     PTSD (post-traumatic stress disorder)     Stroke        Past Surgical History:   Procedure Laterality Date    CATARACT EXTRACTION Bilateral     ENUCLEATION Left 8/30/2023    Procedure: ENUCLEATION, EYE;  Surgeon: Vicki Stewart MD;  Location: [address];  Service: Ophthalmology;  Laterality: Left;    ESOPHAGOGASTRODUODENOSCOPY N/A 8/23/2023    Procedure: EGD (ESOPHAGOGASTRODUODENOSCOPY);  Surgeon: Bharath Ya MD;  Location: [facility] ENDO (2ND FLR);  Service: Endoscopy;  Laterality: N/A;    EVISCERATION OF OCULAR CONTENTS Left 8/16/2023    Procedure: EVISCERATION, OCULAR CONTENTS;  Surgeon: Vicki Stewart MD;  Location: [address];  Service: Ophthalmology;  Laterality: Left;    MAGNETIC RESONANCE IMAGING N/A 8/26/2023    Procedure: MRI (Magnetic Resonance Imagine);  Surgeon: Mckenzie De Jesus;  Location: [facility];  Service: Anesthesiology;  Laterality: N/A;    RETROBULBAR INJECTION OF MEDICATION Left 8/16/2023    Procedure: INJECTION, MEDICATION, RETROBULBAR;  Surgeon: Vicki Stewart MD;  Location: [address];  Service: Ophthalmology;  Laterality: Left;    RETROBULBAR INJECTION OF MEDICATION Left 8/30/2023    Procedure: INJECTION, MEDICATION, RETROBULBAR;  Surgeon: Vicki Stewart MD;  Location: [address];  Service: Ophthalmology;  Laterality: Left;    TARSORRHAPHY Left 8/16/2023    Procedure: BLEPHARORRHAPHY;  Surgeon: Vicki Stewart MD;  Location: [address];  Service: Ophthalmology;  Laterality: Left;    TARSORRHAPHY Left 8/30/2023    Procedure: BLEPHARORRHAPHY;  Surgeon: Vicki Stewart MD;  Location: [address];  Service: Ophthalmology;  Laterality: Left;       Review of patient's allergies indicates:   Allergen Reactions    Morphine Rash    Amiodarone analogues Itching     Other reaction(s): Unknown     Current Facility-Administered  Medications   Medication Frequency    0.9%  NaCl infusion Once    acetaminophen tablet 650 mg Q8H PRN    albuterol-ipratropium 2.5 mg-0.5 mg/3 mL nebulizer solution 3 mL TID PRN    aluminum-magnesium hydroxide-simethicone 200-200-20 mg/5 mL suspension 30 mL QID (AC & HS)    amLODIPine tablet 10 mg Daily    ascorbic acid (vitamin C) tablet 500 mg Daily    aspirin chewable tablet 81 mg Daily    atorvastatin tablet 40 mg Daily    azithromycin tablet 250 mg Daily    bisacodyL suppository 10 mg Daily PRN    calcitRIOL capsule 0.25 mcg Daily    carvediloL tablet 25 mg BID    cefTRIAXone (ROCEPHIN) 2 g in dextrose 5 % in water (D5W) 100 mL IVPB (MB+) Q24H    hydrALAZINE 10 mg 1 tablet, hydrALAZINE 25 mg 1 tablet, isorsorbide dinitrate 20 mg 1 tablet combination TID    Lactobacillus rhamnosus GG capsule 1 capsule BID    megestroL 400 mg/10 mL (10 mL) suspension 400 mg BID    melatonin tablet 6 mg Nightly PRN    multivitamin tablet Daily    mupirocin 2 % ointment BID    naloxone 0.4 mg/mL injection 0.02 mg PRN    ondansetron disintegrating tablet 8 mg Q8H PRN    oxybutynin 24 hr tablet 10 mg Daily    pantoprazole EC tablet 40 mg Daily    polyethylene glycol packet 17 g Daily PRN    polyethylene glycol packet 17 g Daily    prochlorperazine injection Soln 5 mg Q6H PRN    sevelamer carbonate pwpk 0.8 g TID WM    sodium chloride 0.9% flush 10 mL Q12H PRN    sodium zirconium cyclosilicate packet 10 g Once    tamsulosin 24 hr capsule 0.4 mg Daily     Family History       Problem Relation (Age of Onset)    Diabetes Father, Sister    Heart disease Father    Hyperlipidemia Brother    Kidney disease Father    Stroke Mother          Tobacco Use    Smoking status: Former     Types: Cigarettes, Cigars    Smokeless tobacco: Never   Substance and Sexual Activity    Alcohol use: Not Currently    Drug use: Not Currently    Sexual activity: Not on file     Review of Systems  Objective:     Vital Signs (Most  Recent):  Temp: 99 °F (37.2 °C) (10/27/23 0742)  Pulse: 82 (10/27/23 0742)  Resp: 17 (10/27/23 0742)  BP: (!) 149/91 (10/27/23 0742)  SpO2: 95 % (10/27/23 0742) Vital Signs (24h Range):  Temp:  [97.6 °F (36.4 °C)-99.9 °F (37.7 °C)] 99 °F (37.2 °C)  Pulse:  [73-84] 82  Resp:  [15-20] 17  SpO2:  [91 %-100 %] 95 %  BP: (106-157)/(60-91) 149/91        Body mass index is 19.8 kg/m².  Body surface area is 1.76 meters squared.    I/O last 3 completed shifts:  In: 100 [IV Piggyback:100]  Out: 200 [Urine:200]     Physical Exam  Constitutional:       Appearance: Normal appearance. He is normal weight.   HENT:      Head: Normocephalic and atraumatic.      Mouth/Throat:      Mouth: Mucous membranes are moist.   Eyes:      Extraocular Movements: Extraocular movements intact.      Conjunctiva/sclera: Conjunctivae normal.   Cardiovascular:      Rate and Rhythm: Normal rate and regular rhythm.      Heart sounds: No murmur heard.     Comments: Right subclavian Perm cath. C/d/I.   Pulmonary:      Effort: Pulmonary effort is normal. No respiratory distress.      Breath sounds: Normal breath sounds. No wheezing or rales.   Abdominal:      General: Abdomen is flat. There is no distension.      Palpations: Abdomen is soft.      Tenderness: There is no abdominal tenderness. There is no guarding.   Musculoskeletal:         General: No swelling or tenderness.   Skin:     Findings: No rash.   Neurological:      General: No focal deficit present.      Mental Status: He is alert and oriented to person, place, and time. Mental status is at baseline.   Psychiatric:         Mood and Affect: Mood normal.         Behavior: Behavior normal.          Significant Labs:  All labs within the past 24 hours have been reviewed.    Significant Imaging:  Labs: Reviewed    Assessment/Plan:     Renal/  ESRD (end stage renal disease)  -Dialysis modality: Hemodialysis  -Outpatient HD unit: [place]  -Nephrologist: Dr. Jain  -MI TX days:  Monday/Wednesday/Friday, duration of treatment: 3.5hrs  -Last HD TX prior to hospital admission: 10/25/23  -Dialysis access: dialysis catheter     57 y.o. Black or  Male ESRD-HD M-W-F presents to ED on 10/26/2023 with diagnosis of: Hyperkalemia [E87.5];Weakness [R53.1];Facial droop [R29.810];Stroke [I63.9];Infiltrate of right lung present on chest x-ray [R91.8];Acute renal failure superimposed on chronic kidney disease, unspecified acute renal failure type, unspecified CKD stage [N17.9, N18.9]   Nephrology consulted for inpatient ESRD-HD management      Assessment:   - Will provide dialysis (low flow 300/600). today (10/27/2023) with UF -500cc-1L as BP tolerates for metabolic clearance and volume management    - Labs reviewed and dialysate to be adjusted to current labs.   - Continue to monitor intake and output  - Please avoid gadolinium, fleets, phos-based laxatives, NSAIDs  - Dialysis thrice weekly unless more urgent indications arise. Will evaluate RRT requirements Daily.    Anemia of ESRD   Recent Labs   Lab 10/26/23  1134 10/27/23  0707   WBC 8.29 8.40   HGB 8.3* 6.9*   HCT 26.4* 23.3*    145*     Lab Results   Component Value Date    FESATURATED 45 08/10/2023    FERRITIN 3,415 (H) 08/10/2023       - Goal in ESRD is Hgb of 10-11.     Mineral Bone Disease in ESRD   Lab Results   Component Value Date    PTH 78.5 (H) 08/21/2023    CALCIUM 9.1 10/27/2023    ALBUMIN 2.9 (L) 10/27/2023    PHOS 4.3 10/27/2023     - Renal diet with protein intake goal 1.5 g/kg/d with 1 L fluid restriction   - Novasource with meals  - Continue home phos binder   - Cont Sevelamer.   - Daily renal panel so that phos and albumin is monitored daily.           Thank you for your consult. I will follow-up with patient. Please contact us if you have any additional questions.     Case discussed with attending. Attestation to follow.       Paul Vázquez,   Nephrology  [facility] - Med Surg

## 2023-10-27 NOTE — ASSESSMENT & PLAN NOTE
- Status post left eye enucleation with ophthalmology prior to presentation  - Ophthalmology consulted   - Follow-up recommendations

## 2023-10-27 NOTE — SUBJECTIVE & OBJECTIVE
Past Medical History:   Diagnosis Date    Bilateral retinal detachment 7/18/2023    BPH (benign prostatic hyperplasia)     Cataract     CHF (congestive heart failure)     CKD (chronic kidney disease) stage 3, GFR 30-59 ml/min     Diabetes mellitus, type 2     Hypertension     KENIA (obstructive sleep apnea)     Pancreatitis     Persistent proteinuria 11/12/2020    2 g proteinuria noted Resumed ACE Lower BP systolic to less than 130     PTSD (post-traumatic stress disorder)     Stroke        Past Surgical History:   Procedure Laterality Date    CATARACT EXTRACTION Bilateral     ENUCLEATION Left 8/30/2023    Procedure: ENUCLEATION, EYE;  Surgeon: Vicki Stewart MD;  Location: [facility] OR [address] [place] [address];  Service: Ophthalmology;  Laterality: Left;    ESOPHAGOGASTRODUODENOSCOPY N/A 8/23/2023    Procedure: EGD (ESOPHAGOGASTRODUODENOSCOPY);  Surgeon: Bharath Ya MD;  Location: [facility] (2ND FLR);  Service: Endoscopy;  Laterality: N/A;    EVISCERATION OF OCULAR CONTENTS Left 8/16/2023    Procedure: EVISCERATION, OCULAR CONTENTS;  Surgeon: Vicki Stewart MD;  [facility];  Service: Ophthalmology;  Laterality: Left;    MAGNETIC RESONANCE IMAGING N/A 8/26/2023    Procedure: MRI (Magnetic Resonance Imagine);  Surgeon: Mckenzie De Jesus;  Location: [facility];  Service: Anesthesiology;  Laterality: N/A;    RETROBULBAR INJECTION OF MEDICATION Left 8/16/2023    Procedure: INJECTION, MEDICATION, RETROBULBAR;  Surgeon: Vicki Stewart MD;  Location: [address];  Service: Ophthalmology;  Laterality: Left;    RETROBULBAR INJECTION OF MEDICATION Left 8/30/2023    Procedure: INJECTION, MEDICATION, RETROBULBAR;  Surgeon: Vicki Stewart MD;  Location: [facility] OR [address] [place] [address];  Service: Ophthalmology;  Laterality: Left;    TARSORRHAPHY Left 8/16/2023    Procedure: BLEPHARORRHAPHY;  Surgeon: Vicki Stewart MD;  Location: [facility] OR [address] [place] [address];  Service: Ophthalmology;  Laterality: Left;    TARSORRHAPHY Left 8/30/2023    Procedure:  BLEPHARORRHAPHY;  Surgeon: Vicki Stewart MD;  Location: [facility] OR [address] [place] [address];  Service: Ophthalmology;  Laterality: Left;       Review of patient's allergies indicates:   Allergen Reactions    Morphine Rash    Amiodarone analogues Itching     Other reaction(s): Unknown     Current Facility-Administered Medications   Medication Frequency    0.9%  NaCl infusion Once    acetaminophen tablet 650 mg Q8H PRN    albuterol-ipratropium 2.5 mg-0.5 mg/3 mL nebulizer solution 3 mL TID PRN    aluminum-magnesium hydroxide-simethicone 200-200-20 mg/5 mL suspension 30 mL QID (AC & HS)    amLODIPine tablet 10 mg Daily    ascorbic acid (vitamin C) tablet 500 mg Daily    aspirin chewable tablet 81 mg Daily    atorvastatin tablet 40 mg Daily    azithromycin tablet 250 mg Daily    bisacodyL suppository 10 mg Daily PRN    calcitRIOL capsule 0.25 mcg Daily    carvediloL tablet 25 mg BID    cefTRIAXone (ROCEPHIN) 2 g in dextrose 5 % in water (D5W) 100 mL IVPB (MB+) Q24H    hydrALAZINE 10 mg 1 tablet, hydrALAZINE 25 mg 1 tablet, isorsorbide dinitrate 20 mg 1 tablet combination TID    Lactobacillus rhamnosus GG capsule 1 capsule BID    megestroL 400 mg/10 mL (10 mL) suspension 400 mg BID    melatonin tablet 6 mg Nightly PRN    multivitamin tablet Daily    mupirocin 2 % ointment BID    naloxone 0.4 mg/mL injection 0.02 mg PRN    ondansetron disintegrating tablet 8 mg Q8H PRN    oxybutynin 24 hr tablet 10 mg Daily    pantoprazole EC tablet 40 mg Daily    polyethylene glycol packet 17 g Daily PRN    polyethylene glycol packet 17 g Daily    prochlorperazine injection Soln 5 mg Q6H PRN    sevelamer carbonate pwpk 0.8 g TID WM    sodium chloride 0.9% flush 10 mL Q12H PRN    sodium zirconium cyclosilicate packet 10 g Once    tamsulosin 24 hr capsule 0.4 mg Daily     Family History       Problem Relation (Age of Onset)    Diabetes Father, Sister    Heart disease Father    Hyperlipidemia Brother    Kidney disease Father    Stroke Mother          Tobacco  Use    Smoking status: Former     Types: Cigarettes, Cigars    Smokeless tobacco: Never   Substance and Sexual Activity    Alcohol use: Not Currently    Drug use: Not Currently    Sexual activity: Not on file     Review of Systems  Objective:     Vital Signs (Most Recent):  Temp: 99 °F (37.2 °C) (10/27/23 0742)  Pulse: 82 (10/27/23 0742)  Resp: 17 (10/27/23 0742)  BP: (!) 149/91 (10/27/23 0742)  SpO2: 95 % (10/27/23 0742) Vital Signs (24h Range):  Temp:  [97.6 °F (36.4 °C)-99.9 °F (37.7 °C)] 99 °F (37.2 °C)  Pulse:  [73-84] 82  Resp:  [15-20] 17  SpO2:  [91 %-100 %] 95 %  BP: (106-157)/(60-91) 149/91        Body mass index is 19.8 kg/m².  Body surface area is 1.76 meters squared.    I/O last 3 completed shifts:  In: 100 [IV Piggyback:100]  Out: 200 [Urine:200]     Physical Exam  Constitutional:       Appearance: Normal appearance. He is normal weight.   HENT:      Head: Normocephalic and atraumatic.      Mouth/Throat:      Mouth: Mucous membranes are moist.   Eyes:      Extraocular Movements: Extraocular movements intact.      Conjunctiva/sclera: Conjunctivae normal.   Cardiovascular:      Rate and Rhythm: Normal rate and regular rhythm.      Heart sounds: No murmur heard.     Comments: Right subclavian Perm cath. C/d/I.   Pulmonary:      Effort: Pulmonary effort is normal. No respiratory distress.      Breath sounds: Normal breath sounds. No wheezing or rales.   Abdominal:      General: Abdomen is flat. There is no distension.      Palpations: Abdomen is soft.      Tenderness: There is no abdominal tenderness. There is no guarding.   Musculoskeletal:         General: No swelling or tenderness.   Skin:     Findings: No rash.   Neurological:      General: No focal deficit present.      Mental Status: He is alert and oriented to person, place, and time. Mental status is at baseline.   Psychiatric:         Mood and Affect: Mood normal.         Behavior: Behavior normal.          Significant Labs:  All labs within the  past 24 hours have been reviewed.    Significant Imaging:  Labs: Reviewed

## 2023-10-27 NOTE — ASSESSMENT & PLAN NOTE
Patient presenting with concern for new right-sided facial droop on day of presentation.    - CTA head/neck unremarkable  - MRI brain without contrast unremarkable  - Vascular neurology consulted   -- Signed off given unremarkable admission imaging   -- Suspect previously noticed facial droop related to ocular issues rather than stroke given negative MRI  -- Also concern for potential TIA  - LDL 53, TG 51  - A1c 4.7 in August 2023  - Continue to monitor   - Telemetry  - Patient resides at NH

## 2023-10-27 NOTE — ASSESSMENT & PLAN NOTE
- CT head/neck with findings of concern for right upper lobe pneumonia  - Given ceftriaxone and azithromycin ED   - Continue ceftriaxone and azithromycin  - Follow-up dedicated CT chest with contrast ordered

## 2023-10-27 NOTE — ASSESSMENT & PLAN NOTE
Potassium 5.5 on admission in setting of ESRD and home ARB  - Hold on home ARB  - Ordered Lokelma 10 mg on admission  - HD per nephrology  - Renal diet   - Trend BMP

## 2023-10-27 NOTE — SUBJECTIVE & OBJECTIVE
Interval History:   No events overnight. K 5.8 and Hgb 6.9. Patient underwent routine HD today.Hgb repeated pending. Consented with T&S ordered. Patient with no complaints. Sister undated via phone. Formal opthalmology consult placed.       Review of Systems   Constitutional:  Negative for activity change, appetite change, chills, diaphoresis, fatigue and fever.   HENT:  Negative for rhinorrhea and sore throat.    Respiratory:  Negative for cough, chest tightness and shortness of breath.    Cardiovascular:  Negative for chest pain and palpitations.   Gastrointestinal:  Negative for abdominal pain, constipation, diarrhea and nausea.   Endocrine: Negative for cold intolerance.   Genitourinary:  Negative for decreased urine volume and dysuria.   Musculoskeletal:  Negative for arthralgias and myalgias.   Skin:  Negative for rash and wound.   Neurological:  Negative for dizziness, weakness, numbness and headaches.   Psychiatric/Behavioral:  Negative for agitation, behavioral problems and confusion.      Objective:     Vital Signs (Most Recent):  Temp: 99 °F (37.2 °C) (10/27/23 1532)  Pulse: 76 (10/27/23 1532)  Resp: 18 (10/27/23 1532)  BP: 128/68 (10/27/23 1532)  SpO2: 96 % (10/27/23 1532) Vital Signs (24h Range):  Temp:  [97.6 °F (36.4 °C)-99.1 °F (37.3 °C)] 99 °F (37.2 °C)  Pulse:  [72-82] 76  Resp:  [17-20] 18  SpO2:  [91 %-100 %] 96 %  BP: (125-172)/(68-91) 128/68        Body mass index is 19.8 kg/m².    Intake/Output Summary (Last 24 hours) at 10/27/2023 1543  Last data filed at 10/27/2023 1230  Gross per 24 hour   Intake 300 ml   Output 1800 ml   Net -1500 ml         Physical Exam  Constitutional:       Appearance: Normal appearance. He is normal weight.   HENT:      Head: Normocephalic and atraumatic.      Mouth/Throat:      Mouth: Mucous membranes are moist.   Eyes:      Extraocular Movements: Extraocular movements intact.      Conjunctiva/sclera: Conjunctivae normal.   Cardiovascular:      Rate and Rhythm:  Normal rate and regular rhythm.      Heart sounds: No murmur heard.  Pulmonary:      Effort: Pulmonary effort is normal. No respiratory distress.      Breath sounds: Normal breath sounds. No wheezing or rales.   Abdominal:      General: Abdomen is flat. There is no distension.      Palpations: Abdomen is soft.      Tenderness: There is no abdominal tenderness. There is no guarding.      Comments: PEG tube, c/d/i   Musculoskeletal:         General: No swelling or tenderness.   Skin:     Findings: No rash.   Neurological:      General: No focal deficit present.      Mental Status: He is alert and oriented to person, place, and time. Mental status is at baseline.   Psychiatric:         Mood and Affect: Mood normal.         Behavior: Behavior normal.             Significant Labs: All pertinent labs within the past 24 hours have been reviewed.  CBC:   Recent Labs   Lab 10/26/23  1134 10/27/23  0707   WBC 8.29 8.40   HGB 8.3* 6.9*   HCT 26.4* 23.3*    145*     CMP:   Recent Labs   Lab 10/26/23  1134 10/27/23  0413    133*   K 5.5* 5.8*   CL 99 95   CO2 26 20*   GLU 98 119*   BUN 56* 69*   CREATININE 5.0* 5.7*   CALCIUM 8.8 9.1   PROT 6.4 6.4   ALBUMIN 2.8* 2.9*   BILITOT 0.6 0.6   ALKPHOS 77 83   AST 20 17   ALT 15 11   ANIONGAP 15 18*       Significant Imaging: I have reviewed all pertinent imaging results/findings within the past 24 hours.

## 2023-10-27 NOTE — ASSESSMENT & PLAN NOTE
- CT head/neck with findings of concern for right upper lobe pneumonia  - Given ceftriaxone and azithromycin ED   - Continue ceftriaxone and azithromycin  - Follow-up dedicated CT chest with contrast

## 2023-10-27 NOTE — HPI
Mr. Bernal is inpatient male with a past medical history of ESRD on HD, prior CVA, diabetes, hypertension who presents to the ED from clinic with concerns of facial droop.  In the ED, patient HDS.  Labs notable for for potassium 5.5, creatinine 5, BUN 56, glucose 98, and WBC 8.3.  MRI brain without contrast with no evidence of acute infarct or hemorrhage; but notable for chronic small-vessel ischemic changes and remote left basal ganglia/corona radiata lacunar type infarct.  CTA head with no evidence of high-grade stenosis or large intracranial large vessel occlusion.  Lung in the CTA head finding with irregular airspace opacity in the posterior right apex concerning for pneumonia.    Patient was evaluated by vascular neurology. No intevention indicated per note. Today, he reports he is back to baseline. Scheduled for iHD on MWF outpatient.     Outpatient HD Information:  -Dialysis modality: Hemodialysis  -Outpatient HD unit: [facility]  -Nephrologist: Dr. Jain  -HD TX days: Monday/Wednesday/Friday, duration of treatment: 3.5hrs  -Last HD TX prior to hospital admission: 10/25/23  -Dialysis access: dialysis catheter     Nephrology consulted for inpatient dialysis needs.

## 2023-10-27 NOTE — PROGRESS NOTES
[facility] Medicine  Progress Note    Patient Name: Immanuel Bernal  MRN: [id number]  Patient Class: IP- Inpatient   Admission Date: 10/26/2023  Length of Stay: 0 days  Attending Physician: John Beckham MD  Primary Care Provider: Dolly, Primary Doctor        Subjective:     Principal Problem:Facial droop        HPI:   Mr. Bernal is inpatient male with a past medical history of ESRD on HD, prior CVA, diabetes, hypertension who presents to the ED from clinic with concerns of facial droop.  Patient was in his usual state of health while at outpatient clinic appointment where his sister noted him to have right facial droop and he presented to the ED.  Patient with no other complaints.  Denies fevers, chills, dyspnea, abdominal pain, headache, weakness, tingling, numbness, vision changes, nausea, and dysarthria.    In the ED, patient HDS.  Labs notable for for potassium 5.5, creatinine 5, BUN 56, glucose 98, and WBC 8.3.  MRI brain without contrast with no evidence of acute infarct or hemorrhage; but notable for chronic small-vessel ischemic changes and remote left basal ganglia/corona radiata lacunar type infarct.  CTA head with no evidence of high-grade stenosis or large intracranial large vessel occlusion.  Lung in the CTA head finding with irregular airspace opacity in the posterior right apex concerning for pneumonia.      Overview/Hospital Course:  No notes on file    Interval History:   No events overnight. K 5.8 and Hgb 6.9. Patient underwent routine HD today.Hgb repeated pending. Consented with T&S ordered. Patient with no complaints. Sister undated via phone. Formal opthalmology consult placed.       Review of Systems   Constitutional:  Negative for activity change, appetite change, chills, diaphoresis, fatigue and fever.   HENT:  Negative for rhinorrhea and sore throat.    Respiratory:  Negative for cough, chest tightness and shortness of breath.    Cardiovascular:  Negative for chest  pain and palpitations.   Gastrointestinal:  Negative for abdominal pain, constipation, diarrhea and nausea.   Endocrine: Negative for cold intolerance.   Genitourinary:  Negative for decreased urine volume and dysuria.   Musculoskeletal:  Negative for arthralgias and myalgias.   Skin:  Negative for rash and wound.   Neurological:  Negative for dizziness, weakness, numbness and headaches.   Psychiatric/Behavioral:  Negative for agitation, behavioral problems and confusion.      Objective:     Vital Signs (Most Recent):  Temp: 99 °F (37.2 °C) (10/27/23 1532)  Pulse: 76 (10/27/23 1532)  Resp: 18 (10/27/23 1532)  BP: 128/68 (10/27/23 1532)  SpO2: 96 % (10/27/23 1532) Vital Signs (24h Range):  Temp:  [97.6 °F (36.4 °C)-99.1 °F (37.3 °C)] 99 °F (37.2 °C)  Pulse:  [72-82] 76  Resp:  [17-20] 18  SpO2:  [91 %-100 %] 96 %  BP: (125-172)/(68-91) 128/68        Body mass index is 19.8 kg/m².    Intake/Output Summary (Last 24 hours) at 10/27/2023 1543  Last data filed at 10/27/2023 1230  Gross per 24 hour   Intake 300 ml   Output 1800 ml   Net -1500 ml         Physical Exam  Constitutional:       Appearance: Normal appearance. He is normal weight.   HENT:      Head: Normocephalic and atraumatic.      Mouth/Throat:      Mouth: Mucous membranes are moist.   Eyes:      Extraocular Movements: Extraocular movements intact.      Conjunctiva/sclera: Conjunctivae normal.   Cardiovascular:      Rate and Rhythm: Normal rate and regular rhythm.      Heart sounds: No murmur heard.  Pulmonary:      Effort: Pulmonary effort is normal. No respiratory distress.      Breath sounds: Normal breath sounds. No wheezing or rales.   Abdominal:      General: Abdomen is flat. There is no distension.      Palpations: Abdomen is soft.      Tenderness: There is no abdominal tenderness. There is no guarding.      Comments: PEG tube, c/d/i   Musculoskeletal:         General: No swelling or tenderness.   Skin:     Findings: No rash.   Neurological:       General: No focal deficit present.      Mental Status: He is alert and oriented to person, place, and time. Mental status is at baseline.   Psychiatric:         Mood and Affect: Mood normal.         Behavior: Behavior normal.             Significant Labs: All pertinent labs within the past 24 hours have been reviewed.  CBC:   Recent Labs   Lab 10/26/23  1134 10/27/23  0707   WBC 8.29 8.40   HGB 8.3* 6.9*   HCT 26.4* 23.3*    145*     CMP:   Recent Labs   Lab 10/26/23  1134 10/27/23  0413    133*   K 5.5* 5.8*   CL 99 95   CO2 26 20*   GLU 98 119*   BUN 56* 69*   CREATININE 5.0* 5.7*   CALCIUM 8.8 9.1   PROT 6.4 6.4   ALBUMIN 2.8* 2.9*   BILITOT 0.6 0.6   ALKPHOS 77 83   AST 20 17   ALT 15 11   ANIONGAP 15 18*       Significant Imaging: I have reviewed all pertinent imaging results/findings within the past 24 hours.      Assessment/Plan:      * Facial droop  Patient presenting with concern for new right-sided facial droop on day of presentation.    - CTA head/neck unremarkable  - MRI brain without contrast unremarkable  - Vascular neurology consulted   -- Signed off given unremarkable admission imaging   -- Suspect previously noticed facial droop related to ocular issues rather than stroke given negative MRI  -- Also concern for potential TIA  - LDL 53, TG 51  - A1c 4.7 in August 2023  - Continue to monitor   - Telemetry  - Patient resides at NH    Pneumonia  - CT head/neck with findings of concern for right upper lobe pneumonia  - Given ceftriaxone and azithromycin ED   - Continue ceftriaxone and azithromycin  - Follow-up dedicated CT chest with contrast     Hyperkalemia  Potassium 5.5 on admission in setting of ESRD and home ARB  - Hold on home ARB  - Ordered Lokelma 10 mg on admission  - HD per nephrology  - Renal diet   - Trend BMP    Endophthalmitis  - Status post left eye enucleation with ophthalmology prior to presentation  - Ophthalmology consulted   - Follow-up recommendations      ESRD (end  stage renal disease)  - Nephrology consulted  - HD per nephrology  - Continue home sevelamer      PEG (percutaneous endoscopic gastrostomy) status  - Reported used for medications only  - Routine PEG tube care      Anemia in ESRD (end-stage renal disease)  - EPO per nephrology  - Goal Hgb > 7  - Trend CBC    Chronic kidney disease-mineral and bone disorder  - Continue home calcitriol    Benign prostate hyperplasia  - Home tamsulosin      VTE Risk Mitigation (From admission, onward)         Ordered     heparin (porcine) injection 1,000 Units  As needed (PRN)         10/27/23 1100     IP VTE LOW RISK PATIENT  Once         10/26/23 1711     Place sequential compression device  Until discontinued         10/26/23 1711                Discharge Planning   TOBY: 10/27/2023     Code Status: Full Code   Is the patient medically ready for discharge?: No    Reason for patient still in hospital (select all that apply): Patient trending condition, Laboratory test, Treatment, Consult recommendations and Pending disposition      Discharge Delays: None known at this time              John Beckham MD  Department of Hospital Medicine   [facility] - Med Surg

## 2023-10-27 NOTE — PLAN OF CARE
[facility] - Med Surg  Discharge Final Note    Primary Care Provider: No, Primary Doctor    Expected Discharge Date: 10/27/2023    Final Discharge Note (most recent)       Final Note - 10/27/23 0942          Final Note    Assessment Type Final Discharge Note     Anticipated Discharge Disposition Home or Self Care     Hospital Resources/Appts/Education Provided Appointments scheduled and added to AVS        Post-Acute Status    Post-Acute Authorization Other     Other Status No Post-Acute Service Needs     Discharge Delays None known at this time                     Important Message from Medicare             Contact Info       No, Primary Doctor   Relationship: PCP - General        Next Steps: Follow up

## 2023-10-27 NOTE — ASSESSMENT & PLAN NOTE
Patient presenting with concern for new right-sided facial droop on day of presentation.    - CTA head/neck unremarkable  - MRI brain without contrast unremarkable  - Concern for potential TIA  - LDL 53, TG 51  - A1c 4.7 in August 2023  - Continue to monitor   - Telemetry

## 2023-10-27 NOTE — SUBJECTIVE & OBJECTIVE
Past Medical History:   Diagnosis Date    Bilateral retinal detachment 7/18/2023    BPH (benign prostatic hyperplasia)     Cataract     CHF (congestive heart failure)     CKD (chronic kidney disease) stage 3, GFR 30-59 ml/min     Diabetes mellitus, type 2     Hypertension     KENIA (obstructive sleep apnea)     Pancreatitis     Persistent proteinuria 11/12/2020    2 g proteinuria noted Resumed ACE Lower BP systolic to less than 130     PTSD (post-traumatic stress disorder)     Stroke        Past Surgical History:   Procedure Laterality Date    CATARACT EXTRACTION Bilateral     ENUCLEATION Left 8/30/2023    Procedure: ENUCLEATION, EYE;  Surgeon: Vicki Stewart MD;  Location: [facility] OR [address] [place] [address];  Service: Ophthalmology;  Laterality: Left;    ESOPHAGOGASTRODUODENOSCOPY N/A 8/23/2023    Procedure: EGD (ESOPHAGOGASTRODUODENOSCOPY);  Surgeon: Bharath Ya MD;  Location: [facility] (2ND FLR);  Service: Endoscopy;  Laterality: N/A;    EVISCERATION OF OCULAR CONTENTS Left 8/16/2023    Procedure: EVISCERATION, OCULAR CONTENTS;  Surgeon: Vicki Stewart MD;  [facility];  Service: Ophthalmology;  Laterality: Left;    MAGNETIC RESONANCE IMAGING N/A 8/26/2023    Procedure: MRI (Magnetic Resonance Imagine);  Surgeon: Mckenzie De Jesus;  Location: [facility];  Service: Anesthesiology;  Laterality: N/A;    RETROBULBAR INJECTION OF MEDICATION Left 8/16/2023    Procedure: INJECTION, MEDICATION, RETROBULBAR;  Surgeon: Vicki Stewart MD;  Location: [address];  Service: Ophthalmology;  Laterality: Left;    RETROBULBAR INJECTION OF MEDICATION Left 8/30/2023    Procedure: INJECTION, MEDICATION, RETROBULBAR;  Surgeon: Vicki Stewart MD;  Location: [facility] OR [address] [place] [address];  Service: Ophthalmology;  Laterality: Left;    TARSORRHAPHY Left 8/16/2023    Procedure: BLEPHARORRHAPHY;  Surgeon: Vicki Stewart MD;  Location: [facility] OR [address] [place] [address];  Service: Ophthalmology;  Laterality: Left;    TARSORRHAPHY Left 8/30/2023    Procedure:  BLEPHARORRHAPHY;  Surgeon: Vicki Stewart MD;  Location: [facility] OR [address] [place] [address];  Service: Ophthalmology;  Laterality: Left;       Review of patient's allergies indicates:   Allergen Reactions    Morphine Rash    Amiodarone analogues Itching     Other reaction(s): Unknown       No current facility-administered medications on file prior to encounter.     Current Outpatient Medications on File Prior to Encounter   Medication Sig    acetaminophen (TYLENOL) 325 MG tablet Take 650 mg by mouth every 6 (six) hours as needed for Pain.    albuterol-ipratropium (DUO-NEB) 2.5 mg-0.5 mg/3 mL nebulizer solution Take 3 mLs by nebulization 3 (three) times daily as needed for Wheezing or Shortness of Breath.    amLODIPine (NORVASC) 10 MG tablet Take 1 tablet (10 mg total) by mouth once daily.    ascorbic acid, vitamin C, (VITAMIN C) 500 MG tablet Take 500 mg by mouth once daily.    aspirin 81 MG Chew Take 81 mg by mouth once daily.    atorvastatin (LIPITOR) 40 MG tablet 1 tablet (40 mg total) by Per G Tube route once daily.    calcitRIOL (ROCALTROL) 0.25 MCG Cap Take 1 capsule (0.25 mcg total) by mouth once daily.    calcium carbonate-vitamin D3 (OYSTER SHELL CALCIUM-VIT D3) 500 mg-5 mcg (200 unit) PwPk Take 1 tablet by mouth 2 (two) times a day.    carvediloL (COREG) 25 MG tablet 1 tablet (25 mg total) by Per G Tube route 2 (two) times daily.    folic acid (FOLVITE) 1 MG tablet Take 1 tablet by mouth every morning.    isosorbide-hydrALAZINE 20-37.5 mg (BIDIL) 20-37.5 mg Tab Take 2 tablets by mouth 3 (three) times daily.    Lactobacillus rhamnosus GG (CULTURELLE) 10 billion cell capsule 1 capsule by Per G Tube route 2 (two) times daily.    megestroL (MEGACE) 400 mg/10 mL (40 mg/mL) Susp Take 10 mLs by mouth 2 (two) times a day.    melatonin (MELATIN) 3 mg tablet Take 2 tablets (6 mg total) by mouth nightly.    multivitamin (THERAGRAN) per tablet Take 1 tablet by mouth once daily.    ondansetron (ZOFRAN-ODT) 8 MG TbDL Take 1 tablet (8 mg  total) by mouth every 6 (six) hours as needed.    oxybutynin (DITROPAN-XL) 10 MG 24 hr tablet Take 1 tablet (10 mg total) by mouth once daily.    pantoprazole (PROTONIX) 40 mg suspension 1 packet (40 mg total) by Per G Tube route 2 (two) times daily.    polyethylene glycol (GLYCOLAX) 17 gram/dose powder Take 17 g by mouth 2 (two) times daily.    senna-docusate 8.6-50 mg (PERICOLACE) 8.6-50 mg per tablet 1 tablet by Per G Tube route 2 (two) times daily.    sevelamer carbonate (RENVELA) 0.8 gram PwPk 1 packet (0.8 g total) by Per G Tube route 3 (three) times daily with meals.    tamsulosin (FLOMAX) 0.4 mg Cap Take 1 capsule (0.4 mg total) by mouth once daily.    tobramycin-dexAMETHasone 0.3-0.1% (TOBRADEX) 0.3-0.1 % Oint Place into the left eye 3 (three) times daily.    valsartan (DIOVAN) 160 MG tablet Take 1 tablet (160 mg total) by mouth 2 (two) times daily.    VITAMIN B COMPLEX ORAL Take 1 tablet by mouth every morning.    zinc gluconate 50 mg tablet Take 50 mg by mouth once daily.     Family History       Problem Relation (Age of Onset)    Diabetes Father, Sister    Heart disease Father    Hyperlipidemia Brother    Kidney disease Father    Stroke Mother          Tobacco Use    Smoking status: Former     Types: Cigarettes, Cigars    Smokeless tobacco: Never   Substance and Sexual Activity    Alcohol use: Not Currently    Drug use: Not Currently    Sexual activity: Not on file     Review of Systems   Constitutional:  Negative for activity change, appetite change, chills, diaphoresis, fatigue and fever.   HENT:  Negative for rhinorrhea and sore throat.    Respiratory:  Negative for cough, chest tightness and shortness of breath.    Cardiovascular:  Negative for chest pain and palpitations.   Gastrointestinal:  Positive for constipation. Negative for abdominal pain, diarrhea and nausea.   Endocrine: Negative for cold intolerance.   Genitourinary:  Negative for decreased urine volume and dysuria.   Musculoskeletal:   Negative for arthralgias and myalgias.   Skin:  Negative for rash and wound.   Neurological:  Negative for dizziness, weakness, numbness and headaches.   Psychiatric/Behavioral:  Negative for agitation, behavioral problems and confusion.      Objective:     Vital Signs (Most Recent):  Temp: 99.1 °F (37.3 °C) (10/26/23 1843)  Pulse: 75 (10/26/23 1843)  Resp: 17 (10/26/23 1843)  BP: (!) 144/74 (10/26/23 1843)  SpO2: 98 % (10/26/23 1843) Vital Signs (24h Range):  Temp:  [98.3 °F (36.8 °C)-99.9 °F (37.7 °C)] 99.1 °F (37.3 °C)  Pulse:  [73-84] 75  Resp:  [15-20] 17  SpO2:  [96 %-100 %] 98 %  BP: (106-144)/(60-77) 144/74        There is no height or weight on file to calculate BMI.     Physical Exam  Constitutional:       Appearance: Normal appearance. He is normal weight.   HENT:      Head: Normocephalic and atraumatic.      Mouth/Throat:      Mouth: Mucous membranes are moist.   Eyes:      Extraocular Movements: Extraocular movements intact.      Conjunctiva/sclera: Conjunctivae normal.   Cardiovascular:      Rate and Rhythm: Normal rate and regular rhythm.      Heart sounds: No murmur heard.  Pulmonary:      Effort: Pulmonary effort is normal. No respiratory distress.      Breath sounds: Normal breath sounds. No wheezing or rales.   Abdominal:      General: Abdomen is flat. There is no distension.      Palpations: Abdomen is soft.      Tenderness: There is no abdominal tenderness. There is no guarding.   Musculoskeletal:         General: No swelling or tenderness.   Skin:     Findings: No rash.   Neurological:      General: No focal deficit present.      Mental Status: He is alert and oriented to person, place, and time. Mental status is at baseline.   Psychiatric:         Mood and Affect: Mood normal.         Behavior: Behavior normal.                Significant Labs: All pertinent labs within the past 24 hours have been reviewed.  CBC:   Recent Labs   Lab 10/26/23  1134   WBC 8.29   HGB 8.3*   HCT 26.4*         CMP:   Recent Labs   Lab 10/26/23  1134      K 5.5*   CL 99   CO2 26   GLU 98   BUN 56*   CREATININE 5.0*   CALCIUM 8.8   PROT 6.4   ALBUMIN 2.8*   BILITOT 0.6   ALKPHOS 77   AST 20   ALT 15   ANIONGAP 15       Significant Imaging: I have reviewed all pertinent imaging results/findings within the past 24 hours.

## 2023-10-27 NOTE — SUBJECTIVE & OBJECTIVE
Detail Level: Detailed Past Medical History:   Diagnosis Date    Bilateral retinal detachment 7/18/2023    BPH (benign prostatic hyperplasia)     Cataract     CHF (congestive heart failure)     CKD (chronic kidney disease) stage 3, GFR 30-59 ml/min     Diabetes mellitus, type 2     Hypertension     KENIA (obstructive sleep apnea)     Pancreatitis     Persistent proteinuria 11/12/2020    2 g proteinuria noted Resumed ACE Lower BP systolic to less than 130     PTSD (post-traumatic stress disorder)     Stroke        Past Surgical History:   Procedure Laterality Date    CATARACT EXTRACTION Bilateral     EVISCERATION OF OCULAR CONTENTS Left 8/16/2023    Procedure: EVISCERATION, OCULAR CONTENTS;  Surgeon: Vicki Stewart MD;  Location: [facility] OR [address] [place] [address];  Service: Ophthalmology;  Laterality: Left;    RETROBULBAR INJECTION OF MEDICATION Left 8/16/2023    Procedure: INJECTION, MEDICATION, RETROBULBAR;  Surgeon: Vicki Stewart MD;  Location: [facility] OR [address] [place] [address];  Service: Ophthalmology;  Laterality: Left;    TARSORRHAPHY Left 8/16/2023    Procedure: BLEPHARORRHAPHY;  Surgeon: Vicki Stewart MD;  Location: [address];  Service: Ophthalmology;  Laterality: Left;       Review of patient's allergies indicates:   Allergen Reactions    Morphine Rash    Amiodarone analogues Itching     Other reaction(s): Unknown       No current facility-administered medications on file prior to encounter.     Current Outpatient Medications on File Prior to Encounter   Medication Sig    acetaminophen (TYLENOL) 325 MG tablet Take 650 mg by mouth every 6 (six) hours as needed for Pain.    albuterol-ipratropium (DUO-NEB) 2.5 mg-0.5 mg/3 mL nebulizer solution Inhale 3 mLs into the lungs 3 (three) times daily.    ascorbic acid, vitamin C, (VITAMIN C) 500 MG tablet Take 500 mg by mouth once daily.    aspirin 81 MG Chew Take 81 mg by mouth once daily.    calcium carbonate-vitamin D3 (OYSTER SHELL CALCIUM-VIT D3) 500 mg-5 mcg (200 unit) PwPk Take 1 tablet by mouth 2 (two) times a  day.    carvediloL (COREG) 25 MG tablet Take 1 tablet (25 mg total) by mouth 2 (two) times daily with meals.    folic acid (FOLVITE) 1 MG tablet Take 1 tablet by mouth every morning.    megestroL (MEGACE) 400 mg/10 mL (40 mg/mL) Susp Take 10 mLs by mouth 2 (two) times a day.    multivitamin (THERAGRAN) per tablet Take 1 tablet by mouth once daily.    pantoprazole (PROTONIX) 40 MG tablet Take 40 mg by mouth once daily.    polyethylene glycol (GLYCOLAX) 17 gram/dose powder Take 17 g by mouth 2 (two) times daily.    sevelamer carbonate (RENVELA) 800 mg Tab Take 1 tablet by mouth 5 times per day    tamsulosin (FLOMAX) 0.4 mg Cap Take 1 capsule (0.4 mg total) by mouth once daily.    VITAMIN B COMPLEX ORAL Take 1 tablet by mouth every morning.    zinc gluconate 50 mg tablet Take 50 mg by mouth once daily.     Family History       Problem Relation (Age of Onset)    Diabetes Father, Sister    Heart disease Father    Hyperlipidemia Brother    Kidney disease Father    Stroke Mother          Tobacco Use    Smoking status: Former     Current packs/day: 0.00     Types: Cigarettes, Cigars    Smokeless tobacco: Never   Substance and Sexual Activity    Alcohol use: Not Currently    Drug use: Not Currently    Sexual activity: Not on file     Review of Systems   Constitutional:  Positive for diaphoresis. Negative for fever.   Gastrointestinal:  Positive for nausea and vomiting. Negative for abdominal pain.   Neurological:  Positive for dizziness and light-headedness. Negative for syncope.     Objective:     Vital Signs (Most Recent):  Temp: 98 °F (36.7 °C) (08/22/23 0440)  Pulse: 66 (08/22/23 0440)  Resp: 15 (08/22/23 0440)  BP: 120/70 (08/22/23 0440)  SpO2: 100 % (08/22/23 0440) Vital Signs (24h Range):  Temp:  [97.7 °F (36.5 °C)-98 °F (36.7 °C)] 98 °F (36.7 °C)  Pulse:  [62-66] 66  Resp:  [14-18] 15  SpO2:  [100 %] 100 %  BP: (115-132)/(65-84) 120/70     Weight: 62.8 kg (138 lb 7.2 oz)  Body mass index is 19.87 kg/m².     Physical  Exam  Constitutional:       General: He is not in acute distress.     Appearance: Normal appearance.   HENT:      Head: Normocephalic and atraumatic.   Eyes:      General: No scleral icterus.        Left eye: Discharge present.     Extraocular Movements: Extraocular movements intact.      Conjunctiva/sclera: Conjunctivae normal.   Cardiovascular:      Rate and Rhythm: Normal rate and regular rhythm.   Pulmonary:      Effort: Pulmonary effort is normal.      Breath sounds: Normal breath sounds.   Abdominal:      General: Abdomen is flat. Bowel sounds are normal. There is no distension.      Palpations: Abdomen is soft.      Tenderness: There is no abdominal tenderness.   Genitourinary:     Rectum: No anal fissure or external hemorrhoid.      Comments: Dark brown stool, no melena  Skin:     General: Skin is warm and dry.   Neurological:      General: No focal deficit present.      Mental Status: He is alert and oriented to person, place, and time.   Psychiatric:         Mood and Affect: Mood normal.         Behavior: Behavior normal.          Significant Labs: All pertinent labs within the past 24 hours have been reviewed.    Significant Imaging: I have reviewed all pertinent imaging results/findings within the past 24 hours.

## 2023-10-27 NOTE — PLAN OF CARE
Problem: Fluid Volume Excess (Chronic Kidney Disease)  Goal: Fluid Balance  Outcome: Ongoing, Progressing     Problem: Electrolyte Imbalance (Chronic Kidney Disease)  Goal: Electrolyte Balance  Outcome: Ongoing, Progressing

## 2023-10-27 NOTE — PLAN OF CARE
APPOINTMENT:    Patient Appointment(s) scheduled with Kevan Atwood MD, on Friday Nov 3, 2023 11:00 AM

## 2023-10-27 NOTE — PROGRESS NOTES
Dialysis completed. Right IJ tunneled CVC flushed, heparinized, capped and ends wrapped with sterile gauze. Dialyzed for 3 hours with fluid removal of 1 liter. Tolerated well with stable vital signs. Returned to his room by bed.

## 2023-10-27 NOTE — ASSESSMENT & PLAN NOTE
Potassium 5.5 on admission in setting of ESRD and home ARB  - Hold on home ARB  - Ordered Lokelma 10 mg on admission  - Trend BMP

## 2023-10-27 NOTE — ASSESSMENT & PLAN NOTE
Status post left eye enucleation with ophthalmology prior to presentation  Ophthalmology consult in ED   Follow-up recommendations

## 2023-10-27 NOTE — HPI
Mr. Bernal is inpatient male with a past medical history of ESRD on HD, prior CVA, diabetes, hypertension who presents to the ED from clinic with concerns of facial droop.  Patient was in his usual state of health while at outpatient clinic appointment where his sister noted him to have right facial droop and he presented to the ED.  Patient with no other complaints.  Denies fevers, chills, dyspnea, abdominal pain, headache, weakness, tingling, numbness, vision changes, nausea, and dysarthria.    In the ED, patient HDS.  Labs notable for for potassium 5.5, creatinine 5, BUN 56, glucose 98, and WBC 8.3.  MRI brain without contrast with no evidence of acute infarct or hemorrhage; but notable for chronic small-vessel ischemic changes and remote left basal ganglia/corona radiata lacunar type infarct.  CTA head with no evidence of high-grade stenosis or large intracranial large vessel occlusion.  Lung in the CTA head finding with irregular airspace opacity in the posterior right apex concerning for pneumonia.

## 2023-10-27 NOTE — H&P
[facility] Medicine  History & Physical    Patient Name: Immanuel Bernal  MRN: [id number]  Patient Class: OP- Observation  Admission Date: 10/26/2023  Attending Physician: John Beckham MD   Primary Care Provider: Dolly Primary Doctor         Patient information was obtained from patient, past medical records and ER records.     Subjective:     Principal Problem:Facial droop    Chief Complaint:   Chief Complaint   Patient presents with    Facial Droop     Pt arrived via EMS from PCP clinic with c/o left sided drooping. Pt had left eye removed last month, he was at clinic for a follow up        HPI:  Mr. Bernal is inpatient male with a past medical history of ESRD on HD, prior CVA, diabetes, hypertension who presents to the ED from clinic with concerns of facial droop.  Patient was in his usual state of health while at outpatient clinic appointment where his sister noted him to have right facial droop and he presented to the ED.  Patient with no other complaints.  Denies fevers, chills, dyspnea, abdominal pain, headache, weakness, tingling, numbness, vision changes, nausea, and dysarthria.    In the ED, patient HDS.  Labs notable for for potassium 5.5, creatinine 5, BUN 56, glucose 98, and WBC 8.3.  MRI brain without contrast with no evidence of acute infarct or hemorrhage; but notable for chronic small-vessel ischemic changes and remote left basal ganglia/corona radiata lacunar type infarct.  CTA head with no evidence of high-grade stenosis or large intracranial large vessel occlusion.  Lung in the CTA head finding with irregular airspace opacity in the posterior right apex concerning for pneumonia.      Past Medical History:   Diagnosis Date    Bilateral retinal detachment 7/18/2023    BPH (benign prostatic hyperplasia)     Cataract     CHF (congestive heart failure)     CKD (chronic kidney disease) stage 3, GFR 30-59 ml/min     Diabetes mellitus, type 2     Hypertension     KENIA  (obstructive sleep apnea)     Pancreatitis     Persistent proteinuria 11/12/2020    2 g proteinuria noted Resumed ACE Lower BP systolic to less than 130     PTSD (post-traumatic stress disorder)     Stroke        Past Surgical History:   Procedure Laterality Date    CATARACT EXTRACTION Bilateral     ENUCLEATION Left 8/30/2023    Procedure: ENUCLEATION, EYE;  Surgeon: Vicki Stewart MD;  Location: [address];  Service: Ophthalmology;  Laterality: Left;    ESOPHAGOGASTRODUODENOSCOPY N/A 8/23/2023    Procedure: EGD (ESOPHAGOGASTRODUODENOSCOPY);  Surgeon: Bharath Ya MD;  Location: [facility] ENDO (2ND FLR);  Service: Endoscopy;  Laterality: N/A;    EVISCERATION OF OCULAR CONTENTS Left 8/16/2023    Procedure: EVISCERATION, OCULAR CONTENTS;  Surgeon: Vicki Stewart MD;  Location: [address];  Service: Ophthalmology;  Laterality: Left;    MAGNETIC RESONANCE IMAGING N/A 8/26/2023    Procedure: MRI (Magnetic Resonance Imagine);  Surgeon: Ar De Jesus;  Location: [facility];  Service: Anesthesiology;  Laterality: N/A;    RETROBULBAR INJECTION OF MEDICATION Left 8/16/2023    Procedure: INJECTION, MEDICATION, RETROBULBAR;  Surgeon: Vicki Stewart MD;  Location: [address];  Service: Ophthalmology;  Laterality: Left;    RETROBULBAR INJECTION OF MEDICATION Left 8/30/2023    Procedure: INJECTION, MEDICATION, RETROBULBAR;  Surgeon: Vicki Stewart MD;  Location: [address];  Service: Ophthalmology;  Laterality: Left;    TARSORRHAPHY Left 8/16/2023    Procedure: BLEPHARORRHAPHY;  Surgeon: Vicki Stewart MD;  Location: [address];  Service: Ophthalmology;  Laterality: Left;    TARSORRHAPHY Left 8/30/2023    Procedure: BLEPHARORRHAPHY;  Surgeon: Vicki Stewart MD;  Location: [address];  Service: Ophthalmology;  Laterality: Left;       Review of patient's allergies indicates:   Allergen Reactions    Morphine Rash    Amiodarone analogues Itching     Other reaction(s): Unknown       No  current facility-administered medications on file prior to encounter.     Current Outpatient Medications on File Prior to Encounter   Medication Sig    acetaminophen (TYLENOL) 325 MG tablet Take 650 mg by mouth every 6 (six) hours as needed for Pain.    albuterol-ipratropium (DUO-NEB) 2.5 mg-0.5 mg/3 mL nebulizer solution Take 3 mLs by nebulization 3 (three) times daily as needed for Wheezing or Shortness of Breath.    amLODIPine (NORVASC) 10 MG tablet Take 1 tablet (10 mg total) by mouth once daily.    ascorbic acid, vitamin C, (VITAMIN C) 500 MG tablet Take 500 mg by mouth once daily.    aspirin 81 MG Chew Take 81 mg by mouth once daily.    atorvastatin (LIPITOR) 40 MG tablet 1 tablet (40 mg total) by Per G Tube route once daily.    calcitRIOL (ROCALTROL) 0.25 MCG Cap Take 1 capsule (0.25 mcg total) by mouth once daily.    calcium carbonate-vitamin D3 (OYSTER SHELL CALCIUM-VIT D3) 500 mg-5 mcg (200 unit) PwPk Take 1 tablet by mouth 2 (two) times a day.    carvediloL (COREG) 25 MG tablet 1 tablet (25 mg total) by Per G Tube route 2 (two) times daily.    folic acid (FOLVITE) 1 MG tablet Take 1 tablet by mouth every morning.    isosorbide-hydrALAZINE 20-37.5 mg (BIDIL) 20-37.5 mg Tab Take 2 tablets by mouth 3 (three) times daily.    Lactobacillus rhamnosus GG (CULTURELLE) 10 billion cell capsule 1 capsule by Per G Tube route 2 (two) times daily.    megestroL (MEGACE) 400 mg/10 mL (40 mg/mL) Susp Take 10 mLs by mouth 2 (two) times a day.    melatonin (MELATIN) 3 mg tablet Take 2 tablets (6 mg total) by mouth nightly.    multivitamin (THERAGRAN) per tablet Take 1 tablet by mouth once daily.    ondansetron (ZOFRAN-ODT) 8 MG TbDL Take 1 tablet (8 mg total) by mouth every 6 (six) hours as needed.    oxybutynin (DITROPAN-XL) 10 MG 24 hr tablet Take 1 tablet (10 mg total) by mouth once daily.    pantoprazole (PROTONIX) 40 mg suspension 1 packet (40 mg total) by Per G Tube route 2 (two) times daily.     polyethylene glycol (GLYCOLAX) 17 gram/dose powder Take 17 g by mouth 2 (two) times daily.    senna-docusate 8.6-50 mg (PERICOLACE) 8.6-50 mg per tablet 1 tablet by Per G Tube route 2 (two) times daily.    sevelamer carbonate (RENVELA) 0.8 gram PwPk 1 packet (0.8 g total) by Per G Tube route 3 (three) times daily with meals.    tamsulosin (FLOMAX) 0.4 mg Cap Take 1 capsule (0.4 mg total) by mouth once daily.    tobramycin-dexAMETHasone 0.3-0.1% (TOBRADEX) 0.3-0.1 % Oint Place into the left eye 3 (three) times daily.    valsartan (DIOVAN) 160 MG tablet Take 1 tablet (160 mg total) by mouth 2 (two) times daily.    VITAMIN B COMPLEX ORAL Take 1 tablet by mouth every morning.    zinc gluconate 50 mg tablet Take 50 mg by mouth once daily.     Family History       Problem Relation (Age of Onset)    Diabetes Father, Sister    Heart disease Father    Hyperlipidemia Brother    Kidney disease Father    Stroke Mother          Tobacco Use    Smoking status: Former     Types: Cigarettes, Cigars    Smokeless tobacco: Never   Substance and Sexual Activity    Alcohol use: Not Currently    Drug use: Not Currently    Sexual activity: Not on file     Review of Systems   Constitutional:  Negative for activity change, appetite change, chills, diaphoresis, fatigue and fever.   HENT:  Negative for rhinorrhea and sore throat.    Respiratory:  Negative for cough, chest tightness and shortness of breath.    Cardiovascular:  Negative for chest pain and palpitations.   Gastrointestinal:  Positive for constipation. Negative for abdominal pain, diarrhea and nausea.   Endocrine: Negative for cold intolerance.   Genitourinary:  Negative for decreased urine volume and dysuria.   Musculoskeletal:  Negative for arthralgias and myalgias.   Skin:  Negative for rash and wound.   Neurological:  Negative for dizziness, weakness, numbness and headaches.   Psychiatric/Behavioral:  Negative for agitation, behavioral problems and confusion.       Objective:     Vital Signs (Most Recent):  Temp: 99.1 °F (37.3 °C) (10/26/23 1843)  Pulse: 75 (10/26/23 1843)  Resp: 17 (10/26/23 1843)  BP: (!) 144/74 (10/26/23 1843)  SpO2: 98 % (10/26/23 1843) Vital Signs (24h Range):  Temp:  [98.3 °F (36.8 °C)-99.9 °F (37.7 °C)] 99.1 °F (37.3 °C)  Pulse:  [73-84] 75  Resp:  [15-20] 17  SpO2:  [96 %-100 %] 98 %  BP: (106-144)/(60-77) 144/74        There is no height or weight on file to calculate BMI.     Physical Exam  Constitutional:       Appearance: Normal appearance. He is normal weight.   HENT:      Head: Normocephalic and atraumatic.      Mouth/Throat:      Mouth: Mucous membranes are moist.   Eyes:      Extraocular Movements: Extraocular movements intact.      Conjunctiva/sclera: Conjunctivae normal.   Cardiovascular:      Rate and Rhythm: Normal rate and regular rhythm.      Heart sounds: No murmur heard.  Pulmonary:      Effort: Pulmonary effort is normal. No respiratory distress.      Breath sounds: Normal breath sounds. No wheezing or rales.   Abdominal:      General: Abdomen is flat. There is no distension.      Palpations: Abdomen is soft.      Tenderness: There is no abdominal tenderness. There is no guarding.   Musculoskeletal:         General: No swelling or tenderness.   Skin:     Findings: No rash.   Neurological:      General: No focal deficit present.      Mental Status: He is alert and oriented to person, place, and time. Mental status is at baseline.   Psychiatric:         Mood and Affect: Mood normal.         Behavior: Behavior normal.                Significant Labs: All pertinent labs within the past 24 hours have been reviewed.  CBC:   Recent Labs   Lab 10/26/23  1134   WBC 8.29   HGB 8.3*   HCT 26.4*        CMP:   Recent Labs   Lab 10/26/23  1134      K 5.5*   CL 99   CO2 26   GLU 98   BUN 56*   CREATININE 5.0*   CALCIUM 8.8   PROT 6.4   ALBUMIN 2.8*   BILITOT 0.6   ALKPHOS 77   AST 20   ALT 15   ANIONGAP 15       Significant Imaging:  I have reviewed all pertinent imaging results/findings within the past 24 hours.    Assessment/Plan:     * Facial droop  Patient presenting with concern for new right-sided facial droop on day of presentation.    - CTA head/neck unremarkable  - MRI brain without contrast unremarkable  - Concern for potential TIA  - LDL 53, TG 51  - A1c 4.7 in August 2023  - Continue to monitor   - Telemetry    Pneumonia  - CT head/neck with findings of concern for right upper lobe pneumonia  - Given ceftriaxone and azithromycin ED   - Continue ceftriaxone and azithromycin  - Follow-up dedicated CT chest with contrast ordered      Hyperkalemia  Potassium 5.5 on admission in setting of ESRD and home ARB  - Hold on home ARB  - Ordered Lokelma 10 mg on admission  - Trend BMP      Endophthalmitis  Status post left eye enucleation with ophthalmology prior to presentation  Ophthalmology consult in ED   Follow-up recommendations      Anemia in ESRD (end-stage renal disease)  Nephrology consult for routine HD   Continue home sevelamer    Chronic kidney disease-mineral and bone disorder  Continue home calcitriol    Benign prostate hyperplasia  Home tamsulosin      VTE Risk Mitigation (From admission, onward)         Ordered     IP VTE LOW RISK PATIENT  Once         10/26/23 1711     Place sequential compression device  Until discontinued         10/26/23 1711                   On 10/26/2023, patient should be placed in hospital observation services under my care.            John Beckham MD  Department of Hospital Medicine  [facility] - Med Surg    DUE - declined by patient  Family history is reviewed and has not changed   Pertinent information:

## 2023-10-28 LAB
ALBUMIN SERPL BCP-MCNC: 2.9 G/DL (ref 3.5–5.2)
ALP SERPL-CCNC: 68 U/L (ref 55–135)
ALT SERPL W/O P-5'-P-CCNC: 11 U/L (ref 10–44)
ANION GAP SERPL CALC-SCNC: 14 MMOL/L (ref 8–16)
AST SERPL-CCNC: 14 U/L (ref 10–40)
BASOPHILS # BLD AUTO: 0.01 K/UL (ref 0–0.2)
BASOPHILS # BLD AUTO: 0.02 K/UL (ref 0–0.2)
BASOPHILS NFR BLD: 0.1 % (ref 0–1.9)
BASOPHILS NFR BLD: 0.3 % (ref 0–1.9)
BILIRUB SERPL-MCNC: 0.6 MG/DL (ref 0.1–1)
BUN SERPL-MCNC: 50 MG/DL (ref 6–20)
CALCIUM SERPL-MCNC: 9.2 MG/DL (ref 8.7–10.5)
CHLORIDE SERPL-SCNC: 98 MMOL/L (ref 95–110)
CO2 SERPL-SCNC: 23 MMOL/L (ref 23–29)
CREAT SERPL-MCNC: 4.7 MG/DL (ref 0.5–1.4)
DIFFERENTIAL METHOD: ABNORMAL
DIFFERENTIAL METHOD: ABNORMAL
EOSINOPHIL # BLD AUTO: 0.1 K/UL (ref 0–0.5)
EOSINOPHIL # BLD AUTO: 0.1 K/UL (ref 0–0.5)
EOSINOPHIL NFR BLD: 1.8 % (ref 0–8)
EOSINOPHIL NFR BLD: 1.8 % (ref 0–8)
ERYTHROCYTE [DISTWIDTH] IN BLOOD BY AUTOMATED COUNT: 17.9 % (ref 11.5–14.5)
ERYTHROCYTE [DISTWIDTH] IN BLOOD BY AUTOMATED COUNT: 18 % (ref 11.5–14.5)
EST. GFR  (NO RACE VARIABLE): 13.5 ML/MIN/1.73 M^2
GLUCOSE SERPL-MCNC: 114 MG/DL (ref 70–110)
HCT VFR BLD AUTO: 21.7 % (ref 40–54)
HCT VFR BLD AUTO: 22.3 % (ref 40–54)
HGB BLD-MCNC: 6.8 G/DL (ref 14–18)
HGB BLD-MCNC: 7.1 G/DL (ref 14–18)
IMM GRANULOCYTES # BLD AUTO: 0.03 K/UL (ref 0–0.04)
IMM GRANULOCYTES # BLD AUTO: 0.04 K/UL (ref 0–0.04)
IMM GRANULOCYTES NFR BLD AUTO: 0.4 % (ref 0–0.5)
IMM GRANULOCYTES NFR BLD AUTO: 0.5 % (ref 0–0.5)
LYMPHOCYTES # BLD AUTO: 0.6 K/UL (ref 1–4.8)
LYMPHOCYTES # BLD AUTO: 0.7 K/UL (ref 1–4.8)
LYMPHOCYTES NFR BLD: 8.2 % (ref 18–48)
LYMPHOCYTES NFR BLD: 8.9 % (ref 18–48)
MAGNESIUM SERPL-MCNC: 2 MG/DL (ref 1.6–2.6)
MCH RBC QN AUTO: 31.6 PG (ref 27–31)
MCH RBC QN AUTO: 32.3 PG (ref 27–31)
MCHC RBC AUTO-ENTMCNC: 31.3 G/DL (ref 32–36)
MCHC RBC AUTO-ENTMCNC: 31.8 G/DL (ref 32–36)
MCV RBC AUTO: 101 FL (ref 82–98)
MCV RBC AUTO: 101 FL (ref 82–98)
MONOCYTES # BLD AUTO: 0.4 K/UL (ref 0.3–1)
MONOCYTES # BLD AUTO: 0.5 K/UL (ref 0.3–1)
MONOCYTES NFR BLD: 5.7 % (ref 4–15)
MONOCYTES NFR BLD: 7.1 % (ref 4–15)
NEUTROPHILS # BLD AUTO: 6.1 K/UL (ref 1.8–7.7)
NEUTROPHILS # BLD AUTO: 6.1 K/UL (ref 1.8–7.7)
NEUTROPHILS NFR BLD: 82.4 % (ref 38–73)
NEUTROPHILS NFR BLD: 82.8 % (ref 38–73)
NRBC BLD-RTO: 0 /100 WBC
NRBC BLD-RTO: 0 /100 WBC
PHOSPHATE SERPL-MCNC: 5 MG/DL (ref 2.7–4.5)
PLATELET # BLD AUTO: 134 K/UL (ref 150–450)
PLATELET # BLD AUTO: 138 K/UL (ref 150–450)
PMV BLD AUTO: 10.2 FL (ref 9.2–12.9)
PMV BLD AUTO: 10.2 FL (ref 9.2–12.9)
POTASSIUM SERPL-SCNC: 5.3 MMOL/L (ref 3.5–5.1)
PROT SERPL-MCNC: 6.5 G/DL (ref 6–8.4)
RBC # BLD AUTO: 2.15 M/UL (ref 4.6–6.2)
RBC # BLD AUTO: 2.2 M/UL (ref 4.6–6.2)
SODIUM SERPL-SCNC: 135 MMOL/L (ref 136–145)
WBC # BLD AUTO: 7.31 K/UL (ref 3.9–12.7)
WBC # BLD AUTO: 7.42 K/UL (ref 3.9–12.7)

## 2023-10-28 PROCEDURE — 80053 COMPREHEN METABOLIC PANEL: CPT | Performed by: STUDENT IN AN ORGANIZED HEALTH CARE EDUCATION/TRAINING PROGRAM

## 2023-10-28 PROCEDURE — 94761 N-INVAS EAR/PLS OXIMETRY MLT: CPT

## 2023-10-28 PROCEDURE — 11000001 HC ACUTE MED/SURG PRIVATE ROOM

## 2023-10-28 PROCEDURE — S0179 MEGESTROL 20 MG: HCPCS | Performed by: STUDENT IN AN ORGANIZED HEALTH CARE EDUCATION/TRAINING PROGRAM

## 2023-10-28 PROCEDURE — 85025 COMPLETE CBC W/AUTO DIFF WBC: CPT | Mod: 91 | Performed by: STUDENT IN AN ORGANIZED HEALTH CARE EDUCATION/TRAINING PROGRAM

## 2023-10-28 PROCEDURE — 86920 COMPATIBILITY TEST SPIN: CPT | Performed by: STUDENT IN AN ORGANIZED HEALTH CARE EDUCATION/TRAINING PROGRAM

## 2023-10-28 PROCEDURE — 21400001 HC TELEMETRY ROOM

## 2023-10-28 PROCEDURE — 84100 ASSAY OF PHOSPHORUS: CPT | Performed by: STUDENT IN AN ORGANIZED HEALTH CARE EDUCATION/TRAINING PROGRAM

## 2023-10-28 PROCEDURE — 27000221 HC OXYGEN, UP TO 24 HOURS

## 2023-10-28 PROCEDURE — 83735 ASSAY OF MAGNESIUM: CPT | Performed by: STUDENT IN AN ORGANIZED HEALTH CARE EDUCATION/TRAINING PROGRAM

## 2023-10-28 PROCEDURE — 25000003 PHARM REV CODE 250: Performed by: STUDENT IN AN ORGANIZED HEALTH CARE EDUCATION/TRAINING PROGRAM

## 2023-10-28 PROCEDURE — 63700000 PHARM REV CODE 250 ALT 637 W/O HCPCS: Performed by: STUDENT IN AN ORGANIZED HEALTH CARE EDUCATION/TRAINING PROGRAM

## 2023-10-28 PROCEDURE — 63600175 PHARM REV CODE 636 W HCPCS: Performed by: STUDENT IN AN ORGANIZED HEALTH CARE EDUCATION/TRAINING PROGRAM

## 2023-10-28 PROCEDURE — 99900035 HC TECH TIME PER 15 MIN (STAT)

## 2023-10-28 PROCEDURE — 36415 COLL VENOUS BLD VENIPUNCTURE: CPT | Performed by: STUDENT IN AN ORGANIZED HEALTH CARE EDUCATION/TRAINING PROGRAM

## 2023-10-28 RX ORDER — MAG HYDROX/ALUMINUM HYD/SIMETH 200-200-20
30 SUSPENSION, ORAL (FINAL DOSE FORM) ORAL
Status: DISCONTINUED | OUTPATIENT
Start: 2023-10-28 | End: 2023-10-30 | Stop reason: HOSPADM

## 2023-10-28 RX ORDER — FAMOTIDINE 40 MG/5ML
20 POWDER, FOR SUSPENSION ORAL DAILY
Status: DISCONTINUED | OUTPATIENT
Start: 2023-10-28 | End: 2023-10-28

## 2023-10-28 RX ORDER — CARVEDILOL 25 MG/1
25 TABLET ORAL 2 TIMES DAILY
Status: DISCONTINUED | OUTPATIENT
Start: 2023-10-28 | End: 2023-10-30 | Stop reason: HOSPADM

## 2023-10-28 RX ORDER — AZITHROMYCIN 250 MG/1
250 TABLET, FILM COATED ORAL DAILY
Status: COMPLETED | OUTPATIENT
Start: 2023-10-29 | End: 2023-10-30

## 2023-10-28 RX ORDER — AMLODIPINE BESYLATE 10 MG/1
10 TABLET ORAL DAILY
Status: DISCONTINUED | OUTPATIENT
Start: 2023-10-28 | End: 2023-10-28

## 2023-10-28 RX ORDER — CALCITRIOL 1 UG/ML
0.25 SOLUTION ORAL DAILY
Status: DISCONTINUED | OUTPATIENT
Start: 2023-10-28 | End: 2023-10-30 | Stop reason: HOSPADM

## 2023-10-28 RX ORDER — NAPROXEN SODIUM 220 MG/1
81 TABLET, FILM COATED ORAL DAILY
Status: DISCONTINUED | OUTPATIENT
Start: 2023-10-29 | End: 2023-10-30 | Stop reason: HOSPADM

## 2023-10-28 RX ORDER — AZITHROMYCIN 250 MG/1
250 TABLET, FILM COATED ORAL DAILY
Status: DISCONTINUED | OUTPATIENT
Start: 2023-10-28 | End: 2023-10-28

## 2023-10-28 RX ORDER — MEGESTROL ACETATE 40 MG/ML
400 SUSPENSION ORAL 2 TIMES DAILY
Status: DISCONTINUED | OUTPATIENT
Start: 2023-10-28 | End: 2023-10-29

## 2023-10-28 RX ORDER — NAPROXEN SODIUM 220 MG/1
81 TABLET, FILM COATED ORAL DAILY
Status: DISCONTINUED | OUTPATIENT
Start: 2023-10-28 | End: 2023-10-28

## 2023-10-28 RX ORDER — ASCORBIC ACID 500 MG
500 TABLET ORAL DAILY
Status: DISCONTINUED | OUTPATIENT
Start: 2023-10-29 | End: 2023-10-30 | Stop reason: HOSPADM

## 2023-10-28 RX ORDER — FAMOTIDINE 40 MG/5ML
20 POWDER, FOR SUSPENSION ORAL DAILY
Status: DISCONTINUED | OUTPATIENT
Start: 2023-10-29 | End: 2023-10-30 | Stop reason: HOSPADM

## 2023-10-28 RX ORDER — POLYETHYLENE GLYCOL 3350 17 G/17G
17 POWDER, FOR SOLUTION ORAL DAILY
Status: DISCONTINUED | OUTPATIENT
Start: 2023-10-28 | End: 2023-10-29

## 2023-10-28 RX ORDER — ATORVASTATIN CALCIUM 40 MG/1
40 TABLET, FILM COATED ORAL DAILY
Status: DISCONTINUED | OUTPATIENT
Start: 2023-10-29 | End: 2023-10-30 | Stop reason: HOSPADM

## 2023-10-28 RX ORDER — ASCORBIC ACID 500 MG
500 TABLET ORAL DAILY
Status: DISCONTINUED | OUTPATIENT
Start: 2023-10-28 | End: 2023-10-28

## 2023-10-28 RX ORDER — AMLODIPINE BESYLATE 10 MG/1
10 TABLET ORAL DAILY
Status: DISCONTINUED | OUTPATIENT
Start: 2023-10-29 | End: 2023-10-30 | Stop reason: HOSPADM

## 2023-10-28 RX ORDER — HYDROCODONE BITARTRATE AND ACETAMINOPHEN 500; 5 MG/1; MG/1
TABLET ORAL
Status: DISCONTINUED | OUTPATIENT
Start: 2023-10-28 | End: 2023-10-30 | Stop reason: HOSPADM

## 2023-10-28 RX ADMIN — ALUMINUM HYDROXIDE, MAGNESIUM HYDROXIDE, AND SIMETHICONE 30 ML: 200; 200; 20 SUSPENSION ORAL at 09:10

## 2023-10-28 RX ADMIN — CALCITRIOL 0.25 MCG: 1 SOLUTION ORAL at 10:10

## 2023-10-28 RX ADMIN — AZITHROMYCIN 250 MG: 250 TABLET, FILM COATED ORAL at 09:10

## 2023-10-28 RX ADMIN — Medication 1 CAPSULE: at 09:10

## 2023-10-28 RX ADMIN — OXYBUTYNIN CHLORIDE 10 MG: 10 TABLET, EXTENDED RELEASE ORAL at 09:10

## 2023-10-28 RX ADMIN — TOBRAMYCIN AND DEXAMETHASONE: 3; 1 OINTMENT OPHTHALMIC at 03:10

## 2023-10-28 RX ADMIN — SEVELAMER CARBONATE 0.8 G: 0.8 POWDER, FOR SUSPENSION ORAL at 05:10

## 2023-10-28 RX ADMIN — TOBRAMYCIN AND DEXAMETHASONE 1 DROP: 3; 1 SUSPENSION/ DROPS OPHTHALMIC at 01:10

## 2023-10-28 RX ADMIN — TOBRAMYCIN AND DEXAMETHASONE: 3; 1 OINTMENT OPHTHALMIC at 09:10

## 2023-10-28 RX ADMIN — OXYCODONE HYDROCHLORIDE AND ACETAMINOPHEN 500 MG: 500 TABLET ORAL at 09:10

## 2023-10-28 RX ADMIN — AMLODIPINE BESYLATE 10 MG: 10 TABLET ORAL at 09:10

## 2023-10-28 RX ADMIN — ISOSORBIDE DINITRATE: 20 TABLET ORAL at 09:10

## 2023-10-28 RX ADMIN — CEFTRIAXONE 2 G: 2 INJECTION, POWDER, FOR SOLUTION INTRAMUSCULAR; INTRAVENOUS at 09:10

## 2023-10-28 RX ADMIN — TOBRAMYCIN AND DEXAMETHASONE 1 DROP: 3; 1 SUSPENSION/ DROPS OPHTHALMIC at 05:10

## 2023-10-28 RX ADMIN — SODIUM ZIRCONIUM CYCLOSILICATE 10 G: 10 POWDER, FOR SUSPENSION ORAL at 09:10

## 2023-10-28 RX ADMIN — ATORVASTATIN CALCIUM 40 MG: 40 TABLET, FILM COATED ORAL at 09:10

## 2023-10-28 RX ADMIN — MEGESTROL ACETATE 400 MG: 400 SUSPENSION ORAL at 09:10

## 2023-10-28 RX ADMIN — SEVELAMER CARBONATE 0.8 G: 0.8 POWDER, FOR SUSPENSION ORAL at 09:10

## 2023-10-28 RX ADMIN — SEVELAMER CARBONATE 0.8 G: 0.8 POWDER, FOR SUSPENSION ORAL at 01:10

## 2023-10-28 RX ADMIN — CARVEDILOL 25 MG: 25 TABLET, FILM COATED ORAL at 09:10

## 2023-10-28 RX ADMIN — TOBRAMYCIN AND DEXAMETHASONE 1 DROP: 3; 1 SUSPENSION/ DROPS OPHTHALMIC at 09:10

## 2023-10-28 RX ADMIN — THERA TABS 1 TABLET: TAB at 09:10

## 2023-10-28 RX ADMIN — TAMSULOSIN HYDROCHLORIDE 0.4 MG: 0.4 CAPSULE ORAL at 09:10

## 2023-10-28 RX ADMIN — MUPIROCIN: 20 OINTMENT TOPICAL at 09:10

## 2023-10-28 RX ADMIN — ALUMINUM HYDROXIDE, MAGNESIUM HYDROXIDE, AND SIMETHICONE 30 ML: 200; 200; 20 SUSPENSION ORAL at 01:10

## 2023-10-28 RX ADMIN — ISOSORBIDE DINITRATE: 20 TABLET ORAL at 03:10

## 2023-10-28 RX ADMIN — ALUMINUM HYDROXIDE, MAGNESIUM HYDROXIDE, AND SIMETHICONE 30 ML: 200; 200; 20 SUSPENSION ORAL at 03:10

## 2023-10-28 RX ADMIN — FAMOTIDINE 20 MG: 40 POWDER, FOR SUSPENSION ORAL at 10:10

## 2023-10-28 RX ADMIN — ALUMINUM HYDROXIDE, MAGNESIUM HYDROXIDE, AND SIMETHICONE 30 ML: 200; 200; 20 SUSPENSION ORAL at 05:10

## 2023-10-28 RX ADMIN — ACETAMINOPHEN 650 MG: 325 TABLET ORAL at 12:10

## 2023-10-28 RX ADMIN — ASPIRIN 81 MG CHEWABLE TABLET 81 MG: 81 TABLET CHEWABLE at 09:10

## 2023-10-28 NOTE — PLAN OF CARE
Problem: Adult Inpatient Plan of Care  Goal: Plan of Care Review  Outcome: Ongoing, Progressing     Problem: Infection  Goal: Absence of Infection Signs and Symptoms  Outcome: Ongoing, Progressing     Problem: Impaired Wound Healing  Goal: Optimal Wound Healing  Outcome: Ongoing, Progressing     Problem: Skin Injury Risk Increased  Goal: Skin Health and Integrity  Outcome: Ongoing, Progressing       Plan of care reviewed with pt. Pt aaox4.Pt still on 02. Hemoglobin: 7.1. U/s to right upper arm pending. VSS.  Pt remained free of falls, trauma, and injury. No other concerns at this time.

## 2023-10-28 NOTE — ASSESSMENT & PLAN NOTE
Potassium 5.5 on admission in setting of ESRD and home ARB  - Hold on home ARB  - Lokelma as needed  - HD per nephrology  - Renal diet   - Trend BMP

## 2023-10-28 NOTE — PLAN OF CARE
Patient not medically ready. Pending labs. TOBY changed.    Will need transport back to nursing facility.     Maida Rubio, MSW, LCSW  Manager - Case Management

## 2023-10-28 NOTE — ASSESSMENT & PLAN NOTE
- CT head/neck with findings of concern for right upper lobe pneumonia  - Given ceftriaxone and azithromycin ED   - Continue ceftriaxone and azithromycin x 5days, (eot 10/30)  - CT chest with contrast with consolidative opacities throughout the posterior right upper and lower lobes

## 2023-10-28 NOTE — PROGRESS NOTES
[facility] Medicine  Progress Note    Patient Name: Immanuel Bernal  MRN: [id number]  Patient Class: IP- Inpatient   Admission Date: 10/26/2023  Length of Stay: 1 days  Attending Physician: John Beckham MD  Primary Care Provider: Dolly, Primary Doctor        Subjective:     Principal Problem:Facial droop        HPI:   Mr. Bernal is inpatient male with a past medical history of ESRD on HD, prior CVA, diabetes, hypertension who presents to the ED from clinic with concerns of facial droop.  Patient was in his usual state of health while at outpatient clinic appointment where his sister noted him to have right facial droop and he presented to the ED.  Patient with no other complaints.  Denies fevers, chills, dyspnea, abdominal pain, headache, weakness, tingling, numbness, vision changes, nausea, and dysarthria.    In the ED, patient HDS.  Labs notable for for potassium 5.5, creatinine 5, BUN 56, glucose 98, and WBC 8.3.  MRI brain without contrast with no evidence of acute infarct or hemorrhage; but notable for chronic small-vessel ischemic changes and remote left basal ganglia/corona radiata lacunar type infarct.  CTA head with no evidence of high-grade stenosis or large intracranial large vessel occlusion.  Lung in the CTA head finding with irregular airspace opacity in the posterior right apex concerning for pneumonia.      Overview/Hospital Course:  No notes on file    Interval History:   No events overnight. Hgb 7.1 this morning after blood transfusion. K 5.3 this morning and given lokelma.         Review of Systems   Constitutional:  Negative for activity change, appetite change, chills, diaphoresis, fatigue and fever.   HENT:  Negative for rhinorrhea and sore throat.    Respiratory:  Negative for cough, chest tightness and shortness of breath.    Cardiovascular:  Positive for palpitations. Negative for chest pain.   Gastrointestinal:  Negative for abdominal pain, constipation, diarrhea  and nausea.   Endocrine: Negative for cold intolerance.   Genitourinary:  Negative for decreased urine volume and dysuria.   Musculoskeletal:  Negative for arthralgias and myalgias.   Skin:  Negative for rash and wound.   Neurological:  Positive for dizziness. Negative for weakness, numbness and headaches.   Psychiatric/Behavioral:  Negative for agitation, behavioral problems and confusion.       Objective:      Vital Signs (Most Recent):  Temp: 98.2 °F (36.8 °C) (10/28/23 1108)  Pulse: 74 (10/28/23 1108)  Resp: 18 (10/28/23 1108)  BP: (!) 151/92 (10/28/23 1108)  SpO2: 99 % (10/28/23 1108) Vital Signs (24h Range):  Temp:  [98.2 °F (36.8 °C)-99.3 °F (37.4 °C)] 98.2 °F (36.8 °C)  Pulse:  [69-77] 74  Resp:  [17-19] 18  SpO2:  [93 %-100 %] 99 %  BP: (121-151)/(68-92) 151/92        Body mass index is 19.8 kg/m².    Intake/Output Summary (Last 24 hours) at 10/28/2023 1410  Last data filed at 10/28/2023 0644  Gross per 24 hour   Intake 298.75 ml   Output 200 ml   Net 98.75 ml         Physical Exam  Constitutional:       Appearance: Normal appearance. He is normal weight.   HENT:      Head: Normocephalic and atraumatic.      Mouth/Throat:      Mouth: Mucous membranes are moist.   Eyes:      Extraocular Movements: Extraocular movements intact.      Conjunctiva/sclera: Conjunctivae normal.   Cardiovascular:      Rate and Rhythm: Normal rate and regular rhythm.      Heart sounds: No murmur heard.  Pulmonary:      Effort: Pulmonary effort is normal. No respiratory distress.      Breath sounds: Normal breath sounds. No wheezing or rales.   Abdominal:      General: Abdomen is flat. There is no distension.      Palpations: Abdomen is soft.      Tenderness: There is no abdominal tenderness. There is no guarding.      Comments: PEG tube, c/d/i   Musculoskeletal:         General: No swelling or tenderness.   Skin:     Findings: No rash.   Neurological:      General: No focal deficit present.      Mental Status: He is alert and  oriented to person, place, and time. Mental status is at baseline.   Psychiatric:         Mood and Affect: Mood normal.         Behavior: Behavior normal.       Significant Labs: All pertinent labs within the past 24 hours have been reviewed.  CBC:   Recent Labs   Lab 10/27/23  0707 10/27/23  1609 10/28/23  0459 10/28/23  0858   WBC 8.40  --  7.31 7.42   HGB 6.9* 6.0* 6.8* 7.1*   HCT 23.3*  --  21.7* 22.3*   *  --  138* 134*     CMP:   Recent Labs   Lab 10/27/23  0413 10/27/23  1609 10/28/23  0459   * 135* 135*   K 5.8* 4.5 5.3*   CL 95 98 98   CO2 20* 25 23   * 179* 114*   BUN 69* 37* 50*   CREATININE 5.7* 3.5* 4.7*   CALCIUM 9.1 8.6* 9.2   PROT 6.4  --  6.5   ALBUMIN 2.9*  --  2.9*   BILITOT 0.6  --  0.6   ALKPHOS 83  --  68   AST 17  --  14   ALT 11  --  11   ANIONGAP 18* 12 14       Significant Imaging: I have reviewed all pertinent imaging results/findings within the past 24 hours.      Assessment/Plan:      * Facial droop  Patient presenting with concern for new right-sided facial droop on day of presentation.    - CTA head/neck unremarkable  - MRI brain without contrast unremarkable  - Vascular neurology consulted   -- Signed off given unremarkable admission imaging   -- Suspect previously noticed facial droop related to ocular issues rather than stroke given negative MRI  -- Also concern for potential TIA  - LDL 53, TG 51  - A1c 4.7 in August 2023  - Continue to monitor   - Telemetry  - Patient resides at NH    Pneumonia  - CT head/neck with findings of concern for right upper lobe pneumonia  - Given ceftriaxone and azithromycin ED   - Continue ceftriaxone and azithromycin x 5days, (eot 10/30)  - CT chest with contrast with consolidative opacities throughout the posterior right upper and lower lobes    Hyperkalemia  Potassium 5.5 on admission in setting of ESRD and home ARB  - Hold on home ARB  - Lokelma as needed  - HD per nephrology  - Renal diet   - Trend BMP    Anemia in ESRD  (end-stage renal disease)  - EPO per nephrology  - Goal Hgb > 7  - s/p 1 unit 10/27 with appropriate response   - Trend CBC    Endophthalmitis  - Status post left eye enucleation with ophthalmology prior to presentation  - Ophthalmology consulted   - Follow-up recommendations      ESRD (end stage renal disease)  - Nephrology consulted  - HD per nephrology  - Continue home sevelamer      PEG (percutaneous endoscopic gastrostomy) status  - Reported used for medications only  - Routine PEG tube care      Chronic kidney disease-mineral and bone disorder  - Continue home calcitriol    Benign prostate hyperplasia  - Home tamsulosin      VTE Risk Mitigation (From admission, onward)           Ordered     heparin (porcine) injection 1,000 Units  As needed (PRN)         10/27/23 1100     IP VTE LOW RISK PATIENT  Once         10/26/23 1711     Place sequential compression device  Until discontinued         10/26/23 1711                    Discharge Planning   TOBY: 10/30/2023     Code Status: Full Code   Is the patient medically ready for discharge?: No    Reason for patient still in hospital (select all that apply): Patient trending condition, Laboratory test, Treatment and Pending disposition      Discharge Delays: None known at this time              John Beckham MD  Department of Hospital Medicine   [facility] Surg

## 2023-10-28 NOTE — SUBJECTIVE & OBJECTIVE
Interval History:   No events overnight. Hgb 7.1 this morning after blood transfusion. K 5.3 this morning and given lokelma.       Review of Systems  Objective:     Vital Signs (Most Recent):  Temp: 98.2 °F (36.8 °C) (10/28/23 1108)  Pulse: 74 (10/28/23 1108)  Resp: 18 (10/28/23 1108)  BP: (!) 151/92 (10/28/23 1108)  SpO2: 99 % (10/28/23 1108) Vital Signs (24h Range):  Temp:  [98.2 °F (36.8 °C)-99.3 °F (37.4 °C)] 98.2 °F (36.8 °C)  Pulse:  [69-77] 74  Resp:  [17-19] 18  SpO2:  [93 %-100 %] 99 %  BP: (121-151)/(68-92) 151/92        Body mass index is 19.8 kg/m².    Intake/Output Summary (Last 24 hours) at 10/28/2023 1410  Last data filed at 10/28/2023 0644  Gross per 24 hour   Intake 298.75 ml   Output 200 ml   Net 98.75 ml         Physical Exam        Significant Labs: All pertinent labs within the past 24 hours have been reviewed.  CBC:   Recent Labs   Lab 10/27/23  0707 10/27/23  1609 10/28/23  0459 10/28/23  0858   WBC 8.40  --  7.31 7.42   HGB 6.9* 6.0* 6.8* 7.1*   HCT 23.3*  --  21.7* 22.3*   *  --  138* 134*     CMP:   Recent Labs   Lab 10/27/23  0413 10/27/23  1609 10/28/23  0459   * 135* 135*   K 5.8* 4.5 5.3*   CL 95 98 98   CO2 20* 25 23   * 179* 114*   BUN 69* 37* 50*   CREATININE 5.7* 3.5* 4.7*   CALCIUM 9.1 8.6* 9.2   PROT 6.4  --  6.5   ALBUMIN 2.9*  --  2.9*   BILITOT 0.6  --  0.6   ALKPHOS 83  --  68   AST 17  --  14   ALT 11  --  11   ANIONGAP 18* 12 14       Significant Imaging: I have reviewed all pertinent imaging results/findings within the past 24 hours.

## 2023-10-28 NOTE — PROGRESS NOTES
Pharmacist Renal Dose Adjustment Note    Immanuel Bernal is a 57 y.o. male being treated with the medication famotidine     Patient Data:    Vital Signs (Most Recent):  Temp: 98.6 °F (37 °C) (10/28/23 0420)  Pulse: 73 (10/28/23 0648)  Resp: 18 (10/28/23 0420)  BP: (!) 140/82 (10/28/23 0420)  SpO2: 100 % (10/28/23 0420) Vital Signs (72h Range):  Temp:  [97.6 °F (36.4 °C)-99.9 °F (37.7 °C)]   Pulse:  [69-84]   Resp:  [15-20]   BP: (106-172)/(60-91)   SpO2:  [91 %-100 %]      Recent Labs   Lab 10/27/23  0413 10/27/23  1609 10/28/23  0459   CREATININE 5.7* 3.5* 4.7*     Creatinine clearance cannot be calculated (Unknown ideal weight.)    Famotidine 20 mg BID will be changed to famotidine 20 mg daily   Pt ESRD on HD    Pharmacist's Name: Gladys Aguilar  Pharmacist's Extension: [phone]

## 2023-10-29 PROBLEM — I82.621 DEEP VEIN THROMBOSIS (DVT) OF BRACHIAL VEIN OF RIGHT UPPER EXTREMITY: Status: ACTIVE | Noted: 2023-10-29

## 2023-10-29 LAB
ALBUMIN SERPL BCP-MCNC: 3 G/DL (ref 3.5–5.2)
ALP SERPL-CCNC: 80 U/L (ref 55–135)
ALT SERPL W/O P-5'-P-CCNC: 10 U/L (ref 10–44)
ANION GAP SERPL CALC-SCNC: 17 MMOL/L (ref 8–16)
ANION GAP SERPL CALC-SCNC: 18 MMOL/L (ref 8–16)
AST SERPL-CCNC: 12 U/L (ref 10–40)
BASOPHILS # BLD AUTO: 0.02 K/UL (ref 0–0.2)
BASOPHILS NFR BLD: 0.3 % (ref 0–1.9)
BILIRUB SERPL-MCNC: 0.6 MG/DL (ref 0.1–1)
BUN SERPL-MCNC: 73 MG/DL (ref 6–20)
BUN SERPL-MCNC: 76 MG/DL (ref 6–20)
CALCIUM SERPL-MCNC: 9.7 MG/DL (ref 8.7–10.5)
CALCIUM SERPL-MCNC: 9.7 MG/DL (ref 8.7–10.5)
CHLORIDE SERPL-SCNC: 96 MMOL/L (ref 95–110)
CHLORIDE SERPL-SCNC: 98 MMOL/L (ref 95–110)
CO2 SERPL-SCNC: 22 MMOL/L (ref 23–29)
CO2 SERPL-SCNC: 23 MMOL/L (ref 23–29)
CREAT SERPL-MCNC: 6 MG/DL (ref 0.5–1.4)
CREAT SERPL-MCNC: 6.3 MG/DL (ref 0.5–1.4)
DIFFERENTIAL METHOD: ABNORMAL
EOSINOPHIL # BLD AUTO: 0.1 K/UL (ref 0–0.5)
EOSINOPHIL NFR BLD: 1.8 % (ref 0–8)
ERYTHROCYTE [DISTWIDTH] IN BLOOD BY AUTOMATED COUNT: 16.9 % (ref 11.5–14.5)
EST. GFR  (NO RACE VARIABLE): 10.1 ML/MIN/1.73 M^2
EST. GFR  (NO RACE VARIABLE): 9.5 ML/MIN/1.73 M^2
GLUCOSE SERPL-MCNC: 113 MG/DL (ref 70–110)
GLUCOSE SERPL-MCNC: 150 MG/DL (ref 70–110)
HCT VFR BLD AUTO: 22.7 % (ref 40–54)
HGB BLD-MCNC: 7.3 G/DL (ref 14–18)
IMM GRANULOCYTES # BLD AUTO: 0.04 K/UL (ref 0–0.04)
IMM GRANULOCYTES NFR BLD AUTO: 0.6 % (ref 0–0.5)
LYMPHOCYTES # BLD AUTO: 0.7 K/UL (ref 1–4.8)
LYMPHOCYTES NFR BLD: 10.4 % (ref 18–48)
MAGNESIUM SERPL-MCNC: 2.5 MG/DL (ref 1.6–2.6)
MCH RBC QN AUTO: 32.7 PG (ref 27–31)
MCHC RBC AUTO-ENTMCNC: 32.2 G/DL (ref 32–36)
MCV RBC AUTO: 102 FL (ref 82–98)
MONOCYTES # BLD AUTO: 0.5 K/UL (ref 0.3–1)
MONOCYTES NFR BLD: 6.8 % (ref 4–15)
NEUTROPHILS # BLD AUTO: 5.3 K/UL (ref 1.8–7.7)
NEUTROPHILS NFR BLD: 80.1 % (ref 38–73)
NRBC BLD-RTO: 0 /100 WBC
PHOSPHATE SERPL-MCNC: 4.9 MG/DL (ref 2.7–4.5)
PLATELET # BLD AUTO: 146 K/UL (ref 150–450)
PMV BLD AUTO: 10.6 FL (ref 9.2–12.9)
POCT GLUCOSE: 101 MG/DL (ref 70–110)
POCT GLUCOSE: 122 MG/DL (ref 70–110)
POCT GLUCOSE: 147 MG/DL (ref 70–110)
POCT GLUCOSE: 150 MG/DL (ref 70–110)
POCT GLUCOSE: 152 MG/DL (ref 70–110)
POCT GLUCOSE: 153 MG/DL (ref 70–110)
POCT GLUCOSE: 189 MG/DL (ref 70–110)
POCT GLUCOSE: 208 MG/DL (ref 70–110)
POCT GLUCOSE: 289 MG/DL (ref 70–110)
POCT GLUCOSE: 98 MG/DL (ref 70–110)
POTASSIUM SERPL-SCNC: 5.3 MMOL/L (ref 3.5–5.1)
POTASSIUM SERPL-SCNC: 5.6 MMOL/L (ref 3.5–5.1)
POTASSIUM SERPL-SCNC: 5.7 MMOL/L (ref 3.5–5.1)
POTASSIUM SERPL-SCNC: 5.7 MMOL/L (ref 3.5–5.1)
PROT SERPL-MCNC: 6.8 G/DL (ref 6–8.4)
RBC # BLD AUTO: 2.23 M/UL (ref 4.6–6.2)
SODIUM SERPL-SCNC: 136 MMOL/L (ref 136–145)
SODIUM SERPL-SCNC: 138 MMOL/L (ref 136–145)
WBC # BLD AUTO: 6.64 K/UL (ref 3.9–12.7)

## 2023-10-29 PROCEDURE — 27000221 HC OXYGEN, UP TO 24 HOURS

## 2023-10-29 PROCEDURE — 21400001 HC TELEMETRY ROOM

## 2023-10-29 PROCEDURE — S0179 MEGESTROL 20 MG: HCPCS | Performed by: STUDENT IN AN ORGANIZED HEALTH CARE EDUCATION/TRAINING PROGRAM

## 2023-10-29 PROCEDURE — 83735 ASSAY OF MAGNESIUM: CPT | Performed by: STUDENT IN AN ORGANIZED HEALTH CARE EDUCATION/TRAINING PROGRAM

## 2023-10-29 PROCEDURE — 25000003 PHARM REV CODE 250: Performed by: STUDENT IN AN ORGANIZED HEALTH CARE EDUCATION/TRAINING PROGRAM

## 2023-10-29 PROCEDURE — 93010 RHYTHM STRIP: ICD-10-PCS | Mod: ,,, | Performed by: INTERNAL MEDICINE

## 2023-10-29 PROCEDURE — 84100 ASSAY OF PHOSPHORUS: CPT | Performed by: STUDENT IN AN ORGANIZED HEALTH CARE EDUCATION/TRAINING PROGRAM

## 2023-10-29 PROCEDURE — 93005 ELECTROCARDIOGRAM TRACING: CPT

## 2023-10-29 PROCEDURE — 63700000 PHARM REV CODE 250 ALT 637 W/O HCPCS: Performed by: STUDENT IN AN ORGANIZED HEALTH CARE EDUCATION/TRAINING PROGRAM

## 2023-10-29 PROCEDURE — 85025 COMPLETE CBC W/AUTO DIFF WBC: CPT | Performed by: STUDENT IN AN ORGANIZED HEALTH CARE EDUCATION/TRAINING PROGRAM

## 2023-10-29 PROCEDURE — 80053 COMPREHEN METABOLIC PANEL: CPT | Performed by: STUDENT IN AN ORGANIZED HEALTH CARE EDUCATION/TRAINING PROGRAM

## 2023-10-29 PROCEDURE — 99900035 HC TECH TIME PER 15 MIN (STAT)

## 2023-10-29 PROCEDURE — 80048 BASIC METABOLIC PNL TOTAL CA: CPT | Performed by: STUDENT IN AN ORGANIZED HEALTH CARE EDUCATION/TRAINING PROGRAM

## 2023-10-29 PROCEDURE — 36415 COLL VENOUS BLD VENIPUNCTURE: CPT | Mod: XB | Performed by: STUDENT IN AN ORGANIZED HEALTH CARE EDUCATION/TRAINING PROGRAM

## 2023-10-29 PROCEDURE — 63600175 PHARM REV CODE 636 W HCPCS: Performed by: STUDENT IN AN ORGANIZED HEALTH CARE EDUCATION/TRAINING PROGRAM

## 2023-10-29 PROCEDURE — 93010 ELECTROCARDIOGRAM REPORT: CPT | Mod: ,,, | Performed by: INTERNAL MEDICINE

## 2023-10-29 PROCEDURE — 94761 N-INVAS EAR/PLS OXIMETRY MLT: CPT

## 2023-10-29 PROCEDURE — 25000003 PHARM REV CODE 250

## 2023-10-29 PROCEDURE — 84132 ASSAY OF SERUM POTASSIUM: CPT | Mod: 91 | Performed by: STUDENT IN AN ORGANIZED HEALTH CARE EDUCATION/TRAINING PROGRAM

## 2023-10-29 RX ORDER — MEGESTROL ACETATE 40 MG/ML
400 SUSPENSION ORAL 2 TIMES DAILY
Status: DISCONTINUED | OUTPATIENT
Start: 2023-10-29 | End: 2023-10-30 | Stop reason: HOSPADM

## 2023-10-29 RX ORDER — FUROSEMIDE 10 MG/ML
80 INJECTION INTRAMUSCULAR; INTRAVENOUS ONCE
Status: COMPLETED | OUTPATIENT
Start: 2023-10-29 | End: 2023-10-29

## 2023-10-29 RX ORDER — POLYETHYLENE GLYCOL 3350 17 G/17G
17 POWDER, FOR SOLUTION ORAL DAILY
Status: DISCONTINUED | OUTPATIENT
Start: 2023-10-29 | End: 2023-10-30 | Stop reason: HOSPADM

## 2023-10-29 RX ADMIN — FUROSEMIDE 80 MG: 10 INJECTION, SOLUTION INTRAVENOUS at 03:10

## 2023-10-29 RX ADMIN — CEFTRIAXONE 2 G: 2 INJECTION, POWDER, FOR SOLUTION INTRAMUSCULAR; INTRAVENOUS at 08:10

## 2023-10-29 RX ADMIN — DEXTROSE MONOHYDRATE 500 ML: 100 INJECTION, SOLUTION INTRAVENOUS at 03:10

## 2023-10-29 RX ADMIN — SEVELAMER CARBONATE 0.8 G: 0.8 POWDER, FOR SUSPENSION ORAL at 08:10

## 2023-10-29 RX ADMIN — TOBRAMYCIN AND DEXAMETHASONE 1 DROP: 3; 1 SUSPENSION/ DROPS OPHTHALMIC at 05:10

## 2023-10-29 RX ADMIN — SEVELAMER CARBONATE 0.8 G: 0.8 POWDER, FOR SUSPENSION ORAL at 11:10

## 2023-10-29 RX ADMIN — ATORVASTATIN CALCIUM 40 MG: 40 TABLET, FILM COATED ORAL at 08:10

## 2023-10-29 RX ADMIN — MUPIROCIN: 20 OINTMENT TOPICAL at 08:10

## 2023-10-29 RX ADMIN — THERA TABS 1 TABLET: TAB at 08:10

## 2023-10-29 RX ADMIN — ASPIRIN 81 MG CHEWABLE TABLET 81 MG: 81 TABLET CHEWABLE at 08:10

## 2023-10-29 RX ADMIN — TOBRAMYCIN AND DEXAMETHASONE: 3; 1 OINTMENT OPHTHALMIC at 08:10

## 2023-10-29 RX ADMIN — APIXABAN 10 MG: 5 TABLET, FILM COATED ORAL at 08:10

## 2023-10-29 RX ADMIN — SODIUM ZIRCONIUM CYCLOSILICATE 5 G: 5 POWDER, FOR SUSPENSION ORAL at 02:10

## 2023-10-29 RX ADMIN — OXYBUTYNIN CHLORIDE 10 MG: 10 TABLET, EXTENDED RELEASE ORAL at 08:10

## 2023-10-29 RX ADMIN — ISOSORBIDE DINITRATE: 20 TABLET ORAL at 08:10

## 2023-10-29 RX ADMIN — SODIUM ZIRCONIUM CYCLOSILICATE 5 G: 5 POWDER, FOR SUSPENSION ORAL at 09:10

## 2023-10-29 RX ADMIN — ISOSORBIDE DINITRATE: 20 TABLET ORAL at 02:10

## 2023-10-29 RX ADMIN — TOBRAMYCIN AND DEXAMETHASONE 1 DROP: 3; 1 SUSPENSION/ DROPS OPHTHALMIC at 08:10

## 2023-10-29 RX ADMIN — MEGESTROL ACETATE 400 MG: 400 SUSPENSION ORAL at 09:10

## 2023-10-29 RX ADMIN — MEGESTROL ACETATE 400 MG: 400 SUSPENSION ORAL at 08:10

## 2023-10-29 RX ADMIN — AZITHROMYCIN 250 MG: 250 TABLET, FILM COATED ORAL at 08:10

## 2023-10-29 RX ADMIN — CALCITRIOL 0.25 MCG: 1 SOLUTION ORAL at 08:10

## 2023-10-29 RX ADMIN — INSULIN HUMAN 10 UNITS: 100 INJECTION, SOLUTION PARENTERAL at 03:10

## 2023-10-29 RX ADMIN — ALUMINUM HYDROXIDE, MAGNESIUM HYDROXIDE, AND SIMETHICONE 30 ML: 200; 200; 20 SUSPENSION ORAL at 06:10

## 2023-10-29 RX ADMIN — TOBRAMYCIN AND DEXAMETHASONE: 3; 1 OINTMENT OPHTHALMIC at 02:10

## 2023-10-29 RX ADMIN — APIXABAN 10 MG: 5 TABLET, FILM COATED ORAL at 06:10

## 2023-10-29 RX ADMIN — ALUMINUM HYDROXIDE, MAGNESIUM HYDROXIDE, AND SIMETHICONE 30 ML: 200; 200; 20 SUSPENSION ORAL at 05:10

## 2023-10-29 RX ADMIN — FAMOTIDINE 20 MG: 40 POWDER, FOR SUSPENSION ORAL at 08:10

## 2023-10-29 RX ADMIN — SEVELAMER CARBONATE 0.8 G: 0.8 POWDER, FOR SUSPENSION ORAL at 05:10

## 2023-10-29 RX ADMIN — ALUMINUM HYDROXIDE, MAGNESIUM HYDROXIDE, AND SIMETHICONE 30 ML: 200; 200; 20 SUSPENSION ORAL at 08:10

## 2023-10-29 RX ADMIN — CARVEDILOL 25 MG: 25 TABLET, FILM COATED ORAL at 08:10

## 2023-10-29 RX ADMIN — TAMSULOSIN HYDROCHLORIDE 0.4 MG: 0.4 CAPSULE ORAL at 08:10

## 2023-10-29 RX ADMIN — OXYCODONE HYDROCHLORIDE AND ACETAMINOPHEN 500 MG: 500 TABLET ORAL at 08:10

## 2023-10-29 RX ADMIN — AMLODIPINE BESYLATE 10 MG: 10 TABLET ORAL at 08:10

## 2023-10-29 RX ADMIN — Medication 1 CAPSULE: at 08:10

## 2023-10-29 RX ADMIN — ALUMINUM HYDROXIDE, MAGNESIUM HYDROXIDE, AND SIMETHICONE 30 ML: 200; 200; 20 SUSPENSION ORAL at 11:10

## 2023-10-29 RX ADMIN — Medication 6 MG: at 08:10

## 2023-10-29 RX ADMIN — TOBRAMYCIN AND DEXAMETHASONE 1 DROP: 3; 1 SUSPENSION/ DROPS OPHTHALMIC at 01:10

## 2023-10-29 RX ADMIN — SODIUM ZIRCONIUM CYCLOSILICATE 5 G: 5 POWDER, FOR SUSPENSION ORAL at 08:10

## 2023-10-29 RX ADMIN — POLYETHYLENE GLYCOL 3350 17 G: 17 POWDER, FOR SOLUTION ORAL at 08:10

## 2023-10-29 NOTE — ASSESSMENT & PLAN NOTE
Potassium 5.5 on admission in setting of ESRD and home ARB  - Hold on home ARB  - Lokelma  scheduled  - HD per nephrology  - Renal diet   - Trend BMP

## 2023-10-29 NOTE — SUBJECTIVE & OBJECTIVE
Interval History:   No events overnight. Patient with occasional palpitations with dizziness. No other complaints. RUE US with DVT in distal segment of one of the paired right brachial veins c/w CT Chest findings of likely chronic DVT. BLE US ordered. Given 1x dose of Apixaban by night team. K 5.6 this morning and started on scheduled Lokelma.       Review of Systems   Constitutional:  Negative for activity change, appetite change, chills, diaphoresis, fatigue and fever.   HENT:  Negative for rhinorrhea and sore throat.    Respiratory:  Negative for cough, chest tightness and shortness of breath.    Cardiovascular:  Positive for palpitations. Negative for chest pain.   Gastrointestinal:  Negative for abdominal pain, constipation, diarrhea and nausea.   Endocrine: Negative for cold intolerance.   Genitourinary:  Negative for decreased urine volume and dysuria.   Musculoskeletal:  Negative for arthralgias and myalgias.   Skin:  Negative for rash and wound.   Neurological:  Positive for dizziness. Negative for weakness, numbness and headaches.   Psychiatric/Behavioral:  Negative for agitation, behavioral problems and confusion.      Objective:     Vital Signs (Most Recent):  Temp: 98.7 °F (37.1 °C) (10/29/23 0732)  Pulse: 76 (10/29/23 1102)  Resp: 19 (10/29/23 0732)  BP: (!) 140/81 (10/29/23 0732)  SpO2: 98 % (10/29/23 0732) Vital Signs (24h Range):  Temp:  [97.7 °F (36.5 °C)-98.7 °F (37.1 °C)] 98.7 °F (37.1 °C)  Pulse:  [70-76] 76  Resp:  [18-20] 19  SpO2:  [96 %-99 %] 98 %  BP: (131-140)/(74-81) 140/81        Body mass index is 19.8 kg/m².    Intake/Output Summary (Last 24 hours) at 10/29/2023 1258  Last data filed at 10/29/2023 0514  Gross per 24 hour   Intake --   Output 400 ml   Net -400 ml         Physical Exam  Constitutional:       Appearance: Normal appearance. He is normal weight.   HENT:      Head: Normocephalic and atraumatic.      Mouth/Throat:      Mouth: Mucous membranes are moist.   Eyes:       Extraocular Movements: Extraocular movements intact.      Conjunctiva/sclera: Conjunctivae normal.   Cardiovascular:      Rate and Rhythm: Normal rate and regular rhythm.      Heart sounds: No murmur heard.  Pulmonary:      Effort: Pulmonary effort is normal. No respiratory distress.      Breath sounds: Normal breath sounds. No wheezing or rales.   Abdominal:      General: Abdomen is flat. There is no distension.      Palpations: Abdomen is soft.      Tenderness: There is no abdominal tenderness. There is no guarding.      Comments: PEG tube, c/d/i   Musculoskeletal:         General: No swelling or tenderness.   Skin:     Findings: No rash.   Neurological:      General: No focal deficit present.      Mental Status: He is alert and oriented to person, place, and time. Mental status is at baseline.   Psychiatric:         Mood and Affect: Mood normal.         Behavior: Behavior normal.             Significant Labs: All pertinent labs within the past 24 hours have been reviewed.  CBC:   Recent Labs   Lab 10/28/23  0459 10/28/23  0858 10/29/23  0638   WBC 7.31 7.42 6.64   HGB 6.8* 7.1* 7.3*   HCT 21.7* 22.3* 22.7*   * 134* 146*     CMP:   Recent Labs   Lab 10/27/23  1609 10/28/23  0459 10/29/23  0638   * 135* 138   K 4.5 5.3* 5.6*   CL 98 98 98   CO2 25 23 23   * 114* 113*   BUN 37* 50* 73*   CREATININE 3.5* 4.7* 6.0*   CALCIUM 8.6* 9.2 9.7   PROT  --  6.5 6.8   ALBUMIN  --  2.9* 3.0*   BILITOT  --  0.6 0.6   ALKPHOS  --  68 80   AST  --  14 12   ALT  --  11 10   ANIONGAP 12 14 17*       Significant Imaging: I have reviewed all pertinent imaging results/findings within the past 24 hours.

## 2023-10-29 NOTE — NURSING
Upon reviewing patient's chart. I saw an order of blood transfusion dated 10/28/2023 at 07:53 am. The previous shift nurse endorsed to me that the patient received blood last 10/28 at [facility] and was finished at 4am. I message the oncall doctor if I need to transfuse the blood despite the hemoglobin of 7.1 and she said that the it was not stated on the team's plan, just the previous one which was ordered 10/27. I told her that the patient will have another CBC at 10/29 in the morning and she said to wait for the CBC result and will decide base on the results. Meliza KWOK informed.

## 2023-10-29 NOTE — PLAN OF CARE
Problem: Adult Inpatient Plan of Care  Goal: Plan of Care Review  Outcome: Ongoing, Progressing  Goal: Patient-Specific Goal (Individualized)  Outcome: Ongoing, Progressing  Goal: Absence of Hospital-Acquired Illness or Injury  Outcome: Ongoing, Progressing  Goal: Optimal Comfort and Wellbeing  Outcome: Ongoing, Progressing     Problem: Infection  Goal: Absence of Infection Signs and Symptoms  Outcome: Ongoing, Progressing     Problem: Infection (Pneumonia)  Goal: Resolution of Infection Signs and Symptoms  Outcome: Ongoing, Progressing     Problem: Respiratory Compromise (Pneumonia)  Goal: Effective Oxygenation and Ventilation  Outcome: Ongoing, Progressing     Problem: Skin Injury Risk Increased  Goal: Skin Health and Integrity  Outcome: Ongoing, Progressing     Problem: Electrolyte Imbalance (Chronic Kidney Disease)  Goal: Electrolyte Balance  Outcome: Ongoing, Progressing     Problem: Adult Inpatient Plan of Care  Goal: Readiness for Transition of Care  Outcome: Ongoing, Not Progressing       No significant changes on our shift. Patient voided spontaneously.

## 2023-10-29 NOTE — ASSESSMENT & PLAN NOTE
- CT Chest 10/28 with prominence of right chest wall collaterals with diminutive right brachiocephalic vein  - Follow up RUE US with DVT in the distal short-segment of one of the paired right brachial veins  - BLE doppler ordered  - Concern for chronic DVT given collateral on CT Chest  - AC with Apixaban

## 2023-10-29 NOTE — PROGRESS NOTES
[facility]  Progress Note    Patient Name: Immanuel Bernal  MRN: [id number]  Patient Class: IP- Inpatient   Admission Date: 10/26/2023  Length of Stay: 2 days  Attending Physician: John Beckham MD  Primary Care Provider: Dolly, Primary Doctor        Subjective:     Principal Problem:Facial droop        HPI:   Mr. Bernal is inpatient male with a past medical history of ESRD on HD, prior CVA, diabetes, hypertension who presents to the ED from clinic with concerns of facial droop.  Patient was in his usual state of health while at outpatient clinic appointment where his sister noted him to have right facial droop and he presented to the ED.  Patient with no other complaints.  Denies fevers, chills, dyspnea, abdominal pain, headache, weakness, tingling, numbness, vision changes, nausea, and dysarthria.    In the ED, patient HDS.  Labs notable for for potassium 5.5, creatinine 5, BUN 56, glucose 98, and WBC 8.3.  MRI brain without contrast with no evidence of acute infarct or hemorrhage; but notable for chronic small-vessel ischemic changes and remote left basal ganglia/corona radiata lacunar type infarct.  CTA head with no evidence of high-grade stenosis or large intracranial large vessel occlusion.  Lung in the CTA head finding with irregular airspace opacity in the posterior right apex concerning for pneumonia.      Overview/Hospital Course:  No notes on file    Interval History:   No events overnight. Patient with occasional palpitations with dizziness. No other complaints. RUE US with DVT in distal segment of one of the paired right brachial veins c/w CT Chest findings of likely chronic DVT. BLE US ordered. Given 1x dose of Apixaban by night team. K 5.6 this morning and started on scheduled Lokelma.       Review of Systems   Constitutional:  Negative for activity change, appetite change, chills, diaphoresis, fatigue and fever.   HENT:  Negative for rhinorrhea and sore throat.     Respiratory:  Negative for cough, chest tightness and shortness of breath.    Cardiovascular:  Positive for palpitations. Negative for chest pain.   Gastrointestinal:  Negative for abdominal pain, constipation, diarrhea and nausea.   Endocrine: Negative for cold intolerance.   Genitourinary:  Negative for decreased urine volume and dysuria.   Musculoskeletal:  Negative for arthralgias and myalgias.   Skin:  Negative for rash and wound.   Neurological:  Positive for dizziness. Negative for weakness, numbness and headaches.   Psychiatric/Behavioral:  Negative for agitation, behavioral problems and confusion.      Objective:     Vital Signs (Most Recent):  Temp: 98.7 °F (37.1 °C) (10/29/23 0732)  Pulse: 76 (10/29/23 1102)  Resp: 19 (10/29/23 0732)  BP: (!) 140/81 (10/29/23 0732)  SpO2: 98 % (10/29/23 0732) Vital Signs (24h Range):  Temp:  [97.7 °F (36.5 °C)-98.7 °F (37.1 °C)] 98.7 °F (37.1 °C)  Pulse:  [70-76] 76  Resp:  [18-20] 19  SpO2:  [96 %-99 %] 98 %  BP: (131-140)/(74-81) 140/81        Body mass index is 19.8 kg/m².    Intake/Output Summary (Last 24 hours) at 10/29/2023 1254  Last data filed at 10/29/2023 0514  Gross per 24 hour   Intake --   Output 400 ml   Net -400 ml         Physical Exam  Constitutional:       Appearance: Normal appearance. He is normal weight.   HENT:      Head: Normocephalic and atraumatic.      Mouth/Throat:      Mouth: Mucous membranes are moist.   Eyes:      Extraocular Movements: Extraocular movements intact.      Conjunctiva/sclera: Conjunctivae normal.   Cardiovascular:      Rate and Rhythm: Normal rate and regular rhythm.      Heart sounds: No murmur heard.  Pulmonary:      Effort: Pulmonary effort is normal. No respiratory distress.      Breath sounds: Normal breath sounds. No wheezing or rales.   Abdominal:      General: Abdomen is flat. There is no distension.      Palpations: Abdomen is soft.      Tenderness: There is no abdominal tenderness. There is no guarding.       Comments: PEG tube, c/d/i   Musculoskeletal:         General: No swelling or tenderness.   Skin:     Findings: No rash.   Neurological:      General: No focal deficit present.      Mental Status: He is alert and oriented to person, place, and time. Mental status is at baseline.   Psychiatric:         Mood and Affect: Mood normal.         Behavior: Behavior normal.             Significant Labs: All pertinent labs within the past 24 hours have been reviewed.  CBC:   Recent Labs   Lab 10/28/23  0459 10/28/23  0858 10/29/23  0638   WBC 7.31 7.42 6.64   HGB 6.8* 7.1* 7.3*   HCT 21.7* 22.3* 22.7*   * 134* 146*     CMP:   Recent Labs   Lab 10/27/23  1609 10/28/23  0459 10/29/23  0638   * 135* 138   K 4.5 5.3* 5.6*   CL 98 98 98   CO2 25 23 23   * 114* 113*   BUN 37* 50* 73*   CREATININE 3.5* 4.7* 6.0*   CALCIUM 8.6* 9.2 9.7   PROT  --  6.5 6.8   ALBUMIN  --  2.9* 3.0*   BILITOT  --  0.6 0.6   ALKPHOS  --  68 80   AST  --  14 12   ALT  --  11 10   ANIONGAP 12 14 17*       Significant Imaging: I have reviewed all pertinent imaging results/findings within the past 24 hours.      Assessment/Plan:      * Facial droop  Patient presenting with concern for new right-sided facial droop on day of presentation.    - CTA head/neck unremarkable  - MRI brain without contrast unremarkable  - Vascular neurology consulted   -- Signed off given unremarkable admission imaging   -- Suspect previously noticed facial droop related to ocular issues rather than stroke given negative MRI  -- Also concern for potential TIA  - LDL 53, TG 51  - A1c 4.7 in August 2023  - Continue to monitor   - Telemetry  - Patient resides at NH    Deep vein thrombosis (DVT) of brachial vein of right upper extremity  - CT Chest 10/28 with prominence of right chest wall collaterals with diminutive right brachiocephalic vein  - Follow up RUE US with DVT in the distal short-segment of one of the paired right brachial veins  - BLE doppler ordered  -  Concern for chronic DVT given collateral on CT Chest  - AC with Apixaban     Pneumonia  - CT head/neck with findings of concern for right upper lobe pneumonia  - Given ceftriaxone and azithromycin ED   - Continue ceftriaxone and azithromycin x 5days, (eot 10/30)  - CT chest with contrast with consolidative opacities throughout the posterior right upper and lower lobes    Hyperkalemia  Potassium 5.5 on admission in setting of ESRD and home ARB  - Hold on home ARB  - Lokelma  scheduled  - HD per nephrology  - Renal diet   - Trend BMP    Anemia in ESRD (end-stage renal disease)  - EPO per nephrology  - Goal Hgb > 7  - s/p 1 unit 10/27 with appropriate response   - Trend CBC    Endophthalmitis  - Status post left eye enucleation with ophthalmology prior to presentation  - Ophthalmology consulted   - Follow-up recommendations      ESRD (end stage renal disease)  - Nephrology consulted  - HD per nephrology  - Continue home sevelamer      PEG (percutaneous endoscopic gastrostomy) status  - Reported used for medications only  - Routine PEG tube care      Chronic kidney disease-mineral and bone disorder  - Continue home calcitriol    Benign prostate hyperplasia  - Home tamsulosin      VTE Risk Mitigation (From admission, onward)         Ordered     apixaban tablet 5 mg  2 times daily        See Hyperspace for full Linked Orders Report.    10/29/23 1320     apixaban tablet 10 mg  2 times daily        See Hyperspace for full Linked Orders Report.    10/29/23 1320     heparin (porcine) injection 1,000 Units  As needed (PRN)         10/27/23 1100     IP VTE LOW RISK PATIENT  Once         10/26/23 1711     Place sequential compression device  Until discontinued         10/26/23 1711                Discharge Planning   TOBY: 10/30/2023     Code Status: Full Code   Is the patient medically ready for discharge?: No    Reason for patient still in hospital (select all that apply): Patient trending condition, Laboratory test, Treatment,  Imaging and Pending disposition      Discharge Delays: None known at this time              John Beckham MD  Department of Hospital Medicine   [facility]

## 2023-10-30 VITALS
HEART RATE: 76 BPM | HEIGHT: 70 IN | SYSTOLIC BLOOD PRESSURE: 134 MMHG | DIASTOLIC BLOOD PRESSURE: 76 MMHG | RESPIRATION RATE: 16 BRPM | OXYGEN SATURATION: 98 % | TEMPERATURE: 99 F | BODY MASS INDEX: 19.8 KG/M2

## 2023-10-30 DIAGNOSIS — N18.6 ESRD (END STAGE RENAL DISEASE): Primary | ICD-10-CM

## 2023-10-30 DIAGNOSIS — Z01.818 PRE-OP EVALUATION: ICD-10-CM

## 2023-10-30 LAB
ABO + RH BLD: NORMAL
ALBUMIN SERPL BCP-MCNC: 2.8 G/DL (ref 3.5–5.2)
ALP SERPL-CCNC: 73 U/L (ref 55–135)
ALT SERPL W/O P-5'-P-CCNC: 8 U/L (ref 10–44)
ANION GAP SERPL CALC-SCNC: 16 MMOL/L (ref 8–16)
AST SERPL-CCNC: 10 U/L (ref 10–40)
BASOPHILS # BLD AUTO: 0.03 K/UL (ref 0–0.2)
BASOPHILS NFR BLD: 0.5 % (ref 0–1.9)
BILIRUB SERPL-MCNC: 0.6 MG/DL (ref 0.1–1)
BLD GP AB SCN CELLS X3 SERPL QL: NORMAL
BUN SERPL-MCNC: 89 MG/DL (ref 6–20)
CALCIUM SERPL-MCNC: 9.6 MG/DL (ref 8.7–10.5)
CHLORIDE SERPL-SCNC: 95 MMOL/L (ref 95–110)
CO2 SERPL-SCNC: 20 MMOL/L (ref 23–29)
CREAT SERPL-MCNC: 7 MG/DL (ref 0.5–1.4)
DIFFERENTIAL METHOD: ABNORMAL
EOSINOPHIL # BLD AUTO: 0.1 K/UL (ref 0–0.5)
EOSINOPHIL NFR BLD: 1.7 % (ref 0–8)
ERYTHROCYTE [DISTWIDTH] IN BLOOD BY AUTOMATED COUNT: 16 % (ref 11.5–14.5)
EST. GFR  (NO RACE VARIABLE): 8.4 ML/MIN/1.73 M^2
GLUCOSE SERPL-MCNC: 134 MG/DL (ref 70–110)
HCT VFR BLD AUTO: 21.5 % (ref 40–54)
HGB BLD-MCNC: 7.1 G/DL (ref 14–18)
IMM GRANULOCYTES # BLD AUTO: 0.06 K/UL (ref 0–0.04)
IMM GRANULOCYTES NFR BLD AUTO: 0.9 % (ref 0–0.5)
LYMPHOCYTES # BLD AUTO: 0.6 K/UL (ref 1–4.8)
LYMPHOCYTES NFR BLD: 10 % (ref 18–48)
MAGNESIUM SERPL-MCNC: 2.8 MG/DL (ref 1.6–2.6)
MCH RBC QN AUTO: 32.7 PG (ref 27–31)
MCHC RBC AUTO-ENTMCNC: 33 G/DL (ref 32–36)
MCV RBC AUTO: 99 FL (ref 82–98)
MONOCYTES # BLD AUTO: 0.5 K/UL (ref 0.3–1)
MONOCYTES NFR BLD: 7.8 % (ref 4–15)
NEUTROPHILS # BLD AUTO: 5.1 K/UL (ref 1.8–7.7)
NEUTROPHILS NFR BLD: 79.1 % (ref 38–73)
NRBC BLD-RTO: 0 /100 WBC
PHOSPHATE SERPL-MCNC: 4.7 MG/DL (ref 2.7–4.5)
PLATELET # BLD AUTO: 134 K/UL (ref 150–450)
PMV BLD AUTO: 11 FL (ref 9.2–12.9)
POCT GLUCOSE: 121 MG/DL (ref 70–110)
POCT GLUCOSE: 142 MG/DL (ref 70–110)
POCT GLUCOSE: 152 MG/DL (ref 70–110)
POCT GLUCOSE: 169 MG/DL (ref 70–110)
POTASSIUM SERPL-SCNC: 4.2 MMOL/L (ref 3.5–5.1)
POTASSIUM SERPL-SCNC: 5.6 MMOL/L (ref 3.5–5.1)
POTASSIUM SERPL-SCNC: 5.7 MMOL/L (ref 3.5–5.1)
POTASSIUM SERPL-SCNC: 5.7 MMOL/L (ref 3.5–5.1)
PROT SERPL-MCNC: 6.6 G/DL (ref 6–8.4)
RBC # BLD AUTO: 2.17 M/UL (ref 4.6–6.2)
SODIUM SERPL-SCNC: 131 MMOL/L (ref 136–145)
SPECIMEN OUTDATE: NORMAL
WBC # BLD AUTO: 6.39 K/UL (ref 3.9–12.7)

## 2023-10-30 PROCEDURE — 36415 COLL VENOUS BLD VENIPUNCTURE: CPT | Performed by: STUDENT IN AN ORGANIZED HEALTH CARE EDUCATION/TRAINING PROGRAM

## 2023-10-30 PROCEDURE — 85025 COMPLETE CBC W/AUTO DIFF WBC: CPT | Performed by: STUDENT IN AN ORGANIZED HEALTH CARE EDUCATION/TRAINING PROGRAM

## 2023-10-30 PROCEDURE — 63600175 PHARM REV CODE 636 W HCPCS: Performed by: STUDENT IN AN ORGANIZED HEALTH CARE EDUCATION/TRAINING PROGRAM

## 2023-10-30 PROCEDURE — 25000003 PHARM REV CODE 250: Performed by: STUDENT IN AN ORGANIZED HEALTH CARE EDUCATION/TRAINING PROGRAM

## 2023-10-30 PROCEDURE — 84132 ASSAY OF SERUM POTASSIUM: CPT | Performed by: STUDENT IN AN ORGANIZED HEALTH CARE EDUCATION/TRAINING PROGRAM

## 2023-10-30 PROCEDURE — 90935 HEMODIALYSIS ONE EVALUATION: CPT | Mod: ,,, | Performed by: NURSE PRACTITIONER

## 2023-10-30 PROCEDURE — 90935 PR HEMODIALYSIS, ONE EVALUATION: ICD-10-PCS | Mod: ,,, | Performed by: NURSE PRACTITIONER

## 2023-10-30 PROCEDURE — 83735 ASSAY OF MAGNESIUM: CPT | Performed by: STUDENT IN AN ORGANIZED HEALTH CARE EDUCATION/TRAINING PROGRAM

## 2023-10-30 PROCEDURE — 86900 BLOOD TYPING SEROLOGIC ABO: CPT | Performed by: STUDENT IN AN ORGANIZED HEALTH CARE EDUCATION/TRAINING PROGRAM

## 2023-10-30 PROCEDURE — S0179 MEGESTROL 20 MG: HCPCS | Performed by: STUDENT IN AN ORGANIZED HEALTH CARE EDUCATION/TRAINING PROGRAM

## 2023-10-30 PROCEDURE — 80100016 HC MAINTENANCE HEMODIALYSIS

## 2023-10-30 PROCEDURE — 90935 HEMODIALYSIS ONE EVALUATION: CPT

## 2023-10-30 PROCEDURE — 84100 ASSAY OF PHOSPHORUS: CPT | Performed by: STUDENT IN AN ORGANIZED HEALTH CARE EDUCATION/TRAINING PROGRAM

## 2023-10-30 PROCEDURE — 80053 COMPREHEN METABOLIC PANEL: CPT | Performed by: STUDENT IN AN ORGANIZED HEALTH CARE EDUCATION/TRAINING PROGRAM

## 2023-10-30 PROCEDURE — 63700000 PHARM REV CODE 250 ALT 637 W/O HCPCS: Performed by: STUDENT IN AN ORGANIZED HEALTH CARE EDUCATION/TRAINING PROGRAM

## 2023-10-30 RX ADMIN — SODIUM ZIRCONIUM CYCLOSILICATE 5 G: 5 POWDER, FOR SUSPENSION ORAL at 02:10

## 2023-10-30 RX ADMIN — ALUMINUM HYDROXIDE, MAGNESIUM HYDROXIDE, AND SIMETHICONE 30 ML: 200; 200; 20 SUSPENSION ORAL at 12:10

## 2023-10-30 RX ADMIN — CARVEDILOL 25 MG: 25 TABLET, FILM COATED ORAL at 12:10

## 2023-10-30 RX ADMIN — OXYBUTYNIN CHLORIDE 10 MG: 10 TABLET, EXTENDED RELEASE ORAL at 12:10

## 2023-10-30 RX ADMIN — ASPIRIN 81 MG CHEWABLE TABLET 81 MG: 81 TABLET CHEWABLE at 12:10

## 2023-10-30 RX ADMIN — CEFTRIAXONE 2 G: 2 INJECTION, POWDER, FOR SOLUTION INTRAMUSCULAR; INTRAVENOUS at 01:10

## 2023-10-30 RX ADMIN — ACETAMINOPHEN 650 MG: 325 TABLET ORAL at 02:10

## 2023-10-30 RX ADMIN — AZITHROMYCIN 250 MG: 250 TABLET, FILM COATED ORAL at 12:10

## 2023-10-30 RX ADMIN — THERA TABS 1 TABLET: TAB at 12:10

## 2023-10-30 RX ADMIN — POLYETHYLENE GLYCOL 3350 17 G: 17 POWDER, FOR SOLUTION ORAL at 12:10

## 2023-10-30 RX ADMIN — TOBRAMYCIN AND DEXAMETHASONE 1 DROP: 3; 1 SUSPENSION/ DROPS OPHTHALMIC at 12:10

## 2023-10-30 RX ADMIN — Medication 1 CAPSULE: at 12:10

## 2023-10-30 RX ADMIN — AMLODIPINE BESYLATE 10 MG: 10 TABLET ORAL at 12:10

## 2023-10-30 RX ADMIN — ISOSORBIDE DINITRATE: 20 TABLET ORAL at 12:10

## 2023-10-30 RX ADMIN — SEVELAMER CARBONATE 0.8 G: 0.8 POWDER, FOR SUSPENSION ORAL at 12:10

## 2023-10-30 RX ADMIN — MEGESTROL ACETATE 400 MG: 400 SUSPENSION ORAL at 09:10

## 2023-10-30 RX ADMIN — FAMOTIDINE 20 MG: 40 POWDER, FOR SUSPENSION ORAL at 12:10

## 2023-10-30 RX ADMIN — APIXABAN 10 MG: 5 TABLET, FILM COATED ORAL at 12:10

## 2023-10-30 RX ADMIN — ATORVASTATIN CALCIUM 40 MG: 40 TABLET, FILM COATED ORAL at 12:10

## 2023-10-30 RX ADMIN — TAMSULOSIN HYDROCHLORIDE 0.4 MG: 0.4 CAPSULE ORAL at 12:10

## 2023-10-30 RX ADMIN — OXYCODONE HYDROCHLORIDE AND ACETAMINOPHEN 500 MG: 500 TABLET ORAL at 12:10

## 2023-10-30 NOTE — NURSING
Pt left via stretcher to ultrasound. Pt departed on 2 L nasal cannula. Will check blood glucose when patient returns.

## 2023-10-30 NOTE — PLAN OF CARE
[facility] - [facility]  Discharge Final Note    Primary Care Provider: Dolly Primary Doctor    Expected Discharge Date: 10/30/2023    Final Discharge Note (most recent)       Final Note - 10/30/23 1101          Final Note    Assessment Type Final Discharge Note     Anticipated Discharge Disposition Skilled Nursing Facility     Hospital Resources/Appts/Education Provided Post-Acute resouces added to AVS;Appointments scheduled and added to AVS        Post-Acute Status    Post-Acute Authorization Placement     Post-Acute Placement Status Set-up Complete/Auth obtained     Discharge Delays None known at this time                     Important Message from Medicare             Contact Info       No, Primary Doctor   Relationship: PCP - General        Next Steps: Follow up    [facility]   Specialty: Skilled Nursing Facility    [address] [address]   Phone: [phone]       Next Steps: Follow up today

## 2023-10-30 NOTE — PLAN OF CARE
Lizabeth spoke with Mona at [facility],  informed of dc today after HD. Lizabeth faxed IA paperwork to , Lizabeth also faxed Sophia with [facility] to . Lizabeth will arrange stretcher transport once room assigned. Sister Marley updated, Pt to dc later pm.

## 2023-10-30 NOTE — PROGRESS NOTES
[facility]FF HEMODIALYSIS NOTE     Patient currently on hemodialysis for removal of uremic toxins and volume.     Patient seen and evaluated on hemodialysis, tolerating treatment, see HD flowsheet for vitals and assessments.    Labs have been reviewed and the dialysate bath has been adjusted.       Assessment/Plan:    -ESRD on HD   -Patient seen on HD, tolerating treatment well, w/o complaints   -UF goal of 2L   -Renal diet, if not NPO   -Continue renvela and calcitriol   -Strict I/O's and daily weights  -Daily renal function panels  -Keep MAP >65 while on HD   -Maintain Hgb >7.0  -Will continue to follow while inpatient       Pete Grimes, NP  Nephrology

## 2023-10-30 NOTE — PROGRESS NOTES
Patient arrived via bed.  Pt awake, alert and responsive.  VSS.   HD maintenance TX (MWF) started via  RIJ HD catheter.

## 2023-10-30 NOTE — PLAN OF CARE
[facility]    FACILITY TRANSFER ORDERS      Patient Name: Immanuel Bernal  YOB: 1965    PCP: No, Primary Doctor   PCP Address: None  PCP Phone Number: None  PCP Fax: None    Encounter Date: 10/30/2023    Admit to: Nursing Home    Vital Signs:  Routine    Diagnoses:   Active Hospital Problems    Diagnosis  POA    *Facial droop [R29.810]  Yes     Priority: 1 - High    Deep vein thrombosis (DVT) of brachial vein of right upper extremity [I82.621]  Yes     Priority: 2     Pneumonia [J18.9]  Yes     Priority: 2     Hyperkalemia [E87.5]  Yes     Priority: 3     Anemia in ESRD (end-stage renal disease) [N18.6, D63.1]  Yes     Priority: 4      Chronic    Endophthalmitis [H44.009]  Yes     Priority: 4     ESRD (end stage renal disease) [N18.6]  Yes     Priority: 6      Chronic    PEG (percutaneous endoscopic gastrostomy) status [Z93.1]  Not Applicable     Chronic    Chronic kidney disease-mineral and bone disorder [N18.9, E83.9, M89.9]  Yes    Benign prostate hyperplasia [N40.0]  Yes      Resolved Hospital Problems   No resolved problems to display.       Allergies:  Review of patient's allergies indicates:   Allergen Reactions    Morphine Rash    Amiodarone analogues Itching     Other reaction(s): Unknown       Diet: renal diet    Activities: Bed rest with bathroom privileges    Goals of Care Treatment Preferences:  Code Status: Full Code    Health care agent: Marley Bernal  [facility] care agent number: No value filed.          What is most important right now is to focus on extending life as long as possible, even it it means sacrificing quality.  Accordingly, we have decided that the best plan to meet the patient's goals includes continuing with treatment.      Nursing: Per facility     Labs: CBC and BMP Once on  11/2/2023    CONSULTS:     to evaluate for community resources/long-range planning.    MISCELLANEOUS CARE:    BiPAP settings: 16 IPAP. 10 EPAP, FiO2 21%, and Resp 10      PEG Care: Clean site every 24 hours. , Mcknight Care: Empty Mcknight bag every shift. Change Mcknight every month., Routine Skin for Bedridden Patients: Apply moisture barrier cream to all skin folds and wet areas in perineal area daily and after baths and all bowel movements., and Diabetes Care:   SN to perform and educate Diabetic management with blood glucose monitoring:, Fingerstick blood sugar AC and HS, and Report CBG < 60 or > 350 to physician.    WOUND CARE ORDERS  Yes: Surgical Wound:  Location: Left and Right Eyes    Instructions for Nursing Home  - Continue Tobradex ointment TID left eye only  - Large conformer to remain in left eye socket just behind patients eyelids at all times. If it falls out, clean with soap and water and put it back into the eye socket.   - Start artificial tear QID right eye only  - Start Systane gel at bedtime right eye only  - Needs Mrx for Polycarbonate glasses to protect his one good eye. Please provide or contact our office for optometry appointment.     Medications: Review discharge medications with patient and family and provide education.      Current Discharge Medication List        START taking these medications    Details   artificial tears,hypromellose, 0.3 % Drop Apply 1 drop to eye 4 (four) times daily before meals and nightly.  Qty: 15 mL, Refills: 0           CONTINUE these medications which have NOT CHANGED    Details   acetaminophen (TYLENOL) 325 MG tablet Take 650 mg by mouth every 6 (six) hours as needed for Pain.      albuterol-ipratropium (DUO-NEB) 2.5 mg-0.5 mg/3 mL nebulizer solution Take 3 mLs by nebulization 3 (three) times daily as needed for Wheezing or Shortness of Breath.  Qty: 75 mL, Refills: 0      amLODIPine (NORVASC) 10 MG tablet Take 1 tablet (10 mg total) by mouth once daily.  Qty: 30 tablet, Refills: 11    Comments: .      ascorbic acid, vitamin C, (VITAMIN C) 500 MG tablet Take 500 mg by mouth once daily.      aspirin 81 MG Chew Take 81 mg by mouth  once daily.      atorvastatin (LIPITOR) 40 MG tablet 1 tablet (40 mg total) by Per G Tube route once daily.  Qty: 90 tablet, Refills: 3      calcitRIOL (ROCALTROL) 0.25 MCG Cap Take 1 capsule (0.25 mcg total) by mouth once daily.      calcium carbonate-vitamin D3 (OYSTER SHELL CALCIUM-VIT D3) 500 mg-5 mcg (200 unit) PwPk Take 1 tablet by mouth 2 (two) times a day.      carvediloL (COREG) 25 MG tablet 1 tablet (25 mg total) by Per G Tube route 2 (two) times daily.  Qty: 60 tablet, Refills: 11    Comments: .      folic acid (FOLVITE) 1 MG tablet Take 1 tablet by mouth every morning.      isosorbide-hydrALAZINE 20-37.5 mg (BIDIL) 20-37.5 mg Tab Take 2 tablets by mouth 3 (three) times daily.  Qty: 90 tablet, Refills: 11      Lactobacillus rhamnosus GG (CULTURELLE) 10 billion cell capsule 1 capsule by Per G Tube route 2 (two) times daily.      megestroL (MEGACE) 400 mg/10 mL (40 mg/mL) Susp Take 10 mLs by mouth 2 (two) times a day.      melatonin (MELATIN) 3 mg tablet Take 2 tablets (6 mg total) by mouth nightly.  Refills: 0      multivitamin (THERAGRAN) per tablet Take 1 tablet by mouth once daily.      ondansetron (ZOFRAN-ODT) 8 MG TbDL Take 1 tablet (8 mg total) by mouth every 6 (six) hours as needed.      oxybutynin (DITROPAN-XL) 10 MG 24 hr tablet Take 1 tablet (10 mg total) by mouth once daily.  Qty: 30 tablet, Refills: 11      pantoprazole (PROTONIX) 40 mg suspension 1 packet (40 mg total) by Per G Tube route 2 (two) times daily.  Qty: 60 packet, Refills: 11      polyethylene glycol (GLYCOLAX) 17 gram/dose powder Take 17 g by mouth 2 (two) times daily.      senna-docusate 8.6-50 mg (PERICOLACE) 8.6-50 mg per tablet 1 tablet by Per G Tube route 2 (two) times daily.      sevelamer carbonate (RENVELA) 0.8 gram PwPk 1 packet (0.8 g total) by Per G Tube route 3 (three) times daily with meals.  Qty: 90 packet, Refills: 11      tamsulosin (FLOMAX) 0.4 mg Cap Take 1 capsule (0.4 mg total) by mouth once daily.  Qty: 30  capsule, Refills: 6      tobramycin-dexAMETHasone 0.3-0.1% (TOBRADEX) 0.3-0.1 % Oint Place into the left eye 3 (three) times daily.  Qty: 3.5 g, Refills: 1      VITAMIN B COMPLEX ORAL Take 1 tablet by mouth every morning.      zinc gluconate 50 mg tablet Take 50 mg by mouth once daily.           STOP taking these medications       valsartan (DIOVAN) 160 MG tablet Comments:   Reason for Stopping:                  Immunizations Administered as of 10/30/2023       No immunizations on file.            This patient has had both covid vaccinations    Some patients may experience side effects after vaccination.  These may include fever, headache, muscle or joint aches.  Most symptoms resolve with 24-48 hours and do not require urgent medical evaluation unless they persist for more than 72 hours or symptoms are concerning for an unrelated medical condition.          _________________________________  John Beckham MD  10/30/2023

## 2023-10-30 NOTE — PROGRESS NOTES
HD TX completed.  TX time- 3.5 hrs.  Net fluid removed I-2 liters.  VSS.  Tolerated dialysis.  Report given to primary nurse.  Returning to room via bed.

## 2023-10-30 NOTE — NURSING
Patient discharged from unit via [facility]. IV removed tunnel cath in place. Discharge instructions reviewed. Patient verbalized understanding.

## 2023-10-31 LAB
BLD PROD TYP BPU: NORMAL
BLOOD UNIT EXPIRATION DATE: NORMAL
BLOOD UNIT TYPE CODE: 5100
BLOOD UNIT TYPE: NORMAL
CODING SYSTEM: NORMAL
CROSSMATCH INTERPRETATION: NORMAL
DISPENSE STATUS: NORMAL
TRANS ERYTHROCYTES VOL PATIENT: NORMAL ML

## 2023-10-31 NOTE — HOSPITAL COURSE
Facial droop  Patient presenting with concern for new right-sided facial droop on day of presentation.    - CTA head/neck unremarkable  - MRI brain without contrast unremarkable  - Vascular neurology consulted   -- Signed off given unremarkable admission imaging   -- Suspect previously noticed facial droop related to ocular issues rather than stroke given negative MRI  -- Also concern for potential TIA  - LDL 53, TG 51  - A1c 4.7 in August 2023  - Continue to monitor      Deep vein thrombosis (DVT) of brachial vein of right upper extremity  - CT Chest 10/28 with prominence of right chest wall collaterals with diminutive right brachiocephalic vein  - Concern for chronic DVT given collateral on CT Chest  - Follow up RUE US with DVT in the distal short-segment of one of the paired right brachial veins  - BLE doppler without DVT  - Confirmed chronic DVT with patient's family. Holding on off AC.     Pneumonia  - CT head/neck with findings of concern for right upper lobe pneumonia  - Given ceftriaxone and azithromycin ED   - Completed and ceftriaxone and azithromycin x 5days, (eot 10/30)  - CT chest with contrast with consolidative opacities throughout the posterior right upper and lower lobes     Hyperkalemia  Potassium 5.5 on admission in setting of ESRD and home ARB  - Hold on home ARB  - Lokelma as needed  - HD per nephrology  - Renal diet   - Trend BMP     Anemia in ESRD (end-stage renal disease)  - EPO per nephrology  - Goal Hgb > 7  - s/p 1 unit 10/27 with appropriate response   - Trend CBC     Endophthalmitis  - Status post left eye enucleation with ophthalmology prior to presentation  - Ophthalmology consulted   -- Continue eye drops per ophthalmology recommendations  -- Outpatient follow up

## 2023-10-31 NOTE — DISCHARGE SUMMARY
[facility] Medicine  Discharge Summary      Patient Name: Immanuel Bernal  MRN: [id number]  JAMI: [id number]  Patient Class: IP- Inpatient  Admission Date: 10/26/2023  Hospital Length of Stay: 3 days  Discharge Date and Time: 10/30/2023  5:29 PM  Attending Physician: Dolly att. providers found   Discharging Provider: John Beckham MD  Primary Care Provider: Dolly Primary Doctor  [facility] Medicine Team: [facility] MED S John Beckham MD  Primary Care Team: [facility]    HPI:    Mr. Bernal is inpatient male with a past medical history of ESRD on HD, prior CVA, diabetes, hypertension who presents to the ED from clinic with concerns of facial droop.  Patient was in his usual state of health while at outpatient clinic appointment where his sister noted him to have right facial droop and he presented to the ED.  Patient with no other complaints.  Denies fevers, chills, dyspnea, abdominal pain, headache, weakness, tingling, numbness, vision changes, nausea, and dysarthria.    In the ED, patient HDS.  Labs notable for for potassium 5.5, creatinine 5, BUN 56, glucose 98, and WBC 8.3.  MRI brain without contrast with no evidence of acute infarct or hemorrhage; but notable for chronic small-vessel ischemic changes and remote left basal ganglia/corona radiata lacunar type infarct.  CTA head with no evidence of high-grade stenosis or large intracranial large vessel occlusion.  Lung in the CTA head finding with irregular airspace opacity in the posterior right apex concerning for pneumonia.      * No surgery found *      Hospital Course:   Facial droop  Patient presenting with concern for new right-sided facial droop on day of presentation.    - CTA head/neck unremarkable  - MRI brain without contrast unremarkable  - Vascular neurology consulted   -- Signed off given unremarkable admission imaging   -- Suspect previously noticed facial droop related to ocular issues rather than stroke given negative  MRI  -- Also concern for potential TIA  - LDL 53, TG 51  - A1c 4.7 in August 2023  - Continue to monitor      Deep vein thrombosis (DVT) of brachial vein of right upper extremity  - CT Chest 10/28 with prominence of right chest wall collaterals with diminutive right brachiocephalic vein  - Concern for chronic DVT given collateral on CT Chest  - Follow up RUE US with DVT in the distal short-segment of one of the paired right brachial veins  - BLE doppler without DVT  - Confirmed chronic DVT with patient's family. Holding on off AC.     Pneumonia  - CT head/neck with findings of concern for right upper lobe pneumonia  - Given ceftriaxone and azithromycin ED   - Completed and ceftriaxone and azithromycin x 5days, (eot 10/30)  - CT chest with contrast with consolidative opacities throughout the posterior right upper and lower lobes     Hyperkalemia  Potassium 5.5 on admission in setting of ESRD and home ARB  - Hold on home ARB  - Lokelma as needed  - HD per nephrology  - Renal diet   - Trend BMP     Anemia in ESRD (end-stage renal disease)  - EPO per nephrology  - Goal Hgb > 7  - s/p 1 unit 10/27 with appropriate response   - Trend CBC     Endophthalmitis  - Status post left eye enucleation with ophthalmology prior to presentation  - Ophthalmology consulted   -- Continue eye drops per ophthalmology recommendations  -- Outpatient follow up       Goals of Care Treatment Preferences:  Code Status: Full Code    Health care agent: Marley Bernal  [facility] care agent number: No value filed.          What is most important right now is to focus on extending life as long as possible, even it it means sacrificing quality.  Accordingly, we have decided that the best plan to meet the patient's goals includes continuing with treatment.      Consults:   Consults (From admission, onward)        Status Ordering Provider     IP consult to case management  Once        Provider:  (Not yet assigned)    Completed JOSE MARTIN KIDD      Inpatient consult to Nephrology  Once        Provider:  (Not yet assigned)    Completed JOSE MARTIN KIDD     Inpatient consult to Ophthalmology  Once        Provider:  (Not yet assigned)    Completed SHANA TOSCANO     Inpatient consult to Vascular (Stroke) Neurology  Once        Provider:  (Not yet assigned)    Completed ABHAY JUDD          No new Assessment & Plan notes have been filed under this hospital service since the last note was generated.  Service: Hospital Medicine    Final Active Diagnoses:    Diagnosis Date Noted POA    PRINCIPAL PROBLEM:  Facial droop [R29.810] 10/26/2023 Yes    Deep vein thrombosis (DVT) of brachial vein of right upper extremity [I82.621] 10/29/2023 Yes    Pneumonia [J18.9] 10/26/2023 Yes    Hyperkalemia [E87.5] 10/26/2023 Yes    Anemia in ESRD (end-stage renal disease) [N18.6, D63.1] 08/11/2023 Yes     Chronic    Endophthalmitis [H44.009] 08/09/2023 Yes    ESRD (end stage renal disease) [N18.6] 08/09/2023 Yes     Chronic    PEG (percutaneous endoscopic gastrostomy) status [Z93.1] 08/11/2023 Not Applicable     Chronic    Chronic kidney disease-mineral and bone disorder [N18.9, E83.9, M89.9] 08/09/2023 Yes    Benign prostate hyperplasia [N40.0] 10/01/2021 Yes      Problems Resolved During this Admission:       Discharged Condition: good    Disposition: Skilled Nursing Facility    Follow Up:   Follow-up Information     No, Primary Doctor .           [facility] Follow up today.    Specialty: Skilled Nursing Facility  Contact information:  [address] [address]  [phone]                     Patient Instructions:      Ambulatory referral/consult to Sleep Disorders   Standing Status: Future   Referral Priority: Routine Referral Type: Consultation   Requested Specialty: Sleep Medicine   Number of Visits Requested: 1     Diet Adult Regular     Diet renal     Notify your health care provider if you experience any of the  following:  increased confusion or weakness     Notify your health care provider if you experience any of the following:  persistent dizziness, light-headedness, or visual disturbances     Notify your health care provider if you experience any of the following:  worsening rash     Notify your health care provider if you experience any of the following:  severe persistent headache     Notify your health care provider if you experience any of the following:  difficulty breathing or increased cough     Notify your health care provider if you experience any of the following:  redness, tenderness, or signs of infection (pain, swelling, redness, odor or green/yellow discharge around incision site)     Notify your health care provider if you experience any of the following:  severe uncontrolled pain     Notify your health care provider if you experience any of the following:  persistent nausea and vomiting or diarrhea     Notify your health care provider if you experience any of the following:  temperature >100.4     Notify your health care provider if you experience any of the following:  increased confusion or weakness     Notify your health care provider if you experience any of the following:  persistent dizziness, light-headedness, or visual disturbances     Notify your health care provider if you experience any of the following:  worsening rash     Notify your health care provider if you experience any of the following:  severe persistent headache     Notify your health care provider if you experience any of the following:  difficulty breathing or increased cough     Notify your health care provider if you experience any of the following:  redness, tenderness, or signs of infection (pain, swelling, redness, odor or green/yellow discharge around incision site)     Notify your health care provider if you experience any of the following:  severe uncontrolled pain     Notify your health care provider if you experience  any of the following:  persistent nausea and vomiting or diarrhea     Notify your health care provider if you experience any of the following:  temperature >100.4     Activity as tolerated     Activity as tolerated       Significant Diagnostic Studies: Labs: All labs within the past 24 hours have been reviewed    Pending Diagnostic Studies:     None         Medications:  Reconciled Home Medications:      Medication List      START taking these medications    artificial tears(hypromellose) 0.3 % Drop  Apply 1 drop to eye 4 (four) times daily before meals and nightly.        CONTINUE taking these medications    acetaminophen 325 MG tablet  Commonly known as: TYLENOL  Take 650 mg by mouth every 6 (six) hours as needed for Pain.     albuterol-ipratropium 2.5 mg-0.5 mg/3 mL nebulizer solution  Commonly known as: DUO-NEB  Take 3 mLs by nebulization 3 (three) times daily as needed for Wheezing or Shortness of Breath.     amLODIPine 10 MG tablet  Commonly known as: NORVASC  Take 1 tablet (10 mg total) by mouth once daily.     aspirin 81 MG Chew  Take 81 mg by mouth once daily.     atorvastatin 40 MG tablet  Commonly known as: LIPITOR  1 tablet (40 mg total) by Per G Tube route once daily.     calcitRIOL 0.25 MCG Cap  Commonly known as: ROCALTROL  Take 1 capsule (0.25 mcg total) by mouth once daily.     carvediloL 25 MG tablet  Commonly known as: COREG  1 tablet (25 mg total) by Per G Tube route 2 (two) times daily.     folic acid 1 MG tablet  Commonly known as: FOLVITE  Take 1 tablet by mouth every morning.     isosorbide-hydrALAZINE 20-37.5 mg 20-37.5 mg Tab  Commonly known as: BIDIL  Take 2 tablets by mouth 3 (three) times daily.     Lactobacillus rhamnosus GG 10 billion cell capsule  Commonly known as: CULTURELLE  1 capsule by Per G Tube route 2 (two) times daily.     megestroL 400 mg/10 mL (40 mg/mL) Susp  Commonly known as: MEGACE  Take 10 mLs by mouth 2 (two) times a day.     melatonin 3 mg tablet  Commonly known as:  MELATIN  Take 2 tablets (6 mg total) by mouth nightly.     multivitamin per tablet  Commonly known as: THERAGRAN  Take 1 tablet by mouth once daily.     ondansetron 8 MG Tbdl  Commonly known as: ZOFRAN-ODT  Take 1 tablet (8 mg total) by mouth every 6 (six) hours as needed.     oxybutynin 10 MG 24 hr tablet  Commonly known as: DITROPAN-XL  Take 1 tablet (10 mg total) by mouth once daily.     OYSTER SHELL CALCIUM-VIT D3 500 mg-5 mcg (200 unit) Pwpk  Generic drug: calcium carbonate-vitamin D3  Take 1 tablet by mouth 2 (two) times a day.     pantoprazole 40 mg suspension  Commonly known as: PROTONIX  1 packet (40 mg total) by Per G Tube route 2 (two) times daily.     polyethylene glycol 17 gram/dose powder  Commonly known as: GLYCOLAX  Take 17 g by mouth 2 (two) times daily.     senna-docusate 8.6-50 mg 8.6-50 mg per tablet  Commonly known as: PERICOLACE  1 tablet by Per G Tube route 2 (two) times daily.     sevelamer carbonate 0.8 gram Pwpk  Commonly known as: RENVELA  1 packet (0.8 g total) by Per G Tube route 3 (three) times daily with meals.     tamsulosin 0.4 mg Cap  Commonly known as: FLOMAX  Take 1 capsule (0.4 mg total) by mouth once daily.     tobramycin-dexAMETHasone 0.3-0.1% 0.3-0.1 % Oint  Commonly known as: TOBRADEX  Place into the left eye 3 (three) times daily.     VITAMIN B COMPLEX ORAL  Take 1 tablet by mouth every morning.     VITAMIN C 500 MG tablet  Generic drug: ascorbic acid (vitamin C)  Take 500 mg by mouth once daily.     zinc gluconate 50 mg tablet  Take 50 mg by mouth once daily.        STOP taking these medications    valsartan 160 MG tablet  Commonly known as: DIOVAN            Indwelling Lines/Drains at time of discharge:   Lines/Drains/Airways     Central Venous Catheter Line  Duration                Hemodialysis Catheter right internal jugular -- days         Hemodialysis Catheter right internal jugular -- days    Percutaneous Central Line Insertion/Assessment - Double Lumen  08/29/23 1016  Internal Jugular Right 62 days          Drain  Duration                Gastrostomy/Enterostomy 08/23/23 1213 Percutaneous endoscopic gastrostomy (PEG) LUQ 68 days                Time spent on the discharge of patient: 35 minutes         John Beckham MD  Department of [facility] Medicine  [facility]

## 2023-11-03 ENCOUNTER — TELEPHONE (OUTPATIENT)
Dept: OPHTHALMOLOGY | Facility: CLINIC | Age: 58
End: 2023-11-03
Payer: MEDICARE

## 2023-11-03 NOTE — TELEPHONE ENCOUNTER
----- Message from Gladys Macdonald sent at 11/3/2023  3:29 PM CDT -----  Regarding: FW: Letter PU  Contact: Pt sister    ----- Message -----  From: Mara Peguero  Sent: 11/3/2023   3:24 PM CDT  To: Pat Cohen Staff  Subject: Letter PU                                        Pt sister is calling regarding letter and clear conformer  for pt. Pt sister is on the way and will be there soon. Please adv           Confirmed contact below:   Contact Name:Immanuel Bernal  Phone Number: [phone]

## 2023-11-07 ENCOUNTER — TELEPHONE (OUTPATIENT)
Dept: OPHTHALMOLOGY | Facility: CLINIC | Age: 58
End: 2023-11-07
Payer: MEDICARE

## 2023-11-07 NOTE — TELEPHONE ENCOUNTER
I called the sister to explain to her that he had an enucleation due to his infection although they planned for an evisceration at first. She sts she left the hospital with the understanding that he was eviscerated only. She is very upset and is saying we lied to her. She wants to talk to admin today.

## 2023-11-07 NOTE — TELEPHONE ENCOUNTER
----- Message from Keith Brown sent at 11/7/2023  9:15 AM CST -----  Contact: [phone]  Pt sister is calling to find out the procedure her brother had so she can tell the prosthetic eye people which he had. She was trying to see if it was an Enucleation or Evisceration.  Please call back to further assist.

## 2023-11-09 ENCOUNTER — TELEPHONE (OUTPATIENT)
Dept: OPHTHALMOLOGY | Facility: CLINIC | Age: 58
End: 2023-11-09
Payer: MEDICARE

## 2023-11-10 ENCOUNTER — TELEPHONE (OUTPATIENT)
Dept: OPHTHALMOLOGY | Facility: CLINIC | Age: 58
End: 2023-11-10
Payer: MEDICARE

## 2023-11-15 ENCOUNTER — TELEPHONE (OUTPATIENT)
Dept: INTERNAL MEDICINE | Facility: CLINIC | Age: 58
End: 2023-11-15
Payer: MEDICARE

## 2023-11-15 ENCOUNTER — TELEPHONE (OUTPATIENT)
Dept: OPHTHALMOLOGY | Facility: CLINIC | Age: 58
End: 2023-11-15
Payer: MEDICARE

## 2023-11-15 NOTE — TELEPHONE ENCOUNTER
----- Message from Elie Garcia sent at 11/15/2023 12:37 PM CST -----  Contact: Sister/ Marley [phone]  Consult    The patient's potassium is high so she wants to know the name of the medication he was taken off that was effecting his potassium levels making it increase, his potassium level is at 7 at the moment.    She also said someone at the clinic told her that you were the patient's PCP     Thank you

## 2023-11-15 NOTE — TELEPHONE ENCOUNTER
----- Message from Elie Garcia sent at 11/15/2023 12:37 PM CST -----  Contact: Sister/ Marley [phone]  Consult    The patient's potassium is high so she wants to know the name of the medication he was taken off that was effecting his potassium levels making it increase, his potassium level is at 7 at the moment.    She also said someone at the clinic told her that you were the patient's PCP     Thank you     [FreeTextEntry1] : 56 year-old patient for gynecological exam without any gynecological complaints.\par Patient complains of mole on her left breast.\par Patient has a history of fibroid uterus, [Mammogramdate] : 2022 [PapSmeardate] : 2022 [ColonoscopyDate] : 2022 [Currently In Menopause] : currently in menopause [Menopause Age: ____] : age at menopause was [unfilled]

## 2023-11-15 NOTE — TELEPHONE ENCOUNTER
----- Message from Soheila Jackson sent at 11/15/2023  2:39 PM CST -----  Regarding: Requesting Call Back  Patients sister called about Dr. Valentin Office not having pt's conformer and pt will not be able to have his procedure tomorrow. Pt's sister is upset about situation.     Please call back to further eliupp-[phone] Marley

## 2023-11-15 NOTE — TELEPHONE ENCOUNTER
Patient's sister is concerned about her brother's high potassium levels which is currently at a 7.  She wants to find the name of the drug that was affecting his potassium levels which caused it to increase to this level and she wants it to discontinue     The nursing home did not tell her what the name of the medication was when the sister had asked the nurse that is taking care of her brother in the nursing home     Wanted to establish primary care but he did not finish the appt because he had to go to the emergency room

## 2023-11-16 ENCOUNTER — TELEPHONE (OUTPATIENT)
Dept: OPHTHALMOLOGY | Facility: CLINIC | Age: 58
End: 2023-11-16
Payer: MEDICARE

## 2023-11-16 NOTE — TELEPHONE ENCOUNTER
Someone at [facility] called about rescheduling appt with Dr. Stewart from when Immanuel was in the ED. I explain that I was made aware of the ED visit during the time of his post op appt and made arrangements for Ophtho to consult him while he was in ED. His eye was examined and he was cleared by Adalberto Boyd and Dr. Stewart to receive his prosthetic. Patient was given a written RX for the prosthetic before he was discharged. I also explained that the patient will not need to follow up until after he receives his prosthesis.   I explain to the woman on the phone that he has an appt with Satnam Valentin for his prosthetic today. She tells me that they were never informed or made aware of the appointment from the family and therefore never made arrangements for transportation. I asked her to call me back to let me know if he was still going to make his appt with Barbie so we can know if we need to bring the conformer there or not. She will be calling me back with an update once she figures out transportation for the patient to his appointment.

## 2023-11-16 NOTE — TELEPHONE ENCOUNTER
----- Message from Keith Brown sent at 11/16/2023 12:02 PM CST -----  Contact: [phone]  [facility] is returning a call to you about the pt transportation. Please call back to further assist.

## 2023-11-16 NOTE — TELEPHONE ENCOUNTER
----- Message from Keith Brown sent at 11/16/2023 11:37 AM CST -----  Contact: [phone]  [facility] is calling to reschedule pt appt due to missing it because he was in the ER. Please call back to further assist.

## 2023-11-28 ENCOUNTER — TELEPHONE (OUTPATIENT)
Dept: OPHTHALMOLOGY | Facility: CLINIC | Age: 58
End: 2023-11-28
Payer: MEDICARE

## 2023-11-28 NOTE — TELEPHONE ENCOUNTER
----- Message from Kala Rose sent at 11/28/2023  1:54 PM CST -----  Consult/Advisory    Name Of Caller:Juan       Contact Preference:[phone]    Nature of call: Nursing home  called regarding some papers that was needed to mail to them

## 2023-11-30 ENCOUNTER — TELEPHONE (OUTPATIENT)
Dept: OPHTHALMOLOGY | Facility: CLINIC | Age: 58
End: 2023-11-30
Payer: MEDICARE

## 2023-11-30 NOTE — TELEPHONE ENCOUNTER
Spoke to Satnam fenton who sts he is coming in on December 6th. They have the conformer and will put it in his eye then. I called back the nursing home to explain this to them. The floor nurse explains she will be able to put the conformer in if it falls out.

## 2023-12-20 ENCOUNTER — TELEPHONE (OUTPATIENT)
Dept: NEUROLOGY | Facility: CLINIC | Age: 58
End: 2023-12-20
Payer: MEDICARE

## 2023-12-20 NOTE — TELEPHONE ENCOUNTER
Called and spoke with sister. The [facility]/rehab did not bring patient to 10/24 appointment with Dr. Berry. Patient has started a workup with neurocare. MRI Brain wo contrast 10/26/2023: Showed remote left basal ganglia/corona radiata lacunar type infarct.     Patient scheduled 01/09/2024 with Massiel Robles NP.

## 2023-12-20 NOTE — TELEPHONE ENCOUNTER
----- Message from Rona Noonan sent at 10/24/2023  8:02 AM CDT -----  Regarding: Appt  Contact: [phone]  Patient sister Marley is calling is calling. Marley is stating both patients were to be seen today Carmen by Neurology. Please call patient back at  [phone]

## 2024-01-29 PROBLEM — J18.9 PNEUMONIA: Status: RESOLVED | Noted: 2023-10-26 | Resolved: 2024-01-29

## 2024-02-08 NOTE — NURSING TRANSFER
Nursing Transfer Note      8/16/2023   8:20 PM    Nurse giving handoff:Lizzy KRAFT   Nurse receiving handoff:Reema KRAFT    Reason patient is being transferred: MD order     Transfer To: [id number]    Transfer via bed    Transfer with cardiac monitoring    Transported by RN x1, PCT x 1      Telemetry: Box Number [id number], Rate 82, and Rhythm Sinus   Order for Tele Monitor? Yes    Medicines sent: tobradex drops - tobradex ointment       Patient belongings transferred with patient:  in patient    Chart send with patient: Yes    Notified: POA Sister    Patient reassessed at: 2000   Spoke with patient, she is going to fill out information to oxygen to go, we send information when requested.

## 2024-10-10 NOTE — ASSESSMENT & PLAN NOTE
- management per primary team   55 year-old gentleman with history and cardiovascular risk factors as above, now being evaluated for possible liver transplant.  ECG and TTE reviewed.     Plan for DSE.    Discussed with Transplant Team on rounds.

## 2024-10-11 NOTE — ASSESSMENT & PLAN NOTE
Problem: Adult Inpatient Plan of Care  Goal: Plan of Care Review  Outcome: Not Progressing  Goal: Patient-Specific Goal (Individualized)  Outcome: Not Progressing  Goal: Absence of Hospital-Acquired Illness or Injury  Outcome: Not Progressing  Goal: Optimal Comfort and Wellbeing  Outcome: Not Progressing  Goal: Readiness for Transition of Care  Outcome: Not Progressing     Problem: Bariatric Environmental Safety  Goal: Safety Maintained with Care  Outcome: Not Progressing     Problem: Acute Kidney Injury/Impairment  Goal: Fluid and Electrolyte Balance  Outcome: Not Progressing  Goal: Improved Oral Intake  Outcome: Not Progressing  Goal: Effective Renal Function  Outcome: Not Progressing     Problem: Pneumonia  Goal: Fluid Balance  Outcome: Not Progressing  Goal: Resolution of Infection Signs and Symptoms  Outcome: Not Progressing  Goal: Effective Oxygenation and Ventilation  Outcome: Not Progressing      - management per primary team

## 2025-03-29 NOTE — PLAN OF CARE
YULI contacted [facility] for Level II update. [phone]. YULI Was prompted to call [employer] [phone]. Spoke with an employee. Stated that they are awaiting Behavioral [facility]'s assessment and it takes 7 -10 Business days. SW contacted Behavioral [facility] office and left a message. [phone]. SW awaiting a return phone call.      Patient has been accepted to [facility]. SW contacted staff and confirmed. They are willing to take patient on discharge date.     YULI chatted Silva Segovia for HD chair set up. SW made her aware that the patient has been accepted. SW awaiting confirmation on HD chair approval.    LIN Bales, MSW-LMSW  Medical Social Worker/  ER Department      5 (moderate pain)

## (undated) DEVICE — DRESSING EYE OVAL LF

## (undated) DEVICE — TOWEL OR DISP STRL BLUE 4/PK

## (undated) DEVICE — BOWL STERILE LARGE 32OZ

## (undated) DEVICE — SUT SILK 4-0 BLK BR G-3G-3

## (undated) DEVICE — SUCTION SURGICAL STR 7FR

## (undated) DEVICE — ELECTRODE REM PLYHSV RETURN 9

## (undated) DEVICE — SYR 10CC LUER LOCK

## (undated) DEVICE — BLADE SURG CARBON STEEL SZ11

## (undated) DEVICE — TRAY MUSCLE LID EYE

## (undated) DEVICE — DRAPE ORTH SPLIT 77X108IN

## (undated) DEVICE — SUT CTD VICRYL 5-0 P-2 UNDB

## (undated) DEVICE — DRAPE STERI INSTRUMENT 1018

## (undated) DEVICE — NDL STRAIGHT 4CM LEIBINGER

## (undated) DEVICE — SOL BETADINE 5%

## (undated) DEVICE — GOWN ECLIPSE REINF LVL4 TWL XL

## (undated) DEVICE — INSTRUMENT SURG SUCT FRZR W/C

## (undated) DEVICE — TUBING SUC UNIV W/CONN 12FT

## (undated) DEVICE — NDL RETROBLB BLOCK 25GX1.5IN

## (undated) DEVICE — STRIP WOUND PK IDOFORM 180X.5

## (undated) DEVICE — SUT CTD VICRYL 4-0 P-2

## (undated) DEVICE — NDL HYPO A BEVEL 30X1/2

## (undated) DEVICE — CONTAINER SPECIMEN OR STER 4OZ

## (undated) DEVICE — ADHESIVE MASTISOL VIAL 48/BX

## (undated) DEVICE — SOL IRRI STRL WATER 1000ML

## (undated) DEVICE — SHIELD EYE PLASTIC CLEAR ADLT

## (undated) DEVICE — DRESSING XEROFORM FOIL PK 1X8

## (undated) DEVICE — PROTECTOR CORNEAL CROUCH PED

## (undated) DEVICE — SOL NACL IRR 1000ML BTL

## (undated) DEVICE — NDL ECLIPSE SAFETY 18GX1-1/2IN

## (undated) DEVICE — PENCIL ROCKER SWITCH 10FT CORD

## (undated) DEVICE — CONFORMER EYE W/H PMMA LG

## (undated) DEVICE — CORD FOR BIPOLAR FORCEPS 12

## (undated) DEVICE — SPONGE WEC CEL SPEARS

## (undated) DEVICE — CLEANER CAUT TIP STRL 2X2IN